# Patient Record
Sex: FEMALE | Race: WHITE | NOT HISPANIC OR LATINO | Employment: OTHER | URBAN - METROPOLITAN AREA
[De-identification: names, ages, dates, MRNs, and addresses within clinical notes are randomized per-mention and may not be internally consistent; named-entity substitution may affect disease eponyms.]

---

## 2017-01-19 ENCOUNTER — TRANSCRIBE ORDERS (OUTPATIENT)
Dept: ADMINISTRATIVE | Facility: HOSPITAL | Age: 67
End: 2017-01-19

## 2017-01-19 DIAGNOSIS — C49.9: Primary | ICD-10-CM

## 2017-02-07 ENCOUNTER — HOSPITAL ENCOUNTER (EMERGENCY)
Facility: HOSPITAL | Age: 67
Discharge: HOME | End: 2017-02-07
Attending: EMERGENCY MEDICINE | Admitting: EMERGENCY MEDICINE
Payer: MEDICARE

## 2017-02-07 ENCOUNTER — GENERIC CONVERSION - ENCOUNTER (OUTPATIENT)
Dept: OTHER | Facility: OTHER | Age: 67
End: 2017-02-07

## 2017-02-07 ENCOUNTER — APPOINTMENT (EMERGENCY)
Dept: RADIOLOGY | Facility: HOSPITAL | Age: 67
End: 2017-02-07
Payer: MEDICARE

## 2017-02-07 VITALS
SYSTOLIC BLOOD PRESSURE: 110 MMHG | DIASTOLIC BLOOD PRESSURE: 72 MMHG | HEART RATE: 78 BPM | RESPIRATION RATE: 20 BRPM | TEMPERATURE: 98.2 F | OXYGEN SATURATION: 96 % | HEIGHT: 63 IN | WEIGHT: 167 LBS | BODY MASS INDEX: 29.59 KG/M2

## 2017-02-07 DIAGNOSIS — K52.9 GASTROENTERITIS: Primary | ICD-10-CM

## 2017-02-07 LAB
ALBUMIN SERPL BCP-MCNC: 4 G/DL (ref 3.5–5)
ALP SERPL-CCNC: 46 U/L (ref 46–116)
ALT SERPL W P-5'-P-CCNC: 21 U/L (ref 12–78)
ANION GAP SERPL CALCULATED.3IONS-SCNC: 13 MMOL/L (ref 4–13)
APTT PPP: 22 SECONDS (ref 24–36)
AST SERPL W P-5'-P-CCNC: 23 U/L (ref 5–45)
ATRIAL RATE: 80 BPM
BASOPHILS # BLD MANUAL: 0.07 THOUSAND/UL (ref 0–0.1)
BASOPHILS NFR MAR MANUAL: 1 % (ref 0–1)
BILIRUB SERPL-MCNC: 0.3 MG/DL (ref 0.2–1)
BUN SERPL-MCNC: 22 MG/DL (ref 5–25)
C DIFF TOX GENS STL QL NAA+PROBE: NORMAL
CALCIUM SERPL-MCNC: 8.8 MG/DL (ref 8.3–10.1)
CHLORIDE SERPL-SCNC: 104 MMOL/L (ref 100–108)
CO2 SERPL-SCNC: 24 MMOL/L (ref 21–32)
CREAT SERPL-MCNC: 1.14 MG/DL (ref 0.6–1.3)
EOSINOPHIL # BLD MANUAL: 0.07 THOUSAND/UL (ref 0–0.4)
EOSINOPHIL NFR BLD MANUAL: 1 % (ref 0–6)
ERYTHROCYTE [DISTWIDTH] IN BLOOD BY AUTOMATED COUNT: 14.7 % (ref 11.6–15.1)
GFR SERPL CREATININE-BSD FRML MDRD: 47.4 ML/MIN/1.73SQ M
GLUCOSE SERPL-MCNC: 131 MG/DL (ref 65–140)
HCT VFR BLD AUTO: 37.3 % (ref 34.8–46.1)
HEMOCCULT STL QL: POSITIVE
HGB BLD-MCNC: 12.1 G/DL (ref 11.5–15.4)
INR PPP: 1.04 (ref 0.86–1.16)
LACTATE SERPL-SCNC: 3.1 MMOL/L (ref 0.5–2)
LIPASE SERPL-CCNC: 78 U/L (ref 73–393)
LYMPHOCYTES # BLD AUTO: 0.2 THOUSAND/UL (ref 0.6–4.47)
LYMPHOCYTES # BLD AUTO: 3 % (ref 14–44)
MCH RBC QN AUTO: 33.2 PG (ref 26.8–34.3)
MCHC RBC AUTO-ENTMCNC: 32.4 G/DL (ref 31.4–37.4)
MCV RBC AUTO: 102 FL (ref 82–98)
MONOCYTES # BLD AUTO: 0.2 THOUSAND/UL (ref 0–1.22)
MONOCYTES NFR BLD: 3 % (ref 4–12)
NEUTROPHILS # BLD MANUAL: 6.24 THOUSAND/UL (ref 1.85–7.62)
NEUTS SEG NFR BLD AUTO: 92 % (ref 43–75)
P AXIS: 72 DEGREES
PLATELET # BLD AUTO: 179 THOUSANDS/UL (ref 149–390)
PLATELET BLD QL SMEAR: ADEQUATE
PMV BLD AUTO: 11.3 FL (ref 8.9–12.7)
POTASSIUM SERPL-SCNC: 4 MMOL/L (ref 3.5–5.3)
PR INTERVAL: 140 MS
PROT SERPL-MCNC: 6.9 G/DL (ref 6.4–8.2)
PROTHROMBIN TIME: 13.5 SECONDS (ref 12–14.3)
QRS AXIS: -46 DEGREES
QRSD INTERVAL: 126 MS
QT INTERVAL: 434 MS
QTC INTERVAL: 500 MS
RBC # BLD AUTO: 3.65 MILLION/UL (ref 3.81–5.12)
RBC MORPH BLD: NORMAL
RV AG STL QL: NEGATIVE
SODIUM SERPL-SCNC: 141 MMOL/L (ref 136–145)
T WAVE AXIS: 64 DEGREES
TOTAL CELLS COUNTED SPEC: 100
VENTRICULAR RATE: 80 BPM
WBC # BLD AUTO: 6.78 THOUSAND/UL (ref 4.31–10.16)

## 2017-02-07 PROCEDURE — 87899 AGENT NOS ASSAY W/OPTIC: CPT | Performed by: EMERGENCY MEDICINE

## 2017-02-07 PROCEDURE — 71010 HB CHEST X-RAY 1 VIEW FRONTAL (PORTABLE): CPT

## 2017-02-07 PROCEDURE — 80053 COMPREHEN METABOLIC PANEL: CPT | Performed by: EMERGENCY MEDICINE

## 2017-02-07 PROCEDURE — 82272 OCCULT BLD FECES 1-3 TESTS: CPT | Performed by: EMERGENCY MEDICINE

## 2017-02-07 PROCEDURE — 96361 HYDRATE IV INFUSION ADD-ON: CPT

## 2017-02-07 PROCEDURE — 85007 BL SMEAR W/DIFF WBC COUNT: CPT | Performed by: EMERGENCY MEDICINE

## 2017-02-07 PROCEDURE — 93005 ELECTROCARDIOGRAM TRACING: CPT | Performed by: EMERGENCY MEDICINE

## 2017-02-07 PROCEDURE — 83690 ASSAY OF LIPASE: CPT | Performed by: EMERGENCY MEDICINE

## 2017-02-07 PROCEDURE — 36415 COLL VENOUS BLD VENIPUNCTURE: CPT | Performed by: EMERGENCY MEDICINE

## 2017-02-07 PROCEDURE — 83605 ASSAY OF LACTIC ACID: CPT | Performed by: EMERGENCY MEDICINE

## 2017-02-07 PROCEDURE — 87045 FECES CULTURE AEROBIC BACT: CPT | Performed by: EMERGENCY MEDICINE

## 2017-02-07 PROCEDURE — 89055 LEUKOCYTE ASSESSMENT FECAL: CPT | Performed by: EMERGENCY MEDICINE

## 2017-02-07 PROCEDURE — 99284 EMERGENCY DEPT VISIT MOD MDM: CPT

## 2017-02-07 PROCEDURE — 87425 ROTAVIRUS AG IA: CPT | Performed by: EMERGENCY MEDICINE

## 2017-02-07 PROCEDURE — 96374 THER/PROPH/DIAG INJ IV PUSH: CPT

## 2017-02-07 PROCEDURE — 85027 COMPLETE CBC AUTOMATED: CPT | Performed by: EMERGENCY MEDICINE

## 2017-02-07 PROCEDURE — 85730 THROMBOPLASTIN TIME PARTIAL: CPT | Performed by: EMERGENCY MEDICINE

## 2017-02-07 PROCEDURE — 87046 STOOL CULTR AEROBIC BACT EA: CPT | Performed by: EMERGENCY MEDICINE

## 2017-02-07 PROCEDURE — 85610 PROTHROMBIN TIME: CPT | Performed by: EMERGENCY MEDICINE

## 2017-02-07 PROCEDURE — 87493 C DIFF AMPLIFIED PROBE: CPT | Performed by: EMERGENCY MEDICINE

## 2017-02-07 PROCEDURE — 87015 SPECIMEN INFECT AGNT CONCNTJ: CPT | Performed by: EMERGENCY MEDICINE

## 2017-02-07 RX ORDER — ONDANSETRON 2 MG/ML
4 INJECTION INTRAMUSCULAR; INTRAVENOUS ONCE
Status: COMPLETED | OUTPATIENT
Start: 2017-02-07 | End: 2017-02-07

## 2017-02-07 RX ORDER — LOPERAMIDE HYDROCHLORIDE 2 MG/1
2 CAPSULE ORAL 4 TIMES DAILY PRN
COMMUNITY

## 2017-02-07 RX ORDER — IBUPROFEN 200 MG
400 TABLET ORAL
COMMUNITY
End: 2019-06-07 | Stop reason: ALTCHOICE

## 2017-02-07 RX ORDER — ONDANSETRON 4 MG/1
4 TABLET, ORALLY DISINTEGRATING ORAL EVERY 8 HOURS PRN
Qty: 9 TABLET | Refills: 0 | Status: SHIPPED | OUTPATIENT
Start: 2017-02-07 | End: 2019-06-27

## 2017-02-07 RX ORDER — ALPRAZOLAM 0.25 MG/1
TABLET ORAL
COMMUNITY
End: 2020-05-07 | Stop reason: ALTCHOICE

## 2017-02-07 RX ORDER — TRIAMCINOLONE ACETONIDE 55 UG/1
SPRAY, METERED NASAL
COMMUNITY
Start: 2014-10-15 | End: 2018-03-12

## 2017-02-07 RX ORDER — ASPIRIN 325 MG
325 TABLET ORAL DAILY
COMMUNITY
End: 2020-05-03 | Stop reason: HOSPADM

## 2017-02-07 RX ORDER — HYDROXYCHLOROQUINE SULFATE 200 MG/1
200 TABLET, FILM COATED ORAL
COMMUNITY
Start: 2015-04-04 | End: 2019-11-01 | Stop reason: ALTCHOICE

## 2017-02-07 RX ADMIN — SODIUM CHLORIDE 1000 ML: 0.9 INJECTION, SOLUTION INTRAVENOUS at 05:22

## 2017-02-07 RX ADMIN — ONDANSETRON 4 MG: 2 INJECTION INTRAMUSCULAR; INTRAVENOUS at 04:55

## 2017-02-09 LAB
BACTERIA STL CULT: NORMAL
BACTERIA STL CULT: NORMAL

## 2017-02-12 LAB — WBC SPEC QL GRAM STN: ABNORMAL

## 2017-03-01 ENCOUNTER — TRANSCRIBE ORDERS (OUTPATIENT)
Dept: ADMINISTRATIVE | Facility: HOSPITAL | Age: 67
End: 2017-03-01

## 2017-03-01 ENCOUNTER — APPOINTMENT (OUTPATIENT)
Dept: LAB | Facility: HOSPITAL | Age: 67
End: 2017-03-01
Payer: MEDICARE

## 2017-03-01 DIAGNOSIS — C49.9 ENDOTHELIOSARCOMA (HCC): Primary | ICD-10-CM

## 2017-03-01 DIAGNOSIS — C49.9 ENDOTHELIOSARCOMA (HCC): ICD-10-CM

## 2017-03-01 LAB
ALBUMIN SERPL BCP-MCNC: 3.4 G/DL (ref 3.5–5)
ALP SERPL-CCNC: 41 U/L (ref 46–116)
ALT SERPL W P-5'-P-CCNC: 21 U/L (ref 12–78)
ANION GAP SERPL CALCULATED.3IONS-SCNC: 7 MMOL/L (ref 4–13)
AST SERPL W P-5'-P-CCNC: 20 U/L (ref 5–45)
BILIRUB SERPL-MCNC: 0.2 MG/DL (ref 0.2–1)
BUN SERPL-MCNC: 22 MG/DL (ref 5–25)
CALCIUM SERPL-MCNC: 8.2 MG/DL (ref 8.3–10.1)
CHLORIDE SERPL-SCNC: 109 MMOL/L (ref 100–108)
CO2 SERPL-SCNC: 27 MMOL/L (ref 21–32)
CREAT SERPL-MCNC: 1 MG/DL (ref 0.6–1.3)
ERYTHROCYTE [DISTWIDTH] IN BLOOD BY AUTOMATED COUNT: 14.5 % (ref 11.6–15.1)
GFR SERPL CREATININE-BSD FRML MDRD: 55.1 ML/MIN/1.73SQ M
GLUCOSE SERPL-MCNC: 90 MG/DL (ref 65–140)
HCT VFR BLD AUTO: 33 % (ref 34.8–46.1)
HGB BLD-MCNC: 10.4 G/DL (ref 11.5–15.4)
MCH RBC QN AUTO: 32.5 PG (ref 26.8–34.3)
MCHC RBC AUTO-ENTMCNC: 31.5 G/DL (ref 31.4–37.4)
MCV RBC AUTO: 103 FL (ref 82–98)
PLATELET # BLD AUTO: 180 THOUSANDS/UL (ref 149–390)
PMV BLD AUTO: 11.4 FL (ref 8.9–12.7)
POTASSIUM SERPL-SCNC: 4.4 MMOL/L (ref 3.5–5.3)
PROT SERPL-MCNC: 6.5 G/DL (ref 6.4–8.2)
RBC # BLD AUTO: 3.2 MILLION/UL (ref 3.81–5.12)
SODIUM SERPL-SCNC: 143 MMOL/L (ref 136–145)
WBC # BLD AUTO: 4.18 THOUSAND/UL (ref 4.31–10.16)

## 2017-03-01 PROCEDURE — 85027 COMPLETE CBC AUTOMATED: CPT

## 2017-03-01 PROCEDURE — 36415 COLL VENOUS BLD VENIPUNCTURE: CPT

## 2017-03-01 PROCEDURE — 80053 COMPREHEN METABOLIC PANEL: CPT

## 2017-03-16 ENCOUNTER — HOSPITAL ENCOUNTER (OUTPATIENT)
Dept: CT IMAGING | Facility: HOSPITAL | Age: 67
Discharge: HOME | End: 2017-03-16
Payer: MEDICARE

## 2017-03-16 DIAGNOSIS — C49.9: ICD-10-CM

## 2017-03-16 PROCEDURE — 71260 CT THORAX DX C+: CPT

## 2017-03-16 PROCEDURE — 74177 CT ABD & PELVIS W/CONTRAST: CPT

## 2017-03-16 RX ADMIN — IOHEXOL 100 ML: 350 INJECTION, SOLUTION INTRAVENOUS at 09:36

## 2017-03-23 ENCOUNTER — TRANSCRIBE ORDERS (OUTPATIENT)
Dept: ADMINISTRATIVE | Facility: HOSPITAL | Age: 67
End: 2017-03-23

## 2017-03-23 DIAGNOSIS — T45.1X5A CHEMOTHERAPY ADVERSE REACTION, INITIAL ENCOUNTER: Primary | ICD-10-CM

## 2017-03-28 ENCOUNTER — HOSPITAL ENCOUNTER (OUTPATIENT)
Dept: NON INVASIVE DIAGNOSTICS | Facility: HOSPITAL | Age: 67
Discharge: HOME | End: 2017-03-28
Payer: MEDICARE

## 2017-03-28 DIAGNOSIS — T45.1X5A CHEMOTHERAPY ADVERSE REACTION, INITIAL ENCOUNTER: ICD-10-CM

## 2017-03-28 PROCEDURE — 93308 TTE F-UP OR LMTD: CPT

## 2017-03-29 ENCOUNTER — TRANSCRIBE ORDERS (OUTPATIENT)
Dept: ADMINISTRATIVE | Facility: HOSPITAL | Age: 67
End: 2017-03-29

## 2017-03-29 ENCOUNTER — ALLSCRIPTS OFFICE VISIT (OUTPATIENT)
Dept: OTHER | Facility: OTHER | Age: 67
End: 2017-03-29

## 2017-03-29 DIAGNOSIS — I45.10 RIGHT BUNDLE-BRANCH BLOCK: ICD-10-CM

## 2017-04-03 ENCOUNTER — HOSPITAL ENCOUNTER (OUTPATIENT)
Dept: NON INVASIVE DIAGNOSTICS | Facility: CLINIC | Age: 67
Discharge: HOME | End: 2017-04-03
Payer: MEDICARE

## 2017-04-03 DIAGNOSIS — I45.10 RIGHT BUNDLE-BRANCH BLOCK: ICD-10-CM

## 2017-04-03 PROCEDURE — 93225 XTRNL ECG REC<48 HRS REC: CPT

## 2017-04-03 PROCEDURE — 93226 XTRNL ECG REC<48 HR SCAN A/R: CPT

## 2017-04-19 ENCOUNTER — APPOINTMENT (OUTPATIENT)
Dept: LAB | Facility: HOSPITAL | Age: 67
End: 2017-04-19
Payer: MEDICARE

## 2017-04-19 ENCOUNTER — TRANSCRIBE ORDERS (OUTPATIENT)
Dept: ADMINISTRATIVE | Facility: HOSPITAL | Age: 67
End: 2017-04-19

## 2017-04-19 DIAGNOSIS — C49.A0 STROMAL TUMOR OF DIGESTIVE SYSTEM (HCC): ICD-10-CM

## 2017-04-19 DIAGNOSIS — C79.89 SECONDARY MALIGNANT NEOPLASM OF OTHER SPECIFIED SITES(198.89): ICD-10-CM

## 2017-04-19 DIAGNOSIS — C49.A0 STROMAL TUMOR OF DIGESTIVE SYSTEM (HCC): Primary | ICD-10-CM

## 2017-04-19 LAB
ALBUMIN SERPL BCP-MCNC: 3.6 G/DL (ref 3.5–5)
ALP SERPL-CCNC: 56 U/L (ref 46–116)
ALT SERPL W P-5'-P-CCNC: 23 U/L (ref 12–78)
ANION GAP SERPL CALCULATED.3IONS-SCNC: 8 MMOL/L (ref 4–13)
AST SERPL W P-5'-P-CCNC: 26 U/L (ref 5–45)
BASOPHILS # BLD AUTO: 0.01 THOUSANDS/ΜL (ref 0–0.1)
BASOPHILS NFR BLD AUTO: 0 % (ref 0–1)
BILIRUB SERPL-MCNC: 0.3 MG/DL (ref 0.2–1)
BUN SERPL-MCNC: 19 MG/DL (ref 5–25)
CALCIUM SERPL-MCNC: 8.5 MG/DL (ref 8.3–10.1)
CHLORIDE SERPL-SCNC: 108 MMOL/L (ref 100–108)
CO2 SERPL-SCNC: 26 MMOL/L (ref 21–32)
CREAT SERPL-MCNC: 0.97 MG/DL (ref 0.6–1.3)
EOSINOPHIL # BLD AUTO: 0.51 THOUSAND/ΜL (ref 0–0.61)
EOSINOPHIL NFR BLD AUTO: 14 % (ref 0–6)
ERYTHROCYTE [DISTWIDTH] IN BLOOD BY AUTOMATED COUNT: 14.9 % (ref 11.6–15.1)
GFR SERPL CREATININE-BSD FRML MDRD: 57.1 ML/MIN/1.73SQ M
GLUCOSE P FAST SERPL-MCNC: 92 MG/DL (ref 65–99)
HCT VFR BLD AUTO: 39.3 % (ref 34.8–46.1)
HGB BLD-MCNC: 12.8 G/DL (ref 11.5–15.4)
LYMPHOCYTES # BLD AUTO: 0.57 THOUSANDS/ΜL (ref 0.6–4.47)
LYMPHOCYTES NFR BLD AUTO: 15 % (ref 14–44)
MCH RBC QN AUTO: 33 PG (ref 26.8–34.3)
MCHC RBC AUTO-ENTMCNC: 32.6 G/DL (ref 31.4–37.4)
MCV RBC AUTO: 101 FL (ref 82–98)
MONOCYTES # BLD AUTO: 0.41 THOUSAND/ΜL (ref 0.17–1.22)
MONOCYTES NFR BLD AUTO: 11 % (ref 4–12)
NEUTROPHILS # BLD AUTO: 2.22 THOUSANDS/ΜL (ref 1.85–7.62)
NEUTS SEG NFR BLD AUTO: 60 % (ref 43–75)
PLATELET # BLD AUTO: 190 THOUSANDS/UL (ref 149–390)
PMV BLD AUTO: 11.3 FL (ref 8.9–12.7)
POTASSIUM SERPL-SCNC: 4.3 MMOL/L (ref 3.5–5.3)
PROT SERPL-MCNC: 6.7 G/DL (ref 6.4–8.2)
RBC # BLD AUTO: 3.88 MILLION/UL (ref 3.81–5.12)
SODIUM SERPL-SCNC: 142 MMOL/L (ref 136–145)
TSH SERPL DL<=0.05 MIU/L-ACNC: 1.98 UIU/ML (ref 0.36–3.74)
WBC # BLD AUTO: 3.72 THOUSAND/UL (ref 4.31–10.16)

## 2017-04-19 PROCEDURE — 84443 ASSAY THYROID STIM HORMONE: CPT

## 2017-04-19 PROCEDURE — 36415 COLL VENOUS BLD VENIPUNCTURE: CPT

## 2017-04-19 PROCEDURE — 80053 COMPREHEN METABOLIC PANEL: CPT

## 2017-04-19 PROCEDURE — 85025 COMPLETE CBC W/AUTO DIFF WBC: CPT

## 2017-05-17 ENCOUNTER — APPOINTMENT (OUTPATIENT)
Dept: LAB | Facility: HOSPITAL | Age: 67
End: 2017-05-17
Payer: MEDICARE

## 2017-05-17 DIAGNOSIS — C49.A0 STROMAL TUMOR OF DIGESTIVE SYSTEM (HCC): ICD-10-CM

## 2017-05-17 LAB
ALBUMIN SERPL BCP-MCNC: 3.3 G/DL (ref 3.5–5)
ALP SERPL-CCNC: 48 U/L (ref 46–116)
ALT SERPL W P-5'-P-CCNC: 22 U/L (ref 12–78)
ANION GAP SERPL CALCULATED.3IONS-SCNC: 7 MMOL/L (ref 4–13)
AST SERPL W P-5'-P-CCNC: 27 U/L (ref 5–45)
BASOPHILS # BLD AUTO: 0.01 THOUSANDS/ΜL (ref 0–0.1)
BASOPHILS NFR BLD AUTO: 0 % (ref 0–1)
BILIRUB SERPL-MCNC: 0.3 MG/DL (ref 0.2–1)
BUN SERPL-MCNC: 20 MG/DL (ref 5–25)
CALCIUM SERPL-MCNC: 8.3 MG/DL (ref 8.3–10.1)
CHLORIDE SERPL-SCNC: 107 MMOL/L (ref 100–108)
CO2 SERPL-SCNC: 30 MMOL/L (ref 21–32)
CREAT SERPL-MCNC: 1 MG/DL (ref 0.6–1.3)
EOSINOPHIL # BLD AUTO: 0.13 THOUSAND/ΜL (ref 0–0.61)
EOSINOPHIL NFR BLD AUTO: 4 % (ref 0–6)
ERYTHROCYTE [DISTWIDTH] IN BLOOD BY AUTOMATED COUNT: 15.6 % (ref 11.6–15.1)
GFR SERPL CREATININE-BSD FRML MDRD: 55.1 ML/MIN/1.73SQ M
GLUCOSE P FAST SERPL-MCNC: 85 MG/DL (ref 65–99)
HCT VFR BLD AUTO: 39.8 % (ref 34.8–46.1)
HGB BLD-MCNC: 13.1 G/DL (ref 11.5–15.4)
LYMPHOCYTES # BLD AUTO: 0.96 THOUSANDS/ΜL (ref 0.6–4.47)
LYMPHOCYTES NFR BLD AUTO: 26 % (ref 14–44)
MCH RBC QN AUTO: 33.2 PG (ref 26.8–34.3)
MCHC RBC AUTO-ENTMCNC: 32.9 G/DL (ref 31.4–37.4)
MCV RBC AUTO: 101 FL (ref 82–98)
MONOCYTES # BLD AUTO: 0.33 THOUSAND/ΜL (ref 0.17–1.22)
MONOCYTES NFR BLD AUTO: 9 % (ref 4–12)
NEUTROPHILS # BLD AUTO: 2.22 THOUSANDS/ΜL (ref 1.85–7.62)
NEUTS SEG NFR BLD AUTO: 61 % (ref 43–75)
PLATELET # BLD AUTO: 154 THOUSANDS/UL (ref 149–390)
PMV BLD AUTO: 10.6 FL (ref 8.9–12.7)
POTASSIUM SERPL-SCNC: 4.3 MMOL/L (ref 3.5–5.3)
PROT SERPL-MCNC: 6.2 G/DL (ref 6.4–8.2)
RBC # BLD AUTO: 3.95 MILLION/UL (ref 3.81–5.12)
SODIUM SERPL-SCNC: 144 MMOL/L (ref 136–145)
TSH SERPL DL<=0.05 MIU/L-ACNC: 2.88 UIU/ML (ref 0.36–3.74)
WBC # BLD AUTO: 3.65 THOUSAND/UL (ref 4.31–10.16)

## 2017-05-17 PROCEDURE — 85025 COMPLETE CBC W/AUTO DIFF WBC: CPT

## 2017-05-17 PROCEDURE — 84443 ASSAY THYROID STIM HORMONE: CPT

## 2017-05-17 PROCEDURE — 36415 COLL VENOUS BLD VENIPUNCTURE: CPT

## 2017-05-17 PROCEDURE — 80053 COMPREHEN METABOLIC PANEL: CPT

## 2017-05-22 ENCOUNTER — TRANSCRIBE ORDERS (OUTPATIENT)
Dept: ADMINISTRATIVE | Facility: HOSPITAL | Age: 67
End: 2017-05-22

## 2017-05-22 DIAGNOSIS — C49.A0 STROMAL TUMOR OF DIGESTIVE SYSTEM (HCC): Primary | ICD-10-CM

## 2017-05-23 DIAGNOSIS — I48.0 PAROXYSMAL ATRIAL FIBRILLATION (HCC): ICD-10-CM

## 2017-05-23 DIAGNOSIS — I10 ESSENTIAL (PRIMARY) HYPERTENSION: ICD-10-CM

## 2017-06-05 ENCOUNTER — APPOINTMENT (OUTPATIENT)
Dept: LAB | Facility: HOSPITAL | Age: 67
End: 2017-06-05
Attending: INTERNAL MEDICINE
Payer: MEDICARE

## 2017-06-05 ENCOUNTER — TRANSCRIBE ORDERS (OUTPATIENT)
Dept: ADMINISTRATIVE | Facility: HOSPITAL | Age: 67
End: 2017-06-05

## 2017-06-05 ENCOUNTER — ALLSCRIPTS OFFICE VISIT (OUTPATIENT)
Dept: OTHER | Facility: OTHER | Age: 67
End: 2017-06-05

## 2017-06-05 DIAGNOSIS — I10 ESSENTIAL (PRIMARY) HYPERTENSION: ICD-10-CM

## 2017-06-05 DIAGNOSIS — C49.A0 STROMAL TUMOR OF DIGESTIVE SYSTEM (HCC): Primary | ICD-10-CM

## 2017-06-05 DIAGNOSIS — I48.0 PAROXYSMAL ATRIAL FIBRILLATION (HCC): ICD-10-CM

## 2017-06-05 DIAGNOSIS — C49.A0 STROMAL TUMOR OF DIGESTIVE SYSTEM (HCC): ICD-10-CM

## 2017-06-05 LAB
ALBUMIN SERPL BCP-MCNC: 3.4 G/DL (ref 3.5–5)
ALP SERPL-CCNC: 46 U/L (ref 46–116)
ALT SERPL W P-5'-P-CCNC: 23 U/L (ref 12–78)
ANION GAP SERPL CALCULATED.3IONS-SCNC: 9 MMOL/L (ref 4–13)
AST SERPL W P-5'-P-CCNC: 26 U/L (ref 5–45)
BASOPHILS # BLD AUTO: 0 THOUSANDS/ΜL (ref 0–0.1)
BASOPHILS NFR BLD AUTO: 0 % (ref 0–1)
BILIRUB SERPL-MCNC: 0.3 MG/DL (ref 0.2–1)
BUN SERPL-MCNC: 13 MG/DL (ref 5–25)
CALCIUM SERPL-MCNC: 8.8 MG/DL (ref 8.3–10.1)
CHLORIDE SERPL-SCNC: 104 MMOL/L (ref 100–108)
CO2 SERPL-SCNC: 28 MMOL/L (ref 21–32)
CREAT SERPL-MCNC: 1.1 MG/DL (ref 0.6–1.3)
EOSINOPHIL # BLD AUTO: 0.06 THOUSAND/ΜL (ref 0–0.61)
EOSINOPHIL NFR BLD AUTO: 2 % (ref 0–6)
ERYTHROCYTE [DISTWIDTH] IN BLOOD BY AUTOMATED COUNT: 15.9 % (ref 11.6–15.1)
GFR SERPL CREATININE-BSD FRML MDRD: 49.4 ML/MIN/1.73SQ M
GLUCOSE P FAST SERPL-MCNC: 101 MG/DL (ref 65–99)
HCT VFR BLD AUTO: 42.1 % (ref 34.8–46.1)
HGB BLD-MCNC: 13.8 G/DL (ref 11.5–15.4)
LYMPHOCYTES # BLD AUTO: 0.81 THOUSANDS/ΜL (ref 0.6–4.47)
LYMPHOCYTES NFR BLD AUTO: 30 % (ref 14–44)
MCH RBC QN AUTO: 33.3 PG (ref 26.8–34.3)
MCHC RBC AUTO-ENTMCNC: 32.8 G/DL (ref 31.4–37.4)
MCV RBC AUTO: 102 FL (ref 82–98)
MONOCYTES # BLD AUTO: 0.43 THOUSAND/ΜL (ref 0.17–1.22)
MONOCYTES NFR BLD AUTO: 16 % (ref 4–12)
NEUTROPHILS # BLD AUTO: 1.45 THOUSANDS/ΜL (ref 1.85–7.62)
NEUTS SEG NFR BLD AUTO: 52 % (ref 43–75)
PLATELET # BLD AUTO: 175 THOUSANDS/UL (ref 149–390)
PMV BLD AUTO: 10.3 FL (ref 8.9–12.7)
POTASSIUM SERPL-SCNC: 4.1 MMOL/L (ref 3.5–5.3)
PROT SERPL-MCNC: 6.4 G/DL (ref 6.4–8.2)
RBC # BLD AUTO: 4.14 MILLION/UL (ref 3.81–5.12)
SODIUM SERPL-SCNC: 141 MMOL/L (ref 136–145)
WBC # BLD AUTO: 2.75 THOUSAND/UL (ref 4.31–10.16)

## 2017-06-05 PROCEDURE — 36415 COLL VENOUS BLD VENIPUNCTURE: CPT

## 2017-06-05 PROCEDURE — 85025 COMPLETE CBC W/AUTO DIFF WBC: CPT

## 2017-06-05 PROCEDURE — 80053 COMPREHEN METABOLIC PANEL: CPT

## 2017-06-09 ENCOUNTER — ALLSCRIPTS OFFICE VISIT (OUTPATIENT)
Dept: RADIOLOGY | Facility: CLINIC | Age: 67
End: 2017-06-09
Payer: MEDICARE

## 2017-06-14 ENCOUNTER — HOSPITAL ENCOUNTER (OUTPATIENT)
Dept: CT IMAGING | Facility: HOSPITAL | Age: 67
Discharge: HOME | End: 2017-06-14
Payer: MEDICARE

## 2017-06-14 DIAGNOSIS — C49.A0 STROMAL TUMOR OF DIGESTIVE SYSTEM (HCC): ICD-10-CM

## 2017-06-14 PROCEDURE — 74177 CT ABD & PELVIS W/CONTRAST: CPT

## 2017-06-14 RX ADMIN — IODIXANOL 85 ML: 320 INJECTION, SOLUTION INTRAVASCULAR at 09:26

## 2017-07-25 ENCOUNTER — ALLSCRIPTS OFFICE VISIT (OUTPATIENT)
Dept: OTHER | Facility: OTHER | Age: 67
End: 2017-07-25

## 2017-12-12 ENCOUNTER — ALLSCRIPTS OFFICE VISIT (OUTPATIENT)
Dept: OTHER | Facility: OTHER | Age: 67
End: 2017-12-12

## 2017-12-13 ENCOUNTER — GENERIC CONVERSION - ENCOUNTER (OUTPATIENT)
Dept: OTHER | Facility: OTHER | Age: 67
End: 2017-12-13

## 2017-12-13 NOTE — PROGRESS NOTES
was demonstrated using models and literature was provided  Verbal and written consent was obtained  because of helpful our schedule is and her schedule with chemotherapy, we would not be able to proceed with injection for at least another 2 weeks and since I do feel there is definitely significant inflammatory component, we are going to do a titrating dose of oral prednisone  discussed with the patient that at this point time since I feel that there is a significant inflammatory component to their pain symptoms, that they would benefit from a titrating dose of oral steroids over the next 7 days  I advised the patient that while on the steroids, they should not take any other oral NSAIDs except for acetaminophen or Tylenol until they have completed the steroid taper  I also advised them that once they have completed the steroid taper, they are to give our office a follow up phone call to let us know how they are doing and if there is any improvement  The patient was agreeable and verbalized an understanding  discussed with the patient that at this point time she can followup with our office on an as-needed basis  I did review the patient that if her pain symptoms should change, worsen, and/or if she would experience any new symptoms as she would like to be evaluated for, she should give our office a call  The patient was agreeable and verbalized an understanding  The patient has the current Goals: To proceed with a repeat right SI joint injection and the prednisone taper as discussed and hopefully note at least moderate and stable relief soon  The patent has the current Barriers: Chronic pain syndrome  Patient is able to Self-Care  The treatment plan was reviewed with the patient/guardian   The patient/guardian understands and agrees with the treatment plan   The patient was counseled regarding instructions for management,-- prognosis,-- patient and family education,-- impressions,-- risks and benefits of treatment options-- and-- importance of compliance with treatment  total time of encounter was 25 minutes  Chief Complaint    1  Pain  Worsening right sided low back/SI joint and leg pain, returned  History of Present Illness  The patient presents today for a follow up office visit and re-evaluation of the return of her right-sided low back, SI joint, and leg pain  She reports that the right sacroiliac joint injection she received on June 9, 2017 provided moderate to significant relief up until about 1 week ago  She states that without any trauma or injury as she was just lying in bed and rolled over, she heard a snapping sound, which caused significant pain and ever since then the right SI joint and low back pain in the same areas as it was before has been significantly inflamed and causing pain  She reports that she has been using heat, cold, and ibuprofen with minimal relief  She also followed up with a chiropractor who feels it is her SI joint and she presents today to discuss the possibility proceed with a repeat SI joint injection with Dr Navya Patel  However, the patient is now on new chemotherapy, which also makes it difficult for her to schedule the injection and presents today to discuss her medication regimen and treatment plan  Sarah Johnson presents with complaints of constant episodes of severe right lower back, right buttock, right hip, right thigh and right knee pain, described as throbbing and cramping, radiating to the lower back, right buttock and right lower extremity  On a scale of 1 to 10, the patient rates the pain as 10  Symptoms are worsening  Review of Systems   Constitutional: no fever,-- no recent weight gain-- and-- no recent weight loss  Eyes: no double vision-- and-- no blurry vision  Cardiovascular: no chest pain,-- no palpitations-- and-- no lower extremity edema  Respiratory: shortness of breath, but-- no wheezing    Musculoskeletal: difficulty walking,-- joint stiffness-- and-- decreased range of motion, but-- no muscle weakness,-- no joint swelling,-- no limb swelling-- and-- no pain in extremity  Neurological: no dizziness,-- no difficulty swallowing,-- no memory loss,-- no loss of consciousness-- and-- no seizures  Gastrointestinal: constipation-- and-- diarrhea, but-- no nausea-- and-- no vomiting  Genitourinary: no difficulty initiating urine stream,-- no genital pain-- and-- no frequent urination  Integumentary: no complaints of skin rash  Psychiatric: no depression  Endocrine: no excessive thirst,-- no adrenal disease,-- no hypothyroidism-- and-- no hyperthyroidism  Hematologic/Lymphatic: no tendency for easy bruising-- and-- no tendency for easy bleeding  Active Problems    1  Anxiety (300 00) (F41 9)   2  Chronic hip pain, right (099 11,222 24) (M25 551,G89 29)   3  Chronic right-sided low back pain without sciatica (724 2,338 29) (M54 5,G89 29)   4  Closed nondisplaced fracture of neck of left radius with delayed healing, subsequent encounter (V54 12) (S52 135G)   5  DJD (degenerative joint disease) (715 90) (M19 90)   6  Gait disturbance (781 2) (R26 9)   7  Greater trochanteric bursitis of right hip (726 5) (M70 61)   8  Hallux Limitus Of The Left First Toe (735 8)   9  Herniated nucleus pulposus, L5-S1, right (722 10) (M51 27)   10  Hiatal hernia (553 3) (K44 9)   11  Hypertension (401 9) (I10)   12  Lumbar stenosis without neurogenic claudication (724 02) (M48 061)   13  Macrocytic Anemia With Vitamin B12 Deficiency (281 1)   14  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)   15  MARK (obstructive sleep apnea) (327 23) (G47 33)   16  Pain in joint, hand (719 44) (M25 549)   17  Pain syndrome, chronic (338 4) (G89 4)   18  Paroxysmal atrial fibrillation (427 31) (I48 0)   19  Premature atrial contractions (427 61) (I49 1)   20  Pre-procedural examination (V72 84) (Z01 818)   21  Right bundle-branch block (426 4) (I45 10)   22   Sacroiliac joint dysfunction of right side (724 6) (M53 3)   23  Sacroiliitis (720 2) (M46 1)   24  Spondylosis of lumbar region without myelopathy or radiculopathy (721 3) (M47 816)   25  Sprain of medial collateral ligament of left knee, subsequent encounter (V58 89,844 1)  (S83 412D)   26  Status post lumbar laminectomy (V45 89) (Z98 890)   27  Supraventricular tachycardia (427 89) (I47 1)    Past Medical History    1  History of Chronic lumbar radiculopathy (724 4) (M54 16)   2  History of Closed fracture of neck of left radius, sequela   3  History of Closed nondisplaced fracture of neck of left radius with routine healing, subsequent encounter (V54 12) (S52 135D)   4  History of Closed nondisplaced fracture of neck of left radius, initial encounter (813 06) (S52 135A)   5  History of GIST (gastrointestinal stromal tumor), 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)   6  History of atrial fibrillation (V12 59) (Z86 79)   7  History of liver cancer (V10 07) (Z85 05)   8  Personal history of arthritis (V13 4) (Z87 39)   9  History of Rectal cancer (154 1) (C20)   10  History of Reflux (530 81) (K21 9)   11  History of Sprain of medial collateral ligament of knee, left, initial encounter    The active problems and past medical history were reviewed and updated today  Surgical History  1  History of Back Surgery   2  History of Breast Surgery   3  History of Bunion Correction By Lapidus Procedure   4  History of Cataract Surgery   5  History of Hallux Valgus (Bunion) Correction   6  History of Rotator Cuff Repair   7  History of Shoulder Arthroscopy   8  History of Sinus Surgery   9  History of Small Bowel Resection   10  History of Tympanoplasty    The surgical history was reviewed and updated today  Family History  Mother    1  Family history of liver cancer (V16 0) (Z80 0)   2  Family history of lung cancer (V16 1) (Z80 1)   3  Family history of Hip joint replacement status  Father    4   Family history of Prostate cancer  Family History    5  Family history of Back disorder   6  Family history of cardiac disorder (V17 49) (Z82 49)   7  Family history of cerebrovascular accident (V17 1) (Z82 3)   8  Family history of malignant neoplasm (V16 9) (Z80 9)    The family history was reviewed and updated today  Social History     · Former smoker (G55 80) (J71 078)  The social history was reviewed and updated today  The social history was reviewed and is unchanged  Current Meds   1  ALPRAZolam 0 25 MG Oral Tablet; TAKE 1 TABLET AT BEDTIME; Therapy: (Recorded:16Oct2013) to Recorded   2  Aspirin 325 MG Oral Tablet; TAKE 1 TABLET DAILY; Therapy: (Recorded:16Oct2013) to Recorded   3  Hydroxychloroquine Sulfate 200 MG Oral Tablet; Take 1 tablet twice daily; Therapy: 68TQO6049 to Recorded   4  Metoprolol Succinate ER 25 MG Oral Tablet Extended Release 24 Hour; take 1 tablet twice a day; Therapy: 60TFT7222 to (Bijan Vogel)  Requested for: 47MCM1810; Last Rx:75Whx2771 Ordered   5  Nasacort AQ 55 MCG/ACT AERS; Therapy: (Recorded:16Oct2013) to Recorded   6  Probiotic CAPS; Therapy: (Recorded:40Czr8108) to Recorded   7  Reglan SOLN; Therapy: (Recorded:33Aab6287) to Recorded   8  Tums CHEW; Therapy: (Recorded:51Pmf9166) to Recorded   9  Tylenol Extra Strength 500 MG Oral Tablet Recorded   10  Zofran TABS; Therapy: (Recorded:04Xoh9769) to Recorded    The medication list was reviewed and updated today  Allergies  1  Adhesive 1x6yd TAPE   2  Codeine Derivatives   3  Neosporin OINT   4  Zomig TABS    Vitals  Vital Signs    Recorded: 91Uds9286 07:35AM   Temperature 98 F   Heart Rate 64   Systolic 394   Diastolic 70   Height 5 ft 3 in   Weight 154 lb    BMI Calculated 27 28   BSA Calculated 1 73   Pain Scale 10       Physical Exam   Constitutional  General appearance: Well developed, well nourished, alert, in no distress, non-toxic and no overt pain behavior  Eyes  Sclera: anicteric  HEENT  Hearing grossly intact     Pulmonary  Respiratory

## 2017-12-19 ENCOUNTER — TRANSCRIBE ORDERS (OUTPATIENT)
Dept: ADMINISTRATIVE | Facility: HOSPITAL | Age: 67
End: 2017-12-19

## 2017-12-19 ENCOUNTER — APPOINTMENT (OUTPATIENT)
Dept: LAB | Facility: HOSPITAL | Age: 67
End: 2017-12-19
Payer: MEDICARE

## 2017-12-19 DIAGNOSIS — C49.A0 STROMAL TUMOR OF DIGESTIVE SYSTEM (HCC): ICD-10-CM

## 2017-12-19 DIAGNOSIS — C49.A0 STROMAL TUMOR OF DIGESTIVE SYSTEM (HCC): Primary | ICD-10-CM

## 2017-12-19 LAB — TSH SERPL DL<=0.05 MIU/L-ACNC: 2.77 UIU/ML (ref 0.36–3.74)

## 2017-12-19 PROCEDURE — 36415 COLL VENOUS BLD VENIPUNCTURE: CPT

## 2017-12-19 PROCEDURE — 84443 ASSAY THYROID STIM HORMONE: CPT

## 2017-12-28 ENCOUNTER — HOSPITAL ENCOUNTER (OUTPATIENT)
Dept: RADIOLOGY | Facility: CLINIC | Age: 67
Discharge: HOME | End: 2018-01-03
Attending: ANESTHESIOLOGY | Admitting: ANESTHESIOLOGY
Payer: MEDICARE

## 2018-01-10 NOTE — MISCELLANEOUS
Message   Recorded as Task   Date: 03/25/2016 10:47 AM, Created By: Edith Blevins   Task Name: Follow Up   Assigned To: [facility]   Regarding Patient: Zoltan Hood, Status: In Progress   Comment:    BennieNathan - 25 Mar 2016 10:47 AM     TASK CREATED  Can you call the patient and advise her that her right lower ext EMG was normal and did not show any evidence of active or chronic lumbar radiculopathy  I would recommend she proceed with the Gabapentin titration first and f/u in 3-4 weeks and we will re-group then  For now, I think we should hold off on any injections  Sari Gallardo - 25 Mar 2016 11:51 AM     TASK EDITED  *** FYI ***  s/w pt, advised of above  Scheduled fu ov 4/20/2016 at 0915  Pt states she is currently taking gabapentin 1 pill qhs  Helps w/ sleep, noted drowsiness in the morning - improving  Pt states she has a h/o loose bowels, states gabapentin may be contributing  Advised pt - these are common se's which should improve w/ time as her body adjusts  BennieNathan - 25 Mar 2016 11:58 AM     TASK REPLIED TO: Previously Assigned To Edith Blevins  Provider aware  Agree with recommendations  THank you  Active Problems    1  Acute exacerbation of chronic low back pain (724 2,338 19,338 29) (M54 5,G89 29)   2  Anxiety (300 00) (F41 9)   3  Chronic hip pain, right (068 82,723 25) (M25 551,G89 29)   4  Chronic lumbar radiculopathy (724 4) (M54 16)   5  Closed nondisplaced fracture of neck of left radius with delayed healing, subsequent   encounter (V54 12) (S52 135G)   6  DJD (degenerative joint disease) (715 90) (M19 90)   7  Gait disturbance (781 2) (R26 9)   8  Greater trochanteric bursitis of right hip (726 5) (M70 61)   9  Hallux Limitus Of The Left First Toe (735 8)   10  Herniated nucleus pulposus, L5-S1, right (722 10) (M51 27)   11  Hiatal hernia (553 3) (K44 9)   12  Hypertension (401 9) (I10)   13   Lumbar stenosis without neurogenic claudication (724 02) (M48 06)   14  Macrocytic Anemia With Vitamin B12 Deficiency (281 1)   15  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)   16  MARK (obstructive sleep apnea) (327 23) (G47 33)   17  Pain in joint, hand (719 44) (M79 643)   18  Pain syndrome, chronic (338 4) (G89 4)   19  Paroxysmal atrial fibrillation (427 31) (I48 0)   20  Premature atrial contractions (427 61) (I49 1)   21  Pre-procedural examination (V72 84) (Z01 818)   22  Right bundle-branch block (426 4) (I45 10)   23  Sacroiliitis (720 2) (M46 1)   24  Spondylosis of lumbar region without myelopathy or radiculopathy (721 3) (M47 816)   25  Sprain of medial collateral ligament of left knee, subsequent encounter (V58 89,844 1)    (S83 412D)   26  Status post lumbar laminectomy (V45 89) (Z98 89)   27  Supraventricular tachycardia (427 89) (I47 1)    Current Meds   1  ALPRAZolam 0 25 MG Oral Tablet; TAKE 1 TABLET AT BEDTIME; Therapy: (Recorded:16Oct2013) to Recorded   2  Aspirin 325 MG Oral Tablet; TAKE 1 TABLET DAILY; Therapy: (Recorded:16Oct2013) to Recorded   3  Essential Magnesium TABS Recorded   4  Gabapentin 300 MG Oral Capsule; Take 1 pill every other night x 7 days, then 1 every   night; Therapy: 34QHG2810 to (Evaluate:15Apr2016)  Requested for: 29AKJ4287; Last   Rx:16Mar2016 Ordered   5  Gleevec 400 MG Oral Tablet (Imatinib Mesylate); TAKE 1 TABLET DAILY; Therapy: (Recorded:16Oct2013) to Recorded   6  Hydroxychloroquine Sulfate 200 MG Oral Tablet; Take 1 tablet twice daily; Therapy: 42KOU6053 to Recorded   7  Metoprolol Succinate ER 25 MG Oral Tablet Extended Release 24 Hour; Take 1 tablet   twice daily; Therapy: 03ZEN7924 to 03 17 74 30 53)  Requested for: 23HKQ9809; Last   Rx:02Nov2015 Ordered   8  Nasacort AQ 55 MCG/ACT AERS; Therapy: (Recorded:16Oct2013) to Recorded   9  Omeprazole 20 MG Oral Capsule Delayed Release; TAKE 1 CAPSULE DAILY; Therapy: (Recorded:16Oct2013) to Recorded   10   Tylenol Extra Strength 500 MG Oral Tablet Recorded   11  Vitamin D 2000 UNIT Oral Capsule; Therapy: (Recorded:16Oct2013) to Recorded   12  Voltaren 1 % Transdermal Gel; Apply 4 grams to right hip QID; Therapy: 83DNP0977 to (Evaluate:31Jan2016)  Requested for: 24Ymp3498; Last    Rx:89Kuh0873 Ordered    Allergies    1  Adhesive 1"x6yd TAPE   2  Codeine Derivatives   3  Neosporin OINT   4   Zomig TABS    Signatures   Electronically signed by : Chano Fink, ; Mar 25 2016 11:59AM EST                       (Author)

## 2018-01-12 VITALS
TEMPERATURE: 98.3 F | WEIGHT: 159 LBS | DIASTOLIC BLOOD PRESSURE: 82 MMHG | SYSTOLIC BLOOD PRESSURE: 138 MMHG | HEART RATE: 72 BPM | BODY MASS INDEX: 28.17 KG/M2 | HEIGHT: 63 IN

## 2018-01-12 VITALS
SYSTOLIC BLOOD PRESSURE: 118 MMHG | TEMPERATURE: 98.3 F | DIASTOLIC BLOOD PRESSURE: 64 MMHG | WEIGHT: 159 LBS | BODY MASS INDEX: 28.17 KG/M2 | HEART RATE: 78 BPM | HEIGHT: 63 IN

## 2018-01-13 VITALS
WEIGHT: 172 LBS | DIASTOLIC BLOOD PRESSURE: 78 MMHG | SYSTOLIC BLOOD PRESSURE: 124 MMHG | RESPIRATION RATE: 14 BRPM | HEIGHT: 63 IN | HEART RATE: 68 BPM | BODY MASS INDEX: 30.48 KG/M2

## 2018-01-16 ENCOUNTER — ALLSCRIPTS OFFICE VISIT (OUTPATIENT)
Dept: OTHER | Facility: OTHER | Age: 68
End: 2018-01-16

## 2018-01-16 DIAGNOSIS — M54.16 RADICULOPATHY OF LUMBAR REGION: ICD-10-CM

## 2018-01-16 DIAGNOSIS — M25.551 PAIN IN RIGHT HIP: ICD-10-CM

## 2018-01-16 NOTE — MISCELLANEOUS
Message   Recorded as Task   Date: 07/11/2016 01:09 PM, Created By: Jeannette Hargrove   Task Name: Med Renewal Request   Assigned To: [facility] [place] [facility]   Regarding Patient: Roman Santamaria, Status: In Progress   Comment:    Abby Peralta - 11 Jul 2016 1:09 PM     TASK CREATED  Caller: Self; Renew Medication; [phone] (Home)  Received VM from pt  on [facility] triage line from 1230 pm  Pt  states that she was told to call when she needed a refill on her medication  Pt  states that she needs a rx refill on Nortriptyline 10 mg  Pt  requesting c/b at [phone]  Abby Peralta - 11 Jul 2016 2:05 PM     TASK EDITED  Attempted to reach pt  LMOM for pt  to c/b  Abby Peralta - 11 Jul 2016 2:05 PM     TASK IN PROGRESS   Abby Peralta - 11 Jul 2016 4:18 PM     TASK EDITED  Received VM from pt  on [facility] triage line from 248 pm  Pt  states that she is just returning our phone call  Will be home the rest of the afternoon  Sari Gallardo - 12 Jul 2016 11:39 AM     TASK EDITED  LMOM to cb on home/ cell  Batsheva Gonzalez - 12 Jul 2016 11:46 AM     TASK REASSIGNED: Previously Assigned To [facility] [place] [facility]  Spoke with pt who is requesting refill of nortriptyline 10mg after dinner qd to [facility]  States taking it after dinner and not qhs has only helped a little bit with her lethargy during the day  States she ran out of med 1 1/2 weeks ago-states she wanted to see how she would do without it  States her pain hs returned to her back and right leg  So, pt would like to continue on the med  Advised pt of our 49YQ refill policy and that this med should not be stopped abruptly  Nathan Avery - 12 Jul 2016 11:50 AM     TASK REPLIED TO: Previously Assigned To Nathan Avery  I escribed a script, but since she has been off of the med she should take it every other day x 1 week, then every day  She should f/u as scheduled in August and we will proceed from there     Urmila Patel - 12 Jul 2016 3:15 PM     TASK EDITED    I spoke with pt, advised above  Pt verbalizes understanding  ***************        Active Problems    1  Acute exacerbation of chronic low back pain (724 2,338 19,338 29) (M54 5,G89 29)   2  Anxiety (300 00) (F41 9)   3  Chronic hip pain, right (406 14,836 42) (M25 551,G89 29)   4  Closed nondisplaced fracture of neck of left radius with delayed healing, subsequent   encounter (V54 12) (S52 135G)   5  DJD (degenerative joint disease) (715 90) (M19 90)   6  Gait disturbance (781 2) (R26 9)   7  Greater trochanteric bursitis of right hip (726 5) (M70 61)   8  Hallux Limitus Of The Left First Toe (735 8)   9  Herniated nucleus pulposus, L5-S1, right (722 10) (M51 27)   10  Hiatal hernia (553 3) (K44 9)   11  Hypertension (401 9) (I10)   12  Lumbar stenosis without neurogenic claudication (724 02) (M48 06)   13  Macrocytic Anemia With Vitamin B12 Deficiency (281 1)   14  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)   15  MARK (obstructive sleep apnea) (327 23) (G47 33)   16  Pain in joint, hand (719 44) (M79 643)   17  Pain syndrome, chronic (338 4) (G89 4)   18  Paroxysmal atrial fibrillation (427 31) (I48 0)   19  Premature atrial contractions (427 61) (I49 1)   20  Pre-procedural examination (V72 84) (Z01 818)   21  Right bundle-branch block (426 4) (I45 10)   22  Sacroiliac joint dysfunction of right side (724 6) (M53 3)   23  Sacroiliitis (720 2) (M46 1)   24  Spondylosis of lumbar region without myelopathy or radiculopathy (721 3) (M47 816)   25  Sprain of medial collateral ligament of left knee, subsequent encounter (V58 89,844 1)    (S83 412D)   26  Status post lumbar laminectomy (V45 89) (Z98 89)   27  Supraventricular tachycardia (427 89) (I47 1)    Current Meds   1  ALPRAZolam 0 25 MG Oral Tablet; TAKE 1 TABLET AT BEDTIME; Therapy: (Recorded:16Oct2013) to Recorded   2  Aspirin 325 MG Oral Tablet; TAKE 1 TABLET DAILY; Therapy: (Recorded:16Oct2013) to Recorded   3   Calcium 600 MG Oral Tablet Recorded   4  Essential Magnesium TABS Recorded   5  Gleevec 400 MG Oral Tablet (Imatinib Mesylate); TAKE 1 TABLET DAILY; Therapy: (Recorded:16Oct2013) to Recorded   6  Hydroxychloroquine Sulfate 200 MG Oral Tablet; Take 1 tablet twice daily; Therapy: 79VRL4233 to Recorded   7  Metoprolol Succinate ER 25 MG Oral Tablet Extended Release 24 Hour; Take 1 tablet   twice daily; Therapy: 14YKX1439 to 51-30-20-57)  Requested for: 36ICJ3303; Last   Rx:02Nov2015 Ordered   8  Nasacort AQ 55 MCG/ACT AERS; Therapy: (Recorded:16Oct2013) to Recorded   9  Nortriptyline HCl - 10 MG Oral Capsule; Take 1 every other day a dinner x 1 week, then 1   daily at dinner; Therapy: 20Apr2016 to (Evaluate:11Aug2016)  Requested for: 38Mqj5086; Last   Rx:70Dkp1766 Ordered   10  Omeprazole 20 MG Oral Capsule Delayed Release; TAKE 1 CAPSULE DAILY; Therapy: (Recorded:16Oct2013) to Recorded   11  Tylenol Extra Strength 500 MG Oral Tablet Recorded   12  Vitamin D 2000 UNIT Oral Capsule; Therapy: (Recorded:16Oct2013) to Recorded    Allergies    1  Adhesive 1"x6yd TAPE   2  Codeine Derivatives   3  Neosporin OINT   4  Zomig TABS    Signatures   Electronically signed by :  Valeria Barthel, ; Jul 12 2016  3:15PM EST                       (Author)

## 2018-01-17 NOTE — PROGRESS NOTES
Assessment   1  Chronic hip pain, right (182 85,776 08) (M25 551,G89 29)   2  Right lumbar radiculitis (724 4) (M54 16)    Plan   Chronic hip pain, right    · * XR HIP/PELV 2-3 VWS RIGHT W PELVIS IF PERFORMED; Status:Active; Requested    YPQ:[id number]; Perform:[facility]; YKB:[id number]; For:Chronic hip pain, right; Ordered By:Tre Rdz;  Right lumbar radiculitis    · * CT SPINE LUMBAR WO CONTRAST; Status:Hold For - Scheduling; Requested    IHN:[id number]; Perform:[facility]; MNI:[id number]; For:Right lumbar radiculitis; Ordered By:Tre Rdz; Discussion/Summary      At this point the patient is having worsening symptoms I think it most likely secondary to stenosis requesting an MRI however the patient is extremely claustrophobic and will order CT scan  obtain hip radiographs trial any other acute pathology  need CBC if any neuro axial techniques would be performed  did offer patient to try alternative medications for her pain but she would like to hold off at this time  follow up with to obtain the images  The patient has the current Goals: Decreased pain increased function  The patent has the current Barriers: GIST, chronic pain syndrome  Patient is able to Self-Care  The treatment plan was reviewed with the patient/guardian  The patient/guardian understands and agrees with the treatment plan    The patient was counseled regarding diagnostic results,-- instructions for management,-- risk factor reductions,-- prognosis,-- patient and family education,-- risks and benefits of treatment options-- and-- importance of compliance with treatment  Self Referrals: No      Chief Complaint   1  Pain    History of Present Illness   Patient not obtain significant relief with right sacroiliac joint injection  has pain now radiating down her right leg and pain into her groin and anterior thigh  is currently undergoing experimental chemotherapy     with pain that she rates as 10/10 on the visual analog scale  Madhuri Diaz presents with complaints of constant episodes of severe right buttock, right hip and right thigh pain, described as sharp and burning  On a scale of 1 to 10, the patient rates the pain as 10  Symptoms are unchanged  Review of Systems        Constitutional: no fever,-- no recent weight gain-- and-- no recent weight loss  Eyes: no double vision-- and-- no blurry vision  Cardiovascular: no chest pain,-- no palpitations-- and-- no lower extremity edema  Respiratory: shortness of breath, but-- no wheezing  Musculoskeletal: joint stiffness, but-- no difficulty walking,-- no muscle weakness,-- no joint swelling,-- no limb swelling,-- no pain in extremity-- and-- no decreased range of motion  Neurological: no dizziness,-- no difficulty swallowing,-- no memory loss,-- no loss of consciousness-- and-- no seizures  Gastrointestinal: constipation-- and-- diarrhea, but-- no nausea-- and-- no vomiting  Genitourinary: no difficulty initiating urine stream,-- no genital pain-- and-- no frequent urination  Integumentary: no complaints of skin rash  Psychiatric: no depression  Endocrine: no excessive thirst,-- no adrenal disease,-- no hypothyroidism-- and-- no hyperthyroidism  Hematologic/Lymphatic: no tendency for easy bruising-- and-- no tendency for easy bleeding  ROS reviewed  Active Problems   1  Anxiety (300 00) (F41 9)   2  Chronic hip pain, right (473 17,184 33) (M25 551,G89 29)   3  Chronic right-sided low back pain without sciatica (724 2,338 29) (M54 5,G89 29)   4  Closed nondisplaced fracture of neck of left radius with delayed healing, subsequent     encounter (V54 12) (S52 135G)   5  DJD (degenerative joint disease) (715 90) (M19 90)   6  Gait disturbance (781 2) (R26 9)   7  Greater trochanteric bursitis of right hip (726 5) (M70 61)   8   Hallux Limitus Of The Left First Toe (735 8)   9  Herniated nucleus pulposus, L5-S1, right (722 10) (M51 27)   10  Hiatal hernia (553 3) (K44 9)   11  Hypertension (401 9) (I10)   12  Lumbar stenosis without neurogenic claudication (724 02) (M48 061)   13  Macrocytic Anemia With Vitamin B12 Deficiency (281 1)   14  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)   15  MARK (obstructive sleep apnea) (327 23) (G47 33)   16  Pain in joint, hand (719 44) (M25 549)   17  Pain syndrome, chronic (338 4) (G89 4)   18  Paroxysmal atrial fibrillation (427 31) (I48 0)   19  Premature atrial contractions (427 61) (I49 1)   20  Pre-procedural examination (V72 84) (Z01 818)   21  Right bundle-branch block (426 4) (I45 10)   22  Sacroiliac joint dysfunction of right side (724 6) (M53 3)   23  Sacroiliitis (720 2) (M46 1)   24  Spondylosis of lumbar region without myelopathy or radiculopathy (721 3) (M47 816)   25  Sprain of medial collateral ligament of left knee, subsequent encounter (V58 89,844 1)      (S83 412D)   26  Status post lumbar laminectomy (V45 89) (Z98 890)   27  Supraventricular tachycardia (427 89) (I47 1)    Past Medical History   1  History of Chronic lumbar radiculopathy (724 4) (M54 16)   2  History of Closed fracture of neck of left radius, sequela   3  History of Closed nondisplaced fracture of neck of left radius with routine healing,     subsequent encounter (V54 12) (S52 135D)   4  History of Closed nondisplaced fracture of neck of left radius, initial encounter (813 06)     (S52 135A)   5  History of GIST (gastrointestinal stromal tumor), 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)   6  History of atrial fibrillation (V12 59) (Z86 79)   7  History of liver cancer (V10 07) (Z85 05)   8  Personal history of arthritis (V13 4) (Z87 39)   9  History of Rectal cancer (154 1) (C20)   10  History of Reflux (530 81) (K21 9)   11   History of Sprain of medial collateral ligament of knee, left, initial encounter     The active problems and past medical history were reviewed and updated today  Surgical History   1  History of Back Surgery   2  History of Breast Surgery   3  History of Bunion Correction By Lapidus Procedure   4  History of Cataract Surgery   5  History of Hallux Valgus (Bunion) Correction   6  History of Rotator Cuff Repair   7  History of Shoulder Arthroscopy   8  History of Sinus Surgery   9  History of Small Bowel Resection   10  History of Tympanoplasty     The surgical history was reviewed and updated today  Family History   Mother    1  Family history of liver cancer (V16 0) (Z80 0)   2  Family history of lung cancer (V16 1) (Z80 1)   3  Family history of Hip joint replacement status  Father    4  Family history of Prostate cancer  Family History    5  Family history of Back disorder   6  Family history of cardiac disorder (V17 49) (Z82 49)   7  Family history of cerebrovascular accident (V17 1) (Z82 3)   8  Family history of malignant neoplasm (V16 9) (Z80 9)     The family history was reviewed and updated today  Social History    · Former smoker (G98 73) (J38 047)  The social history was reviewed and updated today  Current Meds    1  ALPRAZolam 0 25 MG Oral Tablet; TAKE 1 TABLET AT BEDTIME; Therapy: (Recorded:16Oct2013) to Recorded   2  Aspirin 325 MG Oral Tablet; TAKE 1 TABLET DAILY; Therapy: (Recorded:16Oct2013) to Recorded   3  Hydroxychloroquine Sulfate 200 MG Oral Tablet; Take 1 tablet twice daily; Therapy: 57ZVR4552 to Recorded   4  Metoprolol Succinate ER 25 MG Oral Tablet Extended Release 24 Hour; take 1 tablet     twice a day; Therapy: 57MWS2685 to ([facility])  Requested for: 81LKC9036; Last     Rx:03Jjx0313 Ordered   5  Nasacort AQ 55 MCG/ACT AERS; Therapy: (Recorded:92Axl4313) to Recorded   6  Probiotic CAPS; Therapy: (Recorded:83Mgp7583) to Recorded   7  Reglan SOLN;     Therapy: (Recorded:15Wcb7511) to Recorded   8  Tums CHEW;     Therapy: (Recorded:56Xjq3963) to Recorded   9   Tylenol Extra Strength 500 MG Oral Tablet Recorded   10  Zofran TABS; Therapy: (Recorded:07Zmb3622) to Recorded     The medication list was reviewed and updated today  Allergies   1  Adhesive 1x6yd TAPE   2  Codeine Derivatives   3  Neosporin OINT   4  Zomig TABS    Vitals   Vital Signs    Recorded: 46SKL1723 01:14PM   Temperature 98 F   Heart Rate 68   Systolic 933   Diastolic 64   Height 5 ft 3 in   Weight 154 lb    BMI Calculated 27 28   BSA Calculated 1 73   Pain Scale 10     Physical Exam        Constitutional      General appearance: Abnormal   underweight  Eyes      Sclera: anicteric      HEENT      Hearing grossly intact  Neck      Neck: Supple, symmetric, trachea midline, no masses  Pulmonary      Respiratory effort: Even and unlabored  Cardiovascular      Examination of extremities: No edema or pitting edema present  Skin      Skin and subcutaneous tissue: Normal without rashes or lesions, well hydrated  Psychiatric      Mood and affect: Mood and affect appropriate  Neurologic      Cranial nerves: Cranial nerves II-XII grossly intact  Musculoskeletal      Gait and station: Abnormal   Gait evaluation demonstrated slow, deliberate sit to stand transfer  Results/Data   Results Free Text Form [facility]:    Results     I personally reviewed the films/images in the office today  * MRI Lumbar Spine With and Without Contrast 94ASR4895 02:14PM Joshua Knight      Test Name Result Flag Reference   MRI LSpine W/ & W/O (Report)     [address];;[place];[address]     07/11/2014 1425     07/11/2014 1510     NONE          MRI LUMBAR SPINE WITH AND WITHOUT CONTRAST          INDICATION- 25319& 724 4  Low back pain radiating into right hip amc601 4  Low back pain radiating into right hip and     leg for the last year  COMPARISON- None            TECHNIQUE-     Sagittal T1, sagittal T2, sagittal inversion recovery, axial T1 and     axial T2, coronal T2  Sagittal and axial T1 postcontrast      7 mL of Gadavist was injected intravenously with no immediate     consequence  IMAGE QUALITY-  Diagnostic          FINDINGS-          ALIGNMENT- Mild dextroscoliosis of the mid lumbar spine, apex at the     L4 level  Normal lordosis  No subluxation  MARROW SIGNAL-  Mild scattered degenerative endplate changes noted  DISTAL CORD AND CONUS-  Normal size and signal of the distal cord and     conus  The conus ends at the L2 level  PARASPINAL SOFT TISSUES-  Paraspinal soft tissues are unremarkable  SACRUM-  Normal signal within the sacrum  No evidence of insufficiency     or stress fracture  LOWER THORACIC DISC SPACES-  T10-T11- There is a small central disc     herniation, protrusion type  Mild central canal narrowing  Neural     foramina bilaterally patent  T  11 T12- Small central disc herniation, protrusion type  Mild     central canal narrowing  Neural foramina bilaterally patent  LUMBAR DISC SPACES-             L1-L2- Small left paracentral disc herniation, protrusion type  Mild     left neural foraminal narrowing  Central canal and right neural     foramen patent  L2-L3- Central/right paracentral disc herniation, extrusion type  Bilateral facet hypertrophy and infolding of ligamentum flavum  Moderate central canal narrowing  Moderate right neural foraminal     narrowing  Left neural foramen patent  L3-L4- There is a central/right paracentral disc herniation,     protrusion type  There is bilateral facet hypertrophy  There is     infolding of ligamentum flavum  Mild tricompartmental narrowing  L4-L5- There is a disc osteophyte complex  There is bilateral facet     hypertrophy  There is infolding of ligamentum flavum  Moderate     tricompartmental narrowing  There is infolding of ligamentum flavum        Findings are similar to the previous CT  L5-S1- There is a disc osteophyte complex  There is a superimposed     right foraminal disc protrusion  Moderate right neural foraminal     narrowing  Bilateral facet hypertrophy  Central canal patent  Left     neural foramen minimally  Tiny cysts similar to the previous CT  POSTCONTRAST IMAGING- No abnormal enhancement  IMPRESSION-          1  Multilevel spondylotic changes as described                      Transcribed on- Rise ELIA Barillas MD     Reading Radiologist- ELIA Cortez MD     Releasing Radiologist- ELIA Cortez MD     Released Date Time- 07/11/14 1657     ------------------------------------------------------------------------------     [address] [place] [address] - CHAR      Signatures    Electronically signed by : Garrett Patino DO; Jan 16 2018  1:34PM EST                       (Author)

## 2018-01-18 ENCOUNTER — APPOINTMENT (OUTPATIENT)
Dept: RADIOLOGY | Facility: CLINIC | Age: 68
End: 2018-01-18
Payer: MEDICARE

## 2018-01-18 DIAGNOSIS — M25.551 PAIN IN RIGHT HIP: ICD-10-CM

## 2018-01-18 PROCEDURE — 73502 X-RAY EXAM HIP UNI 2-3 VIEWS: CPT

## 2018-01-19 ENCOUNTER — TRANSCRIBE ORDERS (OUTPATIENT)
Dept: ADMINISTRATIVE | Facility: HOSPITAL | Age: 68
End: 2018-01-19

## 2018-01-19 ENCOUNTER — HOSPITAL ENCOUNTER (OUTPATIENT)
Dept: CT IMAGING | Facility: HOSPITAL | Age: 68
Discharge: HOME | End: 2018-01-19
Attending: ANESTHESIOLOGY
Payer: MEDICARE

## 2018-01-19 ENCOUNTER — APPOINTMENT (OUTPATIENT)
Dept: LAB | Facility: HOSPITAL | Age: 68
End: 2018-01-19
Attending: INTERNAL MEDICINE
Payer: MEDICARE

## 2018-01-19 DIAGNOSIS — M54.16 RADICULOPATHY OF LUMBAR REGION: ICD-10-CM

## 2018-01-19 DIAGNOSIS — Z79.899 NEED FOR PROPHYLACTIC CHEMOTHERAPY: ICD-10-CM

## 2018-01-19 DIAGNOSIS — M06.4 INFLAMMATORY POLYARTHROPATHY (HCC): ICD-10-CM

## 2018-01-19 DIAGNOSIS — M06.4 INFLAMMATORY POLYARTHROPATHY (HCC): Primary | ICD-10-CM

## 2018-01-19 LAB
ERYTHROCYTE [SEDIMENTATION RATE] IN BLOOD: 2 MM/HOUR (ref 0–15)
RHEUMATOID FACT SER QL LA: NEGATIVE

## 2018-01-19 PROCEDURE — 85652 RBC SED RATE AUTOMATED: CPT

## 2018-01-19 PROCEDURE — 86200 CCP ANTIBODY: CPT

## 2018-01-19 PROCEDURE — 72131 CT LUMBAR SPINE W/O DYE: CPT

## 2018-01-19 PROCEDURE — 86430 RHEUMATOID FACTOR TEST QUAL: CPT

## 2018-01-19 PROCEDURE — 36415 COLL VENOUS BLD VENIPUNCTURE: CPT

## 2018-01-22 LAB — CCP IGA+IGG SERPL IA-ACNC: 2 UNITS (ref 0–19)

## 2018-01-23 VITALS
HEART RATE: 64 BPM | TEMPERATURE: 98 F | BODY MASS INDEX: 27.29 KG/M2 | DIASTOLIC BLOOD PRESSURE: 70 MMHG | SYSTOLIC BLOOD PRESSURE: 128 MMHG | WEIGHT: 154 LBS | HEIGHT: 63 IN

## 2018-01-23 VITALS
HEIGHT: 63 IN | SYSTOLIC BLOOD PRESSURE: 118 MMHG | DIASTOLIC BLOOD PRESSURE: 64 MMHG | WEIGHT: 154 LBS | HEART RATE: 68 BPM | BODY MASS INDEX: 27.29 KG/M2 | TEMPERATURE: 98 F

## 2018-01-23 NOTE — MISCELLANEOUS
Message   Recorded as Task   Date: 12/13/2017 12:18 PM, Created By: Nathanael Garcia   Task Name: Miscellaneous   Assigned To: [facility] clinical,Team   Regarding Patient: Nasir Notice, Status: Active   Comment:    Velia Sheppard - 13 Dec 2017 12:18 PM     TASK CREATED  Pt called stating prednisone was prescribed by Nathan yesterday and she does not know how to take it (instructions)  Pls call pt at [phone]  Sari Gallardo - 13 Dec 2017 1:09 PM     TASK EDITED  s/w pt, states she lost the instruction sheet for prednisone 10 mg  Advised pt, will leave a copy of instructions at the  for pu  provided office hours  Pt verbalized understanding and apprecation  Active Problems    1  Anxiety (300 00) (F41 9)   2  Chronic hip pain, right (222 97,984 31) (M25 551,G89 29)   3  Chronic right-sided low back pain without sciatica (724 2,338 29) (M54 5,G89 29)   4  Closed nondisplaced fracture of neck of left radius with delayed healing, subsequent   encounter (V54 12) (S52 135G)   5  DJD (degenerative joint disease) (715 90) (M19 90)   6  Gait disturbance (781 2) (R26 9)   7  Greater trochanteric bursitis of right hip (726 5) (M70 61)   8  Hallux Limitus Of The Left First Toe (735 8)   9  Herniated nucleus pulposus, L5-S1, right (722 10) (M51 27)   10  Hiatal hernia (553 3) (K44 9)   11  Hypertension (401 9) (I10)   12  Lumbar stenosis without neurogenic claudication (724 02) (M48 061)   13  Macrocytic Anemia With Vitamin B12 Deficiency (281 1)   14  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)   15  MARK (obstructive sleep apnea) (327 23) (G47 33)   16  Pain in joint, hand (719 44) (M25 549)   17  Pain syndrome, chronic (338 4) (G89 4)   18  Paroxysmal atrial fibrillation (427 31) (I48 0)   19  Premature atrial contractions (427 61) (I49 1)   20  Pre-procedural examination (V72 84) (Z01 818)   21  Right bundle-branch block (426 4) (I45 10)   22   Sacroiliac joint dysfunction of right side (724 6) (M53 3)   23  Sacroiliitis (720 2) (M46 1)   24  Spondylosis of lumbar region without myelopathy or radiculopathy (721 3) (M47 816)   25  Sprain of medial collateral ligament of left knee, subsequent encounter (V58 89,844 1)    (S83 412D)   26  Status post lumbar laminectomy (V45 89) (Z98 890)   27  Supraventricular tachycardia (427 89) (I47 1)    Current Meds   1  ALPRAZolam 0 25 MG Oral Tablet; TAKE 1 TABLET AT BEDTIME; Therapy: (Recorded:16Oct2013) to Recorded   2  Aspirin 325 MG Oral Tablet; TAKE 1 TABLET DAILY; Therapy: (Recorded:16Oct2013) to Recorded   3  Hydroxychloroquine Sulfate 200 MG Oral Tablet; Take 1 tablet twice daily; Therapy: 29DOT1749 to Recorded   4  Metoprolol Succinate ER 25 MG Oral Tablet Extended Release 24 Hour; take 1 tablet   twice a day; Therapy: 93SKL1972 to ([facility])  Requested for: 95KAL0285; Last   Rx:32Rjh2879 Ordered   5  Nasacort AQ 55 MCG/ACT AERS; Therapy: (Recorded:16Oct2013) to Recorded   6  PredniSONE 10 MG Oral Tablet; Take as directed according to info sheet given at office; Therapy: 52Bur0646 to (Evaluate:86Orh3806)  Requested for: 52Lpa8594; Last   Rx:78Fmd8680 Ordered   7  Probiotic CAPS; Therapy: (Recorded:20Tgg8442) to Recorded   8  Reglan SOLN (Metoclopramide HCl); Therapy: (Recorded:78Ctp2336) to Recorded   9  Tums CHEW;   Therapy: (Recorded:74Lez8746) to Recorded   10  Tylenol Extra Strength 500 MG Oral Tablet Recorded   11  Zofran TABS (Ondansetron HCl); Therapy: (Recorded:70Fcn2001) to Recorded    Allergies    1  Adhesive 1"x6yd TAPE   2  Codeine Derivatives   3  Neosporin OINT   4   Zomig TABS    Signatures   Electronically signed by : Neisha Fuller, ; Dec 13 2017  1:09PM EST                       (Author)

## 2018-01-23 NOTE — RESULT NOTES
Message   Recorded as Task   Date: 12/29/2017 01:41 PM, Created By: Bette Tay   Task Name: Follow Up   Assigned To: [facility] [place],Team   Regarding Patient: Shelbie Rea, Status: Active   CommentRand Cords - 29 Dec 2017 1:41 PM     TASK CREATED  S/P RT SIJ on 12/28/2017 w/SL Teresa Bearden  3wk f/u scheduled on 01/16/2018    Please call on 01/04/2018   Bette Tay - 04 Jan 2018 1:36 PM     TASK EDITED  Patient states the pain is better then it was  She got 10-15% relief and pain level is a 8-9/10  Conf f/u scheduled on 01/16/2018     Tre Rdz - 04 Jan 2018 1:46 PM     TASK REPLIED TO: Previously Assigned To Tre Rdz  [place]

## 2018-01-30 ENCOUNTER — GENERIC CONVERSION - ENCOUNTER (OUTPATIENT)
Dept: OTHER | Facility: OTHER | Age: 68
End: 2018-01-30

## 2018-02-12 NOTE — RESULT NOTES
Message   Recorded as Task   Date: 01/22/2018 09:40 AM, Created By: Corbin [facility]   Task Name: Care Coordination   Assigned To: Shawna Escoto   Regarding Patient: Seth Dixon, Status: Active   Comment:    Tre Rdz - 22 Jan 2018 9:40 AM     TASK CREATED  Patient's CT scan reveals stenosis and degeneration where is the patient's pain in regards to low back 1 leg versus the   Jeny Santana - 22 Jan 2018 11:35 AM     TASK EDITED  S/w the pt  and reviewed the previous task  Pt  stated that her pain is mostly in her LB radiating down the right leg  She does currently take 325mg of ASA  Pt  was made aware of the process depending on the type of treatment, might need to obtain consent  SL to advise  thanks   Corbin Trent [facility] - 22 Jan 2018 11:50 AM     TASK REPLIED TO: Previously Assigned To Tre Rdz  would schedule right L4/5 TFESI X 2  Dx:  Lumbar spinal stenosis, lumbar radiculitis   Sandi Chandra - 22 Jan 2018 1:38 PM     TASK REASSIGNED: Previously Assigned To [facility] [place] clinical,Team   Bia Woodard - 23 Jan 2018 1:32 PM     TASK REASSIGNED: Previously Assigned To [facility] sched,Team   Bia Woodard - 30 Jan 2018 7:28 AM     TASK EDITED  scheduled in  [facility]

## 2018-02-20 ENCOUNTER — HOSPITAL ENCOUNTER (OUTPATIENT)
Dept: RADIOLOGY | Facility: CLINIC | Age: 68
Discharge: HOME | End: 2018-02-20
Attending: ANESTHESIOLOGY | Admitting: ANESTHESIOLOGY
Payer: MEDICARE

## 2018-02-20 VITALS
SYSTOLIC BLOOD PRESSURE: 127 MMHG | RESPIRATION RATE: 18 BRPM | HEART RATE: 67 BPM | TEMPERATURE: 98.6 F | WEIGHT: 120 LBS | OXYGEN SATURATION: 100 % | BODY MASS INDEX: 21.26 KG/M2 | DIASTOLIC BLOOD PRESSURE: 84 MMHG

## 2018-02-20 DIAGNOSIS — M54.16 LUMBAR RADICULITIS: ICD-10-CM

## 2018-02-20 DIAGNOSIS — M48.07 LUMBOSACRAL STENOSIS: ICD-10-CM

## 2018-02-20 PROCEDURE — 64483 NJX AA&/STRD TFRM EPI L/S 1: CPT | Performed by: ANESTHESIOLOGY

## 2018-02-20 PROCEDURE — 64484 NJX AA&/STRD TFRM EPI L/S EA: CPT | Performed by: ANESTHESIOLOGY

## 2018-02-20 RX ORDER — PAPAVERINE HCL 150 MG
20 CAPSULE, EXTENDED RELEASE ORAL ONCE
Status: COMPLETED | OUTPATIENT
Start: 2018-02-20 | End: 2018-02-20

## 2018-02-20 RX ORDER — LIDOCAINE HYDROCHLORIDE 10 MG/ML
5 INJECTION, SOLUTION EPIDURAL; INFILTRATION; INTRACAUDAL; PERINEURAL ONCE
Status: COMPLETED | OUTPATIENT
Start: 2018-02-20 | End: 2018-02-20

## 2018-02-20 RX ADMIN — LIDOCAINE HYDROCHLORIDE 5 ML: 10 INJECTION, SOLUTION EPIDURAL; INFILTRATION; INTRACAUDAL; PERINEURAL at 13:55

## 2018-02-20 RX ADMIN — DEXAMETHASONE SODIUM PHOSPHATE 20 MG: 10 INJECTION, SOLUTION INTRAMUSCULAR; INTRAVENOUS at 13:55

## 2018-02-20 RX ADMIN — LIDOCAINE HYDROCHLORIDE 5 ML: 20 INJECTION, SOLUTION EPIDURAL; INFILTRATION; INTRACAUDAL at 13:55

## 2018-02-20 RX ADMIN — IOHEXOL 1 ML: 300 INJECTION, SOLUTION INTRAVENOUS at 13:51

## 2018-02-20 NOTE — DISCHARGE INSTRUCTIONS
Epidural Steroid Injection   WHAT YOU NEED TO KNOW:   An epidural steroid injection (MARIVEL) is a procedure to inject steroid medicine into the epidural space  The epidural space is between your spinal cord and vertebrae  Steroids reduce inflammation and fluid buildup in your spine that may be causing pain  You may be given pain medicine along with the steroids  ACTIVITY  · Do not drive or operate machinery today  · No strenuous activity today - bending, lifting, etc   · You may resume normal activites starting tomorrow - start slowly and as tolerated  · You may shower today, but no tub baths or hot tubs  · You may have numbness for several hours from the local anesthetic  Please use caution and common sense, especially with weight-bearing activities  CARE OF THE INJECTION SITE  · If you have soreness or pain, apply ice to the area today (20 minutes on/20 minutes off)  · Starting tomorrow, you may use warm, moist heat or ice if needed  · You may have an increase or change in your discomfort for 36-48 hours after your treatment  · Apply ice and continue with any pain medication you have been prescribed  · Notify the [facility] if you have any of the following: redness, drainage, swelling, headache, stiff neck or fever above 100°F     SPECIAL INSTRUCTIONS  · Our office will contact you in approximately 7 days for a progress report  MEDICATIONS  · Continue to take all routine medications  · Our office may have instructed you to hold some medications  If you have a problem specifically related to your procedure, please call our office at [phone]  Problems not related to your procedure should be directed to your primary care physician

## 2018-02-20 NOTE — H&P
History of Present Illness: The patient is a 69 y o  female who presents with complaints of low back and right leg pain    There is no problem list on file for this patient        Past Medical History:   Diagnosis Date    Anxiety     Atrial fibrillation ([address] )     Cancer (HCC)     GERD (gastroesophageal reflux disease)     Hypertension        Past Surgical History:   Procedure Laterality Date    CATARACT EXTRACTION      ROTATOR CUFF REPAIR           Current Outpatient Prescriptions:     ALPRAZolam (XANAX) 0 25 mg tablet, Take by mouth, Disp: , Rfl:     aspirin (ESTELA ASPIRIN) 325 mg tablet, Take 325 mg by mouth, Disp: , Rfl:     Calcium Carbonate-Vitamin D (CALCIUM 500 + D PO), Take 500 mg by mouth, Disp: , Rfl:     hydroxychloroquine (PLAQUENIL) 200 mg tablet, Take 200 mg by mouth, Disp: , Rfl:     ibuprofen (MOTRIN) 200 mg tablet, Take 400 mg by mouth, Disp: , Rfl:     Imatinib Mesylate (GLEEVEC PO), Take 400 mg by mouth, Disp: , Rfl:     loperamide (IMODIUM) 2 mg capsule, Take 2 capsules by mouth 4 (four) times a day as needed, Disp: , Rfl:     MAGNESIUM PO, Take 250 mg by mouth, Disp: , Rfl:     metoprolol (LOPRESSOR) 10 mg/mL oral suspension, Take 25 mg by mouth 2 (two) times a day, Disp: , Rfl:     Omeprazole (PRILOSEC PO), Take 20 mg by mouth daily, Disp: , Rfl:     ondansetron (ZOFRAN-ODT) 4 mg disintegrating tablet, Take 1 tablet by mouth every 8 (eight) hours as needed for nausea or vomiting for up to 3 days, Disp: 9 tablet, Rfl: 0    Probiotic Product (PROBIOTIC ADVANCED PO), Take by mouth, Disp: , Rfl:     Triamcinolone Acetonide 55 MCG/ACT AERO, , Disp: , Rfl:     Allergies   Allergen Reactions    Codeine     Neomycin-Bacitracin Zn-Polymyx Rash    Zolmitriptan Palpitations     Heart palpitations       Physical Exam:   Vitals:    02/20/18 1338   BP: 139/73   Pulse: 71   Resp: 18   Temp: 98 6 °F (37 °C)   SpO2: 99%     General: Awake, Alert, Oriented x 3, Mood and affect appropriate  Respiratory: Respirations even and unlabored  Cardiovascular: Peripheral pulses intact; no edema  Musculoskeletal Exam:  Positive straight leg raising on the right    ASA Score: II    Assessment:   1  Lumbosacral stenosis    2   Lumbar radiculitis        Plan: RT L4-5 TFESI

## 2018-02-21 ENCOUNTER — TRANSCRIBE ORDERS (OUTPATIENT)
Dept: ADMINISTRATIVE | Facility: HOSPITAL | Age: 68
End: 2018-02-21

## 2018-02-21 ENCOUNTER — APPOINTMENT (OUTPATIENT)
Dept: LAB | Facility: HOSPITAL | Age: 68
End: 2018-02-21
Payer: MEDICARE

## 2018-02-21 DIAGNOSIS — D50.0 IRON DEFICIENCY ANEMIA DUE TO CHRONIC BLOOD LOSS: ICD-10-CM

## 2018-02-21 DIAGNOSIS — D50.0 IRON DEFICIENCY ANEMIA DUE TO CHRONIC BLOOD LOSS: Primary | ICD-10-CM

## 2018-02-21 LAB
BASOPHILS # BLD AUTO: 0 THOUSANDS/ΜL (ref 0–0.1)
BASOPHILS NFR BLD AUTO: 0 % (ref 0–1)
EOSINOPHIL # BLD AUTO: 0 THOUSAND/ΜL (ref 0–0.61)
EOSINOPHIL NFR BLD AUTO: 0 % (ref 0–6)
ERYTHROCYTE [DISTWIDTH] IN BLOOD BY AUTOMATED COUNT: 18.5 % (ref 11.6–15.1)
HCT VFR BLD AUTO: 23.7 % (ref 34.8–46.1)
HGB BLD-MCNC: 7.6 G/DL (ref 11.5–15.4)
LYMPHOCYTES # BLD AUTO: 0.33 THOUSANDS/ΜL (ref 0.6–4.47)
LYMPHOCYTES NFR BLD AUTO: 4 % (ref 14–44)
MCH RBC QN AUTO: 33 PG (ref 26.8–34.3)
MCHC RBC AUTO-ENTMCNC: 32.1 G/DL (ref 31.4–37.4)
MCV RBC AUTO: 103 FL (ref 82–98)
MONOCYTES # BLD AUTO: 0.76 THOUSAND/ΜL (ref 0.17–1.22)
MONOCYTES NFR BLD AUTO: 9 % (ref 4–12)
NEUTROPHILS # BLD AUTO: 7.23 THOUSANDS/ΜL (ref 1.85–7.62)
NEUTS SEG NFR BLD AUTO: 87 % (ref 43–75)
PLATELET # BLD AUTO: 227 THOUSANDS/UL (ref 149–390)
PMV BLD AUTO: 9.2 FL (ref 8.9–12.7)
RBC # BLD AUTO: 2.3 MILLION/UL (ref 3.81–5.12)
WBC # BLD AUTO: 8.32 THOUSAND/UL (ref 4.31–10.16)

## 2018-02-21 PROCEDURE — 85025 COMPLETE CBC W/AUTO DIFF WBC: CPT

## 2018-02-21 PROCEDURE — 36415 COLL VENOUS BLD VENIPUNCTURE: CPT

## 2018-02-28 ENCOUNTER — TELEPHONE (OUTPATIENT)
Dept: PAIN MEDICINE | Facility: CLINIC | Age: 68
End: 2018-02-28

## 2018-02-28 NOTE — TELEPHONE ENCOUNTER
1st attempt LM to COB needing %of relief and pain level         S/P RT L4-5 TFESI on 02/20/18 w/[facility] [place]  No f/u scheduled

## 2018-03-01 NOTE — TELEPHONE ENCOUNTER
Patient states she did not get as much relief with this injection compare to the injection she had in the passed  She got 1% relief and her pain level is a 8/10         S/P RT L4-5 TFESI on 02/20/18 w/[place]  No f/u scheduled

## 2018-03-07 NOTE — PROCEDURES
Procedure    Procedure: Right sacroiliac joint injection under fluoroscopy  Diagnosis: Disorder of the sacrum sacroiliitis  Indication for Fluoroscopy: This procedure requires the precise placement of the spinal needle  It is the only way to accurately and safely perform the injection  Medical Necessity: Failure of conservative management  Procedure Note: After fully informed written consent was obtained, the patient was positioned prone on the fluoroscopy table with pillows under the abdomen  Fluoroscopic guidance was used to isolate and identify the sacroiliac joint  A medial to lateral oblique projection allowed separation of the anterior (lateral) and posterior (medial) joint space  The sacrum and posterior iliac crest were sterilely prepared and draped  A 25-gauge needle was placed under the skin and lidocaine 1% was injected for subcutaneous anesthesia  A 22-gauge 3 Â½ inch spinal needle was directed into the inferior aspect of the sacroiliac joint using a posterior approach in bull's eye fashion  The interosseous ligament was engaged  Lidocaine 2% and methylprednisolone was injected  Volumes were kept small-commensurate with the joint's capacity as determined by end-injection back pressure on the syringe  The needle was removed  Disposition: Motor function was intact  Vital signs stable  The patient was discharged home with a   The discharge instruction sheet was given to the patient  Sacroiliac Joint Radiography: Omnipaque 300 was injected in the inferior pole of the SIJ        Signatures   Electronically signed by : Venice Gauthier DO; Dec 28 2017 10:04AM EST                       (Author)

## 2018-03-12 ENCOUNTER — OFFICE VISIT (OUTPATIENT)
Dept: PAIN MEDICINE | Facility: CLINIC | Age: 68
End: 2018-03-12
Payer: MEDICARE

## 2018-03-12 VITALS
HEIGHT: 63 IN | TEMPERATURE: 98 F | DIASTOLIC BLOOD PRESSURE: 76 MMHG | WEIGHT: 126 LBS | HEART RATE: 80 BPM | BODY MASS INDEX: 22.32 KG/M2 | SYSTOLIC BLOOD PRESSURE: 122 MMHG

## 2018-03-12 DIAGNOSIS — G89.4 PAIN SYNDROME, CHRONIC: ICD-10-CM

## 2018-03-12 DIAGNOSIS — G89.29 CHRONIC RIGHT-SIDED LOW BACK PAIN WITHOUT SCIATICA: Primary | ICD-10-CM

## 2018-03-12 DIAGNOSIS — M54.16 RIGHT LUMBAR RADICULITIS: ICD-10-CM

## 2018-03-12 DIAGNOSIS — M47.816 SPONDYLOSIS OF LUMBAR REGION WITHOUT MYELOPATHY OR RADICULOPATHY: ICD-10-CM

## 2018-03-12 DIAGNOSIS — G57.01 PIRIFORMIS SYNDROME OF RIGHT SIDE: ICD-10-CM

## 2018-03-12 DIAGNOSIS — M54.50 CHRONIC RIGHT-SIDED LOW BACK PAIN WITHOUT SCIATICA: Primary | ICD-10-CM

## 2018-03-12 DIAGNOSIS — M51.27 HERNIATED NUCLEUS PULPOSUS, L5-S1, RIGHT: ICD-10-CM

## 2018-03-12 DIAGNOSIS — M25.551 CHRONIC HIP PAIN, RIGHT: ICD-10-CM

## 2018-03-12 DIAGNOSIS — G89.29 CHRONIC HIP PAIN, RIGHT: ICD-10-CM

## 2018-03-12 PROBLEM — D64.9 ANEMIA: Status: ACTIVE | Noted: 2017-09-18

## 2018-03-12 PROBLEM — C49.A0 GIST (GASTROINTESTINAL STROMAL TUMOR), MALIGNANT (HCC): Status: ACTIVE | Noted: 2018-03-12

## 2018-03-12 PROCEDURE — 99214 OFFICE O/P EST MOD 30 MIN: CPT | Performed by: NURSE PRACTITIONER

## 2018-03-12 RX ORDER — METOCLOPRAMIDE HYDROCHLORIDE 5 MG/ML
INJECTION INTRAMUSCULAR; INTRAVENOUS
COMMUNITY
End: 2018-12-28

## 2018-03-12 RX ORDER — DIPHENOXYLATE HYDROCHLORIDE AND ATROPINE SULFATE 2.5; .025 MG/1; MG/1
TABLET ORAL
Refills: 0 | COMMUNITY
Start: 2018-02-20 | End: 2019-01-03 | Stop reason: SDUPTHER

## 2018-03-12 RX ORDER — FUROSEMIDE 20 MG/1
20 TABLET ORAL
COMMUNITY
Start: 2018-03-08 | End: 2019-01-03 | Stop reason: SDUPTHER

## 2018-03-12 RX ORDER — AZELASTINE HYDROCHLORIDE 137 UG/1
SPRAY, METERED NASAL
Refills: 0 | COMMUNITY
Start: 2018-02-21

## 2018-03-12 RX ORDER — MAGNESIUM CHLORIDE 71.5 G/G
TABLET ORAL
Refills: 0 | COMMUNITY
Start: 2018-02-27 | End: 2018-11-02

## 2018-03-12 NOTE — PROGRESS NOTES
Assessment:  1  Chronic right-sided low back pain without sciatica    2  Chronic hip pain, right    3  Herniated nucleus pulposus, L5-S1, right    4  Spondylosis of lumbar region without myelopathy or radiculopathy    5  Pain syndrome, chronic    6  Right lumbar radiculitis    7  Piriformis syndrome of right side        Plan:  While the patient was in the office today, I did have a thorough conversation with the patient regarding her medication regimen and treatment plan  I explained to the patient that with her current symptoms and exam findings, I am suspicious she may actually be suffering from piriformis syndrome on the right side  I explained to her that she may benefit from proceeding with a right piriformis injection and that the injection would serve both a diagnostic, and hopefully, a therapeutic purpose  The patient was agreeable and verbalized an understanding  Complete risks and benefits including bleeding, infection, tissue reaction, nerve injury and allergic reaction were discussed  The approach was demonstrated using models and literature was provided  Verbal and written consent was obtained  I discussed with the patient that at this point time she can followup with our office on an as-needed basis  I did review the patient that if her pain symptoms should change, worsen, and/or if she would experience any new symptoms as she would like to be evaluated for, she should give our office a call  The patient was agreeable and verbalized an understanding  History of Present Illness: The patient is a 69 y o  female last seen on 2/20/18 Right L4-5 TFESI who presents for a follow up office visit in regards to chronic pain secondary to lumbar spondylosis and stenosis as well as possible right piriformis syndrome    The patient currently reports that initially she felt there was a few days of relief after her most recent transforaminal epidural steroid injection with Dr Wilman Galvez in February, however, she continues with the right-sided low back, buttocks, hip and thigh pain and denies any significant lower extremity radicular symptoms or pain past her knee  The patient reports that at this point time she is not interested in medication options but wants to know if there is any other procedures or injections that could be helpful just to make her little bit more comfortable as she continues with chemotherapy as well  The patient presents today to discuss her treatment plan options  I have personally reviewed and/or updated the patient's past medical history, past surgical history, family history, social history, current medications, allergies, and vital signs today  Review of Systems:    Review of Systems   Respiratory: Positive for shortness of breath  Cardiovascular: Negative for chest pain  Gastrointestinal: Positive for constipation and diarrhea  Negative for nausea and vomiting  Musculoskeletal: Positive for gait problem and joint swelling (joint stiffness)  Negative for arthralgias and myalgias  Skin: Negative for rash  Neurological: Positive for dizziness  Negative for seizures and weakness  All other systems reviewed and are negative          Past Medical History:   Diagnosis Date    ADHD     Anxiety     Arthritis     Asthma     Atrial fibrillation (HCC)     Bleeding disorder (HCC)     Breast cancer (HCC)     Cancer ([facility] )     Depression     GERD (gastroesophageal reflux disease)     History of stomach ulcers     Hypercholesterolemia     Hypertension     Hypothyroidism     Kidney disease     Metastatic cancer ([facility] )        Past Surgical History:   Procedure Laterality Date    ANKLE SURGERY      APPENDECTOMY      BREAST SURGERY      CATARACT EXTRACTION       SECTION      HIP SURGERY      LAMINECTOMY      ROTATOR CUFF REPAIR         Family History   Problem Relation Age of Onset    Breast cancer Mother     Diabetes Mother    Laura Tani Hypertension Mother     Hyperlipidemia Father     Colon cancer Paternal Grandmother     Colon cancer Paternal Grandfather     Diabetes Family        Social History     Occupational History    Not on file  Social History Main Topics    Smoking status: Never Smoker    Smokeless tobacco: Not on file    Alcohol use No    Drug use: No    Sexual activity: Not on file         Current Outpatient Prescriptions:     ALPRAZolam (XANAX) 0 25 mg tablet, Take by mouth, Disp: , Rfl:     aspirin (ESTELA ASPIRIN) 325 mg tablet, Take 325 mg by mouth, Disp: , Rfl:     Calcium Carbonate-Vitamin D (CALCIUM 500 + D PO), Take 500 mg by mouth, Disp: , Rfl:     hydroxychloroquine (PLAQUENIL) 200 mg tablet, Take 200 mg by mouth, Disp: , Rfl:     ibuprofen (MOTRIN) 200 mg tablet, Take 400 mg by mouth, Disp: , Rfl:     Imatinib Mesylate (GLEEVEC PO), Take 400 mg by mouth, Disp: , Rfl:     loperamide (IMODIUM) 2 mg capsule, Take 2 capsules by mouth 4 (four) times a day as needed, Disp: , Rfl:     MAGNESIUM PO, Take 250 mg by mouth, Disp: , Rfl:     metoprolol (LOPRESSOR) 10 mg/mL oral suspension, Take 25 mg by mouth 2 (two) times a day, Disp: , Rfl:     Omeprazole (PRILOSEC PO), Take 20 mg by mouth daily, Disp: , Rfl:     ondansetron (ZOFRAN-ODT) 4 mg disintegrating tablet, Take 1 tablet by mouth every 8 (eight) hours as needed for nausea or vomiting for up to 3 days, Disp: 9 tablet, Rfl: 0    Probiotic Product (PROBIOTIC ADVANCED PO), Take by mouth, Disp: , Rfl:     Triamcinolone Acetonide 55 MCG/ACT AERO, , Disp: , Rfl:     Allergies   Allergen Reactions    Codeine     Neomycin-Bacitracin Zn-Polymyx Rash    Zolmitriptan Palpitations     Heart palpitations       Physical Exam:    There were no vitals taken for this visit      Constitutional:underweight  Eyes:anicteric  HEENT:grossly intact  Neck:supple, symmetric, trachea midline and no masses   Pulmonary:even and unlabored  Cardiovascular:No edema or pitting edema present  Skin:Normal without rashes or lesions and well hydrated  Psychiatric:Mood and affect appropriate  Neurologic:Cranial Nerves II-XII grossly intact  Musculoskeletal:normal and Steady, without any assistive devices  The patient continues with tenderness upon exam of the right gluteus roopa, ischial bursa, greater trochanter bursa, and anterior thigh  Imaging  No orders to display         No orders of the defined types were placed in this encounter

## 2018-03-13 NOTE — H&P
Assessment:  1  Chronic right-sided low back pain without sciatica    2  Chronic hip pain, right    3  Herniated nucleus pulposus, L5-S1, right    4  Spondylosis of lumbar region without myelopathy or radiculopathy    5  Pain syndrome, chronic    6  Right lumbar radiculitis    7  Piriformis syndrome of right side        Plan:  While the patient was in the office today, I did have a thorough conversation with the patient regarding her medication regimen and treatment plan  I explained to the patient that with her current symptoms and exam findings, I am suspicious she may actually be suffering from piriformis syndrome on the right side  I explained to her that she may benefit from proceeding with a right piriformis injection and that the injection would serve both a diagnostic, and hopefully, a therapeutic purpose  The patient was agreeable and verbalized an understanding  Complete risks and benefits including bleeding, infection, tissue reaction, nerve injury and allergic reaction were discussed  The approach was demonstrated using models and literature was provided  Verbal and written consent was obtained  I discussed with the patient that at this point time she can followup with our office on an as-needed basis  I did review the patient that if her pain symptoms should change, worsen, and/or if she would experience any new symptoms as she would like to be evaluated for, she should give our office a call  The patient was agreeable and verbalized an understanding  History of Present Illness: The patient is a 69 y o  female last seen on 2/20/18 Right L4-5 TFESI who presents for a follow up office visit in regards to chronic pain secondary to lumbar spondylosis and stenosis as well as possible right piriformis syndrome    The patient currently reports that initially she felt there was a few days of relief after her most recent transforaminal epidural steroid injection with Dr Travis Diaz in February, however, she continues with the right-sided low back, buttocks, hip and thigh pain and denies any significant lower extremity radicular symptoms or pain past her knee  The patient reports that at this point time she is not interested in medication options but wants to know if there is any other procedures or injections that could be helpful just to make her little bit more comfortable as she continues with chemotherapy as well  The patient presents today to discuss her treatment plan options  I have personally reviewed and/or updated the patient's past medical history, past surgical history, family history, social history, current medications, allergies, and vital signs today  Review of Systems:    Review of Systems   Respiratory: Positive for shortness of breath  Cardiovascular: Negative for chest pain  Gastrointestinal: Positive for constipation and diarrhea  Negative for nausea and vomiting  Musculoskeletal: Positive for gait problem and joint swelling (joint stiffness)  Negative for arthralgias and myalgias  Skin: Negative for rash  Neurological: Positive for dizziness  Negative for seizures and weakness  All other systems reviewed and are negative          Past Medical History:   Diagnosis Date    ADHD     Anxiety     Arthritis     Asthma     Atrial fibrillation (HCC)     Bleeding disorder (HCC)     Breast cancer (HCC)     [facility] )     Depression     GERD (gastroesophageal reflux disease)     History of stomach ulcers     Hypercholesterolemia     Hypertension     Hypothyroidism     Kidney disease     Metastatic cancer ([facility] )        Past Surgical History:   Procedure Laterality Date    ANKLE SURGERY      APPENDECTOMY      BREAST SURGERY      CATARACT EXTRACTION       SECTION      HIP SURGERY      LAMINECTOMY      ROTATOR CUFF REPAIR         Family History   Problem Relation Age of Onset    Breast cancer Mother     Diabetes Mother    Cecilia Solano Hypertension Mother     Hyperlipidemia Father     Colon cancer Paternal Grandmother     Colon cancer Paternal Grandfather     Diabetes Family        Social History     Occupational History    Not on file  Social History Main Topics    Smoking status: Never Smoker    Smokeless tobacco: Not on file    Alcohol use No    Drug use: No    Sexual activity: Not on file         Current Outpatient Prescriptions:     ALPRAZolam (XANAX) 0 25 mg tablet, Take by mouth, Disp: , Rfl:     aspirin (ESTELA ASPIRIN) 325 mg tablet, Take 325 mg by mouth, Disp: , Rfl:     Calcium Carbonate-Vitamin D (CALCIUM 500 + D PO), Take 500 mg by mouth, Disp: , Rfl:     hydroxychloroquine (PLAQUENIL) 200 mg tablet, Take 200 mg by mouth, Disp: , Rfl:     ibuprofen (MOTRIN) 200 mg tablet, Take 400 mg by mouth, Disp: , Rfl:     Imatinib Mesylate (GLEEVEC PO), Take 400 mg by mouth, Disp: , Rfl:     loperamide (IMODIUM) 2 mg capsule, Take 2 capsules by mouth 4 (four) times a day as needed, Disp: , Rfl:     MAGNESIUM PO, Take 250 mg by mouth, Disp: , Rfl:     metoprolol (LOPRESSOR) 10 mg/mL oral suspension, Take 25 mg by mouth 2 (two) times a day, Disp: , Rfl:     Omeprazole (PRILOSEC PO), Take 20 mg by mouth daily, Disp: , Rfl:     ondansetron (ZOFRAN-ODT) 4 mg disintegrating tablet, Take 1 tablet by mouth every 8 (eight) hours as needed for nausea or vomiting for up to 3 days, Disp: 9 tablet, Rfl: 0    Probiotic Product (PROBIOTIC ADVANCED PO), Take by mouth, Disp: , Rfl:     Triamcinolone Acetonide 55 MCG/ACT AERO, , Disp: , Rfl:     Allergies   Allergen Reactions    Codeine     Neomycin-Bacitracin Zn-Polymyx Rash    Zolmitriptan Palpitations     Heart palpitations       Physical Exam:    There were no vitals taken for this visit      Constitutional:underweight  Eyes:anicteric  HEENT:grossly intact  Neck:supple, symmetric, trachea midline and no masses   Pulmonary:even and unlabored  Cardiovascular:No edema or pitting edema present  Skin:Normal without rashes or lesions and well hydrated  Psychiatric:Mood and affect appropriate  Neurologic:Cranial Nerves II-XII grossly intact  Musculoskeletal:normal and Steady, without any assistive devices  The patient continues with tenderness upon exam of the right gluteus roopa, ischial bursa, greater trochanter bursa, and anterior thigh  Imaging  No orders to display         No orders of the defined types were placed in this encounter

## 2018-03-16 ENCOUNTER — TELEPHONE (OUTPATIENT)
Dept: RADIOLOGY | Facility: CLINIC | Age: 68
End: 2018-03-16

## 2018-03-16 NOTE — TELEPHONE ENCOUNTER
rcv'd call this am @ 07:13 pt cx'd procedure due to being sick, when she feels better she will call to r/s

## 2018-03-29 ENCOUNTER — HOSPITAL ENCOUNTER (OUTPATIENT)
Dept: RADIOLOGY | Facility: CLINIC | Age: 68
Discharge: HOME | End: 2018-03-29
Attending: ANESTHESIOLOGY | Admitting: ANESTHESIOLOGY
Payer: MEDICARE

## 2018-03-29 VITALS
TEMPERATURE: 97.1 F | HEART RATE: 57 BPM | OXYGEN SATURATION: 96 % | SYSTOLIC BLOOD PRESSURE: 131 MMHG | RESPIRATION RATE: 18 BRPM | DIASTOLIC BLOOD PRESSURE: 78 MMHG

## 2018-03-29 DIAGNOSIS — G57.01 PIRIFORMIS SYNDROME OF RIGHT SIDE: ICD-10-CM

## 2018-03-29 DIAGNOSIS — M54.50 CHRONIC RIGHT-SIDED LOW BACK PAIN WITHOUT SCIATICA: ICD-10-CM

## 2018-03-29 DIAGNOSIS — G89.29 CHRONIC HIP PAIN, RIGHT: ICD-10-CM

## 2018-03-29 DIAGNOSIS — M25.551 CHRONIC HIP PAIN, RIGHT: ICD-10-CM

## 2018-03-29 DIAGNOSIS — G89.29 CHRONIC RIGHT-SIDED LOW BACK PAIN WITHOUT SCIATICA: ICD-10-CM

## 2018-03-29 PROCEDURE — 64445 NJX AA&/STRD SCIATIC NRV IMG: CPT | Performed by: ANESTHESIOLOGY

## 2018-03-29 RX ORDER — LIDOCAINE HYDROCHLORIDE 10 MG/ML
5 INJECTION, SOLUTION EPIDURAL; INFILTRATION; INTRACAUDAL; PERINEURAL ONCE
Status: COMPLETED | OUTPATIENT
Start: 2018-03-29 | End: 2018-03-29

## 2018-03-29 RX ORDER — METHYLPREDNISOLONE ACETATE 80 MG/ML
80 INJECTION, SUSPENSION INTRA-ARTICULAR; INTRALESIONAL; INTRAMUSCULAR; PARENTERAL; SOFT TISSUE ONCE
Status: COMPLETED | OUTPATIENT
Start: 2018-03-29 | End: 2018-03-29

## 2018-03-29 RX ADMIN — IOHEXOL 1 ML: 300 INJECTION, SOLUTION INTRAVENOUS at 10:45

## 2018-03-29 RX ADMIN — METHYLPREDNISOLONE ACETATE 80 MG: 80 INJECTION, SUSPENSION INTRA-ARTICULAR; INTRALESIONAL; INTRAMUSCULAR; SOFT TISSUE at 10:31

## 2018-03-29 RX ADMIN — LIDOCAINE HYDROCHLORIDE 5 ML: 20 INJECTION, SOLUTION EPIDURAL; INFILTRATION; INTRACAUDAL at 10:31

## 2018-03-29 RX ADMIN — LIDOCAINE HYDROCHLORIDE 3 ML: 10 INJECTION, SOLUTION EPIDURAL; INFILTRATION; INTRACAUDAL; PERINEURAL at 10:30

## 2018-03-29 NOTE — H&P
History of Present Illness: The patient is a 69 y o  female who presents with complaints of leg pain      Patient Active Problem List   Diagnosis    Anemia    Anxiety    Chronic hip pain, right    Chronic right-sided low back pain without sciatica    DJD (degenerative joint disease)    Gait disturbance    GIST (gastrointestinal stromal tumor), malignant (HCC)    Greater trochanteric bursitis of right hip    Other acquired deformity of toe    Herniated nucleus pulposus, L5-S1, right    Hiatal hernia    Hypertension    Lumbar stenosis without neurogenic claudication    Absolute anemia    Lesion of plantar nerve    MARK (obstructive sleep apnea)    Pain syndrome, chronic    Paroxysmal atrial fibrillation (HCC)    Premature atrial contractions    Right bundle-branch block    Right lumbar radiculitis    Sacroiliac joint dysfunction of right side    Sacroiliitis (HCC)    Spondylosis of lumbar region without myelopathy or radiculopathy    Supraventricular tachycardia (HCC)    Piriformis syndrome of right side       Past Medical History:   Diagnosis Date    ADHD     Anxiety     Arthritis     Asthma     Atrial fibrillation (HCC)     Bleeding disorder (HCC)     Breast cancer (HCC)     [facility] )     Depression     GERD (gastroesophageal reflux disease)     History of stomach ulcers     Hypercholesterolemia     Hypertension     Hypothyroidism     Kidney disease     [facility]astatic cancer ([facility] )        Past Surgical History:   Procedure Laterality Date    ANKLE SURGERY      APPENDECTOMY      BREAST SURGERY      CATARACT EXTRACTION       SECTION      HIP SURGERY      LAMINECTOMY      ROTATOR CUFF REPAIR           Current Outpatient Prescriptions:     ALPRAZolam (XANAX) 0 25 mg tablet, Take by mouth, Disp: , Rfl:     aspirin (ESTELA ASPIRIN) 325 mg tablet, Take 325 mg by mouth, Disp: , Rfl:     Azelastine HCl 137 MCG/SPRAY SOLN, instill 2 sprays into each nostril twice a day if needed for congestion, Disp: , Rfl: 0    Calcium Carbonate-Vitamin D (CALCIUM 500 + D PO), Take 500 mg by mouth, Disp: , Rfl:     diphenoxylate-atropine (LOMOTIL) 2 5-0 025 mg per tablet, take 1 tablet by mouth four times a day if needed for diarrhea, Disp: , Rfl: 0    furosemide (LASIX) 20 mg tablet, Take 20 mg by mouth, Disp: , Rfl:     hydroxychloroquine (PLAQUENIL) 200 mg tablet, Take 200 mg by mouth, Disp: , Rfl:     ibuprofen (MOTRIN) 200 mg tablet, Take 400 mg by mouth, Disp: , Rfl:     loperamide (IMODIUM) 2 mg capsule, Take 2 capsules by mouth 4 (four) times a day as needed, Disp: , Rfl:     MAGNESIUM PO, Take 250 mg by mouth, Disp: , Rfl:     metoclopramide (REGLAN) 5 mg/mL, Inject as directed, Disp: , Rfl:     metoprolol (LOPRESSOR) 10 mg/mL oral suspension, Take 25 mg by mouth 2 (two) times a day, Disp: , Rfl:     ondansetron (ZOFRAN-ODT) 4 mg disintegrating tablet, Take 1 tablet by mouth every 8 (eight) hours as needed for nausea or vomiting for up to 3 days, Disp: 9 tablet, Rfl: 0    Probiotic Product (PROBIOTIC ADVANCED PO), Take by mouth, Disp: , Rfl:     SLOW-MAG 71 5-119 MG, , Disp: , Rfl: 0    Allergies   Allergen Reactions    Codeine     Wound Dressing Adhesive     Neomycin-Bacitracin Zn-Polymyx Rash    Zolmitriptan Palpitations     Heart palpitations       Physical Exam:   Vitals:    03/29/18 1007   BP: 107/73   Pulse: 57   Resp: 18   Temp: (!) 97 1 °F (36 2 °C)   SpO2: 96%     General: Awake, Alert, Oriented x 3, Mood and affect appropriate  Respiratory: Respirations even and unlabored  Cardiovascular: Peripheral pulses intact; no edema  Musculoskeletal Exam:  Pain to palpation along the piriformis    ASA Score: II    Assessment:   1  Chronic right-sided low back pain without sciatica    2  Chronic hip pain, right    3   Piriformis syndrome of right side        Plan: Right Piriformis inj

## 2018-03-29 NOTE — DISCHARGE INSTRUCTIONS
1  Do not apply heat to any area that is numb  If you have discomfort or soreness at the injection site, you may apply ice today, 20 minutes on and 20 minutes off  Tomorrow you may use ice or warm, moist heat  Do not apply ice or heat directly to the skin  2  If you experience severe shortness of breath, go to the Emergency Room  3  You may have numbness for several hours from the local anesthetic  Please use caution and common sense, especially with weight-bearing activities  4  You may have an increase or change in the discomfort for 36-48 hours after your treatment  Apply ice and continue with any pain medicine you have been prescribed  5  Do not do anything strenuous today  You may shower, but no tub baths or hot tubs today  You may resume your normal activities tomorrow, but do not overdo it  Resume normal activities slowly when you are feeling better  6  If you experience redness, drainage or swelling at the injection site, or if you develop a fever above 100 degrees, please call [facility] at [phone] or go to the Emergency Room  7  Continue to take all routine medicines prescribed by your primary care physician unless otherwise instructed by our staff  Most blood thinners should be started again according to your regularly scheduled dosing  If you have any questions, please give our office a call  If you have a problem specifically related to your procedure, please call our office at [phone]  Problems not related to your procedure should be directed to your primary care physician

## 2018-04-05 ENCOUNTER — TELEPHONE (OUTPATIENT)
Dept: PAIN MEDICINE | Facility: CLINIC | Age: 68
End: 2018-04-05

## 2018-04-05 NOTE — TELEPHONE ENCOUNTER
1st attempt LM to COB needing %of relief and pain level         S/P RT PIRIFORMIS INJ on 03/29/18 w/SL Fadi  F/u scheduled on 04/30/18 w/DG

## 2018-04-11 NOTE — TELEPHONE ENCOUNTER
Patient states if she moves the wrong way the pain start to bother her  She got 80% relief and her pain level is a 2/10  Patient also states she is able to put her socks and shoes on without a lot of pain she was not able to do that before her surgery             S/P RT PIRIFORMIS INJ on 03/29/18 w/SL [employer]ites  F/u scheduled on 04/30/18 w/DG

## 2018-04-25 PROBLEM — I34.0 NON-RHEUMATIC MITRAL REGURGITATION: Status: ACTIVE | Noted: 2018-04-25

## 2018-04-30 ENCOUNTER — OFFICE VISIT (OUTPATIENT)
Dept: PAIN MEDICINE | Facility: CLINIC | Age: 68
End: 2018-04-30
Payer: MEDICARE

## 2018-04-30 VITALS
WEIGHT: 114 LBS | TEMPERATURE: 98 F | DIASTOLIC BLOOD PRESSURE: 78 MMHG | HEART RATE: 56 BPM | HEIGHT: 63 IN | BODY MASS INDEX: 20.2 KG/M2 | SYSTOLIC BLOOD PRESSURE: 118 MMHG

## 2018-04-30 DIAGNOSIS — M47.816 SPONDYLOSIS OF LUMBAR REGION WITHOUT MYELOPATHY OR RADICULOPATHY: ICD-10-CM

## 2018-04-30 DIAGNOSIS — G57.02 PIRIFORMIS SYNDROME, LEFT: ICD-10-CM

## 2018-04-30 DIAGNOSIS — M48.061 LUMBAR STENOSIS WITHOUT NEUROGENIC CLAUDICATION: ICD-10-CM

## 2018-04-30 DIAGNOSIS — G89.4 PAIN SYNDROME, CHRONIC: ICD-10-CM

## 2018-04-30 DIAGNOSIS — G89.29 CHRONIC HIP PAIN, RIGHT: ICD-10-CM

## 2018-04-30 DIAGNOSIS — G89.29 CHRONIC RIGHT-SIDED LOW BACK PAIN WITHOUT SCIATICA: Primary | ICD-10-CM

## 2018-04-30 DIAGNOSIS — M25.551 CHRONIC HIP PAIN, RIGHT: ICD-10-CM

## 2018-04-30 DIAGNOSIS — M54.50 CHRONIC RIGHT-SIDED LOW BACK PAIN WITHOUT SCIATICA: Primary | ICD-10-CM

## 2018-04-30 DIAGNOSIS — G57.01 PIRIFORMIS SYNDROME OF RIGHT SIDE: ICD-10-CM

## 2018-04-30 PROCEDURE — 99213 OFFICE O/P EST LOW 20 MIN: CPT | Performed by: NURSE PRACTITIONER

## 2018-04-30 RX ORDER — MOMETASONE FUROATE 50 UG/1
2 SPRAY, METERED NASAL DAILY
COMMUNITY
End: 2019-12-12 | Stop reason: ALTCHOICE

## 2018-04-30 NOTE — PROGRESS NOTES
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    6  Piriformis syndrome of right side    7  Piriformis syndrome, left        Plan:    I discussed with the patient that since there has been moderate-significant improvement in the pain symptoms that we will hold off on any repeat injections at this point in time  However, I did explain that if over the next 4-6 weeks the pain symptoms should return, worsen, and/or change, we could consider a repeat injection  If after the 6 weeks and OV would be warranted for re-evaluation  I discussed with the patient that if she decides she would like to proceed with the left piriformis injection, she should call our office and we can proceed with that injection since we have already done the right side  The patient was agreeable and verbalized an understanding  I discussed with the patient that at this point time she can followup with our office on an as-needed basis  I did review the patient that if her pain symptoms should change, worsen, and/or if she would experience any new symptoms as she would like to be evaluated for, she should give our office a call  The patient was agreeable and verbalized an understanding  History of Present Illness: The patient is a 69 y o  female last seen on 3/29/18 Right piriformis inj who presents for a follow up office visit in regards to chronic pain secondary to right-sided greater than left piriformis syndrome  The patient currently reports that she definitely has noted moderate stable improvement of her right-sided low back and hip pain as well as some the radicular symptoms as she is status post a right piriformis injection on March 29, 2018 with Dr Dagmar Johnson    She reports she is starting to notice some of the similar pain on the left side, but that it is still manageable and she is still noting moderate to significant relief of the right-sided pain  I have personally reviewed and/or updated the patient's past medical history, past surgical history, family history, social history, current medications, allergies, and vital signs today  Review of Systems:    Review of Systems   Respiratory: Positive for shortness of breath  Cardiovascular: Negative for chest pain  Gastrointestinal: Positive for constipation and diarrhea  Negative for nausea and vomiting  Musculoskeletal: Negative for arthralgias, gait problem, joint swelling and myalgias  Skin: Negative for rash  Neurological: Positive for dizziness  Negative for seizures and weakness  All other systems reviewed and are negative  Past Medical History:   Diagnosis Date    ADHD     Anxiety     Arthritis     Asthma     Atrial fibrillation (HCC)     Bleeding disorder (HCC)     Breast cancer (HCC)     Cancer ([facility] )     Depression     GERD (gastroesophageal reflux disease)     History of stomach ulcers     Hypercholesterolemia     Hypertension     Hypothyroidism     Kidney disease     Metastatic cancer ([facility] )        Past Surgical History:   Procedure Laterality Date    ANKLE SURGERY      APPENDECTOMY      BREAST SURGERY      CATARACT EXTRACTION       SECTION      HIP SURGERY      LAMINECTOMY      ROTATOR CUFF REPAIR         Family History   Problem Relation Age of Onset   Learta January Breast cancer Mother     Diabetes Mother     Hypertension Mother     Hyperlipidemia Father     Colon cancer Paternal [de-identified]     Colon cancer Paternal Grandfather     Diabetes Family        Social History     Occupational History    Not on file       Social History Main Topics    Smoking status: Never Smoker    Smokeless tobacco: Never Used    Alcohol use No    Drug use: No    Sexual activity: Not on file         Current Outpatient Prescriptions:    ALPRAZolam (XANAX) 0 25 mg tablet, Take by mouth, Disp: , Rfl:     aspirin (ESTELA ASPIRIN) 325 mg tablet, Take 325 mg by mouth, Disp: , Rfl:     Azelastine HCl 137 MCG/SPRAY SOLN, instill 2 sprays into each nostril twice a day if needed for congestion, Disp: , Rfl: 0    Calcium Carbonate-Vitamin D (CALCIUM 500 + D PO), Take 500 mg by mouth, Disp: , Rfl:     diphenoxylate-atropine (LOMOTIL) 2 5-0 025 mg per tablet, take 1 tablet by mouth four times a day if needed for diarrhea, Disp: , Rfl: 0    furosemide (LASIX) 20 mg tablet, Take 20 mg by mouth, Disp: , Rfl:     hydroxychloroquine (PLAQUENIL) 200 mg tablet, Take 200 mg by mouth, Disp: , Rfl:     ibuprofen (MOTRIN) 200 mg tablet, Take 400 mg by mouth, Disp: , Rfl:     loperamide (IMODIUM) 2 mg capsule, Take 2 capsules by mouth 4 (four) times a day as needed, Disp: , Rfl:     MAGNESIUM PO, Take 250 mg by mouth, Disp: , Rfl:     metoclopramide (REGLAN) 5 mg/mL, Inject as directed, Disp: , Rfl:     metoprolol (LOPRESSOR) 10 mg/mL oral suspension, Take 25 mg by mouth 2 (two) times a day, Disp: , Rfl:     ondansetron (ZOFRAN-ODT) 4 mg disintegrating tablet, Take 1 tablet by mouth every 8 (eight) hours as needed for nausea or vomiting for up to 3 days, Disp: 9 tablet, Rfl: 0    Probiotic Product (PROBIOTIC ADVANCED PO), Take by mouth, Disp: , Rfl:     SLOW-MAG 71 5-119 MG, , Disp: , Rfl: 0    Allergies   Allergen Reactions    Codeine     Wound Dressing Adhesive     Neomycin-Bacitracin Zn-Polymyx Rash    Zolmitriptan Palpitations     Heart palpitations       Physical Exam:    There were no vitals taken for this visit  Constitutional:normal, well developed, well nourished, alert, in no distress and non-toxic and no overt pain behavior    Eyes:anicteric  HEENT:grossly intact  Neck:supple, symmetric, trachea midline and no masses   Pulmonary:even and unlabored  Cardiovascular:No edema or pitting edema present  Skin:Normal without rashes or lesions and well hydrated  Psychiatric:Mood and affect appropriate  Neurologic:Cranial Nerves II-XII grossly intact  Musculoskeletal:Slightly antalgic, but steady gait with the use of a single cane  Imaging  No orders to display         No orders of the defined types were placed in this encounter

## 2018-08-15 ENCOUNTER — TELEPHONE (OUTPATIENT)
Dept: RADIOLOGY | Facility: CLINIC | Age: 68
End: 2018-08-15

## 2018-08-15 NOTE — TELEPHONE ENCOUNTER
Pt calling to see about getting her Left sided low back pain that radiates to her foot treated  Pt had Rt piriformis inj done on 3/2018, and would like to do the same for the Lt piriformis done as noted in last office note  Advise pt will discuss w/provider and rtc to possibly get her on the schedule by the end of this week

## 2018-08-17 ENCOUNTER — HOSPITAL ENCOUNTER (OUTPATIENT)
Dept: RADIOLOGY | Facility: CLINIC | Age: 68
Discharge: HOME | End: 2018-08-17
Attending: ANESTHESIOLOGY | Admitting: ANESTHESIOLOGY
Payer: MEDICARE

## 2018-08-17 VITALS
RESPIRATION RATE: 20 BRPM | DIASTOLIC BLOOD PRESSURE: 81 MMHG | TEMPERATURE: 98.2 F | HEART RATE: 76 BPM | SYSTOLIC BLOOD PRESSURE: 147 MMHG | OXYGEN SATURATION: 99 %

## 2018-08-17 DIAGNOSIS — M54.50 LOW BACK PAIN: ICD-10-CM

## 2018-08-17 DIAGNOSIS — G57.02 PIRIFORMIS SYNDROME OF LEFT SIDE: ICD-10-CM

## 2018-08-17 PROCEDURE — 20552 NJX 1/MLT TRIGGER POINT 1/2: CPT | Performed by: ANESTHESIOLOGY

## 2018-08-17 PROCEDURE — 77003 FLUOROGUIDE FOR SPINE INJECT: CPT | Performed by: ANESTHESIOLOGY

## 2018-08-17 RX ORDER — METHYLPREDNISOLONE ACETATE 80 MG/ML
80 INJECTION, SUSPENSION INTRA-ARTICULAR; INTRALESIONAL; INTRAMUSCULAR; PARENTERAL; SOFT TISSUE ONCE
Status: COMPLETED | OUTPATIENT
Start: 2018-08-17 | End: 2018-08-17

## 2018-08-17 RX ORDER — LIDOCAINE HYDROCHLORIDE 10 MG/ML
5 INJECTION, SOLUTION EPIDURAL; INFILTRATION; INTRACAUDAL; PERINEURAL ONCE
Status: COMPLETED | OUTPATIENT
Start: 2018-08-17 | End: 2018-08-17

## 2018-08-17 RX ADMIN — LIDOCAINE HYDROCHLORIDE 3 ML: 20 INJECTION, SOLUTION EPIDURAL; INFILTRATION; INTRACAUDAL at 09:11

## 2018-08-17 RX ADMIN — METHYLPREDNISOLONE ACETATE 80 MG: 80 INJECTION, SUSPENSION INTRA-ARTICULAR; INTRALESIONAL; INTRAMUSCULAR; SOFT TISSUE at 09:11

## 2018-08-17 RX ADMIN — IOHEXOL 1 ML: 300 INJECTION, SOLUTION INTRAVENOUS at 09:15

## 2018-08-17 RX ADMIN — LIDOCAINE HYDROCHLORIDE 4 ML: 10 INJECTION, SOLUTION EPIDURAL; INFILTRATION; INTRACAUDAL; PERINEURAL at 09:11

## 2018-08-17 NOTE — DISCHARGE INSTR - LAB
1  Do not apply heat to any area that is numb  If you have discomfort or soreness at the injection site, you may apply ice today, 20 minutes on and 20 minutes off  Tomorrow you may use ice or warm, moist heat  Do not apply ice or heat directly to the skin  2  If you experience severe shortness of breath, go to the Emergency Room  3  You may have numbness for several hours from the local anesthetic  Please use caution and common sense, especially with weight-bearing activities  4  You may have an increase or change in the discomfort for 36-48 hours after your treatment  Apply ice and continue with any pain medicine you have been prescribed  5  Do not do anything strenuous today  You may shower, but no tub baths or hot tubs today  You may resume your normal activities tomorrow, but do not overdo it  Resume normal activities slowly when you are feeling better  6  If you experience redness, drainage or swelling at the injection site, or if you develop a fever above 100 degrees, please call [facility] at [phone] or go to the Emergency Room  7  Continue to take all routine medicines prescribed by your primary care physician unless otherwise instructed by our staff  Most blood thinners should be started again according to your regularly scheduled dosing  If you have any questions, please give our office a call  If you have a problem specifically related to your procedure, please call our office at [phone]  Problems not related to your procedure should be directed to your primary care physician

## 2018-08-17 NOTE — H&P
History of Present Illness: The patient is a 77 y o  female who presents with complaints of left lower extremity pain      Patient Active Problem List   Diagnosis    Anemia    Anxiety    Chronic hip pain, right    Chronic right-sided low back pain without sciatica    DJD (degenerative joint disease)    Gait disturbance    GIST (gastrointestinal stromal tumor), malignant (HCC)    Greater trochanteric bursitis of right hip    Other acquired deformity of toe    Herniated nucleus pulposus, L5-S1, right    Hiatal hernia    Hypertension    Lumbar stenosis without neurogenic claudication    Absolute anemia    Lesion of plantar nerve    MARK (obstructive sleep apnea)    Pain syndrome, chronic    Paroxysmal atrial fibrillation (HCC)    Premature atrial contractions    Right bundle-branch block    Right lumbar radiculitis    Sacroiliac joint dysfunction of right side    Sacroiliitis (HCC)    Spondylosis of lumbar region without myelopathy or radiculopathy    Supraventricular tachycardia (HCC)    Piriformis syndrome of right side    Non-rheumatic mitral regurgitation    Piriformis syndrome, left       Past Medical History:   Diagnosis Date    ADHD     Anxiety     Arthritis     Asthma     Atrial fibrillation (HCC)     Bleeding disorder (HCC)     Breast cancer (HCC)     [facility] )     Depression     GERD (gastroesophageal reflux disease)     History of stomach ulcers     Hypercholesterolemia     Hypertension     Hypothyroidism     Kidney disease     Metastatic cancer ([facility] )        Past Surgical History:   Procedure Laterality Date    ANKLE SURGERY      APPENDECTOMY      BREAST SURGERY      CATARACT EXTRACTION       SECTION      HIP SURGERY      LAMINECTOMY      ROTATOR CUFF REPAIR           Current Outpatient Prescriptions:     ALPRAZolam (XANAX) 0 25 mg tablet, Take by mouth, Disp: , Rfl:     aspirin (ESTELA ASPIRIN) 325 mg tablet, Take 325 mg by mouth, Disp: , Rfl:   Azelastine HCl 137 MCG/SPRAY SOLN, instill 2 sprays into each nostril twice a day if needed for congestion, Disp: , Rfl: 0    Calcium Carbonate-Vitamin D (CALCIUM 500 + D PO), Take 500 mg by mouth, Disp: , Rfl:     diphenoxylate-atropine (LOMOTIL) 2 5-0 025 mg per tablet, take 1 tablet by mouth four times a day if needed for diarrhea, Disp: , Rfl: 0    furosemide (LASIX) 20 mg tablet, Take 20 mg by mouth, Disp: , Rfl:     hydroxychloroquine (PLAQUENIL) 200 mg tablet, Take 200 mg by mouth, Disp: , Rfl:     ibuprofen (MOTRIN) 200 mg tablet, Take 400 mg by mouth, Disp: , Rfl:     loperamide (IMODIUM) 2 mg capsule, Take 2 capsules by mouth 4 (four) times a day as needed, Disp: , Rfl:     MAGNESIUM PO, Take 250 mg by mouth, Disp: , Rfl:     metoclopramide (REGLAN) 5 mg/mL, Inject as directed, Disp: , Rfl:     metoprolol (LOPRESSOR) 10 mg/mL oral suspension, Take 25 mg by mouth 2 (two) times a day, Disp: , Rfl:     mometasone (NASONEX) 50 mcg/act nasal spray, 2 sprays into each nostril daily, Disp: , Rfl:     ondansetron (ZOFRAN-ODT) 4 mg disintegrating tablet, Take 1 tablet by mouth every 8 (eight) hours as needed for nausea or vomiting for up to 3 days, Disp: 9 tablet, Rfl: 0    Probiotic Product (PROBIOTIC ADVANCED PO), Take by mouth, Disp: , Rfl:     SLOW-MAG 71 5-119 MG, , Disp: , Rfl: 0    Allergies   Allergen Reactions    Codeine     Codeine Sulfate     Wound Dressing Adhesive     Neomycin-Bacitracin Zn-Polymyx Rash    Zolmitriptan Palpitations     Heart palpitations       Physical Exam:   Vitals:    08/17/18 0856   BP: 127/74   Pulse: 71   Resp: 18   Temp: 98 2 °F (36 8 °C)   SpO2: 100%     General: Awake, Alert, Oriented x 3, Mood and affect appropriate  Respiratory: Respirations even and unlabored  Cardiovascular: Peripheral pulses intact; no edema  Musculoskeletal Exam:  Pain to palpation left piriformis    ASA Score: II    Patient/Chart Verification  Patient ID Verified: Verbal  Consents Confirmed: Procedural, To be obtained in the Pre-Procedure area  H&P( within 30 days) Verified: To be obtained in the Pre-Procedure area  Interval H&P(within 24 hr) Complete (required for Outpatients and Surgery Admit only): To be obtained in the Pre-Procedure area  Allergies Reviewed: Yes  Anticoag/NSAID held?: NA  Currently on antibiotics?: No    Assessment:   1  Piriformis syndrome of left side    2   Low back pain        Plan: LT PIRIFORMIS INJ

## 2018-08-21 ENCOUNTER — TELEPHONE (OUTPATIENT)
Dept: PAIN MEDICINE | Facility: MEDICAL CENTER | Age: 68
End: 2018-08-21

## 2018-08-24 ENCOUNTER — TELEPHONE (OUTPATIENT)
Dept: PAIN MEDICINE | Facility: CLINIC | Age: 68
End: 2018-08-24

## 2018-08-24 NOTE — TELEPHONE ENCOUNTER
Telephone note   Reason for the call    Post Op F/u [facility] [place]    S/w pt states that she has had little to no relief from inj and that her pain level is a 10 and that when walking she has to stop frequently     S/P LT PIRIFORMIS on 8/17 w/[facility] in [place]      Follow up with [place] 9/7

## 2018-09-06 ENCOUNTER — APPOINTMENT (OUTPATIENT)
Dept: RADIOLOGY | Facility: CLINIC | Age: 68
End: 2018-09-06
Payer: MEDICARE

## 2018-09-06 ENCOUNTER — OFFICE VISIT (OUTPATIENT)
Dept: PAIN MEDICINE | Facility: CLINIC | Age: 68
End: 2018-09-06
Payer: MEDICARE

## 2018-09-06 VITALS
BODY MASS INDEX: 21.62 KG/M2 | TEMPERATURE: 98.1 F | SYSTOLIC BLOOD PRESSURE: 136 MMHG | WEIGHT: 122 LBS | HEART RATE: 76 BPM | DIASTOLIC BLOOD PRESSURE: 72 MMHG | HEIGHT: 63 IN

## 2018-09-06 DIAGNOSIS — G57.02 PIRIFORMIS SYNDROME, LEFT: ICD-10-CM

## 2018-09-06 DIAGNOSIS — M54.16 LUMBAR RADICULOPATHY: ICD-10-CM

## 2018-09-06 DIAGNOSIS — M51.27 HERNIATED NUCLEUS PULPOSUS, L5-S1, RIGHT: ICD-10-CM

## 2018-09-06 DIAGNOSIS — M48.061 LUMBAR STENOSIS WITHOUT NEUROGENIC CLAUDICATION: ICD-10-CM

## 2018-09-06 DIAGNOSIS — G89.4 PAIN SYNDROME, CHRONIC: ICD-10-CM

## 2018-09-06 DIAGNOSIS — M47.816 SPONDYLOSIS OF LUMBAR REGION WITHOUT MYELOPATHY OR RADICULOPATHY: ICD-10-CM

## 2018-09-06 DIAGNOSIS — M25.562 ACUTE PAIN OF LEFT KNEE: Primary | ICD-10-CM

## 2018-09-06 DIAGNOSIS — M79.605 PAIN OF LEFT LOWER EXTREMITY: ICD-10-CM

## 2018-09-06 DIAGNOSIS — M25.562 ACUTE PAIN OF LEFT KNEE: ICD-10-CM

## 2018-09-06 PROCEDURE — 73562 X-RAY EXAM OF KNEE 3: CPT

## 2018-09-06 PROCEDURE — 99214 OFFICE O/P EST MOD 30 MIN: CPT | Performed by: NURSE PRACTITIONER

## 2018-09-06 RX ORDER — TRAMADOL HYDROCHLORIDE 100 MG/1
TABLET, FILM COATED, EXTENDED RELEASE ORAL
Qty: 30 TABLET | Refills: 1 | Status: SHIPPED | OUTPATIENT
Start: 2018-09-06 | End: 2018-11-02

## 2018-09-06 RX ORDER — TRAMADOL HYDROCHLORIDE 50 MG/1
TABLET ORAL
Qty: 90 TABLET | Refills: 1 | Status: SHIPPED | OUTPATIENT
Start: 2018-09-06 | End: 2018-11-02

## 2018-09-06 RX ORDER — TRAMADOL HYDROCHLORIDE 50 MG/1
TABLET ORAL
COMMUNITY
Start: 2018-08-21 | End: 2018-09-06 | Stop reason: SDUPTHER

## 2018-09-06 NOTE — PROGRESS NOTES
Assessment:  1  Acute pain of left knee    2  Pain of left lower extremity    3  Lumbar radiculopathy    4  Herniated nucleus pulposus, L5-S1, right    5  Piriformis syndrome, left    6  Spondylosis of lumbar region without myelopathy or radiculopathy    7  Lumbar stenosis without neurogenic claudication    8  Pain syndrome, chronic        Plan:  While the patient was in the office today, I did have a thorough conversation with the patient regarding her medication regimen and treatment plan options  At this point time I feel would be beneficial proceed with an x-ray of the left knee to evaluate for any new or worsening underlying etiology  I explained to the patient that once we receive the results of the x-rays, our office will give her a call to review the results and discuss the next step in her treatment plan, which most likely will include a left knee injection with Dr Ghulam Guan  The patient was agreeable and verbalized an understanding  However, I explained to the patient that if she continues with some of the radicular symptoms we may also consider a left L4 and L5 transforaminal epidural steroid injection with Dr Ghulam Guan in the future  The patient was agreeable and verbalized an understanding  With regards to her medication regimen, at this point time since she is quite sensitive to medications and tramadol is somewhat helpful for short periods of time, without side effects, I am going to start her on tramadol  mg at bedtime and then she can also take tramadol IR 50 mg t i d  p r n  for pain during the day  I explained to the patient that hopefully by adding the long-acting tramadol it will help with her overall baseline pain and help her sleep better at nighttime and then she will have the tramadol for breakthrough pain throughout the day    I advised the patient that if they experience any side effects or issues with the changes in their medication regiment, they should give our office a call to discuss  I also advised the patient not to drive or operate machinery until they see how the changes in the medication regimen affects them  The patient was agreeable and verbalized an understanding  [place] Prescription Drug Monitoring Program report was reviewed and was appropriate     There are risks associated with opioid medications, including dependence, addiction and tolerance  The patient understands and agrees to use these medications only as prescribed  Potential side effects of the medications include, but are not limited to, constipation, drowsiness, addiction, impaired judgment and risk of fatal overdose if not taken as prescribed  The patient was warned against driving while taking sedation medications  Sharing medications is a felony  At this point in time, the patient is showing no signs of addiction, abuse, diversion or suicidal ideation  The patient will follow-up in 8 weeks for medication prescription refill and reevaluation  The patient was advised to contact the office should their symptoms worsen in the interim  The patient was agreeable and verbalized an understanding  History of Present Illness: The patient is a 77 y o  female last seen on 8/17/18 who presents for a follow up office visit in regards to chronic pain secondary to left piriformis syndrome as well as lumbar stenosis and spondylosis  The patient currently reports that at this point she has not noticed any improvement as a result of her left piriformis injection on August 17, 2018 with Dr Milena Ortega  She reports that her pain is actually worsening, especially the pain in her left knee and denies any recent falls, trauma or injury, however, reports that her left knee has been swelling and at times feels that it is so weak it is going to give out and she is going to fall, the us the reason she is walking with a cane    The patient has been using p r n  tramadol as prescribed by her surgeon for her foot pain with minimal and short-lived relief  However, she is not having side effects from the tramadol as she is typically very sensitive to medications  The patient presents today for follow-up and re-evaluation of her worsening pain symptoms and treatment plan options  Current pain medications includes:  Tramadol 50 mg b i d  p r n  for pain  The patient reports that this regimen is providing 10% pain relief  The patient is reporting no side effects from this pain medication regimen  I have personally reviewed and/or updated the patient's past medical history, past surgical history, family history, social history, current medications, allergies, and vital signs today  Review of Systems:    Review of Systems   Respiratory: Positive for shortness of breath  Cardiovascular: Negative for chest pain  Gastrointestinal: Positive for constipation and diarrhea  Negative for nausea and vomiting  Musculoskeletal: Positive for gait problem and joint swelling (joint stiffness)  Negative for arthralgias and myalgias  Skin: Negative for rash  Neurological: Negative for dizziness, seizures and weakness  All other systems reviewed and are negative          Past Medical History:   Diagnosis Date    ADHD     Anxiety     Arthritis     Asthma     Atrial fibrillation (HCC)     Bleeding disorder (HCC)     Breast cancer (HCC)     Cancer ([facility] )     Depression     GERD (gastroesophageal reflux disease)     History of stomach ulcers     Hypercholesterolemia     Hypertension     Hypothyroidism     Kidney disease     Metastatic cancer ([facility] )        Past Surgical History:   Procedure Laterality Date    ANKLE SURGERY      APPENDECTOMY      BREAST SURGERY      CATARACT EXTRACTION       SECTION      HIP SURGERY      LAMINECTOMY      ROTATOR CUFF REPAIR         Family History   Problem Relation Age of Onset    Breast cancer Mother     Diabetes Mother     Hypertension Mother     Hyperlipidemia Father     Colon cancer Paternal Grandmother     Colon cancer Paternal Grandfather     Diabetes Family        Social History     Occupational History    Not on file       Social History Main Topics    Smoking status: Never Smoker    Smokeless tobacco: Never Used    Alcohol use No    Drug use: No    Sexual activity: Not on file         Current Outpatient Prescriptions:     traMADol (ULTRAM) 50 mg tablet, Take 1 PO TID PRN for Break through pain , Disp: 90 tablet, Rfl: 1    ALPRAZolam (XANAX) 0 25 mg tablet, Take by mouth, Disp: , Rfl:     aspirin (ESTELA ASPIRIN) 325 mg tablet, Take 325 mg by mouth, Disp: , Rfl:     Azelastine HCl 137 MCG/SPRAY SOLN, instill 2 sprays into each nostril twice a day if needed for congestion, Disp: , Rfl: 0    diphenoxylate-atropine (LOMOTIL) 2 5-0 025 mg per tablet, take 1 tablet by mouth four times a day if needed for diarrhea, Disp: , Rfl: 0    furosemide (LASIX) 20 mg tablet, Take 20 mg by mouth, Disp: , Rfl:     hydroxychloroquine (PLAQUENIL) 200 mg tablet, Take 200 mg by mouth, Disp: , Rfl:     ibuprofen (MOTRIN) 200 mg tablet, Take 400 mg by mouth, Disp: , Rfl:     loperamide (IMODIUM) 2 mg capsule, Take 2 capsules by mouth 4 (four) times a day as needed, Disp: , Rfl:     MAGNESIUM PO, Take 250 mg by mouth, Disp: , Rfl:     metoclopramide (REGLAN) 5 mg/mL, Inject as directed, Disp: , Rfl:     metoprolol (LOPRESSOR) 10 mg/mL oral suspension, Take 25 mg by mouth 2 (two) times a day, Disp: , Rfl:     mometasone (NASONEX) 50 mcg/act nasal spray, 2 sprays into each nostril daily, Disp: , Rfl:     ondansetron (ZOFRAN-ODT) 4 mg disintegrating tablet, Take 1 tablet by mouth every 8 (eight) hours as needed for nausea or vomiting for up to 3 days, Disp: 9 tablet, Rfl: 0    SLOW-MAG 71 5-119 MG, , Disp: , Rfl: 0    TraMADol HCl ER, Biphasic, (RYZOLT) 100 MG 24 hr tablet, Take 1 PO HS , Disp: 30 tablet, Rfl: 1    Allergies   Allergen Reactions    Codeine     Codeine Sulfate     Wound Dressing Adhesive     Neomycin-Bacitracin Zn-Polymyx Rash    Zolmitriptan Palpitations     Heart palpitations       Physical Exam:    /72   Pulse 76   Temp 98 1 °F (36 7 °C)   Ht 5' 3" (1 6 m)   Wt 55 3 kg (122 lb)   BMI 21 61 kg/m²     Constitutional:normal, well developed, well nourished, alert, in no distress and non-toxic and no overt pain behavior  Eyes:anicteric  HEENT:grossly intact  Neck:supple, symmetric, trachea midline and no masses   Pulmonary:even and unlabored  Cardiovascular:No edema or pitting edema present  Skin:Normal without rashes or lesions and well hydrated  Psychiatric:Mood and affect appropriate  Neurologic:Cranial Nerves II-XII grossly intact  Musculoskeletal:Slightly antalgic, limping, painful, but steady gait with the use of a single cane  Knee Exam    Appearance:  Normal with no swelling or bruising and no obvious joint deformity  Palpation/Tenderness:   Moderate tenderness noted upon exam of the medial and anterior aspect of the left knee upon exam   Active Range of Motion:  Flexion: Minimally limited, Extension: Minimally limited, Abduction: Minimally limited, External Rotation: Minimally limited and Internal Rotation: Minimally limited    Imaging   No orders to display         Orders Placed This Encounter   Procedures    XR knee 3 vw left non injury

## 2018-09-07 ENCOUNTER — TELEPHONE (OUTPATIENT)
Dept: PAIN MEDICINE | Facility: CLINIC | Age: 68
End: 2018-09-07

## 2018-09-07 NOTE — TELEPHONE ENCOUNTER
Caller: patient   Callback: [phone]  Dr Serenity Sandhu             Pt called looking for xray results for her knee   Please call thanks

## 2018-09-07 NOTE — TELEPHONE ENCOUNTER
S/w pt, confirmed x-ray of 9/6  Advised pt per Epic, x-ray images are complete  The report has not been dictated yet  This office will cb when the report is available  Pt verbalized understanding and appreciation

## 2018-09-09 ENCOUNTER — TELEPHONE (OUTPATIENT)
Dept: PAIN MEDICINE | Facility: CLINIC | Age: 68
End: 2018-09-09

## 2018-09-09 DIAGNOSIS — G89.29 CHRONIC PAIN OF LEFT KNEE: Primary | ICD-10-CM

## 2018-09-09 DIAGNOSIS — M25.562 CHRONIC PAIN OF LEFT KNEE: Primary | ICD-10-CM

## 2018-09-09 DIAGNOSIS — M17.12 LOCALIZED OSTEOARTHRITIS OF LEFT KNEE: ICD-10-CM

## 2018-09-09 NOTE — TELEPHONE ENCOUNTER
Can you please call the patient and advise her that her left knee x-ray showed mod deg OA and that it has worsened since her last x-ray in 2015  I recommend we proceed with a left knee injection with Dr Manuel Pack  I put in the orders  She should continue with the medications as I prescribed them at her last OV and we will see how she does from there

## 2018-09-10 ENCOUNTER — TELEPHONE (OUTPATIENT)
Dept: RADIOLOGY | Facility: CLINIC | Age: 68
End: 2018-09-10

## 2018-09-10 NOTE — TELEPHONE ENCOUNTER
Pt scheduled for procedure on 9/13/2018, pt aware need for , npo 1 hr prior, wear comfortable pants, if become ill/fever/antbx call to r/s, pt verbalized understanding

## 2018-09-13 ENCOUNTER — HOSPITAL ENCOUNTER (OUTPATIENT)
Dept: RADIOLOGY | Facility: CLINIC | Age: 68
Discharge: HOME | End: 2018-09-13
Attending: ANESTHESIOLOGY | Admitting: ANESTHESIOLOGY
Payer: MEDICARE

## 2018-09-13 VITALS
HEART RATE: 53 BPM | SYSTOLIC BLOOD PRESSURE: 110 MMHG | RESPIRATION RATE: 18 BRPM | TEMPERATURE: 98.5 F | OXYGEN SATURATION: 95 % | DIASTOLIC BLOOD PRESSURE: 70 MMHG

## 2018-09-13 DIAGNOSIS — M17.12 LOCALIZED OSTEOARTHRITIS OF LEFT KNEE: ICD-10-CM

## 2018-09-13 DIAGNOSIS — M25.562 CHRONIC PAIN OF LEFT KNEE: ICD-10-CM

## 2018-09-13 DIAGNOSIS — G89.29 CHRONIC PAIN OF LEFT KNEE: ICD-10-CM

## 2018-09-13 PROCEDURE — 77002 NEEDLE LOCALIZATION BY XRAY: CPT | Performed by: ANESTHESIOLOGY

## 2018-09-13 PROCEDURE — 20610 DRAIN/INJ JOINT/BURSA W/O US: CPT | Performed by: ANESTHESIOLOGY

## 2018-09-13 PROCEDURE — 77002 NEEDLE LOCALIZATION BY XRAY: CPT

## 2018-09-13 RX ORDER — LORATADINE 10 MG/1
10 TABLET ORAL DAILY
COMMUNITY
End: 2018-11-02

## 2018-09-13 RX ORDER — HYDROCHLOROTHIAZIDE 12.5 MG/1
CAPSULE, GELATIN COATED ORAL
COMMUNITY
Start: 2018-08-06 | End: 2018-11-02

## 2018-09-13 RX ORDER — FUROSEMIDE 20 MG/1
20 TABLET ORAL DAILY
COMMUNITY
Start: 2018-06-07 | End: 2021-08-11

## 2018-09-13 RX ORDER — LIDOCAINE HYDROCHLORIDE 10 MG/ML
5 INJECTION, SOLUTION EPIDURAL; INFILTRATION; INTRACAUDAL; PERINEURAL ONCE
Status: COMPLETED | OUTPATIENT
Start: 2018-09-13 | End: 2018-09-13

## 2018-09-13 RX ORDER — METHYLPREDNISOLONE ACETATE 80 MG/ML
80 INJECTION, SUSPENSION INTRA-ARTICULAR; INTRALESIONAL; INTRAMUSCULAR; PARENTERAL; SOFT TISSUE ONCE
Status: COMPLETED | OUTPATIENT
Start: 2018-09-13 | End: 2018-09-13

## 2018-09-13 RX ORDER — DIPHENOXYLATE HYDROCHLORIDE AND ATROPINE SULFATE 2.5; .025 MG/1; MG/1
1 TABLET ORAL 4 TIMES DAILY PRN
COMMUNITY
Start: 2018-05-15 | End: 2018-11-02

## 2018-09-13 RX ADMIN — LIDOCAINE HYDROCHLORIDE 2 ML: 10 INJECTION, SOLUTION EPIDURAL; INFILTRATION; INTRACAUDAL; PERINEURAL at 10:12

## 2018-09-13 RX ADMIN — METHYLPREDNISOLONE ACETATE 80 MG: 80 INJECTION, SUSPENSION INTRA-ARTICULAR; INTRALESIONAL; INTRAMUSCULAR; SOFT TISSUE at 10:12

## 2018-09-13 RX ADMIN — IOHEXOL 1 ML: 300 INJECTION, SOLUTION INTRAVENOUS at 10:15

## 2018-09-13 RX ADMIN — LIDOCAINE HYDROCHLORIDE 4 ML: 20 INJECTION, SOLUTION EPIDURAL; INFILTRATION; INTRACAUDAL at 10:12

## 2018-09-13 NOTE — DISCHARGE INSTRUCTIONS
Steroid Joint Injection   WHAT YOU NEED TO KNOW:   A steroid joint injection is a procedure to inject steroid medicine into a joint  Steroid medicine decreases pain and inflammation  The injection may also contain an anesthetic (numbing medicine) to decrease pain  It may be done to treat conditions such as arthritis, gout, or carpal tunnel syndrome  The injections may be given in your knee, ankle, shoulder, elbow, wrist, ankle or sacroiliac joint  1  Do not apply heat to any area that is numb  If you have discomfort or soreness at the injection site, you may apply ice today, 20 minutes on and 20 minutes off  Tomorrow you may use ice or warm, moist heat  Do not apply ice or heat directly to the skin  2  You may have an increase or change in the discomfort for 36-48 hours after your treatment  Apply ice and continue with any pain medicine you have been prescribed  3  Do not do anything strenuous today  You may shower, but no tub baths or hot tubs today  You may resume your normal activities tomorrow, but do not overdo it  Resume normal activities slowly when you are feeling better  4  If you experience redness, drainage or swelling at the injection site, or if you develop a fever above 100 degrees, please call [facility] at [phone] or go to the Emergency Room  5  Continue to take all routine medicines prescribed by your primary care physician unless otherwise instructed by our staff  Most blood thinners should be started again according to your regularly scheduled dosing  If you have any questions, please give our office a call  If you have a problem specifically related to your procedure, please call our office at [phone]  Problems not related to your procedure should be directed to your primary care physician

## 2018-09-13 NOTE — H&P
History of Present Illness: The patient is a 77 y o  female who presents with complaints of left knee pain      Patient Active Problem List   Diagnosis    Anemia    Anxiety    Chronic hip pain, right    Chronic right-sided low back pain without sciatica    DJD (degenerative joint disease)    Gait disturbance    GIST (gastrointestinal stromal tumor), malignant (HCC)    Greater trochanteric bursitis of right hip    Other acquired deformity of toe    Herniated nucleus pulposus, L5-S1, right    Hiatal hernia    Hypertension    Lumbar stenosis without neurogenic claudication    Absolute anemia    Lesion of plantar nerve    MARK (obstructive sleep apnea)    Pain syndrome, chronic    Paroxysmal atrial fibrillation (HCC)    Premature atrial contractions    Right bundle-branch block    Right lumbar radiculitis    Sacroiliac joint dysfunction of right side    Sacroiliitis (HCC)    Spondylosis of lumbar region without myelopathy or radiculopathy    Supraventricular tachycardia (HCC)    Piriformis syndrome of right side    Non-rheumatic mitral regurgitation    Piriformis syndrome, left    Pain of left lower extremity    Chronic pain of left knee    Localized osteoarthritis of left knee       Past Medical History:   Diagnosis Date    ADHD     Anxiety     Arthritis     Asthma     Atrial fibrillation (HCC)     Bleeding disorder (HCC)     Breast cancer (HCC)     [facility] [address] [facility]     Depression     GERD (gastroesophageal reflux disease)     History of stomach ulcers     Hypercholesterolemia     Hypertension     Hypothyroidism     Kidney disease     Metastatic cancer (HCC)        Past Surgical History:   Procedure Laterality Date    ANKLE SURGERY      APPENDECTOMY      BREAST SURGERY      CATARACT EXTRACTION       SECTION      HIP SURGERY      LAMINECTOMY      ROTATOR CUFF REPAIR           Current Outpatient Prescriptions:     ALPRAZolam (XANAX) 0 25 mg tablet, Take by mouth, Disp: , Rfl:     aspirin (ESTELA ASPIRIN) 325 mg tablet, Take 325 mg by mouth, Disp: , Rfl:     Azelastine HCl 137 MCG/SPRAY SOLN, instill 2 sprays into each nostril twice a day if needed for congestion, Disp: , Rfl: 0    diphenoxylate-atropine (LOMOTIL) 2 5-0 025 mg per tablet, take 1 tablet by mouth four times a day if needed for diarrhea, Disp: , Rfl: 0    furosemide (LASIX) 20 mg tablet, Take 20 mg by mouth, Disp: , Rfl:     hydroxychloroquine (PLAQUENIL) 200 mg tablet, Take 200 mg by mouth, Disp: , Rfl:     ibuprofen (MOTRIN) 200 mg tablet, Take 400 mg by mouth, Disp: , Rfl:     loperamide (IMODIUM) 2 mg capsule, Take 2 capsules by mouth 4 (four) times a day as needed, Disp: , Rfl:     MAGNESIUM PO, Take 250 mg by mouth, Disp: , Rfl:     metoclopramide (REGLAN) 5 mg/mL, Inject as directed, Disp: , Rfl:     metoprolol (LOPRESSOR) 10 mg/mL oral suspension, Take 25 mg by mouth 2 (two) times a day, Disp: , Rfl:     mometasone (NASONEX) 50 mcg/act nasal spray, 2 sprays into each nostril daily, Disp: , Rfl:     ondansetron (ZOFRAN-ODT) 4 mg disintegrating tablet, Take 1 tablet by mouth every 8 (eight) hours as needed for nausea or vomiting for up to 3 days, Disp: 9 tablet, Rfl: 0    SLOW-MAG 71 5-119 MG, , Disp: , Rfl: 0    traMADol (ULTRAM) 50 mg tablet, Take 1 PO TID PRN for Break through pain , Disp: 90 tablet, Rfl: 1    TraMADol HCl ER, Biphasic, (RYZOLT) 100 MG 24 hr tablet, Take 1 PO HS , Disp: 30 tablet, Rfl: 1    Allergies   Allergen Reactions    Codeine     Codeine Sulfate     Wound Dressing Adhesive     Neomycin-Bacitracin Zn-Polymyx Rash    Zolmitriptan Palpitations     Heart palpitations       Physical Exam:   Vitals:    09/13/18 0956   BP: 116/66   Pulse: 68   Resp: 20   Temp: 98 5 °F (36 9 °C)   SpO2: 99%     General: Awake, Alert, Oriented x 3, Mood and affect appropriate  Respiratory: Respirations even and unlabored  Cardiovascular: Peripheral pulses intact; no edema  Musculoskeletal Exam:   Decreased range of motion left knee    ASA Score: II    Patient/Chart Verification  Patient ID Verified: Verbal  ID Band Applied: No  Consents Confirmed: Procedural  H&P( within 30 days) Verified: To be obtained in the Pre-Procedure area  Interval H&P(within 24 hr) Complete (required for Outpatients and Surgery Admit only): To be obtained in the Pre-Procedure area  Allergies Reviewed: Yes  Anticoag/NSAID held?: No (takes ASA)  Currently on antibiotics?: No  Pre-op Lab/Test Results Available: N/A    Assessment:   1  Chronic pain of left knee    2   Localized osteoarthritis of left knee        Plan: Left Knee Injection

## 2018-09-20 ENCOUNTER — TELEPHONE (OUTPATIENT)
Dept: PAIN MEDICINE | Facility: CLINIC | Age: 68
End: 2018-09-20

## 2018-09-20 NOTE — TELEPHONE ENCOUNTER
Telephone note   Reason for the call    Post Op F/u [facility] [place]    Pt states that has a 10 pain level with no relief  Still has all the same complaints     S/P Left knee inj on 9/13/18 w/[facility] in [place]      Follow up appt with DG on 11/2/18

## 2018-11-02 ENCOUNTER — OFFICE VISIT (OUTPATIENT)
Dept: PAIN MEDICINE | Facility: CLINIC | Age: 68
End: 2018-11-02
Payer: MEDICARE

## 2018-11-02 VITALS
HEART RATE: 80 BPM | BODY MASS INDEX: 22.86 KG/M2 | WEIGHT: 129 LBS | SYSTOLIC BLOOD PRESSURE: 128 MMHG | DIASTOLIC BLOOD PRESSURE: 74 MMHG | HEIGHT: 63 IN

## 2018-11-02 DIAGNOSIS — M25.562 CHRONIC PAIN OF LEFT KNEE: ICD-10-CM

## 2018-11-02 DIAGNOSIS — M25.551 CHRONIC HIP PAIN, RIGHT: ICD-10-CM

## 2018-11-02 DIAGNOSIS — M79.605 PAIN OF LEFT LOWER EXTREMITY: ICD-10-CM

## 2018-11-02 DIAGNOSIS — G89.4 PAIN SYNDROME, CHRONIC: ICD-10-CM

## 2018-11-02 DIAGNOSIS — M17.12 LOCALIZED OSTEOARTHRITIS OF LEFT KNEE: ICD-10-CM

## 2018-11-02 DIAGNOSIS — M51.27 HERNIATED NUCLEUS PULPOSUS, L5-S1, RIGHT: ICD-10-CM

## 2018-11-02 DIAGNOSIS — M48.061 LUMBAR STENOSIS WITHOUT NEUROGENIC CLAUDICATION: ICD-10-CM

## 2018-11-02 DIAGNOSIS — M54.16 LUMBAR RADICULOPATHY: ICD-10-CM

## 2018-11-02 DIAGNOSIS — G89.29 CHRONIC PAIN OF LEFT KNEE: ICD-10-CM

## 2018-11-02 DIAGNOSIS — G89.29 CHRONIC HIP PAIN, RIGHT: ICD-10-CM

## 2018-11-02 DIAGNOSIS — G89.29 CHRONIC RIGHT-SIDED LOW BACK PAIN WITHOUT SCIATICA: Primary | ICD-10-CM

## 2018-11-02 DIAGNOSIS — M54.50 CHRONIC RIGHT-SIDED LOW BACK PAIN WITHOUT SCIATICA: Primary | ICD-10-CM

## 2018-11-02 DIAGNOSIS — M47.816 SPONDYLOSIS OF LUMBAR REGION WITHOUT MYELOPATHY OR RADICULOPATHY: ICD-10-CM

## 2018-11-02 DIAGNOSIS — M54.16 RIGHT LUMBAR RADICULITIS: ICD-10-CM

## 2018-11-02 DIAGNOSIS — R26.9 GAIT DISTURBANCE: ICD-10-CM

## 2018-11-02 PROCEDURE — 99214 OFFICE O/P EST MOD 30 MIN: CPT | Performed by: NURSE PRACTITIONER

## 2018-11-02 RX ORDER — CETIRIZINE HYDROCHLORIDE 10 MG/1
10 TABLET ORAL DAILY
COMMUNITY
End: 2018-12-28

## 2018-11-02 NOTE — H&P
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 or radiculopathy    12  Lumbar radiculopathy        Plan:  While the patient was in the office today, I discussed with the patient with her current symptoms and most recent MRI findings, I do feel that it would be beneficial to try to address inflammatory component at least provide some kind of relief or improvement and proceed with the previously discussed left L4 and L5 transforaminal epidural steroid injection with Dr Earnest Dee  However, I did explain to the patient at the pending on the results of the injection, it may need to be repeated  The patient was agreeable and verbalized an understanding  Complete risks and benefits including bleeding, infection, tissue reaction, nerve injury and allergic reaction were discussed  The approach was demonstrated using models and literature was provided  Verbal and written consent was obtained  I advised the patient that at this point since she feels Tylenol has been more helpful and she is somewhat sensitive to medications, she can just continue with Tylenol as needed  The patient was agreeable and verbalized an understanding  I discussed with the patient that at this point time she can followup with our office on an as-needed basis  I did review the patient that if her pain symptoms should change, worsen, and/or if she would experience any new symptoms as she would like to be evaluated for, she should give our office a call  The patient was agreeable and verbalized an understanding               History of Present Illness: The patient is a 77 y o  female last seen on 9/13/18 who presents for a follow up office visit in regards to chronic pain secondary to lumbar spondylosis and stenosis with left knee osteoarthritis  The patient currently reports that since her last office visit she does feel there has been some improvement in her left knee pain as she is status post a left knee injection on September 13, 2018 with Dr Cielo Whitmore  She reports that there still times to her pain can be severe and she feels like her left knee is going to give out, but that is better since the injection  Patient rates her biggest issue is the left low back and leg pain that radiates down to her foot and L4 and L5 dermatome distribution  She reports that this point she is ready to discuss the transforaminal epidural steroid injection we had discussed at her last office visit and see if that is helpful  She reports that she has not been taking the tramadol for almost a month because she did not find significantly helpful even though we did at the tramadol ER at bedtime and tramadol IR as needed during the day  She actually feels that Tylenol is more helpful and has discontinued the tramadol and is just using Tylenol with more relief  I have personally reviewed and/or updated the patient's past medical history, past surgical history, family history, social history, current medications, allergies, and vital signs today  Review of Systems:    Review of Systems   Respiratory: Positive for shortness of breath  Cardiovascular: Negative for chest pain  Gastrointestinal: Positive for constipation and diarrhea  Negative for nausea and vomiting  Musculoskeletal: Positive for gait problem and joint swelling  Negative for arthralgias and myalgias  Skin: Negative for rash  Neurological: Negative for dizziness, seizures and weakness  All other systems reviewed and are negative          Past Medical History:   Diagnosis Date    ADHD     Anxiety     Arthritis     Asthma     [facility]     Bleeding disorder (HCC)     Breast cancer (HCC)     Cancer (HCC)     Depression     GERD (gastroesophageal reflux disease)     History of stomach ulcers     Hypercholesterolemia     Hypertension     Hypothyroidism     Kidney disease     [facility]        Past Surgical History:   Procedure Laterality Date    ANKLE SURGERY      APPENDECTOMY      BREAST SURGERY      CATARACT EXTRACTION       SECTION      HIP SURGERY      LAMINECTOMY      ROTATOR CUFF REPAIR         Family History   Problem Relation Age of Onset   Pavel Brown Breast cancer Mother     Diabetes Mother     Hypertension Mother     Hyperlipidemia Father     Colon cancer Paternal [de-identified]     Colon cancer Paternal Grandfather     Diabetes Family        Social History     Occupational History    Not on file       Social History Main Topics    Smoking status: Never Smoker    Smokeless tobacco: Never Used    Alcohol use No    Drug use: No    Sexual activity: Not on file         Current Outpatient Prescriptions:     ALPRAZolam (XANAX) 0 25 mg tablet, Take by mouth, Disp: , Rfl:     aspirin (ESTELA ASPIRIN) 325 mg tablet, Take 325 mg by mouth, Disp: , Rfl:     Azelastine HCl 137 MCG/SPRAY SOLN, instill 2 sprays into each nostril twice a day if needed for congestion, Disp: , Rfl: 0    diphenoxylate-atropine (LOMOTIL) 2 5-0 025 mg per tablet, take 1 tablet by mouth four times a day if needed for diarrhea, Disp: , Rfl: 0    diphenoxylate-atropine (LOMOTIL) 2 5-0 025 mg per tablet, Take 1 tablet by mouth 4 (four) times a day as needed, Disp: , Rfl:     furosemide (LASIX) 20 mg tablet, Take 20 mg by mouth, Disp: , Rfl:     furosemide (LASIX) 20 mg tablet, , Disp: , Rfl:     hydrochlorothiazide (MICROZIDE) 12 5 mg capsule, , Disp: , Rfl:     hydroxychloroquine (PLAQUENIL) 200 mg tablet, Take 200 mg by mouth, Disp: , Rfl:     ibuprofen (MOTRIN) 200 mg tablet, Take 400 mg by mouth, Disp: , Rfl:     loperamide (IMODIUM) 2 mg capsule, Take 2 capsules by mouth 4 (four) times a day as needed, Disp: , Rfl:     loratadine (CLARITIN) 10 mg tablet, Take 10 mg by mouth daily, Disp: , Rfl:     MAGNESIUM PO, Take 250 mg by mouth, Disp: , Rfl:     metoclopramide (REGLAN) 5 mg/mL, Inject as directed, Disp: , Rfl:     metoprolol (LOPRESSOR) 10 mg/mL oral suspension, Take 25 mg by mouth 2 (two) times a day, Disp: , Rfl:     mometasone (NASONEX) 50 mcg/act nasal spray, 2 sprays into each nostril daily, Disp: , Rfl:     ondansetron (ZOFRAN-ODT) 4 mg disintegrating tablet, Take 1 tablet by mouth every 8 (eight) hours as needed for nausea or vomiting for up to 3 days, Disp: 9 tablet, Rfl: 0    SLOW-MAG 71 5-119 MG, , Disp: , Rfl: 0    traMADol (ULTRAM) 50 mg tablet, Take 1 PO TID PRN for Break through pain , Disp: 90 tablet, Rfl: 1    TraMADol HCl ER, Biphasic, (RYZOLT) 100 MG 24 hr tablet, Take 1 PO HS , Disp: 30 tablet, Rfl: 1    Allergies   Allergen Reactions    Codeine Other (See Comments)     Throat closes    Codeine Sulfate     Wound Dressing Adhesive     Neomycin-Bacitracin Zn-Polymyx Rash    Zolmitriptan Palpitations     Heart palpitations       Physical Exam:    There were no vitals taken for this visit  Constitutional:normal, well developed, well nourished, alert, in no distress and non-toxic and no overt pain behavior  Eyes:anicteric  HEENT:grossly intact  Neck:supple, symmetric, trachea midline and no masses   Pulmonary:even and unlabored  Cardiovascular:No edema or pitting edema present  Skin:Normal without rashes or lesions and well hydrated  Psychiatric:Mood and affect appropriate  Neurologic:Cranial Nerves II-XII grossly intact  Musculoskeletal:Slightly antalgic, but steady gait without the use of any assistive devices  Imaging  No orders to display         No orders of the defined types were placed in this encounter

## 2018-11-02 NOTE — PROGRESS NOTES
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 or radiculopathy    12  Lumbar radiculopathy        Plan:  While the patient was in the office today, I discussed with the patient with her current symptoms and most recent MRI findings, I do feel that it would be beneficial to try to address inflammatory component at least provide some kind of relief or improvement and proceed with the previously discussed left L4 and L5 transforaminal epidural steroid injection with Dr Flaca Diaz  However, I did explain to the patient at the pending on the results of the injection, it may need to be repeated  The patient was agreeable and verbalized an understanding  Complete risks and benefits including bleeding, infection, tissue reaction, nerve injury and allergic reaction were discussed  The approach was demonstrated using models and literature was provided  Verbal and written consent was obtained  I advised the patient that at this point since she feels Tylenol has been more helpful and she is somewhat sensitive to medications, she can just continue with Tylenol as needed  The patient was agreeable and verbalized an understanding  I discussed with the patient that at this point time she can followup with our office on an as-needed basis  I did review the patient that if her pain symptoms should change, worsen, and/or if she would experience any new symptoms as she would like to be evaluated for, she should give our office a call  The patient was agreeable and verbalized an understanding               History of Present Illness: The patient is a 77 y o  female last seen on 9/13/18 who presents for a follow up office visit in regards to chronic pain secondary to lumbar spondylosis and stenosis with left knee osteoarthritis  The patient currently reports that since her last office visit she does feel there has been some improvement in her left knee pain as she is status post a left knee injection on September 13, 2018 with Dr Anibal Ward  She reports that there still times to her pain can be severe and she feels like her left knee is going to give out, but that is better since the injection  Patient rates her biggest issue is the left low back and leg pain that radiates down to her foot and L4 and L5 dermatome distribution  She reports that this point she is ready to discuss the transforaminal epidural steroid injection we had discussed at her last office visit and see if that is helpful  She reports that she has not been taking the tramadol for almost a month because she did not find significantly helpful even though we did at the tramadol ER at bedtime and tramadol IR as needed during the day  She actually feels that Tylenol is more helpful and has discontinued the tramadol and is just using Tylenol with more relief  I have personally reviewed and/or updated the patient's past medical history, past surgical history, family history, social history, current medications, allergies, and vital signs today  Review of Systems:    Review of Systems   Respiratory: Positive for shortness of breath  Cardiovascular: Negative for chest pain  Gastrointestinal: Positive for constipation and diarrhea  Negative for nausea and vomiting  Musculoskeletal: Positive for gait problem and joint swelling  Negative for arthralgias and myalgias  Skin: Negative for rash  Neurological: Negative for dizziness, seizures and weakness  All other systems reviewed and are negative          Past Medical History:   Diagnosis Date    ADHD     Anxiety     Arthritis     Asthma     [facility] ([facility] [address] [facility]     Bleeding disorder (HCC)     Breast cancer (HCC)     Cancer (HCC)     Depression     GERD (gastroesophageal reflux disease)     History of stomach ulcers     Hypercholesterolemia     Hypertension     Hypothyroidism     Kidney disease     Metastatic cancer ([facility] [facility]        Past Surgical History:   Procedure Laterality Date    ANKLE SURGERY      APPENDECTOMY      BREAST SURGERY      CATARACT EXTRACTION       SECTION      HIP SURGERY      LAMINECTOMY      ROTATOR CUFF REPAIR         Family History   Problem Relation Age of Onset   [place] Breast cancer Mother     Diabetes Mother     Hypertension Mother     Hyperlipidemia Father     Colon cancer Paternal Danyell Raine     Colon cancer Paternal Grandfather     Diabetes Family        Social History     Occupational History    Not on file       Social History Main Topics    Smoking status: Never Smoker    Smokeless tobacco: Never Used    Alcohol use No    Drug use: No    Sexual activity: Not on file         Current Outpatient Prescriptions:     ALPRAZolam (XANAX) 0 25 mg tablet, Take by mouth, Disp: , Rfl:     aspirin (ESTELA ASPIRIN) 325 mg tablet, Take 325 mg by mouth, Disp: , Rfl:     Azelastine HCl 137 MCG/SPRAY SOLN, instill 2 sprays into each nostril twice a day if needed for congestion, Disp: , Rfl: 0    diphenoxylate-atropine (LOMOTIL) 2 5-0 025 mg per tablet, take 1 tablet by mouth four times a day if needed for diarrhea, Disp: , Rfl: 0    diphenoxylate-atropine (LOMOTIL) 2 5-0 025 mg per tablet, Take 1 tablet by mouth 4 (four) times a day as needed, Disp: , Rfl:     furosemide (LASIX) 20 mg tablet, Take 20 mg by mouth, Disp: , Rfl:     furosemide (LASIX) 20 mg tablet, , Disp: , Rfl:     hydrochlorothiazide (MICROZIDE) 12 5 mg capsule, , Disp: , Rfl:     hydroxychloroquine (PLAQUENIL) 200 mg tablet, Take 200 mg by mouth, Disp: , Rfl:     ibuprofen (MOTRIN) 200 mg tablet, Take 400 mg by mouth, Disp: , Rfl:     loperamide (IMODIUM) 2 mg capsule, Take 2 capsules by mouth 4 (four) times a day as needed, Disp: , Rfl:     loratadine (CLARITIN) 10 mg tablet, Take 10 mg by mouth daily, Disp: , Rfl:     MAGNESIUM PO, Take 250 mg by mouth, Disp: , Rfl:     metoclopramide (REGLAN) 5 mg/mL, Inject as directed, Disp: , Rfl:     metoprolol (LOPRESSOR) 10 mg/mL oral suspension, Take 25 mg by mouth 2 (two) times a day, Disp: , Rfl:     mometasone (NASONEX) 50 mcg/act nasal spray, 2 sprays into each nostril daily, Disp: , Rfl:     ondansetron (ZOFRAN-ODT) 4 mg disintegrating tablet, Take 1 tablet by mouth every 8 (eight) hours as needed for nausea or vomiting for up to 3 days, Disp: 9 tablet, Rfl: 0    SLOW-MAG 71 5-119 MG, , Disp: , Rfl: 0    traMADol (ULTRAM) 50 mg tablet, Take 1 PO TID PRN for Break through pain , Disp: 90 tablet, Rfl: 1    TraMADol HCl ER, Biphasic, (RYZOLT) 100 MG 24 hr tablet, Take 1 PO HS , Disp: 30 tablet, Rfl: 1    Allergies   Allergen Reactions    Codeine Other (See Comments)     Throat closes    Codeine Sulfate     Wound Dressing Adhesive     Neomycin-Bacitracin Zn-Polymyx Rash    Zolmitriptan Palpitations     Heart palpitations       Physical Exam:    There were no vitals taken for this visit  Constitutional:normal, well developed, well nourished, alert, in no distress and non-toxic and no overt pain behavior  Eyes:anicteric  HEENT:grossly intact  Neck:supple, symmetric, trachea midline and no masses   Pulmonary:even and unlabored  Cardiovascular:No edema or pitting edema present  Skin:Normal without rashes or lesions and well hydrated  Psychiatric:Mood and affect appropriate  Neurologic:Cranial Nerves II-XII grossly intact  Musculoskeletal:Slightly antalgic, but steady gait without the use of any assistive devices  Imaging  No orders to display         No orders of the defined types were placed in this encounter

## 2018-11-08 ENCOUNTER — HOSPITAL ENCOUNTER (OUTPATIENT)
Dept: RADIOLOGY | Facility: CLINIC | Age: 68
Discharge: HOME | End: 2018-11-08
Admitting: ANESTHESIOLOGY
Payer: MEDICARE

## 2018-11-08 VITALS
TEMPERATURE: 98.4 F | SYSTOLIC BLOOD PRESSURE: 143 MMHG | RESPIRATION RATE: 20 BRPM | OXYGEN SATURATION: 98 % | DIASTOLIC BLOOD PRESSURE: 81 MMHG | HEART RATE: 75 BPM

## 2018-11-08 DIAGNOSIS — M51.27 HERNIATED NUCLEUS PULPOSUS, L5-S1, RIGHT: ICD-10-CM

## 2018-11-08 DIAGNOSIS — M54.16 LUMBAR RADICULOPATHY: ICD-10-CM

## 2018-11-08 DIAGNOSIS — M48.061 LUMBAR STENOSIS WITHOUT NEUROGENIC CLAUDICATION: ICD-10-CM

## 2018-11-08 PROCEDURE — 64483 NJX AA&/STRD TFRM EPI L/S 1: CPT | Performed by: ANESTHESIOLOGY

## 2018-11-08 PROCEDURE — 64484 NJX AA&/STRD TFRM EPI L/S EA: CPT | Performed by: ANESTHESIOLOGY

## 2018-11-08 RX ORDER — LIDOCAINE HYDROCHLORIDE 10 MG/ML
5 INJECTION, SOLUTION EPIDURAL; INFILTRATION; INTRACAUDAL; PERINEURAL ONCE
Status: COMPLETED | OUTPATIENT
Start: 2018-11-08 | End: 2018-11-08

## 2018-11-08 RX ORDER — PAPAVERINE HCL 150 MG
20 CAPSULE, EXTENDED RELEASE ORAL ONCE
Status: COMPLETED | OUTPATIENT
Start: 2018-11-08 | End: 2018-11-08

## 2018-11-08 RX ADMIN — IOHEXOL 1 ML: 300 INJECTION, SOLUTION INTRAVENOUS at 10:43

## 2018-11-08 RX ADMIN — DEXAMETHASONE SODIUM PHOSPHATE 20 MG: 10 INJECTION, SOLUTION INTRAMUSCULAR; INTRAVENOUS at 10:43

## 2018-11-08 RX ADMIN — LIDOCAINE HYDROCHLORIDE 3 ML: 10 INJECTION, SOLUTION EPIDURAL; INFILTRATION; INTRACAUDAL; PERINEURAL at 10:40

## 2018-11-08 RX ADMIN — LIDOCAINE HYDROCHLORIDE 2 ML: 20 INJECTION, SOLUTION EPIDURAL; INFILTRATION; INTRACAUDAL at 10:43

## 2018-11-08 NOTE — H&P
History of Present Illness: The patient is a 77 y o  female who presents with complaints of low back and leg pain      Patient Active Problem List   Diagnosis    Anemia    Anxiety    Chronic hip pain, right    Chronic right-sided low back pain without sciatica    DJD (degenerative joint disease)    Gait disturbance    GIST (gastrointestinal stromal tumor), malignant (HCC)    Greater trochanteric bursitis of right hip    Other acquired deformity of toe    Herniated nucleus pulposus, L5-S1, right    Hiatal hernia    Hypertension    Lumbar stenosis without neurogenic claudication    Absolute anemia    Lesion of plantar nerve    MARK (obstructive sleep apnea)    Pain syndrome, chronic    Paroxysmal atrial fibrillation (HCC)    Premature atrial contractions    Right bundle-branch block    Right lumbar radiculitis    Sacroiliac joint dysfunction of right side    Sacroiliitis (HCC)    Spondylosis of lumbar region without myelopathy or radiculopathy    Supraventricular tachycardia (HCC)    Piriformis syndrome of right side    Non-rheumatic mitral regurgitation    Piriformis syndrome, left    Pain of left lower extremity    Chronic pain of left knee    Localized osteoarthritis of left knee       Past Medical History:   Diagnosis Date    ADHD     Anxiety     Arthritis     Asthma     Atrial fibrillation (HCC)     Bleeding disorder (HCC)     Breast cancer (HCC)     Cancer ([facility] [address] )     Depression     GERD (gastroesophageal reflux disease)     History of stomach ulcers     Hypercholesterolemia     Hypertension     Hypothyroidism     Kidney disease     Metastatic cancer (HCC)        Past Surgical History:   Procedure Laterality Date    ANKLE SURGERY      APPENDECTOMY      BREAST SURGERY      CATARACT EXTRACTION       SECTION      HIP SURGERY      LAMINECTOMY      ROTATOR CUFF REPAIR           Current Outpatient Prescriptions:     ALPRAZolam (XANAX) 0 25 mg tablet, Take by mouth, Disp: , Rfl:     aspirin (ESTELA ASPIRIN) 325 mg tablet, Take 325 mg by mouth, Disp: , Rfl:     Azelastine HCl 137 MCG/SPRAY SOLN, instill 2 sprays into each nostril twice a day if needed for congestion, Disp: , Rfl: 0    cetirizine (ZyrTEC) 10 mg tablet, Take 10 mg by mouth daily, Disp: , Rfl:     diphenoxylate-atropine (LOMOTIL) 2 5-0 025 mg per tablet, take 1 tablet by mouth four times a day if needed for diarrhea, Disp: , Rfl: 0    furosemide (LASIX) 20 mg tablet, Take 20 mg by mouth, Disp: , Rfl:     furosemide (LASIX) 20 mg tablet, , Disp: , Rfl:     hydroxychloroquine (PLAQUENIL) 200 mg tablet, Take 200 mg by mouth, Disp: , Rfl:     ibuprofen (MOTRIN) 200 mg tablet, Take 400 mg by mouth, Disp: , Rfl:     loperamide (IMODIUM) 2 mg capsule, Take 2 capsules by mouth 4 (four) times a day as needed, Disp: , Rfl:     metoclopramide (REGLAN) 5 mg/mL, Inject as directed, Disp: , Rfl:     metoprolol (LOPRESSOR) 10 mg/mL oral suspension, Take 25 mg by mouth 2 (two) times a day, Disp: , Rfl:     mometasone (NASONEX) 50 mcg/act nasal spray, 2 sprays into each nostril daily, Disp: , Rfl:     ondansetron (ZOFRAN-ODT) 4 mg disintegrating tablet, Take 1 tablet by mouth every 8 (eight) hours as needed for nausea or vomiting for up to 3 days, Disp: 9 tablet, Rfl: 0    Current Facility-Administered Medications:     dexamethasone (PF) (DECADRON) injection 20 mg, 20 mg, Epidural, Once, Tre Rdz DO    iohexol (OMNIPAQUE) 300 mg/mL injection 50 mL, 50 mL, Epidural, Once, Tre Rdz DO    lidocaine (PF) (XYLOCAINE-MPF) 1 % injection 5 mL, 5 mL, Other, Once, Tre Rdz DO    lidocaine (PF) (XYLOCAINE-MPF) 2 % injection 5 mL, 5 mL, Epidural, Once, Tre Rdz DO    Allergies   Allergen Reactions    Codeine Other (See Comments)     Throat closes    Codeine Sulfate     Wound Dressing Adhesive     Neomycin-Bacitracin Zn-Polymyx Rash    Zolmitriptan Palpitations     Heart palpitations       Physical Exam: Vitals:    11/08/18 1030   BP: 128/78   Pulse: 72   Resp: 18   Temp: 98 4 °F (36 9 °C)   SpO2: 97%     General: Awake, Alert, Oriented x 3, Mood and affect appropriate  Respiratory: Respirations even and unlabored  Cardiovascular: Peripheral pulses intact; no edema  Musculoskeletal Exam:  Decreased range of motion lumbar spine    ASA Score: II    Patient/Chart Verification  Patient ID Verified: Verbal  ID Band Applied: No  Consents Confirmed: Procedural  H&P( within 30 days) Verified: To be obtained in the Pre-Procedure area  Interval H&P(within 24 hr) Complete (required for Outpatients and Surgery Admit only): To be obtained in the Pre-Procedure area  Allergies Reviewed: Yes  Anticoag/NSAID held?: No  Currently on antibiotics?: No  Pre-op Lab/Test Results Available: N/A  Pregnancy Lab Collected: N/A comment    Assessment: No diagnosis found      Plan: Left L4 & L5 TFESI

## 2018-11-08 NOTE — DISCHARGE INSTRUCTIONS
Epidural Steroid Injection   WHAT YOU NEED TO KNOW:   An epidural steroid injection (MARIVEL) is a procedure to inject steroid medicine into the epidural space  The epidural space is between your spinal cord and vertebrae  Steroids reduce inflammation and fluid buildup in your spine that may be causing pain  You may be given pain medicine along with the steroids  ACTIVITY  · Do not drive or operate machinery today  · No strenuous activity today - bending, lifting, etc   · You may resume normal activites starting tomorrow - start slowly and as tolerated  · You may shower today, but no tub baths or hot tubs  · You may have numbness for several hours from the local anesthetic  Please use caution and common sense, especially with weight-bearing activities  CARE OF THE INJECTION SITE  · If you have soreness or pain, apply ice to the area today (20 minutes on/20 minutes off)  · Starting tomorrow, you may use warm, moist heat or ice if needed  · You may have an increase or change in your discomfort for 36-48 hours after your treatment  · Apply ice and continue with any pain medication you have been prescribed  · [facility] if you have any of the following: redness, drainage, swelling, headache, stiff neck or fever above 100°F     SPECIAL INSTRUCTIONS  · Our office will contact you in approximately 7 days for a progress report  MEDICATIONS  · Continue to take all routine medications  · Our office may have instructed you to hold some medications  If you have a problem specifically related to your procedure, please call our office at [phone]  Problems not related to your procedure should be directed to your primary care physician

## 2018-11-12 ENCOUNTER — TELEPHONE (OUTPATIENT)
Dept: PAIN MEDICINE | Facility: CLINIC | Age: 68
End: 2018-11-12

## 2018-11-12 NOTE — TELEPHONE ENCOUNTER
Telephone note   Reason for the call      Post Op F/u [facility] [place]    LMTCB with pain level and % of relief since the inj     S/P Left L4 and L5 TFESI on 11/8 w/SL in [place]      Follow up with DG on 11/29

## 2018-11-16 NOTE — TELEPHONE ENCOUNTER
Pt is calling back stating up until yesterday she has no pain but this morning she is having a little bit of pain on her right hip   Pain level 2/10 and 90% relief

## 2018-12-28 ENCOUNTER — OFFICE VISIT (OUTPATIENT)
Dept: PAIN MEDICINE | Facility: CLINIC | Age: 68
End: 2018-12-28
Payer: MEDICARE

## 2018-12-28 VITALS
WEIGHT: 128 LBS | BODY MASS INDEX: 22.68 KG/M2 | DIASTOLIC BLOOD PRESSURE: 72 MMHG | HEART RATE: 68 BPM | SYSTOLIC BLOOD PRESSURE: 132 MMHG | HEIGHT: 63 IN

## 2018-12-28 DIAGNOSIS — G89.29 CHRONIC BILATERAL LOW BACK PAIN WITHOUT SCIATICA: Primary | ICD-10-CM

## 2018-12-28 DIAGNOSIS — R26.9 GAIT DISTURBANCE: ICD-10-CM

## 2018-12-28 DIAGNOSIS — M48.061 LUMBAR STENOSIS WITHOUT NEUROGENIC CLAUDICATION: ICD-10-CM

## 2018-12-28 DIAGNOSIS — G57.02 PIRIFORMIS SYNDROME, LEFT: ICD-10-CM

## 2018-12-28 DIAGNOSIS — M47.816 SPONDYLOSIS OF LUMBAR REGION WITHOUT MYELOPATHY OR RADICULOPATHY: ICD-10-CM

## 2018-12-28 DIAGNOSIS — G57.01 PIRIFORMIS SYNDROME OF RIGHT SIDE: ICD-10-CM

## 2018-12-28 DIAGNOSIS — M54.50 CHRONIC BILATERAL LOW BACK PAIN WITHOUT SCIATICA: Primary | ICD-10-CM

## 2018-12-28 DIAGNOSIS — G89.4 PAIN SYNDROME, CHRONIC: ICD-10-CM

## 2018-12-28 DIAGNOSIS — M79.605 PAIN OF LEFT LOWER EXTREMITY: ICD-10-CM

## 2018-12-28 DIAGNOSIS — M25.562 CHRONIC PAIN OF LEFT KNEE: ICD-10-CM

## 2018-12-28 DIAGNOSIS — M51.27 HERNIATED NUCLEUS PULPOSUS, L5-S1, RIGHT: ICD-10-CM

## 2018-12-28 DIAGNOSIS — M54.16 RIGHT LUMBAR RADICULITIS: ICD-10-CM

## 2018-12-28 DIAGNOSIS — G89.29 CHRONIC PAIN OF LEFT KNEE: ICD-10-CM

## 2018-12-28 PROCEDURE — 99214 OFFICE O/P EST MOD 30 MIN: CPT | Performed by: NURSE PRACTITIONER

## 2018-12-28 NOTE — PROGRESS NOTES
Assessment:  1  Chronic bilateral low back pain without sciatica    2  Chronic pain of left knee    3  Pain of left lower extremity    4  Pain syndrome, chronic    5  Lumbar stenosis without neurogenic claudication    6  Gait disturbance    7  Spondylosis of lumbar region without myelopathy or radiculopathy    8  Herniated nucleus pulposus, L5-S1, right    9  Piriformis syndrome of right side    10  Piriformis syndrome, left    11  Right lumbar radiculitis        Plan:  While the patient was in the office today, I did discuss with the patient that with her current symptoms and exam findings and her previous imaging, I feel would be reasonable appropriate to proceed with a right L4 and L5 transforaminal epidural steroid injection to try to address inflammatory component of the right side and see we can note at least moderate relief on that side as well  However, I did explain to the patient that after this injection she would have to hold off on any repeat injections for at least 2-3 months, if not longer because of the overall steroid load  The patient was agreeable and verbalized an understanding  Complete risks and benefits including bleeding, infection, tissue reaction, nerve injury and allergic reaction were discussed  The approach was demonstrated using models and literature was provided  Verbal and written consent was obtained  The patient can continue to use over-the-counter Tylenol as needed for pain  The patient was agreeable and verbalized an understanding  I discussed with the patient that at this point time she can followup with our office on an as-needed basis  I did review the patient that if her pain symptoms should change, worsen, and/or if she would experience any new symptoms as she would like to be evaluated for, she should give our office a call  The patient was agreeable and verbalized an understanding  History of Present Illness:     The patient is a 77 y o  female last seen on 11/8/18 who presents for a follow up office visit in regards to chronic pain secondary to lumbar spondylosis and stenosis  The patient currently reports that since her last office visit she does feel that her left leg pain has significantly improved and all that she has left in her left leg is the knee pain which is not from her back  She feels that the previous left L4 and L5 transforaminal epidural steroid injection she had in November has provided at least 90% relief of the left leg pain and radicular symptoms  However, she is now noting the same symptoms on the right side in the same distribution pattern  She denies any recent trauma or injury as well as any signs or symptoms of cauda equina syndrome  She continues with Tylenol p r n  for pain as she cannot tolerate medications and presents today to see if there are any injection options for the right side  I have personally reviewed and/or updated the patient's past medical history, past surgical history, family history, social history, current medications, allergies, and vital signs today  Review of Systems:    Review of Systems   Respiratory: Positive for shortness of breath  Cardiovascular: Negative for chest pain  Gastrointestinal: Positive for constipation and diarrhea  Negative for nausea and vomiting  Musculoskeletal: Positive for joint swelling  Negative for arthralgias, gait problem and myalgias  Skin: Negative for rash  Neurological: Negative for dizziness, seizures and weakness  All other systems reviewed and are negative          Past Medical History:   Diagnosis Date    ADHD     Anxiety     Arthritis     Asthma     Atrial fibrillation (HCC)     Bleeding disorder (HCC)     Breast cancer (HCC)     Cancer ([facility] [address]     Depression     GERD (gastroesophageal reflux disease)     History of stomach ulcers     Hypercholesterolemia     Hypertension     Hypothyroidism     Kidney disease     Metastatic cancer [facility])        Past Surgical History:   Procedure Laterality Date    ANKLE SURGERY      APPENDECTOMY      BREAST SURGERY      CATARACT EXTRACTION       SECTION      HIP SURGERY      LAMINECTOMY      ROTATOR CUFF REPAIR         Family History   Problem Relation Age of Onset   [facility] Breast [facility] Mother     Diabetes Mother     Hypertension Mother     Hyperlipidemia Father     Colon cancer Paternal Grandmother     Colon cancer Paternal Grandfather     Diabetes Family        Social History     Occupational History    Not on file       Social History Main Topics    Smoking status: Never Smoker    Smokeless tobacco: Never Used    Alcohol use No    Drug use: No    Sexual activity: Not on file         Current Outpatient Prescriptions:     ALPRAZolam (XANAX) 0 25 mg tablet, Take by mouth, Disp: , Rfl:     aspirin (ESTELA ASPIRIN) 325 mg tablet, Take 325 mg by mouth, Disp: , Rfl:     Azelastine HCl 137 MCG/SPRAY SOLN, instill 2 sprays into each nostril twice a day if needed for congestion, Disp: , Rfl: 0    cetirizine (ZyrTEC) 10 mg tablet, Take 10 mg by mouth daily, Disp: , Rfl:     diphenoxylate-atropine (LOMOTIL) 2 5-0 025 mg per tablet, take 1 tablet by mouth four times a day if needed for diarrhea, Disp: , Rfl: 0    furosemide (LASIX) 20 mg tablet, Take 20 mg by mouth, Disp: , Rfl:     furosemide (LASIX) 20 mg tablet, , Disp: , Rfl:     hydroxychloroquine (PLAQUENIL) 200 mg tablet, Take 200 mg by mouth, Disp: , Rfl:     ibuprofen (MOTRIN) 200 mg tablet, Take 400 mg by mouth, Disp: , Rfl:     loperamide (IMODIUM) 2 mg capsule, Take 2 capsules by mouth 4 (four) times a day as needed, Disp: , Rfl:     metoclopramide (REGLAN) 5 mg/mL, Inject as directed, Disp: , Rfl:     metoprolol (LOPRESSOR) 10 mg/mL oral suspension, Take 25 mg by mouth 2 (two) times a day, Disp: , Rfl:     mometasone (NASONEX) 50 mcg/act nasal spray, 2 sprays into each nostril daily, Disp: , Rfl:     ondansetron (ZOFRAN-ODT) 4 mg disintegrating tablet, Take 1 tablet by mouth every 8 (eight) hours as needed for nausea or vomiting for up to 3 days, Disp: 9 tablet, Rfl: 0    Allergies   Allergen Reactions    Codeine Other (See Comments)     Throat closes    Codeine Sulfate     Wound Dressing Adhesive     Neomycin-Bacitracin Zn-Polymyx Rash    Zolmitriptan Palpitations     Heart palpitations       Physical Exam:    There were no vitals taken for this visit  Constitutional:normal, well developed, well nourished, alert, in no distress and non-toxic and no overt pain behavior  Eyes:anicteric  HEENT:grossly intact  Neck:supple, symmetric, trachea midline and no masses   Pulmonary:even and unlabored  Cardiovascular:No edema or pitting edema present  Skin:Normal without rashes or lesions and well hydrated  Psychiatric:Mood and affect appropriate  Neurologic:Cranial Nerves II-XII grossly intact  Musculoskeletal:Slightly antalgic, but steady gait without the use of any assistive devices  Imaging  No orders to display         No orders of the defined types were placed in this encounter

## 2018-12-29 NOTE — H&P
Assessment:  1  Chronic bilateral low back pain without sciatica    2  Chronic pain of left knee    3  Pain of left lower extremity    4  Pain syndrome, chronic    5  Lumbar stenosis without neurogenic claudication    6  Gait disturbance    7  Spondylosis of lumbar region without myelopathy or radiculopathy    8  Herniated nucleus pulposus, L5-S1, right    9  Piriformis syndrome of right side    10  Piriformis syndrome, left    11  Right lumbar radiculitis        Plan:  While the patient was in the office today, I did discuss with the patient that with her current symptoms and exam findings and her previous imaging, I feel would be reasonable appropriate to proceed with a right L4 and L5 transforaminal epidural steroid injection to try to address inflammatory component of the right side and see we can note at least moderate relief on that side as well  However, I did explain to the patient that after this injection she would have to hold off on any repeat injections for at least 2-3 months, if not longer because of the overall steroid load  The patient was agreeable and verbalized an understanding  Complete risks and benefits including bleeding, infection, tissue reaction, nerve injury and allergic reaction were discussed  The approach was demonstrated using models and literature was provided  Verbal and written consent was obtained  The patient can continue to use over-the-counter Tylenol as needed for pain  The patient was agreeable and verbalized an understanding  I discussed with the patient that at this point time she can followup with our office on an as-needed basis  I did review the patient that if her pain symptoms should change, worsen, and/or if she would experience any new symptoms as she would like to be evaluated for, she should give our office a call  The patient was agreeable and verbalized an understanding  History of Present Illness:     The patient is a 77 y o  female last seen on 11/8/18 who presents for a follow up office visit in regards to chronic pain secondary to lumbar spondylosis and stenosis  The patient currently reports that since her last office visit she does feel that her left leg pain has significantly improved and all that she has left in her left leg is the knee pain which is not from her back  She feels that the previous left L4 and L5 transforaminal epidural steroid injection she had in November has provided at least 90% relief of the left leg pain and radicular symptoms  However, she is now noting the same symptoms on the right side in the same distribution pattern  She denies any recent trauma or injury as well as any signs or symptoms of cauda equina syndrome  She continues with Tylenol p r n  for pain as she cannot tolerate medications and presents today to see if there are any injection options for the right side  I have personally reviewed and/or updated the patient's past medical history, past surgical history, family history, social history, current medications, allergies, and vital signs today  Review of Systems:    Review of Systems   Respiratory: Positive for shortness of breath  Cardiovascular: Negative for chest pain  Gastrointestinal: Positive for constipation and diarrhea  Negative for nausea and vomiting  Musculoskeletal: Positive for joint swelling  Negative for arthralgias, gait problem and myalgias  Skin: Negative for rash  Neurological: Negative for dizziness, seizures and weakness  All other systems reviewed and are negative          Past Medical History:   Diagnosis Date    ADHD     Anxiety     Arthritis     Asthma     Atrial fibrillation (HCC)     Bleeding disorder (HCC)     Breast cancer (HCC)     Cancer ([facility] [address]     Depression     GERD (gastroesophageal reflux disease)     History of stomach ulcers     Hypercholesterolemia     Hypertension     Hypothyroidism     Kidney disease     Metast[facility])        Past Surgical History:   Procedure Laterality Date    ANKLE SURGERY      APPENDECTOMY      BREAST SURGERY      CATARACT EXTRACTION       SECTION      HIP SURGERY      LAMINECTOMY      ROTATOR CUFF REPAIR         Family History   Problem Relation Age of Onset   [facility] Breast cancer Mother     Diabetes Mother     Hypertension Mother     Hyperlipidemia Father     Colon cancer Paternal Grandmother     Colon cancer Paternal Grandfather     Diabetes Family        Social History     Occupational History    Not on file       Social History Main Topics    Smoking status: Never Smoker    Smokeless tobacco: Never Used    Alcohol use No    Drug use: No    Sexual activity: Not on file         Current Outpatient Prescriptions:     ALPRAZolam (XANAX) 0 25 mg tablet, Take by mouth, Disp: , Rfl:     aspirin (ESTELA ASPIRIN) 325 mg tablet, Take 325 mg by mouth, Disp: , Rfl:     Azelastine HCl 137 MCG/SPRAY SOLN, instill 2 sprays into each nostril twice a day if needed for congestion, Disp: , Rfl: 0    cetirizine (ZyrTEC) 10 mg tablet, Take 10 mg by mouth daily, Disp: , Rfl:     diphenoxylate-atropine (LOMOTIL) 2 5-0 025 mg per tablet, take 1 tablet by mouth four times a day if needed for diarrhea, Disp: , Rfl: 0    furosemide (LASIX) 20 mg tablet, Take 20 mg by mouth, Disp: , Rfl:     furosemide (LASIX) 20 mg tablet, , Disp: , Rfl:     hydroxychloroquine (PLAQUENIL) 200 mg tablet, Take 200 mg by mouth, Disp: , Rfl:     ibuprofen (MOTRIN) 200 mg tablet, Take 400 mg by mouth, Disp: , Rfl:     loperamide (IMODIUM) 2 mg capsule, Take 2 capsules by mouth 4 (four) times a day as needed, Disp: , Rfl:     metoclopramide (REGLAN) 5 mg/mL, Inject as directed, Disp: , Rfl:     metoprolol (LOPRESSOR) 10 mg/mL oral suspension, Take 25 mg by mouth 2 (two) times a day, Disp: , Rfl:     mometasone (NASONEX) 50 mcg/act nasal spray, 2 sprays into each nostril daily, Disp: , Rfl:     ondansetron (ZOFRAN-ODT) 4 mg disintegrating tablet, Take 1 tablet by mouth every 8 (eight) hours as needed for nausea or vomiting for up to 3 days, Disp: 9 tablet, Rfl: 0    Allergies   Allergen Reactions    Codeine Other (See Comments)     Throat closes    Codeine Sulfate     Wound Dressing Adhesive     Neomycin-Bacitracin Zn-Polymyx Rash    Zolmitriptan Palpitations     Heart palpitations       Physical Exam:    There were no vitals taken for this visit  Constitutional:normal, well developed, well nourished, alert, in no distress and non-toxic and no overt pain behavior  Eyes:anicteric  HEENT:grossly intact  Neck:supple, symmetric, trachea midline and no masses   Pulmonary:even and unlabored  Cardiovascular:No edema or pitting edema present  Skin:Normal without rashes or lesions and well hydrated  Psychiatric:Mood and affect appropriate  Neurologic:Cranial Nerves II-XII grossly intact  Musculoskeletal:Slightly antalgic, but steady gait without the use of any assistive devices  Imaging  No orders to display         No orders of the defined types were placed in this encounter

## 2019-01-03 ENCOUNTER — HOSPITAL ENCOUNTER (OUTPATIENT)
Dept: RADIOLOGY | Facility: CLINIC | Age: 69
Discharge: HOME | End: 2019-01-03
Admitting: ANESTHESIOLOGY
Payer: MEDICARE

## 2019-01-03 VITALS
DIASTOLIC BLOOD PRESSURE: 66 MMHG | RESPIRATION RATE: 20 BRPM | TEMPERATURE: 98.9 F | OXYGEN SATURATION: 93 % | HEART RATE: 73 BPM | SYSTOLIC BLOOD PRESSURE: 113 MMHG

## 2019-01-03 DIAGNOSIS — G89.29 CHRONIC BILATERAL LOW BACK PAIN WITHOUT SCIATICA: ICD-10-CM

## 2019-01-03 DIAGNOSIS — M54.50 CHRONIC BILATERAL LOW BACK PAIN WITHOUT SCIATICA: ICD-10-CM

## 2019-01-03 DIAGNOSIS — M48.061 LUMBAR STENOSIS WITHOUT NEUROGENIC CLAUDICATION: ICD-10-CM

## 2019-01-03 DIAGNOSIS — M54.16 RIGHT LUMBAR RADICULITIS: ICD-10-CM

## 2019-01-03 DIAGNOSIS — M51.27 HERNIATED NUCLEUS PULPOSUS, L5-S1, RIGHT: ICD-10-CM

## 2019-01-03 PROCEDURE — 64483 NJX AA&/STRD TFRM EPI L/S 1: CPT | Performed by: ANESTHESIOLOGY

## 2019-01-03 PROCEDURE — 64484 NJX AA&/STRD TFRM EPI L/S EA: CPT | Performed by: ANESTHESIOLOGY

## 2019-01-03 RX ORDER — LIDOCAINE HYDROCHLORIDE 10 MG/ML
5 INJECTION, SOLUTION EPIDURAL; INFILTRATION; INTRACAUDAL; PERINEURAL ONCE
Status: COMPLETED | OUTPATIENT
Start: 2019-01-03 | End: 2019-01-03

## 2019-01-03 RX ORDER — PAPAVERINE HCL 150 MG
20 CAPSULE, EXTENDED RELEASE ORAL ONCE
Status: COMPLETED | OUTPATIENT
Start: 2019-01-03 | End: 2019-01-03

## 2019-01-03 RX ORDER — ONDANSETRON HYDROCHLORIDE 8 MG/1
8 TABLET, FILM COATED ORAL EVERY 8 HOURS PRN
COMMUNITY
Start: 2018-10-30 | End: 2019-10-30

## 2019-01-03 RX ADMIN — LIDOCAINE HYDROCHLORIDE 3 ML: 10 INJECTION, SOLUTION EPIDURAL; INFILTRATION; INTRACAUDAL; PERINEURAL at 15:56

## 2019-01-03 RX ADMIN — DEXAMETHASONE SODIUM PHOSPHATE 20 MG: 10 INJECTION, SOLUTION INTRAMUSCULAR; INTRAVENOUS at 15:57

## 2019-01-03 RX ADMIN — IOHEXOL 1 ML: 300 INJECTION, SOLUTION INTRAVENOUS at 15:56

## 2019-01-03 RX ADMIN — LIDOCAINE HYDROCHLORIDE 2 ML: 20 INJECTION, SOLUTION EPIDURAL; INFILTRATION; INTRACAUDAL at 15:57

## 2019-01-03 NOTE — DISCHARGE INSTRUCTIONS
Epidural Steroid Injection   WHAT YOU NEED TO KNOW:   An epidural steroid injection (MARIVEL) is a procedure to inject steroid medicine into the epidural space  The epidural space is between your spinal cord and vertebrae  Steroids reduce inflammation and fluid buildup in your spine that may be causing pain  You may be given pain medicine along with the steroids  ACTIVITY  · Do not drive or operate machinery today  · No strenuous activity today - bending, lifting, etc   · You may resume normal activites starting tomorrow - start slowly and as tolerated  · You may shower today, but no tub baths or hot tubs  · You may have numbness for several hours from the local anesthetic  Please use caution and common sense, especially with weight-bearing activities  CARE OF THE INJECTION SITE  · If you have soreness or pain, apply ice to the area today (20 minutes on/20 minutes off)  · Starting tomorrow, you may use warm, moist heat or ice if needed  · You may have an increase or change in your discomfort for 36-48 hours after your treatment  · Apply ice and continue with any pain medication you have been prescribed  · [facility]ify [facility] if you have any of the following: redness, drainage, swelling, headache, stiff neck or fever above 100°F     SPECIAL INSTRUCTIONS  · Our office will contact you in approximately 7 days for a progress report  MEDICATIONS  · Continue to take all routine medications  · Our office may have instructed you to hold some medications  If you have a problem specifically related to your procedure, please call our office at [phone]  Problems not related to your procedure should be directed to your primary care physician

## 2019-01-03 NOTE — H&P
History of Present Illness: The patient is a 77 y o  female who presents with complaints of low back and leg pain      Patient Active Problem List   Diagnosis    Anemia    Anxiety    Chronic hip pain, right    Chronic bilateral low back pain without sciatica    DJD (degenerative joint disease)    Gait disturbance    GIST (gastrointestinal stromal tumor), malignant (HCC)    Greater trochanteric bursitis of right hip    Other acquired deformity of toe    Herniated nucleus pulposus, L5-S1, right    Hiatal hernia    Hypertension    Lumbar stenosis without neurogenic claudication    Absolute anemia    Lesion of plantar nerve    MAKR (obstructive sleep apnea)    Pain syndrome, chronic    Paroxysmal atrial fibrillation (HCC)    Premature atrial contractions    Right bundle-branch block    Right lumbar radiculitis    Sacroiliac joint dysfunction of right side    Sacroiliitis (HCC)    Spondylosis of lumbar region without myelopathy or radiculopathy    Supraventricular tachycardia (HCC)    Piriformis syndrome of right side    Non-rheumatic mitral regurgitation    Piriformis syndrome, left    Pain of left lower extremity    Chronic pain of left knee    Localized osteoarthritis of left knee       Past Medical History:   Diagnosis Date    ADHD     Anxiety     Arthritis     Asthma     Atrial fibrillation (HCC)     Bleeding disorder (HCC)     Breast cancer (HCC)     Cancer ([facility] [address] )     Depression     GERD (gastroesophageal reflux disease)     History of stomach ulcers     Hypercholesterolemia     Hypertension     Hypothyroidism     Kidney disease     Metastatic cancer (HCC)        Past Surgical History:   Procedure Laterality Date    ANKLE SURGERY      APPENDECTOMY      BREAST SURGERY      CATARACT EXTRACTION       SECTION      HIP SURGERY      LAMINECTOMY      ROTATOR CUFF REPAIR           Current Outpatient Prescriptions:     ALPRAZolam (XANAX) 0 25 mg tablet, Take by mouth, Disp: , Rfl:     aspirin (ESTELA ASPIRIN) 325 mg tablet, Take 325 mg by mouth, Disp: , Rfl:     Azelastine HCl 137 MCG/SPRAY SOLN, instill 2 sprays into each nostril twice a day if needed for congestion, Disp: , Rfl: 0    furosemide (LASIX) 20 mg tablet, , Disp: , Rfl:     hydroxychloroquine (PLAQUENIL) 200 mg tablet, Take 200 mg by mouth, Disp: , Rfl:     ibuprofen (MOTRIN) 200 mg tablet, Take 400 mg by mouth, Disp: , Rfl:     loperamide (IMODIUM) 2 mg capsule, Take 2 capsules by mouth 4 (four) times a day as needed, Disp: , Rfl:     loratadine-pseudoephedrine (CLARITIN-D 12-HOUR) 5-120 mg per tablet, Take 1 tablet by mouth 2 (two) times a day, Disp: , Rfl:     metoprolol (LOPRESSOR) 10 mg/mL oral suspension, Take 25 mg by mouth 2 (two) times a day, Disp: , Rfl:     mometasone (NASONEX) 50 mcg/act nasal spray, 2 sprays into each nostril daily, Disp: , Rfl:     ondansetron (ZOFRAN) 8 mg tablet, Take 8 mg by mouth every 8 (eight) hours as needed, Disp: , Rfl:     ondansetron (ZOFRAN-ODT) 4 mg disintegrating tablet, Take 1 tablet by mouth every 8 (eight) hours as needed for nausea or vomiting for up to 3 days, Disp: 9 tablet, Rfl: 0    Current Facility-Administered Medications:     dexamethasone (PF) (DECADRON) injection 20 mg, 20 mg, Epidural, Once, Tre Rdz DO    iohexol (OMNIPAQUE) 300 mg/mL injection 50 mL, 50 mL, Epidural, Once, Tre Rdz DO    lidocaine (PF) (XYLOCAINE-MPF) 1 % injection 5 mL, 5 mL, Other, Once, Tre Rdz DO    lidocaine (PF) (XYLOCAINE-MPF) 2 % injection 5 mL, 5 mL, Epidural, Once, Tre Rdz DO    Allergies   Allergen Reactions    Codeine Other (See Comments)     Throat closes    Codeine Sulfate     Wound Dressing Adhesive     Neomycin-Bacitracin Zn-Polymyx Rash    Zolmitriptan Palpitations     Heart palpitations       Physical Exam:   General: Awake, Alert, Oriented x 3, Mood and affect appropriate  Respiratory: Respirations even and unlabored  Cardiovascular: Peripheral pulses intact; no edema  Musculoskeletal Exam:  Decreased range of motion lumbar spine    ASA Score: II    Patient/Chart Verification  Patient ID Verified: Verbal  ID Band Applied: No  Consents Confirmed: Procedural, To be obtained in the Pre-Procedure area  H&P( within 30 days) Verified: To be obtained in the Pre-Procedure area  Interval H&P(within 24 hr) Complete (required for Outpatients and Surgery Admit only): To be obtained in the Pre-Procedure area  Allergies Reviewed: Yes  Anticoag/NSAID held?: No  Currently on antibiotics?: No    Assessment:   1  Chronic bilateral low back pain without sciatica    2  Lumbar stenosis without neurogenic claudication    3  Herniated nucleus pulposus, L5-S1, right    4   Right lumbar radiculitis        Plan: Right L4 & L5 TFESI

## 2019-01-10 ENCOUNTER — TELEPHONE (OUTPATIENT)
Dept: PAIN MEDICINE | Facility: CLINIC | Age: 69
End: 2019-01-10

## 2019-05-28 ENCOUNTER — TRANSCRIBE ORDERS (OUTPATIENT)
Dept: PHYSICAL THERAPY | Facility: CLINIC | Age: 69
End: 2019-05-28

## 2019-05-28 ENCOUNTER — EVALUATION (OUTPATIENT)
Dept: PHYSICAL THERAPY | Facility: CLINIC | Age: 69
End: 2019-05-28
Payer: MEDICARE

## 2019-05-28 DIAGNOSIS — G89.29 CHRONIC PAIN OF LEFT KNEE: Primary | ICD-10-CM

## 2019-05-28 DIAGNOSIS — M25.562 CHRONIC PAIN OF LEFT KNEE: Primary | ICD-10-CM

## 2019-05-28 PROCEDURE — 97161 PT EVAL LOW COMPLEX 20 MIN: CPT | Performed by: PHYSICAL THERAPIST

## 2019-05-28 PROCEDURE — 97110 THERAPEUTIC EXERCISES: CPT | Performed by: PHYSICAL THERAPIST

## 2019-06-03 ENCOUNTER — OFFICE VISIT (OUTPATIENT)
Dept: PHYSICAL THERAPY | Facility: CLINIC | Age: 69
End: 2019-06-03
Payer: MEDICARE

## 2019-06-03 DIAGNOSIS — G89.29 CHRONIC PAIN OF LEFT KNEE: Primary | ICD-10-CM

## 2019-06-03 DIAGNOSIS — M25.562 CHRONIC PAIN OF LEFT KNEE: Primary | ICD-10-CM

## 2019-06-03 PROCEDURE — 97110 THERAPEUTIC EXERCISES: CPT

## 2019-06-03 PROCEDURE — 97112 NEUROMUSCULAR REEDUCATION: CPT

## 2019-06-03 PROCEDURE — 97140 MANUAL THERAPY 1/> REGIONS: CPT

## 2019-06-04 ENCOUNTER — OFFICE VISIT (OUTPATIENT)
Dept: GASTROENTEROLOGY | Facility: CLINIC | Age: 69
End: 2019-06-04
Payer: MEDICARE

## 2019-06-04 ENCOUNTER — TELEPHONE (OUTPATIENT)
Dept: GASTROENTEROLOGY | Facility: CLINIC | Age: 69
End: 2019-06-04

## 2019-06-04 VITALS
SYSTOLIC BLOOD PRESSURE: 108 MMHG | WEIGHT: 127 LBS | HEART RATE: 92 BPM | BODY MASS INDEX: 23.37 KG/M2 | HEIGHT: 62 IN | DIASTOLIC BLOOD PRESSURE: 68 MMHG

## 2019-06-04 DIAGNOSIS — K59.00 CONSTIPATION, UNSPECIFIED CONSTIPATION TYPE: ICD-10-CM

## 2019-06-04 DIAGNOSIS — K92.1 MELENA: ICD-10-CM

## 2019-06-04 DIAGNOSIS — C49.A9 MALIGNANT GASTROINTESTINAL STROMAL TUMOR (GIST) OF OTHER SITE (HCC): ICD-10-CM

## 2019-06-04 DIAGNOSIS — K62.5 RECTAL BLEEDING: Primary | ICD-10-CM

## 2019-06-04 DIAGNOSIS — Z79.82 LONG-TERM USE OF ASPIRIN THERAPY: ICD-10-CM

## 2019-06-04 DIAGNOSIS — I48.0 PAROXYSMAL ATRIAL FIBRILLATION (HCC): ICD-10-CM

## 2019-06-04 DIAGNOSIS — D64.9 ANEMIA, UNSPECIFIED TYPE: ICD-10-CM

## 2019-06-04 PROBLEM — C49.9: Status: ACTIVE | Noted: 2019-06-04

## 2019-06-04 PROCEDURE — 99215 OFFICE O/P EST HI 40 MIN: CPT | Performed by: INTERNAL MEDICINE

## 2019-06-04 RX ORDER — ACETAMINOPHEN 325 MG/1
650 TABLET ORAL EVERY 6 HOURS PRN
COMMUNITY
End: 2019-06-27

## 2019-06-04 RX ORDER — CALCIUM CARBONATE 200(500)MG
1 TABLET,CHEWABLE ORAL DAILY PRN
COMMUNITY
End: 2020-06-05 | Stop reason: HOSPADM

## 2019-06-04 RX ORDER — RANITIDINE 150 MG/1
150 TABLET ORAL 2 TIMES DAILY
COMMUNITY
End: 2019-11-01 | Stop reason: ALTCHOICE

## 2019-06-04 RX ORDER — PANTOPRAZOLE SODIUM 40 MG/1
40 TABLET, DELAYED RELEASE ORAL DAILY
COMMUNITY
End: 2020-07-31 | Stop reason: SDUPTHER

## 2019-06-04 RX ORDER — DOCUSATE SODIUM 100 MG/1
100 CAPSULE, LIQUID FILLED ORAL 2 TIMES DAILY PRN
COMMUNITY

## 2019-06-05 ENCOUNTER — TELEPHONE (OUTPATIENT)
Dept: GASTROENTEROLOGY | Facility: CLINIC | Age: 69
End: 2019-06-05

## 2019-06-05 ENCOUNTER — APPOINTMENT (OUTPATIENT)
Dept: LAB | Facility: HOSPITAL | Age: 69
End: 2019-06-05
Attending: INTERNAL MEDICINE
Payer: MEDICARE

## 2019-06-05 DIAGNOSIS — C49.A9 MALIGNANT GASTROINTESTINAL STROMAL TUMOR (GIST) OF OTHER SITE (HCC): ICD-10-CM

## 2019-06-05 DIAGNOSIS — K92.1 MELENA: ICD-10-CM

## 2019-06-05 LAB
ALBUMIN SERPL BCP-MCNC: 3.3 G/DL (ref 3.5–5)
ALP SERPL-CCNC: 123 U/L (ref 46–116)
ALT SERPL W P-5'-P-CCNC: 34 U/L (ref 12–78)
ANION GAP SERPL CALCULATED.3IONS-SCNC: 10 MMOL/L (ref 4–13)
AST SERPL W P-5'-P-CCNC: 54 U/L (ref 5–45)
BASOPHILS # BLD AUTO: 0.01 THOUSANDS/ΜL (ref 0–0.1)
BASOPHILS NFR BLD AUTO: 0 % (ref 0–1)
BILIRUB SERPL-MCNC: 0.2 MG/DL (ref 0.2–1)
BUN SERPL-MCNC: 28 MG/DL (ref 5–25)
CALCIUM SERPL-MCNC: 9 MG/DL (ref 8.3–10.1)
CHLORIDE SERPL-SCNC: 101 MMOL/L (ref 100–108)
CO2 SERPL-SCNC: 26 MMOL/L (ref 21–32)
CREAT SERPL-MCNC: 1.54 MG/DL (ref 0.6–1.3)
EOSINOPHIL # BLD AUTO: 0.09 THOUSAND/ΜL (ref 0–0.61)
EOSINOPHIL NFR BLD AUTO: 4 % (ref 0–6)
ERYTHROCYTE [DISTWIDTH] IN BLOOD BY AUTOMATED COUNT: 16.7 % (ref 11.6–15.1)
GFR SERPL CREATININE-BSD FRML MDRD: 34 ML/MIN/1.73SQ M
GLUCOSE P FAST SERPL-MCNC: 88 MG/DL (ref 65–99)
HCT VFR BLD AUTO: 23.8 % (ref 34.8–46.1)
HGB BLD-MCNC: 7.7 G/DL (ref 11.5–15.4)
IMM GRANULOCYTES # BLD AUTO: 0.01 THOUSAND/UL (ref 0–0.2)
IMM GRANULOCYTES NFR BLD AUTO: 0 % (ref 0–2)
INR PPP: 1.11 (ref 0.86–1.17)
LYMPHOCYTES # BLD AUTO: 0.4 THOUSANDS/ΜL (ref 0.6–4.47)
LYMPHOCYTES NFR BLD AUTO: 16 % (ref 14–44)
MCH RBC QN AUTO: 32.6 PG (ref 26.8–34.3)
MCHC RBC AUTO-ENTMCNC: 32.4 G/DL (ref 31.4–37.4)
MCV RBC AUTO: 101 FL (ref 82–98)
MONOCYTES # BLD AUTO: 0.39 THOUSAND/ΜL (ref 0.17–1.22)
MONOCYTES NFR BLD AUTO: 15 % (ref 4–12)
NEUTROPHILS # BLD AUTO: 1.66 THOUSANDS/ΜL (ref 1.85–7.62)
NEUTS SEG NFR BLD AUTO: 65 % (ref 43–75)
NRBC BLD AUTO-RTO: 0 /100 WBCS
PLATELET # BLD AUTO: 239 THOUSANDS/UL (ref 149–390)
PMV BLD AUTO: 9.6 FL (ref 8.9–12.7)
POTASSIUM SERPL-SCNC: 4.4 MMOL/L (ref 3.5–5.3)
PROT SERPL-MCNC: 6.2 G/DL (ref 6.4–8.2)
PROTHROMBIN TIME: 13.6 SECONDS (ref 11.8–14.2)
RBC # BLD AUTO: 2.36 MILLION/UL (ref 3.81–5.12)
SODIUM SERPL-SCNC: 137 MMOL/L (ref 136–145)
WBC # BLD AUTO: 2.56 THOUSAND/UL (ref 4.31–10.16)

## 2019-06-05 PROCEDURE — 85025 COMPLETE CBC W/AUTO DIFF WBC: CPT

## 2019-06-05 PROCEDURE — 36415 COLL VENOUS BLD VENIPUNCTURE: CPT

## 2019-06-05 PROCEDURE — 80053 COMPREHEN METABOLIC PANEL: CPT

## 2019-06-05 PROCEDURE — 85610 PROTHROMBIN TIME: CPT

## 2019-06-06 ENCOUNTER — ANESTHESIA EVENT (OUTPATIENT)
Dept: GASTROENTEROLOGY | Facility: HOSPITAL | Age: 69
End: 2019-06-06

## 2019-06-07 ENCOUNTER — HOSPITAL ENCOUNTER (OUTPATIENT)
Dept: GASTROENTEROLOGY | Facility: HOSPITAL | Age: 69
Setting detail: OUTPATIENT SURGERY
Discharge: HOME | End: 2019-06-07
Attending: INTERNAL MEDICINE | Admitting: INTERNAL MEDICINE
Payer: MEDICARE

## 2019-06-07 ENCOUNTER — TELEPHONE (OUTPATIENT)
Dept: GASTROENTEROLOGY | Facility: CLINIC | Age: 69
End: 2019-06-07

## 2019-06-07 ENCOUNTER — ANESTHESIA (OUTPATIENT)
Dept: GASTROENTEROLOGY | Facility: HOSPITAL | Age: 69
End: 2019-06-07

## 2019-06-07 VITALS
OXYGEN SATURATION: 99 % | TEMPERATURE: 98.2 F | HEIGHT: 61 IN | DIASTOLIC BLOOD PRESSURE: 58 MMHG | RESPIRATION RATE: 20 BRPM | SYSTOLIC BLOOD PRESSURE: 122 MMHG | HEART RATE: 74 BPM | WEIGHT: 127.6 LBS | BODY MASS INDEX: 24.09 KG/M2

## 2019-06-07 DIAGNOSIS — K92.1 MELENA: ICD-10-CM

## 2019-06-07 DIAGNOSIS — D64.9 ANEMIA, UNSPECIFIED TYPE: Primary | ICD-10-CM

## 2019-06-07 LAB
ATRIAL RATE: 74 BPM
P AXIS: 38 DEGREES
PR INTERVAL: 162 MS
QRS AXIS: -37 DEGREES
QRSD INTERVAL: 128 MS
QT INTERVAL: 444 MS
QTC INTERVAL: 492 MS
T WAVE AXIS: -4 DEGREES
VENTRICULAR RATE: 74 BPM

## 2019-06-07 PROCEDURE — 93005 ELECTROCARDIOGRAM TRACING: CPT

## 2019-06-07 PROCEDURE — 43255 EGD CONTROL BLEEDING ANY: CPT | Performed by: INTERNAL MEDICINE

## 2019-06-07 PROCEDURE — 93010 ELECTROCARDIOGRAM REPORT: CPT | Performed by: INTERNAL MEDICINE

## 2019-06-07 RX ORDER — SODIUM CHLORIDE 9 MG/ML
20 INJECTION, SOLUTION INTRAVENOUS CONTINUOUS
Status: DISCONTINUED | OUTPATIENT
Start: 2019-06-07 | End: 2019-06-08 | Stop reason: HOSPADM

## 2019-06-07 RX ORDER — LABETALOL 20 MG/4 ML (5 MG/ML) INTRAVENOUS SYRINGE
5 AS NEEDED
Status: DISCONTINUED | OUTPATIENT
Start: 2019-06-07 | End: 2019-06-11 | Stop reason: HOSPADM

## 2019-06-07 RX ORDER — PROPOFOL 10 MG/ML
INJECTION, EMULSION INTRAVENOUS AS NEEDED
Status: DISCONTINUED | OUTPATIENT
Start: 2019-06-07 | End: 2019-06-07 | Stop reason: SURG

## 2019-06-07 RX ORDER — METOCLOPRAMIDE HYDROCHLORIDE 5 MG/ML
10 INJECTION INTRAMUSCULAR; INTRAVENOUS ONCE AS NEEDED
Status: DISCONTINUED | OUTPATIENT
Start: 2019-06-07 | End: 2019-06-11 | Stop reason: HOSPADM

## 2019-06-07 RX ORDER — HYDRALAZINE HYDROCHLORIDE 20 MG/ML
5 INJECTION INTRAMUSCULAR; INTRAVENOUS AS NEEDED
Status: DISCONTINUED | OUTPATIENT
Start: 2019-06-07 | End: 2019-06-11 | Stop reason: HOSPADM

## 2019-06-07 RX ORDER — ONDANSETRON 2 MG/ML
4 INJECTION INTRAMUSCULAR; INTRAVENOUS ONCE AS NEEDED
Status: DISCONTINUED | OUTPATIENT
Start: 2019-06-07 | End: 2019-06-11 | Stop reason: HOSPADM

## 2019-06-07 RX ORDER — FENTANYL CITRATE/PF 50 MCG/ML
25 SYRINGE (ML) INJECTION
Status: DISCONTINUED | OUTPATIENT
Start: 2019-06-07 | End: 2019-06-11 | Stop reason: HOSPADM

## 2019-06-07 RX ORDER — MEPERIDINE HYDROCHLORIDE 25 MG/ML
12.5 INJECTION INTRAMUSCULAR; INTRAVENOUS; SUBCUTANEOUS
Status: DISCONTINUED | OUTPATIENT
Start: 2019-06-07 | End: 2019-06-11 | Stop reason: HOSPADM

## 2019-06-07 RX ADMIN — PROPOFOL 30 MG: 10 INJECTION, EMULSION INTRAVENOUS at 08:08

## 2019-06-07 RX ADMIN — PROPOFOL 20 MG: 10 INJECTION, EMULSION INTRAVENOUS at 08:16

## 2019-06-07 RX ADMIN — PROPOFOL 30 MG: 10 INJECTION, EMULSION INTRAVENOUS at 08:10

## 2019-06-07 RX ADMIN — PROPOFOL 30 MG: 10 INJECTION, EMULSION INTRAVENOUS at 08:21

## 2019-06-07 RX ADMIN — PROPOFOL 20 MG: 10 INJECTION, EMULSION INTRAVENOUS at 08:13

## 2019-06-07 RX ADMIN — SODIUM CHLORIDE 20 ML/HR: 0.9 INJECTION, SOLUTION INTRAVENOUS at 07:44

## 2019-06-07 RX ADMIN — PROPOFOL 30 MG: 10 INJECTION, EMULSION INTRAVENOUS at 08:23

## 2019-06-07 RX ADMIN — PROPOFOL 20 MG: 10 INJECTION, EMULSION INTRAVENOUS at 08:18

## 2019-06-07 RX ADMIN — PROPOFOL 20 MG: 10 INJECTION, EMULSION INTRAVENOUS at 08:11

## 2019-06-10 ENCOUNTER — EVALUATION (OUTPATIENT)
Dept: PHYSICAL THERAPY | Facility: CLINIC | Age: 69
End: 2019-06-10
Payer: MEDICARE

## 2019-06-10 DIAGNOSIS — M25.562 CHRONIC PAIN OF LEFT KNEE: Primary | ICD-10-CM

## 2019-06-10 DIAGNOSIS — G89.29 CHRONIC PAIN OF LEFT KNEE: Primary | ICD-10-CM

## 2019-06-10 PROCEDURE — 97110 THERAPEUTIC EXERCISES: CPT | Performed by: PHYSICAL THERAPIST

## 2019-06-11 ENCOUNTER — TELEPHONE (OUTPATIENT)
Dept: GASTROENTEROLOGY | Facility: CLINIC | Age: 69
End: 2019-06-11

## 2019-06-21 ENCOUNTER — TELEPHONE (OUTPATIENT)
Dept: GASTROENTEROLOGY | Facility: CLINIC | Age: 69
End: 2019-06-21

## 2019-06-21 ENCOUNTER — APPOINTMENT (OUTPATIENT)
Dept: LAB | Facility: HOSPITAL | Age: 69
End: 2019-06-21
Attending: INTERNAL MEDICINE
Payer: MEDICARE

## 2019-06-21 DIAGNOSIS — D64.9 ANEMIA, UNSPECIFIED TYPE: ICD-10-CM

## 2019-06-21 DIAGNOSIS — K92.1 MELENA: ICD-10-CM

## 2019-06-21 LAB
BASOPHILS # BLD AUTO: 0.01 THOUSANDS/ΜL (ref 0–0.1)
BASOPHILS NFR BLD AUTO: 0 % (ref 0–1)
EOSINOPHIL # BLD AUTO: 0.11 THOUSAND/ΜL (ref 0–0.61)
EOSINOPHIL NFR BLD AUTO: 4 % (ref 0–6)
ERYTHROCYTE [DISTWIDTH] IN BLOOD BY AUTOMATED COUNT: 15.9 % (ref 11.6–15.1)
HCT VFR BLD AUTO: 31.5 % (ref 34.8–46.1)
HGB BLD-MCNC: 10.3 G/DL (ref 11.5–15.4)
IMM GRANULOCYTES # BLD AUTO: 0.01 THOUSAND/UL (ref 0–0.2)
IMM GRANULOCYTES NFR BLD AUTO: 0 % (ref 0–2)
LYMPHOCYTES # BLD AUTO: 0.49 THOUSANDS/ΜL (ref 0.6–4.47)
LYMPHOCYTES NFR BLD AUTO: 19 % (ref 14–44)
MCH RBC QN AUTO: 33.1 PG (ref 26.8–34.3)
MCHC RBC AUTO-ENTMCNC: 32.7 G/DL (ref 31.4–37.4)
MCV RBC AUTO: 101 FL (ref 82–98)
MONOCYTES # BLD AUTO: 0.44 THOUSAND/ΜL (ref 0.17–1.22)
MONOCYTES NFR BLD AUTO: 17 % (ref 4–12)
NEUTROPHILS # BLD AUTO: 1.56 THOUSANDS/ΜL (ref 1.85–7.62)
NEUTS SEG NFR BLD AUTO: 60 % (ref 43–75)
NRBC BLD AUTO-RTO: 0 /100 WBCS
PLATELET # BLD AUTO: 242 THOUSANDS/UL (ref 149–390)
PMV BLD AUTO: 9.9 FL (ref 8.9–12.7)
RBC # BLD AUTO: 3.11 MILLION/UL (ref 3.81–5.12)
WBC # BLD AUTO: 2.62 THOUSAND/UL (ref 4.31–10.16)

## 2019-06-21 PROCEDURE — 85025 COMPLETE CBC W/AUTO DIFF WBC: CPT

## 2019-06-21 PROCEDURE — 36415 COLL VENOUS BLD VENIPUNCTURE: CPT

## 2019-06-24 ENCOUNTER — PREP FOR PROCEDURE (OUTPATIENT)
Dept: GASTROENTEROLOGY | Facility: CLINIC | Age: 69
End: 2019-06-24

## 2019-06-24 DIAGNOSIS — D64.9 ANEMIA, UNSPECIFIED TYPE: ICD-10-CM

## 2019-06-24 DIAGNOSIS — K92.1 MELENA: Primary | ICD-10-CM

## 2019-06-27 ENCOUNTER — TELEPHONE (OUTPATIENT)
Dept: GASTROENTEROLOGY | Facility: CLINIC | Age: 69
End: 2019-06-27

## 2019-06-27 DIAGNOSIS — D64.9 ANEMIA, UNSPECIFIED TYPE: Primary | ICD-10-CM

## 2019-06-27 RX ORDER — ACETAMINOPHEN 325 MG/1
650 TABLET ORAL EVERY 6 HOURS PRN
COMMUNITY
End: 2019-06-27

## 2019-07-08 ENCOUNTER — HOSPITAL ENCOUNTER (OUTPATIENT)
Dept: GASTROENTEROLOGY | Facility: HOSPITAL | Age: 69
Setting detail: OUTPATIENT SURGERY
Discharge: HOME | End: 2019-07-08
Attending: INTERNAL MEDICINE | Admitting: INTERNAL MEDICINE
Payer: MEDICARE

## 2019-07-08 ENCOUNTER — ANESTHESIA EVENT (OUTPATIENT)
Dept: GASTROENTEROLOGY | Facility: HOSPITAL | Age: 69
End: 2019-07-08

## 2019-07-08 ENCOUNTER — ANESTHESIA (OUTPATIENT)
Dept: GASTROENTEROLOGY | Facility: HOSPITAL | Age: 69
End: 2019-07-08

## 2019-07-08 VITALS
DIASTOLIC BLOOD PRESSURE: 55 MMHG | BODY MASS INDEX: 24 KG/M2 | SYSTOLIC BLOOD PRESSURE: 102 MMHG | RESPIRATION RATE: 27 BRPM | WEIGHT: 127 LBS | HEART RATE: 85 BPM | TEMPERATURE: 97.5 F | OXYGEN SATURATION: 95 %

## 2019-07-08 DIAGNOSIS — K92.1 MELENA: ICD-10-CM

## 2019-07-08 PROCEDURE — 45380 COLONOSCOPY AND BIOPSY: CPT | Performed by: INTERNAL MEDICINE

## 2019-07-08 PROCEDURE — 45385 COLONOSCOPY W/LESION REMOVAL: CPT | Performed by: INTERNAL MEDICINE

## 2019-07-08 PROCEDURE — 1123F ACP DISCUSS/DSCN MKR DOCD: CPT | Performed by: PATHOLOGY

## 2019-07-08 PROCEDURE — 88305 TISSUE EXAM BY PATHOLOGIST: CPT | Performed by: PATHOLOGY

## 2019-07-08 RX ORDER — PROPOFOL 10 MG/ML
INJECTION, EMULSION INTRAVENOUS CONTINUOUS PRN
Status: DISCONTINUED | OUTPATIENT
Start: 2019-07-08 | End: 2019-07-08 | Stop reason: SURG

## 2019-07-08 RX ORDER — PROPOFOL 10 MG/ML
INJECTION, EMULSION INTRAVENOUS AS NEEDED
Status: DISCONTINUED | OUTPATIENT
Start: 2019-07-08 | End: 2019-07-08 | Stop reason: SURG

## 2019-07-08 RX ORDER — SODIUM CHLORIDE 9 MG/ML
20 INJECTION, SOLUTION INTRAVENOUS CONTINUOUS
Status: DISCONTINUED | OUTPATIENT
Start: 2019-07-08 | End: 2019-07-09 | Stop reason: HOSPADM

## 2019-07-08 RX ADMIN — PROPOFOL 50 MCG/KG/MIN: 10 INJECTION, EMULSION INTRAVENOUS at 13:04

## 2019-07-08 RX ADMIN — PROPOFOL 25 MG: 10 INJECTION, EMULSION INTRAVENOUS at 13:34

## 2019-07-08 RX ADMIN — SODIUM CHLORIDE 20 ML/HR: 0.9 INJECTION, SOLUTION INTRAVENOUS at 12:45

## 2019-07-08 RX ADMIN — PROPOFOL 35 MG: 10 INJECTION, EMULSION INTRAVENOUS at 13:04

## 2019-07-08 RX ADMIN — PROPOFOL 25 MG: 10 INJECTION, EMULSION INTRAVENOUS at 13:15

## 2019-07-08 RX ADMIN — PROPOFOL 20 MG: 10 INJECTION, EMULSION INTRAVENOUS at 13:05

## 2019-07-08 NOTE — ANESTHESIA PREPROCEDURE EVALUATION
Review of Systems/Medical History  Patient summary reviewed  Chart reviewed  History of anesthetic complications (EZ mask, intubated with Chely Carcamo 2 (according to patient letter)) difficult airway    Cardiovascular  EKG reviewed, Exercise tolerance (METS): <4,  Hyperlipidemia, Hypertension , Dysrhythmias , atrial fibrillation and history of PSVT,    Pulmonary  Smoker ex-smoker  , Pneumonia: 25 , Asthma , well controlled/ stable , Sleep apnea (not on CPAP) ,        GI/Hepatic    GI bleeding , GERD ,  Hiatal hernia, GI malignancy (GIST),        Chronic kidney disease ,        Endo/Other  History of thyroid disease , hypothyroidism,      GYN  Negative gynecology ROS          Hematology  Anemia ,     Musculoskeletal    Arthritis     Neurology  Negative neurology ROS      Psychology   Anxiety, Depression ,              Physical Exam    Airway    Mallampati score: III  TM Distance: <3 FB  Neck ROM: limited     Dental   No notable dental hx     Cardiovascular  Cardiovascular exam normal    Pulmonary  Pulmonary exam normal     Other Findings        Anesthesia Plan  ASA Score- 3     Anesthesia Type- IV sedation with anesthesia with ASA Monitors  Additional Monitors:   Airway Plan:         Plan Factors-    Induction- intravenous  Postoperative Plan-     Informed Consent- Anesthetic plan and risks discussed with patient  I personally reviewed this patient with the CRNA  Discussed and agreed on the Anesthesia Plan with the CRNA  Ana Mcqueen

## 2019-07-13 NOTE — RESULT ENCOUNTER NOTE
Called the patient about the path  --  She had 5 adenomas - removed - largest under 10 mm   Recall colon for 5 yrs - States that Hb this week was 9 ( good for me ) to have knee surgery 7/30  --  Lots of pain - had EGD earlier this month with cauterized avms in the stomach - advised capsule study - for evaluation of small bowel - she declines at this time - getting another cbc next week - please send endo reports to and path to Dr Bayron Gary, Dr Lico Licona and DR Jackie Mercado- her Oncologist at [facility] -- ( check spelling )

## 2019-07-29 ENCOUNTER — OFFICE VISIT (OUTPATIENT)
Dept: GASTROENTEROLOGY | Facility: CLINIC | Age: 69
End: 2019-07-29
Payer: MEDICARE

## 2019-07-29 ENCOUNTER — TELEPHONE (OUTPATIENT)
Dept: GASTROENTEROLOGY | Facility: CLINIC | Age: 69
End: 2019-07-29

## 2019-07-29 VITALS
BODY MASS INDEX: 24.35 KG/M2 | HEART RATE: 92 BPM | HEIGHT: 61 IN | DIASTOLIC BLOOD PRESSURE: 80 MMHG | WEIGHT: 129 LBS | SYSTOLIC BLOOD PRESSURE: 138 MMHG

## 2019-07-29 DIAGNOSIS — M17.12 LOCALIZED OSTEOARTHRITIS OF LEFT KNEE: ICD-10-CM

## 2019-07-29 DIAGNOSIS — K92.1 MELENA: Primary | ICD-10-CM

## 2019-07-29 DIAGNOSIS — K31.819 AVM (ARTERIOVENOUS MALFORMATION) OF STOMACH, ACQUIRED: ICD-10-CM

## 2019-07-29 DIAGNOSIS — D64.9 ANEMIA, UNSPECIFIED TYPE: ICD-10-CM

## 2019-07-29 DIAGNOSIS — C49.A9 MALIGNANT GASTROINTESTINAL STROMAL TUMOR (GIST) OF OTHER SITE (HCC): ICD-10-CM

## 2019-07-29 PROCEDURE — 1123F ACP DISCUSS/DSCN MKR DOCD: CPT | Performed by: INTERNAL MEDICINE

## 2019-07-29 PROCEDURE — 99214 OFFICE O/P EST MOD 30 MIN: CPT | Performed by: INTERNAL MEDICINE

## 2019-07-29 NOTE — PATIENT INSTRUCTIONS
[facility] - Outpatient Follow-up Note  Radha Paulson 77 y o  female MRN: [id number]  Encounter: [id number]    ASSESSMENT AND PLAN:      1  Melena  This is resolved  The patient did have black appearing stool back in June  She underwent colonoscopy and a arterial venous malformation with bleeding in the stomach  This was cauterized  The patient has not had melena since that time  Continue to monitor CBC    2  Anemia, unspecified type  Hemoglobin on July 26 was 8 4  This is generally pretty good for the patient  He did have a unit of packed red blood cells today in anticipation of surgery for tomorrow  She is going to have a total knee replacement for severe arthritis  -follow CBC  [facility] can arrange and send us results  -and to arrange for capsule endoscopy after the patient's surgery will call in early October  arrange    3  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 [facility])  Followed at [facility]  Stable  No bowel recurrence    4  Localized osteoarthritis of left knee  -patient with severe discomfort  She has been looking forward to replacement surgery for quite some time  She is clear for surgery at this time    5  AVM (arteriovenous malformation) of stomach, acquired  -as above    6  Colon polyps on recent exam   July 2018   Patient had 5 polyps removed  -recall colonoscopy in 3 years      Followup Appointment:  4 month

## 2019-08-28 ENCOUNTER — OFFICE VISIT (OUTPATIENT)
Dept: FAMILY MEDICINE CLINIC | Facility: HOSPITAL | Age: 69
End: 2019-08-28
Payer: MEDICARE

## 2019-08-28 ENCOUNTER — EVALUATION (OUTPATIENT)
Dept: PHYSICAL THERAPY | Facility: CLINIC | Age: 69
End: 2019-08-28
Payer: MEDICARE

## 2019-08-28 VITALS
RESPIRATION RATE: 16 BRPM | WEIGHT: 131 LBS | HEART RATE: 82 BPM | HEIGHT: 61 IN | SYSTOLIC BLOOD PRESSURE: 120 MMHG | BODY MASS INDEX: 24.73 KG/M2 | DIASTOLIC BLOOD PRESSURE: 60 MMHG

## 2019-08-28 DIAGNOSIS — Z96.652 S/P TKR (TOTAL KNEE REPLACEMENT), LEFT: Primary | ICD-10-CM

## 2019-08-28 DIAGNOSIS — Z00.00 HEALTH CARE MAINTENANCE: Primary | ICD-10-CM

## 2019-08-28 DIAGNOSIS — I10 HYPERTENSION, UNSPECIFIED TYPE: ICD-10-CM

## 2019-08-28 DIAGNOSIS — C78.7 HEPATIC METASTASES (HCC): ICD-10-CM

## 2019-08-28 DIAGNOSIS — C49.A9 MALIGNANT GASTROINTESTINAL STROMAL TUMOR (GIST) OF OTHER SITE (HCC): ICD-10-CM

## 2019-08-28 DIAGNOSIS — I48.0 PAROXYSMAL ATRIAL FIBRILLATION (HCC): ICD-10-CM

## 2019-08-28 DIAGNOSIS — D64.9 ANEMIA, UNSPECIFIED TYPE: ICD-10-CM

## 2019-08-28 PROBLEM — C49.9: Status: RESOLVED | Noted: 2019-06-04 | Resolved: 2019-08-28

## 2019-08-28 PROBLEM — K62.5 RECTAL BLEEDING: Status: RESOLVED | Noted: 2019-06-04 | Resolved: 2019-08-28

## 2019-08-28 PROBLEM — M25.562 CHRONIC PAIN OF LEFT KNEE: Status: RESOLVED | Noted: 2018-09-06 | Resolved: 2019-08-28

## 2019-08-28 PROBLEM — M79.605 PAIN OF LEFT LOWER EXTREMITY: Status: RESOLVED | Noted: 2018-09-06 | Resolved: 2019-08-28

## 2019-08-28 PROBLEM — K92.1 MELENA: Status: RESOLVED | Noted: 2019-06-04 | Resolved: 2019-08-28

## 2019-08-28 PROBLEM — G89.29 CHRONIC PAIN OF LEFT KNEE: Status: RESOLVED | Noted: 2018-09-06 | Resolved: 2019-08-28

## 2019-08-28 PROCEDURE — 97163 PT EVAL HIGH COMPLEX 45 MIN: CPT | Performed by: PHYSICAL THERAPIST

## 2019-08-28 PROCEDURE — 99214 OFFICE O/P EST MOD 30 MIN: CPT | Performed by: INTERNAL MEDICINE

## 2019-08-28 RX ORDER — CETIRIZINE HYDROCHLORIDE 10 MG/1
10 TABLET ORAL DAILY
Status: ON HOLD | COMMUNITY
End: 2020-01-05

## 2019-08-28 NOTE — PROGRESS NOTES
PT Evaluation     Today's date: 2019  Patient name: Jose Govea  :   MRN: [id number]  Referring provider: Abram Burkitt, MD  Dx: No diagnosis found  Assessment  Assessment details: Pt is a 68year old female presenting to outpatient Physical Therapy s/p L TKR  Pt presents with moderate to significant strength deficits of the left LE into knee extension, knee flexion, and hip flexion  She demonstrates decreased L knee ROM, 20-83 degrees actively, 12-85 passively  She displays moderate swelling of the L knee, and  and overall decreased functional mobility  She ambulates with a right sided SPC with significant forward trunk flexion, decreased stride length, lacking full extension of the L LE during stance phase  These physical deficits are preventing the patient from participating in usual activities such as walking for prolonged distances, grocery shopping, and standing for prolonged periods of time  Pt would benefit from skilled PT services in order to address these deficits and reach maximum level of function  Thank you kindly for the referral!  Impairments: abnormal gait, abnormal or restricted ROM, activity intolerance, impaired physical strength, lacks appropriate home exercise program and pain with function  Barriers to therapy: None  Understanding of Dx/Px/POC: good   Prognosis: good    Goals  ST  The patient will be fully compliant with HEP within two weeks    2  Pt will report a decrease in pain by at least two points on VAS within three weeks  3  Pt will demonstrate an increase in L knee extension AROM by at least 10 degrees within three weeks  4  Pt will demonstrate L knee flexion AROM of at least 90 degrees or more within three weeks    LT  The patient will increase FOTO score to 56 within 8 weeks  2  The patient will demonstrate full left knee extension during the stance phase of gait within 8 weeks  3  Pt will ambulate without the use of any assistive device within 8 weeks  4  Pt will tolerate walking up to 45 minutes within 8 weeks      Plan  Patient would benefit from: skilled physical therapy  Planned modality interventions: thermotherapy: hydrocollator packs and cryotherapy  Planned therapy interventions: therapeutic activities, therapeutic exercise, home exercise program, manual therapy, joint mobilization, balance, patient education, neuromuscular re-education and gait training  Frequency: 2x week  Duration in weeks: 8  Plan of Care beginning date: 10/23/2019  Treatment plan discussed with: patient        Subjective Evaluation    History of Present Illness  Date of surgery: 2019  Mechanism of injury: Pt had a TKR on , where she returned home after one night in the hospital  She subsequently suffered from an infection and was treated with antimitotics  She had home care for two or three weeks after her surgery   Her doctor told her that he would like to see her do more leg raises and achieve full flexion, as she is currently "too stiff "  Pain  Current pain ratin  At best pain ratin  At worst pain rating: 10  Location: Distal thigh  Quality: burning and sharp  Relieving factors: rest  Aggravating factors: standing and walking    Social Support  Steps to enter house: yes  Stairs in house: yes     Employment status: working  Treatments  Previous treatment: home therapy  Patient Goals  Patient goal: get the thigh stronger, bend the knee further, to improve walking        Objective     Active Range of Motion   Left Knee   Flexion: 83 degrees   Extensor la degrees     Passive Range of Motion   Left Knee   Flexion: 85 degrees with pain  Extension: 12 degrees with pain    Strength/Myotome Testing     Left Hip   Planes of Motion   Flexion: 3+    Right Hip   Planes of Motion   Flexion: 4+    Left Knee   Flexion: 3-  Extension: 3-    Right Knee   Flexion: 4+  Extension: 4+    Left Ankle/Foot   Dorsiflexion: 4+    Right Ankle/Foot   Dorsiflexion: 5    Swelling     Left Knee Girth Measurement (cm)   Joint line: 38 cm    Right Knee Girth Measurement (cm)   Joint line: 35 5 cm    Ambulation     Comments   Pt ambulates with a right sided SPC with significant forward trunk flexion, decreased stride length, lacking full extension of the L LE during stance phase                Precautions: High Blood Pressure, Asthma, Arthritis, Anxiety/Depression, ADHD, Atrial Fibrillation, Breast Cancer, GERD, hypothyroid, kidney Disease    EPOC: 10/23/19    Daily Treatment Diary      Manual                        L knee PROM                       STM Knee                                                                                                     Exercise Diary                        Stationary Bike  (Half rotations)                       Calf stretch at block                       Knee flexion stretch on step                       Standing hamstring Stretch                        HR/TR                       Step Ups/Downs                       Lateral Step Ups                       Mini Squats                       TKE                                               Quad Sets                       SAQ                       Bridges                       ITB stretch with strap                       Prone hamstring curls                                                                                                                                                      Modalities                        CP (post tx)                        NMES                                                  HEP: AAROM knee flexion with contralateral limb,tailgaiting, quad sets, heel prop in sitting, heel slides

## 2019-08-28 NOTE — PROGRESS NOTES
Subjective:   Chief Complaint   Patient presents with    Well Check     NP        Patient ID: Pavan Ricks is a 68 y o  female  First visit here  Multiple medical problems  GIST with mets to liver  PAF and on aspirin only due to GI bleed hx  Anemia noted on recent labs    In a clinical research trial at [facility], taking ANNA 285, going on 2 years now  The following portions of the patient's history were reviewed and updated as appropriate: allergies, current medications, past family history, past medical history, past social history, past surgical history and problem list     Review of Systems   Constitutional: Negative for fatigue and fever  HENT: Negative for hearing loss  Eyes: Negative for visual disturbance  Respiratory: Negative for cough, chest tightness, shortness of breath and wheezing  Cardiovascular: Negative for chest pain, palpitations and leg swelling  Gastrointestinal: Negative for abdominal pain, diarrhea and nausea  Genitourinary: Negative for dysuria and hematuria  Musculoskeletal: Negative for arthralgias  Neurological: Negative for dizziness, numbness and headaches  Psychiatric/Behavioral: Negative for confusion and dysphoric mood  All other systems reviewed and are negative          Current Outpatient Medications on File Prior to Visit   Medication Sig Dispense Refill    ALPRAZolam (XANAX) 0 25 mg tablet Take by mouth daily at bedtime as needed       aspirin (ESTELA ASPIRIN) 325 mg tablet Take 325 mg by mouth      Azelastine HCl 137 MCG/SPRAY SOLN instill 2 sprays into each nostril twice a day if needed for congestion  0    calcium carbonate (TUMS) 500 mg chewable tablet Chew 1 tablet daily as needed for indigestion or heartburn      cetirizine (ZyrTEC) 10 mg tablet Take 10 mg by mouth daily      docusate sodium (COLACE) 100 mg capsule Take 100 mg by mouth 2 (two) times a day as needed for constipation      furosemide (LASIX) 20 mg tablet Take 20 mg by mouth daily       hydroxychloroquine (PLAQUENIL) 200 mg tablet Take 200 mg by mouth      loperamide (IMODIUM) 2 mg capsule Take 2 capsules by mouth 4 (four) times a day as needed      metoprolol tartrate (LOPRESSOR) 25 mg tablet Take 25 mg by mouth every evening      mometasone (NASONEX) 50 mcg/act nasal spray 2 sprays into each nostril daily      Multiple Vitamins-Minerals (CENTRUM SILVER 50+MEN PO) Take by mouth daily      NON FORMULARY BLUE 285 chemotherapy research pill      ondansetron (ZOFRAN) 8 mg tablet Take 8 mg by mouth every 8 (eight) hours as needed      pantoprazole (PROTONIX) 40 mg tablet Take 40 mg by mouth daily      ranitidine (ZANTAC) 150 mg tablet Take 150 mg by mouth 2 (two) times a day      [DISCONTINUED] loratadine-pseudoephedrine (CLARITIN-D 12-HOUR) 5-120 mg per tablet Take 1 tablet by mouth 2 (two) times a day       No current facility-administered medications on file prior to visit  Objective:  Vitals:    08/28/19 1301   BP: 120/60   Pulse: 82   Resp: 16   Weight: 59 4 kg (131 lb)   Height: 5' 1" (1 549 m)      Physical Exam   Constitutional: She is oriented to person, place, and time  She appears well-developed and well-nourished  Eyes: Pupils are equal, round, and reactive to light  Neck: Normal range of motion  Neck supple  No thyromegaly present  Cardiovascular: Normal rate, regular rhythm, normal heart sounds and intact distal pulses  No murmur heard  Pulmonary/Chest: Effort normal and breath sounds normal  She has no wheezes  She has no rales  Abdominal: Soft  Bowel sounds are normal  There is no tenderness  Musculoskeletal: Normal range of motion  She exhibits no edema, tenderness or deformity  Lymphadenopathy:     She has no cervical adenopathy  Neurological: She is alert and oriented to person, place, and time  She has normal reflexes  Skin: Skin is warm and dry  Psychiatric: She has a normal mood and affect     Nursing note and vitals reviewed  Assessment/Plan:    No problem-specific Assessment & Plan notes found for this encounter  Diagnoses and all orders for this visit:    Health care maintenance    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)    Paroxysmal atrial fibrillation (HCC)    Hypertension, unspecified type    Hepatic metastases (HCC)    Anemia, unspecified type    Other orders  -     cetirizine (ZyrTEC) 10 mg tablet;  Take 10 mg by mouth daily

## 2019-08-28 NOTE — PATIENT INSTRUCTIONS
Your visit today was to establish a relationship with our office and to establish care  We hope that you feel welcomed and confident that we will address your health care needs as needed  The doctor or nurse reviewed your health information with you, all medications and some family and social history  If the doctor has ordered labs or tests, please do this soon and then schedule a follow up visit as requested  Consider the new Shingrix vaccine for shingles, this is not a live virus  Recommend flu shot in October  Consider cholesterol and thyroid blood tests soon

## 2019-08-30 ENCOUNTER — TELEPHONE (OUTPATIENT)
Dept: FAMILY MEDICINE CLINIC | Facility: HOSPITAL | Age: 69
End: 2019-08-30

## 2019-08-30 DIAGNOSIS — Z00.00 HEALTH CARE MAINTENANCE: ICD-10-CM

## 2019-08-30 DIAGNOSIS — E03.9 HYPOTHYROIDISM, UNSPECIFIED TYPE: Primary | ICD-10-CM

## 2019-09-04 ENCOUNTER — OFFICE VISIT (OUTPATIENT)
Dept: PHYSICAL THERAPY | Facility: CLINIC | Age: 69
End: 2019-09-04
Payer: MEDICARE

## 2019-09-04 DIAGNOSIS — Z96.652 S/P TKR (TOTAL KNEE REPLACEMENT), LEFT: Primary | ICD-10-CM

## 2019-09-04 PROCEDURE — 97140 MANUAL THERAPY 1/> REGIONS: CPT | Performed by: PHYSICAL THERAPIST

## 2019-09-04 PROCEDURE — 97110 THERAPEUTIC EXERCISES: CPT | Performed by: PHYSICAL THERAPIST

## 2019-09-04 NOTE — PROGRESS NOTES
Daily Note     Today's date: 2019  Patient name: Alia Russell  : [id number]  MRN: [id number]  Referring provider: Jeff Boothe MD  Dx:   Encounter Diagnosis     ICD-10-CM    1  S/P TKR (total knee replacement), left Z96 [id number]                   Subjective:       Objective: See treatment diary below      Assessment: Tolerated treatment fair  Patient was unable to tolerate the stationary bike with half rotations due to a severe report of pain with her first revolution  Due to her high sensitivity and moderate knee ROM deficits, she was treated with STM to the sensitive area, which was over the left knee joint  She tolerated STM well, with moderate decreases in hypersensitivity afterwards  Pt achieved approximately 90 degrees of knee flexion towards the end of AAROM and PROM, but continues to exhibit significant discomfort at end range, with significant guarding, limiting her motion  She demonstrated an extensor lag of 8 degrees following a heel prop in long sit and quad sets  She would benefit from continued skilled PT to progress these deficits and help her achieve her maximum level of functioning  Plan: Add in standing stretching and ROM     Precautions: High Blood Pressure, Asthma, Arthritis, Anxiety/Depression, ADHD, Atrial Fibrillation, Breast Cancer, GERD, hypothyroid, kidney Disease    EPOC: 10/23/19    Daily Treatment Diary      Manual                        L knee PROM  14'                     STM Knee  10'                                                                        24'                           Exercise Diary                        Stationary Bike  (Half rotations)  p!                      Calf stretch at block                       Knee flexion stretch on step  heel slides with strap  2 x10, 10" hold                     Standing hamstring Stretch                        HR/TR                       Step Ups/Downs                       Lateral Step Ups Mini Squats  x20                     TKE                                               Quad Sets  3" hold  2 x10                     SAQ  2 x10                     Bridges                       ITB stretch with strap                       Prone hamstring curls                                                                                                                                                      Modalities                        CP (post tx)                        NMES                                                  HEP: AAROM knee flexion with contralateral limb,tailgaiting, quad sets, heel prop in sitting, heel slides

## 2019-09-06 ENCOUNTER — APPOINTMENT (OUTPATIENT)
Dept: PHYSICAL THERAPY | Facility: CLINIC | Age: 69
End: 2019-09-06
Payer: MEDICARE

## 2019-09-11 ENCOUNTER — OFFICE VISIT (OUTPATIENT)
Dept: PHYSICAL THERAPY | Facility: CLINIC | Age: 69
End: 2019-09-11
Payer: MEDICARE

## 2019-09-11 DIAGNOSIS — Z96.652 S/P TKR (TOTAL KNEE REPLACEMENT), LEFT: Primary | ICD-10-CM

## 2019-09-11 PROCEDURE — 97110 THERAPEUTIC EXERCISES: CPT | Performed by: PHYSICAL THERAPIST

## 2019-09-11 PROCEDURE — 97140 MANUAL THERAPY 1/> REGIONS: CPT | Performed by: PHYSICAL THERAPIST

## 2019-09-13 ENCOUNTER — OFFICE VISIT (OUTPATIENT)
Dept: PHYSICAL THERAPY | Facility: CLINIC | Age: 69
End: 2019-09-13
Payer: MEDICARE

## 2019-09-13 DIAGNOSIS — Z96.652 S/P TKR (TOTAL KNEE REPLACEMENT), LEFT: Primary | ICD-10-CM

## 2019-09-13 PROCEDURE — 97140 MANUAL THERAPY 1/> REGIONS: CPT

## 2019-09-13 PROCEDURE — 97110 THERAPEUTIC EXERCISES: CPT

## 2019-09-13 NOTE — PROGRESS NOTES
Daily Note     Today's date: 2019  Patient name: Cynthia Godoy  :   MRN: [id number]  Referring provider: Aashish Dobbins MD  Dx:   Encounter Diagnosis     ICD-10-CM    1  S/P TKR (total knee replacement), left Z96 652        Start Time: 945  Stop Time: 1030  Total time in clinic (min): 45 minutes    Subjective: Pt reports her L knee is feeling "pretty good" today prior to start of session  Notes she did a lot of walking yesterday at the store with minimal problems/complaints  Objective: See treatment diary below      Assessment: Tolerated treatment well  Was able to perform all exercise with no significant increase of pain  Visible limitations in both L knee flex/ext PROM noted  Is challenged with program, demonstration limitations in strength  Patient would benefit from continued PT for additional strengthening/available ROM, in effort to improve function with ADL  Plan: Continue per plan of care  Progress as able       Precautions: High Blood Pressure, Asthma, Arthritis, Anxiety/Depression, ADHD, Atrial Fibrillation, Breast Cancer, GERD, hypothyroid, kidney Disease    EPOC: 10/23/19    Daily Treatment Diary      Manual                     L knee PROM   14'   15'  15'                 STM Knee   10'   8'  8'                                                                    24'    23'                       Exercise Diary                     Stationary Bike  (Half rotations)   p!   held held                  Calf stretch at block     Long sit with strap  3 x30"  Long sit with strap  3 x30                 Knee flexion stretch on step   heel slides with strap  2 x10, 10" hold      heel slides with strap  2 x10, 10" hold         Heel slides  w/strap    10" hold  2x10                 HR/TR     HR  x20  HR only  2x10                 Step Ups/Downs                       Lateral Step Ups                       Mini Squats   x20   x20  x20 TKE                                               Quad Sets   3" hold  2 x10    3" hold  2 x10  3" hold  2 x10                 SAQ   2 x10   3" hold  2 x10  3" hold  2 x10                 Bridges                       Hamstring stretch in long sit with strap    3 x30"  3x30"                 Prone hamstring curls                                                                                                                                                      Modalities                        CP (post tx)                        NMES                                                  HEP: AAROM knee flexion with contralateral limb,tailgaiting, quad sets, heel prop in sitting, heel slides

## 2019-09-18 ENCOUNTER — OFFICE VISIT (OUTPATIENT)
Dept: PHYSICAL THERAPY | Facility: CLINIC | Age: 69
End: 2019-09-18
Payer: MEDICARE

## 2019-09-18 DIAGNOSIS — Z96.652 S/P TKR (TOTAL KNEE REPLACEMENT), LEFT: Primary | ICD-10-CM

## 2019-09-18 PROCEDURE — 97110 THERAPEUTIC EXERCISES: CPT | Performed by: PHYSICAL THERAPIST

## 2019-09-18 PROCEDURE — 97140 MANUAL THERAPY 1/> REGIONS: CPT | Performed by: PHYSICAL THERAPIST

## 2019-09-18 NOTE — PROGRESS NOTES
Daily Note     Today's date: 2019  Patient name: Travis Mata  : [birth year]  MRN: [id number]  Referring provider: Karson Mensah MD  Dx:   Encounter Diagnosis     ICD-10-CM    1  S/P TKR (total knee replacement), left Z96 [id number]                   Subjective: Pt reports that her knee is feeling a bit stiff today after going grocery shopping for a few hours  Objective: See treatment diary below      Assessment: Tolerated treatment well  Patient is tolerating more with each visit, today progressing to stretching in standing, responding best to prolonged knee flexion on the step  She also responded well to the introduction of IASTM of her incision and surrounding soft tissue, with decreased sensitivity at the end of the treatment  She continues to progress her L knee ROM, but still has significant limitations, appoximately 8-90 degrees of motion today  She would benefit from skilled PT to continue her progress and help her achieve her maximum level of functioning  Plan: Progress treatment as tolerated         Precautions: High Blood Pressure, Asthma, Arthritis, Anxiety/Depression, ADHD, Atrial Fibrillation, Breast Cancer, GERD, hypothyroid, kidney Disease    EPOC: 10/23/19    Daily Treatment Diary      Manual                   L knee PROM   14'   15'   15'  6'               STM Knee   10'   8'   8'  IASTM  6'                                                                  24'     23'  12'                     Exercise Diary                   Stationary Bike  (Half rotations)   p!   held held   held               Calf stretch at block     Long sit with strap  3 x30"  Long sit with strap  3 x30  at block  3 x30"               Knee flexion stretch on step   heel slides with strap  2 x10, 10" hold      heel slides with strap  2 x10, 10" hold          Heel slides  w/strap    10" hold  2x10  Step  10 x10"               HR/TR     HR  x20   HR only  2x10 HR  x20               Step Ups/Downs                       Lateral Step Ups                       Mini Squats   x20   x20   x20                 TKE                                               Quad Sets   3" hold  2 x10    3" hold  2 x10  3" hold  2 x10  5" hold  2 x10               SAQ   2 x10   3" hold  2 x10  3" hold  2 x10   3" hold  2 x10               Bridges                       Hamstring stretch in long sit with strap    3 x30"   3x30"  step  3 x30"               Prone hamstring curls         Heel slides in long sit on towel  x20                Clamshells        PTB  3" hold  2 x10                Knee adduction in hook-laying with ball squeeze        5" hold  2 x10                                                                                             Modalities                        CP (post tx)                        NMES                                                  HEP: AAROM knee flexion with contralateral limb,tailgaiting, quad sets, heel prop in sitting, heel slides

## 2019-09-20 ENCOUNTER — APPOINTMENT (OUTPATIENT)
Dept: PHYSICAL THERAPY | Facility: CLINIC | Age: 69
End: 2019-09-20
Payer: MEDICARE

## 2019-09-25 ENCOUNTER — APPOINTMENT (OUTPATIENT)
Dept: PHYSICAL THERAPY | Facility: CLINIC | Age: 69
End: 2019-09-25
Payer: MEDICARE

## 2019-09-27 ENCOUNTER — OFFICE VISIT (OUTPATIENT)
Dept: PHYSICAL THERAPY | Facility: CLINIC | Age: 69
End: 2019-09-27
Payer: MEDICARE

## 2019-09-27 DIAGNOSIS — Z96.652 S/P TKR (TOTAL KNEE REPLACEMENT), LEFT: Primary | ICD-10-CM

## 2019-09-27 PROCEDURE — 97140 MANUAL THERAPY 1/> REGIONS: CPT

## 2019-09-27 PROCEDURE — 97110 THERAPEUTIC EXERCISES: CPT

## 2019-09-27 NOTE — PROGRESS NOTES
Daily Note     Today's date: 2019  Patient name: Christian Alvarado  : [birth year]  MRN: [id number]  Referring provider: Danay Calderon MD  Dx:   Encounter Diagnosis     ICD-10-CM    1  S/P TKR (total knee replacement), left Z96 [id number]                   Subjective: Pt reports that she continues to have pain and stiffness in her knee  She states that they did a lot of walking yesterday so that may have made it worse  Pt reports that she tries to get some stretching in once a day if she can find time  She also reports having increased LBP recently  Objective: See treatment diary below      Assessment: Tolerated treatment fair  Pt was fearful throughout session of moving her knee due to pain  Flexion PROM of knee was limited due to pain  Educated pt on importance of making time to perform stretching/ROM exercises at home more than one time a day as reported by patient  Pt reported understanding      Plan: Continue per plan of care     Precautions: High Blood Pressure, Asthma, Arthritis, Anxiety/Depression, ADHD, Atrial Fibrillation, Breast Cancer, GERD, hypothyroid, kidney Disease    EPOC: 10/23/19    Daily Treatment Diary      Manual                 L knee PROM   14'   15'   15'   6'  10'             STM Knee   10'   8'   8'   IASTM  6'  IASTM  10'                                                                24'     23'   12'  20'                   Exercise Diary                 Stationary Bike  (Half rotations)   p!   held held    held  held             Calf stretch at block     Long sit with strap  3 x30"  Long sit with strap  3 x30   at block  3 x30"  at block 3x30"             Knee flexion stretch on step   heel slides with strap  2 x10, 10" hold      heel slides with strap  2 x10, 10" hold          Heel slides  w/strap    10" hold  2x10   Step  10 x10"  step  10x10"             HR/TR     HR  x20   HR only  2x10   HR  x20  HR  x20 Step Ups/Downs                       Lateral Step Ups                       Mini Squats   x20   x20   x20                 TKE                                               Quad Sets   3" hold  2 x10    3" hold  2 x10  3" hold  2 x10   5" hold  2 x10  5" hold  2x10             SAQ   2 x10   3" hold  2 x10  3" hold  2 x10   3" hold  2 x10  3" hold  2x10             Bridges                       Hamstring stretch in long sit with strap    3 x30"   3x30"   step  3 x30"               Prone hamstring curls          Heel slides in long sit on towel  x20                 Clamshells        PTB  3" hold  2 x10  PTB  3" hold  2x10               Knee adduction in hook-laying with ball squeeze        5" hold  2 x10  5" hold  2x10                                                                                           Modalities                        CP (post tx)                        NMES                                                  HEP: AAROM knee flexion with contralateral limb,tailgaiting, quad sets, heel prop in sitting, heel slides

## 2019-09-30 ENCOUNTER — TRANSCRIBE ORDERS (OUTPATIENT)
Dept: ADMINISTRATIVE | Facility: HOSPITAL | Age: 69
End: 2019-09-30

## 2019-09-30 DIAGNOSIS — A09 DIARRHEA OF INFECTIOUS ORIGIN: Primary | ICD-10-CM

## 2019-10-09 ENCOUNTER — APPOINTMENT (OUTPATIENT)
Dept: PHYSICAL THERAPY | Facility: CLINIC | Age: 69
End: 2019-10-09
Payer: MEDICARE

## 2019-10-11 ENCOUNTER — OFFICE VISIT (OUTPATIENT)
Dept: PHYSICAL THERAPY | Facility: CLINIC | Age: 69
End: 2019-10-11
Payer: MEDICARE

## 2019-10-11 DIAGNOSIS — Z96.652 S/P TKR (TOTAL KNEE REPLACEMENT), LEFT: Primary | ICD-10-CM

## 2019-10-11 PROCEDURE — 97110 THERAPEUTIC EXERCISES: CPT | Performed by: PHYSICAL THERAPIST

## 2019-10-11 PROCEDURE — 97140 MANUAL THERAPY 1/> REGIONS: CPT | Performed by: PHYSICAL THERAPIST

## 2019-10-11 NOTE — PROGRESS NOTES
Daily Note     Today's date: 10/11/2019  Patient name: Koko Pina  : [birth year]  MRN: [id number]  Referring provider: Manuel Garrido MD  Dx:   Encounter Diagnosis     ICD-10-CM    1  S/P TKR (total knee replacement), left Z96 652                   Subjective: Pt reports that her entire L LE is sore today, especially the hip, which may be from doing a lot of heavy housework, including climbing ladders and scrubbing walls  She notes that her surgeon expects her to achieve more knee motion into both flexion and extension  Objective: See treatment diary below      Assessment: The pt presented to PT today with use of R sided SPC, a progression from the rolling walker at her last visit  She presented wait increased R UE weight bearing and R sided trunk leaning, and decreased knee extension during stance phase B  She takes small steps and moves at a decreased speed  She continues to demonstrate a moderate extensor lag as well as approximately 90 degrees of L knee flexion ROM  She tolerated manual therapy fair, with verbal cues to decrease guarding and moving away from the limb as compensation  She was encouraged to continue with stretch and ROM TE at home mutliple times a day, she verbalized understanding  She was also encouraged to attend PT more, as she would benefit from continued manual therapy, mobility training, and strengthening  Plan: Continue with focus on ROM, gait       Precautions: High Blood Pressure, Asthma, Arthritis, Anxiety/Depression, ADHD, Atrial Fibrillation, Breast Cancer, GERD, hypothyroid, kidney Disease    EPOC: 10/23/19    Daily Treatment Diary      Manual    9/4   9/11   9/13   9/18   9/27  10/11           L knee PROM   14'   15'   15'   6'   10'  13'           STM Knee   10'   8'   8'   IASTM  6'   IASTM  10'  Tiger Tail  10'                                                              24'     23'   12'   20'  23'                 Exercise Diary     9/27  10/11           Calf stretch at block     Long sit with strap  3 x30"  Long sit with strap  3 x30   at block  3 x30"   at block 3x30"  at block  3 x30"           Knee flexion stretch on step   heel slides with strap  2 x10, 10" hold      heel slides with strap  2 x10, 10" hold          Heel slides  w/strap    10" hold  2x10   Step  10 x10"   step  10x10"  step  10 x10"           HR/TR     HR  x20   HR only  2x10   HR  x20   HR  x20  HR  x20           Step Ups/Downs                       Lateral Step Ups                       Mini Squats   x20   x20   x20      x20           Quad stretch in standing with toes on 8" step            3 x30"                                   Quad Sets   3" hold  2 x10    3" hold  2 x10  3" hold  2 x10   5" hold  2 x10   5" hold  2x10             SAQ   2 x10   3" hold  2 x10  3" hold  2 x10   3" hold  2 x10   3" hold  2x10             Tail gaiting             5'           Hamstring stretch in long sit with strap    3 x30"   3x30"   step  3 x30"    manual           Prone hamstring curls          Heel slides in long sit on towel  x20                 Clamshells        PTB  3" hold  2 x10   PTB  3" hold  2x10               Knee adduction in hook-laying with ball squeeze        5" hold  2 x10   5" hold  2x10                                                                                           Modalities                        CP (post tx)                        NMES                                                  HEP: AAROM knee flexion with contralateral limb,tailgaiting, quad sets, heel prop in sitting, heel slides

## 2019-10-14 ENCOUNTER — OFFICE VISIT (OUTPATIENT)
Dept: PHYSICAL THERAPY | Facility: CLINIC | Age: 69
End: 2019-10-14
Payer: MEDICARE

## 2019-10-14 DIAGNOSIS — Z96.652 S/P TKR (TOTAL KNEE REPLACEMENT), LEFT: Primary | ICD-10-CM

## 2019-10-14 PROCEDURE — 97110 THERAPEUTIC EXERCISES: CPT | Performed by: PHYSICAL THERAPIST

## 2019-10-14 PROCEDURE — 97140 MANUAL THERAPY 1/> REGIONS: CPT | Performed by: PHYSICAL THERAPIST

## 2019-10-14 NOTE — PROGRESS NOTES
Daily Note     Today's date: 10/14/2019  Patient name: Tayo Snell  :   MRN: [id number]  Referring provider: Catracho Gay MD  Dx:   Encounter Diagnosis     ICD-10-CM    1  S/P TKR (total knee replacement), left Z96 652                   Subjective: Pt reports that her knee is quite sore today following a weekend of yardwork such as weeding and working in the garden  She says that she stretches usually one time each day, when she is getting up in the morning  Objective: See treatment diary below      Assessment: Tolerated treatment fair  Patient continues to demonstrate significant stiffness of the left knee joint into both flexion and extension  She demonstrates approximately 90 degrees of flexion and a 15 degree extension lag  During manual stretching she revealed significant hamstring, calf, and quad tightness  She also demonstrates heavy guarding during PROM and moderate pain levels at available end range  She was educated on the importance of stretching at least 3-5 times a day right now, in order to regain normal motion  She was given an updated HEP, targeting increased motion and flexibility  She verbalized understanding  She would benefit from continued skilled PT to help improve her ROM and restore normal motion and strength  Plan: Continue per plan of care  Progress treatment as tolerated         Precautions: High Blood Pressure, Asthma, Arthritis, Anxiety/Depression, ADHD, Atrial Fibrillation, Breast Cancer, GERD, hypothyroid, kidney Disease    EPOC: 10/23/19    Daily Treatment Diary      Manual    9/4   9/11   9/13   9/18   9/27   10/11  10/14         L knee PROM   14'   15'   15'   6'   10'   13'  8'         STM Knee   10'   8'   8'   IASTM  6'   IASTM  10'   Tiger Tail  10'  IASTM  5'                                                            24'     23'   12'   20'   23'  13'               Exercise Diary    9/4   9/11   9/13   9/18   9/27   10/11  10/14         Calf stretch at block     Long sit with strap  3 x30"  Long sit with strap  3 x30   at block  3 x30"   at block 3x30"   at block  3 x30"  block  3 x30"         Knee flexion stretch on step   heel slides with strap  2 x10, 10" hold      heel slides with strap  2 x10, 10" hold          Heel slides  w/strap    10" hold  2x10   Step  10 x10"   step  10x10"   step  10 x10"  step  10 x10"         HR/TR     HR  x20   HR only  2x10   HR  x20   HR  x20   HR  x20  HR  x20         Mini Squats   x20   x20   x20       x20  x20  HEP       Quad stretch in standing with toes on 8" step            3 x30"  3 x30"                                 Quad Sets   3" hold  2 x10    3" hold  2 x10  3" hold  2 x10   5" hold  2 x10   5" hold  2x10    5" hold  2 x10         SAQ   2 x10   3" hold  2 x10  3" hold  2 x10   3" hold  2 x10   3" hold  2x10             Tail gaiting             5'  3'  HEP       Hamstring stretch in long sit with strap    3 x30"   3x30"   step  3 x30"     manual  3 x30"         Prone hamstring curls          Heel slides in long sit on towel  x20      Heel slides  10 x10"                      HEP:                  Seated hamstring stretch         HEP reviewed          Heel Prop              HEP reviewed          Seated calf stretch with towel              HEP reviewed           L knee flexion AAROM with contralateral LE              HEP reviewed                Modalities                        CP (post tx)                        NMES                                                  HEP: AAROM knee flexion with contralateral limb,tailgaiting, quad sets, heel prop in sitting, heel slides

## 2019-10-18 ENCOUNTER — EVALUATION (OUTPATIENT)
Dept: PHYSICAL THERAPY | Facility: CLINIC | Age: 69
End: 2019-10-18
Payer: MEDICARE

## 2019-10-18 ENCOUNTER — TRANSCRIBE ORDERS (OUTPATIENT)
Dept: PHYSICAL THERAPY | Facility: CLINIC | Age: 69
End: 2019-10-18

## 2019-10-18 DIAGNOSIS — Z96.652 S/P TKR (TOTAL KNEE REPLACEMENT), LEFT: Primary | ICD-10-CM

## 2019-10-18 PROCEDURE — 97140 MANUAL THERAPY 1/> REGIONS: CPT | Performed by: PHYSICAL THERAPIST

## 2019-10-18 PROCEDURE — 97110 THERAPEUTIC EXERCISES: CPT | Performed by: PHYSICAL THERAPIST

## 2019-10-18 NOTE — PROGRESS NOTES
PT Re-Evaluation     Today's date: 10/18/2019  Patient name: Janette Willoughby  :   MRN: [id number]  Referring provider: Sharona Cesar MD  Dx:   Encounter Diagnosis     ICD-10-CM    1  S/P TKR (total knee replacement), left [id number]                   Assessment  Assessment details: Pt is a 68year old female who has been attending outpatient Physical Therapy s/p L TKR  She has been making slow but steady progress with PT, her sessions have not been able to occur regularly due to her cancer treatments that she is also currently undergoing  Despite the inconsistency with PT, the pt has made progress with her left knee strength and ROM into extension, as well as PROM and AROM into flexion  She would benefit from continued skilled PT as she still displays an extensor lag of 15 degrees and currently her flexion is only at 95 degrees  She also continues to display moderate weakness of the left hamstrings and an antalgic gait pattern  Thank you for your referral    Impairments: abnormal gait, abnormal or restricted ROM, activity intolerance, impaired physical strength and pain with function  Barriers to therapy: None  Understanding of Dx/Px/POC: good   Prognosis: good    Goals  ST  The patient will be fully compliant with HEP within two weeks-partially met     2  Pt will report a decrease in pain by at least two points on VAS within three weeks-not met   3  Pt will demonstrate an increase in L knee extension AROM by at least 10 degrees within three weeks-met  4  Pt will demonstrate L knee flexion AROM of at least 90 degrees or more within three weeks-met    LT  The patient will increase FOTO score to 56 within 8 weeks-met  2  The patient will demonstrate full left knee extension during the stance phase of gait within 8 weeks-not met   3  Pt will ambulate without the use of any assistive device within 8 weeks-not met   4  Pt will tolerate walking up to 45 minutes within 8 weeks-met   5   Pt will demonstrate left knee extension to at least 5 degrees within four weeks-new  6  The pt will demonstrate left knee flexion to at least 110 degrees within four weeks-new      Plan  Patient would benefit from: skilled physical therapy  Planned modality interventions: thermotherapy: hydrocollator packs and cryotherapy  Planned therapy interventions: therapeutic activities, therapeutic exercise, home exercise program, manual therapy, joint mobilization, balance, patient education, neuromuscular re-education and gait training  Frequency: 2x week  Duration in weeks: 8  Plan of Care beginning date: 10/23/2019  Plan of Care expiration date: 11/15/2019  Treatment plan discussed with: patient        Subjective Evaluation    History of Present Illness  Date of surgery: 2019  Mechanism of injury: Pt had a TKR on , where she returned home after one night in the hospital  She subsequently suffered from an infection and was treated with antimitotics  She had home care for two or three weeks after her surgery  Her doctor told her that he would like to see her do more leg raises and achieve full flexion, as she is currently "too stiff "    Re-evaluation 10/17: Pt reports that she is getting around well now  She still has pain but was given CBD oil by her MD  She is not taking as much Tramadol or Tylenol anymore  Her MD told her that it is up to her and her PT about how much longer to come to therapy         Pain  Current pain ratin  At best pain ratin  At worst pain rating: 10  Location: Medial aspect of L knee  Quality: burning and sharp  Relieving factors: rest  Aggravating factors: standing and walking    Social Support  Steps to enter house: yes  Stairs in house: yes     Employment status: working  Treatments  Previous treatment: home therapy  Patient Goals  Patient goal: get the thigh stronger, bend the knee further, to improve walking        Objective     Active Range of Motion   Left Knee   Flexion: 95 degrees Extensor lag: 15 degrees     Passive Range of Motion   Left Knee   Flexion: 85 degrees with pain  Extension: 10 degrees with pain    Strength/Myotome Testing     Left Hip   Planes of Motion   Flexion: 3+    Right Hip   Planes of Motion   Flexion: 4+    Left Knee   Flexion: 3-  Extension: 4+    Right Knee   Flexion: 4+  Extension: 4+    Left Ankle/Foot   Dorsiflexion: 4+    Right Ankle/Foot   Dorsiflexion: 5    Swelling     Left Knee Girth Measurement (cm)   Joint line: 38 cm    Right Knee Girth Measurement (cm)   Joint line: 38 cm    Ambulation     Comments   Pt ambulates with a right sided SPC with significant forward trunk flexion, decreased stride length, lacking full extension of the L LE during stance phase                Precautions: High Blood Pressure, Asthma, Arthritis, Anxiety/Depression, ADHD, Atrial Fibrillation, Breast Cancer, GERD, hypothyroid, kidney Disease    EPOC: 11/15/19    Daily Treatment Diary      Manual    9/4   9/11   9/13   9/18   9/27   10/11  10/14  10/18       L knee PROM   14'   15'   15'   6'   10'   13'  8'  8'       STM Knee   10'   8'   8'   IASTM  6'   IASTM  10'   Tiger Tail  10'  IASTM  5'                                                            24'     23'   12'   20'   23'  13'               Exercise Diary    9/4   9/11   9/13   9/18   9/27   10/11  10/14  10/18       Calf stretch at block     Long sit with strap  3 x30"  Long sit with strap  3 x30   at block  3 x30"   at block 3x30"   at block  3 x30"  block  3 x30"         Knee flexion stretch on step   heel slides with strap  2 x10, 10" hold      heel slides with strap  2 x10, 10" hold          Heel slides  w/strap    10" hold  2x10   Step  10 x10"   step  10x10"   step  10 x10"  step  10 x10"         HR/TR     HR  x20   HR only  2x10   HR  x20   HR  x20   HR  x20  HR  x20         Mini Squats   x20   x20   x20       x20  x20  HEP       Quad stretch in standing with toes on 8" step            3 x30"  3 x30" Quad Sets   3" hold  2 x10    3" hold  2 x10  3" hold  2 x10   5" hold  2 x10   5" hold  2x10    5" hold  2 x10         SAQ   2 x10   3" hold  2 x10  3" hold  2 x10   3" hold  2 x10   3" hold  2x10             Tail gaiting             5'  3'  HEP       Hamstring stretch in long sit with strap    3 x30"   3x30"   step  3 x30"     manual  3 x30"         Prone hamstring curls          Heel slides in long sit on towel  x20      Heel slides  10 x10"  heel slides  10 x10"                    HEP:                  Seated hamstring stretch         HEP reviewed          Heel Prop              HEP reviewed          Seated calf stretch with towel              HEP reviewed           L knee flexion AAROM with contralateral LE              HEP reviewed                Modalities                        CP (post tx)                        NMES                                                  HEP: AAROM knee flexion with contralateral limb,tailgaiting, quad sets, heel prop in sitting, heel slides

## 2019-10-18 NOTE — LETTER
2019    Janeen Ledesma MD  [address] [facility],[address]  [place]    Patient: Gael Murphy   YOB: 1950   Date of Visit: 10/18/2019     Encounter Diagnosis     ICD-10-CM    1  S/P TKR (total knee replacement), left [id number]        Dear Dr Adel Blizzard: Thank you for your recent referral of Gael Murphy  Please review the attached evaluation summary from Cherelle's recent visit  Please verify that you agree with the plan of care by signing the attached order  If you have any questions or concerns, please do not hesitate to call  I sincerely appreciate the opportunity to share in the care of one of your patients and hope to have another opportunity to work with you in the near future  Sincerely,    Jaz Menon, PT      Referring Provider:      I certify that I have read the below Plan of Care and certify the need for these services furnished under this plan of treatment while under my care  Janeen Ledesma MD  [address] [facility],[address]  [employer]: [phone]          PT Re-Evaluation     Today's date: 10/18/2019  Patient name: Gael Murphy  : 1912  MRN: [id number]  Referring provider: Sydney Carballo MD  Dx:   Encounter Diagnosis     ICD-10-CM    1  S/P TKR (total knee replacement), left [id number]                   Assessment  Assessment details: Pt is a 68year old female who has been attending outpatient Physical Therapy s/p L TKR  She has been making slow but steady progress with PT, her sessions have not been able to occur regularly due to her cancer treatments that she is also currently undergoing  Despite the inconsistency with PT, the pt has made progress with her left knee strength and ROM into extension, as well as PROM and AROM into flexion   She would benefit from continued skilled PT as she still displays an extensor lag of 15 degrees and currently her flexion is only at 95 degrees  She also continues to display moderate weakness of the left hamstrings and an antalgic gait pattern  Thank you for your referral    Impairments: abnormal gait, abnormal or restricted ROM, activity intolerance, impaired physical strength and pain with function  Barriers to therapy: None  Understanding of Dx/Px/POC: good   Prognosis: good    Goals  ST  The patient will be fully compliant with HEP within two weeks-partially met     2  Pt will report a decrease in pain by at least two points on VAS within three weeks-not met   3  Pt will demonstrate an increase in L knee extension AROM by at least 10 degrees within three weeks-met  4  Pt will demonstrate L knee flexion AROM of at least 90 degrees or more within three weeks-met    LT  The patient will increase FOTO score to 56 within 8 weeks-met  2  The patient will demonstrate full left knee extension during the stance phase of gait within 8 weeks-not met   3  Pt will ambulate without the use of any assistive device within 8 weeks-not met   4  Pt will tolerate walking up to 45 minutes within 8 weeks-met   5  Pt will demonstrate left knee extension to at least 5 degrees within four weeks-new  6  The pt will demonstrate left knee flexion to at least 110 degrees within four weeks-new      Plan  Patient would benefit from: skilled physical therapy  Planned modality interventions: thermotherapy: hydrocollator packs and cryotherapy  Planned therapy interventions: therapeutic activities, therapeutic exercise, home exercise program, manual therapy, joint mobilization, balance, patient education, neuromuscular re-education and gait training  Frequency: 2x week  Duration in weeks: 8  Plan of Care beginning date: 10/23/2019  Plan of Care expiration date: 11/15/2019  Treatment plan discussed with: patient        Subjective Evaluation    History of Present Illness  Date of surgery: 2019  Mechanism of injury: Pt had a TKR on , where she returned home after one night in the hospital  She subsequently suffered from an infection and was treated with antimitotics  She had home care for two or three weeks after her surgery  Her doctor told her that he would like to see her do more leg raises and achieve full flexion, as she is currently "too stiff "    Re-evaluation 10/17: Pt reports that she is getting around well now  She still has pain but was given CBD oil by her MD  She is not taking as much Tramadol or Tylenol anymore  Her MD told her that it is up to her and her PT about how much longer to come to therapy  Pain  Current pain ratin  At best pain ratin  At worst pain rating: 10  Location: Medial aspect of L knee  Quality: burning and sharp  Relieving factors: rest  Aggravating factors: standing and walking    Social Support  Steps to enter house: yes  Stairs in house: yes     Employment status: working  Treatments  Previous treatment: home therapy  Patient Goals  Patient goal: get the thigh stronger, bend the knee further, to improve walking        Objective     Active Range of Motion   Left Knee   Flexion: 95 degrees   Extensor lag: 15 degrees     Passive Range of Motion   Left Knee   Flexion: 85 degrees with pain  Extension: 10 degrees with pain    Strength/Myotome Testing     Left Hip   Planes of Motion   Flexion: 3+    Right Hip   Planes of Motion   Flexion: 4+    Left Knee   Flexion: 3-  Extension: 4+    Right Knee   Flexion: 4+  Extension: 4+    Left Ankle/Foot   Dorsiflexion: 4+    Right Ankle/Foot   Dorsiflexion: 5    Swelling     Left Knee Girth Measurement (cm)   Joint line: 38 cm    Right Knee Girth Measurement (cm)   Joint line: 38 cm    Ambulation     Comments   Pt ambulates with a right sided SPC with significant forward trunk flexion, decreased stride length, lacking full extension of the L LE during stance phase                Precautions: High Blood Pressure, Asthma, Arthritis, Anxiety/Depression, ADHD, Atrial Fibrillation, Breast Cancer, GERD, hypothyroid, kidney Disease    EPOC: 11/15/19    Daily Treatment Diary      Manual    9/4   9/11   9/13   9/18   9/27   10/11  10/14   10/18       L knee PROM   14'   15'   15'   6'   10'   13'  8'   8'       STM Knee   10'   8'   8'   IASTM  6'   IASTM  10'   Tiger Tail  10'  IASTM  5'                                                            24'     23'   12'   20'   23'  13'               Exercise Diary    9/4   9/11   9/13   9/18   9/27   10/11  10/14   10/18       Calf stretch at block     Long sit with strap  3 x30"  Long sit with strap  3 x30   at block  3 x30"   at block 3x30"   at block  3 x30"  block  3 x30"         Knee flexion stretch on step   heel slides with strap  2 x10, 10" hold      heel slides with strap  2 x10, 10" hold          Heel slides  w/strap    10" hold  2x10   Step  10 x10"   step  10x10"   step  10 x10"  step  10 x10"         HR/TR     HR  x20   HR only  2x10   HR  x20   HR  x20   HR  x20  HR  x20         Mini Squats   x20   x20   x20       x20  x20  HEP       Quad stretch in standing with toes on 8" step            3 x30"  3 x30"                                 Quad Sets   3" hold  2 x10    3" hold  2 x10  3" hold  2 x10   5" hold  2 x10   5" hold  2x10    5" hold  2 x10         SAQ   2 x10   3" hold  2 x10  3" hold  2 x10   3" hold  2 x10   3" hold  2x10             Tail gaiting             5'  3'  HEP       Hamstring stretch in long sit with strap    3 x30"   3x30"   step  3 x30"     manual  3 x30"         Prone hamstring curls          Heel slides in long sit on towel  x20      Heel slides  10 x10"   heel slides  10 x10"                    HEP:                  Seated hamstring stretch         HEP reviewed          Heel Prop              HEP reviewed          Seated calf stretch with towel              HEP reviewed           L knee flexion AAROM with contralateral LE              HEP reviewed                Modalities                        CP (post tx) NMES                                                  HEP: AAROM knee flexion with contralateral limb,tailgaiting, quad sets, heel prop in sitting, heel slides

## 2019-10-23 ENCOUNTER — APPOINTMENT (OUTPATIENT)
Dept: PHYSICAL THERAPY | Facility: CLINIC | Age: 69
End: 2019-10-23
Payer: MEDICARE

## 2019-10-25 ENCOUNTER — OFFICE VISIT (OUTPATIENT)
Dept: PHYSICAL THERAPY | Facility: CLINIC | Age: 69
End: 2019-10-25
Payer: MEDICARE

## 2019-10-25 DIAGNOSIS — Z96.652 S/P TKR (TOTAL KNEE REPLACEMENT), LEFT: Primary | ICD-10-CM

## 2019-10-25 PROCEDURE — 97140 MANUAL THERAPY 1/> REGIONS: CPT | Performed by: PHYSICAL THERAPIST

## 2019-10-25 PROCEDURE — 97110 THERAPEUTIC EXERCISES: CPT | Performed by: PHYSICAL THERAPIST

## 2019-10-25 NOTE — PROGRESS NOTES
Daily Note     Today's date: 10/25/2019  Patient name: Janette Willoughby  : [birth year]  MRN: [id number]  Referring provider: Sharona Cesar MD  Dx: No diagnosis found  Subjective: Pt reports that her knee feels a bit stiff but not bad overall  She notes that she has low back pain that has been the main thing bothering her  Objective: See treatment diary below      Assessment:Overall the pt continues to respond well to gentle passive and active ROM exercises  She performs TE cautiously as she has high levels of fear with pain, however, she continues to end the session with improved motion into both flexion and extension of the knee  She would benefit from continued skilled PT to progress her ROM and to reach her maximum level of functioning  Plan: Continue per plan of care  Progress treatment as tolerated         Precautions: High Blood Pressure, Asthma, Arthritis, Anxiety/Depression, ADHD, Atrial Fibrillation, Breast Cancer, GERD, hypothyroid, kidney Disease    EPOC: 11/15/19    Daily Treatment Diary      Manual    9/11   9/13   9/18   9/27   10/11  10/14   10/18  10/25     L knee PROM   15'   15'   6'   10'   13'  8'   8' 8'     STM Knee   8'   8'   IASTM  6'   IASTM  10'   Tiger Tail  10'  IASTM  5'                                                           23'   12'   20'   23'  13'               Exercise Diary    9/11   9/13   9/18   9/27   10/11  10/14   10/18  10/25     Calf stretch at block   Long sit with strap  3 x30"  Long sit with strap  3 x30   at block  3 x30"   at block 3x30"   at block  3 x30"  block  3 x30"    NP     Standing hamstring stretch on step        3 x30"    Knee flexion stretch on step      heel slides with strap  2 x10, 10" hold          Heel slides  w/strap    10" hold  2x10   Step  10 x10"   step  10x10"   step  10 x10"  step  10 x10"    step 10 x10"     HR/TR   HR  x20   HR only  2x10   HR  x20   HR  x20   HR  x20  HR  x20    HR  x20     Quad stretch in standing with toes on 8" step          3 x30"  3 x30"    3 x30"                           Quad Sets    3" hold  2 x10  3" hold  2 x10   5" hold  2 x10   5" hold  2x10    5" hold  2 x10    5" hold  x20     Tail gaiting           5'  3'  3'  HEP     Hamstring stretch in long sit with strap  3 x30"   3x30"   step  3 x30"     manual  3 x30"    3 x30"     Prone hamstring curls        Heel slides in long sit on towel  x20      Heel slides  10 x10"   heel slides  10 x10"  heel slides  10 x10"                 HEP:                 Seated hamstring stretch        HEP reviewed          Heel Prop            HEP reviewed          Seated calf stretch with towel            HEP reviewed           L knee flexion AAROM with contralateral LE            HEP reviewed                Modalities                        CP (post tx)                        NMES                                                  HEP: AAROM knee flexion with contralateral limb,tailgaiting, quad sets, heel prop in sitting, heel slides

## 2019-10-30 ENCOUNTER — OFFICE VISIT (OUTPATIENT)
Dept: PHYSICAL THERAPY | Facility: CLINIC | Age: 69
End: 2019-10-30
Payer: MEDICARE

## 2019-10-30 DIAGNOSIS — Z96.652 S/P TKR (TOTAL KNEE REPLACEMENT), LEFT: Primary | ICD-10-CM

## 2019-10-30 PROCEDURE — 97110 THERAPEUTIC EXERCISES: CPT | Performed by: PHYSICAL THERAPIST

## 2019-10-30 PROCEDURE — 97140 MANUAL THERAPY 1/> REGIONS: CPT | Performed by: PHYSICAL THERAPIST

## 2019-10-30 NOTE — PROGRESS NOTES
Daily Note     Today's date: 10/30/2019  Patient name: Dario Sanabria  :   MRN: [id number]  Referring provider: Mihaela Valdez MD  Dx:   Encounter Diagnosis     ICD-10-CM    1  S/P TKR (total knee replacement), left [id number]                   Subjective: Pt reports that her knee is feeling stiff this morning  Objective: See treatment diary below      Assessment: Tolerated treatment well  She is making slow but steady progress with L knee mobility and ROM  She responded well to a semi-cespedes position today during PROM and as a result was able to get more of a stretch into flexion on the knee  She still displays hypersensitivity over the most superior aspect of her surgical incision, but it decreases with IASTM  She would benefit from skilled PT to make further progress with ROM and mobility  Plan: Continue to focus on knee ROM and mobility        Precautions: High Blood Pressure, Asthma, Arthritis, Anxiety/Depression, ADHD, Atrial Fibrillation, Breast Cancer, GERD, hypothyroid, kidney Disease    EPOC: 11/15/19    Daily Treatment Diary      Manual    10/11  10/14   10/18   10/25  10/30       L knee PROM   13'  8'   8' 8' 10'       STM Knee   Tiger Tail  10'  IASTM  5'      IASTM  5'        AP tib/fib grade III mobilization           5'                             23'  13'      20'             Exercise Diary    10/11  10/14   10/18   10/25  10/30       Calf stretch at block   at block  3 x30"  block  3 x30"     NP  seated with stap       Standing hamstring stretch on step    3 x30" 3 x30"       Knee flexion stretch on step   step  10 x10"  step  10 x10"     step 10 x10"  step  10 x10"       Mini squats       3 x10       Quad stretch in standing with toes on 8" step  3 x30"  3 x30"     3 x30"  3 x30"                         Quad Sets    5" hold  2 x10     5" hold  x20   5" hold  x20       Tail gaiting   5'  3'   3'  HEP  --       Hamstring stretch in long sit with strap   manual  3 x30"     3 x30"  3 x30"                   HEP:                 Seated hamstring stretch    HEP reviewed              Heel Prop    HEP reviewed              Seated calf stretch with towel    HEP reviewed               L knee flexion AAROM with contralateral LE    HEP reviewed                    Modalities                        CP (post tx)                        NMES                                                  HEP: AAROM knee flexion with contralateral limb,tailgaiting, quad sets, heel prop in sitting, heel slides

## 2019-10-31 ENCOUNTER — LAB (OUTPATIENT)
Dept: LAB | Facility: HOSPITAL | Age: 69
End: 2019-10-31
Attending: INTERNAL MEDICINE
Payer: MEDICARE

## 2019-10-31 DIAGNOSIS — K92.1 MELENA: ICD-10-CM

## 2019-10-31 LAB
BASOPHILS # BLD AUTO: 0.01 THOUSANDS/ΜL (ref 0–0.1)
BASOPHILS NFR BLD AUTO: 0 % (ref 0–1)
EOSINOPHIL # BLD AUTO: 0.12 THOUSAND/ΜL (ref 0–0.61)
EOSINOPHIL NFR BLD AUTO: 3 % (ref 0–6)
ERYTHROCYTE [DISTWIDTH] IN BLOOD BY AUTOMATED COUNT: 14.7 % (ref 11.6–15.1)
HCT VFR BLD AUTO: 23.9 % (ref 34.8–46.1)
HGB BLD-MCNC: 7.7 G/DL (ref 11.5–15.4)
IMM GRANULOCYTES # BLD AUTO: 0.01 THOUSAND/UL (ref 0–0.2)
IMM GRANULOCYTES NFR BLD AUTO: 0 % (ref 0–2)
LYMPHOCYTES # BLD AUTO: 0.7 THOUSANDS/ΜL (ref 0.6–4.47)
LYMPHOCYTES NFR BLD AUTO: 19 % (ref 14–44)
MCH RBC QN AUTO: 34.1 PG (ref 26.8–34.3)
MCHC RBC AUTO-ENTMCNC: 32.2 G/DL (ref 31.4–37.4)
MCV RBC AUTO: 106 FL (ref 82–98)
MONOCYTES # BLD AUTO: 0.38 THOUSAND/ΜL (ref 0.17–1.22)
MONOCYTES NFR BLD AUTO: 10 % (ref 4–12)
NEUTROPHILS # BLD AUTO: 2.56 THOUSANDS/ΜL (ref 1.85–7.62)
NEUTS SEG NFR BLD AUTO: 68 % (ref 43–75)
NRBC BLD AUTO-RTO: 0 /100 WBCS
PLATELET # BLD AUTO: 218 THOUSANDS/UL (ref 149–390)
PMV BLD AUTO: 10 FL (ref 8.9–12.7)
RBC # BLD AUTO: 2.26 MILLION/UL (ref 3.81–5.12)
WBC # BLD AUTO: 3.78 THOUSAND/UL (ref 4.31–10.16)

## 2019-10-31 PROCEDURE — 36415 COLL VENOUS BLD VENIPUNCTURE: CPT

## 2019-10-31 PROCEDURE — 85025 COMPLETE CBC W/AUTO DIFF WBC: CPT

## 2019-10-31 NOTE — RESULT ENCOUNTER NOTE
I spoke with the patient  Hemoglobin is down to 7 7  She has had no further melena today or yesterday  ** needs an EGD with APC at either hospital next week important to schedule  I told her if she feels weaker has more melena she should proceed ER in the interim    Thanks

## 2019-10-31 NOTE — TELEPHONE ENCOUNTER
I spoke with patient  Having some black stool which she has had in the past   Please arrange for a CBC to be done today or tomorrow  She needs an office visit as well  Perhaps the beginning of the week of November 8th  I am away next week  The patient understands that if her blood count is very low she may need quicker attention    Thanks

## 2019-10-31 NOTE — TELEPHONE ENCOUNTER
Pt left  mss asking if she could spk w/ Dr Aidan Posadas about a couple things that are going on; asks for him to call her at [phone]

## 2019-10-31 NOTE — TELEPHONE ENCOUNTER
Patient advised CBC ordered  She will go for testing  FYI - I have been leaving messages for patient to call back to schedule capsule previously ordered by [place]  Would that be helpful at this time or should we wait?

## 2019-11-01 ENCOUNTER — TELEPHONE (OUTPATIENT)
Dept: GASTROENTEROLOGY | Facility: CLINIC | Age: 69
End: 2019-11-01

## 2019-11-01 ENCOUNTER — APPOINTMENT (OUTPATIENT)
Dept: PHYSICAL THERAPY | Facility: CLINIC | Age: 69
End: 2019-11-01
Payer: MEDICARE

## 2019-11-01 RX ORDER — FUROSEMIDE 20 MG/1
TABLET ORAL
COMMUNITY
Start: 2019-08-27 | End: 2019-11-01 | Stop reason: SDUPTHER

## 2019-11-01 RX ORDER — CALCIUM CARBONATE 200(500)MG
1 TABLET,CHEWABLE ORAL DAILY
COMMUNITY
End: 2019-11-01 | Stop reason: SDUPTHER

## 2019-11-01 RX ORDER — ONDANSETRON HYDROCHLORIDE 8 MG/1
8 TABLET, FILM COATED ORAL
COMMUNITY
Start: 2019-10-17

## 2019-11-01 RX ORDER — METOPROLOL SUCCINATE 25 MG
25 TABLET, EXTENDED RELEASE 24 HR ORAL DAILY
COMMUNITY
Start: 2019-10-24 | End: 2020-06-05 | Stop reason: HOSPADM

## 2019-11-01 RX ORDER — HYDROXYCHLOROQUINE SULFATE 200 MG/1
200 TABLET, FILM COATED ORAL 2 TIMES DAILY WITH MEALS
COMMUNITY

## 2019-11-01 NOTE — TELEPHONE ENCOUNTER
Pt called back and did reschedule eye appointment and is able to schedule 11/5 [facility] egd with apc with Dr Larry Vila completed phone prep, paperwork given to her  Josi aware of add on/faxed res sheet to [facility]  FYI to Dr Angelo Tavera so he is aware of this

## 2019-11-01 NOTE — TELEPHONE ENCOUNTER
EGD Phone prep completed  EGD at [facility] with YEE Tuesday 11/5  Pt verbalized understanding, NPO after midnight, all important meds taken night before  Meds and allergies reviewed      WT 129lb HT 61"   BMI 24 4

## 2019-11-01 NOTE — TELEPHONE ENCOUNTER
Copied from result note:  Notes recorded by Tanner Lares MD on 10/31/2019 at 5:34 PM EDT  I spoke with the patient  Ursula Jenkins is down to 7 7   She has had no further melena today or yesterday  ** needs an EGD with APC at either hospital next week important to schedule   I told her if she feels weaker has more melena she should proceed ER in the interim   Thanks    ---  I called patient and offered Tuesday, Nov 6th at [facility] with Dr Karolina Caicedo  She is going to call me back after checking her schedule as she was just waking up  I have tentatively booked her for this

## 2019-11-01 NOTE — TELEPHONE ENCOUNTER
I did not hear from patient yet- so I called her again to ask for an update if she was able to schedule for Tuesday  Patient is very reluctant to do so, as she states she is very busy next week with appointments for eye doctor, dentist, fridge repair  I explained we are only there certain days at the hospital and that Dr Jamee Rutherford stressed the importance of having this done for low hemoglobin, even advising ER if she feels poorly  I suggested to patient to call her eye doctor office and see if she can reschedule this so it would allow her to have the EGD performed  She was resistant to this and said she didn't want to have to wait 3 months to get back in their office  I have asked her to call me back after doing so and let me know    I did try strongly encouraging her that this is probably more of a priority to have done

## 2019-11-08 ENCOUNTER — APPOINTMENT (OUTPATIENT)
Dept: PHYSICAL THERAPY | Facility: CLINIC | Age: 69
End: 2019-11-08
Payer: MEDICARE

## 2019-11-13 ENCOUNTER — OFFICE VISIT (OUTPATIENT)
Dept: PHYSICAL THERAPY | Facility: CLINIC | Age: 69
End: 2019-11-13
Payer: MEDICARE

## 2019-11-13 DIAGNOSIS — Z96.652 S/P TKR (TOTAL KNEE REPLACEMENT), LEFT: Primary | ICD-10-CM

## 2019-11-13 PROCEDURE — 97110 THERAPEUTIC EXERCISES: CPT | Performed by: PHYSICAL THERAPIST

## 2019-11-13 PROCEDURE — 97140 MANUAL THERAPY 1/> REGIONS: CPT | Performed by: PHYSICAL THERAPIST

## 2019-11-13 NOTE — PROGRESS NOTES
Daily Note     Today's date: 2019  Patient name: Tomeka Hoffman  :   MRN: [id number]  Referring provider: Alee Alegria MD  Dx:   Encounter Diagnosis     ICD-10-CM    1  S/P TKR (total knee replacement), left [id number]                   Subjective: Pt reports that she no longer uses her SPC at home, just when she goes out into the community  She says that her L knee overall is feeling pretty good, she feels ready to make this her last week of PT  Objective: See treatment diary below      Assessment: Tolerated treatment well  Patient continues to make progress with her L knee ROM and activity tolerance  She is also displaying an improved gait pattern with decreased forward trunk flexion, increased time spent on L LE and decreased knee flexion during L stance phase  She is ready to be prepared for discharge at her next visit  Plan: Potential discharge next visit       Precautions: High Blood Pressure, Asthma, Arthritis, Anxiety/Depression, ADHD, Atrial Fibrillation, Breast Cancer, GERD, hypothyroid, kidney Disease    EPOC: 11/15/19    Daily Treatment Diary      Manual    10/11  10/14   10/18   10/25   10/30 11/13      L knee PROM   13'  8'   8' 8' 10' 8'      STM Knee   Tiger Tail  10'  IASTM  5'       IASTM  5' IASTM'  8'       AP tib/fib grade III mobilization           5' 8'                            23'  13'       20' 24'            Exercise Diary    10/11  10/14   10/18   10/25   10/30 11/13      Calf stretch at block   at block  3 x30"  block  3 x30"     NP   seated with stap Block  3 x30"      Standing hamstring stretch on step    3 x30" 3 x30" 3 x30"      Knee flexion stretch on step   step  10 x10"  step  10 x10"     step 10 x10"   step  10 x10" 10 x10"      Mini squats       3 x10 3 x10      Quad stretch in standing with toes on 8" step  3 x30"  3 x30"     3 x30"   3 x30" 3 x30"                        Quad Sets    5" hold  2 x10     5" hold  x20   5" hold  x20 5" hold  x20 Tail gaiting   5'  3'   3'  HEP   --       Hamstring stretch in long sit with strap   manual  3 x30"     3 x30"   3 x30" 3 x30"      Heel slides with strap      5" hold  x20      HEP:                 Seated hamstring stretch    HEP reviewed              Heel Prop    HEP reviewed              Seated calf stretch with towel    HEP reviewed               L knee flexion AAROM with contralateral LE    HEP reviewed                    Modalities                        CP (post tx)                        NMES                                                  HEP: AAROM knee flexion with contralateral limb,tailgaiting, quad sets, heel prop in sitting, heel slides

## 2019-11-15 ENCOUNTER — EVALUATION (OUTPATIENT)
Dept: PHYSICAL THERAPY | Facility: CLINIC | Age: 69
End: 2019-11-15
Payer: MEDICARE

## 2019-11-15 DIAGNOSIS — Z96.652 S/P TKR (TOTAL KNEE REPLACEMENT), LEFT: Primary | ICD-10-CM

## 2019-11-15 PROCEDURE — 97140 MANUAL THERAPY 1/> REGIONS: CPT | Performed by: PHYSICAL THERAPIST

## 2019-11-15 PROCEDURE — 97110 THERAPEUTIC EXERCISES: CPT | Performed by: PHYSICAL THERAPIST

## 2019-11-15 NOTE — PROGRESS NOTES
PT Discharge Summary     Today's date: 11/15/2019  Patient name: Jina Adan  : [id number]  MRN: [id number]  Referring provider: Mook Crystal MD  Dx:   Encounter Diagnosis     ICD-10-CM    1  S/P TKR (total knee replacement), left [id number]                   Assessment  Assessment details: Pt is a 68year old female who has been attending outpatient Physical Therapy s/p L TKR  Ermelinda Beams has made progress with PT and at this time verbalizes feeling ready to discharge  She has met all of her personal functional goals, and has met almost all PT set goals  She will continue to progress her ROM at home independently with her HEP  Please feel free to call with any questions or changes in status  Thank you  Barriers to therapy: None    Goals  ST  The patient will be fully compliant with HEP within two weeks-met     2  Pt will report a decrease in pain by at least two points on VAS within three weeks- met   3  Pt will demonstrate an increase in L knee extension AROM by at least 10 degrees within three weeks-met  4  Pt will demonstrate L knee flexion AROM of at least 90 degrees or more within three weeks-met    LT  The patient will increase FOTO score to 56 within 8 weeks-met  2  The patient will demonstrate full left knee extension during the stance phase of gait within 8 weeks-partially met   3  Pt will ambulate without the use of any assistive device within 8 weeks-met   4  Pt will tolerate walking up to 45 minutes within 8 weeks-met   5  Pt will demonstrate left knee extension to at least 5 degrees within four weeks-met  6  The pt will demonstrate left knee flexion to at least 110 degrees within four weeks-partially met       Plan  Plan details: Discharge   Treatment plan discussed with: patient        Subjective Evaluation    History of Present Illness  Date of surgery: 2019  Mechanism of injury: Pt had a TKR on , where she returned home after one night in the hospital  She subsequently suffered from an infection and was treated with antimitotics  She had home care for two or three weeks after her surgery  Her doctor told her that he would like to see her do more leg raises and achieve full flexion, as she is currently "too stiff "    Re-evaluation 10/17: Pt reports that she is getting around well now  She still has pain but was given CBD oil by her MD  She is not taking as much Tramadol or Tylenol anymore  Her MD told her that it is up to her and her PT about how much longer to come to therapy  11/15: Pt reports that overall her L knee feels a lot better  She has been using CBD oil on it, but not today as it was feeling so good  She notes that she is ready to totally stop using her cane for all community ambulation, as she not feels like it gets in her way more than it helps her     Pain  Current pain rating: 3  At best pain ratin  At worst pain ratin  Location: Medial aspect of L knee  Quality: dull ache  Relieving factors: rest  Aggravating factors: standing and walking    Social Support  Steps to enter house: yes  Stairs in house: yes     Employment status: working  Treatments  Previous treatment: home therapy  Patient Goals  Patient goal: get the thigh stronger, bend the knee further, to improve walking        Objective     Active Range of Motion   Left Knee   Flexion: 100 degrees   Extensor la degrees     Passive Range of Motion   Left Knee   Flexion: 101 degrees   Extension: 5 degrees     Strength/Myotome Testing     Left Hip   Planes of Motion   Flexion: 3+    Right Hip   Planes of Motion   Flexion: 4+    Left Knee   Flexion: 4  Extension: 4+    Right Knee   Flexion: 4+  Extension: 4+    Left Ankle/Foot   Dorsiflexion: 4+    Right Ankle/Foot   Dorsiflexion: 5    Swelling     Left Knee Girth Measurement (cm)   Joint line: 38 cm    Right Knee Girth Measurement (cm)   Joint line: 38 cm    Ambulation     Comments   Pt ambulates with a right sided SPC with significant forward trunk flexion, decreased stride length, lacking full extension of the L LE during stance phase                Precautions: High Blood Pressure, Asthma, Arthritis, Anxiety/Depression, ADHD, Atrial Fibrillation, Breast Cancer, GERD, hypothyroid, kidney Disease    EPOC: 11/15/19      Daily Treatment Diary      Manual    10/11  10/14   10/18   10/25   10/30 11/13 11/15     L knee PROM   13'  8'   8' 8' 10' 8' 10'     STM Knee   T[place]  10'  IASTM  5'       IASTM  5' IASTM'  8'       AP tib/fib grade III mobilization           5' 8'                            23'  13'       20' 24' 10'           Exercise Diary    10/11  10/14   10/18   10/25   10/30 11/13 11/15     Calf stretch at block   at block  3 x30"  block  3 x30"     NP   seated with stap Block  3 x30" Block  3 x30"     Standing hamstring stretch on step    3 x30" 3 x30" 3 x30" 3 x30"     Knee flexion stretch on step   step  10 x10"  step  10 x10"     step 10 x10"   step  10 x10" 10 x10" 10 x10"     Mini squats       3 x10 3 x10 3 x10     Quad stretch in standing with toes on 8" step  3 x30"  3 x30"     3 x30"   3 x30" 3 x30"                        Quad Sets    5" hold  2 x10     5" hold  x20   5" hold  x20 5" hold  x20      Tail gaiting   5'  3'   3'  HEP   --       Hamstring stretch in long sit with strap   manual  3 x30"     3 x30"   3 x30" 3 x30"      Heel slides with strap      5" hold  x20      HEP:                 Seated hamstring stretch    HEP reviewed              Heel Prop    HEP reviewed              Seated calf stretch with towel    HEP reviewed               L knee flexion AAROM with contralateral LE    HEP reviewed                    Modalities                        CP (post tx)                        NMES                                                  HEP: AAROM knee flexion with contralateral limb,tailgaiting, quad sets, heel prop in sitting, heel slides

## 2019-11-21 ENCOUNTER — TELEPHONE (OUTPATIENT)
Dept: GASTROENTEROLOGY | Facility: CLINIC | Age: 69
End: 2019-11-21

## 2019-11-21 NOTE — TELEPHONE ENCOUNTER
Capsule ordered in July, then postponed until October  In October patient had melena and Hgb 7 _ and had EGD performed  Does patient still need capsule, office f/u and/or CBC  Last CBC was 10/31/19 according to our records

## 2019-11-25 NOTE — TELEPHONE ENCOUNTER
Tell patient she should have a capsule study -   Had egd and colon this spring- if declines - discuss with Gen Chappell at next vist in December

## 2019-11-27 NOTE — TELEPHONE ENCOUNTER
Spoke with patient  States she went to [facility] this week and her labs showed hemoglobin 8 3  Patient needs to have cornea surgery  States Dr Muriel Singer did EGD and "everything was fine"  I stated that the capsule also shows the small intestine which sometimes is where the cause of the bleeding is found  She does not wish to proceed with capsule

## 2019-12-12 ENCOUNTER — OFFICE VISIT (OUTPATIENT)
Dept: FAMILY MEDICINE CLINIC | Facility: HOSPITAL | Age: 69
End: 2019-12-12
Payer: MEDICARE

## 2019-12-12 VITALS
HEIGHT: 61 IN | SYSTOLIC BLOOD PRESSURE: 96 MMHG | HEART RATE: 73 BPM | WEIGHT: 132 LBS | DIASTOLIC BLOOD PRESSURE: 54 MMHG | BODY MASS INDEX: 24.92 KG/M2

## 2019-12-12 DIAGNOSIS — C49.A9 MALIGNANT GASTROINTESTINAL STROMAL TUMOR (GIST) OF OTHER SITE (HCC): ICD-10-CM

## 2019-12-12 DIAGNOSIS — D64.89 ANEMIA DUE TO OTHER CAUSE, NOT CLASSIFIED: ICD-10-CM

## 2019-12-12 DIAGNOSIS — I45.10 RIGHT BUNDLE-BRANCH BLOCK: ICD-10-CM

## 2019-12-12 DIAGNOSIS — Z01.818 PRE-OP EXAMINATION: Primary | ICD-10-CM

## 2019-12-12 DIAGNOSIS — I10 HYPERTENSION, UNSPECIFIED TYPE: ICD-10-CM

## 2019-12-12 DIAGNOSIS — I48.0 PAROXYSMAL ATRIAL FIBRILLATION (HCC): ICD-10-CM

## 2019-12-12 PROBLEM — C78.7 HEPATIC METASTASES (HCC): Status: RESOLVED | Noted: 2019-08-28 | Resolved: 2019-12-12

## 2019-12-12 PROCEDURE — 99214 OFFICE O/P EST MOD 30 MIN: CPT | Performed by: INTERNAL MEDICINE

## 2019-12-12 RX ORDER — OFLOXACIN 3 MG/ML
SOLUTION/ DROPS OPHTHALMIC
Refills: 0 | COMMUNITY
Start: 2020-04-07 | End: 2021-08-11

## 2019-12-12 RX ORDER — PREDNISOLONE ACETATE 10 MG/ML
SUSPENSION/ DROPS OPHTHALMIC
Refills: 0 | Status: ON HOLD | COMMUNITY
Start: 2020-04-07 | End: 2021-08-18

## 2019-12-12 RX ORDER — DIPHENOXYLATE HYDROCHLORIDE AND ATROPINE SULFATE 2.5; .025 MG/1; MG/1
TABLET ORAL
Refills: 0 | COMMUNITY
Start: 2019-11-25 | End: 2020-06-11 | Stop reason: SDUPTHER

## 2019-12-12 NOTE — ASSESSMENT & PLAN NOTE
anemia due to GI blood loss  No recent labs for review  Last Hg documented for me was 7 7  Capsule endoscopy has been recommended to patient  To date, she has not complied with this  Patient states that more recent labs done at [facility] documented Hg of 8 3

## 2019-12-12 NOTE — PROGRESS NOTES
Subjective:   Chief Complaint   Patient presents with    Pre-op Exam     left eye surgery 12/14/2019 Dr Naren Doyle @ [address] [place] [address]  Patient ID: Chalo Gillette is a 68 y o  female  Presents for pre-op clearance  Has some significant medical problems  She is followed by oncology at [facility] and is on a medication trial      Describes chronic issues with diarrhea  Has not had nay blood in BMs  The following portions of the patient's history were reviewed and updated as appropriate: allergies, current medications, past family history, past medical history, past social history, past surgical history and problem list     Review of Systems   Constitutional: Negative for fatigue and fever  HENT: Negative for hearing loss  Eyes: Negative for visual disturbance  Respiratory: Negative for cough, chest tightness, shortness of breath and wheezing  Cardiovascular: Negative for chest pain, palpitations and leg swelling  Gastrointestinal: Negative for abdominal pain, diarrhea and nausea  Genitourinary: Negative for dysuria and hematuria  Musculoskeletal: Negative for arthralgias  Neurological: Negative for dizziness, numbness and headaches  Psychiatric/Behavioral: Negative for confusion and dysphoric mood  All other systems reviewed and are negative          Current Outpatient Medications on File Prior to Visit   Medication Sig Dispense Refill    ALPRAZolam (XANAX) 0 25 mg tablet Take by mouth daily at bedtime as needed       Azelastine HCl 137 MCG/SPRAY SOLN instill 2 sprays into each nostril twice a day if needed for congestion  0    bismuth subsalicylate (PEPTO BISMOL) 524 mg/30 mL oral suspension Take 15 mL by mouth every 6 (six) hours as needed      calcium carbonate (TUMS) 500 mg chewable tablet Chew 1 tablet daily as needed for indigestion or heartburn      cetirizine (ZyrTEC) 10 mg tablet Take 10 mg by mouth daily      diphenoxylate-atropine (LOMOTIL) 2 5-0 025 mg per tablet TAKE 1 TABLET BY MOUTH 4 TIMES DAILY AS NEEDED FOR DIARRHEA  0    docusate sodium (COLACE) 100 mg capsule Take 100 mg by mouth 2 (two) times a day as needed for constipation      furosemide (LASIX) 20 mg tablet Take 20 mg by mouth daily       hydroxychloroquine (PLAQUENIL) 200 mg tablet Take 200 mg by mouth 2 (two) times a day with meals      loperamide (IMODIUM) 2 mg capsule Take 2 capsules by mouth 4 (four) times a day as needed      metoprolol tartrate (LOPRESSOR) 25 mg tablet Take 25 mg by mouth every evening      ofloxacin (OCUFLOX) 0 3 % ophthalmic solution START 3 DAYS PRE-OP: 1 DROP INTO LEFT EYE 4 TIMES DAILY; CONTINUE POST OP as directed  0    ondansetron (ZOFRAN) 8 mg tablet take 1 tablet by mouth every 8 hours if needed for nausea      pantoprazole (PROTONIX) 40 mg tablet Take 40 mg by mouth daily      prednisoLONE acetate (PRED FORTE) 1 % ophthalmic suspension POST OP: 1 DROP INTO LEFT EYE 4 TIMES DAILY; CONTINUE AS DIRECTED  0    TOPROL XL 25 MG 24 hr tablet       aspirin (ESTELA ASPIRIN) 325 mg tablet Take 325 mg by mouth      [DISCONTINUED] mometasone (NASONEX) 50 mcg/act nasal spray 2 sprays into each nostril daily      [DISCONTINUED] Multiple Vitamins-Minerals (CENTRUM SILVER 50+MEN PO) Take by mouth daily       No current facility-administered medications on file prior to visit  Objective:  Vitals:    12/12/19 1409   BP: 96/54   Pulse: 73   Weight: 59 9 kg (132 lb)   Height: 5' 1" (1 549 m)      Physical Exam   Constitutional: She is oriented to person, place, and time  She appears well-developed and well-nourished  Eyes: Pupils are equal, round, and reactive to light  Neck: Normal range of motion  Neck supple  No thyromegaly present  Cardiovascular: Normal rate, regular rhythm, normal heart sounds and intact distal pulses  No murmur heard  Pulmonary/Chest: Effort normal and breath sounds normal  She has no wheezes  She has no rales  Abdominal: Soft  Bowel sounds are normal  There is no tenderness  Musculoskeletal: Normal range of motion  She exhibits no edema, tenderness or deformity  Lymphadenopathy:     She has no cervical adenopathy  Neurological: She is alert and oriented to person, place, and time  She has normal reflexes  Skin: Skin is warm and dry  Psychiatric: She has a normal mood and affect  Nursing note and vitals reviewed  Assessment/Plan:    Anemia  anemia due to GI blood loss  No recent labs for review  Last Hg documented for me was 7 7  Capsule endoscopy has been recommended to patient  To date, she has not complied with this  Patient states that more recent labs done at [facility] documented Hg of 8 3  Hypertension  Stable on her current regimen    Paroxysmal atrial fibrillation (HCC)  Currently in sinus rhythm  No anticoagulation ordered due to chronic GI bleeding  Right bundle-branch block  Chronic and stable on EKG       Diagnoses and all orders for this visit:    Pre-op examination    Anemia due to other cause, not classified    Hypertension, unspecified type    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 [facility])    Right bundle-branch block    Paroxysmal atrial fibrillation (HCC)    Other orders  -     diphenoxylate-atropine (LOMOTIL) 2 5-0 025 mg per tablet; TAKE 1 TABLET BY MOUTH 4 TIMES DAILY AS NEEDED FOR DIARRHEA  -     ofloxacin (OCUFLOX) 0 3 % ophthalmic solution; START 3 DAYS PRE-OP: 1 DROP INTO LEFT EYE 4 TIMES DAILY; CONTINUE POST OP as directed  -     prednisoLONE acetate (PRED FORTE) 1 % ophthalmic suspension; POST OP: 1 DROP INTO LEFT EYE 4 TIMES DAILY; CONTINUE AS DIRECTED      Patient is cleared for the procedure  Chronic medical problems are stable at present  She has no cardiac symptoms

## 2020-01-05 ENCOUNTER — HOSPITAL ENCOUNTER (INPATIENT)
Facility: HOSPITAL | Age: 70
LOS: 2 days | Discharge: HOME | DRG: 392 | End: 2020-01-07
Attending: EMERGENCY MEDICINE | Admitting: INTERNAL MEDICINE
Payer: COMMERCIAL

## 2020-01-05 ENCOUNTER — APPOINTMENT (EMERGENCY)
Dept: RADIOLOGY | Facility: HOSPITAL | Age: 70
DRG: 392 | End: 2020-01-05
Payer: COMMERCIAL

## 2020-01-05 ENCOUNTER — APPOINTMENT (EMERGENCY)
Dept: CT IMAGING | Facility: HOSPITAL | Age: 70
DRG: 392 | End: 2020-01-05
Payer: COMMERCIAL

## 2020-01-05 DIAGNOSIS — D64.9 CHRONIC ANEMIA: ICD-10-CM

## 2020-01-05 DIAGNOSIS — K52.9 ENTEROCOLITIS: Primary | ICD-10-CM

## 2020-01-05 DIAGNOSIS — C49.A9 MALIGNANT GASTROINTESTINAL STROMAL TUMOR (GIST) OF OTHER SITE (HCC): ICD-10-CM

## 2020-01-05 DIAGNOSIS — R11.2 NAUSEA & VOMITING: ICD-10-CM

## 2020-01-05 DIAGNOSIS — N18.9 CKD (CHRONIC KIDNEY DISEASE): ICD-10-CM

## 2020-01-05 PROBLEM — N18.30 CKD (CHRONIC KIDNEY DISEASE) STAGE 3, GFR 30-59 ML/MIN (HCC): Status: ACTIVE | Noted: 2020-01-05

## 2020-01-05 PROBLEM — R73.9 HYPERGLYCEMIA: Status: ACTIVE | Noted: 2020-01-05

## 2020-01-05 LAB
ALBUMIN SERPL BCP-MCNC: 3.8 G/DL (ref 3.5–5)
ALP SERPL-CCNC: 66 U/L (ref 46–116)
ALT SERPL W P-5'-P-CCNC: 34 U/L (ref 12–78)
ANION GAP SERPL CALCULATED.3IONS-SCNC: 16 MMOL/L (ref 4–13)
APTT PPP: 24 SECONDS (ref 23–37)
AST SERPL W P-5'-P-CCNC: 61 U/L (ref 5–45)
ATRIAL RATE: 82 BPM
BASOPHILS # BLD AUTO: 0.01 THOUSANDS/ΜL (ref 0–0.1)
BASOPHILS NFR BLD AUTO: 0 % (ref 0–1)
BILIRUB SERPL-MCNC: 0.4 MG/DL (ref 0.2–1)
BUN SERPL-MCNC: 20 MG/DL (ref 5–25)
CALCIUM SERPL-MCNC: 8.5 MG/DL (ref 8.3–10.1)
CHLORIDE SERPL-SCNC: 103 MMOL/L (ref 100–108)
CO2 SERPL-SCNC: 24 MMOL/L (ref 21–32)
CREAT SERPL-MCNC: 1.61 MG/DL (ref 0.6–1.3)
EOSINOPHIL # BLD AUTO: 0.04 THOUSAND/ΜL (ref 0–0.61)
EOSINOPHIL NFR BLD AUTO: 2 % (ref 0–6)
ERYTHROCYTE [DISTWIDTH] IN BLOOD BY AUTOMATED COUNT: 14.5 % (ref 11.6–15.1)
GFR SERPL CREATININE-BSD FRML MDRD: 32 ML/MIN/1.73SQ M
GLUCOSE SERPL-MCNC: 155 MG/DL (ref 65–140)
HCT VFR BLD AUTO: 27.1 % (ref 34.8–46.1)
HGB BLD-MCNC: 8.9 G/DL (ref 11.5–15.4)
IMM GRANULOCYTES # BLD AUTO: 0.01 THOUSAND/UL (ref 0–0.2)
IMM GRANULOCYTES NFR BLD AUTO: 0 % (ref 0–2)
INR PPP: 1.12 (ref 0.84–1.19)
LYMPHOCYTES # BLD AUTO: 0.32 THOUSANDS/ΜL (ref 0.6–4.47)
LYMPHOCYTES NFR BLD AUTO: 12 % (ref 14–44)
MCH RBC QN AUTO: 34.6 PG (ref 26.8–34.3)
MCHC RBC AUTO-ENTMCNC: 32.8 G/DL (ref 31.4–37.4)
MCV RBC AUTO: 105 FL (ref 82–98)
MONOCYTES # BLD AUTO: 0.07 THOUSAND/ΜL (ref 0.17–1.22)
MONOCYTES NFR BLD AUTO: 3 % (ref 4–12)
NEUTROPHILS # BLD AUTO: 2.25 THOUSANDS/ΜL (ref 1.85–7.62)
NEUTS SEG NFR BLD AUTO: 83 % (ref 43–75)
NRBC BLD AUTO-RTO: 0 /100 WBCS
P AXIS: 87 DEGREES
PLATELET # BLD AUTO: 172 THOUSANDS/UL (ref 149–390)
PMV BLD AUTO: 10.5 FL (ref 8.9–12.7)
POTASSIUM SERPL-SCNC: 3.5 MMOL/L (ref 3.5–5.3)
PR INTERVAL: 128 MS
PROT SERPL-MCNC: 6.8 G/DL (ref 6.4–8.2)
PROTHROMBIN TIME: 14.1 SECONDS (ref 11.6–14.5)
QRS AXIS: -42 DEGREES
QRSD INTERVAL: 132 MS
QT INTERVAL: 460 MS
QTC INTERVAL: 537 MS
RBC # BLD AUTO: 2.57 MILLION/UL (ref 3.81–5.12)
SODIUM SERPL-SCNC: 143 MMOL/L (ref 136–145)
T WAVE AXIS: 55 DEGREES
VENTRICULAR RATE: 82 BPM
WBC # BLD AUTO: 2.7 THOUSAND/UL (ref 4.31–10.16)

## 2020-01-05 PROCEDURE — 85730 THROMBOPLASTIN TIME PARTIAL: CPT | Performed by: EMERGENCY MEDICINE

## 2020-01-05 PROCEDURE — 74176 CT ABD & PELVIS W/O CONTRAST: CPT

## 2020-01-05 PROCEDURE — 99223 1ST HOSP IP/OBS HIGH 75: CPT | Performed by: INTERNAL MEDICINE

## 2020-01-05 PROCEDURE — 85610 PROTHROMBIN TIME: CPT | Performed by: EMERGENCY MEDICINE

## 2020-01-05 PROCEDURE — 96376 TX/PRO/DX INJ SAME DRUG ADON: CPT

## 2020-01-05 PROCEDURE — 93010 ELECTROCARDIOGRAM REPORT: CPT | Performed by: INTERNAL MEDICINE

## 2020-01-05 PROCEDURE — 93005 ELECTROCARDIOGRAM TRACING: CPT

## 2020-01-05 PROCEDURE — 85025 COMPLETE CBC W/AUTO DIFF WBC: CPT | Performed by: EMERGENCY MEDICINE

## 2020-01-05 PROCEDURE — 36415 COLL VENOUS BLD VENIPUNCTURE: CPT | Performed by: EMERGENCY MEDICINE

## 2020-01-05 PROCEDURE — 99222 1ST HOSP IP/OBS MODERATE 55: CPT | Performed by: INTERNAL MEDICINE

## 2020-01-05 PROCEDURE — 80053 COMPREHEN METABOLIC PANEL: CPT | Performed by: EMERGENCY MEDICINE

## 2020-01-05 PROCEDURE — 99285 EMERGENCY DEPT VISIT HI MDM: CPT

## 2020-01-05 PROCEDURE — 96374 THER/PROPH/DIAG INJ IV PUSH: CPT

## 2020-01-05 PROCEDURE — 96361 HYDRATE IV INFUSION ADD-ON: CPT

## 2020-01-05 PROCEDURE — 99285 EMERGENCY DEPT VISIT HI MDM: CPT | Performed by: EMERGENCY MEDICINE

## 2020-01-05 PROCEDURE — 71045 X-RAY EXAM CHEST 1 VIEW: CPT

## 2020-01-05 RX ORDER — PANTOPRAZOLE SODIUM 40 MG/1
40 TABLET, DELAYED RELEASE ORAL
Status: DISCONTINUED | OUTPATIENT
Start: 2020-01-05 | End: 2020-01-06

## 2020-01-05 RX ORDER — HEPARIN SODIUM 5000 [USP'U]/ML
5000 INJECTION, SOLUTION INTRAVENOUS; SUBCUTANEOUS EVERY 8 HOURS SCHEDULED
Status: DISCONTINUED | OUTPATIENT
Start: 2020-01-05 | End: 2020-01-07 | Stop reason: HOSPADM

## 2020-01-05 RX ORDER — ONDANSETRON 2 MG/ML
4 INJECTION INTRAMUSCULAR; INTRAVENOUS ONCE
Status: COMPLETED | OUTPATIENT
Start: 2020-01-05 | End: 2020-01-05

## 2020-01-05 RX ORDER — ACETAMINOPHEN 325 MG/1
650 TABLET ORAL EVERY 6 HOURS PRN
Status: DISCONTINUED | OUTPATIENT
Start: 2020-01-05 | End: 2020-01-07 | Stop reason: HOSPADM

## 2020-01-05 RX ORDER — METOPROLOL SUCCINATE 25 MG/1
25 TABLET, EXTENDED RELEASE ORAL DAILY
Status: DISCONTINUED | OUTPATIENT
Start: 2020-01-05 | End: 2020-01-07 | Stop reason: HOSPADM

## 2020-01-05 RX ORDER — ONDANSETRON 2 MG/ML
4 INJECTION INTRAMUSCULAR; INTRAVENOUS EVERY 6 HOURS PRN
Status: DISCONTINUED | OUTPATIENT
Start: 2020-01-05 | End: 2020-01-07 | Stop reason: HOSPADM

## 2020-01-05 RX ORDER — DIPHENHYDRAMINE HYDROCHLORIDE 50 MG/ML
25 INJECTION INTRAMUSCULAR; INTRAVENOUS ONCE
Status: DISCONTINUED | OUTPATIENT
Start: 2020-01-05 | End: 2020-01-05

## 2020-01-05 RX ORDER — ONDANSETRON 2 MG/ML
4 INJECTION INTRAMUSCULAR; INTRAVENOUS ONCE
Status: DISCONTINUED | OUTPATIENT
Start: 2020-01-05 | End: 2020-01-05

## 2020-01-05 RX ORDER — SODIUM CHLORIDE 9 MG/ML
125 INJECTION, SOLUTION INTRAVENOUS CONTINUOUS
Status: DISCONTINUED | OUTPATIENT
Start: 2020-01-05 | End: 2020-01-05

## 2020-01-05 RX ORDER — CALCIUM CARBONATE 200(500)MG
500 TABLET,CHEWABLE ORAL DAILY PRN
Status: DISCONTINUED | OUTPATIENT
Start: 2020-01-05 | End: 2020-01-07 | Stop reason: HOSPADM

## 2020-01-05 RX ORDER — METOCLOPRAMIDE HYDROCHLORIDE 5 MG/ML
10 INJECTION INTRAMUSCULAR; INTRAVENOUS ONCE
Status: DISCONTINUED | OUTPATIENT
Start: 2020-01-05 | End: 2020-01-05

## 2020-01-05 RX ORDER — ALPRAZOLAM 0.25 MG/1
0.25 TABLET ORAL
Status: DISCONTINUED | OUTPATIENT
Start: 2020-01-05 | End: 2020-01-07 | Stop reason: HOSPADM

## 2020-01-05 RX ORDER — ASPIRIN 325 MG
325 TABLET ORAL DAILY
Status: DISCONTINUED | OUTPATIENT
Start: 2020-01-05 | End: 2020-01-07 | Stop reason: HOSPADM

## 2020-01-05 RX ORDER — HYDROXYCHLOROQUINE SULFATE 200 MG/1
200 TABLET, FILM COATED ORAL 2 TIMES DAILY WITH MEALS
Status: DISCONTINUED | OUTPATIENT
Start: 2020-01-05 | End: 2020-01-07 | Stop reason: HOSPADM

## 2020-01-05 RX ORDER — SODIUM CHLORIDE 9 MG/ML
75 INJECTION, SOLUTION INTRAVENOUS CONTINUOUS
Status: DISCONTINUED | OUTPATIENT
Start: 2020-01-05 | End: 2020-01-06

## 2020-01-05 RX ORDER — FUROSEMIDE 20 MG/1
20 TABLET ORAL DAILY
Status: DISCONTINUED | OUTPATIENT
Start: 2020-01-05 | End: 2020-01-06

## 2020-01-05 RX ADMIN — HEPARIN SODIUM 5000 UNITS: 5000 INJECTION INTRAVENOUS; SUBCUTANEOUS at 22:17

## 2020-01-05 RX ADMIN — ONDANSETRON 4 MG: 2 INJECTION INTRAMUSCULAR; INTRAVENOUS at 05:54

## 2020-01-05 RX ADMIN — ONDANSETRON 4 MG: 2 INJECTION INTRAMUSCULAR; INTRAVENOUS at 04:12

## 2020-01-05 RX ADMIN — SODIUM CHLORIDE 1000 ML: 0.9 INJECTION, SOLUTION INTRAVENOUS at 04:15

## 2020-01-05 RX ADMIN — ACETAMINOPHEN 650 MG: 325 TABLET, FILM COATED ORAL at 17:43

## 2020-01-05 RX ADMIN — HYDROXYCHLOROQUINE SULFATE 200 MG: 200 TABLET, FILM COATED ORAL at 11:01

## 2020-01-05 RX ADMIN — ASPIRIN 325 MG ORAL TABLET 325 MG: 325 PILL ORAL at 10:59

## 2020-01-05 RX ADMIN — SODIUM CHLORIDE 75 ML/HR: 0.9 INJECTION, SOLUTION INTRAVENOUS at 22:30

## 2020-01-05 RX ADMIN — HEPARIN SODIUM 5000 UNITS: 5000 INJECTION INTRAVENOUS; SUBCUTANEOUS at 11:00

## 2020-01-05 RX ADMIN — METOPROLOL SUCCINATE 25 MG: 25 TABLET, FILM COATED, EXTENDED RELEASE ORAL at 10:59

## 2020-01-05 RX ADMIN — HYDROXYCHLOROQUINE SULFATE 200 MG: 200 TABLET, FILM COATED ORAL at 15:57

## 2020-01-05 RX ADMIN — PANTOPRAZOLE SODIUM 40 MG: 40 TABLET, DELAYED RELEASE ORAL at 10:59

## 2020-01-05 RX ADMIN — SODIUM CHLORIDE 75 ML/HR: 0.9 INJECTION, SOLUTION INTRAVENOUS at 11:00

## 2020-01-05 RX ADMIN — FUROSEMIDE 20 MG: 20 TABLET ORAL at 10:59

## 2020-01-05 RX ADMIN — SODIUM CHLORIDE 75 ML/HR: 0.9 INJECTION, SOLUTION INTRAVENOUS at 07:56

## 2020-01-05 NOTE — ASSESSMENT & PLAN NOTE
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

## 2020-01-05 NOTE — ED NOTES
Pt did not tolerate PO  Pt to be admitted   Pt ambulated to bathroom again without issue      Donnie Castro RN  01/05/20 [id number]

## 2020-01-05 NOTE — ASSESSMENT & PLAN NOTE
History of hypertension on Toprol 25 mg once a day and Lasix 20 mg daily  Continue home meds   monitor BP per unit protocol

## 2020-01-05 NOTE — ED NOTES
Provider gave pt ice chips   If pt tolerates PO she will be able to be discharged     Neema Goldman RN  01/05/20 1518

## 2020-01-05 NOTE — ASSESSMENT & PLAN NOTE
· Patient with chronic anemia, Hb 8 9 on presentation  Hb was 7 7 2 months ago, 10 3 ten months ago  · Patient has had GI workup carried out which showed stomach AVMs and colonoscopy showed polyps   She was advised to get a small bowel capsule enteroscopy but never followed through  · GI recommends getting that done urgently outpatient  · Trend CBC and temperature curve daily  · Keep Hb > 7, transfuse prn if needed

## 2020-01-05 NOTE — PLAN OF CARE
Problem: Potential for Falls  Goal: Patient will remain free of falls  Description  INTERVENTIONS:  - Assess patient frequently for physical needs  -  Identify cognitive and physical deficits and behaviors that affect risk of falls    -  [place] fall precautions as indicated by assessment   - Educate patient/family on patient safety including physical limitations  - Instruct patient to call for assistance with activity based on assessment  - Modify environment to reduce risk of injury  - Consider OT/PT consult to assist with strengthening/mobility  Outcome: Progressing

## 2020-01-05 NOTE — H&P
H&P- Sunshine Carmichael 1950, 68 y o  female MRN: [id number]    Unit/Bed#: -Megan Encounter: [id number]    Primary Care Provider: Damion Conteh MD   Date and time admitted to hospital: 1/5/2020  4:02 AM        * Enterocolitis  Assessment & Plan  Enterocolitis, present on admission as evidenced by abdominal pain, vomiting, Diarrhea of 1 day duration,   CT scan abdomen/pelvis - 1  Diffuse abnormal appearance of the small and large bowel, most compatible with enterocolitis, either infectious or inflammatory  2   Diffuse circumferential wall thickening of the stomach  Correlate for gastritis  3   No significant change in hepatic metastatic disease      Patient with history of GIST tumors, diagnosed 2001, 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  Plan  · Hydrate given GI losses  · Collect Stool cultures  · Check C diff  · Trend CBC and temp curve daily  · Hold off on antibiotics for now, unless patient develops a fever or leucocytosis  · Place on a clear liquid diet  · Discussed with GI, Dr Margie Vernon    Hyperglycemia  Assessment & Plan  Hyperglycemia, blood glucose 155, present on admission, no reported history of DM  Check screening A1c in the morning    CKD (chronic kidney disease) stage 3, GFR 30-59 ml/min ([facility])  Assessment & Plan  History of CKD stage III, creatinine 1 61, GFR 32 on presentation  Baseline creatinine 1 00 - 1 54, baseline GFR of 34-55  Trend BMP daily  Continue home meds    Paroxysmal atrial fibrillation 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, ANNA 285   Patient follows up regularly at [facility] and was seen on Thursday, 1/2/20  She had scans carried out and was told her tumors are stable  · To continue with outpatient scheduled Oncology follow ups    Anemia  Assessment & Plan  · Patient with chronic anemia, Hb 8 9 on presentation  Hb was 7 7 2 months ago, 10 3 ten months ago  · Patient has had GI workup carried out which showed stomach AVMs and colonoscopy showed polyps  She was advised to get a small bowel capsule enteroscopy but never followed through  · GI recommends getting that done urgently outpatient  · Trend CBC and temperature curve daily  · Keep Hb > 7, transfuse prn if needed      VTE Prophylaxis: Heparin  / sequential compression device   Code Status:  Full code  POLST: There is no POLST form on file for this patient (pre-hospital)  Discussion with family:  Discussed with patient and  at bedside    Anticipated Length of Stay:  Patient will be admitted on an Inpatient basis with an anticipated length of stay of  greater than 2 midnights  Justification for Hospital Stay:  Enterocolitis    Total Time for Visit, including Counseling / Coordination of Care: 1 hour  Greater than 50% of this total time spent on direct patient counseling and coordination of care  Chief Complaint:   Abdominal pain, vomiting and diarrhea    History of Present Illness:    Akanksha Mata is a 68 y o  female with past medical history of metastatic GIST cancer, 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), anemia, paroxysmal AFib who presents with abdominal pain, diffuse, of sudden onset, constant, nonradiating, severity 6/10, vomiting x 2 episodes and profuse diarrhea  Patient reports the symptoms started at about 11:00 p m  Last night  Usually patient has intermittent constipation and diarrhea, and she initially thought this was a short-lived episode of diarrhea, however she developed abdominal pain (which was unusual) and 2 episodes of nonbilious nonbloody vomiting, thus she decided to present in the ED      Patient has a history of gastric AVMs per previous endoscopy (November 2019) colonoscopy showed polyps  At the time patient was anemic and advised to get a capsule endoscopy but she was scared of swallowing the capsule and thus never followed up  She is well known to the GI group especially  Dr Bela Thorne  In the ED, patient had multiple episodes of vomiting as well as diarrhea  Labs were notable for creatinine 1 61, glucose 155, AST 61, WBC 2 70, HGB 8 9  CT scan showed enterocolitis and known hepatic metastasis    Review of Systems:    Review of Systems   Constitutional: Positive for chills  HENT: Negative for congestion, drooling, ear discharge and ear pain  Respiratory: Negative for cough, choking, chest tightness, shortness of breath and stridor  Cardiovascular: Negative for chest pain, palpitations and leg swelling  Gastrointestinal: Positive for abdominal pain, diarrhea, nausea and vomiting  Negative for abdominal distention and anal bleeding  Genitourinary: Negative for dyspareunia, flank pain and frequency  Musculoskeletal: Negative for arthralgias, back pain, gait problem and joint swelling  Skin: Negative for color change and pallor  Neurological: Positive for headaches  Negative for seizures, syncope, speech difficulty and numbness  Psychiatric/Behavioral: Negative for agitation, behavioral problems and confusion  All other systems reviewed and are negative        Past Medical and Surgical History:     Past Medical History:   Diagnosis Date    ADHD     Anemia     Anxiety     Arthritis     Asthma     [facility]     [facility]eding disorder [facility]     [facility] cancer [facility] [address] [facility]     Cancer ([facility] [facility]     Coronary artery disease     Depression     GERD (gastroesophageal reflux disease)     History of stomach ulcers     Hypercholesterolemia     Hypertension     Kidney disease     Metastatic cancer ([facility] )        Past Surgical History:   Procedure Laterality Date    ANKLE SURGERY      APPENDECTOMY      BREAST SURGERY      CATARACT EXTRACTION       SECTION      COLONOSCOPY      HIP SURGERY      JOINT REPLACEMENT  2019    Left knee replacement    LAMINECTOMY      ROTATOR CUFF REPAIR      SIGMOID RESECTION / RECTOPEXY      SINUS SURGERY         Meds/Allergies:    Prior to Admission medications    Medication Sig Start Date End Date Taking?  Authorizing Provider   ALPRAZolam Haydee Rafiq) 0 25 mg tablet Take by mouth daily at bedtime as needed     Historical Provider, MD   aspirin (ESTELA ASPIRIN) 325 mg tablet Take 325 mg by mouth    Historical Provider, MD   Azelastine HCl 137 MCG/SPRAY SOLN instill 2 sprays into each nostril twice a day if needed for congestion 18   Historical Provider, MD   bismuth subsalicylate (PEPTO BISMOL) 524 mg/30 mL oral suspension Take 15 mL by mouth every 6 (six) hours as needed    Historical Provider, MD   calcium carbonate (TUMS) 500 mg chewable tablet Chew 1 tablet daily as needed for indigestion or heartburn    Historical Provider, MD   cetirizine (ZyrTEC) 10 mg tablet Take 10 mg by mouth daily    Historical Provider, MD   diphenoxylate-atropine (LOMOTIL) 2 5-0 025 mg per tablet TAKE 1 TABLET BY MOUTH 4 TIMES DAILY AS NEEDED FOR DIARRHEA 19   Historical Provider, MD   docusate sodium (COLACE) 100 mg capsule Take 100 mg by mouth 2 (two) times a day as needed for constipation    Historical Provider, MD   furosemide (LASIX) 20 mg tablet Take 20 mg by mouth daily  18   Historical Provider, MD   hydroxychloroquine (PLAQUENIL) 200 mg tablet Take 200 mg by mouth 2 (two) times a day with meals    Historical Provider, MD   loperamide (IMODIUM) 2 mg capsule Take 2 capsules by mouth 4 (four) times a day as needed    Historical Provider, MD   metoprolol tartrate (LOPRESSOR) 25 mg tablet Take 25 mg by mouth every evening    Historical Provider, MD   ofloxacin (OCUFLOX) 0 3 % ophthalmic solution START 3 DAYS PRE-OP: 1 DROP INTO LEFT EYE 4 TIMES DAILY; CONTINUE POST OP as directed 12/5/19   Historical Provider, MD   ondansetron (ZOFRAN) 8 mg tablet take 1 tablet by mouth every 8 hours if needed for nausea 10/17/19   Historical Provider, MD   pantoprazole (PROTONIX) 40 mg tablet Take 40 mg by mouth daily    Historical Provider, MD   prednisoLONE acetate (PRED FORTE) 1 % ophthalmic suspension POST OP: 1 DROP INTO LEFT EYE 4 TIMES DAILY; CONTINUE AS DIRECTED 12/5/19   Historical Provider, MD   TOPROL XL 25 MG 24 hr tablet  10/24/19   Historical Provider, MD     I have reviewed home medications using allscripts  Allergies: Allergies   Allergen Reactions    Codeine Other (See Comments)     Throat closes    Codeine Sulfate Throat Swelling    Neomycin-Bacitracin Zn-Polymyx Rash    Wound Dressing Adhesive Other (See Comments)     If its on too long- pt starts itching    Zolmitriptan Palpitations     Heart palpitations    Generic for Zomig         Social History:     Marital Status: /Civil Union   Occupation:  Retired  Patient Pre-hospital Living Situation:  Lives with family  Patient Pre-hospital Level of Mobility: Uses a walking stick  Patient Pre-hospital Diet Restrictions:  None  Substance Use History:   Social History     Substance and Sexual Activity   Alcohol Use Never    Frequency: Never    Drinks per session: 1 or 2    Binge frequency: Never     Social History     Tobacco Use   Smoking Status Former Smoker    Years: 20 00   Smokeless Tobacco Never Used     Social History     Substance and Sexual Activity   Drug Use No       Family History:    Family History   Problem Relation Age of Onset    Breast cancer Mother     Diabetes Mother     Hypertension Mother     Lung cancer Mother     Hyperlipidemia Father     Prostate cancer Father     Colon cancer Paternal Grandmother     Colon cancer Paternal Grandfather     Diabetes Family     Substance Abuse Neg Hx     Mental illness Neg Hx        Physical Exam:     Vitals:   Blood Pressure: 124/69 (01/05/20 8858)  Pulse: 84 (01/05/20 0921)  Temperature: 99 6 °F (37 6 °C) (01/05/20 0921)  Temp Source: Temporal (01/05/20 0403)  Respirations: 18 (01/05/20 0921)  Height: 5' 1" (154 9 cm) (01/05/20 0403)  Weight - Scale: 59 kg (130 lb) (01/05/20 0403)  SpO2: 98 % (01/05/20 0921)    Physical Exam   Constitutional: She is oriented to person, place, and time  No distress  Tired looking but comfortable not in acute distress   Eyes: Conjunctivae are normal  Right eye exhibits no discharge  Left eye exhibits no discharge  No scleral icterus  Neck: Normal range of motion  Neck supple  No thyromegaly present  Cardiovascular: Normal rate, regular rhythm, normal heart sounds and intact distal pulses  Exam reveals no gallop and no friction rub  Pulmonary/Chest: Effort normal and breath sounds normal  No stridor  No respiratory distress  She has no wheezes  She has no rales  Abdominal: Soft  Bowel sounds are normal  She exhibits no distension  There is no tenderness  There is no guarding  Musculoskeletal: Normal range of motion  She exhibits no edema, tenderness or deformity  Neurological: She is alert and oriented to person, place, and time  No cranial nerve deficit  Coordination normal    Skin: Skin is warm and dry  Capillary refill takes less than 2 seconds  Rash (Chronic macular erythematous rash on right cheeck/sub orbital area) noted  She is not diaphoretic  No erythema  There is pallor  Psychiatric: She has a normal mood and affect  Her behavior is normal      Additional Data:     Lab Results: I have personally reviewed pertinent reports        Results from last 7 days   Lab Units 01/05/20 0418   WBC Thousand/uL 2 70*   HEMOGLOBIN g/dL 8 9*   HEMATOCRIT % 27 1*   PLATELETS Thousands/uL 172   NEUTROS PCT % 83*   LYMPHS PCT % 12*   MONOS PCT % 3*   EOS PCT % 2     Results from last 7 days   Lab Units 01/05/20 0418   SODIUM mmol/L 143   POTASSIUM mmol/L 3 5   CHLORIDE mmol/L 103   CO2 mmol/L 24   BUN mg/dL 20   CREATININE mg/dL 1 61*   ANION GAP mmol/L 16*   CALCIUM mg/dL 8 5   ALBUMIN g/dL 3 8   TOTAL BILIRUBIN mg/dL 0 40   ALK PHOS U/L 66   ALT U/L 34   AST U/L 61*   GLUCOSE RANDOM mg/dL 155*     Results from last 7 days   Lab Units 01/05/20  0418   INR  1 12                   Imaging: I have personally reviewed pertinent reports  CT abdomen pelvis wo contrast   Final Result by Denny Talley DO (01/05 0196)   1  Diffuse abnormal appearance of the small and large bowel, most compatible with enterocolitis, either infectious or inflammatory  2   Diffuse circumferential wall thickening of the stomach  Correlate for gastritis  3   No significant change in hepatic metastatic disease  The study was marked in [facility] for immediate notification  Workstation performed: [id number]         XR chest 1 view portable   ED Interpretation by Rayo Rodrigues MD (01/05 5795)   No acute findings  EKG, Pathology, and Other Studies Reviewed on Admission:   · EKG:  Normal sinus rhythm    Allscripts / Epic Records Reviewed: Yes     ** Please Note: This note has been constructed using a voice recognition system   **

## 2020-01-05 NOTE — ASSESSMENT & PLAN NOTE
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

## 2020-01-05 NOTE — ED PROVIDER NOTES
History  Chief Complaint   Patient presents with    Vomiting     patient states having diarrhea, which is a problem she has from her cancer  started vomiting around 230 this morning  no fevers  complains of headache  67 yo F with PMH of metastatic GIST gastrointestinal cancer, with resultant chronic GIB & anemia, afib, HTN presents to ED with N/V and diarrhea  Also with some periumbilical crampy abd pain, which is new  Vomited 3 times, yellow  Diarrhea is not new, no blood, normal brown color  Has had a cough, no CP/SOB  No fevers  Had a normal upper endoscopy 11/19  Was recommended to get a capsule study, which she has opted not to do  Current chemo is ILI130, has been on since 2017  Had a recent CT at [facility] - stable disease  Hg typically around 8  History provided by:  Patient and medical records   used: No    Vomiting   Severity:  Moderate  Timing:  Intermittent  Quality:  Stomach contents  Progression:  Unchanged  Chronicity:  Recurrent  Recent urination:  Normal  Relieved by:  Nothing  Worsened by:  Nothing  Associated symptoms: diarrhea    Associated symptoms: no abdominal pain, no chills, no cough, no fever, no headaches and no sore throat    Risk factors: prior abdominal surgery    Risk factors: no diabetes        Prior to Admission Medications   Prescriptions Last Dose Informant Patient Reported? Taking?    ALPRAZolam (XANAX) 0 25 mg tablet  Self Yes No   Sig: Take by mouth daily at bedtime as needed    Azelastine HCl 137 MCG/SPRAY SOLN  Self Yes No   Sig: instill 2 sprays into each nostril twice a day if needed for congestion   TOPROL XL 25 MG 24 hr tablet   Yes No   aspirin (ESTELA ASPIRIN) 325 mg tablet  Self Yes No   Sig: Take 325 mg by mouth   bismuth subsalicylate (PEPTO BISMOL) 524 mg/30 mL oral suspension   Yes No   Sig: Take 15 mL by mouth every 6 (six) hours as needed   calcium carbonate (TUMS) 500 mg chewable tablet  Self Yes No   Sig: Chew 1 tablet daily as needed for indigestion or heartburn   cetirizine (ZyrTEC) 10 mg tablet   Yes No   Sig: Take 10 mg by mouth daily   diphenoxylate-atropine (LOMOTIL) 2 5-0 025 mg per tablet   Yes No   Sig: TAKE 1 TABLET BY MOUTH 4 TIMES DAILY AS NEEDED FOR DIARRHEA   docusate sodium (COLACE) 100 mg capsule  Self Yes No   Sig: Take 100 mg by mouth 2 (two) times a day as needed for constipation   furosemide (LASIX) 20 mg tablet  Self Yes No   Sig: Take 20 mg by mouth daily    hydroxychloroquine (PLAQUENIL) 200 mg tablet   Yes No   Sig: Take 200 mg by mouth 2 (two) times a day with meals   loperamide (IMODIUM) 2 mg capsule  Self Yes No   Sig: Take 2 capsules by mouth 4 (four) times a day as needed   metoprolol tartrate (LOPRESSOR) 25 mg tablet  Self Yes No   Sig: Take 25 mg by mouth every evening   ofloxacin (OCUFLOX) 0 3 % ophthalmic solution   Yes No   Sig: START 3 DAYS PRE-OP: 1 DROP INTO LEFT EYE 4 TIMES DAILY; CONTINUE POST OP as directed   ondansetron (ZOFRAN) 8 mg tablet   Yes No   Sig: take 1 tablet by mouth every 8 hours if needed for nausea   pantoprazole (PROTONIX) 40 mg tablet  Self Yes No   Sig: Take 40 mg by mouth daily   prednisoLONE acetate (PRED FORTE) 1 % ophthalmic suspension   Yes No   Sig: POST OP: 1 DROP INTO LEFT EYE 4 TIMES DAILY; CONTINUE AS DIRECTED      Facility-Administered Medications: None       Past Medical History:   Diagnosis Date    ADHD     Anemia     Anxiety     Arthritis     Asthma     Atrial fibrillation (HCC)     Bleeding disorder (HCC)     Breast cancer (HCC)     Cancer ([place] [address] )     Depression     GERD (gastroesophageal reflux disease)     History of stomach ulcers     Hypercholesterolemia     Hypertension     Hypothyroidism     Kidney disease     [facility] )        Past Surgical History:   Procedure Laterality Date    ANKLE SURGERY      APPENDECTOMY      BREAST SURGERY      CATARACT EXTRACTION       SECTION      COLONOSCOPY      HIP SURGERY      JOINT REPLACEMENT  07/30/2019    Left knee replacement    LAMINECTOMY      ROTATOR CUFF REPAIR      SIGMOID RESECTION / RECTOPEXY      SINUS SURGERY         Family History   Problem Relation Age of Onset   [facility] Breast cancer Mother     Diabetes Mother     Hypertension Mother     Lung cancer Mother     Hyperlipidemia Father     Prostate cancer Father     Colon cancer Paternal [de-identified]     Colon cancer Paternal Grandfather     Diabetes Family     Substance Abuse Neg Hx     Mental illness Neg Hx      I have reviewed and agree with the history as documented  Social History     Tobacco Use    Smoking status: Former Smoker     Years: 20 00    Smokeless tobacco: Never Used   Substance Use Topics    Alcohol use: Never     Frequency: Never     Drinks per session: 1 or 2     Binge frequency: Never    Drug use: No        Review of Systems   Constitutional: Negative for appetite change, chills, fatigue and fever  HENT: Negative for congestion, ear pain, rhinorrhea, sore throat, trouble swallowing and voice change  Eyes: Negative for pain and visual disturbance  Respiratory: Negative for cough, chest tightness and shortness of breath  Cardiovascular: Negative for chest pain, palpitations and leg swelling  Gastrointestinal: Positive for diarrhea, nausea and vomiting  Negative for abdominal pain, blood in stool and constipation  Genitourinary: Negative for difficulty urinating and hematuria  Musculoskeletal: Negative for back pain, neck pain and neck stiffness  Skin: Negative for rash  Neurological: Negative for dizziness, syncope, speech difficulty, light-headedness and headaches  Psychiatric/Behavioral: Negative for confusion and suicidal ideas  Physical Exam  Physical Exam   Constitutional: She is oriented to person, place, and time  She appears well-developed and well-nourished  No distress  HENT:   Head: Normocephalic and atraumatic     Right Ear: External ear normal    Left Ear: External ear normal    Nose: Nose normal    Mouth/Throat: Oropharynx is clear and moist    Eyes: Pupils are equal, round, and reactive to light  Conjunctivae and EOM are normal  Right eye exhibits no discharge  Left eye exhibits no discharge  No scleral icterus  Neck: Normal range of motion  Neck supple  No tracheal deviation present  Cardiovascular: Normal rate, regular rhythm, normal heart sounds and intact distal pulses  Exam reveals no gallop and no friction rub  No murmur heard  Pulmonary/Chest: Effort normal and breath sounds normal  No stridor  No respiratory distress  She exhibits no tenderness  Abdominal: Soft  Bowel sounds are normal  There is no tenderness  There is no rebound and no guarding  Musculoskeletal: Normal range of motion  She exhibits no edema (1+ b/l LE), tenderness or deformity  Lymphadenopathy:     She has no cervical adenopathy  Neurological: She is alert and oriented to person, place, and time  No cranial nerve deficit or sensory deficit  Coordination normal    Skin: Skin is warm and dry  No rash noted  She is not diaphoretic  There is pallor  Psychiatric: She has a normal mood and affect  Her behavior is normal    Nursing note and vitals reviewed        Vital Signs  ED Triage Vitals [01/05/20 0403]   Temperature Pulse Respirations Blood Pressure SpO2   (!) 96 8 °F (36 °C) 94 16 118/64 97 %      Temp Source Heart Rate Source Patient Position - Orthostatic VS BP Location FiO2 (%)   Temporal Monitor Lying Left arm --      Pain Score       --           Vitals:    01/05/20 0403 01/05/20 0607   BP: 118/64 120/60   Pulse: 94 86   Patient Position - Orthostatic VS: Lying Sitting         Visual Acuity      ED Medications  Medications   sodium chloride 0 9 % infusion (has no administration in time range)   sodium chloride 0 9 % bolus 1,000 mL (0 mL Intravenous Stopped 1/5/20 8372)   ondansetron (ZOFRAN) injection 4 mg (4 mg Intravenous Given 1/5/20 8169)   ondansetron (ZOFRAN) injection 4 mg (4 mg Intravenous Given 1/5/20 0554)       Diagnostic Studies  Results Reviewed     Procedure Component Value Units Date/Time    Comprehensive metabolic panel [[id number]]  (Abnormal) Collected:  01/05/20 0418    Lab Status:  Final result Specimen:  Blood from Arm, Right Updated:  01/05/20 0500     Sodium 143 mmol/L      Potassium 3 5 mmol/L      Chloride 103 mmol/L      CO2 24 mmol/L      ANION GAP 16 mmol/L      BUN 20 mg/dL      Creatinine 1 61 mg/dL      Glucose 155 mg/dL      Calcium 8 5 mg/dL      AST 61 U/L      ALT 34 U/L      Alkaline Phosphatase 66 U/L      Total Protein 6 8 g/dL      Albumin 3 8 g/dL      Total Bilirubin 0 40 mg/dL      eGFR 32 ml/min/1 73sq m     Narrative:       National Kidney Disease Foundation guidelines for Chronic Kidney Disease (CKD):     Stage 1 with normal or high GFR (GFR > 90 mL/min/1 73 square meters)    Stage 2 Mild CKD (GFR = 60-89 mL/min/1 73 square meters)    Stage 3A Moderate CKD (GFR = 45-59 mL/min/1 73 square meters)    Stage 3B Moderate CKD (GFR = 30-44 mL/min/1 73 square meters)    Stage 4 Severe CKD (GFR = 15-29 mL/min/1 73 square meters)    Stage 5 End Stage CKD (GFR <15 mL/min/1 73 square meters)  Note: GFR calculation is accurate only with a steady state creatinine    Protime-INR [[id number]]  (Normal) Collected:  01/05/20 0418    Lab Status:  Final result Specimen:  Blood from Arm, Right Updated:  01/05/20 0457     Protime 14 1 seconds      INR 1 12    APTT [[id number]]  (Normal) Collected:  01/05/20 0418    Lab Status:  Final result Specimen:  Blood from Arm, Right Updated:  01/05/20 0457     PTT 24 seconds     CBC and differential [[id number]]  (Abnormal) Collected:  01/05/20 0418    Lab Status:  Final result Specimen:  Blood from Arm, Right Updated:  01/05/20 0443     WBC 2 70 Thousand/uL      RBC 2 57 Million/uL      Hemoglobin 8 9 g/dL      Hematocrit 27 1 %       fL      MCH 34 6 pg      MCHC 32 8 g/dL      RDW 14 5 %      MPV 10 5 fL      Platelets 789 Thousands/uL      nRBC 0 /100 WBCs      Neutrophils Relative 83 %      Immat GRANS % 0 %      Lymphocytes Relative 12 %      Monocytes Relative 3 %      Eosinophils Relative 2 %      Basophils Relative 0 %      Neutrophils Absolute 2 25 Thousands/µL      Immature Grans Absolute 0 01 Thousand/uL      Lymphocytes Absolute 0 32 Thousands/µL      Monocytes Absolute 0 07 Thousand/µL      Eosinophils Absolute 0 04 Thousand/µL      Basophils Absolute 0 01 Thousands/µL                  CT abdomen pelvis wo contrast   Final Result by Juan Forte DO (01/05 8537)   1  Diffuse abnormal appearance of the small and large bowel, most compatible with enterocolitis, either infectious or inflammatory  2   Diffuse circumferential wall thickening of the stomach  Correlate for gastritis  3   No significant change in hepatic metastatic disease  The study was marked in [facility] for immediate notification  Workstation performed: [id number]         XR chest 1 view portable   ED Interpretation by Pat Arcos MD (01/05 9059)   No acute findings  Procedures  ECG 12 Lead Documentation Only  Date/Time: 1/5/2020 4:17 AM  Performed by: Pat Arcos MD  Authorized by: Pat Arcos MD     Indications / Diagnosis:  N/v  ECG reviewed by me, the ED Provider: yes    Patient location:  ED  Previous ECG:     Previous ECG:  Compared to current    Similarity:  No change    Comparison to cardiac monitor: Yes    Interpretation:     Interpretation: abnormal    Quality:     Tracing quality:  Limited by artifact  Rate:     ECG rate:  82    ECG rate assessment: normal    Rhythm:     Rhythm: sinus rhythm    Ectopy:     Ectopy: none    QRS:     QRS axis:  Left    QRS intervals:   Wide  Conduction:     Conduction: abnormal      Abnormal conduction: complete RBBB    ST segments:     ST segments:  Non-specific  T waves:     T waves: non-specific               ED Course  ED Course as of Jan 05 0714   Sun Jan 05, 2020   0448 Hg is around baseline  No need for transfusion at this time  2378 Pt feeling slightly better  Discussed CT findings with her, and offered admission  She would prefer to try to go home  Will po trial        1711 Pt did not tolerate PO, vomited after 2nd dose zofran  Also had diarrhea here, nonbloody, small  Will discuss with hospitalist for admission                                    MDM  Number of Diagnoses or Management Options  Chronic anemia: new and requires workup  CKD (chronic kidney disease): new and requires workup  Enterocolitis: new and requires workup  Nausea & vomiting: new and requires workup     Amount and/or Complexity of Data Reviewed  Clinical lab tests: ordered and reviewed  Tests in the radiology section of CPT®: ordered and reviewed  Tests in the medicine section of CPT®: ordered and reviewed  Review and summarize past medical records: yes  Independent visualization of images, tracings, or specimens: yes    Risk of Complications, Morbidity, and/or Mortality  Presenting problems: moderate  Diagnostic procedures: low  Management options: low    Patient Progress  Patient progress: stable        Disposition  Final diagnoses:   Enterocolitis   Nausea & vomiting   Chronic anemia   CKD (chronic kidney disease)     Time reflects when diagnosis was documented in both MDM as applicable and the Disposition within this note     Time User Action Codes Description Comment    1/5/2020  6:36 AM Cheryn Joe S Add [K52 9] Enterocolitis     1/5/2020  6:36 AM Cheryn Joe S Add [R11 2] Nausea & vomiting     1/5/2020  6:36 AM Fern Wanda Add [D64 9] Chronic anemia     1/5/2020  6:36 AM Cheryn Joe S Add [N18 9] CKD (chronic kidney disease)       ED Disposition     ED Disposition Condition Date/Time Comment    Admit Stable Sun Jan 5, 2020  7:13 AM Case was discussed with Dr Aggie Bucio and the patient's admission status was agreed to be Admission Status: inpatient status to the service of Dr Hidns Matters     Follow up With Specialties Details Why Contact Info Additional Information    Anu Cordero MD Internal Medicine Schedule an appointment as soon as possible for a visit in 2 days  NIGEL Cochran [phone]        [address] Emergency Department Emergency Medicine  If symptoms worsen [address] [place], [address]  [phone]  ED, [address] [place] [address], [facility] [address]          Patient's Medications   Discharge Prescriptions    No medications on file     No discharge procedures on file      ED Provider  Electronically Signed by           Melvina Fraire MD  01/05/20 MD Esau  01/05/20 7304

## 2020-01-05 NOTE — ASSESSMENT & PLAN NOTE
History of CKD stage III, creatinine 1 61, GFR 32 on presentation     Baseline creatinine 1 00 - 1 54, baseline GFR of 34-55  Trend BMP daily  Continue home meds

## 2020-01-05 NOTE — ED NOTES
Patient waiting a little bit to see if she needs ordered zofran or if she will be okay without it        Cristobal Newman RN  01/05/20 8343

## 2020-01-05 NOTE — ASSESSMENT & PLAN NOTE
Hyperglycemia, blood glucose 155, present on admission, no reported history of DM  Check screening A1c in the morning

## 2020-01-05 NOTE — CONSULTS
Consultation - GI   Vini Dominguez 68 y o  female MRN: [id number]  Unit/Bed#: -01 Encounter: [id number]    Consults  ASSESSMENT and PLAN    3 61-year-old female well known to us with gets tumor of the colon diagnosed in 2001  Metastatic to the liver  She has been treated with a variety of chemotherapeutic agents most recently blue" under the direction of [facility]  In with several days of diarrhea crampy abdominal pain nausea and vomiting  CT scan shows large and small bowel enteritis  Most likely this is an infectious enteritis the severity of which could be exacerbated by her immune suppression  There is a small chance this could be ischemic or a side effect of the medication but this would seem less likely  I would recommend supportive care including IV fluids gradually increasing her diet  Stool cultures have been ordered but not done yet  2  Of equal concern is her anemia which has not been adequately explained  She had stomach AVMs  Colonoscopy showed polyps  The she has not followed up with capsule endoscopy which I think she very much needs  Will try to arrange this immediately after discharge  She should probably also have a repeat EGD  Thanks will continue to follow her with you  I discussed with Dr Clyde Davis    Chief Complaint   Patient presents with    Vomiting     patient states having diarrhea, which is a problem she has from her cancer  started vomiting around 230 this morning  no fevers  complains of headache  Physician Requesting Consult: Millie Simon MD    HPI Vini Dominguez is a 68y o  year old female who presents with 24-48 hours of diarrhea  Mild crampy abdominal pain with this  Some nausea and vomiting as well  She denies any fever sweats or chills she says her stool has not been bloody  She said it was black go but she was instructed to discontinue Pepto-Bismol which alleviated the black stool    She has lost about 10 lb over the last 2-3 months  She saw Dr Silas San in 2019 and was noted to have anemia  Colonoscopy showed several polyps which were removed and benign upper endoscopy showed AVMs in the stomach which were cauterized  It was felt that we did not have an adequate explanation for her anemia and a capsule endoscopy was recommended  She has not followed through on this because she is afraid she can swallow the capsule      Historical Information   Past Medical History:   Diagnosis Date    ADHD     Anemia     Anxiety     Arthritis     Asthma     Atrial fibrillation (HCC)     Bleeding disorder (HCC)     Breast cancer (HCC)     Cancer ([place] [address] )     Coronary artery disease     Depression     GERD (gastroesophageal reflux disease)     History of stomach ulcers     Hypercholesterolemia     Hypertension     Kidney disease     [facility] ([facility] )      Past Surgical History:   Procedure Laterality Date    ANKLE SURGERY      APPENDECTOMY      BREAST SURGERY      CATARACT EXTRACTION       SECTION      COLONOSCOPY      HIP SURGERY      JOINT REPLACEMENT  2019    Left knee replacement    LAMINECTOMY      ROTATOR CUFF REPAIR      SIGMOID RESECTION / RECTOPEXY      SINUS SURGERY       Social History   Social History     Substance and Sexual Activity   Alcohol Use Never    Frequency: Never    Drinks per session: 1 or 2    Binge frequency: Never     Social History     Substance and Sexual Activity   Drug Use No     Social History     Tobacco Use   Smoking Status Former Smoker    Years: 20 00   Smokeless Tobacco Never Used     Family History   Problem Relation Age of Onset   Joellen Fraser Breast cancer Mother     Diabetes Mother     Hypertension Mother     Lung cancer Mother     Hyperlipidemia Father     Prostate cancer Father     Colon cancer Paternal Grandmother     Colon cancer Paternal Grandfather     Diabetes Family     Substance Abuse Neg Hx     Mental illness Neg Hx        Meds/Allergies Current Facility-Administered Medications   Medication Dose Route Frequency    ALPRAZolam (XANAX) tablet 0 25 mg  0 25 mg Oral HS PRN    aspirin tablet 325 mg  325 mg Oral Daily    calcium carbonate (TUMS) chewable tablet 500 mg  500 mg Oral Daily PRN    furosemide (LASIX) tablet 20 mg  20 mg Oral Daily    heparin (porcine) subcutaneous injection 5,000 Units  5,000 Units Subcutaneous Q8H [address]    hydroxychloroquine (PLAQUENIL) tablet 200 mg  200 mg Oral BID With Meals    metoprolol succinate (TOPROL-XL) 24 hr tablet 25 mg  25 mg Oral Daily    pantoprazole (PROTONIX) EC tablet 40 mg  40 mg Oral Early Morning    sodium chloride 0 9 % infusion  75 mL/hr Intravenous Continuous     Medications Prior to Admission   Medication    aspirin (ESTELA ASPIRIN) 325 mg tablet    ALPRAZolam (XANAX) 0 25 mg tablet    Azelastine HCl 137 MCG/SPRAY SOLN    calcium carbonate (TUMS) 500 mg chewable tablet    diphenoxylate-atropine (LOMOTIL) 2 5-0 025 mg per tablet    docusate sodium (COLACE) 100 mg capsule    furosemide (LASIX) 20 mg tablet    hydroxychloroquine (PLAQUENIL) 200 mg tablet    loperamide (IMODIUM) 2 mg capsule    ofloxacin (OCUFLOX) 0 3 % ophthalmic solution    ondansetron (ZOFRAN) 8 mg tablet    pantoprazole (PROTONIX) 40 mg tablet    prednisoLONE acetate (PRED FORTE) 1 % ophthalmic suspension    TOPROL XL 25 MG 24 hr tablet       Allergies   Allergen Reactions    Codeine Other (See Comments)     Throat closes    Codeine Sulfate Throat Swelling    Neomycin-Bacitracin Zn-Polymyx Rash    Wound Dressing Adhesive Other (See Comments)     If its on too long- pt starts itching    Zolmitriptan Palpitations     Heart palpitations  Generic for Zomig         PHYSICALEXAM  Blood pressure 124/69, pulse 84, temperature 99 6 °F (37 6 °C), resp  rate 18, height 5' 1" (1 549 m), weight 59 kg (130 lb), SpO2 98 %  Body mass index is 24 56 kg/m²    General Appearance: NAD, cooperative, alert  Eyes: Anicteric, PERRLA, EOMI extreme pallor  ENT:  Normocephalic, atraumatic, normal mucosa  Neck:  Supple, symmetrical, trachea midline  Resp:  Clear to auscultation bilaterally; no rales, rhonchi or wheezing; respirations unlabored   CV:  S1 S2, Regular rate and rhythm; no murmur, rub, or gallop  GI:  Soft, non-tender, non-distended; normal bowel sounds; no masses, no organomegaly   Rectal: Deferred  Musculoskeletal: No cyanosis, clubbing or edema  Normal ROM  Skin:  No jaundice, rashes, or lesions   Heme/Lymph: No palpable cervical lymphadenopathy  Psych: Normal affect, good eye contact  Neuro: No gross deficits, AAOx3    Lab Results   Component Value Date    GLUCOSE 94 10/12/2015    CALCIUM 8 5 01/05/2020     10/12/2015    K 3 5 01/05/2020    CO2 24 01/05/2020     01/05/2020    BUN 20 01/05/2020    CREATININE 1 61 (H) 01/05/2020     Lab Results   Component Value Date    WBC 2 70 (L) 01/05/2020    HGB 8 9 (L) 01/05/2020    HCT 27 1 (L) 01/05/2020     (H) 01/05/2020     01/05/2020     Lab Results   Component Value Date    ALT 34 01/05/2020    AST 61 (H) 01/05/2020    ALKPHOS 66 01/05/2020    BILITOT 0 28 10/12/2015     No results found for: AMYLASE  Lab Results   Component Value Date    LIPASE 78 02/07/2017     No results found for: IRON, TIBC, FERRITIN  Lab Results   Component Value Date    INR 1 12 01/05/2020       Imaging Studies: I have personally reviewed pertinent reports  EKG, Pathology, and Other Studies: I have personally reviewed pertinent reports  REVIEW OF SYSTEMS:    CONSTITUTIONAL: Denies any fever, chills, rigors, and weight loss  HEENT: No earache or tinnitus  Denies hearing loss or visual disturbances  CARDIOVASCULAR: No chest pain or palpitations  RESPIRATORY: Denies any cough, hemoptysis, shortness of breath or dyspnea on exertion  GASTROINTESTINAL: As noted in the History of Present Illness  GENITOURINARY: No problems with urination   Denies any hematuria or dysuria  NEUROLOGIC: No dizziness or vertigo, denies headaches  MUSCULOSKELETAL: Denies any muscle or joint pain  SKIN: Denies skin rashes or itching  ENDOCRINE: Denies excessive thirst  Denies intolerance to heat or cold  PSYCHOSOCIAL: Denies depression or anxiety  Denies any recent memory loss

## 2020-01-05 NOTE — ASSESSMENT & PLAN NOTE
History of paroxysmal AFib not on anticoagulation  Currently in normal sinus rhythm  Currently on 25 mg Toprol once a day

## 2020-01-06 LAB
ABO GROUP BLD: NORMAL
ABO GROUP BLD: NORMAL
ALBUMIN SERPL BCP-MCNC: 2.9 G/DL (ref 3.5–5)
ALP SERPL-CCNC: 47 U/L (ref 46–116)
ALT SERPL W P-5'-P-CCNC: 25 U/L (ref 12–78)
ANION GAP SERPL CALCULATED.3IONS-SCNC: 8 MMOL/L (ref 4–13)
AST SERPL W P-5'-P-CCNC: 42 U/L (ref 5–45)
BILIRUB SERPL-MCNC: 0.5 MG/DL (ref 0.2–1)
BLD GP AB SCN SERPL QL: NEGATIVE
BUN SERPL-MCNC: 18 MG/DL (ref 5–25)
CALCIUM SERPL-MCNC: 8 MG/DL (ref 8.3–10.1)
CHLORIDE SERPL-SCNC: 108 MMOL/L (ref 100–108)
CO2 SERPL-SCNC: 26 MMOL/L (ref 21–32)
CREAT SERPL-MCNC: 1.27 MG/DL (ref 0.6–1.3)
ERYTHROCYTE [DISTWIDTH] IN BLOOD BY AUTOMATED COUNT: 14.5 % (ref 11.6–15.1)
GFR SERPL CREATININE-BSD FRML MDRD: 43 ML/MIN/1.73SQ M
GLUCOSE SERPL-MCNC: 87 MG/DL (ref 65–140)
HCT VFR BLD AUTO: 21.8 % (ref 34.8–46.1)
HCT VFR BLD AUTO: 22.1 % (ref 34.8–46.1)
HCT VFR BLD AUTO: 27.1 % (ref 34.8–46.1)
HGB BLD-MCNC: 7 G/DL (ref 11.5–15.4)
HGB BLD-MCNC: 7.3 G/DL (ref 11.5–15.4)
HGB BLD-MCNC: 9 G/DL (ref 11.5–15.4)
MCH RBC QN AUTO: 34.7 PG (ref 26.8–34.3)
MCHC RBC AUTO-ENTMCNC: 32.1 G/DL (ref 31.4–37.4)
MCV RBC AUTO: 108 FL (ref 82–98)
PLATELET # BLD AUTO: 122 THOUSANDS/UL (ref 149–390)
PMV BLD AUTO: 10.2 FL (ref 8.9–12.7)
POTASSIUM SERPL-SCNC: 3.8 MMOL/L (ref 3.5–5.3)
PROT SERPL-MCNC: 5.4 G/DL (ref 6.4–8.2)
RBC # BLD AUTO: 2.02 MILLION/UL (ref 3.81–5.12)
RH BLD: POSITIVE
RH BLD: POSITIVE
SODIUM SERPL-SCNC: 142 MMOL/L (ref 136–145)
SPECIMEN EXPIRATION DATE: NORMAL
WBC # BLD AUTO: 3.23 THOUSAND/UL (ref 4.31–10.16)

## 2020-01-06 PROCEDURE — 86901 BLOOD TYPING SEROLOGIC RH(D): CPT | Performed by: NURSE PRACTITIONER

## 2020-01-06 PROCEDURE — P9016 RBC LEUKOCYTES REDUCED: HCPCS

## 2020-01-06 PROCEDURE — 86900 BLOOD TYPING SEROLOGIC ABO: CPT | Performed by: NURSE PRACTITIONER

## 2020-01-06 PROCEDURE — 85018 HEMOGLOBIN: CPT | Performed by: INTERNAL MEDICINE

## 2020-01-06 PROCEDURE — 85014 HEMATOCRIT: CPT | Performed by: INTERNAL MEDICINE

## 2020-01-06 PROCEDURE — 86850 RBC ANTIBODY SCREEN: CPT | Performed by: NURSE PRACTITIONER

## 2020-01-06 PROCEDURE — 86920 COMPATIBILITY TEST SPIN: CPT

## 2020-01-06 PROCEDURE — 85027 COMPLETE CBC AUTOMATED: CPT | Performed by: INTERNAL MEDICINE

## 2020-01-06 PROCEDURE — 30233N1 TRANSFUSION OF NONAUTOLOGOUS RED BLOOD CELLS INTO PERIPHERAL VEIN, PERCUTANEOUS APPROACH: ICD-10-PCS | Performed by: INTERNAL MEDICINE

## 2020-01-06 PROCEDURE — 85014 HEMATOCRIT: CPT | Performed by: NURSE PRACTITIONER

## 2020-01-06 PROCEDURE — 99232 SBSQ HOSP IP/OBS MODERATE 35: CPT | Performed by: INTERNAL MEDICINE

## 2020-01-06 PROCEDURE — 80053 COMPREHEN METABOLIC PANEL: CPT | Performed by: INTERNAL MEDICINE

## 2020-01-06 PROCEDURE — 83036 HEMOGLOBIN GLYCOSYLATED A1C: CPT | Performed by: INTERNAL MEDICINE

## 2020-01-06 PROCEDURE — 85018 HEMOGLOBIN: CPT | Performed by: NURSE PRACTITIONER

## 2020-01-06 RX ORDER — SODIUM CHLORIDE 9 MG/ML
75 INJECTION, SOLUTION INTRAVENOUS CONTINUOUS
Status: DISCONTINUED | OUTPATIENT
Start: 2020-01-06 | End: 2020-01-07 | Stop reason: HOSPADM

## 2020-01-06 RX ORDER — PANTOPRAZOLE SODIUM 40 MG/1
40 TABLET, DELAYED RELEASE ORAL
Status: DISCONTINUED | OUTPATIENT
Start: 2020-01-06 | End: 2020-01-07 | Stop reason: HOSPADM

## 2020-01-06 RX ORDER — FAMOTIDINE 20 MG/1
40 TABLET, FILM COATED ORAL
Status: DISCONTINUED | OUTPATIENT
Start: 2020-01-06 | End: 2020-01-07 | Stop reason: HOSPADM

## 2020-01-06 RX ADMIN — ACETAMINOPHEN 650 MG: 325 TABLET, FILM COATED ORAL at 03:15

## 2020-01-06 RX ADMIN — HYDROXYCHLOROQUINE SULFATE 200 MG: 200 TABLET, FILM COATED ORAL at 09:32

## 2020-01-06 RX ADMIN — HEPARIN SODIUM 5000 UNITS: 5000 INJECTION INTRAVENOUS; SUBCUTANEOUS at 21:54

## 2020-01-06 RX ADMIN — HYDROXYCHLOROQUINE SULFATE 200 MG: 200 TABLET, FILM COATED ORAL at 17:46

## 2020-01-06 RX ADMIN — FAMOTIDINE 40 MG: 20 TABLET, FILM COATED ORAL at 21:53

## 2020-01-06 RX ADMIN — HEPARIN SODIUM 5000 UNITS: 5000 INJECTION INTRAVENOUS; SUBCUTANEOUS at 14:46

## 2020-01-06 RX ADMIN — SODIUM CHLORIDE 75 ML/HR: 0.9 INJECTION, SOLUTION INTRAVENOUS at 09:42

## 2020-01-06 RX ADMIN — ASPIRIN 325 MG ORAL TABLET 325 MG: 325 PILL ORAL at 09:33

## 2020-01-06 RX ADMIN — PANTOPRAZOLE SODIUM 40 MG: 40 TABLET, DELAYED RELEASE ORAL at 17:46

## 2020-01-06 RX ADMIN — ACETAMINOPHEN 650 MG: 325 TABLET, FILM COATED ORAL at 09:29

## 2020-01-06 RX ADMIN — HEPARIN SODIUM 5000 UNITS: 5000 INJECTION INTRAVENOUS; SUBCUTANEOUS at 05:02

## 2020-01-06 RX ADMIN — FUROSEMIDE 20 MG: 20 TABLET ORAL at 09:34

## 2020-01-06 RX ADMIN — PANTOPRAZOLE SODIUM 40 MG: 40 TABLET, DELAYED RELEASE ORAL at 05:00

## 2020-01-06 RX ADMIN — SODIUM CHLORIDE 75 ML/HR: 0.9 INJECTION, SOLUTION INTRAVENOUS at 12:15

## 2020-01-06 RX ADMIN — METOPROLOL SUCCINATE 25 MG: 25 TABLET, FILM COATED, EXTENDED RELEASE ORAL at 09:35

## 2020-01-06 NOTE — ASSESSMENT & PLAN NOTE
Hyperglycemia, blood glucose 155, present on admission, no reported history of DM  Hb A1c is pending

## 2020-01-06 NOTE — ASSESSMENT & PLAN NOTE
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, ANNA 285  Patient follows up regularly at [facility] and was seen on Thursday, 1/2/20   She had scans carried out and was told her tumors are stable  · To continue with outpatient scheduled Oncology follow ups

## 2020-01-06 NOTE — PROGRESS NOTES
Progress note - Gastroenterology   Mode Cooper 68 y o  female MRN: [id number]  Unit/Bed#: -01 Encounter: [id number]    ASSESSMENT and PLAN    Anemia  Assessment & Plan  Currently receiving chemo at [facility] for rectal CA with mets to the liver  Her hemoglobin this morning was 7 0 but no overt signs of GI blood loss  She has had no bowel movements since 01/05  Denies UGI symptoms  Says she gets heartburn if she does not watch what she eats despite suppressive therapy  Last transfusion was for hemoglobin of 6 1  Most recent EGD showed gastric AVMs which were cauterized  Colonoscopy only some polyps which were removed  She was previously encourage to follow through with the small bowel capsule study but she did not as she fears swallowing the capsule  -recheck H&H at 11 o'clock  -type and screen  -consider transfusion for hemoglobin less than 7  -may need repeat EGD, consider bleeding scan  -will need outpatient capsule to evaluate for small bowel source for GI blood loss  -increase PPI to b i d   -will add famotidine 40 mg at HS to maximize acid suppression    * Enterocolitis  Assessment & Plan  59-year-old female well known to us with gets tumor of the colon diagnosed in 2001  Metastatic to the liver  She has been treated with a variety of chemotherapeutic agents most recently blue" under the direction of [facility]  In with several days of diarrhea crampy abdominal pain nausea and vomiting  CT scan showed large and small bowel enteritis  Most likely this is an infectious enteritis the severity of which could be exacerbated by her immune suppression  There is a small chance this could be ischemic or a side effect of the medication but this would seem less likely  I would recommend supportive care including IV fluids  She is currently tolerating clear liquids    Stool cultures have been ordered but not done yet and she has had no further stools since yesterday making an infectious etiology less likely  May be viral     -continue clear liquids  -continue IV fluids and supportive care per primary team  -follow lab trends  -complete stool enteric panel and C diff if she has further diarrhea        Chief Complaint   Patient presents with    Vomiting     patient states having diarrhea, which is a problem she has from her cancer  started vomiting around 230 this morning  no fevers  complains of headache  SUBJECTIVE/HPI   No GI complaints today  Nausea, vomiting and diarrhea have all resolved  She is tolerating clear liquids without difficulty  Denies melena, hematochezia      /65 (BP Location: Right arm)   Pulse 80   Temp (!) 97 1 °F (36 2 °C) (Oral)   Resp 17   Ht 5' 1" (1 549 m)   Wt 59 kg (130 lb)   SpO2 97%   BMI 24 56 kg/m²     PHYSICALEXAM  General appearance: alert, appears stated age and cooperative, pale  Eyes: PERLLA, EOMI, no icterus   Head: Normocephalic, without obvious abnormality, atraumatic  Lungs: clear to auscultation bilaterally  Heart: regular rate and rhythm, S1, S2 normal, no murmur, click, rub or gallop  Abdomen: soft, non-tender; bowel sounds normal; no masses,  no organomegaly  Extremities: extremities normal, atraumatic, no cyanosis or edema  Neurologic: Grossly normal    Lab Results   Component Value Date    GLUCOSE 94 10/12/2015    CALCIUM 8 0 (L) 01/06/2020     10/12/2015    K 3 8 01/06/2020    CO2 26 01/06/2020     01/06/2020    BUN 18 01/06/2020    CREATININE 1 27 01/06/2020     Lab Results   Component Value Date    WBC 3 23 (L) 01/06/2020    HGB 7 0 (L) 01/06/2020    HCT 21 8 (L) 01/06/2020     (H) 01/06/2020     (L) 01/06/2020     Lab Results   Component Value Date    ALT 25 01/06/2020    AST 42 01/06/2020    ALKPHOS 47 01/06/2020    BILITOT 0 28 10/12/2015     No results found for: AMYLASE  Lab Results   Component Value Date    LIPASE 78 02/07/2017     No results found for: IRON, TIBC, FERRITIN  Lab Results   Component Value Date    INR 1 12 01/05/2020

## 2020-01-06 NOTE — NURSING NOTE
Pt slept during few hrly rounds overnight, stating that she does not ordinarily sleep much at home  Denies pain and eager to have diet advanced  No stool sample overnight; voided qs

## 2020-01-06 NOTE — ASSESSMENT & PLAN NOTE
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

## 2020-01-06 NOTE — ASSESSMENT & PLAN NOTE
History of CKD stage III, creatinine 1 61, GFR 32 on presentation     Baseline creatinine 1 00 - 1 54, baseline GFR of 34-55  Creatinine is down to 1 27  On IV fluids  Monitor renal function

## 2020-01-06 NOTE — ASSESSMENT & PLAN NOTE
· Patient with chronic anemia, Hb 8 9 on presentation  Hb was 7 7 2 months ago, 10 3 ten months ago  · Patient has had GI workup carried out which showed stomach AVMs and colonoscopy showed polyps  She was advised to get a small bowel capsule enteroscopy but never followed through  · Trend CBC and temperature curve daily  · Keep Hb > 7, transfuse prn if needed  · Hemoglobin this morning is 7    Will transfuse 1 unit of packed red cell  · Monitor H&H and transfuse as needed

## 2020-01-06 NOTE — ASSESSMENT & PLAN NOTE
Currently receiving chemo at [facility] for rectal CA with mets to the liver  Her hemoglobin a m  1/6 was 7 0 but no overt signs of GI blood loss  She has had no bowel movements since 01/05  Denies UGI symptoms  Says she gets heartburn if she does not watch what she eats despite suppressive therapy  Last transfusion was for hemoglobin of 6 1  Most recent EGD showed gastric AVMs which were cauterized  Colonoscopy only some polyps which were removed  She was previously encouraged to follow through with the small bowel capsule study but she did not as she fears swallowing the capsule  Hemoglobin this morning is holding at 9 1 after transfusion of 1 unit PRBCs yesterday  Continues to have no overt signs of GI blood loss  -will need outpatient capsule to evaluate for small bowel source for GI blood loss; this will be arranged by our office outpatient and she is now agreeable  -continue PPI to b i d   -continue famotidine 40 mg at HS   -follow CBC trend, would repeat CBC 1 week post discharge to ensure stability  -no GI barriers to discharge with outpatient follow-up with our office

## 2020-01-06 NOTE — PROGRESS NOTES
Progress Note - Romaine Corrales 1950, 68 y o  female MRN: [id number]    Unit/Bed#: -01 Encounter: [id number]    Primary Care Provider: Lida Lucio MD   Date and time admitted to hospital: 1/5/2020  4:02 AM        Hyperglycemia  Assessment & Plan  Hyperglycemia, blood glucose 155, present on admission, no reported history of DM  Hb A1c is pending    CKD (chronic kidney disease) stage 3, GFR 30-59 ml/min (HCC)  Assessment & Plan  History of CKD stage III, creatinine 1 61, GFR 32 on presentation  Baseline creatinine 1 00 - 1 54, baseline GFR of 34-55  Creatinine is down to 1 27  On IV fluids  Monitor renal function    Paroxysmal atrial fibrillation (HCC)  Assessment & Plan  History of paroxysmal AFib not on anticoagulation  Currently in normal sinus rhythm  Currently on 25 mg Toprol xl once a day    Hypertension  Assessment & Plan  History of hypertension on Toprol xl 25 mg daily  Lasix on hold  Monitor vitals as per protocol    GIST (gastrointestinal stromal tumor), malignant ([facility] [address] )  Assessment & Plan  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, ANNA 285  Patient follows up regularly at [facility] and was seen on Thursday, 1/2/20  She had scans carried out and was told her tumors are stable  · To continue with outpatient scheduled Oncology follow ups    Anemia  Assessment & Plan  · Patient with chronic anemia, Hb 8 9 on presentation  Hb was 7 7 2 months ago, 10 3 ten months ago  · Patient has had GI workup carried out which showed stomach AVMs and colonoscopy showed polyps  She was advised to get a small bowel capsule enteroscopy but never followed through  · Trend CBC and temperature curve daily  · Keep Hb > 7, transfuse prn if needed  · Hemoglobin this morning is 7    Will transfuse 1 unit of packed red cell  · Monitor H&H and transfuse as needed    * Enterocolitis  Assessment & Plan  Enterocolitis, present on admission as evidenced by abdominal pain, vomiting, diarrhea of 1 day duration, CT scan abdomen/pelvis - 1  Diffuse abnormal appearance of the small and large bowel, most compatible with enterocolitis, either infectious or inflammatory  2   Diffuse circumferential wall thickening of the stomach  Correlate for gastritis  3   No significant change in hepatic metastatic disease  Patient with history of GIST tumors, diagnosed 2001, 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  Plan  · On IV fluids  · Collect Stool cultures and Check C diff  · Trend CBC and temp curve daily  · Hold off on antibiotics for now, unless patient develops a fever or leucocytosis  · Place on a clear liquid diet  · GI follow-up      Labs & Imaging: I have personally reviewed pertinent reports  VTE Pharmacologic Prophylaxis: Heparin  VTE Mechanical Prophylaxis: sequential compression device    Code Status:   Level 1 - Full Code    Patient Centered Rounds: I have performed bedside rounds with nursing staff today  Discussions with Specialists or Other Care Team Provider: GI    Current Length of Stay: 1 day(s)    Current Patient Status: Inpatient   Certification Statement: The patient will continue to require additional inpatient hospital stay due to see my assessment and plan  Subjective:   Patient is seen and examined at bedside  Denies any abdominal pain, diarrhea, nausea or vomiting  Afebrile  Denies any other complaint  All other ROS are negative  Objective:    Vitals: Blood pressure 124/65, pulse 80, temperature (!) 97 1 °F (36 2 °C), temperature source Oral, resp  rate 17, height 5' 1" (1 549 m), weight 59 kg (130 lb), SpO2 97 %  ,Body mass index is 24 56 kg/m²    SPO2 RA Rest      ED to Hosp-Admission (Current) from 1/5/2020 in [address] Med Surg Unit   SpO2  97 %   SpO2 Activity  At Rest   O2 Device  None (Room air)   O2 Flow Rate          I&O:     Intake/Output Summary (Last 24 hours) at 1/6/2020 1215  Last data filed at 1/6/2020 1150  Gross per 24 hour   Intake 2452 5 ml   Output 1425 ml   Net 1027 5 ml       Physical Exam:    General- Alert, sitting comfortably chair  Not in any acute distress  HEENT- LATISHA, EOM intact  Neck- Supple, No JVD  CVS- regular, S1 and S2 normal   Chest- Bilateral Air entry, No rhochi, crackles or wheezing present  Abdomen- soft, nontender, not distended, no guarding or rigidity, BS+  Extremities-  No pedal edema, No calf tenderness                         Normal ROM in all extremities  CNS-   Alert, awake and orientedx3  No focal deficits present      Invasive Devices     Peripheral Intravenous Line            Peripheral IV 01/05/20 Right Antecubital 1 day                      Social History  reviewed  Family History   Problem Relation Age of Onset   [facility] Breast cancer Mother     Diabetes Mother     Hypertension Mother     Lung cancer Mother     Hyperlipidemia Father     Prostate cancer Father     Colon cancer Paternal Grandmother     Colon cancer Paternal Grandfather     Diabetes Family     Substance Abuse Neg Hx     Mental illness Neg Hx     reviewed    Meds:  Current Facility-Administered Medications   Medication Dose Route Frequency Provider Last Rate Last Dose    acetaminophen (TYLENOL) tablet 650 mg  650 mg Oral Q6H PRN Millie Simon MD   650 mg at 01/06/20 0929    ALPRAZolam (XANAX) tablet 0 25 mg  0 25 mg Oral HS PRN Millie Simon MD        aspirin tablet 325 mg  325 mg Oral Daily Millie Simon MD   325 mg at 01/06/20 0933    calcium carbonate (TUMS) chewable tablet 500 mg  500 mg Oral Daily PRN Millie Simon MD        famotidine (PEPCID) tablet 40 mg  40 mg Oral HS LANEY Kate        heparin (porcine) subcutaneous injection 5,000 Units  5,000 Units Subcutaneous Q8H 6225 Sasha Carcamo MD   5,000 Units at 01/06/20 0502    hydroxychloroquine (PLAQUENIL) tablet 200 mg  200 mg Oral BID With Meals Millie Simon MD   200 mg at 01/06/20 0932    metoprolol succinate (TOPROL-XL) 24 hr tablet 25 mg 25 mg Oral Daily Artie Michelle MD   25 mg at 01/06/20 0935    ondansetron (ZOFRAN) injection 4 mg  4 mg Intravenous Q6H PRN Artie Michelle MD        pantoprazole (PROTONIX) EC tablet 40 mg  40 mg Oral BID AC LANEY Kate        pneumococcal 23-valent polysaccharide vaccine (PNEUMOVAX-23) injection 0 5 mL  0 5 mL Subcutaneous Prior to discharge Artie Michelle MD        sodium chloride 0 9 % infusion  75 mL/hr Intravenous Continuous Josue Soulier, MD          Medications Prior to Admission   Medication    aspirin (ESTELA ASPIRIN) 325 mg tablet    ALPRAZolam (XANAX) 0 25 mg tablet    Azelastine HCl 137 MCG/SPRAY SOLN    calcium carbonate (TUMS) 500 mg chewable tablet    diphenoxylate-atropine (LOMOTIL) 2 5-0 025 mg per tablet    docusate sodium (COLACE) 100 mg capsule    furosemide (LASIX) 20 mg tablet    hydroxychloroquine (PLAQUENIL) 200 mg tablet    loperamide (IMODIUM) 2 mg capsule    ofloxacin (OCUFLOX) 0 3 % ophthalmic solution    ondansetron (ZOFRAN) 8 mg tablet    pantoprazole (PROTONIX) 40 mg tablet    prednisoLONE acetate (PRED FORTE) 1 % ophthalmic suspension    TOPROL XL 25 MG 24 hr tablet       Labs:  Results from last 7 days   Lab Units 01/06/20  1141 01/06/20  0436 01/05/20  0418   WBC Thousand/uL  --  3 23* 2 70*   HEMOGLOBIN g/dL 7 3* 7 0* 8 9*   HEMATOCRIT % 22 1* 21 8* 27 1*   PLATELETS Thousands/uL  --  122* 172   NEUTROS PCT %  --   --  83*   LYMPHS PCT %  --   --  12*   MONOS PCT %  --   --  3*   EOS PCT %  --   --  2     Results from last 7 days   Lab Units 01/06/20  0437 01/05/20  0418   POTASSIUM mmol/L 3 8 3 5   CHLORIDE mmol/L 108 103   CO2 mmol/L 26 24   BUN mg/dL 18 20   CREATININE mg/dL 1 27 1 61*   CALCIUM mg/dL 8 0* 8 5   ALK PHOS U/L 47 66   ALT U/L 25 34   AST U/L 42 61*     Lab Results   Component Value Date    CKTOTAL 400 (H) 07/03/2014     Results from last 7 days   Lab Units 01/05/20  0418   INR  1 12     No results found for: Sofia Austin      Imaging:  Results for orders placed during the hospital encounter of 01/05/20   XR chest 1 view portable    Narrative CHEST     INDICATION:   cough  COMPARISON:  2/7/2017    EXAM PERFORMED/VIEWS:  XR CHEST PORTABLE      FINDINGS:    Cardiomediastinal silhouette appears unremarkable  The lungs are clear  No pneumothorax or pleural effusion  Osseous structures appear within normal limits for patient age  Impression No acute cardiopulmonary disease  Workstation performed: [id number]       No results found for this or any previous visit  Last 24 Hours Medication List:     Current Facility-Administered Medications:  acetaminophen 650 mg Oral Q6H PRN Ryan Pringle MD   ALPRAZolam 0 25 mg Oral HS PRN Ryan Pringle MD   aspirin 325 mg Oral Daily Ryan Pringle MD   calcium carbonate 500 mg Oral Daily PRN Ryan Pringle MD   famotidine 40 mg Oral HS LANEY Kate   heparin (porcine) 5,000 Units Subcutaneous Q8H [facility] Ryan Pringle MD   hydroxychloroquine 200 mg Oral BID With Meals Ryan Pringle MD   metoprolol succinate 25 mg Oral Daily Ryan Pringle MD   ondansetron 4 mg Intravenous Q6H PRN Ryan Pringle MD   pantoprazole 40 mg Oral BID AC LANEY Kate   pneumococcal 23-valent polysaccharide vaccine 0 5 mL Subcutaneous Prior to discharge Ryan Pringle MD   sodium chloride 75 mL/hr Intravenous Continuous Herminio Donahue MD        Today, Patient Was Seen By: Herminio Donahue MD    ** Please Note: Dictation voice to text software may have been used in the creation of this document   **

## 2020-01-06 NOTE — TELEPHONE ENCOUNTER
Can you set her up for an outpatient capsule endoscopy  ? Luli Enriquez was in the hospital on I know she has refused in the past but now she is agreeable thanks WO

## 2020-01-06 NOTE — ASSESSMENT & PLAN NOTE
24-year-old female well known to us with gets tumor of the colon diagnosed in 2001  Metastatic to the liver  She has been treated with a variety of chemotherapeutic agents most recently blue" under the direction of Jaspreet García  In with several days of diarrhea crampy abdominal pain nausea and vomiting  CT scan showed large and small bowel enteritis  Most likely this is an infectious enteritis the severity of which could be exacerbated by her immune suppression  There is a small chance this could be ischemic or a side effect of the medication but this would seem less likely  She has recovered with supportive care  Currently tolerating at [employer] diet without any pain    Stool studies have been obtained including C diff but are still pending results     -continue medical management per primary team  -follow lab trends  -await stool enteric panel and C diff

## 2020-01-06 NOTE — ASSESSMENT & PLAN NOTE
History of paroxysmal AFib not on anticoagulation  Currently in normal sinus rhythm  Currently on 25 mg Toprol xl once a day

## 2020-01-07 VITALS
DIASTOLIC BLOOD PRESSURE: 74 MMHG | WEIGHT: 130 LBS | BODY MASS INDEX: 24.55 KG/M2 | HEART RATE: 75 BPM | SYSTOLIC BLOOD PRESSURE: 145 MMHG | HEIGHT: 61 IN | OXYGEN SATURATION: 97 % | TEMPERATURE: 98.4 F | RESPIRATION RATE: 18 BRPM

## 2020-01-07 LAB
ANION GAP SERPL CALCULATED.3IONS-SCNC: 7 MMOL/L (ref 4–13)
BASOPHILS # BLD AUTO: 0.01 THOUSANDS/ΜL (ref 0–0.1)
BASOPHILS NFR BLD AUTO: 0 % (ref 0–1)
BUN SERPL-MCNC: 19 MG/DL (ref 5–25)
C DIFF TOX GENS STL QL NAA+PROBE: NEGATIVE
CALCIUM SERPL-MCNC: 8.1 MG/DL (ref 8.3–10.1)
CAMPYLOBACTER DNA SPEC NAA+PROBE: NORMAL
CHLORIDE SERPL-SCNC: 107 MMOL/L (ref 100–108)
CO2 SERPL-SCNC: 23 MMOL/L (ref 21–32)
CREAT SERPL-MCNC: 1.22 MG/DL (ref 0.6–1.3)
EOSINOPHIL # BLD AUTO: 0.12 THOUSAND/ΜL (ref 0–0.61)
EOSINOPHIL NFR BLD AUTO: 3 % (ref 0–6)
ERYTHROCYTE [DISTWIDTH] IN BLOOD BY AUTOMATED COUNT: 16.6 % (ref 11.6–15.1)
GFR SERPL CREATININE-BSD FRML MDRD: 45 ML/MIN/1.73SQ M
GLUCOSE SERPL-MCNC: 87 MG/DL (ref 65–140)
HCT VFR BLD AUTO: 27.4 % (ref 34.8–46.1)
HGB BLD-MCNC: 9.1 G/DL (ref 11.5–15.4)
IMM GRANULOCYTES # BLD AUTO: 0.01 THOUSAND/UL (ref 0–0.2)
IMM GRANULOCYTES NFR BLD AUTO: 0 % (ref 0–2)
LYMPHOCYTES # BLD AUTO: 0.54 THOUSANDS/ΜL (ref 0.6–4.47)
LYMPHOCYTES NFR BLD AUTO: 14 % (ref 14–44)
MAGNESIUM SERPL-MCNC: 1.8 MG/DL (ref 1.6–2.6)
MCH RBC QN AUTO: 34.2 PG (ref 26.8–34.3)
MCHC RBC AUTO-ENTMCNC: 33.2 G/DL (ref 31.4–37.4)
MCV RBC AUTO: 103 FL (ref 82–98)
MONOCYTES # BLD AUTO: 0.38 THOUSAND/ΜL (ref 0.17–1.22)
MONOCYTES NFR BLD AUTO: 10 % (ref 4–12)
NEUTROPHILS # BLD AUTO: 2.85 THOUSANDS/ΜL (ref 1.85–7.62)
NEUTS SEG NFR BLD AUTO: 73 % (ref 43–75)
NRBC BLD AUTO-RTO: 0 /100 WBCS
PLATELET # BLD AUTO: 130 THOUSANDS/UL (ref 149–390)
PMV BLD AUTO: 10.9 FL (ref 8.9–12.7)
POTASSIUM SERPL-SCNC: 3.8 MMOL/L (ref 3.5–5.3)
RBC # BLD AUTO: 2.66 MILLION/UL (ref 3.81–5.12)
SALMONELLA DNA SPEC QL NAA+PROBE: NORMAL
SHIGA TOXIN STX GENE SPEC NAA+PROBE: NORMAL
SHIGELLA DNA SPEC QL NAA+PROBE: NORMAL
SODIUM SERPL-SCNC: 137 MMOL/L (ref 136–145)
WBC # BLD AUTO: 3.91 THOUSAND/UL (ref 4.31–10.16)

## 2020-01-07 PROCEDURE — 80048 BASIC METABOLIC PNL TOTAL CA: CPT | Performed by: INTERNAL MEDICINE

## 2020-01-07 PROCEDURE — 99239 HOSP IP/OBS DSCHRG MGMT >30: CPT | Performed by: INTERNAL MEDICINE

## 2020-01-07 PROCEDURE — 99232 SBSQ HOSP IP/OBS MODERATE 35: CPT | Performed by: INTERNAL MEDICINE

## 2020-01-07 PROCEDURE — 87505 NFCT AGENT DETECTION GI: CPT | Performed by: EMERGENCY MEDICINE

## 2020-01-07 PROCEDURE — 85025 COMPLETE CBC W/AUTO DIFF WBC: CPT | Performed by: INTERNAL MEDICINE

## 2020-01-07 PROCEDURE — 97166 OT EVAL MOD COMPLEX 45 MIN: CPT

## 2020-01-07 PROCEDURE — 83735 ASSAY OF MAGNESIUM: CPT | Performed by: INTERNAL MEDICINE

## 2020-01-07 PROCEDURE — 97163 PT EVAL HIGH COMPLEX 45 MIN: CPT

## 2020-01-07 PROCEDURE — 87493 C DIFF AMPLIFIED PROBE: CPT | Performed by: EMERGENCY MEDICINE

## 2020-01-07 RX ADMIN — HEPARIN SODIUM 5000 UNITS: 5000 INJECTION INTRAVENOUS; SUBCUTANEOUS at 14:32

## 2020-01-07 RX ADMIN — PANTOPRAZOLE SODIUM 40 MG: 40 TABLET, DELAYED RELEASE ORAL at 05:31

## 2020-01-07 RX ADMIN — METOPROLOL SUCCINATE 25 MG: 25 TABLET, FILM COATED, EXTENDED RELEASE ORAL at 09:24

## 2020-01-07 RX ADMIN — HYDROXYCHLOROQUINE SULFATE 200 MG: 200 TABLET, FILM COATED ORAL at 09:25

## 2020-01-07 RX ADMIN — ASPIRIN 325 MG ORAL TABLET 325 MG: 325 PILL ORAL at 09:24

## 2020-01-07 RX ADMIN — SODIUM CHLORIDE 75 ML/HR: 0.9 INJECTION, SOLUTION INTRAVENOUS at 05:36

## 2020-01-07 RX ADMIN — HEPARIN SODIUM 5000 UNITS: 5000 INJECTION INTRAVENOUS; SUBCUTANEOUS at 05:31

## 2020-01-07 NOTE — PHYSICAL THERAPY NOTE
Physical Therapy Evaluation     Patient's Name: Koko Pina    Admitting Diagnosis  Enterocolitis [K52 9]  Vomiting [R11 10]  Nausea & vomiting [R11 2]  CKD (chronic kidney disease) [N18 9]  Chronic anemia [D64 9]    Problem List  Patient Active Problem List   Diagnosis    Anemia    Anxiety    DJD (degenerative joint disease)    Gait disturbance    GIST (gastrointestinal stromal tumor), malignant ([address] )    Greater trochanteric bursitis of right hip    Herniated nucleus pulposus, L5-S1, right    Hiatal hernia    Hypertension    Lumbar stenosis without neurogenic claudication    Lesion of plantar nerve    MARK (obstructive sleep apnea)    Pain syndrome, chronic    Paroxysmal atrial fibrillation (HCC)    Right bundle-branch block    Right lumbar radiculitis    Sacroiliac joint dysfunction of right side    Sacroiliitis (HCC)    Spondylosis of lumbar region without myelopathy or radiculopathy    Piriformis syndrome of right side    Non-rheumatic mitral regurgitation    Piriformis syndrome, left    Localized osteoarthritis of left knee    Health care maintenance    Pre-op examination    Enterocolitis    CKD (chronic kidney disease) stage 3, GFR 30-59 ml/min ([facility])    Hyperglycemia       Past Medical History  Past Medical History:   Diagnosis Date    ADHD     Anemia     Anxiety     Arthritis     Asthma     Atrial fibrillation ([address] )     Bleeding disorder ([facility]     Breast cancer ([facility] )     Cancer ([facility] )     Coronary artery disease     Depression     GERD (gastroesophageal reflux disease)     History of stomach ulcers     Hypercholesterolemia     Hypertension     Kidney disease     Metastatic cancer ([facility] )        Past Surgical History  Past Surgical History:   Procedure Laterality Date    ANKLE SURGERY      APPENDECTOMY      BREAST SURGERY      CATARACT EXTRACTION       SECTION      COLONOSCOPY      HIP SURGERY      JOINT REPLACEMENT  2019    Left knee replacement    LAMINECTOMY      ROTATOR CUFF REPAIR      SIGMOID RESECTION / RECTOPEXY      SINUS SURGERY          01/07/20 4640   Note Type   Note type Eval only   Pain Assessment   Pain Assessment No/denies pain   Pain Score No Pain   Home Living   Type of Home House   Home Layout Two level;Stairs to enter with rails   Home Equipment Walker;Cane   Additional Comments PRIOR TO THIS ADMISSION PATIENT RESIDED [address] [facility] IN A TWO LEVEL HOME (2 [address]) WHERE SHE REPORTS PRIOR I WITH MOBILITY (NO AD IN THE HOME, SPC OUTSIDE OF THE HOME, 0 FALLS), ADLS, AND IADLS  Prior Function   Level of [place] Independent with ADLs and functional mobility   Lives With Spouse; Son   ADL Assistance Independent   IADLs Independent   Falls in the last 6 months 0   Restrictions/Precautions   Weight Bearing Precautions Per Order No   Braces or Orthoses   (NONE)   Other Precautions Contact/isolation;Multiple lines  (MASIMO)   General   Family/Caregiver Present Yes  (SPOUSE)   Cognition   Overall Cognitive Status WFL   Comments FLAT AFFECT    RUE Assessment   RUE Assessment WFL   LUE Assessment   LUE Assessment WFL   RLE Assessment   RLE Assessment WFL   LLE Assessment   LLE Assessment WFL   Bed Mobility   Supine to Sit 7  Independent   Sit to Supine 7  Independent   Transfers   Sit to Stand 5  Supervision   Additional items Assist x 1  (FOR LINE MANAGEMENT)   Stand to Sit 5  Supervision   Additional items Assist x 1  (FOR LINE MANAGEMENT)   Toilet transfer 5  Supervision   Additional items Increased time required;Assist x 1  (FOR LINE MANAGEMENT)   Ambulation/Elevation   Gait pattern WNL  (MILD REDUCTION IN GAIT SPEED )   Gait Assistance 5  Supervision   Additional items Assist x 1  (FOR LINE MANAGEMENT)   Assistive Device SPC   Distance 30 FEET (WITHIN ROOM)    Stair Management Assistance   (PROVIDED EDUCATION )   Balance   Static Sitting Normal   Static Standing Good   Ambulatory Good   Endurance Deficit   Endurance Deficit Yes   Endurance Deficit Description FATIGUE; REDUCED MOBILITY SINCE ADMITTED    Activity Tolerance   Activity Tolerance Patient tolerated treatment well   Medical Staff Made Aware CHUY PENA AND RUPERTO FOR D/C PLANNING   Nurse Made Aware TRINI TO SEE PER CICI HOSKINS    Assessment   Prognosis Good   Problem List Decreased endurance; Impaired balance   Assessment PT COMPLETED EVALUATION OF 68YEAR OLD FEMALE AD[facility]ITTED TO [facility] ON 1/5/20 WITH VOMITING AND DIARRHEA  PATIENT EVALUATED BY GI WHOSE DIAGNOSES INCLUDE ANEMIA AND ENTEROCOLITIS AND R/O C  DIFF  CURRENT STATUS INSTABILITES INCLUDE PENDING STOOL TEST RESULTS, CONTACT PRECAUTIONS, IV FLUIDS, AND CONTINUOUS O2/HR MONITORING  PMH IS SIGNIFICANT FOR COLON CANCER WITH METASTASIS TO LIVER (UNDERGOES CHEMOTHERAPY), ANEMIA, L TKA (07/2019), ANXIETY, AFIB, BREAST CANCER, HTN, AND ANKLE SURGERY  PRIOR TO THIS ADMISSION PATIENT RESIDED WITH SPOUSE AND SON IN A TWO LEVEL HOME (2 [address]) WHERE SHE REPORTS PRIOR I WITH MOBILITY (NO AD IN THE HOME, SPC OUTSIDE OF THE HOME, 0 FALLS), ADLS, AND IADLS  CURRENT IMPAIRMENTS INCLUDE DECREASED ACTIVITY TOLERANCE, GROSS FATIGUE, GAIT DEVIATIONS (REDUCED SPEED), AND BALANCE DEFICITS (USE OF SPC)  DURING PT EVALUATION PATIENT PERFORMED BED MOBILITY I  SHE REQUIRED S FOR LINE MANAGEMENT DURING SIT<-->STAND TRANSFERS FROM BED AND FROM TOILET AND FOR AMBULATION  SHE AMBULATED APPROX 30 FEET WITHIN THE ROOM (MUST STAY WITHIN ROOM SECONDARY TO R/O C DIFF) WITH USE OF SPC  PROVIDED EDUCATION TO RECOMMEND NONRECRIPROCAL NAVIGATION OF STAIRS WITH USE OF HAND RAILS UPON D/C HOME WHICH PATIENT REPORTS DOING AT BASELINE  PT D/C RECOMMENDATION IS FOR HOME WITH USE OF SPC PRN AND ASSISTANCE FROM FAMILY PRN  IN FUTURE SESSIONS IF/WHEN CONTACT PRECAUTIONS CLEARED PLAN TO TRIAL STAIRS AND COMMUNITY DISTANCE AMBULATION IN HALLWAYS  PATIENT WILL BENEFIT FROM CONTINUED SKILLED PT THIS ADMISSION TO ACHIEVE MAXIMAL FUNCTION AND SAFETY     Goals   Patient Goals TO GO HOME    Short Term Goal #1 1) PERFORM SIT<-->STAND TRANSFER I TO PROMOTE I WITH TOILETING AND OOB MOBILITY; 2) AMBULATE 200 FEET MOD-I W/ LEAST RESTRICTIVE DEVICE TO PARTICIPATE IN HOUSEHOLD AND COMMUNITY LEVEL AMBULATION;  3) IMPROVE BALANCE BY 1 GRADE TO REDUCE RISK FOR FALLS; 4) NAVIGATE 12 STEPS MOD-I LEVEL IN ORDER TO SAFELY NAVIGATE MULTIPLE FLOORS AT HOME  PT Treatment Day 0   Plan   Treatment/Interventions Functional transfer training;Elevations; Endurance training;Equipment eval/education;Gait training;Spoke to nursing;OT;Spoke to case management   PT Frequency Other (Comment)  (3-5X/WK)   Recommendation   Recommendation Home with family support   Equipment Recommended Cane  (HAS SPC )   PT - OK to Discharge Yes  (HOME W/ USE OF SPC PRN WHEN MED TRINI )   Barthel Index   Feeding 10   Bathing 0   Grooming Score 5   Dressing Score 10   Bladder Score 5   Bowels Score 5   Toilet Use Score 5   Transfers (Bed/Chair) Score 10   Mobility (Level Surface) Score 0   Stairs Score 0   Barthel Index Score 50       Ángela Daugherty, PT DPT

## 2020-01-07 NOTE — PLAN OF CARE
Problem: PHYSICAL THERAPY ADULT  Goal: Performs mobility at highest level of function for planned discharge setting  See evaluation for individualized goals  Description  Treatment/Interventions: Functional transfer training, Elevations, Endurance training, Equipment eval/education, Gait training, Spoke to nursing, OT, Spoke to case management  Equipment Recommended: Rubene(HAS SPC )       See flowsheet documentation for full assessment, interventions and recommendations  Note:   Prognosis: Good  Problem List: Decreased endurance, Impaired balance  Assessment: PT COMPLETED EVALUATION OF 68YEAR OLD FEMALE ADMITTED TO [facility] ON 1/5/20 WITH VOMITING AND DIARRHEA  PATIENT EVALUATED BY GI WHOSE DIAGNOSES INCLUDE ANEMIA AND ENTEROCOLITIS AND R/O C  DIFF  CURRENT STATUS INSTABILITES INCLUDE PENDING STOOL TEST RESULTS, CONTACT PRECAUTIONS, IV FLUIDS, AND CONTINUOUS O2/HR MONITORING  PMH IS SIGNIFICANT FOR COLON CANCER WITH METASTASIS TO LIVER (UNDERGOES CHEMOTHERAPY), ANEMIA, L TKA (07/2019), ANXIETY, AFIB, BREAST CANCER, HTN, AND ANKLE SURGERY  PRIOR TO THIS ADMISSION PATIENT RESIDED WITH SPOUSE AND SON IN A TWO LEVEL HOME ([address] [facility]) WHERE SHE REPORTS PRIOR I WITH MOBILITY (NO AD IN THE HOME, SPC OUTSIDE OF THE HOME, 0 FALLS), ADLS, AND IADLS  CURRENT IMPAIRMENTS INCLUDE DECREASED ACTIVITY TOLERANCE, GROSS FATIGUE, GAIT DEVIATIONS (REDUCED SPEED), AND BALANCE DEFICITS (USE OF SPC)  DURING PT EVALUATION PATIENT PERFORMED BED MOBILITY I  SHE REQUIRED S FOR LINE MANAGEMENT DURING SIT<-->STAND TRANSFERS FROM BED AND FROM TOILET AND FOR AMBULATION  SHE AMBULATED APPROX 30 FEET WITHIN THE ROOM (MUST STAY WITHIN ROOM SECONDARY TO R/O C DIFF) WITH USE OF SPC  PROVIDED EDUCATION TO RECOMMEND NONRECRIPROCAL NAVIGATION OF STAIRS WITH USE OF HAND RAILS UPON D/C HOME WHICH PATIENT REPORTS DOING AT BASELINE  PT D/C RECOMMENDATION IS FOR HOME WITH USE OF SPC PRN AND ASSISTANCE FROM FAMILY PRN   IN FUTURE SESSIONS IF/WHEN CONTACT PRECAUTIONS CLEARED PLAN TO TRIAL STAIRS AND COMMUNITY DISTANCE AMBULATION IN HALLWAYS  PATIENT WILL BENEFIT FROM CONTINUED SKILLED PT THIS ADMISSION TO ACHIEVE MAXIMAL FUNCTION AND SAFETY  Recommendation: Home with family support     PT - OK to Discharge: Yes(HOME W/ USE OF SPC PRN WHEN MED TRINI )    See flowsheet documentation for full assessment

## 2020-01-07 NOTE — ASSESSMENT & PLAN NOTE
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

## 2020-01-07 NOTE — DISCHARGE SUMMARY
Discharge- Vini Dominguez 1950, 68 y o  female MRN: [id number]    Unit/Bed#: -01 Encounter: [id number]    Primary Care Provider: Carl Leary MD   Date and time admitted to hospital: 1/5/2020  4:02 AM        Hyperglycemia  Assessment & Plan  Hyperglycemia, blood glucose 155, present on admission, no reported history of DM  HB A1c is pending    CKD (chronic kidney disease) stage 3, GFR 30-59 ml/min (HCC)  Assessment & Plan  History of CKD stage III, creatinine 1 61, GFR 32 on presentation  Baseline creatinine 1 00 - 1 54, baseline GFR of 34-55  Creatinine is down to 1 22  Monitor renal function    Paroxysmal atrial fibrillation (HCC)  Assessment & Plan  History of paroxysmal AFib not on anticoagulation  Currently in normal sinus rhythm  Currently on 25 mg Toprol xl once a day    Hypertension  Assessment & Plan  History of hypertension on Toprol xl 25 mg daily  Discharged on Lasix  Monitor vitals as per protocol    GIST (gastrointestinal stromal tumor), malignant ([facility] [address] [address] )  Assessment & Plan  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, ANNA 285  Patient follows up regularly at [facility] and was seen on Thursday, 1/2/20  She had scans carried out and was told her tumors are stable  · To continue with outpatient scheduled Oncology follow ups    Anemia  Assessment & Plan  · Patient with chronic anemia, Hb 8 9 on presentation  Hb was 7 7 2 months ago, 10 3 ten months ago  · Patient has had GI workup carried out which showed stomach AVMs and colonoscopy showed polyps  She was advised to get a small bowel capsule enteroscopy but never followed through  · Trend CBC and temperature curve daily  · Keep Hb > 7, transfuse prn if needed  · Hemoglobin this morning is 7  Will transfuse 1 unit of packed red cell  · Hemoglobin this morning is 9 1  · GI follow-up noted    Patient is stable for discharge with outpatient capsule endoscopy which will be arranged by GI    * Enterocolitis  Assessment & Plan  Enterocolitis, present on admission as evidenced by abdominal pain, vomiting, diarrhea of 1 day duration,   CT scan abdomen/pelvis - 1  Diffuse abnormal appearance of the small and large bowel, most compatible with enterocolitis, either infectious or inflammatory  2   Diffuse circumferential wall thickening of the stomach  Correlate for gastritis  3   No significant change in hepatic metastatic disease  Patient with history of GIST tumors, diagnosed 2001, 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  Plan  · Patient is tolerating diet without any complaints  · Stool cultures and Check C diff is negative  · Trend CBC and temp curve daily  · Hold off on antibiotics for now, unless patient develops a fever or leucocytosis  · GI follow-up  · Patient is seen by  and physical therapy and will be discharged home  · Patient denied any home care services      Hospital Course:     Jesse Torres is a 68 y o  female patient who originally presented to the hospital on   Admission Orders (From admission, onward)     Ordered        01/05/20 0726  Inpatient Admission (expected length of stay for this patient Order details is greater than two midnights)  Once                  due to abdominal pain, vomiting and diarrhea  Patient has history of gastric AVMs and underwent endoscopy and colonoscopy in November 2019  At this time she was recommended for capsule endoscopy but patient states she was scared of swallowing the capsule and never followed up  Patient had CT abdomen pelvis done which showed diffuse abnormal appearance of small and large bowel compatible with enterocolitis  Patient was seen by Gastroenterology and was started on IV fluids  Patient symptoms resolved  Stool workup including stool for C diff was negative  Patient was started on diet and was advanced as tolerated  Patient did well with diet and denies any complaint of abdominal pain, nausea, vomiting or diarrhea    During this admission patient was given 1 unit of packed red cell transfusion and hemoglobin at discharge is 9 1  GI recommended outpatient capsule endoscopy  Patient was seen by Physical therapy and  and did not require any home care services  Follow-up with PCP in 1 week  Follow-up with GI as outpatient for capsule endoscopy  Return to ER with any worsening abdominal pain, diarrhea, rectal bleeding or any other alarming symptoms        Please see above list of diagnoses and related plan for additional information  Condition at Discharge:  good      Discharge instructions/Information to patient and family:   See after visit summary for information provided to patient and family  Provisions for Follow-Up Care:  See after visit summary for information related to follow-up care and any pertinent home health orders  Disposition:     Home       Discharge Statement:  I spent 40 minutes discharging the patient  This time was spent on the day of discharge  I had direct contact with the patient on the day of discharge  Greater than 50% of the total time was spent examining patient, answering all patient questions, arranging and discussing plan of care with patient as well as directly providing post-discharge instructions  Additional time then spent on discharge activities  Discharge Medications:  See after visit summary for reconciled discharge medications provided to patient and family        ** Please Note: This note has been constructed using a voice recognition system **

## 2020-01-07 NOTE — ASSESSMENT & PLAN NOTE
Enterocolitis, present on admission as evidenced by abdominal pain, vomiting, diarrhea of 1 day duration,   CT scan abdomen/pelvis - 1  Diffuse abnormal appearance of the small and large bowel, most compatible with enterocolitis, either infectious or inflammatory  2   Diffuse circumferential wall thickening of the stomach  Correlate for gastritis  3   No significant change in hepatic metastatic disease  Patient with history of GIST tumors, diagnosed 2001, 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  Plan  · Patient is tolerating diet without any complaints    · Stool cultures and Check C diff is negative  · Trend CBC and temp curve daily  · Hold off on antibiotics for now, unless patient develops a fever or leucocytosis  · GI follow-up  · Patient is seen by  and physical therapy and will be discharged home  · Patient denied any home care services

## 2020-01-07 NOTE — DISCHARGE INSTRUCTIONS
Follow-up with PCP in 1 week  Follow-up with GI as outpatient for capsule endoscopy  Return to ER with any worsening abdominal pain, diarrhea, rectal bleeding or any other alarming symptoms

## 2020-01-07 NOTE — NURSING NOTE
Pt slept during approx half of hrly rounds overnight; denies pain  Stool spec obtained and sent as ordered; pt eager to go home

## 2020-01-07 NOTE — PLAN OF CARE
Problem: OCCUPATIONAL THERAPY ADULT  Goal: Performs self-care activities at highest level of function for planned discharge setting  See evaluation for individualized goals  Description  Treatment Interventions: ADL retraining, Functional transfer training, UE strengthening/ROM, Endurance training, Patient/family training, Equipment evaluation/education, Compensatory technique education, Energy conservation, Activityengagement          See flowsheet documentation for full assessment, interventions and recommendations  Note:   Limitation: Decreased ADL status, Decreased UE strength, Decreased endurance, Decreased sensation, Decreased self-care trans, Decreased high-level ADLs, Mood limitation  Prognosis: Fair  Assessment: Pt is a 68 y o  female seen for OT evaluation s/p admit to [address] on 1/5/2020 w/ Enterocolitis  Comorbidities affecting pt's functional performance at time of assessment include: anemia, GIST, HTN, afib, hyperglycemia, anxiety, DJD, gait distubance (please see extensive list of comorbidities)  Personal factors affecting pt at time of IE include:steps to enter environment, difficulty performing ADLS, difficulty performing IADLS , level of education, flat affect, decreased initiation and engagement , health management  and environment  Prior to admission, pt was indep w/ ADLs, functional mobility w/o AD (SPC in community only) and IADLS  Patient lives at home with her son and  who are able to provide 24/7 S/A at DC  Patient states she lives in a [facility] with bed/bath upstairs, no hx of falls in last 6 months  + driving and retired  Upon evaluation: Pt independent w/ bed mobility, S for functional transfers with [facility] and w/ line management, and S for all ADLS this date  Patient's  also present on evaluation stating he is able to A at dc    At this time, patient presents with the following deficits impacting occupational performance: weakness, decreased strength, decreased balance, decreased tolerance, decreased safety awareness and decreased coping skills  Pt to benefit from continued skilled OT tx while in the hospital to address deficits as defined above and maximize level of functional independence w ADL's and functional mobility  Occupational Performance areas to address include: eating, grooming, bathing/shower, toilet hygiene, dressing, medication management, socialization, health maintenance, functional mobility, community mobility, clothing management, cleaning, meal prep, money management, household maintenance and social participation  From OT standpoint, recommendation at time of d/c would be home with family support  Based on findings, patient is of moderate complexity       OT Discharge Recommendation: Home with family support

## 2020-01-07 NOTE — PLAN OF CARE
Problem: Potential for Falls  Goal: Patient will remain free of falls  Description  INTERVENTIONS:  - Assess patient frequently for physical needs  -  Identify cognitive and physical deficits and behaviors that affect risk of falls  -  [place] fall precautions as indicated by assessment   - Educate patient/family on patient safety including physical limitations  - Instruct patient to call for assistance with activity based on assessment  - Modify environment to reduce risk of injury  - Consider OT/PT consult to assist with strengthening/mobility  1/7/2020 1546 by Neetu Moscoso RN  Outcome: Adequate for Discharge  1/7/2020 1106 by Neetu Moscoso RN  Outcome: Progressing     Problem: Prexisting or High Potential for Compromised Skin Integrity  Goal: Skin integrity is maintained or improved  Description  INTERVENTIONS:  - Identify patients at risk for skin breakdown  - Assess and monitor skin integrity  - Assess and monitor nutrition and hydration status  - Monitor labs   - Assess for incontinence   - Turn and reposition patient  - Assist with mobility/ambulation  - Relieve pressure over bony prominences  - Avoid friction and shearing  - Provide appropriate hygiene as needed including keeping skin clean and dry  - Evaluate need for skin moisturizer/barrier cream  - Collaborate with interdisciplinary team   - Patient/family teaching  - Consider wound care consult   1/7/2020 1546 by Neetu Moscoso RN  Outcome: Adequate for Discharge  1/7/2020 1106 by Neetu Moscoso RN  Outcome: Progressing     Problem: Nutrition/Hydration-ADULT  Goal: Nutrient/Hydration intake appropriate for improving, restoring or maintaining nutritional needs  Description  Monitor and assess patient's nutrition/hydration status for malnutrition  Collaborate with interdisciplinary team and initiate plan and interventions as ordered  Monitor patient's weight and dietary intake as ordered or per policy   Utilize nutrition screening tool and intervene as necessary  Determine patient's food preferences and provide high-protein, high-caloric foods as appropriate       INTERVENTIONS:  - Monitor oral intake, urinary output, labs, and treatment plans  - Assess nutrition and hydration status and recommend course of action  - Evaluate amount of meals eaten  - Assist patient with eating if necessary   - Allow adequate time for meals  - Recommend/ encourage appropriate diets, oral nutritional supplements, and vitamin/mineral supplements  - Order, calculate, and assess calorie counts as needed  - Recommend, monitor, and adjust tube feedings and TPN/PPN based on assessed needs  - Assess need for intravenous fluids  - Provide specific nutrition/hydration education as appropriate  - Include patient/family/caregiver in decisions related to nutrition  1/7/2020 1546 by Dorothy Thompson RN  Outcome: Adequate for Discharge  1/7/2020 1106 by Dorothy Thompson RN  Outcome: Progressing     Problem: SAFETY ADULT  Goal: Maintain or return to baseline ADL function  Description  INTERVENTIONS:  -  Assess patient's ability to carry out ADLs; assess patient's baseline for ADL function and identify physical deficits which impact ability to perform ADLs (bathing, care of mouth/teeth, toileting, grooming, dressing, etc )  - Assess/evaluate cause of self-care deficits   - Assess range of motion  - Assess patient's mobility; develop plan if impaired  - Assess patient's need for assistive devices and provide as appropriate  - Encourage maximum independence but intervene and supervise when necessary  - Involve family in performance of ADLs  - Assess for home care needs following discharge   - Consider OT consult to assist with ADL evaluation and planning for discharge  - Provide patient education as appropriate  1/7/2020 1546 by Dorothy Thompson RN  Outcome: Adequate for Discharge  1/7/2020 1106 by Dorothy Thompson RN  Outcome: Progressing  Goal: Maintain or return mobility status to optimal level  Description  INTERVENTIONS:  - Assess patient's baseline mobility status (ambulation, transfers, stairs, etc )    - Identify cognitive and physical deficits and behaviors that affect mobility  - Identify mobility aids required to assist with transfers and/or ambulation (gait belt, sit-to-stand, lift, walker, cane, etc )  - [place] fall precautions as indicated by assessment  - Record patient progress and toleration of activity level on Mobility SBAR; progress patient to next Phase/Stage  - Instruct patient to call for assistance with activity based on assessment  - Consider rehabilitation consult to assist with strengthening/weightbearing, etc   1/7/2020 1546 by Elena Ames RN  Outcome: Adequate for Discharge  1/7/2020 1106 by Elena Ames RN  Outcome: Progressing     Problem: DISCHARGE PLANNING  Goal: Discharge to home or other facility with appropriate resources  Description  INTERVENTIONS:  - Identify barriers to discharge w/patient and caregiver  - Arrange for needed discharge resources and transportation as appropriate  - Identify discharge learning needs (meds, wound care, etc )  - Arrange for interpretive services to assist at discharge as needed  - Refer to Case Management Department for coordinating discharge planning if the patient needs post-hospital services based on physician/advanced practitioner order or complex needs related to functional status, cognitive ability, or social support system  1/7/2020 1546 by Elena Ames RN  Outcome: Adequate for Discharge  1/7/2020 1106 by Elena Ames RN  Outcome: Progressing     Problem: Knowledge Deficit  Goal: Patient/family/caregiver demonstrates understanding of disease process, treatment plan, medications, and discharge instructions  Description  Complete learning assessment and assess knowledge base    Interventions:  - Provide teaching at level of understanding  - Provide teaching via preferred learning methods  1/7/2020 1546 by Gloria Cabrera RN  Outcome: Adequate for Discharge  1/7/2020 1106 by Gloria Cabrera RN  Outcome: Progressing     Problem: GASTROINTESTINAL - ADULT  Goal: Minimal or absence of nausea and/or vomiting  Description  INTERVENTIONS:  - Administer IV fluids if ordered to ensure adequate hydration  - Maintain NPO status until nausea and vomiting are resolved  - Nasogastric tube if ordered  - Administer ordered antiemetic medications as needed  - Provide nonpharmacologic comfort measures as appropriate  - Advance diet as tolerated, if ordered  - Consider nutrition services referral to assist patient with adequate nutrition and appropriate food choices  1/7/2020 1546 by Gloria Cabrera RN  Outcome: Adequate for Discharge  1/7/2020 1106 by Gloria Cabrera RN  Outcome: Progressing  Goal: Maintains or returns to baseline bowel function  Description  INTERVENTIONS:  - Assess bowel function  - Encourage oral fluids to ensure adequate hydration  - Administer IV fluids if ordered to ensure adequate hydration  - Administer ordered medications as needed  - Encourage mobilization and activity  - Consider nutritional services referral to assist patient with adequate nutrition and appropriate food choices  1/7/2020 1546 by Gloria Cabrera RN  Outcome: Adequate for Discharge  1/7/2020 1106 by Gloria Cabrera RN  Outcome: Progressing  Goal: Maintains adequate nutritional intake  Description  INTERVENTIONS:  - Monitor percentage of each meal consumed  - Identify factors contributing to decreased intake, treat as appropriate  - Assist with meals as needed  - Monitor I&O, weight, and lab values if indicated  - Obtain nutrition services referral as needed  1/7/2020 1546 by Gloria Cabrera RN  Outcome: Adequate for Discharge  1/7/2020 1106 by Gloria Cabrera RN  Outcome: Progressing  Goal: Establish and maintain optimal ostomy function  Description  INTERVENTIONS:  - Assess bowel function  - Encourage oral fluids to ensure adequate hydration  - Administer IV fluids if ordered to ensure adequate hydration   - Administer ordered medications as needed  - Encourage mobilization and activity  - Nutrition services referral to assist patient with appropriate food choices  - Assess stoma site  - Consider wound care consult   1/7/2020 1546 by Giorgio Ruggiero RN  Outcome: Adequate for Discharge  1/7/2020 1106 by Giorgio Ruggiero RN  Outcome: Progressing     Problem: METABOLIC, FLUID AND ELECTROLYTES - ADULT  Goal: Electrolytes maintained within normal limits  Description  INTERVENTIONS:  - Monitor labs and assess patient for signs and symptoms of electrolyte imbalances  - Administer electrolyte replacement as ordered  - Monitor response to electrolyte replacements, including repeat lab results as appropriate  - Instruct patient on fluid and nutrition as appropriate  1/7/2020 1546 by Giorgio Ruggiero RN  Outcome: Adequate for Discharge  1/7/2020 1106 by Giorgio Ruggiero RN  Outcome: Progressing  Goal: Fluid balance maintained  Description  INTERVENTIONS:  - Monitor labs   - Monitor I/O and WT  - Instruct patient on fluid and nutrition as appropriate  - Assess for signs & symptoms of volume excess or deficit  1/7/2020 1546 by Giorgio Ruggiero RN  Outcome: Adequate for Discharge  1/7/2020 1106 by Giorgio Ruggiero RN  Outcome: Progressing  Goal: Glucose maintained within target range  Description  INTERVENTIONS:  - Monitor Blood Glucose as ordered  - Assess for signs and symptoms of hyperglycemia and hypoglycemia  - Administer ordered medications to maintain glucose within target range  - Assess nutritional intake and initiate nutrition service referral as needed  1/7/2020 1546 by Giorgio Ruggiero RN  Outcome: Adequate for Discharge  1/7/2020 1106 by Giorgio Ruggiero RN  Outcome: Progressing

## 2020-01-07 NOTE — ASSESSMENT & PLAN NOTE
History of CKD stage III, creatinine 1 61, GFR 32 on presentation     Baseline creatinine 1 00 - 1 54, baseline GFR of 34-55  Creatinine is down to 1 22  Monitor renal function

## 2020-01-07 NOTE — PROGRESS NOTES
Progress note - Gastroenterology   Unknown Au 68 y o  female MRN: [id number]  Unit/Bed#: -01 Encounter: [id number]    ASSESSMENT and PLAN    Anemia  Assessment & Plan  Currently receiving chemo at [facility] for rectal CA with mets to the liver  Her hemoglobin a m  1/6 was 7 0 but no overt signs of GI blood loss  She has had no bowel movements since 01/05  Denies UGI symptoms  Says she gets heartburn if she does not watch what she eats despite suppressive therapy  Last transfusion was for hemoglobin of 6 1  Most recent EGD showed gastric AVMs which were cauterized  Colonoscopy only some polyps which were removed  She was previously encouraged to follow through with the small bowel capsule study but she did not as she fears swallowing the capsule  Hemoglobin this morning is holding at 9 1 after transfusion of 1 unit PRBCs yesterday  Continues to have no overt signs of GI blood loss  -will need outpatient capsule to evaluate for small bowel source for GI blood loss; this will be arranged by our office outpatient and she is now agreeable  -continue PPI to b i d   -continue famotidine 40 mg at HS   -follow CBC trend, would repeat CBC 1 week post discharge to ensure stability  -no GI barriers to discharge with outpatient follow-up with our office  * Enterocolitis  Assessment & Plan  73-year-old female well known to us with gets tumor of the colon diagnosed in 2001  Metastatic to the liver  She has been treated with a variety of chemotherapeutic agents most recently blue" under the direction of [employer]  In with several days of diarrhea crampy abdominal pain nausea and vomiting  CT scan showed large and small bowel enteritis  Most likely this is an infectious enteritis the severity of which could be exacerbated by her immune suppression  There is a small chance this could be ischemic or a side effect of the medication but this would seem less likely    She has recovered with supportive care  Currently tolerating at [facility] diet without any pain  Stool studies have been obtained including C diff but are still pending results     -continue medical management per primary team  -follow lab trends  -await stool enteric panel and C diff       Chief Complaint   Patient presents with    Vomiting     patient states having diarrhea, which is a problem she has from her cancer  started vomiting around 230 this morning  no fevers  complains of headache  SUBJECTIVE/HPI   Feels well  Tolerating house diet with no abdominal pain  Denies N/V/D, melena, hematochezia       /74   Pulse 75   Temp 98 4 °F (36 9 °C)   Resp 18   Ht 5' 1" (1 549 m)   Wt 59 kg (130 lb)   SpO2 97%   BMI 24 56 kg/m²     PHYSICALEXAM  General appearance: alert, appears stated age and cooperative  Eyes: PERLLA, EOMI, no icterus   Head: Normocephalic, without obvious abnormality, atraumatic  Lungs: clear to auscultation bilaterally  Heart: regular rate and rhythm, S1, S2 normal, no murmur, click, rub or gallop  Abdomen: soft, non-tender; bowel sounds normal; no masses,  no organomegaly  Extremities: extremities normal, atraumatic, no cyanosis or edema  Neurologic: Grossly normal    Lab Results   Component Value Date    GLUCOSE 94 10/12/2015    CALCIUM 8 1 (L) 01/07/2020     10/12/2015    K 3 8 01/07/2020    CO2 23 01/07/2020     01/07/2020    BUN 19 01/07/2020    CREATININE 1 22 01/07/2020     Lab Results   Component Value Date    WBC 3 91 (L) 01/07/2020    HGB 9 1 (L) 01/07/2020    HCT 27 4 (L) 01/07/2020     (H) 01/07/2020     (L) 01/07/2020     Lab Results   Component Value Date    ALT 25 01/06/2020    AST 42 01/06/2020    ALKPHOS 47 01/06/2020    BILITOT 0 28 10/12/2015     No results found for: AMYLASE  Lab Results   Component Value Date    LIPASE 78 02/07/2017     No results found for: IRON, TIBC, FERRITIN  Lab Results   Component Value Date    INR 1 12 01/05/2020

## 2020-01-07 NOTE — ASSESSMENT & PLAN NOTE
History of hypertension on Toprol xl 25 mg daily  Discharged on Lasix  Monitor vitals as per protocol

## 2020-01-07 NOTE — UTILIZATION REVIEW
Initial Clinical Review    Admission: Date/Time/Statement: Inpatient Admission Orders (From admission, onward)     Ordered        01/05/20 0726  Inpatient Admission (expected length of stay for this patient Order details is greater than two midnights)  Once                   Orders Placed This Encounter   Procedures    Inpatient Admission (expected length of stay for this patient Order details is greater than two midnights)     Standing Status:   Standing     Number of Occurrences:   1     Order Specific Question:   Admitting Physician     Answer:   Jeny Lopez     Order Specific Question:   Level of Care     Answer:   Med Surg [16]     Order Specific Question:   Estimated length of stay     Answer:   More than 2 Midnights     Order Specific Question:   Certification     Answer:   I certify that inpatient services are medically necessary for this patient for a duration of greater than two midnights  See H&P and MD Progress Notes for additional information about the patient's course of treatment  ED Arrival Information     Expected Arrival Acuity Means of Arrival Escorted By Service Admission Type    - 1/5/2020 03:55 Urgent Walk-In Family Member Hospitalist Urgent    Arrival Complaint    Vomiting        Chief Complaint   Patient presents with    Vomiting     patient states having diarrhea, which is a problem she has from her cancer  started vomiting around 230 this morning  no fevers  complains of headache  Assessment/Plan: this is a 68year old female from home to ED admitted impatient due to enterocolitis/hyperglycemia  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   Presented due to nausea, vomiting x 3 and diarrhea with crampy abdominal pain  On exam has +1 bilateral edema  Pallor  Bun 20  Creatinine 1 61 with baseline of 1- 1 54, glucose 155 with no history of diabetes  AST 61  Wbc 2 70, H&H 8 9/27 1 with baseline of 8   CT abdomen showed enterocolitis, possible gastritis, no change in hepatic metastatic disease  In ED vomited po trial after receiving 2 doses of Zofran  IVF, stool cultures in progress  Consult GI      1/5/2020 per GI - gist tumor of the colon diagnosed in 2001  Metastatic to the liver  She has been treated with a variety of chemotherapeutic agents most recently blue" under the direction of Teddy Thorne  There is a small chance this could be ischemic or a side effect of the medication but this would seem less likely  I would recommend supportive care including IV fluids gradually increasing her diet  anemia which has not been adequately explained  She had stomach AVMs  Colonoscopy showed polyps  The she has not followed up with capsule endoscopy     On 1/6/2020- diet is advanced  Hemoglobin down  To 7, hct 21 8 with no overt signs of bleeding, transfusion with PRBC  Creatinine improved to 1 27 and on IVF       On 1/7/2020 stool C diff and enteric panel in progress    ED Triage Vitals   Temperature Pulse Respirations Blood Pressure SpO2   01/05/20 0403 01/05/20 0403 01/05/20 0403 01/05/20 0403 01/05/20 0403   (!) 96 8 °F (36 °C) 94 16 118/64 97 %      Temp Source Heart Rate Source Patient Position - Orthostatic VS BP Location FiO2 (%)   01/05/20 0403 01/05/20 0403 01/05/20 0403 01/05/20 0403 --   Temporal Monitor Lying Left arm       Pain Score       01/05/20 1743       7        Wt Readings from Last 1 Encounters:   01/05/20 59 kg (130 lb)     Additional Vital Signs:   01/07/20 08:15:43  98 4 °F (36 9 °C)  75  18  145/74  98  97 %       01/06/20 23:53:18  98 5 °F (36 9 °C)  72  20  146/74  98  98 %  None (Room air)  Lying   01/06/20 1840  98 9 °F (37 2 °C)  66  18  128/76           01/06/20 1800    78    129/80  96  97 %       01/06/20 1749  98 9 °F (37 2 °C)    18  129/80           01/06/20 1545  99 3 °F (37 4 °C)    17  124/62  81      Sitting   01/06/20 0929  97 1 °F (36 2 °C)Abnormal   80  17  124/65  80      Sitting   01/06/20 01:03:55  97 9 °F (36 6 °C)  70  18  101/57  72  97 %       01/05/20 09:21:48  99 6 °F (37 6 °C)  84  18  124/69  87  98 %           Pertinent Labs/Diagnostic Test Results:   1/5/2020 CXR - No acute cardiopulmonary disease  1/5/2020 CT abdomen -  Diffuse abnormal appearance of the small and large bowel, most compatible with enterocolitis, either infectious or inflammatory  2   Diffuse circumferential wall thickening of the stomach  Correlate for gastritis  3   No significant change in hepatic metastatic disease      1/5/2020 EKG - Normal sinus rhythm  Left axis deviation  Right bundle branch block  Nonspecific ST and T wave abnormality  Results from last 7 days   Lab Units 01/07/20  0521 01/06/20  2054 01/06/20  1141 01/06/20  0436 01/05/20  0418   WBC Thousand/uL 3 91*  --   --  3 23* 2 70*   HEMOGLOBIN g/dL 9 1* 9 0* 7 3* 7 0* 8 9*   HEMATOCRIT % 27 4* 27 1* 22 1* 21 8* 27 1*   PLATELETS Thousands/uL 130*  --   --  122* 172   NEUTROS ABS Thousands/µL 2 85  --   --   --  2 25     Results from last 7 days   Lab Units 01/07/20  0520 01/06/20  0437 01/05/20  0418   SODIUM mmol/L 137 142 143   POTASSIUM mmol/L 3 8 3 8 3 5   CHLORIDE mmol/L 107 108 103   CO2 mmol/L 23 26 24   ANION GAP mmol/L 7 8 16*   BUN mg/dL 19 18 20   CREATININE mg/dL 1 22 1 27 1 61*   EGFR ml/min/1 73sq m 45 43 32   CALCIUM mg/dL 8 1* 8 0* 8 5   MAGNESIUM mg/dL 1 8  --   --      Results from last 7 days   Lab Units 01/06/20  0437 01/05/20  0418   AST U/L 42 61*   ALT U/L 25 34   ALK PHOS U/L 47 66   TOTAL PROTEIN g/dL 5 4* 6 8   ALBUMIN g/dL 2 9* 3 8   TOTAL BILIRUBIN mg/dL 0 50 0 40     Results from last 7 days   Lab Units 01/07/20  0520 01/06/20  0437 01/05/20  0418   GLUCOSE RANDOM mg/dL 87 87 155*     Results from last 7 days   Lab Units 01/05/20  0418   PROTIME seconds 14 1   INR  1 12   PTT seconds 24     ED Treatment:   Medication Administration from 01/05/2020 0355 to 01/05/2020 0621       Date/Time Order Dose Route Action Comments 01/05/2020 0415 sodium chloride 0 9 % bolus 1,000 mL 1,000 mL Intravenous New Bag      01/05/2020 0412 ondansetron (ZOFRAN) injection 4 mg 4 mg Intravenous Given      01/05/2020 0554 ondansetron (ZOFRAN) injection 4 mg 4 mg Intravenous Given      01/05/2020 0756 sodium chloride 0 9 % infusion 75 mL/hr Intravenous New Bag         Past Medical History:   Diagnosis Date    ADHD     Anemia     Anxiety     Arthritis     Asthma     Atrial fibrillation (HCC)     Bleeding disorder (HCC)     [facility] )     Cancer ([place] [address] )     Coronary artery disease     Depression     GERD (gastroesophageal reflux disease)     History of stomach ulcers     Hypercholesterolemia     Hypertension     Kidney disease     Metastatic cancer ([place] [address] )      Present on Admission:   Enterocolitis   Anemia   GIST (gastrointestinal stromal tumor), malignant (HCC)   Hypertension   Paroxysmal atrial fibrillation (HCC)   CKD (chronic kidney disease) stage 3, GFR 30-59 ml/min ([facility])      Admitting Diagnosis: Enterocolitis [K52 9]  Vomiting [R11 10]  Nausea & vomiting [R11 2]  CKD (chronic kidney disease) [N18 9]  Chronic anemia [D64 9]  Age/Sex: 68 y o  female  Admission Orders: 1/5/2020 0726 inpatient   Scheduled Medications:  ALPRAZolam (XANAX) tablet 0 25 mg  0 25 mg Oral HS PRN     aspirin tablet 325 mg  325 mg Oral Daily    calcium carbonate (TUMS) chewable tablet 500 mg  500 mg Oral Daily PRN   Dc on 1/6/2020 furosemide (LASIX) tablet 20 mg  20 mg Oral Daily    heparin (porcine) subcutaneous injection 5,000 Units  5,000 Units Subcutaneous Q8H [facility]    hydroxychloroquine (PLAQUENIL) tablet 200 mg  200 mg Oral BID With Meals    metoprolol succinate (TOPROL-XL) 24 hr tablet 25 mg  25 mg Oral Daily    pantoprazole (PROTONIX) EC tablet 40 mg  40 mg Oral Early Morning    sodium chloride 0 9 % infusion  75 mL/hr Intravenous Continuous     PRN:  Acetaminophen - used x 1 on 1/5, x 2 on 1/6    IP CONSULT TO GASTROENTEROLOGY  Transfused on e unit leukoreduced RBC on 1/6/2020 1/5/2020 - clears  Advanced to house on 1/6/2020      Network Utilization Review Department  [email or web address]  ATTENTION: Please call with any questions or concerns to [phone] and carefully listen to the prompts so that you are directed to the right person  All voicemails are confidential   Galilea Aquino all requests for admission clinical reviews, approved or denied determinations and any other requests to dedicated fax number below belonging to the campus where the patient is receiving treatment   List of dedicated fax numbers for the Facilities:  [address] DENIALS (Administrative/Medical Necessity) [phone]   [address] (Maternity/NICU/Pediatrics) [phone]   [facility] [phone]   Laverne Egan [phone]   Ilan Mckenna [phone]   Anders Buerger [phone]   [address] [place] [address] [phone]   [facility]  [phone]   [address] [facility]  [address] [phone]

## 2020-01-07 NOTE — OCCUPATIONAL THERAPY NOTE
[address] [facility]     Patient Name: Pratik Ricks  YKBUW'P Date: 2020  Problem List  Principal Problem:    Enterocolitis  Active Problems:    Anemia    GIST (gastrointestinal stromal tumor), malignant (HCC)    Hypertension    Paroxysmal atrial fibrillation (HCC)    CKD (chronic kidney disease) stage 3, GFR 30-59 ml/min (HCC)    Hyperglycemia    Past Medical History  Past Medical History:   Diagnosis Date    ADHD     Anemia     Anxiety     Arthritis     Asthma     Atrial fibrillation (HCC)     Bleeding disorder ([facility] )     Breast cancer ([facility] )     Cancer ([facility] )     Coronary artery disease     Depression     GERD (gastroesophageal reflux disease)     History of stomach ulcers     Hypercholesterolemia     Hypertension     Kidney disease     Metastatic cancer ([facility] )      Past Surgical History  Past Surgical History:   Procedure Laterality Date    ANKLE SURGERY      APPENDECTOMY      BREAST SURGERY      CATARACT EXTRACTION       SECTION      COLONOSCOPY      HIP SURGERY      JOINT REPLACEMENT  2019    Left knee replacement    LAMINECTOMY      ROTATOR CUFF REPAIR      SIGMOID RESECTION / RECTOPEXY      SINUS SURGERY          20 1440   Note Type   Note type Eval only   Restrictions/Precautions   Weight Bearing Precautions Per Order No   Other Precautions Contact/isolation;Multiple lines; Fall Risk  (+ strict hand hygiene)   Pain Assessment   Pain Assessment No/denies pain   Pain Score No Pain   Home Living   Type of [address] [place] [address] Two level;1/2 bath on main level;Bed/bath upstairs;Stairs to enter with rails  (2STE, FF to 2nd floor BHR)   Bathroom Shower/Tub Tub/shower unit   Bathroom Toilet Standard   Bathroom Equipment Grab bars in shower; Tub transfer bench;Hand-held shower;Commode; Toilet raiser   [address] Cane;Grab bars   Prior Function   Level of [place] Independent with ADLs and functional mobility  (+ IADLs)   Lives With Spouse;Son  ((+) 24/7 S/A at [facility])   Receives Help From Family   ADL Assistance Independent   IADLs Independent   Falls in the last 6 months 0   Vocational Retired  (hairdresser)   Comments + driving   Lifestyle   Autonomy PTA pt was I w/ ADLs, IADLs and functional mobility w/o AD in the house (used [facility] for community only)   Reciprocal Relationships supportive spouse and son   Service to Others retired hairdresser   Intr[employer] Gratification TV, spending time with family   Psychosocial   Psychosocial (WDL) WDL   Subjective   Subjective I don't know why you guys are here - I can take myself to and from the bathroom just fine   ADL   Eating Assistance 7  Independent   Grooming Assistance 5  Supervision/Setup  (standing)   UB Bathing Assistance 5  Supervision/Setup   LB Bathing Assistance 5  Supervision/Setup   UB Dressing Assistance 5  Supervision/Setup   LB Dressing Assistance 5  Supervision/Setup   Toileting Assistance  5  Supervision/Setup   Bed Mobility   Supine to Sit 7  Independent   Sit to Supine 7  Independent   Transfers   Sit to Stand 5  Supervision   Additional items Assist x 1; Increased time required   Stand to Sit 5  Supervision   Additional items Assist x 1; Increased time required   Stand pivot 5  Supervision   Additional items Assist x 1; Increased time required   Toilet transfer 5  Supervision   Additional items Assist x 1; Increased time required;Verbal cues;Standard toilet  (w/ rails)   Additional Comments transfers w/ SPC - A w/ IV pole management   Functional Mobility   Functional Mobility 5  Supervision   Additional Comments Ax1, assist w/ line management   Additional items SPC   Balance   Static Sitting Normal   Static Standing Fair +   Ambulatory Fair   Activity Tolerance   Activity Tolerance Patient tolerated treatment well   Medical Staff Made Aware PT and CM for DC planning   Nurse Made Aware [place] to see per [place]IC[place] LAW Assessment   RUE Assessment WFL   LUE Assessment   LUE Assessment WFL   Hand Function   Gross Motor Coordination Functional   Fine Motor Coordination Functional   Sensation   Light Touch Partial deficits in the LUE  (pointer and middle finger, chronic)   Vision-Basic Assessment   Current Vision Wears glasses all the time   Cognition   Overall Cognitive Status [facility]   Arousal/Participation Alert; Responsive; Cooperative   Attention Within functional limits   Orientation Level Oriented X4   Memory Within functional limits   Following Commands Follows all commands and directions without difficulty   Comments flat affect noted throughout eval  cooperative  eager to return home at DC  Assessment   Limitation Decreased ADL status; Decreased UE strength;Decreased endurance;Decreased sensation;Decreased self-care trans;Decreased high-level ADLs;Mood limitation   Prognosis Fair   Assessment Pt is a 68 y o  female seen for OT evaluation s/p admit to [facility] on 1/5/2020 w/ Enterocolitis  Comorbidities affecting pt's functional performance at time of assessment include: anemia, GIST, HTN, afib, hyperglycemia, anxiety, DJD, gait distubance (please see extensive list of comorbidities)  Personal factors affecting pt at time of IE include:steps to enter environment, difficulty performing ADLS, difficulty performing IADLS , level of education, flat affect, decreased initiation and engagement , health management  and environment  Prior to admission, pt was indep w/ ADLs, functional mobility w/o AD (SPC in community only) and IADLS  Patient lives at home with her son and  who are able to provide 24/7 S/A at DC  Patient states she lives in a [facility] with bed/bath upstairs, no hx of falls in last 6 months  + driving and retired  Upon evaluation: Pt independent w/ bed mobility, S for functional transfers with [facility] and w/ line management, and S for all ADLS this date  Patient's  also present on evaluation stating he is able to A at dc    At this time, patient presents with the following deficits impacting occupational performance: weakness, decreased strength, decreased balance, decreased tolerance, decreased safety awareness and decreased coping skills  Pt to benefit from continued skilled OT tx while in the hospital to address deficits as defined above and maximize level of functional independence w ADL's and functional mobility  Occupational Performance areas to address include: eating, grooming, bathing/shower, toilet hygiene, dressing, medication management, socialization, health maintenance, functional mobility, community mobility, clothing management, cleaning, meal prep, money management, household maintenance and social participation  From OT standpoint, recommendation at time of d/c would be home with family support  Based on findings, patient is of moderate complexity  Goals   Patient Goals to go home   Long Term Goal see below   Plan   Treatment Interventions ADL retraining;Functional transfer training;UE strengthening/ROM; Endurance training;Patient/family training;Equipment evaluation/education; Compensatory technique education; Energy conservation; Activityengagement   Goal Expiration Date 01/17/20   OT Treatment Day 0   OT Frequency 2-3x/wk   Recommendation   OT Discharge Recommendation Home with family support   Barthel Index   Feeding 10   Bathing 0   Grooming Score 5   Dressing Score 10   Bladder Score 5  (pull up on eval)   Bowels Score 5  (pull up on eval)   Toilet Use Score 5   Transfers (Bed/Chair) Score 10   Mobility (Level Surface) Score 0   Stairs Score 0   Barthel Index Score 50         Pt will achieve the following goals by d/c:    1  Pt will complete bathroom mobility Lilo/I   2  Pt will complete bed mobility with Lilo/I for seated ADLs EOB  3  Pt will complete LB ADLs with Lilo/I   4  Patient will increase UB ADLs to Mod I/I for bathing and dressing  5  Patient will complete toileting with Mod I  6   Patient will increase ADL transfers to Mod I level with G activity tolerance and balance  7  Continue to monitor functional cogntion, safety and behaviors during I/ADL participation in efforts to decrease risk of falls/injury  8  Pt will ID 2-3 EC techniques to utilize for inc'd performance with daily purposeful tasks        Vilma Bahena MS, OTR/L

## 2020-01-07 NOTE — ASSESSMENT & PLAN NOTE
· Patient with chronic anemia, Hb 8 9 on presentation  Hb was 7 7 2 months ago, 10 3 ten months ago  · Patient has had GI workup carried out which showed stomach AVMs and colonoscopy showed polyps  She was advised to get a small bowel capsule enteroscopy but never followed through  · Trend CBC and temperature curve daily  · Keep Hb > 7, transfuse prn if needed  · Hemoglobin this morning is 7  Will transfuse 1 unit of packed red cell  · Hemoglobin this morning is 9 1  · GI follow-up noted    Patient is stable for discharge with outpatient capsule endoscopy which will be arranged by GI

## 2020-01-07 NOTE — PLAN OF CARE
Problem: Potential for Falls  Goal: Patient will remain free of falls  Description  INTERVENTIONS:  - Assess patient frequently for physical needs  -  Identify cognitive and physical deficits and behaviors that affect risk of falls  -  [place] fall precautions as indicated by assessment   - Educate patient/family on patient safety including physical limitations  - Instruct patient to call for assistance with activity based on assessment  - Modify environment to reduce risk of injury  - Consider OT/PT consult to assist with strengthening/mobility  Outcome: Progressing     Problem: Prexisting or High Potential for Compromised Skin Integrity  Goal: Skin integrity is maintained or improved  Description  INTERVENTIONS:  - Identify patients at risk for skin breakdown  - Assess and monitor skin integrity  - Assess and monitor nutrition and hydration status  - Monitor labs   - Assess for incontinence   - Turn and reposition patient  - Assist with mobility/ambulation  - Relieve pressure over bony prominences  - Avoid friction and shearing  - Provide appropriate hygiene as needed including keeping skin clean and dry  - Evaluate need for skin moisturizer/barrier cream  - Collaborate with interdisciplinary team   - Patient/family teaching  - Consider wound care consult   Outcome: Progressing     Problem: Nutrition/Hydration-ADULT  Goal: Nutrient/Hydration intake appropriate for improving, restoring or maintaining nutritional needs  Description  Monitor and assess patient's nutrition/hydration status for malnutrition  Collaborate with interdisciplinary team and initiate plan and interventions as ordered  Monitor patient's weight and dietary intake as ordered or per policy  Utilize nutrition screening tool and intervene as necessary  Determine patient's food preferences and provide high-protein, high-caloric foods as appropriate       INTERVENTIONS:  - Monitor oral intake, urinary output, labs, and treatment plans  - Assess nutrition and hydration status and recommend course of action  - Evaluate amount of meals eaten  - Assist patient with eating if necessary   - Allow adequate time for meals  - Recommend/ encourage appropriate diets, oral nutritional supplements, and vitamin/mineral supplements  - Order, calculate, and assess calorie counts as needed  - Recommend, monitor, and adjust tube feedings and TPN/PPN based on assessed needs  - Assess need for intravenous fluids  - Provide specific nutrition/hydration education as appropriate  - Include patient/family/caregiver in decisions related to nutrition  Outcome: Progressing     Problem: SAFETY ADULT  Goal: Maintain or return to baseline ADL function  Description  INTERVENTIONS:  -  Assess patient's ability to carry out ADLs; assess patient's baseline for ADL function and identify physical deficits which impact ability to perform ADLs (bathing, care of mouth/teeth, toileting, grooming, dressing, etc )  - Assess/evaluate cause of self-care deficits   - Assess range of motion  - Assess patient's mobility; develop plan if impaired  - Assess patient's need for assistive devices and provide as appropriate  - Encourage maximum independence but intervene and supervise when necessary  - Involve family in performance of ADLs  - Assess for home care needs following discharge   - Consider OT consult to assist with ADL evaluation and planning for discharge  - Provide patient education as appropriate  Outcome: Progressing  Goal: Maintain or return mobility status to optimal level  Description  INTERVENTIONS:  - Assess patient's baseline mobility status (ambulation, transfers, stairs, etc )    - Identify cognitive and physical deficits and behaviors that affect mobility  - Identify mobility aids required to assist with transfers and/or ambulation (gait belt, sit-to-stand, lift, walker, cane, etc )  - [place] fall precautions as indicated by assessment  - Record patient progress and toleration of activity level on Mobility SBAR; progress patient to next Phase/Stage  - Instruct patient to call for assistance with activity based on assessment  - Consider rehabilitation consult to assist with strengthening/weightbearing, etc   Outcome: Progressing     Problem: DISCHARGE PLANNING  Goal: Discharge to home or other facility with appropriate resources  Description  INTERVENTIONS:  - Identify barriers to discharge w/patient and caregiver  - Arrange for needed discharge resources and transportation as appropriate  - Identify discharge learning needs (meds, wound care, etc )  - Arrange for interpretive services to assist at discharge as needed  - Refer to Case Management Department for coordinating discharge planning if the patient needs post-hospital services based on physician/advanced practitioner order or complex needs related to functional status, cognitive ability, or social support system  Outcome: Progressing     Problem: Knowledge Deficit  Goal: Patient/family/caregiver demonstrates understanding of disease process, treatment plan, medications, and discharge instructions  Description  Complete learning assessment and assess knowledge base    Interventions:  - Provide teaching at level of understanding  - Provide teaching via preferred learning methods  Outcome: Progressing     Problem: GASTROINTESTINAL - ADULT  Goal: Minimal or absence of nausea and/or vomiting  Description  INTERVENTIONS:  - Administer IV fluids if ordered to ensure adequate hydration  - Maintain NPO status until nausea and vomiting are resolved  - Nasogastric tube if ordered  - Administer ordered antiemetic medications as needed  - Provide nonpharmacologic comfort measures as appropriate  - Advance diet as tolerated, if ordered  - Consider nutrition services referral to assist patient with adequate nutrition and appropriate food choices  Outcome: Progressing  Goal: Maintains or returns to baseline bowel function  Description  INTERVENTIONS:  - Assess bowel function  - Encourage oral fluids to ensure adequate hydration  - Administer IV fluids if ordered to ensure adequate hydration  - Administer ordered medications as needed  - Encourage mobilization and activity  - Consider nutritional services referral to assist patient with adequate nutrition and appropriate food choices  Outcome: Progressing  Goal: Maintains adequate nutritional intake  Description  INTERVENTIONS:  - Monitor percentage of each meal consumed  - Identify factors contributing to decreased intake, treat as appropriate  - Assist with meals as needed  - Monitor I&O, weight, and lab values if indicated  - Obtain nutrition services referral as needed  Outcome: Progressing  Goal: Establish and maintain optimal ostomy function  Description  INTERVENTIONS:  - Assess bowel function  - Encourage oral fluids to ensure adequate hydration  - Administer IV fluids if ordered to ensure adequate hydration   - Administer ordered medications as needed  - Encourage mobilization and activity  - Nutrition services referral to assist patient with appropriate food choices  - Assess stoma site  - Consider wound care consult   Outcome: Progressing     Problem: METABOLIC, FLUID AND ELECTROLYTES - ADULT  Goal: Electrolytes maintained within normal limits  Description  INTERVENTIONS:  - Monitor labs and assess patient for signs and symptoms of electrolyte imbalances  - Administer electrolyte replacement as ordered  - Monitor response to electrolyte replacements, including repeat lab results as appropriate  - Instruct patient on fluid and nutrition as appropriate  Outcome: Progressing  Goal: Fluid balance maintained  Description  INTERVENTIONS:  - Monitor labs   - Monitor I/O and WT  - Instruct patient on fluid and nutrition as appropriate  - Assess for signs & symptoms of volume excess or deficit  Outcome: Progressing  Goal: Glucose maintained within target range  Description  INTERVENTIONS:  - Monitor Blood Glucose as ordered  - Assess for signs and symptoms of hyperglycemia and hypoglycemia  - Administer ordered medications to maintain glucose within target range  - Assess nutritional intake and initiate nutrition service referral as needed  Outcome: Progressing

## 2020-01-08 LAB
ABO GROUP BLD BPU: NORMAL
BPU ID: NORMAL
CROSSMATCH: NORMAL
UNIT DISPENSE STATUS: NORMAL
UNIT PRODUCT CODE: NORMAL
UNIT RH: NORMAL

## 2020-01-08 NOTE — TELEPHONE ENCOUNTER
Spoke with patient  Scheduled for January 17, 2020  Patient is very reluctant to proceed with capsule  Reassurance provided  Instructions will be mailed to patient

## 2020-01-08 NOTE — TELEPHONE ENCOUNTER
Pt states she was supposed to call Dr Xena Richardson when she got home from the hospital/got home yesterday at 4; is supposed to sched a scope down her throat  I thought she was calling to sched EGD/transf'd call to scheduling  Scheduling transf'd call back/test is capsule  # [phone]   Pt vocalized not wanting test

## 2020-01-09 ENCOUNTER — TRANSITIONAL CARE MANAGEMENT (OUTPATIENT)
Dept: FAMILY MEDICINE CLINIC | Facility: HOSPITAL | Age: 70
End: 2020-01-09

## 2020-01-09 NOTE — UTILIZATION REVIEW
Notification of Discharge  This is a Notification of Discharge from our facility [address]  Please be advised that this patient has been discharge from our facility  Below you will find the admission and discharge date and time including the patients disposition  PRESENTATION DATE: 1/5/2020  4:02 AM  OBS ADMISSION DATE:   IP ADMISSION DATE: 1/5/20 0726   DISCHARGE DATE: 1/7/2020  4:13 PM  DISPOSITION: Home/Self Care Home/Self Care   Admission Orders listed below:  Admission Orders (From admission, onward)     Ordered        01/05/20 0726  Inpatient Admission (expected length of stay for this patient Order details is greater than two midnights)  Once                   Please contact the UR Department if additional information is required to close this patient's authorization/case  [facility] Utilization Review Department  Main: [phone] x carefully listen to the prompts  All voicemails are confidential   [email or web address]  Send all requests for admission clinical reviews, approved or denied determinations and any other requests to dedicated fax number below belonging to the campus where the patient is receiving treatment   List of dedicated fax numbers:  [address] DENIALS (Administrative/Medical Necessity) [phone]   [address] (Maternity/NICU/Pediatrics) [phone]   Wu Muñoz [phone]   Nicole Clay [phone]   Cornell Bernabe [phone]   [address] [phone]   [facility]  [phone]   [address] [place] [address], [address]  2401 [facility] 1000 [facility] [phone]

## 2020-01-10 ENCOUNTER — TELEPHONE (OUTPATIENT)
Dept: GASTROENTEROLOGY | Facility: CLINIC | Age: 70
End: 2020-01-10

## 2020-01-10 LAB
EST. AVERAGE GLUCOSE BLD GHB EST-MCNC: 94 MG/DL
HBA1C MFR BLD: 4.9 % (ref 4.2–6.3)

## 2020-01-10 NOTE — TELEPHONE ENCOUNTER
PER DR Jing Mendez AN OUTPATIENT CAPSULE FOR Yinareema Ovidio  SHE WAS DISCHARGED FROM [facility] ON 01/07/2020    Niurka Moulton

## 2020-01-14 NOTE — TELEPHONE ENCOUNTER
Called [employer]  "No precert required" for [id number] if participating provider  Checked policy info and patient's diagnosis code/egd/colon requirements are okay  Patient advised

## 2020-01-14 NOTE — TELEPHONE ENCOUNTER
Pt called  Her insurance changed    Now has [employer] [facility]  ID # [id number] Dael Graham  Provider line is   States she was told capsule policy # [id number]

## 2020-01-15 ENCOUNTER — OFFICE VISIT (OUTPATIENT)
Dept: FAMILY MEDICINE CLINIC | Facility: HOSPITAL | Age: 70
End: 2020-01-15
Payer: COMMERCIAL

## 2020-01-15 VITALS
BODY MASS INDEX: 24.55 KG/M2 | HEIGHT: 61 IN | WEIGHT: 130 LBS | HEART RATE: 82 BPM | DIASTOLIC BLOOD PRESSURE: 68 MMHG | OXYGEN SATURATION: 97 % | SYSTOLIC BLOOD PRESSURE: 132 MMHG

## 2020-01-15 DIAGNOSIS — I10 HYPERTENSION, UNSPECIFIED TYPE: ICD-10-CM

## 2020-01-15 DIAGNOSIS — I48.0 PAROXYSMAL ATRIAL FIBRILLATION (HCC): ICD-10-CM

## 2020-01-15 DIAGNOSIS — D50.8 OTHER IRON DEFICIENCY ANEMIA: ICD-10-CM

## 2020-01-15 DIAGNOSIS — N18.30 CKD (CHRONIC KIDNEY DISEASE) STAGE 3, GFR 30-59 ML/MIN (HCC): ICD-10-CM

## 2020-01-15 DIAGNOSIS — I34.0 NON-RHEUMATIC MITRAL REGURGITATION: ICD-10-CM

## 2020-01-15 DIAGNOSIS — K52.9 ENTEROCOLITIS: Primary | ICD-10-CM

## 2020-01-15 DIAGNOSIS — C49.A9 MALIGNANT GASTROINTESTINAL STROMAL TUMOR (GIST) OF OTHER SITE (HCC): ICD-10-CM

## 2020-01-15 PROBLEM — R73.9 HYPERGLYCEMIA: Status: RESOLVED | Noted: 2020-01-05 | Resolved: 2020-01-15

## 2020-01-15 PROBLEM — G57.01 PIRIFORMIS SYNDROME OF RIGHT SIDE: Status: RESOLVED | Noted: 2018-03-12 | Resolved: 2020-01-15

## 2020-01-15 PROBLEM — Z00.00 HEALTH CARE MAINTENANCE: Status: RESOLVED | Noted: 2019-08-28 | Resolved: 2020-01-15

## 2020-01-15 PROBLEM — G57.02 PIRIFORMIS SYNDROME, LEFT: Status: RESOLVED | Noted: 2018-04-30 | Resolved: 2020-01-15

## 2020-01-15 PROBLEM — M17.12 LOCALIZED OSTEOARTHRITIS OF LEFT KNEE: Status: RESOLVED | Noted: 2018-09-09 | Resolved: 2020-01-15

## 2020-01-15 PROBLEM — Z01.818 PRE-OP EXAMINATION: Status: RESOLVED | Noted: 2019-12-12 | Resolved: 2020-01-15

## 2020-01-15 PROCEDURE — 1160F RVW MEDS BY RX/DR IN RCRD: CPT | Performed by: INTERNAL MEDICINE

## 2020-01-15 PROCEDURE — 1111F DSCHRG MED/CURRENT MED MERGE: CPT | Performed by: INTERNAL MEDICINE

## 2020-01-15 PROCEDURE — 99495 TRANSJ CARE MGMT MOD F2F 14D: CPT | Performed by: INTERNAL MEDICINE

## 2020-01-15 RX ORDER — AMOXICILLIN 500 MG/1
CAPSULE ORAL
COMMUNITY
Start: 2019-12-13 | End: 2020-02-08 | Stop reason: HOSPADM

## 2020-01-15 NOTE — PROGRESS NOTES
TCM VISIT     Subjective:    Patient ID: Zahra Reyes is a 68 y o  female here today for TCM    The following portions of the patient's history were reviewed and updated as appropriate: allergies, current medications, past family history, past medical history, past social history, past surgical history and problem list     MISTI visit after hospital stay    Treated for enterocolitis complicated by hx of gastric AVMs and ongoing chemotx for GIST tumor  Feeling better  No diarrhea and appetite is better  GI consult reviewed and discussed  Planning capsule endoscopy with GI  Review of Systems   Constitutional: Negative for fatigue and fever  HENT: Negative for hearing loss  Eyes: Negative for visual disturbance  Respiratory: Negative for cough, chest tightness, shortness of breath and wheezing  Cardiovascular: Negative for chest pain, palpitations and leg swelling  Gastrointestinal: Negative for abdominal pain, diarrhea and nausea  Genitourinary: Negative for dysuria and hematuria  Musculoskeletal: Negative for arthralgias  Neurological: Negative for dizziness, numbness and headaches  Psychiatric/Behavioral: Negative for confusion and dysphoric mood  All other systems reviewed and are negative         Objective:    Vitals:    01/15/20 1101   BP: 132/68   Pulse: 82   SpO2: 97%   Weight: 59 kg (130 lb)   Height: 5' 1" (1 549 m)       Current Outpatient Medications on File Prior to Visit   Medication Sig Dispense Refill    ALPRAZolam (XANAX) 0 25 mg tablet Take by mouth daily at bedtime as needed       aspirin (ESTELA ASPIRIN) 325 mg tablet Take 325 mg by mouth daily       Azelastine HCl 137 MCG/SPRAY SOLN instill 2 sprays into each nostril twice a day if needed for congestion  0    calcium carbonate (TUMS) 500 mg chewable tablet Chew 1 tablet daily as needed for indigestion or heartburn      diphenoxylate-atropine (LOMOTIL) 2 5-0 025 mg per tablet TAKE 1 TABLET BY MOUTH 4 TIMES DAILY AS NEEDED FOR DIARRHEA  0    docusate sodium (COLACE) 100 mg capsule Take 100 mg by mouth 2 (two) times a day as needed for constipation      furosemide (LASIX) 20 mg tablet Take 20 mg by mouth daily       hydroxychloroquine (PLAQUENIL) 200 mg tablet Take 200 mg by mouth daily in the early morning       loperamide (IMODIUM) 2 mg capsule Take 2 capsules by mouth 4 (four) times a day as needed      ofloxacin (OCUFLOX) 0 3 % ophthalmic solution START 3 DAYS PRE-OP: 1 DROP INTO LEFT EYE 4 TIMES DAILY; CONTINUE POST OP as directed  0    ondansetron (ZOFRAN) 8 mg tablet take 1 tablet by mouth every 8 hours if needed for nausea      pantoprazole (PROTONIX) 40 mg tablet Take 40 mg by mouth daily      prednisoLONE acetate (PRED FORTE) 1 % ophthalmic suspension POST OP: 1 DROP INTO LEFT EYE 4 TIMES DAILY; CONTINUE AS DIRECTED  0    TOPROL XL 25 MG 24 hr tablet daily       amoxicillin (AMOXIL) 500 mg capsule        No current facility-administered medications on file prior to visit  Physical Exam   Constitutional: She appears well-developed  HENT:   Head: Normocephalic  Eyes: Pupils are equal, round, and reactive to light  Cardiovascular: Normal rate, regular rhythm, normal heart sounds and intact distal pulses  Pulmonary/Chest: Effort normal and breath sounds normal    Abdominal: Soft  Bowel sounds are normal    Musculoskeletal: Normal range of motion  Neurological: She is alert  Skin: Skin is warm and dry  Psychiatric: She has a normal mood and affect   Her behavior is normal          Transitional Care Management Review:    TCM Call (since 12/15/2019)     Date and time call was made  1/9/2020  2:40 PM    Hospital care reviewed  Records reviewed    Patient was hospitialized at  [facility]    Date of Admission  01/05/20    Date of discharge  01/07/20    Diagnosis  Enterocolitis     Disposition  Home    Were the patients medications reviewed and updated  Yes    Current Symptoms  None      TCM Call (since 12/15/2019)     Should patient be enrolled in anticoag monitoring? No    Scheduled for follow up? Yes    Patients specialists  Other (comment)    Other specialists names  [facility]     Did you obtain your prescribed medications  Yes    Do you need help managing your prescriptions or medications  No    Is transportation to your appointment needed  No    I have advised the patient to call PCP with any new or worsening symptoms  MEREDITH Torres MA     Living Arrangements  Spouse or Significiant other    Support System  Spouse; Friends; Family    Are you recieving any outpatient services  Yes    What type of services  Chemo treatments    Are you recieving home care services  No            Assessment/Plan:     No diagnosis found  No problem-specific Assessment & Plan notes found for this encounter        Chester Gonzalez MD

## 2020-01-15 NOTE — PATIENT INSTRUCTIONS
You have been seen today for a hospital discharge follow up visit  The purpose of the visit is to be sure that you are feeling well and taking all medications as ordered  This visit is scheduled so that any post hospital questions you have can be addressed  The doctor or nurse will review all medications and will provide refills of medications  You may be asked to get some follow up labs or other testing done, please do this as soon as able  If  You are seeing a specialist for follow up, let us know so we can do appropriate referrals  Schedule your next routine visit today so that we can keep you on track and healthy  Labs should be done at end of month    No need to fast

## 2020-01-17 ENCOUNTER — CLINICAL SUPPORT (OUTPATIENT)
Dept: GASTROENTEROLOGY | Facility: CLINIC | Age: 70
End: 2020-01-17
Payer: COMMERCIAL

## 2020-01-17 DIAGNOSIS — D50.9 IRON DEFICIENCY ANEMIA, UNSPECIFIED IRON DEFICIENCY ANEMIA TYPE: ICD-10-CM

## 2020-01-17 PROCEDURE — 91110 GI TRC IMG INTRAL ESOPH-ILE: CPT | Performed by: INTERNAL MEDICINE

## 2020-01-21 ENCOUNTER — TELEPHONE (OUTPATIENT)
Dept: PAIN MEDICINE | Facility: MEDICAL CENTER | Age: 70
End: 2020-01-21

## 2020-01-23 ENCOUNTER — TELEPHONE (OUTPATIENT)
Dept: GASTROENTEROLOGY | Facility: CLINIC | Age: 70
End: 2020-01-23

## 2020-01-23 NOTE — TELEPHONE ENCOUNTER
Discussed capsule study with patient  No active bleeding or bleeding lesions identified but a polyp was identified in the mid small bowel  Unclear if this is part of her underlying malignancy (GIST) or an incidental finding  She would like to have any further studies done with her GI surgeon at [facility]  Please send copy of capsule study to Dr Paul Wei at [facility]  Send them a copy of the disc if desired

## 2020-02-06 ENCOUNTER — APPOINTMENT (EMERGENCY)
Dept: CT IMAGING | Facility: HOSPITAL | Age: 70
DRG: 872 | End: 2020-02-06
Payer: COMMERCIAL

## 2020-02-06 ENCOUNTER — HOSPITAL ENCOUNTER (INPATIENT)
Facility: HOSPITAL | Age: 70
LOS: 2 days | Discharge: HOME | DRG: 872 | End: 2020-02-08
Attending: EMERGENCY MEDICINE | Admitting: FAMILY MEDICINE
Payer: COMMERCIAL

## 2020-02-06 ENCOUNTER — APPOINTMENT (EMERGENCY)
Dept: RADIOLOGY | Facility: HOSPITAL | Age: 70
DRG: 872 | End: 2020-02-06
Payer: COMMERCIAL

## 2020-02-06 DIAGNOSIS — A41.9 SEPSIS (HCC): ICD-10-CM

## 2020-02-06 DIAGNOSIS — K52.9 ENTERITIS: Primary | ICD-10-CM

## 2020-02-06 DIAGNOSIS — D72.825 BANDEMIA: ICD-10-CM

## 2020-02-06 LAB
ALBUMIN SERPL BCP-MCNC: 3.8 G/DL (ref 3.5–5)
ALP SERPL-CCNC: 69 U/L (ref 46–116)
ALT SERPL W P-5'-P-CCNC: 30 U/L (ref 12–78)
ANION GAP SERPL CALCULATED.3IONS-SCNC: 14 MMOL/L (ref 4–13)
ANISOCYTOSIS BLD QL SMEAR: PRESENT
APTT PPP: 24 SECONDS (ref 23–37)
AST SERPL W P-5'-P-CCNC: 53 U/L (ref 5–45)
BASOPHILS # BLD MANUAL: 0 THOUSAND/UL (ref 0–0.1)
BASOPHILS NFR MAR MANUAL: 0 % (ref 0–1)
BILIRUB SERPL-MCNC: 0.6 MG/DL (ref 0.2–1)
BUN SERPL-MCNC: 23 MG/DL (ref 5–25)
CALCIUM SERPL-MCNC: 8.5 MG/DL (ref 8.3–10.1)
CHLORIDE SERPL-SCNC: 103 MMOL/L (ref 100–108)
CLARITY, POC: CLEAR
CO2 SERPL-SCNC: 23 MMOL/L (ref 21–32)
COLOR, POC: YELLOW
CREAT SERPL-MCNC: 1.71 MG/DL (ref 0.6–1.3)
EOSINOPHIL # BLD MANUAL: 0 THOUSAND/UL (ref 0–0.4)
EOSINOPHIL NFR BLD MANUAL: 0 % (ref 0–6)
ERYTHROCYTE [DISTWIDTH] IN BLOOD BY AUTOMATED COUNT: 16.6 % (ref 11.6–15.1)
EXT BILIRUBIN, UA: NEGATIVE
EXT BLOOD URINE: ABNORMAL
EXT GLUCOSE, UA: NEGATIVE
EXT KETONES: NEGATIVE
EXT NITRITE, UA: NEGATIVE
EXT PH, UA: 6
EXT PROTEIN, UA: ABNORMAL
EXT SPECIFIC GRAVITY, UA: 1
EXT UROBILINOGEN: 0.2
FLUAV RNA NPH QL NAA+PROBE: NORMAL
FLUBV RNA NPH QL NAA+PROBE: NORMAL
GFR SERPL CREATININE-BSD FRML MDRD: 30 ML/MIN/1.73SQ M
GLUCOSE SERPL-MCNC: 130 MG/DL (ref 65–140)
HCT VFR BLD AUTO: 22.5 % (ref 34.8–46.1)
HGB BLD-MCNC: 7.2 G/DL (ref 11.5–15.4)
HYPERCHROMIA BLD QL SMEAR: PRESENT
INR PPP: 1.1 (ref 0.84–1.19)
LACTATE SERPL-SCNC: 2.3 MMOL/L (ref 0.5–2)
LYMPHOCYTES # BLD AUTO: 0.13 THOUSAND/UL (ref 0.6–4.47)
LYMPHOCYTES # BLD AUTO: 4 % (ref 14–44)
MACROCYTES BLD QL AUTO: PRESENT
MAGNESIUM SERPL-MCNC: 1.9 MG/DL (ref 1.6–2.6)
MCH RBC QN AUTO: 34.6 PG (ref 26.8–34.3)
MCHC RBC AUTO-ENTMCNC: 32 G/DL (ref 31.4–37.4)
MCV RBC AUTO: 108 FL (ref 82–98)
MONOCYTES # BLD AUTO: 0.03 THOUSAND/UL (ref 0–1.22)
MONOCYTES NFR BLD: 1 % (ref 4–12)
NEUTROPHILS # BLD MANUAL: 3.11 THOUSAND/UL (ref 1.85–7.62)
NEUTS BAND NFR BLD MANUAL: 14 % (ref 0–8)
NEUTS SEG NFR BLD AUTO: 81 % (ref 43–75)
NRBC BLD AUTO-RTO: 0 /100 WBCS
PLATELET # BLD AUTO: 179 THOUSANDS/UL (ref 149–390)
PLATELET BLD QL SMEAR: ADEQUATE
PMV BLD AUTO: 10.4 FL (ref 8.9–12.7)
POLYCHROMASIA BLD QL SMEAR: PRESENT
POTASSIUM SERPL-SCNC: 3.6 MMOL/L (ref 3.5–5.3)
PROT SERPL-MCNC: 6.8 G/DL (ref 6.4–8.2)
PROTHROMBIN TIME: 13.9 SECONDS (ref 11.6–14.5)
RBC # BLD AUTO: 2.08 MILLION/UL (ref 3.81–5.12)
RBC MORPH BLD: PRESENT
RSV RNA NPH QL NAA+PROBE: NORMAL
SODIUM SERPL-SCNC: 140 MMOL/L (ref 136–145)
TOTAL CELLS COUNTED SPEC: 100
TROPONIN I SERPL-MCNC: 0.04 NG/ML
WBC # BLD AUTO: 3.27 THOUSAND/UL (ref 4.31–10.16)
WBC # BLD EST: NEGATIVE 10*3/UL

## 2020-02-06 PROCEDURE — 85007 BL SMEAR W/DIFF WBC COUNT: CPT | Performed by: EMERGENCY MEDICINE

## 2020-02-06 PROCEDURE — 36415 COLL VENOUS BLD VENIPUNCTURE: CPT | Performed by: EMERGENCY MEDICINE

## 2020-02-06 PROCEDURE — 74177 CT ABD & PELVIS W/CONTRAST: CPT

## 2020-02-06 PROCEDURE — 93005 ELECTROCARDIOGRAM TRACING: CPT

## 2020-02-06 PROCEDURE — 85610 PROTHROMBIN TIME: CPT | Performed by: EMERGENCY MEDICINE

## 2020-02-06 PROCEDURE — 96365 THER/PROPH/DIAG IV INF INIT: CPT

## 2020-02-06 PROCEDURE — 85730 THROMBOPLASTIN TIME PARTIAL: CPT | Performed by: EMERGENCY MEDICINE

## 2020-02-06 PROCEDURE — 80053 COMPREHEN METABOLIC PANEL: CPT | Performed by: EMERGENCY MEDICINE

## 2020-02-06 PROCEDURE — 83605 ASSAY OF LACTIC ACID: CPT | Performed by: PHYSICIAN ASSISTANT

## 2020-02-06 PROCEDURE — 87631 RESP VIRUS 3-5 TARGETS: CPT | Performed by: EMERGENCY MEDICINE

## 2020-02-06 PROCEDURE — 85027 COMPLETE CBC AUTOMATED: CPT | Performed by: EMERGENCY MEDICINE

## 2020-02-06 PROCEDURE — 81002 URINALYSIS NONAUTO W/O SCOPE: CPT | Performed by: EMERGENCY MEDICINE

## 2020-02-06 PROCEDURE — 99285 EMERGENCY DEPT VISIT HI MDM: CPT | Performed by: EMERGENCY MEDICINE

## 2020-02-06 PROCEDURE — 87040 BLOOD CULTURE FOR BACTERIA: CPT | Performed by: EMERGENCY MEDICINE

## 2020-02-06 PROCEDURE — 71046 X-RAY EXAM CHEST 2 VIEWS: CPT

## 2020-02-06 PROCEDURE — 83605 ASSAY OF LACTIC ACID: CPT | Performed by: EMERGENCY MEDICINE

## 2020-02-06 PROCEDURE — 30233N1 TRANSFUSION OF NONAUTOLOGOUS RED BLOOD CELLS INTO PERIPHERAL VEIN, PERCUTANEOUS APPROACH: ICD-10-PCS | Performed by: INTERNAL MEDICINE

## 2020-02-06 PROCEDURE — 83735 ASSAY OF MAGNESIUM: CPT | Performed by: PHYSICIAN ASSISTANT

## 2020-02-06 PROCEDURE — 96368 THER/DIAG CONCURRENT INF: CPT

## 2020-02-06 PROCEDURE — 84484 ASSAY OF TROPONIN QUANT: CPT | Performed by: EMERGENCY MEDICINE

## 2020-02-06 PROCEDURE — 99285 EMERGENCY DEPT VISIT HI MDM: CPT

## 2020-02-06 RX ORDER — ONDANSETRON 2 MG/ML
1 INJECTION INTRAMUSCULAR; INTRAVENOUS ONCE
Status: COMPLETED | OUTPATIENT
Start: 2020-02-06 | End: 2020-02-06

## 2020-02-06 RX ORDER — ACETAMINOPHEN 325 MG/1
975 TABLET ORAL ONCE
Status: COMPLETED | OUTPATIENT
Start: 2020-02-06 | End: 2020-02-06

## 2020-02-06 RX ORDER — LIDOCAINE 50 MG/G
1 PATCH TOPICAL ONCE
Status: COMPLETED | OUTPATIENT
Start: 2020-02-06 | End: 2020-02-07

## 2020-02-06 RX ORDER — CEFEPIME HYDROCHLORIDE 1 G/50ML
1000 INJECTION, SOLUTION INTRAVENOUS ONCE
Status: COMPLETED | OUTPATIENT
Start: 2020-02-06 | End: 2020-02-06

## 2020-02-06 RX ORDER — VANCOMYCIN HYDROCHLORIDE 1 G/200ML
15 INJECTION, SOLUTION INTRAVENOUS ONCE
Status: COMPLETED | OUTPATIENT
Start: 2020-02-06 | End: 2020-02-06

## 2020-02-06 RX ADMIN — METRONIDAZOLE 500 MG: 500 INJECTION, SOLUTION INTRAVENOUS at 23:34

## 2020-02-06 RX ADMIN — SODIUM CHLORIDE 500 ML: 0.9 INJECTION, SOLUTION INTRAVENOUS at 20:54

## 2020-02-06 RX ADMIN — SODIUM CHLORIDE 500 ML: 0.9 INJECTION, SOLUTION INTRAVENOUS at 23:16

## 2020-02-06 RX ADMIN — ACETAMINOPHEN 975 MG: 325 TABLET, FILM COATED ORAL at 20:26

## 2020-02-06 RX ADMIN — CEFEPIME HYDROCHLORIDE 1000 MG: 1 INJECTION, SOLUTION INTRAVENOUS at 21:35

## 2020-02-06 RX ADMIN — IOHEXOL 100 ML: 350 INJECTION, SOLUTION INTRAVENOUS at 21:58

## 2020-02-06 RX ADMIN — VANCOMYCIN HYDROCHLORIDE 1000 MG: 1 INJECTION, SOLUTION INTRAVENOUS at 21:35

## 2020-02-06 RX ADMIN — LIDOCAINE 1 PATCH: 50 PATCH TOPICAL at 20:54

## 2020-02-07 PROBLEM — D72.825 BANDEMIA: Status: ACTIVE | Noted: 2020-02-07

## 2020-02-07 LAB
ABO GROUP BLD BPU: NORMAL
ABO GROUP BLD: NORMAL
ALBUMIN SERPL BCP-MCNC: 2.8 G/DL (ref 3.5–5)
ALP SERPL-CCNC: 43 U/L (ref 46–116)
ALT SERPL W P-5'-P-CCNC: 22 U/L (ref 12–78)
ANION GAP SERPL CALCULATED.3IONS-SCNC: 12 MMOL/L (ref 4–13)
ANISOCYTOSIS BLD QL SMEAR: PRESENT
AST SERPL W P-5'-P-CCNC: 42 U/L (ref 5–45)
ATRIAL RATE: 114 BPM
BASOPHILS # BLD MANUAL: 0 THOUSAND/UL (ref 0–0.1)
BASOPHILS NFR MAR MANUAL: 0 % (ref 0–1)
BILIRUB SERPL-MCNC: 0.6 MG/DL (ref 0.2–1)
BLD GP AB SCN SERPL QL: NEGATIVE
BPU ID: NORMAL
BUN SERPL-MCNC: 24 MG/DL (ref 5–25)
C DIFF TOX GENS STL QL NAA+PROBE: NEGATIVE
CALCIUM SERPL-MCNC: 7.5 MG/DL (ref 8.3–10.1)
CHLORIDE SERPL-SCNC: 105 MMOL/L (ref 100–108)
CO2 SERPL-SCNC: 21 MMOL/L (ref 21–32)
CREAT SERPL-MCNC: 1.53 MG/DL (ref 0.6–1.3)
CROSSMATCH: NORMAL
EOSINOPHIL # BLD MANUAL: 0 THOUSAND/UL (ref 0–0.4)
EOSINOPHIL NFR BLD MANUAL: 0 % (ref 0–6)
ERYTHROCYTE [DISTWIDTH] IN BLOOD BY AUTOMATED COUNT: 17.6 % (ref 11.6–15.1)
FERRITIN SERPL-MCNC: 79 NG/ML (ref 8–388)
FOLATE SERPL-MCNC: >20 NG/ML (ref 3.1–17.5)
GFR SERPL CREATININE-BSD FRML MDRD: 34 ML/MIN/1.73SQ M
GLUCOSE SERPL-MCNC: 116 MG/DL (ref 65–140)
HCT VFR BLD AUTO: 20.8 % (ref 34.8–46.1)
HCT VFR BLD AUTO: 25.8 % (ref 34.8–46.1)
HGB BLD-MCNC: 6.7 G/DL (ref 11.5–15.4)
HGB BLD-MCNC: 8.5 G/DL (ref 11.5–15.4)
HYPERCHROMIA BLD QL SMEAR: PRESENT
IRON SATN MFR SERPL: 6 %
IRON SERPL-MCNC: 22 UG/DL (ref 50–170)
LACTATE SERPL-SCNC: 1.5 MMOL/L (ref 0.5–2)
LACTATE SERPL-SCNC: 3.2 MMOL/L (ref 0.5–2)
LYMPHOCYTES # BLD AUTO: 0 % (ref 14–44)
LYMPHOCYTES # BLD AUTO: 0 THOUSAND/UL (ref 0.6–4.47)
MACROCYTES BLD QL AUTO: PRESENT
MAGNESIUM SERPL-MCNC: 1.7 MG/DL (ref 1.6–2.6)
MCH RBC QN AUTO: 33.3 PG (ref 26.8–34.3)
MCHC RBC AUTO-ENTMCNC: 32.2 G/DL (ref 31.4–37.4)
MCV RBC AUTO: 104 FL (ref 82–98)
MONOCYTES # BLD AUTO: 0.62 THOUSAND/UL (ref 0–1.22)
MONOCYTES NFR BLD: 8 % (ref 4–12)
NEUTROPHILS # BLD MANUAL: 7.18 THOUSAND/UL (ref 1.85–7.62)
NEUTS BAND NFR BLD MANUAL: 4 % (ref 0–8)
NEUTS SEG NFR BLD AUTO: 88 % (ref 43–75)
NRBC BLD AUTO-RTO: 0 /100 WBCS
P AXIS: 41 DEGREES
PHOSPHATE SERPL-MCNC: 3.6 MG/DL (ref 2.3–4.1)
PLATELET # BLD AUTO: 126 THOUSANDS/UL (ref 149–390)
PLATELET # BLD AUTO: 146 THOUSANDS/UL (ref 149–390)
PLATELET BLD QL SMEAR: ADEQUATE
PMV BLD AUTO: 10 FL (ref 8.9–12.7)
PMV BLD AUTO: 9.8 FL (ref 8.9–12.7)
POLYCHROMASIA BLD QL SMEAR: PRESENT
POTASSIUM SERPL-SCNC: 4.3 MMOL/L (ref 3.5–5.3)
PR INTERVAL: 130 MS
PROT SERPL-MCNC: 5.3 G/DL (ref 6.4–8.2)
QRS AXIS: -62 DEGREES
QRSD INTERVAL: 130 MS
QT INTERVAL: 378 MS
QTC INTERVAL: 521 MS
RBC # BLD AUTO: 2.01 MILLION/UL (ref 3.81–5.12)
RBC MORPH BLD: PRESENT
RH BLD: POSITIVE
SODIUM SERPL-SCNC: 138 MMOL/L (ref 136–145)
SPECIMEN EXPIRATION DATE: NORMAL
T WAVE AXIS: 92 DEGREES
TIBC SERPL-MCNC: 341 UG/DL (ref 250–450)
TOTAL CELLS COUNTED SPEC: 100
UNIT DISPENSE STATUS: NORMAL
UNIT PRODUCT CODE: NORMAL
UNIT RH: NORMAL
VENTRICULAR RATE: 114 BPM
VIT B12 SERPL-MCNC: 299 PG/ML (ref 100–900)
WBC # BLD AUTO: 7.8 THOUSAND/UL (ref 4.31–10.16)

## 2020-02-07 PROCEDURE — 83550 IRON BINDING TEST: CPT | Performed by: INTERNAL MEDICINE

## 2020-02-07 PROCEDURE — 86920 COMPATIBILITY TEST SPIN: CPT

## 2020-02-07 PROCEDURE — 83735 ASSAY OF MAGNESIUM: CPT | Performed by: PHYSICIAN ASSISTANT

## 2020-02-07 PROCEDURE — 80053 COMPREHEN METABOLIC PANEL: CPT | Performed by: PHYSICIAN ASSISTANT

## 2020-02-07 PROCEDURE — 86923 COMPATIBILITY TEST ELECTRIC: CPT

## 2020-02-07 PROCEDURE — 85007 BL SMEAR W/DIFF WBC COUNT: CPT | Performed by: PHYSICIAN ASSISTANT

## 2020-02-07 PROCEDURE — 85018 HEMOGLOBIN: CPT | Performed by: INTERNAL MEDICINE

## 2020-02-07 PROCEDURE — 85049 AUTOMATED PLATELET COUNT: CPT | Performed by: PHYSICIAN ASSISTANT

## 2020-02-07 PROCEDURE — 82728 ASSAY OF FERRITIN: CPT | Performed by: INTERNAL MEDICINE

## 2020-02-07 PROCEDURE — P9058 RBC, L/R, CMV-NEG, IRRAD: HCPCS

## 2020-02-07 PROCEDURE — 86850 RBC ANTIBODY SCREEN: CPT | Performed by: PHYSICIAN ASSISTANT

## 2020-02-07 PROCEDURE — 86901 BLOOD TYPING SEROLOGIC RH(D): CPT | Performed by: PHYSICIAN ASSISTANT

## 2020-02-07 PROCEDURE — 82746 ASSAY OF FOLIC ACID SERUM: CPT | Performed by: INTERNAL MEDICINE

## 2020-02-07 PROCEDURE — 82607 VITAMIN B-12: CPT | Performed by: INTERNAL MEDICINE

## 2020-02-07 PROCEDURE — 86900 BLOOD TYPING SEROLOGIC ABO: CPT | Performed by: PHYSICIAN ASSISTANT

## 2020-02-07 PROCEDURE — 84100 ASSAY OF PHOSPHORUS: CPT | Performed by: PHYSICIAN ASSISTANT

## 2020-02-07 PROCEDURE — 85027 COMPLETE CBC AUTOMATED: CPT | Performed by: PHYSICIAN ASSISTANT

## 2020-02-07 PROCEDURE — 93010 ELECTROCARDIOGRAM REPORT: CPT | Performed by: INTERNAL MEDICINE

## 2020-02-07 PROCEDURE — 36415 COLL VENOUS BLD VENIPUNCTURE: CPT | Performed by: PHYSICIAN ASSISTANT

## 2020-02-07 PROCEDURE — 99223 1ST HOSP IP/OBS HIGH 75: CPT | Performed by: INTERNAL MEDICINE

## 2020-02-07 PROCEDURE — 85014 HEMATOCRIT: CPT | Performed by: INTERNAL MEDICINE

## 2020-02-07 PROCEDURE — 87493 C DIFF AMPLIFIED PROBE: CPT | Performed by: PHYSICIAN ASSISTANT

## 2020-02-07 PROCEDURE — 83605 ASSAY OF LACTIC ACID: CPT | Performed by: PHYSICIAN ASSISTANT

## 2020-02-07 PROCEDURE — 83540 ASSAY OF IRON: CPT | Performed by: INTERNAL MEDICINE

## 2020-02-07 PROCEDURE — 99253 IP/OBS CNSLTJ NEW/EST LOW 45: CPT | Performed by: INTERNAL MEDICINE

## 2020-02-07 RX ORDER — ONDANSETRON 2 MG/ML
4 INJECTION INTRAMUSCULAR; INTRAVENOUS EVERY 6 HOURS PRN
Status: DISCONTINUED | OUTPATIENT
Start: 2020-02-07 | End: 2020-02-08 | Stop reason: HOSPADM

## 2020-02-07 RX ORDER — ACETAMINOPHEN 325 MG/1
650 TABLET ORAL EVERY 6 HOURS PRN
Status: DISCONTINUED | OUTPATIENT
Start: 2020-02-07 | End: 2020-02-08 | Stop reason: HOSPADM

## 2020-02-07 RX ORDER — ONDANSETRON 4 MG/1
8 TABLET, ORALLY DISINTEGRATING ORAL ONCE
Status: COMPLETED | OUTPATIENT
Start: 2020-02-07 | End: 2020-02-07

## 2020-02-07 RX ORDER — ASPIRIN 325 MG
325 TABLET ORAL DAILY
Status: DISCONTINUED | OUTPATIENT
Start: 2020-02-07 | End: 2020-02-07

## 2020-02-07 RX ORDER — ALPRAZOLAM 0.25 MG/1
0.25 TABLET ORAL
Status: DISCONTINUED | OUTPATIENT
Start: 2020-02-07 | End: 2020-02-08 | Stop reason: HOSPADM

## 2020-02-07 RX ORDER — HYDROXYCHLOROQUINE SULFATE 200 MG/1
200 TABLET, FILM COATED ORAL
Status: DISCONTINUED | OUTPATIENT
Start: 2020-02-07 | End: 2020-02-08 | Stop reason: HOSPADM

## 2020-02-07 RX ORDER — DOCUSATE SODIUM 100 MG/1
100 CAPSULE, LIQUID FILLED ORAL 2 TIMES DAILY PRN
Status: DISCONTINUED | OUTPATIENT
Start: 2020-02-07 | End: 2020-02-08 | Stop reason: HOSPADM

## 2020-02-07 RX ORDER — FUROSEMIDE 20 MG/1
20 TABLET ORAL DAILY
Status: DISCONTINUED | OUTPATIENT
Start: 2020-02-07 | End: 2020-02-07

## 2020-02-07 RX ORDER — HEPARIN SODIUM 5000 [USP'U]/ML
5000 INJECTION, SOLUTION INTRAVENOUS; SUBCUTANEOUS EVERY 8 HOURS SCHEDULED
Status: DISCONTINUED | OUTPATIENT
Start: 2020-02-07 | End: 2020-02-08 | Stop reason: HOSPADM

## 2020-02-07 RX ORDER — CEFEPIME HYDROCHLORIDE 1 G/50ML
1000 INJECTION, SOLUTION INTRAVENOUS EVERY 12 HOURS
Status: DISCONTINUED | OUTPATIENT
Start: 2020-02-07 | End: 2020-02-08

## 2020-02-07 RX ORDER — PANTOPRAZOLE SODIUM 40 MG/1
40 TABLET, DELAYED RELEASE ORAL
Status: DISCONTINUED | OUTPATIENT
Start: 2020-02-07 | End: 2020-02-08 | Stop reason: HOSPADM

## 2020-02-07 RX ORDER — SODIUM CHLORIDE 9 MG/ML
75 INJECTION, SOLUTION INTRAVENOUS CONTINUOUS
Status: DISCONTINUED | OUTPATIENT
Start: 2020-02-07 | End: 2020-02-08 | Stop reason: HOSPADM

## 2020-02-07 RX ORDER — VANCOMYCIN HYDROCHLORIDE 1 G/200ML
15 INJECTION, SOLUTION INTRAVENOUS EVERY 24 HOURS
Status: DISCONTINUED | OUTPATIENT
Start: 2020-02-07 | End: 2020-02-08

## 2020-02-07 RX ADMIN — SODIUM CHLORIDE 75 ML/HR: 0.9 INJECTION, SOLUTION INTRAVENOUS at 09:40

## 2020-02-07 RX ADMIN — HEPARIN SODIUM 5000 UNITS: 5000 INJECTION INTRAVENOUS; SUBCUTANEOUS at 18:17

## 2020-02-07 RX ADMIN — SODIUM CHLORIDE 500 ML: 0.9 INJECTION, SOLUTION INTRAVENOUS at 01:46

## 2020-02-07 RX ADMIN — SODIUM CHLORIDE 500 ML: 0.9 INJECTION, SOLUTION INTRAVENOUS at 00:21

## 2020-02-07 RX ADMIN — HEPARIN SODIUM 5000 UNITS: 5000 INJECTION INTRAVENOUS; SUBCUTANEOUS at 11:30

## 2020-02-07 RX ADMIN — HEPARIN SODIUM 5000 UNITS: 5000 INJECTION INTRAVENOUS; SUBCUTANEOUS at 01:48

## 2020-02-07 RX ADMIN — METRONIDAZOLE 500 MG: 500 INJECTION, SOLUTION INTRAVENOUS at 23:47

## 2020-02-07 RX ADMIN — ACETAMINOPHEN 650 MG: 325 TABLET ORAL at 06:21

## 2020-02-07 RX ADMIN — ACETAMINOPHEN 650 MG: 325 TABLET ORAL at 14:27

## 2020-02-07 RX ADMIN — PANTOPRAZOLE SODIUM 40 MG: 40 TABLET, DELAYED RELEASE ORAL at 06:10

## 2020-02-07 RX ADMIN — METRONIDAZOLE 500 MG: 500 INJECTION, SOLUTION INTRAVENOUS at 16:57

## 2020-02-07 RX ADMIN — CEFEPIME HYDROCHLORIDE 1000 MG: 1 INJECTION, SOLUTION INTRAVENOUS at 11:30

## 2020-02-07 RX ADMIN — VANCOMYCIN HYDROCHLORIDE 1000 MG: 1 INJECTION, SOLUTION INTRAVENOUS at 22:37

## 2020-02-07 RX ADMIN — CEFEPIME HYDROCHLORIDE 1000 MG: 1 INJECTION, SOLUTION INTRAVENOUS at 21:39

## 2020-02-07 RX ADMIN — ACETAMINOPHEN 650 MG: 325 TABLET ORAL at 21:39

## 2020-02-07 RX ADMIN — ONDANSETRON 8 MG: 4 TABLET, ORALLY DISINTEGRATING ORAL at 01:48

## 2020-02-07 RX ADMIN — METRONIDAZOLE 500 MG: 500 INJECTION, SOLUTION INTRAVENOUS at 09:26

## 2020-02-07 NOTE — ASSESSMENT & PLAN NOTE
History of CKD stage 3 creatinine is 1 71 GFR 32  Baseline creatinine is 1-1 5 for  Trend BMP  Continue home medication

## 2020-02-07 NOTE — ASSESSMENT & PLAN NOTE
Patient has bandemia on the CBC with 14% bands  Started on antibiotics  Procalcitonin is pending  Lactic acid is is 3 8  Continue to trend

## 2020-02-07 NOTE — ASSESSMENT & PLAN NOTE
C diff check   Trend CBC and temp curve daily  cefepime and Flagyl and vancomycin was given in the ER, bandemia was present  GI consult  rehydrate  Place on a clear liquid diet

## 2020-02-07 NOTE — SOCIAL WORK
LOS: 1 Patient is a 30 day readmission being discharged 1/8/20, she reports having explosive diarrhea last night and came into the ED  She has followed up with her PCP and reports taking her medication, she reports no new meds were prescribed  Met with patient to discuss the discharge planning process including identifying help at home and patient preference for discharge  Patient resides in a 2 story home with her spouse and their 39year old son  Patient reports being independent of ADL's and having a SPC and Rw after recent knee surgery  Patient uses [facility] for her medication and can afford her meds  Mariah Stoddard a POA/Ad and says a copy is in her chart  Patient denies SNF/BHU/D&A rehab admission  She plans to return home and anticipates no needs

## 2020-02-07 NOTE — PLAN OF CARE
Problem: Potential for Falls  Goal: Patient will remain free of falls  Description  INTERVENTIONS:  - Assess patient frequently for physical needs  -  Identify cognitive and physical deficits and behaviors that affect risk of falls    -  [place] fall precautions as indicated by assessment   - Educate patient/family on patient safety including physical limitations  - Instruct patient to call for assistance with activity based on assessment  - Modify environment to reduce risk of injury  - Consider OT/PT consult to assist with strengthening/mobility  Outcome: Progressing     Problem: Prexisting or High Potential for Compromised Skin Integrity  Goal: Skin integrity is maintained or improved  Description  INTERVENTIONS:  - Identify patients at risk for skin breakdown  - Assess and monitor skin integrity  - Assess and monitor nutrition and hydration status  - Monitor labs   - Assess for incontinence   - Turn and reposition patient  - Assist with mobility/ambulation  - Relieve pressure over bony prominences  - Avoid friction and shearing  - Provide appropriate hygiene as needed including keeping skin clean and dry  - Evaluate need for skin moisturizer/barrier cream  - Collaborate with interdisciplinary team   - Patient/family teaching  - Consider wound care consult   Outcome: Progressing     Problem: PAIN - ADULT  Goal: Verbalizes/displays adequate comfort level or baseline comfort level  Description  Interventions:  - Encourage patient to monitor pain and request assistance  - Assess pain using appropriate pain scale  - Administer analgesics based on type and severity of pain and evaluate response  - Implement non-pharmacological measures as appropriate and evaluate response  - Consider cultural and social influences on pain and pain management  - Notify physician/advanced practitioner if interventions unsuccessful or patient reports new pain  Outcome: Progressing     Problem: INFECTION - ADULT  Goal: Absence or prevention of progression during hospitalization  Description  INTERVENTIONS:  - Assess and monitor for signs and symptoms of infection  - Monitor lab/diagnostic results  - Monitor all insertion sites, i e  indwelling lines, tubes, and drains  - Monitor endotracheal if appropriate and nasal secretions for changes in amount and color  - Frederic appropriate cooling/warming therapies per order  - Administer medications as ordered  - Instruct and encourage patient and family to use good hand hygiene technique  - Identify and instruct in appropriate isolation precautions for identified infection/condition  Outcome: Progressing  Goal: Absence of fever/infection during neutropenic period  Description  INTERVENTIONS:  - Monitor WBC    Outcome: Progressing     Problem: SAFETY ADULT  Goal: Maintain or return to baseline ADL function  Description  INTERVENTIONS:  -  Assess patient's ability to carry out ADLs; assess patient's baseline for ADL function and identify physical deficits which impact ability to perform ADLs (bathing, care of mouth/teeth, toileting, grooming, dressing, etc )  - Assess/evaluate cause of self-care deficits   - Assess range of motion  - Assess patient's mobility; develop plan if impaired  - Assess patient's need for assistive devices and provide as appropriate  - Encourage maximum independence but intervene and supervise when necessary  - Involve family in performance of ADLs  - Assess for home care needs following discharge   - Consider OT consult to assist with ADL evaluation and planning for discharge  - Provide patient education as appropriate  Outcome: Progressing  Goal: Maintain or return mobility status to optimal level  Description  INTERVENTIONS:  - Assess patient's baseline mobility status (ambulation, transfers, stairs, etc )    - Identify cognitive and physical deficits and behaviors that affect mobility  - Identify mobility aids required to assist with transfers and/or ambulation (gait belt, sit-to-stand, lift, walker, cane, etc )  - [place] fall precautions as indicated by assessment  - Record patient progress and toleration of activity level on Mobility SBAR; progress patient to next Phase/Stage  - Instruct patient to call for assistance with activity based on assessment  - Consider rehabilitation consult to assist with strengthening/weightbearing, etc   Outcome: Progressing

## 2020-02-07 NOTE — ASSESSMENT & PLAN NOTE
Patient has chronic anemia hemoglobin 7 2  Ordered 1 unit of red packed cells  Received 1 L of fluid  Trend CBC and temperature curve  Keep hemoglobin greater than 7  Patient had recent capsule studies done see below for resolved   capsule study -  No active bleeding or bleeding lesions identified but a polyp was identified in the mid small bowel  Unclear if this is part of her underlying malignancy (GIST) or an incidental finding

## 2020-02-07 NOTE — H&P
Progress Note - Dario Sanabria 1950, 68 y o  female MRN: [id number]    Unit/Bed#: -Megan Encounter: [id number]    Primary Care Provider: Velasquez Camilo MD   Date and time admitted to hospital: 2/6/2020  8:07 PM        * Enteritis  Assessment & Plan  C diff check   Trend CBC and temp curve daily  cefepime and Flagyl and vancomycin was given in the ER, bandemia was present  GI consult  rehydrate  Place on a clear liquid diet      Bandemia  Assessment & Plan  Patient has bandemia on the CBC with 14% bands  Started on antibiotics  Procalcitonin is pending  Lactic acid is is 3 8  Continue to trend    CKD (chronic kidney disease) stage 3, GFR 30-59 ml/min ([facility])  Assessment & Plan  History of CKD stage 3 creatinine is 1 71 GFR 32  Baseline creatinine is 1-1 5 for  Trend BMP  Continue home medication    Paroxysmal atrial fibrillation ([facility] [address] )  Assessment & Plan  History of AFib not currently on anti coag      GIST (gastrointestinal stromal tumor), malignant ([facility] [address] )  Assessment & Plan  History of high-grade gist tumor on chemotherapy patient follows regularly with [facility] last seen Thursday 01/02/2020 was told tumor is stable    Anemia  Assessment & Plan  Patient has chronic anemia hemoglobin 7 2  Ordered 1 unit of red packed cells  Received 1 L of fluid  Trend CBC and temperature curve  Keep hemoglobin greater than 7  Patient had recent capsule studies done see below for resolved   capsule study -  No active bleeding or bleeding lesions identified but a polyp was identified in the mid small bowel  Unclear if this is part of her underlying malignancy (GIST) or an incidental finding  VTE Prophylaxis: Heparin  / sequential compression device   Code Status: level 1  POLST: POLST form is not discussed and not completed at this time  Anticipated Length of Stay:  Patient will be admitted on an Inpatient basis with an anticipated length of stay of  > 2 midnights     Justification for Hospital Stay: enteritis     Total Time for Visit, including Counseling / Coordination of Care: 30 minutes  Greater than 50% of this total time spent on direct patient counseling and coordination of care  Chief Complaint:   Diarrhea    History of Present Illness:    Koko Pina is a 68 y o  female with an extensive medical history comes to the ER for evaluation of nonproductive cough chills and diarrhea since last night, patient admitted to vomiting x1 yesterday denies any today she denies any blood in her stools, received Zofran with AMS and stated she improved her nausea  She also reports 4 times loose stool she did take Imodium with no improved she also complains of bilateral hip pain and took the tramadol   Patient gets chemotherapy twice a year at [facility] for gastrointestinal stromal tumor  Patient came in with a hemoglobin of 7 2 in receiving a unit of RBCs her last transfusion was in general on the last admission has a history of PAF and is not on anticoagulation due to chronic anemia  Review of Systems:    Review of Systems   Constitutional: Positive for appetite change, chills and fever  Negative for diaphoresis and fatigue  HENT: Negative for congestion, facial swelling, rhinorrhea, sneezing and tinnitus  Eyes: Negative for photophobia, pain and discharge  Respiratory: Negative for apnea, cough and shortness of breath  Cardiovascular: Negative for chest pain and leg swelling  Gastrointestinal: Positive for diarrhea, nausea and vomiting  Negative for abdominal distention and abdominal pain  Endocrine: Negative for cold intolerance, polydipsia and polyphagia  Genitourinary: Negative for enuresis, frequency and hematuria  Musculoskeletal: Positive for back pain, gait problem and myalgias  Negative for arthralgias  Skin: Negative for color change and rash  Allergic/Immunologic: Negative for environmental allergies and food allergies     Neurological: Positive for dizziness, weakness and numbness  Negative for tremors, speech difficulty, light-headedness and headaches  Hematological: Negative for adenopathy  Does not bruise/bleed easily  Psychiatric/Behavioral: Negative for behavioral problems, decreased concentration and hallucinations  The patient is not hyperactive  Past Medical and Surgical History:     Past Medical History:   Diagnosis Date    ADHD     Anemia     Anxiety     Arthritis     Asthma     Atrial fibrillation (HCC)     Bleeding disorder (HCC)     [place] [address] )     Cancer ([place] [address] )     Coronary artery disease     Depression     GERD (gastroesophageal reflux disease)     History of stomach ulcers     Hypercholesterolemia     Hypertension     Kidney disease     Metastatic cancer ([place] [address] )        Past Surgical History:   Procedure Laterality Date    ANKLE SURGERY      APPENDECTOMY      BREAST SURGERY      CATARACT EXTRACTION       SECTION      COLONOSCOPY      HIP SURGERY      JOINT REPLACEMENT  2019    Left knee replacement    LAMINECTOMY      ROTATOR CUFF REPAIR      SIGMOID RESECTION / RECTOPEXY      SINUS SURGERY         Meds/Allergies:    Prior to Admission medications    Medication Sig Start Date End Date Taking?  Authorizing Provider   aspirin (ESTELA ASPIRIN) 325 mg tablet Take 325 mg by mouth daily    Yes Historical Provider, MD   Azelastine HCl 137 MCG/SPRAY SOLN instill 2 sprays into each nostril twice a day if needed for congestion 18  Yes Historical Provider, MD   diphenoxylate-atropine (LOMOTIL) 2 5-0 025 mg per tablet TAKE 1 TABLET BY MOUTH 4 TIMES DAILY AS NEEDED FOR DIARRHEA 19  Yes Historical Provider, MD   docusate sodium (COLACE) 100 mg capsule Take 100 mg by mouth 2 (two) times a day as needed for constipation   Yes Historical Provider, MD   furosemide (LASIX) 20 mg tablet Take 20 mg by mouth daily  18  Yes Historical Provider, MD   hydroxychloroquine (PLAQUENIL) 200 mg tablet Take 200 mg by mouth daily in the early morning    Yes Historical Provider, MD   ondansetron (ZOFRAN) 8 mg tablet take 1 tablet by mouth every 8 hours if needed for nausea 10/17/19  Yes Historical Provider, MD   pantoprazole (PROTONIX) 40 mg tablet Take 40 mg by mouth daily   Yes Historical Provider, MD   TOPROL XL 25 MG 24 hr tablet daily  10/24/19  Yes Historical Provider, MD   ALPRAZolam Brendalyn Pilon) 0 25 mg tablet Take by mouth daily at bedtime as needed     Historical Provider, MD   amoxicillin (AMOXIL) 500 mg capsule  12/13/19   Historical Provider, MD   calcium carbonate (TUMS) 500 mg chewable tablet Chew 1 tablet daily as needed for indigestion or heartburn    Historical Provider, MD   loperamide (IMODIUM) 2 mg capsule Take 2 capsules by mouth 4 (four) times a day as needed    Historical Provider, MD   ofloxacin (OCUFLOX) 0 3 % ophthalmic solution START 3 DAYS PRE-OP: 1 DROP INTO LEFT EYE 4 TIMES DAILY; CONTINUE POST OP as directed 12/5/19   Historical Provider, MD   prednisoLONE acetate (PRED FORTE) 1 % ophthalmic suspension POST OP: 1 DROP INTO LEFT EYE 4 TIMES DAILY; CONTINUE AS DIRECTED 12/5/19   Historical Provider, MD     I have reviewed home medications using allscripts  Allergies: Allergies   Allergen Reactions    Codeine Other (See Comments)     Throat closes    Codeine Sulfate Throat Swelling    Neomycin-Bacitracin Zn-Polymyx Rash    Wound Dressing Adhesive Other (See Comments)     If its on too long- pt starts itching    Zolmitriptan Palpitations     Heart palpitations    Generic for Zomig         Social History:     Marital Status: /Civil Union   Occupation: retired  Patient Pre-hospital Living Situation: home   Patient Pre-hospital Level of Mobility: walks   Patient Pre-hospital Diet Restrictions: none   Substance Use History:   Social History     Substance and Sexual Activity   Alcohol Use Never    Frequency: Never    Drinks per session: 1 or 2    Binge frequency: Never     Social History     Tobacco Use   Smoking Status Former Smoker    Years: 20 00   Smokeless Tobacco Never Used     Social History     Substance and Sexual Activity   Drug Use No       Family History:    Family History   Problem Relation Age of Onset   Frida Somers Breast cancer Mother     Diabetes Mother     Hypertension Mother     Lung cancer Mother     Hyperlipidemia Father     Prostate cancer Father     Colon cancer Paternal Grandmother     Colon cancer Paternal Grandfather     Diabetes Family     Substance Abuse Neg Hx     Mental illness Neg Hx        Physical Exam:     Vitals:   Blood Pressure: (!) 89/55 (02/07/20 0221)  Pulse: 66 (02/07/20 0221)  Temperature: 98 3 °F (36 8 °C) (02/07/20 0221)  Temp Source: Oral (02/07/20 0221)  Respirations: 20 (02/07/20 0221)  Height: 5' 1" (154 9 cm) (02/07/20 0221)  Weight - Scale: 63 kg (138 lb 14 2 oz) (02/07/20 0221)  SpO2: 95 % (02/07/20 0110)    Physical Exam   Constitutional: She is oriented to person, place, and time  No distress  Under nourished   HENT:   Head: Normocephalic and atraumatic  Eyes: Pupils are equal, round, and reactive to light  Conjunctivae and EOM are normal  No scleral icterus  Neck: Normal range of motion  Neck supple  No JVD present  No tracheal deviation present  Cardiovascular: Normal rate, regular rhythm, normal heart sounds and intact distal pulses  Exam reveals no friction rub  No murmur heard  Pulmonary/Chest: Effort normal and breath sounds normal  No stridor  No respiratory distress  She has no wheezes  She has no rales  Abdominal: Soft  Bowel sounds are normal  She exhibits no distension  There is no tenderness  There is no guarding  Musculoskeletal: Normal range of motion  She exhibits no edema  Neurological: She is alert and oriented to person, place, and time  She has normal reflexes  Skin: Skin is warm and dry  Capillary refill takes less than 2 seconds  She is not diaphoretic  Nursing note and vitals reviewed          Additional Data: Lab Results: I have personally reviewed pertinent reports  Results from last 7 days   Lab Units 02/06/20 2026   WBC Thousand/uL 3 27*   HEMOGLOBIN g/dL 7 2*   HEMATOCRIT % 22 5*   PLATELETS Thousands/uL 179   LYMPHO PCT % 4*   MONO PCT % 1*   EOS PCT % 0     Results from last 7 days   Lab Units 02/06/20 2026   POTASSIUM mmol/L 3 6   CHLORIDE mmol/L 103   CO2 mmol/L 23   BUN mg/dL 23   CREATININE mg/dL 1 71*   CALCIUM mg/dL 8 5   ALK PHOS U/L 69   ALT U/L 30   AST U/L 53*     Results from last 7 days   Lab Units 02/06/20 2026   INR  1 10       Imaging: I have personally reviewed pertinent reports  No results found  EKG, Pathology, and Other Studies Reviewed on Admission:   · EKG:  AFib    Epic / Care Everywhere Records Reviewed: Yes     ** Please Note: This note has been constructed using a voice recognition system   **

## 2020-02-07 NOTE — PROGRESS NOTES
Vancomycin Assessment    Akash Henning is a 68 y o  female who is currently receiving vancomycin vancomycin 750mg q12h for bacteremia     Relevant clinical data and objective history reviewed:  Creatinine   Date Value Ref Range Status   02/06/2020 1 71 (H) 0 60 - 1 30 mg/dL Final     Comment:     Standardized to IDMS reference method   01/07/2020 1 22 0 60 - 1 30 mg/dL Final     Comment:     Standardized to IDMS reference method   01/06/2020 1 27 0 60 - 1 30 mg/dL Final     Comment:     Standardized to IDMS reference method   10/12/2015 0 93 0 60 - 1 30 mg/dL Final     Comment:     Standardized to IDMS reference method   07/09/2015 0 91 0 60 - 1 30 mg/dL Final     Comment:     Standardized to IDMS reference method   04/14/2015 0 89 0 60 - 1 30 mg/dL Final     Comment:     Standardized to IDMS reference method     BP (!) 89/55   Pulse 66   Temp 98 3 °F (36 8 °C) (Oral)   Resp 20   LMP  (LMP Unknown)   SpO2 95%   No intake/output data recorded  Lab Results   Component Value Date/Time    BUN 23 02/06/2020 08:26 PM    BUN 18 10/12/2015 08:56 AM    WBC 3 27 (L) 02/06/2020 08:26 PM    WBC 4 11 (L) 10/12/2015 08:56 AM    HGB 7 2 (L) 02/06/2020 08:26 PM    HGB 10 8 (L) 10/12/2015 08:56 AM    HCT 22 5 (L) 02/06/2020 08:26 PM    HCT 33 8 (L) 10/12/2015 08:56 AM     (H) 02/06/2020 08:26 PM     (H) 10/12/2015 08:56 AM     02/06/2020 08:26 PM     10/12/2015 08:56 AM     Temp Readings from Last 3 Encounters:   02/07/20 98 3 °F (36 8 °C) (Oral)   01/07/20 98 4 °F (36 9 °C)   07/08/19 97 5 °F (36 4 °C) (Oral)     Vancomycin Days of Therapy: 2    Assessment/Plan  The patient is currently on vancomycin utilizing scheduled dosing based on actual body weight  Baseline risks associated with therapy include: pre-existing renal impairment and advanced age  The patient is currently receiving vancomycin 750mg q12h and after clinical evaluation will be changed to vancomycin 1000mg q24h    Pharmacy will also follow closely for s/sx of nephrotoxicity, infusion reactions and appropriateness of therapy  BMP and CBC will be ordered per protocol  Plan for trough as patient approaches steady state, prior to the 4th  dose at approximately 2130 2/9   Due to infection severity, will target a trough of 15-20 (appropriate for most indications)   Pharmacy will continue to follow the patients culture results and clinical progress daily      Reba Shepard, Pharmacist

## 2020-02-07 NOTE — ASSESSMENT & PLAN NOTE
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

## 2020-02-07 NOTE — CONSULTS
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, NO, 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  Follows at [facility]  Seen in the beginning of this year at [facility] and was told that tumor was stable  Initially resected in August of 2001  A biopsy of the liver lesion in April 2005 demonstrated metastatic disease  She underwent a diagnostic laparoscopy, laparoscopy lysis of adhesions, exploratory laparotomy, resection of pelvic just in conduction with a small-bowel resection with primary anastomosis at that time  3  Anemia, recurrent  Macrocytic  Possibly secondary to AVMs or GIST tumor  EGD/COLONOSCOPY June of 2019 showed multiple small angiectasias in the body of the stomach and antrum with bleeding status post cauterization  Colonoscopy showed multiple colon polyps, sigmoid diverticulitis has an abnormal low rectal anastomosis from previous surgery  Repeat EGD in November 2019 without any active GI bleeding source  Recent capsule endoscopy showed no active bleeding or bleeding lesions identified a polyp identified in the mid small bowel  She would like to have any further studies performed by her GI surgeon at [facility]  Chief Complaint   Patient presents with    Fever - 9 weeks to 74 years     Pt with fever and body aches x 24 hours        Physician Requesting Consult: Michelle Marin Jossy Blanchard MD    [facility] Keira Culver is a 68y o  year old female who presents with a 2 day history of diarrhea  This woke her up from sleep and she went approximately 4-5 times in one setting  Diarrhea recurred several hours later and prompted emergency room visitation after she took Imodium without improvement  Took amoxicillin approximately a week ago prior to dental examination  Denies sick contacts or travel  Denies any rectal bleeding, melena or abdominal pain  She believes she has lost weight but is unclear as to the amount  Admits to  fevers and chills  Admits to nausea and vomiting that began yesterday  History of GIST tumor and on chemotherapy  Followed by Lyle Goldstein      Historical Information   Past Medical History:   Diagnosis Date    ADHD     Anemia     Anxiety     Arthritis     Asthma     Atrial fibrillation (HCC)     Bleeding disorder (HCC)     Breast cancer (HCC)     Cancer ([place] [address] )     Coronary artery disease     Depression     GERD (gastroesophageal reflux disease)     History of stomach ulcers     Hypercholesterolemia     Hypertension     Kidney disease     Metastatic cancer ([facility] )      Past Surgical History:   Procedure Laterality Date    ANKLE SURGERY      APPENDECTOMY      BREAST SURGERY      CATARACT EXTRACTION       SECTION      COLONOSCOPY      HIP SURGERY      JOINT REPLACEMENT  2019    Left knee replacement    LAMINECTOMY      ROTATOR CUFF REPAIR      SIGMOID RESECTION / RECTOPEXY      SINUS SURGERY       Social History   Social History     Substance and Sexual Activity   Alcohol Use Never    Frequency: Never    Drinks per session: 1 or 2    Binge frequency: Never     Social History     Substance and Sexual Activity   Drug Use No     Social History     Tobacco Use   Smoking Status Former Smoker    Years: 20 00   Smokeless Tobacco Never Used     Family History   Problem Relation Age of Onset   Johnny Danielle Breast cancer Mother     Diabetes Mother  Hypertension Mother     Lung cancer Mother     Hyperlipidemia Father     Prostate cancer Father     Colon cancer Paternal Grandmother     Colon cancer Paternal Grandfather     Diabetes Family     Substance Abuse Neg Hx     Mental illness Neg Hx        Meds/Allergies   Current Facility-Administered Medications   Medication Dose Route Frequency    acetaminophen (TYLENOL) tablet 650 mg  650 mg Oral Q6H PRN    ALPRAZolam (XANAX) tablet 0 25 mg  0 25 mg Oral HS PRN    aspirin tablet 325 mg  325 mg Oral Daily    cefepime (MAXIPIME) IVPB (premix) 1,000 mg  1,000 mg Intravenous Q12H    docusate sodium (COLACE) capsule 100 mg  100 mg Oral BID PRN    furosemide (LASIX) tablet 20 mg  20 mg Oral Daily    heparin (porcine) subcutaneous injection 5,000 Units  5,000 Units Subcutaneous Q8H [address]    hydroxychloroquine (PLAQUENIL) tablet 200 mg  200 mg Oral Early Morning    lidocaine (LIDODERM) 5 % patch 1 patch  1 patch Topical Once    metroNIDAZOLE (FLAGYL) IVPB (premix) 500 mg  500 mg Intravenous Q8H    ondansetron (ZOFRAN) injection 4 mg  4 mg Intravenous Q6H PRN    pantoprazole (PROTONIX) EC tablet 40 mg  40 mg Oral Early Morning    vancomycin (VANCOCIN) IVPB (premix) 1,000 mg  15 mg/kg Intravenous Q24H     Medications Prior to Admission   Medication    aspirin (ESTELA ASPIRIN) 325 mg tablet    Azelastine HCl 137 MCG/SPRAY SOLN    diphenoxylate-atropine (LOMOTIL) 2 5-0 025 mg per tablet    docusate sodium (COLACE) 100 mg capsule    furosemide (LASIX) 20 mg tablet    hydroxychloroquine (PLAQUENIL) 200 mg tablet    ondansetron (ZOFRAN) 8 mg tablet    pantoprazole (PROTONIX) 40 mg tablet    TOPROL XL 25 MG 24 hr tablet    ALPRAZolam (XANAX) 0 25 mg tablet    amoxicillin (AMOXIL) 500 mg capsule    calcium carbonate (TUMS) 500 mg chewable tablet    loperamide (IMODIUM) 2 mg capsule    ofloxacin (OCUFLOX) 0 3 % ophthalmic solution    prednisoLONE acetate (PRED FORTE) 1 % ophthalmic suspension Allergies   Allergen Reactions    Codeine Other (See Comments)     Throat closes    Codeine Sulfate Throat Swelling    Neomycin-Bacitracin Zn-Polymyx Rash    Wound Dressing Adhesive Other (See Comments)     If its on too long- pt starts itching    Zolmitriptan Palpitations     Heart palpitations  Generic for Zomig         PHYSICALEXAM  Blood pressure 100/54, pulse 77, temperature 98 5 °F (36 9 °C), temperature source Oral, resp  rate 18, height 5' 1" (1 549 m), weight 63 kg (138 lb 14 2 oz), SpO2 95 %  Body mass index is 26 24 kg/m²  General Appearance: NAD, cooperative, alert  Eyes: Anicteric, pale conjunctiva  ENT:  Normocephalic, atraumatic, normal mucosa  Neck:  Supple, symmetrical, trachea midline  Resp:  Clear to auscultation bilaterally; no rales, rhonchi or wheezing; respirations unlabored   CV:  S1 S2, Regular rate and rhythm; no murmur, rub, or gallop  GI:  Soft, non-tender, non-distended; normal bowel sounds; no masses, no organomegaly   Rectal: Deferred  Musculoskeletal: No cyanosis, clubbing or edema  Normal ROM    Skin:  No jaundice, rashes, or lesions  positive birthmark right side of eye  Heme/Lymph: No palpable cervical lymphadenopathy  Psych: Normal affect, good eye contact  Neuro: No gross deficits, AAOx3    Lab Results   Component Value Date    GLUCOSE 94 10/12/2015    CALCIUM 7 5 (L) 02/07/2020     10/12/2015    K 4 3 02/07/2020    CO2 21 02/07/2020     02/07/2020    BUN 24 02/07/2020    CREATININE 1 53 (H) 02/07/2020     Lab Results   Component Value Date    WBC 7 80 02/07/2020    HGB 6 7 (LL) 02/07/2020    HCT 20 8 (L) 02/07/2020     (H) 02/07/2020     (L) 02/07/2020     Lab Results   Component Value Date    ALT 22 02/07/2020    AST 42 02/07/2020    ALKPHOS 43 (L) 02/07/2020    BILITOT 0 28 10/12/2015     No results found for: AMYLASE  Lab Results   Component Value Date    LIPASE 78 02/07/2017     No results found for: IRON, TIBC, FERRITIN  Lab Results Component Value Date    INR 1 10 02/06/2020       Imaging Studies: I have personally reviewed pertinent reports  EKG, Pathology, and Other Studies: I have personally reviewed pertinent reports  REVIEW OF SYSTEMS:    CONSTITUTIONAL:  Positive for fever, chills, rigors, and weight loss  HEENT: No earache or tinnitus  Denies hearing loss or visual disturbances  CARDIOVASCULAR: No chest pain or palpitations  RESPIRATORY: Denies any cough, hemoptysis, shortness of breath or dyspnea on exertion  GASTROINTESTINAL: As noted in the History of Present Illness  GENITOURINARY: No problems with urination  Denies any hematuria or dysuria  NEUROLOGIC: No dizziness or vertigo, denies headaches  MUSCULOSKELETAL: Denies any muscle or joint pain  SKIN: Denies skin rashes or itching  ENDOCRINE: Denies excessive thirst  Denies intolerance to heat or cold  PSYCHOSOCIAL: Denies depression or anxiety  Denies any recent memory loss

## 2020-02-07 NOTE — UTILIZATION REVIEW
Initial Clinical Review    Admission: Date/Time/Statement: Admission Orders (From admission, onward)     Ordered        02/06/20 2326  Inpatient Admission  Once                   Orders Placed This Encounter   Procedures    Inpatient Admission     Standing Status:   Standing     Number of Occurrences:   1     Order Specific Question:   Admitting Physician     Answer:   Tre Slater [[id number]]     Order Specific Question:   Level of Care     Answer:   Med Surg [[id number]]     Order Specific Question:   Estimated length of stay     Answer:   More than 2 Midnights     Order Specific Question:   Certification     Answer:   I certify that inpatient services are medically necessary for this patient for a duration of greater than two midnights  See H&P and MD Progress Notes for additional information about the patient's course of treatment  ED Arrival Information     Expected Arrival Acuity Means of Arrival Escorted By Service Admission Type    - 2/6/2020 20:05 Emergent Ambulance [facility]) Hospitalist Emergency    Arrival Complaint    N/V         Chief Complaint   Patient presents with    Fever - 9 weeks to 74 years     Pt with fever and body aches x 24 hours  Assessment/Plan:  this is a 68year old female from home to ED via ems admitted inpatient due to enteritis with bandemia/anemia  History of metastatic GIST cancer with mets to liver on chemo  Presented due to non productive cough, chills, nausea and vomiting for last day  loose stools earlier and taking imodium  Has bilateral posterior hip and back pain  Febrile to 102 4  Ems administered Zofran  On exam is tachycardic, systolic murmur  Tenderness at SI joints  Pallor  Lactic acid 3 2  H&H 7 2/22  5  Wbc 3 27,  Bands 14blood cultures done  Bun 23  Creatinine 1 71 wit baseline of 1 t 1 5     CT showed signs of enteritis  IVF, IV antibiotics in progress  GI consulted    On 2/7 - sepsis secondary to enteritis, ? Infectious/IVIS     has continued diarrhea  Plan is stool work up, started on Questran and probiotics, IVF continue, IV antibiotics continue  Hemoglobin down to 6 7 today post unit PRBC  And to have another  PRBC today  Lasix on hold, creatinine is 1 53 and IVF continue  2/7/2020 per GI -  Recurrent diarrhea  Exclude C diff as she recently was given amoxicillin prior to dental exam last week  Exclude enteric pathogens  Possibly secondary to GIST tumor as she does report acute on chronic diarrhea for several years      ED Triage Vitals   Temperature Pulse Respirations Blood Pressure SpO2   02/06/20 2016 02/06/20 2016 02/06/20 2016 02/06/20 2016 02/06/20 2016   (!) 102 4 °F (39 1 °C) (!) 112 22 135/63 97 %      Temp Source Heart Rate Source Patient Position - Orthostatic VS BP Location FiO2 (%)   02/06/20 2016 02/06/20 2016 02/06/20 2016 02/06/20 2016 --   Oral Monitor Lying Right arm       Pain Score       02/06/20 2026       9        Wt Readings from Last 1 Encounters:   02/07/20 63 kg (138 lb 14 2 oz)     Additional Vital Signs:   02/07/20 11:29:02        103/54  70         02/07/20 1002        84/52Abnormal         Sitting   02/07/20 09:59:20  98 8 °F (37 1 °C)    16  81/52Abnormal   62         02/07/20 0700  98 5 °F (36 9 °C)  77  18  100/54    95 %  None (Room air)  Lying   02/07/20 0614  98 6 °F (37 °C)  69  18  100/54           02/07/20 0540  98 3 °F (36 8 °C)  69  20  98/54           02/07/20 0221  98 3 °F (36 8 °C)  66  20  89/55Abnormal            02/07/20 0157  99 °F (37 2 °C)  85  20  89/56Abnormal         Lying   02/07/20 0000    121Abnormal   26Abnormal   103/55    92 %    Lying   02/06/20 2300  100 2 °F (37 9 °C)  91  22  93/55    94 %  None (Room air)  Lying   02/06/20 2230    98  22  99/54    96 %  None (Room air)         Pertinent Labs/Diagnostic Test Results:   2/7/2020 CT abdomen - Nonspecific findings including fluid-filled loops of bowel throughout the abdomen, distention of stomach and proximal large bowel, and trace abdominopelvic ascites  Chantepepe Godinez is nonspecific findings can sometimes be seen in the setting of gastroenteritis    Abnormal appearance of the retroverted uterus suggesting the possibility of abnormal post menopausal endometrial thickening   Pelvic ultrasound follow-up, when appropriate, is recommended   Please note that the abnormal appearing uterus was reported as "presacral soft tissue mass" in the preliminary interpretation by the virtual radiologic physician     Advanced thoracolumbar degenerative change  Results from last 7 days   Lab Units 02/07/20  0416 02/06/20 2026   WBC Thousand/uL 7 80 3 27*   HEMOGLOBIN g/dL 6 7* 7 2*   HEMATOCRIT % 20 8* 22 5*   PLATELETS Thousands/uL 126* 179   BANDS PCT % 4 14*     Results from last 7 days   Lab Units 02/07/20  0416 02/06/20 2026   SODIUM mmol/L 138 140   POTASSIUM mmol/L 4 3 3 6   CHLORIDE mmol/L 105 103   CO2 mmol/L 21 23   ANION GAP mmol/L 12 14*   BUN mg/dL 24 23   CREATININE mg/dL 1 53* 1 71*   EGFR ml/min/1 73sq m 34 30   CALCIUM mg/dL 7 5* 8 5   MAGNESIUM mg/dL 1 7 1 9   PHOSPHORUS mg/dL 3 6  --      Results from last 7 days   Lab Units 02/07/20  0416 02/06/20 2026   AST U/L 42 53*   ALT U/L 22 30   ALK PHOS U/L 43* 69   TOTAL PROTEIN g/dL 5 3* 6 8   ALBUMIN g/dL 2 8* 3 8   TOTAL BILIRUBIN mg/dL 0 60 0 60     Results from last 7 days   Lab Units 02/07/20  0416 02/06/20 2026   GLUCOSE RANDOM mg/dL 116 130     Results from last 7 days   Lab Units 02/06/20 2026   TROPONIN I ng/mL 0 04     Results from last 7 days   Lab Units 02/06/20 2026   PROTIME seconds 13 9   INR  1 10   PTT seconds 24     Results from last 7 days   Lab Units 02/07/20  0418 02/06/20  2337 02/06/20 2026   LACTIC ACID mmol/L 1 5 3 2* 2 3*     Results from last 7 days   Lab Units 02/06/20  2343   CLARITY UA  CLEAR   COLOR UA  YELLOW   SPEC GRAV US  1 005   PH UA  6 0   GLUCOSE UA  NEGATIVE   KETONES UA  NEGATIVE   BLOOD UA  TRACE   PROTEIN UA TRACE   NITRITE UA  NEGATIVE   BILIRUBIN, UA  NEGATIVE   UROBILINOGEN UA  0 2   LEUKOCYTES UA  NEGATIVE     Results from last 7 days   Lab Units 02/06/20 2026   INFLUENZA A PCR  None Detected   INFLUENZA B PCR  None Detected   RSV PCR  None Detected     Results from last 7 days   Lab Units 02/06/20 2026   BLOOD CULTURE  Received in Microbiology Lab  Culture in Progress  Received in Microbiology Lab  Culture in Progress       Results from last 7 days   Lab Units 02/07/20  0416 02/06/20 2026   TOTAL COUNTED  100 100     ED Treatment:   Medication Administration from 02/06/2020 2005 to 02/07/2020 0101       Date/Time Order Dose Route Action Comments     02/06/2020 2026 acetaminophen (TYLENOL) tablet 975 mg 975 mg Oral Given      02/06/2020 2054 ondansetron (FOR EMS ONLY) (ZOFRAN) 4 mg/2 mL injection 4 mg 0 mg Does not apply Given to EMS      02/06/2020 2054 lidocaine (LIDODERM) 5 % patch 1 patch 1 patch Topical Medication Applied      02/06/2020 2054 sodium chloride 0 9 % bolus 500 mL 500 mL Intravenous New Bag      02/06/2020 2135 cefepime (MAXIPIME) IVPB (premix) 1,000 mg 1,000 mg Intravenous New Bag      02/06/2020 2135 vancomycin (VANCOCIN) IVPB (premix) 1,000 mg 1,000 mg Intravenous New Bag      02/06/2020 2316 sodium chloride 0 9 % bolus 500 mL 500 mL Intravenous New Bag      02/06/2020 2334 metroNIDAZOLE (FLAGYL) IVPB (premix) 500 mg 500 mg Intravenous New Bag      02/07/2020 0021 sodium chloride 0 9 % bolus 500 mL 500 mL Intravenous New Bag         Past Medical History:   Diagnosis Date    ADHD     Anemia     Anxiety     Arthritis     Asthma     Atrial fibrillation (HCC)     [facility]     [facility] cancer [facility] )     [facility] )     Coronary artery disease     Depression     GERD (gastroesophageal reflux disease)     History of stomach ulcers     Hypercholesterolemia     Hypertension     Kidney disease     [facility]      Present on Admission:   MARK (obstructive sleep apnea)   Paroxysmal atrial fibrillation (HCC)   CKD (chronic kidney disease) stage 3, GFR 30-59 ml/min (HCC)   GIST (gastrointestinal stromal tumor), malignant (HCC)   Anemia      Admitting Diagnosis: Enteritis [K52 9]  Nausea [R11 0]  Bandemia [D72 825]  Sepsis ([facility] ) [A41 9]  Age/Sex: 68 y o  female  Admission Orders: 2/6/2020 2326 inpatient   Scheduled Medications:  Medications:  cefepime 1,000 mg Intravenous Q12H   heparin (porcine) 5,000 Units Subcutaneous Q8H [address]   hydroxychloroquine 200 mg Oral Early Morning   metroNIDAZOLE 500 mg Intravenous Q8H   pantoprazole 40 mg Oral Early Morning   vancomycin 15 mg/kg Intravenous Q24H     Continuous IV Infusions:  sodium chloride 75 mL/hr Intravenous Continuous   Transfuse leukoreduced RBC- 1 unit 2/6 and 2/7    PRN Meds:  Acetaminophen - used x 1  650 mg Oral Q6H PRN   ALPRAZolam 0 25 mg Oral HS PRN   docusate sodium 100 mg Oral BID PRN   ondansetron 4 mg Intravenous Q6H PRN       IP CONSULT TO GASTROENTEROLOGY  [facility]     Network Utilization Review Department  [email or web address]  ATTENTION: Please call with any questions or concerns to [phone] and carefully listen to the prompts so that you are directed to the right person  All voicemails are confidential   Keri Avina all requests for admission clinical reviews, approved or denied determinations and any other requests to dedicated fax number below belonging to the campus where the patient is receiving treatment   List of dedicated fax numbers for the Facilities:  [address] [place] [address] DENIALS (Administrative/Medical Necessity) [phone]   [address] (Maternity/NICU/Pediatrics) [phone]   [place] Notice [phone]   Kylah Gordillo [phone]   [facility] [phone]   Traci Anderson [address] [facility] [phone] [address] [facility]  [phone] [address] [place] [address] [address] [facility] [phone]

## 2020-02-07 NOTE — ED PROVIDER NOTES
History  Chief Complaint   Patient presents with    Fever - 9 weeks to 74 years     Pt with fever and body aches x 24 hours  47-year-old female presents for evaluation of nonproductive cough, chills, nausea and vomiting beginning last night  Reports approximately 5 episodes of emesis today  She denies hematemesis  She denies melena or hematochezia  Nausea improved with 4 mg of Zofran given by EMS prior to arrival   Patient states that she has also had bilateral posterior hip and lumbar back pain beginning last night with other symptoms as she was walking to the bathroom  She denies any falls  Patient reports 3 loose stools earlier today and has been taking Imodium  Patient took tramadol around 4:00 p m  for her hip pain  Patient has history of gastrointestinal stromal tumor for which she undergoes chemotherapy  She does not recall when her last chemotherapy treatment was does not believe it has been in the past month  Patient sees a gastrointestinal surgeon at [facility]  Patient had undergone a capsule endoscopy on 01/17/2020 per records which had shown a polyp in the mid small bowel  She had been admitted from 01/05/2022 01/07/2020 for enterocolitis  Patient has history of chronic anemia and received 1 unit PRBCs during her January admission  History of PAF  No current anticoagulation  History provided by:  Patient and medical records  Vomiting   Severity:  Moderate  Duration:  1 day  Timing:  Constant  Number of daily episodes:  5  Quality:  Stomach contents  Progression:  Improving  Chronicity:  New  Relieved by: Zofran given by EMS    Worsened by:  Nothing  Ineffective treatments:  None tried  Associated symptoms: chills and cough    Associated symptoms: no abdominal pain, no diarrhea, no headaches, no myalgias and no sore throat    Cough:     Cough characteristics:  Non-productive    Severity:  Mild    Onset quality:  Gradual    Duration:  1 day    Timing:  Constant    Progression: Unchanged    Chronicity:  New  Risk factors comment:  History of gastrointestinal tumor      Prior to Admission Medications   Prescriptions Last Dose Informant Patient Reported? Taking?    ALPRAZolam (XANAX) 0 25 mg tablet  Self Yes No   Sig: Take by mouth daily at bedtime as needed    Azelastine HCl 137 MCG/SPRAY SOLN 2/6/2020 at Unknown time Self Yes Yes   Sig: instill 2 sprays into each nostril twice a day if needed for congestion   TOPROL XL 25 MG 24 hr tablet 2/6/2020 at Unknown time  Yes Yes   Sig: daily    amoxicillin (AMOXIL) 500 mg capsule Not Taking at Unknown time  Yes No   aspirin (ESTELA ASPIRIN) 325 mg tablet 2/6/2020 at Unknown time Self Yes Yes   Sig: Take 325 mg by mouth daily    calcium carbonate (TUMS) 500 mg chewable tablet  Self Yes No   Sig: Chew 1 tablet daily as needed for indigestion or heartburn   diphenoxylate-atropine (LOMOTIL) 2 5-0 025 mg per tablet 2/6/2020 at Unknown time  Yes Yes   Sig: TAKE 1 TABLET BY MOUTH 4 TIMES DAILY AS NEEDED FOR DIARRHEA   docusate sodium (COLACE) 100 mg capsule 2/6/2020 at Unknown time Self Yes Yes   Sig: Take 100 mg by mouth 2 (two) times a day as needed for constipation   furosemide (LASIX) 20 mg tablet 2/6/2020 at Unknown time Self Yes Yes   Sig: Take 20 mg by mouth daily    hydroxychloroquine (PLAQUENIL) 200 mg tablet 2/6/2020 at Unknown time  Yes Yes   Sig: Take 200 mg by mouth daily in the early morning    loperamide (IMODIUM) 2 mg capsule  Self Yes No   Sig: Take 2 capsules by mouth 4 (four) times a day as needed   ofloxacin (OCUFLOX) 0 3 % ophthalmic solution   Yes No   Sig: START 3 DAYS PRE-OP: 1 DROP INTO LEFT EYE 4 TIMES DAILY; CONTINUE POST OP as directed   ondansetron (ZOFRAN) 8 mg tablet 2/6/2020 at Unknown time  Yes Yes   Sig: take 1 tablet by mouth every 8 hours if needed for nausea   pantoprazole (PROTONIX) 40 mg tablet 2/6/2020 at Unknown time Self Yes Yes   Sig: Take 40 mg by mouth daily   prednisoLONE acetate (PRED FORTE) 1 % ophthalmic suspension   Yes No   Sig: POST OP: 1 DROP INTO LEFT EYE 4 TIMES DAILY; CONTINUE AS DIRECTED      Facility-Administered Medications: None       Past Medical History:   Diagnosis Date    ADHD     Anemia     Anxiety     Arthritis     Asthma     Atrial fibrillation (HCC)     Bleeding disorder (HCC)     Breast cancer (HCC)     Cancer ([facility] )     Coronary artery disease     Depression     GERD (gastroesophageal reflux disease)     History of stomach ulcers     Hypercholesterolemia     Hypertension     Kidney disease     Metastatic cancer ([facility] )        Past Surgical History:   Procedure Laterality Date    ANKLE SURGERY      APPENDECTOMY      BREAST SURGERY      CATARACT EXTRACTION       SECTION      COLONOSCOPY      HIP SURGERY      JOINT REPLACEMENT  2019    Left knee replacement    LAMINECTOMY      ROTATOR CUFF REPAIR      SIGMOID RESECTION / RECTOPEXY      SINUS SURGERY         Family History   Problem Relation Age of Onset   [facility] Breast cancer Mother     Diabetes Mother     Hypertension Mother     Lung cancer Mother     Hyperlipidemia Father     Prostate cancer Father     Colon cancer Paternal [de-identified]     Colon cancer Paternal Grandfather     Diabetes Family     Substance Abuse Neg Hx     Mental illness Neg Hx      I have reviewed and agree with the history as documented  Social History     Tobacco Use    Smoking status: Former Smoker     Years: 20 00    Smokeless tobacco: Never Used   Substance Use Topics    Alcohol use: Never     Frequency: Never     Drinks per session: 1 or 2     Binge frequency: Never    Drug use: No        Review of Systems   Constitutional: Positive for appetite change and chills  HENT: Negative for congestion, rhinorrhea and sore throat  Respiratory: Positive for cough and chest tightness  Negative for shortness of breath  Cardiovascular: Negative for chest pain, palpitations and leg swelling     Gastrointestinal: Positive for nausea and vomiting  Negative for abdominal pain, constipation and diarrhea  Genitourinary: Negative for dysuria, frequency and hematuria  Musculoskeletal: Positive for back pain (lumbar and bilateral posterior hips)  Negative for myalgias, neck pain and neck stiffness  Skin: Negative for pallor  Neurological: Negative for syncope and headaches  All other systems reviewed and are negative  Physical Exam  Physical Exam   Constitutional: She is oriented to person, place, and time  She appears well-developed and well-nourished  Non-toxic appearance  No distress  HENT:   Head: Normocephalic and atraumatic  Eyes: Pupils are equal, round, and reactive to light  Conjunctivae and EOM are normal    Neck: Normal range of motion  Neck supple  No tracheal deviation present  No thyromegaly present  Cardiovascular: Regular rhythm and intact distal pulses  Tachycardia present  Murmur heard  Systolic murmur is present with a grade of 2/6  Pulmonary/Chest: Effort normal and breath sounds normal    Abdominal: Soft  Bowel sounds are normal  She exhibits no distension  There is no tenderness  Musculoskeletal:   Positive straight leg raise bilaterally  Tenderness at SI joints bilaterally  Lymphadenopathy:     She has no cervical adenopathy  Neurological: She is alert and oriented to person, place, and time  Skin: Skin is warm and dry  She is not diaphoretic  There is pallor  Nursing note and vitals reviewed        Vital Signs  ED Triage Vitals   Temperature Pulse Respirations Blood Pressure SpO2   02/06/20 2016 02/06/20 2016 02/06/20 2016 02/06/20 2016 02/06/20 2016   (!) 102 4 °F (39 1 °C) (!) 112 22 135/63 97 %      Temp Source Heart Rate Source Patient Position - Orthostatic VS BP Location FiO2 (%)   02/06/20 2016 02/06/20 2016 02/06/20 2016 02/06/20 2016 --   Oral Monitor Lying Right arm       Pain Score       02/06/20 2026       9           Vitals:    02/07/20 0000 02/07/20 0110 02/07/20 0157 02/07/20 0221   BP: 103/55 117/59 (!) 89/56 (!) 89/55   Pulse: (!) 121 102 85 66   Patient Position - Orthostatic VS: Lying Sitting Lying          Visual Acuity      ED Medications  Medications   lidocaine (LIDODERM) 5 % patch 1 patch (1 patch Topical Medication Applied 2/6/20 2054)   ALPRAZolam (XANAX) tablet 0 25 mg (has no administration in time range)   aspirin tablet 325 mg (has no administration in time range)   docusate sodium (COLACE) capsule 100 mg (has no administration in time range)   furosemide (LASIX) tablet 20 mg (has no administration in time range)   hydroxychloroquine (PLAQUENIL) tablet 200 mg (has no administration in time range)   pantoprazole (PROTONIX) EC tablet 40 mg (has no administration in time range)   heparin (porcine) subcutaneous injection 5,000 Units (5,000 Units Subcutaneous Given 2/7/20 0148)   ondansetron (ZOFRAN) injection 4 mg (has no administration in time range)   acetaminophen (TYLENOL) tablet 650 mg (has no administration in time range)   cefepime (MAXIPIME) IVPB (premix) 1,000 mg (has no administration in time range)   metroNIDAZOLE (FLAGYL) IVPB (premix) 500 mg (has no administration in time range)   vancomycin (VANCOCIN) IVPB (premix) 1,000 mg (has no administration in time range)   acetaminophen (TYLENOL) tablet 975 mg (975 mg Oral Given 2/6/20 2026)   ondansetron (FOR EMS ONLY) (ZOFRAN) 4 mg/2 mL injection 4 mg (0 mg Does not apply Given to EMS 2/6/20 2054)   sodium chloride 0 9 % bolus 500 mL (0 mL Intravenous Stopped 2/6/20 2113)   cefepime (MAXIPIME) IVPB (premix) 1,000 mg (0 mg Intravenous Stopped 2/6/20 2205)   vancomycin (VANCOCIN) IVPB (premix) 1,000 mg (0 mg/kg × 59 kg Intravenous Stopped 2/6/20 2235)   iohexol (OMNIPAQUE) 350 MG/ML injection (MULTI-DOSE) 100 mL (100 mL Intravenous Given 2/6/20 2158)   sodium chloride 0 9 % bolus 500 mL (0 mL Intravenous Stopped 2/6/20 5726)   metroNIDAZOLE (FLAGYL) IVPB (premix) 500 mg (0 mg Intravenous Stopped 2/7/20 0019) ondansetron (ZOFRAN-ODT) dispersible tablet 8 mg (8 mg Oral Given 2/7/20 0148)   sodium chloride 0 9 % bolus 500 mL (500 mL Intravenous New Bag 2/7/20 0021)   sodium chloride 0 9 % bolus 500 mL (500 mL Intravenous New Bag 2/7/20 0146)       Diagnostic Studies  Results Reviewed     Procedure Component Value Units Date/Time    Lactic acid, plasma [[id number]]  (Abnormal) Collected:  02/06/20 2337    Lab Status:  Final result Specimen:  Blood from Arm, Right Updated:  02/07/20 0059     LACTIC ACID 3 2 mmol/L     Narrative:       Result may be elevated if tourniquet was used during collection      Magnesium [[id number]]  (Normal) Collected:  02/06/20 2026    Lab Status:  Final result Specimen:  Blood from Arm, Right Updated:  02/06/20 2357     Magnesium 1 9 mg/dL     POCT urinalysis dipstick [[id number]]  (Abnormal) Resulted:  02/06/20 2343    Lab Status:  Final result Specimen:  Urine Updated:  02/06/20 2345     Color, UA YELLOW     Clarity, UA CLEAR     Glucose, UA (Ref: Negative) NEGATIVE     Bilirubin, UA (Ref: Negative) NEGATIVE     Ketones, UA (Ref: Negative) NEGATIVE     Spec Grav, UA (Ref:1 003-1 030) 1 005     Blood, UA (Ref: Negative) TRACE     pH, UA (Ref: 4 5-8 0) 6 0     Protein, UA (Ref: Negative) TRACE     Urobilinogen, UA (Ref: 0 2- 1 0) 0 2      Leukocytes, UA (Ref: Negative) NEGATIVE     Nitrite, UA (Ref: Negative) NEGATIVE    Influenza A/B and RSV PCR [[id number]]  (Normal) Collected:  02/06/20 2026    Lab Status:  Final result Specimen:  Nasopharyngeal Swab Updated:  02/06/20 2125     INFLUENZA A PCR None Detected     INFLUENZA B PCR None Detected     RSV PCR None Detected    CBC and differential [[id number]]  (Abnormal) Collected:  02/06/20 2026    Lab Status:  Final result Specimen:  Blood from Arm, Right Updated:  02/06/20 2120     WBC 3 27 Thousand/uL      RBC 2 08 Million/uL      Hemoglobin 7 2 g/dL      Hematocrit 22 5 %       fL      MCH 34 6 pg      MCHC 32 0 g/dL      RDW 16 6 %      MPV 10 4 fL      Platelets 600 Thousands/uL      nRBC 0 /100 WBCs     Narrative: This is an appended report  These results have been appended to a previously verified report  Lactic acid, plasma x2 [[id number]]  (Abnormal) Collected:  02/06/20 2026    Lab Status:  Final result Specimen:  Blood from Arm, Right Updated:  02/06/20 2120     LACTIC ACID 2 3 mmol/L     Narrative:       Result may be elevated if tourniquet was used during collection      Troponin I [[id number]]  (Normal) Collected:  02/06/20 2026    Lab Status:  Final result Specimen:  Blood from Arm, Right Updated:  02/06/20 2100     Troponin I 0 04 ng/mL     Comprehensive metabolic panel [[id number]]  (Abnormal) Collected:  02/06/20 2026    Lab Status:  Final result Specimen:  Blood from Arm, Right Updated:  02/06/20 2057     Sodium 140 mmol/L      Potassium 3 6 mmol/L      Chloride 103 mmol/L      CO2 23 mmol/L      ANION GAP 14 mmol/L      BUN 23 mg/dL      Creatinine 1 71 mg/dL      Glucose 130 mg/dL      Calcium 8 5 mg/dL      AST 53 U/L      ALT 30 U/L      Alkaline Phosphatase 69 U/L      Total Protein 6 8 g/dL      Albumin 3 8 g/dL      Total Bilirubin 0 60 mg/dL      eGFR 30 ml/min/1 73sq m     Narrative:       [place] guidelines for Chronic Kidney Disease (CKD):     Stage 1 with normal or high GFR (GFR > 90 mL/min/1 73 square meters)    Stage 2 Mild CKD (GFR = 60-89 mL/min/1 73 square meters)    Stage 3A Moderate CKD (GFR = 45-59 mL/min/1 73 square meters)    Stage 3B Moderate CKD (GFR = 30-44 mL/min/1 73 square meters)    Stage 4 Severe CKD (GFR = 15-29 mL/min/1 73 square meters)    Stage 5 End Stage CKD (GFR <15 mL/min/1 73 square meters)  Note: GFR calculation is accurate only with a steady state creatinine    Protime-INR [[id number]]  (Normal) Collected:  02/06/20 2026    Lab Status:  Final result Specimen:  Blood from Arm, Right Updated:  02/06/20 2052     Protime 13 9 seconds      INR 1 10    APTT [[id number]]  (Normal) Collected:  02/06/20 2026    Lab Status:  Final result Specimen:  Blood from Arm, Right Updated:  02/06/20 2052     PTT 24 seconds     Blood culture #1 [[id number]] Collected:  02/06/20 2026    Lab Status: In process Specimen:  Blood from Arm, Left Updated:  02/06/20 2037    Blood culture #2 [[id number]] Collected:  02/06/20 2026    Lab Status: In process Specimen:  Blood from Arm, Right Updated:  02/06/20 2037                 CT abdomen pelvis with contrast   ED Interpretation by Thanh English MD (02/06 2316)   Reviewed interpretation by [facility]  Per their impression:   1  5 1 x 4 7 x 2 5 cm presacral soft tissue mass  This abuts the distal sigmoid colon and small bowel loops  Colonic neoplasm is in the differential    2  Findings suggestive of infectious or inflammatory enteritis  3  Mild gallbladder distention  The finding is nonspecific in nature and can be seen in the fasting state  There is significant clinical concern for acute cholecystitis, recommend abdominal ultrasound for further evaluation  XR chest 2 views   ED Interpretation by Thanh English MD (02/06 2118)   No acute pulmonary pathology                 Procedures  ECG 12 Lead Documentation Only  Date/Time: 2/6/2020 8:15 PM  Performed by: Thanh English MD  Authorized by: Thanh English MD     Indications / Diagnosis:  Tachycardia  Patient location:  ED  Previous ECG:     Previous ECG:  Compared to current    Comparison ECG info:  01/05/2020 normal sinus rhythm with left axis deviation and right bundle branch block    Similarity:  Changes noted  Interpretation:     Interpretation: abnormal    Rate:     ECG rate:  114    ECG rate assessment: tachycardic    Rhythm:     Rhythm: sinus tachycardia    Ectopy:     Ectopy: none    QRS:     QRS axis:  Left    QRS intervals:   Wide  Conduction:     Conduction: abnormal      Abnormal conduction: bifascicular block    ST segments:     ST segments:  Normal  T waves:     T waves: inverted      Inverted:  AVL and V2             ED Course  ED Course as of Feb 07 0246   u Feb 06, 2020 2057 9 1 one month ago after transfusion   Hemoglobin(!): 7 2 2057 3 91 one month ago  No recent chemotherapy per patient   WBC(!): 3 27 2058 1 22 one month ago   Creatinine(!): 1 71   2124 LACTIC ACID(!!): 2 3   2125 Bands Relative(!): 14   2331 Patient does not require blood products at this time; however, given current hemoglobin, patient likely to require blood products during this admission  Consent for blood products signed by patient and myself  Initial Sepsis Screening     Row Name 02/06/20 2126 02/06/20 2103             Is the patient's history suggestive of a new or worsening infection?   (!) Yes (Proceed)  -EE       Suspected source of infection    suspect infection, source unknown  -EE       Are two or more of the following signs & symptoms of infection both present and new to the patient?   (!) Yes (Proceed)  -EE       Indicate SIRS criteria    Tachycardia > 90 bpm;Hyperthemia > 38 3C (100 9F); Leukopenia (WBC < 4000 IJL)  -EE       If the answer is yes to both questions, suspicion of sepsis is present  [de-identified]         If severe sepsis is present AND tissue hypoperfusion perists in the hour after fluid resuscitation or lactate > 4, the patient meets criteria for SEPTIC SHOCK           Are any of the following organ dysfunction criteria present within 6 hours of suspected infection and SIRS criteria that are NOT considered to be chronic conditions? (!) Yes  -EE         Organ dysfunction  Lactate > 2 0 mmol/L  -EE         Date of presentation of severe sepsis  02/06/20  -EE         Time of presentation of severe sepsis  2126  -EE         Tissue hypoperfusion persists in the hour after crystalloid fluid administration, evidenced, by either:           Was hypotension present within one hour of the conclusion of crystalloid fluid administration?           Date of presentation of septic shock           Time of presentation of septic shock             User Key  (r) = Recorded By, (t) = Taken By, (c) = Cosigned By    [address] Name Provider Type     Thurl Saint, MD Physician                  MDM  Number of Diagnoses or Management Options  Bandemia: new and requires workup  Enteritis: new and requires workup  Sepsis [facility]): new and requires workup  Diagnosis management comments: 68-year-old female with history of gastrointestinal stromal tumor presents for evaluation of nausea, vomiting, chills and back pain  Patient tachycardic on arrival   EF of 65% on echocardiogram performed in 2017  Patient's meets sepsis criteria with bandemia present on labs as well as elevated lactic acid  CT shows signs of enteritis  Patient started on broad-spectrum antibiotics including cefepime, vancomycin and Flagyl  Patient admitted for further evaluation management         Amount and/or Complexity of Data Reviewed  Clinical lab tests: ordered and reviewed  Tests in the radiology section of CPT®: ordered and reviewed  Independent visualization of images, tracings, or specimens: yes    Patient Progress  Patient progress: stable        Disposition  Final diagnoses:   Bandemia   Enteritis   [facility]     Time reflects when diagnosis was documented in both MDM as applicable and the Disposition within this note     Time User Action Codes Description Comment    2/6/2020  9:27 PM Chrystie Gash [D72 825] Bandemia     2/6/2020 11:10 PM Chrystie Gash [K52 9] Enteritis     2/6/2020 11:10 PM Steph Baldwinty [D72 825] Bandemia     2/6/2020 11:10 PM Iveth Long Neck Modify [K52 9] Enteritis     2/6/2020 11:10 PM Ivethlyndon Scott Add [R79 89] Elevated lactic acid level     2/6/2020 11:11 PM Iveth Long Neck Add [A41 9] Sepsis ([facility] )     2/6/2020 11:11 PM Micah Merida Remove [R79 89] Elevated lactic acid level       ED Disposition     ED Disposition Condition Date/Time Comment    Admit Stable u Feb 6, 2020 11:31 PM Case was discussed with BRINDA and the patient's admission status was agreed to be Admission Status: inpatient status to the service of Dr Nesha Wells   Follow-up Information    None         Current Discharge Medication List      CONTINUE these medications which have NOT CHANGED    Details   aspirin (ESTELA ASPIRIN) 325 mg tablet Take 325 mg by mouth daily       Azelastine HCl 137 MCG/SPRAY SOLN instill 2 sprays into each nostril twice a day if needed for congestion  Refills: 0      diphenoxylate-atropine (LOMOTIL) 2 5-0 025 mg per tablet TAKE 1 TABLET BY MOUTH 4 TIMES DAILY AS NEEDED FOR DIARRHEA  Refills: 0      docusate sodium (COLACE) 100 mg capsule Take 100 mg by mouth 2 (two) times a day as needed for constipation      furosemide (LASIX) 20 mg tablet Take 20 mg by mouth daily       hydroxychloroquine (PLAQUENIL) 200 mg tablet Take 200 mg by mouth daily in the early morning       ondansetron (ZOFRAN) 8 mg tablet take 1 tablet by mouth every 8 hours if needed for nausea      pantoprazole (PROTONIX) 40 mg tablet Take 40 mg by mouth daily      TOPROL XL 25 MG 24 hr tablet daily       ALPRAZolam (XANAX) 0 25 mg tablet Take by mouth daily at bedtime as needed       amoxicillin (AMOXIL) 500 mg capsule       calcium carbonate (TUMS) 500 mg chewable tablet Chew 1 tablet daily as needed for indigestion or heartburn      loperamide (IMODIUM) 2 mg capsule Take 2 capsules by mouth 4 (four) times a day as needed      ofloxacin (OCUFLOX) 0 3 % ophthalmic solution START 3 DAYS PRE-OP: 1 DROP INTO LEFT EYE 4 TIMES DAILY; CONTINUE POST OP as directed  Refills: 0      prednisoLONE acetate (PRED FORTE) 1 % ophthalmic suspension POST OP: 1 DROP INTO LEFT EYE 4 TIMES DAILY; CONTINUE AS DIRECTED  Refills: 0           No discharge procedures on file      ED Provider  Electronically Signed by           Gamal Palma MD  02/07/20 6413

## 2020-02-08 ENCOUNTER — APPOINTMENT (INPATIENT)
Dept: RADIOLOGY | Facility: HOSPITAL | Age: 70
DRG: 872 | End: 2020-02-08
Payer: COMMERCIAL

## 2020-02-08 PROBLEM — A41.9 SEPSIS (HCC): Status: ACTIVE | Noted: 2020-02-07

## 2020-02-08 PROBLEM — A41.9 SEPSIS (HCC): Status: RESOLVED | Noted: 2020-02-07 | Resolved: 2020-02-08

## 2020-02-08 LAB
ABO GROUP BLD BPU: NORMAL
ALBUMIN SERPL BCP-MCNC: 2.6 G/DL (ref 3.5–5)
ALP SERPL-CCNC: 42 U/L (ref 46–116)
ALT SERPL W P-5'-P-CCNC: 23 U/L (ref 12–78)
ANION GAP SERPL CALCULATED.3IONS-SCNC: 9 MMOL/L (ref 4–13)
AST SERPL W P-5'-P-CCNC: 41 U/L (ref 5–45)
BASOPHILS # BLD AUTO: 0.01 THOUSANDS/ΜL (ref 0–0.1)
BASOPHILS NFR BLD AUTO: 0 % (ref 0–1)
BILIRUB SERPL-MCNC: 0.6 MG/DL (ref 0.2–1)
BPU ID: NORMAL
BUN SERPL-MCNC: 20 MG/DL (ref 5–25)
CALCIUM SERPL-MCNC: 7.8 MG/DL (ref 8.3–10.1)
CHLORIDE SERPL-SCNC: 111 MMOL/L (ref 100–108)
CO2 SERPL-SCNC: 22 MMOL/L (ref 21–32)
CREAT SERPL-MCNC: 1.28 MG/DL (ref 0.6–1.3)
CROSSMATCH: NORMAL
EOSINOPHIL # BLD AUTO: 0.03 THOUSAND/ΜL (ref 0–0.61)
EOSINOPHIL NFR BLD AUTO: 0 % (ref 0–6)
ERYTHROCYTE [DISTWIDTH] IN BLOOD BY AUTOMATED COUNT: 20.1 % (ref 11.6–15.1)
GFR SERPL CREATININE-BSD FRML MDRD: 42 ML/MIN/1.73SQ M
GLUCOSE SERPL-MCNC: 85 MG/DL (ref 65–140)
HCT VFR BLD AUTO: 24.5 % (ref 34.8–46.1)
HCT VFR BLD AUTO: 24.9 % (ref 34.8–46.1)
HGB BLD-MCNC: 8.2 G/DL (ref 11.5–15.4)
HGB BLD-MCNC: 8.3 G/DL (ref 11.5–15.4)
IMM GRANULOCYTES # BLD AUTO: 0.09 THOUSAND/UL (ref 0–0.2)
IMM GRANULOCYTES NFR BLD AUTO: 1 % (ref 0–2)
LYMPHOCYTES # BLD AUTO: 0.36 THOUSANDS/ΜL (ref 0.6–4.47)
LYMPHOCYTES NFR BLD AUTO: 4 % (ref 14–44)
MAGNESIUM SERPL-MCNC: 1.9 MG/DL (ref 1.6–2.6)
MCH RBC QN AUTO: 32.8 PG (ref 26.8–34.3)
MCHC RBC AUTO-ENTMCNC: 33.5 G/DL (ref 31.4–37.4)
MCV RBC AUTO: 98 FL (ref 82–98)
MONOCYTES # BLD AUTO: 0.64 THOUSAND/ΜL (ref 0.17–1.22)
MONOCYTES NFR BLD AUTO: 7 % (ref 4–12)
NEUTROPHILS # BLD AUTO: 8.53 THOUSANDS/ΜL (ref 1.85–7.62)
NEUTS SEG NFR BLD AUTO: 88 % (ref 43–75)
NRBC BLD AUTO-RTO: 0 /100 WBCS
PHOSPHATE SERPL-MCNC: 3.1 MG/DL (ref 2.3–4.1)
PLATELET # BLD AUTO: 133 THOUSANDS/UL (ref 149–390)
PMV BLD AUTO: 10.3 FL (ref 8.9–12.7)
POTASSIUM SERPL-SCNC: 3.9 MMOL/L (ref 3.5–5.3)
PROCALCITONIN SERPL-MCNC: 44.95 NG/ML
PROT SERPL-MCNC: 5.1 G/DL (ref 6.4–8.2)
RBC # BLD AUTO: 2.5 MILLION/UL (ref 3.81–5.12)
SODIUM SERPL-SCNC: 142 MMOL/L (ref 136–145)
UNIT DISPENSE STATUS: NORMAL
UNIT PRODUCT CODE: NORMAL
UNIT RH: NORMAL
WBC # BLD AUTO: 9.66 THOUSAND/UL (ref 4.31–10.16)

## 2020-02-08 PROCEDURE — 84100 ASSAY OF PHOSPHORUS: CPT | Performed by: INTERNAL MEDICINE

## 2020-02-08 PROCEDURE — 99232 SBSQ HOSP IP/OBS MODERATE 35: CPT | Performed by: INTERNAL MEDICINE

## 2020-02-08 PROCEDURE — 84145 PROCALCITONIN (PCT): CPT | Performed by: INTERNAL MEDICINE

## 2020-02-08 PROCEDURE — 97162 PT EVAL MOD COMPLEX 30 MIN: CPT

## 2020-02-08 PROCEDURE — 83735 ASSAY OF MAGNESIUM: CPT | Performed by: INTERNAL MEDICINE

## 2020-02-08 PROCEDURE — 80053 COMPREHEN METABOLIC PANEL: CPT | Performed by: INTERNAL MEDICINE

## 2020-02-08 PROCEDURE — 85025 COMPLETE CBC W/AUTO DIFF WBC: CPT | Performed by: INTERNAL MEDICINE

## 2020-02-08 PROCEDURE — 71046 X-RAY EXAM CHEST 2 VIEWS: CPT

## 2020-02-08 PROCEDURE — 85014 HEMATOCRIT: CPT | Performed by: INTERNAL MEDICINE

## 2020-02-08 PROCEDURE — 85018 HEMOGLOBIN: CPT | Performed by: INTERNAL MEDICINE

## 2020-02-08 PROCEDURE — 99239 HOSP IP/OBS DSCHRG MGMT >30: CPT | Performed by: INTERNAL MEDICINE

## 2020-02-08 RX ADMIN — PANTOPRAZOLE SODIUM 40 MG: 40 TABLET, DELAYED RELEASE ORAL at 05:18

## 2020-02-08 RX ADMIN — CEFEPIME HYDROCHLORIDE 1000 MG: 1 INJECTION, SOLUTION INTRAVENOUS at 12:02

## 2020-02-08 RX ADMIN — METRONIDAZOLE 500 MG: 500 INJECTION, SOLUTION INTRAVENOUS at 08:46

## 2020-02-08 RX ADMIN — HEPARIN SODIUM 5000 UNITS: 5000 INJECTION INTRAVENOUS; SUBCUTANEOUS at 12:03

## 2020-02-08 RX ADMIN — ACETAMINOPHEN 650 MG: 325 TABLET ORAL at 05:30

## 2020-02-08 RX ADMIN — HYDROXYCHLOROQUINE SULFATE 200 MG: 200 TABLET, FILM COATED ORAL at 05:18

## 2020-02-08 RX ADMIN — HEPARIN SODIUM 5000 UNITS: 5000 INJECTION INTRAVENOUS; SUBCUTANEOUS at 02:26

## 2020-02-08 NOTE — DISCHARGE INSTRUCTIONS
Follow-up with PCP in 1 week  Follow-up with Cristiano Winston in 1 week  Return to ER with any worsening fever, chills, abdominal pain, nausea, vomiting, diarrhea or any other alarming symptoms

## 2020-02-08 NOTE — ASSESSMENT & PLAN NOTE
Patient presented with acute kidney injury on chronic kidney disease stage 3  Patient creatinine on admission was 1 77  Creatinine this morning is down to 1 28  Resolved  Avoid hypotension/nephrotoxins medications

## 2020-02-08 NOTE — ASSESSMENT & PLAN NOTE
Patient admitted with fever, tachycardia and bandemia    Likely secondary to enteritis  DC antibiotics  Patient is afebrile

## 2020-02-08 NOTE — PHYSICAL THERAPY NOTE
PHYSICAL THERAPY EVAL       02/08/20 1320   Note Type   Note type Eval only   Pain Assessment   Pain Assessment No/denies pain   Pain Score No Pain   Home Living   Type of Home House   Home Layout Two level;1/2 bath on main level;Bed/bath upstairs  (1STE )   Home Equipment Walker;Cane   Additional Comments Uses cane outside of home   Prior Function   Level of [place] Independent with ADLs and functional mobility   Lives With Spouse; Son   ADL Assistance Independent   IADLs Independent   Falls in the last 6 months 0   Comments +Drives   Restrictions/Precautions   Weight Bearing Precautions Per Order No   Other Precautions Fall Risk;Multiple lines   General   Family/Caregiver Present No   Cognition   Overall Cognitive Status WFL   Arousal/Participation Alert   Orientation Level Oriented X4   Memory Within functional limits   Following Commands Follows all commands and directions without difficulty   RLE Assessment   RLE Assessment WFL   LLE Assessment   LLE Assessment WFL   Coordination   Sensation WFL   Light Touch   RLE Light Touch Grossly intact   LLE Light Touch Grossly intact   Bed Mobility   Supine to Sit 7  Independent   Sit to Supine 7  Independent   Transfers   Sit to Stand 5  Supervision   Additional items Armrests   Stand to Sit 5  Supervision   Additional items Armrests   Ambulation/Elevation   Gait pattern Foward flexed; Wide CATALINO; Short stride   Gait Assistance 5  Supervision   Assistive Device None   Distance 75ft   Stair Management Assistance Not tested   Balance   Static Sitting Normal   Dynamic Sitting Good   Static Standing Fair +   Dynamic Standing Fair +   Ambulatory Fair +   Endurance Deficit   Endurance Deficit No   Activity Tolerance   Activity Tolerance Patient tolerated treatment well   Nurse Made Aware RN Deena   Assessment   Prognosis Good   Problem List Decreased endurance; Impaired balance;Decreased mobility   Assessment Patient is a 68y/o F who presented with abdominal pain, nausea, vomiting and diarrhea  Hemoglobin was 6 7 and patient was transfused  She was found to be septic secondary to enteritis  PMH significant for CKD, A-fib, GIST, anxiety, HTN, chronic pain  Patient lives with spouse and son in a [address] with steps to enter  Was ind prior to admission and uses a cane outside of the home  Current medical status includes multiple lines, fatigue, decreased endurance and mobility  She tolerated session well and was at a supervision level for transfers and amb  She amb a short distance before becoming fatigued  Did not attempt stairs this session  Plan for next session  Advised patient to have spouse present when performing stairs for the first few times at home  She is agreeable  Anticipate that she will progress during her stay  Recommending that she return home with family support  Barriers to Discharge Inaccessible home environment   Goals   Patient Goals To go home   STG Expiration Date 02/15/20   Short Term Goal #1 1  perform sit<>stand transfers mod I level 2  Amb 200ft without an AD ind 3  Ascend/descend 12 stairs with railing mod I level   PT Treatment Day 0   Plan   Treatment/Interventions Functional transfer training;Elevations;Gait training;Spoke to nursing   PT Frequency 2-3x/wk   Recommendation   Recommendation Home with family support   Modified [place] Scale   Modified [place] Scale 3   Jael Tom, PT            Patient Name: Tayo DERAS Date: 2/8/2020

## 2020-02-08 NOTE — PROGRESS NOTES
Progress note - Gastroenterology   Jina Adan 68 y o  female MRN: [id number]  Unit/Bed#: -01 Encounter: [id number]    ASSESSMENT and PLAN    1  Recurrent diarrhea  Was on amoxicillin prior to dental exam last week but C dif negative  Exclude enteric pathogens  Possibly secondary to GIST tumor as she does report acute on chronic diarrhea for several years  Resolved  Tolerating diet  - stop antibiotics  - Okay to discharge  - resume oral chemotherapy  - follow-up with [facility]    Chief Complaint   Patient presents with    Fever - 9 weeks to 74 years     Pt with fever and body aches x 24 hours  SUBJECTIVE/HPI   Denies any further no nausea/vomiting/diarrhea  Tolerating diet  Wants to go home      /66 (BP Location: Left arm)   Pulse 81   Temp 98 5 °F (36 9 °C) (Oral)   Resp 18   Ht 5' 1" (1 549 m)   Wt 59 6 kg (131 lb 8 oz)   LMP  (LMP Unknown)   SpO2 98%   BMI 24 85 kg/m²     PHYSICALEXAM  General appearance: alert, appears stated age and cooperative  Eyes: PERLLA, EOMI, no icterus   Head: Normocephalic, without obvious abnormality, atraumatic  Lungs: clear to auscultation bilaterally  Heart: regular rate and rhythm, S1, S2 normal, no murmur, click, rub or gallop  Abdomen: soft, non-tender; bowel sounds normal; no masses,  no organomegaly  Extremities: extremities normal, atraumatic, no cyanosis or edema  Neurologic: Grossly normal    Lab Results   Component Value Date    GLUCOSE 94 10/12/2015    CALCIUM 7 8 (L) 02/08/2020     10/12/2015    K 3 9 02/08/2020    CO2 22 02/08/2020     (H) 02/08/2020    BUN 20 02/08/2020    CREATININE 1 28 02/08/2020     Lab Results   Component Value Date    WBC 9 66 02/08/2020    HGB 8 2 (L) 02/08/2020    HCT 24 5 (L) 02/08/2020    MCV 98 02/08/2020     (L) 02/08/2020     Lab Results   Component Value Date    ALT 23 02/08/2020    AST 41 02/08/2020    ALKPHOS 42 (L) 02/08/2020    BILITOT 0 28 10/12/2015     No results found for: AMYLASE  Lab Results   Component Value Date    LIPASE 78 02/07/2017     Lab Results   Component Value Date    IRON 22 (L) 02/07/2020    TIBC 341 02/07/2020    FERRITIN 79 02/07/2020     Lab Results   Component Value Date    INR 1 10 02/06/2020

## 2020-02-08 NOTE — ASSESSMENT & PLAN NOTE
On cefepime, vancomycin and Flagyl  CT scan showed Nonspecific findings including fluid-filled loops of bowel throughout the abdomen, distention of stomach and proximal large bowel, and trace abdominopelvic ascites  There is nonspecific findings can sometimes be seen in the setting of gastroenteritis  rehydrate  GI follow-up noted  Has per GI patient recurrent diarrhea is likely chemotherapy related  Stool for C diff is negative  Continue on Questran and Imodium  As per GI DC antibiotics and patient can be discharged with outpatient follow-up with [facility]  Patient is tolerating diet    Discussed with patient and she is in agreement with the discharge plan and wants to go home and will follow-up with [facility] as outpatient patient

## 2020-02-08 NOTE — PROGRESS NOTES
Progress Note - Claudean Husk 1950, 68 y o  female MRN: [id number]    Unit/Bed#: -01 Encounter: [id number]    Primary Care Provider: Nhan Gallagher MD   Date and time admitted to hospital: 2/6/2020  8:07 PM        Sepsis [facility])  Assessment & Plan  Patient admitted with fever, tachycardia and bandemia  Likely secondary to enteritis  Continue antibiotics  Trend procalcitonin  Trend fever and WBC curve    CKD (chronic kidney disease) stage 3, GFR 30-59 ml/min ([facility])  Assessment & Plan  Patient presented with acute kidney injury on chronic kidney disease stage 3  Patient creatinine on admission was 1 77  Creatinine this morning is down to 1 28  Resolved  Avoid hypotension/nephrotoxins medications    Paroxysmal atrial fibrillation ([facility] [address] )  Assessment & Plan  History of AFib not currently on anti coag secondary to chronic anemia      GIST (gastrointestinal stromal tumor), malignant [facility])  Assessment & Plan  History of high-grade gist tumor on chemotherapy patient follows regularly with [facility] last seen Thursday 01/02/2020 was told tumor is stable  Continue outpatient follow-up at [facility]  Patient has chronic anemia hemoglobin 7 2  Patient received 2 units of packed red cell transfusion  H&H is 8 2 this morning  Keep hemoglobin greater than 7  Patient had recent 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  GI on board  Monitor H&H and transfuse as needed    * Enteritis  Assessment & Plan  On cefepime, vancomycin and Flagyl  CT scan showed Nonspecific findings including fluid-filled loops of bowel throughout the abdomen, distention of stomach and proximal large bowel, and trace abdominopelvic ascites  There is nonspecific findings can sometimes be seen in the setting of gastroenteritis  rehydrate  GI consult appreciated  Has per GI patient recurrent diarrhea is likely chemotherapy related    Stool for C diff is negative  Continue on Questran and Imodium  Advance diet as tolerated          Labs & Imaging: I have personally reviewed pertinent reports  VTE Pharmacologic Prophylaxis: Heparin  VTE Mechanical Prophylaxis: sequential compression device    Code Status:   Level 1 - Full Code    Patient Centered Rounds: I have performed bedside rounds with nursing staff today  Discussions with Specialists or Other Care Team Provider:     Current Length of Stay: 2 day(s)    Current Patient Status: Inpatient   Certification Statement: The patient will continue to require additional inpatient hospital stay due to see my assessment and plan  Subjective:   Patient is seen and examined at bedside  Denies any further nausea, vomiting or abdominal pain or diarrhea  Afebrile  No active or overt rectal bleeding  All other ROS are negative  Objective:    Vitals: Blood pressure 121/66, pulse 81, temperature 98 5 °F (36 9 °C), temperature source Oral, resp  rate 18, height 5' 1" (1 549 m), weight 59 6 kg (131 lb 8 oz), SpO2 98 %  ,Body mass index is 24 85 kg/m²  SPO2 RA Rest      ED to Hosp-Admission (Current) from 2/6/2020 in [address] Med Surg Unit   SpO2  98 %   SpO2 Activity  At Rest   O2 Device  None (Room air)   O2 Flow Rate          I&O:     Intake/Output Summary (Last 24 hours) at 2/8/2020 1018  Last data filed at 2/8/2020 0846  Gross per 24 hour   Intake 360 ml   Output 550 ml   Net -190 ml       Physical Exam:    General- Alert, lying comfortably in bed  Not in any acute distress  HEENT- LATISHA, EOM intact  Neck- Supple, No JVD  CVS- regular, S1 and S2 normal   Chest- Bilateral Air entry, No rhochi, crackles or wheezing present  Abdomen- soft, nontender, not distended, no guarding or rigidity, BS+  Extremities-  No pedal edema, No calf tenderness                         Normal ROM in all extremities  CNS-   Alert, awake and orientedx3  No focal deficits present      Invasive Devices     Peripheral Intravenous Line            Peripheral IV 02/06/20 Right Antecubital 1 day                      Social History  reviewed  Family History   Problem Relation Age of Onset   [facility] Breast cancer Mother     Diabetes Mother     Hypertension Mother     Lung cancer Mother     Hyperlipidemia Father     Prostate cancer Father     Colon cancer Paternal Grandmother     Colon cancer Paternal Grandfather     Diabetes Family     Substance Abuse Neg Hx     Mental illness Neg Hx     reviewed    Meds:  Current Facility-Administered Medications   Medication Dose Route Frequency Provider Last Rate Last Dose    acetaminophen (TYLENOL) tablet 650 mg  650 mg Oral Q6H PRN Heidimarie I YOSELIN Griffin   650 mg at 02/08/20 0530    ALPRAZolam (XANAX) tablet 0 25 mg  0 25 mg Oral HS PRN Heidimarie I YOSELIN Griffin        cefepime (MAXIPIME) IVPB (premix) 1,000 mg  1,000 mg Intravenous Q12H [facility] 100 mL/hr at 02/07/20 2139 1,000 mg at 02/07/20 2139    docusate sodium (COLACE) capsule 100 mg  100 mg Oral BID PRN Heidimarie I YOSELIN Griffin        heparin (porcine) subcutaneous injection 5,000 Units  5,000 Units Subcutaneous Q8H [facility] I YOSELIN Griffin   5,000 Units at 02/08/20 0226    hydroxychloroquine (PLAQUENIL) tablet 200 mg  200 mg Oral Early Morning Heidimarie I YOSELIN Griffin   200 mg at 02/08/20 0518    metroNIDAZOLE (FLAGYL) IVPB (premix) 500 mg  500 mg Intravenous Q8H Heidimarie I YOSELIN Griffin 200 mL/hr at 02/08/20 0846 500 mg at 02/08/20 0846    ondansetron (ZOFRAN) injection 4 mg  4 mg Intravenous Q6H PRN Heidimarie I YOSELIN Griffin        pantoprazole (PROTONIX) EC tablet 40 mg  40 mg Oral Early Morning Heidimarie I YOSELIN Griffin   40 mg at 02/08/20 0518    sodium chloride 0 9 % infusion  75 mL/hr Intravenous Continuous Nino Lombardi MD 75 mL/hr at 02/07/20 0940 75 mL/hr at 02/07/20 0940    vancomycin (VANCOCIN) IVPB (premix) 1,000 mg  15 mg/kg Intravenous Q24H Shashank Lr  mL/hr at 02/07/20 2237 1,000 mg at 02/07/20 2237      Medications Prior to Admission   Medication    aspirin (ESTELA ASPIRIN) 325 mg tablet    Azelastine HCl 137 MCG/SPRAY SOLN    diphenoxylate-atropine (LOMOTIL) 2 5-0 025 mg per tablet    docusate sodium (COLACE) 100 mg capsule    furosemide (LASIX) 20 mg tablet    hydroxychloroquine (PLAQUENIL) 200 mg tablet    ondansetron (ZOFRAN) 8 mg tablet    pantoprazole (PROTONIX) 40 mg tablet    TOPROL XL 25 MG 24 hr tablet    ALPRAZolam (XANAX) 0 25 mg tablet    amoxicillin (AMOXIL) 500 mg capsule    calcium carbonate (TUMS) 500 mg chewable tablet    loperamide (IMODIUM) 2 mg capsule    ofloxacin (OCUFLOX) 0 3 % ophthalmic solution    prednisoLONE acetate (PRED FORTE) 1 % ophthalmic suspension       Labs:  Results from last 7 days   Lab Units 02/08/20  0654 02/08/20  0003 02/07/20  1611 02/07/20  0416 02/06/20 2026   WBC Thousand/uL 9 66  --   --  7 80 3 27*   HEMOGLOBIN g/dL 8 2* 8 3* 8 5* 6 7* 7 2*   HEMATOCRIT % 24 5* 24 9* 25 8* 20 8* 22 5*   PLATELETS Thousands/uL 133*  --  146* 126* 179   NEUTROS PCT % 88*  --   --   --   --    LYMPHS PCT % 4*  --   --   --   --    LYMPHO PCT %  --   --   --  0* 4*   MONOS PCT % 7  --   --   --   --    MONO PCT %  --   --   --  8 1*   EOS PCT % 0  --   --  0 0     Results from last 7 days   Lab Units 02/08/20  0654 02/07/20  0416 02/06/20 2026   POTASSIUM mmol/L 3 9 4 3 3 6   CHLORIDE mmol/L 111* 105 103   CO2 mmol/L 22 21 23   BUN mg/dL 20 24 23   CREATININE mg/dL 1 28 1 53* 1 71*   CALCIUM mg/dL 7 8* 7 5* 8 5   ALK PHOS U/L 42* 43* 69   ALT U/L 23 22 30   AST U/L 41 42 53*     Lab Results   Component Value Date    TROPONINI 0 04 02/06/2020    CKTOTAL 400 (H) 07/03/2014     Results from last 7 days   Lab Units 02/06/20 2026   INR  1 10     Lab Results   Component Value Date    BLOODCX No Growth at 24 hrs  02/06/2020    BLOODCX No Growth at 24 hrs  02/06/2020         Imaging:  Results for orders placed during the hospital encounter of 01/05/20   XR chest 1 view portable    Narrative CHEST     INDICATION:   cough  COMPARISON:  2/7/2017    EXAM PERFORMED/VIEWS:  XR CHEST PORTABLE      FINDINGS:    Cardiomediastinal silhouette appears unremarkable  The lungs are clear  No pneumothorax or pleural effusion  Osseous structures appear within normal limits for patient age  Impression No acute cardiopulmonary disease  Workstation performed: [id number]       Results for orders placed during the hospital encounter of 02/06/20   XR chest pa & lateral    Narrative CHEST     INDICATION:   sob  COMPARISON:  Chest radiograph from 2/6/2020  Chest CT from 3/16/2017  EXAM PERFORMED/VIEWS:  XR CHEST PA & LATERAL  DUAL ENERGY SUBTRACTION TECHNIQUE      FINDINGS:    Mild cardiomegaly  No acute pulmonary disease  Trace effusions on the lateral projection  No pneumothorax  Osseous structures appear within normal limits for patient age  Impression No acute pulmonary disease  Trace effusions          Workstation performed: [id number]         Last 24 Hours Medication List:     Current Facility-Administered Medications:  acetaminophen 650 mg Oral Q6H PRN Heidimarie I YOSELIN Griffin    ALPRAZolam 0 25 mg Oral HS PRN Heidimarie I YOSELIN Griffin    cefepime 1,000 mg Intravenous Q12H Heidimarie I YOSELIN Griffin Last Rate: 1,000 mg (02/07/20 2139)   docusate sodium 100 mg Oral BID PRN Heidimarie I YOSELIN Griffin    heparin (porcine) 5,000 Units Subcutaneous Q8H [address] Heidimarie I YOSELIN Griffin    hydroxychloroquine 200 mg Oral Early Morning Heidimarie I YOSELIN Griffin    metroNIDAZOLE 500 mg Intravenous Q8H Heidimarie I YOSELIN Griffin Last Rate: 500 mg (02/08/20 0846)   ondansetron 4 mg Intravenous Q6H PRN Heidimarie I YOSELIN Griffin    pantoprazole 40 mg Oral Early Morning Heidimarie I YOSELIN Griffin    sodium chloride 75 mL/hr Intravenous Continuous Stefania Montgomery MD Last Rate: 75 mL/hr (02/07/20 0940)   vancomycin 15 mg/kg Intravenous Q24H Alfie Junior CORAL Lr DO Last Rate: 1,000 mg (02/07/20 2234)        Today, Patient Was Seen By: Curtis Marcial MD    ** Please Note: Dictation voice to text software may have been used in the creation of this document   **

## 2020-02-08 NOTE — ASSESSMENT & PLAN NOTE
History of high-grade gist tumor on chemotherapy patient follows regularly with [facility] last seen Thursday 01/02/2020 was told tumor is stable  Continue outpatient [facility] [place]

## 2020-02-08 NOTE — ASSESSMENT & PLAN NOTE
Patient has chronic anemia hemoglobin 7 2  Patient received 2 units of packed red cell transfusion  H&H is 8 2 this morning  Keep hemoglobin greater than 7  Patient had recent 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  GI on board    As per GI patient can be discharged with [facility] [place] [facility]

## 2020-02-08 NOTE — ASSESSMENT & PLAN NOTE
Patient admitted with fever, tachycardia and bandemia    Likely secondary to enteritis  Continue antibiotics  Trend procalcitonin  Trend fever and WBC curve

## 2020-02-08 NOTE — PLAN OF CARE
Problem: Potential for Falls  Goal: Patient will remain free of falls  Description  INTERVENTIONS:  - Assess patient frequently for physical needs  -  Identify cognitive and physical deficits and behaviors that affect risk of falls    -  [place] fall precautions as indicated by assessment   - Educate patient/family on patient safety including physical limitations  - Instruct patient to call for assistance with activity based on assessment  - Modify environment to reduce risk of injury  - Consider OT/PT consult to assist with strengthening/mobility  Outcome: Progressing     Problem: Prexisting or High Potential for Compromised Skin Integrity  Goal: Skin integrity is maintained or improved  Description  INTERVENTIONS:  - Identify patients at risk for skin breakdown  - Assess and monitor skin integrity  - Assess and monitor nutrition and hydration status  - Monitor labs   - Assess for incontinence   - Turn and reposition patient  - Assist with mobility/ambulation  - Relieve pressure over bony prominences  - Avoid friction and shearing  - Provide appropriate hygiene as needed including keeping skin clean and dry  - Evaluate need for skin moisturizer/barrier cream  - Collaborate with interdisciplinary team   - Patient/family teaching  - Consider wound care consult   Outcome: Progressing     Problem: PAIN - ADULT  Goal: Verbalizes/displays adequate comfort level or baseline comfort level  Description  Interventions:  - Encourage patient to monitor pain and request assistance  - Assess pain using appropriate pain scale  - Administer analgesics based on type and severity of pain and evaluate response  - Implement non-pharmacological measures as appropriate and evaluate response  - Consider cultural and social influences on pain and pain management  - Notify physician/advanced practitioner if interventions unsuccessful or patient reports new pain  Outcome: Progressing     Problem: INFECTION - ADULT  Goal: Absence or prevention of progression during hospitalization  Description  INTERVENTIONS:  - Assess and monitor for signs and symptoms of infection  - Monitor lab/diagnostic results  - Monitor all insertion sites, i e  indwelling lines, tubes, and drains  - Monitor endotracheal if appropriate and nasal secretions for changes in amount and color  - [place] appropriate cooling/warming therapies per order  - Administer medications as ordered  - Instruct and encourage patient and family to use good hand hygiene technique  - Identify and instruct in appropriate isolation precautions for identified infection/condition  Outcome: Progressing  Goal: Absence of fever/infection during neutropenic period  Description  INTERVENTIONS:  - Monitor WBC    Outcome: Progressing     Problem: SAFETY ADULT  Goal: Maintain or return to baseline ADL function  Description  INTERVENTIONS:  -  Assess patient's ability to carry out ADLs; assess patient's baseline for ADL function and identify physical deficits which impact ability to perform ADLs (bathing, care of mouth/teeth, toileting, grooming, dressing, etc )  - Assess/evaluate cause of self-care deficits   - Assess range of motion  - Assess patient's mobility; develop plan if impaired  - Assess patient's need for assistive devices and provide as appropriate  - Encourage maximum independence but intervene and supervise when necessary  - Involve family in performance of ADLs  - Assess for home care needs following discharge   - Consider OT consult to assist with ADL evaluation and planning for discharge  - Provide patient education as appropriate  Outcome: Progressing  Goal: Maintain or return mobility status to optimal level  Description  INTERVENTIONS:  - Assess patient's baseline mobility status (ambulation, transfers, stairs, etc )    - Identify cognitive and physical deficits and behaviors that affect mobility  - Identify mobility aids required to assist with transfers and/or ambulation (gait belt, sit-to-stand, lift, walker, cane, etc )  - [place] fall precautions as indicated by assessment  - Record patient progress and toleration of activity level on Mobility SBAR; progress patient to next Phase/Stage  - Instruct patient to call for assistance with activity based on assessment  - Consider rehabilitation consult to assist with strengthening/weightbearing, etc   Outcome: Progressing

## 2020-02-08 NOTE — ASSESSMENT & PLAN NOTE
History of high-grade gist tumor on chemotherapy patient follows regularly with [facility] last seen Thursday 01/02/2020 was told tumor is stable  [place] [facility] [place]

## 2020-02-08 NOTE — PLAN OF CARE
Problem: Potential for Falls  Goal: Patient will remain free of falls  Description  INTERVENTIONS:  - Assess patient frequently for physical needs  -  Identify cognitive and physical deficits and behaviors that affect risk of falls    -  [place] fall precautions as indicated by assessment   - Educate patient/family on patient safety including physical limitations  - Instruct patient to call for assistance with activity based on assessment  - Modify environment to reduce risk of injury  - Consider OT/PT consult to assist with strengthening/mobility  Outcome: Progressing     Problem: Prexisting or High Potential for Compromised Skin Integrity  Goal: Skin integrity is maintained or improved  Description  INTERVENTIONS:  - Identify patients at risk for skin breakdown  - Assess and monitor skin integrity  - Assess and monitor nutrition and hydration status  - Monitor labs   - Assess for incontinence   - Turn and reposition patient  - Assist with mobility/ambulation  - Relieve pressure over bony prominences  - Avoid friction and shearing  - Provide appropriate hygiene as needed including keeping skin clean and dry  - Evaluate need for skin moisturizer/barrier cream  - Collaborate with interdisciplinary team   - Patient/family teaching  - Consider wound care consult   Outcome: Progressing     Problem: PAIN - ADULT  Goal: Verbalizes/displays adequate comfort level or baseline comfort level  Description  Interventions:  - Encourage patient to monitor pain and request assistance  - Assess pain using appropriate pain scale  - Administer analgesics based on type and severity of pain and evaluate response  - Implement non-pharmacological measures as appropriate and evaluate response  - Consider cultural and social influences on pain and pain management  - Notify physician/advanced practitioner if interventions unsuccessful or patient reports new pain  Outcome: Progressing     Problem: INFECTION - ADULT  Goal: Absence or prevention of progression during hospitalization  Description  INTERVENTIONS:  - Assess and monitor for signs and symptoms of infection  - Monitor lab/diagnostic results  - Monitor all insertion sites, i e  indwelling lines, tubes, and drains  - Monitor endotracheal if appropriate and nasal secretions for changes in amount and color  - [place] appropriate cooling/warming therapies per order  - Administer medications as ordered  - Instruct and encourage patient and family to use good hand hygiene technique  - Identify and instruct in appropriate isolation precautions for identified infection/condition  Outcome: Progressing  Goal: Absence of fever/infection during neutropenic period  Description  INTERVENTIONS:  - Monitor WBC    Outcome: Progressing     Problem: SAFETY ADULT  Goal: Maintain or return to baseline ADL function  Description  INTERVENTIONS:  -  Assess patient's ability to carry out ADLs; assess patient's baseline for ADL function and identify physical deficits which impact ability to perform ADLs (bathing, care of mouth/teeth, toileting, grooming, dressing, etc )  - Assess/evaluate cause of self-care deficits   - Assess range of motion  - Assess patient's mobility; develop plan if impaired  - Assess patient's need for assistive devices and provide as appropriate  - Encourage maximum independence but intervene and supervise when necessary  - Involve family in performance of ADLs  - Assess for home care needs following discharge   - Consider OT consult to assist with ADL evaluation and planning for discharge  - Provide patient education as appropriate  Outcome: Progressing  Goal: Maintain or return mobility status to optimal level  Description  INTERVENTIONS:  - Assess patient's baseline mobility status (ambulation, transfers, stairs, etc )    - Identify cognitive and physical deficits and behaviors that affect mobility  - Identify mobility aids required to assist with transfers and/or ambulation (gait belt, sit-to-stand, lift, walker, cane, etc )  - [place] fall precautions as indicated by assessment  - Record patient progress and toleration of activity level on Mobility SBAR; progress patient to next Phase/Stage  - Instruct patient to call for assistance with activity based on assessment  - Consider rehabilitation consult to assist with strengthening/weightbearing, etc   Outcome: Progressing

## 2020-02-08 NOTE — ASSESSMENT & PLAN NOTE
On cefepime, vancomycin and Flagyl  CT scan showed Nonspecific findings including fluid-filled loops of bowel throughout the abdomen, distention of stomach and proximal large bowel, and trace abdominopelvic ascites  There is nonspecific findings can sometimes be seen in the setting of gastroenteritis  rehydrate  GI consult appreciated  Has per GI patient recurrent diarrhea is likely chemotherapy related    Stool for C diff is negative  Continue on Questran and Imodium  Advance diet as tolerated

## 2020-02-08 NOTE — PROGRESS NOTES
Vancomycin IV Pharmacy-to-Dose Consultation  Regino Wolfe is a 68 y o  female who is currently receiving Vancomycin IV with management by the Pharmacy Consult service for the treatment of bacteremia    Assessment/Plan:  The patient's chart was reviewed  Renal function is stable  There are no signs or symptoms of nephrotoxicity and/or infusion reactions documented  The following nephrotoxicity factors are present:  Medications: Zosyn, IV acyclovir, tenofovir, high-dose Bactrim, Aminoglycosides, amphotericin B, colistin/polymyxin B, vasopressors, diuretics, NSAIDs, ACEIs/ARBs, IV contrast, cyclosporine, tacrolimus, cisplatin, lithium, etc     Patient-Factors: elderly, dehydration, hypotension, renal dysfunction, blood loss (surgery)  Based on todays assessment, will continue current vancomycin (Day 3 ) dosing of 1000mg q24h, with a plan for trough to be drawn at 2130 on 2/9/20  We will continue to follow the patients culture results and clinical progress daily      Maureen Avitia, PharmD  Pharmacist

## 2020-02-08 NOTE — ASSESSMENT & PLAN NOTE
Patient presented with acute kidney injury on chronic kidney disease stage 3  Patient creatinine on admission was 1 77  Creatinine this morning is down to 1 28  Resolved

## 2020-02-08 NOTE — ASSESSMENT & PLAN NOTE
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

## 2020-02-08 NOTE — DISCHARGE SUMMARY
Discharge- Koko Prude 1950, 68 y o  female MRN: [id number]    Unit/Bed#: -01 Encounter: [id number]    Primary Care Provider: Jared Mason MD   Date and time admitted to hospital: 2/6/2020  8:07 PM        Sepsis [facility])  Assessment & Plan  Patient admitted with fever, tachycardia and bandemia  Likely secondary to enteritis  DC antibiotics  Patient is afebrile    CKD (chronic kidney disease) stage 3, GFR 30-59 ml/min ([facility])  Assessment & Plan  Patient presented with acute kidney injury on chronic kidney disease stage 3  Patient creatinine on admission was 1 77  Creatinine this morning is down to 1 28  Resolved      Paroxysmal atrial fibrillation ([facility] [address] )  Assessment & Plan  History of AFib not currently on anti coag secondary to chronic anemia      GIST (gastrointestinal stromal tumor), malignant [facility])  Assessment & Plan  History of high-grade gist tumor on chemotherapy patient follows regularly with Ben Dunn last seen Thursday 01/02/2020 was told tumor is stable  Continue outpatient follow-up at [place] [address]  Patient has chronic anemia hemoglobin 7 2  Patient received 2 units of packed red cell transfusion  H&H is 8 2 this morning  Keep hemoglobin greater than 7  Patient had recent 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  GI on board  As per GI patient can be discharged with outpatient follow-up at [address] [place] [address]  On cefepime, vancomycin and Flagyl  CT scan showed Nonspecific findings including fluid-filled loops of bowel throughout the abdomen, distention of stomach and proximal large bowel, and trace abdominopelvic ascites  There is nonspecific findings can sometimes be seen in the setting of gastroenteritis  rehydrate  GI follow-up noted  Has per GI patient recurrent diarrhea is likely chemotherapy related    Stool for C diff is negative  Continue on Questran and Imodium  As per GI DC antibiotics and patient can be discharged with outpatient follow-up with [facility]  Patient is tolerating diet  Discussed with patient and she is in agreement with the discharge plan and wants to go home and will follow-up with [facility] as outpatient patient            Hospital Course:     Wilton Batista is a 68 y o  female patient who originally presented to the hospital on   Admission Orders (From admission, onward)     Ordered        02/06/20 2326  Inpatient Admission  Once                  due to abdominal pain nausea, vomiting and diarrhea  Patient had workup for stool done which was all negative for C diff and enteric organisms  Patient presented with hemoglobin of 7 2 which went down to 6 7  Patient received 2 units of packed red cell transfusion  Patient was started on broad-spectrum antibiotics at the time of admission for sepsis likely secondary to enteritis  Patient had CT abdomen pelvis done-the results as above  Patient was seen by GI and as per GI patient was started on clear liquid diet and it was advanced as tolerated  Patient  Follows up with the [facility] and undergoes chemotherapy for GIST tumors  GI feels patient's symptoms are related to chemotherapy  As per GI patient is tolerating diet  They recommended DC antibiotics and patient can be discharged home  Patient is ambulating without any discomfort  Patient wants to go home and agreed with the discharge plan and will follow-up with [facility] as outpatient  Patient was seen by  and refused any home care services  On exam-  Chest-clear to auscultation  Abdomen-soft, nontender  Follow-up with PCP in 1 week  Follow-up with [facility] Din[facility] in 1 week  Return to ER with any worsening fever, chills, abdominal pain, nausea, vomiting, diarrhea or any other alarming symptoms  Please see above list of diagnoses and related plan for additional information       Condition at Discharge:  good      Discharge instructions/Information to patient and family:   See after visit summary for information provided to patient and family  Provisions for Follow-Up Care:  See after visit summary for information related to follow-up care and any pertinent home health orders  Disposition:     Home       Discharge Statement:  I spent 40 minutes discharging the patient  This time was spent on the day of discharge  I had direct contact with the patient on the day of discharge  Greater than 50% of the total time was spent examining patient, answering all patient questions, arranging and discussing plan of care with patient as well as directly providing post-discharge instructions  Additional time then spent on discharge activities  Discharge Medications:  See after visit summary for reconciled discharge medications provided to patient and family        ** Please Note: This note has been constructed using a voice recognition system **

## 2020-02-08 NOTE — CONSULTS
Vancomycin IV Pharmacy-to-Dose Consultation  Akanksha Mata is a 68 y o  female who was receiving Vancomycin IV with management by the Pharmacy Consult service for treatment of bacteremia    The patients Vancomycin therapy has been completed / discontinued  Thank you for allowing us to take part in this patient's care  Pharmacy will sign-off now; please call or re-consult if there are any questions       Nasima uGyD  Pharmacist

## 2020-02-08 NOTE — NURSING NOTE
Pt  For D/C home accompanied by her   Pt  Belongings secured with patient, PIV D/C and D/C instructions reviewed with patient  Pt  Left in NAD

## 2020-02-09 VITALS
OXYGEN SATURATION: 98 % | HEIGHT: 61 IN | HEART RATE: 81 BPM | RESPIRATION RATE: 19 BRPM | DIASTOLIC BLOOD PRESSURE: 68 MMHG | WEIGHT: 131.5 LBS | BODY MASS INDEX: 24.83 KG/M2 | SYSTOLIC BLOOD PRESSURE: 116 MMHG | TEMPERATURE: 97.7 F

## 2020-02-11 ENCOUNTER — TRANSITIONAL CARE MANAGEMENT (OUTPATIENT)
Dept: FAMILY MEDICINE CLINIC | Facility: HOSPITAL | Age: 70
End: 2020-02-11

## 2020-02-12 ENCOUNTER — OFFICE VISIT (OUTPATIENT)
Dept: PAIN MEDICINE | Facility: CLINIC | Age: 70
End: 2020-02-12
Payer: COMMERCIAL

## 2020-02-12 VITALS
DIASTOLIC BLOOD PRESSURE: 70 MMHG | WEIGHT: 129 LBS | BODY MASS INDEX: 24.35 KG/M2 | SYSTOLIC BLOOD PRESSURE: 128 MMHG | HEIGHT: 61 IN | HEART RATE: 80 BPM

## 2020-02-12 DIAGNOSIS — M48.061 LUMBAR STENOSIS WITHOUT NEUROGENIC CLAUDICATION: ICD-10-CM

## 2020-02-12 DIAGNOSIS — M54.50 CHRONIC BILATERAL LOW BACK PAIN WITHOUT SCIATICA: ICD-10-CM

## 2020-02-12 DIAGNOSIS — R26.9 GAIT DISTURBANCE: ICD-10-CM

## 2020-02-12 DIAGNOSIS — G89.4 PAIN SYNDROME, CHRONIC: Primary | ICD-10-CM

## 2020-02-12 DIAGNOSIS — G89.29 CHRONIC BILATERAL LOW BACK PAIN WITHOUT SCIATICA: ICD-10-CM

## 2020-02-12 DIAGNOSIS — M54.16 BILATERAL LUMBAR RADICULOPATHY: ICD-10-CM

## 2020-02-12 LAB
BACTERIA BLD CULT: NORMAL
BACTERIA BLD CULT: NORMAL

## 2020-02-12 PROCEDURE — 3078F DIAST BP <80 MM HG: CPT | Performed by: NURSE PRACTITIONER

## 2020-02-12 PROCEDURE — 99214 OFFICE O/P EST MOD 30 MIN: CPT | Performed by: NURSE PRACTITIONER

## 2020-02-12 PROCEDURE — 3074F SYST BP LT 130 MM HG: CPT | Performed by: NURSE PRACTITIONER

## 2020-02-12 PROCEDURE — 1111F DSCHRG MED/CURRENT MED MERGE: CPT | Performed by: NURSE PRACTITIONER

## 2020-02-12 PROCEDURE — 1036F TOBACCO NON-USER: CPT | Performed by: NURSE PRACTITIONER

## 2020-02-12 PROCEDURE — 3008F BODY MASS INDEX DOCD: CPT | Performed by: NURSE PRACTITIONER

## 2020-02-12 PROCEDURE — 4040F PNEUMOC VAC/ADMIN/RCVD: CPT | Performed by: NURSE PRACTITIONER

## 2020-02-12 PROCEDURE — 1160F RVW MEDS BY RX/DR IN RCRD: CPT | Performed by: NURSE PRACTITIONER

## 2020-02-12 RX ORDER — DIPHENHYDRAMINE HCL 25 MG
25 TABLET ORAL EVERY 6 HOURS PRN
COMMUNITY

## 2020-02-12 NOTE — PATIENT INSTRUCTIONS
Epidural Steroid Injection   AMBULATORY CARE:   What you need to know about an epidural steroid injection (MARIVEL):  An MARIVEL is a procedure to inject steroid medicine into the epidural space  The epidural space is between your spinal cord and vertebrae  Steroids reduce inflammation and fluid buildup in your spine that may be causing pain  You may be given pain medicine along with the steroids  How to prepare for an MARIVEL:  Your healthcare provider will talk to you about how to prepare for your procedure  He will tell you what medicines to take or not take on the day of your procedure  You may need to stop taking blood thinners or other medicines several days before your procedure  You may need to adjust any diabetes medicine you take on the day of your procedure  Steroid medicine can increase your blood sugar level  What will happen during an MARIVEL:   · You will be given medicine to numb the procedure area  You will be awake for the procedure, but you will not feel pain  You may also be given medicine to help you relax during the procedure  Contrast liquid will be used to help your healthcare provider see the area better  Tell the healthcare provider if you have ever had an allergic reaction to contrast liquid  · Your healthcare provider may place the needle into your neck area, middle of your back, or tailbone area  He may inject the medicine next to the nerves that are causing your pain  He may instead inject the medicine into a larger area of the epidural space  This helps the medicine spread to more nerves  Your healthcare provider will use a fluoroscope to help guide the needle to the right place  A fluoroscope is a type of x-ray  After the procedure, a bandage will be placed over the injection site to prevent infection  Risks of an MARIVEL:  You may have temporary or permanent nerve damage or paralysis  You may have bleeding or develop a serious infection, such as meningitis (swelling of the brain coverings)  An abscess may also develop  You may need surgery to fix the abscess  You may have a seizure, anxiety, or trouble sleeping  If you are a man, you may have temporary erectile dysfunction (not able to have an erection)  Care for your wound as directed: You may remove the bandage before you go to bed the day of your procedure  You may take a shower, but do not take a bath for at least 24 hours  Self-care:   · Do not drive,  use machines, or do strenuous activity for 24 hours after your procedure or as directed  · Continue other treatments  as directed  Steroid injections alone will not control your pain  The injections are meant to be used with other treatments, such as physical therapy  Seek care immediately if:   · Blood soaks through your bandage  · Your wound is red, swollen, or draining pus  · You have a fever or chills, severe back pain, and the procedure area is sensitive to the touch  · You have a seizure  · You have trouble moving your legs  · You have weakness or numbness in your legs  · You cannot control when you urinate or have a bowel movement  Contact your healthcare provider if:   · You have nausea or are vomiting  · Your face or neck is red for a few days and you feel warm  · You have more pain than you had before the procedure  · You have swelling in your hands or feet  · You have questions or concerns about your condition or care  Follow up with your healthcare provider as directed:  Write down your questions so you remember to ask them during your visits  © 2017 [employer] [employer] Information is for End User's use only and may not be sold, redistributed or otherwise used for commercial purposes  All illustrations and images included in CareNotes® are the copyrighted property of [email or web address]  or [employer]  The above information is an  only  It is not intended as medical advice for individual conditions or treatments  Talk to your doctor, nurse or pharmacist before following any medical regimen to see if it is safe and effective for you

## 2020-02-12 NOTE — UTILIZATION REVIEW
Notification of Discharge  This is a Notification of Discharge from our facility [address]  Please be advised that this patient has been discharge from our facility  Below you will find the admission and discharge date and time including the patients disposition  PRESENTATION DATE: 2/6/2020  8:07 PM  OBS ADMISSION DATE:   IP ADMISSION DATE: 2/6/20 2321   DISCHARGE DATE: 2/8/2020  4:06 PM  DISPOSITION: Home/Self Care Home/Self Care   Admission Orders listed below:  Admission Orders (From admission, onward)     Ordered        02/06/20 2326  Inpatient Admission  Once                   Please contact the UR Department if additional information is required to close this patient's authorization/case  Savi Bloodgood  [facility] Utilization Review Department  Main: [phone] x carefully listen to the prompts  All voicemails are confidential   [email or web address]  Send all requests for admission clinical reviews, approved or denied determinations and any other requests to dedicated fax number below belonging to the campus where the patient is receiving treatment   List of dedicated fax numbers:  [address] DENIALS (Administrative/Medical Necessity) [phone]   [address] (Maternity/NICU/Pediatrics) [phone]   [facility] [phone]   [facility] [phone]   [facility] [phone]   [address] [facility] [address] [place] [address] [phone]   [facility]  [phone]   [address] [facility] [address] [facility]  [address] [facility] [address] [facility] [phone]

## 2020-02-12 NOTE — PROGRESS NOTES
Assessment:  1  Pain syndrome, chronic    2  Chronic bilateral low back pain without sciatica    3  Bilateral lumbar radiculopathy    4  Gait disturbance    5  Lumbar stenosis without neurogenic claudication        Plan:  While the patient was in the office today, I discussed with the patient that at this point time since I do feel there is definitely significant inflammatory component and the previous injection was significantly helpful for such a long time, I do feel that it would be beneficial proceed with a bilateral L4 transforaminal epidural steroid injection with Dr Tenna Baumgarten  Depending on the results of the injection, it may need to be repeated and we may need to consider going down to the L5 or S1 level depending on her pain and symptoms at the time  The patient was agreeable and verbalized an understanding  Complete risks and benefits including bleeding, infection, tissue reaction, nerve injury and allergic reaction were discussed  The approach was demonstrated using models and literature was provided  Verbal and written consent was obtained  The patient is schedule a follow-up office visit 3-4 weeks after the procedure at that point time we will re-evaluate her pain symptoms and discuss her treatment plan options  The patient was agreeable and verbalized an understanding  History of Present Illness: The patient is a 68 y o  female last seen on 1/3/19 who presents for a follow up office visit in regards to chronic pain syndrome secondary to lumbar stenosis with radiculopathy  The patient currently reports that since her last office visit her low back and leg pain and radicular symptoms in an L4-L5 dermatome distribution have returned without any recent trauma or injury  She currently denies any signs or symptoms of cauda equina syndrome  She presents today to discuss the possibility of a repeat injection as her last injection was on January 3, 2019   She reports that the previous injection provided moderate to significant relief up until a few months ago and the pain and just been slowly and steadily returning  She also feels that her knee replacement surgery, which went relatively well, but she is still not walking quite right as she did follow-up with Dr Matias Barth recently who is going to have her restart physical therapy to make sure she is getting more out of the flexion and hopefully help with the continued knee recovery  I have personally reviewed and/or updated the patient's past medical history, past surgical history, family history, social history, current medications, allergies, and vital signs today  Review of Systems:    Review of Systems   Respiratory: Positive for shortness of breath  Cardiovascular: Negative for chest pain  Gastrointestinal: Positive for constipation and diarrhea  Negative for nausea and vomiting  Musculoskeletal: Positive for gait problem  Negative for arthralgias, joint swelling (joint stiffness) and myalgias  Skin: Negative for rash  Neurological: Negative for dizziness, seizures and weakness  All other systems reviewed and are negative          Past Medical History:   Diagnosis Date    ADHD     Anemia     Anxiety     Arthritis     Asthma     Atrial fibrillation (HCC)     Bleeding disorder (HCC)     Breast cancer (HCC)     [facility] )     Coronary artery disease     Depression     GERD (gastroesophageal reflux disease)     History of stomach ulcers     Hypercholesterolemia     Hypertension     Kidney disease     [facility] )        Past Surgical History:   Procedure Laterality Date    ANKLE SURGERY      APPENDECTOMY      BREAST SURGERY      CATARACT EXTRACTION       SECTION      COLONOSCOPY      HIP SURGERY      JOINT REPLACEMENT  2019    Left knee replacement    LAMINECTOMY      ROTATOR CUFF REPAIR      SIGMOID RESECTION / RECTOPEXY      SINUS SURGERY         Family History Problem Relation Age of Onset   Adriana Mckeon Breast cancer Mother     Diabetes Mother     Hypertension Mother     Lung cancer Mother     Hyperlipidemia Father     Prostate cancer Father     Colon cancer Paternal Grandmother     Colon cancer Paternal Grandfather     Diabetes Family     Substance Abuse Neg Hx     Mental illness Neg Hx        Social History     Occupational History    Not on file   Tobacco Use    Smoking status: Former Smoker     Years: 20 00    Smokeless tobacco: Never Used   Substance and Sexual Activity    Alcohol use: Never     Frequency: Never     Drinks per session: 1 or 2     Binge frequency: Never    Drug use: No    Sexual activity: Not Currently         Current Outpatient Medications:     ALPRAZolam (XANAX) 0 25 mg tablet, Take by mouth daily at bedtime as needed , Disp: , Rfl:     aspirin (ESTELA ASPIRIN) 325 mg tablet, Take 325 mg by mouth daily , Disp: , Rfl:     calcium carbonate (TUMS) 500 mg chewable tablet, Chew 1 tablet daily as needed for indigestion or heartburn, Disp: , Rfl:     diphenhydrAMINE (BENADRYL) 25 mg tablet, Take 25 mg by mouth every 6 (six) hours as needed for itching, Disp: , Rfl:     diphenoxylate-atropine (LOMOTIL) 2 5-0 025 mg per tablet, TAKE 1 TABLET BY MOUTH 4 TIMES DAILY AS NEEDED FOR DIARRHEA, Disp: , Rfl: 0    docusate sodium (COLACE) 100 mg capsule, Take 100 mg by mouth 2 (two) times a day as needed for constipation, Disp: , Rfl:     furosemide (LASIX) 20 mg tablet, Take 20 mg by mouth daily , Disp: , Rfl:     hydroxychloroquine (PLAQUENIL) 200 mg tablet, Take 200 mg by mouth daily in the early morning , Disp: , Rfl:     loperamide (IMODIUM) 2 mg capsule, Take 2 capsules by mouth 4 (four) times a day as needed, Disp: , Rfl:     ofloxacin (OCUFLOX) 0 3 % ophthalmic solution, START 3 DAYS PRE-OP: 1 DROP INTO LEFT EYE 4 TIMES DAILY; CONTINUE POST OP as directed, Disp: , Rfl: 0    ondansetron (ZOFRAN) 8 mg tablet, take 1 tablet by mouth every 8 hours if needed for nausea, Disp: , Rfl:     pantoprazole (PROTONIX) 40 mg tablet, Take 40 mg by mouth daily, Disp: , Rfl:     prednisoLONE acetate (PRED FORTE) 1 % ophthalmic suspension, POST OP: 1 DROP INTO LEFT EYE 4 TIMES DAILY; CONTINUE AS DIRECTED, Disp: , Rfl: 0    TOPROL XL 25 MG 24 hr tablet, daily , Disp: , Rfl:     Azelastine HCl 137 MCG/SPRAY SOLN, instill 2 sprays into each nostril twice a day if needed for congestion, Disp: , Rfl: 0    Allergies   Allergen Reactions    Codeine Other (See Comments)     Throat closes    Codeine Sulfate Throat Swelling    Neomycin-Bacitracin Zn-Polymyx Rash    Wound Dressing Adhesive Other (See Comments)     If its on too long- pt starts itching    Zolmitriptan Palpitations     Heart palpitations  Generic for Zomig         Physical Exam:    /70 (BP Location: Left arm, Patient Position: Sitting, Cuff Size: Standard)   Pulse 80   Ht 5' 1" (1 549 m)   Wt 58 5 kg (129 lb)   LMP  (LMP Unknown)   BMI 24 37 kg/m²     Constitutional:normal, well developed, well nourished, alert, in no distress and non-toxic and no overt pain behavior  Eyes:anicteric  HEENT:grossly intact  Neck:supple, symmetric, trachea midline and no masses   Pulmonary:even and unlabored  Cardiovascular:No edema or pitting edema present  Skin:Normal without rashes or lesions and well hydrated  Psychiatric:Mood and affect appropriate  Neurologic:Cranial Nerves II-XII grossly intact  Musculoskeletal:The patient's gait is slightly antalgic, painful, but steady without the use of any assistive devices  The patient has pain upon exam with flexion greater than extension of the lumbosacral spine  Pain was noted upon exam of the left greater than right lumbosacral spine paravertebral musculature and left greater than right sacroiliac joint        Imaging  FL spine and pain procedure    (Results Pending)         Orders Placed This Encounter   Procedures    FL spine and pain procedure

## 2020-02-13 NOTE — UTILIZATION REVIEW
Notification of Discharge  This is a Notification of Discharge from our facility [address]  Please be advised that this patient has been discharge from our facility  Below you will find the admission and discharge date and time including the patients disposition  PRESENTATION DATE: 2/6/2020  8:07 PM  OBS ADMISSION DATE:   IP ADMISSION DATE: 2/6/20 2321   DISCHARGE DATE: 2/8/2020  4:06 PM  DISPOSITION: Home/Self Care Home/Self Care   Admission Orders listed below:  Admission Orders (From admission, onward)     Ordered        02/06/20 2326  Inpatient Admission  Once                   Please contact the UR Department if additional information is required to close this patient's authorization/case  [facility] Utilization Review Department  Main: [phone] x carefully listen to the prompts  All voicemails are confidential   [email or web address]  Send all requests for admission clinical reviews, approved or denied determinations and any other requests to dedicated fax number below belonging to the campus where the patient is receiving treatment   List of dedicated fax numbers:  [address] DENIALS (Administrative/Medical Necessity) [phone]   [address] (Maternity/NICU/Pediatrics) [phone]   [facility] [phone]   [facility] [phone]   [email or web address] [phone]   145 [facility] [address] [place] [address] [phone]   [facility]  [phone]   [address] [facility] [address]  [address] [facility] [address] [facility] [phone]

## 2020-02-17 ENCOUNTER — HOSPITAL ENCOUNTER (OUTPATIENT)
Dept: RADIOLOGY | Facility: CLINIC | Age: 70
Discharge: HOME | End: 2020-02-17
Attending: ANESTHESIOLOGY
Payer: COMMERCIAL

## 2020-02-17 VITALS
OXYGEN SATURATION: 97 % | RESPIRATION RATE: 16 BRPM | SYSTOLIC BLOOD PRESSURE: 116 MMHG | DIASTOLIC BLOOD PRESSURE: 53 MMHG | TEMPERATURE: 98.5 F | HEART RATE: 80 BPM

## 2020-02-17 DIAGNOSIS — M54.50 CHRONIC BILATERAL LOW BACK PAIN WITHOUT SCIATICA: ICD-10-CM

## 2020-02-17 DIAGNOSIS — G89.29 CHRONIC BILATERAL LOW BACK PAIN WITHOUT SCIATICA: ICD-10-CM

## 2020-02-17 DIAGNOSIS — M54.16 BILATERAL LUMBAR RADICULOPATHY: ICD-10-CM

## 2020-02-17 DIAGNOSIS — M48.061 LUMBAR STENOSIS WITHOUT NEUROGENIC CLAUDICATION: ICD-10-CM

## 2020-02-17 PROCEDURE — 64483 NJX AA&/STRD TFRM EPI L/S 1: CPT | Performed by: ANESTHESIOLOGY

## 2020-02-17 RX ORDER — LIDOCAINE HYDROCHLORIDE 10 MG/ML
5 INJECTION, SOLUTION EPIDURAL; INFILTRATION; INTRACAUDAL; PERINEURAL ONCE
Status: COMPLETED | OUTPATIENT
Start: 2020-02-17 | End: 2020-02-17

## 2020-02-17 RX ORDER — PAPAVERINE HCL 150 MG
20 CAPSULE, EXTENDED RELEASE ORAL ONCE
Status: COMPLETED | OUTPATIENT
Start: 2020-02-17 | End: 2020-02-17

## 2020-02-17 RX ADMIN — DEXAMETHASONE SODIUM PHOSPHATE 20 MG: 10 INJECTION, SOLUTION INTRAMUSCULAR; INTRAVENOUS at 09:11

## 2020-02-17 RX ADMIN — IOHEXOL 1 ML: 300 INJECTION, SOLUTION INTRAVENOUS at 09:10

## 2020-02-17 RX ADMIN — LIDOCAINE HYDROCHLORIDE 3 ML: 10 INJECTION, SOLUTION EPIDURAL; INFILTRATION; INTRACAUDAL; PERINEURAL at 09:10

## 2020-02-17 RX ADMIN — LIDOCAINE HYDROCHLORIDE 3 ML: 20 INJECTION, SOLUTION EPIDURAL; INFILTRATION; INTRACAUDAL at 09:14

## 2020-02-17 NOTE — H&P
History of Present Illness: The patient is a 68 y o  female who presents with complaints of low back and leg pain      Patient Active Problem List   Diagnosis    Anemia    Anxiety    Chronic bilateral low back pain without sciatica    DJD (degenerative joint disease)    Gait disturbance    GIST (gastrointestinal stromal tumor), malignant ([facility] )    Hiatal hernia    Hypertension    Spinal stenosis of lumbar region with neurogenic claudication    Pain syndrome, chronic    Paroxysmal atrial fibrillation (HCC)    Right bundle-branch block    Bilateral lumbar radiculopathy    Non-rheumatic mitral regurgitation    Enteritis    CKD (chronic kidney disease) stage 3, GFR 30-59 ml/min ([facility])       Past Medical History:   Diagnosis Date    ADHD     Anemia     Anxiety     Arthritis     Asthma     Atrial fibrillation ([place] [address] )     Bleeding disorder ([place] [address] )     Breast cancer ([place] [address] )     Cancer ([place] [address] )     Coronary artery disease     Depression     GERD (gastroesophageal reflux disease)     History of stomach ulcers     Hypercholesterolemia     Hypertension     Kidney disease     Metastatic cancer ([place] [address] )        Past Surgical History:   Procedure Laterality Date    ANKLE SURGERY      APPENDECTOMY      BREAST SURGERY      CATARACT EXTRACTION       SECTION      COLONOSCOPY      HIP SURGERY      JOINT REPLACEMENT  2019    Left knee replacement    LAMINECTOMY      ROTATOR CUFF REPAIR      SIGMOID RESECTION / RECTOPEXY      SINUS SURGERY           Current Outpatient Medications:     ALPRAZolam (XANAX) 0 25 mg tablet, Take by mouth daily at bedtime as needed , Disp: , Rfl:     aspirin (ESTELA ASPIRIN) 325 mg tablet, Take 325 mg by mouth daily , Disp: , Rfl:     Azelastine HCl 137 MCG/SPRAY SOLN, instill 2 sprays into each nostril twice a day if needed for congestion, Disp: , Rfl: 0    calcium carbonate (TUMS) 500 mg chewable tablet, Chew 1 tablet daily as needed for indigestion or heartburn, Disp: , Rfl:     diphenhydrAMINE (BENADRYL) 25 mg tablet, Take 25 mg by mouth every 6 (six) hours as needed for itching, Disp: , Rfl:     diphenoxylate-atropine (LOMOTIL) 2 5-0 025 mg per tablet, TAKE 1 TABLET BY MOUTH 4 TIMES DAILY AS NEEDED FOR DIARRHEA, Disp: , Rfl: 0    docusate sodium (COLACE) 100 mg capsule, Take 100 mg by mouth 2 (two) times a day as needed for constipation, Disp: , Rfl:     furosemide (LASIX) 20 mg tablet, Take 20 mg by mouth daily , Disp: , Rfl:     hydroxychloroquine (PLAQUENIL) 200 mg tablet, Take 200 mg by mouth daily in the early morning , Disp: , Rfl:     loperamide (IMODIUM) 2 mg capsule, Take 2 capsules by mouth 4 (four) times a day as needed, Disp: , Rfl:     ofloxacin (OCUFLOX) 0 3 % ophthalmic solution, START 3 DAYS PRE-OP: 1 DROP INTO LEFT EYE 4 TIMES DAILY; CONTINUE POST OP as directed, Disp: , Rfl: 0    ondansetron (ZOFRAN) 8 mg tablet, take 1 tablet by mouth every 8 hours if needed for nausea, Disp: , Rfl:     pantoprazole (PROTONIX) 40 mg tablet, Take 40 mg by mouth daily, Disp: , Rfl:     prednisoLONE acetate (PRED FORTE) 1 % ophthalmic suspension, POST OP: 1 DROP INTO LEFT EYE 4 TIMES DAILY; CONTINUE AS DIRECTED, Disp: , Rfl: 0    TOPROL XL 25 MG 24 hr tablet, daily , Disp: , Rfl:     Current Facility-Administered Medications:     dexamethasone (PF) (DECADRON) injection 20 mg, 20 mg, Epidural, Once, Tre Rdz DO    iohexol (OMNIPAQUE) 300 mg/mL injection 50 mL, 50 mL, Epidural, Once, Tre Rdz DO    lidocaine (PF) (XYLOCAINE-MPF) 1 % injection 5 mL, 5 mL, Other, Once, Tre Rdz DO    lidocaine (PF) (XYLOCAINE-MPF) 2 % injection 5 mL, 5 mL, Epidural, Once, Tre Rdz DO    Allergies   Allergen Reactions    Codeine Other (See Comments)     Throat closes    Codeine Sulfate Throat Swelling    Neomycin-Bacitracin Zn-Polymyx Rash    Wound Dressing Adhesive Other (See Comments)     If its on too long- pt starts itching    Zolmitriptan Palpitations Heart palpitations  Generic for Zomig         Physical Exam:   General: Awake, Alert, Oriented x 3, Mood and affect appropriate  Respiratory: Respirations even and unlabored  Cardiovascular: Peripheral pulses intact; no edema  Musculoskeletal Exam:  Decreased range of motion lumbar spine    ASA Score: II         Assessment:   1  Chronic bilateral low back pain without sciatica    2  Bilateral lumbar radiculopathy    3   Lumbar stenosis without neurogenic claudication        Plan: B/L L4 TFESI

## 2020-02-17 NOTE — DISCHARGE INSTRUCTIONS
Epidural Steroid Injection   WHAT YOU NEED TO KNOW:   An epidural steroid injection (MARIVEL) is a procedure to inject steroid medicine into the epidural space  The epidural space is between your spinal cord and vertebrae  Steroids reduce inflammation and fluid buildup in your spine that may be causing pain  You may be given pain medicine along with the steroids  ACTIVITY  · Do not drive or operate machinery today  · No strenuous activity today - bending, lifting, etc   · You may resume normal activites starting tomorrow - start slowly and as tolerated  · You may shower today, but no tub baths or hot tubs  · You may have numbness for several hours from the local anesthetic  Please use caution and common sense, especially with weight-bearing activities  CARE OF THE INJECTION SITE  · If you have soreness or pain, apply ice to the area today (20 minutes on/20 minutes off)  · Starting tomorrow, you may use warm, moist heat or ice if needed  · You may have an increase or change in your discomfort for 36-48 hours after your treatment  · Apply ice and continue with any pain medication you have been prescribed  · Notify [facility] if you have any of the following: redness, drainage, swelling, headache, stiff neck or fever above 100°F     SPECIAL INSTRUCTIONS  · Our office will contact you in approximately 7 days for a progress report  MEDICATIONS  · Continue to take all routine medications  · Our office may have instructed you to hold some medications  If you have a problem specifically related to your procedure, please call our office at [phone]  Problems not related to your procedure should be directed to your primary care physician

## 2020-02-19 ENCOUNTER — APPOINTMENT (EMERGENCY)
Dept: CT IMAGING | Facility: HOSPITAL | Age: 70
End: 2020-02-19
Payer: COMMERCIAL

## 2020-02-19 ENCOUNTER — TELEPHONE (OUTPATIENT)
Dept: GASTROENTEROLOGY | Facility: CLINIC | Age: 70
End: 2020-02-19

## 2020-02-19 ENCOUNTER — HOSPITAL ENCOUNTER (OUTPATIENT)
Facility: HOSPITAL | Age: 70
Setting detail: OBSERVATION
Discharge: HOME | End: 2020-02-20
Attending: EMERGENCY MEDICINE | Admitting: FAMILY MEDICINE
Payer: COMMERCIAL

## 2020-02-19 DIAGNOSIS — R11.2 NAUSEA AND VOMITING: ICD-10-CM

## 2020-02-19 DIAGNOSIS — R77.8 ELEVATED TROPONIN: Primary | ICD-10-CM

## 2020-02-19 DIAGNOSIS — E86.0 DEHYDRATION: ICD-10-CM

## 2020-02-19 DIAGNOSIS — N17.9 AKI (ACUTE KIDNEY INJURY) (HCC): ICD-10-CM

## 2020-02-19 DIAGNOSIS — C49.A9 MALIGNANT GASTROINTESTINAL STROMAL TUMOR (GIST) OF OTHER SITE (HCC): ICD-10-CM

## 2020-02-19 LAB
ALBUMIN SERPL BCP-MCNC: 3.8 G/DL (ref 3.5–5)
ALP SERPL-CCNC: 65 U/L (ref 46–116)
ALT SERPL W P-5'-P-CCNC: 44 U/L (ref 12–78)
ANION GAP SERPL CALCULATED.3IONS-SCNC: 13 MMOL/L (ref 4–13)
ANISOCYTOSIS BLD QL SMEAR: PRESENT
APTT PPP: 23 SECONDS (ref 23–37)
AST SERPL W P-5'-P-CCNC: 53 U/L (ref 5–45)
BASOPHILS # BLD MANUAL: 0 THOUSAND/UL (ref 0–0.1)
BASOPHILS NFR MAR MANUAL: 0 % (ref 0–1)
BILIRUB SERPL-MCNC: 0.7 MG/DL (ref 0.2–1)
BUN SERPL-MCNC: 26 MG/DL (ref 5–25)
CALCIUM SERPL-MCNC: 8.7 MG/DL (ref 8.3–10.1)
CHLORIDE SERPL-SCNC: 105 MMOL/L (ref 100–108)
CO2 SERPL-SCNC: 24 MMOL/L (ref 21–32)
CREAT SERPL-MCNC: 1.78 MG/DL (ref 0.6–1.3)
EOSINOPHIL # BLD MANUAL: 0 THOUSAND/UL (ref 0–0.4)
EOSINOPHIL NFR BLD MANUAL: 0 % (ref 0–6)
ERYTHROCYTE [DISTWIDTH] IN BLOOD BY AUTOMATED COUNT: 17.5 % (ref 11.6–15.1)
FLUAV RNA NPH QL NAA+PROBE: NORMAL
FLUBV RNA NPH QL NAA+PROBE: NORMAL
GFR SERPL CREATININE-BSD FRML MDRD: 28 ML/MIN/1.73SQ M
GLUCOSE SERPL-MCNC: 112 MG/DL (ref 65–140)
HCT VFR BLD AUTO: 28.2 % (ref 34.8–46.1)
HGB BLD-MCNC: 9.2 G/DL (ref 11.5–15.4)
INR PPP: 1.15 (ref 0.84–1.19)
LACTATE SERPL-SCNC: 2 MMOL/L (ref 0.5–2)
LG PLATELETS BLD QL SMEAR: PRESENT
LIPASE SERPL-CCNC: 118 U/L (ref 73–393)
LYMPHOCYTES # BLD AUTO: 0.09 THOUSAND/UL (ref 0.6–4.47)
LYMPHOCYTES # BLD AUTO: 1 % (ref 14–44)
MACROCYTES BLD QL AUTO: PRESENT
MCH RBC QN AUTO: 33.6 PG (ref 26.8–34.3)
MCHC RBC AUTO-ENTMCNC: 32.6 G/DL (ref 31.4–37.4)
MCV RBC AUTO: 103 FL (ref 82–98)
MONOCYTES # BLD AUTO: 0.77 THOUSAND/UL (ref 0–1.22)
MONOCYTES NFR BLD: 9 % (ref 4–12)
NEUTROPHILS # BLD MANUAL: 7.65 THOUSAND/UL (ref 1.85–7.62)
NEUTS BAND NFR BLD MANUAL: 4 % (ref 0–8)
NEUTS SEG NFR BLD AUTO: 86 % (ref 43–75)
NRBC BLD AUTO-RTO: 0 /100 WBCS
NT-PROBNP SERPL-MCNC: 1705 PG/ML
PLATELET # BLD AUTO: 253 THOUSANDS/UL (ref 149–390)
PLATELET BLD QL SMEAR: ADEQUATE
PMV BLD AUTO: 10.1 FL (ref 8.9–12.7)
POTASSIUM SERPL-SCNC: 3.6 MMOL/L (ref 3.5–5.3)
PROT SERPL-MCNC: 6.7 G/DL (ref 6.4–8.2)
PROTHROMBIN TIME: 14.4 SECONDS (ref 11.6–14.5)
RBC # BLD AUTO: 2.74 MILLION/UL (ref 3.81–5.12)
RBC MORPH BLD: PRESENT
RSV RNA NPH QL NAA+PROBE: NORMAL
SODIUM SERPL-SCNC: 142 MMOL/L (ref 136–145)
TOTAL CELLS COUNTED SPEC: 100
TROPONIN I SERPL-MCNC: 0.06 NG/ML
WBC # BLD AUTO: 8.5 THOUSAND/UL (ref 4.31–10.16)

## 2020-02-19 PROCEDURE — 81002 URINALYSIS NONAUTO W/O SCOPE: CPT | Performed by: EMERGENCY MEDICINE

## 2020-02-19 PROCEDURE — 99285 EMERGENCY DEPT VISIT HI MDM: CPT

## 2020-02-19 PROCEDURE — 99285 EMERGENCY DEPT VISIT HI MDM: CPT | Performed by: EMERGENCY MEDICINE

## 2020-02-19 PROCEDURE — 96374 THER/PROPH/DIAG INJ IV PUSH: CPT

## 2020-02-19 PROCEDURE — 85610 PROTHROMBIN TIME: CPT | Performed by: EMERGENCY MEDICINE

## 2020-02-19 PROCEDURE — 93005 ELECTROCARDIOGRAM TRACING: CPT

## 2020-02-19 PROCEDURE — 83690 ASSAY OF LIPASE: CPT | Performed by: EMERGENCY MEDICINE

## 2020-02-19 PROCEDURE — 83880 ASSAY OF NATRIURETIC PEPTIDE: CPT | Performed by: EMERGENCY MEDICINE

## 2020-02-19 PROCEDURE — 74176 CT ABD & PELVIS W/O CONTRAST: CPT

## 2020-02-19 PROCEDURE — 84484 ASSAY OF TROPONIN QUANT: CPT | Performed by: EMERGENCY MEDICINE

## 2020-02-19 PROCEDURE — 85027 COMPLETE CBC AUTOMATED: CPT | Performed by: EMERGENCY MEDICINE

## 2020-02-19 PROCEDURE — 96361 HYDRATE IV INFUSION ADD-ON: CPT

## 2020-02-19 PROCEDURE — 85730 THROMBOPLASTIN TIME PARTIAL: CPT | Performed by: EMERGENCY MEDICINE

## 2020-02-19 PROCEDURE — 80053 COMPREHEN METABOLIC PANEL: CPT | Performed by: EMERGENCY MEDICINE

## 2020-02-19 PROCEDURE — 87040 BLOOD CULTURE FOR BACTERIA: CPT | Performed by: EMERGENCY MEDICINE

## 2020-02-19 PROCEDURE — 85007 BL SMEAR W/DIFF WBC COUNT: CPT | Performed by: EMERGENCY MEDICINE

## 2020-02-19 PROCEDURE — 87631 RESP VIRUS 3-5 TARGETS: CPT | Performed by: EMERGENCY MEDICINE

## 2020-02-19 PROCEDURE — 36415 COLL VENOUS BLD VENIPUNCTURE: CPT | Performed by: EMERGENCY MEDICINE

## 2020-02-19 PROCEDURE — 83605 ASSAY OF LACTIC ACID: CPT | Performed by: EMERGENCY MEDICINE

## 2020-02-19 RX ORDER — ONDANSETRON 2 MG/ML
4 INJECTION INTRAMUSCULAR; INTRAVENOUS ONCE
Status: COMPLETED | OUTPATIENT
Start: 2020-02-19 | End: 2020-02-19

## 2020-02-19 RX ADMIN — SODIUM CHLORIDE 1000 ML: 0.9 INJECTION, SOLUTION INTRAVENOUS at 20:34

## 2020-02-19 RX ADMIN — ONDANSETRON 4 MG: 2 INJECTION INTRAMUSCULAR; INTRAVENOUS at 20:29

## 2020-02-19 NOTE — TELEPHONE ENCOUNTER
I returned patient's call from the answering service  Patient did have significant problems with nausea vomiting abdominal pain  She is dizzy with standing up  She had a lot of diarrhea last night  Not having blood per rectum  She took Imodium and is not having a bowel movement  Was in the hospital about 2 weeks ago for question of sepsis  I did call the [address] ER  Did refer the patient for evaluation -spoke with attending physician staff

## 2020-02-20 ENCOUNTER — APPOINTMENT (OUTPATIENT)
Dept: ULTRASOUND IMAGING | Facility: HOSPITAL | Age: 70
End: 2020-02-20
Payer: COMMERCIAL

## 2020-02-20 VITALS
SYSTOLIC BLOOD PRESSURE: 114 MMHG | RESPIRATION RATE: 17 BRPM | HEIGHT: 61 IN | WEIGHT: 131.84 LBS | HEART RATE: 83 BPM | DIASTOLIC BLOOD PRESSURE: 63 MMHG | BODY MASS INDEX: 24.89 KG/M2 | OXYGEN SATURATION: 98 % | TEMPERATURE: 98.9 F

## 2020-02-20 PROBLEM — R77.8 ELEVATED TROPONIN I LEVEL: Status: RESOLVED | Noted: 2020-02-20 | Resolved: 2020-02-20

## 2020-02-20 PROBLEM — R79.89 ELEVATED TROPONIN I LEVEL: Status: RESOLVED | Noted: 2020-02-20 | Resolved: 2020-02-20

## 2020-02-20 PROBLEM — N18.9 ACUTE KIDNEY INJURY SUPERIMPOSED ON CKD (HCC): Status: ACTIVE | Noted: 2020-01-05

## 2020-02-20 PROBLEM — N17.9 ACUTE KIDNEY INJURY SUPERIMPOSED ON CKD (HCC): Status: RESOLVED | Noted: 2020-01-05 | Resolved: 2020-02-20

## 2020-02-20 PROBLEM — R79.89 ELEVATED TROPONIN I LEVEL: Status: ACTIVE | Noted: 2020-02-20

## 2020-02-20 PROBLEM — N18.9 ACUTE KIDNEY INJURY SUPERIMPOSED ON CKD (HCC): Status: RESOLVED | Noted: 2020-01-05 | Resolved: 2020-02-20

## 2020-02-20 PROBLEM — R11.2 NAUSEA & VOMITING: Status: RESOLVED | Noted: 2020-02-20 | Resolved: 2020-02-20

## 2020-02-20 PROBLEM — D63.8 ANEMIA OF CHRONIC DISEASE: Status: ACTIVE | Noted: 2017-09-18

## 2020-02-20 PROBLEM — R11.2 NAUSEA & VOMITING: Status: ACTIVE | Noted: 2020-02-20

## 2020-02-20 PROBLEM — N17.9 ACUTE KIDNEY INJURY SUPERIMPOSED ON CKD (HCC): Status: ACTIVE | Noted: 2020-01-05

## 2020-02-20 PROBLEM — R77.8 ELEVATED TROPONIN I LEVEL: Status: ACTIVE | Noted: 2020-02-20

## 2020-02-20 PROBLEM — N28.89 PERINEPHRIC FLUID COLLECTION: Status: ACTIVE | Noted: 2020-02-20

## 2020-02-20 LAB
ABO GROUP BLD: NORMAL
ALBUMIN SERPL BCP-MCNC: 2.6 G/DL (ref 3.5–5)
ALP SERPL-CCNC: 45 U/L (ref 46–116)
ALT SERPL W P-5'-P-CCNC: 29 U/L (ref 12–78)
ANION GAP SERPL CALCULATED.3IONS-SCNC: 11 MMOL/L (ref 4–13)
ANION GAP SERPL CALCULATED.3IONS-SCNC: 7 MMOL/L (ref 4–13)
AST SERPL W P-5'-P-CCNC: 36 U/L (ref 5–45)
ATRIAL RATE: 100 BPM
BILIRUB SERPL-MCNC: 0.6 MG/DL (ref 0.2–1)
BLD GP AB SCN SERPL QL: NEGATIVE
BUN SERPL-MCNC: 22 MG/DL (ref 5–25)
BUN SERPL-MCNC: 28 MG/DL (ref 5–25)
CALCIUM SERPL-MCNC: 7.1 MG/DL (ref 8.3–10.1)
CALCIUM SERPL-MCNC: 8.2 MG/DL (ref 8.3–10.1)
CHLORIDE SERPL-SCNC: 104 MMOL/L (ref 100–108)
CHLORIDE SERPL-SCNC: 107 MMOL/L (ref 100–108)
CLARITY, POC: CLEAR
CO2 SERPL-SCNC: 23 MMOL/L (ref 21–32)
CO2 SERPL-SCNC: 24 MMOL/L (ref 21–32)
COLOR, POC: NORMAL
CREAT SERPL-MCNC: 1.17 MG/DL (ref 0.6–1.3)
CREAT SERPL-MCNC: 1.45 MG/DL (ref 0.6–1.3)
ERYTHROCYTE [DISTWIDTH] IN BLOOD BY AUTOMATED COUNT: 17.5 % (ref 11.6–15.1)
EXT BILIRUBIN, UA: NEGATIVE
EXT BLOOD URINE: NEGATIVE
EXT GLUCOSE, UA: NEGATIVE
EXT KETONES: NORMAL
EXT NITRITE, UA: NEGATIVE
EXT PH, UA: 5
EXT PROTEIN, UA: NORMAL
EXT SPECIFIC GRAVITY, UA: 1.01
EXT UROBILINOGEN: 0.2
GFR SERPL CREATININE-BSD FRML MDRD: 36 ML/MIN/1.73SQ M
GFR SERPL CREATININE-BSD FRML MDRD: 47 ML/MIN/1.73SQ M
GLUCOSE P FAST SERPL-MCNC: 101 MG/DL (ref 65–99)
GLUCOSE SERPL-MCNC: 101 MG/DL (ref 65–140)
GLUCOSE SERPL-MCNC: 82 MG/DL (ref 65–140)
HCT VFR BLD AUTO: 20.2 % (ref 34.8–46.1)
HCT VFR BLD AUTO: 32 % (ref 34.8–46.1)
HGB BLD-MCNC: 10.7 G/DL (ref 11.5–15.4)
HGB BLD-MCNC: 6.6 G/DL (ref 11.5–15.4)
HGB BLD-MCNC: 6.8 G/DL (ref 11.5–15.4)
HGB BLD-MCNC: 9.6 G/DL (ref 11.5–15.4)
MAGNESIUM SERPL-MCNC: 1.7 MG/DL (ref 1.6–2.6)
MCH RBC QN AUTO: 33.3 PG (ref 26.8–34.3)
MCHC RBC AUTO-ENTMCNC: 32.7 G/DL (ref 31.4–37.4)
MCV RBC AUTO: 102 FL (ref 82–98)
P AXIS: 56 DEGREES
PLATELET # BLD AUTO: 174 THOUSANDS/UL (ref 149–390)
PLATELET # BLD AUTO: 191 THOUSANDS/UL (ref 149–390)
PMV BLD AUTO: 10 FL (ref 8.9–12.7)
PMV BLD AUTO: 9.8 FL (ref 8.9–12.7)
POTASSIUM SERPL-SCNC: 4.1 MMOL/L (ref 3.5–5.3)
POTASSIUM SERPL-SCNC: 4.5 MMOL/L (ref 3.5–5.3)
PR INTERVAL: 128 MS
PROT SERPL-MCNC: 4.9 G/DL (ref 6.4–8.2)
QRS AXIS: -62 DEGREES
QRSD INTERVAL: 122 MS
QT INTERVAL: 388 MS
QTC INTERVAL: 500 MS
RBC # BLD AUTO: 1.98 MILLION/UL (ref 3.81–5.12)
RH BLD: POSITIVE
SODIUM SERPL-SCNC: 137 MMOL/L (ref 136–145)
SODIUM SERPL-SCNC: 139 MMOL/L (ref 136–145)
SPECIMEN EXPIRATION DATE: NORMAL
T WAVE AXIS: 84 DEGREES
TROPONIN I SERPL-MCNC: 0.05 NG/ML
TROPONIN I SERPL-MCNC: 0.05 NG/ML
TROPONIN I SERPL-MCNC: 0.07 NG/ML
VENTRICULAR RATE: 100 BPM
WBC # BLD AUTO: 8.59 THOUSAND/UL (ref 4.31–10.16)
WBC # BLD EST: NEGATIVE 10*3/UL

## 2020-02-20 PROCEDURE — 93010 ELECTROCARDIOGRAM REPORT: CPT | Performed by: INTERNAL MEDICINE

## 2020-02-20 PROCEDURE — 86850 RBC ANTIBODY SCREEN: CPT | Performed by: NURSE PRACTITIONER

## 2020-02-20 PROCEDURE — 76770 US EXAM ABDO BACK WALL COMP: CPT

## 2020-02-20 PROCEDURE — 99253 IP/OBS CNSLTJ NEW/EST LOW 45: CPT | Performed by: INTERNAL MEDICINE

## 2020-02-20 PROCEDURE — 85018 HEMOGLOBIN: CPT | Performed by: INTERNAL MEDICINE

## 2020-02-20 PROCEDURE — 85049 AUTOMATED PLATELET COUNT: CPT | Performed by: NURSE PRACTITIONER

## 2020-02-20 PROCEDURE — 85027 COMPLETE CBC AUTOMATED: CPT | Performed by: NURSE PRACTITIONER

## 2020-02-20 PROCEDURE — 36415 COLL VENOUS BLD VENIPUNCTURE: CPT | Performed by: NURSE PRACTITIONER

## 2020-02-20 PROCEDURE — 99236 HOSP IP/OBS SAME DATE HI 85: CPT | Performed by: INTERNAL MEDICINE

## 2020-02-20 PROCEDURE — C9113 INJ PANTOPRAZOLE SODIUM, VIA: HCPCS | Performed by: NURSE PRACTITIONER

## 2020-02-20 PROCEDURE — 86923 COMPATIBILITY TEST ELECTRIC: CPT

## 2020-02-20 PROCEDURE — P9058 RBC, L/R, CMV-NEG, IRRAD: HCPCS

## 2020-02-20 PROCEDURE — 84484 ASSAY OF TROPONIN QUANT: CPT | Performed by: NURSE PRACTITIONER

## 2020-02-20 PROCEDURE — 80048 BASIC METABOLIC PNL TOTAL CA: CPT | Performed by: INTERNAL MEDICINE

## 2020-02-20 PROCEDURE — 86900 BLOOD TYPING SEROLOGIC ABO: CPT | Performed by: NURSE PRACTITIONER

## 2020-02-20 PROCEDURE — 80053 COMPREHEN METABOLIC PANEL: CPT | Performed by: NURSE PRACTITIONER

## 2020-02-20 PROCEDURE — 86901 BLOOD TYPING SEROLOGIC RH(D): CPT | Performed by: NURSE PRACTITIONER

## 2020-02-20 PROCEDURE — 83735 ASSAY OF MAGNESIUM: CPT | Performed by: NURSE PRACTITIONER

## 2020-02-20 PROCEDURE — 85018 HEMOGLOBIN: CPT | Performed by: NURSE PRACTITIONER

## 2020-02-20 PROCEDURE — 85014 HEMATOCRIT: CPT | Performed by: INTERNAL MEDICINE

## 2020-02-20 RX ORDER — HEPARIN SODIUM 5000 [USP'U]/ML
5000 INJECTION, SOLUTION INTRAVENOUS; SUBCUTANEOUS EVERY 8 HOURS SCHEDULED
Status: DISCONTINUED | OUTPATIENT
Start: 2020-02-20 | End: 2020-02-20

## 2020-02-20 RX ORDER — ASPIRIN 325 MG
325 TABLET ORAL DAILY
Status: DISCONTINUED | OUTPATIENT
Start: 2020-02-20 | End: 2020-02-20

## 2020-02-20 RX ORDER — METOPROLOL SUCCINATE 25 MG/1
25 TABLET, EXTENDED RELEASE ORAL DAILY
Status: DISCONTINUED | OUTPATIENT
Start: 2020-02-20 | End: 2020-02-20

## 2020-02-20 RX ORDER — ALPRAZOLAM 0.25 MG/1
0.25 TABLET ORAL
Status: DISCONTINUED | OUTPATIENT
Start: 2020-02-20 | End: 2020-02-20 | Stop reason: HOSPADM

## 2020-02-20 RX ORDER — SODIUM CHLORIDE, SODIUM GLUCONATE, SODIUM ACETATE, POTASSIUM CHLORIDE, MAGNESIUM CHLORIDE, SODIUM PHOSPHATE, DIBASIC, AND POTASSIUM PHOSPHATE .53; .5; .37; .037; .03; .012; .00082 G/100ML; G/100ML; G/100ML; G/100ML; G/100ML; G/100ML; G/100ML
500 INJECTION, SOLUTION INTRAVENOUS ONCE
Status: COMPLETED | OUTPATIENT
Start: 2020-02-20 | End: 2020-02-20

## 2020-02-20 RX ORDER — PANTOPRAZOLE SODIUM 40 MG/1
40 TABLET, DELAYED RELEASE ORAL
Status: DISCONTINUED | OUTPATIENT
Start: 2020-02-20 | End: 2020-02-20

## 2020-02-20 RX ORDER — POTASSIUM CHLORIDE 20 MEQ/1
40 TABLET, EXTENDED RELEASE ORAL ONCE
Status: COMPLETED | OUTPATIENT
Start: 2020-02-20 | End: 2020-02-20

## 2020-02-20 RX ORDER — DOCUSATE SODIUM 100 MG/1
100 CAPSULE, LIQUID FILLED ORAL 2 TIMES DAILY PRN
Status: DISCONTINUED | OUTPATIENT
Start: 2020-02-20 | End: 2020-02-20 | Stop reason: HOSPADM

## 2020-02-20 RX ORDER — HYDROXYCHLOROQUINE SULFATE 200 MG/1
200 TABLET, FILM COATED ORAL
Status: DISCONTINUED | OUTPATIENT
Start: 2020-02-20 | End: 2020-02-20 | Stop reason: HOSPADM

## 2020-02-20 RX ORDER — ACETAMINOPHEN 325 MG/1
650 TABLET ORAL EVERY 6 HOURS PRN
Status: DISCONTINUED | OUTPATIENT
Start: 2020-02-20 | End: 2020-02-20 | Stop reason: HOSPADM

## 2020-02-20 RX ORDER — DIPHENOXYLATE HYDROCHLORIDE AND ATROPINE SULFATE 2.5; .025 MG/1; MG/1
1 TABLET ORAL 4 TIMES DAILY PRN
Status: DISCONTINUED | OUTPATIENT
Start: 2020-02-20 | End: 2020-02-20 | Stop reason: HOSPADM

## 2020-02-20 RX ORDER — CALCIUM CARBONATE 200(500)MG
500 TABLET,CHEWABLE ORAL DAILY PRN
Status: DISCONTINUED | OUTPATIENT
Start: 2020-02-20 | End: 2020-02-20 | Stop reason: HOSPADM

## 2020-02-20 RX ORDER — ONDANSETRON 2 MG/ML
4 INJECTION INTRAMUSCULAR; INTRAVENOUS EVERY 6 HOURS PRN
Status: DISCONTINUED | OUTPATIENT
Start: 2020-02-20 | End: 2020-02-20 | Stop reason: HOSPADM

## 2020-02-20 RX ORDER — DIPHENHYDRAMINE HCL 25 MG
25 TABLET ORAL EVERY 6 HOURS PRN
Status: DISCONTINUED | OUTPATIENT
Start: 2020-02-20 | End: 2020-02-20 | Stop reason: HOSPADM

## 2020-02-20 RX ORDER — FUROSEMIDE 20 MG/1
20 TABLET ORAL DAILY
Status: DISCONTINUED | OUTPATIENT
Start: 2020-02-20 | End: 2020-02-20 | Stop reason: HOSPADM

## 2020-02-20 RX ORDER — ONDANSETRON 4 MG/1
8 TABLET, ORALLY DISINTEGRATING ORAL EVERY 6 HOURS PRN
Status: DISCONTINUED | OUTPATIENT
Start: 2020-02-20 | End: 2020-02-20 | Stop reason: SDUPTHER

## 2020-02-20 RX ORDER — PANTOPRAZOLE SODIUM 40 MG/1
40 INJECTION, POWDER, FOR SOLUTION INTRAVENOUS EVERY 12 HOURS SCHEDULED
Status: DISCONTINUED | OUTPATIENT
Start: 2020-02-20 | End: 2020-02-20 | Stop reason: HOSPADM

## 2020-02-20 RX ADMIN — POTASSIUM CHLORIDE 40 MEQ: 1500 TABLET, EXTENDED RELEASE ORAL at 08:08

## 2020-02-20 RX ADMIN — PANTOPRAZOLE SODIUM 40 MG: 40 INJECTION, POWDER, FOR SOLUTION INTRAVENOUS at 04:53

## 2020-02-20 RX ADMIN — SODIUM CHLORIDE, SODIUM GLUCONATE, SODIUM ACETATE, POTASSIUM CHLORIDE, MAGNESIUM CHLORIDE, SODIUM PHOSPHATE, DIBASIC, AND POTASSIUM PHOSPHATE 500 ML: .53; .5; .37; .037; .03; .012; .00082 INJECTION, SOLUTION INTRAVENOUS at 03:49

## 2020-02-20 RX ADMIN — ACETAMINOPHEN 650 MG: 325 TABLET, FILM COATED ORAL at 03:16

## 2020-02-20 RX ADMIN — HYDROXYCHLOROQUINE SULFATE 200 MG: 200 TABLET, FILM COATED ORAL at 08:00

## 2020-02-20 NOTE — CONSULTS
Consultation - GI   Lyric Leger 68 y o  female MRN: [id number]  Unit/Bed#: ED 10 Encounter: [id number]    Inpatient consult to gastroenterology  Consult performed by: Ernesto Lazo DO  Consult ordered by: LANEY Palma        ASSESSMENT and PLAN    1  Vomiting  Abrupt onset of nausea and vomiting prior to admission yesterday, none since  Two admissions to  [facility] this year with enteritis symptoms with mainly lower GI complaints  No etiology on CT  Differential mainly includes infectious gastroenteritis, gastritis/peptic ulcer disease, or  chemotherapy effect  Recent EGD and capsule study showed a normal stomach and she has been on a PPI so gastritis is less likely  We discussed repeat EGD, but will proceed conservatively for now with clear liquid diet advancing as tolerated  Will reconsider upper endoscopy if symptoms recur  She takes Zofran once daily at home, I recommended additional doses as needed     2  Anemia  Acute on chronic  No localizing GI source  Recent EGD/colonoscopy/capsule without bleeding source  Agree with transfusion  If hemoglobin declines further will discuss upper endoscopy    3  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  Nonobstructive and nonbleeding  Reports for faxed to her oncologist at [facility]  No urgent need for deep enteroscopy    4  Metastatic GIST tumor  Participating in a clinical trial at [facility]  D/w her clinical coordinator, GI symptoms and anemia can be affected by the study drug     Discussed with R N  And Internal Medicine    Chief Complaint   Patient presents with    Vomiting     Started vomiting at 4pm today  Physician Requesting Consult: Kym Amezcua MD    [facility] Lyric Leger is a 68y o  year old female who presents with vomiting  She is known to our practice from an outpatient anemia workup and 2 recent admissions [facility] with [facility]    She was in her usual state of health yesterday  She ate a normal breakfast   She was not very hungry for lunch  She ate a small amount of chicken around 3:00 p m     At around 4:00 p m  She developed nausea and several episodes of nonbloody emesis  She contacted our physician on-call and was referred to the ER for further evaluation  She has had no recurrence of nausea or vomiting  She denies any abdominal pain  She chronically has loose stool with her last episode on   She typically takes Zofran once a day in the morning but does not take additional doses      Historical Information   Past Medical History:   Diagnosis Date    ADHD     Anemia     Anxiety     Arthritis     Asthma     Atrial fibrillation (HCC)     Bleeding disorder (HCC)     Breast cancer (HCC)     Cancer ([facility] [address] [facility]     Coronary artery disease     Depression     GERD (gastroesophageal reflux disease)     History of stomach ulcers     Hypercholesterolemia     Hypertension     Kidney disease     Metastatic cancer (HCC)      Past Surgical History:   Procedure Laterality Date    ANKLE SURGERY      APPENDECTOMY      BREAST SURGERY      CATARACT EXTRACTION       SECTION      COLONOSCOPY      HIP SURGERY      JOINT REPLACEMENT  2019    Left knee replacement    LAMINECTOMY      ROTATOR CUFF REPAIR      SIGMOID RESECTION / RECTOPEXY      SINUS SURGERY       Social History   Social History     Substance and Sexual Activity   Alcohol Use Never    Frequency: Never    Drinks per session: 1 or 2    Binge frequency: Never     Social History     Substance and Sexual Activity   Drug Use No     Social History     Tobacco Use   Smoking Status Former Smoker    Years: 20 00   Smokeless Tobacco Never Used     Family History   Problem Relation Age of Onset   [place] Breast cancer Mother     Diabetes Mother     Hypertension Mother     Lung cancer Mother     Hyperlipidemia Father     Prostate cancer Father     Colon cancer Paternal Grandmother     Colon cancer Paternal Grandfather     Diabetes Family     Substance Abuse Neg Hx     Mental illness Neg Hx        Meds/Allergies   Current Facility-Administered Medications   Medication Dose Route Frequency    acetaminophen (TYLENOL) tablet 650 mg  650 mg Oral Q6H PRN    ALPRAZolam (XANAX) tablet 0 25 mg  0 25 mg Oral HS PRN    calcium carbonate (TUMS) chewable tablet 500 mg  500 mg Oral Daily PRN    diphenhydrAMINE (BENADRYL) tablet 25 mg  25 mg Oral Q6H PRN    diphenoxylate-atropine (LOMOTIL) 2 5-0 025 mg per tablet 1 tablet  1 tablet Oral 4x Daily PRN    docusate sodium (COLACE) capsule 100 mg  100 mg Oral BID PRN    furosemide (LASIX) tablet 20 mg  20 mg Oral Daily    hydroxychloroquine (PLAQUENIL) tablet 200 mg  200 mg Oral Early Morning    ondansetron (ZOFRAN) injection 4 mg  4 mg Intravenous Q6H PRN    pantoprazole (PROTONIX) injection 40 mg  40 mg Intravenous Q12H [address]       (Not in a hospital admission)    Allergies   Allergen Reactions    Codeine Other (See Comments)     Throat closes    Codeine Sulfate Throat Swelling    Neomycin-Bacitracin Zn-Polymyx Rash    Wound Dressing Adhesive Other (See Comments)     If its on too long- pt starts itching    Zolmitriptan Palpitations     Heart palpitations  Generic for Zomig         PHYSICALEXAM  Blood pressure 102/58, pulse 75, temperature 98 6 °F (37 °C), temperature source Temporal, resp  rate 20, weight 59 kg (130 lb), SpO2 97 %  Body mass index is 24 56 kg/m²  General Appearance: NAD, cooperative, alert  Eyes: Anicteric, PERRLA, EOMI  ENT:  Normocephalic, atraumatic, normal mucosa  Neck:  Supple, symmetrical, trachea midline  Resp:  Clear to auscultation bilaterally; no rales, rhonchi or wheezing; respirations unlabored   CV:  S1 S2, Regular rate and rhythm; no murmur, rub, or gallop    GI:  Soft, non-tender, non-distended; normal bowel sounds; no masses, no organomegaly   Rectal: Deferred  Musculoskeletal: No cyanosis, clubbing or edema  Normal ROM  Skin:  No jaundice, rashes, or lesions   Heme/Lymph: No palpable cervical lymphadenopathy  Psych: Normal affect, good eye contact  Neuro: No gross deficits, AAOx3    Lab Results   Component Value Date    GLUCOSE 94 10/12/2015    CALCIUM 7 1 (L) 02/20/2020     10/12/2015    K 4 5 02/20/2020    CO2 24 02/20/2020     02/20/2020    BUN 28 (H) 02/20/2020    CREATININE 1 45 (H) 02/20/2020     Lab Results   Component Value Date    WBC 8 59 02/20/2020    HGB 6 8 (LL) 02/20/2020    HCT 20 2 (L) 02/20/2020     (H) 02/20/2020     02/20/2020     Lab Results   Component Value Date    ALT 29 02/20/2020    AST 36 02/20/2020    ALKPHOS 45 (L) 02/20/2020    BILITOT 0 28 10/12/2015     No results found for: AMYLASE  Lab Results   Component Value Date    LIPASE 118 02/19/2020     Lab Results   Component Value Date    IRON 22 (L) 02/07/2020    TIBC 341 02/07/2020    FERRITIN 79 02/07/2020     Lab Results   Component Value Date    INR 1 15 02/19/2020       Imaging Studies: I have personally reviewed pertinent reports  EKG, Pathology, and Other Studies: I have personally reviewed pertinent reports  REVIEW OF SYSTEMS:    CONSTITUTIONAL: Denies any fever, chills, rigors, and weight loss  HEENT: No earache or tinnitus  Denies hearing loss or visual disturbances  CARDIOVASCULAR: No chest pain or palpitations  RESPIRATORY: Denies any cough, hemoptysis, shortness of breath or dyspnea on exertion  GASTROINTESTINAL: As noted in the History of Present Illness  GENITOURINARY: No problems with urination  Denies any hematuria or dysuria  NEUROLOGIC: No dizziness or vertigo, denies headaches  MUSCULOSKELETAL: Denies any muscle or joint pain  SKIN: Denies skin rashes or itching  ENDOCRINE: Denies excessive thirst  Denies intolerance to heat or cold  PSYCHOSOCIAL: Denies depression or anxiety  Denies any recent memory loss

## 2020-02-20 NOTE — UTILIZATION REVIEW
Initial Clinical Review    2/20/2020 0207 observation with treatment/evaluation starting in ED on 2/19 1856  Admission: Date/Time/Statement: Admission Orders (From admission, onward)     Ordered        02/20/20 0207  Place in Observation (expected length of stay for this patient is less than two midnights)  Once                   Orders Placed This Encounter   Procedures    Place in Observation (expected length of stay for this patient is less than two midnights)     Standing Status:   Standing     Number of Occurrences:   1     Order Specific Question:   Admitting Physician     Answer:   Jason Nation [[id number]]     Order Specific Question:   Level of Care     Answer:   Med Surg [[id number]]     ED Arrival Information     Expected Arrival Acuity Means of Arrival Escorted By Service Admission Type    - 2/19/2020 18:56 Urgent Walk-In Family Member General Medicine Urgent    Arrival Complaint    vomiting         Chief Complaint   Patient presents with    Vomiting     Started vomiting at 4pm today  Assessment/Plan: this is a 68year old female from home to ED admitted to observation due to IVIS/nausea and vomiting/anemia  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 through [facility] clinical trial, CKD stage III, PAF not on AC, hx of GIB, chronic anemia, HTN & spinal stenosis  Presented due to nausea and 6 episodes of vomiting starting about 4 hours prior to arrival   Has RUQ and abdominal pain starting today, along with cough, nasal congestion for last 3 to 4 days  On exam mucous membranes are dry  Tachycardic  Tenderness in RUQ and epigastric area  Bun 26 creatinine 1 78 with baseline of 1 2 - 1 4  H&H 9 2/28  2  Troponin 0 06  NT-proBNP 1705  CT abdomen showed asymmetric perinephric fluid  EKG with inverted T waves  IVF, antiemetics and protonix given in ED  GI consulted, continue Protonix, telemetry, serial troponin in progress        On 2/20- am labs revealed hemoglobin of 6 6, hematocrit 20 2, transfusion of 2 units PRBC in progress, continue Protonix  Trend hemoglobin and NPO until seen by GI     Per GI on 2/20-  Differential mainly includes infectious gastroenteritis, gastritis/peptic ulcer disease, or  chemotherapy effect  Recent EGD and capsule study showed a normal stomach and she has been on a PPI so gastritis is less likely  Will start clears and proceed conservatively  ED Triage Vitals   Temperature Pulse Respirations Blood Pressure SpO2   02/19/20 1919 02/19/20 1919 02/19/20 1919 02/19/20 1919 02/19/20 1919   100 2 °F (37 9 °C) (!) 119 16 132/66 97 %      Temp Source Heart Rate Source Patient Position - Orthostatic VS BP Location FiO2 (%)   02/19/20 1943 02/19/20 1943 02/19/20 1943 02/19/20 1943 --   Temporal Monitor Lying Left arm       Pain Score       02/19/20 1919       7        Wt Readings from Last 1 Encounters:   02/19/20 59 kg (130 lb)     Additional Vital Signs:   02/20/20 0700    67    92/55  72  93 %       02/20/20 0558  97 8 °F (36 6 °C)  80  22  119/58           02/20/20 0237    74  18  94/53    97 %  None (Room air)  Lying   02/20/20 0015    92    107/54  77  96 %           Pertinent Labs/Diagnostic Test Results:   2/19 CT abdomen - Asymmetric perinephric fluid more prominent on the left     No renal stones   Post appendectomy changes are noted   No bowel obstruction    2/19/2020 EKG - sinus tachycardia  Bifascicular block    Inverted T in aVL and V2  Results from last 7 days   Lab Units 02/20/20  0438 02/20/20 0409 02/20/20  0326 02/19/20 2022   WBC Thousand/uL  --  8 59  --  8 50   HEMOGLOBIN g/dL 6 8* 6 6*  --  9 2*   HEMATOCRIT %  --  20 2*  --  28 2*   PLATELETS Thousands/uL  --  174 191 253   BANDS PCT %  --   --   --  4         Results from last 7 days   Lab Units 02/20/20  0409 02/19/20 2022   SODIUM mmol/L 139 142   POTASSIUM mmol/L 4 5 3 6   CHLORIDE mmol/L 104 105   CO2 mmol/L 24 24   ANION GAP mmol/L 11 13   BUN mg/dL 28* 26* CREATININE mg/dL 1 45* 1 78*   EGFR ml/min/1 73sq m 36 28   CALCIUM mg/dL 7 1* 8 7   MAGNESIUM mg/dL 1 7  --      Results from last 7 days   Lab Units 02/20/20  0409 02/19/20 2022   AST U/L 36 53*   ALT U/L 29 44   ALK PHOS U/L 45* 65   TOTAL PROTEIN g/dL 4 9* 6 7   ALBUMIN g/dL 2 6* 3 8   TOTAL BILIRUBIN mg/dL 0 60 0 70         Results from last 7 days   Lab Units 02/20/20  0409 02/19/20 2022   GLUCOSE RANDOM mg/dL 101 112       Results from last 7 days   Lab Units 02/20/20  0409 02/19/20 2022   TROPONIN I ng/mL 0 07* 0 06*     Results from last 7 days   Lab Units 02/19/20 2022   PROTIME seconds 14 4   INR  1 15   PTT seconds 23     Results from last 7 days   Lab Units 02/19/20 2022   LACTIC ACID mmol/L 2 0     Results from last 7 days   Lab Units 02/19/20 2022   NT-PRO BNP pg/mL 1,705*     Results from last 7 days   Lab Units 02/19/20 2022   LIPASE u/L 118     Results from last 7 days   Lab Units 02/20/20  0016   CLARITY UA  clear   COLOR UA  [place]   SPEC GRAV US  1 015   PH UA  5 0   GLUCOSE UA  negative   KETONES UA  MODERATE   BLOOD UA  negative   PROTEIN UA  100+   NITRITE UA  negative   BILIRUBIN, UA  negative   UROBILINOGEN UA  0 2   LEUKOCYTES UA  negative     Results from last 7 days   Lab Units 02/19/20 2022   INFLUENZA A PCR  None Detected   INFLUENZA B PCR  None Detected   RSV PCR  None Detected       Results from last 7 days   Lab Units 02/19/20 2022   TOTAL COUNTED  100       ED Treatment:   Medication Administration from 02/19/2020 1856 to 02/20/2020 6387       Date/Time Order Dose Route Action Comments     02/19/2020 2034 sodium chloride 0 9 % bolus 1,000 mL 1,000 mL Intravenous New Bag      02/19/2020 2029 ondansetron (ZOFRAN) injection 4 mg 4 mg Intravenous Given      02/20/2020 0316 acetaminophen (TYLENOL) tablet 650 mg 650 mg Oral Given      02/20/2020 0349 multi-electrolyte (ISOLYTE-S PH 7 4) bolus 500 mL 500 mL Intravenous New Bag      02/20/2020 0453 pantoprazole (PROTONIX) injection 40 mg 40 mg Intravenous Given         Past Medical History:   Diagnosis Date    ADHD     Anemia     Anxiety     Arthritis     Asthma     Atrial fibrillation (HCC)     Bleeding disorder (HCC)     Breast cancer (HCC)     Cancer ([place] [address] )     Coronary artery disease     Depression     GERD (gastroesophageal reflux disease)     History of stomach ulcers     Hypercholesterolemia     Hypertension     Kidney disease     [facility] ([facility] )      Present on Admission:   Hypertension   Acute on chronic anemia   GIST (gastrointestinal stromal tumor), malignant (HCC)   Paroxysmal atrial fibrillation (HCC)   Acute kidney injury superimposed on CKD ([place] [address] )   Spinal stenosis of lumbar region with neurogenic claudication      Admitting Diagnosis: Vomiting [R11 10]  Age/Sex: 68 y o  female  Admission Orders: 2/20/2020 0207 Observation   Scheduled Medications:  Medications:  furosemide 20 mg Oral Daily   hydroxychloroquine 200 mg Oral Early Morning   pantoprazole 40 mg Intravenous Q12H [address]   potassium chloride 40 mEq Oral Once     Continuous IV Infusions:   transfuse leukoreduced RBC - 2 units    PRN Meds:  acetaminophen 650 mg Oral Q6H PRN   ALPRAZolam 0 25 mg Oral HS PRN   calcium carbonate 500 mg Oral Daily PRN   diphenhydrAMINE 25 mg Oral Q6H PRN   diphenoxylate-atropine 1 tablet Oral 4x Daily PRN   docusate sodium 100 mg Oral BID PRN   ondansetron 4 mg Intravenous Q6H PRN   ondansetron 8 mg Oral Q6H PRN       IP CONSULT TO GASTROENTEROLOGY   telemetry    Network Utilization Review Department  [email or web address]  ATTENTION: Please call with any questions or concerns to [phone] and carefully listen to the prompts so that you are directed to the right person   All voicemails are confidential   Aden Zelaya all requests for admission clinical reviews, approved or denied determinations and any other requests to dedicated fax number below belonging to the campus where the patient is receiving treatment   List of dedicated fax numbers for the Facilities:  [address] DENIALS (Administrative/Medical Necessity) [phone]   [address] (Maternity/NICU/Pediatrics) [phone]   Melvina Lorenz [phone]   [address] [phone]   [place] [phone]   [address] [facility]  [phone]   [address] [facility] [address] [facility] [phone]   [facility]  [phone]   [address] [facility]  [address] [facility] [address] [facility] [phone]

## 2020-02-20 NOTE — ASSESSMENT & PLAN NOTE
· Possibly related to dehydration, patient denies CP   · Initial troponin was 0 06, will trend until peaked   · ECG appears to show SR with known Right BBB  · ECG PRN with chest pain or rhythm changes  · Will place on telemetry monitoring

## 2020-02-20 NOTE — ASSESSMENT & PLAN NOTE
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

## 2020-02-20 NOTE — ASSESSMENT & PLAN NOTE
· Patient states she just saw her pain doctor and received injections in her back which helped her and her pain has improved greatly  · PRN tylenol ordered

## 2020-02-20 NOTE — ED NOTES
1st transfusion complete  Patient tolerated well  No reaction occurred        Low Phelan, CICI  02/20/20 1128

## 2020-02-20 NOTE — ASSESSMENT & PLAN NOTE
· Continue f/u with cristopher chacon, patient recently evaluated and was told her cancer is stable at this time   · Oncology treatment summary as above

## 2020-02-20 NOTE — ASSESSMENT & PLAN NOTE
· Hgb this admission was 9 2, previous trends appear to be anywhere from 7 0-10 0  · Trend CBC daily  · No active source of bleeding at this time   · Transfuse with PRBC for Hgb < 7 0   · Previous EGD done in 06/2019 - Revealed 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  One in antrum and 1 in body  Cauterized with control of bleeding  Small hiatal hernia

## 2020-02-20 NOTE — H&P
H&P- Prshloom Gillette 1950, 68 y o  female MRN: [id number]    Unit/Bed#: ED 10 Encounter: [id number]    Primary Care Provider: Kaiden Johnston MD   Date and time admitted to hospital: 2/19/2020  7:41 PM      * Nausea & vomiting  Assessment & Plan  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, primarily managed by Servando Bateman   · Patient started with nausea & vomiting around 1600 yesterday after going out to dinner with her , vomited x 6   · CT abdomen/pelvis - Sludge in the gallbladder  Asymmetric perinephric fluid more prominent on the left  No renal stones  Post appendectomy changes are noted  No bowel obstruction  Consider contrast examining the appropriate clinical setting  · Vomiting resolved with zofran which patient takes regularly   · Continue zofran PRN, PO & IV  · Diet as tolerated   · No further vomiting since admission, no blood noted in vomit as per patient   · Received 1 L IVF while in the ED   · Continue protonix daily   · TUMS PRN   · Previous EGD work up revealed 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  One in antrum and 1 in body  Cauterized with control of bleeding  Small hiatal hernia  · Will consult GI      Acute kidney injury superimposed on CKD (HCC)  Assessment & Plan  · Baseline Cr appears to be anywhere from 1 2-1 4 as per EMR, Gfr 40-45, this admission Cr is 1 78, gfr 28, possibly related to dehydration   · Patient received 1 L IVF bolus while in the ED, will give additional bolus of 500cc of isolyte over 2 hrs  · Trend Cr daily  · Strict I/O  · CT findings as above   · Consult nephrology if Cr does not improve     Anemia of chronic disease  Assessment & Plan  · Hgb this admission was 9 2, previous trends appear to be anywhere from 7 0-10 0  · Trend CBC daily  · No active source of bleeding at this time   · Transfuse with PRBC for Hgb < 7 0   · Previous EGD done in 06/2019 - Revealed 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  One in antrum and 1 in body    Cauterized with control of bleeding  Small hiatal hernia      Paroxysmal atrial fibrillation (HCC)  Assessment & Plan  · Patient not on AC due to anemia and previous GIB   · Continue toprol XL & ASA   · Currently in SR, had some tachycardia upon arrival possibly related to dehydration which as resolved, current HR - 74    Perinephric fluid collection  Assessment & Plan  · As evidenced by CT abd/pelvis   · Will order renal u/s   · Would consider Nephrology consult     Elevated troponin I level  Assessment & Plan  · Possibly related to dehydration, patient denies CP   · Initial troponin was 0 06, will trend until peaked   · ECG appears to show SR with known Right BBB  · ECG PRN with chest pain or rhythm changes  · Will place on telemetry monitoring     Spinal stenosis of lumbar region with neurogenic claudication  Assessment & Plan  · Patient states she just saw her pain doctor and received injections in her back which helped her and her pain has improved greatly  · PRN tylenol ordered     Hypertension  Assessment & Plan  · Currently normotensive  · Continue toprol & lasix with hold parameters for SBP <110     GIST (gastrointestinal stromal tumor), malignant (HCC)  Assessment & Plan  · Continue f/u with cristopher chacon, patient recently evaluated and was told her cancer is stable at this time   · Oncology treatment summary as above       History and Physical - [facility] Internal Medicine    Patient Information: Unknown Plank 68 y o  female MRN: [id number]  Unit/Bed#: ED 10 Encounter: [id number]  Admitting Physician: Tiago Wilkins  PCP: Dayday Beavers MD  Date of Admission:  02/20/20    Assessment/Plan:    Hospital Problem List:     Principal Problem:    Nausea & vomiting  Active Problems:    Acute kidney injury superimposed on CKD (HCC)    Anemia of chronic disease    Paroxysmal atrial fibrillation ([facility] [address] )    GIST (gastrointestinal stromal tumor), malignant ([facility] [address] )    Hypertension    Spinal stenosis of lumbar region with neurogenic claudication    Elevated troponin I level    Perinephric fluid collection      VTE Prophylaxis: Heparin  / sequential compression device   Code Status: FC  POLST: There is no POLST form on file for this patient (pre-hospital)    Anticipated Length of Stay:  Patient will be admitted on an Observation basis with an anticipated length of stay of  1- 2 midnights  Justification for Hospital Stay: IVIS/Trop elevation     Total Time for Visit, including Counseling / Coordination of Care: 20 minutes  Greater than 50% of this total time spent on direct patient counseling and coordination of care  Chief Complaint:   Nausea/vomiting     History of Present Illness:    Regino Wolfe is a 68 y o  female who presents with PMH of gastrointestinal stromal tumor stage IV on chemotherapy through [facility] clinical trial, CKD stage III, PAF not on AC, hx of GIB, chronic anemia, HTN & spinal stenosis  She presents to the ED for nausea & vomiting which started yesterday afternoon around 1600 after she had dinner with her   The patient has nausea often which primarily gets attributed to her chemotherapy drug  She also had a mildly elevated troponin & was tachycardic on arrival      She was most recently admitted x 2 in the past 2 months for enteritis and treated with abx, all her stool work ups have been negative to date  She denies any blood in her vomit  She was treated with Zofran & IVF while in the ED which appeared to improve her symptoms & tachycardia  A CT of the abdomen/pelvis was performed which revealed sludge in the gallbladder & asymmetric perinephric fluid  No bowel obstruction  A renal U/S was ordered to further evaluate the perinephric fluid  The patient was admitted under observation status with a GI consultation  Review of Systems:    Review of Systems   Constitutional: Negative  HENT: Negative  Eyes: Negative  Respiratory: Negative  Cardiovascular: Negative  Gastrointestinal: Positive for constipation, nausea and vomiting  Negative for abdominal pain  Endocrine: Negative  Genitourinary: Negative  Musculoskeletal: Positive for arthralgias  Skin: Negative  Allergic/Immunologic: Negative  Neurological: Positive for headaches  Hematological: Negative  Psychiatric/Behavioral: Negative  Past Medical and Surgical History:     Past Medical History:   Diagnosis Date    ADHD     Anemia     Anxiety     Arthritis     Asthma     Atrial fibrillation (HCC)     Bleeding disorder (HCC)     [place] ([place] [address] )     Cancer ([place] [address] )     Coronary artery disease     Depression     GERD (gastroesophageal reflux disease)     History of stomach ulcers     Hypercholesterolemia     Hypertension     Kidney disease     Metastatic cancer ([place] [address] )        Past Surgical History:   Procedure Laterality Date    ANKLE SURGERY      APPENDECTOMY      BREAST SURGERY      CATARACT EXTRACTION       SECTION      COLONOSCOPY      HIP SURGERY      JOINT REPLACEMENT  2019    Left knee replacement    LAMINECTOMY      ROTATOR CUFF REPAIR      SIGMOID RESECTION / RECTOPEXY      SINUS SURGERY         Meds/Allergies:    Prior to Admission medications    Medication Sig Start Date End Date Taking?  Authorizing Provider   ALPRAZolam Durward Ambrosia) 0 25 mg tablet Take by mouth daily at bedtime as needed     Historical Provider, MD   aspirin (ESTELA ASPIRIN) 325 mg tablet Take 325 mg by mouth daily     Historical Provider, MD   Azelastine HCl 137 MCG/SPRAY SOLN instill 2 sprays into each nostril twice a day if needed for congestion 18   Historical Provider, MD   calcium carbonate (TUMS) 500 mg chewable tablet Chew 1 tablet daily as needed for indigestion or heartburn    Historical Provider, MD   diphenhydrAMINE (BENADRYL) 25 mg tablet Take 25 mg by mouth every 6 (six) hours as needed for itching    Historical Provider, MD   diphenoxylate-atropine (LOMOTIL) 2 5-0 025 mg per tablet TAKE 1 TABLET BY MOUTH 4 TIMES DAILY AS NEEDED FOR DIARRHEA 11/25/19   Historical Provider, MD   docusate sodium (COLACE) 100 mg capsule Take 100 mg by mouth 2 (two) times a day as needed for constipation    Historical Provider, MD   furosemide (LASIX) 20 mg tablet Take 20 mg by mouth daily  6/7/18   Historical Provider, MD   hydroxychloroquine (PLAQUENIL) 200 mg tablet Take 200 mg by mouth daily in the early morning     Historical Provider, MD   loperamide (IMODIUM) 2 mg capsule Take 2 capsules by mouth 4 (four) times a day as needed    Historical Provider, MD   ofloxacin (OCUFLOX) 0 3 % ophthalmic solution START 3 DAYS PRE-OP: 1 DROP INTO LEFT EYE 4 TIMES DAILY; CONTINUE POST OP as directed 12/5/19   Historical Provider, MD   ondansetron (ZOFRAN) 8 mg tablet take 1 tablet by mouth every 8 hours if needed for nausea 10/17/19   Historical Provider, MD   pantoprazole (PROTONIX) 40 mg tablet Take 40 mg by mouth daily    Historical Provider, MD   prednisoLONE acetate (PRED FORTE) 1 % ophthalmic suspension POST OP: 1 DROP INTO LEFT EYE 4 TIMES DAILY; CONTINUE AS DIRECTED 12/5/19   Historical Provider, MD   TOPROL XL 25 MG 24 hr tablet 25 mg daily  10/24/19   Historical Provider, MD     I have reviewed home medications with patient personally  Allergies: Allergies   Allergen Reactions    Codeine Other (See Comments)     Throat closes    Codeine Sulfate Throat Swelling    Neomycin-Bacitracin Zn-Polymyx Rash    Wound Dressing Adhesive Other (See Comments)     If its on too long- pt starts itching    Zolmitriptan Palpitations     Heart palpitations    Generic for Zomig         Social History:     Marital Status: /Civil Union     Substance Use History:   Social History     Substance and Sexual Activity   Alcohol Use Never    Frequency: Never    Drinks per session: 1 or 2    Binge frequency: Never     Social History     Tobacco Use   Smoking Status Former Smoker    Years: 20 00 Smokeless Tobacco Never Used     Social History     Substance and Sexual Activity   Drug Use No       Family History:    Family History   Problem Relation Age of Onset   [facility] Breast cancer Mother     Diabetes Mother     Hypertension Mother     Lung cancer Mother     Hyperlipidemia Father     Prostate cancer Father     Colon cancer Paternal Grandmother     Colon cancer Paternal Grandfather     Diabetes Family     Substance Abuse Neg Hx     Mental illness Neg Hx        Physical Exam:     Vitals:   Blood Pressure: 94/53 (02/20/20 0237)  Pulse: 74 (02/20/20 0237)  Temperature: 98 8 °F (37 1 °C) (02/19/20 1943)  Temp Source: Temporal (02/19/20 1943)  Respirations: 18 (02/20/20 0237)  Weight - Scale: 59 kg (130 lb) (02/19/20 1919)  SpO2: 97 % (02/20/20 0237)    Physical Exam   Constitutional: She is oriented to person, place, and time  No distress  HENT:   Head: Normocephalic and atraumatic  Eyes: Pupils are equal, round, and reactive to light  Neck: Normal range of motion  No tracheal deviation present  Cardiovascular: Normal rate and regular rhythm  Pulmonary/Chest: Effort normal and breath sounds normal  No respiratory distress  Abdominal: Soft  She exhibits no distension  Bowel sounds are increased  There is no tenderness  Musculoskeletal:   Left knee pain s/p knee replacement, limited ROM   Lymphadenopathy:     She has no cervical adenopathy  Neurological: She is alert and oriented to person, place, and time  Skin: Skin is warm and dry  She is not diaphoretic  Nails show no clubbing  Psychiatric: She has a normal mood and affect  Cognition and memory are normal        Additional Data:     Lab Results: I have personally reviewed pertinent reports        Results from last 7 days   Lab Units 02/20/20  0326 02/19/20 2022   WBC Thousand/uL  --  8 50   HEMOGLOBIN g/dL  --  9 2*   HEMATOCRIT %  --  28 2*   PLATELETS Thousands/uL 191 253   LYMPHO PCT %  --  1*   MONO PCT %  --  9   EOS PCT %  -- 0     Results from last 7 days   Lab Units 02/19/20 2022   POTASSIUM mmol/L 3 6   CHLORIDE mmol/L 105   CO2 mmol/L 24   BUN mg/dL 26*   CREATININE mg/dL 1 78*   CALCIUM mg/dL 8 7   ALK PHOS U/L 65   ALT U/L 44   AST U/L 53*     Results from last 7 days   Lab Units 02/19/20 2022   INR  1 15       Imaging: I have personally reviewed pertinent reports  Ct Abdomen Pelvis Wo Contrast    Result Date: 2/19/2020  Narrative: CT ABDOMEN AND PELVIS WITHOUT IV CONTRAST INDICATION:   RUQ, epigastric tenderness, vomiting, constipation  COMPARISON:  2/6/2020 TECHNIQUE:  CT examination of the abdomen and pelvis was performed without intravenous contrast   Axial, sagittal, and coronal 2D reformatted images were created from the source data and submitted for interpretation  Radiation dose length product (DLP) for this visit:  389 mGy-cm   This examination, like all CT scans performed in the [facility], was performed utilizing techniques to minimize radiation dose exposure, including the use of iterative reconstruction and automated exposure control  Enteric contrast was not administered  FINDINGS: ABDOMEN LOWER CHEST:  No clinically significant abnormality identified in the visualized lower chest  LIVER/BILIARY TREE:  There are one or more hepatic simple cyst(s) present  No CT evidence of suspicious solid hepatic mass  Normal hepatic contours  No biliary dilatation  GALLBLADDER: Sludge is noted within the gallbladder  No calcified gallstones  No pericholecystic inflammatory change  SPLEEN:  Unremarkable  PANCREAS:  Unremarkable  ADRENAL GLANDS:  Unremarkable  KIDNEYS/URETERS:  Asymmetric perinephric fluid more prominent on the left  Subcentimeter hyperdense cyst is seen in the right kidney  No hydronephrosis  STOMACH AND BOWEL:  Sigmoid and rectal bowel anastomosis is noted    APPENDIX:  There are expected postoperative changes of appendectomy   ABDOMINOPELVIC CAVITY:  No ascites or free intraperitoneal air  No lymphadenopathy  VESSELS:  Unremarkable for patient's age  PELVIS REPRODUCTIVE ORGANS:  Retroverted uterus is again noted  Otherwise unremarkable for patient's age  URINARY BLADDER:  Unremarkable  ABDOMINAL WALL/INGUINAL REGIONS:  Unremarkable  OSSEOUS STRUCTURES:  No acute fracture or destructive osseous lesion  Unchanged degenerative changes of the thoracolumbar spine  Impression: Asymmetric perinephric fluid more prominent on the left  No renal stones  Post appendectomy changes are noted  No bowel obstruction  Consider contrast examining the appropriate clinical setting  Workstation performed: [id number]     Xr Chest Pa & Lateral    Result Date: 2/8/2020  Narrative: CHEST INDICATION:   sob  COMPARISON:  Chest radiograph from 2/6/2020  Chest CT from 3/16/2017  EXAM PERFORMED/VIEWS:  XR CHEST PA & LATERAL  DUAL ENERGY SUBTRACTION TECHNIQUE FINDINGS: Mild cardiomegaly  No acute pulmonary disease  Trace effusions on the lateral projection  No pneumothorax  Osseous structures appear within normal limits for patient age  Impression: No acute pulmonary disease  Trace effusions  Workstation performed: [id number]     Xr Chest 2 Views    Result Date: 2/8/2020  Narrative: CHEST INDICATION:  Cough, fever  COMPARISON:  1/5/2020, report CT chest, abdomen and pelvis 3/16/2017 EXAM PERFORMED/VIEWS:  XR CHEST FRONTAL & LATERAL FINDINGS: Cardiomediastinal silhouette appears stable  The lungs are clear  Mild eventration right diaphragm  No pneumothorax or pleural effusion  Degenerative changes of the spine  Impression: No acute cardiopulmonary disease  Workstation performed: [id number]     Radiology Results    Result Date: 2/8/2020  Narrative: Ordered by an unspecified provider  Ct Abdomen Pelvis With Contrast    Result Date: 2/8/2020  Narrative: CT ABDOMEN AND PELVIS WITH IV CONTRAST INDICATION:   hx gastrointestinal stromal tumor, nausea, vomiting, bilateral posterior hip and lumbar back pain  COMPARISON:  None  TECHNIQUE:  CT examination of the abdomen and pelvis was performed  Axial, sagittal, and coronal 2D reformatted images were created from the source data and submitted for interpretation  Radiation dose length product (DLP) for this visit:  627 mGy-cm   This examination, like all CT scans performed in the [facility], was performed utilizing techniques to minimize radiation dose exposure, including the use of iterative reconstruction and automated exposure control  IV Contrast:  100 mL of iohexol (OMNIPAQUE) Enteric Contrast:  Enteric contrast was not administered  FINDINGS: ABDOMEN LOWER CHEST:  Minor bibasilar atelectasis  LIVER/BILIARY TREE:  Small scattered hepatic cysts are noted largest measuring up to 13 mm in the dome of the right hepatic lobe  No suspicious solid hepatic mass  Patent portal and hepatic veins  No biliary dilatation  GALLBLADDER:  No calcified gallstones  No pericholecystic inflammatory change  SPLEEN:  Unremarkable  PANCREAS:  Unremarkable  ADRENAL GLANDS:  Unremarkable  KIDNEYS/URETERS:  No solid renal mass, urinary tract calculus, or hydronephrosis  Symmetric nephrograms  Nonspecific bilateral perinephric stranding and trace bilateral perinephric fluid  STOMACH AND BOWEL:  Anastomotic staple line is seen in association with the rectum  Right mid abdominal small bowel anastomotic staple line is noted  Findings are consistent with surgery related to resection of gastrointestinal stromal tumor  Stomach,  small bowel, and large bowel loops are fluid-filled, and some loops are rather distended; findings are nonspecific but can be seen in the setting of gastroenteritis  No bowel obstruction  No abnormal bowel wall thickening or pneumatosis  APPENDIX:  There are expected postoperative changes of appendectomy  ABDOMINOPELVIC CAVITY:  No pneumoperitoneum  No significant lymphadenopathy    Small volume of upper abdominal ascites and of pelvic ascites noted  VESSELS:  Unremarkable for patient's age  PELVIS REPRODUCTIVE ORGANS:  The uterus is retroverted in the uterine fundus extends into the presacral region  There is an atypical appearance of the uterus suggesting the possibility of markedly distended endometrial cavity and nonemergent pelvic ultrasound follow-up is recommended  URINARY BLADDER:  Unremarkable  ABDOMINAL WALL/INGUINAL REGIONS:  Surgical changes are seen in the abdominal wall  No lymphadenopathy  OSSEOUS STRUCTURES:  No acute fracture or destructive osseous lesion  Advanced thoracolumbar degenerative changes are noted  Impression: Nonspecific findings including fluid-filled loops of bowel throughout the abdomen, distention of stomach and proximal large bowel, and trace abdominopelvic ascites  There is nonspecific findings can sometimes be seen in the setting of gastroenteritis  Abnormal appearance of the retroverted uterus suggesting the possibility of abnormal post menopausal endometrial thickening  Pelvic ultrasound follow-up, when appropriate, is recommended  Please note that the abnormal appearing uterus was reported as "presacral soft tissue mass" in the preliminary interpretation by the virtual radiologic physician  Advanced thoracolumbar degenerative change  This examination was marked "significant notification" in Epic in order to begin the standard process by which the radiology reading room liaison alerts the referring practitioner  Workstation performed: [id number]     Fl Spine And Pain Procedure    Result Date: 2/17/2020  Narrative: Place of Service: Procedure Room  Diagnosis:  Lumbar spinal stenosis lumbar radiculitis  Procedure:   Bilateral L4 Transforaminal Epidural Injection under Fluoroscopy  Indication for Fluoroscopy: This procedure requires the precise placement of the spinal needle into the specific paravertebral foramen  The only way to accurately and safely perform the injection   Medical necessity: Failure of conservative medical management  Procedure Note: After fully informed consent was obtained, the patient was then positioned on the fluoroscopy table in the prone position with a pillow beneath the pelvis to reduce lumbar lordosis  The lumbar area was prepped with ChloraPrep and draped in the usual manner  The fluoroscope was used to identify the L3///L4///L5 vertebral body on the AP projection  It was then rotated into an oblique projection until the superior articulating process of the L5 (inferior) vertebra is projected beneath the 6 o'clock position of the L4 (superior) vertebrae  Using a 25 gauge needle, 1% lidocaine was injected for local anesthetic  The 22 gauge 5 inch curved tip needle was inserted in the skin at a point overlying the superior articulating process of the inferior vertebra and aimed for the 6 o'clock position of the superior vertebrae's pedicle  After the needle contacted bone, a lateral projection was obtained to insure that the needle tip was in proximity with the vertebral body  After gentle negative aspiration,  Omnipaque 300 was injected under live fluoroscopy  Digital subtraction was utilized  No vascular uptake was noted  Following demonstration of the neurogram, 2% lidocaine was injected followed by Dexamethasone  There was no CSF, paresthesiae nor heme identified  This was then repeated for the contralateral side  Disposition: The patient was brought to the recovery room in stable condition  Strength and sensation were tested in both lower extremities and were found to be intact  Vital signs were stable  The patient tolerated the procedure well  The patient was discharged home with a  and discharge instructions were reviewed and given to the patient   Estimated blood loss:  Minimal No specimens were taken              EKG, Pathology, and Other Studies Reviewed on Admission:   · EKG: SR with BBB     Allscripts / Epic Records Reviewed: Yes     ** Please Note: This note has been constructed using a voice recognition system   **

## 2020-02-20 NOTE — PLAN OF CARE
Problem: Potential for Falls  Goal: Patient will remain free of falls  Description  INTERVENTIONS:  - Assess patient frequently for physical needs  -  Identify cognitive and physical deficits and behaviors that affect risk of falls    -  [place] fall precautions as indicated by assessment   - Educate patient/family on patient safety including physical limitations  - Instruct patient to call for assistance with activity based on assessment  - Modify environment to reduce risk of injury  - Consider OT/PT consult to assist with strengthening/mobility  Outcome: Progressing     Problem: GASTROINTESTINAL - ADULT  Goal: Minimal or absence of nausea and/or vomiting  Description  INTERVENTIONS:  - Administer IV fluids if ordered to ensure adequate hydration  - Maintain NPO status until nausea and vomiting are resolved  - Nasogastric tube if ordered  - Administer ordered antiemetic medications as needed  - Provide nonpharmacologic comfort measures as appropriate  - Advance diet as tolerated, if ordered  - Consider nutrition services referral to assist patient with adequate nutrition and appropriate food choices  Outcome: Progressing  Goal: Maintains or returns to baseline bowel function  Description  INTERVENTIONS:  - Assess bowel function  - Encourage oral fluids to ensure adequate hydration  - Administer IV fluids if ordered to ensure adequate hydration  - Administer ordered medications as needed  - Encourage mobilization and activity  - Consider nutritional services referral to assist patient with adequate nutrition and appropriate food choices  Outcome: Progressing     Problem: HEMATOLOGIC - ADULT  Goal: Maintains hematologic stability  Description  INTERVENTIONS  - Assess for signs and symptoms of bleeding or hemorrhage  - Monitor labs  - Administer supportive blood products/factors as ordered and appropriate  Outcome: Progressing     Problem: PAIN - ADULT  Goal: Verbalizes/displays adequate comfort level or baseline comfort level  Description  Interventions:  - Encourage patient to monitor pain and request assistance  - Assess pain using appropriate pain scale  - Administer analgesics based on type and severity of pain and evaluate response  - Implement non-pharmacological measures as appropriate and evaluate response  - Consider cultural and social influences on pain and pain management  - Notify physician/advanced practitioner if interventions unsuccessful or patient reports new pain  Outcome: Progressing     Problem: INFECTION - ADULT  Goal: Absence or prevention of progression during hospitalization  Description  INTERVENTIONS:  - Assess and monitor for signs and symptoms of infection  - Monitor lab/diagnostic results  - Monitor all insertion sites, i e  indwelling lines, tubes, and drains  - Monitor endotracheal if appropriate and nasal secretions for changes in amount and color  - [place] appropriate cooling/warming therapies per order  - Administer medications as ordered  - Instruct and encourage patient and family to use good hand hygiene technique  - Identify and instruct in appropriate isolation precautions for identified infection/condition  Outcome: Progressing     Problem: SAFETY ADULT  Goal: Maintain or return to baseline ADL function  Description  INTERVENTIONS:  -  Assess patient's ability to carry out ADLs; assess patient's baseline for ADL function and identify physical deficits which impact ability to perform ADLs (bathing, care of mouth/teeth, toileting, grooming, dressing, etc )  - Assess/evaluate cause of self-care deficits   - Assess range of motion  - Assess patient's mobility; develop plan if impaired  - Assess patient's need for assistive devices and provide as appropriate  - Encourage maximum independence but intervene and supervise when necessary  - Involve family in performance of ADLs  - Assess for home care needs following discharge   - Consider OT consult to assist with ADL evaluation and planning for discharge  - Provide patient education as appropriate  Outcome: Progressing  Goal: Maintain or return mobility status to optimal level  Description  INTERVENTIONS:  - Assess patient's baseline mobility status (ambulation, transfers, stairs, etc )    - Identify cognitive and physical deficits and behaviors that affect mobility  - Identify mobility aids required to assist with transfers and/or ambulation (gait belt, sit-to-stand, lift, walker, cane, etc )  - [place] fall precautions as indicated by assessment  - Record patient progress and toleration of activity level on Mobility SBAR; progress patient to next Phase/Stage  - Instruct patient to call for assistance with activity based on assessment  - Consider rehabilitation consult to assist with strengthening/weightbearing, etc   Outcome: Progressing     Problem: DISCHARGE PLANNING  Goal: Discharge to home or other facility with appropriate resources  Description  INTERVENTIONS:  - Identify barriers to discharge w/patient and caregiver  - Arrange for needed discharge resources and transportation as appropriate  - Identify discharge learning needs (meds, wound care, etc )  - Arrange for interpretive services to assist at discharge as needed  - Refer to Case Management Department for coordinating discharge planning if the patient needs post-hospital services based on physician/advanced practitioner order or complex needs related to functional status, cognitive ability, or social support system  Outcome: Progressing     Problem: Knowledge Deficit  Goal: Patient/family/caregiver demonstrates understanding of disease process, treatment plan, medications, and discharge instructions  Description  Complete learning assessment and assess knowledge base    Interventions:  - Provide teaching at level of understanding  - Provide teaching via preferred learning methods  Outcome: Progressing     Problem: Prexisting or High Potential for Compromised Skin Integrity  Goal: Skin integrity is maintained or improved  Description  INTERVENTIONS:  - Identify patients at risk for skin breakdown  - Assess and monitor skin integrity  - Assess and monitor nutrition and hydration status  - Monitor labs   - Assess for incontinence   - Turn and reposition patient  - Assist with mobility/ambulation  - Relieve pressure over bony prominences  - Avoid friction and shearing  - Provide appropriate hygiene as needed including keeping skin clean and dry  - Evaluate need for skin moisturizer/barrier cream  - Collaborate with interdisciplinary team   - Patient/family teaching  - Consider wound care consult   Outcome: Progressing

## 2020-02-20 NOTE — ASSESSMENT & PLAN NOTE
· Baseline Cr appears to be anywhere from 1 2-1 4 as per EMR, Gfr 40-45, this admission Cr is 1 78, gfr 28, possibly related to dehydration   · Patient creatinine improved to 1 17 at discharge  · Resolved  · Repeat blood work as outpatient with PCP

## 2020-02-20 NOTE — ASSESSMENT & PLAN NOTE
· Possibly related to dehydration, patient denies CP   · Initial troponin was 0 06, will trend until peaked   · ECG appears to show SR with known Right BBB  · ECG PRN with chest pain or rhythm changes  · Denies any chest pain  Stable    No further cardiac workup needed

## 2020-02-20 NOTE — ASSESSMENT & PLAN NOTE
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, primarily managed by Marilia Nesbitt   · Patient started with nausea & vomiting around 1600 yesterday after going out to dinner with her , vomited x 6   · CT abdomen/pelvis - Sludge in the gallbladder  Asymmetric perinephric fluid more prominent on the left  No renal stones  Post appendectomy changes are noted  No bowel obstruction  Consider contrast examining the appropriate clinical setting  · Vomiting resolved with zofran which patient takes regularly   · Continue zofran PRN, PO & IV  · Diet as tolerated   · No further vomiting since admission, no blood noted in vomit as per patient   · Received 1 L IVF while in the ED   · Continue protonix daily   · TUMS PRN   · Previous EGD work up revealed 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

## 2020-02-20 NOTE — DISCHARGE SUMMARY
Discharge- Chapis Keane 1950, 68 y o  female MRN: [id number]    Unit/Bed#: -01 Encounter: [id number]    Primary Care Provider: Kofi Villa MD   Date and time admitted to hospital: 2/19/2020  7:41 PM        Perinephric fluid collection  Assessment & Plan  · Renal function back to normal   · Outpatient follow-up with PCP    Elevated troponin I level  Assessment & Plan  · Possibly related to dehydration, patient denies CP   · Initial troponin was 0 06, will trend until peaked   · ECG appears to show SR with known Right BBB  · ECG PRN with chest pain or rhythm changes  · Denies any chest pain  Stable    No further cardiac workup needed    Acute kidney injury superimposed on CKD (HCC)  Assessment & Plan  · Baseline Cr appears to be anywhere from 1 2-1 4 as per EMR, Gfr 40-45, this admission Cr is 1 78, gfr 28, possibly related to dehydration   · Patient creatinine improved to 1 17 at discharge  · Resolved  · Repeat blood work as outpatient with PCP    Paroxysmal atrial fibrillation ([facility] [address] )  Assessment & Plan  · Patient not on AC due to anemia and previous GIB   · Continue toprol XL & ASA   · Currently in SR, had some tachycardia upon arrival possibly related to dehydration which as resolved, current HR - 74    Spinal stenosis of lumbar region with neurogenic claudication  Assessment & Plan  · Patient states she just saw her pain doctor and received injections in her back which helped her and her pain has improved greatly  · PRN tylenol ordered     Hypertension  Assessment & Plan  · Currently normotensive  · Continue toprol & lasix with hold parameters for SBP <110     GIST (gastrointestinal stromal tumor), malignant ([address] )  Assessment & Plan  · Continue f/u with cristopher chacon, patient recently evaluated and was told her cancer is stable at this time   · Oncology treatment summary as above     Acute on chronic anemia  Assessment & Plan  · Hgb this admission was 9 2, previous trends appear to be anywhere from 7 0-10 0  · Hemoglobin dropped to 6 6  · Patient received 2 units of packed red cells  · Hemoglobin improved to 9 6 and then at discharge 10 7  · Previous EGD done in 06/2019 - Revealed 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  One in antrum and 1 in body  Cauterized with control of bleeding  Small hiatal hernia  · GI consult appreciated  · Switch protonix to IV BID  · Trend Hgb q6 hrs   · GI did speak with Tammy Tony who recommended patient's symptoms are likely secondary to chemotherapy and no further need for endoscopy at this time  · GI started patient on clear liquid diet and it was advanced  Patient tolerated the diet well  · As per GI patient can be discharged with outpatient follow-up with Tammy Tony and them  · Patient wants to go home and is in agreement with the discharge plan    * Nausea & vomiting  Assessment & Plan  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, primarily managed by Tammy Tony   · Patient started with nausea & vomiting around 1600 yesterday after going out to dinner with her , vomited x 6   · CT abdomen/pelvis - Sludge in the gallbladder  Asymmetric perinephric fluid more prominent on the left  No renal stones  Post appendectomy changes are noted  No bowel obstruction  Consider contrast examining the appropriate clinical setting  · Resolved  · Patient tolerated diet with no further nausea and vomiting  · Received IV fluids  · Continue protonix daily   · TUMS PRN   · Previous EGD work up revealed 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  One in antrum and 1 in body  Cauterized with control of bleeding   Small hiatal hernia  · Cleared by GI for discharge        Hospital Course:     Jesse Torres is a 68 y o  female patient who originally presented to the hospital on   Admission Orders (From admission, onward)     Ordered        02/20/20 0207  Place in Observation (expected length of stay for this patient is less than two midnights)  Once                  due to nausea, vomiting  Patient was admitted and was found to have hemoglobin of 6 6  Patient was given 2 units of packed red cell transfusion  Patient was also admitted with acute kidney injury which improved with IV fluids  Patient was seen by GI and as per them no further intervention is needed  Patient nausea vomiting resolved  Patient's symptoms are attributed to her chemotherapy  Patient hemoglobin at discharge is 10  Patient also had renal ultrasound down for evaluation of perinephric fluid  Discussed with her that the results are still pending and she states that she will follow-up with PCP  Patient is hemodynamically stable and wants to go home  Patient is cleared by GI for discharge and outpatient follow-up with kade and Jaspreet García  Follow-up with PCP and review renal ultrasound results within 1 week  Follow-up with STEPHEN and Jaspreet García as outpatient  Return to ER with any worsening abdominal pain, nausea, vomiting or any other alarming symptoms    Please see above list of diagnoses and related plan for additional information  Condition at Discharge:  good      Discharge instructions/Information to patient and family:   See after visit summary for information provided to patient and family  Provisions for Follow-Up Care:  See after visit summary for information related to follow-up care and any pertinent home health orders  Disposition:     Home       Discharge Statement:  I spent 40 minutes discharging the patient  This time was spent on the day of discharge  I had direct contact with the patient on the day of discharge  Greater than 50% of the total time was spent examining patient, answering all patient questions, arranging and discussing plan of care with patient as well as directly providing post-discharge instructions  Additional time then spent on discharge activities      Discharge Medications:  See after visit summary for reconciled discharge medications provided to patient and family        ** Please Note: This note has been constructed using a voice recognition system **

## 2020-02-20 NOTE — ED PROVIDER NOTES
History  Chief Complaint   Patient presents with    Vomiting     Started vomiting at 4pm today  25-year-old female presents for evaluation nausea and vomiting beginning at 4:00 p m  today  Patient reports approximately 6 episodes of nonbloody nonbilious emesis prior to arrival  Patient states she has not had a bowel movement in the past 3 days  She reports it is typical for her to go several days without a bowel movement  She has RUQ and epigastric abdominal pain which began today as well  Recent URI symptoms with cough and nasal congestion for the past 3-4 days  She reports dyspnea with exertion which resolves with rest  No chest pain  Patient has history of gastrointestinal stromal tumor for which she undergoes oral chemotherapy managed by [facility]  Patient had undergone a capsule endoscopy on 01/17/2020 per records which had shown a polyp in the mid small bowel  She had been admitted from 01/05/2020 - 01/07/2020 and again from 02/06/2020 - 02/08/2020 for enteritis  Stool was negative for C diff  Patient has history of chronic anemia and received 1 unit PRBCs during her January admission and 2 units PRBCs during the admission earlier this month  History of PAF  No current anticoagulation  Patient states she is taking a chemotherapy under active research which she takes daily  She take zofran with her morning doses, but did not take zofran when she developed nausea and vomiting this afternoon        History provided by:  Patient and medical records  Vomiting   Severity:  Severe  Duration:  4 hours  Timing:  Constant  Number of daily episodes:  6  Quality:  Stomach contents  Progression:  Improving  Chronicity:  New  Relieved by:  None tried  Worsened by:  Nothing  Ineffective treatments:  None tried  Associated symptoms: abdominal pain and cough    Associated symptoms: no chills, no diarrhea, no fever, no headaches, no myalgias and no sore throat    Abdominal pain:     Location:  RUQ and epigastric Severity:  Moderate    Onset quality:  Gradual    Timing:  Constant    Progression:  Worsening    Chronicity:  New  Cough:     Cough characteristics:  Non-productive    Severity:  Moderate    Onset quality:  Gradual    Duration:  3 days    Timing:  Constant    Progression:  Worsening    Chronicity:  New  Risk factors: prior abdominal surgery    Risk factors comment:  History gastrointestinal stromal tumor      Prior to Admission Medications   Prescriptions Last Dose Informant Patient Reported? Taking?    ALPRAZolam (XANAX) 0 25 mg tablet  Self Yes No   Sig: Take by mouth daily at bedtime as needed    Azelastine HCl 137 MCG/SPRAY SOLN  Self Yes No   Sig: instill 2 sprays into each nostril twice a day if needed for congestion   TOPROL XL 25 MG 24 hr tablet   Yes No   Si mg daily    aspirin (ESTELA ASPIRIN) 325 mg tablet  Self Yes No   Sig: Take 325 mg by mouth daily    calcium carbonate (TUMS) 500 mg chewable tablet  Self Yes No   Sig: Chew 1 tablet daily as needed for indigestion or heartburn   diphenhydrAMINE (BENADRYL) 25 mg tablet   Yes No   Sig: Take 25 mg by mouth every 6 (six) hours as needed for itching   diphenoxylate-atropine (LOMOTIL) 2 5-0 025 mg per tablet   Yes No   Sig: TAKE 1 TABLET BY MOUTH 4 TIMES DAILY AS NEEDED FOR DIARRHEA   docusate sodium (COLACE) 100 mg capsule  Self Yes No   Sig: Take 100 mg by mouth 2 (two) times a day as needed for constipation   furosemide (LASIX) 20 mg tablet  Self Yes No   Sig: Take 20 mg by mouth daily    hydroxychloroquine (PLAQUENIL) 200 mg tablet   Yes No   Sig: Take 200 mg by mouth daily in the early morning    loperamide (IMODIUM) 2 mg capsule  Self Yes No   Sig: Take 2 capsules by mouth 4 (four) times a day as needed   ofloxacin (OCUFLOX) 0 3 % ophthalmic solution   Yes No   Sig: START 3 DAYS PRE-OP: 1 DROP INTO LEFT EYE 4 TIMES DAILY; CONTINUE POST OP as directed   ondansetron (ZOFRAN) 8 mg tablet   Yes No   Sig: take 1 tablet by mouth every 8 hours if needed for nausea   pantoprazole (PROTONIX) 40 mg tablet  Self Yes No   Sig: Take 40 mg by mouth daily   prednisoLONE acetate (PRED FORTE) 1 % ophthalmic suspension   Yes No   Sig: POST OP: 1 DROP INTO LEFT EYE 4 TIMES DAILY; CONTINUE AS DIRECTED      Facility-Administered Medications: None       Past Medical History:   Diagnosis Date    ADHD     Anemia     Anxiety     Arthritis     Asthma     Atrial fibrillation (HCC)     Bleeding disorder (HCC)     Breast cancer (HCC)     [place] ([place] [address] )     Coronary artery disease     Depression     GERD (gastroesophageal reflux disease)     History of stomach ulcers     Hypercholesterolemia     Hypertension     Kidney disease     Metastatic cancer ([place] [address] )        Past Surgical History:   Procedure Laterality Date    ANKLE SURGERY      APPENDECTOMY      BREAST SURGERY      CATARACT EXTRACTION       SECTION      COLONOSCOPY      HIP SURGERY      JOINT REPLACEMENT  2019    Left knee replacement    LAMINECTOMY      ROTATOR CUFF REPAIR      SIGMOID RESECTION / RECTOPEXY      SINUS SURGERY         Family History   Problem Relation Age of Onset   Valaria Reil Breast cancer Mother     Diabetes Mother     Hypertension Mother     Lung cancer Mother     Hyperlipidemia Father     Prostate cancer Father     Colon cancer Paternal [de-identified]     Colon cancer Paternal Grandfather     Diabetes Family     Substance Abuse Neg Hx     Mental illness Neg Hx      I have reviewed and agree with the history as documented  Social History     Tobacco Use    Smoking status: Former Smoker     Years: 20 00    Smokeless tobacco: Never Used   Substance Use Topics    Alcohol use: Never     Frequency: Never     Drinks per session: 1 or 2     Binge frequency: Never    Drug use: No       Review of Systems   Constitutional: Negative for appetite change, chills and fever  HENT: Positive for congestion  Negative for rhinorrhea and sore throat      Respiratory: Positive for cough and shortness of breath  Negative for chest tightness  Cardiovascular: Negative for chest pain, palpitations and leg swelling  Gastrointestinal: Positive for abdominal pain, constipation, nausea and vomiting  Negative for diarrhea  Genitourinary: Negative for dysuria, frequency and hematuria  Musculoskeletal: Negative for myalgias, neck pain and neck stiffness  Skin: Negative for pallor  Neurological: Negative for syncope, weakness and headaches  All other systems reviewed and are negative  Physical Exam  Physical Exam   Constitutional: She is oriented to person, place, and time  She appears well-developed and well-nourished  Non-toxic appearance  No distress  HENT:   Head: Normocephalic and atraumatic  Mouth/Throat: Mucous membranes are dry  Eyes: Pupils are equal, round, and reactive to light  Conjunctivae and EOM are normal    Neck: Normal range of motion  Neck supple  No tracheal deviation present  No thyromegaly present  Cardiovascular: Regular rhythm, normal heart sounds and intact distal pulses  Tachycardia present  Pulmonary/Chest: Effort normal and breath sounds normal    Abdominal: Soft  Bowel sounds are normal  She exhibits no distension  There is tenderness in the right upper quadrant and epigastric area  There is no rigidity, no rebound and no guarding  Musculoskeletal: She exhibits no edema  Lymphadenopathy:     She has no cervical adenopathy  Neurological: She is alert and oriented to person, place, and time  Skin: Skin is warm and dry  She is not diaphoretic  There is pallor  Nursing note and vitals reviewed        Vital Signs  ED Triage Vitals   Temperature Pulse Respirations Blood Pressure SpO2   02/19/20 1919 02/19/20 1919 02/19/20 1919 02/19/20 1919 02/19/20 1919   100 2 °F (37 9 °C) (!) 119 16 132/66 97 %      Temp Source Heart Rate Source Patient Position - Orthostatic VS BP Location FiO2 (%)   02/19/20 1943 02/19/20 1943 02/19/20 1943 02/19/20 1943 -- Temporal Monitor Lying Left arm       Pain Score       02/19/20 1919       7           Vitals:    02/19/20 2130 02/19/20 2200 02/20/20 0015 02/20/20 0237   BP: 101/51 101/54 107/54 94/53   Pulse: 90 87 92 74   Patient Position - Orthostatic VS:    Lying         Visual Acuity  Visual Acuity      Most Recent Value   L Pupil Size (mm)  2   R Pupil Size (mm)  2   L Pupil Shape  Round   R Pupil Shape  Round          ED Medications  Medications   sodium chloride 0 9 % bolus 1,000 mL (0 mL Intravenous Stopped 2/19/20 2143)   ondansetron (ZOFRAN) injection 4 mg (4 mg Intravenous Given 2/19/20 2029)       Diagnostic Studies  Results Reviewed     Procedure Component Value Units Date/Time    POCT urinalysis dipstick [[id number]]  (Normal) Resulted:  02/20/20 0016    Lab Status:  Final result Specimen:  Urine Updated:  02/20/20 0017     Color, UA Jemima     Clarity, UA clear     Glucose, UA (Ref: Negative) negative     Bilirubin, UA (Ref: Negative) negative     Ketones, UA (Ref: Negative) MODERATE     Spec Grav, UA (Ref:1 003-1 030) 1 015     Blood, UA (Ref: Negative) negative     pH, UA (Ref: 4 5-8 0) 5 0     Protein, UA (Ref: Negative) 100+     Urobilinogen, UA (Ref: 0 2- 1 0) 0 2      Leukocytes, UA (Ref: Negative) negative     Nitrite, UA (Ref: Negative) negative    Blood culture #2 [[id number]] Collected:  02/19/20 2300    Lab Status: In process Specimen:  Blood from Arm, Right Updated:  02/19/20 2305    Blood culture #1 [[id number]] Collected:  02/19/20 2300    Lab Status:   In process Specimen:  Blood from Arm, Left Updated:  02/19/20 2305    Comprehensive metabolic panel [[id number]]  (Abnormal) Collected:  02/19/20 2022    Lab Status:  Final result Specimen:  Blood from Arm, Right Updated:  02/19/20 2236     Sodium 142 mmol/L      Potassium 3 6 mmol/L      Chloride 105 mmol/L      CO2 24 mmol/L      ANION GAP 13 mmol/L      BUN 26 mg/dL      Creatinine 1 78 mg/dL      Glucose 112 mg/dL      Calcium 8 7 mg/dL      AST 53 U/L      ALT 44 U/L      Alkaline Phosphatase 65 U/L      Total Protein 6 7 g/dL      Albumin 3 8 g/dL      Total Bilirubin 0 70 mg/dL      eGFR 28 ml/min/1 73sq m     Narrative:       [place] guidelines for Chronic Kidney Disease (CKD):     Stage 1 with normal or high GFR (GFR > 90 mL/min/1 73 square meters)    Stage 2 Mild CKD (GFR = 60-89 mL/min/1 73 square meters)    Stage 3A Moderate CKD (GFR = 45-59 mL/min/1 73 square meters)    Stage 3B Moderate CKD (GFR = 30-44 mL/min/1 73 square meters)    Stage 4 Severe CKD (GFR = 15-29 mL/min/1 73 square meters)    Stage 5 End Stage CKD (GFR <15 mL/min/1 73 square meters)  Note: GFR calculation is accurate only with a steady state creatinine    Lipase [[id number]]  (Normal) Collected:  02/19/20 2022    Lab Status:  Final result Specimen:  Blood from Arm, Right Updated:  02/19/20 2236     Lipase 118 u/L     Protime-INR [[id number]]  (Normal) Collected:  02/19/20 2022    Lab Status:  Final result Specimen:  Blood from Arm, Right Updated:  02/19/20 2231     Protime 14 4 seconds      INR 1 15    APTT [[id number]]  (Normal) Collected:  02/19/20 2022    Lab Status:  Final result Specimen:  Blood from Arm, Right Updated:  02/19/20 2231     PTT 23 seconds     CBC and differential [[id number]]  (Abnormal) Collected:  02/19/20 2022    Lab Status:  Final result Specimen:  Blood from Arm, Right Updated:  02/19/20 2129     WBC 8 50 Thousand/uL      RBC 2 74 Million/uL      Hemoglobin 9 2 g/dL      Hematocrit 28 2 %       fL      MCH 33 6 pg      MCHC 32 6 g/dL      RDW 17 5 %      MPV 10 1 fL      Platelets 204 Thousands/uL      nRBC 0 /100 WBCs     Narrative: This is an appended report  These results have been appended to a previously verified report      Influenza A/B and RSV PCR [[id number]]  (Normal) Collected:  02/19/20 2022    Lab Status:  Final result Specimen:  Nose Updated:  02/19/20 2124     INFLUENZA A PCR None Detected     INFLUENZA B PCR None Detected     RSV PCR None Detected    NT-BNP PRO [[id number]]  (Abnormal) Collected:  02/19/20 2022    Lab Status:  Final result Specimen:  Blood from Arm, Right Updated:  02/19/20 2103     NT-proBNP 1,705 pg/mL     Lactic acid, plasma x2 [[id number]]  (Normal) Collected:  02/19/20 2022    Lab Status:  Final result Specimen:  Blood from Arm, Right Updated:  02/19/20 2102     LACTIC ACID 2 0 mmol/L     Narrative:       Result may be elevated if tourniquet was used during collection  Troponin I [[id number]]  (Abnormal) Collected:  02/19/20 2022    Lab Status:  Final result Specimen:  Blood from Arm, Right Updated:  02/19/20 2058     Troponin I 0 06 ng/mL                  CT abdomen pelvis wo contrast   Final Result by Merlene Osman MD (02/19 2346)      Asymmetric perinephric fluid more prominent on the left  No renal stones  Post appendectomy changes are noted  No bowel obstruction  Consider contrast examining the appropriate clinical setting  Workstation performed: [id number]                    Procedures  ECG 12 Lead Documentation Only  Date/Time: 2/19/2020 7:50 PM  Performed by: Alisson Skinner MD  Authorized by: Alisson Skinner MD     Indications / Diagnosis:  Exertional dyspnea  ECG reviewed by me, the ED Provider: yes    Patient location:  ED  Previous ECG:     Previous ECG:  Compared to current    Comparison ECG info:  2/6/2020 sinus tachycardia with bifascicular block    Similarity:  No change  Interpretation:     Interpretation: abnormal    Rate:     ECG rate:  100    ECG rate assessment: tachycardic    Rhythm:     Rhythm: sinus tachycardia    Ectopy:     Ectopy: none    QRS:     QRS axis:  Left    QRS intervals:   Wide  Conduction:     Conduction: abnormal      Abnormal conduction: bifascicular block    ST segments:     ST segments:  Normal  T waves:     T waves: inverted      Inverted:  AVL and V2             ED Course  ED Course as of Feb 20 0304   Wed Feb 19, 2020 2042 8 2 eleven days ago   Hemoglobin(!): 9 2   2106 0 04 thirteen days ago   Troponin I(!): 0 06   2236 IVIS, 1 28 eleven days ago   Creatinine(!): 1 78   Thu Feb 20, 2020   0036 Leukocytes, UA: negative   0036 Nitrite, UA: negative                   Initial Sepsis Screening     Row Name 02/19/20 2238                Is the patient's history suggestive of a new or worsening infection? No  -EE        Suspected source of infection          Are two or more of the following signs & symptoms of infection both present and new to the patient? No  -EE        Indicate SIRS criteria  Tachycardia > 90 bpm  -EE        If the answer is yes to both questions, suspicion of sepsis is present          If severe sepsis is present AND tissue hypoperfusion perists in the hour after fluid resuscitation or lactate > 4, the patient meets criteria for SEPTIC SHOCK          Are any of the following organ dysfunction criteria present within 6 hours of suspected infection and SIRS criteria that are NOT considered to be chronic conditions?         Organ dysfunction          Date of presentation of severe sepsis          Time of presentation of severe sepsis          Tissue hypoperfusion persists in the hour after crystalloid fluid administration, evidenced, by either:          Was hypotension present within one hour of the conclusion of crystalloid fluid administration?         Date of presentation of septic shock          Time of presentation of septic shock            User Key  (r) = Recorded By, (t) = Taken By, (c) = Cosigned By    [address] Name Provider Type    EE Kassi Soto MD Physician                  MDM  Number of Diagnoses or Management Options  IVIS (acute kidney injury) [facility]): new and requires workup  Dehydration: new and requires workup  Elevated troponin: new and requires workup  Nausea and vomiting: new and requires workup  Diagnosis management comments: 68year old female presents with nausea and vomiting   Tachycardic on arrival with dry mucous membranes concerning for dehydration  1 L NS ordered  Recent admission for similar symptoms  Left perinephric fluid on CT  No signs of UTI/pyelonephritis on UA  History of abdominal malignancy  Labs significant for mildly elevated troponin  No STEMI criteria on EKG  IVIS likely secondary to dehydration  Will defer antibiotics at this time as no source of infection noted  Patient admitted for further evaluation and management  Amount and/or Complexity of Data Reviewed  Clinical lab tests: ordered and reviewed  Tests in the radiology section of CPT®: ordered and reviewed  Review and summarize past medical records: yes    Patient Progress  Patient progress: stable        Disposition  Final diagnoses:   Elevated troponin   Nausea and vomiting   Dehydration   IVIS (acute kidney injury) ([facility] [address] )     Time reflects when diagnosis was documented in both MDM as applicable and the Disposition within this note     Time User Action Codes Description Comment    2/19/2020  9:07 PM Ruthanna Prince Add [R79 89] Elevated troponin     2/19/2020  9:07 PM MagnoliaGrace cifuentes Norse Add [R11 2] Nausea and vomiting     2/19/2020 10:37 PM Ruthanna Prince Add [E86 0] Dehydration     2/19/2020 10:37 PM MagnoliaGrace Norse Add [N17 9] IVIS (acute kidney injury) [facility])       ED Disposition     ED Disposition Condition Date/Time Comment    Admit Stable Thu Feb 20, 2020  2:06 AM Case was discussed with BRINDA and the patient's admission status was agreed to be Admission Status: observation status to the service of Dr Cale Breaux   Follow-up Information    None         Patient's Medications   Discharge Prescriptions    No medications on file     No discharge procedures on file      PDMP Review       Value Time User    PDMP Reviewed  Yes 2/8/2020  2:10 PM Jackie García MD          ED Provider  Electronically Signed by           Nadia Torre MD  02/20/20 7900

## 2020-02-20 NOTE — NURSING NOTE
Will be discharged to home in stable condition  Family to take patient home  Instructions given to patient

## 2020-02-20 NOTE — ASSESSMENT & PLAN NOTE
· Baseline Cr appears to be anywhere from 1 2-1 4 as per EMR, Gfr 40-45, this admission Cr is 1 78, gfr 28, possibly related to dehydration   · Patient received 1 L IVF bolus while in the ED, will give additional bolus of 500cc of isolyte over 2 hrs  · Trend Cr daily  · Strict I/O  · CT findings as above   · Consult nephrology if Cr does not improve

## 2020-02-20 NOTE — PROGRESS NOTES
Progress Note - Andrew Guevara 1950, 68 y o  female MRN: [id number]    Unit/Bed#: ED 10 Encounter: [id number]    Primary Care Provider: Teagan Gardner MD   Date and time admitted to hospital: 2/19/2020  7:41 PM    Called by ED RN for Hgb drop to 6 6 on repeat AM labs  Interventions as below  BPs soft but stable, no evident ongoing bleeding  Acute on chronic anemia  Assessment & Plan  · Hgb this admission was 9 2, previous trends appear to be anywhere from 7 0-10 0  · Trend CBC daily, AM labs revealed a Hgb of 6 6, verified by repeat   · Will obtain stool occult   · Transfuse with PRBC , prepare 2 units and transfuse, goal Hgb > 7 5  · Obtain type and screen   · Previous EGD done in 06/2019 - Revealed 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  One in antrum and 1 in body  Cauterized with control of bleeding  Small hiatal hernia    · Stop ASA, toprol  · Switch protonix to IV BID  · Trend Hgb q6 hrs   · GI consultation pending   · Maintain NPO

## 2020-02-20 NOTE — ASSESSMENT & PLAN NOTE
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, primarily managed by Vasiliy Forde   · Patient started with nausea & vomiting around 1600 yesterday after going out to dinner with her , vomited x 6   · CT abdomen/pelvis - Sludge in the gallbladder  Asymmetric perinephric fluid more prominent on the left  No renal stones  Post appendectomy changes are noted  No bowel obstruction  Consider contrast examining the appropriate clinical setting  · Resolved  · Patient tolerated diet with no further nausea and vomiting  · Received IV fluids  · Continue protonix daily   · TUMS PRN   · Previous EGD work up revealed 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  One in antrum and 1 in body  Cauterized with control of bleeding   Small hiatal hernia  · Cleared by GI for discharge

## 2020-02-20 NOTE — DISCHARGE INSTRUCTIONS
Follow-up with PCP and review renal ultrasound results within 1 week  Follow-up with GI and Kj Anderson as outpatient  Return to ER with any worsening abdominal pain, nausea, vomiting or any other alarming symptoms

## 2020-02-20 NOTE — ED NOTES
Patient transported to [address] [place] [address], [address] [place] [address]  02/19/20 1181

## 2020-02-20 NOTE — ASSESSMENT & PLAN NOTE
· Hgb this admission was 9 2, previous trends appear to be anywhere from 7 0-10 0  · Trend CBC daily, AM labs revealed a Hgb of 6 6, verified by repeat   · Will obtain stool occult   · Transfuse with PRBC , prepare 2 units and transfuse, goal Hgb > 7 5  · Obtain type and screen   · Previous EGD done in 06/2019 - Revealed 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  One in antrum and 1 in body  Cauterized with control of bleeding  Small hiatal hernia    · Stop ASA, toprol  · Switch protonix to IV BID  · Trend Hgb q6 hrs   · GI consultation pending   · Maintain NPO

## 2020-02-20 NOTE — ASSESSMENT & PLAN NOTE
· Patient not on AC due to anemia and previous GIB   · Continue toprol XL & ASA   · Currently in SR, had some tachycardia upon arrival possibly related to dehydration which as resolved, current HR - 74

## 2020-02-21 ENCOUNTER — TELEPHONE (OUTPATIENT)
Dept: FAMILY MEDICINE CLINIC | Facility: HOSPITAL | Age: 70
End: 2020-02-21

## 2020-02-21 LAB
ABO GROUP BLD BPU: NORMAL
ABO GROUP BLD BPU: NORMAL
BPU ID: NORMAL
BPU ID: NORMAL
CROSSMATCH: NORMAL
CROSSMATCH: NORMAL
UNIT DISPENSE STATUS: NORMAL
UNIT DISPENSE STATUS: NORMAL
UNIT PRODUCT CODE: NORMAL
UNIT PRODUCT CODE: NORMAL
UNIT RH: NORMAL
UNIT RH: NORMAL

## 2020-02-21 NOTE — TELEPHONE ENCOUNTER
Dr Shaista Sorensen please advise what to tell patient about her inpatient CT and US results  She was told to call here to get results, pt aware that she will not receive call until Monday when you are back in the office to review results

## 2020-02-24 ENCOUNTER — TRANSITIONAL CARE MANAGEMENT (OUTPATIENT)
Dept: FAMILY MEDICINE CLINIC | Facility: HOSPITAL | Age: 70
End: 2020-02-24

## 2020-02-24 ENCOUNTER — TELEPHONE (OUTPATIENT)
Dept: PAIN MEDICINE | Facility: CLINIC | Age: 70
End: 2020-02-24

## 2020-02-24 DIAGNOSIS — M48.062 SPINAL STENOSIS OF LUMBAR REGION WITH NEUROGENIC CLAUDICATION: ICD-10-CM

## 2020-02-24 DIAGNOSIS — G89.29 CHRONIC BILATERAL LOW BACK PAIN WITHOUT SCIATICA: ICD-10-CM

## 2020-02-24 DIAGNOSIS — M54.50 CHRONIC BILATERAL LOW BACK PAIN WITHOUT SCIATICA: ICD-10-CM

## 2020-02-24 DIAGNOSIS — M54.16 BILATERAL LUMBAR RADICULOPATHY: Primary | ICD-10-CM

## 2020-02-24 NOTE — TELEPHONE ENCOUNTER
Pt reports 80% improvement post inj   Pain level 4-5/10 (depends on what she's doing)     S/p B/L L4 TFESI 2/17 F/u 3/31

## 2020-02-24 NOTE — TELEPHONE ENCOUNTER
CT shows several chronic and ongoing issues,  US of kidneys is not in chart  We will ask to get result and I can go over with her at upcoming appt  Ladan, please obtain report of US of kidneys,  Report not in chart

## 2020-02-25 LAB
BACTERIA BLD CULT: NORMAL
BACTERIA BLD CULT: NORMAL

## 2020-03-02 ENCOUNTER — OFFICE VISIT (OUTPATIENT)
Dept: FAMILY MEDICINE CLINIC | Facility: HOSPITAL | Age: 70
End: 2020-03-02
Payer: COMMERCIAL

## 2020-03-02 VITALS
HEIGHT: 61 IN | BODY MASS INDEX: 23.79 KG/M2 | SYSTOLIC BLOOD PRESSURE: 108 MMHG | WEIGHT: 126 LBS | DIASTOLIC BLOOD PRESSURE: 62 MMHG | HEART RATE: 82 BPM

## 2020-03-02 DIAGNOSIS — D64.89 ANEMIA DUE TO OTHER CAUSE, NOT CLASSIFIED: ICD-10-CM

## 2020-03-02 DIAGNOSIS — C49.A9 MALIGNANT GASTROINTESTINAL STROMAL TUMOR (GIST) OF OTHER SITE (HCC): Primary | ICD-10-CM

## 2020-03-02 DIAGNOSIS — I48.0 PAROXYSMAL ATRIAL FIBRILLATION (HCC): ICD-10-CM

## 2020-03-02 DIAGNOSIS — C79.9 METASTATIC CANCER (HCC): ICD-10-CM

## 2020-03-02 DIAGNOSIS — E86.0 DEHYDRATION: ICD-10-CM

## 2020-03-02 DIAGNOSIS — N17.9 AKI (ACUTE KIDNEY INJURY) (HCC): ICD-10-CM

## 2020-03-02 PROCEDURE — 1111F DSCHRG MED/CURRENT MED MERGE: CPT | Performed by: INTERNAL MEDICINE

## 2020-03-02 PROCEDURE — 99495 TRANSJ CARE MGMT MOD F2F 14D: CPT | Performed by: INTERNAL MEDICINE

## 2020-03-02 NOTE — PATIENT INSTRUCTIONS
You have been seen today for a hospital discharge follow up visit  The purpose of the visit is to be sure that you are feeling well and taking all medications as ordered  This visit is scheduled so that any post hospital questions you have can be addressed  The doctor or nurse will review all medications and will provide refills of medications  You may be asked to get some follow up labs or other testing done, please do this as soon as able  If  You are seeing a specialist for follow up, let us know so we can do appropriate referrals  Schedule your next routine visit today so that we can keep you on track and healthy        Labs due in 2 week    Follow up for pre-op exam in about 3 weeks

## 2020-03-04 ENCOUNTER — EVALUATION (OUTPATIENT)
Dept: PHYSICAL THERAPY | Facility: CLINIC | Age: 70
End: 2020-03-04
Payer: COMMERCIAL

## 2020-03-04 DIAGNOSIS — M25.562 CHRONIC PAIN OF LEFT KNEE: Primary | ICD-10-CM

## 2020-03-04 DIAGNOSIS — G89.29 CHRONIC PAIN OF LEFT KNEE: Primary | ICD-10-CM

## 2020-03-04 PROCEDURE — 97163 PT EVAL HIGH COMPLEX 45 MIN: CPT | Performed by: PHYSICAL THERAPIST

## 2020-03-04 NOTE — PROGRESS NOTES
PT Evaluation     Today's date: 3/4/2020  Patient name: Emelia Amador  :   MRN: [id number]  Referring provider: Lars Fraire MD  Dx:   Encounter Diagnosis     ICD-10-CM    1  Chronic pain of left knee M25 562     G89 29                   Assessment  Assessment details: Pt is a 68year old female presenting to outpatient Physical Therapy with primary complaints of left knee pain and ROM deficits  She underwent a L TKR in 2019, and subsequently successfully completed physical therapy with a discharge on 11/15/2019  Since her discharge she has been hospitalized on several different occasions with gastroenteritis, and following those periods of immobility her left knee functional has digressed  At the time of intitial evaluation today her left knee has poor ROM, with AROM of 25-75 degrees, moderate strength deficits into l hip flexion and knee extension, and considerable strength deficits into L knee flexion  She also displays TTP of all hamstring and quad musculature, with hypomobility of the L patella superiorly and inferiorly  Other findings that are likely contributing to her pain is poor posture in standing and ambulation, including moderately increased flexed trunk, B knee valgus, midfoot contact, small steps, and slow speed  She would benefit from skilled PT to improve strength and ROM of the L knee, as well as to improve posture and gait  Thank you for your referral!  Impairments: abnormal gait, abnormal or restricted ROM, abnormal movement, activity intolerance, impaired physical strength, lacks appropriate home exercise program, pain with function, poor posture  and poor body mechanics  Barriers to therapy: None  Understanding of Dx/Px/POC: good   Prognosis: good    Goals  ST  The patient will be fully compliant with HEP within two weeks    2  Pt will report a decrease in pain by at least two points on VAS within two to three weeks  3  Pt will improve L knee AROM into flexion by at least 10 degrees within two to three weeks  4  Pt will improve L knee extension AROM by at least 5 degrees  LT  The patient will increase FOTO score to at least 49 within six weeks  2  The patient will display a decrease in forward trunk flexion during ambulation by at least 75% within six weeks  3  Pt will display at least 4/5 hamstring strength of the L knee if order to have better foot clearance during ambulation within six weeks  4  Pt will have at least 4+/5 L quad strength for good eccentric control coming down stairs    Plan  Patient would benefit from: skilled physical therapy  Planned modality interventions: thermotherapy: hydrocollator packs and cryotherapy  Planned therapy interventions: therapeutic activities, therapeutic exercise, home exercise program, manual therapy, joint mobilization, balance, patient education and neuromuscular re-education  Frequency: 2x week  Duration in weeks: 6  Plan of Care beginning date: 3/4/2020  Plan of Care expiration date: 4/15/2020  Treatment plan discussed with: patient        Subjective Evaluation    History of Present Illness  Mechanism of injury: Pt says that she was feeling okay with her left knee replacement until her last visit with her surgeon, who told her that her knee flexion ROM was poor and that she needed to return to PT  She says that above her knee it is "still lumpy-like scar tissue, but isn't scar tissue", and her surgeon told her that if it doesn't improve he will have to go back in and cut the adhesions  She was in the hospital a few days in January and February with gastroadenitis, and she says that her knee start hurting through all of that while she wasn't able to do her exercises  She got two low back injections with her pain management team about three weeks ago  Following the first 5-6 days following her injections she felt better, but then she started being more active and the pain returned     Pain  Current pain ratin  At best pain ratin  At worst pain ratin  Location: B UEs posterior and anterior  Quality: dull ache and sharp  Relieving factors: ice, heat and medications  Exacerbated by: kneeling, bending  Treatments  Previous treatment: physical therapy and injection treatment  Discharged from (in last 30 days): inpatient hospitalization  Patient Goals  Patient goals for therapy: increased motion  Patient goal: "to get the knee the way it should be-bending, straightening, and kneeling"        Objective     Static Posture   General Observations  Tilted left and flexed  Head  Forward  Shoulders  Rounded  Thoracic Spine  Hyperkyphosis  Knee   Genu valgus  Knee (Left): Flexed  Palpation   Left   Tenderness of the distal biceps femoris, distal semimembranosus, distal semitendinosus, rectus femoris, vastus lateralis and vastus medialis       Active Range of Motion   Left Knee   Flexion: 75 degrees with pain  Extensor la degrees     Passive Range of Motion   Left Knee   Flexion: 82 degrees with pain  Extension: 14 degrees     Mobility   Patellar Mobility:   Left Knee   WFL: medial and lateral    Hypomobile: left superior and left inferior    Strength/Myotome Testing     Left Hip   Planes of Motion   Flexion: 4-    Right Hip   Planes of Motion   Flexion: 4+    Left Knee   Flexion: 2-  Extension: 4+    Right Knee   Flexion: 4-  Extension: 4+    Ambulation     Comments   Pt ambulates with a R sided SPC and the following deficits:  Slow speed  Midfoot strike  Small step length  Moderately increased forward trunk flexion  No trunk rotation or lateral flexion  Little to no arm swing  Minimal knee and hip flexion  Lacking full L knee extension during stance phase             Precautions: cancer, anemia, chronic low back pain, asthma, CAD, A-fib, GERD, kidney disease, anxiety/depression  EPOC:4/15/2020      Manual              L knee PROM                                                                     Exercise Diary Stationary bike             Mini squats on slant board              TKE             Step ps             Step downs             Standing hamstring curl             B shoulder ER with TB                          Seated postural correction with holds             Seated lateral trunk flexion             Seated trunk rotation                                       HEP             Scapular squeezes             Doorway pec stretch             Seated hamstring stretch             Seated calf stretch             Knee flexion AAROM with contralateral LE             Knee flexor stretch on step             Tail gaiters             LTR                                                                     Modalities

## 2020-03-06 ENCOUNTER — OFFICE VISIT (OUTPATIENT)
Dept: PHYSICAL THERAPY | Facility: CLINIC | Age: 70
End: 2020-03-06
Payer: COMMERCIAL

## 2020-03-06 DIAGNOSIS — M25.562 CHRONIC PAIN OF LEFT KNEE: Primary | ICD-10-CM

## 2020-03-06 DIAGNOSIS — G89.29 CHRONIC PAIN OF LEFT KNEE: Primary | ICD-10-CM

## 2020-03-06 PROCEDURE — 97140 MANUAL THERAPY 1/> REGIONS: CPT | Performed by: PHYSICAL THERAPIST

## 2020-03-06 PROCEDURE — 97110 THERAPEUTIC EXERCISES: CPT | Performed by: PHYSICAL THERAPIST

## 2020-03-06 NOTE — PROGRESS NOTES
Daily Note/Discharge Summary  **pt is discharged at this time per her request due to [place]  She was not interested in telehealth  Today's date: 3/6/2020  Patient name: Zahra Reyes  :   MRN: [id number]  Referring provider: Melania Grnat MD  Dx:   Encounter Diagnosis     ICD-10-CM    1  Chronic pain of left knee M25 562     G89 29                   Subjective: Pt reports that her left sided low back into the thigh has been incredibly painful the last two days  She has started her HEP, which she thinks is going well  Objective: See treatment diary below      Assessment: Tolerated treatment well  Patient was able to show mild improvements in L knee flexion with half rotations on the stationary bike, with slow cautious movements  She demonstrated good improvement in trunk posture during ambulation after raising cane height one level  She also showed good trunk activation with seated TB exercises  She displayed TTP of L low back and hip musculature, but responded well to manual therapy  She displayed improved knee flexion to 85 degrees at the end of the treatment  Plan: Continue per plan of care  Progress treatment as tolerated         Precautions: cancer, anemia, chronic low back pain, asthma, CAD, A-fib, GERD, kidney disease, anxiety/depression  EPOC:4/15/2020      Manual  3/6            L knee PROM 6'            STM TIget tail low back/hip 6'            IASTM L knee 6'            IASTM L hip/low back 6'                          24'                Exercise Diary  3/6            Stationary bike half rotations 5'            Mini squats on slant board              TKE             Step ups             Step downs             Standing hamstring curl                          B shoulder ER with TB YTB  x10            Seated shoulder rows with TB YTB  2 x10            Tailgaiters 2'            Seated lateral trunk flexion x10            Seated trunk rotation x10 HEP             Scapular squeezes             Doorway pec stretch             Seated hamstring stretch             Seated calf stretch             Knee flexion AAROM with contralateral LE             Knee flexor stretch on step             Tail gaiters             LTR                                                                     Modalities

## 2020-03-10 LAB
ALBUMIN SERPL-MCNC: 3.9 G/DL (ref 3.6–5.1)
ALBUMIN/GLOB SERPL: 1.9 (CALC) (ref 1–2.5)
ALP SERPL-CCNC: 59 U/L (ref 37–153)
ALT SERPL-CCNC: 18 U/L (ref 6–29)
AST SERPL-CCNC: 38 U/L (ref 10–35)
BASOPHILS # BLD AUTO: 11 CELLS/UL (ref 0–200)
BASOPHILS NFR BLD AUTO: 0.2 %
BILIRUB SERPL-MCNC: 0.4 MG/DL (ref 0.2–1.2)
BUN SERPL-MCNC: 20 MG/DL (ref 7–25)
BUN/CREAT SERPL: 15 (CALC) (ref 6–22)
CALCIUM SERPL-MCNC: 9.1 MG/DL (ref 8.6–10.4)
CHLORIDE SERPL-SCNC: 101 MMOL/L (ref 98–110)
CO2 SERPL-SCNC: 27 MMOL/L (ref 20–32)
CREAT SERPL-MCNC: 1.36 MG/DL (ref 0.6–0.93)
EOSINOPHIL # BLD AUTO: 138 CELLS/UL (ref 15–500)
EOSINOPHIL NFR BLD AUTO: 2.6 %
ERYTHROCYTE [DISTWIDTH] IN BLOOD BY AUTOMATED COUNT: 15.8 % (ref 11–15)
GLOBULIN SER CALC-MCNC: 2.1 G/DL (CALC) (ref 1.9–3.7)
GLUCOSE SERPL-MCNC: 75 MG/DL (ref 65–99)
HCT VFR BLD AUTO: 26.9 % (ref 35–45)
HGB BLD-MCNC: 8.8 G/DL (ref 11.7–15.5)
LYMPHOCYTES # BLD AUTO: 620 CELLS/UL (ref 850–3900)
LYMPHOCYTES NFR BLD AUTO: 11.7 %
MCH RBC QN AUTO: 31.3 PG (ref 27–33)
MCHC RBC AUTO-ENTMCNC: 32.7 G/DL (ref 32–36)
MCV RBC AUTO: 95.7 FL (ref 80–100)
MONOCYTES # BLD AUTO: 440 CELLS/UL (ref 200–950)
MONOCYTES NFR BLD AUTO: 8.3 %
NEUTROPHILS # BLD AUTO: 4092 CELLS/UL (ref 1500–7800)
NEUTROPHILS NFR BLD AUTO: 77.2 %
PLATELET # BLD AUTO: 255 THOUSAND/UL (ref 140–400)
PMV BLD REES-ECKER: 10.5 FL (ref 7.5–12.5)
POTASSIUM SERPL-SCNC: 4.2 MMOL/L (ref 3.5–5.3)
PROT SERPL-MCNC: 6 G/DL (ref 6.1–8.1)
RBC # BLD AUTO: 2.81 MILLION/UL (ref 3.8–5.1)
SL AMB EGFR AFRICAN AMERICAN: 45 ML/MIN/1.73M2
SL AMB EGFR NON AFRICAN AMERICAN: 39 ML/MIN/1.73M2
SODIUM SERPL-SCNC: 137 MMOL/L (ref 135–146)
WBC # BLD AUTO: 5.3 THOUSAND/UL (ref 3.8–10.8)

## 2020-03-10 NOTE — TELEPHONE ENCOUNTER
Pt called statin that she is having pain in both legs pt states the she would like to know what can be done  Pt can not walk or sit for too long   Pt states 10/10 pain     Pt can be reached at [phone]

## 2020-03-10 NOTE — TELEPHONE ENCOUNTER
I put in an order to proceed with a B/L S1 TFESI with Dr Natasha Gandara  She will then need a f/u OV with me 3-4 weeks after the injection  Thank you

## 2020-03-10 NOTE — TELEPHONE ENCOUNTER
S/W pt who states that after injection on 2/17, had about 2 weeks of 80% relief, but pain has returned  Pain is down both legs, and severe at times  Using ice/heat along with Tylenol  When it is really bad, takes left over Tramadol    Has f/u OV on 3/31/2020  Please advise, OV note advised possible repeat or change in injection

## 2020-03-11 ENCOUNTER — OFFICE VISIT (OUTPATIENT)
Dept: PHYSICAL THERAPY | Facility: CLINIC | Age: 70
End: 2020-03-11
Payer: COMMERCIAL

## 2020-03-11 DIAGNOSIS — M25.562 CHRONIC PAIN OF LEFT KNEE: Primary | ICD-10-CM

## 2020-03-11 DIAGNOSIS — G89.29 CHRONIC PAIN OF LEFT KNEE: Primary | ICD-10-CM

## 2020-03-11 PROCEDURE — 97110 THERAPEUTIC EXERCISES: CPT | Performed by: PHYSICAL THERAPIST

## 2020-03-11 PROCEDURE — 97140 MANUAL THERAPY 1/> REGIONS: CPT | Performed by: PHYSICAL THERAPIST

## 2020-03-11 NOTE — PROGRESS NOTES
Daily Note     Today's date: 3/11/2020  Patient name: Mode Cooper  :   MRN: [id number]  Referring provider: Ciaran Lorenzana MD  Dx:   Encounter Diagnosis     ICD-10-CM    1  Chronic pain of left knee M25 562     G89 29                   Subjective: Reports low back pain is improving a little  Objective: See treatment diary below      Assessment: Tolerated treatment well  Knee ROM continues limits in flex and ext with some pain at end range  Demonstrated less tenderness in lumbar and hip musculature today, but does still have pain referring through posterior thighs  General weakness present especially in LLE  Responded well to additional strengthening exercises today with fatigue  Difficulty with technique with mini squat, but does well with sit to stands  Would benefit from continued PT  Plan: Continue per plan of care  Progress treatment as tolerated         Precautions: cancer, anemia, chronic low back pain, asthma, CAD, A-fib, GERD, kidney disease, anxiety/depression  EPOC:4/15/2020      Manual  3/6 3/11           L knee PROM 6' 8'           STM TIget tail low back/hip 6' 8'           IASTM L knee 6'            IASTM L hip/low back 6'                          24' 16'               Exercise Diary  3/6 3/11           Stationary bike half rotations 5' 6'           Mini squats   10x           Sit to stand  15x chair +foam, hand hold assit           TKE             Step ups  6" 15x ea           Step downs  6" 10x ea           Standing hamstring curl             LAQ  2# 2x10 ea           B shoulder ER with TB YTB  x10 YTB  2x10           Seated shoulder rows with TB YTB  2 x10 YTB  2 x10           Tailgaiters 2'            Seated lateral trunk flexion x10            Seated trunk rotation x10 x10                                     HEP             Scapular squeezes             Doorway pec stretch             Seated hamstring stretch             Seated calf stretch             Knee flexion AAROM with contralateral LE             Knee flexor stretch on step             Tail gaiters             LTR                                                                     Modalities

## 2020-03-11 NOTE — TELEPHONE ENCOUNTER
Proc scheduled for 3/16/20, kepted the 3/31/2020 f/u as pt will be having eye surgery on 4/10/20  pt aware of all pre-procedural instructions

## 2020-03-16 ENCOUNTER — APPOINTMENT (OUTPATIENT)
Dept: PHYSICAL THERAPY | Facility: CLINIC | Age: 70
End: 2020-03-16
Payer: COMMERCIAL

## 2020-03-16 ENCOUNTER — TELEPHONE (OUTPATIENT)
Dept: RADIOLOGY | Facility: CLINIC | Age: 70
End: 2020-03-16

## 2020-03-16 NOTE — TELEPHONE ENCOUNTER
When calling pt for screening questions for covid-19, pt states she was going to call and cancel her procedure due to being sick  Pt is scheduled for eye surgery on 4/10/20, pt will call after surgery to r/s procedure

## 2020-03-20 ENCOUNTER — APPOINTMENT (OUTPATIENT)
Dept: PHYSICAL THERAPY | Facility: CLINIC | Age: 70
End: 2020-03-20
Payer: COMMERCIAL

## 2020-03-23 ENCOUNTER — APPOINTMENT (OUTPATIENT)
Dept: PHYSICAL THERAPY | Facility: CLINIC | Age: 70
End: 2020-03-23
Payer: COMMERCIAL

## 2020-03-27 ENCOUNTER — APPOINTMENT (OUTPATIENT)
Dept: PHYSICAL THERAPY | Facility: CLINIC | Age: 70
End: 2020-03-27
Payer: COMMERCIAL

## 2020-03-30 ENCOUNTER — APPOINTMENT (OUTPATIENT)
Dept: PHYSICAL THERAPY | Facility: CLINIC | Age: 70
End: 2020-03-30
Payer: COMMERCIAL

## 2020-04-07 ENCOUNTER — TELEMEDICINE (OUTPATIENT)
Dept: PAIN MEDICINE | Facility: CLINIC | Age: 70
End: 2020-04-07
Payer: COMMERCIAL

## 2020-04-07 ENCOUNTER — TELEPHONE (OUTPATIENT)
Dept: PAIN MEDICINE | Facility: CLINIC | Age: 70
End: 2020-04-07

## 2020-04-07 DIAGNOSIS — M48.062 SPINAL STENOSIS OF LUMBAR REGION WITH NEUROGENIC CLAUDICATION: Primary | ICD-10-CM

## 2020-04-07 DIAGNOSIS — M54.16 LUMBAR RADICULOPATHY: ICD-10-CM

## 2020-04-07 PROCEDURE — G2012 BRIEF CHECK IN BY MD/QHP: HCPCS | Performed by: ANESTHESIOLOGY

## 2020-04-07 RX ORDER — TRAMADOL HYDROCHLORIDE 50 MG/1
50 TABLET ORAL EVERY 8 HOURS PRN
Qty: 30 TABLET | Refills: 0 | Status: SHIPPED | OUTPATIENT
Start: 2020-04-07 | End: 2020-05-20 | Stop reason: SDUPTHER

## 2020-04-30 ENCOUNTER — HOSPITAL ENCOUNTER (INPATIENT)
Facility: HOSPITAL | Age: 70
LOS: 3 days | Discharge: HOME | DRG: 378 | End: 2020-05-03
Attending: EMERGENCY MEDICINE | Admitting: INTERNAL MEDICINE
Payer: COMMERCIAL

## 2020-04-30 ENCOUNTER — APPOINTMENT (EMERGENCY)
Dept: RADIOLOGY | Facility: HOSPITAL | Age: 70
DRG: 378 | End: 2020-04-30
Payer: COMMERCIAL

## 2020-04-30 ENCOUNTER — OFFICE VISIT (OUTPATIENT)
Dept: GASTROENTEROLOGY | Facility: CLINIC | Age: 70
End: 2020-04-30
Payer: COMMERCIAL

## 2020-04-30 DIAGNOSIS — D64.9 SYMPTOMATIC ANEMIA: ICD-10-CM

## 2020-04-30 DIAGNOSIS — D64.89 ANEMIA DUE TO OTHER CAUSE, NOT CLASSIFIED: ICD-10-CM

## 2020-04-30 DIAGNOSIS — C49.A9 MALIGNANT GASTROINTESTINAL STROMAL TUMOR (GIST) OF OTHER SITE (HCC): ICD-10-CM

## 2020-04-30 DIAGNOSIS — R19.7 DIARRHEA, UNSPECIFIED TYPE: ICD-10-CM

## 2020-04-30 DIAGNOSIS — K92.2 GI BLEED: Primary | ICD-10-CM

## 2020-04-30 DIAGNOSIS — K92.1 MELENA: Primary | ICD-10-CM

## 2020-04-30 DIAGNOSIS — K92.1 MELENA: ICD-10-CM

## 2020-04-30 PROBLEM — E86.0 DEHYDRATION: Status: RESOLVED | Noted: 2020-03-02 | Resolved: 2020-04-30

## 2020-04-30 PROBLEM — K52.9 ENTERITIS: Status: RESOLVED | Noted: 2020-01-05 | Resolved: 2020-04-30

## 2020-04-30 PROBLEM — R79.89 ELEVATED SERUM CREATININE: Status: ACTIVE | Noted: 2020-04-30

## 2020-04-30 PROBLEM — N17.9 AKI (ACUTE KIDNEY INJURY) (HCC): Status: RESOLVED | Noted: 2020-03-02 | Resolved: 2020-04-30

## 2020-04-30 LAB
ABO GROUP BLD: NORMAL
ALBUMIN SERPL BCP-MCNC: 3.2 G/DL (ref 3.5–5)
ALP SERPL-CCNC: 50 U/L (ref 46–116)
ALT SERPL W P-5'-P-CCNC: 23 U/L (ref 12–78)
ANION GAP SERPL CALCULATED.3IONS-SCNC: 11 MMOL/L (ref 4–13)
APTT PPP: 30 SECONDS (ref 23–37)
AST SERPL W P-5'-P-CCNC: 41 U/L (ref 5–45)
BASOPHILS # BLD AUTO: 0 THOUSANDS/ΜL (ref 0–0.1)
BASOPHILS NFR BLD AUTO: 0 % (ref 0–1)
BILIRUB SERPL-MCNC: 0.2 MG/DL (ref 0.2–1)
BLD GP AB SCN SERPL QL: NEGATIVE
BUN SERPL-MCNC: 26 MG/DL (ref 5–25)
CALCIUM SERPL-MCNC: 7.6 MG/DL (ref 8.3–10.1)
CHLORIDE SERPL-SCNC: 103 MMOL/L (ref 100–108)
CO2 SERPL-SCNC: 24 MMOL/L (ref 21–32)
CREAT SERPL-MCNC: 1.58 MG/DL (ref 0.6–1.3)
EOSINOPHIL # BLD AUTO: 0 THOUSAND/ΜL (ref 0–0.61)
EOSINOPHIL NFR BLD AUTO: 0 % (ref 0–6)
ERYTHROCYTE [DISTWIDTH] IN BLOOD BY AUTOMATED COUNT: 17.2 % (ref 11.6–15.1)
GFR SERPL CREATININE-BSD FRML MDRD: 33 ML/MIN/1.73SQ M
GLUCOSE SERPL-MCNC: 85 MG/DL (ref 65–140)
HCT VFR BLD AUTO: 13 % (ref 34.8–46.1)
HGB BLD-MCNC: 3.9 G/DL (ref 11.5–15.4)
IMM GRANULOCYTES # BLD AUTO: 0 THOUSAND/UL (ref 0–0.2)
IMM GRANULOCYTES NFR BLD AUTO: 0 % (ref 0–2)
INR PPP: 1.19 (ref 0.84–1.19)
LACTATE SERPL-SCNC: 0.8 MMOL/L (ref 0.5–2)
LYMPHOCYTES # BLD AUTO: 0.22 THOUSANDS/ΜL (ref 0.6–4.47)
LYMPHOCYTES NFR BLD AUTO: 8 % (ref 14–44)
MCH RBC QN AUTO: 33.3 PG (ref 26.8–34.3)
MCHC RBC AUTO-ENTMCNC: 30 G/DL (ref 31.4–37.4)
MCV RBC AUTO: 111 FL (ref 82–98)
MONOCYTES # BLD AUTO: 0.27 THOUSAND/ΜL (ref 0.17–1.22)
MONOCYTES NFR BLD AUTO: 10 % (ref 4–12)
NEUTROPHILS # BLD AUTO: 2.31 THOUSANDS/ΜL (ref 1.85–7.62)
NEUTS SEG NFR BLD AUTO: 82 % (ref 43–75)
PLATELET # BLD AUTO: 160 THOUSANDS/UL (ref 149–390)
PMV BLD AUTO: 9.8 FL (ref 8.9–12.7)
POTASSIUM SERPL-SCNC: 3.5 MMOL/L (ref 3.5–5.3)
PROT SERPL-MCNC: 6 G/DL (ref 6.4–8.2)
PROTHROMBIN TIME: 14.8 SECONDS (ref 11.6–14.5)
RBC # BLD AUTO: 1.17 MILLION/UL (ref 3.81–5.12)
RH BLD: POSITIVE
SARS-COV-2 RNA RESP QL NAA+PROBE: NEGATIVE
SODIUM SERPL-SCNC: 138 MMOL/L (ref 136–145)
SPECIMEN EXPIRATION DATE: NORMAL
TROPONIN I SERPL-MCNC: 0.05 NG/ML
WBC # BLD AUTO: 2.8 THOUSAND/UL (ref 4.31–10.16)

## 2020-04-30 PROCEDURE — 83605 ASSAY OF LACTIC ACID: CPT | Performed by: EMERGENCY MEDICINE

## 2020-04-30 PROCEDURE — 36415 COLL VENOUS BLD VENIPUNCTURE: CPT | Performed by: EMERGENCY MEDICINE

## 2020-04-30 PROCEDURE — 96365 THER/PROPH/DIAG IV INF INIT: CPT

## 2020-04-30 PROCEDURE — P9058 RBC, L/R, CMV-NEG, IRRAD: HCPCS

## 2020-04-30 PROCEDURE — 86850 RBC ANTIBODY SCREEN: CPT | Performed by: EMERGENCY MEDICINE

## 2020-04-30 PROCEDURE — 30233N1 TRANSFUSION OF NONAUTOLOGOUS RED BLOOD CELLS INTO PERIPHERAL VEIN, PERCUTANEOUS APPROACH: ICD-10-PCS | Performed by: INTERNAL MEDICINE

## 2020-04-30 PROCEDURE — U0003 INFECTIOUS AGENT DETECTION BY NUCLEIC ACID (DNA OR RNA); SEVERE ACUTE RESPIRATORY SYNDROME CORONAVIRUS 2 (SARS-COV-2) (CORONAVIRUS DISEASE [COVID-19]), AMPLIFIED PROBE TECHNIQUE, MAKING USE OF HIGH THROUGHPUT TECHNOLOGIES AS DESCRIBED BY CMS-2020-01-R: HCPCS | Performed by: EMERGENCY MEDICINE

## 2020-04-30 PROCEDURE — 1160F RVW MEDS BY RX/DR IN RCRD: CPT | Performed by: NURSE PRACTITIONER

## 2020-04-30 PROCEDURE — 86901 BLOOD TYPING SEROLOGIC RH(D): CPT | Performed by: EMERGENCY MEDICINE

## 2020-04-30 PROCEDURE — 3008F BODY MASS INDEX DOCD: CPT | Performed by: NURSE PRACTITIONER

## 2020-04-30 PROCEDURE — 85610 PROTHROMBIN TIME: CPT | Performed by: EMERGENCY MEDICINE

## 2020-04-30 PROCEDURE — 4040F PNEUMOC VAC/ADMIN/RCVD: CPT | Performed by: NURSE PRACTITIONER

## 2020-04-30 PROCEDURE — 85730 THROMBOPLASTIN TIME PARTIAL: CPT | Performed by: EMERGENCY MEDICINE

## 2020-04-30 PROCEDURE — 3074F SYST BP LT 130 MM HG: CPT | Performed by: NURSE PRACTITIONER

## 2020-04-30 PROCEDURE — 3078F DIAST BP <80 MM HG: CPT | Performed by: NURSE PRACTITIONER

## 2020-04-30 PROCEDURE — 80053 COMPREHEN METABOLIC PANEL: CPT | Performed by: EMERGENCY MEDICINE

## 2020-04-30 PROCEDURE — C9113 INJ PANTOPRAZOLE SODIUM, VIA: HCPCS | Performed by: EMERGENCY MEDICINE

## 2020-04-30 PROCEDURE — 1036F TOBACCO NON-USER: CPT | Performed by: NURSE PRACTITIONER

## 2020-04-30 PROCEDURE — 99223 1ST HOSP IP/OBS HIGH 75: CPT | Performed by: INTERNAL MEDICINE

## 2020-04-30 PROCEDURE — 86900 BLOOD TYPING SEROLOGIC ABO: CPT | Performed by: EMERGENCY MEDICINE

## 2020-04-30 PROCEDURE — 71045 X-RAY EXAM CHEST 1 VIEW: CPT

## 2020-04-30 PROCEDURE — 86920 COMPATIBILITY TEST SPIN: CPT

## 2020-04-30 PROCEDURE — 99285 EMERGENCY DEPT VISIT HI MDM: CPT

## 2020-04-30 PROCEDURE — 99285 EMERGENCY DEPT VISIT HI MDM: CPT | Performed by: EMERGENCY MEDICINE

## 2020-04-30 PROCEDURE — 93005 ELECTROCARDIOGRAM TRACING: CPT

## 2020-04-30 PROCEDURE — 84484 ASSAY OF TROPONIN QUANT: CPT | Performed by: EMERGENCY MEDICINE

## 2020-04-30 PROCEDURE — 85025 COMPLETE CBC W/AUTO DIFF WBC: CPT | Performed by: EMERGENCY MEDICINE

## 2020-04-30 PROCEDURE — 36430 TRANSFUSION BLD/BLD COMPNT: CPT

## 2020-04-30 PROCEDURE — 99213 OFFICE O/P EST LOW 20 MIN: CPT | Performed by: NURSE PRACTITIONER

## 2020-04-30 RX ORDER — AZELASTINE 1 MG/ML
1 SPRAY, METERED NASAL 2 TIMES DAILY
Status: DISCONTINUED | OUTPATIENT
Start: 2020-04-30 | End: 2020-05-03 | Stop reason: HOSPADM

## 2020-04-30 RX ORDER — ALPRAZOLAM 0.25 MG/1
0.25 TABLET ORAL
Status: DISCONTINUED | OUTPATIENT
Start: 2020-04-30 | End: 2020-05-03 | Stop reason: HOSPADM

## 2020-04-30 RX ORDER — PREDNISOLONE ACETATE 10 MG/ML
1 SUSPENSION/ DROPS OPHTHALMIC 4 TIMES DAILY
Status: DISCONTINUED | OUTPATIENT
Start: 2020-04-30 | End: 2020-05-03 | Stop reason: HOSPADM

## 2020-04-30 RX ORDER — METOPROLOL SUCCINATE 25 MG/1
25 TABLET, EXTENDED RELEASE ORAL DAILY
Status: DISCONTINUED | OUTPATIENT
Start: 2020-05-01 | End: 2020-05-03 | Stop reason: HOSPADM

## 2020-04-30 RX ORDER — TRAMADOL HYDROCHLORIDE 50 MG/1
50 TABLET ORAL EVERY 8 HOURS PRN
Status: DISCONTINUED | OUTPATIENT
Start: 2020-04-30 | End: 2020-05-03 | Stop reason: HOSPADM

## 2020-04-30 RX ORDER — DIPHENHYDRAMINE HCL 25 MG
25 TABLET ORAL EVERY 6 HOURS PRN
Status: DISCONTINUED | OUTPATIENT
Start: 2020-04-30 | End: 2020-05-03 | Stop reason: HOSPADM

## 2020-04-30 RX ORDER — ACETAMINOPHEN 325 MG/1
650 TABLET ORAL EVERY 6 HOURS PRN
Status: DISCONTINUED | OUTPATIENT
Start: 2020-04-30 | End: 2020-05-03 | Stop reason: HOSPADM

## 2020-04-30 RX ORDER — ONDANSETRON 2 MG/ML
4 INJECTION INTRAMUSCULAR; INTRAVENOUS EVERY 6 HOURS PRN
Status: DISCONTINUED | OUTPATIENT
Start: 2020-04-30 | End: 2020-05-03 | Stop reason: HOSPADM

## 2020-04-30 RX ORDER — HYDROXYCHLOROQUINE SULFATE 200 MG/1
200 TABLET, FILM COATED ORAL
Status: DISCONTINUED | OUTPATIENT
Start: 2020-05-01 | End: 2020-05-03 | Stop reason: HOSPADM

## 2020-04-30 RX ADMIN — SODIUM CHLORIDE 80 MG: 9 INJECTION, SOLUTION INTRAVENOUS at 17:56

## 2020-04-30 RX ADMIN — SODIUM CHLORIDE 8 MG/HR: 9 INJECTION, SOLUTION INTRAVENOUS at 17:56

## 2020-04-30 NOTE — ASSESSMENT & PLAN NOTE
Underlying chronic kidney disease stage 3, creatinine near baseline  Avoid nephrotoxins, renal function likely improve with blood transfusion

## 2020-04-30 NOTE — ED NOTES
Patient o2 saturation while walking dropped into low 80s on RA, patient placed in wheelchair for remainder of transport to room, by the time patient was put in room RA sat normal at 97%      Yoko Montero RN  04/30/20 [id number]

## 2020-04-30 NOTE — ASSESSMENT & PLAN NOTE
Patient presents with acute on chronic anemia, suspected acute blood loss anemia secondary to acute GI bleed  Plan for upper endoscopy tomorrow clear liquids until midnight, NPO after midnight  GI consult, ppi drip  Transfuse 2 units packed red blood cells

## 2020-05-01 ENCOUNTER — APPOINTMENT (INPATIENT)
Dept: GASTROENTEROLOGY | Facility: HOSPITAL | Age: 70
DRG: 378 | End: 2020-05-01
Payer: COMMERCIAL

## 2020-05-01 ENCOUNTER — ANESTHESIA EVENT (INPATIENT)
Dept: GASTROENTEROLOGY | Facility: HOSPITAL | Age: 70
DRG: 378 | End: 2020-05-01
Payer: COMMERCIAL

## 2020-05-01 ENCOUNTER — ANESTHESIA (INPATIENT)
Dept: GASTROENTEROLOGY | Facility: HOSPITAL | Age: 70
DRG: 378 | End: 2020-05-01
Payer: COMMERCIAL

## 2020-05-01 LAB
ABO GROUP BLD BPU: NORMAL
ABO GROUP BLD BPU: NORMAL
ALBUMIN SERPL BCP-MCNC: 2.7 G/DL (ref 3.5–5)
ALP SERPL-CCNC: 46 U/L (ref 46–116)
ALT SERPL W P-5'-P-CCNC: 21 U/L (ref 12–78)
ANION GAP SERPL CALCULATED.3IONS-SCNC: 9 MMOL/L (ref 4–13)
AST SERPL W P-5'-P-CCNC: 37 U/L (ref 5–45)
ATRIAL RATE: 75 BPM
BASOPHILS # BLD AUTO: 0 THOUSANDS/ΜL (ref 0–0.1)
BASOPHILS # BLD AUTO: 0 THOUSANDS/ΜL (ref 0–0.1)
BASOPHILS NFR BLD AUTO: 0 % (ref 0–1)
BASOPHILS NFR BLD AUTO: 0 % (ref 0–1)
BILIRUB SERPL-MCNC: 0.7 MG/DL (ref 0.2–1)
BPU ID: NORMAL
BPU ID: NORMAL
BUN SERPL-MCNC: 19 MG/DL (ref 5–25)
CALCIUM SERPL-MCNC: 7.5 MG/DL (ref 8.3–10.1)
CHLORIDE SERPL-SCNC: 106 MMOL/L (ref 100–108)
CO2 SERPL-SCNC: 22 MMOL/L (ref 21–32)
CREAT SERPL-MCNC: 1.31 MG/DL (ref 0.6–1.3)
CROSSMATCH: NORMAL
CROSSMATCH: NORMAL
EOSINOPHIL # BLD AUTO: 0 THOUSAND/ΜL (ref 0–0.61)
EOSINOPHIL # BLD AUTO: 0.01 THOUSAND/ΜL (ref 0–0.61)
EOSINOPHIL NFR BLD AUTO: 0 % (ref 0–6)
EOSINOPHIL NFR BLD AUTO: 1 % (ref 0–6)
ERYTHROCYTE [DISTWIDTH] IN BLOOD BY AUTOMATED COUNT: 19.7 % (ref 11.6–15.1)
ERYTHROCYTE [DISTWIDTH] IN BLOOD BY AUTOMATED COUNT: 21.3 % (ref 11.6–15.1)
GFR SERPL CREATININE-BSD FRML MDRD: 41 ML/MIN/1.73SQ M
GLUCOSE SERPL-MCNC: 75 MG/DL (ref 65–140)
GLUCOSE SERPL-MCNC: 86 MG/DL (ref 65–140)
HCT VFR BLD AUTO: 21.3 % (ref 34.8–46.1)
HCT VFR BLD AUTO: 21.7 % (ref 34.8–46.1)
HCT VFR BLD AUTO: 21.8 % (ref 34.8–46.1)
HGB BLD-MCNC: 7 G/DL (ref 11.5–15.4)
HGB BLD-MCNC: 7 G/DL (ref 11.5–15.4)
HGB BLD-MCNC: 7.1 G/DL (ref 11.5–15.4)
IMM GRANULOCYTES # BLD AUTO: 0.01 THOUSAND/UL (ref 0–0.2)
IMM GRANULOCYTES # BLD AUTO: 0.05 THOUSAND/UL (ref 0–0.2)
IMM GRANULOCYTES NFR BLD AUTO: 1 % (ref 0–2)
IMM GRANULOCYTES NFR BLD AUTO: 1 % (ref 0–2)
INR PPP: 1.11 (ref 0.84–1.19)
LYMPHOCYTES # BLD AUTO: 0.28 THOUSANDS/ΜL (ref 0.6–4.47)
LYMPHOCYTES # BLD AUTO: 0.39 THOUSANDS/ΜL (ref 0.6–4.47)
LYMPHOCYTES NFR BLD AUTO: 18 % (ref 14–44)
LYMPHOCYTES NFR BLD AUTO: 6 % (ref 14–44)
MAGNESIUM SERPL-MCNC: 1.8 MG/DL (ref 1.6–2.6)
MCH RBC QN AUTO: 33.2 PG (ref 26.8–34.3)
MCH RBC QN AUTO: 33.2 PG (ref 26.8–34.3)
MCHC RBC AUTO-ENTMCNC: 32.1 G/DL (ref 31.4–37.4)
MCHC RBC AUTO-ENTMCNC: 32.7 G/DL (ref 31.4–37.4)
MCV RBC AUTO: 101 FL (ref 82–98)
MCV RBC AUTO: 103 FL (ref 82–98)
MONOCYTES # BLD AUTO: 0.29 THOUSAND/ΜL (ref 0.17–1.22)
MONOCYTES # BLD AUTO: 0.44 THOUSAND/ΜL (ref 0.17–1.22)
MONOCYTES NFR BLD AUTO: 10 % (ref 4–12)
MONOCYTES NFR BLD AUTO: 13 % (ref 4–12)
NEUTROPHILS # BLD AUTO: 1.51 THOUSANDS/ΜL (ref 1.85–7.62)
NEUTROPHILS # BLD AUTO: 3.74 THOUSANDS/ΜL (ref 1.85–7.62)
NEUTS SEG NFR BLD AUTO: 67 % (ref 43–75)
NEUTS SEG NFR BLD AUTO: 83 % (ref 43–75)
NRBC BLD AUTO-RTO: 0 /100 WBCS
NRBC BLD AUTO-RTO: 0 /100 WBCS
P AXIS: 83 DEGREES
PHOSPHATE SERPL-MCNC: 4 MG/DL (ref 2.3–4.1)
PLATELET # BLD AUTO: 136 THOUSANDS/UL (ref 149–390)
PLATELET # BLD AUTO: 139 THOUSANDS/UL (ref 149–390)
PMV BLD AUTO: 10.1 FL (ref 8.9–12.7)
POTASSIUM SERPL-SCNC: 3.9 MMOL/L (ref 3.5–5.3)
PR INTERVAL: 160 MS
PROT SERPL-MCNC: 5.3 G/DL (ref 6.4–8.2)
PROTHROMBIN TIME: 14 SECONDS (ref 11.6–14.5)
QRS AXIS: -30 DEGREES
QRSD INTERVAL: 126 MS
QT INTERVAL: 432 MS
QTC INTERVAL: 482 MS
RBC # BLD AUTO: 2.11 MILLION/UL (ref 3.81–5.12)
RBC # BLD AUTO: 2.14 MILLION/UL (ref 3.81–5.12)
SODIUM SERPL-SCNC: 137 MMOL/L (ref 136–145)
T WAVE AXIS: 43 DEGREES
UNIT DISPENSE STATUS: NORMAL
UNIT DISPENSE STATUS: NORMAL
UNIT PRODUCT CODE: NORMAL
UNIT PRODUCT CODE: NORMAL
UNIT RH: NORMAL
UNIT RH: NORMAL
VENTRICULAR RATE: 75 BPM
WBC # BLD AUTO: 2.21 THOUSAND/UL (ref 4.31–10.16)
WBC # BLD AUTO: 4.51 THOUSAND/UL (ref 4.31–10.16)

## 2020-05-01 PROCEDURE — P9058 RBC, L/R, CMV-NEG, IRRAD: HCPCS

## 2020-05-01 PROCEDURE — 85014 HEMATOCRIT: CPT | Performed by: INTERNAL MEDICINE

## 2020-05-01 PROCEDURE — 85025 COMPLETE CBC W/AUTO DIFF WBC: CPT | Performed by: NURSE PRACTITIONER

## 2020-05-01 PROCEDURE — 85018 HEMOGLOBIN: CPT | Performed by: INTERNAL MEDICINE

## 2020-05-01 PROCEDURE — C9113 INJ PANTOPRAZOLE SODIUM, VIA: HCPCS | Performed by: INTERNAL MEDICINE

## 2020-05-01 PROCEDURE — 84100 ASSAY OF PHOSPHORUS: CPT | Performed by: INTERNAL MEDICINE

## 2020-05-01 PROCEDURE — 0W3P8ZZ CONTROL BLEEDING IN GASTROINTESTINAL TRACT, VIA NATURAL OR ARTIFICIAL OPENING ENDOSCOPIC: ICD-10-PCS | Performed by: INTERNAL MEDICINE

## 2020-05-01 PROCEDURE — 83735 ASSAY OF MAGNESIUM: CPT | Performed by: INTERNAL MEDICINE

## 2020-05-01 PROCEDURE — 85025 COMPLETE CBC W/AUTO DIFF WBC: CPT | Performed by: INTERNAL MEDICINE

## 2020-05-01 PROCEDURE — 93010 ELECTROCARDIOGRAM REPORT: CPT | Performed by: INTERNAL MEDICINE

## 2020-05-01 PROCEDURE — 97165 OT EVAL LOW COMPLEX 30 MIN: CPT

## 2020-05-01 PROCEDURE — 30233N1 TRANSFUSION OF NONAUTOLOGOUS RED BLOOD CELLS INTO PERIPHERAL VEIN, PERCUTANEOUS APPROACH: ICD-10-PCS | Performed by: INTERNAL MEDICINE

## 2020-05-01 PROCEDURE — 43255 EGD CONTROL BLEEDING ANY: CPT

## 2020-05-01 PROCEDURE — 99253 IP/OBS CNSLTJ NEW/EST LOW 45: CPT | Performed by: INTERNAL MEDICINE

## 2020-05-01 PROCEDURE — 82948 REAGENT STRIP/BLOOD GLUCOSE: CPT

## 2020-05-01 PROCEDURE — 97163 PT EVAL HIGH COMPLEX 45 MIN: CPT

## 2020-05-01 PROCEDURE — 80053 COMPREHEN METABOLIC PANEL: CPT | Performed by: INTERNAL MEDICINE

## 2020-05-01 PROCEDURE — 85610 PROTHROMBIN TIME: CPT | Performed by: INTERNAL MEDICINE

## 2020-05-01 PROCEDURE — 99232 SBSQ HOSP IP/OBS MODERATE 35: CPT | Performed by: INTERNAL MEDICINE

## 2020-05-01 RX ORDER — SODIUM CHLORIDE 9 MG/ML
INJECTION, SOLUTION INTRAVENOUS CONTINUOUS PRN
Status: DISCONTINUED | OUTPATIENT
Start: 2020-05-01 | End: 2020-05-01 | Stop reason: SURG

## 2020-05-01 RX ORDER — PROPOFOL 10 MG/ML
INJECTION, EMULSION INTRAVENOUS AS NEEDED
Status: DISCONTINUED | OUTPATIENT
Start: 2020-05-01 | End: 2020-05-01 | Stop reason: SURG

## 2020-05-01 RX ORDER — EPINEPHRINE 1 MG/ML
INJECTION, SOLUTION, CONCENTRATE INTRAVENOUS CODE/TRAUMA/SEDATION MEDICATION
Status: COMPLETED | OUTPATIENT
Start: 2020-05-01 | End: 2020-05-01

## 2020-05-01 RX ADMIN — PROPOFOL 50 MG: 10 INJECTION, EMULSION INTRAVENOUS at 14:11

## 2020-05-01 RX ADMIN — PROPOFOL 50 MG: 10 INJECTION, EMULSION INTRAVENOUS at 14:23

## 2020-05-01 RX ADMIN — PROPOFOL 50 MG: 10 INJECTION, EMULSION INTRAVENOUS at 14:34

## 2020-05-01 RX ADMIN — PROPOFOL 50 MG: 10 INJECTION, EMULSION INTRAVENOUS at 14:02

## 2020-05-01 RX ADMIN — PROPOFOL 50 MG: 10 INJECTION, EMULSION INTRAVENOUS at 14:19

## 2020-05-01 RX ADMIN — PROPOFOL 50 MG: 10 INJECTION, EMULSION INTRAVENOUS at 14:15

## 2020-05-01 RX ADMIN — SODIUM CHLORIDE 8 MG/HR: 9 INJECTION, SOLUTION INTRAVENOUS at 23:22

## 2020-05-01 RX ADMIN — AZELASTINE HYDROCHLORIDE 1 SPRAY: 137 SPRAY, METERED NASAL at 10:00

## 2020-05-01 RX ADMIN — ACETAMINOPHEN 650 MG: 325 TABLET ORAL at 17:51

## 2020-05-01 RX ADMIN — PROPOFOL 50 MG: 10 INJECTION, EMULSION INTRAVENOUS at 14:06

## 2020-05-01 RX ADMIN — SODIUM CHLORIDE 8 MG/HR: 9 INJECTION, SOLUTION INTRAVENOUS at 13:11

## 2020-05-01 RX ADMIN — EPINEPHRINE 0.2 MG: 1 INJECTION, SOLUTION, CONCENTRATE INTRAVENOUS at 14:10

## 2020-05-01 RX ADMIN — PROPOFOL 50 MG: 10 INJECTION, EMULSION INTRAVENOUS at 14:28

## 2020-05-01 RX ADMIN — AZELASTINE HYDROCHLORIDE 1 SPRAY: 137 SPRAY, METERED NASAL at 17:52

## 2020-05-01 RX ADMIN — HYDROXYCHLOROQUINE SULFATE 200 MG: 200 TABLET, FILM COATED ORAL at 05:19

## 2020-05-01 RX ADMIN — ACETAMINOPHEN 650 MG: 325 TABLET ORAL at 05:41

## 2020-05-01 RX ADMIN — SODIUM CHLORIDE: 0.9 INJECTION, SOLUTION INTRAVENOUS at 14:00

## 2020-05-01 NOTE — ASSESSMENT & PLAN NOTE
Full-dose aspirin/aspirin 325 mg on hold due to GI bleed    Patient has not been on full-dose anticoagulation ever before

## 2020-05-01 NOTE — PROGRESS NOTES
Pancytopenia present on admission, likely due to chemotherapy plus acute GI blood loss      White blood cell count 2 21 today with platelet count [id number], hemoglobin was 3 9 on admission increased to 7 0 status post 2 unit blood transfusion

## 2020-05-01 NOTE — CONSULTS
Consultation - GI   Osiris Gordon 68 y o  female MRN: [id number]  Unit/Bed#: -01 Encounter: [id number]    Inpatient consult to gastroenterology  Consult performed by: LANEY Oviedo  Consult ordered by: Kal Brown DO        ASSESSMENT and PLAN    1  Symptomatic anemia   2  Melena   Recurrent anemia, macrocytic  Hemoglobin 3 9 on admission , received 2 units of packed red blood cells on 04/30  Hemoglobin 7 0 this morning  Patient denies any further episodes of melena since admission  EGD performed on 06/19 showed 2 bleeding AVMs, one in the antrum and one in the body of the stomach that were cauterized  Also showed small hiatal hernia  Patient did have a repeat EGD in 11/19 that was negative as well as a capsule endoscopy in January 2020 which showed a jejunal polyp  Differential diagnoses include AVM's or secondary to GIST tumor      - continue to trend H&H    - transfuse PRN for goal Hgb >7    - plan for EGD today   - NPO  - continue IV Protonix gtt      3  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  Follows at [facility] and is currently participating in a clinical trial   She is on oral chemotherapy daily  The tumor was initially resected in August 2001  A biopsy of the liver lesion in April 2005 demonstrated metastatic disease  She did undergo a diagnostic laparoscopy, lysis of adhesions, exploratory lap, resection of pelvis with a small bowel resection and primary anastomoses at that time  WBC 2 21   Platelets 751     - continue to trend CBC     Chief Complaint   Patient presents with    Weakness - Generalized     patient presents to the ED with c/o SOB on exertion, weakness with hx of anemia and cough for the past several days  was sent from upper x GI     Physician Requesting Consult: Kal Brown DO    HPI Osiris Gordon is a 68y o  year old female who presents with symptomatic anemia, melena    Patient was seen in the office yesterday with me and was complaining of melena, shortness of breath, dizziness, diarrhea  Patient states that her symptoms began on Tuesday evening   She reported multiple episodes of melena  Patient has a history of chronic diarrhea, but states that she had been moving her bowels about 10-12 times per day which was unusual for her  She did have associated shortness of breath, dyspnea on exertion, dizziness, lightheadedness  She also did have an episode of vomiting which has since resolved  Given history of symptomatic anemia and AVMs in the past, patient was sent to the emergency department for further evaluation and admitted  She did test negative for COVID-19  She was found to have a hemoglobin of 3 9 on admission and was transfused with 2 units of packed red blood cells  Patient states that she is feeling somewhat improved since yesterday  She denies any further melena, diarrhea, she also states that shortness of breath and dizziness has resolved  She denies any fever, chills, nausea, vomiting, abdominal pain  She is not on anticoagulation      Historical Information   Past Medical History:   Diagnosis Date    ADHD     Anemia     Anxiety     Arthritis     Asthma     Atrial fibrillation (HCC)     Breast cancer (HCC)     Cancer ([place] [address] )     Coronary artery disease     Depression     Gastroenteritis 2020    GERD (gastroesophageal reflux disease)     History of stomach ulcers     Hypercholesterolemia     Hypertension     Kidney disease     [facility] ([facility] )      Past Surgical History:   Procedure Laterality Date    ANKLE SURGERY      APPENDECTOMY      BREAST SURGERY      CATARACT EXTRACTION       SECTION      COLONOSCOPY      HIP SURGERY      JOINT REPLACEMENT  2019    Left knee replacement    LAMINECTOMY      ROTATOR CUFF REPAIR      SIGMOID RESECTION / RECTOPEXY      SINUS SURGERY       Social History   Social History     Substance and Sexual Activity   Alcohol Use Not Currently    Frequency: Never    Drinks per session: 1 or 2    Binge frequency: Never     Social History     Substance and Sexual Activity   Drug Use No     Social History     Tobacco Use   Smoking Status Former Smoker    Years: 20 00   Smokeless Tobacco Never Used     Family History   Problem Relation Age of Onset   Godfrey Liming Breast cancer Mother     Diabetes Mother     Hypertension Mother     Lung cancer Mother     Hyperlipidemia Father     Prostate cancer Father     Colon cancer Paternal Grandmother     Colon cancer Paternal Grandfather     Diabetes Family     Substance Abuse Neg Hx     Mental illness Neg Hx        Meds/Allergies   Current Facility-Administered Medications   Medication Dose Route Frequency    acetaminophen (TYLENOL) tablet 650 mg  650 mg Oral Q6H PRN    ALPRAZolam (XANAX) tablet 0 25 mg  0 25 mg Oral HS PRN    azelastine (ASTELIN) 0 1 % nasal spray 1 spray  1 spray Each Nare BID    diphenhydrAMINE (BENADRYL) tablet 25 mg  25 mg Oral Q6H PRN    hydroxychloroquine (PLAQUENIL) tablet 200 mg  200 mg Oral Early Morning    metoprolol succinate (TOPROL-XL) 24 hr tablet 25 mg  25 mg Oral Daily    ondansetron (ZOFRAN) injection 4 mg  4 mg Intravenous Q6H PRN    pantoprazole (PROTONIX) 80 mg in sodium chloride 0 9 % 100 mL infusion  8 mg/hr Intravenous Continuous    prednisoLONE acetate (PRED FORTE) 1 % ophthalmic suspension 1 drop  1 drop Left Eye 4x Daily    traMADol (ULTRAM) tablet 50 mg  50 mg Oral Q8H PRN     Medications Prior to Admission   Medication    ALPRAZolam (XANAX) 0 25 mg tablet    aspirin (ESTELA ASPIRIN) 325 mg tablet    calcium carbonate (TUMS) 500 mg chewable tablet    diphenhydrAMINE (BENADRYL) 25 mg tablet    diphenoxylate-atropine (LOMOTIL) 2 5-0 025 mg per tablet    docusate sodium (COLACE) 100 mg capsule    famotidine (PEPCID) 10 mg tablet    furosemide (LASIX) 20 mg tablet    hydroxychloroquine (PLAQUENIL) 200 mg tablet    loperamide (IMODIUM) 2 mg capsule    NON FORMULARY    pantoprazole (PROTONIX) 40 mg tablet    TOPROL XL 25 MG 24 hr tablet    Azelastine HCl 137 MCG/SPRAY SOLN    ofloxacin (OCUFLOX) 0 3 % ophthalmic solution    ondansetron (ZOFRAN) 8 mg tablet    prednisoLONE acetate (PRED FORTE) 1 % ophthalmic suspension    traMADol (ULTRAM) 50 mg tablet    Triamcinolone Acetonide (NASACORT ALLERGY 24HR NA)       Allergies   Allergen Reactions    Codeine Other (See Comments)     Throat closes    Codeine Sulfate Throat Swelling    Neomycin-Bacitracin Zn-Polymyx Rash    Wound Dressing Adhesive Other (See Comments)     If its on too long- pt starts itching    Zolmitriptan Palpitations     Heart palpitations  Generic for Zomig         PHYSICALEXAM  Blood pressure 109/64, pulse 72, temperature 98 5 °F (36 9 °C), temperature source Oral, resp  rate 18, height 5' 1" (1 549 m), weight 56 7 kg (125 lb), SpO2 98 %  Body mass index is 23 62 kg/m²  General Appearance: NAD, cooperative, alert, +pallor   Eyes: Anicteric, PERRLA, EOMI  ENT:  Normocephalic, atraumatic, normal mucosa  Neck:  Supple, symmetrical, trachea midline  Resp:  Clear to auscultation bilaterally; no rales, rhonchi or wheezing; respirations unlabored   CV:  S1 S2, Regular rate and rhythm; no murmur, rub, or gallop  GI:  Soft, non-tender, non-distended; normal bowel sounds; no masses, no organomegaly   Rectal: Deferred   Musculoskeletal: No cyanosis, clubbing or edema  Normal ROM    Skin:  No jaundice, rashes, or lesions   Heme/Lymph: No palpable cervical lymphadenopathy  Psych: Normal affect, good eye contact  Neuro: No gross deficits, AAOx3    Lab Results   Component Value Date    GLUCOSE 94 10/12/2015    CALCIUM 7 5 (L) 05/01/2020     10/12/2015    K 3 9 05/01/2020    CO2 22 05/01/2020     05/01/2020    BUN 19 05/01/2020    CREATININE 1 31 (H) 05/01/2020     Lab Results   Component Value Date    WBC 2 21 (L) 05/01/2020    HGB 7 0 (L) 05/01/2020    HCT 21 8 (L) 05/01/2020     (H) 05/01/2020     (L) 05/01/2020     Lab Results   Component Value Date    ALT 21 05/01/2020    AST 37 05/01/2020    ALKPHOS 46 05/01/2020    BILITOT 0 28 10/12/2015     No results found for: AMYLASE  Lab Results   Component Value Date    LIPASE 118 02/19/2020     Lab Results   Component Value Date    IRON 22 (L) 02/07/2020    TIBC 341 02/07/2020    FERRITIN 79 02/07/2020     Lab Results   Component Value Date    INR 1 11 05/01/2020       Imaging Studies: I have personally reviewed pertinent reports  EKG, Pathology, and Other Studies: I have personally reviewed pertinent reports  REVIEW OF SYSTEMS:    CONSTITUTIONAL: Denies any fever, chills, rigors, and weight loss  HEENT: No earache or tinnitus  Denies hearing loss or visual disturbances  CARDIOVASCULAR: No chest pain or palpitations  RESPIRATORY: Denies any cough, hemoptysis, shortness of breath or dyspnea on exertion  GASTROINTESTINAL: As noted in the History of Present Illness  GENITOURINARY: No problems with urination  Denies any hematuria or dysuria  NEUROLOGIC: No dizziness or vertigo, denies headaches  MUSCULOSKELETAL: Denies any muscle or joint pain  SKIN: Denies skin rashes or itching  ENDOCRINE: Denies excessive thirst  Denies intolerance to heat or cold  PSYCHOSOCIAL: Denies depression or anxiety  Denies any recent memory loss

## 2020-05-01 NOTE — ASSESSMENT & PLAN NOTE
Patient presents with acute on chronic anemia, suspected acute blood loss anemia secondary to acute GI bleed  EGD today    Follow-up hemoglobin later today and tomorrow a m , patient will likely require an additional unit of packed red blood cells

## 2020-05-01 NOTE — ED PROVIDER NOTES
History  Chief Complaint   Patient presents with    Weakness - Generalized     patient presents to the ED with c/o SOB on exertion, weakness with hx of anemia and cough for the past several days  was sent from [facility]       History provided by:  Patient   used: No      Patient is 70-year-old female presenting to emergency department with weakness, shortness of breath with exertion, and dark stools  Went to her gastroenterologist today and was referred to ER  Patient with history of upper GI bleed  Also with gist tumor and currently on chemotherapy at [facility]   Takes aspirin daily  No other blood thinners  No chest pain  Feels lightheaded  No vomiting  MDM will do cardiac evaluation, type and screen, AB0 Rh, CBC, coags, transfuse as needed    Case discussed with GI, will scope the patient tomorrow  Patient to be transfused  Admitted to Medicine      Prior to Admission Medications   Prescriptions Last Dose Informant Patient Reported? Taking?    ALPRAZolam (XANAX) 0 25 mg tablet  Self Yes Yes   Sig: Take by mouth daily at bedtime as needed    Azelastine HCl 137 MCG/SPRAY SOLN  Self Yes No   Sig: instill 2 sprays into each nostril twice a day if needed for congestion   NON FORMULARY   Yes Yes   Sig: Take 2 tablets by mouth daily Chemo: Blue 285   TOPROL XL 25 MG 24 hr tablet 2020 at Unknown time  Yes Yes   Si mg daily    Triamcinolone Acetonide (NASACORT ALLERGY 24HR NA)  Self Yes No   Sig: into each nostril 1 spray each nostril--at bedtime   (OTC)   aspirin (ESTELA ASPIRIN) 325 mg tablet 2020 at Unknown time Self Yes Yes   Sig: Take 325 mg by mouth daily    calcium carbonate (TUMS) 500 mg chewable tablet  Self Yes Yes   Sig: Chew 1 tablet daily as needed for indigestion or heartburn   diphenhydrAMINE (BENADRYL) 25 mg tablet   Yes Yes   Sig: Take 25 mg by mouth every 6 (six) hours as needed for itching   diphenoxylate-atropine (LOMOTIL) 2 5-0 025 mg per tablet 2020 at Unknown time  Yes Yes   Sig: TAKE 1 TABLET BY MOUTH 4 TIMES DAILY AS NEEDED FOR DIARRHEA   docusate sodium (COLACE) 100 mg capsule Past Month at Unknown time Self Yes Yes   Sig: Take 100 mg by mouth 2 (two) times a day as needed for constipation   famotidine (PEPCID) 10 mg tablet   Yes Yes   Sig: Take 10 mg by mouth    furosemide (LASIX) 20 mg tablet 2020 at Unknown time Self Yes Yes   Sig: Take 20 mg by mouth daily    hydroxychloroquine (PLAQUENIL) 200 mg tablet 2020 at Unknown time  Yes Yes   Sig: Take 200 mg by mouth daily in the early morning    loperamide (IMODIUM) 2 mg capsule 2020 at Unknown time Self Yes Yes   Sig: Take 2 capsules by mouth 4 (four) times a day as needed   ofloxacin (OCUFLOX) 0 3 % ophthalmic solution   Yes No   Sig: Starting    ondansetron (ZOFRAN) 8 mg tablet   Yes No   Si mg daily with breakfast Prior to chemo   pantoprazole (PROTONIX) 40 mg tablet 2020 at Unknown time Self Yes Yes   Sig: Take 40 mg by mouth daily   prednisoLONE acetate (PRED FORTE) 1 % ophthalmic suspension   Yes No   Sig: POST OP: 1 DROP INTO LEFT EYE 4 TIMES DAILY; CONTINUE AS DIRECTED   traMADol (ULTRAM) 50 mg tablet   No No   Sig: Take 1 tablet (50 mg total) by mouth every 8 (eight) hours as needed for moderate pain      Facility-Administered Medications: None       Past Medical History:   Diagnosis Date    ADHD     Anemia     Anxiety     Arthritis     Asthma     Atrial fibrillation (HCC)     Breast cancer (HCC)     Cancer ([place] [id number] )     Coronary artery disease     Depression     Gastroenteritis 2020    GERD (gastroesophageal reflux disease)     History of stomach ulcers     Hypercholesterolemia     Hypertension     Kidney disease     [facility]ast[facility] cancer ([facility] )        Past Surgical History:   Procedure Laterality Date    ANKLE SURGERY      APPENDECTOMY      BREAST SURGERY      CATARACT EXTRACTION       SECTION      COLONOSCOPY      HIP SURGERY      JOINT REPLACEMENT  07/30/2019    Left knee replacement    LAMINECTOMY      ROTATOR CUFF REPAIR      SIGMOID RESECTION / RECTOPEXY      SINUS SURGERY         Family History   Problem Relation Age of Onset   [facility] Breast cancer Mother     Diabetes Mother     Hypertension Mother     Lung cancer Mother     Hyperlipidemia Father     Prostate cancer Father     Colon cancer Paternal [de-identified]     Colon cancer Paternal Grandfather     Diabetes Family     Substance Abuse Neg Hx     Mental illness Neg Hx      I have reviewed and agree with the history as documented  E-Cigarette/Vaping    E-Cigarette Use Never User      E-Cigarette/Vaping Substances     Social History     Tobacco Use    Smoking status: Former Smoker     Years: 20 00    Smokeless tobacco: Never Used   Substance Use Topics    Alcohol use: Not Currently     Frequency: Never     Drinks per session: 1 or 2     Binge frequency: Never    Drug use: No       Review of Systems   Constitutional: Negative for chills, diaphoresis and fever  HENT: Negative for congestion and sore throat  Respiratory: Negative for cough, shortness of breath, wheezing and stridor  Cardiovascular: Negative for chest pain, palpitations and leg swelling  Gastrointestinal: Negative for abdominal pain, blood in stool, diarrhea, nausea and vomiting  Genitourinary: Negative for dysuria, frequency and urgency  Musculoskeletal: Negative for neck pain and neck stiffness  Skin: Negative for pallor and rash  Neurological: Negative for dizziness, syncope, weakness, light-headedness and headaches  All other systems reviewed and are negative  Physical Exam  Physical Exam   Constitutional: She is oriented to person, place, and time  She appears well-developed and well-nourished  HENT:   Head: Normocephalic and atraumatic  Eyes: Pupils are equal, round, and reactive to light  Neck: Neck supple     Cardiovascular: Normal rate, regular rhythm, normal heart sounds and intact distal pulses  Pulmonary/Chest: Effort normal and breath sounds normal  No respiratory distress  Abdominal: Soft  Bowel sounds are normal  There is no tenderness  Genitourinary: Rectal exam shows guaiac positive stool  Musculoskeletal: Normal range of motion  She exhibits no edema, tenderness or deformity  Neurological: She is alert and oriented to person, place, and time  Skin: Skin is warm and dry  Capillary refill takes less than 2 seconds  No rash noted  No erythema  No pallor  Vitals reviewed        Vital Signs  ED Triage Vitals   Temperature Pulse Respirations Blood Pressure SpO2   04/30/20 1625 04/30/20 1625 04/30/20 1625 04/30/20 1625 04/30/20 1625   98 5 °F (36 9 °C) 72 20 124/59 97 %      Temp Source Heart Rate Source Patient Position - Orthostatic VS BP Location FiO2 (%)   04/30/20 1625 04/30/20 1625 04/30/20 1625 04/30/20 1625 --   Temporal Monitor Sitting Right arm       Pain Score       04/30/20 2055       5           Vitals:    04/30/20 1930 04/30/20 1945 04/30/20 2032 04/30/20 2119   BP: 129/62 122/58 119/71 121/72   Pulse: 71 71 78 74   Patient Position - Orthostatic VS:             Visual Acuity      ED Medications  Medications   pantoprazole (PROTONIX) 80 mg in sodium chloride 0 9 % 100 mL infusion (8 mg/hr Intravenous New Bag 4/30/20 1756)   ALPRAZolam (XANAX) tablet 0 25 mg (has no administration in time range)   azelastine (ASTELIN) 0 1 % nasal spray 1 spray (has no administration in time range)   diphenhydrAMINE (BENADRYL) tablet 25 mg (has no administration in time range)   hydroxychloroquine (PLAQUENIL) tablet 200 mg (has no administration in time range)   prednisoLONE acetate (PRED FORTE) 1 % ophthalmic suspension 1 drop (has no administration in time range)   metoprolol succinate (TOPROL-XL) 24 hr tablet 25 mg (has no administration in time range)   traMADol (ULTRAM) tablet 50 mg (has no administration in time range)   ondansetron (ZOFRAN) injection 4 mg (has no administration in time range)   acetaminophen (TYLENOL) tablet 650 mg (has no administration in time range)   pantoprazole (PROTONIX) 80 mg in sodium chloride 0 9 % 100 mL IVPB (0 mg Intravenous Stopped 4/30/20 1830)       Diagnostic Studies  Results Reviewed     Procedure Component Value Units Date/Time    Novel Coronavirus [facility] [[id number]] Collected:  04/30/20 1618    Lab Status: In process Specimen:  Nasopharyngeal Swab Updated:  04/30/20 1817    Lactic acid [[id number]]  (Normal) Collected:  04/30/20 1618    Lab Status:  Final result Specimen:  Blood from Arm, Right Updated:  04/30/20 1655     LACTIC ACID 0 8 mmol/L     Narrative:       Result may be elevated if tourniquet was used during collection      Troponin I [[id number]]  (Abnormal) Collected:  04/30/20 1618    Lab Status:  Final result Specimen:  Blood from Arm, Right Updated:  04/30/20 1655     Troponin I 0 05 ng/mL     Comprehensive metabolic panel [[id number]]  (Abnormal) Collected:  04/30/20 1618    Lab Status:  Final result Specimen:  Blood from Arm, Right Updated:  04/30/20 1652     Sodium 138 mmol/L      Potassium 3 5 mmol/L      Chloride 103 mmol/L      CO2 24 mmol/L      ANION GAP 11 mmol/L      BUN 26 mg/dL      Creatinine 1 58 mg/dL      Glucose 85 mg/dL      Calcium 7 6 mg/dL      AST 41 U/L      ALT 23 U/L      Alkaline Phosphatase 50 U/L      Total Protein 6 0 g/dL      Albumin 3 2 g/dL      Total Bilirubin 0 20 mg/dL      eGFR 33 ml/min/1 73sq m     Narrative:       [place] guidelines for Chronic Kidney Disease (CKD):     Stage 1 with normal or high GFR (GFR > 90 mL/min/1 73 square meters)    Stage 2 Mild CKD (GFR = 60-89 mL/min/1 73 square meters)    Stage 3A Moderate CKD (GFR = 45-59 mL/min/1 73 square meters)    Stage 3B Moderate CKD (GFR = 30-44 mL/min/1 73 square meters)    Stage 4 Severe CKD (GFR = 15-29 mL/min/1 73 square meters)    Stage 5 End Stage CKD (GFR <15 mL/min/1 73 square meters)  Note: GFR calculation is accurate only with a steady state creatinine    CBC and differential [[id number]]  (Abnormal) Collected:  04/30/20 1618    Lab Status:  Final result Specimen:  Blood from Arm, Right Updated:  04/30/20 1649     WBC 2 80 Thousand/uL      RBC 1 17 Million/uL      Hemoglobin 3 9 g/dL      Hematocrit 13 0 %       fL      MCH 33 3 pg      MCHC 30 0 g/dL      RDW 17 2 %      MPV 9 8 fL      Platelets 002 Thousands/uL      Neutrophils Relative 82 %      Immat GRANS % 0 %      Lymphocytes Relative 8 %      Monocytes Relative 10 %      Eosinophils Relative 0 %      Basophils Relative 0 %      Neutrophils Absolute 2 31 Thousands/µL      Immature Grans Absolute 0 00 Thousand/uL      Lymphocytes Absolute 0 22 Thousands/µL      Monocytes Absolute 0 27 Thousand/µL      Eosinophils Absolute 0 00 Thousand/µL      Basophils Absolute 0 00 Thousands/µL     Protime-INR [[id number]]  (Abnormal) Collected:  04/30/20 1618    Lab Status:  Final result Specimen:  Blood from Arm, Right Updated:  04/30/20 1647     Protime 14 8 seconds      INR 1 19    APTT [[id number]]  (Normal) Collected:  04/30/20 1618    Lab Status:  Final result Specimen:  Blood from Arm, Right Updated:  04/30/20 1647     PTT 30 seconds     POCT urinalysis dipstick [[id number]]     Lab Status:  No result Specimen:  Urine                  XR chest 1 view portable   Final Result by Darleen Magana DO (04/30 1647)      Cardiomegaly  No acute cardiopulmonary disease              Workstation performed: [id number]                    Procedures  Procedures         ED Course  ED Course as of Apr 30 2227   Thu Apr 30, 2020 1708 Spoke with Dr Lanny Matta from GI, recommends admission, blood, scope tomorrow      1823 Blood pressure 113/56, heart rate 68      1824 ECG shows rate of 75, sinus, left axis deviation, nonspecific ST and T-waves throughout, independently interpreted by me MDM      Disposition  Final diagnoses:   GI bleed     Time reflects when diagnosis was documented in both MDM as applicable and the Disposition within this note     Time User Action Codes Description Comment    4/30/2020  6:24 PM Mercedes Terrazas Augustus [K92 2] GI bleed       ED Disposition     ED Disposition Condition Date/Time Comment    Admit Stable Thu Apr 30, 2020  6:24 PM Case was discussed with medicine and the patient's admission status was agreed to be Admission Status: inpatient status to the service of Dr Keri Rehman          Follow-up Information    None         Current Discharge Medication List      CONTINUE these medications which have NOT CHANGED    Details   ALPRAZolam (XANAX) 0 25 mg tablet Take by mouth daily at bedtime as needed       aspirin (ESTELA ASPIRIN) 325 mg tablet Take 325 mg by mouth daily       calcium carbonate (TUMS) 500 mg chewable tablet Chew 1 tablet daily as needed for indigestion or heartburn      diphenhydrAMINE (BENADRYL) 25 mg tablet Take 25 mg by mouth every 6 (six) hours as needed for itching      diphenoxylate-atropine (LOMOTIL) 2 5-0 025 mg per tablet TAKE 1 TABLET BY MOUTH 4 TIMES DAILY AS NEEDED FOR DIARRHEA  Refills: 0      docusate sodium (COLACE) 100 mg capsule Take 100 mg by mouth 2 (two) times a day as needed for constipation      famotidine (PEPCID) 10 mg tablet Take 10 mg by mouth       furosemide (LASIX) 20 mg tablet Take 20 mg by mouth daily       hydroxychloroquine (PLAQUENIL) 200 mg tablet Take 200 mg by mouth daily in the early morning       loperamide (IMODIUM) 2 mg capsule Take 2 capsules by mouth 4 (four) times a day as needed      NON FORMULARY Take 2 tablets by mouth daily Chemo: Blue 285      pantoprazole (PROTONIX) 40 mg tablet Take 40 mg by mouth daily      TOPROL XL 25 MG 24 hr tablet 25 mg daily       Azelastine HCl 137 MCG/SPRAY SOLN instill 2 sprays into each nostril twice a day if needed for congestion  Refills: 0 ofloxacin (OCUFLOX) 0 3 % ophthalmic solution Starting April 7  Refills: 0      ondansetron (ZOFRAN) 8 mg tablet 8 mg daily with breakfast Prior to chemo      prednisoLONE acetate (PRED FORTE) 1 % ophthalmic suspension POST OP: 1 DROP INTO LEFT EYE 4 TIMES DAILY; CONTINUE AS DIRECTED  Refills: 0      traMADol (ULTRAM) 50 mg tablet Take 1 tablet (50 mg total) by mouth every 8 (eight) hours as needed for moderate pain  Qty: 30 tablet, Refills: 0    Associated Diagnoses: Spinal stenosis of lumbar region with neurogenic claudication; Lumbar radiculopathy      Triamcinolone Acetonide (NASACORT ALLERGY 24HR NA) into each nostril 1 spray each nostril--at bedtime   (OTC)           No discharge procedures on file      PDMP Review       Value Time User    PDMP Reviewed  Yes 4/7/2020  1:14 PM [employer], DO          ED Provider  Electronically Signed by           Julieth Sanchez MD  04/30/20 2425

## 2020-05-01 NOTE — PHYSICAL THERAPY NOTE
PT eval     05/01/20 1605   Note Type   Note type Eval only   Pain Assessment   Pain Assessment Tool Pain Assessment not indicated - pt denies pain   Home Living   Type of [address] Two level   Prior Function   Level of [place] Independent with ADLs and functional mobility   Lives With Family   Receives Help From Family   ADL Assistance Independent   IADLs Needs assistance   Falls in the last 6 months 0   Vocational Retired   Restrictions/Precautions   Other Precautions Telemetry   General   Additional Pertinent History adm with severe anekia, rec'd 2 units prbcs, EGD revealed some bleed injected and clips placed  Family/Caregiver Present No   Cognition   Overall Cognitive Status WFL   Orientation Level Oriented X4   Following Commands Follows all commands and directions without difficulty   RUE Assessment   RUE Assessment WFL   LUE Assessment   LUE Assessment WFL   RLE Assessment   RLE Assessment WFL   LLE Assessment   LLE Assessment WFL   Proprioception   RLE Proprioception Grossly intact   LLE Proprioception Grossly Intact   Bed Mobility   Rolling R 7  Independent   Rolling L 7  Independent   Supine to Sit 6  Modified independent   Additional items HOB elevated; Bedrails   Transfers   Sit to Stand 5  Supervision   Stand to Sit 5  Supervision   Stand pivot 5  Supervision   Ambulation/Elevation   Gait pattern WNL   Gait Assistance 5  Supervision   Assistive Device None   Distance 15'x2   Balance   Static Sitting Good   Dynamic Sitting Good   Static Standing Fair +   Dynamic Standing Fair +   Ambulatory Fair +   Endurance Deficit   Endurance Deficit No   Activity Tolerance   Activity Tolerance Patient tolerated treatment well   Medical Staff Made Aware CHUY Cornelius   Nurse Made Aware yes   Assessment   Prognosis Good   Assessment Pt presents as a functional ambualtor without AD  No bp issues or dizziness noted with mobility  Has cane fro when she goes out   State she is much improved after transfusions and is hungry  Able to toilet self wiutout issues  No skilled PT needs at this time  d/c PT no needs Appropriate for home d/c once medically cleared     Barriers to Discharge   (medical status)   Goals   Patient Goals go home   PT Treatment Day   (dc PT)   Plan   PT Frequency   (d/c PT)   Recommendation   Recommendation Home with family support   PT - OK to Discharge Yes   Modified Leslie Scale   Modified Royce Scale 1   Angle Ramos, PT

## 2020-05-01 NOTE — OCCUPATIONAL THERAPY NOTE
Occupational Therapy Evaluation     Patient Name: Osiris DANIELSLAriellaO Date: 2020  Problem List  Principal Problem:    Symptomatic anemia  Active Problems:    Anxiety    GIST (gastrointestinal stromal tumor), malignant (HCC)    Paroxysmal atrial fibrillation (HCC)    Elevated troponin    Elevated serum creatinine    Past Medical History  Past Medical History:   Diagnosis Date    ADHD     Anemia     Anxiety     Arthritis     Asthma     [facility] fibrillation [facility] )     [facility] cancer ([facility] )     Cancer ([facility] )     Coronary artery disease     Depression     Gastroenteritis 2020    GERD (gastroesophageal reflux disease)     History of stomach ulcers     Hypercholesterolemia     Hypertension     Kidney disease     Metastatic cancer ([facility] )      Past Surgical History  Past Surgical History:   Procedure Laterality Date    ANKLE SURGERY      APPENDECTOMY      BREAST SURGERY      CATARACT EXTRACTION       SECTION      COLONOSCOPY      HIP SURGERY      JOINT REPLACEMENT  2019    Left knee replacement    LAMINECTOMY      ROTATOR CUFF REPAIR      SIGMOID RESECTION / RECTOPEXY      SINUS SURGERY           20 1604   Note Type   Note type Eval only   Restrictions/Precautions   Other Precautions Telemetry   Pain Assessment   Pain Assessment Tool Pain Assessment not indicated - pt denies pain   Home Living   Type of [address] [place] [address] Two level;Stairs to enter with rails   Bathroom Shower/Tub Tub/shower unit   Bathroom Toilet Standard   Bathroom Accessibility [place]  (for community mobility)   Prior Function   Level of [place] Independent with ADLs and functional mobility; Needs assistance with IADLs   Lives With Family   Receives Help From Family   ADL Assistance Independent   IADLs Needs assistance   Falls in the last 6 months 0   Vocational Retired   Subjective   Subjective Pt states "I'm feeling better"   ADL   Eating Assistance 7 Independent   Eating Deficit Setup   Grooming Assistance 7  Independent   Grooming Deficit Setup   UB Bathing Assistance 5  Supervision/Setup   LB Bathing Assistance 5  Supervision/Setup   UB Dressing Assistance 5  Supervision/Setup   LB Dressing Assistance 5  Supervision/Setup   Toileting Assistance  5  Supervision/Setup   Bed Mobility   Supine to Sit 6  Modified independent   Additional Comments Pt remained seated in wheelchair awaiting transport to another room   Transfers   Sit to Stand 5  Supervision   Stand to Sit 5  Supervision   Stand pivot 5  Supervision   Functional Mobility   Functional Mobility 5  Supervision   Additional Comments No AD   Balance   Static Sitting Good   Dynamic Sitting Good   Static Standing Fair +   Dynamic Standing Fair +   Activity Tolerance   Activity Tolerance Patient tolerated treatment well   Medical Staff Made Aware PT Carla  RN Hilary   RUE Assessment   RUE Assessment WFL   LUE Assessment   LUE Assessment WFL   Cognition   Overall Cognitive Status WFL   Assessment   Assessment Pt is a 68 y o  female seen for OT evaluation at [facility], admitted 4/30/2020 w/ Symptomatic anemia  OT completed brief review of pt's medical and social history  Comorbidities affecting pt's functional performance at time of assessment include: anxiety, DJD, gait disturbance, HN, GL bleed, and metastatic cancer, hiatal hernia, etc (see chart)  Prior to admission, pt was living with family in house with steps to manage and was independent with ADLs and relied on some assist with IADLS  Pt also relied on a cane PRN  Upon evaluation, pt presents to OT at functional baseline  Based on findings, pt is of low complexity  At this time, OT recommendations at time of discharge are home with family support  No further acute OT needs indicated at this time - Recommend pt continue to be OOB for meals, ambulation to/from BR, perform self care tasks, and mobility in hallway with nursing   D/C from OT caseload with above recommendations     Goals   Patient Goals Pt wishes to get home   Plan   OT Frequency Eval only  (Pt appears to be at/close to baseline)   Recommendation   OT Discharge Recommendation Home with family support      Yamileth Echols, OTR/L

## 2020-05-01 NOTE — PLAN OF CARE
Problem: Nutrition/Hydration-ADULT  Goal: Nutrient/Hydration intake appropriate for improving, restoring or maintaining nutritional needs  Description  Monitor and assess patient's nutrition/hydration status for malnutrition  Collaborate with interdisciplinary team and initiate plan and interventions as ordered  Monitor patient's weight and dietary intake as ordered or per policy  Utilize nutrition screening tool and intervene as necessary  Determine patient's food preferences and provide high-protein, high-caloric foods as appropriate       INTERVENTIONS:  - Monitor oral intake, urinary output, labs, and treatment plans  - Assess nutrition and hydration status and recommend course of action  - Evaluate amount of meals eaten  - Assist patient with eating if necessary   - Allow adequate time for meals  - Recommend/ encourage appropriate diets, oral nutritional supplements, and vitamin/mineral supplements  - Order, calculate, and assess calorie counts as needed  - Recommend, monitor, and adjust tube feedings and TPN/PPN based on assessed needs  - Assess need for intravenous fluids  - Provide specific nutrition/hydration education as appropriate  - Include patient/family/caregiver in decisions related to nutrition  Outcome: Progressing     Problem: Prexisting or High Potential for Compromised Skin Integrity  Goal: Skin integrity is maintained or improved  Description  INTERVENTIONS:  - Identify patients at risk for skin breakdown  - Assess and monitor skin integrity  - Assess and monitor nutrition and hydration status  - Monitor labs   - Assess for incontinence   - Turn and reposition patient  - Assist with mobility/ambulation  - Relieve pressure over bony prominences  - Avoid friction and shearing  - Provide appropriate hygiene as needed including keeping skin clean and dry  - Evaluate need for skin moisturizer/barrier cream  - Collaborate with interdisciplinary team   - Patient/family teaching  - Consider wound care consult   Outcome: Progressing

## 2020-05-01 NOTE — PLAN OF CARE
Problem: Nutrition/Hydration-ADULT  Goal: Nutrient/Hydration intake appropriate for improving, restoring or maintaining nutritional needs  Description  Monitor and assess patient's nutrition/hydration status for malnutrition  Collaborate with interdisciplinary team and initiate plan and interventions as ordered  Monitor patient's weight and dietary intake as ordered or per policy  Utilize nutrition screening tool and intervene as necessary  Determine patient's food preferences and provide high-protein, high-caloric foods as appropriate       INTERVENTIONS:  - Monitor oral intake, urinary output, labs, and treatment plans  - Assess nutrition and hydration status and recommend course of action  - Evaluate amount of meals eaten  - Assist patient with eating if necessary   - Allow adequate time for meals  - Recommend/ encourage appropriate diets, oral nutritional supplements, and vitamin/mineral supplements  - Order, calculate, and assess calorie counts as needed  - Recommend, monitor, and adjust tube feedings and TPN/PPN based on assessed needs  - Assess need for intravenous fluids  - Provide specific nutrition/hydration education as appropriate  - Include patient/family/caregiver in decisions related to nutrition  5/1/2020 1138 by Melina Pavon RN  Outcome: Progressing  5/1/2020 1003 by Melina Pavon RN  Outcome: Progressing     Problem: Prexisting or High Potential for Compromised Skin Integrity  Goal: Skin integrity is maintained or improved  Description  INTERVENTIONS:  - Identify patients at risk for skin breakdown  - Assess and monitor skin integrity  - Assess and monitor nutrition and hydration status  - Monitor labs   - Assess for incontinence   - Turn and reposition patient  - Assist with mobility/ambulation  - Relieve pressure over bony prominences  - Avoid friction and shearing  - Provide appropriate hygiene as needed including keeping skin clean and dry  - Evaluate need for skin moisturizer/barrier cream  - Collaborate with interdisciplinary team   - Patient/family teaching  - Consider wound care consult   5/1/2020 1138 by Cary Galeano RN  Outcome: Progressing  5/1/2020 1003 by Cary Galeano RN  Outcome: Progressing     Problem: HEMATOLOGIC - ADULT  Goal: Maintains hematologic stability  Description  INTERVENTIONS  - Assess for signs and symptoms of bleeding or hemorrhage  - Monitor labs  - Administer supportive blood products/factors as ordered and appropriate  5/1/2020 1138 by Cary Galeano RN  Outcome: Progressing  5/1/2020 1003 by Cary Galeano RN  Outcome: Progressing     Problem: Potential for Falls  Goal: Patient will remain free of falls  Description  INTERVENTIONS:  - Assess patient frequently for physical needs  -  Identify cognitive and physical deficits and behaviors that affect risk of falls    -  [place] fall precautions as indicated by assessment   - Educate patient/family on patient safety including physical limitations  - Instruct patient to call for assistance with activity based on assessment  - Modify environment to reduce risk of injury  - Consider OT/PT consult to assist with strengthening/mobility  Outcome: Progressing

## 2020-05-01 NOTE — PROGRESS NOTES
Pastoral Care Progress Note    2020  Patient: Osiris Gordon :   Admission Date & Time: 2020 1552  MRN: [id number] [facility]: [id number]                     Chaplaincy Interventions Utilized:   Empowerment: Clarified, confirmed, or reviewed information from treatment team  and Provided chaplaincy education    Exploration: Explored emotional needs & resources, Explored relational needs & resources, Explored spiritual needs & resources and Facilitated story telling        Relationship Building: Cultivated a relationship of care and support, Listened empathically and Provided silent and supportive presence                Chaplaincy Outcomes Achieved: Identified meaningful connections  Spiritual Coping Strategies Utilized:   Connectedness and Spiritual empowerment       [address] Affiliation Christianity/[facility] [place]   Spiritual Leader Fr Maru Montiel   Spiritual Beliefs/Perceptions   Concept of God Accepting   Relationship with God Close   Spiritual Strengths Resilient spirit  Stress Factors   Patient Stress Factors Health changes; Other (Comment)  (Has lived w/chronic illness for 18 years)   Coping Responses   Patient Coping Accepting;Open/discussion   Plan of Care   Assessment Completed by: Unit visit

## 2020-05-01 NOTE — UTILIZATION REVIEW
Initial Clinical Review    Admission: Date/Time/Statement: Admission Orders (From admission, onward)     Ordered        04/30/20 1824  Inpatient Admission  Once                   Orders Placed This Encounter   Procedures    Inpatient Admission     Standing Status:   Standing     Number of Occurrences:   1     Order Specific Question:   Admitting Physician     Answer:   Luisa Mckeon     Order Specific Question:   Level of Care     Answer:   Med Surg [16]     Order Specific Question:   Estimated length of stay     Answer:   More than 2 Midnights     Order Specific Question:   Certification     Answer:   I certify that inpatient services are medically necessary for this patient for a duration of greater than two midnights  See H&P and MD Progress Notes for additional information about the patient's course of treatment  ED Arrival Information     Expected Arrival Acuity Means of Arrival Escorted By Service Admission Type    - 4/30/2020 15:46 Emergent Walk-In Family Member General Medicine Urgent    Arrival Complaint    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  Presented due to weakness, shortness of breath with exertion and dark stools  Feels lightheaded  On daily asa  On exam has guaiac positive stool  Bun 26  Creatinine 1 58   H&H 3 9/13 0  Plan is transfusion 2 units PRBC, Protonix gtt, GI consult  On 5/1/2020 per GI - patient with symptomatic anemia and melena  No stools since admission  Plan is EGD  Continue Protonix gtt  Transfuse for hemoglobin of >7     Procedure EGD - Dieulafoy's lesion in proximal stomach just distal to the GE junction which was actively bleeding with some fresh blood in the stomach  1-71844 epinephrine was injected to control bleeding  Then 2 Endoclips were placed  1  Small hiatal hernia  Normal esophagus and stomach      ED Triage Vitals   Temperature Pulse Respirations Blood Pressure SpO2   04/30/20 1625 04/30/20 1625 04/30/20 1625 04/30/20 1625 04/30/20 1625   98 5 °F (36 9 °C) 72 20 124/59 97 %      Temp Source Heart Rate Source Patient Position - Orthostatic VS BP Location FiO2 (%)   04/30/20 1625 04/30/20 1625 04/30/20 1625 04/30/20 1625 --   Temporal Monitor Sitting Right arm       Pain Score       04/30/20 2055       5        Wt Readings from Last 1 Encounters:   04/30/20 56 7 kg (125 lb)     Additional Vital Signs:   05/01/20 07:34:49  98 5 °F (36 9 °C)  72  18  109/64  79  98 %  None (Room air)  Lying   05/01/20 0530  --  65  16  114/64  --  96 %  --  --   05/01/20 0521  98 4 °F (36 9 °C)  77  --  --  --  97 %  --  --   05/01/20 02:44:54  98 6 °F (37 °C)  66  16  104/49Abnormal   67  95 %  --  Lying   05/01/20 0223  98 7 °F (37 1 °C)  65  16  94/46Abnormal   --  --  --  --   04/30/20 22:45:52  98 6 °F (37 °C)  84  --  127/73  91  97 %  --  --   04/30/20 21:19:55  98 9 °F (37 2 °C)  74  16  121/72  88  100 %  --  --   04/30/20 20:32:41  99 1 °F (37 3 °C)  78  18  119/71  87  99 %  --  --   04/30/20 1912  98 1 °F (36 7 °C)  76  --  114/55  --  96 %  None (Room air)         Pertinent Labs/Diagnostic Test Results:   4/30/2020 CxR - Cardiomegaly   No acute cardiopulmonary disease      4/30/2020 EKG - rate of 75, sinus, left axis deviation, nonspecific ST and T-waves throughout  Results from last 7 days   Lab Units 04/30/20  1618   SARS-COV-2  Negative     Results from last 7 days   Lab Units 05/01/20  0534 04/30/20  1618   WBC Thousand/uL 2 21* 2 80*   HEMOGLOBIN g/dL 7 0* 3 9*   HEMATOCRIT % 21 8* 13 0*   PLATELETS Thousands/uL 136* 160   NEUTROS ABS Thousands/µL 1 51* 2 31     Results from last 7 days Lab Units 05/01/20  0535 04/30/20  1618   SODIUM mmol/L 137 138   POTASSIUM mmol/L 3 9 3 5   CHLORIDE mmol/L 106 103   CO2 mmol/L 22 24   ANION GAP mmol/L 9 11   BUN mg/dL 19 26*   CREATININE mg/dL 1 31* 1 58*   EGFR ml/min/1 73sq m 41 33   CALCIUM mg/dL 7 5* 7 6*   MAGNESIUM mg/dL 1 8  --    PHOSPHORUS mg/dL 4 0  --      Results from last 7 days   Lab Units 05/01/20  0535 04/30/20  1618   AST U/L 37 41   ALT U/L 21 23   ALK PHOS U/L 46 50   TOTAL PROTEIN g/dL 5 3* 6 0*   ALBUMIN g/dL 2 7* 3 2*   TOTAL BILIRUBIN mg/dL 0 70 0 20     Results from last 7 days   Lab Units 05/01/20  0730   POC GLUCOSE mg/dl 86     Results from last 7 days   Lab Units 05/01/20  0535 04/30/20  1618   GLUCOSE RANDOM mg/dL 75 85     Results from last 7 days   Lab Units 04/30/20  1618   TROPONIN I ng/mL 0 05*     Results from last 7 days   Lab Units 05/01/20  0535 04/30/20  1618   PROTIME seconds 14 0 14 8*   INR  1 11 1 19   PTT seconds  --  30     Results from last 7 days   Lab Units 04/30/20  1618   LACTIC ACID mmol/L 0 8     ED Treatment:   Medication Administration from 04/30/2020 1545 to 04/30/2020 2030       Date/Time Order Dose Route Action Comments     04/30/2020 1756 pantoprazole (PROTONIX) 80 mg in sodium chloride 0 9 % 100 mL IVPB 80 mg Intravenous New Bag      04/30/2020 1756 pantoprazole (PROTONIX) 80 mg in sodium chloride 0 9 % 100 mL infusion 8 mg/hr Intravenous New Bag         Past Medical History:   Diagnosis Date    ADHD     Anemia     Anxiety     Arthritis     Asthma     [facility] ([facility] )     Breast cancer ([facility] )     Cancer ([facility] )     Coronary artery disease     Depression     Gastroenteritis 01/2020    GERD (gastroesophageal reflux disease)     History of stomach ulcers     Hypercholesterolemia     Hypertension     Kidney disease     Metastatic cancer ([facility] )      Present on Admission:   Symptomatic anemia   Anxiety   GIST (gastrointestinal stromal tumor), malignant (HCC)   Paroxysmal atrial fibrillation (HCC)   Elevated serum creatinine   Elevated troponin      Admitting Diagnosis: Weakness [R53 1]  GI bleed [K92 2]  Age/Sex: 68 y o  female  Admission Orders:  4/30/2020 1824 inpatient  Scheduled Medications:  Medications:  azelastine 1 spray Each Nare BID   hydroxychloroquine 200 mg Oral Early Morning   metoprolol succinate 25 mg Oral Daily   prednisoLONE acetate 1 drop Left Eye 4x Daily     Continuous IV Infusions:  pantoprozole (PROTONIX) infusion (Continuous) 8 mg/hr Intravenous Continuous     PRN Meds:  Acetaminophen - used x 1  650 mg Oral Q6H PRN   ALPRAZolam 0 25 mg Oral HS PRN   diphenhydrAMINE 25 mg Oral Q6H PRN   ondansetron 4 mg Intravenous Q6H PRN   traMADol 50 mg Oral Q8H PRN       IP CONSULT TO GASTROENTEROLOGY   Telemetry  Transfuse 2 units leukoreduced RBC    Network Utilization Review Department  [email or web address]  ATTENTION: Please call with any questions or concerns to [phone] and carefully listen to the prompts so that you are directed to the right person  All voicemails are confidential   Kin Schmitt all requests for admission clinical reviews, approved or denied determinations and any other requests to dedicated fax number below belonging to the campus where the patient is receiving treatment   List of dedicated fax numbers for the Facilities:  [address] DENIALS (Administrative/Medical Necessity) [phone]   [address] [place] [address] (Maternity/NICU/Pediatrics) [phone]   [facility] Seen [phone]   Lelo Bauman [phone]   Amee Jose [phone]   [facility] [phone]   [address] [place] [address] [phone]   [facility]  [phone]   [address] [facility]  [address] [facility] [address] [facility] [phone]

## 2020-05-01 NOTE — PROGRESS NOTES
Progress Note - Gustavo Sparrow 1950, 68 y o  female MRN: [id number]    Unit/Bed#: -01 Encounter: [id number]    Primary Care Provider: Mike Shaw MD   Date and time admitted to hospital: 2020  3:52 PM        * Symptomatic anemia  Assessment & Plan  Patient presents with acute on chronic anemia, suspected acute blood loss anemia secondary to acute GI bleed  EGD today    Follow-up hemoglobin later today and tomorrow a m , patient will likely require an additional unit of packed red blood cells  Elevated serum creatinine  Assessment & Plan  Underlying chronic kidney disease stage 3, creatinine near baseline  Avoid nephrotoxins, renal function did improve with blood transfusion as expected        Elevated troponin  Assessment & Plan  Non MI troponin elevation, no further evaluation required    Paroxysmal atrial fibrillation (HCC)  Assessment & Plan  Full-dose aspirin/aspirin 325 mg on hold due to GI bleed    Patient has not been on full-dose anticoagulation ever before    Anxiety  Assessment & Plan  Medical management    GIST (gastrointestinal stromal tumor), malignant [facility])  Assessment & Plan  Continue outpatient follow-up once acute medical issues resolved  VTE Pharmacologic Prophylaxis:   Pharmacologic: Pharmacologic VTE Prophylaxis contraindicated due to Acute GI bleed  Mechanical VTE Prophylaxis in Place: Yes    Patient Centered Rounds: I have performed bedside rounds with nursing staff today  Current Length of Stay: 1 day(s)    Current Patient Status: Inpatient   Certification Statement: The patient will continue to require additional inpatient hospital stay due to Monitoring of blood counts, follow-up EGD results      Discharge Plan / Estimated Discharge Date:  Possible discharge 2020      Code Status: Level 1 - Full Code      Subjective:   No pain    Objective:     Vitals:   Temp (24hrs), Av 7 °F (37 1 °C), Min:98 1 °F (36 7 °C), Max:99 4 °F (37 4 °C)    Temp:  [98 1 °F (36 7 °C)-99 4 °F (37 4 °C)] 99 4 °F (37 4 °C)  HR:  [65-90] 84  Resp:  [16-20] 16  BP: ()/(46-73) 106/65  SpO2:  [93 %-100 %] 98 %  Body mass index is 23 62 kg/m²  Input and Output Summary (last 24 hours): Intake/Output Summary (Last 24 hours) at 5/1/2020 1704  Last data filed at 5/1/2020 1438  Gross per 24 hour   Intake 1300 ml   Output 1000 ml   Net 300 ml       Physical Exam:     Physical Exam   Constitutional: No distress  Frail elderly appearing female   Cardiovascular: Normal rate, regular rhythm, normal heart sounds and intact distal pulses  Pulmonary/Chest: Effort normal and breath sounds normal  No stridor  No respiratory distress  Abdominal: Soft  Bowel sounds are normal  She exhibits no distension  There is no tenderness  Musculoskeletal: She exhibits no edema or deformity  Skin: She is not diaphoretic  There is pallor  Psychiatric: She has a normal mood and affect  Her behavior is normal  Judgment and thought content normal    Nursing note and vitals reviewed  Additional Data:     Labs:    Results from last 7 days   Lab Units 05/01/20  1635   WBC Thousand/uL 4 51   HEMOGLOBIN g/dL 7 1*   HEMATOCRIT % 21 7*   PLATELETS Thousands/uL 139*   NEUTROS PCT % 83*   LYMPHS PCT % 6*   MONOS PCT % 10   EOS PCT % 0     Results from last 7 days   Lab Units 05/01/20  0535   POTASSIUM mmol/L 3 9   CHLORIDE mmol/L 106   CO2 mmol/L 22   BUN mg/dL 19   CREATININE mg/dL 1 31*   CALCIUM mg/dL 7 5*   ALK PHOS U/L 46   ALT U/L 21   AST U/L 37     Results from last 7 days   Lab Units 05/01/20  0535   INR  1 11       * I Have Reviewed All Lab Data Listed Above  * Additional Pertinent Lab Tests Reviewed:  [place] 66 Admission Reviewed      Recent Cultures (last 7 days):           Last 24 Hours Medication List:     Current Facility-Administered Medications:  acetaminophen 650 mg Oral Q6H PRN Marden Daniel, DO    ALPRAZolam 0 25 mg Oral HS PRN Lisbeth Mura Jacinto, DO    azelastine 1 spray Each Nare BID Ceclia Dianelys, DO    diphenhydrAMINE 25 mg Oral Q6H PRN Ceclia Dianelys, DO    hydroxychloroquine 200 mg Oral Early Morning Ceclia Dianelys, DO    metoprolol succinate 25 mg Oral Daily Ceclia Dianelys, DO    ondansetron 4 mg Intravenous Q6H PRN Ceclia Dianelys, DO    pantoprozole (PROTONIX) infusion (Continuous) 8 mg/hr Intravenous Continuous Ceclia Dianelys, DO Last Rate: 8 mg/hr (05/01/20 1311)   prednisoLONE acetate 1 drop Left Eye 4x Daily Ceclia Dianelys, DO    traMADol 50 mg Oral Q8H PRN Ceclia Dianelys, DO         Today, Patient Was Seen By: Stefan Ivory, DO

## 2020-05-01 NOTE — SOCIAL WORK
LOS: 1 day  Pt is not a documented bundle  Pt is not a 30 day readmission  Met with Pt  Pt presents AA&Ox3  Discussed role of , discharge planning, identifying help at home and discharge preference  Pt reports she lives with her  and youngest son [place], [place]  Pt reports she is independent with adls and uses cane sometimes for community distance  Pt reports she uses [facility] on [facility], has prescription plan and is able to afford medications  Pt reports she also uses [facility]  Pt reports she has living will  Pt reports she had [facility] in past but unable to recall agency  Pt denies hx of SNF  Pt denies hx of mental health and drug and alcohol treatment  Pt reports her  helps her at home if needed  Pt reports her  will transport her home upon discharge  Await PT/OT kati HICKEY to follow

## 2020-05-01 NOTE — QUICK NOTE
Brief EGD report    IMPRESSION:  1  Dieulafoy's lesion in proximal stomach just distal to the GE junction which was actively bleeding with some fresh blood in the stomach  [id number] epinephrine was injected to control bleeding  Then 2 Endoclips were placed  2  Small hiatal hernia  3   Normal esophagus and stomach      RECOMMENDATION:  Follow H&H and signs of bleeding  Continue PPI  Clear liquid diet

## 2020-05-01 NOTE — PROGRESS NOTES
Pleasant woman  Receptive to   Pt openly shared her med hx, especially tx for cancer at [facility]  No despondency  Matter of fact attitude  Very supportive spouse of 48 yrs 10/2020  Latter-day  Erin a positive presence in her life and outlook  05/01/20 590 [facility] Affiliation Latter-[employer]/[place]   Spiritual Leader Fr Emilee Martin   Spiritual Beliefs/Perceptions   Concept of God Accepting   Relationship with God Close   Spiritual Strengths Resilient spirit  Stress Factors   Patient Stress Factors Health changes; Other (Comment)  (Has lived w/chronic illness for 18 years)   Coping Responses   Patient Coping Accepting;Open/discussion   Plan of Care   Assessment Completed by: Unit visit

## 2020-05-01 NOTE — ASSESSMENT & PLAN NOTE
Underlying chronic kidney disease stage 3, creatinine near baseline      Avoid nephrotoxins, renal function did improve with blood transfusion as expected

## 2020-05-01 NOTE — H&P
H&P- Glenroy Apodaca 1950, 68 y o  female MRN: [id number]    Unit/Bed#: -01 Encounter: [id number]    Primary Care Provider: Francisco Davis MD   Date and time admitted to hospital: 4/30/2020  3:52 PM        * Symptomatic anemia  Assessment & Plan  Patient presents with acute on chronic anemia, suspected acute blood loss anemia secondary to acute GI bleed  Plan for upper endoscopy tomorrow clear liquids until midnight, NPO after midnight  GI consult, ppi drip  Transfuse 2 units packed red blood cells  Elevated serum creatinine  Assessment & Plan  Underlying chronic kidney disease stage 3, creatinine near baseline  Avoid nephrotoxins, renal function likely improve with blood transfusion  Elevated troponin  Assessment & Plan  Non MI troponin elevation, no further evaluation required    Paroxysmal atrial fibrillation (HCC)  Assessment & Plan  Full-dose aspirin/aspirin 325 mg on hold due to GI bleed    Anxiety  Assessment & Plan  Medical management    GIST (gastrointestinal stromal tumor), malignant [facility])  Assessment & Plan  Continue outpatient follow-up once acute medical issues resolved  Anticipated Length of Stay:  Patient will be admitted on an Inpatient basis with an anticipated length of stay of  greater than 2 midnights  Justification for Hospital Stay:  Acute on chronic anemia, suspected acute blood loss anemia, secondary to suspected GI bleed    Total Time for Visit, including Counseling / Coordination of Care: 90 minutes  Greater than 50% of this total time spent on direct patient counseling and coordination of care  Chief Complaint:   Generalized weakness    History of Present Illness:    Glenroy Apodaca is a 68 y o  female who presents with generalized weakness, increasing shortness of breath  Patient denies any acute pain  Denies any shortness of breath at rest, no chest pain      Review of Systems:    Review of Systems   All other systems reviewed and are negative  Past Medical and Surgical History:     Past Medical History:   Diagnosis Date    ADHD     Anemia     Anxiety     Arthritis     Asthma     [facility]     Breast cancer ([facility] )     Cancer ([facility] )     Coronary artery disease     Depression     Gastroenteritis 2020    GERD (gastroesophageal reflux disease)     History of stomach ulcers     Hypercholesterolemia     Hypertension     Kidney disease     Metastatic cancer ([facility] )        Past Surgical History:   Procedure Laterality Date    ANKLE SURGERY      APPENDECTOMY      BREAST SURGERY      CATARACT EXTRACTION       SECTION      COLONOSCOPY      HIP SURGERY      JOINT REPLACEMENT  2019    Left knee replacement    LAMINECTOMY      ROTATOR CUFF REPAIR      SIGMOID RESECTION / RECTOPEXY      SINUS SURGERY         Meds/Allergies:    Prior to Admission medications    Medication Sig Start Date End Date Taking?  Authorizing Provider   ALPRAZolam Karrogelio Meline) 0 25 mg tablet Take by mouth daily at bedtime as needed     Historical Provider, MD   aspirin (ESTELA ASPIRIN) 325 mg tablet Take 325 mg by mouth daily     Historical Provider, MD   Azelastine HCl 137 MCG/SPRAY SOLN instill 2 sprays into each nostril twice a day if needed for congestion 18   Historical Provider, MD   calcium carbonate (TUMS) 500 mg chewable tablet Chew 1 tablet daily as needed for indigestion or heartburn    Historical Provider, MD   diphenhydrAMINE (BENADRYL) 25 mg tablet Take 25 mg by mouth every 6 (six) hours as needed for itching    Historical Provider, MD   diphenoxylate-atropine (LOMOTIL) 2 5-0 025 mg per tablet TAKE 1 TABLET BY MOUTH 4 TIMES DAILY AS NEEDED FOR DIARRHEA 19   Historical Provider, MD   docusate sodium (COLACE) 100 mg capsule Take 100 mg by mouth 2 (two) times a day as needed for constipation    Historical Provider, MD   famotidine (PEPCID) 10 mg tablet Take 10 mg by mouth 2 (two) times a day Historical Provider, MD   furosemide (LASIX) 20 mg tablet Take 20 mg by mouth daily  6/7/18   Historical Provider, MD   hydroxychloroquine (PLAQUENIL) 200 mg tablet Take 200 mg by mouth daily in the early morning     Historical Provider, MD   loperamide (IMODIUM) 2 mg capsule Take 2 capsules by mouth 4 (four) times a day as needed    Historical Provider, MD   ofloxacin (OCUFLOX) 0 3 % ophthalmic solution Starting April 7 4/7/20   Historical Provider, MD   ondansetron (ZOFRAN) 8 mg tablet take 1 tablet by mouth every 8 hours if needed for nausea 10/17/19   Historical Provider, MD   pantoprazole (PROTONIX) 40 mg tablet Take 40 mg by mouth daily    Historical Provider, MD   prednisoLONE acetate (PRED FORTE) 1 % ophthalmic suspension POST OP: 1 DROP INTO LEFT EYE 4 TIMES DAILY; CONTINUE AS DIRECTED 4/7/20   Historical Provider, MD   TOPROL XL 25 MG 24 hr tablet 25 mg daily  10/24/19   Historical Provider, MD   traMADol (ULTRAM) 50 mg tablet Take 1 tablet (50 mg total) by mouth every 8 (eight) hours as needed for moderate pain 4/7/20   Cohen Hoops, DO   Triamcinolone Acetonide (NASACORT ALLERGY 24HR NA) into each nostril 1 spray each nostril--at bedtime   (OTC)    Historical Provider, MD     I have reviewed home medications with patient personally  Allergies: Allergies   Allergen Reactions    Codeine Other (See Comments)     Throat closes    Codeine Sulfate Throat Swelling    Neomycin-Bacitracin Zn-Polymyx Rash    Wound Dressing Adhesive Other (See Comments)     If its on too long- pt starts itching    Zolmitriptan Palpitations     Heart palpitations    Generic for Zomig         Social History:     Marital Status: /Civil Union   Substance Use History:   Social History     Substance and Sexual Activity   Alcohol Use Never    Frequency: Never    Drinks per session: 1 or 2    Binge frequency: Never     Social History     Tobacco Use   Smoking Status Former Smoker    Years: 20 00   Smokeless Tobacco Never Used     Social History     Substance and Sexual Activity   Drug Use No       Family History:    non-contributory    Physical Exam:     Vitals:   Blood Pressure: 119/71 (04/30/20 2032)  Pulse: 78 (04/30/20 2032)  Temperature: 99 1 °F (37 3 °C) (04/30/20 2032)  Temp Source: Temporal (04/30/20 1625)  Respirations: 18 (04/30/20 2032)  Height: 5' 1" (154 9 cm) (04/30/20 1625)  Weight - Scale: 56 7 kg (125 lb) (04/30/20 1625)  SpO2: 99 % (04/30/20 2032)    Physical Exam   Constitutional: No distress  Chronically ill-appearing elderly female   HENT:   Head: Normocephalic  Eyes: Pupils are equal, round, and reactive to light  Cardiovascular: Normal rate  Pulmonary/Chest: Effort normal    Abdominal: Soft  Bowel sounds are normal  She exhibits no distension  There is no tenderness  Skin: Skin is warm and dry  She is not diaphoretic  No erythema  There is pallor  Nursing note and vitals reviewed  Additional Data:     Lab Results: I have personally reviewed pertinent reports  Results from last 7 days   Lab Units 04/30/20  1618   WBC Thousand/uL 2 80*   HEMOGLOBIN g/dL 3 9*   HEMATOCRIT % 13 0*   PLATELETS Thousands/uL 160   NEUTROS PCT % 82*   LYMPHS PCT % 8*   MONOS PCT % 10   EOS PCT % 0     Results from last 7 days   Lab Units 04/30/20  1618   SODIUM mmol/L 138   POTASSIUM mmol/L 3 5   CHLORIDE mmol/L 103   CO2 mmol/L 24   BUN mg/dL 26*   CREATININE mg/dL 1 58*   ANION GAP mmol/L 11   CALCIUM mg/dL 7 6*   ALBUMIN g/dL 3 2*   TOTAL BILIRUBIN mg/dL 0 20   ALK PHOS U/L 50   ALT U/L 23   AST U/L 41   GLUCOSE RANDOM mg/dL 85     Results from last 7 days   Lab Units 04/30/20  1618   INR  1 19             Results from last 7 days   Lab Units 04/30/20  1618   LACTIC ACID mmol/L 0 8       Imaging: I have personally reviewed pertinent reports  XR chest 1 view portable   Final Result by Blas Mckeon DO (04/30 1647)      Cardiomegaly  No acute cardiopulmonary disease              Workstation performed: [id number]           [facility] Records Reviewed: Yes     ** Please Note: This note has been constructed using a voice recognition system   **

## 2020-05-01 NOTE — PLAN OF CARE
Problem: Nutrition/Hydration-ADULT  Goal: Nutrient/Hydration intake appropriate for improving, restoring or maintaining nutritional needs  Description  Monitor and assess patient's nutrition/hydration status for malnutrition  Collaborate with interdisciplinary team and initiate plan and interventions as ordered  Monitor patient's weight and dietary intake as ordered or per policy  Utilize nutrition screening tool and intervene as necessary  Determine patient's food preferences and provide high-protein, high-caloric foods as appropriate       INTERVENTIONS:  - Monitor oral intake, urinary output, labs, and treatment plans  - Assess nutrition and hydration status and recommend course of action  - Evaluate amount of meals eaten  - Assist patient with eating if necessary   - Allow adequate time for meals  - Recommend/ encourage appropriate diets, oral nutritional supplements, and vitamin/mineral supplements  - Order, calculate, and assess calorie counts as needed  - Recommend, monitor, and adjust tube feedings and TPN/PPN based on assessed needs  - Assess need for intravenous fluids  - Provide specific nutrition/hydration education as appropriate  - Include patient/family/caregiver in decisions related to nutrition  Outcome: Progressing     Problem: Prexisting or High Potential for Compromised Skin Integrity  Goal: Skin integrity is maintained or improved  Description  INTERVENTIONS:  - Identify patients at risk for skin breakdown  - Assess and monitor skin integrity  - Assess and monitor nutrition and hydration status  - Monitor labs   - Assess for incontinence   - Turn and reposition patient  - Assist with mobility/ambulation  - Relieve pressure over bony prominences  - Avoid friction and shearing  - Provide appropriate hygiene as needed including keeping skin clean and dry  - Evaluate need for skin moisturizer/barrier cream  - Collaborate with interdisciplinary team   - Patient/family teaching  - Consider wound care consult   Outcome: Progressing     Problem: HEMATOLOGIC - ADULT  Goal: Maintains hematologic stability  Description  INTERVENTIONS  - Assess for signs and symptoms of bleeding or hemorrhage  - Monitor labs  - Administer supportive blood products/factors as ordered and appropriate  Outcome: Progressing

## 2020-05-02 LAB
ALBUMIN SERPL BCP-MCNC: 2.6 G/DL (ref 3.5–5)
ALP SERPL-CCNC: 48 U/L (ref 46–116)
ALT SERPL W P-5'-P-CCNC: 21 U/L (ref 12–78)
ANION GAP SERPL CALCULATED.3IONS-SCNC: 10 MMOL/L (ref 4–13)
AST SERPL W P-5'-P-CCNC: 35 U/L (ref 5–45)
BASOPHILS # BLD AUTO: 0 THOUSANDS/ΜL (ref 0–0.1)
BASOPHILS # BLD AUTO: 0.01 THOUSANDS/ΜL (ref 0–0.1)
BASOPHILS NFR BLD AUTO: 0 % (ref 0–1)
BASOPHILS NFR BLD AUTO: 0 % (ref 0–1)
BILIRUB SERPL-MCNC: 0.5 MG/DL (ref 0.2–1)
BUN SERPL-MCNC: 15 MG/DL (ref 5–25)
CALCIUM SERPL-MCNC: 7.8 MG/DL (ref 8.3–10.1)
CHLORIDE SERPL-SCNC: 107 MMOL/L (ref 100–108)
CO2 SERPL-SCNC: 22 MMOL/L (ref 21–32)
CREAT SERPL-MCNC: 1.17 MG/DL (ref 0.6–1.3)
EOSINOPHIL # BLD AUTO: 0.04 THOUSAND/ΜL (ref 0–0.61)
EOSINOPHIL # BLD AUTO: 0.05 THOUSAND/ΜL (ref 0–0.61)
EOSINOPHIL NFR BLD AUTO: 1 % (ref 0–6)
EOSINOPHIL NFR BLD AUTO: 2 % (ref 0–6)
ERYTHROCYTE [DISTWIDTH] IN BLOOD BY AUTOMATED COUNT: 20.4 % (ref 11.6–15.1)
ERYTHROCYTE [DISTWIDTH] IN BLOOD BY AUTOMATED COUNT: 21.1 % (ref 11.6–15.1)
GFR SERPL CREATININE-BSD FRML MDRD: 47 ML/MIN/1.73SQ M
GLUCOSE SERPL-MCNC: 71 MG/DL (ref 65–140)
HCT VFR BLD AUTO: 23.2 % (ref 34.8–46.1)
HCT VFR BLD AUTO: 23.8 % (ref 34.8–46.1)
HGB BLD-MCNC: 7.5 G/DL (ref 11.5–15.4)
HGB BLD-MCNC: 7.8 G/DL (ref 11.5–15.4)
IMM GRANULOCYTES # BLD AUTO: 0.02 THOUSAND/UL (ref 0–0.2)
IMM GRANULOCYTES # BLD AUTO: 0.02 THOUSAND/UL (ref 0–0.2)
IMM GRANULOCYTES NFR BLD AUTO: 1 % (ref 0–2)
IMM GRANULOCYTES NFR BLD AUTO: 1 % (ref 0–2)
INR PPP: 1.1 (ref 0.84–1.19)
LYMPHOCYTES # BLD AUTO: 0.39 THOUSANDS/ΜL (ref 0.6–4.47)
LYMPHOCYTES # BLD AUTO: 0.44 THOUSANDS/ΜL (ref 0.6–4.47)
LYMPHOCYTES NFR BLD AUTO: 12 % (ref 14–44)
LYMPHOCYTES NFR BLD AUTO: 16 % (ref 14–44)
MAGNESIUM SERPL-MCNC: 2 MG/DL (ref 1.6–2.6)
MCH RBC QN AUTO: 32.8 PG (ref 26.8–34.3)
MCH RBC QN AUTO: 33.3 PG (ref 26.8–34.3)
MCHC RBC AUTO-ENTMCNC: 32.3 G/DL (ref 31.4–37.4)
MCHC RBC AUTO-ENTMCNC: 32.8 G/DL (ref 31.4–37.4)
MCV RBC AUTO: 100 FL (ref 82–98)
MCV RBC AUTO: 103 FL (ref 82–98)
MONOCYTES # BLD AUTO: 0.4 THOUSAND/ΜL (ref 0.17–1.22)
MONOCYTES # BLD AUTO: 0.43 THOUSAND/ΜL (ref 0.17–1.22)
MONOCYTES NFR BLD AUTO: 13 % (ref 4–12)
MONOCYTES NFR BLD AUTO: 14 % (ref 4–12)
NEUTROPHILS # BLD AUTO: 1.88 THOUSANDS/ΜL (ref 1.85–7.62)
NEUTROPHILS # BLD AUTO: 2.31 THOUSANDS/ΜL (ref 1.85–7.62)
NEUTS SEG NFR BLD AUTO: 68 % (ref 43–75)
NEUTS SEG NFR BLD AUTO: 72 % (ref 43–75)
NRBC BLD AUTO-RTO: 0 /100 WBCS
NRBC BLD AUTO-RTO: 0 /100 WBCS
PHOSPHATE SERPL-MCNC: 4 MG/DL (ref 2.3–4.1)
PLATELET # BLD AUTO: 143 THOUSANDS/UL (ref 149–390)
PLATELET # BLD AUTO: 148 THOUSANDS/UL (ref 149–390)
PMV BLD AUTO: 10.3 FL (ref 8.9–12.7)
PMV BLD AUTO: 9.9 FL (ref 8.9–12.7)
POTASSIUM SERPL-SCNC: 3.7 MMOL/L (ref 3.5–5.3)
PROT SERPL-MCNC: 5.2 G/DL (ref 6.4–8.2)
PROTHROMBIN TIME: 13.9 SECONDS (ref 11.6–14.5)
RBC # BLD AUTO: 2.25 MILLION/UL (ref 3.81–5.12)
RBC # BLD AUTO: 2.38 MILLION/UL (ref 3.81–5.12)
SODIUM SERPL-SCNC: 139 MMOL/L (ref 136–145)
WBC # BLD AUTO: 2.79 THOUSAND/UL (ref 4.31–10.16)
WBC # BLD AUTO: 3.2 THOUSAND/UL (ref 4.31–10.16)

## 2020-05-02 PROCEDURE — 85025 COMPLETE CBC W/AUTO DIFF WBC: CPT | Performed by: INTERNAL MEDICINE

## 2020-05-02 PROCEDURE — 84100 ASSAY OF PHOSPHORUS: CPT | Performed by: INTERNAL MEDICINE

## 2020-05-02 PROCEDURE — 99232 SBSQ HOSP IP/OBS MODERATE 35: CPT | Performed by: INTERNAL MEDICINE

## 2020-05-02 PROCEDURE — 80053 COMPREHEN METABOLIC PANEL: CPT | Performed by: INTERNAL MEDICINE

## 2020-05-02 PROCEDURE — 83735 ASSAY OF MAGNESIUM: CPT | Performed by: INTERNAL MEDICINE

## 2020-05-02 PROCEDURE — 85610 PROTHROMBIN TIME: CPT | Performed by: INTERNAL MEDICINE

## 2020-05-02 RX ORDER — PANTOPRAZOLE SODIUM 40 MG/1
40 TABLET, DELAYED RELEASE ORAL
Status: DISCONTINUED | OUTPATIENT
Start: 2020-05-03 | End: 2020-05-03 | Stop reason: HOSPADM

## 2020-05-02 RX ORDER — FAMOTIDINE 20 MG/1
20 TABLET, FILM COATED ORAL
Status: DISCONTINUED | OUTPATIENT
Start: 2020-05-02 | End: 2020-05-03 | Stop reason: HOSPADM

## 2020-05-02 RX ADMIN — FAMOTIDINE 20 MG: 20 TABLET ORAL at 21:03

## 2020-05-02 RX ADMIN — AZELASTINE HYDROCHLORIDE 1 SPRAY: 137 SPRAY, METERED NASAL at 11:02

## 2020-05-02 RX ADMIN — HYDROXYCHLOROQUINE SULFATE 200 MG: 200 TABLET, FILM COATED ORAL at 04:50

## 2020-05-02 RX ADMIN — METOPROLOL SUCCINATE 25 MG: 25 TABLET, EXTENDED RELEASE ORAL at 11:06

## 2020-05-02 NOTE — PLAN OF CARE
Problem: Nutrition/Hydration-ADULT  Goal: Nutrient/Hydration intake appropriate for improving, restoring or maintaining nutritional needs  Description  Monitor and assess patient's nutrition/hydration status for malnutrition  Collaborate with interdisciplinary team and initiate plan and interventions as ordered  Monitor patient's weight and dietary intake as ordered or per policy  Utilize nutrition screening tool and intervene as necessary  Determine patient's food preferences and provide high-protein, high-caloric foods as appropriate       INTERVENTIONS:  - Monitor oral intake, urinary output, labs, and treatment plans  - Assess nutrition and hydration status and recommend course of action  - Evaluate amount of meals eaten  - Assist patient with eating if necessary   - Allow adequate time for meals  - Recommend/ encourage appropriate diets, oral nutritional supplements, and vitamin/mineral supplements  - Order, calculate, and assess calorie counts as needed  - Recommend, monitor, and adjust tube feedings and TPN/PPN based on assessed needs  - Assess need for intravenous fluids  - Provide specific nutrition/hydration education as appropriate  - Include patient/family/caregiver in decisions related to nutrition  Outcome: Progressing     Problem: Prexisting or High Potential for Compromised Skin Integrity  Goal: Skin integrity is maintained or improved  Description  INTERVENTIONS:  - Identify patients at risk for skin breakdown  - Assess and monitor skin integrity  - Assess and monitor nutrition and hydration status  - Monitor labs   - Assess for incontinence   - Turn and reposition patient  - Assist with mobility/ambulation  - Relieve pressure over bony prominences  - Avoid friction and shearing  - Provide appropriate hygiene as needed including keeping skin clean and dry  - Evaluate need for skin moisturizer/barrier cream  - Collaborate with interdisciplinary team   - Patient/family teaching  - Consider wound care consult   Outcome: Progressing     Problem: HEMATOLOGIC - ADULT  Goal: Maintains hematologic stability  Description  INTERVENTIONS  - Assess for signs and symptoms of bleeding or hemorrhage  - Monitor labs  - Administer supportive blood products/factors as ordered and appropriate  Outcome: Progressing     Problem: Potential for Falls  Goal: Patient will remain free of falls  Description  INTERVENTIONS:  - Assess patient frequently for physical needs  -  Identify cognitive and physical deficits and behaviors that affect risk of falls    -  [place] fall precautions as indicated by assessment   - Educate patient/family on patient safety including physical limitations  - Instruct patient to call for assistance with activity based on assessment  - Modify environment to reduce risk of injury  - Consider OT/PT consult to assist with strengthening/mobility  Outcome: Progressing

## 2020-05-02 NOTE — PROGRESS NOTES
Progress note - Gastroenterology   Devyn Monae 68 y o  female MRN: [id number]  Unit/Bed#: -01 Encounter: [id number]    ASSESSMENT and PLAN    1  Upper GI bleed secondary to bleeding proximal gastric Diealafoy's lesion  S/P EGD with epinephrine injection and Endoclip placement  - follow H&H  - restart PPI and H2 blocker  - advance diet    Chief Complaint   Patient presents with    Weakness - Generalized     patient presents to the ED with c/o SOB on exertion, weakness with hx of anemia and cough for the past several days  was sent from upper bux GI       SUBJECTIVE/HPI   Brown stool this morning    No GI complaints    /65   Pulse 70   Temp 97 8 °F (36 6 °C)   Resp 20   Ht 5' 1" (1 549 m)   Wt 56 7 kg (125 lb)   LMP  (LMP Unknown)   SpO2 98%   BMI 23 62 kg/m²     PHYSICALEXAM  General appearance: alert, appears stated age and cooperative  Eyes: PERLLA, EOMI, no icterus   Head: Normocephalic, without obvious abnormality, atraumatic  Lungs: clear to auscultation bilaterally  Heart: regular rate and rhythm, S1, S2 normal, no murmur, click, rub or gallop  Abdomen: soft, non-tender; bowel sounds normal; no masses,  no organomegaly  Extremities: extremities normal, atraumatic, no cyanosis or edema  Neurologic: Grossly normal    Lab Results   Component Value Date    GLUCOSE 94 10/12/2015    CALCIUM 7 8 (L) 05/02/2020     10/12/2015    K 3 7 05/02/2020    CO2 22 05/02/2020     05/02/2020    BUN 15 05/02/2020    CREATININE 1 17 05/02/2020     Lab Results   Component Value Date    WBC 2 79 (L) 05/02/2020    HGB 7 8 (L) 05/02/2020    HCT 23 8 (L) 05/02/2020     (H) 05/02/2020     (L) 05/02/2020     Lab Results   Component Value Date    ALT 21 05/02/2020    AST 35 05/02/2020    ALKPHOS 48 05/02/2020    BILITOT 0 28 10/12/2015     No results found for: AMYLASE  Lab Results   Component Value Date    LIPASE 118 02/19/2020     Lab Results   Component Value Date    IRON 22 (L) 02/07/2020    TIBC 341 02/07/2020    FERRITIN 79 02/07/2020     Lab Results   Component Value Date    INR 1 10 05/02/2020

## 2020-05-02 NOTE — ASSESSMENT & PLAN NOTE
Patient presents with acute on chronic anemia, suspected acute blood loss anemia secondary to acute GI bleed  EGD today    Hemoglobin is slightly low still    Discussed with GI and they would like to monitor the patient for another day likely discharge tomorrow

## 2020-05-02 NOTE — PROGRESS NOTES
Progress Note - Rajinder Renu 1950, 68 y o  female MRN: [id number]    Unit/Bed#: -01 Encounter: [id number]    Primary Care Provider: Leticia Soliz MD   Date and time admitted to hospital: 4/30/2020  3:52 PM        Elevated serum creatinine  Assessment & Plan  Underlying chronic kidney disease stage 3, creatinine near baseline  Avoid nephrotoxins, renal function did improve with blood transfusion as expected        Paroxysmal atrial fibrillation (HCC)  Assessment & Plan  Full-dose aspirin/aspirin 325 mg on hold due to GI bleed    Patient has not been on full-dose anticoagulation ever before    GIST (gastrointestinal stromal tumor), malignant [facility])  Assessment & Plan  Continue outpatient follow-up once acute medical issues resolved  Anxiety  Assessment & Plan  Medical management    * Symptomatic anemia  Assessment & Plan  Patient presents with acute on chronic anemia, suspected acute blood loss anemia secondary to acute GI bleed  EGD today    Hemoglobin is slightly low still  Discussed with GI and they would like to monitor the patient for another day likely discharge tomorrow          VTE Pharmacologic Prophylaxis:   Pharmacologic: Heparin  Mechanical VTE Prophylaxis in Place: Yes    Patient Centered Rounds: I have performed bedside rounds with nursing staff today  Discussions with Specialists or Other Care Team Provider: Discussed with GI     Education and Discussions with Family / Patient: Discussed with patient  Time Spent for Care: 30 minutes  More than 50% of total time spent on counseling and coordination of care as described above  Current Length of Stay: 2 day(s)    Current Patient Status: Inpatient   Certification Statement: The patient will continue to require additional inpatient hospital stay due to monitor for further bleeding       Discharge Plan:  Not medically stable for discharge    Code Status: Level 1 - Full Code      Subjective:   Patient seen and examined  Feeling okay eager to be discharged    Objective:     Vitals:   Temp (24hrs), Av 8 °F (36 6 °C), Min:97 8 °F (36 6 °C), Max:97 8 °F (36 6 °C)    Temp:  [97 8 °F (36 6 °C)] 97 8 °F (36 6 °C)  HR:  [66-72] 70  Resp:  [20] 20  BP: (107-122)/(57-65) 122/65  SpO2:  [96 %-99 %] 99 %  Body mass index is 23 62 kg/m²  Input and Output Summary (last 24 hours): Intake/Output Summary (Last 24 hours) at 2020 1915  Last data filed at 2020 1201  Gross per 24 hour   Intake 100 ml   Output --   Net 100 ml       Physical Exam:     Physical Exam   Constitutional: She is oriented to person, place, and time  She appears well-developed  No distress  HENT:   Head: Normocephalic  Mouth/Throat: No oropharyngeal exudate  Eyes: Pupils are equal, round, and reactive to light  Right eye exhibits no discharge  Left eye exhibits no discharge  No scleral icterus  Neck: No tracheal deviation present  No thyromegaly present  Cardiovascular: Normal rate  Exam reveals no gallop and no friction rub  No murmur heard  Pulmonary/Chest: Effort normal  No stridor  No respiratory distress  She has no rales  She exhibits no tenderness  Abdominal: She exhibits no distension and no mass  There is no tenderness  There is no rebound and no guarding  No hernia  Musculoskeletal: She exhibits no edema, tenderness or deformity  Lymphadenopathy:     She has no cervical adenopathy  Neurological: She is alert and oriented to person, place, and time  She displays normal reflexes  No cranial nerve deficit or sensory deficit  She exhibits normal muscle tone  Coordination normal    Skin: Capillary refill takes less than 2 seconds  She is not diaphoretic  No erythema  There is pallor  Psychiatric: She has a normal mood and affect         Additional Data:     Labs:    Results from last 7 days   Lab Units 20  0453   WBC Thousand/uL 2 79*   HEMOGLOBIN g/dL 7 8*   HEMATOCRIT % 23 8*   PLATELETS Thousands/uL 143* NEUTROS PCT % 68   LYMPHS PCT % 16   MONOS PCT % 14*   EOS PCT % 1     Results from last 7 days   Lab Units 05/02/20  0453   SODIUM mmol/L 139   POTASSIUM mmol/L 3 7   CHLORIDE mmol/L 107   CO2 mmol/L 22   BUN mg/dL 15   CREATININE mg/dL 1 17   ANION GAP mmol/L 10   CALCIUM mg/dL 7 8*   ALBUMIN g/dL 2 6*   TOTAL BILIRUBIN mg/dL 0 50   ALK PHOS U/L 48   ALT U/L 21   AST U/L 35   GLUCOSE RANDOM mg/dL 71     Results from last 7 days   Lab Units 05/02/20  0453   INR  1 10     Results from last 7 days   Lab Units 05/01/20  0730   POC GLUCOSE mg/dl 86         Results from last 7 days   Lab Units 04/30/20  1618   LACTIC ACID mmol/L 0 8           * I Have Reviewed All Lab Data Listed Above  * Additional Pertinent Lab Tests Reviewed: [place] 64 Admission Reviewed    Imaging:    Imaging Reports Reviewed Today Include:   Imaging Personally Reviewed by Myself Includes:      Recent Cultures (last 7 days):           Last 24 Hours Medication List:     Current Facility-Administered Medications:  acetaminophen 650 mg Oral Q6H PRN Dayday My, DO   ALPRAZolam 0 25 mg Oral HS PRN Dayday My, DO   azelastine 1 spray Each Nare BID Dayday My, DO   diphenhydrAMINE 25 mg Oral Q6H PRN Dayday My, DO   famotidine 20 mg Oral HS Kaleb Jackson MD   hydroxychloroquine 200 mg Oral Early Morning Dayday My, DO   metoprolol succinate 25 mg Oral Daily Dayday My, DO   ondansetron 4 mg Intravenous Q6H PRN Dayday My, DO   [START ON 5/3/2020] pantoprazole 40 mg Oral Early Morning Kaleb Jackson MD   prednisoLONE acetate 1 drop Left Eye 4x Daily Dayday My, DO   traMADol 50 mg Oral Q8H PRN Dayday My, DO        Today, Patient Was Seen By: Rosa Huff MD    ** Please Note: Dictation voice to text software may have been used in the creation of this document   **

## 2020-05-03 VITALS
OXYGEN SATURATION: 99 % | HEIGHT: 61 IN | HEART RATE: 73 BPM | DIASTOLIC BLOOD PRESSURE: 61 MMHG | BODY MASS INDEX: 23.6 KG/M2 | TEMPERATURE: 97.5 F | WEIGHT: 125 LBS | RESPIRATION RATE: 20 BRPM | SYSTOLIC BLOOD PRESSURE: 115 MMHG

## 2020-05-03 PROBLEM — K92.2 UPPER GI BLEED: Status: ACTIVE | Noted: 2020-05-03

## 2020-05-03 LAB
ANION GAP SERPL CALCULATED.3IONS-SCNC: 10 MMOL/L (ref 4–13)
BUN SERPL-MCNC: 13 MG/DL (ref 5–25)
CALCIUM SERPL-MCNC: 7.9 MG/DL (ref 8.3–10.1)
CHLORIDE SERPL-SCNC: 105 MMOL/L (ref 100–108)
CO2 SERPL-SCNC: 23 MMOL/L (ref 21–32)
CREAT SERPL-MCNC: 1.14 MG/DL (ref 0.6–1.3)
ERYTHROCYTE [DISTWIDTH] IN BLOOD BY AUTOMATED COUNT: 19.9 % (ref 11.6–15.1)
GFR SERPL CREATININE-BSD FRML MDRD: 48 ML/MIN/1.73SQ M
GLUCOSE SERPL-MCNC: 88 MG/DL (ref 65–140)
HCT VFR BLD AUTO: 22.4 % (ref 34.8–46.1)
HGB BLD-MCNC: 7.2 G/DL (ref 11.5–15.4)
MCH RBC QN AUTO: 32.9 PG (ref 26.8–34.3)
MCHC RBC AUTO-ENTMCNC: 32.1 G/DL (ref 31.4–37.4)
MCV RBC AUTO: 102 FL (ref 82–98)
PLATELET # BLD AUTO: 136 THOUSANDS/UL (ref 149–390)
PMV BLD AUTO: 9.9 FL (ref 8.9–12.7)
POTASSIUM SERPL-SCNC: 3.4 MMOL/L (ref 3.5–5.3)
RBC # BLD AUTO: 2.19 MILLION/UL (ref 3.81–5.12)
SODIUM SERPL-SCNC: 138 MMOL/L (ref 136–145)
WBC # BLD AUTO: 2.66 THOUSAND/UL (ref 4.31–10.16)

## 2020-05-03 PROCEDURE — 99232 SBSQ HOSP IP/OBS MODERATE 35: CPT | Performed by: INTERNAL MEDICINE

## 2020-05-03 PROCEDURE — 85027 COMPLETE CBC AUTOMATED: CPT | Performed by: INTERNAL MEDICINE

## 2020-05-03 PROCEDURE — 99239 HOSP IP/OBS DSCHRG MGMT >30: CPT | Performed by: INTERNAL MEDICINE

## 2020-05-03 PROCEDURE — 80048 BASIC METABOLIC PNL TOTAL CA: CPT | Performed by: INTERNAL MEDICINE

## 2020-05-03 RX ORDER — LOPERAMIDE HYDROCHLORIDE 2 MG/1
2 CAPSULE ORAL 3 TIMES DAILY PRN
Status: DISCONTINUED | OUTPATIENT
Start: 2020-05-03 | End: 2020-05-03 | Stop reason: HOSPADM

## 2020-05-03 RX ORDER — POTASSIUM CHLORIDE 20 MEQ/1
40 TABLET, EXTENDED RELEASE ORAL ONCE
Status: COMPLETED | OUTPATIENT
Start: 2020-05-03 | End: 2020-05-03

## 2020-05-03 RX ORDER — FERROUS SULFATE TAB EC 324 MG (65 MG FE EQUIVALENT) 324 (65 FE) MG
324 TABLET DELAYED RESPONSE ORAL
Qty: 60 TABLET | Refills: 0 | Status: SHIPPED | OUTPATIENT
Start: 2020-05-03 | End: 2020-06-29 | Stop reason: SDUPTHER

## 2020-05-03 RX ADMIN — HYDROXYCHLOROQUINE SULFATE 200 MG: 200 TABLET, FILM COATED ORAL at 05:02

## 2020-05-03 RX ADMIN — POTASSIUM CHLORIDE 40 MEQ: 1500 TABLET, EXTENDED RELEASE ORAL at 11:09

## 2020-05-03 RX ADMIN — AZELASTINE HYDROCHLORIDE 1 SPRAY: 137 SPRAY, METERED NASAL at 09:12

## 2020-05-03 RX ADMIN — PANTOPRAZOLE SODIUM 40 MG: 40 TABLET, DELAYED RELEASE ORAL at 05:02

## 2020-05-03 RX ADMIN — LOPERAMIDE HYDROCHLORIDE 2 MG: 2 CAPSULE ORAL at 11:09

## 2020-05-03 RX ADMIN — METOPROLOL SUCCINATE 25 MG: 25 TABLET, EXTENDED RELEASE ORAL at 09:12

## 2020-05-03 NOTE — ASSESSMENT & PLAN NOTE
Patient presents with acute on chronic anemia, suspected acute blood loss anemia secondary to acute GI bleed      EGD today  DC ASA  Discussed with GI, medically cleared for discharge  They recommend starting oral iron tabs BID

## 2020-05-03 NOTE — ASSESSMENT & PLAN NOTE
POA, secondary to bleeding proximal gastric dieulafoy's lesion s /p EGD  2 endoclips placed and epi injection  Continue with PPI  OTC iron tabs

## 2020-05-03 NOTE — PLAN OF CARE
Problem: Nutrition/Hydration-ADULT  Goal: Nutrient/Hydration intake appropriate for improving, restoring or maintaining nutritional needs  Description  Monitor and assess patient's nutrition/hydration status for malnutrition  Collaborate with interdisciplinary team and initiate plan and interventions as ordered  Monitor patient's weight and dietary intake as ordered or per policy  Utilize nutrition screening tool and intervene as necessary  Determine patient's food preferences and provide high-protein, high-caloric foods as appropriate       INTERVENTIONS:  - Monitor oral intake, urinary output, labs, and treatment plans  - Assess nutrition and hydration status and recommend course of action  - Evaluate amount of meals eaten  - Assist patient with eating if necessary   - Allow adequate time for meals  - Recommend/ encourage appropriate diets, oral nutritional supplements, and vitamin/mineral supplements  - Order, calculate, and assess calorie counts as needed  - Recommend, monitor, and adjust tube feedings and TPN/PPN based on assessed needs  - Assess need for intravenous fluids  - Provide specific nutrition/hydration education as appropriate  - Include patient/family/caregiver in decisions related to nutrition  Outcome: Progressing     Problem: Prexisting or High Potential for Compromised Skin Integrity  Goal: Skin integrity is maintained or improved  Description  INTERVENTIONS:  - Identify patients at risk for skin breakdown  - Assess and monitor skin integrity  - Assess and monitor nutrition and hydration status  - Monitor labs   - Assess for incontinence   - Turn and reposition patient  - Assist with mobility/ambulation  - Relieve pressure over bony prominences  - Avoid friction and shearing  - Provide appropriate hygiene as needed including keeping skin clean and dry  - Evaluate need for skin moisturizer/barrier cream  - Collaborate with interdisciplinary team   - Patient/family teaching  - Consider wound care consult   Outcome: Progressing     Problem: HEMATOLOGIC - ADULT  Goal: Maintains hematologic stability  Description  INTERVENTIONS  - Assess for signs and symptoms of bleeding or hemorrhage  - Monitor labs  - Administer supportive blood products/factors as ordered and appropriate  Outcome: Progressing     Problem: Potential for Falls  Goal: Patient will remain free of falls  Description  INTERVENTIONS:  - Assess patient frequently for physical needs  -  Identify cognitive and physical deficits and behaviors that affect risk of falls    -  Thornton fall precautions as indicated by assessment   - Educate patient/family on patient safety including physical limitations  - Instruct patient to call for assistance with activity based on assessment  - Modify environment to reduce risk of injury  - Consider OT/PT consult to assist with strengthening/mobility  Outcome: Progressing     Problem: INFECTION - ADULT  Goal: Absence or prevention of progression during hospitalization  Description  INTERVENTIONS:  - Assess and monitor for signs and symptoms of infection  - Monitor lab/diagnostic results  - Monitor all insertion sites, i e  indwelling lines, tubes, and drains  - Monitor endotracheal if appropriate and nasal secretions for changes in amount and color  - Thornton appropriate cooling/warming therapies per order  - Administer medications as ordered  - Instruct and encourage patient and family to use good hand hygiene technique  - Identify and instruct in appropriate isolation precautions for identified infection/condition  Outcome: Progressing     Problem: DISCHARGE PLANNING  Goal: Discharge to home or other facility with appropriate resources  Description  INTERVENTIONS:  - Identify barriers to discharge w/patient and caregiver  - Arrange for needed discharge resources and transportation as appropriate  - Identify discharge learning needs (meds, wound care, etc )  - Arrange for interpretive services to assist at discharge as needed  - Refer to Case Management Department for coordinating discharge planning if the patient needs post-hospital services based on physician/advanced practitioner order or complex needs related to functional status, cognitive ability, or social support system  Outcome: Progressing     Problem: Knowledge Deficit  Goal: Patient/family/caregiver demonstrates understanding of disease process, treatment plan, medications, and discharge instructions  Description  Complete learning assessment and assess knowledge base    Interventions:  - Provide teaching at level of understanding  - Provide teaching via preferred learning methods  Outcome: Progressing

## 2020-05-03 NOTE — PROGRESS NOTES
Progress note - Gastroenterology   Dale Kurtz 68 y o  female MRN: [id number]  Unit/Bed#: -01 Encounter: [id number]    ASSESSMENT and PLAN    1  Upper GI bleed secondary to bleeding proximal gastric Diealafoy's lesion  S/P EGD with epinephrine injection and Endoclip placement  HGB stable 7-7 5  - OK to discharge  - Recheck CBC 1 week  - F/U office visit 3-4 weeks  - Continue PPI/H2B  - OTC iron supplement      Chief Complaint   Patient presents with    Weakness - Generalized     patient presents to the ED with c/o SOB on exertion, weakness with hx of anemia and cough for the past several days  was sent from upper bux GI       SUBJECTIVE/HPI   No further bleeding  Tolerating diet      /61   Pulse 73   Temp 97 5 °F (36 4 °C)   Resp 20   Ht 5' 1" (1 549 m)   Wt 56 7 kg (125 lb)   LMP  (LMP Unknown)   SpO2 99%   BMI 23 62 kg/m²     PHYSICALEXAM  General appearance: alert, appears stated age and cooperative  Eyes: PERLLA, EOMI, no icterus   Head: Normocephalic, without obvious abnormality, atraumatic  Lungs: clear to auscultation bilaterally  Heart: regular rate and rhythm, S1, S2 normal, no murmur, click, rub or gallop  Abdomen: soft, non-tender; bowel sounds normal; no masses,  no organomegaly  Extremities: extremities normal, atraumatic, no cyanosis or edema  Neurologic: Grossly normal    Lab Results   Component Value Date    GLUCOSE 94 10/12/2015    CALCIUM 7 9 (L) 05/03/2020     10/12/2015    K 3 4 (L) 05/03/2020    CO2 23 05/03/2020     05/03/2020    BUN 13 05/03/2020    CREATININE 1 14 05/03/2020     Lab Results   Component Value Date    WBC 2 66 (L) 05/03/2020    HGB 7 2 (L) 05/03/2020    HCT 22 4 (L) 05/03/2020     (H) 05/03/2020     (L) 05/03/2020     Lab Results   Component Value Date    ALT 21 05/02/2020    AST 35 05/02/2020    ALKPHOS 48 05/02/2020    BILITOT 0 28 10/12/2015     No results found for: AMYLASE  Lab Results   Component Value Date    LIPASE 118 02/19/2020     Lab Results   Component Value Date    IRON 22 (L) 02/07/2020    TIBC 341 02/07/2020    FERRITIN 79 02/07/2020     Lab Results   Component Value Date    INR 1 10 05/02/2020

## 2020-05-03 NOTE — PLAN OF CARE
Problem: Nutrition/Hydration-ADULT  Goal: Nutrient/Hydration intake appropriate for improving, restoring or maintaining nutritional needs  Description  Monitor and assess patient's nutrition/hydration status for malnutrition  Collaborate with interdisciplinary team and initiate plan and interventions as ordered  Monitor patient's weight and dietary intake as ordered or per policy  Utilize nutrition screening tool and intervene as necessary  Determine patient's food preferences and provide high-protein, high-caloric foods as appropriate       INTERVENTIONS:  - Monitor oral intake, urinary output, labs, and treatment plans  - Assess nutrition and hydration status and recommend course of action  - Evaluate amount of meals eaten  - Assist patient with eating if necessary   - Allow adequate time for meals  - Recommend/ encourage appropriate diets, oral nutritional supplements, and vitamin/mineral supplements  - Order, calculate, and assess calorie counts as needed  - Recommend, monitor, and adjust tube feedings and TPN/PPN based on assessed needs  - Assess need for intravenous fluids  - Provide specific nutrition/hydration education as appropriate  - Include patient/family/caregiver in decisions related to nutrition  5/3/2020 1355 by Kal Multani RN  Outcome: Adequate for Discharge  5/3/2020 0936 by Kal Multani RN  Outcome: Progressing     Problem: Prexisting or High Potential for Compromised Skin Integrity  Goal: Skin integrity is maintained or improved  Description  INTERVENTIONS:  - Identify patients at risk for skin breakdown  - Assess and monitor skin integrity  - Assess and monitor nutrition and hydration status  - Monitor labs   - Assess for incontinence   - Turn and reposition patient  - Assist with mobility/ambulation  - Relieve pressure over bony prominences  - Avoid friction and shearing  - Provide appropriate hygiene as needed including keeping skin clean and dry  - Evaluate need for skin moisturizer/barrier cream  - Collaborate with interdisciplinary team   - Patient/family teaching  - Consider wound care consult   5/3/2020 1355 by Carina Yee RN  Outcome: Adequate for Discharge  5/3/2020 0936 by Carina Yee RN  Outcome: Progressing     Problem: HEMATOLOGIC - ADULT  Goal: Maintains hematologic stability  Description  INTERVENTIONS  - Assess for signs and symptoms of bleeding or hemorrhage  - Monitor labs  - Administer supportive blood products/factors as ordered and appropriate  5/3/2020 1355 by Carina Yee RN  Outcome: Adequate for Discharge  5/3/2020 0936 by Carina Yee RN  Outcome: Progressing     Problem: Potential for Falls  Goal: Patient will remain free of falls  Description  INTERVENTIONS:  - Assess patient frequently for physical needs  -  Identify cognitive and physical deficits and behaviors that affect risk of falls    -  Rueter fall precautions as indicated by assessment   - Educate patient/family on patient safety including physical limitations  - Instruct patient to call for assistance with activity based on assessment  - Modify environment to reduce risk of injury  - Consider OT/PT consult to assist with strengthening/mobility  5/3/2020 1355 by Carina Yee RN  Outcome: Adequate for Discharge  5/3/2020 0936 by Carina Yee RN  Outcome: Progressing     Problem: INFECTION - ADULT  Goal: Absence or prevention of progression during hospitalization  Description  INTERVENTIONS:  - Assess and monitor for signs and symptoms of infection  - Monitor lab/diagnostic results  - Monitor all insertion sites, i e  indwelling lines, tubes, and drains  - Monitor endotracheal if appropriate and nasal secretions for changes in amount and color  - Ru[place] appropriate cooling/warming therapies per order  - Administer medications as ordered  - Instruct and encourage patient and family to use good hand hygiene technique  - Identify and instruct in appropriate isolation precautions for identified infection/condition  5/3/2020 1355 by Ana Cuevas RN  Outcome: Adequate for Discharge  5/3/2020 0936 by Ana Cuevas RN  Outcome: Progressing     Problem: DISCHARGE PLANNING  Goal: Discharge to home or other facility with appropriate resources  Description  INTERVENTIONS:  - Identify barriers to discharge w/patient and caregiver  - Arrange for needed discharge resources and transportation as appropriate  - Identify discharge learning needs (meds, wound care, etc )  - Arrange for interpretive services to assist at discharge as needed  - Refer to Case Management Department for coordinating discharge planning if the patient needs post-hospital services based on physician/advanced practitioner order or complex needs related to functional status, cognitive ability, or social support system  5/3/2020 1355 by Ana Cuevas RN  Outcome: Adequate for Discharge  5/3/2020 0936 by Ana Cuevas RN  Outcome: Progressing     Problem: Knowledge Deficit  Goal: Patient/family/caregiver demonstrates understanding of disease process, treatment plan, medications, and discharge instructions  Description  Complete learning assessment and assess knowledge base    Interventions:  - Provide teaching at level of understanding  - Provide teaching via preferred learning methods  5/3/2020 1355 by Ana Cuevas RN  Outcome: Adequate for Discharge  5/3/2020 0936 by Ana Cuevas RN  Outcome: Progressing

## 2020-05-03 NOTE — DISCHARGE SUMMARY
Discharge- Nguyen Marine 1950, 68 y o  female MRN: [id number]    Unit/Bed#: -01 Encounter: [id number]    Primary Care Provider: Erica Stratton MD   Date and time admitted to hospital: 4/30/2020  3:52 PM        * Symptomatic anemia  Assessment & Plan  Patient presents with acute on chronic anemia, suspected acute blood loss anemia secondary to acute GI bleed  EGD today  DC ASA  Discussed with GI, medically cleared for discharge  They recommend starting oral iron tabs BID      Upper GI bleed  Assessment & Plan  POA, secondary to bleeding proximal gastric dieulafoy's lesion s /p EGD  2 endoclips placed and epi injection  Continue with PPI  OTC iron tabs    Elevated serum creatinine  Assessment & Plan  Underlying chronic kidney disease stage 3, creatinine near baseline  Avoid nephrotoxins, renal function did improve with blood transfusion as expected        Elevated troponin  Assessment & Plan  Non MI troponin elevation, no further evaluation required    Paroxysmal atrial fibrillation (HCC)  Assessment & Plan  Full-dose aspirin/aspirin 325 mg on hold due to GI bleed  DC ASA on discharge      GIST (gastrointestinal stromal tumor), malignant [facility])  Assessment & Plan  Continue outpatient follow-up once acute medical issues resolved      Anxiety  Assessment & Plan  Medical management        Discharging Physician / Practitioner: Handy Hogan MD  PCP: Erica Stratton MD  Admission Date:   Admission Orders (From admission, onward)     Ordered        04/30/20 1824  Inpatient Admission  Once                   Discharge Date: 05/03/20    Resolved Problems  Date Reviewed: 5/2/2020    None          Consultations During Hospital Stay:  · GI    Procedures Performed:   · 5/1/2020 EGD - Dieulafoy's lesion s/p endoclip and epinephrine  ·     Significant Findings / Test Results:   · CXR- No acute cardiopulmonary disease    Incidental Findings:   ·      Test Results Pending at Discharge (will require follow up):   ·      Outpatient Tests Requested:  ·     Complications:      Reason for Admission: Generalized weakness    Hospital Course:     Francisco Price is a 68 y o  female patient with metastatic GIST tumors, anxiety who originally presented to the hospital on 4/30/2020 due to generalized weakness, found to have Hb 3 9 s/p transfusion, EGD showed dieulafoy's lesion s/p epi and endoclip  Hb stable at 7 2, to continue with oral iron tabs and DC ASA  Please see above list of diagnoses and related plan for additional information  Condition at Discharge: stable     Discharge Day Visit / Exam:     Subjective:  No overnight events, eager to go home    Vitals: Blood Pressure: 115/61 (05/03/20 0729)  Pulse: 73 (05/03/20 0729)  Temperature: 97 5 °F (36 4 °C) (05/03/20 0729)  Temp Source: Oral (05/01/20 1455)  Respirations: 20 (05/01/20 2337)  Height: 5' 1" (154 9 cm) (04/30/20 1625)  Weight - Scale: 56 7 kg (125 lb) (04/30/20 1625)  SpO2: 99 % (05/03/20 0729)  Exam:   Physical Exam   Constitutional: She is oriented to person, place, and time  She appears well-developed  No distress  Cardiovascular: Normal rate, regular rhythm, normal heart sounds and intact distal pulses  Exam reveals no friction rub  No murmur heard  Pulmonary/Chest: Effort normal and breath sounds normal  No stridor  No respiratory distress  She has no wheezes  Abdominal: Soft  Bowel sounds are normal  She exhibits no distension  There is no tenderness  There is no guarding  Musculoskeletal: Normal range of motion  She exhibits no edema, tenderness or deformity  Neurological: She is alert and oriented to person, place, and time  No cranial nerve deficit  Coordination normal    Skin: Skin is warm  Capillary refill takes less than 2 seconds  She is not diaphoretic  No erythema  There is pallor  Psychiatric: She has a normal mood and affect  Nursing note and vitals reviewed      Discussion with Family: Discussed with patient, she would update family    Discharge instructions/Information to patient and family:   See after visit summary for information provided to patient and family  Provisions for Follow-Up Care:  See after visit summary for information related to follow-up care and any pertinent home health orders  Disposition:     Home    For Discharges to [facility] SNF:   · Not Applicable to this Patient - Not Applicable to this Patient    Planned Readmission:      Discharge Statement:  I spent 45 minutes discharging the patient  This time was spent on the day of discharge  I had direct contact with the patient on the day of discharge  Greater than 50% of the total time was spent examining patient, answering all patient questions, arranging and discussing plan of care with patient as well as directly providing post-discharge instructions  Additional time then spent on discharge activities  Discharge Medications:  See after visit summary for reconciled discharge medications provided to patient and family        ** Please Note: This note has been constructed using a voice recognition system **

## 2020-05-04 ENCOUNTER — TRANSITIONAL CARE MANAGEMENT (OUTPATIENT)
Dept: FAMILY MEDICINE CLINIC | Facility: HOSPITAL | Age: 70
End: 2020-05-04

## 2020-05-04 ENCOUNTER — TELEPHONE (OUTPATIENT)
Dept: GASTROENTEROLOGY | Facility: CLINIC | Age: 70
End: 2020-05-04

## 2020-05-04 NOTE — UTILIZATION REVIEW
Notification of Discharge  This is a Notification of Discharge from our facility [address]  Please be advised that this patient has been discharge from our facility  Below you will find the admission and discharge date and time including the patients disposition  PRESENTATION DATE: 4/30/2020  3:52 PM  OBS ADMISSION DATE:   IP ADMISSION DATE: 4/30/20 1824   DISCHARGE DATE: 5/3/2020  2:24 PM  DISPOSITION: Home/Self Care Home/Self Care   Admission Orders listed below:  Admission Orders (From admission, onward)     Ordered        04/30/20 1824  Inpatient Admission  Once                   Please contact the UR Department if additional information is required to close this patient's authorization/case  [facility] Utilization Review Department  Main: [phone] x carefully listen to the prompts  All voicemails are confidential   [email or web address]  Send all requests for admission clinical reviews, approved or denied determinations and any other requests to dedicated fax number below belonging to the campus where the patient is receiving treatment   List of dedicated fax numbers:  [address] DENIALS (Administrative/Medical Necessity) [phone]   [address] (Maternity/NICU/Pediatrics) [phone]   Evaristo Calixto [phone]   Jean Mott [phone]   Dania Burden [phone]   Josue Shepard [facility] [address]  [phone]   [facility]  [phone]   [address] [facility] [address]  [address] [address] [address] [facility] [phone]

## 2020-05-05 ENCOUNTER — TELEPHONE (OUTPATIENT)
Dept: FAMILY MEDICINE CLINIC | Facility: HOSPITAL | Age: 70
End: 2020-05-05

## 2020-05-07 ENCOUNTER — TELEPHONE (OUTPATIENT)
Dept: RADIOLOGY | Facility: CLINIC | Age: 70
End: 2020-05-07

## 2020-05-07 ENCOUNTER — APPOINTMENT (OUTPATIENT)
Dept: LAB | Facility: HOSPITAL | Age: 70
End: 2020-05-07
Attending: INTERNAL MEDICINE
Payer: COMMERCIAL

## 2020-05-07 DIAGNOSIS — D64.9 SYMPTOMATIC ANEMIA: ICD-10-CM

## 2020-05-07 LAB
ERYTHROCYTE [DISTWIDTH] IN BLOOD BY AUTOMATED COUNT: 18 % (ref 11.6–15.1)
HCT VFR BLD AUTO: 24.1 % (ref 34.8–46.1)
HGB BLD-MCNC: 7.4 G/DL (ref 11.5–15.4)
MCH RBC QN AUTO: 33 PG (ref 26.8–34.3)
MCHC RBC AUTO-ENTMCNC: 30.7 G/DL (ref 31.4–37.4)
MCV RBC AUTO: 108 FL (ref 82–98)
PLATELET # BLD AUTO: 198 THOUSANDS/UL (ref 149–390)
PMV BLD AUTO: 10.6 FL (ref 8.9–12.7)
RBC # BLD AUTO: 2.24 MILLION/UL (ref 3.81–5.12)
WBC # BLD AUTO: 3.2 THOUSAND/UL (ref 4.31–10.16)

## 2020-05-07 PROCEDURE — 36415 COLL VENOUS BLD VENIPUNCTURE: CPT

## 2020-05-07 PROCEDURE — 85027 COMPLETE CBC AUTOMATED: CPT

## 2020-05-14 ENCOUNTER — TELEMEDICINE (OUTPATIENT)
Dept: FAMILY MEDICINE CLINIC | Facility: HOSPITAL | Age: 70
End: 2020-05-14
Payer: COMMERCIAL

## 2020-05-14 VITALS
HEIGHT: 61 IN | DIASTOLIC BLOOD PRESSURE: 70 MMHG | BODY MASS INDEX: 23.6 KG/M2 | TEMPERATURE: 97.8 F | SYSTOLIC BLOOD PRESSURE: 108 MMHG | WEIGHT: 125 LBS

## 2020-05-14 DIAGNOSIS — C79.9 METASTATIC CANCER (HCC): ICD-10-CM

## 2020-05-14 DIAGNOSIS — C49.A9 MALIGNANT GASTROINTESTINAL STROMAL TUMOR (GIST) OF OTHER SITE (HCC): ICD-10-CM

## 2020-05-14 DIAGNOSIS — G89.29 CHRONIC BILATERAL LOW BACK PAIN WITHOUT SCIATICA: ICD-10-CM

## 2020-05-14 DIAGNOSIS — M54.50 CHRONIC BILATERAL LOW BACK PAIN WITHOUT SCIATICA: ICD-10-CM

## 2020-05-14 DIAGNOSIS — K92.2 UPPER GI BLEED: Primary | ICD-10-CM

## 2020-05-14 DIAGNOSIS — I10 HYPERTENSION, UNSPECIFIED TYPE: ICD-10-CM

## 2020-05-14 DIAGNOSIS — D63.8 ANEMIA OF CHRONIC DISEASE: ICD-10-CM

## 2020-05-14 DIAGNOSIS — I48.0 PAROXYSMAL ATRIAL FIBRILLATION (HCC): ICD-10-CM

## 2020-05-14 PROBLEM — N28.89 PERINEPHRIC FLUID COLLECTION: Status: RESOLVED | Noted: 2020-02-20 | Resolved: 2020-05-14

## 2020-05-14 PROBLEM — R79.89 ELEVATED SERUM CREATININE: Status: RESOLVED | Noted: 2020-04-30 | Resolved: 2020-05-14

## 2020-05-14 PROBLEM — R77.8 ELEVATED TROPONIN: Status: RESOLVED | Noted: 2020-02-20 | Resolved: 2020-05-14

## 2020-05-14 PROBLEM — R79.89 ELEVATED TROPONIN: Status: RESOLVED | Noted: 2020-02-20 | Resolved: 2020-05-14

## 2020-05-14 PROBLEM — D64.9 SYMPTOMATIC ANEMIA: Status: RESOLVED | Noted: 2017-09-18 | Resolved: 2020-05-14

## 2020-05-14 PROCEDURE — 99443 PR PHYS/QHP TELEPHONE EVALUATION 21-30 MIN: CPT | Performed by: INTERNAL MEDICINE

## 2020-05-20 ENCOUNTER — TELEMEDICINE (OUTPATIENT)
Dept: PAIN MEDICINE | Facility: CLINIC | Age: 70
End: 2020-05-20
Payer: COMMERCIAL

## 2020-05-20 DIAGNOSIS — M48.062 SPINAL STENOSIS OF LUMBAR REGION WITH NEUROGENIC CLAUDICATION: ICD-10-CM

## 2020-05-20 DIAGNOSIS — M54.16 LUMBAR RADICULOPATHY: ICD-10-CM

## 2020-05-20 DIAGNOSIS — G89.4 PAIN SYNDROME, CHRONIC: Primary | ICD-10-CM

## 2020-05-20 DIAGNOSIS — G89.29 CHRONIC BILATERAL LOW BACK PAIN WITHOUT SCIATICA: ICD-10-CM

## 2020-05-20 DIAGNOSIS — M54.50 CHRONIC BILATERAL LOW BACK PAIN WITHOUT SCIATICA: ICD-10-CM

## 2020-05-20 DIAGNOSIS — M47.816 SPONDYLOSIS OF LUMBAR REGION WITHOUT MYELOPATHY OR RADICULOPATHY: ICD-10-CM

## 2020-05-20 PROCEDURE — 99441 PR PHYS/QHP TELEPHONE EVALUATION 5-10 MIN: CPT | Performed by: NURSE PRACTITIONER

## 2020-05-20 RX ORDER — TRAMADOL HYDROCHLORIDE 50 MG/1
50 TABLET ORAL EVERY 8 HOURS PRN
Qty: 30 TABLET | Refills: 0 | Status: SHIPPED | OUTPATIENT
Start: 2020-05-20 | End: 2020-10-09 | Stop reason: SDUPTHER

## 2020-05-22 ENCOUNTER — TELEPHONE (OUTPATIENT)
Dept: FAMILY MEDICINE CLINIC | Facility: HOSPITAL | Age: 70
End: 2020-05-22

## 2020-06-01 ENCOUNTER — TELEPHONE (OUTPATIENT)
Dept: PAIN MEDICINE | Facility: CLINIC | Age: 70
End: 2020-06-01

## 2020-06-01 ENCOUNTER — HOSPITAL ENCOUNTER (OUTPATIENT)
Dept: RADIOLOGY | Facility: CLINIC | Age: 70
Discharge: HOME | End: 2020-06-01
Attending: ANESTHESIOLOGY

## 2020-06-01 DIAGNOSIS — M48.062 SPINAL STENOSIS, LUMBAR REGION, WITH NEUROGENIC CLAUDICATION: ICD-10-CM

## 2020-06-01 DIAGNOSIS — M54.16 LUMBAR RADICULOPATHY: ICD-10-CM

## 2020-06-02 ENCOUNTER — APPOINTMENT (OUTPATIENT)
Dept: LAB | Facility: HOSPITAL | Age: 70
End: 2020-06-02
Attending: INTERNAL MEDICINE
Payer: COMMERCIAL

## 2020-06-02 ENCOUNTER — TELEPHONE (OUTPATIENT)
Dept: OTHER | Facility: OTHER | Age: 70
End: 2020-06-02

## 2020-06-02 ENCOUNTER — HOSPITAL ENCOUNTER (INPATIENT)
Facility: HOSPITAL | Age: 70
LOS: 3 days | Discharge: HOME | DRG: 378 | End: 2020-06-05
Attending: EMERGENCY MEDICINE | Admitting: INTERNAL MEDICINE
Payer: COMMERCIAL

## 2020-06-02 ENCOUNTER — APPOINTMENT (EMERGENCY)
Dept: RADIOLOGY | Facility: HOSPITAL | Age: 70
DRG: 378 | End: 2020-06-02
Payer: COMMERCIAL

## 2020-06-02 DIAGNOSIS — N17.9 AKI (ACUTE KIDNEY INJURY) (HCC): ICD-10-CM

## 2020-06-02 DIAGNOSIS — C49.A9 MALIGNANT GASTROINTESTINAL STROMAL TUMOR (GIST) OF OTHER SITE (HCC): ICD-10-CM

## 2020-06-02 DIAGNOSIS — I10 HYPERTENSION, UNSPECIFIED TYPE: ICD-10-CM

## 2020-06-02 DIAGNOSIS — R77.8 ELEVATED TROPONIN: ICD-10-CM

## 2020-06-02 DIAGNOSIS — D64.9 SYMPTOMATIC ANEMIA: Primary | ICD-10-CM

## 2020-06-02 DIAGNOSIS — R79.89 ELEVATED LACTIC ACID LEVEL: ICD-10-CM

## 2020-06-02 DIAGNOSIS — K92.2 UPPER GI BLEED: ICD-10-CM

## 2020-06-02 DIAGNOSIS — D62 ACUTE BLOOD LOSS ANEMIA: ICD-10-CM

## 2020-06-02 LAB
ABO GROUP BLD: NORMAL
ALBUMIN SERPL BCP-MCNC: 2.8 G/DL (ref 3.5–5)
ALBUMIN SERPL BCP-MCNC: 3 G/DL (ref 3.5–5)
ALP SERPL-CCNC: 52 U/L (ref 46–116)
ALP SERPL-CCNC: 55 U/L (ref 46–116)
ALT SERPL W P-5'-P-CCNC: 25 U/L (ref 12–78)
ALT SERPL W P-5'-P-CCNC: 25 U/L (ref 12–78)
ANION GAP SERPL CALCULATED.3IONS-SCNC: 10 MMOL/L (ref 4–13)
ANION GAP SERPL CALCULATED.3IONS-SCNC: 13 MMOL/L (ref 4–13)
APTT PPP: 28 SECONDS (ref 23–37)
AST SERPL W P-5'-P-CCNC: 36 U/L (ref 5–45)
AST SERPL W P-5'-P-CCNC: 44 U/L (ref 5–45)
BASOPHILS # BLD AUTO: 0 THOUSANDS/ΜL (ref 0–0.1)
BASOPHILS # BLD AUTO: 0.01 THOUSANDS/ΜL (ref 0–0.1)
BASOPHILS NFR BLD AUTO: 0 % (ref 0–1)
BASOPHILS NFR BLD AUTO: 0 % (ref 0–1)
BILIRUB SERPL-MCNC: 0.27 MG/DL (ref 0.2–1)
BILIRUB SERPL-MCNC: 0.3 MG/DL (ref 0.2–1)
BLD GP AB SCN SERPL QL: NEGATIVE
BUN SERPL-MCNC: 33 MG/DL (ref 5–25)
BUN SERPL-MCNC: 34 MG/DL (ref 5–25)
CALCIUM SERPL-MCNC: 7.9 MG/DL (ref 8.3–10.1)
CALCIUM SERPL-MCNC: 8 MG/DL (ref 8.3–10.1)
CHLORIDE SERPL-SCNC: 105 MMOL/L (ref 100–108)
CHLORIDE SERPL-SCNC: 110 MMOL/L (ref 100–108)
CO2 SERPL-SCNC: 18 MMOL/L (ref 21–32)
CO2 SERPL-SCNC: 19 MMOL/L (ref 21–32)
CREAT SERPL-MCNC: 1.79 MG/DL (ref 0.6–1.3)
CREAT SERPL-MCNC: 2.05 MG/DL (ref 0.6–1.3)
EOSINOPHIL # BLD AUTO: 0.01 THOUSAND/ΜL (ref 0–0.61)
EOSINOPHIL # BLD AUTO: 0.03 THOUSAND/ΜL (ref 0–0.61)
EOSINOPHIL NFR BLD AUTO: 0 % (ref 0–6)
EOSINOPHIL NFR BLD AUTO: 1 % (ref 0–6)
ERYTHROCYTE [DISTWIDTH] IN BLOOD BY AUTOMATED COUNT: 16.6 % (ref 11.6–15.1)
ERYTHROCYTE [DISTWIDTH] IN BLOOD BY AUTOMATED COUNT: 16.8 % (ref 11.6–15.1)
GFR SERPL CREATININE-BSD FRML MDRD: 24 ML/MIN/1.73SQ M
GFR SERPL CREATININE-BSD FRML MDRD: 28 ML/MIN/1.73SQ M
GLUCOSE P FAST SERPL-MCNC: 106 MG/DL (ref 65–99)
GLUCOSE SERPL-MCNC: 134 MG/DL (ref 65–140)
HCT VFR BLD AUTO: 10.9 % (ref 34.8–46.1)
HCT VFR BLD AUTO: 9.5 % (ref 34.8–46.1)
HGB BLD-MCNC: 2.8 G/DL (ref 11.5–15.4)
HGB BLD-MCNC: 3.2 G/DL (ref 11.5–15.4)
IMM GRANULOCYTES # BLD AUTO: 0.04 THOUSAND/UL (ref 0–0.2)
IMM GRANULOCYTES # BLD AUTO: 0.08 THOUSAND/UL (ref 0–0.2)
IMM GRANULOCYTES NFR BLD AUTO: 1 % (ref 0–2)
IMM GRANULOCYTES NFR BLD AUTO: 2 % (ref 0–2)
INR PPP: 1.28 (ref 0.84–1.19)
LACTATE SERPL-SCNC: 0.7 MMOL/L (ref 0.5–2)
LACTATE SERPL-SCNC: 2.9 MMOL/L (ref 0.5–2)
LYMPHOCYTES # BLD AUTO: 0.22 THOUSANDS/ΜL (ref 0.6–4.47)
LYMPHOCYTES # BLD AUTO: 0.35 THOUSANDS/ΜL (ref 0.6–4.47)
LYMPHOCYTES NFR BLD AUTO: 6 % (ref 14–44)
LYMPHOCYTES NFR BLD AUTO: 8 % (ref 14–44)
MCH RBC QN AUTO: 31.1 PG (ref 26.8–34.3)
MCH RBC QN AUTO: 32.3 PG (ref 26.8–34.3)
MCHC RBC AUTO-ENTMCNC: 29.4 G/DL (ref 31.4–37.4)
MCHC RBC AUTO-ENTMCNC: 29.5 G/DL (ref 31.4–37.4)
MCV RBC AUTO: 106 FL (ref 82–98)
MCV RBC AUTO: 110 FL (ref 82–98)
MONOCYTES # BLD AUTO: 0.23 THOUSAND/ΜL (ref 0.17–1.22)
MONOCYTES # BLD AUTO: 0.33 THOUSAND/ΜL (ref 0.17–1.22)
MONOCYTES NFR BLD AUTO: 7 % (ref 4–12)
MONOCYTES NFR BLD AUTO: 8 % (ref 4–12)
NEUTROPHILS # BLD AUTO: 2.96 THOUSANDS/ΜL (ref 1.85–7.62)
NEUTROPHILS # BLD AUTO: 3.61 THOUSANDS/ΜL (ref 1.85–7.62)
NEUTS SEG NFR BLD AUTO: 81 % (ref 43–75)
NEUTS SEG NFR BLD AUTO: 86 % (ref 43–75)
NRBC BLD AUTO-RTO: 1 /100 WBCS
PLATELET # BLD AUTO: 155 THOUSANDS/UL (ref 149–390)
PLATELET # BLD AUTO: 170 THOUSANDS/UL (ref 149–390)
PMV BLD AUTO: 10.5 FL (ref 8.9–12.7)
PMV BLD AUTO: 11 FL (ref 8.9–12.7)
POTASSIUM SERPL-SCNC: 3.9 MMOL/L (ref 3.5–5.3)
POTASSIUM SERPL-SCNC: 4 MMOL/L (ref 3.5–5.3)
PROT SERPL-MCNC: 5.5 G/DL (ref 6.4–8.2)
PROT SERPL-MCNC: 5.7 G/DL (ref 6.4–8.2)
PROTHROMBIN TIME: 15.7 SECONDS (ref 11.6–14.5)
RBC # BLD AUTO: 0.9 MILLION/UL (ref 3.81–5.12)
RBC # BLD AUTO: 0.99 MILLION/UL (ref 3.81–5.12)
RH BLD: POSITIVE
SODIUM SERPL-SCNC: 137 MMOL/L (ref 136–145)
SODIUM SERPL-SCNC: 138 MMOL/L (ref 136–145)
SPECIMEN EXPIRATION DATE: NORMAL
TROPONIN I SERPL-MCNC: 0.1 NG/ML
WBC # BLD AUTO: 3.47 THOUSAND/UL (ref 4.31–10.16)
WBC # BLD AUTO: 4.4 THOUSAND/UL (ref 4.31–10.16)

## 2020-06-02 PROCEDURE — C9113 INJ PANTOPRAZOLE SODIUM, VIA: HCPCS | Performed by: PHYSICIAN ASSISTANT

## 2020-06-02 PROCEDURE — 36430 TRANSFUSION BLD/BLD COMPNT: CPT

## 2020-06-02 PROCEDURE — 85025 COMPLETE CBC W/AUTO DIFF WBC: CPT

## 2020-06-02 PROCEDURE — 96374 THER/PROPH/DIAG INJ IV PUSH: CPT

## 2020-06-02 PROCEDURE — 86923 COMPATIBILITY TEST ELECTRIC: CPT

## 2020-06-02 PROCEDURE — 93005 ELECTROCARDIOGRAM TRACING: CPT

## 2020-06-02 PROCEDURE — 86900 BLOOD TYPING SEROLOGIC ABO: CPT | Performed by: PHYSICIAN ASSISTANT

## 2020-06-02 PROCEDURE — P9058 RBC, L/R, CMV-NEG, IRRAD: HCPCS

## 2020-06-02 PROCEDURE — 36415 COLL VENOUS BLD VENIPUNCTURE: CPT

## 2020-06-02 PROCEDURE — 85610 PROTHROMBIN TIME: CPT | Performed by: PHYSICIAN ASSISTANT

## 2020-06-02 PROCEDURE — 86901 BLOOD TYPING SEROLOGIC RH(D): CPT | Performed by: PHYSICIAN ASSISTANT

## 2020-06-02 PROCEDURE — 99223 1ST HOSP IP/OBS HIGH 75: CPT | Performed by: NURSE PRACTITIONER

## 2020-06-02 PROCEDURE — 83605 ASSAY OF LACTIC ACID: CPT | Performed by: PHYSICIAN ASSISTANT

## 2020-06-02 PROCEDURE — 99285 EMERGENCY DEPT VISIT HI MDM: CPT

## 2020-06-02 PROCEDURE — 99285 EMERGENCY DEPT VISIT HI MDM: CPT | Performed by: PHYSICIAN ASSISTANT

## 2020-06-02 PROCEDURE — 80053 COMPREHEN METABOLIC PANEL: CPT | Performed by: PHYSICIAN ASSISTANT

## 2020-06-02 PROCEDURE — 80053 COMPREHEN METABOLIC PANEL: CPT

## 2020-06-02 PROCEDURE — 71045 X-RAY EXAM CHEST 1 VIEW: CPT

## 2020-06-02 PROCEDURE — 84484 ASSAY OF TROPONIN QUANT: CPT | Performed by: PHYSICIAN ASSISTANT

## 2020-06-02 PROCEDURE — 86850 RBC ANTIBODY SCREEN: CPT | Performed by: PHYSICIAN ASSISTANT

## 2020-06-02 PROCEDURE — 85730 THROMBOPLASTIN TIME PARTIAL: CPT | Performed by: PHYSICIAN ASSISTANT

## 2020-06-02 PROCEDURE — 85025 COMPLETE CBC W/AUTO DIFF WBC: CPT | Performed by: PHYSICIAN ASSISTANT

## 2020-06-02 PROCEDURE — 30233N1 TRANSFUSION OF NONAUTOLOGOUS RED BLOOD CELLS INTO PERIPHERAL VEIN, PERCUTANEOUS APPROACH: ICD-10-PCS | Performed by: EMERGENCY MEDICINE

## 2020-06-02 RX ORDER — PANTOPRAZOLE SODIUM 40 MG/1
40 INJECTION, POWDER, FOR SOLUTION INTRAVENOUS 2 TIMES DAILY
Status: DISCONTINUED | OUTPATIENT
Start: 2020-06-02 | End: 2020-06-03

## 2020-06-02 RX ORDER — DIPHENOXYLATE HYDROCHLORIDE AND ATROPINE SULFATE 2.5; .025 MG/1; MG/1
1 TABLET ORAL 4 TIMES DAILY PRN
Status: DISCONTINUED | OUTPATIENT
Start: 2020-06-02 | End: 2020-06-05 | Stop reason: HOSPADM

## 2020-06-02 RX ORDER — CALCIUM CARBONATE 200(500)MG
500 TABLET,CHEWABLE ORAL DAILY PRN
Status: DISCONTINUED | OUTPATIENT
Start: 2020-06-02 | End: 2020-06-05 | Stop reason: HOSPADM

## 2020-06-02 RX ORDER — HYDROXYCHLOROQUINE SULFATE 200 MG/1
200 TABLET, FILM COATED ORAL
Status: DISCONTINUED | OUTPATIENT
Start: 2020-06-03 | End: 2020-06-05 | Stop reason: HOSPADM

## 2020-06-02 RX ORDER — ONDANSETRON 2 MG/ML
4 INJECTION INTRAMUSCULAR; INTRAVENOUS EVERY 6 HOURS PRN
Status: DISCONTINUED | OUTPATIENT
Start: 2020-06-02 | End: 2020-06-05 | Stop reason: HOSPADM

## 2020-06-02 RX ORDER — FUROSEMIDE 20 MG/1
20 TABLET ORAL DAILY
Status: DISCONTINUED | OUTPATIENT
Start: 2020-06-03 | End: 2020-06-04

## 2020-06-02 RX ORDER — DIPHENHYDRAMINE HCL 25 MG
25 TABLET ORAL EVERY 6 HOURS PRN
Status: DISCONTINUED | OUTPATIENT
Start: 2020-06-02 | End: 2020-06-05 | Stop reason: HOSPADM

## 2020-06-02 RX ORDER — FLUTICASONE PROPIONATE 50 MCG
2 SPRAY, SUSPENSION (ML) NASAL
Status: DISCONTINUED | OUTPATIENT
Start: 2020-06-02 | End: 2020-06-05 | Stop reason: HOSPADM

## 2020-06-02 RX ORDER — ACETAMINOPHEN 325 MG/1
650 TABLET ORAL EVERY 6 HOURS PRN
Status: DISCONTINUED | OUTPATIENT
Start: 2020-06-02 | End: 2020-06-05 | Stop reason: HOSPADM

## 2020-06-02 RX ORDER — FERROUS SULFATE 325(65) MG
325 TABLET ORAL
Status: DISCONTINUED | OUTPATIENT
Start: 2020-06-03 | End: 2020-06-05 | Stop reason: HOSPADM

## 2020-06-02 RX ORDER — TRIAMCINOLONE ACETONIDE 55 UG/1
1 SPRAY, METERED NASAL
Status: DISCONTINUED | OUTPATIENT
Start: 2020-06-02 | End: 2020-06-02 | Stop reason: CLARIF

## 2020-06-02 RX ORDER — METOPROLOL SUCCINATE 25 MG/1
25 TABLET, EXTENDED RELEASE ORAL DAILY
Status: DISCONTINUED | OUTPATIENT
Start: 2020-06-03 | End: 2020-06-04

## 2020-06-02 RX ORDER — AZELASTINE 1 MG/ML
1 SPRAY, METERED NASAL 2 TIMES DAILY
Status: DISCONTINUED | OUTPATIENT
Start: 2020-06-03 | End: 2020-06-05 | Stop reason: HOSPADM

## 2020-06-02 RX ADMIN — SODIUM CHLORIDE 80 MG: 9 INJECTION, SOLUTION INTRAVENOUS at 19:37

## 2020-06-03 ENCOUNTER — ANESTHESIA (INPATIENT)
Dept: GASTROENTEROLOGY | Facility: HOSPITAL | Age: 70
DRG: 378 | End: 2020-06-03
Payer: COMMERCIAL

## 2020-06-03 ENCOUNTER — APPOINTMENT (INPATIENT)
Dept: GASTROENTEROLOGY | Facility: HOSPITAL | Age: 70
DRG: 378 | End: 2020-06-03
Payer: COMMERCIAL

## 2020-06-03 ENCOUNTER — ANESTHESIA EVENT (INPATIENT)
Dept: GASTROENTEROLOGY | Facility: HOSPITAL | Age: 70
DRG: 378 | End: 2020-06-03
Payer: COMMERCIAL

## 2020-06-03 PROBLEM — E87.20 LACTIC ACIDOSIS: Status: ACTIVE | Noted: 2020-06-02

## 2020-06-03 PROBLEM — E87.2 LACTIC ACIDOSIS: Status: ACTIVE | Noted: 2020-06-02

## 2020-06-03 LAB
ABO GROUP BLD BPU: NORMAL
ANION GAP SERPL CALCULATED.3IONS-SCNC: 11 MMOL/L (ref 4–13)
ATRIAL RATE: 113 BPM
BPU ID: NORMAL
BUN SERPL-MCNC: 29 MG/DL (ref 5–25)
CALCIUM SERPL-MCNC: 7.8 MG/DL (ref 8.3–10.1)
CHLORIDE SERPL-SCNC: 107 MMOL/L (ref 100–108)
CO2 SERPL-SCNC: 21 MMOL/L (ref 21–32)
CREAT SERPL-MCNC: 1.74 MG/DL (ref 0.6–1.3)
CROSSMATCH: NORMAL
GFR SERPL CREATININE-BSD FRML MDRD: 29 ML/MIN/1.73SQ M
GLUCOSE SERPL-MCNC: 82 MG/DL (ref 65–140)
HGB BLD-MCNC: 6 G/DL (ref 11.5–15.4)
HGB BLD-MCNC: 7 G/DL (ref 11.5–15.4)
LACTATE SERPL-SCNC: 0.6 MMOL/L (ref 0.5–2)
P AXIS: 83 DEGREES
POTASSIUM SERPL-SCNC: 3.9 MMOL/L (ref 3.5–5.3)
PR INTERVAL: 206 MS
QRS AXIS: -30 DEGREES
QRSD INTERVAL: 120 MS
QT INTERVAL: 362 MS
QTC INTERVAL: 496 MS
SARS-COV-2 RNA RESP QL NAA+PROBE: NEGATIVE
SODIUM SERPL-SCNC: 139 MMOL/L (ref 136–145)
T WAVE AXIS: 41 DEGREES
UNIT DISPENSE STATUS: NORMAL
UNIT PRODUCT CODE: NORMAL
UNIT RH: NORMAL
VENTRICULAR RATE: 113 BPM

## 2020-06-03 PROCEDURE — 99232 SBSQ HOSP IP/OBS MODERATE 35: CPT | Performed by: INTERNAL MEDICINE

## 2020-06-03 PROCEDURE — 0W3P8ZZ CONTROL BLEEDING IN GASTROINTESTINAL TRACT, VIA NATURAL OR ARTIFICIAL OPENING ENDOSCOPIC: ICD-10-PCS | Performed by: INTERNAL MEDICINE

## 2020-06-03 PROCEDURE — 85018 HEMOGLOBIN: CPT | Performed by: INTERNAL MEDICINE

## 2020-06-03 PROCEDURE — C9113 INJ PANTOPRAZOLE SODIUM, VIA: HCPCS | Performed by: INTERNAL MEDICINE

## 2020-06-03 PROCEDURE — C9113 INJ PANTOPRAZOLE SODIUM, VIA: HCPCS | Performed by: NURSE PRACTITIONER

## 2020-06-03 PROCEDURE — P9058 RBC, L/R, CMV-NEG, IRRAD: HCPCS

## 2020-06-03 PROCEDURE — 85018 HEMOGLOBIN: CPT | Performed by: NURSE PRACTITIONER

## 2020-06-03 PROCEDURE — 80048 BASIC METABOLIC PNL TOTAL CA: CPT | Performed by: NURSE PRACTITIONER

## 2020-06-03 PROCEDURE — 99223 1ST HOSP IP/OBS HIGH 75: CPT | Performed by: INTERNAL MEDICINE

## 2020-06-03 PROCEDURE — 3E0G8GC INTRODUCTION OF OTHER THERAPEUTIC SUBSTANCE INTO UPPER GI, VIA NATURAL OR ARTIFICIAL OPENING ENDOSCOPIC: ICD-10-PCS | Performed by: INTERNAL MEDICINE

## 2020-06-03 PROCEDURE — 83605 ASSAY OF LACTIC ACID: CPT | Performed by: NURSE PRACTITIONER

## 2020-06-03 PROCEDURE — 93010 ELECTROCARDIOGRAM REPORT: CPT | Performed by: INTERNAL MEDICINE

## 2020-06-03 PROCEDURE — 43255 EGD CONTROL BLEEDING ANY: CPT | Performed by: INTERNAL MEDICINE

## 2020-06-03 PROCEDURE — 87635 SARS-COV-2 COVID-19 AMP PRB: CPT | Performed by: NURSE PRACTITIONER

## 2020-06-03 PROCEDURE — 30233N1 TRANSFUSION OF NONAUTOLOGOUS RED BLOOD CELLS INTO PERIPHERAL VEIN, PERCUTANEOUS APPROACH: ICD-10-PCS | Performed by: EMERGENCY MEDICINE

## 2020-06-03 RX ORDER — SODIUM CHLORIDE 9 MG/ML
INJECTION, SOLUTION INTRAVENOUS CONTINUOUS PRN
Status: DISCONTINUED | OUTPATIENT
Start: 2020-06-03 | End: 2020-06-03 | Stop reason: SURG

## 2020-06-03 RX ORDER — EPINEPHRINE 0.1 MG/ML
SYRINGE (ML) INJECTION CODE/TRAUMA/SEDATION MEDICATION
Status: DISCONTINUED | OUTPATIENT
Start: 2020-06-03 | End: 2020-06-05 | Stop reason: HOSPADM

## 2020-06-03 RX ORDER — PROPOFOL 10 MG/ML
INJECTION, EMULSION INTRAVENOUS AS NEEDED
Status: DISCONTINUED | OUTPATIENT
Start: 2020-06-03 | End: 2020-06-03 | Stop reason: SURG

## 2020-06-03 RX ORDER — PANTOPRAZOLE SODIUM 40 MG/1
40 INJECTION, POWDER, FOR SOLUTION INTRAVENOUS 2 TIMES DAILY
Status: DISCONTINUED | OUTPATIENT
Start: 2020-06-03 | End: 2020-06-05 | Stop reason: HOSPADM

## 2020-06-03 RX ADMIN — FERROUS SULFATE TAB 325 MG (65 MG ELEMENTAL FE) 325 MG: 325 (65 FE) TAB at 08:32

## 2020-06-03 RX ADMIN — EPINEPHRINE 0.04 MG: 0.1 INJECTION, SOLUTION ENDOTRACHEAL; INTRACARDIAC; INTRAVENOUS at 12:23

## 2020-06-03 RX ADMIN — PANTOPRAZOLE SODIUM 40 MG: 40 INJECTION, POWDER, FOR SOLUTION INTRAVENOUS at 18:19

## 2020-06-03 RX ADMIN — METOPROLOL SUCCINATE 25 MG: 25 TABLET, EXTENDED RELEASE ORAL at 08:32

## 2020-06-03 RX ADMIN — AZELASTINE HYDROCHLORIDE 1 SPRAY: 137 SPRAY, METERED NASAL at 18:17

## 2020-06-03 RX ADMIN — HYDROXYCHLOROQUINE SULFATE 200 MG: 200 TABLET, FILM COATED ORAL at 05:01

## 2020-06-03 RX ADMIN — PROPOFOL 50 MG: 10 INJECTION, EMULSION INTRAVENOUS at 12:17

## 2020-06-03 RX ADMIN — FUROSEMIDE 20 MG: 20 TABLET ORAL at 08:31

## 2020-06-03 RX ADMIN — PROPOFOL 100 MG: 10 INJECTION, EMULSION INTRAVENOUS at 12:14

## 2020-06-03 RX ADMIN — PANTOPRAZOLE SODIUM 40 MG: 40 INJECTION, POWDER, FOR SOLUTION INTRAVENOUS at 08:31

## 2020-06-03 RX ADMIN — SODIUM CHLORIDE: 0.9 INJECTION, SOLUTION INTRAVENOUS at 12:06

## 2020-06-03 RX ADMIN — FLUTICASONE PROPIONATE 2 SPRAY: 50 SPRAY, METERED NASAL at 23:02

## 2020-06-03 RX ADMIN — PANTOPRAZOLE SODIUM 40 MG: 40 INJECTION, POWDER, FOR SOLUTION INTRAVENOUS at 00:33

## 2020-06-04 PROBLEM — N17.9 ACUTE KIDNEY FAILURE (HCC): Status: RESOLVED | Noted: 2020-01-05 | Resolved: 2020-06-04

## 2020-06-04 PROBLEM — K31.819 GASTRIC AVM: Status: ACTIVE | Noted: 2020-06-04

## 2020-06-04 PROBLEM — K25.0 ACUTE GASTRIC ULCER WITH HEMORRHAGE: Status: ACTIVE | Noted: 2020-06-04

## 2020-06-04 LAB
ALBUMIN SERPL BCP-MCNC: 2.5 G/DL (ref 3.5–5)
ALP SERPL-CCNC: 53 U/L (ref 46–116)
ALT SERPL W P-5'-P-CCNC: 19 U/L (ref 12–78)
ANION GAP SERPL CALCULATED.3IONS-SCNC: 5 MMOL/L (ref 4–13)
AST SERPL W P-5'-P-CCNC: 35 U/L (ref 5–45)
ATRIAL RATE: 64 BPM
BILIRUB SERPL-MCNC: 0.8 MG/DL (ref 0.2–1)
BUN SERPL-MCNC: 18 MG/DL (ref 5–25)
CALCIUM SERPL-MCNC: 8 MG/DL (ref 8.3–10.1)
CHLORIDE SERPL-SCNC: 106 MMOL/L (ref 100–108)
CO2 SERPL-SCNC: 27 MMOL/L (ref 21–32)
CREAT SERPL-MCNC: 1.54 MG/DL (ref 0.6–1.3)
ERYTHROCYTE [DISTWIDTH] IN BLOOD BY AUTOMATED COUNT: 19.3 % (ref 11.6–15.1)
GFR SERPL CREATININE-BSD FRML MDRD: 34 ML/MIN/1.73SQ M
GLUCOSE SERPL-MCNC: 81 MG/DL (ref 65–140)
HCT VFR BLD AUTO: 22.5 % (ref 34.8–46.1)
HCT VFR BLD AUTO: 23.7 % (ref 34.8–46.1)
HGB BLD-MCNC: 6.8 G/DL (ref 11.5–15.4)
HGB BLD-MCNC: 7.5 G/DL (ref 11.5–15.4)
HGB BLD-MCNC: 7.8 G/DL (ref 11.5–15.4)
LACTATE SERPL-SCNC: 1.3 MMOL/L (ref 0.5–2)
MCH RBC QN AUTO: 30.4 PG (ref 26.8–34.3)
MCHC RBC AUTO-ENTMCNC: 32.9 G/DL (ref 31.4–37.4)
MCV RBC AUTO: 92 FL (ref 82–98)
P AXIS: 37 DEGREES
PLATELET # BLD AUTO: 143 THOUSANDS/UL (ref 149–390)
PMV BLD AUTO: 10.8 FL (ref 8.9–12.7)
POTASSIUM SERPL-SCNC: 3.5 MMOL/L (ref 3.5–5.3)
PR INTERVAL: 174 MS
PROT SERPL-MCNC: 5.1 G/DL (ref 6.4–8.2)
QRS AXIS: -44 DEGREES
QRSD INTERVAL: 126 MS
QT INTERVAL: 472 MS
QTC INTERVAL: 486 MS
RBC # BLD AUTO: 2.57 MILLION/UL (ref 3.81–5.12)
SODIUM SERPL-SCNC: 138 MMOL/L (ref 136–145)
T WAVE AXIS: 9 DEGREES
VENTRICULAR RATE: 64 BPM
WBC # BLD AUTO: 3.36 THOUSAND/UL (ref 4.31–10.16)

## 2020-06-04 PROCEDURE — 99232 SBSQ HOSP IP/OBS MODERATE 35: CPT | Performed by: INTERNAL MEDICINE

## 2020-06-04 PROCEDURE — 86644 CMV ANTIBODY: CPT

## 2020-06-04 PROCEDURE — 80053 COMPREHEN METABOLIC PANEL: CPT | Performed by: INTERNAL MEDICINE

## 2020-06-04 PROCEDURE — P9058 RBC, L/R, CMV-NEG, IRRAD: HCPCS

## 2020-06-04 PROCEDURE — 85018 HEMOGLOBIN: CPT | Performed by: INTERNAL MEDICINE

## 2020-06-04 PROCEDURE — 93005 ELECTROCARDIOGRAM TRACING: CPT

## 2020-06-04 PROCEDURE — 93010 ELECTROCARDIOGRAM REPORT: CPT | Performed by: INTERNAL MEDICINE

## 2020-06-04 PROCEDURE — 85018 HEMOGLOBIN: CPT | Performed by: NURSE PRACTITIONER

## 2020-06-04 PROCEDURE — C9113 INJ PANTOPRAZOLE SODIUM, VIA: HCPCS | Performed by: INTERNAL MEDICINE

## 2020-06-04 PROCEDURE — 85027 COMPLETE CBC AUTOMATED: CPT | Performed by: INTERNAL MEDICINE

## 2020-06-04 PROCEDURE — 83605 ASSAY OF LACTIC ACID: CPT | Performed by: INTERNAL MEDICINE

## 2020-06-04 PROCEDURE — 85014 HEMATOCRIT: CPT | Performed by: NURSE PRACTITIONER

## 2020-06-04 RX ORDER — METOPROLOL SUCCINATE 25 MG/1
12.5 TABLET, EXTENDED RELEASE ORAL DAILY
Status: DISCONTINUED | OUTPATIENT
Start: 2020-06-05 | End: 2020-06-05 | Stop reason: HOSPADM

## 2020-06-04 RX ADMIN — AZELASTINE HYDROCHLORIDE 1 SPRAY: 137 SPRAY, METERED NASAL at 17:28

## 2020-06-04 RX ADMIN — HYDROXYCHLOROQUINE SULFATE 200 MG: 200 TABLET, FILM COATED ORAL at 05:22

## 2020-06-04 RX ADMIN — PANTOPRAZOLE SODIUM 40 MG: 40 INJECTION, POWDER, FOR SOLUTION INTRAVENOUS at 09:22

## 2020-06-04 RX ADMIN — PANTOPRAZOLE SODIUM 40 MG: 40 INJECTION, POWDER, FOR SOLUTION INTRAVENOUS at 17:27

## 2020-06-04 RX ADMIN — FERROUS SULFATE TAB 325 MG (65 MG ELEMENTAL FE) 325 MG: 325 (65 FE) TAB at 09:27

## 2020-06-04 RX ADMIN — AZELASTINE HYDROCHLORIDE 1 SPRAY: 137 SPRAY, METERED NASAL at 09:28

## 2020-06-04 RX ADMIN — FLUTICASONE PROPIONATE 2 SPRAY: 50 SPRAY, METERED NASAL at 21:47

## 2020-06-05 VITALS
BODY MASS INDEX: 23.79 KG/M2 | RESPIRATION RATE: 20 BRPM | HEIGHT: 61 IN | OXYGEN SATURATION: 99 % | HEART RATE: 69 BPM | WEIGHT: 126 LBS | TEMPERATURE: 98.6 F | DIASTOLIC BLOOD PRESSURE: 64 MMHG | SYSTOLIC BLOOD PRESSURE: 122 MMHG

## 2020-06-05 LAB
ABO GROUP BLD BPU: NORMAL
ABO GROUP BLD BPU: NORMAL
BPU ID: NORMAL
BPU ID: NORMAL
CROSSMATCH: NORMAL
CROSSMATCH: NORMAL
HCT VFR BLD AUTO: 24.6 % (ref 34.8–46.1)
HGB BLD-MCNC: 10.4 G/DL (ref 11.5–15.4)
HGB BLD-MCNC: 8.2 G/DL (ref 11.5–15.4)
HGB BLD-MCNC: 8.5 G/DL (ref 11.5–15.4)
UNIT DISPENSE STATUS: NORMAL
UNIT DISPENSE STATUS: NORMAL
UNIT PRODUCT CODE: NORMAL
UNIT PRODUCT CODE: NORMAL
UNIT RH: NORMAL
UNIT RH: NORMAL

## 2020-06-05 PROCEDURE — 85014 HEMATOCRIT: CPT | Performed by: NURSE PRACTITIONER

## 2020-06-05 PROCEDURE — 99238 HOSP IP/OBS DSCHRG MGMT 30/<: CPT | Performed by: INTERNAL MEDICINE

## 2020-06-05 PROCEDURE — 85018 HEMOGLOBIN: CPT | Performed by: NURSE PRACTITIONER

## 2020-06-05 PROCEDURE — C9113 INJ PANTOPRAZOLE SODIUM, VIA: HCPCS | Performed by: INTERNAL MEDICINE

## 2020-06-05 PROCEDURE — 85018 HEMOGLOBIN: CPT | Performed by: INTERNAL MEDICINE

## 2020-06-05 PROCEDURE — 99232 SBSQ HOSP IP/OBS MODERATE 35: CPT | Performed by: INTERNAL MEDICINE

## 2020-06-05 RX ORDER — TRAMADOL HYDROCHLORIDE 50 MG/1
50 TABLET ORAL EVERY 6 HOURS PRN
Status: DISCONTINUED | OUTPATIENT
Start: 2020-06-05 | End: 2020-06-05 | Stop reason: HOSPADM

## 2020-06-05 RX ORDER — METOPROLOL SUCCINATE 25 MG/1
12.5 TABLET, EXTENDED RELEASE ORAL DAILY
Refills: 0 | Status: ON HOLD
Start: 2020-06-05 | End: 2021-08-25 | Stop reason: CLARIF

## 2020-06-05 RX ORDER — HYDROCODONE BITARTRATE AND ACETAMINOPHEN 5; 325 MG/1; MG/1
1 TABLET ORAL EVERY 6 HOURS PRN
Status: DISCONTINUED | OUTPATIENT
Start: 2020-06-05 | End: 2020-06-05

## 2020-06-05 RX ADMIN — METOPROLOL SUCCINATE 12.5 MG: 25 TABLET, EXTENDED RELEASE ORAL at 09:38

## 2020-06-05 RX ADMIN — TRAMADOL HYDROCHLORIDE 50 MG: 50 TABLET, FILM COATED ORAL at 09:37

## 2020-06-05 RX ADMIN — FERROUS SULFATE TAB 325 MG (65 MG ELEMENTAL FE) 325 MG: 325 (65 FE) TAB at 09:37

## 2020-06-05 RX ADMIN — ACETAMINOPHEN 650 MG: 325 TABLET ORAL at 07:24

## 2020-06-05 RX ADMIN — PANTOPRAZOLE SODIUM 40 MG: 40 INJECTION, POWDER, FOR SOLUTION INTRAVENOUS at 09:37

## 2020-06-05 RX ADMIN — DIPHENOXYLATE HYDROCHLORIDE AND ATROPINE SULFATE 1 TABLET: 2.5; .025 TABLET ORAL at 09:37

## 2020-06-05 RX ADMIN — HYDROXYCHLOROQUINE SULFATE 200 MG: 200 TABLET, FILM COATED ORAL at 05:12

## 2020-06-08 ENCOUNTER — TELEPHONE (OUTPATIENT)
Dept: FAMILY MEDICINE CLINIC | Facility: HOSPITAL | Age: 70
End: 2020-06-08

## 2020-06-08 ENCOUNTER — TELEPHONE (OUTPATIENT)
Dept: GASTROENTEROLOGY | Facility: CLINIC | Age: 70
End: 2020-06-08

## 2020-06-09 ENCOUNTER — TRANSITIONAL CARE MANAGEMENT (OUTPATIENT)
Dept: FAMILY MEDICINE CLINIC | Facility: HOSPITAL | Age: 70
End: 2020-06-09

## 2020-06-09 ENCOUNTER — OFFICE VISIT (OUTPATIENT)
Dept: GASTROENTEROLOGY | Facility: CLINIC | Age: 70
End: 2020-06-09
Payer: COMMERCIAL

## 2020-06-09 VITALS
WEIGHT: 128 LBS | BODY MASS INDEX: 24.17 KG/M2 | SYSTOLIC BLOOD PRESSURE: 120 MMHG | DIASTOLIC BLOOD PRESSURE: 84 MMHG | TEMPERATURE: 99.3 F | HEIGHT: 61 IN

## 2020-06-09 DIAGNOSIS — D50.9 CHRONIC IRON DEFICIENCY ANEMIA: Primary | ICD-10-CM

## 2020-06-09 DIAGNOSIS — C49.A9 MALIGNANT GASTROINTESTINAL STROMAL TUMOR (GIST) OF OTHER SITE (HCC): ICD-10-CM

## 2020-06-09 DIAGNOSIS — R19.7 DIARRHEA, UNSPECIFIED TYPE: ICD-10-CM

## 2020-06-09 PROCEDURE — 4040F PNEUMOC VAC/ADMIN/RCVD: CPT | Performed by: INTERNAL MEDICINE

## 2020-06-09 PROCEDURE — 3008F BODY MASS INDEX DOCD: CPT | Performed by: INTERNAL MEDICINE

## 2020-06-09 PROCEDURE — 99214 OFFICE O/P EST MOD 30 MIN: CPT | Performed by: INTERNAL MEDICINE

## 2020-06-09 PROCEDURE — 1036F TOBACCO NON-USER: CPT | Performed by: INTERNAL MEDICINE

## 2020-06-09 PROCEDURE — 3074F SYST BP LT 130 MM HG: CPT | Performed by: INTERNAL MEDICINE

## 2020-06-09 PROCEDURE — 1160F RVW MEDS BY RX/DR IN RCRD: CPT | Performed by: INTERNAL MEDICINE

## 2020-06-09 PROCEDURE — 3079F DIAST BP 80-89 MM HG: CPT | Performed by: INTERNAL MEDICINE

## 2020-06-09 PROCEDURE — 1111F DSCHRG MED/CURRENT MED MERGE: CPT | Performed by: INTERNAL MEDICINE

## 2020-06-11 ENCOUNTER — TELEMEDICINE (OUTPATIENT)
Dept: FAMILY MEDICINE CLINIC | Facility: HOSPITAL | Age: 70
End: 2020-06-11
Payer: COMMERCIAL

## 2020-06-11 DIAGNOSIS — I48.0 PAROXYSMAL ATRIAL FIBRILLATION (HCC): ICD-10-CM

## 2020-06-11 DIAGNOSIS — C49.A9 MALIGNANT GASTROINTESTINAL STROMAL TUMOR (GIST) OF OTHER SITE (HCC): ICD-10-CM

## 2020-06-11 DIAGNOSIS — I10 HYPERTENSION, UNSPECIFIED TYPE: ICD-10-CM

## 2020-06-11 DIAGNOSIS — K25.0 ACUTE GASTRIC ULCER WITH HEMORRHAGE: ICD-10-CM

## 2020-06-11 DIAGNOSIS — C79.9 METASTATIC CANCER (HCC): ICD-10-CM

## 2020-06-11 DIAGNOSIS — K92.2 UPPER GI BLEED: Primary | ICD-10-CM

## 2020-06-11 PROBLEM — D50.9 CHRONIC IRON DEFICIENCY ANEMIA: Status: RESOLVED | Noted: 2020-06-09 | Resolved: 2020-06-11

## 2020-06-11 PROBLEM — E87.2 LACTIC ACIDOSIS: Status: RESOLVED | Noted: 2020-06-02 | Resolved: 2020-06-11

## 2020-06-11 PROBLEM — R77.8 ELEVATED TROPONIN LEVEL NOT DUE MYOCARDIAL INFARCTION: Status: RESOLVED | Noted: 2020-02-20 | Resolved: 2020-06-11

## 2020-06-11 PROBLEM — E87.20 LACTIC ACIDOSIS: Status: RESOLVED | Noted: 2020-06-02 | Resolved: 2020-06-11

## 2020-06-11 PROBLEM — R79.89 ELEVATED TROPONIN LEVEL NOT DUE MYOCARDIAL INFARCTION: Status: RESOLVED | Noted: 2020-02-20 | Resolved: 2020-06-11

## 2020-06-11 PROBLEM — D64.9 SYMPTOMATIC ANEMIA: Status: RESOLVED | Noted: 2017-09-18 | Resolved: 2020-06-11

## 2020-06-11 PROCEDURE — 1160F RVW MEDS BY RX/DR IN RCRD: CPT | Performed by: INTERNAL MEDICINE

## 2020-06-11 PROCEDURE — 99214 OFFICE O/P EST MOD 30 MIN: CPT | Performed by: INTERNAL MEDICINE

## 2020-06-11 RX ORDER — DIPHENOXYLATE HYDROCHLORIDE AND ATROPINE SULFATE 2.5; .025 MG/1; MG/1
1 TABLET ORAL 4 TIMES DAILY PRN
Qty: 30 TABLET | Refills: 2 | Status: SHIPPED | OUTPATIENT
Start: 2020-06-11 | End: 2020-08-10

## 2020-06-18 ENCOUNTER — TELEPHONE (OUTPATIENT)
Dept: FAMILY MEDICINE CLINIC | Facility: HOSPITAL | Age: 70
End: 2020-06-18

## 2020-06-19 ENCOUNTER — TELEPHONE (OUTPATIENT)
Dept: GASTROENTEROLOGY | Facility: CLINIC | Age: 70
End: 2020-06-19

## 2020-06-29 DIAGNOSIS — D64.9 SYMPTOMATIC ANEMIA: ICD-10-CM

## 2020-06-29 RX ORDER — FERROUS SULFATE TAB EC 324 MG (65 MG FE EQUIVALENT) 324 (65 FE) MG
324 TABLET DELAYED RESPONSE ORAL
Qty: 60 TABLET | Refills: 2 | Status: SHIPPED | OUTPATIENT
Start: 2020-06-29 | End: 2022-07-20

## 2020-07-07 NOTE — TELEPHONE ENCOUNTER
Patient called requesting to reschedule her procedure she had cancelled last month   Please advise, abram    Call back# [phone]

## 2020-07-20 ENCOUNTER — TELEPHONE (OUTPATIENT)
Dept: RADIOLOGY | Facility: CLINIC | Age: 70
End: 2020-07-20

## 2020-07-23 ENCOUNTER — TRANSCRIBE ORDERS (OUTPATIENT)
Dept: ADMINISTRATIVE | Facility: HOSPITAL | Age: 70
End: 2020-07-23

## 2020-07-23 ENCOUNTER — TELEPHONE (OUTPATIENT)
Dept: PAIN MEDICINE | Facility: CLINIC | Age: 70
End: 2020-07-23

## 2020-07-23 ENCOUNTER — APPOINTMENT (OUTPATIENT)
Dept: LAB | Facility: HOSPITAL | Age: 70
End: 2020-07-23
Payer: COMMERCIAL

## 2020-07-23 DIAGNOSIS — D64.9 ANEMIA, UNSPECIFIED TYPE: Primary | ICD-10-CM

## 2020-07-23 DIAGNOSIS — D64.9 ANEMIA, UNSPECIFIED TYPE: ICD-10-CM

## 2020-07-23 LAB
BASOPHILS # BLD AUTO: 0.02 THOUSANDS/ΜL (ref 0–0.1)
BASOPHILS NFR BLD AUTO: 1 % (ref 0–1)
EOSINOPHIL # BLD AUTO: 0.14 THOUSAND/ΜL (ref 0–0.61)
EOSINOPHIL NFR BLD AUTO: 4 % (ref 0–6)
ERYTHROCYTE [DISTWIDTH] IN BLOOD BY AUTOMATED COUNT: 20.6 % (ref 11.6–15.1)
HCT VFR BLD AUTO: 21.6 % (ref 34.8–46.1)
HGB BLD-MCNC: 6.8 G/DL (ref 11.5–15.4)
IMM GRANULOCYTES # BLD AUTO: 0.01 THOUSAND/UL (ref 0–0.2)
IMM GRANULOCYTES NFR BLD AUTO: 0 % (ref 0–2)
LYMPHOCYTES # BLD AUTO: 0.39 THOUSANDS/ΜL (ref 0.6–4.47)
LYMPHOCYTES NFR BLD AUTO: 11 % (ref 14–44)
MCH RBC QN AUTO: 34.2 PG (ref 26.8–34.3)
MCHC RBC AUTO-ENTMCNC: 31.5 G/DL (ref 31.4–37.4)
MCV RBC AUTO: 109 FL (ref 82–98)
MONOCYTES # BLD AUTO: 0.4 THOUSAND/ΜL (ref 0.17–1.22)
MONOCYTES NFR BLD AUTO: 11 % (ref 4–12)
NEUTROPHILS # BLD AUTO: 2.68 THOUSANDS/ΜL (ref 1.85–7.62)
NEUTS SEG NFR BLD AUTO: 73 % (ref 43–75)
NRBC BLD AUTO-RTO: 0 /100 WBCS
PLATELET # BLD AUTO: 208 THOUSANDS/UL (ref 149–390)
PMV BLD AUTO: 10.6 FL (ref 8.9–12.7)
RBC # BLD AUTO: 1.99 MILLION/UL (ref 3.81–5.12)
WBC # BLD AUTO: 3.64 THOUSAND/UL (ref 4.31–10.16)

## 2020-07-23 PROCEDURE — 36415 COLL VENOUS BLD VENIPUNCTURE: CPT

## 2020-07-23 PROCEDURE — 85025 COMPLETE CBC W/AUTO DIFF WBC: CPT

## 2020-07-23 NOTE — TELEPHONE ENCOUNTER
Patient   [phone]  Dr Betito Espana    Patient is calling in because she would like to get another injection  Her pain level is a 9-10/10  She said that she called last week with no answer back  Pain is going from the hip down into the leg  The medication is not helping that much       Please follow up with patient

## 2020-07-24 NOTE — TELEPHONE ENCOUNTER
Patient would like to reschedule LESI  Patient confirmed taking Aspiring 325mg prescibed by Dr Robbie Patel  Anti Coag faxed to Dr Rodriguez Cassette office at [phone]

## 2020-07-24 NOTE — TELEPHONE ENCOUNTER
S/w pt, advised that the writer will need to discuss the iron infusion w/ Dr Janet Marks and cb to advise  This office is also waiting for Dr Claressa Seip' response to the asa hold  Pt verbalized understanding and appreciation  Stated that her oncologist, Dr Ashish Moses ordered the infusion  The pt will see her on 7/30

## 2020-07-24 NOTE — TELEPHONE ENCOUNTER
Patient states on July 30th she will be having an Iron Infusion   Questioning if this will interfere with procedure

## 2020-07-27 NOTE — TELEPHONE ENCOUNTER
Per communication consent, left detailed message on pt's home phone advising of SL's notation  CB# provided for any further questions or concerns

## 2020-07-29 ENCOUNTER — OFFICE VISIT (OUTPATIENT)
Dept: FAMILY MEDICINE CLINIC | Facility: HOSPITAL | Age: 70
End: 2020-07-29
Payer: COMMERCIAL

## 2020-07-29 VITALS
SYSTOLIC BLOOD PRESSURE: 120 MMHG | OXYGEN SATURATION: 98 % | HEART RATE: 78 BPM | HEIGHT: 61 IN | BODY MASS INDEX: 23.22 KG/M2 | DIASTOLIC BLOOD PRESSURE: 64 MMHG | TEMPERATURE: 98.1 F | WEIGHT: 123 LBS

## 2020-07-29 DIAGNOSIS — I48.0 PAROXYSMAL ATRIAL FIBRILLATION (HCC): ICD-10-CM

## 2020-07-29 DIAGNOSIS — Z01.818 PRE-OP EXAMINATION: Primary | ICD-10-CM

## 2020-07-29 DIAGNOSIS — G89.4 PAIN SYNDROME, CHRONIC: ICD-10-CM

## 2020-07-29 DIAGNOSIS — H18.9 CORNEA DISORDER: ICD-10-CM

## 2020-07-29 DIAGNOSIS — D62 ANEMIA DUE TO BLOOD LOSS, ACUTE: ICD-10-CM

## 2020-07-29 DIAGNOSIS — K31.819 GASTRIC AVM: ICD-10-CM

## 2020-07-29 DIAGNOSIS — C49.A9 MALIGNANT GASTROINTESTINAL STROMAL TUMOR (GIST) OF OTHER SITE (HCC): ICD-10-CM

## 2020-07-29 DIAGNOSIS — C79.9 METASTATIC CANCER (HCC): ICD-10-CM

## 2020-07-29 DIAGNOSIS — I10 HYPERTENSION, UNSPECIFIED TYPE: ICD-10-CM

## 2020-07-29 LAB — # OF QRS COMPLEXES: ABNORMAL MS

## 2020-07-29 PROCEDURE — 3078F DIAST BP <80 MM HG: CPT | Performed by: INTERNAL MEDICINE

## 2020-07-29 PROCEDURE — 93000 ELECTROCARDIOGRAM COMPLETE: CPT | Performed by: INTERNAL MEDICINE

## 2020-07-29 PROCEDURE — 3008F BODY MASS INDEX DOCD: CPT | Performed by: INTERNAL MEDICINE

## 2020-07-29 PROCEDURE — 1101F PT FALLS ASSESS-DOCD LE1/YR: CPT | Performed by: INTERNAL MEDICINE

## 2020-07-29 PROCEDURE — 3288F FALL RISK ASSESSMENT DOCD: CPT | Performed by: INTERNAL MEDICINE

## 2020-07-29 PROCEDURE — 3074F SYST BP LT 130 MM HG: CPT | Performed by: INTERNAL MEDICINE

## 2020-07-29 PROCEDURE — 1036F TOBACCO NON-USER: CPT | Performed by: INTERNAL MEDICINE

## 2020-07-29 PROCEDURE — 4040F PNEUMOC VAC/ADMIN/RCVD: CPT | Performed by: INTERNAL MEDICINE

## 2020-07-29 PROCEDURE — 1160F RVW MEDS BY RX/DR IN RCRD: CPT | Performed by: INTERNAL MEDICINE

## 2020-07-29 PROCEDURE — 99214 OFFICE O/P EST MOD 30 MIN: CPT | Performed by: INTERNAL MEDICINE

## 2020-07-29 PROCEDURE — 1111F DSCHRG MED/CURRENT MED MERGE: CPT | Performed by: INTERNAL MEDICINE

## 2020-07-29 NOTE — ASSESSMENT & PLAN NOTE
Hg on last labs done 7/23 showed Hg of 6 8  Prior labs had Hg of 11 after hospital admission for GI bleed in June  She has had no evidence of GI bleeding but is on oral iron supplements  Denies any abdominal pain or diarrhea or nausea  She has plans to see her oncologist tomorrow for venofer infusion and a second one one week later

## 2020-07-29 NOTE — PROGRESS NOTES
Subjective:   Chief Complaint   Patient presents with    Pre-op Exam     8/22/2020 Dr Nathanael Sy        Patient ID: Mercy Tate is a 68 y o  female  Here for pre-op evaluation for a corneal procedure  Has multiple medical problems as indicated below  Current medications were reviewed  Patient advised to take her usual medications in the AM with a small sip of water  Patient had recent GI bleed from gastric AVMs and GIST tumor  She was hospitalized with Hg or 2 8  Was transfused several units of PRBCs and had EGD procedure which revealed a gastric ulcer and several gastric AVMs  She is currently taking PPI and famotidine and iron supplements  On discharge from hospital, Hg was 11  In spite of this, recent labs are notable for critical Hg of 6 8 on labs of 7/23/20  Would hesitate to clear her with critical value  She is to have venofer infusions x 2 and will be seen by her oncologist and repeat labs will be done  The following portions of the patient's history were reviewed and updated as appropriate: allergies, current medications, past family history, past medical history, past social history, past surgical history and problem list     Review of Systems   Constitutional: Negative for fatigue and fever  HENT: Negative for hearing loss  Eyes: Negative for visual disturbance  Respiratory: Negative for cough, chest tightness, shortness of breath and wheezing  Cardiovascular: Negative for chest pain, palpitations and leg swelling  Gastrointestinal: Negative for abdominal pain, diarrhea and nausea  Genitourinary: Negative for dysuria and hematuria  Musculoskeletal: Negative for arthralgias  Neurological: Negative for dizziness, numbness and headaches  Psychiatric/Behavioral: Negative for confusion and dysphoric mood  All other systems reviewed and are negative          Current Outpatient Medications on File Prior to Visit   Medication Sig Dispense Refill    Azelastine HCl 137 MCG/SPRAY SOLN instill 2 sprays into each nostril twice a day if needed for congestion  0    diphenhydrAMINE (BENADRYL) 25 mg tablet Take 25 mg by mouth every 6 (six) hours as needed for itching      diphenoxylate-atropine (LOMOTIL) 2 5-0 025 mg per tablet Take 1 tablet by mouth 4 (four) times a day as needed for diarrhea 30 tablet 2    docusate sodium (COLACE) 100 mg capsule Take 100 mg by mouth 2 (two) times a day as needed for constipation      famotidine (PEPCID) 10 mg tablet Take 10 mg by mouth       ferrous sulfate 324 (65 Fe) mg Take 1 tablet (324 mg total) by mouth 2 (two) times a day before meals 60 tablet 2    furosemide (LASIX) 20 mg tablet Take 20 mg by mouth daily       hydroxychloroquine (PLAQUENIL) 200 mg tablet Take 200 mg by mouth daily in the early morning       loperamide (IMODIUM) 2 mg capsule Take 2 capsules by mouth 4 (four) times a day as needed      metoprolol succinate (Toprol XL) 25 mg 24 hr tablet Take 0 5 tablets (12 5 mg total) by mouth daily  0    NON FORMULARY Take 2 tablets by mouth daily Chemo: Blue 285      ofloxacin (OCUFLOX) 0 3 % ophthalmic solution Starting April 7  0    ondansetron (ZOFRAN) 8 mg tablet 8 mg daily with breakfast Prior to chemo      pantoprazole (PROTONIX) 40 mg tablet Take 40 mg by mouth daily      prednisoLONE acetate (PRED FORTE) 1 % ophthalmic suspension   0    traMADol (ULTRAM) 50 mg tablet Take 1 tablet (50 mg total) by mouth every 8 (eight) hours as needed for moderate pain 30 tablet 0    Triamcinolone Acetonide (NASACORT ALLERGY 24HR NA) into each nostril 1 spray each nostril--at bedtime   (OTC)       No current facility-administered medications on file prior to visit          Objective:  Vitals:    07/29/20 1120   BP: 120/64   Pulse: 78   Temp: 98 1 °F (36 7 °C)   TempSrc: Tympanic   SpO2: 98%   Weight: 55 8 kg (123 lb)   Height: 5' 1" (1 549 m)      Physical Exam   Constitutional: She is oriented to person, place, and time  She appears well-developed and well-nourished  Eyes: Pupils are equal, round, and reactive to light  Neck: Normal range of motion  Neck supple  No thyromegaly present  Cardiovascular: Normal rate, regular rhythm, normal heart sounds and intact distal pulses  No murmur heard  Pulmonary/Chest: Effort normal and breath sounds normal  She has no wheezes  She has no rales  Abdominal: Soft  Bowel sounds are normal  There is no tenderness  Musculoskeletal: Normal range of motion  She exhibits no edema, tenderness or deformity  Lymphadenopathy:     She has no cervical adenopathy  Neurological: She is alert and oriented to person, place, and time  She has normal reflexes  Skin: Skin is warm and dry  Psychiatric: She has a normal mood and affect  Nursing note and vitals reviewed  Assessment/Plan:    EKG is stable, sinus at 68 bpm , RBBB and LAD, no change from prior tracings  Clearance is pending her follow up labs  Will get venofer infusions over next 2 weeks, then follow up labs are ordered  Anemia due to blood loss, acute  Hg on last labs done 7/23 showed Hg of 6 8  Prior labs had Hg of 11 after hospital admission for GI bleed in June  She has had no evidence of GI bleeding but is on oral iron supplements  Denies any abdominal pain or diarrhea or nausea  She has plans to see her oncologist tomorrow for venofer infusion and a second one one week later          Diagnoses and all orders for this visit:    Pre-op examination  -     POCT ECG    Cornea disorder    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

## 2020-07-31 ENCOUNTER — OFFICE VISIT (OUTPATIENT)
Dept: GASTROENTEROLOGY | Facility: CLINIC | Age: 70
End: 2020-07-31
Payer: COMMERCIAL

## 2020-07-31 VITALS
SYSTOLIC BLOOD PRESSURE: 160 MMHG | DIASTOLIC BLOOD PRESSURE: 88 MMHG | HEART RATE: 82 BPM | TEMPERATURE: 98.2 F | WEIGHT: 123 LBS | BODY MASS INDEX: 23.22 KG/M2 | HEIGHT: 61 IN

## 2020-07-31 DIAGNOSIS — D50.9 IRON DEFICIENCY ANEMIA, UNSPECIFIED IRON DEFICIENCY ANEMIA TYPE: ICD-10-CM

## 2020-07-31 DIAGNOSIS — K92.2 UPPER GI BLEED: Primary | ICD-10-CM

## 2020-07-31 DIAGNOSIS — K92.2 UPPER GI BLEED: ICD-10-CM

## 2020-07-31 DIAGNOSIS — D62 ANEMIA DUE TO BLOOD LOSS, ACUTE: ICD-10-CM

## 2020-07-31 DIAGNOSIS — C49.A9 MALIGNANT GASTROINTESTINAL STROMAL TUMOR (GIST) OF OTHER SITE (HCC): ICD-10-CM

## 2020-07-31 PROCEDURE — 1160F RVW MEDS BY RX/DR IN RCRD: CPT | Performed by: NURSE PRACTITIONER

## 2020-07-31 PROCEDURE — 1111F DSCHRG MED/CURRENT MED MERGE: CPT | Performed by: NURSE PRACTITIONER

## 2020-07-31 PROCEDURE — 3008F BODY MASS INDEX DOCD: CPT | Performed by: NURSE PRACTITIONER

## 2020-07-31 PROCEDURE — 99214 OFFICE O/P EST MOD 30 MIN: CPT | Performed by: NURSE PRACTITIONER

## 2020-07-31 PROCEDURE — 4040F PNEUMOC VAC/ADMIN/RCVD: CPT | Performed by: NURSE PRACTITIONER

## 2020-07-31 PROCEDURE — 3079F DIAST BP 80-89 MM HG: CPT | Performed by: NURSE PRACTITIONER

## 2020-07-31 PROCEDURE — 1036F TOBACCO NON-USER: CPT | Performed by: NURSE PRACTITIONER

## 2020-07-31 PROCEDURE — 3077F SYST BP >= 140 MM HG: CPT | Performed by: NURSE PRACTITIONER

## 2020-07-31 RX ORDER — PANTOPRAZOLE SODIUM 40 MG/1
TABLET, DELAYED RELEASE ORAL
Qty: 180 TABLET | Refills: 5 | Status: ON HOLD | OUTPATIENT
Start: 2020-07-31 | End: 2021-08-25 | Stop reason: SDUPTHER

## 2020-07-31 RX ORDER — PANTOPRAZOLE SODIUM 40 MG/1
40 TABLET, DELAYED RELEASE ORAL 2 TIMES DAILY
Qty: 60 TABLET | Refills: 5 | Status: SHIPPED | OUTPATIENT
Start: 2020-07-31 | End: 2020-07-31

## 2020-07-31 NOTE — LETTER
July 31, 2020     Phillip Asher MD  [facility] [address]    Patient: Carson Roman   YOB: 1950   Date of Visit: 7/31/2020       Dear Dr Richy Mcintyre:    Thank you for referring Carson Roman to me for evaluation  Below are my notes for this consultation  If you have questions, please do not hesitate to call me  I look forward to following your patient along with you  Sincerely,        LANEY Templeton        CC: MD Cary Culver MD Darliss Cram, CRNP  7/31/2020  1:13 PM  [address] [facility] - Outpatient Follow-up Note  Carson Roman 68 y o  female MRN: [id number]  Encounter: [id number]    ASSESSMENT AND PLAN:          1  Anemia due to blood loss, acute  2  Iron deficiency anemia, unspecified iron deficiency anemia type  Patient with recurrent chronic iron deficiency anemia with intermittent upper GI bleeding  Underwent an extensive GI evaluation over the last year to include multiple EGDs, a colonoscopy, and capsule endoscopy  Has had gastric AVMs cauterized, Dieulafoy's lesion clipped in May of this year, and most recently a Rizzo  erosion that was cauterized in June of this year  Hgb continues to decline and most recent hgb was 6 4gms  She recently received 2 units of packed red blood cells at [facility] yesterday in addition to an IV iron transfusion  Follow-up appointment scheduled [facility] for another IV iron infusion next week  Recheck CBC next week at [facility] Protonix to 40 mg twice a day  Pantoprazole (PROTONIX) 40 mg tablet; Take 1 tablet (40 mg total) by mouth 2 (two) times a day  Dispense: 60 tablet;  Refill: 5  Repeat EGD  Continue with IV iron infusions  Advise surgical consultation, as previously advised by Dr Beverly Sawyer with Dr Kalpana Blancas, surgeon at [facility], for a hiatal hernia repair regarding bleeding Seb erosions, causing recurrent issues with anemia and subsequent multiple blood transfusions this past year  4  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 [facility])  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   Followed at [facility] by Madeline Martinez  Currently participating in a clinical trial  She is on oral chemotherapy daily  The tumor was initially resected in August 2001  A subsequent CT scan several years later demonstrated a liver lesion  Biopsy of the liver lesion in April of 2005 demonstrated metastatic disease  Underwent a diagnostic laparoscopy, lysis of adhesions, exploratory lap, resection of pelvis with a small bowel resection and primary anastomoses at that time  Progression in March 2017     - continue follow-up with at [facility] by Madeline Martinez  Followup Appointment: 3 months  ______________________________________________________________________    Chief Complaint   Patient presents with    Follow-up    Anemia     HPI:    Follow-up for recurrent issues with GI bleeding and anemia  Multiple hospitalizations for this in addition to multiple blood transfusions over the past year  Most recent EGD revealed a bleeding Seb's erosion was cauterized  She has been feeling fatigued and weak  Recent hemoglobin was 6 4 and she was seen at [facility] yesterday where she received an IV iron infusion in addition to transfusion with 2 units of packed red blood cells  She is scheduled for another IV iron infusion next week  She is on oral iron as well so her stools with dark  Denies any hematochezia, abdominal pain, nausea or vomiting  Her weight has been stable over the past year  Denies any shortness of breath or chest pain  Continues with fatigue and weakness but this has improved following blood transfusions      Historical Information   Past Medical History:   Diagnosis Date    ADHD     Anemia     Anxiety     Arthritis     Asthma  Atrial fibrillation (HCC)     Breast cancer (HCC)     [facility] [address] [facility]     Chronic iron deficiency anemia 2020    Extensive GI evaluation over the last year including multiple EGD, colonoscopy, and capsule endoscopy    Has had gastric AVMs cauterized, Dieulafoy's lesion clipped, and most recently a Seb erosion cauterized    Coronary artery disease     Depression     Gastroenteritis 2020    GERD (gastroesophageal reflux disease)     History of stomach ulcers     Hypercholesterolemia     Hypertension     Kidney disease     Metastatic cancer ([facility] )      Past Surgical History:   Procedure Laterality Date    ANKLE SURGERY      APPENDECTOMY      BREAST SURGERY      CATARACT EXTRACTION       SECTION      COLONOSCOPY      HIP SURGERY      JOINT REPLACEMENT  2019    Left knee replacement    LAMINECTOMY      ROTATOR CUFF REPAIR      SIGMOID RESECTION / RECTOPEXY      SINUS SURGERY       Social History     Substance and Sexual Activity   Alcohol Use Not Currently    Frequency: Never    Drinks per session: 1 or 2    Binge frequency: Never     Social History     Substance and Sexual Activity   Drug Use No     Social History     Tobacco Use   Smoking Status Former Smoker    Years: 20 00   Smokeless Tobacco Never Used     Family History   Problem Relation Age of Onset   Ankita Spear Breast cancer Mother     Diabetes Mother     Hypertension Mother     Lung cancer Mother     Hyperlipidemia Father     Prostate cancer Father     Colon cancer Paternal Grandmother     Colon cancer Paternal Grandfather     Diabetes Family     Substance Abuse Neg Hx     Mental illness Neg Hx          Current Outpatient Medications:     Azelastine HCl 137 MCG/SPRAY SOLN    diphenhydrAMINE (BENADRYL) 25 mg tablet    diphenoxylate-atropine (LOMOTIL) 2 5-0 025 mg per tablet    docusate sodium (COLACE) 100 mg capsule    famotidine (PEPCID) 10 mg tablet    furosemide (LASIX) 20 mg tablet   hydroxychloroquine (PLAQUENIL) 200 mg tablet    loperamide (IMODIUM) 2 mg capsule    metoprolol succinate (Toprol XL) 25 mg 24 hr tablet    NON FORMULARY    ofloxacin (OCUFLOX) 0 3 % ophthalmic solution    ondansetron (ZOFRAN) 8 mg tablet    pantoprazole (PROTONIX) 40 mg tablet    prednisoLONE acetate (PRED FORTE) 1 % ophthalmic suspension    traMADol (ULTRAM) 50 mg tablet    Triamcinolone Acetonide (NASACORT ALLERGY 24HR NA)    ferrous sulfate 324 (65 Fe) mg  Allergies   Allergen Reactions    Codeine Other (See Comments)     Throat closes    Codeine Sulfate Throat Swelling    Neomycin-Bacitracin Zn-Polymyx Rash    Wound Dressing Adhesive Other (See Comments)     If its on too long- pt starts itching    Zolmitriptan Palpitations     Heart palpitations  Generic for Zomig       Reviewed medications and allergies and updated as indicated    PHYSICAL EXAM:    Blood pressure 160/88, pulse 82, temperature 98 2 °F (36 8 °C), height 5' 1" (1 549 m), weight 55 8 kg (123 lb)  Body mass index is 23 24 kg/m²  General Appearance: NAD, cooperative, alert  Eyes: Pale conjunctiva  ENT:  Normocephalic   Neck:  Appears normal  Resp:  Clear to auscultation bilaterally; no rales, rhonchi or wheezing; respirations unlabored   CV:  S1 S2, Regular rate and rhythm; no murmur, rub, or gallop  GI:  Soft, non-tender, non-distended; normal bowel sounds; no masses, no organomegaly   Rectal: Deferred  Musculoskeletal: No cyanosis, clubbing or edema  Normal ROM    Skin: No jaundice, rashes, or lesions, birthmark on right-side of face  Psych: Normal affect, good eye contact  Neuro: No gross deficits, AAOx3    Lab Results:   Lab Results   Component Value Date    WBC 3 64 (L) 07/23/2020    HGB 6 8 (LL) 07/23/2020    HCT 21 6 (L) 07/23/2020     (H) 07/23/2020     07/23/2020     Lab Results   Component Value Date     10/12/2015    K 3 5 06/04/2020     06/04/2020    CO2 27 06/04/2020    ANIONGAP 5 10/12/2015 BUN 18 06/04/2020    CREATININE 1 54 (H) 06/04/2020    GLUCOSE 94 10/12/2015    GLUF 106 (H) 06/02/2020    CALCIUM 8 0 (L) 06/04/2020    AST 35 06/04/2020    ALT 19 06/04/2020    ALKPHOS 53 06/04/2020    PROT 6 2 (L) 10/12/2015    BILITOT 0 28 10/12/2015    EGFR 34 06/04/2020     Lab Results   Component Value Date    IRON 22 (L) 02/07/2020    TIBC 341 02/07/2020    FERRITIN 79 02/07/2020     Lab Results   Component Value Date    LIPASE 118 02/19/2020       Radiology Results:   No results found

## 2020-07-31 NOTE — PROGRESS NOTES
[facility] - Outpatient Follow-up Note  Devyn Monae 68 y o  female MRN: [id number]  Encounter: [id number]    ASSESSMENT AND PLAN:          1  Anemia due to blood loss, acute  2  Iron deficiency anemia, unspecified iron deficiency anemia type  Patient with recurrent chronic iron deficiency anemia with intermittent upper GI bleeding  Underwent an extensive GI evaluation over the last year to include multiple EGDs, a colonoscopy, and capsule endoscopy  Has had gastric AVMs cauterized, Dieulafoy's lesion clipped in May of this year, and most recently a [place] erosion that was cauterized in June of this year  Hgb continues to decline and most recent hgb was 6 4gms  She recently received 2 units of packed red blood cells at [facility] yesterday in addition to an IV iron transfusion  Follow-up appointment scheduled [facility] for another IV iron infusion next week  Recheck CBC next week at [facility] Protonix to 40 mg twice a day  Pantoprazole (PROTONIX) 40 mg tablet; Take 1 tablet (40 mg total) by mouth 2 (two) times a day  Dispense: 60 tablet; Refill: 5  Repeat EGD  Continue with IV iron infusions  Advise surgical consultation, as previously advised by Dr Lana Hook with Dr Brenton Hamman, surgeon at [facility], for a hiatal hernia repair regarding bleeding Seb erosions, causing recurrent issues with anemia and subsequent multiple blood transfusions this past year  4  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   Followed at [facility] by Mana Crane  Currently participating in a clinical trial  She is on oral chemotherapy daily  The tumor was initially resected in August 2001  A subsequent CT scan several years later demonstrated a liver lesion  Biopsy of the liver lesion in April of 2005 demonstrated metastatic disease    Underwent a diagnostic laparoscopy, lysis of adhesions, exploratory lap, resection of pelvis with a small bowel resection and primary anastomoses at that time  Progression in March 2017     - continue follow-up with at [facility] by Catrachito Barnett  Followup Appointment: 3 months  ______________________________________________________________________    Chief Complaint   Patient presents with    Follow-up    Anemia     HPI:    Follow-up for recurrent issues with GI bleeding and anemia  Multiple hospitalizations for this in addition to multiple blood transfusions over the past year  Most recent EGD revealed a bleeding Seb's erosion was cauterized  She has been feeling fatigued and weak  Recent hemoglobin was 6 4 and she was seen at [facility] yesterday where she received an IV iron infusion in addition to transfusion with 2 units of packed red blood cells  She is scheduled for another IV iron infusion next week  She is on oral iron as well so her stools with dark  Denies any hematochezia, abdominal pain, nausea or vomiting  Her weight has been stable over the past year  Denies any shortness of breath or chest pain  Continues with fatigue and weakness but this has improved following blood transfusions  Historical Information   Past Medical History:   Diagnosis Date    ADHD     Anemia     Anxiety     Arthritis     Asthma     Atrial fibrillation (HCC)     [facility] cancer ([facility] [address] )     Cancer ([facility] )     Chronic iron deficiency anemia 6/9/2020    Extensive GI evaluation over the last year including multiple EGD, colonoscopy, and capsule endoscopy    Has had gastric AVMs cauterized, Dieulafoy's lesion clipped, and most recently a Seb erosion cauterized    Coronary artery disease     Depression     Gastroenteritis 01/2020    GERD (gastroesophageal reflux disease)     History of stomach ulcers     Hypercholesterolemia     Hypertension     Kidney disease     Metastatic cancer (HCC)      Past Surgical History:   Procedure Laterality Date    ANKLE SURGERY      APPENDECTOMY      BREAST SURGERY      CATARACT EXTRACTION       SECTION      COLONOSCOPY      HIP SURGERY      JOINT REPLACEMENT  2019    Left knee replacement    LAMINECTOMY      ROTATOR CUFF REPAIR      SIGMOID RESECTION / RECTOPEXY      SINUS SURGERY       Social History     Substance and Sexual Activity   Alcohol Use Not Currently    Frequency: Never    Drinks per session: 1 or 2    Binge frequency: Never     Social History     Substance and Sexual Activity   Drug Use No     Social History     Tobacco Use   Smoking Status Former Smoker    Years: 20 00   Smokeless Tobacco Never Used     Family History   Problem Relation Age of Onset   [facility] Breast cancer Mother     Diabetes Mother     Hypertension Mother     Lung cancer Mother     Hyperlipidemia Father     Prostate cancer Father     Colon cancer Paternal Grandmother     Colon cancer Paternal Grandfather     Diabetes Family     Substance Abuse Neg Hx     Mental illness Neg Hx          Current Outpatient Medications:     Azelastine HCl 137 MCG/SPRAY SOLN    diphenhydrAMINE (BENADRYL) 25 mg tablet    diphenoxylate-atropine (LOMOTIL) 2 5-0 025 mg per tablet    docusate sodium (COLACE) 100 mg capsule    famotidine (PEPCID) 10 mg tablet    furosemide (LASIX) 20 mg tablet    hydroxychloroquine (PLAQUENIL) 200 mg tablet    loperamide (IMODIUM) 2 mg capsule    metoprolol succinate (Toprol XL) 25 mg 24 hr tablet    NON FORMULARY    ofloxacin (OCUFLOX) 0 3 % ophthalmic solution    ondansetron (ZOFRAN) 8 mg tablet    pantoprazole (PROTONIX) 40 mg tablet    prednisoLONE acetate (PRED FORTE) 1 % ophthalmic suspension    traMADol (ULTRAM) 50 mg tablet    Triamcinolone Acetonide (NASACORT ALLERGY 24HR NA)    ferrous sulfate 324 (65 Fe) mg  Allergies   Allergen Reactions    Codeine Other (See Comments)     Throat closes    Codeine Sulfate Throat Swelling    Neomycin-Bacitracin Zn-Polymyx Rash    Wound Dressing Adhesive Other (See Comments)     If its on too long- pt starts itching    Zolmitriptan Palpitations     Heart palpitations  Generic for Zomig       Reviewed medications and allergies and updated as indicated    PHYSICAL EXAM:    Blood pressure 160/88, pulse 82, temperature 98 2 °F (36 8 °C), height 5' 1" (1 549 m), weight 55 8 kg (123 lb)  Body mass index is 23 24 kg/m²  General Appearance: NAD, cooperative, alert  Eyes: Pale conjunctiva  ENT:  Normocephalic  Neck:  Appears normal  Resp:  Clear to auscultation bilaterally; no rales, rhonchi or wheezing; respirations unlabored   CV:  S1 S2, Regular rate and rhythm; no murmur, rub, or gallop  GI:  Soft, non-tender, non-distended; normal bowel sounds; no masses, no organomegaly   Rectal: Deferred  Musculoskeletal: No cyanosis, clubbing or edema  Normal ROM  Skin: No jaundice, rashes, or lesions, birthmark on right-side of face  Psych: Normal affect, good eye contact  Neuro: No gross deficits, AAOx3    Lab Results:   Lab Results   Component Value Date    WBC 3 64 (L) 07/23/2020    HGB 6 8 (LL) 07/23/2020    HCT 21 6 (L) 07/23/2020     (H) 07/23/2020     07/23/2020     Lab Results   Component Value Date     10/12/2015    K 3 5 06/04/2020     06/04/2020    CO2 27 06/04/2020    ANIONGAP 5 10/12/2015    BUN 18 06/04/2020    CREATININE 1 54 (H) 06/04/2020    GLUCOSE 94 10/12/2015    GLUF 106 (H) 06/02/2020    CALCIUM 8 0 (L) 06/04/2020    AST 35 06/04/2020    ALT 19 06/04/2020    ALKPHOS 53 06/04/2020    PROT 6 2 (L) 10/12/2015    BILITOT 0 28 10/12/2015    EGFR 34 06/04/2020     Lab Results   Component Value Date    IRON 22 (L) 02/07/2020    TIBC 341 02/07/2020    FERRITIN 79 02/07/2020     Lab Results   Component Value Date    LIPASE 118 02/19/2020       Radiology Results:   No results found

## 2020-07-31 NOTE — PATIENT INSTRUCTIONS
Increase Protonix to 40 mg twice a day   Repeat EGD   Continue with IV iron infusions   Advise surgical consultation with Dr Saleem Gupta for consideration of hiatal hernia repair regarding bleeding Trey Mercury erosions

## 2020-08-05 DIAGNOSIS — M54.16 LUMBAR RADICULITIS: ICD-10-CM

## 2020-08-05 DIAGNOSIS — M48.061 SPINAL STENOSIS OF LUMBAR REGION, UNSPECIFIED WHETHER NEUROGENIC CLAUDICATION PRESENT: Primary | ICD-10-CM

## 2020-08-05 NOTE — TELEPHONE ENCOUNTER
S/w pt, advised of above  Scheduled procedure on 8/21 at 1420 pm  No asa 8/15 - 8/21/2020 post procedure  Reviewed pre procedure instructions; eat a light meal - npo 1 hour prior,  - may not enter the building, mask must be worn while on the property, loose fitting clothing, cb if illness / abs present, injected steroid may affect immunity  Pt should practice strict handwashing, masking, social distancing / isolate whenever possible x 2 - 3 weeks after procedure  Pt verbalized understanding  Will cb if questions / concerns arise  Pt stated that she is going for an iron infusion next week s/t anemia  SL aware, no pre procedure bw required

## 2020-08-07 ENCOUNTER — TELEPHONE (OUTPATIENT)
Dept: GASTROENTEROLOGY | Facility: CLINIC | Age: 70
End: 2020-08-07

## 2020-08-07 DIAGNOSIS — C49.A9 MALIGNANT GASTROINTESTINAL STROMAL TUMOR (GIST) OF OTHER SITE (HCC): ICD-10-CM

## 2020-08-07 NOTE — TELEPHONE ENCOUNTER
Pt called to cancel upcoming 8/27 procedure at [address]- states she is having this done at [facility]  She did confirm keeping her office visit in September with [facility]  Sending to you as a heads up since it was ordered from an office visit  Thank you

## 2020-08-10 RX ORDER — DIPHENOXYLATE HYDROCHLORIDE AND ATROPINE SULFATE 2.5; .025 MG/1; MG/1
TABLET ORAL
Qty: 30 TABLET | Refills: 5 | Status: SHIPPED | OUTPATIENT
Start: 2020-08-10

## 2020-08-21 ENCOUNTER — HOSPITAL ENCOUNTER (OUTPATIENT)
Dept: RADIOLOGY | Facility: CLINIC | Age: 70
Discharge: HOME | End: 2020-08-21
Admitting: ANESTHESIOLOGY
Payer: COMMERCIAL

## 2020-08-21 VITALS
HEART RATE: 61 BPM | RESPIRATION RATE: 20 BRPM | SYSTOLIC BLOOD PRESSURE: 114 MMHG | OXYGEN SATURATION: 98 % | DIASTOLIC BLOOD PRESSURE: 68 MMHG | TEMPERATURE: 97.1 F

## 2020-08-21 DIAGNOSIS — M54.16 LUMBAR RADICULITIS: ICD-10-CM

## 2020-08-21 DIAGNOSIS — M48.061 SPINAL STENOSIS OF LUMBAR REGION, UNSPECIFIED WHETHER NEUROGENIC CLAUDICATION PRESENT: ICD-10-CM

## 2020-08-21 PROCEDURE — 62323 NJX INTERLAMINAR LMBR/SAC: CPT | Performed by: ANESTHESIOLOGY

## 2020-08-21 RX ORDER — METHYLPREDNISOLONE ACETATE 80 MG/ML
80 INJECTION, SUSPENSION INTRA-ARTICULAR; INTRALESIONAL; INTRAMUSCULAR; PARENTERAL; SOFT TISSUE ONCE
Status: COMPLETED | OUTPATIENT
Start: 2020-08-21 | End: 2020-08-21

## 2020-08-21 RX ORDER — LIDOCAINE HYDROCHLORIDE 10 MG/ML
5 INJECTION, SOLUTION EPIDURAL; INFILTRATION; INTRACAUDAL; PERINEURAL ONCE
Status: COMPLETED | OUTPATIENT
Start: 2020-08-21 | End: 2020-08-21

## 2020-08-21 RX ADMIN — METHYLPREDNISOLONE ACETATE 80 MG: 80 INJECTION, SUSPENSION INTRA-ARTICULAR; INTRALESIONAL; INTRAMUSCULAR; SOFT TISSUE at 14:19

## 2020-08-21 RX ADMIN — IOHEXOL 1 ML: 300 INJECTION, SOLUTION INTRAVENOUS at 14:19

## 2020-08-21 RX ADMIN — LIDOCAINE HYDROCHLORIDE 3 ML: 10 INJECTION, SOLUTION EPIDURAL; INFILTRATION; INTRACAUDAL; PERINEURAL at 14:18

## 2020-08-28 ENCOUNTER — TELEPHONE (OUTPATIENT)
Dept: PAIN MEDICINE | Facility: CLINIC | Age: 70
End: 2020-08-28

## 2020-09-17 ENCOUNTER — TELEPHONE (OUTPATIENT)
Dept: GASTROENTEROLOGY | Facility: CLINIC | Age: 70
End: 2020-09-17

## 2020-09-17 NOTE — TELEPHONE ENCOUNTER
Call transf'd  Pt states she was transf'd to leave a mssg for [facility]; canc'd an appt [facility]/ [facility]; has surgery at [facility] for her hiatal hernia/suspected source of bleeding; surgeon Dr Martino Search  If ques # [phone]     I req'd Pt ask FC to send info to [facility]

## 2020-09-22 NOTE — TELEPHONE ENCOUNTER
Inge Gonzalez RN from [facility] called requesting we mail color copies of patient's EGD's 5/2020 and 6/2020 and a copy of her capsule study to [address] [place] [address]  attn: Inge [phone] [id number]  I contacted endo to print out

## 2020-09-25 ENCOUNTER — TELEPHONE (OUTPATIENT)
Dept: PAIN MEDICINE | Facility: CLINIC | Age: 70
End: 2020-09-25

## 2020-09-25 NOTE — TELEPHONE ENCOUNTER
Pt contacted Call Center requested refill of their medication  Medication Name: traMADol (ULTRAM          Dosage of Med: 50 mg       Frequency of Med: Take 1 tablet (50 mg total) by mouth every 8 (eight) hours as needed for moderate pain     Remaining Medication: 2      Pharmacy and Location:   [facility]   [address] [place] [address],[address]   [phone]        Pt  Preferred Callback Phone Number: [phone]      Thank you  PATIENT NAME: Adrienne King  MRN: [id number]  DATE OF OPERATION: 5/6/2019  PREOPERATIVE DIAGNOSIS:   1. Intermittent dysphagia  2. Chronic gastroesophageal reflux disease  3. Anemia with low iron saturation  4.  Average risk for colon cancer here for scr randomly to rule out eosinophilic esophagitis. Sliding hiatal hernia Hill grade 2 was seen approximately 1 cm in length. 2.  Patchy erythema was noted in the stomach and biopsies were taken to rule out H. pylori gastritis.   The gastric folds were not thi discharge. IMPRESSION:  1. As above    RECOMMENDATIONS:  1. Await final biopsy results if adenomatous polyp in the colon then colonoscopy in 5 years otherwise 10 years  2. Check final biopsy results in the duodenal polyp.   If adenomatous polyp then uppe

## 2020-09-25 NOTE — TELEPHONE ENCOUNTER
Pt would like to schedule her next procedure  Please be advise thank you        Please call patient back @  [phone]

## 2020-09-25 NOTE — TELEPHONE ENCOUNTER
pre op instructions   Went over pre procedure instructions, no vaccinations 2 weeks prior/post proc, NPO 1 hr prior, if sick or on abx needs to call to rs, wear loose, comf clothing- no buttons/zippers, needs   Pt verbalized understanding  covid disclaimer:   COVID disclaimer/ screening completed  Pt states they have NOT had COVID  Because of possible immunosuppression due to steroid, pt is aware that he should practice more strict social distancing, masking, handwashing for 2-3 weeks following procedure  Reviewed check in process with pt- will enter building no earlier than arrival time given, agreed to wear mask for duration of appt,  will wait in car

## 2020-10-06 DIAGNOSIS — M48.062 SPINAL STENOSIS OF LUMBAR REGION WITH NEUROGENIC CLAUDICATION: ICD-10-CM

## 2020-10-06 DIAGNOSIS — M54.16 LUMBAR RADICULOPATHY: ICD-10-CM

## 2020-10-06 RX ORDER — TRAMADOL HYDROCHLORIDE 50 MG/1
TABLET ORAL
Qty: 30 TABLET | OUTPATIENT
Start: 2020-10-06

## 2020-10-09 ENCOUNTER — HOSPITAL ENCOUNTER (OUTPATIENT)
Dept: RADIOLOGY | Facility: CLINIC | Age: 70
Discharge: HOME | End: 2020-10-09
Attending: ANESTHESIOLOGY | Admitting: ANESTHESIOLOGY
Payer: COMMERCIAL

## 2020-10-09 VITALS
SYSTOLIC BLOOD PRESSURE: 113 MMHG | RESPIRATION RATE: 20 BRPM | TEMPERATURE: 99.2 F | OXYGEN SATURATION: 97 % | DIASTOLIC BLOOD PRESSURE: 70 MMHG | HEART RATE: 68 BPM

## 2020-10-09 DIAGNOSIS — M48.062 SPINAL STENOSIS OF LUMBAR REGION WITH NEUROGENIC CLAUDICATION: ICD-10-CM

## 2020-10-09 DIAGNOSIS — M54.16 LUMBAR RADICULOPATHY: ICD-10-CM

## 2020-10-09 PROCEDURE — 62323 NJX INTERLAMINAR LMBR/SAC: CPT | Performed by: ANESTHESIOLOGY

## 2020-10-09 RX ORDER — TRAMADOL HYDROCHLORIDE 50 MG/1
50 TABLET ORAL EVERY 8 HOURS PRN
Qty: 30 TABLET | Refills: 0 | Status: SHIPPED | OUTPATIENT
Start: 2020-10-09 | End: 2020-11-18 | Stop reason: SDUPTHER

## 2020-10-09 RX ORDER — METHYLPREDNISOLONE ACETATE 80 MG/ML
80 INJECTION, SUSPENSION INTRA-ARTICULAR; INTRALESIONAL; INTRAMUSCULAR; PARENTERAL; SOFT TISSUE ONCE
Status: COMPLETED | OUTPATIENT
Start: 2020-10-09 | End: 2020-10-09

## 2020-10-09 RX ORDER — LIDOCAINE HYDROCHLORIDE 10 MG/ML
5 INJECTION, SOLUTION EPIDURAL; INFILTRATION; INTRACAUDAL; PERINEURAL ONCE
Status: COMPLETED | OUTPATIENT
Start: 2020-10-09 | End: 2020-10-09

## 2020-10-09 RX ADMIN — IOHEXOL 1 ML: 300 INJECTION, SOLUTION INTRAVENOUS at 11:04

## 2020-10-09 RX ADMIN — METHYLPREDNISOLONE ACETATE 80 MG: 80 INJECTION, SUSPENSION INTRA-ARTICULAR; INTRALESIONAL; INTRAMUSCULAR; SOFT TISSUE at 11:04

## 2020-10-09 RX ADMIN — LIDOCAINE HYDROCHLORIDE 3 ML: 10 INJECTION, SOLUTION EPIDURAL; INFILTRATION; INTRACAUDAL; PERINEURAL at 11:03

## 2020-10-14 ENCOUNTER — OFFICE VISIT (OUTPATIENT)
Dept: FAMILY MEDICINE CLINIC | Facility: HOSPITAL | Age: 70
End: 2020-10-14
Payer: COMMERCIAL

## 2020-10-14 VITALS
HEIGHT: 61 IN | SYSTOLIC BLOOD PRESSURE: 124 MMHG | BODY MASS INDEX: 24.2 KG/M2 | OXYGEN SATURATION: 98 % | DIASTOLIC BLOOD PRESSURE: 60 MMHG | TEMPERATURE: 97.8 F | WEIGHT: 128.2 LBS | HEART RATE: 71 BPM

## 2020-10-14 DIAGNOSIS — I10 HYPERTENSION, UNSPECIFIED TYPE: ICD-10-CM

## 2020-10-14 DIAGNOSIS — Z00.00 HEALTH CARE MAINTENANCE: Primary | ICD-10-CM

## 2020-10-14 DIAGNOSIS — I48.0 PAROXYSMAL ATRIAL FIBRILLATION (HCC): ICD-10-CM

## 2020-10-14 DIAGNOSIS — N18.30 STAGE 3 CHRONIC KIDNEY DISEASE, UNSPECIFIED WHETHER STAGE 3A OR 3B CKD (HCC): ICD-10-CM

## 2020-10-14 DIAGNOSIS — D50.8 OTHER IRON DEFICIENCY ANEMIA: ICD-10-CM

## 2020-10-14 DIAGNOSIS — M48.062 SPINAL STENOSIS, LUMBAR REGION, WITH NEUROGENIC CLAUDICATION: ICD-10-CM

## 2020-10-14 DIAGNOSIS — C49.A9 MALIGNANT GASTROINTESTINAL STROMAL TUMOR (GIST) OF OTHER SITE (HCC): ICD-10-CM

## 2020-10-14 PROBLEM — K92.2 UPPER GI BLEED: Status: RESOLVED | Noted: 2020-05-03 | Resolved: 2020-10-14

## 2020-10-14 PROBLEM — K25.0 ACUTE GASTRIC ULCER WITH HEMORRHAGE: Status: RESOLVED | Noted: 2020-06-04 | Resolved: 2020-10-14

## 2020-10-14 PROBLEM — Z01.818 PRE-OP EXAMINATION: Status: RESOLVED | Noted: 2020-07-29 | Resolved: 2020-10-14

## 2020-10-14 PROBLEM — H18.9 CORNEA DISORDER: Status: RESOLVED | Noted: 2020-07-29 | Resolved: 2020-10-14

## 2020-10-14 PROBLEM — D64.9 ABSOLUTE ANEMIA: Status: ACTIVE | Noted: 2020-10-14

## 2020-10-14 PROBLEM — D62 ANEMIA DUE TO BLOOD LOSS, ACUTE: Status: RESOLVED | Noted: 2020-07-29 | Resolved: 2020-10-14

## 2020-10-14 PROBLEM — M54.16 LUMBAR RADICULOPATHY: Status: RESOLVED | Noted: 2018-01-16 | Resolved: 2020-10-14

## 2020-10-14 PROCEDURE — 3288F FALL RISK ASSESSMENT DOCD: CPT | Performed by: INTERNAL MEDICINE

## 2020-10-14 PROCEDURE — 3078F DIAST BP <80 MM HG: CPT | Performed by: INTERNAL MEDICINE

## 2020-10-14 PROCEDURE — 1101F PT FALLS ASSESS-DOCD LE1/YR: CPT | Performed by: INTERNAL MEDICINE

## 2020-10-14 PROCEDURE — 3074F SYST BP LT 130 MM HG: CPT | Performed by: INTERNAL MEDICINE

## 2020-10-14 PROCEDURE — 1036F TOBACCO NON-USER: CPT | Performed by: INTERNAL MEDICINE

## 2020-10-14 PROCEDURE — 1160F RVW MEDS BY RX/DR IN RCRD: CPT | Performed by: INTERNAL MEDICINE

## 2020-10-14 PROCEDURE — 1170F FXNL STATUS ASSESSED: CPT | Performed by: INTERNAL MEDICINE

## 2020-10-14 PROCEDURE — G0439 PPPS, SUBSEQ VISIT: HCPCS | Performed by: INTERNAL MEDICINE

## 2020-10-14 PROCEDURE — 1125F AMNT PAIN NOTED PAIN PRSNT: CPT | Performed by: INTERNAL MEDICINE

## 2020-10-14 PROCEDURE — 3725F SCREEN DEPRESSION PERFORMED: CPT | Performed by: INTERNAL MEDICINE

## 2020-10-14 PROCEDURE — 99213 OFFICE O/P EST LOW 20 MIN: CPT | Performed by: INTERNAL MEDICINE

## 2020-10-14 RX ORDER — AVAPRITINIB 200 MG/1
200 TABLET, FILM COATED ORAL DAILY
COMMUNITY
End: 2020-10-14 | Stop reason: SDUPTHER

## 2020-10-14 RX ORDER — AVAPRITINIB 200 MG/1
200 TABLET, FILM COATED ORAL DAILY
COMMUNITY

## 2020-10-16 ENCOUNTER — TELEPHONE (OUTPATIENT)
Dept: PAIN MEDICINE | Facility: CLINIC | Age: 70
End: 2020-10-16

## 2020-11-02 ENCOUNTER — LAB (OUTPATIENT)
Dept: LAB | Facility: HOSPITAL | Age: 70
End: 2020-11-02
Attending: INTERNAL MEDICINE
Payer: COMMERCIAL

## 2020-11-02 ENCOUNTER — TELEPHONE (OUTPATIENT)
Dept: FAMILY MEDICINE CLINIC | Facility: HOSPITAL | Age: 70
End: 2020-11-02

## 2020-11-02 ENCOUNTER — APPOINTMENT (EMERGENCY)
Dept: RADIOLOGY | Facility: HOSPITAL | Age: 70
End: 2020-11-02
Payer: COMMERCIAL

## 2020-11-02 ENCOUNTER — TRANSCRIBE ORDERS (OUTPATIENT)
Dept: ADMINISTRATIVE | Facility: HOSPITAL | Age: 70
End: 2020-11-02

## 2020-11-02 ENCOUNTER — HOSPITAL ENCOUNTER (EMERGENCY)
Facility: HOSPITAL | Age: 70
Discharge: HOME | End: 2020-11-02
Attending: EMERGENCY MEDICINE | Admitting: EMERGENCY MEDICINE
Payer: COMMERCIAL

## 2020-11-02 VITALS
OXYGEN SATURATION: 96 % | RESPIRATION RATE: 18 BRPM | HEART RATE: 81 BPM | BODY MASS INDEX: 23.03 KG/M2 | TEMPERATURE: 98 F | HEIGHT: 61 IN | WEIGHT: 122 LBS | DIASTOLIC BLOOD PRESSURE: 56 MMHG | SYSTOLIC BLOOD PRESSURE: 119 MMHG

## 2020-11-02 DIAGNOSIS — Z51.81 ENCOUNTER FOR THERAPEUTIC DRUG MONITORING: ICD-10-CM

## 2020-11-02 DIAGNOSIS — C49.A0 STROMAL TUMOR OF DIGESTIVE SYSTEM (HCC): ICD-10-CM

## 2020-11-02 DIAGNOSIS — D64.9 ACUTE ON CHRONIC ANEMIA: Primary | ICD-10-CM

## 2020-11-02 DIAGNOSIS — C78.7 SECONDARY MALIGNANT NEOPLASM OF LIVER (HCC): ICD-10-CM

## 2020-11-02 DIAGNOSIS — D50.8 OTHER IRON DEFICIENCY ANEMIA: ICD-10-CM

## 2020-11-02 DIAGNOSIS — D64.9 ANEMIA, UNSPECIFIED TYPE: Primary | ICD-10-CM

## 2020-11-02 DIAGNOSIS — R19.7 DIARRHEA, UNSPECIFIED TYPE: ICD-10-CM

## 2020-11-02 DIAGNOSIS — D64.9 ANEMIA, UNSPECIFIED TYPE: ICD-10-CM

## 2020-11-02 DIAGNOSIS — I10 HYPERTENSION, UNSPECIFIED TYPE: ICD-10-CM

## 2020-11-02 LAB
ABO GROUP BLD: NORMAL
ALBUMIN SERPL BCP-MCNC: 3.1 G/DL (ref 3.5–5)
ALBUMIN SERPL BCP-MCNC: 3.1 G/DL (ref 3.5–5)
ALP SERPL-CCNC: 66 U/L (ref 46–116)
ALP SERPL-CCNC: 69 U/L (ref 46–116)
ALT SERPL W P-5'-P-CCNC: 23 U/L (ref 12–78)
ALT SERPL W P-5'-P-CCNC: 28 U/L (ref 12–78)
ANION GAP SERPL CALCULATED.3IONS-SCNC: 7 MMOL/L (ref 4–13)
ANION GAP SERPL CALCULATED.3IONS-SCNC: 8 MMOL/L (ref 4–13)
AST SERPL W P-5'-P-CCNC: 36 U/L (ref 5–45)
AST SERPL W P-5'-P-CCNC: 42 U/L (ref 5–45)
BASOPHILS # BLD AUTO: 0 THOUSANDS/ΜL (ref 0–0.1)
BASOPHILS # BLD AUTO: 0 THOUSANDS/ΜL (ref 0–0.1)
BASOPHILS NFR BLD AUTO: 0 % (ref 0–1)
BASOPHILS NFR BLD AUTO: 0 % (ref 0–1)
BILIRUB SERPL-MCNC: 0.2 MG/DL (ref 0.2–1)
BILIRUB SERPL-MCNC: 0.22 MG/DL (ref 0.2–1)
BLD GP AB SCN SERPL QL: NEGATIVE
BUN SERPL-MCNC: 18 MG/DL (ref 5–25)
BUN SERPL-MCNC: 18 MG/DL (ref 5–25)
CALCIUM ALBUM COR SERPL-MCNC: 8.6 MG/DL (ref 8.3–10.1)
CALCIUM ALBUM COR SERPL-MCNC: 8.7 MG/DL (ref 8.3–10.1)
CALCIUM SERPL-MCNC: 7.9 MG/DL (ref 8.3–10.1)
CALCIUM SERPL-MCNC: 8 MG/DL (ref 8.3–10.1)
CHLORIDE SERPL-SCNC: 105 MMOL/L (ref 100–108)
CHLORIDE SERPL-SCNC: 111 MMOL/L (ref 100–108)
CO2 SERPL-SCNC: 24 MMOL/L (ref 21–32)
CO2 SERPL-SCNC: 26 MMOL/L (ref 21–32)
CREAT SERPL-MCNC: 1.45 MG/DL (ref 0.6–1.3)
CREAT SERPL-MCNC: 1.52 MG/DL (ref 0.6–1.3)
EOSINOPHIL # BLD AUTO: 0.03 THOUSAND/ΜL (ref 0–0.61)
EOSINOPHIL # BLD AUTO: 0.07 THOUSAND/ΜL (ref 0–0.61)
EOSINOPHIL NFR BLD AUTO: 1 % (ref 0–6)
EOSINOPHIL NFR BLD AUTO: 3 % (ref 0–6)
ERYTHROCYTE [DISTWIDTH] IN BLOOD BY AUTOMATED COUNT: 17.6 % (ref 11.6–15.1)
ERYTHROCYTE [DISTWIDTH] IN BLOOD BY AUTOMATED COUNT: 18 % (ref 11.6–15.1)
GFR SERPL CREATININE-BSD FRML MDRD: 34 ML/MIN/1.73SQ M
GFR SERPL CREATININE-BSD FRML MDRD: 36 ML/MIN/1.73SQ M
GLUCOSE P FAST SERPL-MCNC: 95 MG/DL (ref 65–99)
GLUCOSE SERPL-MCNC: 91 MG/DL (ref 65–140)
HCT VFR BLD AUTO: 17.8 % (ref 34.8–46.1)
HCT VFR BLD AUTO: 18.6 % (ref 34.8–46.1)
HGB BLD-MCNC: 5.2 G/DL (ref 11.5–15.4)
HGB BLD-MCNC: 5.4 G/DL (ref 11.5–15.4)
IMM GRANULOCYTES # BLD AUTO: 0.03 THOUSAND/UL (ref 0–0.2)
IMM GRANULOCYTES # BLD AUTO: 0.03 THOUSAND/UL (ref 0–0.2)
IMM GRANULOCYTES NFR BLD AUTO: 1 % (ref 0–2)
IMM GRANULOCYTES NFR BLD AUTO: 1 % (ref 0–2)
LYMPHOCYTES # BLD AUTO: 0.26 THOUSANDS/ΜL (ref 0.6–4.47)
LYMPHOCYTES # BLD AUTO: 0.48 THOUSANDS/ΜL (ref 0.6–4.47)
LYMPHOCYTES NFR BLD AUTO: 10 % (ref 14–44)
LYMPHOCYTES NFR BLD AUTO: 17 % (ref 14–44)
MCH RBC QN AUTO: 38 PG (ref 26.8–34.3)
MCH RBC QN AUTO: 39.1 PG (ref 26.8–34.3)
MCHC RBC AUTO-ENTMCNC: 29 G/DL (ref 31.4–37.4)
MCHC RBC AUTO-ENTMCNC: 29.2 G/DL (ref 31.4–37.4)
MCV RBC AUTO: 130 FL (ref 82–98)
MCV RBC AUTO: 135 FL (ref 82–98)
MONOCYTES # BLD AUTO: 0.28 THOUSAND/ΜL (ref 0.17–1.22)
MONOCYTES # BLD AUTO: 0.39 THOUSAND/ΜL (ref 0.17–1.22)
MONOCYTES NFR BLD AUTO: 10 % (ref 4–12)
MONOCYTES NFR BLD AUTO: 14 % (ref 4–12)
NEUTROPHILS # BLD AUTO: 1.82 THOUSANDS/ΜL (ref 1.85–7.62)
NEUTROPHILS # BLD AUTO: 2.09 THOUSANDS/ΜL (ref 1.85–7.62)
NEUTS SEG NFR BLD AUTO: 65 % (ref 43–75)
NEUTS SEG NFR BLD AUTO: 78 % (ref 43–75)
NRBC BLD AUTO-RTO: 0 /100 WBCS
PLATELET # BLD AUTO: 156 THOUSANDS/UL (ref 149–390)
PLATELET # BLD AUTO: 170 THOUSANDS/UL (ref 149–390)
PMV BLD AUTO: 10.4 FL (ref 8.9–12.7)
PMV BLD AUTO: 9.9 FL (ref 8.9–12.7)
POTASSIUM SERPL-SCNC: 3.7 MMOL/L (ref 3.5–5.3)
POTASSIUM SERPL-SCNC: 3.8 MMOL/L (ref 3.5–5.3)
PROT SERPL-MCNC: 5.6 G/DL (ref 6.4–8.2)
PROT SERPL-MCNC: 6 G/DL (ref 6.4–8.2)
RBC # BLD AUTO: 1.37 MILLION/UL (ref 3.81–5.12)
RBC # BLD AUTO: 1.38 MILLION/UL (ref 3.81–5.12)
RH BLD: POSITIVE
SODIUM SERPL-SCNC: 139 MMOL/L (ref 136–145)
SODIUM SERPL-SCNC: 142 MMOL/L (ref 136–145)
SPECIMEN EXPIRATION DATE: NORMAL
WBC # BLD AUTO: 2.69 THOUSAND/UL (ref 4.31–10.16)
WBC # BLD AUTO: 2.79 THOUSAND/UL (ref 4.31–10.16)

## 2020-11-02 PROCEDURE — 80053 COMPREHEN METABOLIC PANEL: CPT | Performed by: PHYSICIAN ASSISTANT

## 2020-11-02 PROCEDURE — 99285 EMERGENCY DEPT VISIT HI MDM: CPT | Performed by: PHYSICIAN ASSISTANT

## 2020-11-02 PROCEDURE — 36415 COLL VENOUS BLD VENIPUNCTURE: CPT

## 2020-11-02 PROCEDURE — 85025 COMPLETE CBC W/AUTO DIFF WBC: CPT

## 2020-11-02 PROCEDURE — 85025 COMPLETE CBC W/AUTO DIFF WBC: CPT | Performed by: PHYSICIAN ASSISTANT

## 2020-11-02 PROCEDURE — 93005 ELECTROCARDIOGRAM TRACING: CPT

## 2020-11-02 PROCEDURE — 86900 BLOOD TYPING SEROLOGIC ABO: CPT | Performed by: PHYSICIAN ASSISTANT

## 2020-11-02 PROCEDURE — 71045 X-RAY EXAM CHEST 1 VIEW: CPT

## 2020-11-02 PROCEDURE — 80053 COMPREHEN METABOLIC PANEL: CPT

## 2020-11-02 PROCEDURE — 86923 COMPATIBILITY TEST ELECTRIC: CPT

## 2020-11-02 PROCEDURE — P9058 RBC, L/R, CMV-NEG, IRRAD: HCPCS

## 2020-11-02 PROCEDURE — 86901 BLOOD TYPING SEROLOGIC RH(D): CPT | Performed by: PHYSICIAN ASSISTANT

## 2020-11-02 PROCEDURE — 86850 RBC ANTIBODY SCREEN: CPT | Performed by: PHYSICIAN ASSISTANT

## 2020-11-02 PROCEDURE — 99284 EMERGENCY DEPT VISIT MOD MDM: CPT

## 2020-11-02 PROCEDURE — 36430 TRANSFUSION BLD/BLD COMPNT: CPT

## 2020-11-02 RX ORDER — SOD PHOS DI, MONO/K PHOS MONO 250 MG
1 TABLET ORAL DAILY
COMMUNITY
Start: 2020-10-06 | End: 2021-08-11

## 2020-11-04 LAB
ATRIAL RATE: 97 BPM
P AXIS: 64 DEGREES
PR INTERVAL: 160 MS
QRS AXIS: -37 DEGREES
QRSD INTERVAL: 122 MS
QT INTERVAL: 400 MS
QTC INTERVAL: 508 MS
T WAVE AXIS: 52 DEGREES
VENTRICULAR RATE: 97 BPM

## 2020-11-04 PROCEDURE — 93010 ELECTROCARDIOGRAM REPORT: CPT | Performed by: INTERNAL MEDICINE

## 2020-11-05 ENCOUNTER — VBI (OUTPATIENT)
Dept: FAMILY MEDICINE CLINIC | Facility: HOSPITAL | Age: 70
End: 2020-11-05

## 2020-11-18 ENCOUNTER — OFFICE VISIT (OUTPATIENT)
Dept: PAIN MEDICINE | Facility: CLINIC | Age: 70
End: 2020-11-18
Payer: COMMERCIAL

## 2020-11-18 VITALS
HEART RATE: 68 BPM | SYSTOLIC BLOOD PRESSURE: 130 MMHG | HEIGHT: 61 IN | WEIGHT: 128 LBS | DIASTOLIC BLOOD PRESSURE: 76 MMHG | TEMPERATURE: 99.1 F | BODY MASS INDEX: 24.17 KG/M2

## 2020-11-18 DIAGNOSIS — M54.16 LUMBAR RADICULOPATHY: ICD-10-CM

## 2020-11-18 DIAGNOSIS — M48.062 SPINAL STENOSIS, LUMBAR REGION, WITH NEUROGENIC CLAUDICATION: ICD-10-CM

## 2020-11-18 DIAGNOSIS — M48.062 SPINAL STENOSIS OF LUMBAR REGION WITH NEUROGENIC CLAUDICATION: ICD-10-CM

## 2020-11-18 DIAGNOSIS — M47.816 SPONDYLOSIS OF LUMBAR REGION WITHOUT MYELOPATHY OR RADICULOPATHY: ICD-10-CM

## 2020-11-18 DIAGNOSIS — G89.4 PAIN SYNDROME, CHRONIC: Primary | ICD-10-CM

## 2020-11-18 DIAGNOSIS — G89.29 CHRONIC BILATERAL LOW BACK PAIN WITHOUT SCIATICA: ICD-10-CM

## 2020-11-18 DIAGNOSIS — M54.50 CHRONIC BILATERAL LOW BACK PAIN WITHOUT SCIATICA: ICD-10-CM

## 2020-11-18 DIAGNOSIS — M51.27 HERNIATED NUCLEUS PULPOSUS, L5-S1, RIGHT: ICD-10-CM

## 2020-11-18 PROCEDURE — 3008F BODY MASS INDEX DOCD: CPT | Performed by: NURSE PRACTITIONER

## 2020-11-18 PROCEDURE — 1160F RVW MEDS BY RX/DR IN RCRD: CPT | Performed by: NURSE PRACTITIONER

## 2020-11-18 PROCEDURE — 3078F DIAST BP <80 MM HG: CPT | Performed by: NURSE PRACTITIONER

## 2020-11-18 PROCEDURE — 3075F SYST BP GE 130 - 139MM HG: CPT | Performed by: NURSE PRACTITIONER

## 2020-11-18 PROCEDURE — 99214 OFFICE O/P EST MOD 30 MIN: CPT | Performed by: NURSE PRACTITIONER

## 2020-11-18 PROCEDURE — 1036F TOBACCO NON-USER: CPT | Performed by: NURSE PRACTITIONER

## 2020-11-18 RX ORDER — TRAMADOL HYDROCHLORIDE 50 MG/1
50 TABLET ORAL EVERY 8 HOURS PRN
Qty: 40 TABLET | Refills: 1 | Status: SHIPPED | OUTPATIENT
Start: 2020-11-18 | End: 2021-01-15 | Stop reason: SDUPTHER

## 2020-11-18 RX ORDER — ACETAMINOPHEN 500 MG
500 TABLET ORAL EVERY 6 HOURS PRN
COMMUNITY

## 2020-12-02 ENCOUNTER — TELEPHONE (OUTPATIENT)
Dept: PAIN MEDICINE | Facility: CLINIC | Age: 70
End: 2020-12-02

## 2020-12-03 ENCOUNTER — HOSPITAL ENCOUNTER (OUTPATIENT)
Dept: RADIOLOGY | Facility: CLINIC | Age: 70
Discharge: HOME | End: 2020-12-03
Attending: ANESTHESIOLOGY | Admitting: ANESTHESIOLOGY
Payer: COMMERCIAL

## 2020-12-03 VITALS
TEMPERATURE: 98.1 F | OXYGEN SATURATION: 96 % | DIASTOLIC BLOOD PRESSURE: 65 MMHG | HEART RATE: 71 BPM | RESPIRATION RATE: 20 BRPM | SYSTOLIC BLOOD PRESSURE: 120 MMHG

## 2020-12-03 DIAGNOSIS — M51.27 HERNIATED NUCLEUS PULPOSUS, L5-S1, RIGHT: ICD-10-CM

## 2020-12-03 DIAGNOSIS — M54.16 LUMBAR RADICULOPATHY: ICD-10-CM

## 2020-12-03 DIAGNOSIS — M54.50 CHRONIC BILATERAL LOW BACK PAIN WITHOUT SCIATICA: ICD-10-CM

## 2020-12-03 DIAGNOSIS — M48.062 SPINAL STENOSIS OF LUMBAR REGION WITH NEUROGENIC CLAUDICATION: ICD-10-CM

## 2020-12-03 DIAGNOSIS — G89.29 CHRONIC BILATERAL LOW BACK PAIN WITHOUT SCIATICA: ICD-10-CM

## 2020-12-03 PROCEDURE — 62323 NJX INTERLAMINAR LMBR/SAC: CPT | Performed by: ANESTHESIOLOGY

## 2020-12-03 RX ORDER — LIDOCAINE HYDROCHLORIDE 10 MG/ML
5 INJECTION, SOLUTION EPIDURAL; INFILTRATION; INTRACAUDAL; PERINEURAL ONCE
Status: COMPLETED | OUTPATIENT
Start: 2020-12-03 | End: 2020-12-03

## 2020-12-03 RX ORDER — METHYLPREDNISOLONE ACETATE 80 MG/ML
80 INJECTION, SUSPENSION INTRA-ARTICULAR; INTRALESIONAL; INTRAMUSCULAR; PARENTERAL; SOFT TISSUE ONCE
Status: COMPLETED | OUTPATIENT
Start: 2020-12-03 | End: 2020-12-03

## 2020-12-03 RX ADMIN — METHYLPREDNISOLONE ACETATE 80 MG: 80 INJECTION, SUSPENSION INTRA-ARTICULAR; INTRALESIONAL; INTRAMUSCULAR; SOFT TISSUE at 13:25

## 2020-12-03 RX ADMIN — LIDOCAINE HYDROCHLORIDE 3 ML: 10 INJECTION, SOLUTION EPIDURAL; INFILTRATION; INTRACAUDAL; PERINEURAL at 13:24

## 2020-12-03 RX ADMIN — IOHEXOL 1 ML: 300 INJECTION, SOLUTION INTRAVENOUS at 13:25

## 2020-12-07 ENCOUNTER — TRANSCRIBE ORDERS (OUTPATIENT)
Dept: ADMINISTRATIVE | Facility: HOSPITAL | Age: 70
End: 2020-12-07

## 2020-12-07 ENCOUNTER — LAB (OUTPATIENT)
Dept: LAB | Facility: HOSPITAL | Age: 70
End: 2020-12-07
Payer: COMMERCIAL

## 2020-12-07 DIAGNOSIS — C49.A0 STROMAL TUMOR OF DIGESTIVE SYSTEM (HCC): ICD-10-CM

## 2020-12-07 DIAGNOSIS — C49.A0 STROMAL TUMOR OF DIGESTIVE SYSTEM (HCC): Primary | ICD-10-CM

## 2020-12-07 LAB
ALBUMIN SERPL BCP-MCNC: 3.4 G/DL (ref 3.5–5)
ALP SERPL-CCNC: 73 U/L (ref 46–116)
ALT SERPL W P-5'-P-CCNC: 29 U/L (ref 12–78)
ANION GAP SERPL CALCULATED.3IONS-SCNC: 5 MMOL/L (ref 4–13)
AST SERPL W P-5'-P-CCNC: 42 U/L (ref 5–45)
BASOPHILS # BLD AUTO: 0.01 THOUSANDS/ΜL (ref 0–0.1)
BASOPHILS NFR BLD AUTO: 0 % (ref 0–1)
BILIRUB SERPL-MCNC: 0.31 MG/DL (ref 0.2–1)
BUN SERPL-MCNC: 20 MG/DL (ref 5–25)
CALCIUM ALBUM COR SERPL-MCNC: 9.5 MG/DL (ref 8.3–10.1)
CALCIUM SERPL-MCNC: 9 MG/DL (ref 8.3–10.1)
CHLORIDE SERPL-SCNC: 109 MMOL/L (ref 100–108)
CO2 SERPL-SCNC: 28 MMOL/L (ref 21–32)
CREAT SERPL-MCNC: 1.12 MG/DL (ref 0.6–1.3)
EOSINOPHIL # BLD AUTO: 0.11 THOUSAND/ΜL (ref 0–0.61)
EOSINOPHIL NFR BLD AUTO: 4 % (ref 0–6)
ERYTHROCYTE [DISTWIDTH] IN BLOOD BY AUTOMATED COUNT: 18.1 % (ref 11.6–15.1)
GFR SERPL CREATININE-BSD FRML MDRD: 49 ML/MIN/1.73SQ M
GLUCOSE P FAST SERPL-MCNC: 82 MG/DL (ref 65–99)
HCT VFR BLD AUTO: 25.5 % (ref 34.8–46.1)
HGB BLD-MCNC: 7.9 G/DL (ref 11.5–15.4)
IMM GRANULOCYTES # BLD AUTO: 0.01 THOUSAND/UL (ref 0–0.2)
IMM GRANULOCYTES NFR BLD AUTO: 0 % (ref 0–2)
LYMPHOCYTES # BLD AUTO: 0.43 THOUSANDS/ΜL (ref 0.6–4.47)
LYMPHOCYTES NFR BLD AUTO: 14 % (ref 14–44)
MCH RBC QN AUTO: 33.3 PG (ref 26.8–34.3)
MCHC RBC AUTO-ENTMCNC: 31 G/DL (ref 31.4–37.4)
MCV RBC AUTO: 108 FL (ref 82–98)
MONOCYTES # BLD AUTO: 0.4 THOUSAND/ΜL (ref 0.17–1.22)
MONOCYTES NFR BLD AUTO: 13 % (ref 4–12)
NEUTROPHILS # BLD AUTO: 2.11 THOUSANDS/ΜL (ref 1.85–7.62)
NEUTS SEG NFR BLD AUTO: 69 % (ref 43–75)
NRBC BLD AUTO-RTO: 0 /100 WBCS
PLATELET # BLD AUTO: 210 THOUSANDS/UL (ref 149–390)
PMV BLD AUTO: 10.6 FL (ref 8.9–12.7)
POTASSIUM SERPL-SCNC: 3.9 MMOL/L (ref 3.5–5.3)
PROT SERPL-MCNC: 6 G/DL (ref 6.4–8.2)
RBC # BLD AUTO: 2.37 MILLION/UL (ref 3.81–5.12)
SODIUM SERPL-SCNC: 142 MMOL/L (ref 136–145)
WBC # BLD AUTO: 3.07 THOUSAND/UL (ref 4.31–10.16)

## 2020-12-07 PROCEDURE — 80053 COMPREHEN METABOLIC PANEL: CPT

## 2020-12-07 PROCEDURE — 36415 COLL VENOUS BLD VENIPUNCTURE: CPT

## 2020-12-07 PROCEDURE — 85025 COMPLETE CBC W/AUTO DIFF WBC: CPT

## 2020-12-10 ENCOUNTER — TELEPHONE (OUTPATIENT)
Dept: PAIN MEDICINE | Facility: CLINIC | Age: 70
End: 2020-12-10

## 2020-12-10 NOTE — TELEPHONE ENCOUNTER
Pt reports 95% improvement post inj   Pain level 8/10 (a lot of walking today)      S/p L5-S1 LESI #2 12/3 F/u 12/28

## 2020-12-15 ENCOUNTER — TRANSCRIBE ORDERS (OUTPATIENT)
Dept: ADMINISTRATIVE | Facility: HOSPITAL | Age: 70
End: 2020-12-15

## 2020-12-15 ENCOUNTER — LAB (OUTPATIENT)
Dept: LAB | Facility: HOSPITAL | Age: 70
End: 2020-12-15
Payer: COMMERCIAL

## 2020-12-15 DIAGNOSIS — D64.9 ANEMIA, UNSPECIFIED TYPE: Primary | ICD-10-CM

## 2020-12-15 DIAGNOSIS — D64.9 ANEMIA, UNSPECIFIED TYPE: ICD-10-CM

## 2020-12-15 LAB
ALBUMIN SERPL BCP-MCNC: 3.7 G/DL (ref 3.5–5)
ALP SERPL-CCNC: 75 U/L (ref 46–116)
ALT SERPL W P-5'-P-CCNC: 25 U/L (ref 12–78)
ANION GAP SERPL CALCULATED.3IONS-SCNC: 6 MMOL/L (ref 4–13)
AST SERPL W P-5'-P-CCNC: 44 U/L (ref 5–45)
BASOPHILS # BLD AUTO: 0.01 THOUSANDS/ΜL (ref 0–0.1)
BASOPHILS NFR BLD AUTO: 0 % (ref 0–1)
BILIRUB SERPL-MCNC: 0.28 MG/DL (ref 0.2–1)
BUN SERPL-MCNC: 24 MG/DL (ref 5–25)
CALCIUM SERPL-MCNC: 9.2 MG/DL (ref 8.3–10.1)
CHLORIDE SERPL-SCNC: 109 MMOL/L (ref 100–108)
CO2 SERPL-SCNC: 24 MMOL/L (ref 21–32)
CREAT SERPL-MCNC: 1.56 MG/DL (ref 0.6–1.3)
EOSINOPHIL # BLD AUTO: 0.15 THOUSAND/ΜL (ref 0–0.61)
EOSINOPHIL NFR BLD AUTO: 5 % (ref 0–6)
ERYTHROCYTE [DISTWIDTH] IN BLOOD BY AUTOMATED COUNT: 18.1 % (ref 11.6–15.1)
GFR SERPL CREATININE-BSD FRML MDRD: 33 ML/MIN/1.73SQ M
GLUCOSE P FAST SERPL-MCNC: 82 MG/DL (ref 65–99)
HCT VFR BLD AUTO: 26.5 % (ref 34.8–46.1)
HGB BLD-MCNC: 8.4 G/DL (ref 11.5–15.4)
IMM GRANULOCYTES # BLD AUTO: 0.02 THOUSAND/UL (ref 0–0.2)
IMM GRANULOCYTES NFR BLD AUTO: 1 % (ref 0–2)
LYMPHOCYTES # BLD AUTO: 0.45 THOUSANDS/ΜL (ref 0.6–4.47)
LYMPHOCYTES NFR BLD AUTO: 14 % (ref 14–44)
MCH RBC QN AUTO: 34.3 PG (ref 26.8–34.3)
MCHC RBC AUTO-ENTMCNC: 31.7 G/DL (ref 31.4–37.4)
MCV RBC AUTO: 108 FL (ref 82–98)
MONOCYTES # BLD AUTO: 0.36 THOUSAND/ΜL (ref 0.17–1.22)
MONOCYTES NFR BLD AUTO: 11 % (ref 4–12)
NEUTROPHILS # BLD AUTO: 2.25 THOUSANDS/ΜL (ref 1.85–7.62)
NEUTS SEG NFR BLD AUTO: 69 % (ref 43–75)
NRBC BLD AUTO-RTO: 0 /100 WBCS
PLATELET # BLD AUTO: 222 THOUSANDS/UL (ref 149–390)
PMV BLD AUTO: 10.4 FL (ref 8.9–12.7)
POTASSIUM SERPL-SCNC: 4.6 MMOL/L (ref 3.5–5.3)
PROT SERPL-MCNC: 6.3 G/DL (ref 6.4–8.2)
RBC # BLD AUTO: 2.45 MILLION/UL (ref 3.81–5.12)
SODIUM SERPL-SCNC: 139 MMOL/L (ref 136–145)
WBC # BLD AUTO: 3.24 THOUSAND/UL (ref 4.31–10.16)

## 2020-12-15 PROCEDURE — 85025 COMPLETE CBC W/AUTO DIFF WBC: CPT

## 2020-12-15 PROCEDURE — 36415 COLL VENOUS BLD VENIPUNCTURE: CPT

## 2020-12-15 PROCEDURE — 80053 COMPREHEN METABOLIC PANEL: CPT

## 2020-12-28 ENCOUNTER — TELEMEDICINE (OUTPATIENT)
Dept: PAIN MEDICINE | Facility: CLINIC | Age: 70
End: 2020-12-28
Payer: COMMERCIAL

## 2020-12-28 DIAGNOSIS — G89.4 PAIN SYNDROME, CHRONIC: Primary | ICD-10-CM

## 2020-12-28 DIAGNOSIS — M51.27 HERNIATED NUCLEUS PULPOSUS, L5-S1, RIGHT: ICD-10-CM

## 2020-12-28 DIAGNOSIS — M48.062 SPINAL STENOSIS, LUMBAR REGION, WITH NEUROGENIC CLAUDICATION: ICD-10-CM

## 2020-12-28 DIAGNOSIS — M54.16 LUMBAR RADICULOPATHY: ICD-10-CM

## 2020-12-28 DIAGNOSIS — G89.29 CHRONIC BILATERAL LOW BACK PAIN WITHOUT SCIATICA: ICD-10-CM

## 2020-12-28 DIAGNOSIS — M47.816 SPONDYLOSIS OF LUMBAR REGION WITHOUT MYELOPATHY OR RADICULOPATHY: ICD-10-CM

## 2020-12-28 DIAGNOSIS — M54.50 CHRONIC BILATERAL LOW BACK PAIN WITHOUT SCIATICA: ICD-10-CM

## 2020-12-28 PROCEDURE — 99441 PR PHYS/QHP TELEPHONE EVALUATION 5-10 MIN: CPT | Performed by: NURSE PRACTITIONER

## 2020-12-29 ENCOUNTER — LAB (OUTPATIENT)
Dept: LAB | Facility: HOSPITAL | Age: 70
End: 2020-12-29
Payer: COMMERCIAL

## 2020-12-29 ENCOUNTER — TRANSCRIBE ORDERS (OUTPATIENT)
Dept: ADMINISTRATIVE | Facility: HOSPITAL | Age: 70
End: 2020-12-29

## 2020-12-29 DIAGNOSIS — C49.A0 GASTROINTESTINAL STROMAL TUMOR (HCC): ICD-10-CM

## 2020-12-29 DIAGNOSIS — C49.A0 GASTROINTESTINAL STROMAL TUMOR (HCC): Primary | ICD-10-CM

## 2020-12-29 LAB
ALBUMIN SERPL BCP-MCNC: 3.6 G/DL (ref 3.5–5)
ALP SERPL-CCNC: 73 U/L (ref 46–116)
ALT SERPL W P-5'-P-CCNC: 26 U/L (ref 12–78)
ANION GAP SERPL CALCULATED.3IONS-SCNC: 5 MMOL/L (ref 4–13)
AST SERPL W P-5'-P-CCNC: 45 U/L (ref 5–45)
BASOPHILS # BLD AUTO: 0.01 THOUSANDS/ΜL (ref 0–0.1)
BASOPHILS NFR BLD AUTO: 0 % (ref 0–1)
BILIRUB SERPL-MCNC: 0.3 MG/DL (ref 0.2–1)
BUN SERPL-MCNC: 27 MG/DL (ref 5–25)
CALCIUM SERPL-MCNC: 8.7 MG/DL (ref 8.3–10.1)
CHLORIDE SERPL-SCNC: 109 MMOL/L (ref 100–108)
CO2 SERPL-SCNC: 28 MMOL/L (ref 21–32)
CREAT SERPL-MCNC: 1.53 MG/DL (ref 0.6–1.3)
EOSINOPHIL # BLD AUTO: 0.14 THOUSAND/ΜL (ref 0–0.61)
EOSINOPHIL NFR BLD AUTO: 3 % (ref 0–6)
ERYTHROCYTE [DISTWIDTH] IN BLOOD BY AUTOMATED COUNT: 17.5 % (ref 11.6–15.1)
GFR SERPL CREATININE-BSD FRML MDRD: 34 ML/MIN/1.73SQ M
GLUCOSE P FAST SERPL-MCNC: 83 MG/DL (ref 65–99)
HCT VFR BLD AUTO: 23.4 % (ref 34.8–46.1)
HGB BLD-MCNC: 7.2 G/DL (ref 11.5–15.4)
IMM GRANULOCYTES # BLD AUTO: 0.01 THOUSAND/UL (ref 0–0.2)
IMM GRANULOCYTES NFR BLD AUTO: 0 % (ref 0–2)
LYMPHOCYTES # BLD AUTO: 0.45 THOUSANDS/ΜL (ref 0.6–4.47)
LYMPHOCYTES NFR BLD AUTO: 10 % (ref 14–44)
MCH RBC QN AUTO: 33.8 PG (ref 26.8–34.3)
MCHC RBC AUTO-ENTMCNC: 30.8 G/DL (ref 31.4–37.4)
MCV RBC AUTO: 110 FL (ref 82–98)
MONOCYTES # BLD AUTO: 0.36 THOUSAND/ΜL (ref 0.17–1.22)
MONOCYTES NFR BLD AUTO: 8 % (ref 4–12)
NEUTROPHILS # BLD AUTO: 3.48 THOUSANDS/ΜL (ref 1.85–7.62)
NEUTS SEG NFR BLD AUTO: 79 % (ref 43–75)
NRBC BLD AUTO-RTO: 0 /100 WBCS
PLATELET # BLD AUTO: 196 THOUSANDS/UL (ref 149–390)
PMV BLD AUTO: 10.6 FL (ref 8.9–12.7)
POTASSIUM SERPL-SCNC: 4.2 MMOL/L (ref 3.5–5.3)
PROT SERPL-MCNC: 6.5 G/DL (ref 6.4–8.2)
RBC # BLD AUTO: 2.13 MILLION/UL (ref 3.81–5.12)
SODIUM SERPL-SCNC: 142 MMOL/L (ref 136–145)
WBC # BLD AUTO: 4.45 THOUSAND/UL (ref 4.31–10.16)

## 2020-12-29 PROCEDURE — 80053 COMPREHEN METABOLIC PANEL: CPT

## 2020-12-29 PROCEDURE — 36415 COLL VENOUS BLD VENIPUNCTURE: CPT

## 2020-12-29 PROCEDURE — 85025 COMPLETE CBC W/AUTO DIFF WBC: CPT

## 2021-01-04 ENCOUNTER — TELEPHONE (OUTPATIENT)
Dept: PAIN MEDICINE | Facility: CLINIC | Age: 71
End: 2021-01-04

## 2021-01-04 ENCOUNTER — TELEPHONE (OUTPATIENT)
Dept: FAMILY MEDICINE CLINIC | Facility: HOSPITAL | Age: 71
End: 2021-01-04

## 2021-01-04 DIAGNOSIS — M51.27 HERNIATED NUCLEUS PULPOSUS, L5-S1, RIGHT: ICD-10-CM

## 2021-01-04 DIAGNOSIS — M48.062 SPINAL STENOSIS, LUMBAR REGION, WITH NEUROGENIC CLAUDICATION: ICD-10-CM

## 2021-01-04 DIAGNOSIS — M54.50 CHRONIC BILATERAL LOW BACK PAIN WITHOUT SCIATICA: ICD-10-CM

## 2021-01-04 DIAGNOSIS — G89.29 CHRONIC BILATERAL LOW BACK PAIN WITHOUT SCIATICA: ICD-10-CM

## 2021-01-04 DIAGNOSIS — M54.16 LUMBAR RADICULOPATHY: Primary | ICD-10-CM

## 2021-01-04 NOTE — TELEPHONE ENCOUNTER
DG told pt to call if she is having trouble with her legs, so she can schedule an appt with dr mckinney for an injection   Ph# [phone]

## 2021-01-08 ENCOUNTER — TELEPHONE (OUTPATIENT)
Dept: RADIOLOGY | Facility: CLINIC | Age: 71
End: 2021-01-08

## 2021-01-08 ENCOUNTER — HOSPITAL ENCOUNTER (OUTPATIENT)
Dept: RADIOLOGY | Facility: CLINIC | Age: 71
Discharge: HOME | End: 2021-01-08
Attending: ANESTHESIOLOGY

## 2021-01-08 VITALS — RESPIRATION RATE: 20 BRPM | TEMPERATURE: 97.7 F

## 2021-01-08 DIAGNOSIS — M48.062 SPINAL STENOSIS, LUMBAR REGION, WITH NEUROGENIC CLAUDICATION: ICD-10-CM

## 2021-01-08 DIAGNOSIS — M54.16 LUMBAR RADICULOPATHY: ICD-10-CM

## 2021-01-08 DIAGNOSIS — M54.50 CHRONIC BILATERAL LOW BACK PAIN WITHOUT SCIATICA: ICD-10-CM

## 2021-01-08 DIAGNOSIS — G89.29 CHRONIC BILATERAL LOW BACK PAIN WITHOUT SCIATICA: ICD-10-CM

## 2021-01-08 DIAGNOSIS — M51.27 HERNIATED NUCLEUS PULPOSUS, L5-S1, RIGHT: ICD-10-CM

## 2021-01-08 RX ORDER — METHYLPREDNISOLONE ACETATE 80 MG/ML
80 INJECTION, SUSPENSION INTRA-ARTICULAR; INTRALESIONAL; INTRAMUSCULAR; PARENTERAL; SOFT TISSUE ONCE
Status: DISCONTINUED | OUTPATIENT
Start: 2021-01-08 | End: 2021-01-09 | Stop reason: HOSPADM

## 2021-01-08 RX ORDER — LIDOCAINE HYDROCHLORIDE 10 MG/ML
5 INJECTION, SOLUTION EPIDURAL; INFILTRATION; INTRACAUDAL; PERINEURAL ONCE
Status: DISCONTINUED | OUTPATIENT
Start: 2021-01-08 | End: 2021-01-09 | Stop reason: HOSPADM

## 2021-01-08 NOTE — TELEPHONE ENCOUNTER
----- Message from Emi Saenz DO sent at 1/8/2021 10:44 AM EST -----  Please schedule patient for one week from today for LESI, needed to cancel for today secondary to blood transfusion yesterday

## 2021-01-08 NOTE — H&P
History of Present Illness: The patient is a 65 y o  female who presents with complaints of low back and leg pain  Patient Active Problem List   Diagnosis    Anxiety    Chronic bilateral low back pain without sciatica    DJD (degenerative joint disease)    GIST (gastrointestinal stromal tumor), malignant (HCC)    Herniated nucleus pulposus, L5-S1, right    Hypertension    Spinal stenosis, lumbar region, with neurogenic claudication    Pain syndrome, chronic    Paroxysmal atrial fibrillation (HCC)    Right bundle-branch block    Lumbar radiculopathy    Spondylosis of lumbar region without myelopathy or radiculopathy    Non-rheumatic mitral regurgitation    Health care maintenance    Metastatic cancer ([place] [address] )    Gastric AVM    Iron deficiency anemia    Stage 3 chronic kidney disease    Absolute anemia       Past Medical History:   Diagnosis Date    ADHD     Anemia     Anxiety     Arthritis     Asthma     Atrial fibrillation (HCC)     [facility] cancer ([facility] )     Cancer ([place] [address] )     Chronic iron deficiency anemia 2020    Extensive GI evaluation over the last year including multiple EGD, colonoscopy, and capsule endoscopy  Has had gastric AVMs cauterized, Dieulafoy's lesion clipped, and most recently a Seb erosion cauterized    Coronary artery disease     Depression     Gastroenteritis 2020    GERD (gastroesophageal reflux disease)     History of stomach ulcers     Hypercholesterolemia     Hypertension     Kidney disease     Metastatic cancer ([facility] )        Past Surgical History:   Procedure Laterality Date    ANKLE SURGERY      APPENDECTOMY      BREAST SURGERY      CATARACT EXTRACTION       SECTION      COLONOSCOPY  2019    Multiple adenomatous colon polyps and internal hemorrhoids    Three year recall advised    HIP SURGERY      JOINT REPLACEMENT  2019    Left knee replacement    LAMINECTOMY      ROTATOR CUFF REPAIR      SIGMOID RESECTION / RECTOPEXY      SINUS SURGERY      UPPER GASTROINTESTINAL ENDOSCOPY  05/01/2020    Dieulafoy's lesion in proximal stomach which was actively oozing  Injected with epinephrine and 2 Endoclips placed with control of bleeding, hiatal hernia      UPPER GASTROINTESTINAL ENDOSCOPY  06/2020    Bleeding Seb's erosion status post cauterization         Current Outpatient Medications:     acetaminophen (TYLENOL) 500 mg tablet, Take 500 mg by mouth every 6 (six) hours as needed for mild pain, Disp: , Rfl:     Avapritinib (Ayvakit) 200 MG TABS, Take 200 mg by mouth daily, Disp: , Rfl:     Azelastine HCl 137 MCG/SPRAY SOLN, instill 2 sprays into each nostril twice a day if needed for congestion, Disp: , Rfl: 0    diphenhydrAMINE (BENADRYL) 25 mg tablet, Take 25 mg by mouth every 6 (six) hours as needed for itching, Disp: , Rfl:     diphenoxylate-atropine (LOMOTIL) 2 5-0 025 mg per tablet, TAKE 1 TABLET BY MOUTH 4 TIMES A DAY AS NEEDED FOR DIARRHEA, Disp: 30 tablet, Rfl: 5    docusate sodium (COLACE) 100 mg capsule, Take 100 mg by mouth 2 (two) times a day as needed for constipation, Disp: , Rfl:     famotidine (PEPCID) 10 mg tablet, Take 10 mg by mouth , Disp: , Rfl:     ferrous sulfate 324 (65 Fe) mg, Take 1 tablet (324 mg total) by mouth 2 (two) times a day before meals, Disp: 60 tablet, Rfl: 2    furosemide (LASIX) 20 mg tablet, Take 20 mg by mouth daily , Disp: , Rfl:     hydroxychloroquine (PLAQUENIL) 200 mg tablet, Take 200 mg by mouth daily in the early morning , Disp: , Rfl:     loperamide (IMODIUM) 2 mg capsule, Take 2 capsules by mouth 4 (four) times a day as needed, Disp: , Rfl:     metoprolol succinate (Toprol XL) 25 mg 24 hr tablet, Take 0 5 tablets (12 5 mg total) by mouth daily, Disp: , Rfl: 0    NON FORMULARY, Take 2 tablets by mouth daily Chemo: Blue 285, Disp: , Rfl:     ofloxacin (OCUFLOX) 0 3 % ophthalmic solution, Starting April 7, Disp: , Rfl: 0    ondansetron (ZOFRAN) 8 mg tablet, 8 mg daily with breakfast Prior to chemo, Disp: , Rfl:     pantoprazole (PROTONIX) 40 mg tablet, take 1 tablet by mouth twice a day, Disp: 180 tablet, Rfl: 5    prednisoLONE acetate (PRED FORTE) 1 % ophthalmic suspension, , Disp: , Rfl: 0    traMADol (ULTRAM) 50 mg tablet, Take 1 tablet (50 mg total) by mouth every 8 (eight) hours as needed for moderate pain, Disp: 40 tablet, Rfl: 1    Triamcinolone Acetonide (NASACORT ALLERGY 24HR NA), into each nostril 1 spray each nostril--at bedtime   (OTC), Disp: , Rfl:     Virt-Phos 250 Neutral 155-852-130 MG tablet, Take 1 tablet by mouth daily, Disp: , Rfl:     Current Facility-Administered Medications:     lidocaine (PF) (XYLOCAINE-MPF) 1 % injection 5 mL, 5 mL, Other, Once, Tre Rdz DO    methylPREDNISolone acetate (DEPO-MEDROL) injection 80 mg, 80 mg, Epidural, Once, Tre Rdz DO    Allergies   Allergen Reactions    Codeine Other (See Comments)     Throat closes    Codeine Sulfate Throat Swelling    Neomycin-Bacitracin Zn-Polymyx Rash    Wound Dressing Adhesive Other (See Comments)     If its on too long- pt starts itching    Zolmitriptan Palpitations     Heart palpitations    Generic for Zomig         Physical Exam:   General: Awake, Alert, Oriented x 3, Mood and affect appropriate  Respiratory: Respirations even and unlabored  Cardiovascular: Peripheral pulses intact; no edema  Musculoskeletal Exam:  Decreased range of motion lumbar spine    ASA Score: II         Assessment:  Lumbar spinal stenosis    Plan: L5-S1 LESI

## 2021-01-15 ENCOUNTER — HOSPITAL ENCOUNTER (OUTPATIENT)
Dept: RADIOLOGY | Facility: CLINIC | Age: 71
Discharge: HOME | End: 2021-01-15
Attending: ANESTHESIOLOGY | Admitting: ANESTHESIOLOGY
Payer: COMMERCIAL

## 2021-01-15 VITALS
OXYGEN SATURATION: 97 % | RESPIRATION RATE: 20 BRPM | DIASTOLIC BLOOD PRESSURE: 84 MMHG | SYSTOLIC BLOOD PRESSURE: 136 MMHG | TEMPERATURE: 97.5 F | HEART RATE: 114 BPM

## 2021-01-15 DIAGNOSIS — M54.16 LUMBAR RADICULOPATHY: ICD-10-CM

## 2021-01-15 DIAGNOSIS — M54.50 ACUTE BILATERAL LOW BACK PAIN WITHOUT SCIATICA: Primary | ICD-10-CM

## 2021-01-15 DIAGNOSIS — M48.062 SPINAL STENOSIS OF LUMBAR REGION WITH NEUROGENIC CLAUDICATION: ICD-10-CM

## 2021-01-15 PROCEDURE — 62323 NJX INTERLAMINAR LMBR/SAC: CPT | Performed by: ANESTHESIOLOGY

## 2021-01-15 RX ORDER — TRAMADOL HYDROCHLORIDE 50 MG/1
50 TABLET ORAL EVERY 8 HOURS PRN
Qty: 40 TABLET | Refills: 1 | Status: SHIPPED | OUTPATIENT
Start: 2021-01-15 | End: 2021-04-26 | Stop reason: SDUPTHER

## 2021-01-15 RX ORDER — METHYLPREDNISOLONE ACETATE 80 MG/ML
80 INJECTION, SUSPENSION INTRA-ARTICULAR; INTRALESIONAL; INTRAMUSCULAR; PARENTERAL; SOFT TISSUE ONCE
Status: COMPLETED | OUTPATIENT
Start: 2021-01-15 | End: 2021-01-15

## 2021-01-15 RX ORDER — LIDOCAINE HYDROCHLORIDE 10 MG/ML
5 INJECTION, SOLUTION EPIDURAL; INFILTRATION; INTRACAUDAL; PERINEURAL ONCE
Status: COMPLETED | OUTPATIENT
Start: 2021-01-15 | End: 2021-01-15

## 2021-01-15 RX ADMIN — METHYLPREDNISOLONE ACETATE 80 MG: 80 INJECTION, SUSPENSION INTRA-ARTICULAR; INTRALESIONAL; INTRAMUSCULAR; SOFT TISSUE at 10:51

## 2021-01-15 RX ADMIN — LIDOCAINE HYDROCHLORIDE 3 ML: 10 INJECTION, SOLUTION EPIDURAL; INFILTRATION; INTRACAUDAL; PERINEURAL at 10:43

## 2021-01-15 RX ADMIN — IOHEXOL 1 ML: 300 INJECTION, SOLUTION INTRAVENOUS at 10:51

## 2021-01-15 NOTE — DISCHARGE INSTRUCTIONS
Epidural Steroid Injection   WHAT YOU NEED TO KNOW:   An epidural steroid injection (MARIVEL) is a procedure to inject steroid medicine into the epidural space  The epidural space is between your spinal cord and vertebrae  Steroids reduce inflammation and fluid buildup in your spine that may be causing pain  You may be given pain medicine along with the steroids  ACTIVITY  · Do not drive or operate machinery today  · No strenuous activity today - bending, lifting, etc   · You may resume normal activites starting tomorrow - start slowly and as tolerated  · You may shower today, but no tub baths or hot tubs  · You may have numbness for several hours from the local anesthetic  Please use caution and common sense, especially with weight-bearing activities  CARE OF THE INJECTION SITE  · If you have soreness or pain, apply ice to the area today (20 minutes on/20 minutes off)  · Starting tomorrow, you may use warm, moist heat or ice if needed  · You may have an increase or change in your discomfort for 36-48 hours after your treatment  · Apply ice and continue with any pain medication you have been prescribed  · [facility]ify [facility] if you have any of the following: redness, drainage, swelling, headache, stiff neck or fever above 100°F     SPECIAL INSTRUCTIONS  · Our office will contact you in approximately 7 days for a progress report  MEDICATIONS  · Continue to take all routine medications  · Our office may have instructed you to hold some medications  If you have a problem specifically related to your procedure, please call our office at [phone]  Problems not related to your procedure should be directed to your primary care physician

## 2021-01-15 NOTE — H&P
History of Present Illness: The patient is a 65 y o  female who presents with complaints of low back pain    Patient Active Problem List   Diagnosis    Anxiety    Chronic bilateral low back pain without sciatica    DJD (degenerative joint disease)    GIST (gastrointestinal stromal tumor), malignant (HCC)    Herniated nucleus pulposus, L5-S1, right    Hypertension    Spinal stenosis, lumbar region, with neurogenic claudication    Pain syndrome, chronic    Paroxysmal atrial fibrillation (HCC)    Right bundle-branch block    Lumbar radiculopathy    Spondylosis of lumbar region without myelopathy or radiculopathy    Non-rheumatic mitral regurgitation    Health care maintenance    Metastatic cancer (HCC)    Gastric AVM    Iron deficiency anemia    Stage 3 chronic kidney disease    Absolute anemia       Past Medical History:   Diagnosis Date    ADHD     Anemia     Anxiety     Arthritis     Asthma     Atrial fibrillation (HCC)     [facility]     Cancer ([facility] )     Chronic iron deficiency anemia 2020    Extensive GI evaluation over the last year including multiple EGD, colonoscopy, and capsule endoscopy  Has had gastric AVMs cauterized, Dieulafoy's lesion clipped, and most recently a Seb erosion cauterized    Coronary artery disease     Depression     Gastroenteritis 2020    GERD (gastroesophageal reflux disease)     History of stomach ulcers     Hypercholesterolemia     Hypertension     Kidney disease     [facility] )        Past Surgical History:   Procedure Laterality Date    ANKLE SURGERY      APPENDECTOMY      BREAST SURGERY      CATARACT EXTRACTION       SECTION      COLONOSCOPY  2019    Multiple adenomatous colon polyps and internal hemorrhoids    Three year recall advised    HIP SURGERY      JOINT REPLACEMENT  2019    Left knee replacement    LAMINECTOMY      ROTATOR CUFF REPAIR      SIGMOID RESECTION / RECTOPEXY      SINUS SURGERY      UPPER GASTROINTESTINAL ENDOSCOPY  05/01/2020    Dieulafoy's lesion in proximal stomach which was actively oozing  Injected with epinephrine and 2 Endoclips placed with control of bleeding, hiatal hernia      UPPER GASTROINTESTINAL ENDOSCOPY  06/2020    Bleeding Seb's erosion status post cauterization         Current Outpatient Medications:     acetaminophen (TYLENOL) 500 mg tablet, Take 500 mg by mouth every 6 (six) hours as needed for mild pain, Disp: , Rfl:     Avapritinib (Ayvakit) 200 MG TABS, Take 200 mg by mouth daily, Disp: , Rfl:     Azelastine HCl 137 MCG/SPRAY SOLN, instill 2 sprays into each nostril twice a day if needed for congestion, Disp: , Rfl: 0    diphenhydrAMINE (BENADRYL) 25 mg tablet, Take 25 mg by mouth every 6 (six) hours as needed for itching, Disp: , Rfl:     diphenoxylate-atropine (LOMOTIL) 2 5-0 025 mg per tablet, TAKE 1 TABLET BY MOUTH 4 TIMES A DAY AS NEEDED FOR DIARRHEA, Disp: 30 tablet, Rfl: 5    docusate sodium (COLACE) 100 mg capsule, Take 100 mg by mouth 2 (two) times a day as needed for constipation, Disp: , Rfl:     famotidine (PEPCID) 10 mg tablet, Take 10 mg by mouth , Disp: , Rfl:     ferrous sulfate 324 (65 Fe) mg, Take 1 tablet (324 mg total) by mouth 2 (two) times a day before meals, Disp: 60 tablet, Rfl: 2    furosemide (LASIX) 20 mg tablet, Take 20 mg by mouth daily , Disp: , Rfl:     hydroxychloroquine (PLAQUENIL) 200 mg tablet, Take 200 mg by mouth daily in the early morning , Disp: , Rfl:     loperamide (IMODIUM) 2 mg capsule, Take 2 capsules by mouth 4 (four) times a day as needed, Disp: , Rfl:     metoprolol succinate (Toprol XL) 25 mg 24 hr tablet, Take 0 5 tablets (12 5 mg total) by mouth daily, Disp: , Rfl: 0    NON FORMULARY, Take 2 tablets by mouth daily Chemo: Blue 285, Disp: , Rfl:     ofloxacin (OCUFLOX) 0 3 % ophthalmic solution, Starting April 7, Disp: , Rfl: 0    ondansetron (ZOFRAN) 8 mg tablet, 8 mg daily with breakfast Prior to chemo, Disp: , Rfl:     pantoprazole (PROTONIX) 40 mg tablet, take 1 tablet by mouth twice a day, Disp: 180 tablet, Rfl: 5    prednisoLONE acetate (PRED FORTE) 1 % ophthalmic suspension, , Disp: , Rfl: 0    traMADol (ULTRAM) 50 mg tablet, Take 1 tablet (50 mg total) by mouth every 8 (eight) hours as needed for moderate pain, Disp: 40 tablet, Rfl: 1    Triamcinolone Acetonide (NASACORT ALLERGY 24HR NA), into each nostril 1 spray each nostril--at bedtime   (OTC), Disp: , Rfl:     Virt-Phos 250 Neutral 155-852-130 MG tablet, Take 1 tablet by mouth daily, Disp: , Rfl:     Current Facility-Administered Medications:     iohexol (OMNIPAQUE) 300 mg/mL injection 50 mL, 50 mL, Epidural, Once, Tre Rdz,     lidocaine (PF) (XYLOCAINE-MPF) 1 % injection 5 mL, 5 mL, Other, Once, Tre Rdz DO    methylPREDNISolone acetate (DEPO-MEDROL) injection 80 mg, 80 mg, Epidural, Once, [employer], DO    Allergies   Allergen Reactions    Codeine Other (See Comments)     Throat closes    Codeine Sulfate Throat Swelling    Neomycin-Bacitracin Zn-Polymyx Rash    Wound Dressing Adhesive Other (See Comments)     If its on too long- pt starts itching    Zolmitriptan Palpitations     Heart palpitations    Generic for Zomig         Physical Exam:   General: Awake, Alert, Oriented x 3, Mood and affect appropriate  Respiratory: Respirations even and unlabored  Cardiovascular: Peripheral pulses intact; no edema  Musculoskeletal Exam:  Antalgic gait    ASA Score: II         Assessment:  Lumbar spinal stenosis    Plan: L5-S1 LESI

## 2021-01-18 ENCOUNTER — LAB (OUTPATIENT)
Dept: LAB | Facility: HOSPITAL | Age: 71
End: 2021-01-18
Payer: COMMERCIAL

## 2021-01-18 ENCOUNTER — TRANSCRIBE ORDERS (OUTPATIENT)
Dept: ADMINISTRATIVE | Facility: HOSPITAL | Age: 71
End: 2021-01-18

## 2021-01-18 DIAGNOSIS — C49.A0 STROMAL TUMOR OF DIGESTIVE SYSTEM (HCC): ICD-10-CM

## 2021-01-18 DIAGNOSIS — C49.A0 STROMAL TUMOR OF DIGESTIVE SYSTEM (HCC): Primary | ICD-10-CM

## 2021-01-18 LAB
ALBUMIN SERPL BCP-MCNC: 3.4 G/DL (ref 3.5–5)
ALP SERPL-CCNC: 71 U/L (ref 46–116)
ALT SERPL W P-5'-P-CCNC: 27 U/L (ref 12–78)
ANION GAP SERPL CALCULATED.3IONS-SCNC: 6 MMOL/L (ref 4–13)
AST SERPL W P-5'-P-CCNC: 42 U/L (ref 5–45)
BILIRUB SERPL-MCNC: 0.34 MG/DL (ref 0.2–1)
BUN SERPL-MCNC: 18 MG/DL (ref 5–25)
CALCIUM ALBUM COR SERPL-MCNC: 9.3 MG/DL (ref 8.3–10.1)
CALCIUM SERPL-MCNC: 8.8 MG/DL (ref 8.3–10.1)
CHLORIDE SERPL-SCNC: 110 MMOL/L (ref 100–108)
CO2 SERPL-SCNC: 28 MMOL/L (ref 21–32)
CREAT SERPL-MCNC: 1.4 MG/DL (ref 0.6–1.3)
GFR SERPL CREATININE-BSD FRML MDRD: 38 ML/MIN/1.73SQ M
GLUCOSE P FAST SERPL-MCNC: 85 MG/DL (ref 65–99)
POTASSIUM SERPL-SCNC: 4.1 MMOL/L (ref 3.5–5.3)
PROT SERPL-MCNC: 6 G/DL (ref 6.4–8.2)
SODIUM SERPL-SCNC: 144 MMOL/L (ref 136–145)

## 2021-01-18 PROCEDURE — 80053 COMPREHEN METABOLIC PANEL: CPT

## 2021-01-18 PROCEDURE — 36415 COLL VENOUS BLD VENIPUNCTURE: CPT

## 2021-01-25 ENCOUNTER — TELEPHONE (OUTPATIENT)
Dept: PAIN MEDICINE | Facility: CLINIC | Age: 71
End: 2021-01-25

## 2021-02-01 ENCOUNTER — TELEMEDICINE (OUTPATIENT)
Dept: PAIN MEDICINE | Facility: CLINIC | Age: 71
End: 2021-02-01
Payer: COMMERCIAL

## 2021-02-01 DIAGNOSIS — M48.062 SPINAL STENOSIS OF LUMBAR REGION WITH NEUROGENIC CLAUDICATION: ICD-10-CM

## 2021-02-01 DIAGNOSIS — M48.062 SPINAL STENOSIS, LUMBAR REGION, WITH NEUROGENIC CLAUDICATION: ICD-10-CM

## 2021-02-01 DIAGNOSIS — G89.4 PAIN SYNDROME, CHRONIC: Primary | ICD-10-CM

## 2021-02-01 DIAGNOSIS — M54.50 CHRONIC BILATERAL LOW BACK PAIN WITHOUT SCIATICA: ICD-10-CM

## 2021-02-01 DIAGNOSIS — M54.16 LUMBAR RADICULOPATHY: ICD-10-CM

## 2021-02-01 DIAGNOSIS — M47.816 SPONDYLOSIS OF LUMBAR REGION WITHOUT MYELOPATHY OR RADICULOPATHY: ICD-10-CM

## 2021-02-01 DIAGNOSIS — G89.29 CHRONIC BILATERAL LOW BACK PAIN WITHOUT SCIATICA: ICD-10-CM

## 2021-02-01 PROCEDURE — 99212 OFFICE O/P EST SF 10 MIN: CPT | Performed by: NURSE PRACTITIONER

## 2021-02-01 NOTE — PATIENT INSTRUCTIONS
Assessment/Plan: 1  I discussed with the patient at this point time since she has had 3 epidural steroid injections and the previous injections did not seem to last very long, for now, we will see how she does over the next 1-2 months and hope that the injection last at least 2-3 months, if not longer, however, I did explain to the patient at this point Dr Abdi Toro feels that it is in her best interest to hold off any repeat injections for a little while  The patient was agreeable and verbalized an understanding  2    I discussed with the patient that in the meantime she can continue to use her p r n  tramadol as prescribed and I suggested her cutting the tramadol in half and trying taking half a tramadol with the Tylenol as needed this way it might help her pain, but not make her as sleepy and she could take the tramadol 2 or 3 times a day that way spread out which might help manage her pain better  I did not send a prescription for the tramadol today as we were confident that with what she has left and the refill that it most likely would get her to her next office visit in 3 months, however, I did explain to the patient that if she does need another refill before her next office visit, she should call our office  The patient was agreeable and verbalized an understanding  3   The patient is to follow-up as scheduled in 12 weeks on April 26, 2021 for a virtual visit and I advised the patient that we will call her at or around her scheduled time  Because of the patient's immunosuppression therapy and her cancer we decided to continue with the virtual visit for her next visit at this point especially since she has not yet had the vaccine for COVID  The patient was agreeable and verbalized an understanding  Opioid Safety   AMBULATORY CARE:   Opioid safety  includes the correct use, storage, and disposal of opioids  Examples of opioid medicines to treat pain include oxycodone, morphine, fentanyl, and codeine  Call your local emergency number (911 in the [address]), or have someone else call if:   · You have a seizure  · You cannot be woken  · You have trouble staying awake and your breathing is slow or shallow  · Your speech is slurred, or you are confused  · You are dizzy or stumble when you walk  Call your doctor, or have someone close to you call if:   · You are extremely drowsy, or you have trouble staying awake or speaking  · You have pale or clammy skin  · You have blue fingernails or lips  · Your heartbeat is slower than normal     · You cannot stop vomiting  · You have questions or concerns about your condition or care  Use opioids safely:   · Take prescribed opioids exactly as directed  Opioids come with directions based on the kind of opioid and how it is given  Do not take more than the recommended amount, or for longer than needed  · Do not give opioids to others or take opioids that belong to someone else  Misuse of opioids can lead to an addiction or overdose  · Do not mix opioids with other medicines or alcohol  The combination can cause an overdose, or lead to a coma  · Do not drive or operate heavy machinery after you take the opioid  Your provider or pharmacist can tell you how long to wait after a dose before you do these activities  · Talk to your healthcare provider if you have any side effects  He or she can help you prevent or relieve side effects  Side effects include nausea, sleepiness, itching, and trouble thinking clearly  Manage constipation:  Constipation is the most common side effect of opioid medicine  Constipation is when you have hard, dry bowel movements, or you go longer than usual between bowel movements  Tell your healthcare provider about all changes in your bowel movements while you are taking opioids  He or she may recommend laxative medicine to help you have a bowel movement   He or she may also change the kind of opioid you are taking, or change when you take it  The following are more ways you can prevent or relieve constipation:  · Drink liquids as directed  You may need to drink extra liquids to help soften and move your bowels  Ask how much liquid to drink each day and which liquids are best for you  · Eat high-fiber foods  This may help decrease constipation by adding bulk to your bowel movements  High-fiber foods include fruits, vegetables, whole-grain breads and cereals, and beans  Your healthcare provider or dietitian can help you create a high-fiber meal plan  Your provider may also recommend a fiber supplement if you cannot get enough fiber from food  · Exercise regularly  Regular physical activity can help stimulate your intestines  Walking is a good exercise to prevent or relieve constipation  Ask which exercises are best for you  · Schedule a time each day to have a bowel movement  This may help train your body to have regular bowel movements  Bend forward while you are on the toilet to help move the bowel movement out  Sit on the toilet for at least 10 minutes, even if you do not have a bowel movement  Store opioids safely:   · Store opioids where others cannot easily get them  Keep them in a locked cabinet or secure area  Do not  keep them in a purse or other bag you carry with you  A person may be looking for something else and find the opioids  · Make sure opioids are stored out of the reach of children  A child can easily overdose on opioids  Opioids may look like candy to a small child  The best way to dispose of opioids: The laws vary by country and area  In the [place], the best way is to return the opioids through a take-back program  This program is offered by the [employer]  The following are options for using the program:  · Take the opioids to a SOPHIA collection site  The site is often a law enforcement center   Call your local law enforcement center for scheduled take-back days in your area  You will be given information on where to go if the collection site is in a different location  · Take the opioids to an approved pharmacy or hospital   A pharmacy or hospital may be set up as a collection site  You will need to ask if it is a SOPHIA collection site if you were not directed there  A pharmacy or doctor's office may not be able to take back opioids unless it is a SOPHIA site  · Use a mail-back system  This means you are given containers to put the opioids into  You will then mail them in the containers  · Use a take-back drop box  This is a place to leave the opioids at any time  People and animals will not be able to get into the box  Your local law enforcement agency can tell you where to find a drop box in your area  Other safe ways to dispose of opioids: The medicine may come with disposal instructions  The instructions may vary depending on the brand of medicine you are using  Instructions may come in a Medication Guide, but not every medicine has one  You may instead get instructions from your pharmacy or doctor  Follow instructions carefully  The following are general guidelines to follow:  · Find out if you can flush the opioid  Some opioids can be flushed down the toilet or poured into the sink  You will need to contact authorities in your area to see if this is an option for you  The FDA also offers a list of medicines that are safe to flush down the toilet  You can check the list if you cannot get the information for your local area  · Ask your waste management company about rules for putting opioids in the trash  The company will be able to give you specific directions  Scratch out personal information on the original medicine label so it cannot be read  Then put it in the trash  Do not label the trash or put any information on it about the opioids  It should look like regular household trash so no one is tempted to look for the opioids  Keep the trash out of the reach of children and animals  Always make sure trash is secure  · Talk to officials if you live in a facility  If you live in a nursing home or assisted living center, talk to an official  The person will know the rules for your area  Other ways to manage pain:   · Ask your healthcare provider about non-opioid medicines to control pain  Nonprescription medicines include NSAIDs (such as ibuprofen) and acetaminophen  Prescription medicines include muscle relaxers, antidepressants, and steroids  · Pain may be managed without any medicines  Some ways to relieve pain include massage, aromatherapy, or meditation  Physical or occupational therapy may also help  For more information:   · Drug Enforcement Administration  [address]  Phone: 8- 365 - 979-2392  Web Address: [facility]/drug_disposal/    · [address] and Drug Administration  [place] , [address]  Phone: 7- 120 - [phone]  Web Address: http://[employer]/  Follow up with your doctor or pain specialist as directed: You may need to have your dose adjusted  Your doctor or pain specialist can also help you find ways to manage pain without opioids  Write down your questions so you remember to ask them during your visits  © Copyright [address] [place] [address] Information is for End User's use only and may not be sold, redistributed or otherwise used for commercial purposes  All illustrations and images included in CareNotes® are the copyrighted property of A D A M , Inc  or [facility] Aaron Lai   The above information is an  only  It is not intended as medical advice for individual conditions or treatments  Talk to your doctor, nurse or pharmacist before following any medical regimen to see if it is safe and effective for you

## 2021-02-01 NOTE — PROGRESS NOTES
Virtual Brief Visit    Assessment/Plan: 1  I discussed with the patient at this point time since she has had 3 epidural steroid injections and the previous injections did not seem to last very long, for now, we will see how she does over the next 1-2 months and hope that the injection last at least 2-3 months, if not longer, however, I did explain to the patient at this point Dr Hieu Hudson feels that it is in her best interest to hold off any repeat injections for a little while  The patient was agreeable and verbalized an understanding  2    I discussed with the patient that in the meantime she can continue to use her p r n  tramadol as prescribed and I suggested her cutting the tramadol in half and trying taking half a tramadol with the Tylenol as needed this way it might help her pain, but not make her as sleepy and she could take the tramadol 2 or 3 times a day that way spread out which might help manage her pain better  I did not send a prescription for the tramadol today as we were confident that with what she has left and the refill that it most likely would get her to her next office visit in 3 months, however, I did explain to the patient that if she does need another refill before her next office visit, she should call our office  The patient was agreeable and verbalized an understanding  3   The patient is to follow-up as scheduled in 12 weeks on April 26, 2021 for a virtual visit and I advised the patient that we will call her at or around her scheduled time  Because of the patient's immunosuppression therapy and her cancer we decided to continue with the virtual visit for her next visit at this point especially since she has not yet had the vaccine for COVID  The patient was agreeable and verbalized an understanding             Problem List Items Addressed This Visit        Nervous and Auditory    Lumbar radiculopathy       Musculoskeletal and Integument    Spondylosis of lumbar region without myelopathy or radiculopathy       Other    Chronic bilateral low back pain without sciatica    Spinal stenosis, lumbar region, with neurogenic claudication    Pain syndrome, chronic - Primary      Other Visit Diagnoses     Spinal stenosis of lumbar region with neurogenic claudication                    Reason for visit is   Chief Complaint   Patient presents with    Virtual Brief Visit        Encounter provider LANEY Manley    Provider located at [address]  [address]  [facility] Mikayla O[address]  [phone]    Recent Visits  No visits were found meeting these conditions  Showing recent visits within past 7 days and meeting all other requirements     Today's Visits  Date Type Provider Dept   02/01/21 Telemedicine [facility] [place]   Showing today's visits and meeting all other requirements     Future Appointments  No visits were found meeting these conditions  Showing future appointments within next 150 days and meeting all other requirements      It was my intent to perform this visit via video technology but the patient was not able to do a video connection so the visit was completed via audio telephone only  After connecting through telephone, the patient was identified by name and date of birth  Pratik Ricks was informed that this is a telemedicine visit and that the visit is being conducted through telephone  My office door was closed  No one else was in the room  She acknowledged consent and understanding of privacy and security of the platform  The patient has agreed to participate and understands she can discontinue the visit at any time  Patient is aware this is a billable service  Subjective    Pratik Ricks is a 65 y o  female Who currently is concerned with inclement weather and our office is physically closed so we decided to proceed with a virtual visit today  Patient is currently being treated for her chronic pain syndrome secondary to lumbar spondylosis and stenosis with radiculopathy  She is status post her 3rd epidural steroid injection which was an L5-S1 interlaminar lumbar epidural steroid injection on January 15, 2021 with Dr Teri Jon  She does report that there has been moderate to significant improvement in her pain symptoms as a result of the most recent injection and that so far this was the best injection with regards to relief and is hoping it will last several weeks to months  However, she want to know the if in the next few weeks or month or so if she needed another injection could she call and get 1  She does report that she has been using the p r n  tramadol and if the pain is severe she will take the tramadol with  Tylenol which seems to be somewhat helpful, however, if she takes the tramadol too much it makes her too sleepy but she does note mild to moderate relief, without any significant side effects  Presently she rates her pain as a 4 to 5/10   And reports that she has approximately 20 pills of the tramadol left on hand with a refill yet  Noreen HOROWITZ     Past Medical History:   Diagnosis Date    ADHD     Anemia     Anxiety     Arthritis     Asthma     Atrial fibrillation (HCC)     [facility]     [facility]     Chronic iron deficiency anemia 2020    Extensive GI evaluation over the last year including multiple EGD, colonoscopy, and capsule endoscopy    Has had gastric AVMs cauterized, Dieulafoy's lesion clipped, and most recently a Seb erosion cauterized    Coronary artery disease     Depression     Gastroenteritis 2020    GERD (gastroesophageal reflux disease)     History of stomach ulcers     Hypercholesterolemia     Hypertension     Kidney disease     [facility] )        Past Surgical History:   Procedure Laterality Date    ANKLE SURGERY      APPENDECTOMY      BREAST SURGERY      CATARACT EXTRACTION       SECTION      COLONOSCOPY  06/26/2019    Multiple adenomatous colon polyps and internal hemorrhoids  Three year recall advised    HIP SURGERY      JOINT REPLACEMENT  07/30/2019    Left knee replacement    LAMINECTOMY      ROTATOR CUFF REPAIR      SIGMOID RESECTION / RECTOPEXY      SINUS SURGERY      UPPER GASTROINTESTINAL ENDOSCOPY  05/01/2020    Dieulafoy's lesion in proximal stomach which was actively oozing  Injected with epinephrine and 2 Endoclips placed with control of bleeding, hiatal hernia      UPPER GASTROINTESTINAL ENDOSCOPY  06/2020    Bleeding Seb's erosion status post cauterization       Current Outpatient Medications   Medication Sig Dispense Refill    acetaminophen (TYLENOL) 500 mg tablet Take 500 mg by mouth every 6 (six) hours as needed for mild pain      Avapritinib (Ayvakit) 200 MG TABS Take 200 mg by mouth daily      Azelastine HCl 137 MCG/SPRAY SOLN instill 2 sprays into each nostril twice a day if needed for congestion  0    diphenhydrAMINE (BENADRYL) 25 mg tablet Take 25 mg by mouth every 6 (six) hours as needed for itching      diphenoxylate-atropine (LOMOTIL) 2 5-0 025 mg per tablet TAKE 1 TABLET BY MOUTH 4 TIMES A DAY AS NEEDED FOR DIARRHEA 30 tablet 5    docusate sodium (COLACE) 100 mg capsule Take 100 mg by mouth 2 (two) times a day as needed for constipation      famotidine (PEPCID) 10 mg tablet Take 10 mg by mouth       ferrous sulfate 324 (65 Fe) mg Take 1 tablet (324 mg total) by mouth 2 (two) times a day before meals 60 tablet 2    furosemide (LASIX) 20 mg tablet Take 20 mg by mouth daily       hydroxychloroquine (PLAQUENIL) 200 mg tablet Take 200 mg by mouth daily in the early morning       loperamide (IMODIUM) 2 mg capsule Take 2 capsules by mouth 4 (four) times a day as needed      metoprolol succinate (Toprol XL) 25 mg 24 hr tablet Take 0 5 tablets (12 5 mg total) by mouth daily  0    NON FORMULARY Take 2 tablets by mouth daily Chemo: Blue 285      ofloxacin (OCUFLOX) 0 3 % ophthalmic solution Starting April 7  0    ondansetron (ZOFRAN) 8 mg tablet 8 mg daily with breakfast Prior to chemo      pantoprazole (PROTONIX) 40 mg tablet take 1 tablet by mouth twice a day 180 tablet 5    prednisoLONE acetate (PRED FORTE) 1 % ophthalmic suspension   0    traMADol (ULTRAM) 50 mg tablet Take 1 tablet (50 mg total) by mouth every 8 (eight) hours as needed for moderate pain 40 tablet 1    Triamcinolone Acetonide (NASACORT ALLERGY 24HR NA) into each nostril 1 spray each nostril--at bedtime   (OTC)      Virt-Phos 250 Neutral 155-852-130 MG tablet Take 1 tablet by mouth daily       No current facility-administered medications for this visit  Allergies   Allergen Reactions    Codeine Other (See Comments)     Throat closes    Codeine Sulfate Throat Swelling    Neomycin-Bacitracin Zn-Polymyx Rash    Wound Dressing Adhesive Other (See Comments)     If its on too long- pt starts itching    Zolmitriptan Palpitations     Heart palpitations  Generic for Zomig         Review of Systems   Musculoskeletal: Positive for arthralgias, back pain and gait problem  There were no vitals filed for this visit  I spent 11 minutes discussing the patients past medical/surgical history, current pain symptoms, and medication regiment/treatment plan with the patient today during the virtual visit  Normal OV: [id number]    VIRTUAL VISIT DISCLAIMER    Janette Willoughby acknowledges that she has consented to an online visit or consultation  She understands that the online visit is based solely on information provided by her, and that, in the absence of a face-to-face physical evaluation by the physician, the diagnosis she receives is both limited and provisional in terms of accuracy and completeness  This is not intended to replace a full medical face-to-face evaluation by the physician  Janette Willoughby understands and accepts these terms

## 2021-02-23 ENCOUNTER — OFFICE VISIT (OUTPATIENT)
Dept: FAMILY MEDICINE CLINIC | Facility: HOSPITAL | Age: 71
End: 2021-02-23
Payer: COMMERCIAL

## 2021-02-23 VITALS
HEIGHT: 61 IN | HEART RATE: 95 BPM | BODY MASS INDEX: 24.09 KG/M2 | SYSTOLIC BLOOD PRESSURE: 132 MMHG | WEIGHT: 127.6 LBS | OXYGEN SATURATION: 95 % | DIASTOLIC BLOOD PRESSURE: 62 MMHG

## 2021-02-23 DIAGNOSIS — I10 HYPERTENSION, UNSPECIFIED TYPE: ICD-10-CM

## 2021-02-23 DIAGNOSIS — I48.0 PAROXYSMAL ATRIAL FIBRILLATION (HCC): ICD-10-CM

## 2021-02-23 DIAGNOSIS — C49.A9 MALIGNANT GASTROINTESTINAL STROMAL TUMOR (GIST) OF OTHER SITE (HCC): Primary | ICD-10-CM

## 2021-02-23 PROCEDURE — 3078F DIAST BP <80 MM HG: CPT | Performed by: INTERNAL MEDICINE

## 2021-02-23 PROCEDURE — 1101F PT FALLS ASSESS-DOCD LE1/YR: CPT | Performed by: INTERNAL MEDICINE

## 2021-02-23 PROCEDURE — 3725F SCREEN DEPRESSION PERFORMED: CPT | Performed by: INTERNAL MEDICINE

## 2021-02-23 PROCEDURE — 3075F SYST BP GE 130 - 139MM HG: CPT | Performed by: INTERNAL MEDICINE

## 2021-02-23 PROCEDURE — 99397 PER PM REEVAL EST PAT 65+ YR: CPT | Performed by: INTERNAL MEDICINE

## 2021-02-23 PROCEDURE — 3288F FALL RISK ASSESSMENT DOCD: CPT | Performed by: INTERNAL MEDICINE

## 2021-02-23 PROCEDURE — 3008F BODY MASS INDEX DOCD: CPT | Performed by: INTERNAL MEDICINE

## 2021-02-23 PROCEDURE — 1160F RVW MEDS BY RX/DR IN RCRD: CPT | Performed by: INTERNAL MEDICINE

## 2021-02-23 PROCEDURE — 1036F TOBACCO NON-USER: CPT | Performed by: INTERNAL MEDICINE

## 2021-02-23 NOTE — PROGRESS NOTES
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, hx of gastric AVMs as well    Other problems stable at this time  On list for Covid vaccine  The following portions of the patient's history were reviewed and updated as appropriate: allergies, current medications, past family history, past medical history, past social history, past surgical history and problem list     Review of Systems   Constitutional: Positive for fatigue  Negative for fever  HENT: Negative for hearing loss  Eyes: Negative for visual disturbance  Respiratory: Negative for cough, chest tightness, shortness of breath and wheezing  Cardiovascular: Negative for chest pain, palpitations and leg swelling  Gastrointestinal: Negative for abdominal pain, diarrhea and nausea  Genitourinary: Negative for dysuria and hematuria  Musculoskeletal: Negative for arthralgias  Neurological: Negative for dizziness, numbness and headaches  Psychiatric/Behavioral: Negative for confusion and dysphoric mood  All other systems reviewed and are negative          Current Outpatient Medications on File Prior to Visit   Medication Sig Dispense Refill    acetaminophen (TYLENOL) 500 mg tablet Take 500 mg by mouth every 6 (six) hours as needed for mild pain      Avapritinib (Ayvakit) 200 MG TABS Take 200 mg by mouth daily      Azelastine HCl 137 MCG/SPRAY SOLN instill 2 sprays into each nostril twice a day if needed for congestion  0    diphenhydrAMINE (BENADRYL) 25 mg tablet Take 25 mg by mouth every 6 (six) hours as needed for itching      diphenoxylate-atropine (LOMOTIL) 2 5-0 025 mg per tablet TAKE 1 TABLET BY MOUTH 4 TIMES A DAY AS NEEDED FOR DIARRHEA 30 tablet 5    docusate sodium (COLACE) 100 mg capsule Take 100 mg by mouth 2 (two) times a day as needed for constipation      ferrous sulfate 324 (65 Fe) mg Take 1 tablet (324 mg total) by mouth 2 (two) times a day before meals 60 tablet 2    furosemide (LASIX) 20 mg tablet Take 20 mg by mouth daily       hydroxychloroquine (PLAQUENIL) 200 mg tablet Take 200 mg by mouth daily in the early morning       loperamide (IMODIUM) 2 mg capsule Take 2 capsules by mouth 4 (four) times a day as needed      metoprolol succinate (Toprol XL) 25 mg 24 hr tablet Take 0 5 tablets (12 5 mg total) by mouth daily  0    NON FORMULARY Take 2 tablets by mouth daily Chemo: Blue 285      ofloxacin (OCUFLOX) 0 3 % ophthalmic solution Starting April 7  0    ondansetron (ZOFRAN) 8 mg tablet 8 mg daily with breakfast Prior to chemo      pantoprazole (PROTONIX) 40 mg tablet take 1 tablet by mouth twice a day 180 tablet 5    prednisoLONE acetate (PRED FORTE) 1 % ophthalmic suspension   0    traMADol (ULTRAM) 50 mg tablet Take 1 tablet (50 mg total) by mouth every 8 (eight) hours as needed for moderate pain 40 tablet 1    Triamcinolone Acetonide (NASACORT ALLERGY 24HR NA) into each nostril 1 spray each nostril--at bedtime   (OTC)      Virt-Phos 250 Neutral 155-852-130 MG tablet Take 1 tablet by mouth daily      [DISCONTINUED] famotidine (PEPCID) 10 mg tablet Take 10 mg by mouth        No current facility-administered medications on file prior to visit  Objective:  Vitals:    02/23/21 1400   BP: 132/62   Pulse: 95   SpO2: 95%   Weight: 57 9 kg (127 lb 9 6 oz)   Height: 5' 1" (1 549 m)      Physical Exam  Vitals signs and nursing note reviewed  Constitutional:       General: She is not in acute distress  Neck:      Musculoskeletal: Normal range of motion     Cardiovascular:      Rate and Rhythm: Normal rate and regular rhythm  Pulmonary:      Effort: Pulmonary effort is normal       Breath sounds: Normal breath sounds  Musculoskeletal: Normal range of motion  Skin:     Findings: No rash  Neurological:      Mental Status: She is alert and oriented to person, place, and time  Psychiatric:         Thought Content: Thought content normal             Assessment/Plan:    No problem-specific Assessment & Plan notes found for this encounter         Diagnoses and all orders for this visit:    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

## 2021-03-09 DIAGNOSIS — Z23 ENCOUNTER FOR IMMUNIZATION: ICD-10-CM

## 2021-04-12 ENCOUNTER — LAB (OUTPATIENT)
Dept: LAB | Facility: HOSPITAL | Age: 71
End: 2021-04-12
Payer: COMMERCIAL

## 2021-04-12 ENCOUNTER — TRANSCRIBE ORDERS (OUTPATIENT)
Dept: ADMINISTRATIVE | Facility: HOSPITAL | Age: 71
End: 2021-04-12

## 2021-04-12 DIAGNOSIS — D64.9 ANEMIA, UNSPECIFIED TYPE: ICD-10-CM

## 2021-04-12 DIAGNOSIS — Z51.81 ENCOUNTER FOR THERAPEUTIC DRUG MONITORING: Primary | ICD-10-CM

## 2021-04-12 DIAGNOSIS — Z51.81 ENCOUNTER FOR THERAPEUTIC DRUG MONITORING: ICD-10-CM

## 2021-04-12 LAB
ALBUMIN SERPL BCP-MCNC: 3.5 G/DL (ref 3.5–5)
ALP SERPL-CCNC: 70 U/L (ref 46–116)
ALT SERPL W P-5'-P-CCNC: 29 U/L (ref 12–78)
ANION GAP SERPL CALCULATED.3IONS-SCNC: 7 MMOL/L (ref 4–13)
AST SERPL W P-5'-P-CCNC: 48 U/L (ref 5–45)
BASOPHILS # BLD AUTO: 0.01 THOUSANDS/ΜL (ref 0–0.1)
BASOPHILS NFR BLD AUTO: 0 % (ref 0–1)
BILIRUB SERPL-MCNC: 0.23 MG/DL (ref 0.2–1)
BUN SERPL-MCNC: 26 MG/DL (ref 5–25)
CALCIUM SERPL-MCNC: 8.6 MG/DL (ref 8.3–10.1)
CHLORIDE SERPL-SCNC: 108 MMOL/L (ref 100–108)
CO2 SERPL-SCNC: 25 MMOL/L (ref 21–32)
CREAT SERPL-MCNC: 1.56 MG/DL (ref 0.6–1.3)
EOSINOPHIL # BLD AUTO: 0.26 THOUSAND/ΜL (ref 0–0.61)
EOSINOPHIL NFR BLD AUTO: 10 % (ref 0–6)
ERYTHROCYTE [DISTWIDTH] IN BLOOD BY AUTOMATED COUNT: 16.7 % (ref 11.6–15.1)
GFR SERPL CREATININE-BSD FRML MDRD: 33 ML/MIN/1.73SQ M
GLUCOSE P FAST SERPL-MCNC: 87 MG/DL (ref 65–99)
HCT VFR BLD AUTO: 23.5 % (ref 34.8–46.1)
HGB BLD-MCNC: 7.6 G/DL (ref 11.5–15.4)
IMM GRANULOCYTES # BLD AUTO: 0.01 THOUSAND/UL (ref 0–0.2)
IMM GRANULOCYTES NFR BLD AUTO: 0 % (ref 0–2)
LYMPHOCYTES # BLD AUTO: 0.46 THOUSANDS/ΜL (ref 0.6–4.47)
LYMPHOCYTES NFR BLD AUTO: 18 % (ref 14–44)
MAGNESIUM SERPL-MCNC: 1.7 MG/DL (ref 1.6–2.6)
MCH RBC QN AUTO: 35.3 PG (ref 26.8–34.3)
MCHC RBC AUTO-ENTMCNC: 32.3 G/DL (ref 31.4–37.4)
MCV RBC AUTO: 109 FL (ref 82–98)
MONOCYTES # BLD AUTO: 0.36 THOUSAND/ΜL (ref 0.17–1.22)
MONOCYTES NFR BLD AUTO: 14 % (ref 4–12)
NEUTROPHILS # BLD AUTO: 1.45 THOUSANDS/ΜL (ref 1.85–7.62)
NEUTS SEG NFR BLD AUTO: 58 % (ref 43–75)
NRBC BLD AUTO-RTO: 0 /100 WBCS
PHOSPHATE SERPL-MCNC: 3.7 MG/DL (ref 2.3–4.1)
PLATELET # BLD AUTO: 203 THOUSANDS/UL (ref 149–390)
PMV BLD AUTO: 10.3 FL (ref 8.9–12.7)
POTASSIUM SERPL-SCNC: 3.6 MMOL/L (ref 3.5–5.3)
PROT SERPL-MCNC: 6.4 G/DL (ref 6.4–8.2)
RBC # BLD AUTO: 2.15 MILLION/UL (ref 3.81–5.12)
SODIUM SERPL-SCNC: 140 MMOL/L (ref 136–145)
WBC # BLD AUTO: 2.55 THOUSAND/UL (ref 4.31–10.16)

## 2021-04-12 PROCEDURE — 36415 COLL VENOUS BLD VENIPUNCTURE: CPT

## 2021-04-12 PROCEDURE — 85025 COMPLETE CBC W/AUTO DIFF WBC: CPT

## 2021-04-12 PROCEDURE — 83735 ASSAY OF MAGNESIUM: CPT

## 2021-04-12 PROCEDURE — 84100 ASSAY OF PHOSPHORUS: CPT

## 2021-04-12 PROCEDURE — 80053 COMPREHEN METABOLIC PANEL: CPT

## 2021-04-24 ENCOUNTER — IMMUNIZATIONS (OUTPATIENT)
Dept: FAMILY MEDICINE CLINIC | Facility: HOSPITAL | Age: 71
End: 2021-04-24

## 2021-04-24 DIAGNOSIS — Z23 ENCOUNTER FOR IMMUNIZATION: Primary | ICD-10-CM

## 2021-04-24 PROCEDURE — 0001A SARS-COV-2 / COVID-19 MRNA VACCINE (PFIZER-BIONTECH) 30 MCG: CPT | Performed by: NURSE PRACTITIONER

## 2021-04-24 PROCEDURE — 91300 SARS-COV-2 / COVID-19 MRNA VACCINE (PFIZER-BIONTECH) 30 MCG: CPT | Performed by: NURSE PRACTITIONER

## 2021-04-26 ENCOUNTER — TELEMEDICINE (OUTPATIENT)
Dept: PAIN MEDICINE | Facility: CLINIC | Age: 71
End: 2021-04-26
Payer: COMMERCIAL

## 2021-04-26 DIAGNOSIS — M54.16 LUMBAR RADICULOPATHY: ICD-10-CM

## 2021-04-26 DIAGNOSIS — M47.816 SPONDYLOSIS OF LUMBAR REGION WITHOUT MYELOPATHY OR RADICULOPATHY: ICD-10-CM

## 2021-04-26 DIAGNOSIS — G89.29 CHRONIC BILATERAL LOW BACK PAIN WITHOUT SCIATICA: ICD-10-CM

## 2021-04-26 DIAGNOSIS — M48.062 SPINAL STENOSIS, LUMBAR REGION, WITH NEUROGENIC CLAUDICATION: ICD-10-CM

## 2021-04-26 DIAGNOSIS — G89.4 PAIN SYNDROME, CHRONIC: Primary | ICD-10-CM

## 2021-04-26 DIAGNOSIS — M48.062 SPINAL STENOSIS OF LUMBAR REGION WITH NEUROGENIC CLAUDICATION: ICD-10-CM

## 2021-04-26 DIAGNOSIS — M54.50 CHRONIC BILATERAL LOW BACK PAIN WITHOUT SCIATICA: ICD-10-CM

## 2021-04-26 PROCEDURE — 99214 OFFICE O/P EST MOD 30 MIN: CPT | Performed by: NURSE PRACTITIONER

## 2021-04-26 PROCEDURE — 1036F TOBACCO NON-USER: CPT | Performed by: NURSE PRACTITIONER

## 2021-04-26 PROCEDURE — 1160F RVW MEDS BY RX/DR IN RCRD: CPT | Performed by: NURSE PRACTITIONER

## 2021-04-26 RX ORDER — TRAMADOL HYDROCHLORIDE 50 MG/1
50 TABLET ORAL EVERY 8 HOURS PRN
Qty: 40 TABLET | Refills: 2 | Status: SHIPPED | OUTPATIENT
Start: 2021-04-26 | End: 2021-05-13 | Stop reason: SDUPTHER

## 2021-04-26 NOTE — PROGRESS NOTES
Virtual Brief Visit    Assessment/Plan: 1  I discussed with the patient at this point time I feel it is in her best interest to continue to follow-up with the [facility] regarding her cancer treatments and hemoglobin and hematocrit and transfusion needs and to proceed with the ultrasound of her left breast as scheduled  The patient was agreeable and verbalized an understanding  2   With regards to her chronic low back and leg pain, for now, since she is noting moderate and stable overall relief, without any significant side effects or issues, we will hold off any repeat injections and she can continue the p r n  tramadol and the topical Voltaren gel as needed  The patient was agreeable and verbalized an understanding  3   The patient is to follow-up as scheduled in 16 weeks on August 23, 2021 to arrive at 10:00 a m  for a 10:15 a m  office visit  The patient was agreeable and verbalized an understanding  While the patient was in the office today, I did review the patient's report on the [employer] [facility] web site and found it to be appropriate for what is being prescribed and I reviewed it for any inconsistencies or evidence of multiple prescribers/drug diversion  A copy of the patient's report can be found in their chart  The risk of opioid medications, including dependence, addiction and tolerance were explained to the patient  The patient understands and agrees to use these medications only as prescribed  I have fully discussed the potential side effects of the medication with the patient, which include, but are not limited to, constipation, drowsiness, addiction, impaired judgment and risk of fatal overdose as not taken as prescribed  I have warned the patient that sharing medications is a felony  I warned against driving while taking sedating medications   At this point in time, the patient is showing no signs of addiction, abuse, diversion or suicidal ideation  Problem List Items Addressed This Visit        Nervous and Auditory    Lumbar radiculopathy    Relevant Medications    traMADol (ULTRAM) 50 mg tablet       Musculoskeletal and Integument    Spondylosis of lumbar region without myelopathy or radiculopathy       Other    Chronic bilateral low back pain without sciatica    Spinal stenosis, lumbar region, with neurogenic claudication    Relevant Medications    traMADol (ULTRAM) 50 mg tablet    Pain syndrome, chronic - Primary      Other Visit Diagnoses     Spinal stenosis of lumbar region with neurogenic claudication        Relevant Medications    traMADol (ULTRAM) 50 mg tablet                Reason for visit is   Chief Complaint   Patient presents with    Virtual Brief Visit        Encounter provider LANEY Cabrera    Provider located at [address] [facility]  [address]  [place] [address] [address]  [phone]    Recent Visits  No visits were found meeting these conditions  Showing recent visits within past 7 days and meeting all other requirements     Today's Visits  Date Type Provider Dept   04/26/21 Telemedicine [facility]   Showing today's visits and meeting all other requirements     Future Appointments  No visits were found meeting these conditions  Showing future appointments within next 150 days and meeting all other requirements      It was my intent to perform this visit via video technology but the patient was not able to do a video connection so the visit was completed via audio telephone only  After connecting through telephone, the patient was identified by name and date of birth  Enzo Rogers was informed that this is a telemedicine visit and that the visit is being conducted through telephone  My office door was closed  No one else was in the room    She acknowledged consent and understanding of privacy and security of the platform  The patient has agreed to participate and understands she can discontinue the visit at any time  Patient is aware this is a billable service  Subjective    Jamey Lai is a 65 y o  female who is currently concerned about the risks of COVID-19  The patient currently denies any of the signs or symptoms of COVID-19, but because of age or other comorbidities, medical history, and/or fear of exposure to COVID-19, she also continues with some significant compromise the shin of her immune system and low hemoglobin and hematocrit and at this point the patient preferred to proceed with a virtual visit today  The patient is currently being treated for her chronic pain syndrome secondary to lumbar spondylosis and stenosis with radiculopathy  She has continue to follow-up with [facility] regarding her cancer treatments and most recently had a mammogram and is proceeding with a left breast ultrasound because of a finding on the mammogram The patient reports that overall her pain symptoms have remained manageable and stable since her last office visit and without any significant side effects or issues  She is currently managing her pain symptoms with p r n  tramadol and Voltaren gel  She reports that she will use the p r n  tramadol occasionally and it does note moderate relief most of the time she takes it without any significant side effects or issues  Presently she rates her pain as a 2/10 and at this point she would like to hold off any procedures or injections and continue her current medications as prescribed  HPI     Past Medical History:   Diagnosis Date    ADHD     Anemia     Anxiety     Arthritis     Asthma     Atrial fibrillation (HCC)     [facility] [address] [facility]     [facility]     Chronic iron deficiency anemia 6/9/2020    Extensive GI evaluation over the last year including multiple EGD, colonoscopy, and capsule endoscopy    Has had gastric AVMs cauterized, Dieulafoy's lesion clipped, and most recently a [place] erosion cauterized    Coronary artery disease     Depression     GERD (gastroesophageal reflux disease)     History of stomach ulcers     Hypercholesterolemia     Hypertension     Kidney disease     Metastatic cancer ([address] [address] )        Past Surgical History:   Procedure Laterality Date    ANKLE SURGERY      APPENDECTOMY      BREAST SURGERY      CATARACT EXTRACTION       SECTION      COLONOSCOPY  2019    Multiple adenomatous colon polyps and internal hemorrhoids  Three year recall advised    HIP SURGERY      JOINT REPLACEMENT  2019    Left knee replacement    LAMINECTOMY      ROTATOR CUFF REPAIR      SIGMOID RESECTION / RECTOPEXY      SINUS SURGERY      UPPER GASTROINTESTINAL ENDOSCOPY  2020    Dieulafoy's lesion in proximal stomach which was actively oozing  Injected with epinephrine and 2 Endoclips placed with control of bleeding, hiatal hernia      UPPER GASTROINTESTINAL ENDOSCOPY  2020    Bleeding Seb's erosion status post cauterization       Current Outpatient Medications   Medication Sig Dispense Refill    acetaminophen (TYLENOL) 500 mg tablet Take 500 mg by mouth every 6 (six) hours as needed for mild pain      Avapritinib (Ayvakit) 200 MG TABS Take 200 mg by mouth daily      Azelastine HCl 137 MCG/SPRAY SOLN instill 2 sprays into each nostril twice a day if needed for congestion  0    diphenhydrAMINE (BENADRYL) 25 mg tablet Take 25 mg by mouth every 6 (six) hours as needed for itching      diphenoxylate-atropine (LOMOTIL) 2 5-0 025 mg per tablet TAKE 1 TABLET BY MOUTH 4 TIMES A DAY AS NEEDED FOR DIARRHEA 30 tablet 5    docusate sodium (COLACE) 100 mg capsule Take 100 mg by mouth 2 (two) times a day as needed for constipation      ferrous sulfate 324 (65 Fe) mg Take 1 tablet (324 mg total) by mouth 2 (two) times a day before meals 60 tablet 2    furosemide (LASIX) 20 mg tablet Take 20 mg by mouth daily       hydroxychloroquine (PLAQUENIL) 200 mg tablet Take 200 mg by mouth daily in the early morning       loperamide (IMODIUM) 2 mg capsule Take 2 capsules by mouth 4 (four) times a day as needed      metoprolol succinate (Toprol XL) 25 mg 24 hr tablet Take 0 5 tablets (12 5 mg total) by mouth daily  0    NON FORMULARY Take 2 tablets by mouth daily Chemo: Blue 285      ofloxacin (OCUFLOX) 0 3 % ophthalmic solution Starting April 7  0    ondansetron (ZOFRAN) 8 mg tablet 8 mg daily with breakfast Prior to chemo      pantoprazole (PROTONIX) 40 mg tablet take 1 tablet by mouth twice a day 180 tablet 5    prednisoLONE acetate (PRED FORTE) 1 % ophthalmic suspension   0    traMADol (ULTRAM) 50 mg tablet Take 1 tablet (50 mg total) by mouth every 8 (eight) hours as needed for moderate pain 40 tablet 2    Triamcinolone Acetonide (NASACORT ALLERGY 24HR NA) into each nostril 1 spray each nostril--at bedtime   (OTC)      Virt-Phos 250 Neutral [id number] MG tablet Take 1 tablet by mouth daily       No current facility-administered medications for this visit  Allergies   Allergen Reactions    Codeine Other (See Comments)     Throat closes    Codeine Sulfate Throat Swelling    Neomycin-Bacitracin Zn-Polymyx Rash    Wound Dressing Adhesive Other (See Comments)     If its on too long- pt starts itching    Zolmitriptan Palpitations     Heart palpitations  Generic for Zomig         Review of Systems   Musculoskeletal: Positive for arthralgias, back pain and gait problem  Neurological: Positive for weakness and numbness  All other systems reviewed and are negative  There were no vitals filed for this visit  I spent 10 minutes discussing the patients past medical/surgical history, current pain symptoms, and medication regiment/treatment plan with the patient today during the virtual visit       Normal OV: 19733    VIRTUAL VISIT DISCLAIMER    Ariel Edwards acknowledges that she has consented to an online visit or consultation  She understands that the online visit is based solely on information provided by her, and that, in the absence of a face-to-face physical evaluation by the physician, the diagnosis she receives is both limited and provisional in terms of accuracy and completeness  This is not intended to replace a full medical face-to-face evaluation by the physician  Francisco Price understands and accepts these terms

## 2021-04-26 NOTE — PATIENT INSTRUCTIONS
Assessment/Plan: 1  I discussed with the patient at this point time I feel it is in her best interest to continue to follow-up with the [facility] regarding her cancer treatments and hemoglobin and hematocrit and transfusion needs and to proceed with the ultrasound of her left breast as scheduled  The patient was agreeable and verbalized an understanding  2   With regards to her chronic low back and leg pain, for now, since she is noting moderate and stable overall relief, without any significant side effects or issues, we will hold off any repeat injections and she can continue the p r n  tramadol and the topical Voltaren gel as needed  The patient was agreeable and verbalized an understanding  3   The patient is to follow-up as scheduled in 16 weeks on August 23, 2021 to arrive at 10:00 a m  for a 10:15 a m  office visit  The patient was agreeable and verbalized an understanding  Opioid Safety   AMBULATORY CARE:   Opioid safety  includes the correct use, storage, and disposal of opioids  Examples of opioid medicines to treat pain include oxycodone, morphine, fentanyl, and codeine  Call your local emergency number (911 in the [address] [place] [address],[address]), or have someone else call if:   · You have a seizure  · You cannot be woken  · You have trouble staying awake and your breathing is slow or shallow  · Your speech is slurred, or you are confused  · You are dizzy or stumble when you walk  Call your doctor, or have someone close to you call if:   · You are extremely drowsy, or you have trouble staying awake or speaking  · You have pale or clammy skin  · You have blue fingernails or lips  · Your heartbeat is slower than normal     · You cannot stop vomiting  · You have questions or concerns about your condition or care  Use opioids safely:   · Take prescribed opioids exactly as directed  Opioids come with directions based on the kind of opioid and how it is given   Do not take more than the recommended amount, or for longer than needed  · Do not give opioids to others or take opioids that belong to someone else  Misuse of opioids can lead to an addiction or overdose  · Do not mix opioids with other medicines or alcohol  The combination can cause an overdose, or lead to a coma  · Do not drive or operate heavy machinery after you take the opioid  Your provider or pharmacist can tell you how long to wait after a dose before you do these activities  · Talk to your healthcare provider if you have any side effects  He or she can help you prevent or relieve side effects  Side effects include nausea, sleepiness, itching, and trouble thinking clearly  Manage constipation:  Constipation is the most common side effect of opioid medicine  Constipation is when you have hard, dry bowel movements, or you go longer than usual between bowel movements  Tell your healthcare provider about all changes in your bowel movements while you are taking opioids  He or she may recommend laxative medicine to help you have a bowel movement  He or she may also change the kind of opioid you are taking, or change when you take it  The following are more ways you can prevent or relieve constipation:  · Drink liquids as directed  You may need to drink extra liquids to help soften and move your bowels  Ask how much liquid to drink each day and which liquids are best for you  · Eat high-fiber foods  This may help decrease constipation by adding bulk to your bowel movements  High-fiber foods include fruits, vegetables, whole-grain breads and cereals, and beans  Your healthcare provider or dietitian can help you create a high-fiber meal plan  Your provider may also recommend a fiber supplement if you cannot get enough fiber from food  · Exercise regularly  Regular physical activity can help stimulate your intestines  Walking is a good exercise to prevent or relieve constipation   Ask which exercises are best for you  · Schedule a time each day to have a bowel movement  This may help train your body to have regular bowel movements  Bend forward while you are on the toilet to help move the bowel movement out  Sit on the toilet for at least 10 minutes, even if you do not have a bowel movement  Store opioids safely:   · Store opioids where others cannot easily get them  Keep them in a locked cabinet or secure area  Do not  keep them in a purse or other bag you carry with you  A person may be looking for something else and find the opioids  · Make sure opioids are stored out of the reach of children  A child can easily overdose on opioids  Opioids may look like candy to a small child  The best way to dispose of opioids: The laws vary by country and area  In the [place], the best way is to return the opioids through a take-back program  This program is offered by the [employer])  The following are options for using the program:  · Take the opioids to a SOPHIA collection site  The site is often a law enforcement center  Call your local law enforcement center for scheduled take-back days in your area  You will be given information on where to go if the collection site is in a different location  · Take the opioids to an approved pharmacy or hospital   A pharmacy or hospital may be set up as a collection site  You will need to ask if it is a SOPHIA collection site if you were not directed there  A pharmacy or doctor's office may not be able to take back opioids unless it is a SOPHIA site  · Use a mail-back system  This means you are given containers to put the opioids into  You will then mail them in the containers  · Use a take-back drop box  This is a place to leave the opioids at any time  People and animals will not be able to get into the box  Your local law enforcement agency can tell you where to find a drop box in your area      Other safe ways to dispose of opioids: The medicine may come with disposal instructions  The instructions may vary depending on the brand of medicine you are using  Instructions may come in a Medication Guide, but not every medicine has one  You may instead get instructions from your pharmacy or doctor  Follow instructions carefully  The following are general guidelines to follow:  · Find out if you can flush the opioid  Some opioids can be flushed down the toilet or poured into the sink  You will need to contact authorities in your area to see if this is an option for you  The FDA also offers a list of medicines that are safe to flush down the toilet  You can check the list if you cannot get the information for your local area  · Ask your waste management company about rules for putting opioids in the trash  The company will be able to give you specific directions  Scratch out personal information on the original medicine label so it cannot be read  Then put it in the trash  Do not label the trash or put any information on it about the opioids  It should look like regular household trash so no one is tempted to look for the opioids  Keep the trash out of the reach of children and animals  Always make sure trash is secure  · Talk to officials if you live in a facility  If you live in a nursing home or assisted living center, talk to an official  The person will know the rules for your area  Other ways to manage pain:   · Ask your healthcare provider about non-opioid medicines to control pain  Nonprescription medicines include NSAIDs (such as ibuprofen) and acetaminophen  Prescription medicines include muscle relaxers, antidepressants, and steroids  · Pain may be managed without any medicines  Some ways to relieve pain include massage, aromatherapy, or meditation  Physical or occupational therapy may also help      For more information:   · Drug Enforcement Administration  [facility] [address]  Phone: 1- [address] [place] [address]  Web Address: [facility]  usdoj gov/drug_disposal/    · [address] and Drug Administration  [place] , [address] [facility]  Phone: 2- 427 - 722-8309  Web Address: http://[employer]/  Follow up with your doctor or pain specialist as directed: You may need to have your dose adjusted  Your doctor or pain specialist can also help you find ways to manage pain without opioids  Write down your questions so you remember to ask them during your visits  © Copyright [address] [place] [address] Information is for End User's use only and may not be sold, redistributed or otherwise used for commercial purposes  All illustrations and images included in CareNotes® are the copyrighted property of A D A M , Inc  or Cliff Munguia  The above information is an  only  It is not intended as medical advice for individual conditions or treatments  Talk to your doctor, nurse or pharmacist before following any medical regimen to see if it is safe and effective for you

## 2021-05-04 ENCOUNTER — HOSPITAL ENCOUNTER (OUTPATIENT)
Facility: HOSPITAL | Age: 71
Setting detail: OBSERVATION
Discharge: HOME | End: 2021-05-05
Attending: EMERGENCY MEDICINE | Admitting: INTERNAL MEDICINE
Payer: COMMERCIAL

## 2021-05-04 ENCOUNTER — APPOINTMENT (EMERGENCY)
Dept: CT IMAGING | Facility: HOSPITAL | Age: 71
End: 2021-05-04
Payer: COMMERCIAL

## 2021-05-04 DIAGNOSIS — M54.59 INTRACTABLE LOW BACK PAIN: ICD-10-CM

## 2021-05-04 DIAGNOSIS — D64.9 ACUTE ON CHRONIC ANEMIA: ICD-10-CM

## 2021-05-04 DIAGNOSIS — D64.9 ANEMIA: Primary | ICD-10-CM

## 2021-05-04 DIAGNOSIS — R26.2 AMBULATORY DYSFUNCTION: ICD-10-CM

## 2021-05-04 DIAGNOSIS — M54.30 SCIATICA: ICD-10-CM

## 2021-05-04 LAB
ABO GROUP BLD: NORMAL
ALBUMIN SERPL BCP-MCNC: 3.1 G/DL (ref 3.5–5)
ALP SERPL-CCNC: 78 U/L (ref 46–116)
ALT SERPL W P-5'-P-CCNC: 25 U/L (ref 12–78)
ANION GAP SERPL CALCULATED.3IONS-SCNC: 8 MMOL/L (ref 4–13)
AST SERPL W P-5'-P-CCNC: 45 U/L (ref 5–45)
BASOPHILS # BLD AUTO: 0 THOUSANDS/ΜL (ref 0–0.1)
BASOPHILS NFR BLD AUTO: 0 % (ref 0–1)
BILIRUB SERPL-MCNC: 0.5 MG/DL (ref 0.2–1)
BLD GP AB SCN SERPL QL: NEGATIVE
BUN SERPL-MCNC: 16 MG/DL (ref 5–25)
CALCIUM ALBUM COR SERPL-MCNC: 9.4 MG/DL (ref 8.3–10.1)
CALCIUM SERPL-MCNC: 8.7 MG/DL (ref 8.3–10.1)
CHLORIDE SERPL-SCNC: 101 MMOL/L (ref 100–108)
CO2 SERPL-SCNC: 28 MMOL/L (ref 21–32)
CREAT SERPL-MCNC: 1.31 MG/DL (ref 0.6–1.3)
EOSINOPHIL # BLD AUTO: 0 THOUSAND/ΜL (ref 0–0.61)
EOSINOPHIL NFR BLD AUTO: 0 % (ref 0–6)
ERYTHROCYTE [DISTWIDTH] IN BLOOD BY AUTOMATED COUNT: 15.8 % (ref 11.6–15.1)
GFR SERPL CREATININE-BSD FRML MDRD: 41 ML/MIN/1.73SQ M
GLUCOSE SERPL-MCNC: 95 MG/DL (ref 65–140)
HCT VFR BLD AUTO: 20.2 % (ref 34.8–46.1)
HCT VFR BLD AUTO: 29.1 % (ref 34.8–46.1)
HGB BLD-MCNC: 6.5 G/DL (ref 11.5–15.4)
HGB BLD-MCNC: 9.8 G/DL (ref 11.5–15.4)
IMM GRANULOCYTES # BLD AUTO: 0.07 THOUSAND/UL (ref 0–0.2)
IMM GRANULOCYTES NFR BLD AUTO: 1 % (ref 0–2)
LYMPHOCYTES # BLD AUTO: 0.09 THOUSANDS/ΜL (ref 0.6–4.47)
LYMPHOCYTES NFR BLD AUTO: 1 % (ref 14–44)
MCH RBC QN AUTO: 35.7 PG (ref 26.8–34.3)
MCHC RBC AUTO-ENTMCNC: 32.2 G/DL (ref 31.4–37.4)
MCV RBC AUTO: 111 FL (ref 82–98)
MONOCYTES # BLD AUTO: 0.63 THOUSAND/ΜL (ref 0.17–1.22)
MONOCYTES NFR BLD AUTO: 8 % (ref 4–12)
NEUTROPHILS # BLD AUTO: 7.15 THOUSANDS/ΜL (ref 1.85–7.62)
NEUTS SEG NFR BLD AUTO: 90 % (ref 43–75)
NRBC BLD AUTO-RTO: 0 /100 WBCS
PLATELET # BLD AUTO: 155 THOUSANDS/UL (ref 149–390)
PMV BLD AUTO: 9.9 FL (ref 8.9–12.7)
POTASSIUM SERPL-SCNC: 3.6 MMOL/L (ref 3.5–5.3)
PROT SERPL-MCNC: 6.2 G/DL (ref 6.4–8.2)
RBC # BLD AUTO: 1.82 MILLION/UL (ref 3.81–5.12)
RH BLD: POSITIVE
SODIUM SERPL-SCNC: 137 MMOL/L (ref 136–145)
SPECIMEN EXPIRATION DATE: NORMAL
WBC # BLD AUTO: 7.94 THOUSAND/UL (ref 4.31–10.16)

## 2021-05-04 PROCEDURE — 99220 PR INITIAL OBSERVATION CARE/DAY 70 MINUTES: CPT | Performed by: INTERNAL MEDICINE

## 2021-05-04 PROCEDURE — 96374 THER/PROPH/DIAG INJ IV PUSH: CPT

## 2021-05-04 PROCEDURE — 36415 COLL VENOUS BLD VENIPUNCTURE: CPT | Performed by: EMERGENCY MEDICINE

## 2021-05-04 PROCEDURE — 86850 RBC ANTIBODY SCREEN: CPT | Performed by: INTERNAL MEDICINE

## 2021-05-04 PROCEDURE — 72131 CT LUMBAR SPINE W/O DYE: CPT

## 2021-05-04 PROCEDURE — 86901 BLOOD TYPING SEROLOGIC RH(D): CPT | Performed by: INTERNAL MEDICINE

## 2021-05-04 PROCEDURE — P9058 RBC, L/R, CMV-NEG, IRRAD: HCPCS

## 2021-05-04 PROCEDURE — G1004 CDSM NDSC: HCPCS

## 2021-05-04 PROCEDURE — 80053 COMPREHEN METABOLIC PANEL: CPT | Performed by: EMERGENCY MEDICINE

## 2021-05-04 PROCEDURE — 86900 BLOOD TYPING SEROLOGIC ABO: CPT | Performed by: INTERNAL MEDICINE

## 2021-05-04 PROCEDURE — 36430 TRANSFUSION BLD/BLD COMPNT: CPT

## 2021-05-04 PROCEDURE — 85014 HEMATOCRIT: CPT | Performed by: INTERNAL MEDICINE

## 2021-05-04 PROCEDURE — 85018 HEMOGLOBIN: CPT | Performed by: INTERNAL MEDICINE

## 2021-05-04 PROCEDURE — 85025 COMPLETE CBC W/AUTO DIFF WBC: CPT | Performed by: EMERGENCY MEDICINE

## 2021-05-04 PROCEDURE — 99285 EMERGENCY DEPT VISIT HI MDM: CPT | Performed by: EMERGENCY MEDICINE

## 2021-05-04 PROCEDURE — 86923 COMPATIBILITY TEST ELECTRIC: CPT

## 2021-05-04 PROCEDURE — 99285 EMERGENCY DEPT VISIT HI MDM: CPT

## 2021-05-04 RX ORDER — FUROSEMIDE 20 MG/1
20 TABLET ORAL DAILY
Status: DISCONTINUED | OUTPATIENT
Start: 2021-05-05 | End: 2021-05-05 | Stop reason: HOSPADM

## 2021-05-04 RX ORDER — HYDROMORPHONE HCL/PF 1 MG/ML
0.2 SYRINGE (ML) INJECTION ONCE
Status: COMPLETED | OUTPATIENT
Start: 2021-05-04 | End: 2021-05-04

## 2021-05-04 RX ORDER — HYDROXYCHLOROQUINE SULFATE 200 MG/1
200 TABLET, FILM COATED ORAL
Status: DISCONTINUED | OUTPATIENT
Start: 2021-05-05 | End: 2021-05-05 | Stop reason: HOSPADM

## 2021-05-04 RX ORDER — FERROUS SULFATE 325(65) MG
325 TABLET ORAL 2 TIMES DAILY WITH MEALS
Status: DISCONTINUED | OUTPATIENT
Start: 2021-05-04 | End: 2021-05-05 | Stop reason: HOSPADM

## 2021-05-04 RX ORDER — ASPIRIN 81 MG/1
81 TABLET, CHEWABLE ORAL DAILY
COMMUNITY
End: 2022-07-20 | Stop reason: SDUPTHER

## 2021-05-04 RX ORDER — FOLIC ACID/MULTIVIT,IRON,MINER .4-18-35
1 TABLET,CHEWABLE ORAL DAILY
COMMUNITY

## 2021-05-04 RX ORDER — PREDNISONE 20 MG/1
40 TABLET ORAL DAILY
Status: DISCONTINUED | OUTPATIENT
Start: 2021-05-05 | End: 2021-05-05 | Stop reason: HOSPADM

## 2021-05-04 RX ORDER — PREDNISONE 20 MG/1
40 TABLET ORAL ONCE
Status: COMPLETED | OUTPATIENT
Start: 2021-05-04 | End: 2021-05-04

## 2021-05-04 RX ORDER — METOPROLOL SUCCINATE 25 MG/1
12.5 TABLET, EXTENDED RELEASE ORAL DAILY
Status: DISCONTINUED | OUTPATIENT
Start: 2021-05-05 | End: 2021-05-05 | Stop reason: HOSPADM

## 2021-05-04 RX ORDER — TRAMADOL HYDROCHLORIDE 50 MG/1
50 TABLET ORAL EVERY 8 HOURS PRN
Status: DISCONTINUED | OUTPATIENT
Start: 2021-05-04 | End: 2021-05-05 | Stop reason: HOSPADM

## 2021-05-04 RX ORDER — PANTOPRAZOLE SODIUM 40 MG/1
40 TABLET, DELAYED RELEASE ORAL 2 TIMES DAILY
Status: DISCONTINUED | OUTPATIENT
Start: 2021-05-04 | End: 2021-05-05 | Stop reason: HOSPADM

## 2021-05-04 RX ORDER — LIDOCAINE 50 MG/G
1 PATCH TOPICAL ONCE
Status: COMPLETED | OUTPATIENT
Start: 2021-05-04 | End: 2021-05-04

## 2021-05-04 RX ORDER — ACETAMINOPHEN 325 MG/1
975 TABLET ORAL EVERY 8 HOURS SCHEDULED
Status: DISCONTINUED | OUTPATIENT
Start: 2021-05-04 | End: 2021-05-05 | Stop reason: HOSPADM

## 2021-05-04 RX ADMIN — PANTOPRAZOLE SODIUM 40 MG: 40 TABLET, DELAYED RELEASE ORAL at 17:41

## 2021-05-04 RX ADMIN — PREDNISONE 40 MG: 20 TABLET ORAL at 11:22

## 2021-05-04 RX ADMIN — ACETAMINOPHEN 975 MG: 325 TABLET, FILM COATED ORAL at 22:26

## 2021-05-04 RX ADMIN — LIDOCAINE 5% 1 PATCH: 700 PATCH TOPICAL at 11:22

## 2021-05-04 RX ADMIN — HYDROMORPHONE HYDROCHLORIDE 0.2 MG: 1 INJECTION, SOLUTION INTRAMUSCULAR; INTRAVENOUS; SUBCUTANEOUS at 11:21

## 2021-05-04 RX ADMIN — FERROUS SULFATE TAB 325 MG (65 MG ELEMENTAL FE) 325 MG: 325 (65 FE) TAB at 18:04

## 2021-05-04 RX ADMIN — TRAMADOL HYDROCHLORIDE 50 MG: 50 TABLET, FILM COATED ORAL at 16:46

## 2021-05-04 RX ADMIN — ACETAMINOPHEN 975 MG: 325 TABLET, FILM COATED ORAL at 17:41

## 2021-05-04 NOTE — ED NOTES
Patient sitting up and eating in bed   Tolerating food without difficulty     Mele John RN  05/04/21 [id number]

## 2021-05-04 NOTE — ASSESSMENT & PLAN NOTE
· Acute on chronic anemia, patient receives regular transfusions as outpatient  · Currently receiving 2 units PRBCs at time of admission  · Hemoglobin 6 5 on admit, repeat CBC tomorrow  · Transfuse as needed  · Patient denies any evidence of bleeding

## 2021-05-04 NOTE — ASSESSMENT & PLAN NOTE
· Patient presented to the emergency department with acute on chronic low back pain; patient feels she may have over exerted herself a couple of days ago  · Follows with pain management as an outpatient, took Tylenol and tramadol without relief  Described as her typical sciatic pain radiating down the left leg  Denies any direct trauma  · CT lumbar spine: "There is worsening dextroscoliosis of the lumbar spine and grade 1 anterior spondylolisthesis of L5 upon S1, compared to the prior CT of the lumbar spine dated 1/19/2018  Persistent multilevel degenerative disc disease within the lumbar spine including superiorly extruded disc herniation at L2-3 and left-sided degenerative disc disease with disc herniation at L4-5  Right greater than left foraminal narrowing at L5-S1 is also stable  Large central calcification in the anterior epidural space at the T11-12 level resulting in moderate canal stenosis and distortion of the ventral aspect of the thecal sac is unchanged from prior examination  Heterogeneous bone density within the sacrum bilaterally appears slightly improved compared to the CT scans of the abdomen and pelvis from early 2020, suggestive of healing sacral insufficiency/stress fractures  If patient is able, MRI would help evaluate any residual marrow changes within the sacrum "  · Patient did note pain relief in the ER - had received lidocaine patch, IV Dilaudid, prednisone  · Continue scheduled Tylenol, prednisone  Can continue tramadol as needed    Reports allergy to codeine  · PT/OT consult  · Fall precautions

## 2021-05-04 NOTE — ASSESSMENT & PLAN NOTE
Lab Results   Component Value Date    EGFR 41 05/04/2021    EGFR 33 04/12/2021    EGFR 38 01/18/2021    CREATININE 1 31 (H) 05/04/2021    CREATININE 1 56 (H) 04/12/2021    CREATININE 1 40 (H) 01/18/2021   · Baseline creatinine 1 4-1 5  · Creatinine stable at 1 31  · Repeat BMP tomorrow

## 2021-05-04 NOTE — H&P
[facility] [place]     H&P- Devyn Monae 1950, 65 y o  female MRN: [id number]  Unit/Bed#: ED 08 Encounter: [id number]  Primary Care Provider: Carmen Gao MD   Date and time admitted to hospital: 5/4/2021 10:15 AM    * Intractable low back pain  Assessment & Plan  · Patient presented to the emergency department with acute on chronic low back pain; patient feels she may have over exerted herself a couple of days ago  · Follows with pain management as an outpatient, took Tylenol and tramadol without relief  Described as her typical sciatic pain radiating down the left leg  Denies any direct trauma  · CT lumbar spine: "There is worsening dextroscoliosis of the lumbar spine and grade 1 anterior spondylolisthesis of L5 upon S1, compared to the prior CT of the lumbar spine dated 1/19/2018  Persistent multilevel degenerative disc disease within the lumbar spine including superiorly extruded disc herniation at L2-3 and left-sided degenerative disc disease with disc herniation at L4-5  Right greater than left foraminal narrowing at L5-S1 is also stable  Large central calcification in the anterior epidural space at the T11-12 level resulting in moderate canal stenosis and distortion of the ventral aspect of the thecal sac is unchanged from prior examination  Heterogeneous bone density within the sacrum bilaterally appears slightly improved compared to the CT scans of the abdomen and pelvis from early 2020, suggestive of healing sacral insufficiency/stress fractures  If patient is able, MRI would help evaluate any residual marrow changes within the sacrum "  · Patient did note pain relief in the ER - had received lidocaine patch, IV Dilaudid, prednisone  · Continue scheduled Tylenol, prednisone  Can continue tramadol as needed    Reports allergy to codeine  · PT/OT consult  · Fall precautions    Acute on chronic anemia  Assessment & Plan  · Acute on chronic anemia, patient receives regular transfusions as outpatient  · Currently receiving 2 units PRBCs at time of admission  · Hemoglobin 6 5 on admit, repeat CBC tomorrow  · Transfuse as needed  · Patient denies any evidence of bleeding    Stage 3 chronic kidney disease [facility])  Assessment & Plan  Lab Results   Component Value Date    EGFR 41 05/04/2021    EGFR 33 04/12/2021    EGFR 38 01/18/2021    CREATININE 1 31 (H) 05/04/2021    CREATININE 1 56 (H) 04/12/2021    CREATININE 1 40 (H) 01/18/2021   · Baseline creatinine 1 4-1 5  · Creatinine stable at 1 31  · Repeat BMP tomorrow    GIST (gastrointestinal stromal tumor), malignant ([facility] [address] )  Assessment & Plan  · [facility]  · Continue outpatient follow-up with Oncology  · Currently on ayvakit    VTE Prophylaxis: Pharmacologic VTE Prophylaxis contraindicated due to Anemia  / sequential compression device   Code Status:  Level 1 - full code  POLST: There is no POLST form on file for this patient (pre-hospital)  Discussion with family:  Discussed with patient directly at bedside  Declined family update, states she willl call her  herself  Anticipated Length of Stay:  Patient will be admitted on an Observation basis with an anticipated length of stay of  less than 2 midnights  Justification for Hospital Stay:  Intractable back pain, anemia    Total Time for Visit, including Counseling / Coordination of Care: 70 minutes  Greater than 50% of this total time spent on direct patient counseling and coordination of care  Chief Complaint:   "The back pain just got worse "    History of Present Illness:    Jay Read is a 65 y o  female who presents with acute on chronic low back pain  Past medical history significant for gist tumor, anemia, GERD, chronic kidney disease, iron deficiency anemia  Patient presented to the emergency department several day history of worsening low back pain with radiation down her left leg typical for her sciatic pain    Patient reports that she feels she may have over exerted herself a couple of days ago with a lot of bending and lifting  Denies any trauma or fall  Denies chest pain/palpitations, shortness of breath, nausea vomiting, abdominal pain  Reports that she follows with Pain Management as an outpatient and triedTylenol and tramadol without improvement  She has received injections in the past with pain management but usually gets relief for only about 3 weeks  Pain currently feels improved after receiving pain medication in the ED  Denies any fever/chills or recent illness  Denies any melena, bloody stools, dizziness or lightheadedness  Does admit to generalized fatigue  Denies any new bowel or bladder incontinence or saddle anesthesia  Review of Systems:    Review of Systems   Constitutional: Positive for fatigue  Negative for chills, fever and unexpected weight change  HENT: Negative for congestion, sore throat and trouble swallowing  Eyes: Negative for photophobia, pain and visual disturbance  Respiratory: Negative for cough, shortness of breath and wheezing  Cardiovascular: Negative for chest pain, palpitations and leg swelling  Gastrointestinal: Negative for abdominal pain, constipation, diarrhea, nausea and vomiting  Endocrine: Negative for polyuria  Genitourinary: Negative for difficulty urinating, dysuria, flank pain, hematuria and urgency  Musculoskeletal: Positive for back pain and gait problem  Negative for myalgias, neck pain and neck stiffness  Skin: Negative for pallor and rash  Neurological: Negative for dizziness, tremors, syncope, weakness, light-headedness, numbness and headaches  Hematological: Does not bruise/bleed easily  Psychiatric/Behavioral: Negative for agitation and confusion         Past Medical and Surgical History:     Past Medical History:   Diagnosis Date    ADHD     Anemia     Anxiety     Arthritis     Asthma     Atrial fibrillation ([facility] )     Breast cancer ([facility] )     [facility]     Chronic iron deficiency anemia 2020    Extensive GI evaluation over the last year including multiple EGD, colonoscopy, and capsule endoscopy  Has had gastric AVMs cauterized, Dieulafoy's lesion clipped, and most recently a Seb erosion cauterized    Coronary artery disease     Depression     GERD (gastroesophageal reflux disease)     History of stomach ulcers     Hypercholesterolemia     Hypertension     Kidney disease     [facility]        Past Surgical History:   Procedure Laterality Date    ANKLE SURGERY      APPENDECTOMY      BREAST SURGERY      CATARACT EXTRACTION       SECTION      COLONOSCOPY  2019    Multiple adenomatous colon polyps and internal hemorrhoids  Three year recall advised    HIP SURGERY      JOINT REPLACEMENT  2019    Left knee replacement    LAMINECTOMY      ROTATOR CUFF REPAIR      SIGMOID RESECTION / RECTOPEXY      SINUS SURGERY      UPPER GASTROINTESTINAL ENDOSCOPY  2020    Dieulafoy's lesion in proximal stomach which was actively oozing  Injected with epinephrine and 2 Endoclips placed with control of bleeding, hiatal hernia   UPPER GASTROINTESTINAL ENDOSCOPY  2020    Bleeding Seb's erosion status post cauterization       Meds/Allergies:    Prior to Admission medications    Medication Sig Start Date End Date Taking?  Authorizing Provider   acetaminophen (TYLENOL) 500 mg tablet Take 500 mg by mouth every 6 (six) hours as needed for mild pain    Historical Provider, MD   Avapritinib (Ayvakit) 200 MG TABS Take 200 mg by mouth daily    Historical Provider, MD   Azelastine HCl 137 MCG/SPRAY SOLN instill 2 sprays into each nostril twice a day if needed for congestion 18   Historical Provider, MD   diphenhydrAMINE (BENADRYL) 25 mg tablet Take 25 mg by mouth every 6 (six) hours as needed for itching    Historical Provider, MD   diphenoxylate-atropine (LOMOTIL) 2 5-0 025 mg per tablet TAKE 1 TABLET BY MOUTH 4 TIMES A DAY AS NEEDED FOR DIARRHEA 8/10/20   Naima Tyler MD   docusate sodium (COLACE) 100 mg capsule Take 100 mg by mouth 2 (two) times a day as needed for constipation    Historical Provider, MD   ferrous sulfate 324 (65 Fe) mg Take 1 tablet (324 mg total) by mouth 2 (two) times a day before meals 6/29/20 2/23/21  Angie Spaulding MD   furosemide (LASIX) 20 mg tablet Take 20 mg by mouth daily  6/7/18   Historical Provider, MD   hydroxychloroquine (PLAQUENIL) 200 mg tablet Take 200 mg by mouth daily in the early morning     Historical Provider, MD   loperamide (IMODIUM) 2 mg capsule Take 2 capsules by mouth 4 (four) times a day as needed    Historical Provider, MD   metoprolol succinate (Toprol XL) 25 mg 24 hr tablet Take 0 5 tablets (12 5 mg total) by mouth daily 6/5/20   Priya Rocks, DO   NON FORMULARY Take 2 tablets by mouth daily Chemo: Blue 285    Historical Provider, MD   ofloxacin (OCUFLOX) 0 3 % ophthalmic solution Starting April 7 4/7/20   Historical Provider, MD   ondansetron (ZOFRAN) 8 mg tablet 8 mg daily with breakfast Prior to chemo 10/17/19   Historical Provider, MD   pantoprazole (PROTONIX) 40 mg tablet take 1 tablet by mouth twice a day 7/31/20   LANEY Aguillon   prednisoLONE acetate (PRED FORTE) 1 % ophthalmic suspension  4/7/20   Historical Provider, MD   traMADol (ULTRAM) 50 mg tablet Take 1 tablet (50 mg total) by mouth every 8 (eight) hours as needed for moderate pain 4/26/21   LANEY Angela   Triamcinolone Acetonide (NASACORT ALLERGY 24HR NA) into each nostril 1 spray each nostril--at bedtime   (OTC)    Historical Provider, MD   Virt-Phos 250 Neutral [phone] MG tablet Take 1 tablet by mouth daily 10/6/20   Historical Provider, MD     I have reviewed home medications with patient personally  Allergies:    Allergies   Allergen Reactions    Codeine Other (See Comments)     Throat closes    Codeine Sulfate Throat Swelling    Neomycin-Bacitracin Zn-Polymyx Rash  Wound Dressing Adhesive Other (See Comments)     If its on too long- pt starts itching    Zolmitriptan Palpitations     Heart palpitations  Generic for Zomig         Social History:     Marital Status: /Civil Union   Occupation:  Retired  Patient Pre-hospital Living Situation:  Lives with   Patient Pre-hospital Level of Mobility:  Ambulatory  Patient Pre-hospital Diet Restrictions:  None  Substance Use History:   Social History     Substance and Sexual Activity   Alcohol Use Not Currently    Frequency: Never    Drinks per session: 1 or 2    Binge frequency: Never     Social History     Tobacco Use   Smoking Status Former Smoker    Years: 20 00   Smokeless Tobacco Never Used     Social History     Substance and Sexual Activity   Drug Use No       Family History:    Family History   Problem Relation Age of Onset   Ardeth Needs Breast cancer Mother     Diabetes Mother     Hypertension Mother     Lung cancer Mother     Hyperlipidemia Father     Prostate cancer Father     Colon cancer Paternal Grandmother     Colon cancer Paternal Grandfather     Diabetes Family     Substance Abuse Neg Hx     Mental illness Neg Hx        Physical Exam:     Vitals:   Blood Pressure: 123/65 (05/04/21 1615)  Pulse: 87 (05/04/21 1615)  Temperature: 99 °F (37 2 °C) (05/04/21 1537)  Temp Source: Temporal (05/04/21 1537)  Respirations: 20 (05/04/21 1615)  Height: 5' 1" (154 9 cm) (05/04/21 1019)  Weight - Scale: 56 7 kg (125 lb) (05/04/21 1019)  SpO2: 96 % (05/04/21 1615)    Physical Exam  Vitals signs and nursing note reviewed  Constitutional:       Appearance: Normal appearance  Comments: Appears comfortable, no acute distress   HENT:      Head: Normocephalic  Eyes:      General: No scleral icterus  Extraocular Movements: Extraocular movements intact  Conjunctiva/sclera: Conjunctivae normal    Neck:      Musculoskeletal: Normal range of motion     Cardiovascular:      Rate and Rhythm: Normal rate and regular rhythm  Heart sounds: S1 normal and S2 normal    Pulmonary:      Effort: Pulmonary effort is normal       Breath sounds: Normal breath sounds  No wheezing, rhonchi or rales  Abdominal:      General: Bowel sounds are normal       Palpations: Abdomen is soft  Tenderness: There is no abdominal tenderness  There is no guarding or rebound  Musculoskeletal:         General: No swelling, tenderness or deformity  Comments: Able to move upper/lower extremities bilaterally, lifting left lower extremity elicits low back pain  No edema   Skin:     General: Skin is warm and dry  Neurological:      Mental Status: She is alert and oriented to person, place, and time  Psychiatric:         Mood and Affect: Mood normal          Speech: Speech normal          Behavior: Behavior normal            Additional Data:     Lab Results: I have personally reviewed pertinent reports  Results from last 7 days   Lab Units 05/04/21  1127   WBC Thousand/uL 7 94   HEMOGLOBIN g/dL 6 5*   HEMATOCRIT % 20 2*   PLATELETS Thousands/uL 155   NEUTROS PCT % 90*   LYMPHS PCT % 1*   MONOS PCT % 8   EOS PCT % 0     Results from last 7 days   Lab Units 05/04/21  1127   SODIUM mmol/L 137   POTASSIUM mmol/L 3 6   CHLORIDE mmol/L 101   CO2 mmol/L 28   BUN mg/dL 16   CREATININE mg/dL 1 31*   ANION GAP mmol/L 8   CALCIUM mg/dL 8 7   ALBUMIN g/dL 3 1*   TOTAL BILIRUBIN mg/dL 0 50   ALK PHOS U/L 78   ALT U/L 25   AST U/L 45   GLUCOSE RANDOM mg/dL 95                       Imaging: I have personally reviewed pertinent reports  CT lumbar spine without contrast   Final Result by Milvia Dan DO (05/04 9762)      There is worsening dextroscoliosis of the lumbar spine and grade 1 anterior spondylolisthesis of L5 upon S1, compared to the prior CT of the lumbar spine dated 1/19/2018        Persistent multilevel degenerative disc disease within the lumbar spine including superiorly extruded disc herniation at L2-3 and left-sided degenerative disc disease with disc herniation at L4-5  Right greater than left foraminal narrowing at L5-S1 is    also stable  Large central calcification in the anterior epidural space at the T11-12 level resulting in moderate canal stenosis and distortion of the ventral aspect of the thecal sac is unchanged from prior examination  Heterogeneous bone density within the sacrum bilaterally appears slightly improved compared to the CT scans of the abdomen and pelvis from early 2020, suggestive of healing sacral insufficiency/stress fractures  If patient is able, MRI would help    evaluate any residual marrow changes within the sacrum  Workstation performed: [id number]             EKG, Pathology, and Other Studies Reviewed on Admission:   · Reviewed as above    Allscripts / Epic Records Reviewed: Yes     ** Please Note: This note has been constructed using a voice recognition system   **

## 2021-05-04 NOTE — ED PROVIDER NOTES
History  Chief Complaint   Patient presents with    Back Pain     patient presents to the ED with c/o back pain into left leg, states she think she over did it this weekend and aggrivated her sciatic nerve       This is a 64-year-old female presents with low back pain that radiates down the left leg to the ankle over the past few days she thinks she did some increased activity which aggravated it over the weekend including bending and lifting she does have chronic stool incontinence from previous surgery denies any urinary incontinence no focal paralysis no fevers she is currently undergoing chemotherapy for GI cancer  Normally runs low hemoglobin  She states in the 7 range  She does have a history of recurrent sciatic of followed by pain specialist and took a tramadol and Tylenol earlier this morning      History provided by:  Patient and spouse  Medical Problem  Location:   low back  Quality:   shooting pain  Severity:  Severe  Onset quality:  Gradual  Duration:  3 days  Timing:  Constant  Progression:  Waxing and waning  Chronicity:  Recurrent  Context:   low back pain with radiation down the left leg over the past few days from increased activity  Relieved by:   nothing  Worsened by:   movement  Associated symptoms: no abdominal pain and no fever        Prior to Admission Medications   Prescriptions Last Dose Informant Patient Reported? Taking?    Avapritinib (Ayvakit) 200 MG TABS   Yes No   Sig: Take 200 mg by mouth daily   Azelastine HCl 137 MCG/SPRAY SOLN  Self Yes No   Sig: instill 2 sprays into each nostril twice a day if needed for congestion   NON FORMULARY  Self Yes No   Sig: Take 2 tablets by mouth daily Chemo: Blue 285   Triamcinolone Acetonide (NASACORT ALLERGY 24HR NA)  Self Yes No   Sig: into each nostril 1 spray each nostril--at bedtime   (OTC)   Virt-Phos 250 Neutral [id number] MG tablet   Yes No   Sig: Take 1 tablet by mouth daily   acetaminophen (TYLENOL) 500 mg tablet   Yes No   Sig: Take 500 mg by mouth every 6 (six) hours as needed for mild pain   diphenhydrAMINE (BENADRYL) 25 mg tablet  Self Yes No   Sig: Take 25 mg by mouth every 6 (six) hours as needed for itching   diphenoxylate-atropine (LOMOTIL) 2 5-0 025 mg per tablet   No No   Sig: TAKE 1 TABLET BY MOUTH 4 TIMES A DAY AS NEEDED FOR DIARRHEA   docusate sodium (COLACE) 100 mg capsule  Self Yes No   Sig: Take 100 mg by mouth 2 (two) times a day as needed for constipation   ferrous sulfate 324 (65 Fe) mg   No No   Sig: Take 1 tablet (324 mg total) by mouth 2 (two) times a day before meals   furosemide (LASIX) 20 mg tablet  Self Yes No   Sig: Take 20 mg by mouth daily    hydroxychloroquine (PLAQUENIL) 200 mg tablet  Self Yes No   Sig: Take 200 mg by mouth daily in the early morning    loperamide (IMODIUM) 2 mg capsule  Self Yes No   Sig: Take 2 capsules by mouth 4 (four) times a day as needed   metoprolol succinate (Toprol XL) 25 mg 24 hr tablet  Self No No   Sig: Take 0 5 tablets (12 5 mg total) by mouth daily   ofloxacin (OCUFLOX) 0 3 % ophthalmic solution  Self Yes No   Sig: Starting    ondansetron (ZOFRAN) 8 mg tablet  Self Yes No   Si mg daily with breakfast Prior to chemo   pantoprazole (PROTONIX) 40 mg tablet   No No   Sig: take 1 tablet by mouth twice a day   prednisoLONE acetate (PRED FORTE) 1 % ophthalmic suspension  Self Yes No   traMADol (ULTRAM) 50 mg tablet   No No   Sig: Take 1 tablet (50 mg total) by mouth every 8 (eight) hours as needed for moderate pain      Facility-Administered Medications: None       Past Medical History:   Diagnosis Date    ADHD     Anemia     Anxiety     Arthritis     Asthma     Atrial fibrillation (HCC)     Breast cancer (HCC)     Cancer (HCC)     Chronic iron deficiency anemia 2020    Extensive GI evaluation over the last year including multiple EGD, colonoscopy, and capsule endoscopy    Has had gastric AVMs cauterized, Dieulafoy's lesion clipped, and most recently a Burnie Charter erosion cauterized    Coronary artery disease     Depression     GERD (gastroesophageal reflux disease)     History of stomach ulcers     Hypercholesterolemia     Hypertension     Kidney disease     Metast[facility] cancer ([facility] [address] )        Past Surgical History:   Procedure Laterality Date    ANKLE SURGERY      APPENDECTOMY      BREAST SURGERY      CATARACT EXTRACTION       SECTION      COLONOSCOPY  2019    Multiple adenomatous colon polyps and internal hemorrhoids  Three year recall advised    HIP SURGERY      JOINT REPLACEMENT  2019    Left knee replacement    LAMINECTOMY      ROTATOR CUFF REPAIR      SIGMOID RESECTION / RECTOPEXY      SINUS SURGERY      UPPER GASTROINTESTINAL ENDOSCOPY  2020    Dieulafoy's lesion in proximal stomach which was actively oozing  Injected with epinephrine and 2 Endoclips placed with control of bleeding, hiatal hernia   UPPER GASTROINTESTINAL ENDOSCOPY  2020    Bleeding Seb's erosion status post cauterization       Family History   Problem Relation Age of Onset   Erika Nixon Breast cancer Mother     Diabetes Mother     Hypertension Mother     Lung cancer Mother     Hyperlipidemia Father     Prostate cancer Father     Colon cancer Paternal [de-identified]     Colon cancer Paternal Grandfather     Diabetes Family     Substance Abuse Neg Hx     Mental illness Neg Hx      I have reviewed and agree with the history as documented  E-Cigarette/Vaping    E-Cigarette Use Never User      E-Cigarette/Vaping Substances    Nicotine No     THC No     CBD No     Flavoring No     Other No     Unknown No      Social History     Tobacco Use    Smoking status: Former Smoker     Years: 20 00    Smokeless tobacco: Never Used   Substance Use Topics    Alcohol use: Not Currently     Frequency: Never     Drinks per session: 1 or 2     Binge frequency: Never    Drug use: No       Review of Systems   Constitutional: Negative for fever     Gastrointestinal: Negative for abdominal pain  Genitourinary: Negative for vaginal bleeding  Musculoskeletal: Positive for back pain  All other systems reviewed and are negative  Physical Exam  Physical Exam  Vitals signs and nursing note reviewed  Constitutional:       General: She is not in acute distress  Appearance: She is not ill-appearing, toxic-appearing or diaphoretic  HENT:      Head: Normocephalic and atraumatic  Right Ear: External ear normal       Left Ear: External ear normal       Nose: Nose normal    Eyes:      General: No scleral icterus  Right eye: No discharge  Left eye: No discharge  Extraocular Movements: Extraocular movements intact  Pupils: Pupils are equal, round, and reactive to light  Neck:      Musculoskeletal: Normal range of motion and neck supple  No neck rigidity or muscular tenderness  Cardiovascular:      Rate and Rhythm: Normal rate and regular rhythm  Pulses: Normal pulses  Heart sounds: Normal heart sounds  No murmur  No friction rub  No gallop  Pulmonary:      Effort: Pulmonary effort is normal  No respiratory distress  Breath sounds: Normal breath sounds  No stridor  No wheezing, rhonchi or rales  Abdominal:      General: Abdomen is flat  Bowel sounds are normal  There is no distension  Palpations: Abdomen is soft  Tenderness: There is no abdominal tenderness  There is no guarding or rebound  Musculoskeletal:         General: Tenderness ( left lower lumbar area) present  Comments: Decreased range of motion to the left leg secondary to pain without any focal motor deficit   Skin:     General: Skin is warm and dry  Findings: Rash ( right facial birth jf) present  Neurological:      General: No focal deficit present  Mental Status: She is alert and oriented to person, place, and time  Cranial Nerves: No cranial nerve deficit  Sensory: No sensory deficit        Coordination: Coordination normal       Deep Tendon Reflexes: Reflexes normal    Psychiatric:         Mood and Affect: Mood normal          Behavior: Behavior normal          Thought Content:  Thought content normal          Vital Signs  ED Triage Vitals [05/04/21 1019]   Temperature Pulse Respirations Blood Pressure SpO2   98 °F (36 7 °C) 95 20 123/76 98 %      Temp Source Heart Rate Source Patient Position - Orthostatic VS BP Location FiO2 (%)   Temporal Monitor Sitting Right arm --      Pain Score       6           Vitals:    05/04/21 1019 05/04/21 1515 05/04/21 1520   BP: 123/76 131/63 131/63   Pulse: 95 84 82   Patient Position - Orthostatic VS: Sitting           Visual Acuity  Visual Acuity      Most Recent Value   L Pupil Size (mm)  3   R Pupil Size (mm)  3          ED Medications  Medications   lidocaine (LIDODERM) 5 % patch 1 patch (1 patch Topical Medication Applied 5/4/21 1122)   HYDROmorphone (DILAUDID) injection 0 2 mg (0 2 mg Intravenous Given 5/4/21 1121)   predniSONE tablet 40 mg (40 mg Oral Given 5/4/21 1122)       Diagnostic Studies  Results Reviewed     Procedure Component Value Units Date/Time    CBC and differential [[id number]]  (Abnormal) Collected: 05/04/21 1127    Lab Status: Final result Specimen: Blood from Arm, Right Updated: 05/04/21 1221     WBC 7 94 Thousand/uL      RBC 1 82 Million/uL      Hemoglobin 6 5 g/dL      Hematocrit 20 2 %       fL      MCH 35 7 pg      MCHC 32 2 g/dL      RDW 15 8 %      MPV 9 9 fL      Platelets 275 Thousands/uL      nRBC 0 /100 WBCs      Neutrophils Relative 90 %      Immat GRANS % 1 %      Lymphocytes Relative 1 %      Monocytes Relative 8 %      Eosinophils Relative 0 %      Basophils Relative 0 %      Neutrophils Absolute 7 15 Thousands/µL      Immature Grans Absolute 0 07 Thousand/uL      Lymphocytes Absolute 0 09 Thousands/µL      Monocytes Absolute 0 63 Thousand/µL      Eosinophils Absolute 0 00 Thousand/µL      Basophils Absolute 0 00 Thousands/µL     Narrative: This is an appended report  These results have been appended to a previously verified report  Comprehensive metabolic panel [[id number]]  (Abnormal) Collected: 05/04/21 1127    Lab Status: Final result Specimen: Blood from Arm, Right Updated: 05/04/21 1152     Sodium 137 mmol/L      Potassium 3 6 mmol/L      Chloride 101 mmol/L      CO2 28 mmol/L      ANION GAP 8 mmol/L      BUN 16 mg/dL      Creatinine 1 31 mg/dL      Glucose 95 mg/dL      Calcium 8 7 mg/dL      Corrected Calcium 9 4 mg/dL      AST 45 U/L      ALT 25 U/L      Alkaline Phosphatase 78 U/L      Total Protein 6 2 g/dL      Albumin 3 1 g/dL      Total Bilirubin 0 50 mg/dL      eGFR 41 ml/min/1 73sq m     Narrative:      [place] guidelines for Chronic Kidney Disease (CKD):     Stage 1 with normal or high GFR (GFR > 90 mL/min/1 73 square meters)    Stage 2 Mild CKD (GFR = 60-89 mL/min/1 73 square meters)    Stage 3A Moderate CKD (GFR = 45-59 mL/min/1 73 square meters)    Stage 3B Moderate CKD (GFR = 30-44 mL/min/1 73 square meters)    Stage 4 Severe CKD (GFR = 15-29 mL/min/1 73 square meters)    Stage 5 End Stage CKD (GFR <15 mL/min/1 73 square meters)  Note: GFR calculation is accurate only with a steady state creatinine                 CT lumbar spine without contrast   Final Result by Viraj Jonas DO (05/04 1152)      There is worsening dextroscoliosis of the lumbar spine and grade 1 anterior spondylolisthesis of L5 upon S1, compared to the prior CT of the lumbar spine dated 1/19/2018  Persistent multilevel degenerative disc disease within the lumbar spine including superiorly extruded disc herniation at L2-3 and left-sided degenerative disc disease with disc herniation at L4-5  Right greater than left foraminal narrowing at L5-S1 is    also stable        Large central calcification in the anterior epidural space at the T11-12 level resulting in moderate canal stenosis and distortion of the ventral aspect of the thecal sac is unchanged from prior examination  Heterogeneous bone density within the sacrum bilaterally appears slightly improved compared to the CT scans of the abdomen and pelvis from early 2020, suggestive of healing sacral insufficiency/stress fractures  If patient is able, MRI would help    evaluate any residual marrow changes within the sacrum  Workstation performed: [id number]                    Procedures  Procedures         ED Course  ED Course as of May 04 1525   Tue May 04, 2021   1220  Attempted to stand patient in the ER but she is still in too much pain to support her own weight will admit for pain control and transfusion                                SBIRT 20yo+      Most Recent Value   SBIRT (25 yo +)   In order to provide better care to our patients, we are screening all of our patients for alcohol and drug use  Would it be okay to ask you these screening questions?   No Filed at: 05/04/2021 1057                    [facility]  Number of Diagnoses or Management Options  Diagnosis management comments:  Low back pain most consistent with sciatica will treat symptomatically check CT scan to rule out cancerous metastatic spread       Amount and/or Complexity of Data Reviewed  Clinical lab tests: ordered  Tests in the radiology section of CPT®: ordered        Disposition  Final diagnoses:   Anemia   Sciatica   Ambulatory dysfunction     Time reflects when diagnosis was documented in both MDM as applicable and the Disposition within this note     Time User Action Codes Description Comment    5/4/2021 12:21 PM Lei Lucero [D64 9] Anemia     5/4/2021 12:21 PM Trude Hashimoto Add [M54 30] Sciatica     5/4/2021 12:21 PM Trude Hashimoto Add [R26 2] Ambulatory dysfunction       ED Disposition     ED Disposition Condition Date/Time Comment    Admit Stable Tue May 4, 2021 12:32 PM Case was discussed with *Dr Ivis Wilson** and the patient's admission status was agreed to be Admission Status: observation status to the service of Dr Jacalyn Baumgarten   Follow-up Information    None         Patient's Medications   Discharge Prescriptions    No medications on file     No discharge procedures on file      PDMP Review       Value Time User    PDMP Reviewed  Yes 4/26/2021  1:38 PM Bethany Ramirez, [address]           ED Provider  Electronically Signed by           Steve Constantino DO  05/04/21 7804

## 2021-05-05 VITALS
TEMPERATURE: 98.7 F | HEART RATE: 80 BPM | RESPIRATION RATE: 14 BRPM | BODY MASS INDEX: 23.81 KG/M2 | OXYGEN SATURATION: 96 % | WEIGHT: 126.1 LBS | HEIGHT: 61 IN | SYSTOLIC BLOOD PRESSURE: 119 MMHG | DIASTOLIC BLOOD PRESSURE: 59 MMHG

## 2021-05-05 PROBLEM — D64.9 ACUTE ON CHRONIC ANEMIA: Status: RESOLVED | Noted: 2020-10-14 | Resolved: 2021-05-05

## 2021-05-05 PROBLEM — M54.59 INTRACTABLE LOW BACK PAIN: Status: RESOLVED | Noted: 2021-05-04 | Resolved: 2021-05-05

## 2021-05-05 LAB
ABO GROUP BLD BPU: NORMAL
ABO GROUP BLD BPU: NORMAL
ANION GAP SERPL CALCULATED.3IONS-SCNC: 8 MMOL/L (ref 4–13)
BASOPHILS # BLD AUTO: 0 THOUSANDS/ΜL (ref 0–0.1)
BASOPHILS NFR BLD AUTO: 0 % (ref 0–1)
BPU ID: NORMAL
BPU ID: NORMAL
BUN SERPL-MCNC: 19 MG/DL (ref 5–25)
CALCIUM SERPL-MCNC: 8.3 MG/DL (ref 8.3–10.1)
CHLORIDE SERPL-SCNC: 104 MMOL/L (ref 100–108)
CO2 SERPL-SCNC: 26 MMOL/L (ref 21–32)
CREAT SERPL-MCNC: 1.18 MG/DL (ref 0.6–1.3)
CROSSMATCH: NORMAL
CROSSMATCH: NORMAL
EOSINOPHIL # BLD AUTO: 0 THOUSAND/ΜL (ref 0–0.61)
EOSINOPHIL NFR BLD AUTO: 0 % (ref 0–6)
ERYTHROCYTE [DISTWIDTH] IN BLOOD BY AUTOMATED COUNT: 21.8 % (ref 11.6–15.1)
GFR SERPL CREATININE-BSD FRML MDRD: 46 ML/MIN/1.73SQ M
GLUCOSE P FAST SERPL-MCNC: 114 MG/DL (ref 65–99)
GLUCOSE SERPL-MCNC: 114 MG/DL (ref 65–140)
GLUCOSE SERPL-MCNC: 114 MG/DL (ref 65–140)
HCT VFR BLD AUTO: 30.6 % (ref 34.8–46.1)
HGB BLD-MCNC: 10.2 G/DL (ref 11.5–15.4)
IMM GRANULOCYTES # BLD AUTO: 0.01 THOUSAND/UL (ref 0–0.2)
IMM GRANULOCYTES NFR BLD AUTO: 0 % (ref 0–2)
LYMPHOCYTES # BLD AUTO: 0.1 THOUSANDS/ΜL (ref 0.6–4.47)
LYMPHOCYTES NFR BLD AUTO: 2 % (ref 14–44)
MCH RBC QN AUTO: 33.1 PG (ref 26.8–34.3)
MCHC RBC AUTO-ENTMCNC: 33.3 G/DL (ref 31.4–37.4)
MCV RBC AUTO: 99 FL (ref 82–98)
MONOCYTES # BLD AUTO: 0.43 THOUSAND/ΜL (ref 0.17–1.22)
MONOCYTES NFR BLD AUTO: 7 % (ref 4–12)
NEUTROPHILS # BLD AUTO: 5.44 THOUSANDS/ΜL (ref 1.85–7.62)
NEUTS SEG NFR BLD AUTO: 91 % (ref 43–75)
PLATELET # BLD AUTO: 150 THOUSANDS/UL (ref 149–390)
PMV BLD AUTO: 10.1 FL (ref 8.9–12.7)
POTASSIUM SERPL-SCNC: 3.9 MMOL/L (ref 3.5–5.3)
RBC # BLD AUTO: 3.08 MILLION/UL (ref 3.81–5.12)
SODIUM SERPL-SCNC: 138 MMOL/L (ref 136–145)
UNIT DISPENSE STATUS: NORMAL
UNIT DISPENSE STATUS: NORMAL
UNIT PRODUCT CODE: NORMAL
UNIT PRODUCT CODE: NORMAL
UNIT RH: NORMAL
UNIT RH: NORMAL
WBC # BLD AUTO: 5.98 THOUSAND/UL (ref 4.31–10.16)

## 2021-05-05 PROCEDURE — 97163 PT EVAL HIGH COMPLEX 45 MIN: CPT

## 2021-05-05 PROCEDURE — 99217 PR OBSERVATION CARE DISCHARGE MANAGEMENT: CPT | Performed by: INTERNAL MEDICINE

## 2021-05-05 PROCEDURE — 82948 REAGENT STRIP/BLOOD GLUCOSE: CPT

## 2021-05-05 PROCEDURE — 80048 BASIC METABOLIC PNL TOTAL CA: CPT | Performed by: PHYSICIAN ASSISTANT

## 2021-05-05 PROCEDURE — 85025 COMPLETE CBC W/AUTO DIFF WBC: CPT | Performed by: PHYSICIAN ASSISTANT

## 2021-05-05 PROCEDURE — 97167 OT EVAL HIGH COMPLEX 60 MIN: CPT

## 2021-05-05 RX ORDER — PREDNISONE 20 MG/1
TABLET ORAL
Qty: 8 TABLET | Refills: 0 | Status: SHIPPED | OUTPATIENT
Start: 2021-05-06 | End: 2021-05-13 | Stop reason: SDUPTHER

## 2021-05-05 RX ADMIN — PANTOPRAZOLE SODIUM 40 MG: 40 TABLET, DELAYED RELEASE ORAL at 08:38

## 2021-05-05 RX ADMIN — TRAMADOL HYDROCHLORIDE 50 MG: 50 TABLET, FILM COATED ORAL at 10:19

## 2021-05-05 RX ADMIN — PREDNISONE 40 MG: 20 TABLET ORAL at 08:38

## 2021-05-05 RX ADMIN — ACETAMINOPHEN 975 MG: 325 TABLET, FILM COATED ORAL at 05:01

## 2021-05-05 RX ADMIN — HYDROXYCHLOROQUINE SULFATE 200 MG: 200 TABLET, FILM COATED ORAL at 05:03

## 2021-05-05 RX ADMIN — METOPROLOL SUCCINATE 12.5 MG: 25 TABLET, FILM COATED, EXTENDED RELEASE ORAL at 08:37

## 2021-05-05 RX ADMIN — FERROUS SULFATE TAB 325 MG (65 MG ELEMENTAL FE) 325 MG: 325 (65 FE) TAB at 07:34

## 2021-05-05 RX ADMIN — ACETAMINOPHEN 975 MG: 325 TABLET, FILM COATED ORAL at 13:14

## 2021-05-05 RX ADMIN — FUROSEMIDE 20 MG: 20 TABLET ORAL at 08:38

## 2021-05-05 NOTE — ASSESSMENT & PLAN NOTE
· Patient presented to the emergency department with acute on chronic low back pain; patient feels she may have over exerted herself a couple of days ago  · Follows with pain management as an outpatient, took Tylenol and tramadol without relief  Described as her typical sciatic pain radiating down the left leg  Denies any direct trauma  · CT lumbar spine: "There is worsening dextroscoliosis of the lumbar spine and grade 1 anterior spondylolisthesis of L5 upon S1, compared to the prior CT of the lumbar spine dated 1/19/2018  Persistent multilevel degenerative disc disease within the lumbar spine including superiorly extruded disc herniation at L2-3 and left-sided degenerative disc disease with disc herniation at L4-5  Right greater than left foraminal narrowing at L5-S1 is also stable  Large central calcification in the anterior epidural space at the T11-12 level resulting in moderate canal stenosis and distortion of the ventral aspect of the thecal sac is unchanged from prior examination  Heterogeneous bone density within the sacrum bilaterally appears slightly improved compared to the CT scans of the abdomen and pelvis from early 2020, suggestive of healing sacral insufficiency/stress fractures  If patient is able, MRI would help evaluate any residual marrow changes within the sacrum "  · Patient did note pain relief in the ER - had received lidocaine patch, IV Dilaudid, prednisone  · Continue scheduled Tylenol, prednisone  Can continue tramadol as needed  Reports allergy to codeine  · PT/OT consult - patient is stable for discharge home with family support  · Notes improvement in her back pain    Recommend close outpatient follow-up with pain management

## 2021-05-05 NOTE — OCCUPATIONAL THERAPY NOTE
Occupational Therapy Evaluation     Patient Name: Asher Thornton  TFSTG'E Date: 2021  Problem List  Active Problems:    GIST (gastrointestinal stromal tumor), malignant ([facility] [address] )    Stage 3 chronic kidney disease ([facility] )    Past Medical History  Past Medical History:   Diagnosis Date    ADHD     Anemia     Anxiety     Arthritis     Asthma     [facility]rial fibrillation [facility]     Breast cancer ([facility] )     Cancer ([facility] )     Chronic iron deficiency anemia 2020    Extensive GI evaluation over the last year including multiple EGD, colonoscopy, and capsule endoscopy  Has had gastric AVMs cauterized, Dieulafoy's lesion clipped, and most recently a Seb erosion cauterized    Coronary artery disease     Depression     GERD (gastroesophageal reflux disease)     History of stomach ulcers     Hypercholesterolemia     Hypertension     Kidney disease     Metastatic cancer ([facility] )      Past Surgical History  Past Surgical History:   Procedure Laterality Date    ANKLE SURGERY      APPENDECTOMY      BREAST SURGERY      CATARACT EXTRACTION       SECTION      COLONOSCOPY  2019    Multiple adenomatous colon polyps and internal hemorrhoids  Three year recall advised    HIP SURGERY      JOINT REPLACEMENT  2019    Left knee replacement    LAMINECTOMY      ROTATOR CUFF REPAIR      SIGMOID RESECTION / RECTOPEXY      SINUS SURGERY      UPPER GASTROINTESTINAL ENDOSCOPY  2020    Dieulafoy's lesion in proximal stomach which was actively oozing  Injected with epinephrine and 2 Endoclips placed with control of bleeding, hiatal hernia      UPPER GASTROINTESTINAL ENDOSCOPY  2020    Bleeding Seb's erosion status post cauterization           21 1014   OT Last Visit   OT Visit Date 21   Note Type   Note type Evaluation   Restrictions/Precautions   Weight Bearing Precautions Per Order No   Other Precautions Pain      Pain Assessment   Pain Score 1   Pain Location/Orientation Location: Back   Home Living   Type of [address] [place] [address] Two level;Stairs to enter with rails   Bathroom Shower/Tub Tub/shower unit   Bathroom Toilet Standard   Bathroom Equipment Shower chair;Commode   [address] Walker;Cane  (Typically relies on cane)   Prior Function   Level of Bath Independent with ADLs and functional mobility   Lives With Spouse   Receives Help From Family   ADL Assistance Independent   IADLs Independent   Falls in the last 6 months 0   Vocational Retired   Subjective   Subjective Pt received in semisupine position  Pt agreeable to session  ADL   Eating Assistance 7  Independent   Grooming Assistance 7  Independent   UB Bathing Assistance 5  Supervision/Setup   LB Bathing Assistance 4  Minimal Assistance   UB Dressing Assistance 5  Supervision/Setup   LB Dressing Assistance 4  Minimal [address]  4  Minimal Assistance   Bed Mobility   Supine to Sit 5  Supervision   Additional items Verbal cues   Additional Comments via long rolling  Pt remained seated in recliner by end of session      Transfers   Sit to Stand 5  Supervision   Stand to Sit 5  Supervision   Stand pivot 5  Supervision   Additional items   (RW)   Functional Mobility   Functional Mobility 5  Supervision   Additional Comments RW   Balance   Static Sitting Good   Dynamic Sitting Fair +   Static Standing Fair +   Dynamic Standing Fair +   Activity Tolerance   Activity Tolerance Patient limited by pain   Medical Staff Made Aware PT Carla   Nurse Made Aware RN Joanna   RULYNDSEY Assessment   RUE Assessment WFL   LUE Assessment   LUE Assessment WFL   Cognition   Overall Cognitive Status WFL   Arousal/Participation Alert   Attention Within functional limits   Orientation Level Oriented X4   Memory Within functional limits   Following Commands Follows all commands and directions without difficulty   Assessment   Limitation Decreased high-level ADLs   Assessment Pt is nick 65 y o  female seen for OT evaluation at [facility], admitted 5/4/2021 w/ Intractable low back pain  OT completed extensive review of pt's medical and social history  Comorbidities affecting pt's functional performance at time of assessment include: sciatica, ambulatory dysfunction, anemia, DJD, metastatic cancer, etc (see chart for additional hx)  Personal factors affecting pt at time of IE include:steps to enter environment, difficulty performing ADLS and difficulty performing IADLS   Pt with active OT orders  Prior to admission, pt was living with spouse in house with steps to manage  Pt was I w/  ADLS & required use of cane PTA  Upon evaluation: Pt requires supervision for bed mobility, supervision for functional mobility/transfers, supervision for UB ADLs and min A for LB ADLS 2* the following deficits impacting occupational performance: decreased balance and increased pain  Pt to benefit from continued skilled OT tx while in the hospital to address deficits as defined above and maximize level of functional independence w ADL's and functional mobility  Occupational Performance areas to address include: bathing/shower, toilet hygiene, dressing and functional mobility  Based on findings, pt is of high complexity  The patient's raw score on the AM-PAC Daily Activity inpatient short form is 21, standardized score is 44 27, greater than 39 4  Patients at this level are likely to benefit from DC to home  Please refer to the recommendation of the Occupational Therapist for safe DC planning  At this time, OT recommendations at time of discharge are home with family support  Also recommended use of long handled equipment for LB ADLs  Goals   Patient Goals Pt wishes for back pain to improve   Plan   Treatment Interventions ADL retraining;Functional transfer training;Patient/family training; Compensatory technique education;Equipment evaluation/education   Goal Expiration Date 05/15/21   OT Treatment Day 0 OT Frequency 2-3x/wk   Recommendation   OT Discharge Recommendation No rehabilitation needs   Equipment Recommended Reacher ($)  (Pt aware how to purchase)   AM-PAC Daily Activity Inpatient   Lower Body Dressing 3   Bathing 3   Toileting 3   Upper Body Dressing 4   Grooming 4   Eating 4   Daily Activity Raw Score 21   Daily Activity Standardized Score (Calc for Raw Score >=11) 44 27   AM-PAC Applied Cognition Inpatient   Following a Speech/Presentation 4   Understanding Ordinary Conversation 4   Taking Medications 4   Remembering Where Things Are Placed or Put Away 4   Remembering List of 4-5 Errands 4   Taking Care of Complicated Tasks 4   Applied Cognition Raw Score 24   Applied Cognition Standardized Score 62 21         Pt will achieve the following goals within 10 days  *Pt will complete UB bathing and dressing with mod I     *Pt will complete LB bathing and dressing with mod I      * Pt will complete toileting w/ mod I w/ G hygiene/thoroughness using DME PRN    *Pt will complete bed mobility with mod I, with bed flat and no side rail to prep for purposeful tasks    *Pt will perform functional transfers with on/off all surfaces with mod UI using DME as needed w/ G balance/safety  *Pt will increase standing tolerance to 5 minutes in order to complete sinkside ADL task  *Pt will improve functional mobility during ADL/IADL/leisure tasks to mod I using DME as needed w/ G balance/safety         Renny Campbell, OTR/L

## 2021-05-05 NOTE — PLAN OF CARE
Problem: Potential for Falls  Goal: Patient will remain free of falls  Description: INTERVENTIONS:  - Assess patient frequently for physical needs  -  Identify cognitive and physical deficits and behaviors that affect risk of falls    -  [place] fall precautions as indicated by assessment   - Educate patient/family on patient safety including physical limitations  - Instruct patient to call for assistance with activity based on assessment  - Modify environment to reduce risk of injury  - Consider OT/PT consult to assist with strengthening/mobility  5/5/2021 1246 by Hugo Sung RN  Outcome: Adequate for Discharge  5/5/2021 0740 by Hugo Sung RN  Outcome: Progressing     Problem: PAIN - ADULT  Goal: Verbalizes/displays adequate comfort level or baseline comfort level  Description: Interventions:  - Encourage patient to monitor pain and request assistance  - Assess pain using appropriate pain scale  - Administer analgesics based on type and severity of pain and evaluate response  - Implement non-pharmacological measures as appropriate and evaluate response  - Consider cultural and social influences on pain and pain management  - Notify physician/advanced practitioner if interventions unsuccessful or patient reports new pain  5/5/2021 1246 by Hugo Sung RN  Outcome: Adequate for Discharge  5/5/2021 0740 by Hugo Sung RN  Outcome: Progressing     Problem: INFECTION - ADULT  Goal: Absence or prevention of progression during hospitalization  Description: INTERVENTIONS:  - Assess and monitor for signs and symptoms of infection  - Monitor lab/diagnostic results  - Monitor all insertion sites, i e  indwelling lines, tubes, and drains  - Monitor endotracheal if appropriate and nasal secretions for changes in amount and color  - [place] appropriate cooling/warming therapies per order  - Administer medications as ordered  - Instruct and encourage patient and family to use good hand hygiene technique  - Identify and instruct in appropriate isolation precautions for identified infection/condition  5/5/2021 1246 by Zheng Belle RN  Outcome: Adequate for Discharge  5/5/2021 0740 by Zheng Belle RN  Outcome: Progressing  Goal: Absence of fever/infection during neutropenic period  Description: INTERVENTIONS:  - Monitor WBC    5/5/2021 1246 by Zheng Belle RN  Outcome: Adequate for Discharge  5/5/2021 0740 by Zheng Belle RN  Outcome: Progressing     Problem: SAFETY ADULT  Goal: Patient will remain free of falls  Description: INTERVENTIONS:  - Assess patient frequently for physical needs  -  Identify cognitive and physical deficits and behaviors that affect risk of falls    -  [place] fall precautions as indicated by assessment   - Educate patient/family on patient safety including physical limitations  - Instruct patient to call for assistance with activity based on assessment  - Modify environment to reduce risk of injury  - Consider OT/PT consult to assist with strengthening/mobility  5/5/2021 1246 by Zheng Belle RN  Outcome: Adequate for Discharge  5/5/2021 0740 by Zheng Belle RN  Outcome: Progressing  Goal: Maintain or return to baseline ADL function  Description: INTERVENTIONS:  -  Assess patient's ability to carry out ADLs; assess patient's baseline for ADL function and identify physical deficits which impact ability to perform ADLs (bathing, care of mouth/teeth, toileting, grooming, dressing, etc )  - Assess/evaluate cause of self-care deficits   - Assess range of motion  - Assess patient's mobility; develop plan if impaired  - Assess patient's need for assistive devices and provide as appropriate  - Encourage maximum independence but intervene and supervise when necessary  - Involve family in performance of ADLs  - Assess for home care needs following discharge   - Consider OT consult to assist with ADL evaluation and planning for discharge  - Provide patient education as appropriate  5/5/2021 1246 by Maria Alejandra Franks Sandro Posada RN  Outcome: Adequate for Discharge  5/5/2021 0740 by Alesha Malhotra RN  Outcome: Progressing  Goal: Maintain or return mobility status to optimal level  Description: INTERVENTIONS:  - Assess patient's baseline mobility status (ambulation, transfers, stairs, etc )    - Identify cognitive and physical deficits and behaviors that affect mobility  - Identify mobility aids required to assist with transfers and/or ambulation (gait belt, sit-to-stand, lift, walker, cane, etc )  - [place] fall precautions as indicated by assessment  - Record patient progress and toleration of activity level on Mobility SBAR; progress patient to next Phase/Stage  - Instruct patient to call for assistance with activity based on assessment  - Consider rehabilitation consult to assist with strengthening/weightbearing, etc   5/5/2021 1246 by Alesha Malhotra RN  Outcome: Adequate for Discharge  5/5/2021 0740 by Alesha Malhotra RN  Outcome: Progressing     Problem: DISCHARGE PLANNING  Goal: Discharge to home or other facility with appropriate resources  Description: INTERVENTIONS:  - Identify barriers to discharge w/patient and caregiver  - Arrange for needed discharge resources and transportation as appropriate  - Identify discharge learning needs (meds, wound care, etc )  - Arrange for interpretive services to assist at discharge as needed  - Refer to Case Management Department for coordinating discharge planning if the patient needs post-hospital services based on physician/advanced practitioner order or complex needs related to functional status, cognitive ability, or social support system  5/5/2021 1246 by Alesha Malhotra RN  Outcome: Adequate for Discharge  5/5/2021 0740 by Alesha Malhotra RN  Outcome: Progressing     Problem: Knowledge Deficit  Goal: Patient/family/caregiver demonstrates understanding of disease process, treatment plan, medications, and discharge instructions  Description: Complete learning assessment and assess knowledge base   Interventions:  - Provide teaching at level of understanding  - Provide teaching via preferred learning methods  5/5/2021 1246 by Ward Hedrick RN  Outcome: Adequate for Discharge  5/5/2021 0740 by Ward Hedrick RN  Outcome: Progressing

## 2021-05-05 NOTE — UTILIZATION REVIEW
Initial Clinical Review    Admission: Date/Time/Statement:   Admission Orders (From admission, onward)     Ordered        05/04/21 1236  Place in Observation  Once                   Orders Placed This Encounter   Procedures    Place in Observation     Standing Status:   Standing     Number of Occurrences:   1     Order Specific Question:   Level of Care     Answer:   Med Surg [16]     ED Arrival Information     Expected Arrival Acuity Means of Arrival Escorted By Service Admission Type    - 5/4/2021 10:14 [facility]) General Medicine Urgent    Arrival Complaint    chronic back pain        Chief Complaint   Patient presents with    Back Pain     patient presents to the ED with c/o back pain into left leg, states she think she over did it this weekend and aggrivated her sciatic nerve        Initial Presentation:   64 yo female,  To ER via EMS,  Admitted OBS status, ms level of care for management of   Acute on chronic low back pain     Pt follows w/pain mgt as OP:  Took tylenol and tramadol at home w/o relief  Described as her typical sciatic pain radiating down the left leg  CT Lumbar spine  Indicated worsening dextroscoliosis  And spondylolishtesis  H/o chronic anemia - receives regular transfusions as OP  Admit hgb 6 5   Baseline crt 1 4-1 5; today is 1 31 (stable)      Will  Give 2 U PRBC,  cont scheduled tylenol, prednisone, tramadol prn:  Order PT/OT evals    Repeat CBC in AM       Date:  5/5    Day 2:        ED Triage Vitals [05/04/21 1019]   Temperature Pulse Respirations Blood Pressure SpO2   98 °F (36 7 °C) 95 20 123/76 98 %      Temp Source Heart Rate Source Patient Position - Orthostatic VS BP Location FiO2 (%)   Temporal Monitor Sitting Right arm --      Pain Score       6          Wt Readings from Last 1 Encounters:   05/04/21 57 2 kg (126 lb 1 7 oz)     Additional Vital Signs:   05/04/21 1808  97 °F (36 1 °C)Abnormal   83  18  123/65  --  --  --  --   05/04/21 1715  99 °F (37 2 °C)  80  20  123/65  --  98 %  None (Room air)         Pertinent Labs/Diagnostic Test Results:   Cxr, ekg  None    5/4  CT L Spine: There is worsening dextroscoliosis of the lumbar spine and grade 1 anterior spondylolisthesis of L5 upon S1, compared to the prior CT of the lumbar spine dated 1/19/2018  Persistent multilevel degenerative disc disease within the lumbar spine including superiorly extruded disc herniation at L2-3 and left-sided degenerative disc disease with disc herniation at L4-5   Right greater than left foraminal narrowing at L5-S1 is   also stable  Large central calcification in the anterior epidural space at the T11-12 level resulting in moderate canal stenosis and distortion of the ventral aspect of the thecal sac is unchanged from prior examination  Heterogeneous bone density within the sacrum bilaterally appears slightly improved compared to the CT scans of the abdomen and pelvis from early 2020, suggestive of healing sacral insufficiency/stress fractures   If patient is able, MRI would help   evaluate any residual marrow changes within the sacrum             Results from last 7 days   Lab Units 05/05/21  0459 05/04/21  2326 05/04/21  1127   WBC Thousand/uL 5 98  --  7 94   HEMOGLOBIN g/dL 10 2* 9 8* 6 5*   HEMATOCRIT % 30 6* 29 1* 20 2*   PLATELETS Thousands/uL 150  --  155   NEUTROS ABS Thousands/µL 5 44  --  7 15         Results from last 7 days   Lab Units 05/05/21  0459 05/04/21  1127   SODIUM mmol/L 138 137   POTASSIUM mmol/L 3 9 3 6   CHLORIDE mmol/L 104 101   CO2 mmol/L 26 28   ANION GAP mmol/L 8 8   BUN mg/dL 19 16   CREATININE mg/dL 1 18 1 31*   EGFR ml/min/1 73sq m 46 41   CALCIUM mg/dL 8 3 8 7     Results from last 7 days   Lab Units 05/04/21  1127   AST U/L 45   ALT U/L 25   ALK PHOS U/L 78   TOTAL PROTEIN g/dL 6 2*   ALBUMIN g/dL 3 1*   TOTAL BILIRUBIN mg/dL 0 50         Results from last 7 days   Lab Units 05/05/21  0459 05/04/21  1127   GLUCOSE RANDOM mg/dL 114 95   ED Treatment:   Medication Administration from 05/04/2021 1014 to 05/04/2021 1946       Date/Time Order Dose Route Action     05/04/2021 1121 HYDROmorphone (DILAUDID) injection 0 2 mg 0 2 mg Intravenous Given     05/04/2021 1122 predniSONE tablet 40 mg 40 mg Oral Given     05/04/2021 1122 lidocaine (LIDODERM) 5 % patch 1 patch 1 patch Topical Medication Applied     05/04/2021 1741 pantoprazole (PROTONIX) EC tablet 40 mg 40 mg Oral Given     05/04/2021 1646 traMADol (ULTRAM) tablet 50 mg 50 mg Oral Given     05/04/2021 1804 ferrous sulfate tablet 325 mg 325 mg Oral Given     05/04/2021 1741 acetaminophen (TYLENOL) tablet 975 mg 975 mg Oral Given        Past Medical History:   Diagnosis Date    ADHD     Anemia     Anxiety     Arthritis     Asthma     Atrial fibrillation (HCC)     Breast cancer ([facility] )     Cancer ([place] [address] )     Chronic iron deficiency anemia 6/9/2020    Extensive GI evaluation over the last year including multiple EGD, colonoscopy, and capsule endoscopy    Has had gastric AVMs cauterized, Dieulafoy's lesion clipped, and most recently a [place] erosion cauterized    Coronary artery disease     Depression     GERD (gastroesophageal reflux disease)     History of stomach ulcers     Hypercholesterolemia     Hypertension     Kidney disease     Metastatic cancer ([facility] )      Present on Admission:   Intractable low back pain   GIST (gastrointestinal stromal tumor), malignant (HCC)   Stage 3 chronic kidney disease (HCC)      Admitting Diagnosis: Sciatica [M54 30]  Back pain [M54 9]  Anemia [D64 9]  Ambulatory dysfunction [R26 2]  Age/Sex: 65 y o  female  Admission Orders:   PT/OT;  Transfuse 2 U PRBC;  VS q 3 hr x2 then routine    Scheduled Medications:  acetaminophen, 975 mg, Oral, Q8H [address]  Avapritinib, 200 mg, Oral, Daily  ferrous sulfate, 325 mg, Oral, BID With Meals  furosemide, 20 mg, Oral, Daily  hydroxychloroquine, 200 mg, Oral, Early Morning  metoprolol succinate, 12 5 mg, Oral, Daily  pantoprazole, 40 mg, Oral, BID  predniSONE, 40 mg, Oral, Daily      Continuous IV Infusions:     PRN Meds:  traMADol, 50 mg, Oral, Q8H PRN        Network Utilization Review Department  ATTENTION: Please call with any questions or concerns to [phone] and carefully listen to the prompts so that you are directed to the right person  All voicemails are confidential   Kin Schmitt all requests for admission clinical reviews, approved or denied determinations and any other requests to dedicated fax number below belonging to the campus where the patient is receiving treatment   List of dedicated fax numbers for the Facilities:  [address] DENIALS (Administrative/Medical Necessity) [phone]   [address] (Maternity/NICU/Pediatrics) [phone]   [address] Holden Gee 9361 [address] [place] [address] [address] [address] [place] [address] [place], [place] [address] [phone]

## 2021-05-05 NOTE — ASSESSMENT & PLAN NOTE
· Acute on chronic anemia, patient receives regular transfusions as outpatient  · Currently receiving 2 units PRBCs at time of admission  · Hemoglobin 6 5 on admit, repeat H/H 10 2 s/p transfusions  · Repeat CBC in 1 week  · Outpatient follow-up with Hematology

## 2021-05-05 NOTE — PLAN OF CARE
Problem: Potential for Falls  Goal: Patient will remain free of falls  Description: INTERVENTIONS:  - Assess patient frequently for physical needs  -  Identify cognitive and physical deficits and behaviors that affect risk of falls    -  [place] fall precautions as indicated by assessment   - Educate patient/family on patient safety including physical limitations  - Instruct patient to call for assistance with activity based on assessment  - Modify environment to reduce risk of injury  - Consider OT/PT consult to assist with strengthening/mobility  Outcome: Progressing     Problem: PAIN - ADULT  Goal: Verbalizes/displays adequate comfort level or baseline comfort level  Description: Interventions:  - Encourage patient to monitor pain and request assistance  - Assess pain using appropriate pain scale  - Administer analgesics based on type and severity of pain and evaluate response  - Implement non-pharmacological measures as appropriate and evaluate response  - Consider cultural and social influences on pain and pain management  - Notify physician/advanced practitioner if interventions unsuccessful or patient reports new pain  Outcome: Progressing     Problem: INFECTION - ADULT  Goal: Absence or prevention of progression during hospitalization  Description: INTERVENTIONS:  - Assess and monitor for signs and symptoms of infection  - Monitor lab/diagnostic results  - Monitor all insertion sites, i e  indwelling lines, tubes, and drains  - Monitor endotracheal if appropriate and nasal secretions for changes in amount and color  - [place] appropriate cooling/warming therapies per order  - Administer medications as ordered  - Instruct and encourage patient and family to use good hand hygiene technique  - Identify and instruct in appropriate isolation precautions for identified infection/condition  Outcome: Progressing  Goal: Absence of fever/infection during neutropenic period  Description: INTERVENTIONS:  - Monitor WBC    Outcome: Progressing     Problem: SAFETY ADULT  Goal: Patient will remain free of falls  Description: INTERVENTIONS:  - Assess patient frequently for physical needs  -  Identify cognitive and physical deficits and behaviors that affect risk of falls    -  [place] fall precautions as indicated by assessment   - Educate patient/family on patient safety including physical limitations  - Instruct patient to call for assistance with activity based on assessment  - Modify environment to reduce risk of injury  - Consider OT/PT consult to assist with strengthening/mobility  Outcome: Progressing  Goal: Maintain or return to baseline ADL function  Description: INTERVENTIONS:  -  Assess patient's ability to carry out ADLs; assess patient's baseline for ADL function and identify physical deficits which impact ability to perform ADLs (bathing, care of mouth/teeth, toileting, grooming, dressing, etc )  - Assess/evaluate cause of self-care deficits   - Assess range of motion  - Assess patient's mobility; develop plan if impaired  - Assess patient's need for assistive devices and provide as appropriate  - Encourage maximum independence but intervene and supervise when necessary  - Involve family in performance of ADLs  - Assess for home care needs following discharge   - Consider OT consult to assist with ADL evaluation and planning for discharge  - Provide patient education as appropriate  Outcome: Progressing  Goal: Maintain or return mobility status to optimal level  Description: INTERVENTIONS:  - Assess patient's baseline mobility status (ambulation, transfers, stairs, etc )    - Identify cognitive and physical deficits and behaviors that affect mobility  - Identify mobility aids required to assist with transfers and/or ambulation (gait belt, sit-to-stand, lift, walker, cane, etc )  - [place] fall precautions as indicated by assessment  - Record patient progress and toleration of activity level on Mobility SBAR; progress patient to next Phase/Stage  - Instruct patient to call for assistance with activity based on assessment  - Consider rehabilitation consult to assist with strengthening/weightbearing, etc   Outcome: Progressing     Problem: DISCHARGE PLANNING  Goal: Discharge to home or other facility with appropriate resources  Description: INTERVENTIONS:  - Identify barriers to discharge w/patient and caregiver  - Arrange for needed discharge resources and transportation as appropriate  - Identify discharge learning needs (meds, wound care, etc )  - Arrange for interpretive services to assist at discharge as needed  - Refer to Case Management Department for coordinating discharge planning if the patient needs post-hospital services based on physician/advanced practitioner order or complex needs related to functional status, cognitive ability, or social support system  Outcome: Progressing     Problem: Knowledge Deficit  Goal: Patient/family/caregiver demonstrates understanding of disease process, treatment plan, medications, and discharge instructions  Description: Complete learning assessment and assess knowledge base    Interventions:  - Provide teaching at level of understanding  - Provide teaching via preferred learning methods  Outcome: Progressing

## 2021-05-05 NOTE — PLAN OF CARE
Problem: OCCUPATIONAL THERAPY ADULT  Goal: Performs self-care activities at highest level of function for planned discharge setting  See evaluation for individualized goals  Description: Treatment Interventions: ADL retraining, Functional transfer training, Patient/family training, Compensatory technique education, Equipment evaluation/education  Equipment Recommended: [employer] ($)(Pt aware how to purchase)       See flowsheet documentation for full assessment, interventions and recommendations  Note: Limitation: Decreased high-level ADLs     Assessment: Pt is a 65 y o  female seen for OT evaluation at [address] [place] [address], admitted 5/4/2021 w/ Intractable low back pain  OT completed extensive review of pt's medical and social history  Comorbidities affecting pt's functional performance at time of assessment include: sciatica, ambulatory dysfunction, anemia, DJD, metastatic cancer, etc (see chart for additional hx)  Personal factors affecting pt at time of IE include:steps to enter environment, difficulty performing ADLS and difficulty performing IADLS   Pt with active OT orders  Prior to admission, pt was living with spouse in house with steps to manage  Pt was I w/  ADLS & required use of cane PTA  Upon evaluation: Pt requires supervision for bed mobility, supervision for functional mobility/transfers, supervision for UB ADLs and min A for LB ADLS 2* the following deficits impacting occupational performance: decreased balance and increased pain  Pt to benefit from continued skilled OT tx while in the hospital to address deficits as defined above and maximize level of functional independence w ADL's and functional mobility  Occupational Performance areas to address include: bathing/shower, toilet hygiene, dressing and functional mobility  Based on findings, pt is of high complexity   The patient's raw score on the AM-PAC Daily Activity inpatient short form is 21, standardized score is 44 27, greater than 39 4  Patients at this level are likely to benefit from DC to home  Please refer to the recommendation of the Occupational Therapist for safe DC planning  At this time, OT recommendations at time of discharge are home with family support  Also recommended use of long handled equipment for LB ADLs        OT Discharge Recommendation: No rehabilitation needs

## 2021-05-05 NOTE — PLAN OF CARE
Problem: Potential for Falls  Goal: Patient will remain free of falls  Description: INTERVENTIONS:  - Assess patient frequently for physical needs  -  Identify cognitive and physical deficits and behaviors that affect risk of falls    -  [place] fall precautions as indicated by assessment   - Educate patient/family on patient safety including physical limitations  - Instruct patient to call for assistance with activity based on assessment  - Modify environment to reduce risk of injury  - Consider OT/PT consult to assist with strengthening/mobility  Outcome: Progressing     Problem: PAIN - ADULT  Goal: Verbalizes/displays adequate comfort level or baseline comfort level  Description: Interventions:  - Encourage patient to monitor pain and request assistance  - Assess pain using appropriate pain scale  - Administer analgesics based on type and severity of pain and evaluate response  - Implement non-pharmacological measures as appropriate and evaluate response  - Consider cultural and social influences on pain and pain management  - Notify physician/advanced practitioner if interventions unsuccessful or patient reports new pain  Outcome: Progressing     Problem: INFECTION - ADULT  Goal: Absence or prevention of progression during hospitalization  Description: INTERVENTIONS:  - Assess and monitor for signs and symptoms of infection  - Monitor lab/diagnostic results  - Monitor all insertion sites, i e  indwelling lines, tubes, and drains  - Monitor endotracheal if appropriate and nasal secretions for changes in amount and color  - [place] appropriate cooling/warming therapies per order  - Administer medications as ordered  - Instruct and encourage patient and family to use good hand hygiene technique  - Identify and instruct in appropriate isolation precautions for identified infection/condition  Outcome: Progressing  Goal: Absence of fever/infection during neutropenic period  Description: INTERVENTIONS:  - Monitor WBC    Outcome: Progressing     Problem: SAFETY ADULT  Goal: Patient will remain free of falls  Description: INTERVENTIONS:  - Assess patient frequently for physical needs  -  Identify cognitive and physical deficits and behaviors that affect risk of falls    -  [place] fall precautions as indicated by assessment   - Educate patient/family on patient safety including physical limitations  - Instruct patient to call for assistance with activity based on assessment  - Modify environment to reduce risk of injury  - Consider OT/PT consult to assist with strengthening/mobility  Outcome: Progressing  Goal: Maintain or return to baseline ADL function  Description: INTERVENTIONS:  -  Assess patient's ability to carry out ADLs; assess patient's baseline for ADL function and identify physical deficits which impact ability to perform ADLs (bathing, care of mouth/teeth, toileting, grooming, dressing, etc )  - Assess/evaluate cause of self-care deficits   - Assess range of motion  - Assess patient's mobility; develop plan if impaired  - Assess patient's need for assistive devices and provide as appropriate  - Encourage maximum independence but intervene and supervise when necessary  - Involve family in performance of ADLs  - Assess for home care needs following discharge   - Consider OT consult to assist with ADL evaluation and planning for discharge  - Provide patient education as appropriate  Outcome: Progressing  Goal: Maintain or return mobility status to optimal level  Description: INTERVENTIONS:  - Assess patient's baseline mobility status (ambulation, transfers, stairs, etc )    - Identify cognitive and physical deficits and behaviors that affect mobility  - Identify mobility aids required to assist with transfers and/or ambulation (gait belt, sit-to-stand, lift, walker, cane, etc )  - [place] fall precautions as indicated by assessment  - Record patient progress and toleration of activity level on Mobility SBAR; progress patient to next Phase/Stage  - Instruct patient to call for assistance with activity based on assessment  - Consider rehabilitation consult to assist with strengthening/weightbearing, etc   Outcome: Progressing     Problem: DISCHARGE PLANNING  Goal: Discharge to home or other facility with appropriate resources  Description: INTERVENTIONS:  - Identify barriers to discharge w/patient and caregiver  - Arrange for needed discharge resources and transportation as appropriate  - Identify discharge learning needs (meds, wound care, etc )  - Arrange for interpretive services to assist at discharge as needed  - Refer to Case Management Department for coordinating discharge planning if the patient needs post-hospital services based on physician/advanced practitioner order or complex needs related to functional status, cognitive ability, or social support system  Outcome: Progressing     Problem: Knowledge Deficit  Goal: Patient/family/caregiver demonstrates understanding of disease process, treatment plan, medications, and discharge instructions  Description: Complete learning assessment and assess knowledge base    Interventions:  - Provide teaching at level of understanding  - Provide teaching via preferred learning methods  Outcome: Progressing

## 2021-05-05 NOTE — ASSESSMENT & PLAN NOTE
Lab Results   Component Value Date    EGFR 46 05/05/2021    EGFR 41 05/04/2021    EGFR 33 04/12/2021    CREATININE 1 18 05/05/2021    CREATININE 1 31 (H) 05/04/2021    CREATININE 1 56 (H) 04/12/2021   · Baseline creatinine 1 4-1 5  · Creatinine stable at 1 18

## 2021-05-05 NOTE — DISCHARGE SUMMARY
[facility]     Discharge- Cali Grant 1950, 65 y o  female MRN: [id number]  Unit/Bed#: -01 Encounter: [id number]  Primary Care Provider: Howard Guzman MD   Date and time admitted to hospital: 5/4/2021 10:15 AM    * Intractable low back pain-resolved as of 5/5/2021  Assessment & Plan  · Patient presented to the emergency department with acute on chronic low back pain; patient feels she may have over exerted herself a couple of days ago  · Follows with pain management as an outpatient, took Tylenol and tramadol without relief  Described as her typical sciatic pain radiating down the left leg  Denies any direct trauma  · CT lumbar spine: "There is worsening dextroscoliosis of the lumbar spine and grade 1 anterior spondylolisthesis of L5 upon S1, compared to the prior CT of the lumbar spine dated 1/19/2018  Persistent multilevel degenerative disc disease within the lumbar spine including superiorly extruded disc herniation at L2-3 and left-sided degenerative disc disease with disc herniation at L4-5  Right greater than left foraminal narrowing at L5-S1 is also stable  Large central calcification in the anterior epidural space at the T11-12 level resulting in moderate canal stenosis and distortion of the ventral aspect of the thecal sac is unchanged from prior examination  Heterogeneous bone density within the sacrum bilaterally appears slightly improved compared to the CT scans of the abdomen and pelvis from early 2020, suggestive of healing sacral insufficiency/stress fractures  If patient is able, MRI would help evaluate any residual marrow changes within the sacrum "  · Patient did note pain relief in the ER - had received lidocaine patch, IV Dilaudid, prednisone  · Continue scheduled Tylenol, prednisone  Can continue tramadol as needed    Reports allergy to codeine  · PT/OT consult - patient is stable for discharge home with family support  · Notes improvement in her back pain  Recommend close outpatient follow-up with pain management    Stage 3 chronic kidney disease [facility])  Assessment & Plan  Lab Results   Component Value Date    EGFR 46 05/05/2021    EGFR 41 05/04/2021    EGFR 33 04/12/2021    CREATININE 1 18 05/05/2021    CREATININE 1 31 (H) 05/04/2021    CREATININE 1 56 (H) 04/12/2021   · Baseline creatinine 1 4-1 5  · Creatinine stable at 1 18    GIST (gastrointestinal stromal tumor), malignant ([facility] [address] )  Assessment & Plan  · [facility]  · Continue outpatient follow-up with Oncology  · Currently on ayvakit    Acute on chronic anemia-resolved as of 5/5/2021  Assessment & Plan  · Acute on chronic anemia, patient receives regular transfusions as outpatient  · Currently receiving 2 units PRBCs at time of admission  · Hemoglobin 6 5 on admit, repeat H/H 10 2 s/p transfusions  · Repeat CBC in 1 week  · Outpatient follow-up with Hematology    Discharging Physician / Practitioner: Nasreen Cisse PA-C  PCP: Naima Tyler MD  Admission Date:   Admission Orders (From admission, onward)     Ordered        05/04/21 1236  Place in Observation  Once                   Discharge Date: 05/05/21    Resolved Problems  Date Reviewed: 5/5/2021          Resolved    Acute on chronic anemia 5/5/2021     Resolved by  Nasreen Cisse PA-C    * (Principal) Intractable low back pain 5/5/2021     Resolved by  Nasreen Cisse PA-C          Consultations During Hospital Stay:  · PT/OT    Procedures Performed:   · None    Significant Findings / Test Results:   · CT lumbar spine: "There is worsening dextroscoliosis of the lumbar spine and grade 1 anterior spondylolisthesis of L5 upon S1, compared to the prior CT of the lumbar spine dated 1/19/2018  Persistent multilevel degenerative disc disease within the lumbar spine including superiorly extruded disc herniation at L2-3 and left-sided degenerative disc disease with disc herniation at L4-5    Right greater than left foraminal narrowing at L5-S1 is also stable  Large central calcification in the anterior epidural space at the T11-12 level resulting in moderate canal stenosis and distortion of the ventral aspect of the thecal sac is unchanged from prior examination  Heterogeneous bone density within the sacrum bilaterally appears slightly improved compared to the CT scans of the abdomen and pelvis from early 2020, suggestive of healing sacral insufficiency/stress fractures  If patient is able, MRI would help  evaluate any residual marrow changes within the sacrum "  · Hemoglobin 6 5 on admission  · Creatinine 1 31 on admit    Incidental Findings:   · As above     Test Results Pending at Discharge (will require follow up): · None     Outpatient Tests Requested:  · CBC in 1 week    Complications:  None    Reason for Admission:  Acute on chronic back pain    Hospital Course:     Zia Jung is a 65 y o  female patient who originally presented to the hospital on 5/4/2021 due to back pain  Past medical history is significant for gist tumor, anemia, chronic kidney disease  Patient presented to the emergency department 05/04/2021 due to acute on chronic low back pain that radiates down her left leg  Patient follows with pain management as an outpatient  Also found to have hemoglobin of 6 5, patient does receive transfusions as outpatient, no evidence of active bleeding  Patient was admitted for pain control and PT/OT evaluation  Started on oral steroids and lidocaine patch  Patient noted good improvement in her pain and was evaluated by PT/OT who recommended home with family support  Hemoglobin improved s/p blood transfusions  Condition discharge patient was hemodynamically stable and verbalized understanding for request outpatient follow-up  Please see above list of diagnoses and related plan for additional information       Condition at Discharge: stable     Discharge Day Visit / Exam:     Subjective:  "I feel a lot better "  Vitals: Blood Pressure: 119/59 (05/05/21 0814)  Pulse: 80 (05/05/21 0814)  Temperature: 98 7 °F (37 1 °C) (05/05/21 0814)  Temp Source: Oral (05/05/21 0814)  Respirations: 14 (05/05/21 0814)  Height: 5' 1" (154 9 cm) (05/04/21 1949)  Weight - Scale: 57 2 kg (126 lb 1 7 oz) (05/04/21 1949)  SpO2: 96 % (05/05/21 0814)  Exam:   Physical Exam  Vitals signs and nursing note reviewed  Constitutional:       Appearance: Normal appearance  Comments: Appears comfortable, no acute distress   HENT:      Head: Normocephalic  Eyes:      General: No scleral icterus  Extraocular Movements: Extraocular movements intact  Conjunctiva/sclera: Conjunctivae normal    Neck:      Musculoskeletal: Normal range of motion  Cardiovascular:      Rate and Rhythm: Normal rate and regular rhythm  Heart sounds: S1 normal and S2 normal    Pulmonary:      Effort: Pulmonary effort is normal       Breath sounds: Normal breath sounds  No wheezing, rhonchi or rales  Abdominal:      General: Bowel sounds are normal       Palpations: Abdomen is soft  Tenderness: There is no abdominal tenderness  There is no guarding or rebound  Musculoskeletal:         General: No swelling, tenderness or deformity  Comments: Interval to move upper/lower extremities bilaterally without difficulty, no edema   Skin:     General: Skin is warm and dry  Neurological:      Mental Status: She is alert and oriented to person, place, and time  Psychiatric:         Mood and Affect: Mood normal          Speech: Speech normal          Behavior: Behavior normal          Discussion with Family: Declined family update  Discharge instructions/Information to patient and family:   See after visit summary for information provided to patient and family  Provisions for Follow-Up Care:  See after visit summary for information related to follow-up care and any pertinent home health orders        Disposition:     Home    For Discharges to [facility] SNF:   · Not Applicable to this Patient - Not Applicable to this Patient    Planned Readmission: None     Discharge Statement:  I spent 45 minutes discharging the patient  This time was spent on the day of discharge  I had direct contact with the patient on the day of discharge  Greater than 50% of the total time was spent examining patient, answering all patient questions, arranging and discussing plan of care with patient as well as directly providing post-discharge instructions  Additional time then spent on discharge activities  Discharge Medications:  See after visit summary for reconciled discharge medications provided to patient and family        ** Please Note: This note has been constructed using a voice recognition system **

## 2021-05-05 NOTE — DISCHARGE INSTR - AVS FIRST PAGE
· Follow-up follow-up with PCP within 1 week for post hospitalization follow-up  · Follow-up with Pain Management as previously scheduled  · Please have a CBC completed in 1 week to re-evaluate hemoglobin (blood count) level    Return to the emergency department for further evaluation should you experience chest pain/palpitations, shortness of breath, nausea/vomiting, abdominal pain, worsening back pain

## 2021-05-07 ENCOUNTER — TRANSITIONAL CARE MANAGEMENT (OUTPATIENT)
Dept: FAMILY MEDICINE CLINIC | Facility: HOSPITAL | Age: 71
End: 2021-05-07

## 2021-05-13 ENCOUNTER — OFFICE VISIT (OUTPATIENT)
Dept: FAMILY MEDICINE CLINIC | Facility: HOSPITAL | Age: 71
End: 2021-05-13
Payer: COMMERCIAL

## 2021-05-13 VITALS
OXYGEN SATURATION: 99 % | BODY MASS INDEX: 23.22 KG/M2 | HEIGHT: 61 IN | SYSTOLIC BLOOD PRESSURE: 140 MMHG | HEART RATE: 82 BPM | DIASTOLIC BLOOD PRESSURE: 82 MMHG | WEIGHT: 123 LBS

## 2021-05-13 DIAGNOSIS — M54.59 INTRACTABLE LOW BACK PAIN: ICD-10-CM

## 2021-05-13 DIAGNOSIS — M48.062 SPINAL STENOSIS OF LUMBAR REGION WITH NEUROGENIC CLAUDICATION: ICD-10-CM

## 2021-05-13 DIAGNOSIS — M51.27 HERNIATED NUCLEUS PULPOSUS, L5-S1, RIGHT: ICD-10-CM

## 2021-05-13 DIAGNOSIS — M54.16 LUMBAR RADICULOPATHY: Primary | ICD-10-CM

## 2021-05-13 DIAGNOSIS — M48.062 SPINAL STENOSIS, LUMBAR REGION, WITH NEUROGENIC CLAUDICATION: ICD-10-CM

## 2021-05-13 DIAGNOSIS — I48.0 PAROXYSMAL ATRIAL FIBRILLATION (HCC): ICD-10-CM

## 2021-05-13 DIAGNOSIS — C49.A9 MALIGNANT GASTROINTESTINAL STROMAL TUMOR (GIST) OF OTHER SITE (HCC): ICD-10-CM

## 2021-05-13 PROCEDURE — 1111F DSCHRG MED/CURRENT MED MERGE: CPT | Performed by: INTERNAL MEDICINE

## 2021-05-13 PROCEDURE — 99495 TRANSJ CARE MGMT MOD F2F 14D: CPT | Performed by: INTERNAL MEDICINE

## 2021-05-13 RX ORDER — TRAMADOL HYDROCHLORIDE 50 MG/1
50 TABLET ORAL EVERY 6 HOURS PRN
Qty: 120 TABLET | Refills: 0 | Status: SHIPPED | OUTPATIENT
Start: 2021-05-13 | End: 2021-06-12

## 2021-05-13 RX ORDER — PREDNISONE 20 MG/1
TABLET ORAL
Qty: 8 TABLET | Refills: 0 | Status: SHIPPED | OUTPATIENT
Start: 2021-05-13 | End: 2021-05-21 | Stop reason: SDUPTHER

## 2021-05-13 NOTE — PROGRESS NOTES
TCM VISIT     Subjective:    Patient ID: Bety Skinner is a 65 y o  female here today for TCM    The following portions of the patient's history were reviewed and updated as appropriate: allergies, current medications, past family history, past medical history, past social history, past surgical history and problem list     Hospital admission for severe back pain  CT lumbar reviewed with patient today  Multi-level disease and some stenosis of spinal canal   Pain did respond to prednisone with some relief but now, off prednisone, pain is worsened  Had spinal surgery in past       Follow at [facility] for GIST tumor and recurrent GI bleed with anemia  This currently is stable  Review of Systems   Constitutional: Negative for fatigue and fever  HENT: Negative for hearing loss  Eyes: Negative for visual disturbance  Respiratory: Negative for cough, chest tightness, shortness of breath and wheezing  Cardiovascular: Negative for chest pain, palpitations and leg swelling  Gastrointestinal: Negative for abdominal pain, diarrhea and nausea  Genitourinary: Negative for dysuria and hematuria  Musculoskeletal: Positive for arthralgias, back pain and myalgias  Neurological: Negative for dizziness, numbness and headaches  Psychiatric/Behavioral: Negative for confusion and dysphoric mood  All other systems reviewed and are negative        Objective:    Vitals:    05/13/21 1445   BP: 140/82   Pulse: 82   SpO2: 99%   Weight: 55 8 kg (123 lb)   Height: 5' 1" (1 549 m)       Current Outpatient Medications on File Prior to Visit   Medication Sig Dispense Refill    acetaminophen (TYLENOL) 500 mg tablet Take 500 mg by mouth every 6 (six) hours as needed for mild pain      aspirin 81 mg chewable tablet Chew 81 mg daily      Avapritinib (Ayvakit) 200 MG TABS Take 200 mg by mouth daily      Azelastine HCl 137 MCG/SPRAY SOLN instill 2 sprays into each nostril twice a day if needed for congestion  0    diphenhydrAMINE (BENADRYL) 25 mg tablet Take 25 mg by mouth every 6 (six) hours as needed for itching      diphenoxylate-atropine (LOMOTIL) 2 5-0 025 mg per tablet TAKE 1 TABLET BY MOUTH 4 TIMES A DAY AS NEEDED FOR DIARRHEA 30 tablet 5    docusate sodium (COLACE) 100 mg capsule Take 100 mg by mouth 2 (two) times a day as needed for constipation      ferrous sulfate 324 (65 Fe) mg Take 1 tablet (324 mg total) by mouth 2 (two) times a day before meals 60 tablet 2    furosemide (LASIX) 20 mg tablet Take 20 mg by mouth daily       hydroxychloroquine (PLAQUENIL) 200 mg tablet Take 200 mg by mouth daily in the early morning       loperamide (IMODIUM) 2 mg capsule Take 2 capsules by mouth 4 (four) times a day as needed      metoprolol succinate (Toprol XL) 25 mg 24 hr tablet Take 0 5 tablets (12 5 mg total) by mouth daily  0    multivitamin-iron-minerals-folic acid (CENTRUM) chewable tablet Chew 1 tablet daily      NON FORMULARY Take 2 tablets by mouth daily Chemo: Blue 285      ofloxacin (OCUFLOX) 0 3 % ophthalmic solution Starting April 7  0    ondansetron (ZOFRAN) 8 mg tablet 8 mg daily with breakfast Prior to chemo      pantoprazole (PROTONIX) 40 mg tablet take 1 tablet by mouth twice a day 180 tablet 5    prednisoLONE acetate (PRED FORTE) 1 % ophthalmic suspension   0    Triamcinolone Acetonide (NASACORT ALLERGY 24HR NA) into each nostril 1 spray each nostril--at bedtime   (OTC)      Virt-Phos 250 Neutral [id number] MG tablet Take 1 tablet by mouth daily      [DISCONTINUED] traMADol (ULTRAM) 50 mg tablet Take 1 tablet (50 mg total) by mouth every 8 (eight) hours as needed for moderate pain 40 tablet 2    [DISCONTINUED] predniSONE 20 mg tablet Take 2 tablets (40 mg total) by mouth daily for 1 day, THEN 1 5 tablets (30 mg total) daily for 2 days, THEN 1 tablet (20 mg total) daily for 2 days, THEN 0 5 tablets (10 mg total) daily for 2 days   (Patient not taking: Reported on 5/13/2021) 8 tablet 0     No current facility-administered medications on file prior to visit  Physical Exam  Constitutional:       Appearance: She is well-developed  HENT:      Head: Normocephalic  Eyes:      Pupils: Pupils are equal, round, and reactive to light  Cardiovascular:      Rate and Rhythm: Normal rate and regular rhythm  Heart sounds: Normal heart sounds  Pulmonary:      Effort: Pulmonary effort is normal       Breath sounds: Normal breath sounds  Abdominal:      General: Bowel sounds are normal       Palpations: Abdomen is soft  Musculoskeletal: Normal range of motion  Skin:     General: Skin is warm and dry  Neurological:      Mental Status: She is alert  Psychiatric:         Behavior: Behavior normal            Transitional Care Management Review:    TCM Call (since 4/12/2021)     [facility] care reviewed  Records reviewed    Patient was hospitialized at  [address] [place] [address]        Date of Admission  05/04/21    Date of discharge  05/05/21    Diagnosis  Intractable low back pain     Disposition  Home    Were the patients medications reviewed and updated  Yes    Current Symptoms  None      TCM Call (since 4/12/2021)     Should patient be enrolled in anticoag monitoring? No    Scheduled for follow up? Yes    Did you obtain your prescribed medications  Yes    Do you need help managing your prescriptions or medications  No    Is transportation to your appointment needed  No    I have advised the patient to call PCP with any new or worsening symptoms  Saima Nguyen, [address] or [employer] other    Support System  Spouse; Friends; Family    Are you recieving any outpatient services  No    Are you recieving home care services  No            Assessment/Plan:     1  Lumbar radiculopathy  Ambulatory referral to Neurosurgery    traMADol (ULTRAM) 50 mg tablet   2  Intractable low back pain  Ambulatory referral to Neurosurgery    predniSONE 20 mg tablet   3   Spinal stenosis of lumbar region with neurogenic claudication  Ambulatory referral to Neurosurgery    traMADol (ULTRAM) 50 mg tablet   4  Herniated nucleus pulposus, L5-S1, right     5  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 [facility])     6  Paroxysmal atrial fibrillation (HCC)     7  Spinal stenosis, lumbar region, with neurogenic claudication         Herniated nucleus pulposus, L5-S1, right  Severe pain    Refer to neurosurg      Amy Hernández MD

## 2021-05-18 ENCOUNTER — IMMUNIZATIONS (OUTPATIENT)
Dept: FAMILY MEDICINE CLINIC | Facility: HOSPITAL | Age: 71
End: 2021-05-18

## 2021-05-18 DIAGNOSIS — Z23 ENCOUNTER FOR IMMUNIZATION: Primary | ICD-10-CM

## 2021-05-18 PROCEDURE — 0002A SARS-COV-2 / COVID-19 MRNA VACCINE (PFIZER-BIONTECH) 30 MCG: CPT

## 2021-05-18 PROCEDURE — 91300 SARS-COV-2 / COVID-19 MRNA VACCINE (PFIZER-BIONTECH) 30 MCG: CPT

## 2021-05-21 ENCOUNTER — TELEPHONE (OUTPATIENT)
Dept: FAMILY MEDICINE CLINIC | Facility: HOSPITAL | Age: 71
End: 2021-05-21

## 2021-05-21 DIAGNOSIS — M54.59 INTRACTABLE LOW BACK PAIN: ICD-10-CM

## 2021-05-21 PROBLEM — K56.609 INTESTINAL OBSTRUCTION (HCC): Status: ACTIVE | Noted: 2021-05-21

## 2021-05-21 PROBLEM — Z98.890 STATUS POST LUMBAR LAMINECTOMY: Status: ACTIVE | Noted: 2021-05-21

## 2021-05-21 PROBLEM — G47.33 OSA (OBSTRUCTIVE SLEEP APNEA): Status: ACTIVE | Noted: 2021-05-21

## 2021-05-21 PROBLEM — S52.135A CLOSED NONDISPLACED FRACTURE OF NECK OF LEFT RADIUS: Status: ACTIVE | Noted: 2021-05-21

## 2021-05-21 PROBLEM — S83.412A SPRAIN OF MEDIAL COLLATERAL LIGAMENT OF LEFT KNEE: Status: ACTIVE | Noted: 2021-05-21

## 2021-05-21 RX ORDER — CALCIUM CARBONATE 200(500)MG
1 TABLET,CHEWABLE ORAL
COMMUNITY

## 2021-05-21 RX ORDER — PREDNISONE 20 MG/1
TABLET ORAL
Qty: 8 TABLET | Refills: 0 | Status: SHIPPED | OUTPATIENT
Start: 2021-05-21 | End: 2021-06-02 | Stop reason: HOSPADM

## 2021-05-21 RX ORDER — ERGOCALCIFEROL (VITAMIN D2) 50 MCG
CAPSULE ORAL
COMMUNITY
End: 2021-08-11

## 2021-05-21 NOTE — TELEPHONE ENCOUNTER
Saw gb 5/13--she rx'd Pred 20 mg tabs--2 x 1 day, 1 & 1/2 x 2 days, 1 x 2 days, 1/2 x 2 days  Gave #8 pills  Pt wants more till sees surg on 6/2  RA [id number]  Would you mind handling?   thx dk

## 2021-05-29 ENCOUNTER — APPOINTMENT (EMERGENCY)
Dept: CT IMAGING | Facility: HOSPITAL | Age: 71
DRG: 872 | End: 2021-05-29
Payer: COMMERCIAL

## 2021-05-29 ENCOUNTER — APPOINTMENT (EMERGENCY)
Dept: RADIOLOGY | Facility: HOSPITAL | Age: 71
DRG: 872 | End: 2021-05-29
Payer: COMMERCIAL

## 2021-05-29 ENCOUNTER — HOSPITAL ENCOUNTER (INPATIENT)
Facility: HOSPITAL | Age: 71
LOS: 4 days | Discharge: HOME HEALTH | DRG: 872 | End: 2021-06-02
Attending: EMERGENCY MEDICINE | Admitting: INTERNAL MEDICINE
Payer: COMMERCIAL

## 2021-05-29 DIAGNOSIS — R00.0 TACHYCARDIA: ICD-10-CM

## 2021-05-29 DIAGNOSIS — M46.40 DISKITIS: ICD-10-CM

## 2021-05-29 DIAGNOSIS — A41.9 SEPSIS (HCC): Primary | ICD-10-CM

## 2021-05-29 DIAGNOSIS — G89.29 ACUTE EXACERBATION OF CHRONIC LOW BACK PAIN: ICD-10-CM

## 2021-05-29 DIAGNOSIS — K56.600 PARTIAL SMALL BOWEL OBSTRUCTION (HCC): ICD-10-CM

## 2021-05-29 DIAGNOSIS — M54.50 ACUTE EXACERBATION OF CHRONIC LOW BACK PAIN: ICD-10-CM

## 2021-05-29 DIAGNOSIS — N39.0 UTI (URINARY TRACT INFECTION): ICD-10-CM

## 2021-05-29 DIAGNOSIS — R77.8 ELEVATED TROPONIN: ICD-10-CM

## 2021-05-29 DIAGNOSIS — M54.16 LUMBAR RADICULOPATHY: ICD-10-CM

## 2021-05-29 LAB
ALBUMIN SERPL BCP-MCNC: 3.4 G/DL (ref 3.5–5)
ALP SERPL-CCNC: 87 U/L (ref 46–116)
ALT SERPL W P-5'-P-CCNC: 30 U/L (ref 12–78)
ANION GAP SERPL CALCULATED.3IONS-SCNC: 11 MMOL/L (ref 4–13)
ANION GAP SERPL CALCULATED.3IONS-SCNC: 8 MMOL/L (ref 4–13)
APTT PPP: 24 SECONDS (ref 23–37)
AST SERPL W P-5'-P-CCNC: 63 U/L (ref 5–45)
BACTERIA UR QL AUTO: ABNORMAL /HPF
BASOPHILS # BLD AUTO: 0.01 THOUSANDS/ΜL (ref 0–0.1)
BASOPHILS NFR BLD AUTO: 0 % (ref 0–1)
BILIRUB SERPL-MCNC: 0.5 MG/DL (ref 0.2–1)
BILIRUB UR QL STRIP: NEGATIVE
BUN SERPL-MCNC: 18 MG/DL (ref 5–25)
BUN SERPL-MCNC: 21 MG/DL (ref 5–25)
CALCIUM ALBUM COR SERPL-MCNC: 8.8 MG/DL (ref 8.3–10.1)
CALCIUM SERPL-MCNC: 8.3 MG/DL (ref 8.3–10.1)
CALCIUM SERPL-MCNC: 8.4 MG/DL (ref 8.3–10.1)
CHLORIDE SERPL-SCNC: 98 MMOL/L (ref 100–108)
CHLORIDE SERPL-SCNC: 99 MMOL/L (ref 100–108)
CLARITY UR: ABNORMAL
CO2 SERPL-SCNC: 28 MMOL/L (ref 21–32)
CO2 SERPL-SCNC: 30 MMOL/L (ref 21–32)
COLOR UR: YELLOW
CREAT SERPL-MCNC: 1.37 MG/DL (ref 0.6–1.3)
CREAT SERPL-MCNC: 1.48 MG/DL (ref 0.6–1.3)
CRP SERPL QL: 38.9 MG/L
EOSINOPHIL # BLD AUTO: 0 THOUSAND/ΜL (ref 0–0.61)
EOSINOPHIL NFR BLD AUTO: 0 % (ref 0–6)
ERYTHROCYTE [DISTWIDTH] IN BLOOD BY AUTOMATED COUNT: 16.6 % (ref 11.6–15.1)
ERYTHROCYTE [SEDIMENTATION RATE] IN BLOOD: 32 MM/HOUR (ref 0–29)
GFR SERPL CREATININE-BSD FRML MDRD: 35 ML/MIN/1.73SQ M
GFR SERPL CREATININE-BSD FRML MDRD: 39 ML/MIN/1.73SQ M
GLUCOSE SERPL-MCNC: 103 MG/DL (ref 65–140)
GLUCOSE SERPL-MCNC: 99 MG/DL (ref 65–140)
GLUCOSE UR STRIP-MCNC: NEGATIVE MG/DL
HCT VFR BLD AUTO: 27.3 % (ref 34.8–46.1)
HGB BLD-MCNC: 9 G/DL (ref 11.5–15.4)
HGB UR QL STRIP.AUTO: ABNORMAL
IMM GRANULOCYTES # BLD AUTO: 0.06 THOUSAND/UL (ref 0–0.2)
IMM GRANULOCYTES NFR BLD AUTO: 1 % (ref 0–2)
INR PPP: 1.04 (ref 0.84–1.19)
KETONES UR STRIP-MCNC: NEGATIVE MG/DL
LACTATE SERPL-SCNC: 1.8 MMOL/L (ref 0.5–2)
LEUKOCYTE ESTERASE UR QL STRIP: NEGATIVE
LYMPHOCYTES # BLD AUTO: 0.28 THOUSANDS/ΜL (ref 0.6–4.47)
LYMPHOCYTES NFR BLD AUTO: 4 % (ref 14–44)
MCH RBC QN AUTO: 33.5 PG (ref 26.8–34.3)
MCHC RBC AUTO-ENTMCNC: 33 G/DL (ref 31.4–37.4)
MCV RBC AUTO: 102 FL (ref 82–98)
MONOCYTES # BLD AUTO: 0.18 THOUSAND/ΜL (ref 0.17–1.22)
MONOCYTES NFR BLD AUTO: 3 % (ref 4–12)
NEUTROPHILS # BLD AUTO: 6.79 THOUSANDS/ΜL (ref 1.85–7.62)
NEUTS SEG NFR BLD AUTO: 92 % (ref 43–75)
NITRITE UR QL STRIP: NEGATIVE
NON-SQ EPI CELLS URNS QL MICRO: ABNORMAL /HPF
NRBC BLD AUTO-RTO: 0 /100 WBCS
PH UR STRIP.AUTO: 7 [PH]
PLATELET # BLD AUTO: 291 THOUSANDS/UL (ref 149–390)
PMV BLD AUTO: 9.8 FL (ref 8.9–12.7)
POTASSIUM SERPL-SCNC: 3.9 MMOL/L (ref 3.5–5.3)
POTASSIUM SERPL-SCNC: 4.4 MMOL/L (ref 3.5–5.3)
PROCALCITONIN SERPL-MCNC: 1.32 NG/ML
PROT SERPL-MCNC: 7.6 G/DL (ref 6.4–8.2)
PROT UR STRIP-MCNC: NEGATIVE MG/DL
PROTHROMBIN TIME: 13.6 SECONDS (ref 11.6–14.5)
RBC # BLD AUTO: 2.69 MILLION/UL (ref 3.81–5.12)
RBC #/AREA URNS AUTO: ABNORMAL /HPF
SARS-COV-2 RNA RESP QL NAA+PROBE: NEGATIVE
SODIUM SERPL-SCNC: 136 MMOL/L (ref 136–145)
SODIUM SERPL-SCNC: 138 MMOL/L (ref 136–145)
SP GR UR STRIP.AUTO: 1.01 (ref 1–1.03)
TROPONIN I SERPL-MCNC: 0.07 NG/ML
TROPONIN I SERPL-MCNC: 0.24 NG/ML
TROPONIN I SERPL-MCNC: 0.28 NG/ML
TROPONIN I SERPL-MCNC: 0.35 NG/ML
TROPONIN I SERPL-MCNC: 0.4 NG/ML
UROBILINOGEN UR QL STRIP.AUTO: 0.2 E.U./DL
WBC # BLD AUTO: 7.32 THOUSAND/UL (ref 4.31–10.16)
WBC #/AREA URNS AUTO: ABNORMAL /HPF

## 2021-05-29 PROCEDURE — 99222 1ST HOSP IP/OBS MODERATE 55: CPT | Performed by: INTERNAL MEDICINE

## 2021-05-29 PROCEDURE — 99285 EMERGENCY DEPT VISIT HI MDM: CPT

## 2021-05-29 PROCEDURE — 96375 TX/PRO/DX INJ NEW DRUG ADDON: CPT

## 2021-05-29 PROCEDURE — 85610 PROTHROMBIN TIME: CPT | Performed by: EMERGENCY MEDICINE

## 2021-05-29 PROCEDURE — NC001 PR NO CHARGE: Performed by: NEUROLOGICAL SURGERY

## 2021-05-29 PROCEDURE — 80053 COMPREHEN METABOLIC PANEL: CPT | Performed by: EMERGENCY MEDICINE

## 2021-05-29 PROCEDURE — 84145 PROCALCITONIN (PCT): CPT | Performed by: EMERGENCY MEDICINE

## 2021-05-29 PROCEDURE — 96367 TX/PROPH/DG ADDL SEQ IV INF: CPT

## 2021-05-29 PROCEDURE — 84484 ASSAY OF TROPONIN QUANT: CPT | Performed by: INTERNAL MEDICINE

## 2021-05-29 PROCEDURE — 99223 1ST HOSP IP/OBS HIGH 75: CPT | Performed by: INTERNAL MEDICINE

## 2021-05-29 PROCEDURE — U0003 INFECTIOUS AGENT DETECTION BY NUCLEIC ACID (DNA OR RNA); SEVERE ACUTE RESPIRATORY SYNDROME CORONAVIRUS 2 (SARS-COV-2) (CORONAVIRUS DISEASE [COVID-19]), AMPLIFIED PROBE TECHNIQUE, MAKING USE OF HIGH THROUGHPUT TECHNOLOGIES AS DESCRIBED BY CMS-2020-01-R: HCPCS | Performed by: EMERGENCY MEDICINE

## 2021-05-29 PROCEDURE — 87077 CULTURE AEROBIC IDENTIFY: CPT | Performed by: EMERGENCY MEDICINE

## 2021-05-29 PROCEDURE — G1004 CDSM NDSC: HCPCS

## 2021-05-29 PROCEDURE — 81001 URINALYSIS AUTO W/SCOPE: CPT | Performed by: EMERGENCY MEDICINE

## 2021-05-29 PROCEDURE — 85652 RBC SED RATE AUTOMATED: CPT | Performed by: EMERGENCY MEDICINE

## 2021-05-29 PROCEDURE — 86140 C-REACTIVE PROTEIN: CPT | Performed by: EMERGENCY MEDICINE

## 2021-05-29 PROCEDURE — 71045 X-RAY EXAM CHEST 1 VIEW: CPT

## 2021-05-29 PROCEDURE — U0005 INFEC AGEN DETEC AMPLI PROBE: HCPCS | Performed by: EMERGENCY MEDICINE

## 2021-05-29 PROCEDURE — 80048 BASIC METABOLIC PNL TOTAL CA: CPT | Performed by: INTERNAL MEDICINE

## 2021-05-29 PROCEDURE — 84484 ASSAY OF TROPONIN QUANT: CPT | Performed by: EMERGENCY MEDICINE

## 2021-05-29 PROCEDURE — 85025 COMPLETE CBC W/AUTO DIFF WBC: CPT | Performed by: EMERGENCY MEDICINE

## 2021-05-29 PROCEDURE — 83605 ASSAY OF LACTIC ACID: CPT | Performed by: EMERGENCY MEDICINE

## 2021-05-29 PROCEDURE — 87186 SC STD MICRODIL/AGAR DIL: CPT | Performed by: EMERGENCY MEDICINE

## 2021-05-29 PROCEDURE — 85730 THROMBOPLASTIN TIME PARTIAL: CPT | Performed by: EMERGENCY MEDICINE

## 2021-05-29 PROCEDURE — 96365 THER/PROPH/DIAG IV INF INIT: CPT

## 2021-05-29 PROCEDURE — 93005 ELECTROCARDIOGRAM TRACING: CPT

## 2021-05-29 PROCEDURE — 74177 CT ABD & PELVIS W/CONTRAST: CPT

## 2021-05-29 PROCEDURE — 36415 COLL VENOUS BLD VENIPUNCTURE: CPT | Performed by: EMERGENCY MEDICINE

## 2021-05-29 PROCEDURE — 99285 EMERGENCY DEPT VISIT HI MDM: CPT | Performed by: EMERGENCY MEDICINE

## 2021-05-29 PROCEDURE — 87040 BLOOD CULTURE FOR BACTERIA: CPT | Performed by: EMERGENCY MEDICINE

## 2021-05-29 RX ORDER — TRAMADOL HYDROCHLORIDE 50 MG/1
50 TABLET ORAL EVERY 6 HOURS PRN
Status: DISCONTINUED | OUTPATIENT
Start: 2021-05-29 | End: 2021-06-02 | Stop reason: HOSPADM

## 2021-05-29 RX ORDER — HYDROMORPHONE HCL/PF 1 MG/ML
1 SYRINGE (ML) INJECTION ONCE
Status: COMPLETED | OUTPATIENT
Start: 2021-05-29 | End: 2021-05-29

## 2021-05-29 RX ORDER — PREDNISONE 1 MG/1
5 TABLET ORAL ONCE
Status: COMPLETED | OUTPATIENT
Start: 2021-05-29 | End: 2021-05-29

## 2021-05-29 RX ORDER — AZELASTINE 1 MG/ML
1 SPRAY, METERED NASAL 2 TIMES DAILY
Status: DISCONTINUED | OUTPATIENT
Start: 2021-05-29 | End: 2021-06-02 | Stop reason: HOSPADM

## 2021-05-29 RX ORDER — ONDANSETRON 2 MG/ML
4 INJECTION INTRAMUSCULAR; INTRAVENOUS EVERY 6 HOURS PRN
Status: DISCONTINUED | OUTPATIENT
Start: 2021-05-29 | End: 2021-06-02 | Stop reason: HOSPADM

## 2021-05-29 RX ORDER — ATORVASTATIN CALCIUM 40 MG/1
40 TABLET, FILM COATED ORAL
Status: DISCONTINUED | OUTPATIENT
Start: 2021-05-29 | End: 2021-06-02 | Stop reason: HOSPADM

## 2021-05-29 RX ORDER — HEPARIN SODIUM 5000 [USP'U]/ML
5000 INJECTION, SOLUTION INTRAVENOUS; SUBCUTANEOUS EVERY 8 HOURS SCHEDULED
Status: DISCONTINUED | OUTPATIENT
Start: 2021-05-29 | End: 2021-06-02 | Stop reason: HOSPADM

## 2021-05-29 RX ORDER — CEFTRIAXONE 1 G/50ML
1000 INJECTION, SOLUTION INTRAVENOUS ONCE
Status: COMPLETED | OUTPATIENT
Start: 2021-05-29 | End: 2021-05-29

## 2021-05-29 RX ORDER — ONDANSETRON 2 MG/ML
4 INJECTION INTRAMUSCULAR; INTRAVENOUS ONCE
Status: COMPLETED | OUTPATIENT
Start: 2021-05-29 | End: 2021-05-29

## 2021-05-29 RX ORDER — ASPIRIN 81 MG/1
81 TABLET, CHEWABLE ORAL DAILY
Status: DISCONTINUED | OUTPATIENT
Start: 2021-05-29 | End: 2021-05-29 | Stop reason: SDUPTHER

## 2021-05-29 RX ORDER — HYDROXYCHLOROQUINE SULFATE 200 MG/1
200 TABLET, FILM COATED ORAL
Status: DISCONTINUED | OUTPATIENT
Start: 2021-05-29 | End: 2021-06-02 | Stop reason: HOSPADM

## 2021-05-29 RX ORDER — PANTOPRAZOLE SODIUM 40 MG/1
40 TABLET, DELAYED RELEASE ORAL 2 TIMES DAILY
Status: DISCONTINUED | OUTPATIENT
Start: 2021-05-29 | End: 2021-06-02 | Stop reason: HOSPADM

## 2021-05-29 RX ORDER — SODIUM CHLORIDE 9 MG/ML
75 INJECTION, SOLUTION INTRAVENOUS CONTINUOUS
Status: DISCONTINUED | OUTPATIENT
Start: 2021-05-29 | End: 2021-06-01

## 2021-05-29 RX ORDER — VANCOMYCIN HYDROCHLORIDE 1 G/200ML
20 INJECTION, SOLUTION INTRAVENOUS ONCE
Status: DISCONTINUED | OUTPATIENT
Start: 2021-05-29 | End: 2021-05-29

## 2021-05-29 RX ORDER — ACETAMINOPHEN 325 MG/1
975 TABLET ORAL ONCE
Status: COMPLETED | OUTPATIENT
Start: 2021-05-29 | End: 2021-05-29

## 2021-05-29 RX ORDER — ACETAMINOPHEN 325 MG/1
650 TABLET ORAL EVERY 6 HOURS PRN
Status: DISCONTINUED | OUTPATIENT
Start: 2021-05-29 | End: 2021-06-02 | Stop reason: HOSPADM

## 2021-05-29 RX ORDER — SODIUM CHLORIDE, SODIUM GLUCONATE, SODIUM ACETATE, POTASSIUM CHLORIDE, MAGNESIUM CHLORIDE, SODIUM PHOSPHATE, DIBASIC, AND POTASSIUM PHOSPHATE .53; .5; .37; .037; .03; .012; .00082 G/100ML; G/100ML; G/100ML; G/100ML; G/100ML; G/100ML; G/100ML
1000 INJECTION, SOLUTION INTRAVENOUS ONCE
Status: COMPLETED | OUTPATIENT
Start: 2021-05-29 | End: 2021-05-29

## 2021-05-29 RX ORDER — MULTIVITAMIN/IRON/FOLIC ACID 18MG-0.4MG
1 TABLET ORAL DAILY
Status: DISCONTINUED | OUTPATIENT
Start: 2021-05-29 | End: 2021-05-31

## 2021-05-29 RX ORDER — VANCOMYCIN HYDROCHLORIDE 1 G/200ML
20 INJECTION, SOLUTION INTRAVENOUS ONCE
Status: COMPLETED | OUTPATIENT
Start: 2021-05-29 | End: 2021-05-29

## 2021-05-29 RX ORDER — ASPIRIN 325 MG
325 TABLET ORAL DAILY
Status: DISCONTINUED | OUTPATIENT
Start: 2021-05-29 | End: 2021-06-02 | Stop reason: HOSPADM

## 2021-05-29 RX ORDER — CEFEPIME HYDROCHLORIDE 1 G/50ML
1000 INJECTION, SOLUTION INTRAVENOUS EVERY 12 HOURS
Status: DISCONTINUED | OUTPATIENT
Start: 2021-05-29 | End: 2021-05-29

## 2021-05-29 RX ORDER — LIDOCAINE 50 MG/G
1 PATCH TOPICAL ONCE
Status: COMPLETED | OUTPATIENT
Start: 2021-05-29 | End: 2021-05-29

## 2021-05-29 RX ORDER — METOPROLOL SUCCINATE 25 MG/1
12.5 TABLET, EXTENDED RELEASE ORAL DAILY
Status: DISCONTINUED | OUTPATIENT
Start: 2021-05-29 | End: 2021-06-02 | Stop reason: HOSPADM

## 2021-05-29 RX ORDER — FUROSEMIDE 20 MG/1
20 TABLET ORAL DAILY
Status: DISCONTINUED | OUTPATIENT
Start: 2021-05-29 | End: 2021-05-29

## 2021-05-29 RX ORDER — CEFEPIME HYDROCHLORIDE 2 G/50ML
2000 INJECTION, SOLUTION INTRAVENOUS EVERY 12 HOURS
Status: DISCONTINUED | OUTPATIENT
Start: 2021-05-29 | End: 2021-05-31

## 2021-05-29 RX ORDER — ASPIRIN 81 MG/1
324 TABLET, CHEWABLE ORAL ONCE
Status: COMPLETED | OUTPATIENT
Start: 2021-05-29 | End: 2021-05-29

## 2021-05-29 RX ADMIN — MULTIPLE VITAMINS W/ MINERALS TAB 1 TABLET: TAB ORAL at 09:30

## 2021-05-29 RX ADMIN — VANCOMYCIN HYDROCHLORIDE 1000 MG: 1 INJECTION, SOLUTION INTRAVENOUS at 10:12

## 2021-05-29 RX ADMIN — IOHEXOL 100 ML: 350 INJECTION, SOLUTION INTRAVENOUS at 03:32

## 2021-05-29 RX ADMIN — SODIUM CHLORIDE 75 ML/HR: 0.9 INJECTION, SOLUTION INTRAVENOUS at 10:10

## 2021-05-29 RX ADMIN — TRAMADOL HYDROCHLORIDE 50 MG: 50 TABLET, FILM COATED ORAL at 23:39

## 2021-05-29 RX ADMIN — HEPARIN SODIUM 5000 UNITS: 5000 INJECTION INTRAVENOUS; SUBCUTANEOUS at 21:00

## 2021-05-29 RX ADMIN — ATORVASTATIN CALCIUM 40 MG: 40 TABLET, FILM COATED ORAL at 16:54

## 2021-05-29 RX ADMIN — PREDNISONE 5 MG: 5 TABLET ORAL at 09:30

## 2021-05-29 RX ADMIN — CEFEPIME HYDROCHLORIDE 2000 MG: 2 INJECTION, SOLUTION INTRAVENOUS at 16:54

## 2021-05-29 RX ADMIN — PANTOPRAZOLE SODIUM 40 MG: 40 TABLET, DELAYED RELEASE ORAL at 09:30

## 2021-05-29 RX ADMIN — CEFTRIAXONE 1000 MG: 1 INJECTION, SOLUTION INTRAVENOUS at 04:26

## 2021-05-29 RX ADMIN — ACETAMINOPHEN 650 MG: 325 TABLET, FILM COATED ORAL at 17:07

## 2021-05-29 RX ADMIN — PANTOPRAZOLE SODIUM 40 MG: 40 TABLET, DELAYED RELEASE ORAL at 17:07

## 2021-05-29 RX ADMIN — ASPIRIN 325 MG ORAL TABLET 325 MG: 325 PILL ORAL at 09:30

## 2021-05-29 RX ADMIN — HEPARIN SODIUM 5000 UNITS: 5000 INJECTION INTRAVENOUS; SUBCUTANEOUS at 14:34

## 2021-05-29 RX ADMIN — FUROSEMIDE 20 MG: 20 TABLET ORAL at 09:30

## 2021-05-29 RX ADMIN — HEPARIN SODIUM 5000 UNITS: 5000 INJECTION INTRAVENOUS; SUBCUTANEOUS at 09:37

## 2021-05-29 RX ADMIN — LIDOCAINE 1 PATCH: 50 PATCH TOPICAL at 02:30

## 2021-05-29 RX ADMIN — ACETAMINOPHEN 975 MG: 325 TABLET, FILM COATED ORAL at 03:08

## 2021-05-29 RX ADMIN — METOPROLOL SUCCINATE 12.5 MG: 25 TABLET, FILM COATED, EXTENDED RELEASE ORAL at 09:30

## 2021-05-29 RX ADMIN — ONDANSETRON 4 MG: 2 INJECTION INTRAMUSCULAR; INTRAVENOUS at 02:27

## 2021-05-29 RX ADMIN — HYDROXYCHLOROQUINE SULFATE 200 MG: 200 TABLET, FILM COATED ORAL at 09:37

## 2021-05-29 RX ADMIN — ASPIRIN 81 MG CHEWABLE TABLET 324 MG: 81 TABLET CHEWABLE at 03:08

## 2021-05-29 RX ADMIN — SODIUM CHLORIDE, SODIUM GLUCONATE, SODIUM ACETATE, POTASSIUM CHLORIDE, MAGNESIUM CHLORIDE, SODIUM PHOSPHATE, DIBASIC, AND POTASSIUM PHOSPHATE 1000 ML: .53; .5; .37; .037; .03; .012; .00082 INJECTION, SOLUTION INTRAVENOUS at 03:01

## 2021-05-29 RX ADMIN — HYDROMORPHONE HYDROCHLORIDE 1 MG: 1 INJECTION, SOLUTION INTRAMUSCULAR; INTRAVENOUS; SUBCUTANEOUS at 02:20

## 2021-05-29 RX ADMIN — AZELASTINE HYDROCHLORIDE 1 SPRAY: 137 SPRAY, METERED NASAL at 09:37

## 2021-05-29 RX ADMIN — SODIUM CHLORIDE, SODIUM GLUCONATE, SODIUM ACETATE, POTASSIUM CHLORIDE, MAGNESIUM CHLORIDE, SODIUM PHOSPHATE, DIBASIC, AND POTASSIUM PHOSPHATE 1000 ML: .53; .5; .37; .037; .03; .012; .00082 INJECTION, SOLUTION INTRAVENOUS at 02:23

## 2021-05-29 RX ADMIN — AZELASTINE HYDROCHLORIDE 1 SPRAY: 137 SPRAY, METERED NASAL at 17:07

## 2021-05-29 NOTE — Clinical Note
Case was discussed with hospitalist and the patient's admission status was agreed to be Admission Status: inpatient status to the service of Dr  hospitalist

## 2021-05-29 NOTE — ED NOTES
Pt became tachycardic on cardiac monitor at 140bpm  Dr Yolanda Baltazar at [address] [facility]  05/29/21 9072

## 2021-05-29 NOTE — H&P
[place]  H&P- Zia Jung 1950, 65 y o  female MRN: [id number]  Unit/Bed#: ED 11 Encounter: [id number]  Primary Care Provider: Denise Avina MD   Date and time admitted to hospital: 5/29/2021  1:48 AM    * Acute exacerbation of chronic low back pain  Assessment & Plan  · Patient presented to the ED due to worsening low back pain  Patient had been recently placed on a prednisone taper, last dose was earlier today  Patient reports that she felt her back pain is getting worse as the prednisone dosage decreased  · Pain is described as constant, radiates down both legs, worse with movements  Earlier in the day she was able to ambulate then unable to ambulate without walker  · Took Tylenol and tramadol before coming to the ED  · Febrile in the ED, meeting SIRS criteria, ED doc reports poor rectal tone  Concerned for possible back abscess  Spoke to neuro surgery who recommended patient have an MRI of her back performed  Patient currently refusing  · Manage pain  · Consult PT/OT    Sepsis ([facility] [address] )  Assessment & Plan  · Febrile (102 2), tachycardia, tachypnea  Possible source of infection UTI vs back abscess vs ??  · Urine micro is only positive for bacteria    UA is negative for nitrite and leukocytes  · Patient currently refusing MRI of her back  · Patient received 2 L bolus of fluids  · Started on ceftriaxone, continue  · Blood culture and procalcitonin pending    Elevated troponin  Assessment & Plan  · Troponin 0 07   · Continue to trend  · Patient currently denying chest pain   · EKG normal sinus rhythm 86 beats per minute  · Monitor on telemetry    Stage 3 chronic kidney disease [facility])  Assessment & Plan  Lab Results   Component Value Date    EGFR 35 05/29/2021    EGFR 46 05/05/2021    EGFR 41 05/04/2021    CREATININE 1 48 (H) 05/29/2021    CREATININE 1 18 05/05/2021    CREATININE 1 31 (H) 05/04/2021   · Creatinine 1 48 on admission   · Baseline appears to be around 1 41 5   · Continue to monitor    Essential hypertension  Assessment & Plan  · Continue metoprolol succinate 12 5 mg daily and Lasix 20 mg daily   · Blood pressure now controlled  Initial elevation most likely due to pain  · Continue to monitor    GIST (gastrointestinal stromal tumor), malignant ([address] )  Assessment & Plan  · Follows with [employer]   · Continue outpatient follow-up with Oncology   · Currently on ayvakit    VTE Prophylaxis: Heparin  / sequential compression device   Code Status: Level 1 Full Code   POLST: There is no POLST form on file for this patient (pre-hospital)  Discussion with family: No    Anticipated Length of Stay:  Patient will be admitted on an Inpatient basis with an anticipated length of stay of  > 2 midnights  Justification for Hospital Stay: Low back pain, sepsis     Total Time for Visit, including Counseling / Coordination of Care: 45 minutes  Greater than 50% of this total time spent on direct patient counseling and coordination of care  Chief Complaint:   Low back pain     History of Present Illness:    Stewart Stauffer is a 65 y o  female with past medical history of chronic low back pain, CKD, GIST, hypertension who presents with worsening low back pain  Patient reports that she had been started on a prednisone taper that was supposed to end today on 05/29  Reports that as the dosing has a decreased her pain has become worse  Pain is described as constant, radiates down both legs sometimes there is a numbness of the left leg  She is typically able to ambulate without a walker or cane  Today ambulation was more difficult and she found herself using a walker  Took Tylenol and tramadol before coming into the ED  In the ED patient found to have fever of 102, tachycardia and tachypnea  Patient was given Tylenol and Dilaudid  Patient is now seen post Dilaudid  She reports that her back pain has resolved at this point    She no longer has a fever, tachycardia and tachypnea have resolved at this time  Reports compliance with home medications  Review of Systems:    Review of Systems   Constitutional: Positive for activity change and fever  Negative for fatigue  HENT: Negative for sore throat  Respiratory: Negative for cough, chest tightness and shortness of breath  Cardiovascular: Negative for chest pain  Gastrointestinal: Negative for abdominal distention, abdominal pain, diarrhea, nausea and vomiting  Genitourinary: Negative for difficulty urinating  Musculoskeletal: Positive for back pain  Negative for arthralgias  Neurological: Negative for weakness and headaches  Psychiatric/Behavioral: Negative for agitation and behavioral problems  All other systems reviewed and are negative  Past Medical and Surgical History:     Past Medical History:   Diagnosis Date    ADHD     Anemia     Anxiety     Arthritis     Asthma     Atrial fibrillation ([facility] )     Breast cancer ([facility] )     Cancer ([facility] )     Chronic iron deficiency anemia 2020    Extensive GI evaluation over the last year including multiple EGD, colonoscopy, and capsule endoscopy  Has had gastric AVMs cauterized, Dieulafoy's lesion clipped, and most recently a Seb erosion cauterized    Coronary artery disease     Depression     GERD (gastroesophageal reflux disease)     History of stomach ulcers     Hypercholesterolemia     Hypertension     Kidney disease     Metastatic cancer ([facility] )        Past Surgical History:   Procedure Laterality Date    ANKLE SURGERY      APPENDECTOMY      BREAST SURGERY      CATARACT EXTRACTION       SECTION      COLONOSCOPY  2019    Multiple adenomatous colon polyps and internal hemorrhoids    Three year recall advised    HIP SURGERY      JOINT REPLACEMENT  2019    Left knee replacement    LAMINECTOMY      ROTATOR CUFF REPAIR      SIGMOID RESECTION / RECTOPEXY      SINUS SURGERY      UPPER GASTROINTESTINAL ENDOSCOPY 05/01/2020    Dieulafoy's lesion in proximal stomach which was actively oozing  Injected with epinephrine and 2 Endoclips placed with control of bleeding, hiatal hernia   UPPER GASTROINTESTINAL ENDOSCOPY  06/2020    Bleeding Seb's erosion status post cauterization       Meds/Allergies:    Prior to Admission medications    Medication Sig Start Date End Date Taking?  Authorizing Provider   acetaminophen (TYLENOL) 500 mg tablet Take 500 mg by mouth every 6 (six) hours as needed for mild pain    Historical Provider, MD   aspirin 81 mg chewable tablet Chew 81 mg daily    Historical Provider, MD   Avapritinib (Ayvakit) 200 MG TABS Take 200 mg by mouth daily    Historical Provider, MD   Azelastine HCl 137 MCG/SPRAY SOLN instill 2 sprays into each nostril twice a day if needed for congestion 2/21/18   Historical Provider, MD   calcium carbonate (Tums) 500 mg chewable tablet Chew 1 tablet    Historical Provider, MD   diphenhydrAMINE (BENADRYL) 25 mg tablet Take 25 mg by mouth every 6 (six) hours as needed for itching    Historical Provider, MD   diphenoxylate-atropine (LOMOTIL) 2 5-0 025 mg per tablet TAKE 1 TABLET BY MOUTH 4 TIMES A DAY AS NEEDED FOR DIARRHEA 8/10/20   Olga Hutchinson MD   docusate sodium (COLACE) 100 mg capsule Take 100 mg by mouth 2 (two) times a day as needed for constipation    Historical Provider, MD   ferrous sulfate 324 (65 Fe) mg Take 1 tablet (324 mg total) by mouth 2 (two) times a day before meals 6/29/20 5/13/21  Melvi Ross MD   furosemide (LASIX) 20 mg tablet Take 20 mg by mouth daily  6/7/18   Historical Provider, MD   hydroxychloroquine (PLAQUENIL) 200 mg tablet Take 200 mg by mouth daily in the early morning     Historical Provider, MD   loperamide (IMODIUM) 2 mg capsule Take 2 capsules by mouth 4 (four) times a day as needed    Historical Provider, MD   metoprolol succinate (Toprol XL) 25 mg 24 hr tablet Take 0 5 tablets (12 5 mg total) by mouth daily 6/5/20   Augustine Brunson DO multivitamin-iron-minerals-folic acid (CENTRUM) chewable tablet Chew 1 tablet daily    Historical Provider, MD   NON FORMULARY Take 2 tablets by mouth daily Chemo: Blue 285    Historical Provider, MD   ofloxacin (OCUFLOX) 0 3 % ophthalmic solution Starting April 7 4/7/20   Historical Provider, MD   ondansetron (ZOFRAN) 8 mg tablet 8 mg daily with breakfast Prior to chemo 10/17/19   Historical Provider, MD   pantoprazole (PROTONIX) 40 mg tablet take 1 tablet by mouth twice a day 7/31/20   LANEY Patino   prednisoLONE acetate (PRED FORTE) 1 % ophthalmic suspension  4/7/20   Historical Provider, MD   predniSONE 20 mg tablet Take 2 tablets (40 mg total) by mouth daily for 1 day, THEN 1 5 tablets (30 mg total) daily for 2 days, THEN 1 tablet (20 mg total) daily for 2 days, THEN 0 5 tablets (10 mg total) daily for 2 days  5/21/21 5/28/21  Daljit Magana PA-C   [facility]ol [facility]) 50 mg tablet Take 1 tablet (50 mg total) by mouth every 6 (six) hours as needed for severe pain 5/13/21 6/12/21  Silas Blas MD   Triamcinolone Acetonide (NASACORT ALLERGY 24HR NA) into each nostril 1 spray each nostril--at bedtime   (OTC)    Historical Provider, MD   Virt-Phos 250 Neutral [phone] MG tablet Take 1 tablet by mouth daily 10/6/20   Historical Provider, MD   Vitamin D, Ergocalciferol, 50 MCG (2000 UT) CAPS Take by mouth    Historical Provider, MD     I have reviewed home medications with patient personally  Allergies: Allergies   Allergen Reactions    Codeine Other (See Comments)     Throat closes    Codeine Sulfate Throat Swelling    Neomycin-Bacitracin Zn-Polymyx Rash    Wound Dressing Adhesive Other (See Comments)     If its on too long- pt starts itching    Zolmitriptan Palpitations     Heart palpitations    Generic for Zomig         Social History:     Marital Status: /Civil Union   Occupation:  Retired  Patient Pre-hospital Living Situation:  Home  Patient Pre-hospital Level of Mobility: Limited  Patient Pre-hospital Diet Restrictions:  Regular  Substance Use History:   Social History     Substance and Sexual Activity   Alcohol Use Not Currently    Frequency: Never    Drinks per session: 1 or 2    Binge frequency: Never     Social History     Tobacco Use   Smoking Status Former Smoker    Years: 20 00   Smokeless Tobacco Never Used     Social History     Substance and Sexual Activity   Drug Use No       Family History:    Family History   Problem Relation Age of Onset   [facility] Breast cancer Mother     Diabetes Mother     Hypertension Mother     Lung cancer Mother     Hyperlipidemia Father     Prostate cancer Father     Colon cancer Paternal Grandmother     Colon cancer Paternal Grandfather     Diabetes Family     Substance Abuse Neg Hx     Mental illness Neg Hx        Physical Exam:     Vitals:   Blood Pressure: 107/57 (05/29/21 0615)  Pulse: 81 (05/29/21 0615)  Temperature: 98 6 °F (37 °C) (05/29/21 0530)  Temp Source: Oral (05/29/21 0530)  Respirations: 18 (05/29/21 0615)  SpO2: 98 % (05/29/21 0615)    Physical Exam  Vitals signs and nursing note reviewed  Constitutional:       Appearance: Normal appearance  HENT:      Head: Normocephalic  Eyes:      Extraocular Movements: Extraocular movements intact  Pupils: Pupils are equal, round, and reactive to light  Neck:      Musculoskeletal: Normal range of motion  Cardiovascular:      Rate and Rhythm: Normal rate and regular rhythm  Heart sounds: No murmur  Pulmonary:      Effort: Pulmonary effort is normal  No respiratory distress  Breath sounds: Normal breath sounds  No wheezing  Abdominal:      General: Bowel sounds are normal  There is no distension  Tenderness: There is abdominal tenderness (low back)  There is no guarding  Musculoskeletal: Normal range of motion  Right lower leg: No edema  Left lower leg: No edema  Skin:     General: Skin is warm     Neurological:      General: No focal deficit present  Mental Status: She is alert and oriented to person, place, and time  Psychiatric:         Mood and Affect: Mood normal          Behavior: Behavior normal          Thought Content: Thought content normal            Additional Data:     Lab Results: I have personally reviewed pertinent reports  Results from last 7 days   Lab Units 05/29/21  0212   WBC Thousand/uL 7 32   HEMOGLOBIN g/dL 9 0*   HEMATOCRIT % 27 3*   PLATELETS Thousands/uL 291   NEUTROS PCT % 92*   LYMPHS PCT % 4*   MONOS PCT % 3*   EOS PCT % 0     Results from last 7 days   Lab Units 05/29/21  0212   SODIUM mmol/L 138   POTASSIUM mmol/L 4 4   CHLORIDE mmol/L 99*   CO2 mmol/L 28   BUN mg/dL 21   CREATININE mg/dL 1 48*   ANION GAP mmol/L 11   CALCIUM mg/dL 8 3   ALBUMIN g/dL 3 4*   TOTAL BILIRUBIN mg/dL 0 50   ALK PHOS U/L 87   ALT U/L 30   AST U/L 63*   GLUCOSE RANDOM mg/dL 99     Results from last 7 days   Lab Units 05/29/21  0212   INR  1 04             Results from last 7 days   Lab Units 05/29/21  0212   LACTIC ACID mmol/L 1 8       Imaging: I have personally reviewed pertinent reports  CT abdomen pelvis with contrast   Final Result by Zarina Zaragoza MD (05/29 0437)      Gas is seen within the endometrial canal   Differential diagnosis includes recent instrumentation, endometritis, and/or fistula to the bowel  Workstation performed: [id number]         XR chest portable    (Results Pending)         Allscripts / Epic Records Reviewed: Yes     ** Please Note: This note has been constructed using a voice recognition system   **

## 2021-05-29 NOTE — ASSESSMENT & PLAN NOTE
Lab Results   Component Value Date    EGFR 35 05/29/2021    EGFR 46 05/05/2021    EGFR 41 05/04/2021    CREATININE 1 48 (H) 05/29/2021    CREATININE 1 18 05/05/2021    CREATININE 1 31 (H) 05/04/2021   · Creatinine 1 48 on admission   · Baseline appears to be around 1 41 5   · Continue to monitor

## 2021-05-29 NOTE — ED PROVIDER NOTES
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, on avapritinib, HTN, HLD, pAfib not on anticoagulation secondary to prior GI bleeding and chronic iron anemia, chronic diarrhea and stool incontinence, chronic back pain on tylenol and tramadol, recently completed prednisone course (today) and following with pain management, chronic kidney disease stage 3, who presents with worsening low back pain  Patient reports that her back pain has been progressively worsening while she has been tapering down the prednisone, and today it became unbearable before bed  Earlier in the day,  states that she was doing yard work as well as cleaning the house  She then needed to use a walker to walk  Before bed, she describes the pain as constant, throughout the low back radiating down both legs, worse with movement, difficult to describe- can just describe as aching  Woke up with even worse pain, now unable to move without horrible pain and unable to stand/ambulate  Last took tylenol and tramadol at 8pm  Denies any numbness, tingling, weakness, loss of bladder control  Does have chronic diarrhea  Denies any trauma  Denies any other complaints  Per EMS, patient had a fever of 102 3 en route  Patient denies feeling fevers/ chills  Review of medical records shows that the patient had a CT AP on 5/24/2021 which showed:   IMPRESSION:  1   Prior resection of the perirectal GIST without evidence of local  recurrence  2   Small stable fluid attenuation lesions in others subcentimeter low  attenuations that are too  small to characterize in the liver are stable over multiple prior studies  and probably reflects  stable treated metastases  3   No new measurable or nonmeasurable disease    4  Nicholas Rogers is air within the endometrial cavity of the uterus of uncertain  etiology  Query recent  gynecologic procedure  Correlate for symptoms of infection  Prior to Admission Medications   Prescriptions Last Dose Informant Patient Reported? Taking?    Avapritinib (Ayvakit) 200 MG TABS   Yes No   Sig: Take 200 mg by mouth daily   Azelastine HCl 137 MCG/SPRAY SOLN  Self Yes No   Sig: instill 2 sprays into each nostril twice a day if needed for congestion   NON FORMULARY  Self Yes No   Sig: Take 2 tablets by mouth daily Chemo: Blue 285   Triamcinolone Acetonide (NASACORT ALLERGY 24HR NA)  Self Yes No   Sig: into each nostril 1 spray each nostril--at bedtime   (OTC)   Virt-Phos 250 Neutral [phone] MG tablet   Yes No   Sig: Take 1 tablet by mouth daily   Vitamin D, Ergocalciferol, 50 MCG (2000 UT) CAPS   Yes No   Sig: Take by mouth   acetaminophen (TYLENOL) 500 mg tablet   Yes No   Sig: Take 500 mg by mouth every 6 (six) hours as needed for mild pain   aspirin 81 mg chewable tablet  Self Yes No   Sig: Chew 81 mg daily   calcium carbonate (Tums) 500 mg chewable tablet   Yes No   Sig: Chew 1 tablet   diphenhydrAMINE (BENADRYL) 25 mg tablet  Self Yes No   Sig: Take 25 mg by mouth every 6 (six) hours as needed for itching   diphenoxylate-atropine (LOMOTIL) 2 5-0 025 mg per tablet   No No   Sig: TAKE 1 TABLET BY MOUTH 4 TIMES A DAY AS NEEDED FOR DIARRHEA   docusate sodium (COLACE) 100 mg capsule  Self Yes No   Sig: Take 100 mg by mouth 2 (two) times a day as needed for constipation   ferrous sulfate 324 (65 Fe) mg   No No   Sig: Take 1 tablet (324 mg total) by mouth 2 (two) times a day before meals   furosemide (LASIX) 20 mg tablet  Self Yes No   Sig: Take 20 mg by mouth daily    hydroxychloroquine (PLAQUENIL) 200 mg tablet  Self Yes No   Sig: Take 200 mg by mouth daily in the early morning    loperamide (IMODIUM) 2 mg capsule  Self Yes No   Sig: Take 2 capsules by mouth 4 (four) times a day as needed   metoprolol succinate (Toprol XL) 25 mg 24 hr tablet  Self No No   Sig: Take 0 5 tablets (12 5 mg total) by mouth daily   multivitamin-iron-minerals-folic acid (CENTRUM) chewable tablet  Self Yes No   Sig: Chew 1 tablet daily   ofloxacin (OCUFLOX) 0 3 % ophthalmic solution  Self Yes No   Sig: Starting    ondansetron (ZOFRAN) 8 mg tablet  Self Yes No   Si mg daily with breakfast Prior to chemo   pantoprazole (PROTONIX) 40 mg tablet   No No   Sig: take 1 tablet by mouth twice a day   predniSONE 20 mg tablet   No No   Sig: Take 2 tablets (40 mg total) by mouth daily for 1 day, THEN 1 5 tablets (30 mg total) daily for 2 days, THEN 1 tablet (20 mg total) daily for 2 days, THEN 0 5 tablets (10 mg total) daily for 2 days  prednisoLONE acetate (PRED FORTE) 1 % ophthalmic suspension  Self Yes No   traMADol (ULTRAM) 50 mg tablet   No No   Sig: Take 1 tablet (50 mg total) by mouth every 6 (six) hours as needed for severe pain      Facility-Administered Medications: None       Past Medical History:   Diagnosis Date    ADHD     Anemia     Anxiety     Arthritis     Asthma     Atrial fibrillation (HCC)     Breast cancer (HCC)     Cancer ([facility] )     Chronic iron deficiency anemia 2020    Extensive GI evaluation over the last year including multiple EGD, colonoscopy, and capsule endoscopy  Has had gastric AVMs cauterized, Dieulafoy's lesion clipped, and most recently a Seb erosion cauterized    Coronary artery disease     Depression     GERD (gastroesophageal reflux disease)     History of stomach ulcers     Hypercholesterolemia     Hypertension     Kidney disease     Metastatic cancer ([facility] )        Past Surgical History:   Procedure Laterality Date    ANKLE SURGERY      APPENDECTOMY      BREAST SURGERY      CATARACT EXTRACTION       SECTION      COLONOSCOPY  2019    Multiple adenomatous colon polyps and internal hemorrhoids    Three year recall advised    HIP SURGERY      JOINT REPLACEMENT  2019    Left knee replacement    LAMINECTOMY      ROTATOR CUFF REPAIR      SIGMOID RESECTION / RECTOPEXY      SINUS SURGERY      UPPER GASTROINTESTINAL ENDOSCOPY  05/01/2020    Dieulafoy's lesion in proximal stomach which was actively oozing  Injected with epinephrine and 2 Endoclips placed with control of bleeding, hiatal hernia   UPPER GASTROINTESTINAL ENDOSCOPY  06/2020    Bleeding Seb's erosion status post cauterization       Family History   Problem Relation Age of Onset   Erika Nixon Breast cancer Mother     Diabetes Mother     Hypertension Mother     Lung cancer Mother     Hyperlipidemia Father     Prostate cancer Father     Colon cancer Paternal [de-identified]     Colon cancer Paternal Grandfather     Diabetes Family     Substance Abuse Neg Hx     Mental illness Neg Hx      I have reviewed and agree with the history as documented  E-Cigarette/Vaping    E-Cigarette Use Never User      E-Cigarette/Vaping Substances    Nicotine No     THC No     CBD No     Flavoring No     Other No     Unknown No      Social History     Tobacco Use    Smoking status: Former Smoker     Years: 20 00    Smokeless tobacco: Never Used   Substance Use Topics    Alcohol use: Not Currently     Frequency: Never     Drinks per session: 1 or 2     Binge frequency: Never    Drug use: No       Review of Systems   Constitutional: Negative for chills and fever  HENT: Negative for congestion and rhinorrhea  Eyes: Negative for photophobia and visual disturbance  Respiratory: Negative for cough and shortness of breath  Cardiovascular: Negative for chest pain and palpitations  Gastrointestinal: Negative for abdominal pain, constipation, diarrhea, nausea and vomiting  Genitourinary: Negative for dysuria, flank pain, hematuria, pelvic pain, vaginal bleeding, vaginal discharge and vaginal pain  Musculoskeletal: Positive for back pain and gait problem  Negative for neck pain and neck stiffness  Skin: Negative for color change and pallor  Neurological: Negative for dizziness, weakness, light-headedness, numbness and headaches  Physical Exam  Physical Exam  Vitals signs and nursing note reviewed  Constitutional:       General: She is not in acute distress  Appearance: Normal appearance  She is not ill-appearing, toxic-appearing or diaphoretic  HENT:      Head: Normocephalic and atraumatic  Mouth/Throat:      Mouth: Mucous membranes are moist    Eyes:      Conjunctiva/sclera: Conjunctivae normal       Pupils: Pupils are equal, round, and reactive to light  Neck:      Musculoskeletal: Neck supple  Cardiovascular:      Rate and Rhythm: Normal rate and regular rhythm  Pulses: Normal pulses  Heart sounds: Normal heart sounds  No murmur  Pulmonary:      Effort: Pulmonary effort is normal  No respiratory distress  Breath sounds: Normal breath sounds  No stridor  No wheezing, rhonchi or rales  Chest:      Chest wall: No tenderness  Abdominal:      General: Bowel sounds are normal  There is no distension  Palpations: Abdomen is soft  Tenderness: There is no abdominal tenderness  There is no right CVA tenderness, left CVA tenderness, guarding or rebound  Genitourinary:     Vagina: No signs of injury and foreign body  No vaginal discharge, erythema, tenderness, bleeding, lesions or prolapsed vaginal walls  Rectum: Abnormal anal tone  Comments: Perianal sensation intact  Musculoskeletal:      Cervical back: Normal  She exhibits normal range of motion, no tenderness, no bony tenderness, no swelling, no edema, no deformity, no laceration, no pain, no spasm and normal pulse  Thoracic back: Normal  She exhibits normal range of motion, no tenderness, no bony tenderness, no swelling, no edema, no deformity, no laceration, no pain, no spasm and normal pulse  Lumbar back: She exhibits decreased range of motion, tenderness, pain and spasm   She exhibits no bony tenderness, no swelling, no edema, no deformity, no laceration and normal pulse  Back:       Right lower leg: No edema  Left lower leg: No edema  Skin:     General: Skin is warm and dry  Coloration: Skin is pale  Findings: No rash  Neurological:      General: No focal deficit present  Mental Status: She is alert and oriented to person, place, and time  Mental status is at baseline  GCS: GCS eye subscore is 4  GCS verbal subscore is 5  GCS motor subscore is 6  Sensory: Sensation is intact  Comments: Strength is 5 out of 5 in bilateral lower extremities- able to push down equally on the "gas pedals", able to lift and move bilateral lower extremities off the bed, but minimally as it results in significant back pain and she winces and cries out   Psychiatric:         Mood and Affect: Mood is anxious  Affect is tearful           Behavior: Behavior normal          Vital Signs  ED Triage Vitals [05/29/21 0150]   Temperature Pulse Respirations Blood Pressure SpO2   (!) 102 2 °F (39 °C) 95 20 (!) 183/90 97 %      Temp Source Heart Rate Source Patient Position - Orthostatic VS BP Location FiO2 (%)   Oral Monitor Lying Left arm --      Pain Score       Worst Possible Pain           Vitals:    05/29/21 0500 05/29/21 0530 05/29/21 0600 05/29/21 0615   BP: 128/68 129/62 92/54 107/57   Pulse: 93 90 73 81   Patient Position - Orthostatic VS:  Lying           Visual Acuity      ED Medications  Medications   lidocaine (LIDODERM) 5 % patch 1 patch (1 patch Topical Medication Applied 5/29/21 0230)   multi-electrolyte (ISOLYTE-S PH 7 4 equivalent) IV solution 1,000 mL (0 mL Intravenous Stopped 5/29/21 0300)     Followed by   multi-electrolyte (PLASMALYTE-A/ISOLYTE-S PH 7 4) IV solution 1,000 mL (0 mL Intravenous Stopped 5/29/21 0416)   HYDROmorphone (DILAUDID) injection 1 mg (1 mg Intravenous Given 5/29/21 0220)   ondansetron (ZOFRAN) injection 4 mg (4 mg Intravenous Given 5/29/21 0227)   aspirin chewable tablet 324 mg (324 mg Oral Given 5/29/21 0308)   acetaminophen (TYLENOL) tablet 975 mg (975 mg Oral Given 5/29/21 0308)   iohexol (OMNIPAQUE) 350 MG/ML injection (MULTI-DOSE) 100 mL (100 mL Intravenous Given 5/29/21 0332)   cefTRIAXone (ROCEPHIN) IVPB (premix in dextrose) 1,000 mg 50 mL (0 mg Intravenous Stopped 5/29/21 0456)       Diagnostic Studies  Results Reviewed     Procedure Component Value Units Date/Time    Troponin I [[id number]]  (Abnormal) Collected: 05/29/21 0532    Lab Status: Final result Specimen: Blood from Arm, Right Updated: 05/29/21 0633     Troponin I 0 28 ng/mL     Troponin I [[id number]]     Lab Status: No result Specimen: Blood     Urine Microscopic [[id number]]  (Abnormal) Collected: 05/29/21 0258    Lab Status: Final result Specimen: Urine, Straight Cath Updated: 05/29/21 0346     RBC, UA None Seen /hpf      WBC, UA None Seen /hpf      Epithelial Cells None Seen /hpf      Bacteria, UA Innumerable /hpf     UA w Reflex to Microscopic w Reflex to Culture [[id number]]  (Abnormal) Collected: 05/29/21 0258    Lab Status: Final result Specimen: Urine, Straight Cath Updated: 05/29/21 0328     Color, UA Yellow     Clarity, UA Slightly Cloudy     Specific Gravity, UA 1 015     pH, UA 7 0     Leukocytes, UA Negative     Nitrite, UA Negative     Protein, UA Negative mg/dl      Glucose, UA Negative mg/dl      Ketones, UA Negative mg/dl      Urobilinogen, UA 0 2 E U /dl      Bilirubin, UA Negative     Blood, UA Trace-Intact    Novel Coronavirus (Covid-19),PCR [facility] - 2 Hour Stat [[id number]]  (Normal) Collected: 05/29/21 2075    Lab Status: Final result Specimen: Nares from Nose Updated: 05/29/21 0328     SARS-CoV-2 Negative    Narrative: The specimen collection materials, transport medium, and/or testing methodology utilized in the production of these test results have been proven to be reliable in a limited validation with an abbreviated program under the Emergency Utilization Authorization provided by the FDA    Testing reported as "Presumptive positive" will be confirmed with secondary testing to ensure result accuracy  Clinical caution and judgement should be used with the interpretation of these results with consideration of the clinical impression and other laboratory testing  Testing reported as "Positive" or "Negative" has been proven to be accurate according to standard laboratory validation requirements  All testing is performed with control materials showing appropriate reactivity at standard intervals  CBC and differential [[id number]]  (Abnormal) Collected: 05/29/21 0212    Lab Status: Final result Specimen: Blood from Arm, Right Updated: 05/29/21 0259     WBC 7 32 Thousand/uL      RBC 2 69 Million/uL      Hemoglobin 9 0 g/dL      Hematocrit 27 3 %       fL      MCH 33 5 pg      MCHC 33 0 g/dL      RDW 16 6 %      MPV 9 8 fL      Platelets 497 Thousands/uL      nRBC 0 /100 WBCs      Neutrophils Relative 92 %      Immat GRANS % 1 %      Lymphocytes Relative 4 %      Monocytes Relative 3 %      Eosinophils Relative 0 %      Basophils Relative 0 %      Neutrophils Absolute 6 79 Thousands/µL      Immature Grans Absolute 0 06 Thousand/uL      Lymphocytes Absolute 0 28 Thousands/µL      Monocytes Absolute 0 18 Thousand/µL      Eosinophils Absolute 0 00 Thousand/µL      Basophils Absolute 0 01 Thousands/µL     Narrative: This is an appended report  These results have been appended to a previously verified report      Sedimentation rate, automated [[id number]]  (Abnormal) Collected: 05/29/21 0212    Lab Status: Final result Specimen: Blood from Arm, Right Updated: 05/29/21 0257     Sed Rate 32 mm/hour     C-reactive protein [[id number]]  (Abnormal) Collected: 05/29/21 0212    Lab Status: Final result Specimen: Blood from Arm, Right Updated: 05/29/21 0256     CRP 38 9 mg/L     Lactic acid [[id number]]  (Normal) Collected: 05/29/21 5535    Lab Status: Final result Specimen: Blood from Arm, Right Updated: 05/29/21 0249     LACTIC ACID 1 8 mmol/L     Narrative:      Result may be elevated if tourniquet was used during collection  Troponin I [[id number]]  (Abnormal) Collected: 05/29/21 0212    Lab Status: Final result Specimen: Blood from Arm, Right Updated: 05/29/21 0248     Troponin I 0 07 ng/mL     Comprehensive metabolic panel [[id number]]  (Abnormal) Collected: 05/29/21 0212    Lab Status: Final result Specimen: Blood from Arm, Right Updated: 05/29/21 0246     Sodium 138 mmol/L      Potassium 4 4 mmol/L      Chloride 99 mmol/L      CO2 28 mmol/L      ANION GAP 11 mmol/L      BUN 21 mg/dL      Creatinine 1 48 mg/dL      Glucose 99 mg/dL      Calcium 8 3 mg/dL      Corrected Calcium 8 8 mg/dL      AST 63 U/L      ALT 30 U/L      Alkaline Phosphatase 87 U/L      Total Protein 7 6 g/dL      Albumin 3 4 g/dL      Total Bilirubin 0 50 mg/dL      eGFR 35 ml/min/1 73sq m     Narrative:      [place] guidelines for Chronic Kidney Disease (CKD):     Stage 1 with normal or high GFR (GFR > 90 mL/min/1 73 square meters)    Stage 2 Mild CKD (GFR = 60-89 mL/min/1 73 square meters)    Stage 3A Moderate CKD (GFR = 45-59 mL/min/1 73 square meters)    Stage 3B Moderate CKD (GFR = 30-44 mL/min/1 73 square meters)    Stage 4 Severe CKD (GFR = 15-29 mL/min/1 73 square meters)    Stage 5 End Stage CKD (GFR <15 mL/min/1 73 square meters)  Note: GFR calculation is accurate only with a steady state creatinine    Protime-INR [[id number]]  (Normal) Collected: 05/29/21 0212    Lab Status: Final result Specimen: Blood from Arm, Right Updated: 05/29/21 0241     Protime 13 6 seconds      INR 1 04    APTT [[id number]]  (Normal) Collected: 05/29/21 0212    Lab Status: Final result Specimen: Blood from Arm, Right Updated: 05/29/21 0241     PTT 24 seconds     Procalcitonin with AM Reflex [[id number]] Collected: 05/29/21 6540    Lab Status:  In process Specimen: Blood from Arm, Right Updated: 05/29/21 0223    Blood culture #1 [[id number]] Collected: 05/29/21 6090    Lab Status: In process Specimen: Blood from Arm, Right Updated: 05/29/21 0223    Blood culture #2 [[id number]] Collected: 05/29/21 0212    Lab Status: In process Specimen: Blood from Hand, Right Updated: 05/29/21 0223                 CT abdomen pelvis with contrast   Final Result by Rohit Romero MD (05/29 0346)      Gas is seen within the endometrial canal   Differential diagnosis includes recent instrumentation, endometritis, and/or fistula to the bowel  Workstation performed: [id number]         XR chest portable    (Results Pending)              Procedures  ECG 12 Lead Documentation Only    Date/Time: 5/29/2021 2:35 AM  Performed by: Clovis Sorenson DO  Authorized by: Clovis Sorenson DO     Indications / Diagnosis:  Back pain   ECG reviewed by me, the ED Provider: yes    Patient location:  ED  Previous ECG:     Previous ECG:  Compared to current    Comparison ECG info:  11/2/2020    Similarity:  No change  Interpretation:     Interpretation: non-specific    Rate:     ECG rate:  89    ECG rate assessment: normal    Rhythm:     Rhythm: sinus rhythm    Ectopy:     Ectopy: none    QRS:     QRS axis:  Left    QRS intervals:   Wide  Conduction:     Conduction: abnormal      Abnormal conduction: complete RBBB, LAFB and bifascicular block    ST segments:     ST segments:  Non-specific  T waves:     T waves: non-specific    Comments:      qtc 462    ECG 12 Lead Documentation Only    Date/Time: 5/29/2021 5:45 AM  Performed by: Clovis Sorenson DO  Authorized by: Clovis Sorenson DO     Indications / Diagnosis:  Repeat  ECG reviewed by me, the ED Provider: yes    Patient location:  ED  Previous ECG:     Previous ECG:  Compared to current    Comparison ECG info:  3 hours prior    Similarity:  Changes noted  Interpretation:     Interpretation: non-specific    Rate:     ECG rate:  86    ECG rate assessment: normal    Rhythm:     Rhythm: sinus rhythm    Ectopy:     Ectopy: none QRS:     QRS axis:  Left    QRS intervals: Wide  Conduction:     Conduction: abnormal      Abnormal conduction: incomplete RBBB, LAFB and bifascicular block    ST segments:     ST segments:  Non-specific  T waves:     T waves: non-specific    Comments:      qtc 488             ED Course  ED Course as of May 29 0645   Sat May 29, 2021   0244 Patient had a 5 minute episode of tachycardia with -140 bpm  Blood pressure remained stable throughout and patient denied any chest pain, shortness of breath, palpitations, lightheadedness, dizziness  6373 Giving 324mg aspirin   Troponin I(!): 0 07   0257 C-REACTIVE PROTEIN(!): 38 9   0257 Sed Rate(!): 32   0307 Will discuss case with neurosurgeon on call regarding concern for epidural abscess in setting of fever, worsening low back pain  Normal neurological exam  Slowly progressive pain  Will discuss the question of whether patient needs urgent MRI tonight and will need to be transferred or if can be admitted at [facility] for MRI to be done in the morning/during the day  ESR/CRP mildly elevated, so cannot rule out epidural abscess  0335 SARS-COV-2: Negative   0347 Bacteria, UA(!): Innumerable   0441 Discussed case with Dr Onel De La Rosa, neurosurgery regarding patient's clinical exam, imaging, and labs  Secondary to patient not being a great historian, has worsening back pain, fever of 102 2 without any other complaints aside from the back pain, mildly elevated ESR/CRP, no rectal tone (granted questionable whether new or not), and no improvement after medications, will transfer for MRI imaging to be performed asap to rule out epidural abscess  6728 I discussed transfer to [facility] with the patient and her  at length  I explained my concern about the back pain in conjunction with fever as a concern for epidural abscess and that MRI is the definitive imaging modality to assess for it   I also thoroughly explained that risks of missing the diagnosis of epidural abscess including permanent disability from paralysis, bladder incontinence, spread of infection, sepsis, death  I also explained to the patient and her  that her vitals signs in the ER have been labile with the HR ranging from 90 - 140bpm despite IVF resusictation and that there is a questionable concomitant UTI as patient has innumerable bacteria on straight cath urine  Concern if truly a UTI though as there are no WBCs  CT imaging concerning for endometritis- pelvic exam performed  Was limited secondary to significant pain and patient being unable to accomodate position for appropriate speculum exam, but limited speculum exam did not show any discharge or stool within vagina  3031 Patient initially wanted to leave [place] as she states that she does not want to get an MRI because she has a knee replacement and because she does not want to lay in the MRI machine  After much conversation, patient is amenable to stay for admission in the [address] [place] [address], for continued antibiotics, and hospitalist team will once again discuss MRI imaging with the patient  Initial Sepsis Screening     Row Name 05/29/21 7955                Is the patient's history suggestive of a new or worsening infection? (!) Yes (Proceed)  -LG        Suspected source of infection  urinary tract infection UTI versus epidural abscess  -LG        Are two or more of the following signs & symptoms of infection both present and new to the patient?   (!) Yes (Proceed)  -LG        Indicate SIRS criteria  Tachycardia > 90 bpm;Tachypnea > 20 resp per min  -LG        If the answer is yes to both questions, suspicion of sepsis is present  --        If severe sepsis is present AND tissue hypoperfusion perists in the hour after fluid resuscitation or lactate > 4, the patient meets criteria for SEPTIC SHOCK  --        Are any of the following organ dysfunction criteria present within 6 hours of suspected infection and SIRS criteria that are NOT considered to be chronic conditions? No  -LG        Organ dysfunction  --        Date of presentation of severe sepsis  --        Time of presentation of severe sepsis  --        Tissue hypoperfusion persists in the hour after crystalloid fluid administration, evidenced, by either:  --        Was hypotension present within one hour of the conclusion of crystalloid fluid administration?  --        Date of presentation of septic shock  --        Time of presentation of septic shock  --          User Key  (r) = Recorded By, (t) = Taken By, (c) = Cosigned By    [address] Name Provider Type    OLMAN Newton DO Physician                        [facility]  Number of Diagnoses or Management Options  Diagnosis management comments: Assessment and Plan:   65year old female presenting with progressively worsening low back pain  Additionally, found to be febrile at 102 2  As patient is a cancer patient, immunocompromised presenting with fever and worsening back pain, concerned about epidural abscess  Patient has no neurological deficits, is globally weak  Will perform infectious workup with CXR to assess for PNA, urinalysis to assess for UTI, CT A/P for intra-abdominal pathology as well as metastases or pathologic fractures  Treat pain with lidoderm patch and dilaudid  Reassess         Disposition  Final diagnoses:   Sepsis ([address] )   UTI (urinary tract infection)   Acute exacerbation of chronic low back pain   Tachycardia   Elevated troponin     Time reflects when diagnosis was documented in both MDM as applicable and the Disposition within this note     Time User Action Codes Description Comment    5/29/2021  4:41 AM Fabio Bussing Add [A41 9] Sepsis ([address] )     5/29/2021  4:41 AM Fabio Bussing Add [N39 0] UTI (urinary tract infection)     5/29/2021  4:41 AM Fabio Bussing Add [M54 5,  G89 29] Acute exacerbation of chronic low back pain     5/29/2021  4:46 AM Fabio Bussing Add [R00 0] Tachycardia 5/29/2021  4:46 AM Awais Quiñones Add [R77 8] Elevated troponin       ED Disposition     ED Disposition Condition Date/Time Comment    Transfer to Another Facility-In Network  [facility] May 29, 2021  4:58 AM Patient is going to be transferred to [facility]        Follow-up Information    None         Patient's Medications   Discharge Prescriptions    No medications on file     No discharge procedures on file      PDMP Review       Value Time User    PDMP Reviewed  Yes 5/5/2021 11:52 AM Paulie Pierce PA-C          ED Provider  Electronically Signed by           Clovis Sorenson DO  05/29/21 [id number]

## 2021-05-29 NOTE — TELEMEDICINE
On-Call Telephone Note    Contacted by Dr Jasmina Kurtz 65 y o  female MRN: [id number]  Unit/Bed#: ED 6 Encounter: [id number]    Per provider report, patient presents with acute exacerbation of chronic back pain  Patient has a longstanding history of back pain followed by pain management  Patient also has history of GIST with previous abdominal surgery and rectal surgery  Available past medical history,social history, surgical history, medication list, drug allergies and review of systems were reviewed  /57   Pulse 81   Temp 98 6 °F (37 °C) (Oral)   Resp 18   SpO2 98%      Clinical exam per provider report, no focal neurological deficits in lower extremities  Patient does have decreased rectal tone, but is unclear if this is chronic  Imaging personally reviewed  CT scan of the abdomen pelvis with contrast dated May 29th, 2021  Lumbar reconstructions reviewed  No fracture  Degenerative scoliosis  Multiple levels of degenerative disc disease and facet arthropathy  No obvious epidural collection  Note is made of a calcified lesion within the canal at T11-T12, likely calcified disc herniation verses meningioma  Assessment and Plan  3 61-year-old woman who presents with acute exacerbation of more chronic lower back pain and is febrile  No obvious osteomyelitis or diskitis on CT imaging  Would recommend MRI of the lumbar and thoracic spine with and without contrast   Contact Neurosurgery with the results  All questions answered  Provider is in agreement with the course of action  A total of 7 minutes was spent discussing the presentation, physical exam and formulating a management plan with the provider

## 2021-05-29 NOTE — ASSESSMENT & PLAN NOTE
· Febrile (102 2), tachycardia, tachypnea  Possible source of infection UTI vs back abscess vs ??  · Urine micro is only positive for bacteria    UA is negative for nitrite and leukocytes  · Patient currently refusing MRI of her back  · Patient received 2 L bolus of fluids  · Started on ceftriaxone, continue  · Blood culture and procalcitonin pending

## 2021-05-29 NOTE — ASSESSMENT & PLAN NOTE
· Troponin 0 07   · Continue to trend  · Patient currently denying chest pain   · EKG normal sinus rhythm 86 beats per minute  · Monitor on telemetry

## 2021-05-29 NOTE — ASSESSMENT & PLAN NOTE
· Follows with Ayana Miller   · Continue outpatient follow-up with Oncology   · Currently on [employer]vakit

## 2021-05-29 NOTE — CONSULTS
Consultation - Cardiology   Corina Will 65 y o  female MRN: [id number]  Unit/Bed#: -01 Encounter: [id number]        Inpatient consult to Cardiology  Consult performed by: Luis Bearden MD  Consult ordered by: Yaron Hahn MD            Assessment/Plan   Principal Problem:    Acute exacerbation of chronic low back pain  Active Problems:    GIST (gastrointestinal stromal tumor), malignant ([facility] [address] )    Essential hypertension    Sepsis ([address] )    Elevated troponin    Stage 3 chronic kidney disease ([facility] )      Elevated troponin in the setting of sepsis:  Probable non MI troponin elevation  Intermediate risk of underlying coronary artery disease  Abnormal resting EKG  She will need further risk stratification with a pharmacologic nuclear stress test when her acute medical issues infection resolve  Continue beta-blockers and statins  Paroxysmal atrial fibrillation with no recurrence as per the patient in the last 5 years:  Anticoagulation was deferred due to recurrent GI blood loss secondary to gastric AV malformations for which she has had cauterization done  Telemetry shows sinus rhythm with APCs  Continue monitoring  Continue low-dose beta-blockers  Systolic murmur:  Possibly related to a combination of mitral regurgitation and aortic valve disease  Echo planned to evaluate LV systolic function, wall motion abnormalities and severity of mitral and aortic valve disease  Plan of care was discussed with the patient  Cardiology service will follow back with her on Tuesday  Please call for any questions  Physician Requesting Consult: Yaron Hahn MD    Reason for Consult / Principal Problem:   Chief Complaint   Patient presents with    Back Pain     chronic back pain worsening over past 3 weeks           HPI: Corina Will is a 65y o  year old female with history of chronic low back pain, hypertension, CKD, presented to the ER with fever, shortness of breath and worsening lower back pain  She was found to be febrile with tachycardia and tachypnea and was presumed to have sepsis  Incidental finding of mildly elevated troponin that peaked at 0 4  She denies any chest pain  She has no prior history of coronary artery disease  She is a former smoker  She does have dyslipidemia  She has a prior history of atrial fibrillation diagnosed over 5 years ago  AFib was transient  She was placed on a beta-blocker by her cardiologist here at [facility]  She was never anticoagulated  She has no history of TIA or stroke  Does not have diabetes  She does have a history of 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 which have been cauterized  For this reason she has not been on anticoagulation  She has had no recurrent atrial fibrillation documented to the best of her knowledge  Reports a family history of valvular heart disease  Her mother had to have valve replacement  I am asked to see her in consultation regarding her elevated troponin  Currently she has no cardiac symptoms  Her only complaint is low back pain  EKG was abnormal with sinus rhythm and bifascicular block:  Right bundle and left anterior fascicular block  Several prior EKG showed sinus rhythm with right bundle branch block and left axis deviation which did not meet criteria for left anterior fascicular block  Past history, family history, social history, current medications, vital signs, recent lab and imaging studies and  prior cardiology studies reviewed independently on this visit  Echo in 2017 showed normal LVEF, moderate MR and mild aortic sclerosis    Holter the same time showed sinus rhythm with rare ectopy      Review of Systems   Constitutional: Positive for activity change and fever  Negative for appetite change and unexpected weight change  HENT: Negative for trouble swallowing  Eyes: Negative for visual disturbance     Respiratory: Negative for chest tightness, shortness of breath and wheezing  Cardiovascular: Positive for palpitations  Negative for chest pain and leg swelling  Gastrointestinal: Negative for blood in stool  Endocrine: Negative for polyuria  Genitourinary: Negative for hematuria  Musculoskeletal: Positive for arthralgias and back pain  Skin: Negative for rash  Neurological: Negative for dizziness and weakness  Hematological: Does not bruise/bleed easily  Psychiatric/Behavioral: Negative for behavioral problems  Historical Information   Past Medical History:   Diagnosis Date    ADHD     Anemia     Anxiety     Arthritis     Asthma     Atrial fibrillation (HCC)     [place] ([place] [address] )     Cancer ([place] [address] )     Chronic iron deficiency anemia 2020    Extensive GI evaluation over the last year including multiple EGD, colonoscopy, and capsule endoscopy  Has had gastric AVMs cauterized, Dieulafoy's lesion clipped, and most recently a Seb erosion cauterized    Coronary artery disease     Depression     GERD (gastroesophageal reflux disease)     History of stomach ulcers     Hypercholesterolemia     Hypertension     Kidney disease     Metastatic cancer ([place] [address] )      Past Surgical History:   Procedure Laterality Date    ANKLE SURGERY      APPENDECTOMY      BREAST SURGERY      CATARACT EXTRACTION       SECTION      COLONOSCOPY  2019    Multiple adenomatous colon polyps and internal hemorrhoids  Three year recall advised    HIP SURGERY      JOINT REPLACEMENT  2019    Left knee replacement    LAMINECTOMY      ROTATOR CUFF REPAIR      SIGMOID RESECTION / RECTOPEXY      SINUS SURGERY      UPPER GASTROINTESTINAL ENDOSCOPY  2020    Dieulafoy's lesion in proximal stomach which was actively oozing  Injected with epinephrine and 2 Endoclips placed with control of bleeding, hiatal hernia      UPPER GASTROINTESTINAL ENDOSCOPY  2020    Bleeding Seb's erosion status post cauterization     Social History Substance and Sexual Activity   Alcohol Use Not Currently    Frequency: Never    Drinks per session: 1 or 2    Binge frequency: Never     Social History     Substance and Sexual Activity   Drug Use No     Social History     Tobacco Use   Smoking Status Former Smoker    Years: 20 00   Smokeless Tobacco Never Used     Family History   Problem Relation Age of Onset   Yolanda Crawley Breast cancer Mother     Diabetes Mother     Hypertension Mother     Lung cancer Mother     Hyperlipidemia Father     Prostate cancer Father     Colon cancer Paternal Grandmother     Colon cancer Paternal Grandfather     Diabetes Family     Substance Abuse Neg Hx     Mental illness Neg Hx        Allergies: Allergies   Allergen Reactions    Codeine Other (See Comments)     Throat closes    Codeine Sulfate Throat Swelling    Neomycin-Bacitracin Zn-Polymyx Rash    Wound Dressing Adhesive Other (See Comments)     If its on too long- pt starts itching    Zolmitriptan Palpitations     Heart palpitations    Generic for Zomig         Medications:     Current Facility-Administered Medications:     acetaminophen (TYLENOL) tablet 650 mg, 650 mg, Oral, Q6H PRN, Oscar Cervantes PA-C, 650 mg at 05/29/21 1707    aspirin tablet 325 mg, 325 mg, Oral, Daily, Jessee Tay MD, 325 mg at 05/29/21 0930    atorvastatin (LIPITOR) tablet 40 mg, 40 mg, Oral, Daily With Jane Andersen MD, 40 mg at 05/29/21 1654    Avapritinib TABS 200 mg, 200 mg, Oral, Daily, Oscar Cervantes PA-C    azelastine (ASTELIN) 0 1 % nasal spray 1 spray, 1 spray, Each Nare, BID, Arelis David PA-C, 1 spray at 05/29/21 1707    cefepime (MAXIPIME) IVPB (premix in dextrose) 2,000 mg 50 mL, 2,000 mg, Intravenous, Q12H, Kishor Purvis MD, Last Rate: 100 mL/hr at 05/29/21 1654, 2,000 mg at 05/29/21 1654    heparin (porcine) subcutaneous injection 5,000 Units, 5,000 Units, Subcutaneous, Q8H [address], 5,000 Units at 05/29/21 1434 **AND** Platelet count, , , Once, Oscar Cervantes PA-C  Yolanda Zaragozak hydroxychloroquine (PLAQUENIL) tablet 200 mg, 200 mg, Oral, Early Morning, Arelis David PA-C, 200 mg at 05/29/21 3653    metoprolol succinate (TOPROL-XL) 24 hr tablet 12 5 mg, 12 5 mg, Oral, Daily, Arelis David PA-C, 12 5 mg at 05/29/21 0930    multivitamin-minerals (CENTRUM ADULTS) tablet 1 tablet, 1 tablet, Oral, Daily, Arelis David PA-C, 1 tablet at 05/29/21 0930    ondansetron (ZOFRAN) injection 4 mg, 4 mg, Intravenous, Q6H PRN, Hamida Onofre PA-C    pantoprazole (PROTONIX) EC tablet 40 mg, 40 mg, Oral, BID, Arelis David PA-C, 40 mg at 05/29/21 1707    sodium chloride 0 9 % infusion, 75 mL/hr, Intravenous, Continuous, Trevor Hartman MD, Last Rate: 75 mL/hr at 05/29/21 1010, 75 mL/hr at 05/29/21 1010    traMADol (ULTRAM) tablet 50 mg, 50 mg, Oral, Q6H PRN, Hamida Onofre PA-C    vancomycin (VANCOCIN) IVPB (premix in dextrose) 750 mg 150 mL, 15 mg/kg, Intravenous, Q24H, Trevor Hartman MD     Objective:   Vitals:   Vitals:    05/29/21 1526   BP: 95/60   Pulse: 59   Resp: 18   Temp: 98 7 °F (37 1 °C)   SpO2: 97%     Body mass index is 23 05 kg/m²  Orthostatic Blood Pressures      Most Recent Value   Blood Pressure  95/60 filed at 05/29/2021 1526   Patient Position - Orthostatic VS  Lying filed at 05/29/2021 0314        Systolic (85DRH), ALYSON:534 , Min:92 , EKY:188     Diastolic (12EHV), NMR:46, Min:54, Max:97      Intake/Output Summary (Last 24 hours) at 5/29/2021 1829  Last data filed at 5/29/2021 1239  Gross per 24 hour   Intake 3220 ml   Output 2075 ml   Net 1145 ml     Weight (last 2 days)     Date/Time   Weight    05/29/21 0801   55 3 (122)            Invasive Devices     Peripheral Intravenous Line            Peripheral IV 05/29/21 Right Antecubital less than 1 day                Physical Exam  Constitutional:       General: She is not in acute distress  Appearance: Normal appearance  HENT:      Head: Normocephalic        Mouth/Throat:      Mouth: Mucous membranes are moist    Eyes: Conjunctiva/sclera: Conjunctivae normal    Cardiovascular:      Rate and Rhythm: Normal rate and regular rhythm  Heart sounds: S1 normal and S2 normal  Murmur present  Crescendo  systolic murmur present with a grade of 3/6  Pulmonary:      Effort: Pulmonary effort is normal       Breath sounds: Normal breath sounds  Abdominal:      Palpations: Abdomen is soft  Musculoskeletal:         General: No swelling  Right lower leg: No edema  Left lower leg: No edema  Skin:     General: Skin is warm  Comments: Left facial birth jf   Neurological:      General: No focal deficit present     Psychiatric:         Mood and Affect: Mood normal          Labs:     Results from last 7 days   Lab Units 05/29/21  1415 05/29/21  1125 05/29/21  0811   TROPONIN I ng/mL 0 24* 0 40* 0 35*     CBC with diff:   Results from last 7 days   Lab Units 05/29/21  0212   WBC Thousand/uL 7 32   HEMOGLOBIN g/dL 9 0*   HEMATOCRIT % 27 3*   MCV fL 102*   PLATELETS Thousands/uL 291   MCH pg 33 5   MCHC g/dL 33 0   RDW % 16 6*   MPV fL 9 8   NRBC AUTO /100 WBCs 0     CMP:  Results from last 7 days   Lab Units 05/29/21  0831 05/29/21  0212   POTASSIUM mmol/L 3 9 4 4   CHLORIDE mmol/L 98* 99*   CO2 mmol/L 30 28   BUN mg/dL 18 21   CREATININE mg/dL 1 37* 1 48*   CALCIUM mg/dL 8 4 8 3   AST U/L  --  63*   ALT U/L  --  30   ALK PHOS U/L  --  87   EGFR ml/min/1 73sq m 39 35     NT-proBNP:   Lab Results   Component Value Date    NTBNP 1,705 (H) 02/19/2020      Magnesium:    Coags:  Results from last 7 days   Lab Units 05/29/21  0212   PTT seconds 24   INR  1 04     TSH:     Hemoglobin A1C:    Lipid Profile:   Lab Results   Component Value Date    CHOL 127 02/17/2015    CHOL 144 07/03/2014     Lab Results   Component Value Date    HDL 49 04/11/2016    HDL 45 01/11/2016    HDL 45 02/17/2015     Lab Results   Component Value Date    LDLCALC 60 04/11/2016    LDLCALC 74 01/11/2016    LDLCALC 67 02/17/2015     Lab Results   Component Value Date    TRIG 45 04/11/2016    TRIG 46 01/11/2016    TRIG 75 02/17/2015       Imaging & Testing   Cardiac testing:       Imaging:   I have personally reviewed pertinent films in PACS  Xr Chest Portable    Result Date: 5/29/2021  Narrative: CHEST INDICATION:   fever  COMPARISON:  Chest radiograph from 11/2/2020 and chest CT from 7/15/2015  Abdomen CT from 5/29/2021  EXAM PERFORMED/VIEWS:  XR CHEST PORTABLE  FINDINGS: Cardiomediastinal silhouette normal  Lungs clear  No effusion or pneumothorax  Skinfolds over the left hemithorax  Clip in the left upper quadrant  Osseous structures normal for age  Impression: No acute cardiopulmonary disease  Workstation performed: [id number]     Ct Lumbar Spine Without Contrast    Result Date: 5/4/2021  Narrative: CT LUMBAR SPINE INDICATION:   Back pain or radiculopathy, cancer or infection suspected Low back pain with history of cancer  COMPARISON: X-rays dated 1/15/2021  CT dated 1/19/2018  TECHNIQUE:  Contiguous axial images through the lumbar spine were obtained  Sagittal and coronal reconstructions were performed  Radiation dose length product (DLP) for this visit:  673 mGy-cm   This examination, like all CT scans performed in the [facility], was performed utilizing techniques to minimize radiation dose exposure, including the use of iterative reconstruction and automated exposure control  IMAGE QUALITY:  Diagnostic  FINDINGS: ALIGNMENT:  There is an exaggerated lumbar lordosis with grade 1 anterior spondylolisthesis of L5 upon S1  There is worsening dextroscoliosis centered at the L4 level with slight right lateral subluxation of L4 in relation to L5  There are 5 lumbar type vertebral bodies  VERTEBRAL BODIES: No acute lumbar spine fracture is identified  Lower thoracic endplate degenerative changes are seen with sclerosis and multiple small Schmorl's nodes  Similar findings noted at the L4-5 endplates   DEGENERATIVE CHANGES: Lower Thoracic spine: Within the lower thoracic spine there is a large central calcification in the anterior epidural space at T11-12 similar to the prior examination resulting in moderate canal stenosis with distortion of the ventral aspect of the thecal sac  Moderate right foraminal narrowing  Similar central calcification, much smaller in size at the T10-11 level with mild canal stenosis and moderate bilateral foraminal narrowing  At T12-L1 there is slight annular bulging without canal stenosis or foraminal narrowing ] L1-2:  Vacuum phenomenon with slight loss of disc height  Mild annular bulging with mild annular calcification noted  Mild canal stenosis with moderate left and mild right foraminal narrowing  L2-3:  Vacuum phenomenon with moderate diffuse annular bulging  There is a small superiorly extruded central disc herniation  Facet degenerative change is present  There is moderate canal stenosis with distortion of the thecal sac  Mild right foraminal narrowing  L3-4:  Loss of disc height with vacuum phenomenon  Annular bulging with annular calcifications centrally  There is a broad-based left paramedian and subarticular disc herniation  Left greater than right facet hypertrophic degenerative change with moderate left lateral canal stenosis and moderate left foraminal narrowing  L4-5:  Vacuum phenomenon with loss of disc height  There is annular bulging with a broad-based left-sided subarticular and foraminal disc herniation  Moderate facet degenerative change bilaterally  Mild canal stenosis and advanced left foraminal narrowing  Correlate for left L4 radiculopathy  L5-S1:  Grade 1 anterior spondylolisthesis  Loss of disc height with vacuum phenomenon  Moderate annular bulging diffusely with mild annular calcifications  Right greater than left facet hypertrophic degenerative change is present  No significant central canal stenosis  Severe right foraminal narrowing with mild left foraminal narrowing   SACRUM: Evaluation of the sacrum demonstrates heterogeneity involving the anterior lateral aspect of the sacrum bilaterally  Overall there is decreased sclerosis when compared to the prior examination of the abdomen and pelvis from 2/19/2020  Findings are suggestive of healing sacral stress/insufficiency fractures  There is slight buckling of the anterior cortex seen on sagittal reconstructions  Stable benign focus of sclerosis within the left lateral aspect of the sacrum lateral to the S1 transverse foramen  PARASPINAL SOFT TISSUES:  Stable vascular calcification of the abdominal aorta  Impression: There is worsening dextroscoliosis of the lumbar spine and grade 1 anterior spondylolisthesis of L5 upon S1, compared to the prior CT of the lumbar spine dated 1/19/2018  Persistent multilevel degenerative disc disease within the lumbar spine including superiorly extruded disc herniation at L2-3 and left-sided degenerative disc disease with disc herniation at L4-5  Right greater than left foraminal narrowing at L5-S1 is also stable  Large central calcification in the anterior epidural space at the T11-12 level resulting in moderate canal stenosis and distortion of the ventral aspect of the thecal sac is unchanged from prior examination  Heterogeneous bone density within the sacrum bilaterally appears slightly improved compared to the CT scans of the abdomen and pelvis from early 2020, suggestive of healing sacral insufficiency/stress fractures  If patient is able, MRI would help evaluate any residual marrow changes within the sacrum  Workstation performed: [id number]     Ct Abdomen Pelvis With Contrast    Result Date: 5/29/2021  Narrative: CT ABDOMEN AND PELVIS WITH IV CONTRAST INDICATION:   Abdominal pain, fever fever, back pain  COMPARISON:  CT of abdomen pelvis on February 19, 2020  CT of the lumbar spine on May 4, 2021  TECHNIQUE:  CT examination of the abdomen and pelvis was performed   Axial, sagittal, and coronal 2D reformatted images were created from the source data and submitted for interpretation  Radiation dose length product (DLP) for this visit:  404 mGy-cm   This examination, like all CT scans performed in the [facility], was performed utilizing techniques to minimize radiation dose exposure, including the use of iterative reconstruction and automated exposure control  IV Contrast:  100 mL of iohexol (OMNIPAQUE)  was administered intravenously without immediate adverse reaction  Enteric Contrast:  Enteric contrast was not administered  FINDINGS: ABDOMEN LOWER CHEST:  No clinically significant abnormality identified in the visualized lower chest  LIVER/BILIARY TREE:  One or more subcentimeter sharply circumscribed low-density hepatic lesion(s) are noted, too small to accurately characterize, but statistically most likely to represent subcentimeter hepatic cysts  Hepatic contours are normal   No biliary dilatation  GALLBLADDER:  No calcified gallstones  No pericholecystic inflammatory change  SPLEEN:  Unremarkable  PANCREAS:  Unremarkable  ADRENAL GLANDS:  Unremarkable  KIDNEYS/URETERS:  Unremarkable  No hydronephrosis  STOMACH AND BOWEL:  Clips near the GE junction  No bowel obstruction  Right lower quadrant anastomosis  APPENDIX:  No findings to suggest appendicitis  ABDOMINOPELVIC CAVITY:  No ascites  No pneumoperitoneum  No lymphadenopathy  VESSELS:  Unremarkable for patient's age  PELVIS REPRODUCTIVE ORGANS:  Gas is seen within the endometrial canal (series 2, image 58)  URINARY BLADDER:  Unremarkable  ABDOMINAL WALL/INGUINAL REGIONS:  Unremarkable  OSSEOUS STRUCTURES:  No acute fracture or destructive osseous lesion  Degenerative changes the osseous structures with multilevel disc herniation similar to prior exam   Dextroscoliosis of the lumbar spine       Impression: Gas is seen within the endometrial canal   Differential diagnosis includes recent instrumentation, endometritis, and/or fistula to the bowel  Workstation performed: [id number]       Maximilian Lyons MD, [address] of the record may have been created with voice recognition software  Occasional wrong word or "sound a like" substitutions may have occurred due to the inherent limitations of voice recognition software  Please read the chart carefully and recognize, using context, where substitutions have occurred  Please call the Risa Scott of the note for any clarifications

## 2021-05-29 NOTE — ASSESSMENT & PLAN NOTE
· Patient presented to the ED due to worsening low back pain  Patient had been recently placed on a prednisone taper, last dose was earlier today  Patient reports that she felt her back pain is getting worse as the prednisone dosage decreased  · Pain is described as constant, radiates down both legs, worse with movements  Earlier in the day she was able to ambulate then unable to ambulate without walker  · Took Tylenol and tramadol before coming to the ED  · Febrile in the ED, meeting SIRS criteria, ED doc reports poor rectal tone  Concerned for possible back abscess  Spoke to neuro surgery who recommended patient have an MRI of her back performed  Patient currently refusing     · Manage pain  · Consult PT/OT

## 2021-05-29 NOTE — PLAN OF CARE
Problem: Potential for Falls  Goal: Patient will remain free of falls  Description: INTERVENTIONS:  - Assess patient frequently for physical needs  -  Identify cognitive and physical deficits and behaviors that affect risk of falls    -  [place] fall precautions as indicated by assessment   - Educate patient/family on patient safety including physical limitations  - Instruct patient to call for assistance with activity based on assessment  - Modify environment to reduce risk of injury  - Consider OT/PT consult to assist with strengthening/mobility  Outcome: Progressing

## 2021-05-29 NOTE — SEPSIS NOTE
Sepsis Note   Umm Cerda 65 y o  female MRN: [id number]  Unit/Bed#: ED 11 Encounter: [id number]      qSOFA     Row Name 05/29/21 0430 05/29/21 0415 05/29/21 0400 05/29/21 0345 05/29/21 0330    Altered mental status GCS < 15  --  --  --  --  --    Respiratory Rate > / =22  1  1  1  1  1    Systolic BP < / =209  0  0  0  0  0    Q Sofa Score  1  1  1  1  1    Row Name 05/29/21 0300 05/29/21 0230 05/29/21 0215 05/29/21 0150       Altered mental status GCS < 15  --  --  --  --     Respiratory Rate > / =22  0  0  0  0     Systolic BP < / =487  0  0  --  0     Q Sofa Score  0  0  0  0         Initial Sepsis Screening     Row Name 05/29/21 0447                Is the patient's history suggestive of a new or worsening infection? (!) Yes (Proceed)  -LG        Suspected source of infection  urinary tract infection UTI versus epidural abscess  -LG        Are two or more of the following signs & symptoms of infection both present and new to the patient? (!) Yes (Proceed)  -LG        Indicate SIRS criteria  Tachycardia > 90 bpm;Tachypnea > 20 resp per min  -LG        If the answer is yes to both questions, suspicion of sepsis is present  --        If severe sepsis is present AND tissue hypoperfusion perists in the hour after fluid resuscitation or lactate > 4, the patient meets criteria for SEPTIC SHOCK  --        Are any of the following organ dysfunction criteria present within 6 hours of suspected infection and SIRS criteria that are NOT considered to be chronic conditions?   No  -LG        Organ dysfunction  --        Date of presentation of severe sepsis  --        Time of presentation of severe sepsis  --        Tissue hypoperfusion persists in the hour after crystalloid fluid administration, evidenced, by either:  --        Was hypotension present within one hour of the conclusion of crystalloid fluid administration?  --        Date of presentation of septic shock  --        Time of presentation of septic shock --          User Key  (r) = Recorded By, (t) = Taken By, (c) = Cosigned By    [address] Name Provider Type    OLMAN Fernandes DO Physician

## 2021-05-29 NOTE — ASSESSMENT & PLAN NOTE
· Continue metoprolol succinate 12 5 mg daily and Lasix 20 mg daily   · Blood pressure now controlled    Initial elevation most likely due to pain  · Continue to monitor

## 2021-05-30 LAB
ALBUMIN SERPL BCP-MCNC: 2.8 G/DL (ref 3.5–5)
ALP SERPL-CCNC: 74 U/L (ref 46–116)
ALT SERPL W P-5'-P-CCNC: 25 U/L (ref 12–78)
ANION GAP SERPL CALCULATED.3IONS-SCNC: 11 MMOL/L (ref 4–13)
AST SERPL W P-5'-P-CCNC: 42 U/L (ref 5–45)
ATRIAL RATE: 133 BPM
ATRIAL RATE: 75 BPM
ATRIAL RATE: 86 BPM
ATRIAL RATE: 89 BPM
ATRIAL RATE: 94 BPM
BILIRUB SERPL-MCNC: 0.2 MG/DL (ref 0.2–1)
BUN SERPL-MCNC: 20 MG/DL (ref 5–25)
CALCIUM ALBUM COR SERPL-MCNC: 9.2 MG/DL (ref 8.3–10.1)
CALCIUM SERPL-MCNC: 8.2 MG/DL (ref 8.3–10.1)
CHLORIDE SERPL-SCNC: 104 MMOL/L (ref 100–108)
CHOLEST SERPL-MCNC: 119 MG/DL (ref 50–200)
CO2 SERPL-SCNC: 24 MMOL/L (ref 21–32)
CREAT SERPL-MCNC: 1.29 MG/DL (ref 0.6–1.3)
GFR SERPL CREATININE-BSD FRML MDRD: 41 ML/MIN/1.73SQ M
GLUCOSE SERPL-MCNC: 95 MG/DL (ref 65–140)
HDLC SERPL-MCNC: 58 MG/DL
LDLC SERPL CALC-MCNC: 42 MG/DL (ref 0–100)
MAGNESIUM SERPL-MCNC: 2.1 MG/DL (ref 1.6–2.6)
NONHDLC SERPL-MCNC: 61 MG/DL
P AXIS: 53 DEGREES
P AXIS: 72 DEGREES
P AXIS: 76 DEGREES
P AXIS: 85 DEGREES
POTASSIUM SERPL-SCNC: 3.3 MMOL/L (ref 3.5–5.3)
PR INTERVAL: 138 MS
PR INTERVAL: 140 MS
PR INTERVAL: 156 MS
PR INTERVAL: 162 MS
PROCALCITONIN SERPL-MCNC: 4.24 NG/ML
PROT SERPL-MCNC: 6.5 G/DL (ref 6.4–8.2)
QRS AXIS: -34 DEGREES
QRS AXIS: -41 DEGREES
QRS AXIS: -45 DEGREES
QRS AXIS: -49 DEGREES
QRS AXIS: -53 DEGREES
QRSD INTERVAL: 130 MS
QRSD INTERVAL: 130 MS
QRSD INTERVAL: 132 MS
QRSD INTERVAL: 134 MS
QRSD INTERVAL: 142 MS
QT INTERVAL: 346 MS
QT INTERVAL: 380 MS
QT INTERVAL: 384 MS
QT INTERVAL: 408 MS
QT INTERVAL: 422 MS
QTC INTERVAL: 462 MS
QTC INTERVAL: 471 MS
QTC INTERVAL: 480 MS
QTC INTERVAL: 488 MS
QTC INTERVAL: 528 MS
SODIUM SERPL-SCNC: 139 MMOL/L (ref 136–145)
T WAVE AXIS: 45 DEGREES
T WAVE AXIS: 55 DEGREES
T WAVE AXIS: 6 DEGREES
T WAVE AXIS: 63 DEGREES
T WAVE AXIS: 78 DEGREES
TRIGL SERPL-MCNC: 97 MG/DL
TROPONIN I SERPL-MCNC: 0.12 NG/ML
VENTRICULAR RATE: 140 BPM
VENTRICULAR RATE: 75 BPM
VENTRICULAR RATE: 86 BPM
VENTRICULAR RATE: 89 BPM
VENTRICULAR RATE: 94 BPM

## 2021-05-30 PROCEDURE — 99232 SBSQ HOSP IP/OBS MODERATE 35: CPT | Performed by: INTERNAL MEDICINE

## 2021-05-30 PROCEDURE — 80061 LIPID PANEL: CPT | Performed by: INTERNAL MEDICINE

## 2021-05-30 PROCEDURE — 80053 COMPREHEN METABOLIC PANEL: CPT | Performed by: INTERNAL MEDICINE

## 2021-05-30 PROCEDURE — 83735 ASSAY OF MAGNESIUM: CPT | Performed by: INTERNAL MEDICINE

## 2021-05-30 PROCEDURE — 84145 PROCALCITONIN (PCT): CPT | Performed by: EMERGENCY MEDICINE

## 2021-05-30 PROCEDURE — 84484 ASSAY OF TROPONIN QUANT: CPT | Performed by: INTERNAL MEDICINE

## 2021-05-30 PROCEDURE — 93010 ELECTROCARDIOGRAM REPORT: CPT | Performed by: INTERNAL MEDICINE

## 2021-05-30 PROCEDURE — 87040 BLOOD CULTURE FOR BACTERIA: CPT | Performed by: INTERNAL MEDICINE

## 2021-05-30 RX ORDER — TRAMADOL HYDROCHLORIDE 50 MG/1
50 TABLET ORAL ONCE
Status: COMPLETED | OUTPATIENT
Start: 2021-05-30 | End: 2021-05-31

## 2021-05-30 RX ORDER — HYDROMORPHONE HCL/PF 1 MG/ML
0.5 SYRINGE (ML) INJECTION ONCE
Status: COMPLETED | OUTPATIENT
Start: 2021-05-30 | End: 2021-05-30

## 2021-05-30 RX ORDER — POTASSIUM CHLORIDE 20 MEQ/1
40 TABLET, EXTENDED RELEASE ORAL ONCE
Status: COMPLETED | OUTPATIENT
Start: 2021-05-30 | End: 2021-05-30

## 2021-05-30 RX ORDER — HYDROMORPHONE HCL/PF 1 MG/ML
0.5 SYRINGE (ML) INJECTION EVERY 8 HOURS PRN
Status: DISCONTINUED | OUTPATIENT
Start: 2021-05-30 | End: 2021-06-02 | Stop reason: HOSPADM

## 2021-05-30 RX ADMIN — ATORVASTATIN CALCIUM 40 MG: 40 TABLET, FILM COATED ORAL at 17:02

## 2021-05-30 RX ADMIN — PANTOPRAZOLE SODIUM 40 MG: 40 TABLET, DELAYED RELEASE ORAL at 08:09

## 2021-05-30 RX ADMIN — CEFEPIME HYDROCHLORIDE 2000 MG: 2 INJECTION, SOLUTION INTRAVENOUS at 03:14

## 2021-05-30 RX ADMIN — TRAMADOL HYDROCHLORIDE 50 MG: 50 TABLET, FILM COATED ORAL at 06:32

## 2021-05-30 RX ADMIN — POTASSIUM CHLORIDE 40 MEQ: 1500 TABLET, EXTENDED RELEASE ORAL at 17:15

## 2021-05-30 RX ADMIN — METOPROLOL SUCCINATE 12.5 MG: 25 TABLET, FILM COATED, EXTENDED RELEASE ORAL at 08:09

## 2021-05-30 RX ADMIN — HEPARIN SODIUM 5000 UNITS: 5000 INJECTION INTRAVENOUS; SUBCUTANEOUS at 21:11

## 2021-05-30 RX ADMIN — VANCOMYCIN HYDROCHLORIDE 750 MG: 750 INJECTION, SOLUTION INTRAVENOUS at 09:28

## 2021-05-30 RX ADMIN — HYDROXYCHLOROQUINE SULFATE 200 MG: 200 TABLET, FILM COATED ORAL at 06:32

## 2021-05-30 RX ADMIN — AZELASTINE HYDROCHLORIDE 1 SPRAY: 137 SPRAY, METERED NASAL at 08:12

## 2021-05-30 RX ADMIN — TRAMADOL HYDROCHLORIDE 50 MG: 50 TABLET, FILM COATED ORAL at 19:27

## 2021-05-30 RX ADMIN — ACETAMINOPHEN 650 MG: 325 TABLET, FILM COATED ORAL at 03:13

## 2021-05-30 RX ADMIN — HYDROMORPHONE HYDROCHLORIDE 0.5 MG: 1 INJECTION, SOLUTION INTRAMUSCULAR; INTRAVENOUS; SUBCUTANEOUS at 17:07

## 2021-05-30 RX ADMIN — PANTOPRAZOLE SODIUM 40 MG: 40 TABLET, DELAYED RELEASE ORAL at 17:02

## 2021-05-30 RX ADMIN — MULTIPLE VITAMINS W/ MINERALS TAB 1 TABLET: TAB ORAL at 08:09

## 2021-05-30 RX ADMIN — ASPIRIN 325 MG ORAL TABLET 325 MG: 325 PILL ORAL at 08:09

## 2021-05-30 RX ADMIN — TRAMADOL HYDROCHLORIDE 50 MG: 50 TABLET, FILM COATED ORAL at 13:15

## 2021-05-30 RX ADMIN — ACETAMINOPHEN 650 MG: 325 TABLET, FILM COATED ORAL at 19:27

## 2021-05-30 RX ADMIN — CEFEPIME HYDROCHLORIDE 2000 MG: 2 INJECTION, SOLUTION INTRAVENOUS at 17:02

## 2021-05-30 RX ADMIN — SODIUM CHLORIDE 75 ML/HR: 0.9 INJECTION, SOLUTION INTRAVENOUS at 17:09

## 2021-05-30 RX ADMIN — HYDROMORPHONE HYDROCHLORIDE 0.5 MG: 1 INJECTION, SOLUTION INTRAMUSCULAR; INTRAVENOUS; SUBCUTANEOUS at 22:16

## 2021-05-30 RX ADMIN — AZELASTINE HYDROCHLORIDE 1 SPRAY: 137 SPRAY, METERED NASAL at 17:58

## 2021-05-30 RX ADMIN — HEPARIN SODIUM 5000 UNITS: 5000 INJECTION INTRAVENOUS; SUBCUTANEOUS at 13:16

## 2021-05-30 NOTE — ASSESSMENT & PLAN NOTE
· Troponin 0 07   · Continue to trend  · Troponin peaked at 0 40 and this morning is 0 12  · Non MI related troponin elevation in the setting of sepsis  · Patient currently denying chest pain   · EKG normal sinus rhythm 86 beats per minute  · Cardiology consult appreciated

## 2021-05-30 NOTE — ASSESSMENT & PLAN NOTE
Lab Results   Component Value Date    EGFR 41 05/30/2021    EGFR 39 05/29/2021    EGFR 35 05/29/2021    CREATININE 1 29 05/30/2021    CREATININE 1 37 (H) 05/29/2021    CREATININE 1 48 (H) 05/29/2021   · Creatinine 1 48 on admission   · Baseline appears to be around 1 4-1 5   · Creatinine this morning is 1 29  · Avoid hypotension/nephrotoxins medication  · Monitor renal function in a m

## 2021-05-30 NOTE — ASSESSMENT & PLAN NOTE
· Patient presented to the ED due to worsening low back pain  Patient had been recently placed on a prednisone taper, last dose was earlier today  Patient reports that she felt her back pain is getting worse as the prednisone dosage decreased  · Pain is described as constant, radiates down both legs, worse with movements  Earlier in the day she was able to ambulate then unable to ambulate without walker  · Took Tylenol and tramadol before coming to the ED  · Febrile in the ED, meeting SIRS criteria, ED doc reports poor rectal tone  Concerned for possible back abscess  Spoke to neuro surgery who recommended patient have an MRI of her back performed  Patient currently refusing  Discussed with patient and her  and she refuses MRI  Patient is aware of the risk of undiagnosed abscess/diskitis   · On cefepime and vancomycin  · Infectious disease consult  · Trend WBC and fever curve  · Blood cultures growing Gram-negative rods  Repeat blood cultures  · Consult PT/OT  · Discussed with ID and Neurosurgery Dr Reena Packer who recommended ideally MRI is recommended but since patient is refusing to talk to radiologist   I spoke with radiologist who agreed with getting CT thoracic and lumbar with contrast and if there is no abscess/diskitis then it does not exclude it completely  · Discussed regarding Radiology recommendation to Dr Reena Packer and he felt that once CT is done tomorrow to re-evaluate for regarding transferring the patient to [place] for getting MRI with anesthesia for Wednesday if needed

## 2021-05-30 NOTE — CONSULTS
Consultation - Infectious Disease   Yue Ballesteros 65 y o  female MRN: [id number]  Unit/Bed#: -01 Encounter: [id number]      Assessment/Plan   1  E coli septicemia/Fever/Back pain: Pt presented with increasing low back pain x 3 weeks  CT of lumbar spine without contrast was done on 5/4 which showed significant DDD and she was tx'd with Prednisone  Her back pain increased as the steroid dose was tapered  On admission, she had fever so discitis/vertebral osteo/epidural abscess need to be considered with worsening back pain  Abd CT also showed DDD but there was gas seen in the endomdetrial canal - ?presence of fistula to bowel  Neurosurgery recommended MRI of lumbar spine but Pt refused because of her significant back pain and her  states that she cannot have an MRI because of hardware in her ankle from previous surgery  ESR is 32 an dCRP is 38 9    Admission bld cx's are growing E coli - most likely from intra-abd source since UA did not show any pyuria to suggest presence of UTI  A  Cont current abx for now  B  Awaiting final cx results - if no evidence of MRSA infxn, will stop Vanco  C  Discussed case with Dr Maru Germain, since Pt cannot have MRI, would repeat CT of lumbar spine with IV contrast to eval for discitis/vertebral osteo/epidural abscess since Pt is bacteremia - her renal fcn needs to be optimized and she will need to be followed closely after she receives IV contrast - need to make sure that she is not dehydrated  D  Pt needs to be evaluated for presence of colo-vaginal fistula since Abd CT shows gas in the endometrial canal   E   Will re-eval Pt on Tuesday, please contact me if any new probs arise over the next two days     History of Present Illness   Physician Requesting Consult: Terence Price MD  Reason for Consult / Principal Problem: Back pain and fever    HPI: Yue Ballesteros is a 65y o  year old female with H/O GIST rectal tumor - s/p resection, CKD, HTN, HLD, A fib, GIB due to gastric AVM's, chronic diarrhea with incontinence, chronic back pain, ADHD, anxiety, DDD, asthma, breast CA, CAD, depression, s/p left TKR, and s/p ankle surgery with retained hardware who was admitted on 5/29 with c/o worsening back pain radiating to her legs x 3 weeks  She had CT of lumbar spine without contrast on 5/4 which showed significant degenerative disc disease  She was tx'd with Prednisone but back pain worsened as her steroid dose was tapered  On the day of admission, she developed increasing difficulty ambulating and presented to the ER  On admission, she had fever but WBC count was not elevated  Abd CT showed gas in the endometrial canal - ?possibility of fistula to the bowel  There was also degenerative changes of osseus structures with multilevel disc herniation but no acute fx of destructive osseous lesion  There is concern for presence of discitis or vertebral osteo with presence of back pain and fever  Neurosurgery recommended MRI of lumbar spine with and without ROGELIO but Pt refused because of her back pain  She was initially started on Rocephin but abx regimen was changed to Vanco and Cefepime  UA did not show any pyuria  Bld cx's are growing E coli  Pt denies change in her chronic abd discomfort  Pt's  reports that the Pt cannot have an MRI because of hardware in her ankle  ESR is 32 and CRP is 38 9    She denies cough, SOB, CP, N/V/D, or dysuria  COVID19 test is neg     Inpatient consult to Infectious Diseases  Consult performed by: Everette Peralta MD  Consult ordered by: Jaidne Herndon MD          ROS: 12 systems reviewed, remainder is neg      Historical Information   Past Medical History:   Diagnosis Date    ADHD     Anemia     Anxiety     Arthritis     Asthma     Atrial fibrillation (HCC)     Breast cancer (HCC)     Cancer ([facility] [address] [facility]     Chronic iron deficiency anemia 6/9/2020    Extensive GI evaluation over the last year including multiple EGD, colonoscopy, and capsule endoscopy  Has had gastric AVMs cauterized, Dieulafoy's lesion clipped, and most recently a Seb erosion cauterized    Coronary artery disease     Depression     GERD (gastroesophageal reflux disease)     History of stomach ulcers     Hypercholesterolemia     Hypertension     Kidney disease     Metastatic cancer ([facility] [address] )      Past Surgical History:   Procedure Laterality Date    ANKLE SURGERY      APPENDECTOMY      BREAST SURGERY      CATARACT EXTRACTION       SECTION      COLONOSCOPY  2019    Multiple adenomatous colon polyps and internal hemorrhoids  Three year recall advised    HIP SURGERY      JOINT REPLACEMENT  2019    Left knee replacement    LAMINECTOMY      ROTATOR CUFF REPAIR      SIGMOID RESECTION / RECTOPEXY      SINUS SURGERY      UPPER GASTROINTESTINAL ENDOSCOPY  2020    Dieulafoy's lesion in proximal stomach which was actively oozing  Injected with epinephrine and 2 Endoclips placed with control of bleeding, hiatal hernia      UPPER GASTROINTESTINAL ENDOSCOPY  2020    Bleeding Seb's erosion status post cauterization     Social History   Social History     Substance and Sexual Activity   Alcohol Use Not Currently    Frequency: Never    Drinks per session: 1 or 2    Binge frequency: Never     Social History     Substance and Sexual Activity   Drug Use No     Social History     Tobacco Use   Smoking Status Former Smoker    Years: 20 00   Smokeless Tobacco Never Used     Family History   Problem Relation Age of Onset   Chi Pert Breast cancer Mother     Diabetes Mother     Hypertension Mother     Lung cancer Mother     Hyperlipidemia Father     Prostate cancer Father     Colon cancer Paternal Grandmother     Colon cancer Paternal Grandfather     Diabetes Family     Substance Abuse Neg Hx     Mental illness Neg Hx        Meds/Allergies   MEDS:  Vanco: #2  Cefepime: #2        Completed:   Rocephin x 1 dose on       Current Facility-Administered Medications:     acetaminophen (TYLENOL) tablet 650 mg, 650 mg, Oral, Q6H PRN, Fay Horton PA-C, 650 mg at 05/30/21 6498    aspirin tablet 325 mg, 325 mg, Oral, Daily, Miranda Easton MD, 325 mg at 05/30/21 0809    atorvastatin (LIPITOR) tablet 40 mg, 40 mg, Oral, Daily With Asmita Perry MD, 40 mg at 05/29/21 1654    Avapritinib TABS 200 mg, 200 mg, Oral, Daily, Fay Horton PA-C    azelastine (ASTELIN) 0 1 % nasal spray 1 spray, 1 spray, Each Nare, BID, Fay Horton PA-C, 1 spray at 05/30/21 8863    cefepime (MAXIPIME) IVPB (premix in dextrose) 2,000 mg 50 mL, 2,000 mg, Intravenous, Q12H, Stewart Burnette MD, Last Rate: 100 mL/hr at 05/30/21 0314, 2,000 mg at 05/30/21 0314    heparin (porcine) subcutaneous injection 5,000 Units, 5,000 Units, Subcutaneous, Q8H [address], 5,000 Units at 05/30/21 1316 **AND** Platelet count, , , Once, Fay Horton PA-C    hydroxychloroquine (PLAQUENIL) tablet 200 mg, 200 mg, Oral, Early Morning, Arelis David PA-C, 200 mg at 05/30/21 7656    metoprolol succinate (TOPROL-XL) 24 hr tablet 12 5 mg, 12 5 mg, Oral, Daily, Arelis David PA-C, 12 5 mg at 05/30/21 0809    multivitamin-minerals (CENTRUM ADULTS) tablet 1 tablet, 1 tablet, Oral, Daily, Fay Horton PA-C, 1 tablet at 05/30/21 0809    ondansetron (ZOFRAN) injection 4 mg, 4 mg, Intravenous, Q6H PRN, Fay Horton PA-C    pantoprazole (PROTONIX) EC tablet 40 mg, 40 mg, Oral, BID, Arelis David PA-C, 40 mg at 05/30/21 0809    sodium chloride 0 9 % infusion, 75 mL/hr, Intravenous, Continuous, Miranda Easton MD, Last Rate: 75 mL/hr at 05/29/21 1010, 75 mL/hr at 05/29/21 1010    traMADol (ULTRAM) tablet 50 mg, 50 mg, Oral, Q6H PRN, Arelis David PA-C, 50 mg at 05/30/21 1315    traMADol (ULTRAM) tablet 50 mg, 50 mg, Oral, Once, Fay Horton PA-C, Stopped at 05/30/21 0345    vancomycin (VANCOCIN) IVPB (premix in dextrose) 750 mg 150 mL, 15 mg/kg, Intravenous, Q24H, Miranda Easton MD, Last Rate: 150 mL/hr at 05/30/21 0928, 750 mg at 05/30/21 5870    Allergies   Allergen Reactions    Codeine Other (See Comments)     Throat closes    Codeine Sulfate Throat Swelling    Neomycin-Bacitracin Zn-Polymyx Rash    Wound Dressing Adhesive Other (See Comments)     If its on too long- pt starts itching    Zolmitriptan Palpitations     Heart palpitations    Generic for Zomig           Intake/Output Summary (Last 24 hours) at 5/30/2021 1341  Last data filed at 5/30/2021 1252  Gross per 24 hour   Intake 120 ml   Output 1050 ml   Net -930 ml       PE:  WD, WN, WF who c/o low back pain  VSS, Tmax: 98 7  HEENT:  No scleral icterus, pharynx clear  NECK: Supple  CARDIAC:  RRR, nml S1, S2  LUNGS:  Clear anteriorly  ABDOMEN:  +BS, soft, nontender  EXTREMITIES:  No calf tenderness  SKIN: No rash  NEURO: Able to move both legs    Invasive Devices:   Peripheral IV 05/29/21 Right Antecubital (Active)   Site Assessment WDL 05/30/21 0900   Dressing Type Transparent 05/30/21 0900   Line Status Infusing 05/30/21 0900   Dressing Status Intact 05/30/21 0900           Lab Results:   Admission on 05/29/2021   Component Date Value    WBC 05/29/2021 7 32     RBC 05/29/2021 2 69*    Hemoglobin 05/29/2021 9 0*    Hematocrit 05/29/2021 27 3*    MCV 05/29/2021 102*    MCH 05/29/2021 33 5     MCHC 05/29/2021 33 0     RDW 05/29/2021 16 6*    MPV 05/29/2021 9 8     Platelets 31/11/3583 291     nRBC 05/29/2021 0     Neutrophils Relative 05/29/2021 92*    Immat GRANS % 05/29/2021 1     Lymphocytes Relative 05/29/2021 4*    Monocytes Relative 05/29/2021 3*    Eosinophils Relative 05/29/2021 0     Basophils Relative 05/29/2021 0     Neutrophils Absolute 05/29/2021 6 79     Immature Grans Absolute 05/29/2021 0 06     Lymphocytes Absolute 05/29/2021 0 28*    Monocytes Absolute 05/29/2021 0 18     Eosinophils Absolute 05/29/2021 0 00     Basophils Absolute 05/29/2021 0 01     Sodium 05/29/2021 138     Potassium 05/29/2021 4 4     Chloride 05/29/2021 99*    CO2 05/29/2021 28     ANION GAP 05/29/2021 11     BUN 05/29/2021 21     Creatinine 05/29/2021 1 48*    Glucose 05/29/2021 99     Calcium 05/29/2021 8 3     Corrected Calcium 05/29/2021 8 8     AST 05/29/2021 63*    ALT 05/29/2021 30     Alkaline Phosphatase 05/29/2021 87     Total Protein 05/29/2021 7 6     Albumin 05/29/2021 3 4*    Total Bilirubin 05/29/2021 0 50     eGFR 05/29/2021 35     LACTIC ACID 05/29/2021 1 8     Procalcitonin 05/29/2021 1 32*    Protime 05/29/2021 13 6     INR 05/29/2021 1 04     PTT 05/29/2021 24     Blood Culture 05/29/2021 Gram Negative Koko Enteric Like*    Gram Stain Result 05/29/2021 Gram negative rods*    Blood Culture 05/29/2021 Gram Negative Koko Enteric Like*    Gram Stain Result 05/29/2021 Gram negative rods*    Color, UA 05/29/2021 Yellow     Clarity, UA 05/29/2021 Slightly Cloudy     Specific Gravity, UA 05/29/2021 1 015     pH, UA 05/29/2021 7 0     Leukocytes, UA 05/29/2021 Negative     Nitrite, UA 05/29/2021 Negative     Protein, UA 05/29/2021 Negative     Glucose, UA 05/29/2021 Negative     Ketones, UA 05/29/2021 Negative     Urobilinogen, UA 05/29/2021 0 2     Bilirubin, UA 05/29/2021 Negative     Blood, UA 05/29/2021 Trace-Intact*    Troponin I 05/29/2021 0 07*    SARS-CoV-2 05/29/2021 Negative     Sed Rate 05/29/2021 32*    CRP 05/29/2021 38 9*    RBC, UA 05/29/2021 None Seen     WBC, UA 05/29/2021 None Seen     Epithelial Cells 05/29/2021 None Seen     Bacteria, UA 05/29/2021 Innumerable*    Troponin I 05/29/2021 0 28*    Troponin I 05/29/2021 0 35*    Sodium 05/29/2021 136     Potassium 05/29/2021 3 9     Chloride 05/29/2021 98*    CO2 05/29/2021 30     ANION GAP 05/29/2021 8     BUN 05/29/2021 18     Creatinine 05/29/2021 1 37*    Glucose 05/29/2021 103     Calcium 05/29/2021 8 4     eGFR 05/29/2021 39     Troponin I 05/29/2021 0 40*    Procalcitonin 05/30/2021 4 24*    Troponin I 05/29/2021 0 24*    Cholesterol 05/30/2021 119     Triglycerides 05/30/2021 97     HDL, Direct 05/30/2021 58     LDL Calculated 05/30/2021 42     Non-HDL-Chol (CHOL-HDL) 05/30/2021 61     Troponin I 05/30/2021 0 12*    Sodium 05/30/2021 139     Potassium 05/30/2021 3 3*    Chloride 05/30/2021 104     CO2 05/30/2021 24     ANION GAP 05/30/2021 11     BUN 05/30/2021 20     Creatinine 05/30/2021 1 29     Glucose 05/30/2021 95     Calcium 05/30/2021 8 2*    Corrected Calcium 05/30/2021 9 2     AST 05/30/2021 42     ALT 05/30/2021 25     Alkaline Phosphatase 05/30/2021 74     Total Protein 05/30/2021 6 5     Albumin 05/30/2021 2 8*    Total Bilirubin 05/30/2021 0 20     eGFR 05/30/2021 41     Magnesium 05/30/2021 2 1     Ventricular Rate 05/29/2021 75     Atrial Rate 05/29/2021 75     CO Interval 05/29/2021 162     QRSD Interval 05/29/2021 130     QT Interval 05/29/2021 422     QTC Interval 05/29/2021 471     P Axis 05/29/2021 72     QRS Axis 05/29/2021 -34     T Wave Axis 05/29/2021 6     Ventricular Rate 05/29/2021 94     Atrial Rate 05/29/2021 94     CO Interval 05/29/2021 138     QRSD Interval 05/29/2021 132     QT Interval 05/29/2021 384     QTC Interval 05/29/2021 480     P Axis 05/29/2021 76     QRS Axis 05/29/2021 -53     T Wave Axis 05/29/2021 63     Ventricular Rate 05/29/2021 89     Atrial Rate 05/29/2021 89     CO Interval 05/29/2021 140     QRSD Interval 05/29/2021 134     QT Interval 05/29/2021 380     QTC Interval 05/29/2021 462     P Axis 05/29/2021 53     QRS Axis 05/29/2021 -45     T Wave Axis 05/29/2021 45     Ventricular Rate 05/29/2021 140     Atrial Rate 05/29/2021 133     QRSD Interval 05/29/2021 130     QT Interval 05/29/2021 346     QTC Interval 05/29/2021 528     QRS Axis 05/29/2021 -49     T Wave Axis 05/29/2021 78     Ventricular Rate 05/29/2021 86     Atrial Rate 05/29/2021 86     CO Interval 05/29/2021 156     QRSD Interval 05/29/2021 142     QT Interval 05/29/2021 408     QTC Interval 05/29/2021 488     P Axis 05/29/2021 85     QRS Axis 05/29/2021 -39     T Wave Axis 05/29/2021 55      Imaging Studies: I have personally reviewed pertinent reports  EKG, Pathology, and Other Studies: I have personally reviewed pertinent reports  Culture  Lab Results   Component Value Date    BLOODCX Gram Negative Koko Enteric Like (A) 05/29/2021    BLOODCX Gram Negative Koko Enteric Like (A) 05/29/2021    BLOODCX No Growth After 5 Days  02/19/2020    BLOODCX No Growth After 5 Days  02/19/2020    BLOODCX No Growth After 5 Days  02/06/2020    BLOODCX No Growth After 5 Days   02/06/2020     No results found for: WOUNDCULT  No results found for: URINECX  No results found for: SPUTUMCULTUR    Principal Problem:    Acute exacerbation of chronic low back pain  Active Problems:    GIST (gastrointestinal stromal tumor), malignant (HCC)    Essential hypertension    Sepsis (HCC)    Elevated troponin    Stage 3 chronic kidney disease ([facility] [address] )

## 2021-05-30 NOTE — UTILIZATION REVIEW
Initial Clinical Review    Admission: Date/Time/Statement:   Admission Orders (From admission, onward)     Ordered        05/29/21 0525  Inpatient Admission  Once                   Orders Placed This Encounter   Procedures    Inpatient Admission     Standing Status:   Standing     Number of Occurrences:   1     Order Specific Question:   Level of Care     Answer:   Med Surg [16]     Order Specific Question:   Estimated length of stay     Answer:   More than 2 Midnights     Order Specific Question:   Certification     Answer:   I certify that inpatient services are medically necessary for this patient for a duration of greater than two midnights  See H&P and MD Progress Notes for additional information about the patient's course of treatment  ED Arrival Information     Expected Arrival Acuity Means of Arrival Escorted By Service Admission Type    5/29/2021 01:49 5/29/2021 01:48 [facility] [employer] [facility] Urgent    Arrival Complaint    Back Pain        Chief Complaint   Patient presents with    Back Pain     chronic back pain worsening over past 3 weeks  Initial Presentation: 65year old female to the ED from home via EMS with complaints of worsenign lower back pain  Admitted to inpatient for acute exacerbation of low back pain, elevated troponin, sepsis  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, on avapritinib, HTN, HLD, pAfib   Had been tapering down prednisone for back pain  Difficulty moving on day of arrival and now unable to stand, ambulate due to pain  On arrival, she is pale, febrile, has elevated bp, anxious, tearful, has decreased ROM, tenderness, spasm in left lumbar paraspinal area  Strength is 5 out of 5 in bilateral lower extremities- able to push down equally on the "gas pedals", able to lift and move bilateral lower extremities off the bed, but minimally as it results in significant back pain and she winces and cries out   She has abnormal anal tone  Troponin elevated, CRP and CSed rate elevated  Discussed with neurosurgery concerning for epidural abscess  [facility] need [facility] for [facility]  Currently refusing MRI  Given IV fluids in the ED  IV abx started  Blood cultures pending  Troponin elevated, no check pain  IV dilaudid given in the ED  Neurosurgery consult, cardiology consult  5/29 Cardiology consult: Elevated troponin in the setting of sepsis  Probable non mi troponin elevation  INtermediate risk of CAD with abnormal resting EKG  Will need nuclear stress test when acute medical issues resolved  Continue BB and statins  Tele currently with NSR with AP[facility]  COntinue to monitor  Check ECHO  5/29 Neurosurg consult: Longstanding h/o back pain with pain management and h/o GIST with previous abdominal surgery/rectal surgery  Date: 5/30    Day 2:  Troponin remains elevated  Continues to refuse MRI  Will get CT T and L spine with possible transfer for MRI under sedation  Blood cultures growing gram neg rods  Continue IV abx, ID consult       ED Triage Vitals [05/29/21 0150]   Temperature Pulse Respirations Blood Pressure SpO2   (!) 102 2 °F (39 °C) 95 20 (!) 183/90 97 %      Temp Source Heart Rate Source Patient Position - Orthostatic VS BP Location FiO2 (%)   Oral Monitor Lying Left arm --      Pain Score       Worst Possible Pain          Wt Readings from Last 1 Encounters:   05/29/21 55 3 kg (122 lb)     Additional Vital Signs:   Time  Temp  Pulse  Resp  BP  MAP (mmHg)  SpO2  Calculated FIO2 (%) - Nasal Cannula  Nasal Cannula O2 Flow Rate (L/min)  O2 Device  Patient Position - Orthostatic VS   05/30/21 0900  --  --  --  --  --  97 %  --  --  None (Room air)  --   05/30/21 07:25:01  98 3 °F (36 8 °C)  --  16  110/62  78  --  --  --  --  --   05/29/21 23:36:53  98 4 °F (36 9 °C)  --  21  100/61  74  --  --  --  --  --   05/29/21 15:26:49  98 7 °F (37 1 °C)  59  18  95/60  72  97 %  --  --  --  --   05/29/21 0900  --  --  --  --  --  -- --  --  None (Room air)  --   05/29/21 0801  98 7 °F (37 1 °C)  81  18  111/71  84  --  --  --  --  --   05/29/21 0615  --  81  18  107/57  76  98 %  28  2 L/min  Nasal cannula  --   05/29/21 0600  --  73  18  92/54  70  99 %  --  --  --  --   05/29/21 0530  98 6 °F (37 °C)  90  18  129/62  89  100 %  28  2 L/min  Nasal cannula  Lying   05/29/21 0500  --  93  20  128/68  92  100 %  --  --  --  --   05/29/21 0430  --  135Abnormal   22  151/79  107  99 %  28  2 L/min  Nasal cannula  Lying   05/29/21 0415  --  133Abnormal   22  144/75  104  99 %  --  --  --  --   05/29/21 0400  --  133Abnormal   24Abnormal   152/81  112  100 %  --  --  --  --   05/29/21 0345  --  134Abnormal   22  161/97  122  100 %  --  --  --  --   05/29/21 0330  100 6 °F (38 1 °C)Abnormal   135Abnormal   22  163/78  111  100 %  28  2 L/min  Nasal cannula  Lying   05/29/21 0300  --  99  18  164/77  110  98 %  28  2 L/min  Nasal cannula  --   05/29/21 0230  --  94  17  155/75  107  99 %  28  2 L/min  Nasal cannula  --   05/29/21 0216  --  --  --  --  --  99 %  28  2 L/min  Nasal cannula  --   05/29/21 0215  --  92  18  --  --  87 %Abnormal    --  --  None (Room air)  --   SpO2: DR Nguyen Goss AT BEDSIDE- PLACED PT ON 2LO2 NC at 05/29/21 0215   05/29/21 0150  102 2 °F (39 °C)Abnormal   95  20  183/90Abnormal   --  97 %  --  --  None (Room air         Pertinent Labs/Diagnostic Test Results:   5/29 CT A/P:   Gas is seen within the endometrial canal   Differential diagnosis includes recent instrumentation, endometritis, and/or fistula to the bowel  5/29 CXR:   No acute cardiopulmonary disease     5/29 EKG: Sinus rhythm with Premature atrial complexes in a pattern of bigeminy  Left axis deviation  Right bundle branch block  Minimal voltage criteria for LVH, may be normal variant  Abnormal ECG    Results from last 7 days   Lab Units 05/29/21  0212   SARS-COV-2  Negative     Results from last 7 days   Lab Units 05/29/21  0212   WBC Thousand/uL 7 32 HEMOGLOBIN g/dL 9 0*   HEMATOCRIT % 27 3*   PLATELETS Thousands/uL 291   NEUTROS ABS Thousands/µL 6 79         Results from last 7 days   Lab Units 05/30/21  0619 05/29/21  0831 05/29/21  0212   SODIUM mmol/L 139 136 138   POTASSIUM mmol/L 3 3* 3 9 4 4   CHLORIDE mmol/L 104 98* 99*   CO2 mmol/L 24 30 28   ANION GAP mmol/L 11 8 11   BUN mg/dL 20 18 21   CREATININE mg/dL 1 29 1 37* 1 48*   EGFR ml/min/1 73sq m 41 39 35   CALCIUM mg/dL 8 2* 8 4 8 3     Results from last 7 days   Lab Units 05/30/21  0619 05/29/21  0212   AST U/L 42 63*   ALT U/L 25 30   ALK PHOS U/L 74 87   TOTAL PROTEIN g/dL 6 5 7 6   ALBUMIN g/dL 2 8* 3 4*   TOTAL BILIRUBIN mg/dL 0 20 0 50         Results from last 7 days   Lab Units 05/30/21  0619 05/29/21  0831 05/29/21  0212   GLUCOSE RANDOM mg/dL 95 103 99         Results from last 7 days   Lab Units 05/30/21  0619 05/29/21  1415 05/29/21  1125 05/29/21  0811 05/29/21  0532 05/29/21  0212   TROPONIN I ng/mL 0 12* 0 24* 0 40* 0 35* 0 28* 0 07*         Results from last 7 days   Lab Units 05/29/21  0212   PROTIME seconds 13 6   INR  1 04   PTT seconds 24         Results from last 7 days   Lab Units 05/29/21  0212   PROCALCITONIN ng/ml 1 32*     Results from last 7 days   Lab Units 05/29/21  0212   LACTIC ACID mmol/L 1 8     Results from last 7 days   Lab Units 05/29/21  0212   CRP mg/L 38 9*   SED RATE mm/hour 32*         Results from last 7 days   Lab Units 05/29/21  0258   CLARITY UA  Slightly Cloudy   COLOR UA  Yellow   SPEC GRAV UA  1 015   PH UA  7 0   GLUCOSE UA mg/dl Negative   KETONES UA mg/dl Negative   BLOOD UA  Trace-Intact*   PROTEIN UA mg/dl Negative   NITRITE UA  Negative   BILIRUBIN UA  Negative   UROBILINOGEN UA E U /dl 0 2   LEUKOCYTES UA  Negative   WBC UA /hpf None Seen   RBC UA /hpf None Seen   BACTERIA UA /hpf Innumerable*   EPITHELIAL CELLS WET PREP /hpf None Seen     Results from last 7 days   Lab Units 05/29/21  0212   GRAM STAIN RESULT  Gram negative rods*  Gram negative rods*     ED Treatment:   Medication Administration from 05/29/2021 0147 to 05/29/2021 0753       Date/Time Order Dose Route Action     05/29/2021 0223 multi-electrolyte (ISOLYTE-S PH 7 4 equivalent) IV solution 1,000 mL 1,000 mL Intravenous New Bag     05/29/2021 0301 multi-electrolyte (PLASMALYTE-A/ISOLYTE-S PH 7 4) IV solution 1,000 mL 1,000 mL Intravenous New Bag     05/29/2021 0220 HYDROmorphone (DILAUDID) injection 1 mg 1 mg Intravenous Given     05/29/2021 0227 ondansetron (ZOFRAN) injection 4 mg 4 mg Intravenous Given     05/29/2021 0230 lidocaine (LIDODERM) 5 % patch 1 patch 1 patch Topical Medication Applied     05/29/2021 0308 aspirin chewable tablet 324 mg 324 mg Oral Given     05/29/2021 0308 acetaminophen (TYLENOL) tablet 975 mg 975 mg Oral Given     05/29/2021 0426 cefTRIAXone (ROCEPHIN) IVPB (premix in dextrose) 1,000 mg 50 mL 1,000 mg Intravenous New Bag        Past Medical History:   Diagnosis Date    ADHD     Anemia     Anxiety     Arthritis     Asthma     Atrial fibrillation (HCC)     Breast cancer ([facility] )     Cancer ([place] [address] )     Chronic iron deficiency anemia 6/9/2020    Extensive GI evaluation over the last year including multiple EGD, colonoscopy, and capsule endoscopy    Has had gastric AVMs cauterized, Dieulafoy's lesion clipped, and most recently a [place] erosion cauterized    Coronary artery disease     Depression     GERD (gastroesophageal reflux disease)     History of stomach ulcers     Hypercholesterolemia     Hypertension     Kidney disease     Metastatic cancer ([facility] )        Admitting Diagnosis: UTI (urinary tract infection) [N39 0]  Tachycardia [R00 0]  Elevated troponin [R77 8]  Acute exacerbation of chronic low back pain [M54 5, G89 29]  Sepsis ([facility] ) [A41 9]  Age/Sex: 65 y o  female  Admission Orders:  Scheduled Medications:  aspirin, 325 mg, Oral, Daily  atorvastatin, 40 mg, Oral, Daily With Dinner  Avapritinib, 200 mg, Oral, Daily  azelastine, 1 spray, Each Nare, BID  cefepime, 2,000 mg, Intravenous, Q12H  heparin (porcine), 5,000 Units, Subcutaneous, Q8H [facility]  hydroxychloroquine, 200 mg, Oral, Early Morning  metoprolol succinate, 12 5 mg, Oral, Daily  multivitamin-minerals, 1 tablet, Oral, Daily  pantoprazole, 40 mg, Oral, BID  traMADol, 50 mg, Oral, Once  vancomycin, 15 mg/kg, Intravenous, Q24H      Continuous IV Infusions:  sodium chloride, 75 mL/hr, Intravenous, Continuous      PRN Meds:  acetaminophen, 650 mg, Oral, Q6H PRN  ondansetron, 4 mg, Intravenous, Q6H PRN  traMADol, 50 mg, Oral, Q6H PRN x3        IP CONSULT TO CASE MANAGEMENT  IP CONSULT TO CARDIOLOGY  IP CONSULT TO INFECTIOUS DISEASES  IP CONSULT TO PHARMACY    Network Utilization Review Department  ATTENTION: Please call with any questions or concerns to [phone] and carefully listen to the prompts so that you are directed to the right person  All voicemails are confidential   [place] all requests for admission clinical reviews, approved or denied determinations and any other requests to dedicated fax number below belonging to the campus where the patient is receiving treatment   List of dedicated fax numbers for the Facilities:  [address] DENIALS (Administrative/Medical Necessity) [phone]   [address] (Maternity/NICU/Pediatrics) [phone]   [address] Holden Gee [id number] [place] [address] [address] [address] [facility] [place] [address] [phone]

## 2021-05-30 NOTE — PLAN OF CARE
Problem: Potential for Falls  Goal: Patient will remain free of falls  Description: INTERVENTIONS:  - Assess patient frequently for physical needs  -  Identify cognitive and physical deficits and behaviors that affect risk of falls    -  [place] fall precautions as indicated by assessment   - Educate patient/family on patient safety including physical limitations  - Instruct patient to call for assistance with activity based on assessment  - Modify environment to reduce risk of injury  - Consider OT/PT consult to assist with strengthening/mobility  Outcome: Progressing     Problem: CARDIOVASCULAR - ADULT  Goal: Maintains optimal cardiac output and hemodynamic stability  Description: INTERVENTIONS:  - Monitor I/O, vital signs and rhythm  - Monitor for S/S and trends of decreased cardiac output  - Administer and titrate ordered vasoactive medications to optimize hemodynamic stability  - Assess quality of pulses, skin color and temperature  - Assess for signs of decreased coronary artery perfusion  - Instruct patient to report change in severity of symptoms  Outcome: Progressing  Goal: Absence of cardiac dysrhythmias or at baseline rhythm  Description: INTERVENTIONS:  - Continuous cardiac monitoring, vital signs, obtain 12 lead EKG if ordered  - Administer antiarrhythmic and heart rate control medications as ordered  - Monitor electrolytes and administer replacement therapy as ordered  Outcome: Progressing     Problem: MUSCULOSKELETAL - ADULT  Goal: Maintain or return mobility to safest level of function  Description: INTERVENTIONS:  - Assess patient's ability to carry out ADLs; assess patient's baseline for ADL function and identify physical deficits which impact ability to perform ADLs (bathing, care of mouth/teeth, toileting, grooming, dressing, etc )  - Assess/evaluate cause of self-care deficits   - Assess range of motion  - Assess patient's mobility  - Assess patient's need for assistive devices and provide as appropriate  - Encourage maximum independence but intervene and supervise when necessary  - Involve family in performance of ADLs  - Assess for home care needs following discharge   - Consider OT consult to assist with ADL evaluation and planning for discharge  - Provide patient education as appropriate  Outcome: Progressing     Problem: PAIN - ADULT  Goal: Verbalizes/displays adequate comfort level or baseline comfort level  Description: Interventions:  - Encourage patient to monitor pain and request assistance  - Assess pain using appropriate pain scale  - Administer analgesics based on type and severity of pain and evaluate response  - Implement non-pharmacological measures as appropriate and evaluate response  - Consider cultural and social influences on pain and pain management  - Notify physician/advanced practitioner if interventions unsuccessful or patient reports new pain  Outcome: Progressing

## 2021-05-30 NOTE — ASSESSMENT & PLAN NOTE
· Continue metoprolol succinate 12 5 mg daily and Lasix 20 mg daily   · Lasix on hold  · Monitor vitals as per protocol

## 2021-05-30 NOTE — PROGRESS NOTES
[place]  Progress Note Austin Martinez [birth year], 65 y o  female MRN: [id number]  Unit/Bed#: -01 Encounter: [id number]  Primary Care Provider: Belinda Parra MD   Date and time admitted to hospital: 5/29/2021  1:48 AM    Stage 3 chronic kidney disease [facility])  Assessment & Plan  Lab Results   Component Value Date    EGFR 41 05/30/2021    EGFR 39 05/29/2021    EGFR 35 05/29/2021    CREATININE 1 29 05/30/2021    CREATININE 1 37 (H) 05/29/2021    CREATININE 1 48 (H) 05/29/2021   · Creatinine 1 48 on admission   · Baseline appears to be around 1 4-1 5   · Creatinine this morning is 1 29  · Avoid hypotension/nephrotoxins medication  · Monitor renal function in a m  Elevated troponin  Assessment & Plan  · Troponin 0 07   · Continue to trend  · Troponin peaked at 0 40 and this morning is 0 12  · Non MI related troponin elevation in the setting of sepsis  · Patient currently denying chest pain   · EKG normal sinus rhythm 86 beats per minute  · Cardiology consult appreciated    Sepsis [facility])  Assessment & Plan  · Febrile (102 2), tachycardia, tachypnea  Possible source of infection epidural abscess/diskitis vs ??  · Urine micro is only positive for bacteria    UA is negative for nitrite and leukocytes  · Patient currently refusing MRI of her back  · Patient received 2 L bolus of fluids  · On cefepime and vancomycin  · Patient is bacteremic with Gram-negative rods  · Repeat blood culture  · Trend WBC and fever curve  · See above    Essential hypertension  Assessment & Plan  · Continue metoprolol succinate 12 5 mg daily and Lasix 20 mg daily   · Lasix on hold  · Monitor vitals as per protocol    GIST (gastrointestinal stromal tumor), malignant ([facility] )  Assessment & Plan  · [facility] with [facility]   · Continue outpatient follow-up with Oncology   · Currently on ayvakit    * Acute exacerbation of chronic low back pain  Assessment & Plan  · Patient presented to the ED due to worsening low back pain  Patient had been recently placed on a prednisone taper, last dose was earlier today  Patient reports that she felt her back pain is getting worse as the prednisone dosage decreased  · Pain is described as constant, radiates down both legs, worse with movements  Earlier in the day she was able to ambulate then unable to ambulate without walker  · Took Tylenol and tramadol before coming to the ED  · Febrile in the ED, meeting SIRS criteria, ED doc reports poor rectal tone  Concerned for possible back abscess  Spoke to neuro surgery who recommended patient have an MRI of her back performed  Patient currently refusing  Discussed with patient and her  and she refuses MRI  Patient is aware of the risk of undiagnosed abscess/diskitis   · On cefepime and vancomycin  · Infectious disease consult  · Trend WBC and fever curve  · Blood cultures growing Gram-negative rods  Repeat blood cultures  · Consult PT/OT  · Discussed with ID and Neurosurgery Dr Markos Mittal who recommended ideally MRI is recommended but since patient is refusing to talk to radiologist   I spoke with radiologist who agreed with getting CT thoracic and lumbar with contrast and if there is no abscess/diskitis then it does not exclude it completely  · Discussed regarding Radiology recommendation to Dr Markos Mittal and he felt that once CT is done tomorrow to re-evaluate for regarding transferring the patient to [place] for getting MRI with anesthesia for Wednesday if needed  Labs & Imaging: I have personally reviewed pertinent reports  VTE Pharmacologic Prophylaxis: Heparin  VTE Mechanical Prophylaxis: sequential compression device    Code Status:   Level 1 - Full Code    Patient Centered Rounds: I have performed bedside rounds with nursing staff today      Discussions with Specialists or Other Care Team Provider:  Neurosurgery, Radiology and ID    Education and Discussions with Family / Patient:   at bedside    Current Length of Stay: 1 day(s)    Current Patient Status: Inpatient   Certification Statement: The patient will continue to require additional inpatient hospital stay due to see my assessment and plan  Subjective:   Patient is seen and examined at bedside  Back pain is well controlled  Afebrile  Denies any nausea, vomiting, chest pain, shortness of breath, palpitations  All other ROS are negative  Objective:    Vitals: Blood pressure 110/62, pulse 59, temperature 98 3 °F (36 8 °C), temperature source Oral, resp  rate 16, height 5' 1" (1 549 m), weight 55 3 kg (122 lb), SpO2 97 %  ,Body mass index is 23 05 kg/m²  SPO2 RA Rest      ED to Hosp-Admission (Current) from 5/29/2021 in [address] Med Surg Unit   SpO2  97 %   SpO2 Activity  At Rest   O2 Device  None (Room air)   O2 Flow Rate  --        I&O:     Intake/Output Summary (Last 24 hours) at 5/30/2021 0921  Last data filed at 5/30/2021 0842  Gross per 24 hour   Intake 1240 ml   Output 1650 ml   Net -410 ml       Physical Exam:    General- Alert, lying comfortably in bed  Not in any acute distress  Neck- Supple, No JVD  CVS- regular, S1 and S2 normal  Chest- Bilateral Air entry, No rhochi, crackles or wheezing present  Abdomen- soft, nontender, not distended, no guarding or rigidity, BS+  Extremities-  No pedal edema, No calf tenderness  Moving all 4 extremities  CNS-   Alert, awake and orientedx3  No focal deficits present  Able to wiggle the toes    No numbness, tingling or weakness    Invasive Devices     Peripheral Intravenous Line            Peripheral IV 05/29/21 Right Antecubital 1 day                      Social History  reviewed  Family History   Problem Relation Age of Onset   Tracy Knik Breast cancer Mother     Diabetes Mother     Hypertension Mother     Lung cancer Mother     Hyperlipidemia Father     Prostate cancer Father     Colon cancer Paternal Grandmother     Colon cancer Paternal Grandfather     Diabetes Family     Substance Abuse Neg Hx     Mental illness Neg Hx     reviewed    Meds:  Current Facility-Administered Medications   Medication Dose Route Frequency Provider Last Rate Last Admin    acetaminophen (TYLENOL) tablet 650 mg  650 mg Oral Q6H PRN Muna Evans PA-C   650 mg at 05/30/21 0313    aspirin tablet 325 mg  325 mg Oral Daily Catrina Kunz MD   325 mg at 05/30/21 0809    atorvastatin (LIPITOR) tablet 40 mg  40 mg Oral Daily With Khoi Escalera MD   40 mg at 05/29/21 1654    Avapritinib TABS 200 mg  200 mg Oral Daily Muna Evans PA-C        azelastine (ASTELIN) 0 1 % nasal spray 1 spray  1 spray Each Nare BID Muna Evans PA-C   1 spray at 05/30/21 8017    cefepime (MAXIPIME) IVPB (premix in dextrose) 2,000 mg 50 mL  2,000 mg Intravenous Q12H So Whitaker  mL/hr at 05/30/21 0314 2,000 mg at 05/30/21 0314    heparin (porcine) subcutaneous injection 5,000 Units  5,000 Units Subcutaneous [facility] Arelis David PA-C   5,000 Units at 05/29/21 2100    hydroxychloroquine (PLAQUENIL) tablet 200 mg  200 mg Oral Early Morning Muna Evans PA-C   200 mg at 05/30/21 0950    metoprolol succinate (TOPROL-XL) 24 hr tablet 12 5 mg  12 5 mg Oral Daily Arelis David PA-C   12 5 mg at 05/30/21 0809    multivitamin-minerals (CENTRUM ADULTS) tablet 1 tablet  1 tablet Oral Daily Muna Evans PA-C   1 tablet at 05/30/21 0809    ondansetron (ZOFRAN) injection 4 mg  4 mg Intravenous Q6H PRN Muna Evans PA-C        pantoprazole (PROTONIX) EC tablet 40 mg  40 mg Oral BID Muna Evans PA-C   40 mg at 05/30/21 0809    sodium chloride 0 9 % infusion  75 mL/hr Intravenous Continuous Catrina Kunz MD 75 mL/hr at 05/29/21 1010 75 mL/hr at 05/29/21 1010    traMADol (ULTRAM) tablet 50 mg  50 mg Oral Q6H PRN Muna Evans PA-C   50 mg at 05/30/21 5528    traMADol (ULTRAM) tablet 50 mg  50 mg Oral Once Muna Evans PA-C   Stopped at 05/30/21 0345    vancomycin (VANCOCIN) IVPB (premix in dextrose) 750 mg 150 mL  15 mg/kg Intravenous Q24H Trevor Hartman MD          Medications Prior to Admission   Medication    acetaminophen (TYLENOL) 500 mg tablet    aspirin 81 mg chewable tablet    Avapritinib (Ayvakit) 200 MG TABS    Azelastine HCl 137 MCG/SPRAY SOLN    calcium carbonate (Tums) 500 mg chewable tablet    diphenhydrAMINE (BENADRYL) 25 mg tablet    diphenoxylate-atropine (LOMOTIL) 2 5-0 025 mg per tablet    docusate sodium (COLACE) 100 mg capsule    ferrous sulfate 324 (65 Fe) mg    furosemide (LASIX) 20 mg tablet    hydroxychloroquine (PLAQUENIL) 200 mg tablet    loperamide (IMODIUM) 2 mg capsule    metoprolol succinate (Toprol XL) 25 mg 24 hr tablet    multivitamin-iron-minerals-folic acid (CENTRUM) chewable tablet    NON FORMULARY    ofloxacin (OCUFLOX) 0 3 % ophthalmic solution    ondansetron (ZOFRAN) 8 mg tablet    pantoprazole (PROTONIX) 40 mg tablet    prednisoLONE acetate (PRED FORTE) 1 % ophthalmic suspension    [] predniSONE 20 mg tablet    traMADol (ULTRAM) 50 mg tablet    Triamcinolone Acetonide (NASACORT ALLERGY 24HR NA)    Virt-Phos 250 Neutral 155-852-130 MG tablet    Vitamin D, Ergocalciferol, 50 MCG (2000) CAPS       Labs:  Results from last 7 days   Lab Units 21  0212   WBC Thousand/uL 7 32   HEMOGLOBIN g/dL 9 0*   HEMATOCRIT % 27 3*   PLATELETS Thousands/uL 291   NEUTROS PCT % 92*   LYMPHS PCT % 4*   MONOS PCT % 3*   EOS PCT % 0     Results from last 7 days   Lab Units 21  0619 21  0831 21  0212   POTASSIUM mmol/L 3 3* 3 9 4 4   CHLORIDE mmol/L 104 98* 99*   CO2 mmol/L 24 30 28   BUN mg/dL 20 18 21   CREATININE mg/dL 1 29 1 37* 1 48*   CALCIUM mg/dL 8 2* 8 4 8 3   ALK PHOS U/L 74  --  87   ALT U/L 25  --  30   AST U/L 42  --  63*     Lab Results   Component Value Date    TROPONINI 0 12 (H) 2021    TROPONINI 0 24 (H) 2021    TROPONINI 0 40 (H) 2021    CKTOTAL 400 (H) 2014     Results from last 7 days   Lab Units 21  0217 INR  1 04     Lab Results   Component Value Date    BLOODCX No Growth After 5 Days  02/19/2020    BLOODCX No Growth After 5 Days  02/19/2020    BLOODCX No Growth After 5 Days  02/06/2020    BLOODCX No Growth After 5 Days  02/06/2020         Imaging:  Results for orders placed during the hospital encounter of 05/29/21   XR chest portable    Narrative CHEST     INDICATION:   fever  COMPARISON:  Chest radiograph from 11/2/2020 and chest CT from 7/15/2015  Abdomen CT from 5/29/2021  EXAM PERFORMED/VIEWS:  XR CHEST PORTABLE  FINDINGS:    Cardiomediastinal silhouette normal     Lungs clear  No effusion or pneumothorax  Skinfolds over the left hemithorax  Clip in the left upper quadrant  Osseous structures normal for age  Impression No acute cardiopulmonary disease  Workstation performed: [id number]       Results for orders placed during the hospital encounter of 02/06/20   XR chest pa & lateral    Narrative CHEST     INDICATION:   sob  COMPARISON:  Chest radiograph from 2/6/2020  Chest CT from 3/16/2017  EXAM PERFORMED/VIEWS:  XR CHEST PA & LATERAL  DUAL ENERGY SUBTRACTION TECHNIQUE      FINDINGS:    Mild cardiomegaly  No acute pulmonary disease  Trace effusions on the lateral projection  No pneumothorax  Osseous structures appear within normal limits for patient age  Impression No acute pulmonary disease  Trace effusions          Workstation performed: [id number]         Last 24 Hours Medication List:   Current Facility-Administered Medications   Medication Dose Route Frequency Provider Last Rate    acetaminophen  650 mg Oral Q6H PRN Fay Horton PA-C      aspirin  325 mg Oral Daily Miranda Easton MD      atorvastatin  40 mg Oral Daily With Lizz Godinez MD      Avapritinib  200 mg Oral Daily Fay Horton PA-C      azelastine  1 spray Each Nare BID Fay Horton PA-C      cefepime  2,000 mg Intravenous Q12H Stewart Burnette MD 2,000 mg (05/30/21 6234)   Laura Freire heparin (porcine)  5,000 Units Subcutaneous [facility] [place] Miss, PA-C      hydroxychloroquine  200 mg Oral Early Morning [place] Miss, PA-C      metoprolol succinate  12 5 mg Oral Daily [place] Miss, PA-C      multivitamin-minerals  1 tablet Oral Daily [place] Miss, PA-C      ondansetron  4 mg Intravenous Q6H PRN [place] Miss, PA-C      pantoprazole  40 mg Oral BID [place] Miss, PA-C      sodium chloride  75 mL/hr Intravenous Continuous Kimberly Lanier MD 75 mL/hr (05/29/21 1010)    traMADol  50 mg Oral Q6H PRN [place] Miss, PA-C      traMADol  50 mg Oral Once Kendra Miss, PA-C      vancomycin  15 mg/kg Intravenous Q24H Kimberly Lanier MD          Today, Patient Was Seen By: Kimberly Lanier MD    ** Please Note: Dictation voice to text software may have been used in the creation of this document   **

## 2021-05-30 NOTE — PROGRESS NOTES
Vancomycin IV Pharmacy-to-Dose Consultation  Cesar Roberts is a 65 y o  female who is currently receiving Vancomycin IV with management by the Pharmacy Consult service for the treatment of Bone/Joint infection  Assessment/Plan:  The patient's chart was reviewed  Renal function is stable  There are no signs or symptoms of nephrotoxicity and/or infusion reactions documented  Based on todays assessment, will continue current vancomycin (Day # 2) dosing of 750mg IV q24h, with a plan for trough to be drawn at 0930 on 6/1/21  We will continue to follow the patients culture results and clinical progress daily      Deric Torres, PharmD  Pharmacist

## 2021-05-31 ENCOUNTER — APPOINTMENT (INPATIENT)
Dept: CT IMAGING | Facility: HOSPITAL | Age: 71
DRG: 872 | End: 2021-05-31
Payer: COMMERCIAL

## 2021-05-31 LAB
ANION GAP SERPL CALCULATED.3IONS-SCNC: 10 MMOL/L (ref 4–13)
BACTERIA BLD CULT: ABNORMAL
BACTERIA BLD CULT: ABNORMAL
BASOPHILS # BLD AUTO: 0.01 THOUSANDS/ΜL (ref 0–0.1)
BASOPHILS NFR BLD AUTO: 0 % (ref 0–1)
BUN SERPL-MCNC: 15 MG/DL (ref 5–25)
CALCIUM SERPL-MCNC: 8.1 MG/DL (ref 8.3–10.1)
CHLORIDE SERPL-SCNC: 103 MMOL/L (ref 100–108)
CO2 SERPL-SCNC: 25 MMOL/L (ref 21–32)
CREAT SERPL-MCNC: 1.14 MG/DL (ref 0.6–1.3)
CRP SERPL QL: 89.9 MG/L
EOSINOPHIL # BLD AUTO: 0.06 THOUSAND/ΜL (ref 0–0.61)
EOSINOPHIL NFR BLD AUTO: 1 % (ref 0–6)
ERYTHROCYTE [DISTWIDTH] IN BLOOD BY AUTOMATED COUNT: 16.8 % (ref 11.6–15.1)
ERYTHROCYTE [SEDIMENTATION RATE] IN BLOOD: 28 MM/HOUR (ref 0–29)
GFR SERPL CREATININE-BSD FRML MDRD: 48 ML/MIN/1.73SQ M
GLUCOSE SERPL-MCNC: 106 MG/DL (ref 65–140)
GRAM STN SPEC: ABNORMAL
GRAM STN SPEC: ABNORMAL
HCT VFR BLD AUTO: 28.2 % (ref 34.8–46.1)
HGB BLD-MCNC: 9 G/DL (ref 11.5–15.4)
IMM GRANULOCYTES # BLD AUTO: 0.01 THOUSAND/UL (ref 0–0.2)
IMM GRANULOCYTES NFR BLD AUTO: 0 % (ref 0–2)
LYMPHOCYTES # BLD AUTO: 0.42 THOUSANDS/ΜL (ref 0.6–4.47)
LYMPHOCYTES NFR BLD AUTO: 9 % (ref 14–44)
MCH RBC QN AUTO: 32.8 PG (ref 26.8–34.3)
MCHC RBC AUTO-ENTMCNC: 31.9 G/DL (ref 31.4–37.4)
MCV RBC AUTO: 103 FL (ref 82–98)
MONOCYTES # BLD AUTO: 0.57 THOUSAND/ΜL (ref 0.17–1.22)
MONOCYTES NFR BLD AUTO: 12 % (ref 4–12)
NEUTROPHILS # BLD AUTO: 3.62 THOUSANDS/ΜL (ref 1.85–7.62)
NEUTS SEG NFR BLD AUTO: 78 % (ref 43–75)
NRBC BLD AUTO-RTO: 0 /100 WBCS
PLATELET # BLD AUTO: 249 THOUSANDS/UL (ref 149–390)
PMV BLD AUTO: 9.5 FL (ref 8.9–12.7)
POTASSIUM SERPL-SCNC: 4.1 MMOL/L (ref 3.5–5.3)
RBC # BLD AUTO: 2.74 MILLION/UL (ref 3.81–5.12)
SODIUM SERPL-SCNC: 138 MMOL/L (ref 136–145)
WBC # BLD AUTO: 4.69 THOUSAND/UL (ref 4.31–10.16)

## 2021-05-31 PROCEDURE — 72129 CT CHEST SPINE W/DYE: CPT

## 2021-05-31 PROCEDURE — G1004 CDSM NDSC: HCPCS

## 2021-05-31 PROCEDURE — 99232 SBSQ HOSP IP/OBS MODERATE 35: CPT | Performed by: INTERNAL MEDICINE

## 2021-05-31 PROCEDURE — 72132 CT LUMBAR SPINE W/DYE: CPT

## 2021-05-31 PROCEDURE — 85025 COMPLETE CBC W/AUTO DIFF WBC: CPT | Performed by: INTERNAL MEDICINE

## 2021-05-31 PROCEDURE — 80048 BASIC METABOLIC PNL TOTAL CA: CPT | Performed by: INTERNAL MEDICINE

## 2021-05-31 PROCEDURE — 86140 C-REACTIVE PROTEIN: CPT | Performed by: INTERNAL MEDICINE

## 2021-05-31 PROCEDURE — 97163 PT EVAL HIGH COMPLEX 45 MIN: CPT

## 2021-05-31 PROCEDURE — 85652 RBC SED RATE AUTOMATED: CPT | Performed by: INTERNAL MEDICINE

## 2021-05-31 RX ORDER — CEFTRIAXONE 2 G/50ML
2000 INJECTION, SOLUTION INTRAVENOUS EVERY 24 HOURS
Status: DISCONTINUED | OUTPATIENT
Start: 2021-05-31 | End: 2021-06-01

## 2021-05-31 RX ORDER — MULTIVITAMIN/IRON/FOLIC ACID 18MG-0.4MG
1 TABLET ORAL DAILY
Status: DISCONTINUED | OUTPATIENT
Start: 2021-06-01 | End: 2021-06-02 | Stop reason: HOSPADM

## 2021-05-31 RX ADMIN — TRAMADOL HYDROCHLORIDE 50 MG: 50 TABLET, FILM COATED ORAL at 20:15

## 2021-05-31 RX ADMIN — CEFEPIME HYDROCHLORIDE 2000 MG: 2 INJECTION, SOLUTION INTRAVENOUS at 04:32

## 2021-05-31 RX ADMIN — IOHEXOL 100 ML: 350 INJECTION, SOLUTION INTRAVENOUS at 16:05

## 2021-05-31 RX ADMIN — HYDROXYCHLOROQUINE SULFATE 200 MG: 200 TABLET, FILM COATED ORAL at 05:55

## 2021-05-31 RX ADMIN — MULTIPLE VITAMINS W/ MINERALS TAB 1 TABLET: TAB ORAL at 09:18

## 2021-05-31 RX ADMIN — TRAMADOL HYDROCHLORIDE 50 MG: 50 TABLET, FILM COATED ORAL at 01:37

## 2021-05-31 RX ADMIN — ACETAMINOPHEN 650 MG: 325 TABLET, FILM COATED ORAL at 14:41

## 2021-05-31 RX ADMIN — HYDROMORPHONE HYDROCHLORIDE 0.5 MG: 1 INJECTION, SOLUTION INTRAMUSCULAR; INTRAVENOUS; SUBCUTANEOUS at 23:53

## 2021-05-31 RX ADMIN — PANTOPRAZOLE SODIUM 40 MG: 40 TABLET, DELAYED RELEASE ORAL at 17:30

## 2021-05-31 RX ADMIN — HEPARIN SODIUM 5000 UNITS: 5000 INJECTION INTRAVENOUS; SUBCUTANEOUS at 14:41

## 2021-05-31 RX ADMIN — HEPARIN SODIUM 5000 UNITS: 5000 INJECTION INTRAVENOUS; SUBCUTANEOUS at 05:55

## 2021-05-31 RX ADMIN — ASPIRIN 325 MG ORAL TABLET 325 MG: 325 PILL ORAL at 09:18

## 2021-05-31 RX ADMIN — VANCOMYCIN HYDROCHLORIDE 750 MG: 750 INJECTION, SOLUTION INTRAVENOUS at 09:18

## 2021-05-31 RX ADMIN — HEPARIN SODIUM 5000 UNITS: 5000 INJECTION INTRAVENOUS; SUBCUTANEOUS at 21:19

## 2021-05-31 RX ADMIN — HYDROMORPHONE HYDROCHLORIDE 0.5 MG: 1 INJECTION, SOLUTION INTRAMUSCULAR; INTRAVENOUS; SUBCUTANEOUS at 02:20

## 2021-05-31 RX ADMIN — METOPROLOL SUCCINATE 12.5 MG: 25 TABLET, FILM COATED, EXTENDED RELEASE ORAL at 09:18

## 2021-05-31 RX ADMIN — HYDROMORPHONE HYDROCHLORIDE 0.5 MG: 1 INJECTION, SOLUTION INTRAMUSCULAR; INTRAVENOUS; SUBCUTANEOUS at 16:23

## 2021-05-31 RX ADMIN — ACETAMINOPHEN 650 MG: 325 TABLET, FILM COATED ORAL at 04:32

## 2021-05-31 RX ADMIN — CEFTRIAXONE 2000 MG: 2 INJECTION, SOLUTION INTRAVENOUS at 20:16

## 2021-05-31 RX ADMIN — TRAMADOL HYDROCHLORIDE 50 MG: 50 TABLET, FILM COATED ORAL at 09:18

## 2021-05-31 RX ADMIN — AZELASTINE HYDROCHLORIDE 1 SPRAY: 137 SPRAY, METERED NASAL at 17:30

## 2021-05-31 RX ADMIN — TRAMADOL HYDROCHLORIDE 50 MG: 50 TABLET, FILM COATED ORAL at 14:41

## 2021-05-31 RX ADMIN — AZELASTINE HYDROCHLORIDE 1 SPRAY: 137 SPRAY, METERED NASAL at 09:19

## 2021-05-31 RX ADMIN — PANTOPRAZOLE SODIUM 40 MG: 40 TABLET, DELAYED RELEASE ORAL at 09:17

## 2021-05-31 RX ADMIN — ATORVASTATIN CALCIUM 40 MG: 40 TABLET, FILM COATED ORAL at 15:37

## 2021-05-31 RX ADMIN — CEFEPIME HYDROCHLORIDE 2000 MG: 2 INJECTION, SOLUTION INTRAVENOUS at 15:24

## 2021-05-31 NOTE — ASSESSMENT & PLAN NOTE
· Follows with [facility]   · Continue outpatient follow-up with Oncology   · Currently on ayvakit

## 2021-05-31 NOTE — PROGRESS NOTES
Vancomycin IV Pharmacy-to-Dose Consultation    Austen Ocasio is a 65 y o  female who is currently receiving Vancomycin IV with management by the Pharmacy Consult service  Assessment/Plan:  The patient was reviewed  Renal function is stable and no signs or symptoms of nephrotoxicity and/or infusion reactions were documented in the chart  Based on todays assessment, continue current vancomycin (day # 3) dosing of 750mg IV q24h, with a plan for trough to be drawn at 0930 on 6/1/21  We will continue to follow the patients culture results and clinical progress daily      Missy Almonte, Pharmacist

## 2021-05-31 NOTE — ASSESSMENT & PLAN NOTE
· Home meds: metoprolol succinate 12 5 mg daily and Lasix 20 mg daily   · Lasix on hold  · Monitor vitals as per protocol

## 2021-05-31 NOTE — ASSESSMENT & PLAN NOTE
Lab Results   Component Value Date    EGFR 48 05/31/2021    EGFR 41 05/30/2021    EGFR 39 05/29/2021    CREATININE 1 14 05/31/2021    CREATININE 1 29 05/30/2021    CREATININE 1 37 (H) 05/29/2021   · Creatinine 1 48 on admission   · Baseline Cr 1 4-1 5   · Creatinine stable at 1 14 today  · Avoid hypotension/nephrotoxins medication  · Monitor renal function in a m

## 2021-05-31 NOTE — PHYSICAL THERAPY NOTE
PHYSICAL THERAPY Evaluation    Physical Therapy Evaluation      Patient Active Problem List   Diagnosis    Anxiety    Acute exacerbation of chronic low back pain    DJD (degenerative joint disease)    Gait disturbance    GIST (gastrointestinal stromal tumor), malignant (HCC)    Herniated nucleus pulposus, L5-S1, right    Essential hypertension    Spinal stenosis, lumbar region, with neurogenic claudication    Pain syndrome, chronic    Paroxysmal atrial fibrillation (HCC)    Right bundle-branch block    Lumbar radiculopathy    Non-rheumatic mitral regurgitation    Health care maintenance    Sepsis ([facility] )    Elevated troponin    Metastatic cancer (HCC)    Gastric AVM    Iron deficiency anemia    Stage 3 chronic kidney disease (HCC)    Closed nondisplaced fracture of neck of left radius    Intestinal obstruction (HCC)    MARK (obstructive sleep apnea)    Partial small bowel obstruction (HCC)    Sprain of medial collateral ligament of left knee    Status post lumbar laminectomy    UTI (urinary tract infection)       Past Medical History:   Diagnosis Date    ADHD     Anemia     Anxiety     Arthritis     Asthma     Atrial fibrillation (HCC)     [facility]     Cancer ([facility] )     Chronic iron deficiency anemia 2020    Extensive GI evaluation over the last year including multiple EGD, colonoscopy, and capsule endoscopy    Has had gastric AVMs cauterized, Dieulafoy's lesion clipped, and most recently a Seb erosion cauterized    Coronary artery disease     Depression     GERD (gastroesophageal reflux disease)     History of stomach ulcers     Hypercholesterolemia     Hypertension     Kidney disease     [facility]        Past Surgical History:   Procedure Laterality Date    ANKLE SURGERY      APPENDECTOMY      BREAST SURGERY      CATARACT EXTRACTION       SECTION  COLONOSCOPY  06/26/2019    Multiple adenomatous colon polyps and internal hemorrhoids  Three year recall advised    HIP SURGERY      JOINT REPLACEMENT  07/30/2019    Left knee replacement    LAMINECTOMY      ROTATOR CUFF REPAIR      SIGMOID RESECTION / RECTOPEXY      SINUS SURGERY      UPPER GASTROINTESTINAL ENDOSCOPY  05/01/2020    Dieulafoy's lesion in proximal stomach which was actively oozing  Injected with epinephrine and 2 Endoclips placed with control of bleeding, hiatal hernia   UPPER GASTROINTESTINAL ENDOSCOPY  06/2020    Bleeding Seb's erosion status post cauterization        05/31/21 1007   PT Last Visit   PT Visit Date 05/31/21   Note Type   Note type Evaluation   Pain Assessment   Pain Assessment Tool 0-10   Pain Score 3   Pain Location/Orientation Location: Hip;Orientation: Right   Home Living   Type of Home House   Home Layout Two level;Bed/bath upstairs  (1 step to enter)   Home Equipment Cane;Walker   Prior Function   Level of [place] Independent with ADLs and functional mobility  (pt was doing yard work prior to this episode)   Lives With [employer] Help From Family   ADL Assistance Independent   IADLs Independent   General   Additional Pertinent History concern for epidural abscess; pt refuses MRI; possible transfer to \[facility]"" for neuro surgery consult   Cognition   Overall Cognitive Status WFL   Arousal/Participation Alert   Orientation Level Oriented X4   Memory Within functional limits   Following Commands Follows one step commands without difficulty   RUE Assessment   RUE Assessment WFL   LUE Assessment   LUE Assessment WFL   RLE Assessment   RLE Assessment X   Strength RLE   R Hip Flexion 2-/5   R Hip ABduction 2-/5   R Hip ADduction 2-/5   R Knee Flexion 3+/5   R Knee Extension 3+/5   R Ankle Dorsiflexion 4/5   LLE Assessment   LLE Assessment WFL   Bed Mobility   Supine to Sit 4  Minimal assistance   Additional items Assist x 1;HOB elevated; Bedrails   Sit to Supine 4  Minimal assistance   Additional items Assist x 1   Transfers   Sit to Stand 4  Minimal assistance   Additional items Assist x 1  (RW)   Stand to Sit 4  Minimal assistance   Additional items Assist x 1   Ambulation/Elevation   Gait pattern Forward Flexion;Decreased foot clearance;Decreased R stance;Decreased L stance; Step to;Excessively slow   Gait Assistance 4  Minimal assist   Additional items Assist x 1   Assistive Device Rolling walker   Distance 10ft; forward/backward walking and side stepping at EOB;  limited by R hip pain  Pt denies back pain   Balance   Static Sitting Fair -   Dynamic Sitting Fair -   Static Standing Fair -   Dynamic Standing Fair -   Ambulatory Fair -   Endurance Deficit   Endurance Deficit Yes   Endurance Deficit Description limited by R hip pain   Activity Tolerance   Activity Tolerance Patient limited by pain   Nurse Made Aware Radu   Assessment   Prognosis Guarded   Problem List Decreased strength;Decreased range of motion;Decreased endurance; Impaired balance;Decreased mobility; Decreased coordination;Pain   Assessment Pt is a 69y/o female who presented to ED with c/o LBP  Dx Sepsis, tachycardia, fever, blood cultures growing E  Coli-most likely from intra-abdominal source since UA did not show any pyuria to suggest UTI, elevated troponin, CKD, abnormal EKG with sinus rhythm with bifascicular block  Pt currently presents with pain in R hip, mobility deficits requiring physical assist for all bed mobility, transfers and ambulation, unable to tolerate increased distances secondary to fatigue, pain and B knee buckling noted  Pt unable to demo safe household mobility, poor sitting tolerance secondary to pain  Pt able to sit EOB x 3 minutes then begins to become restless, shifting weight to attempt to decrease pain in R hip;  Returned to supine in bed and positioned to comfort   Pt lives with  and son; pt's bed/bath are on second floor and pt will need to be able to care for herself (mobility, ADLs, and steps)  Pt would benefit from continued PT while in hospital and follow up at inpt rehab to increase strength, balance, endurance, independence with funcitonal mobility to return to PLOF  The patient's AM-PAC Basic Mobility Inpatient Short Form Raw Score is 16, Standardized Score is 38 32  A standardized score less than 42 9 suggests the patient may benefit from discharge to post-acute rehabilitation services  Please also refer to the recommendation of the Physical Therapist for safe discharge planning  Barriers to Discharge Inaccessible home environment;Decreased caregiver support   Goals   Patient Goals to get rid of the pain and go home   STG Expiration Date 06/14/21   Short Term Goal #1 1   mod I for sit to/from supine;  2   mod I for sit to/from standing with RW;  3   mod I to ambulate 50ft with RW;  4   mod I to ascend/descend 10 steps with handrail   Plan   Treatment/Interventions ADL retraining;Functional transfer training;LE strengthening/ROM; Elevations; Therapeutic exercise; Endurance training;Equipment eval/education; Bed mobility;Gait training; Compensatory technique education;Continued evaluation;Spoke to nursing;OT   PT Frequency Other (Comment)  (2-3x/wk)   Recommendation   PT Discharge Recommendation Post acute rehabilitation services   PT - OK to Discharge Yes  (when medically ready and bed available)   AM-PAC Basic Mobility Inpatient   Turning in Bed Without Bedrails 3   Lying on Back to Sitting on Edge of Flat Bed 3   Moving Bed to Chair 3   Standing Up From Chair 3   Walk in Room 2   Climb 3-5 Stairs 2   Basic Mobility Inpatient Raw Score 16   Basic Mobility Standardized Score 38 32         Griselda Cheema, PT              Patient Name: Mercy MCKEONAriellaI Date: 5/31/2021

## 2021-05-31 NOTE — PROGRESS NOTES
[place]  Progress Note Fransisca Ames 1950, 65 y o  female MRN: [id number]  Unit/Bed#: -01 Encounter: [id number]  Primary Care Provider: Naima Tyler MD   Date and time admitted to hospital: 5/29/2021  1:48 AM    * Acute exacerbation of chronic low back pain  Assessment & Plan  · Acute on chronic low back pain, ongoing for the past 3 weeks, recently hospitalized 5/4 and managed on prednisone taper  Steroid initially helped her back pain but back pain recurred when prednisone dosage finished  · CT lumbar spine on admission - showed severe degenerative disc disease from L2- L3, with disc herniation L4- L5  · Febrile in the ED, meeting SIRS criteria, ED doc reports poor rectal tone  Concerned for possible back abscess  Spoke to neuro surgery who recommended patient have an MRI of her back performed  Patient currently refusing, as she prefers to be knocked out for MRI    Patient is aware of the risk of undiagnosed abscess/diskitis   · On cefepime and vancomycin per ID recs  · Blood cultures #2/2 positive for Ecoli   · Infectious disease on board  · Trend WBC and fever curve  · Repeat blood cultures pending  · Plan for repeat CT lumbar spine w/Contrast today - if CT results are inconclusive, would initiate transfer to [place] for MRI with anesthesia and neurosurgical evaluation      [facility] ([facility]  Assessment & Plan  · Sepsis, present on admission, a/e/b fevers, T max 102 2, tachycardia, tachypnea secondary to E coli septicemia  · Rule out Discitis/vdertebral osteo given complaints of low back pain  · UA - Bacteruria, negative for wbc, nitrite or leukocytes  · Patient currently refusing MRI of her back  · On cefepime and vancomycin  · Admission Blood cultures 2/2 - Ecoli  · Repeat blood culture negative till date  · Trend WBC and fever curve  · See above    Stage 3 chronic kidney disease ([facility] )  Assessment & Plan  Lab Results   Component Value Date    EGFR 48 2021    EGFR 41 2021    EGFR 39 2021    CREATININE 1 14 2021    CREATININE 1 29 2021    CREATININE 1 37 (H) 2021   · Creatinine 1 48 on admission   · Baseline Cr 1 4-1 5   · Creatinine stable at 1 14 today  · Avoid hypotension/nephrotoxins medication  · Monitor renal function in a m  Elevated troponin  Assessment & Plan  · Non MI Troponin elevation, 0 07 on admission in the setting of E coli bacteremia, acute on chronic low back pain  · Troponin peaked at 0 40 and downtrended to 0 12  · Patient currently denying chest pain   · EKG normal sinus rhythm 86 beats per minute  · Cardiology consult appreciated    Essential hypertension  Assessment & Plan  · Home meds: metoprolol succinate 12 5 mg daily and Lasix 20 mg daily   · Lasix on hold  · Monitor vitals as per protocol    GIST (gastrointestinal stromal tumor), malignant ([facility] )  Assessment & Plan  · Follows with Meaghan Kapoor   · Continue outpatient follow-up with Oncology   · Currently on ayvakit      VTE Pharmacologic Prophylaxis:   Pharmacologic: Heparin  Mechanical VTE Prophylaxis in Place: Yes    Patient Centered Rounds: I have performed bedside rounds with nursing staff today  Discussions with Specialists or Other Care Team Provider:     Education and Discussions with Family / Patient: patient's  called, no answer, left a voicemail  Time Spent for Care: 30 minutes  More than 50% of total time spent on counseling and coordination of care as described above      Current Length of Stay: 2 day(s)    Current Patient Status: Inpatient   Certification Statement: The patient will continue to require additional inpatient hospital stay due to as above    Discharge Plan: 2-3 days, pending CT lumbar spine    Code Status: Level 1 - Full Code      Subjective:   No overnight events, complaining of continued low back pain, radiating to her legs     Objective:     Vitals:   Temp (24hrs), Av 7 °F (37 1 °C), Min:98 4 °F (36 9 °C), Max:98 9 °F (37 2 °C)    Temp:  [98 4 °F (36 9 °C)-98 9 °F (37 2 °C)] 98 4 °F (36 9 °C)  HR:  [67] 67  Resp:  [16] 16  BP: (112-125)/(65-91) 121/90  SpO2:  [97 %] 97 %  Body mass index is 23 05 kg/m²  Input and Output Summary (last 24 hours): Intake/Output Summary (Last 24 hours) at 5/31/2021 1135  Last data filed at 5/31/2021 0935  Gross per 24 hour   Intake 685 ml   Output 2269 ml   Net -1584 ml       Physical Exam:     Physical Exam  Vitals signs and nursing note reviewed  Constitutional:       General: She is not in acute distress  Appearance: Normal appearance  She is not ill-appearing, toxic-appearing or diaphoretic  Neck:      Musculoskeletal: Neck supple  Cardiovascular:      Rate and Rhythm: Normal rate and regular rhythm  Pulses: Normal pulses  Heart sounds: Normal heart sounds  No murmur  No gallop  Pulmonary:      Effort: Pulmonary effort is normal  No respiratory distress  Breath sounds: Normal breath sounds  No wheezing, rhonchi or rales  Chest:      Chest wall: No tenderness  Abdominal:      General: Bowel sounds are normal  There is no distension  Palpations: Abdomen is soft  Tenderness: There is no abdominal tenderness  There is no right CVA tenderness, left CVA tenderness or guarding  Musculoskeletal:         General: No swelling  Right hip: She exhibits decreased range of motion  Left hip: She exhibits decreased range of motion  Right lower leg: No edema  Left lower leg: No edema  Skin:     General: Skin is warm and dry  Capillary Refill: Capillary refill takes less than 2 seconds  Coloration: Skin is not jaundiced or pale  Findings: Rash (chronic facial rash) present  No bruising, erythema or lesion  Neurological:      General: No focal deficit present  Mental Status: She is alert  Mental status is at baseline  Cranial Nerves: No cranial nerve deficit     Psychiatric:         Mood and Affect: Mood normal          Thought Content: Thought content normal          Judgment: Judgment normal      Additional Data:     Labs:    Results from last 7 days   Lab Units 05/31/21  0514   WBC Thousand/uL 4 69   HEMOGLOBIN g/dL 9 0*   HEMATOCRIT % 28 2*   PLATELETS Thousands/uL 249   NEUTROS PCT % 78*   LYMPHS PCT % 9*   MONOS PCT % 12   EOS PCT % 1     Results from last 7 days   Lab Units 05/31/21  0514 05/30/21  0619   SODIUM mmol/L 138 139   POTASSIUM mmol/L 4 1 3 3*   CHLORIDE mmol/L 103 104   CO2 mmol/L 25 24   BUN mg/dL 15 20   CREATININE mg/dL 1 14 1 29   ANION GAP mmol/L 10 11   CALCIUM mg/dL 8 1* 8 2*   ALBUMIN g/dL  --  2 8*   TOTAL BILIRUBIN mg/dL  --  0 20   ALK PHOS U/L  --  74   ALT U/L  --  25   AST U/L  --  42   GLUCOSE RANDOM mg/dL 106 95     Results from last 7 days   Lab Units 05/29/21  0212   INR  1 04             Results from last 7 days   Lab Units 05/30/21  0619 05/29/21  0212   LACTIC ACID mmol/L  --  1 8   PROCALCITONIN ng/ml 4 24* 1 32*           * I Have Reviewed All Lab Data Listed Above  * Additional Pertinent Lab Tests Reviewed: All Labs Within Last 24 Hours Reviewed    Imaging:    Imaging Reports Reviewed Today Include:   Imaging Personally Reviewed by Myself Includes:      Recent Cultures (last 7 days):     Results from last 7 days   Lab Units 05/30/21  0943 05/29/21  6837   BLOOD CULTURE  Received in Microbiology Lab  Culture in Progress  Received in Microbiology Lab  Culture in Progress   Escherichia coli*  Escherichia coli*   GRAM STAIN RESULT   --  Gram negative rods*  Gram negative rods*       Last 24 Hours Medication List:   Current Facility-Administered Medications   Medication Dose Route Frequency Provider Last Rate    acetaminophen  650 mg Oral Q6H PRN Saritha Freire PA-C      aspirin  325 mg Oral Daily Meagan Arango MD      atorvastatin  40 mg Oral Daily With Levi Gibbs MD      Avapritinib  200 mg Oral Daily Saritha Freire PA-C      azelastine  1 spray Each Nare BID Ahsan Sal, PA-C      cefepime  2,000 mg Intravenous Q12H Larry Alonzo MD 2,000 mg (05/31/21 0432)    heparin (porcine)  5,000 Units Subcutaneous Q8H [facility] Ahsan Sal, PA-C      HYDROmorphone  0 5 mg Intravenous Q8H PRN Yimi Abernathy MD      hydroxychloroquine  200 mg Oral Early Morning Ahsan Sal, PA-C      metoprolol succinate  12 5 mg Oral Daily Ahsan Moose, PA-C      multivitamin-minerals  1 tablet Oral Daily Ahsan Sal, PA-C      ondansetron  4 mg Intravenous Q6H PRN Ahsan Sal, PA-C      pantoprazole  40 mg Oral BID Ahsan Sal, PA-C      sodium chloride  75 mL/hr Intravenous Continuous Yimi Abernathy MD 75 mL/hr (05/30/21 9254)    traMADol  50 mg Oral Q6H PRN Ahsan Sal, PA-C      traMADol  50 mg Oral Once DOUG Mendez-YUE      vancomycin  15 mg/kg Intravenous Q24H Yimi Abernathy  mg (05/31/21 9527)        Today, Patient Was Seen By: Jorge Dupree MD    ** Please Note: Dictation voice to text software may have been used in the creation of this document   **

## 2021-05-31 NOTE — PLAN OF CARE
Problem: Potential for Falls  Goal: Patient will remain free of falls  Description: INTERVENTIONS:  - Assess patient frequently for physical needs  -  Identify cognitive and physical deficits and behaviors that affect risk of falls    -  [place] fall precautions as indicated by assessment   - Educate patient/family on patient safety including physical limitations  - Instruct patient to call for assistance with activity based on assessment  - Modify environment to reduce risk of injury  - Consider OT/PT consult to assist with strengthening/mobility  Outcome: Progressing

## 2021-05-31 NOTE — PLAN OF CARE
Problem: PHYSICAL THERAPY ADULT  Goal: Performs mobility at highest level of function for planned discharge setting  See evaluation for individualized goals  Description: Treatment/Interventions: ADL retraining, Functional transfer training, LE strengthening/ROM, Elevations, Therapeutic exercise, Endurance training, Equipment eval/education, Bed mobility, Gait training, Compensatory technique education, Continued evaluation, Spoke to nursing, OT          See flowsheet documentation for full assessment, interventions and recommendations  Note: Prognosis: Guarded  Problem List: Decreased strength, Decreased range of motion, Decreased endurance, Impaired balance, Decreased mobility, Decreased coordination, Pain  Assessment: Pt is a 71y/o female who presented to ED with c/o LBP  Dx Sepsis, tachycardia, fever, blood cultures growing E  Coli-most likely from intra-abdominal source since UA did not show any pyuria to suggest UTI, elevated troponin, CKD, abnormal EKG with sinus rhythm with bifascicular block  Pt currently presents with pain in R hip, mobility deficits requiring physical assist for all bed mobility, transfers and ambulation, unable to tolerate increased distances secondary to fatigue, pain and B knee buckling noted  Pt unable to demo safe household mobility, poor sitting tolerance secondary to pain  Pt able to sit EOB x 3 minutes then begins to become restless, shifting weight to attempt to decrease pain in R hip;  Returned to supine in bed and positioned to comfort  Pt lives with  and son; pt's bed/bath are on second floor and pt will need to be able to care for herself (mobility, ADLs, and steps)  Pt would benefit from continued PT while in hospital and follow up at inpt rehab to increase strength, balance, endurance, independence with funcitonal mobility to return to PLOF   The patient's AM-PAC Basic Mobility Inpatient Short Form Raw Score is 16, Standardized Score is 38 32  A standardized score less than 42 9 suggests the patient may benefit from discharge to post-acute rehabilitation services  Please also refer to the recommendation of the Physical Therapist for safe discharge planning  Barriers to Discharge: Inaccessible home environment, Decreased caregiver support        PT Discharge Recommendation: Post acute rehabilitation services     PT - OK to Discharge: Yes(when medically ready and bed available)    See flowsheet documentation for full assessment

## 2021-05-31 NOTE — ASSESSMENT & PLAN NOTE
· Non MI Troponin elevation, 0 07 on admission in the setting of E coli bacteremia, acute on chronic low back pain  · Troponin peaked at 0 40 and downtrended to 0 12  · Patient currently denying chest pain   · EKG normal sinus rhythm 86 beats per minute  · Cardiology consult appreciated

## 2021-05-31 NOTE — ASSESSMENT & PLAN NOTE
· Sepsis, present on admission, a/e/b fevers, T max 102 2, tachycardia, tachypnea secondary to E coli septicemia  · Rule out Discitis/vdertebral osteo given complaints of low back pain  · UA - Bacteruria, negative for wbc, nitrite or leukocytes  · Patient currently refusing MRI of her back  · On cefepime and vancomycin  · Admission Blood cultures 2/2 - Ecoli  · Repeat blood culture negative till date  · Trend WBC and fever curve  · See above

## 2021-05-31 NOTE — PROGRESS NOTES
Cardiology Progress Note - Ariel Edwards 65 y o  female MRN: [id number]    Unit/Bed#: -01 Encounter: [id number]        ASSESSMENT/PLAN:    Principal Problem:    Acute exacerbation of chronic low back pain  Active Problems:    GIST (gastrointestinal stromal tumor), malignant (HCC)    Essential hypertension    Sepsis (HCC)    Elevated troponin    Stage 3 chronic kidney disease (HCC)    Elevated troponin in the setting of sepsis , E coli bacteremia:  Probable non MI troponin elevation  Troponin has trended down  Intermediate risk of underlying coronary artery disease  Abnormal resting EKG  She will need further risk stratification with a pharmacologic nuclear stress test when her acute medical issues infection resolve  Continue beta-blockers and statins  Echo to evaluate LV systolic function  Paroxysmal atrial fibrillation with no recurrence as per the patient in the last 5 years:  Anticoagulation was deferred due to recurrent GI blood loss secondary to gastric AV malformations for which she has had cauterization done  Continue monitoring  Continue low-dose beta-blockers      Systolic murmur:  Possibly related to a combination of mitral regurgitation and aortic valve disease  Echo planned to evaluate LV systolic function, wall motion abnormalities and severity of mitral and aortic valve disease  Subjective:   No significant events overnight  No chest pain reported, no shortness of breath  Continues to report low back pain  Review of Systems   Constitutional: Positive for fever  Respiratory: Negative for chest tightness, shortness of breath and wheezing  Cardiovascular: Negative for chest pain, palpitations and leg swelling  Musculoskeletal: Positive for back pain  Skin: Negative for rash  Neurological: Negative for syncope  Hematological: Does not bruise/bleed easily           Current Facility-Administered Medications:     acetaminophen (TYLENOL) tablet 650 mg, 650 mg, Oral, Q6H PRN, Luz Pacheco PA-C, 650 mg at 05/31/21 1933    aspirin tablet 325 mg, 325 mg, Oral, Daily, Gerald Samuel MD, 325 mg at 05/31/21 8511    atorvastatin (LIPITOR) tablet 40 mg, 40 mg, Oral, Daily With Prachi Hood MD, 40 mg at 05/30/21 1702    Avapritinib TABS 200 mg, 200 mg, Oral, Daily, Luz Pacheco PA-C    azelastine (ASTELIN) 0 1 % nasal spray 1 spray, 1 spray, Each Nare, BID, Arelis David PA-C, 1 spray at 05/31/21 0919    cefepime (MAXIPIME) IVPB (premix in dextrose) 2,000 mg 50 mL, 2,000 mg, Intravenous, Q12H, Anjana Vega MD, Last Rate: 100 mL/hr at 05/31/21 0432, 2,000 mg at 05/31/21 0432    heparin (porcine) subcutaneous injection 5,000 Units, 5,000 Units, Subcutaneous, Q8H [address], 5,000 Units at 05/31/21 0555 **AND** Platelet count, , , Once, Luz Pacheco PA-C    HYDROmorphone (DILAUDID) injection 0 5 mg, 0 5 mg, Intravenous, Q8H PRN, Gerald Samuel MD, 0 5 mg at 05/31/21 0220    hydroxychloroquine (PLAQUENIL) tablet 200 mg, 200 mg, Oral, Early Morning, Arelis David PA-C, 200 mg at 05/31/21 0555    metoprolol succinate (TOPROL-XL) 24 hr tablet 12 5 mg, 12 5 mg, Oral, Daily, Arelis David PA-C, 12 5 mg at 05/31/21 3529    multivitamin-minerals (CENTRUM ADULTS) tablet 1 tablet, 1 tablet, Oral, Daily, Luz Pacheco PA-C, 1 tablet at 05/31/21 0918    ondansetron (ZOFRAN) injection 4 mg, 4 mg, Intravenous, Q6H PRN, Luz Pacheco PA-C    pantoprazole (PROTONIX) EC tablet 40 mg, 40 mg, Oral, BID, Arelis David PA-C, 40 mg at 05/31/21 0917    sodium chloride 0 9 % infusion, 75 mL/hr, Intravenous, Continuous, Gerald Samuel MD, Last Rate: 75 mL/hr at 05/30/21 1709, 75 mL/hr at 05/30/21 1709    traMADol (ULTRAM) tablet 50 mg, 50 mg, Oral, Q6H PRN, Luz Pacheco PA-C, 50 mg at 05/31/21 0918    traMADol (ULTRAM) tablet 50 mg, 50 mg, Oral, Once, Luz Pacheco PA-C, Stopped at 05/30/21 0345    vancomycin (VANCOCIN) IVPB (premix in dextrose) 750 mg 150 mL, 15 mg/kg, Intravenous, Q24H, Yimi Rosa Blackwood MD, Last Rate: 150 mL/hr at 05/31/21 0918, 750 mg at 05/31/21 6831     Objective:     Vitals:   Blood pressure 121/90, pulse 67, temperature 98 4 °F (36 9 °C), temperature source Oral, resp  rate 16, height 5' 1" (1 549 m), weight 55 3 kg (122 lb), SpO2 97 %  Body mass index is 23 05 kg/m²  Orthostatic Blood Pressures      Most Recent Value   Blood Pressure  121/90 filed at 05/31/2021 0744   Patient Position - Orthostatic VS  Lying filed at 05/29/2021 2901         Systolic (33YAE), NRW:250 , Min:112 , TMX:552     Diastolic (16PRK), QAO:24, Min:65, Max:91      Intake/Output Summary (Last 24 hours) at 5/31/2021 1006  Last data filed at 5/31/2021 0935  Gross per 24 hour   Intake 685 ml   Output 2519 ml   Net -1834 ml     Weight (last 2 days)     Date/Time   Weight    05/29/21 0801   55 3 (122)              Physical Exam  Constitutional:       General: She is not in acute distress  HENT:      Head: Normocephalic  Cardiovascular:      Rate and Rhythm: Normal rate and regular rhythm  Heart sounds: S1 normal and S2 normal  Murmur present  Systolic murmur present with a grade of 3/6  Pulmonary:      Effort: Pulmonary effort is normal       Breath sounds: Normal breath sounds  Musculoskeletal:         General: No swelling  Right lower leg: No edema  Left lower leg: No edema  Skin:     General: Skin is warm  Comments: Left facial birth jf   Neurological:      General: No focal deficit present     Psychiatric:         Mood and Affect: Mood normal            Labs & Results:    Results from last 7 days   Lab Units 05/30/21  0619 05/29/21  1415 05/29/21  1125   TROPONIN I ng/mL 0 12* 0 24* 0 40*     Results from last 7 days   Lab Units 05/31/21  0514 05/29/21  0212   WBC Thousand/uL 4 69 7 32   HEMOGLOBIN g/dL 9 0* 9 0*   HEMATOCRIT % 28 2* 27 3*   PLATELETS Thousands/uL 249 291     Results from last 7 days   Lab Units 05/30/21  0619   TRIGLYCERIDES mg/dL 97   HDL mg/dL 58     Results from last 7 days   Lab Units 05/31/21  0514 05/30/21  0619 05/29/21  0831 05/29/21  0212   POTASSIUM mmol/L 4 1 3 3* 3 9 4 4   CHLORIDE mmol/L 103 104 98* 99*   CO2 mmol/L 25 24 30 28   BUN mg/dL 15 20 18 21   CREATININE mg/dL 1 14 1 29 1 37* 1 48*   CALCIUM mg/dL 8 1* 8 2* 8 4 8 3   ALK PHOS U/L  --  74  --  87   ALT U/L  --  25  --  30   AST U/L  --  42  --  63*     Results from last 7 days   Lab Units 05/29/21  0212   INR  1 04   PTT seconds 24     Results from last 7 days   Lab Units 05/30/21  0619   MAGNESIUM mg/dL 2 1     No results for input(s): NTBNP in the last 72 hours

## 2021-06-01 ENCOUNTER — APPOINTMENT (INPATIENT)
Dept: NON INVASIVE DIAGNOSTICS | Facility: HOSPITAL | Age: 71
DRG: 872 | End: 2021-06-01
Payer: COMMERCIAL

## 2021-06-01 LAB
ANION GAP SERPL CALCULATED.3IONS-SCNC: 10 MMOL/L (ref 4–13)
BASOPHILS # BLD AUTO: 0.01 THOUSANDS/ΜL (ref 0–0.1)
BASOPHILS NFR BLD AUTO: 0 % (ref 0–1)
BUN SERPL-MCNC: 13 MG/DL (ref 5–25)
CALCIUM SERPL-MCNC: 8.5 MG/DL (ref 8.3–10.1)
CHLORIDE SERPL-SCNC: 102 MMOL/L (ref 100–108)
CO2 SERPL-SCNC: 23 MMOL/L (ref 21–32)
CREAT SERPL-MCNC: 1.05 MG/DL (ref 0.6–1.3)
EOSINOPHIL # BLD AUTO: 0.07 THOUSAND/ΜL (ref 0–0.61)
EOSINOPHIL NFR BLD AUTO: 1 % (ref 0–6)
ERYTHROCYTE [DISTWIDTH] IN BLOOD BY AUTOMATED COUNT: 16.6 % (ref 11.6–15.1)
GFR SERPL CREATININE-BSD FRML MDRD: 53 ML/MIN/1.73SQ M
GLUCOSE SERPL-MCNC: 100 MG/DL (ref 65–140)
HCT VFR BLD AUTO: 26.8 % (ref 34.8–46.1)
HGB BLD-MCNC: 8.8 G/DL (ref 11.5–15.4)
IMM GRANULOCYTES # BLD AUTO: 0.02 THOUSAND/UL (ref 0–0.2)
IMM GRANULOCYTES NFR BLD AUTO: 0 % (ref 0–2)
LYMPHOCYTES # BLD AUTO: 0.33 THOUSANDS/ΜL (ref 0.6–4.47)
LYMPHOCYTES NFR BLD AUTO: 6 % (ref 14–44)
MCH RBC QN AUTO: 33.2 PG (ref 26.8–34.3)
MCHC RBC AUTO-ENTMCNC: 32.8 G/DL (ref 31.4–37.4)
MCV RBC AUTO: 101 FL (ref 82–98)
MONOCYTES # BLD AUTO: 0.53 THOUSAND/ΜL (ref 0.17–1.22)
MONOCYTES NFR BLD AUTO: 10 % (ref 4–12)
NEUTROPHILS # BLD AUTO: 4.54 THOUSANDS/ΜL (ref 1.85–7.62)
NEUTS SEG NFR BLD AUTO: 83 % (ref 43–75)
NRBC BLD AUTO-RTO: 0 /100 WBCS
PLATELET # BLD AUTO: 242 THOUSANDS/UL (ref 149–390)
PMV BLD AUTO: 9.8 FL (ref 8.9–12.7)
POTASSIUM SERPL-SCNC: 4 MMOL/L (ref 3.5–5.3)
RBC # BLD AUTO: 2.65 MILLION/UL (ref 3.81–5.12)
SODIUM SERPL-SCNC: 135 MMOL/L (ref 136–145)
WBC # BLD AUTO: 5.5 THOUSAND/UL (ref 4.31–10.16)

## 2021-06-01 PROCEDURE — 99232 SBSQ HOSP IP/OBS MODERATE 35: CPT | Performed by: INTERNAL MEDICINE

## 2021-06-01 PROCEDURE — 80048 BASIC METABOLIC PNL TOTAL CA: CPT | Performed by: INTERNAL MEDICINE

## 2021-06-01 PROCEDURE — 97167 OT EVAL HIGH COMPLEX 60 MIN: CPT

## 2021-06-01 PROCEDURE — 85025 COMPLETE CBC W/AUTO DIFF WBC: CPT | Performed by: INTERNAL MEDICINE

## 2021-06-01 PROCEDURE — 99231 SBSQ HOSP IP/OBS SF/LOW 25: CPT | Performed by: NURSE PRACTITIONER

## 2021-06-01 RX ORDER — CEFTRIAXONE 1 G/50ML
1000 INJECTION, SOLUTION INTRAVENOUS EVERY 24 HOURS
Status: DISCONTINUED | OUTPATIENT
Start: 2021-06-02 | End: 2021-06-02 | Stop reason: HOSPADM

## 2021-06-01 RX ORDER — FUROSEMIDE 20 MG/1
20 TABLET ORAL DAILY
Status: DISCONTINUED | OUTPATIENT
Start: 2021-06-01 | End: 2021-06-02 | Stop reason: HOSPADM

## 2021-06-01 RX ADMIN — CEFTRIAXONE 2000 MG: 2 INJECTION, SOLUTION INTRAVENOUS at 17:38

## 2021-06-01 RX ADMIN — ACETAMINOPHEN 650 MG: 325 TABLET, FILM COATED ORAL at 08:29

## 2021-06-01 RX ADMIN — FUROSEMIDE 20 MG: 20 TABLET ORAL at 14:28

## 2021-06-01 RX ADMIN — HEPARIN SODIUM 5000 UNITS: 5000 INJECTION INTRAVENOUS; SUBCUTANEOUS at 06:33

## 2021-06-01 RX ADMIN — PANTOPRAZOLE SODIUM 40 MG: 40 TABLET, DELAYED RELEASE ORAL at 08:29

## 2021-06-01 RX ADMIN — TRAMADOL HYDROCHLORIDE 50 MG: 50 TABLET, FILM COATED ORAL at 08:29

## 2021-06-01 RX ADMIN — ASPIRIN 325 MG ORAL TABLET 325 MG: 325 PILL ORAL at 08:31

## 2021-06-01 RX ADMIN — TRAMADOL HYDROCHLORIDE 50 MG: 50 TABLET, FILM COATED ORAL at 03:12

## 2021-06-01 RX ADMIN — HYDROXYCHLOROQUINE SULFATE 200 MG: 200 TABLET, FILM COATED ORAL at 06:33

## 2021-06-01 RX ADMIN — HYDROMORPHONE HYDROCHLORIDE 0.5 MG: 1 INJECTION, SOLUTION INTRAMUSCULAR; INTRAVENOUS; SUBCUTANEOUS at 20:23

## 2021-06-01 RX ADMIN — HEPARIN SODIUM 5000 UNITS: 5000 INJECTION INTRAVENOUS; SUBCUTANEOUS at 13:46

## 2021-06-01 RX ADMIN — AZELASTINE HYDROCHLORIDE 1 SPRAY: 137 SPRAY, METERED NASAL at 18:25

## 2021-06-01 RX ADMIN — PANTOPRAZOLE SODIUM 40 MG: 40 TABLET, DELAYED RELEASE ORAL at 17:38

## 2021-06-01 RX ADMIN — MULTIPLE VITAMINS W/ MINERALS TAB 1 TABLET: TAB ORAL at 08:29

## 2021-06-01 RX ADMIN — TRAMADOL HYDROCHLORIDE 50 MG: 50 TABLET, FILM COATED ORAL at 17:40

## 2021-06-01 RX ADMIN — ATORVASTATIN CALCIUM 40 MG: 40 TABLET, FILM COATED ORAL at 17:38

## 2021-06-01 RX ADMIN — ACETAMINOPHEN 650 MG: 325 TABLET, FILM COATED ORAL at 03:12

## 2021-06-01 RX ADMIN — METOPROLOL SUCCINATE 12.5 MG: 25 TABLET, FILM COATED, EXTENDED RELEASE ORAL at 08:30

## 2021-06-01 RX ADMIN — AZELASTINE HYDROCHLORIDE 1 SPRAY: 137 SPRAY, METERED NASAL at 08:31

## 2021-06-01 NOTE — PROGRESS NOTES
This patient's vancomycin therapy has been discontinued  Thank you for this consult; pharmacy will sign off now

## 2021-06-01 NOTE — PROGRESS NOTES
[place]  Progress Note Deborah Rasmussen 1950, 65 y o  female MRN: [id number]  Unit/Bed#: -01 Encounter: [id number]  Primary Care Provider: Mike Shaw MD   Date and time admitted to hospital: 5/29/2021  1:48 AM    * Acute exacerbation of chronic low back pain  Assessment & Plan  · Acute on chronic low back pain, ongoing for the past 3 weeks, recently hospitalized 5/4 and managed on prednisone taper  Steroid initially helped her back pain but back pain recurred when prednisone dosage finished  · CT lumbar spine on admission - showed severe degenerative disc disease from L2- L3, with disc herniation L4- L5  · Febrile in the ED, meeting SIRS criteria, ED doc reports poor rectal tone  Concerned for possible back abscess  Spoke to neuro surgery who recommended patient have an MRI of her back performed  Patient currently refusing, as she prefers to be knocked out for MRI  Patient is aware of the risk of undiagnosed abscess/diskitis   · On cefepime and vancomycin per ID recs  · Blood cultures #2/2 positive for Ecoli   · Infectious disease on board  · Trend WBC and fever curve  · Repeat blood cultures pending  S/p Repeat CT lumbar spine w/Contrast (6/1/21) - No evidence of abscess or discitis seen   Patient very reluctant to pursue MRI option at [facility], would keep inpatient pending further ID recs on antibiotic coverage etc    Sepsis ([facility] [place] [address] )  Assessment & Plan  · Sepsis, present on admission, a/e/b fevers, T max 102 2, tachycardia, tachypnea secondary to E coli septicemia  · Rule out Discitis/vdertebral osteo given complaints of low back pain  · UA - Bacteruria, negative for wbc, nitrite or leukocytes  · CT scan lumbar spine - No evidence of abscess or discitis  · Patient currently refusing MRI of her back  · On cefepime and vancomycin per ID recs  · Admission Blood cultures 2/2 - Ecoli  · Repeat blood culture negative till date  · Trend WBC and fever curve  · See above    Stage 3 chronic kidney disease [facility])  Assessment & Plan  Lab Results   Component Value Date    EGFR 53 06/01/2021    EGFR 48 05/31/2021    EGFR 41 05/30/2021    CREATININE 1 05 06/01/2021    CREATININE 1 14 05/31/2021    CREATININE 1 29 05/30/2021   · Creatinine 1 48 on admission   · Baseline Cr 1 4-1 5   · Creatinine stable at 1 14 today  · Avoid hypotension/nephrotoxins medication  · Monitor renal function in a m  Elevated troponin  Assessment & Plan  · Non MI Troponin elevation, 0 07 on admission in the setting of E coli bacteremia, acute on chronic low back pain  · Troponin peaked at 0 40 and downtrended to 0 12  · Patient currently denying chest pain   · EKG normal sinus rhythm 86 beats per minute  · Cardiology consult appreciated - patient refusing cardiology evaluation or ECHO  She would prefer to follow up with her personal cardiologist, Dr Tiffanie Alicea hypertension  Assessment & Plan  · Home meds: metoprolol succinate 12 5 mg daily and Lasix 20 mg daily   · Lasix on hold initially, can resume today  · Monitor vitals as per protocol    GIST (gastrointestinal stromal tumor), malignant ([facility] [address] )  Assessment & Plan  · Follows with Manda Hudson   · Continue outpatient follow-up with Oncology   · Currently on [email or web address]      VTE Pharmacologic Prophylaxis:   Pharmacologic: Heparin  Mechanical VTE Prophylaxis in Place: Yes    Patient Centered Rounds: I have performed bedside rounds with nursing staff today  Discussions with Specialists or Other Care Team Provider: CM    Education and Discussions with Family / Patient: patient discussed with, she opted to update her     Time Spent for Care: 30 minutes  More than 50% of total time spent on counseling and coordination of care as described above      Current Length of Stay: 3 day(s)    Current Patient Status: Inpatient   Certification Statement: The patient will continue to require additional inpatient hospital stay due to as above, pending final ID recs    Discharge Plan: Possibly tomorrow    Code Status: Level 1 - Full Code      Subjective:   No overnight events, chronic back pain somewhat tolerable today    Objective:     Vitals:   Temp (24hrs), Av 5 °F (36 9 °C), Min:98 5 °F (36 9 °C), Max:98 5 °F (36 9 °C)    Temp:  [98 5 °F (36 9 °C)] 98 5 °F (36 9 °C)  HR:  [78-86] 78  Resp:  [16-20] 16  BP: (142)/(82-83) 142/82  SpO2:  [97 %] 97 %  Body mass index is 23 05 kg/m²  Input and Output Summary (last 24 hours): Intake/Output Summary (Last 24 hours) at 2021 1414  Last data filed at 2021 0701  Gross per 24 hour   Intake 800 ml   Output 1575 ml   Net -775 ml       Physical Exam:     Physical Exam  Vitals signs and nursing note reviewed  Constitutional:       General: She is not in acute distress  Appearance: Normal appearance  She is not ill-appearing, toxic-appearing or diaphoretic  Cardiovascular:      Rate and Rhythm: Normal rate and regular rhythm  Pulses: Normal pulses  Heart sounds: Murmur present  Pulmonary:      Effort: Pulmonary effort is normal  No respiratory distress  Breath sounds: Normal breath sounds  No stridor  No wheezing, rhonchi or rales  Chest:      Chest wall: No tenderness  Abdominal:      General: Bowel sounds are normal  There is no distension  Palpations: Abdomen is soft  Tenderness: There is no abdominal tenderness  There is no right CVA tenderness, left CVA tenderness, guarding or rebound  Hernia: No hernia is present  Musculoskeletal: Normal range of motion  General: Tenderness (paralumbar) present  No swelling, deformity or signs of injury  Right lower leg: No edema  Left lower leg: No edema  Skin:     General: Skin is warm and dry  Capillary Refill: Capillary refill takes less than 2 seconds  Coloration: Skin is not jaundiced  Findings: Rash (right eye birthmark) present  No bruising or lesion     Neurological:      General: No focal deficit present  Mental Status: She is alert and oriented to person, place, and time  Mental status is at baseline  Cranial Nerves: No cranial nerve deficit  Psychiatric:         Mood and Affect: Mood normal          Thought Content: Thought content normal        Additional Data:     Labs:    Results from last 7 days   Lab Units 06/01/21  0510   WBC Thousand/uL 5 50   HEMOGLOBIN g/dL 8 8*   HEMATOCRIT % 26 8*   PLATELETS Thousands/uL 242   NEUTROS PCT % 83*   LYMPHS PCT % 6*   MONOS PCT % 10   EOS PCT % 1     Results from last 7 days   Lab Units 06/01/21  0510  05/30/21  0619   SODIUM mmol/L 135*   < > 139   POTASSIUM mmol/L 4 0   < > 3 3*   CHLORIDE mmol/L 102   < > 104   CO2 mmol/L 23   < > 24   BUN mg/dL 13   < > 20   CREATININE mg/dL 1 05   < > 1 29   ANION GAP mmol/L 10   < > 11   CALCIUM mg/dL 8 5   < > 8 2*   ALBUMIN g/dL  --   --  2 8*   TOTAL BILIRUBIN mg/dL  --   --  0 20   ALK PHOS U/L  --   --  74   ALT U/L  --   --  25   AST U/L  --   --  42   GLUCOSE RANDOM mg/dL 100   < > 95    < > = values in this interval not displayed  Results from last 7 days   Lab Units 05/29/21  0212   INR  1 04             Results from last 7 days   Lab Units 05/30/21  0619 05/29/21  3206   LACTIC ACID mmol/L  --  1 8   PROCALCITONIN ng/ml 4 24* 1 32*           * I Have Reviewed All Lab Data Listed Above  * Additional Pertinent Lab Tests Reviewed: All Labs Within Last 24 Hours Reviewed    Imaging:    Imaging Reports Reviewed Today Include:   Imaging Personally Reviewed by Myself Includes:      Recent Cultures (last 7 days):     Results from last 7 days   Lab Units 05/30/21  0943 05/29/21  0212   BLOOD CULTURE  No Growth at 24 hrs  No Growth at 24 hrs   Escherichia coli*  Escherichia coli*   GRAM STAIN RESULT   --  Gram negative rods*  Gram negative rods*       Last 24 Hours Medication List:   Current Facility-Administered Medications   Medication Dose Route Frequency Provider Last Rate    acetaminophen  650 mg Oral Q6H PRN Karina Mathur PA-C      aspirin  325 mg Oral Daily Joao García MD      atorvastatin  40 mg Oral Daily With Td Mullen MD      Avapritinib  200 mg Oral Daily Karina Mathur PA-C      azelastine  1 spray Each Nare BID Karina Mathur PA-C      cefTRIAXone  2,000 mg Intravenous Q24H Neisha Stokes MD 2,000 mg (05/31/21 2016)    furosemide  20 mg Oral Daily Jayne Anderson MD      heparin (porcine)  5,000 Units Subcutaneous [facility] Karina Mathur PA-C      HYDROmorphone  0 5 mg Intravenous Q8H PRN Joao García MD      hydroxychloroquine  200 mg Oral Early Morning Karina Mathur PA-C      metoprolol succinate  12 5 mg Oral Daily Karina Mathur PA-C      multivitamin-minerals  1 tablet Oral Daily Neisha Stokes MD      ondansetron  4 mg Intravenous Q6H PRN Karina Mathur PA-C      pantoprazole  40 mg Oral BID Karina Mathur PA-C      sodium chloride  75 mL/hr Intravenous Continuous Joao García MD 75 mL/hr (05/30/21 1709)    traMADol  50 mg Oral Q6H PRN Karina Mathur PA-C          Today, Patient Was Seen By: Jayne Anderson MD    ** Please Note: Dictation voice to text software may have been used in the creation of this document   **

## 2021-06-01 NOTE — CASE MANAGEMENT
LOS: 3 days  Bundle: No  Unplanned Readmission Score: 20 Green  30 Day Readmission: No     CM met with patient at bedside  Explained the role of CM  Obtained the following information from patient  Home: 2 story home 12 steps to second floor 1 step to enter house  Lives With:  and son  ADL's: Independent  DME: Walker/cane  Ambulation: Independent  Transportation: Yes  [facility]  PCP: Belinda José MD  [facility] Hx: [employer]  Rehab Hx: No  Mental Health Hx: No  Substance Abuse Hx: No  Employment:Retired  POA/LW/AD: Yes/Yes/Yes  Transport at D/C:     CM reviewed d/c planning process including the following: identifying help at home, patient preferences for d/c planning needs, availability of treatment team to discuss questions or concerns patient and/or family may have regarding understanding medications and recognizing signs and symptoms once discharged  Discussed therapy recommendations patient declines STR and [facility] services at this time  No DC needs at this time

## 2021-06-01 NOTE — ASSESSMENT & PLAN NOTE
Lab Results   Component Value Date    EGFR 53 06/01/2021    EGFR 48 05/31/2021    EGFR 41 05/30/2021    CREATININE 1 05 06/01/2021    CREATININE 1 14 05/31/2021    CREATININE 1 29 05/30/2021   · Creatinine 1 48 on admission   · Baseline Cr 1 4-1 5   · Creatinine stable at 1 14 today  · Avoid hypotension/nephrotoxins medication  · Monitor renal function in a m

## 2021-06-01 NOTE — PLAN OF CARE
Problem: Potential for Falls  Goal: Patient will remain free of falls  Description: INTERVENTIONS:  - Assess patient frequently for physical needs  -  Identify cognitive and physical deficits and behaviors that affect risk of falls    -  [place] fall precautions as indicated by assessment   - Educate patient/family on patient safety including physical limitations  - Instruct patient to call for assistance with activity based on assessment  - Modify environment to reduce risk of injury  - Consider OT/PT consult to assist with strengthening/mobility  Outcome: Progressing

## 2021-06-01 NOTE — OCCUPATIONAL THERAPY NOTE
Occupational Therapy Evaluation     Patient Name: Cristine BELLA Date: 2021  Problem List  Principal Problem:    Acute exacerbation of chronic low back pain  Active Problems:    GIST (gastrointestinal stromal tumor), malignant ([facility] )    Essential hypertension    Sepsis (HCC)    Elevated troponin    Stage 3 chronic kidney disease ([facility] )    Past Medical History  Past Medical History:   Diagnosis Date    ADHD     Anemia     Anxiety     Arthritis     Asthma     Atrial fibrillation ([facility] )     Breast cancer ([facility] )     Cancer ([place] [address] )     Chronic iron deficiency anemia 2020    Extensive GI evaluation over the last year including multiple EGD, colonoscopy, and capsule endoscopy  Has had gastric AVMs cauterized, Dieulafoy's lesion clipped, and most recently a Seb erosion cauterized    Coronary artery disease     Depression     GERD (gastroesophageal reflux disease)     History of stomach ulcers     Hypercholesterolemia     Hypertension     Kidney disease     Metastatic cancer ([facility] )      Past Surgical History  Past Surgical History:   Procedure Laterality Date    ANKLE SURGERY      APPENDECTOMY      BREAST SURGERY      CATARACT EXTRACTION       SECTION      COLONOSCOPY  2019    Multiple adenomatous colon polyps and internal hemorrhoids  Three year recall advised    HIP SURGERY      JOINT REPLACEMENT  2019    Left knee replacement    LAMINECTOMY      ROTATOR CUFF REPAIR      SIGMOID RESECTION / RECTOPEXY      SINUS SURGERY      UPPER GASTROINTESTINAL ENDOSCOPY  2020    Dieulafoy's lesion in proximal stomach which was actively oozing  Injected with epinephrine and 2 Endoclips placed with control of bleeding, hiatal hernia      UPPER GASTROINTESTINAL ENDOSCOPY  2020    Bleeding Seb's erosion status post cauterization             21 1415   OT Last Visit   OT Visit Date 21   Note Type   Note type Evaluation Restrictions/Precautions   Other Precautions Pain; Fall Risk   Pain Assessment   Pain Assessment Tool 0-10   Pain Score 3   Pain Location/Orientation Location: Back   Home Living   Type of Home House   Home Layout Two level;Bed/bath upstairs  (1STE)   Bathroom Shower/Tub Tub/shower unit   [employer] Raised   Bathroom Equipment Grab bars in shower; Shower chair   Home Equipment Cane;Walker   Additional Comments Reports mostly uses cane but also uses RW when pain increases  Prior Function   Level of [place] Independent with ADLs and functional mobility; Needs assistance with IADLs   Lives With Family   Receives Help From Family   ADL Assistance Independent   IADLs Needs assistance   Falls in the last 6 months 1 to 4   Comments Reports 1 fall 3 months ago  Son is in 45s and does yard work and helps around the house  +  Psychosocial   Psychosocial (WDL) WDL   Subjective   Subjective Pt reports she has no interest in going to rehab but would be agreeable to home health  ADL   Where Assessed Chair   Eating Assistance 7  Independent   Grooming Assistance 7  Independent   UB Bathing Assistance 7  Independent   LB Bathing Assistance 2  Maximal Assistance   UB Dressing Assistance 7  Independent   LB Dressing Assistance 2  Maximal [address]  4  Minimal Assistance   Additional Comments Pt demonstrates functional AROM for UB ADLs but is unable to reach feet to don/doff socks  Min A for toilet transfer this date  Bed Mobility   Additional Comments Pt recieved OOB to chair      Transfers   Sit to Stand 4  Minimal assistance   Additional items Assist x 1   Stand to Sit 4  Minimal assistance   Additional items Assist x 1   Balance   Static Sitting Fair   Dynamic Sitting Fair   Static Standing Fair -   Dynamic Standing Fair -   Activity Tolerance   Activity Tolerance Patient limited by pain   Nurse Made Aware OK to see per Elena Fisher RN   RUE Assessment   RUE Assessment WFL   LUE Assessment LUE Assessment WFL   Hand Function   Gross Motor Coordination Functional   Fine Motor Coordination Functional   Cognition   Overall Cognitive Status WFL   Arousal/Participation Alert; Cooperative   Attention Within functional limits   Orientation Level Oriented X4   Memory Within functional limits   Following Commands Follows one step commands without difficulty   Comments Pleasant and demonstrate good insight  Assessment   Limitation Decreased ADL status; Decreased endurance;Decreased self-care trans;Decreased high-level ADLs   Prognosis Good   Assessment Pt is a 65 y o  female seen for OT evaluation s/p admit to [facility] on 5/29/2021 w/ Acute exacerbation of chronic low back pain  Comorbidities affecting pt's functional performance at time of assessment include: GIST, essential hypertension, sepsis, elevated troponin, stage 3 CKD  Personal factors affecting pt at time of IE include:steps to enter environment, difficulty performing ADLS and difficulty performing IADLS   Prior to admission, pt was living in a [facility] with her  and son, she was independent in ADLs and functional mobility with a cane, and was assisted with IADLs by her family  Upon evaluation: Pt requires min A for functional mobility and max A for lower body ADLs 2* the following deficits impacting occupational performance: weakness, decreased strength, decreased balance, decreased tolerance and increased pain  Pt to benefit from continued skilled OT tx while in the hospital to address deficits as defined above and maximize level of functional independence w ADL's and functional mobility  Occupational Performance areas to address include: bathing/shower, toilet hygiene, dressing, functional mobility and community mobility  From OT standpoint, recommendation at time of d/c would be home health OT  Goals   Patient Goals To go home and get back to walking around more there      Plan   Goal Expiration Date 06/11/21   OT Frequency 2-3x/wk Recommendation   OT Discharge Recommendation Home with home health rehabilitation   AM-PAC Daily Activity Inpatient   Lower Body Dressing 2   Bathing 2   Toileting 3   Upper Body Dressing 4   Grooming 4   Eating 4   Daily Activity Raw Score 19   Daily Activity Standardized Score (Calc for Raw Score >=11) 40 22      GOALS    1) Pt will improve activity tolerance to G for min 30 min txment sessions    2) Pt will complete UB/LB dressing/self care w/ mod I using adaptive device and DME as needed    3) Pt will complete bathing w/ Mod I w/ use of AE and DME as needed    4) Pt will complete toileting w/ mod I w/ G hygiene/thoroughness using DME as needed    5) Pt will improve functional transfers to Mod I on/off all surfaces using DME as needed w/ G balance/safety     6) Pt will improve functional mobility during ADL/IADL/leisure tasks to Mod I using DME as needed w/ G balance/safety     7) Pt will participate in simulated IADL management task to increase independence to Mod I w/ G safety and endurance    8) Pt will demonstrate G attention for 10 minutes during ongoing cognitive assessment to assist w/ safe d/c planning/recommendations    9) Pt will demonstrate G carryover of pt/caregiver education and training as appropriate w/ mod I w/o cues w/ good tolerance    10) Pt will demonstrate 100% carryover of energy conservation techniques w/ mod I t/o functional I/ADL/leisure tasks w/o cues s/p skilled education    Giles Barajas, MOT, OTR/L

## 2021-06-01 NOTE — ASSESSMENT & PLAN NOTE
· Home meds: metoprolol succinate 12 5 mg daily and Lasix 20 mg daily   · Lasix on hold initially, can resume today  · Monitor vitals as per protocol

## 2021-06-01 NOTE — ASSESSMENT & PLAN NOTE
· Non MI Troponin elevation, 0 07 on admission in the setting of E coli bacteremia, acute on chronic low back pain  · Troponin peaked at 0 40 and downtrended to 0 12  · Patient currently denying chest pain   · EKG normal sinus rhythm 86 beats per minute  · Cardiology consult appreciated - patient refusing cardiology evaluation or ECHO   She would prefer to follow up with her personal cardiologist, Dr Kathi Flanagan

## 2021-06-01 NOTE — UTILIZATION REVIEW
Initial Clinical Review    Admission: Date/Time/Statement:   Admission Orders (From admission, onward)     Ordered        05/29/21 0525  Inpatient Admission  Once                   Orders Placed This Encounter   Procedures    Inpatient Admission     Standing Status:   Standing     Number of Occurrences:   1     Order Specific Question:   Level of Care     Answer:   Med Surg [16]     Order Specific Question:   Estimated length of stay     Answer:   More than 2 Midnights     Order Specific Question:   Certification     Answer:   I certify that inpatient services are medically necessary for this patient for a duration of greater than two midnights  See H&P and MD Progress Notes for additional information about the patient's course of treatment  ED Arrival Information     Expected Arrival Acuity Means of Arrival Escorted By Service Admission Type    5/29/2021 01:49 5/29/2021 01:48 Urgent Ambulance [facility] [employer] [facility]) General Medicine Urgent    Arrival Complaint    Back Pain        Chief Complaint   Patient presents with    Back Pain     chronic back pain worsening over past 3 weeks  Initial Presentation: 65year old female to the ED from home via EMS with complaints of worsenign lower back pain  Admitted to inpatient for acute exacerbation of low back pain, elevated troponin, sepsis  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, on avapritinib, HTN, HLD, pAfib   Had been tapering down prednisone for back pain  Difficulty moving on day of arrival and now unable to stand, ambulate due to pain  On arrival, she is pale, febrile, has elevated bp, anxious, tearful, has decreased ROM, tenderness, spasm in left lumbar paraspinal area  Strength is 5 out of 5 in bilateral lower extremities- able to push down equally on the "gas pedals", able to lift and move bilateral lower extremities off the bed, but minimally as it results in significant back pain and she winces and cries out   She has abnormal anal tone  Troponin elevated, CRP and CSed rate elevated  Discussed with neurosurgery concerning for epidural abscess  May need transfer to [facility] for MRI  Currently refusing MRI  Given IV fluids in the ED  IV abx started  Blood cultures pending  Troponin elevated, no check pain  IV dilaudid given in the ED  Neurosurgery consult, cardiology consult  5/29 Cardiology consult: Elevated troponin in the setting of sepsis  Probable non mi troponin elevation  INtermediate risk of CAD with abnormal resting EKG  Will need nuclear stress test when acute medical issues resolved  Continue BB and statins  Tele currently with NSR with APcs  COntinue to monitor  Check ECHO  5/29 Neurosurg consult: Longstanding h/o back pain with pain management and h/o GIST with previous abdominal surgery/rectal surgery  Date: 5/30    Day 2:  Troponin remains elevated  Continues to refuse MRI  Will get CT T and L spine with possible transfer for MRI under sedation  Blood cultures growing gram neg rods  Continue IV abx, ID consult       ED Triage Vitals [05/29/21 0150]   Temperature Pulse Respirations Blood Pressure SpO2   (!) 102 2 °F (39 °C) 95 20 (!) 183/90 97 %      Temp Source Heart Rate Source Patient Position - Orthostatic VS BP Location FiO2 (%)   Oral Monitor Lying Left arm --      Pain Score       Worst Possible Pain          Wt Readings from Last 1 Encounters:   05/29/21 55 3 kg (122 lb)     Additional Vital Signs:   Time  Temp  Pulse  Resp  BP  MAP (mmHg)  SpO2  Calculated FIO2 (%) - Nasal Cannula  Nasal Cannula O2 Flow Rate (L/min)  O2 Device  Patient Position - Orthostatic VS   05/30/21 0900  --  --  --  --  --  97 %  --  --  None (Room air)  --   05/30/21 07:25:01  98 3 °F (36 8 °C)  --  16  110/62  78  --  --  --  --  --   05/29/21 23:36:53  98 4 °F (36 9 °C)  --  21  100/61  74  --  --  --  --  --   05/29/21 15:26:49  98 7 °F (37 1 °C)  59  18  95/60  72  97 %  --  --  --  --   05/29/21 0900  --  --  --  --  --  -- --  --  None (Room air)  --   05/29/21 0801  98 7 °F (37 1 °C)  81  18  111/71  84  --  --  --  --  --   05/29/21 0615  --  81  18  107/57  76  98 %  28  2 L/min  Nasal cannula  --   05/29/21 0600  --  73  18  92/54  70  99 %  --  --  --  --   05/29/21 0530  98 6 °F (37 °C)  90  18  129/62  89  100 %  28  2 L/min  Nasal cannula  Lying   05/29/21 0500  --  93  20  128/68  92  100 %  --  --  --  --   05/29/21 0430  --  135Abnormal   22  151/79  107  99 %  28  2 L/min  Nasal cannula  Lying   05/29/21 0415  --  133Abnormal   22  144/75  104  99 %  --  --  --  --   05/29/21 0400  --  133Abnormal   24Abnormal   152/81  112  100 %  --  --  --  --   05/29/21 0345  --  134Abnormal   22  161/97  122  100 %  --  --  --  --   05/29/21 0330  100 6 °F (38 1 °C)Abnormal   135Abnormal   22  163/78  111  100 %  28  2 L/min  Nasal cannula  Lying   05/29/21 0300  --  99  18  164/77  110  98 %  28  2 L/min  Nasal cannula  --   05/29/21 0230  --  94  17  155/75  107  99 %  28  2 L/min  Nasal cannula  --   05/29/21 0216  --  --  --  --  --  99 %  28  2 L/min  Nasal cannula  --   05/29/21 0215  --  92  18  --  --  87 %Abnormal    --  --  None (Room air)  --   SpO2: DR Meredith Power AT BEDSIDE- PLACED PT ON 2LO2 NC at 05/29/21 0215   05/29/21 0150  102 2 °F (39 °C)Abnormal   95  20  183/90Abnormal   --  97 %  --  --  None (Room air         Pertinent Labs/Diagnostic Test Results:   5/29 CT A/P:   Gas is seen within the endometrial canal   Differential diagnosis includes recent instrumentation, endometritis, and/or fistula to the bowel  5/29 CXR:   No acute cardiopulmonary disease     5/29 EKG: Sinus rhythm with Premature atrial complexes in a pattern of bigeminy  Left axis deviation  Right bundle branch block  Minimal voltage criteria for LVH, may be normal variant  Abnormal ECG    Results from last 7 days   Lab Units 05/29/21  0212   SARS-COV-2  Negative     Results from last 7 days   Lab Units 06/01/21  0510 05/31/21  0514 05/29/21  0279 WBC Thousand/uL 5 50 4 69 7 32   HEMOGLOBIN g/dL 8 8* 9 0* 9 0*   HEMATOCRIT % 26 8* 28 2* 27 3*   PLATELETS Thousands/uL 242 249 291   NEUTROS ABS Thousands/µL 4 54 3 62 6 79         Results from last 7 days   Lab Units 06/01/21  0510 05/31/21  0514 05/30/21  0619 05/29/21  0831 05/29/21  0212   SODIUM mmol/L 135* 138 139 136 138   POTASSIUM mmol/L 4 0 4 1 3 3* 3 9 4 4   CHLORIDE mmol/L 102 103 104 98* 99*   CO2 mmol/L 23 25 24 30 28   ANION GAP mmol/L 10 10 11 8 11   BUN mg/dL 13 15 20 18 21   CREATININE mg/dL 1 05 1 14 1 29 1 37* 1 48*   EGFR ml/min/1 73sq m 53 48 41 39 35   CALCIUM mg/dL 8 5 8 1* 8 2* 8 4 8 3   MAGNESIUM mg/dL  --   --  2 1  --   --      Results from last 7 days   Lab Units 05/30/21  0619 05/29/21  0212   AST U/L 42 63*   ALT U/L 25 30   ALK PHOS U/L 74 87   TOTAL PROTEIN g/dL 6 5 7 6   ALBUMIN g/dL 2 8* 3 4*   TOTAL BILIRUBIN mg/dL 0 20 0 50         Results from last 7 days   Lab Units 06/01/21  0510 05/31/21  0514 05/30/21  0619 05/29/21  0831 05/29/21  0212   GLUCOSE RANDOM mg/dL 100 106 95 103 99         Results from last 7 days   Lab Units 05/30/21  0619 05/29/21  1415 05/29/21  1125 05/29/21  0811 05/29/21  0532 05/29/21  0212   TROPONIN I ng/mL 0 12* 0 24* 0 40* 0 35* 0 28* 0 07*         Results from last 7 days   Lab Units 05/29/21  0212   PROTIME seconds 13 6   INR  1 04   PTT seconds 24         Results from last 7 days   Lab Units 05/30/21  0619 05/29/21  0212   PROCALCITONIN ng/ml 4 24* 1 32*     Results from last 7 days   Lab Units 05/29/21  0212   LACTIC ACID mmol/L 1 8     Results from last 7 days   Lab Units 05/31/21  0514 05/29/21  0212   CRP mg/L 89 9* 38 9*   SED RATE mm/hour 28 32*         Results from last 7 days   Lab Units 05/29/21  0258   CLARITY UA  Slightly Cloudy   COLOR UA  Yellow   SPEC GRAV UA  1 015   PH UA  7 0   GLUCOSE UA mg/dl Negative   KETONES UA mg/dl Negative   BLOOD UA  Trace-Intact*   PROTEIN UA mg/dl Negative   NITRITE UA  Negative   BILIRUBIN UA Negative   UROBILINOGEN UA E U /dl 0 2   LEUKOCYTES UA  Negative   WBC UA /hpf None Seen   RBC UA /hpf None Seen   BACTERIA UA /hpf Innumerable*   EPITHELIAL CELLS WET PREP /hpf None Seen     Results from last 7 days   Lab Units 05/30/21  0943 05/29/21  0375   BLOOD CULTURE  No Growth at 24 hrs  No Growth at 24 hrs  Escherichia coli*  Escherichia coli*   GRAM STAIN RESULT   --  Gram negative rods*  Gram negative rods*     ED Treatment:   Medication Administration from 05/29/2021 0147 to 05/29/2021 0753       Date/Time Order Dose Route Action     05/29/2021 0223 multi-electrolyte (ISOLYTE-S PH 7 4 equivalent) IV solution 1,000 mL 1,000 mL Intravenous New Bag     05/29/2021 0301 multi-electrolyte (PLASMALYTE-A/ISOLYTE-S PH 7 4) IV solution 1,000 mL 1,000 mL Intravenous New Bag     05/29/2021 0220 HYDROmorphone (DILAUDID) injection 1 mg 1 mg Intravenous Given     05/29/2021 0227 ondansetron (ZOFRAN) injection 4 mg 4 mg Intravenous Given     05/29/2021 0230 lidocaine (LIDODERM) 5 % patch 1 patch 1 patch Topical Medication Applied     05/29/2021 0308 aspirin chewable tablet 324 mg 324 mg Oral Given     05/29/2021 0308 acetaminophen (TYLENOL) tablet 975 mg 975 mg Oral Given     05/29/2021 0426 cefTRIAXone (ROCEPHIN) IVPB (premix in dextrose) 1,000 mg 50 mL 1,000 mg Intravenous New Bag        Past Medical History:   Diagnosis Date    ADHD     Anemia     Anxiety     Arthritis     Asthma     Atrial fibrillation (HCC)     [facility] [address] )     [facility]     Chronic iron deficiency anemia 6/9/2020    Extensive GI evaluation over the last year including multiple EGD, colonoscopy, and capsule endoscopy    Has had gastric AVMs cauterized, Dieulafoy's lesion clipped, and most recently a Seb erosion cauterized    Coronary artery disease     Depression     GERD (gastroesophageal reflux disease)     History of stomach ulcers     Hypercholesterolemia     Hypertension     Kidney disease     Metastatic cancer [facility])        Admitting Diagnosis: UTI (urinary tract infection) [N39 0]  Tachycardia [R00 0]  Elevated troponin [R77 8]  Acute exacerbation of chronic low back pain [M54 5, G89 29]  Sepsis ([address] ) [A41 9]  Age/Sex: 65 y o  female  Admission Orders:  Scheduled Medications:  aspirin, 325 mg, Oral, Daily  atorvastatin, 40 mg, Oral, Daily With Dinner  Avapritinib, 200 mg, Oral, Daily  azelastine, 1 spray, Each Nare, BID  cefTRIAXone, 2,000 mg, Intravenous, Q24H  heparin (porcine), 5,000 Units, Subcutaneous, Q8H [address]  hydroxychloroquine, 200 mg, Oral, Early Morning  metoprolol succinate, 12 5 mg, Oral, Daily  multivitamin-minerals, 1 tablet, Oral, Daily  pantoprazole, 40 mg, Oral, BID      Continuous IV Infusions:  sodium chloride, 75 mL/hr, Intravenous, Continuous      PRN Meds:  acetaminophen, 650 mg, Oral, Q6H PRN  ondansetron, 4 mg, Intravenous, Q6H PRN  traMADol, 50 mg, Oral, Q6H PRN x3        IP CONSULT TO CASE MANAGEMENT  IP CONSULT TO CARDIOLOGY  IP CONSULT TO INFECTIOUS DISEASES    Network Utilization Review Department  ATTENTION: Please call with any questions or concerns to [phone] and carefully listen to the prompts so that you are directed to the right person  All voicemails are confidential   Parul Soriano all requests for admission clinical reviews, approved or denied determinations and any other requests to dedicated fax number below belonging to the campus where the patient is receiving treatment   List of dedicated fax numbers for the Facilities:  [address] DENIALS (Administrative/Medical Necessity) [phone]   [address] (Maternity/NICU/Pediatrics) 261 [facility] [address] Holden Gee 85[id number] [facility] Isabella [address] [address] [phone]

## 2021-06-01 NOTE — CASE MANAGEMENT
Called back to room by therapist  and patient now requests [facility] for therapy  [place] referrals sent via [facility] [address]  CM following

## 2021-06-01 NOTE — PROGRESS NOTES
General Cardiology   Progress Note -  Team One   Enzo Rogers 65 y o  female MRN: [id number]    Unit/Bed#: -01 Encounter: [id number]    Assessment:    Non-MI elevated troponin  · Admitted with exacerbation of lower back pain  · Initial consultation performed by Dr Hugo Arnold 5/29/2021 for elevated troponin  · EKG on admit NSR with RBBB; no acute ST/T wave changes noted  · Troponin: peak 0 40 on 5/29; likely non MI elevated troponin in the setting of sepsis/E  Coli bacteremia  · Echo ordered on initial consultation; the patient is currently refusing echo; I had a lengthy discussion with the patient and her  regarding the possible risk that E coli bacteremia has affected her cardiac function/valves and given elevated troponin, albeit mild elevation, warrants echocardiogram to further evaluate cardiac function  · Patient is adamantly refusing testing stating that she wants to follow-up with her primary cardiologist, Dr Myesha Cortes; I again reviewed importance of echocardiogram and further cardiovascular evaluation during this admission and patient's  states "do you understand what we are telling you?  She does not want to be here and does not want your testing"  · I have reviewed this information with internal medicine team and we will cancel echo; recommended that patient be given echocardiogram script to be done as outpatient and she is adamant about following up with her primary cardiologist Dr Myesha Cortes    PAF  · EKG on admit is NSR; not on tele this afternoon  · Home med: Toprol XL 25 mg QD  · No OAC due to recurrent GI bleeds in the past from AVM's  · Follows with Dr Cnythia Pittman and was last seen 6//5/1123    Systolic murmur  · Noted on PE by Dr Hugo Arnold upon his physical exam at time of consult  · Patient refuses to allow me to perform physical exam today  · Please see documentation as written above regarding patient's refusal of echocardiogram for further evaluation    Essential HTN  · SBP averaging 120's-140's  · Home meds: lasix 20 mg QD and Toprol XL 25 mg QD (lasix on hold)    Plan/Recommendations:  · Echo ordered on initial consultation; the patient is currently refusing echo; I had a lengthy discussion with the patient and her  regarding the possible risk that E coli bacteremia has affected her cardiac function/valves and given elevated troponin, albeit mild elevation, warrants echocardiogram to further evaluate cardiac function  · Patient is adamantly refusing testing stating that she wants to follow-up with her primary cardiologist, Dr Gladys Somers; I again reviewed importance of echocardiogram and further cardiovascular evaluation during this admission and patient's  states "do you understand what we are telling you? She does not want to be here and does not want your testing"  · I have reviewed this information with internal medicine team and we will cancel echo; recommended that patient be given echocardiogram script to be done as outpatient and she is adamant about following up with her primary cardiologist Dr Gladys Somers  · Continue Toprol XL 25 mg QD and restart PO lasix 20 mg QD once patient is more stable      __________________________________________________________________________    Subjective  Patient refuses cardiovascular workup today  Review of Systems   Unable to perform ROS: other   Patient is refusing Cardiology follow up care today and will not participate in ROS  Objective:   Vitals: Blood pressure 142/82, pulse 78, temperature 98 5 °F (36 9 °C), temperature source Oral, resp  rate 16, height 5' 1" (1 549 m), weight 55 3 kg (122 lb), SpO2 97 %  ,     Wt Readings from Last 3 Encounters:   05/29/21 55 3 kg (122 lb)   05/13/21 55 8 kg (123 lb)   05/04/21 57 2 kg (126 lb 1 7 oz)        Lab Results   Component Value Date    CREATININE 1 05 06/01/2021    CREATININE 1 14 05/31/2021    CREATININE 1 29 05/30/2021         Body mass index is 23 05 kg/m²  ,     Systolic (24hrs), Av , Min:139 , CWE:408     Diastolic (90RSG), QDT:86, Min:63, Max:83          Intake/Output Summary (Last 24 hours) at 2021 1229  Last data filed at 2021 0701  Gross per 24 hour   Intake 800 ml   Output 1575 ml   Net -775 ml     Weight (last 2 days)     None            Telemetry Review: not on tele      Physical exam is limited as patient refuses to allow me to physically examine her today  Physical Exam  Constitutional:       General: She is not in acute distress  HENT:      Head: Normocephalic  Comments: Right sided ecchymosis noted  Pulmonary:      Effort: Pulmonary effort is normal  No respiratory distress  Neurological:      Mental Status: She is alert and oriented to person, place, and time           LABORATORY RESULTS  Results from last 7 days   Lab Units 21  0619 21  1415 21  1125   TROPONIN I ng/mL 0 12* 0 24* 0 40*     CBC with diff:   Results from last 7 days   Lab Units 21  0510 05/31/21  0514 21  0212   WBC Thousand/uL 5 50 4 69 7 32   HEMOGLOBIN g/dL 8 8* 9 0* 9 0*   HEMATOCRIT % 26 8* 28 2* 27 3*   MCV fL 101* 103* 102*   PLATELETS Thousands/uL 242 249 291   MCH pg 33 2 32 8 33 5   MCHC g/dL 32 8 31 9 33 0   RDW % 16 6* 16 8* 16 6*   MPV fL 9 8 9 5 9 8   NRBC AUTO /100 WBCs 0 0 0       CMP:  Results from last 7 days   Lab Units 21  0510 21  0514 21  0619 21  0831 21  0212   POTASSIUM mmol/L 4 0 4 1 3 3* 3 9 4 4   CHLORIDE mmol/L 102 103 104 98* 99*   CO2 mmol/L 23 25 24 30 28   BUN mg/dL 13 15 20 18 21   CREATININE mg/dL 1 05 1 14 1 29 1 37* 1 48*   CALCIUM mg/dL 8 5 8 1* 8 2* 8 4 8 3   AST U/L  --   --  42  --  63*   ALT U/L  --   --  25  --  30   ALK PHOS U/L  --   --  74  --  87   EGFR ml/min/1 73sq m 53 48 41 39 35       BMP:  Results from last 7 days   Lab Units 21  0510 21  0514 21  0619 21  0831 21  0212   POTASSIUM mmol/L 4 0 4 1 3 3* 3 9 4 4   CHLORIDE mmol/L 102 103 104 98* 99*   CO2 mmol/L 23 25 24 30 28   BUN mg/dL 13 15 20 18 21   CREATININE mg/dL 1 05 1 14 1 29 1 37* 1 48*   CALCIUM mg/dL 8 5 8 1* 8 2* 8 4 8 3       Lab Results   Component Value Date    NTBNP 1,705 (H) 02/19/2020             Results from last 7 days   Lab Units 05/30/21  0619   MAGNESIUM mg/dL 2 1                     Results from last 7 days   Lab Units 05/29/21  0212   INR  1 04       Lipid Profile:   Lab Results   Component Value Date    CHOL 127 02/17/2015    CHOL 144 07/03/2014     Lab Results   Component Value Date    HDL 58 05/30/2021    HDL 49 04/11/2016    HDL 45 01/11/2016     Lab Results   Component Value Date    LDLCALC 42 05/30/2021    LDLCALC 60 04/11/2016    LDLCALC 74 01/11/2016     Lab Results   Component Value Date    TRIG 97 05/30/2021    TRIG 45 04/11/2016    TRIG 46 01/11/2016       Cardiac testing:   No results found for this or any previous visit  No results found for this or any previous visit  No results found for this or any previous visit  No procedure found  No results found for this or any previous visit        Meds/Allergies   current meds:   Current Facility-Administered Medications   Medication Dose Route Frequency    acetaminophen (TYLENOL) tablet 650 mg  650 mg Oral Q6H PRN    aspirin tablet 325 mg  325 mg Oral Daily    atorvastatin (LIPITOR) tablet 40 mg  40 mg Oral Daily With Dinner    Avapritinib TABS 200 mg  200 mg Oral Daily    azelastine (ASTELIN) 0 1 % nasal spray 1 spray  1 spray Each Nare BID    cefTRIAXone (ROCEPHIN) IVPB (premix in dextrose) 2,000 mg 50 mL  2,000 mg Intravenous Q24H    heparin (porcine) subcutaneous injection 5,000 Units  5,000 Units Subcutaneous Q8H [facility]    HYDROmorphone (DILAUDID) injection 0 5 mg  0 5 mg Intravenous Q8H PRN    hydroxychloroquine (PLAQUENIL) tablet 200 mg  200 mg Oral Early Morning    metoprolol succinate (TOPROL-XL) 24 hr tablet 12 5 mg  12 5 mg Oral Daily    multivitamin-minerals (CENTRUM ADULTS) tablet 1 tablet  1 tablet Oral Daily    ondansetron (ZOFRAN) injection 4 mg  4 mg Intravenous Q6H PRN    pantoprazole (PROTONIX) EC tablet 40 mg  40 mg Oral BID    sodium chloride 0 9 % infusion  75 mL/hr Intravenous Continuous    traMADol (ULTRAM) tablet 50 mg  50 mg Oral Q6H PRN     Medications Prior to Admission   Medication    acetaminophen (TYLENOL) 500 mg tablet    aspirin 81 mg chewable tablet    Avapritinib (Ayvakit) 200 MG TABS    Azelastine HCl 137 MCG/SPRAY SOLN    calcium carbonate (Tums) 500 mg chewable tablet    diphenhydrAMINE (BENADRYL) 25 mg tablet    diphenoxylate-atropine (LOMOTIL) 2 5-0 025 mg per tablet    docusate sodium (COLACE) 100 mg capsule    ferrous sulfate 324 (65 Fe) mg    furosemide (LASIX) 20 mg tablet    hydroxychloroquine (PLAQUENIL) 200 mg tablet    loperamide (IMODIUM) 2 mg capsule    metoprolol succinate (Toprol XL) 25 mg 24 hr tablet    multivitamin-iron-minerals-folic acid (CENTRUM) chewable tablet    NON FORMULARY    ofloxacin (OCUFLOX) 0 3 % ophthalmic solution    ondansetron (ZOFRAN) 8 mg tablet    pantoprazole (PROTONIX) 40 mg tablet    prednisoLONE acetate (PRED FORTE) 1 % ophthalmic suspension    [] predniSONE 20 mg tablet    traMADol (ULTRAM) 50 mg tablet    Triamcinolone Acetonide (NASACORT ALLERGY 24HR NA)    Virt-Phos 250 Neutral [phone] MG tablet    Vitamin D, Ergocalciferol, 50 MCG ( UT) CAPS       sodium chloride, 75 mL/hr, Last Rate: 75 mL/hr (21 1709)        Counseling / Coordination of Care  Total floor / unit time spent today 20 minutes  Greater than 50% of total time was spent with the patient and / or family counseling and / or coordination of care  ** Please Note: Dragon 360 Dictation voice to text software may have been used in the creation of this document   **

## 2021-06-01 NOTE — ASSESSMENT & PLAN NOTE
· Acute on chronic low back pain, ongoing for the past 3 weeks, recently hospitalized 5/4 and managed on prednisone taper  Steroid initially helped her back pain but back pain recurred when prednisone dosage finished  · CT lumbar spine on admission - showed severe degenerative disc disease from L2- L3, with disc herniation L4- L5  · Febrile in the ED, meeting SIRS criteria, ED doc reports poor rectal tone  Concerned for possible back abscess  Spoke to neuro surgery who recommended patient have an MRI of her back performed  Patient currently refusing, as she prefers to be knocked out for MRI  Patient is aware of the risk of undiagnosed abscess/diskitis   · On cefepime and vancomycin per ID recs  · Blood cultures #2/2 positive for Ecoli   · Infectious disease on board  · Trend WBC and fever curve  · Repeat blood cultures pending  S/p Repeat CT lumbar spine w/Contrast (6/1/21) - No evidence of abscess or discitis seen   Patient very reluctant to pursue MRI option at [place], would keep inpatient pending further ID recs on antibiotic coverage etc

## 2021-06-01 NOTE — PLAN OF CARE
Problem: OCCUPATIONAL THERAPY ADULT  Goal: Performs self-care activities at highest level of function for planned discharge setting  See evaluation for individualized goals  Description:            See flowsheet documentation for full assessment, interventions and recommendations  Outcome: Progressing  Note: Limitation: Decreased ADL status, Decreased endurance, Decreased self-care trans, Decreased high-level ADLs  Prognosis: Good  Assessment: Pt is a 65 y o  female seen for OT evaluation s/p admit to [facility] on 5/29/2021 w/ Acute exacerbation of chronic low back pain  Comorbidities affecting pt's functional performance at time of assessment include: GIST, essential hypertension, sepsis, elevated troponin, stage 3 CKD  Personal factors affecting pt at time of IE include:steps to enter environment, difficulty performing ADLS and difficulty performing IADLS   Prior to admission, pt was living in a [facility] with her  and son, she was independent in ADLs and functional mobility with a cane, and was assisted with IADLs by her family  Upon evaluation: Pt requires min A for functional mobility and max A for lower body ADLs 2* the following deficits impacting occupational performance: weakness, decreased strength, decreased balance, decreased tolerance and increased pain  Pt to benefit from continued skilled OT tx while in the hospital to address deficits as defined above and maximize level of functional independence w ADL's and functional mobility  Occupational Performance areas to address include: bathing/shower, toilet hygiene, dressing, functional mobility and community mobility  From OT standpoint, recommendation at time of d/c would be home health OT       OT Discharge Recommendation: Home with home health rehabilitation

## 2021-06-01 NOTE — ASSESSMENT & PLAN NOTE
· Sepsis, present on admission, a/e/b fevers, T max 102 2, tachycardia, tachypnea secondary to E coli septicemia  · Rule out Discitis/vdertebral osteo given complaints of low back pain  · UA - Bacteruria, negative for wbc, nitrite or leukocytes  · CT scan lumbar spine - No evidence of abscess or discitis  · Patient currently refusing MRI of her back  · On cefepime and vancomycin per ID recs  · Admission Blood cultures 2/2 - Ecoli  · Repeat blood culture negative till date  · Trend WBC and fever curve  · See above

## 2021-06-01 NOTE — ASSESSMENT & PLAN NOTE
· Follows with Fredo Hardin   · Continue outpatient follow-up with Oncology   · Currently on ayvakit

## 2021-06-02 VITALS
HEIGHT: 61 IN | OXYGEN SATURATION: 97 % | RESPIRATION RATE: 16 BRPM | TEMPERATURE: 98.6 F | DIASTOLIC BLOOD PRESSURE: 78 MMHG | SYSTOLIC BLOOD PRESSURE: 135 MMHG | BODY MASS INDEX: 23.03 KG/M2 | HEART RATE: 74 BPM | WEIGHT: 122 LBS

## 2021-06-02 LAB
ANION GAP SERPL CALCULATED.3IONS-SCNC: 8 MMOL/L (ref 4–13)
BUN SERPL-MCNC: 15 MG/DL (ref 5–25)
CALCIUM SERPL-MCNC: 7.3 MG/DL (ref 8.3–10.1)
CHLORIDE SERPL-SCNC: 107 MMOL/L (ref 100–108)
CO2 SERPL-SCNC: 25 MMOL/L (ref 21–32)
CREAT SERPL-MCNC: 0.92 MG/DL (ref 0.6–1.3)
ERYTHROCYTE [DISTWIDTH] IN BLOOD BY AUTOMATED COUNT: 16.7 % (ref 11.6–15.1)
GFR SERPL CREATININE-BSD FRML MDRD: 62 ML/MIN/1.73SQ M
GLUCOSE SERPL-MCNC: 80 MG/DL (ref 65–140)
HCT VFR BLD AUTO: 27.4 % (ref 34.8–46.1)
HGB BLD-MCNC: 8.8 G/DL (ref 11.5–15.4)
MCH RBC QN AUTO: 32.6 PG (ref 26.8–34.3)
MCHC RBC AUTO-ENTMCNC: 32.1 G/DL (ref 31.4–37.4)
MCV RBC AUTO: 102 FL (ref 82–98)
PLATELET # BLD AUTO: 238 THOUSANDS/UL (ref 149–390)
PMV BLD AUTO: 10.5 FL (ref 8.9–12.7)
POTASSIUM SERPL-SCNC: 3.5 MMOL/L (ref 3.5–5.3)
RBC # BLD AUTO: 2.7 MILLION/UL (ref 3.81–5.12)
SODIUM SERPL-SCNC: 140 MMOL/L (ref 136–145)
WBC # BLD AUTO: 5.42 THOUSAND/UL (ref 4.31–10.16)

## 2021-06-02 PROCEDURE — 99447 NTRPROF PH1/NTRNET/EHR 11-20: CPT | Performed by: PHYSICIAN ASSISTANT

## 2021-06-02 PROCEDURE — NC001 PR NO CHARGE: Performed by: INTERNAL MEDICINE

## 2021-06-02 PROCEDURE — 85027 COMPLETE CBC AUTOMATED: CPT | Performed by: INTERNAL MEDICINE

## 2021-06-02 PROCEDURE — 99239 HOSP IP/OBS DSCHRG MGMT >30: CPT | Performed by: INTERNAL MEDICINE

## 2021-06-02 PROCEDURE — 80048 BASIC METABOLIC PNL TOTAL CA: CPT | Performed by: INTERNAL MEDICINE

## 2021-06-02 PROCEDURE — 97760 ORTHOTIC MGMT&TRAING 1ST ENC: CPT

## 2021-06-02 PROCEDURE — 99221 1ST HOSP IP/OBS SF/LOW 40: CPT | Performed by: INTERNAL MEDICINE

## 2021-06-02 RX ORDER — LIDOCAINE 50 MG/G
1 PATCH TOPICAL DAILY
Status: DISCONTINUED | OUTPATIENT
Start: 2021-06-02 | End: 2021-06-02 | Stop reason: HOSPADM

## 2021-06-02 RX ORDER — CEPHALEXIN 500 MG/1
500 CAPSULE ORAL EVERY 6 HOURS SCHEDULED
Qty: 36 CAPSULE | Refills: 0 | Status: SHIPPED | OUTPATIENT
Start: 2021-06-02 | End: 2021-06-11

## 2021-06-02 RX ADMIN — PANTOPRAZOLE SODIUM 40 MG: 40 TABLET, DELAYED RELEASE ORAL at 10:13

## 2021-06-02 RX ADMIN — FUROSEMIDE 20 MG: 20 TABLET ORAL at 10:16

## 2021-06-02 RX ADMIN — TRAMADOL HYDROCHLORIDE 50 MG: 50 TABLET, FILM COATED ORAL at 10:13

## 2021-06-02 RX ADMIN — HYDROXYCHLOROQUINE SULFATE 200 MG: 200 TABLET, FILM COATED ORAL at 05:23

## 2021-06-02 RX ADMIN — LIDOCAINE 5% 1 PATCH: 700 PATCH TOPICAL at 10:18

## 2021-06-02 RX ADMIN — METOPROLOL SUCCINATE 12.5 MG: 25 TABLET, FILM COATED, EXTENDED RELEASE ORAL at 10:12

## 2021-06-02 RX ADMIN — HEPARIN SODIUM 5000 UNITS: 5000 INJECTION INTRAVENOUS; SUBCUTANEOUS at 05:28

## 2021-06-02 RX ADMIN — HYDROMORPHONE HYDROCHLORIDE 0.5 MG: 1 INJECTION, SOLUTION INTRAMUSCULAR; INTRAVENOUS; SUBCUTANEOUS at 05:23

## 2021-06-02 RX ADMIN — ACETAMINOPHEN 650 MG: 325 TABLET, FILM COATED ORAL at 01:11

## 2021-06-02 RX ADMIN — TRAMADOL HYDROCHLORIDE 50 MG: 50 TABLET, FILM COATED ORAL at 01:08

## 2021-06-02 RX ADMIN — ASPIRIN 325 MG ORAL TABLET 325 MG: 325 PILL ORAL at 10:12

## 2021-06-02 RX ADMIN — MULTIPLE VITAMINS W/ MINERALS TAB 1 TABLET: TAB ORAL at 10:13

## 2021-06-02 NOTE — PROGRESS NOTES
Nely Mercer  65 y o   female  1950  mrn [id number]    Assessment/Plan:  1  E coli septicemia/Fever/Back pain: Fevers have resolved and WBC count is nml  Admission bld cx's grew E coli - most likely from intra-abd source since UA did not show any pyuria to suggest presence of UTI  Repeat bld cx's are neg  Abd CT also showed DDD but there was gas seen in the endomdetrial canal - ?presence of fistula to bowel which might be a source for her septicemia  Abx regimen was narrowed to Rocephin on 5/31  GI is evaluating the Pt for source of her E coli septicemia      Repeat CT of lumbar spine with contrast did not show any evidence of discitis/osteo - it conts to show significant DDD which is the etiology for her increased back pain  Pt was scheduled to see Neurosurgery today as an out-Pt for evaluation of DDD       Pt presented with increasing low back pain x 3 weeks  Out-Pt CT of lumbar spine without contrast was done on 5/4 which showed significant DDD and she was tx'd with Prednisone  Her back pain increased as the steroid dose was tapered      On admission, she had fever so discitis/vertebral osteo/epidural abscess was considered as the etiology of her worsening back pain  Neurosurgery recommended MRI of lumbar spine but Pt refused because of her significant back pain and her  states that she cannot have an MRI because of hardware in her ankle from previous surgery  ESR is 32 and CRP is 38 9  Repeat ESR is 28 but CRP increased to 89 9 - difficult to interpret these results in the setting of acute E coli septicemia and her underlying medical isues     A  Cont Rocephin until Pt is ready for d/c  B  Awaiting recs from GI regarding eval for source of E coli septicemia - Abd CT suggested presence of colo-vaginal fistula  C   Would contact Neurosurgery to see if back brace would be helpful for symptomatic tx of DDD in this Pt - She was scheduled to have out-Pt appt with Neurosurgery today for eval of her DDD  D  If GI is not planning to do any studies during this admission and Pt is cleared medically, OK to d/c today on Keflex 500 mg po QID and cont through 6/11/21 to complete 2 week course of abx tx  E  Pt should reschedule appt with Neurosurgery to eval her DDD       Subjective: Feels better  Back pain is not as bad today    Objective:  Tmax: 99  Lungs: Clear  Abd: +BS, soft, nontender  Ext: No calf tenderness    Labs:  CBC w/diff  Recent Labs     06/01/21  0510 06/02/21  0448   WBC 5 50 5 42   HGB 8 8* 8 8*   HCT 26 8* 27 4*    238   NEUTOPHILPCT 83*  --    LYMPHOPCT 6*  --    MONOPCT 10  --    EOSPCT 1  --      BMP  Recent Labs     06/02/21  0448   K 3 5      CO2 25   BUN 15   CREATININE 0 92   CALCIUM 7 3*     CMP  Recent Labs     06/02/21  0448   K 3 5      CO2 25   BUN 15   CREATININE 0 92   CALCIUM 7 3*         labrc    Cultures:  Lab Results   Component Value Date    BLOODCX No Growth at 48 hrs  05/30/2021    BLOODCX No Growth at 48 hrs  05/30/2021    BLOODCX Escherichia coli (A) 05/29/2021    BLOODCX Escherichia coli (A) 05/29/2021    BLOODCX No Growth After 5 Days  02/19/2020    BLOODCX No Growth After 5 Days  02/19/2020    BLOODCX No Growth After 5 Days  02/06/2020    BLOODCX No Growth After 5 Days   02/06/2020     No results found for: WOUNDCULT  No results found for: URINECX  No results found for: SPUTUMCULTUR    MED:  Rocephin: #3  Abx: #5              Completed:   Rocephin x 1 dose on 5/29  Vanco: x 3 days on 5/31  Cefepime x 3 days on 5/31         Current Facility-Administered Medications:     acetaminophen (TYLENOL) tablet 650 mg, 650 mg, Oral, Q6H PRN, Arelis David PA-C, 650 mg at 06/02/21 0111    aspirin tablet 325 mg, 325 mg, Oral, Daily, Sukumar Hale MD, 325 mg at 06/02/21 1012    atorvastatin (LIPITOR) tablet 40 mg, 40 mg, Oral, Daily With Janessa Amaya MD, 40 mg at 06/01/21 1738    Avapritinib TABS 200 mg, 200 mg, Oral, Daily, Arelis David PA-C, 200 mg at 06/01/21 0831    azelastine (ASTELIN) 0 1 % nasal spray 1 spray, 1 spray, Each Nare, BID, Arelis David PA-C, 1 spray at 06/01/21 1825    cefTRIAXone (ROCEPHIN) IVPB (premix in dextrose) 1,000 mg 50 mL, 1,000 mg, Intravenous, Q24H, Kishor Purvis MD    furosemide (LASIX) tablet 20 mg, 20 mg, Oral, Daily, Parul Mejia MD, 20 mg at 06/02/21 1016    heparin (porcine) subcutaneous injection 5,000 Units, 5,000 Units, Subcutaneous, Q8H [address], 5,000 Units at 06/02/21 0528 **AND** Platelet count, , , Once, Oscar Cervantes PA-C    HYDROmorphone (DILAUDID) injection 0 5 mg, 0 5 mg, Intravenous, Q8H PRN, Jessee Tay MD, 0 5 mg at 06/02/21 0523    hydroxychloroquine (PLAQUENIL) tablet 200 mg, 200 mg, Oral, Early Morning, Arelis David PA-C, 200 mg at 06/02/21 0523    lidocaine (LIDODERM) 5 % patch 1 patch, 1 patch, Topical, Daily, Parul Mejia MD, 1 patch at 06/02/21 1018    metoprolol succinate (TOPROL-XL) 24 hr tablet 12 5 mg, 12 5 mg, Oral, Daily, Arelis David PA-C, 12 5 mg at 06/02/21 1012    multivitamin-minerals (CENTRUM ADULTS) tablet 1 tablet, 1 tablet, Oral, Daily, Kishor Purvis MD, 1 tablet at 06/02/21 1013    ondansetron (ZOFRAN) injection 4 mg, 4 mg, Intravenous, Q6H PRN, Oscar Cervantes PA-C    pantoprazole (PROTONIX) EC tablet 40 mg, 40 mg, Oral, BID, Arelis David PA-C, 40 mg at 06/02/21 1013    traMADol (ULTRAM) tablet 50 mg, 50 mg, Oral, Q6H PRN, Arelis David PA-C, 50 mg at 06/02/21 1013    Principal Problem:    Acute exacerbation of chronic low back pain  Active Problems:    GIST (gastrointestinal stromal tumor), malignant (HCC)    Essential hypertension    Sepsis (HCC)    Elevated troponin    Stage 3 chronic kidney disease ([facility] [address] )      Kishor Purvis MD

## 2021-06-02 NOTE — ASSESSMENT & PLAN NOTE
Lab Results   Component Value Date    EGFR 62 06/02/2021    EGFR 53 06/01/2021    EGFR 48 05/31/2021    CREATININE 0 92 06/02/2021    CREATININE 1 05 06/01/2021    CREATININE 1 14 05/31/2021   · Creatinine 1 48 on admission   · Baseline Cr 1 4-1 5   · Creatinine stable at 1 14 today  · Avoid hypotension/nephrotoxins medication  · Monitor renal function in a m

## 2021-06-02 NOTE — PHYSICAL THERAPY NOTE
Physical Therapy Cancellation Note       06/02/21 1138   PT Last Visit   PT Visit Date 06/02/21   Note Type   Cancel Reasons Other  (Pt declines PT efforts, walked to/from bathroom with nurse) Pt states she is not ambulating enough in hospital but declines ambulation opportunity at this time         Umm Mercer, PT

## 2021-06-02 NOTE — CONSULTS
Consultation - GI   Enzo Rogers 65 y o  female MRN: [id number]  Unit/Bed#: -01 Encounter: [id number]      Assessment/Plan    38-year-old female with history of rectal GIST tumor with recurrence and multiple resection, subsequent chemotherapy and left pelvic wall radiation  Admitted with severe acute on chronic low back pain radiating to left leg  Noted to be febrile and tachycardic on admission and blood cultures positive for E coli  CT scan shows no intra-abdominal abnormalities however gas noted at endometrial canal raising question of colovaginal fistula    1  E coli bacteremia  2  History of rectal GIST with recurrence  3  Recurrent upper GI bleed      Physician Requesting Consult: Nory Krause MD  Reason for Consult / Principal Problem:  E coli bacteremia, evaluate for GI source    HPI: Enzo Rogers is a 65y o  year old female with an extensive past medical history which includes resection of rectal GIST 2001 with liver Mets diagnosed 2005, progression of disease and resection of recurrence pelvic GIST 2014  She underwent radiation to the left pelvic wall in 2015 and continues on chemotherapy for treatment  She follows with Oncology at [facility]  She also has a long  history of upper GI bleed and iron deficiency anemia due to gastric AVMs, Dieulafoy's lesion,s/p OVESCO clip placement 11/2020 and Seb erosions  Most recent EGD was 03/2021 which revealed normal esophagus, telangiectasias within the gastric cardia actively oozing, treated with APC  History recent colonoscopy was 08/2020 performed at [facility] which revealed multiple adenomatous polyps which were excised and normal mucosa at anastomosis   History recent colonoscopy was 08/2020 performed at [facility] which revealed multiple adenomatous polyps which were excised and normal mucosa at anastomosis  She has chronic low back pain and DDD managed by pain management as an outpatient  She was recently evaluated in the ED 05/04/2020 with acute on chronic low back pain radiating to her left leg which was treated with lidocaine patch, IV Dilaudid and prednisone with improvement in her pain  On 05/29/2021, she again presented to ED with acute onset of severe left buttock and leg pain  In the ED she was noted to have a fever of 102 2, and tachycardia  UTI versus diskitis was suspected  Blood cultures are obtained and subsequently grew E coli on 05/29 but were negative on 05/30  UA negative for UTI per Infectious Disease  She is currently being treated with IV Rocephin  Her white count is within normal limits, C reactive protein elevated 89 9  Derek Francisca We are consulted to evaluate for GI source of E  Coli bacteremia  Recent CTscan of abdomen and pelvis showed no lymph adenopathy however gas was seen within the endometrial canal raising a question of a colovaginal fistula  The patient denies recent abdominal or pelvic pain  She denies recent change in bowel habits and reports bowel movement every 1-3 days with formed bowel movements and occasional constipation  No recent diarrhea  She denies any unusual or feculent vaginal discharge  She does report recent gyn exam and Pap smear within the past 3 months and does not report any unusual findings  Review of Systems   Constitutional: Positive for fever  Respiratory: Negative  Cardiovascular: Negative  Gastrointestinal: Positive for constipation  Genitourinary: Positive for difficulty urinating and dysuria  Musculoskeletal: Positive for back pain  Severe Left buttock pain radiating to left leg, and moderate right leg discomfort   Skin: Negative  Neurological: Negative  Psychiatric/Behavioral: Negative            Historical Information   Past Medical History:   Diagnosis Date    ADHD     Anemia     Anxiety     Arthritis     Asthma     Atrial fibrillation [facility]     Breast cancer ([facility] )     Cancer ([facility] )  Chronic iron deficiency anemia 2020    Extensive GI evaluation over the last year including multiple EGD, colonoscopy, and capsule endoscopy  Has had gastric AVMs cauterized, Dieulafoy's lesion clipped, and most recently a Seb erosion cauterized    Coronary artery disease     Depression     GERD (gastroesophageal reflux disease)     History of stomach ulcers     Hypercholesterolemia     Hypertension     Kidney disease     Metastatic cancer ([facility] [address] )      Past Surgical History:   Procedure Laterality Date    ANKLE SURGERY      APPENDECTOMY      BREAST SURGERY      CATARACT EXTRACTION       SECTION      COLONOSCOPY  2019    Multiple adenomatous colon polyps and internal hemorrhoids  Three year recall advised    HIP SURGERY      JOINT REPLACEMENT  2019    Left knee replacement    LAMINECTOMY      ROTATOR CUFF REPAIR      SIGMOID RESECTION / RECTOPEXY      SINUS SURGERY      UPPER GASTROINTESTINAL ENDOSCOPY  2020    Dieulafoy's lesion in proximal stomach which was actively oozing  Injected with epinephrine and 2 Endoclips placed with control of bleeding, hiatal hernia      UPPER GASTROINTESTINAL ENDOSCOPY  2020    Bleeding Seb's erosion status post cauterization     Social History   Social History     Substance and Sexual Activity   Alcohol Use Not Currently    Frequency: Never    Drinks per session: 1 or 2    Binge frequency: Never     Social History     Substance and Sexual Activity   Drug Use No     Social History     Tobacco Use   Smoking Status Former Smoker    Years: 20 00   Smokeless Tobacco Never Used     Family History   Problem Relation Age of Onset   Laura Lewis Breast cancer Mother     Diabetes Mother     Hypertension Mother     Lung cancer Mother     Hyperlipidemia Father     Prostate cancer Father     Colon cancer Paternal Grandmother     Colon cancer Paternal Grandfather     Diabetes Family     Substance Abuse Neg Hx     Mental illness Neg Hx        Meds/Allergies   Current Facility-Administered Medications   Medication Dose Route Frequency    acetaminophen (TYLENOL) tablet 650 mg  650 mg Oral Q6H PRN    aspirin tablet 325 mg  325 mg Oral Daily    atorvastatin (LIPITOR) tablet 40 mg  40 mg Oral Daily With Dinner    Avapritinib TABS 200 mg  200 mg Oral Daily    azelastine (ASTELIN) 0 1 % nasal spray 1 spray  1 spray Each Nare BID    cefTRIAXone (ROCEPHIN) IVPB (premix in dextrose) 1,000 mg 50 mL  1,000 mg Intravenous Q24H    furosemide (LASIX) tablet 20 mg  20 mg Oral Daily    heparin (porcine) subcutaneous injection 5,000 Units  5,000 Units Subcutaneous Q8H [address]    HYDROmorphone (DILAUDID) injection 0 5 mg  0 5 mg Intravenous Q8H PRN    hydroxychloroquine (PLAQUENIL) tablet 200 mg  200 mg Oral Early Morning    lidocaine (LIDODERM) 5 % patch 1 patch  1 patch Topical Daily    metoprolol succinate (TOPROL-XL) 24 hr tablet 12 5 mg  12 5 mg Oral Daily    multivitamin-minerals (CENTRUM ADULTS) tablet 1 tablet  1 tablet Oral Daily    ondansetron (ZOFRAN) injection 4 mg  4 mg Intravenous Q6H PRN    pantoprazole (PROTONIX) EC tablet 40 mg  40 mg Oral BID    traMADol (ULTRAM) tablet 50 mg  50 mg Oral Q6H PRN     Medications Prior to Admission   Medication    acetaminophen (TYLENOL) 500 mg tablet    aspirin 81 mg chewable tablet    Avapritinib (Ayvakit) 200 MG TABS    Azelastine HCl 137 MCG/SPRAY SOLN    calcium carbonate (Tums) 500 mg chewable tablet    diphenhydrAMINE (BENADRYL) 25 mg tablet    diphenoxylate-atropine (LOMOTIL) 2 5-0 025 mg per tablet    docusate sodium (COLACE) 100 mg capsule    ferrous sulfate 324 (65 Fe) mg    furosemide (LASIX) 20 mg tablet    hydroxychloroquine (PLAQUENIL) 200 mg tablet    loperamide (IMODIUM) 2 mg capsule    metoprolol succinate (Toprol XL) 25 mg 24 hr tablet    multivitamin-iron-minerals-folic acid (CENTRUM) chewable tablet    NON FORMULARY    ofloxacin (OCUFLOX) 0 3 % ophthalmic solution    ondansetron (ZOFRAN) 8 mg tablet    pantoprazole (PROTONIX) 40 mg tablet    prednisoLONE acetate (PRED FORTE) 1 % ophthalmic suspension    [] predniSONE 20 mg tablet    traMADol (ULTRAM) 50 mg tablet    Triamcinolone Acetonide (NASACORT ALLERGY 24HR NA)    Virt-Phos 250 Neutral 155-852-130 MG tablet    Vitamin D, Ergocalciferol, 50 MCG ( [place]) CAPS       Allergies   Allergen Reactions    Codeine Other (See Comments)     Throat closes    Codeine Sulfate Throat Swelling    Neomycin-Bacitracin Zn-Polymyx Rash    Wound Dressing Adhesive Other (See Comments)     If its on too long- pt starts itching    Zolmitriptan Palpitations     Heart palpitations  Generic for Zomig       Vitals:    21 0732 21 1530 21 2240 21 0700   BP: 142/82 122/70 137/67 136/70   Pulse: 78   75   Resp: 16  18 16   Temp: 98 5 °F (36 9 °C) 99 2 °F (37 3 °C) 99 2 °F (37 3 °C) 98 7 °F (37 1 °C)   TempSrc: Oral   Oral   SpO2: 97%   97%   Weight:       Height:                 Physical Exam   Constitutional: Is oriented to person, place, and time  Appears pale   HENT: WNL  Head: Normocephalic and atraumatic  Eyes: Pupils are equal, round, and reactive to light  Neck: Normal range of motion  Neck supple  Cardiovascular: Normal rate and regular rhythm  Pulmonary/Chest: Effort normal and breath sounds normal    Abdominal: Soft  Bowel sounds are normal, midline abdominal incisional scar  No tenderness with palpation  Musculoskeletal: Normal range of motion, persistent left> right buttock and leg pain with ambulation   Extremities:  No edema  Neurological: Is alert and oriented to person, place, and time  Skin: Skin is warm and dry  Psychiatric: Has a normal mood and affect  Lab Results:   No results displayed because visit has over 200 results  Imaging Studies: I have personally reviewed pertinent reports        EKG, Pathology, and Other Studies: I have personally reviewed pertinent reports  Counseling / Coordination of Care  Total floor / unit time spent today 30 minutes

## 2021-06-02 NOTE — TELEMEDICINE
On-Call Telephone Note    Inpatient consult to Neurosurgery  Consult performed by: Colt Naidu PA-C  Consult ordered by: Nel Sandra MD      Contacted through inpatient consultation  Taqueria Francisco 65 y o  female MRN: [id number]  Unit/Bed#: -01 Encounter: [id number]    Per provider report, patient presents on 5/29 with acute exacerbation of chronic low back pain  Neurosurgery on call recommended patient undergo MRI lumbar spine to rule out osteomyelitis in setting on infection, but patient refused  Available past medical history,social history, surgical history, medication list, drug allergies and review of systems were reviewed  /70   Pulse 75   Temp 98 7 °F (37 1 °C) (Oral)   Resp 16   Ht 5' 1" (1 549 m)   Wt 55 3 kg (122 lb)   SpO2 97%   BMI 23 05 kg/m²      Limited neurological examination per clinical notes, but neurosurgery consulted to determine in bracing may help patients pain  Imaging personally reviewed  Ct imaging with contrast was completed showing significant degenerative changes and spinal stenosis without evidence of infectious process  Assessment and Plan  1  Can consider LSO brace when out of bed of HOB >45 degrees for comfort  Possible improvement in mechanical pain with bracing  Typically degenerative spinal disease is not treated with bracing  2  Would still recommend MRI of the lumbar and thoracic spine given the severity of stenosis on imaging  3  Will require to reschedule outpatient appointment for formal surgical evaluation of lumbar and T11-12 spinal stenosis  4  Call with further questions, concerns, or worsening weakness or ongoing intractable pain  Ongoing pain control per primary team      All questions answered  Provider is in agreement with the course of action  A total of 15 minutes was spent discussing the presentation, physical exam and formulating a management plan with the provider

## 2021-06-02 NOTE — ASSESSMENT & PLAN NOTE
· Sepsis, present on admission, a/e/b fevers, T max 102 2, tachycardia, tachypnea secondary to E coli septicemia  · Rule out Discitis/vdertebral osteo given complaints of low back pain  · UA - Bacteruria, negative for wbc, nitrite or leukocytes  · CT scan lumbar spine x2 - No evidence of abscess or discitis  · Patient currently refusing MRI of her back  · On cefepime and vancomycin per ID recs  · Admission Blood cultures 2/2 - Ecoli  · GI consulted to help locate source of Ecoli bacteremia  · Repeat blood culture negative till date  · Trend WBC and fever curve  · See above

## 2021-06-02 NOTE — PROGRESS NOTES
[facility]  Progress Note Natty Sanchez 1950, 65 y o  female MRN: [id number]  Unit/Bed#: -01 Encounter: [id number]  Primary Care Provider: Denise Avina MD   Date and time admitted to hospital: 5/29/2021  1:48 AM    * Acute exacerbation of chronic low back pain  Assessment & Plan  · Acute on chronic low back pain, ongoing for the past 3 weeks, recently hospitalized 5/4 and managed on prednisone taper  Steroid initially helped her back pain but back pain recurred when prednisone dosage finished  · CT lumbar spine on admission - showed severe degenerative disc disease from L2- L3, with disc herniation L4- L5  · Repeat CT lumbar spine w/Contrast (6/1/21) - No evidence of abscess or discitis seen  · Patient reportedly febrile in the ED, with poor rectal tone and back pain, recommended to have MRI spine, but she has persistently refused  She is also reluctant to go to [facility] for MRI with anesthesia   Patient is aware of the risk of undiagnosed abscess/diskitis   · Neurosurgery consulted  · On cefepime and vancomycin per ID recs  · Blood cultures #2/2 positive for Ecoli   · Repeat blood cultures negative till date x 48hrs  · Infectious disease on board      Sepsis [facility])  Assessment & Plan  · Sepsis, present on admission, a/e/b fevers, T max 102 2, tachycardia, tachypnea secondary to E coli septicemia  · Rule out Discitis/vdertebral osteo given complaints of low back pain  · UA - Bacteruria, negative for wbc, nitrite or leukocytes  · CT scan lumbar spine x2 - No evidence of abscess or discitis  · Patient currently refusing MRI of her back  · On cefepime and vancomycin per ID recs  · Admission Blood cultures 2/2 - Ecoli  · GI consulted to help locate source of Ecoli bacteremia  · Repeat blood culture negative till date  · Trend WBC and fever curve  · See above    Stage 3 chronic kidney disease ([address] )  Assessment & Plan  Lab Results   Component Value Date    EGFR 62 2021    EGFR 53 2021    EGFR 48 2021    CREATININE 0 92 2021    CREATININE 1 05 2021    CREATININE 1 14 2021   · Creatinine 1 48 on admission   · Baseline Cr 1 4-1 5   · Creatinine stable at 1 14 today  · Avoid hypotension/nephrotoxins medication  · Monitor renal function in a m  Elevated troponin  Assessment & Plan  · Non MI Troponin elevation, 0 07 on admission in the setting of E coli bacteremia, acute on chronic low back pain  · Troponin peaked at 0 40 and downtrended to 0 12  · Patient currently denying chest pain   · EKG normal sinus rhythm 86 beats per minute  · Cardiology consult appreciated - patient refusing cardiology evaluation or ECHO  She would prefer to follow up with her personal cardiologist, Dr Marc Mcdonald [address]  · Home meds: metoprolol succinate 12 5 mg daily and Lasix 20 mg daily   · Monitor vitals as per protocol    GIST (gastrointestinal stromal tumor), malignant ([address] )  Assessment & Plan  · Follows with Enriqueta Taylor   · Continue outpatient follow-up with Oncology   · Currently on ayvakit      VTE Pharmacologic Prophylaxis:   Pharmacologic: Heparin  Mechanical VTE Prophylaxis in Place: Yes    Patient Centered Rounds: I have performed bedside rounds with nursing staff today  Discussions with Specialists or Other Care Team Provider: CM    Education and Discussions with Family / Patient: Plan to update patient's  at beside later today    Time Spent for Care: 30 minutes  More than 50% of total time spent on counseling and coordination of care as described above      Current Length of Stay: 4 day(s)    Current Patient Status: Inpatient   Certification Statement: The patient will continue to require additional inpatient hospital stay due to as above    Discharge Plan: 1-2 days    Code Status: Level 1 - Full Code      Subjective:   No overnight events, still with back pain    Objective:     Vitals:   Temp (24hrs), Av °F (37 2 °C), Min:98 7 °F (37 1 °C), Max:99 2 °F (37 3 °C)    Temp:  [98 7 °F (37 1 °C)-99 2 °F (37 3 °C)] 98 7 °F (37 1 °C)  HR:  [75] 75  Resp:  [16-18] 16  BP: (122-137)/(67-70) 136/70  SpO2:  [97 %] 97 %  Body mass index is 23 05 kg/m²  Input and Output Summary (last 24 hours): Intake/Output Summary (Last 24 hours) at 6/2/2021 0906  Last data filed at 6/1/2021 1901  Gross per 24 hour   Intake --   Output 351 ml   Net -351 ml       Physical Exam:     Physical Exam  Vitals signs and nursing note reviewed  Constitutional:       General: She is not in acute distress  Appearance: Normal appearance  She is not ill-appearing, toxic-appearing or diaphoretic  Cardiovascular:      Rate and Rhythm: Normal rate and regular rhythm  Pulses: Normal pulses  Heart sounds: Murmur present  Pulmonary:      Effort: Pulmonary effort is normal  No respiratory distress  Breath sounds: Normal breath sounds  No stridor  No wheezing, rhonchi or rales  Chest:      Chest wall: No tenderness  Abdominal:      General: Bowel sounds are normal  There is no distension  Palpations: Abdomen is soft  Tenderness: There is no abdominal tenderness  There is no right CVA tenderness, left CVA tenderness or guarding  Musculoskeletal: Normal range of motion  General: Tenderness (back) present  No swelling or deformity  Right lower leg: No edema  Left lower leg: No edema  Skin:     General: Skin is warm and dry  Capillary Refill: Capillary refill takes less than 2 seconds  Coloration: Skin is pale  Skin is not jaundiced  Findings: Rash present  No bruising, erythema or lesion  Neurological:      General: No focal deficit present  Mental Status: She is alert and oriented to person, place, and time  Mental status is at baseline  Cranial Nerves: No cranial nerve deficit  Psychiatric:         Mood and Affect: Mood normal          Thought Content:  Thought content normal          Judgment: Judgment normal          Additional Data:     Labs:    Results from last 7 days   Lab Units 06/02/21  0448 06/01/21  0510   WBC Thousand/uL 5 42 5 50   HEMOGLOBIN g/dL 8 8* 8 8*   HEMATOCRIT % 27 4* 26 8*   PLATELETS Thousands/uL 238 242   NEUTROS PCT %  --  83*   LYMPHS PCT %  --  6*   MONOS PCT %  --  10   EOS PCT %  --  1     Results from last 7 days   Lab Units 06/02/21  0448  05/30/21  0619   SODIUM mmol/L 140   < > 139   POTASSIUM mmol/L 3 5   < > 3 3*   CHLORIDE mmol/L 107   < > 104   CO2 mmol/L 25   < > 24   BUN mg/dL 15   < > 20   CREATININE mg/dL 0 92   < > 1 29   ANION GAP mmol/L 8   < > 11   CALCIUM mg/dL 7 3*   < > 8 2*   ALBUMIN g/dL  --   --  2 8*   TOTAL BILIRUBIN mg/dL  --   --  0 20   ALK PHOS U/L  --   --  74   ALT U/L  --   --  25   AST U/L  --   --  42   GLUCOSE RANDOM mg/dL 80   < > 95    < > = values in this interval not displayed  Results from last 7 days   Lab Units 05/29/21  0212   INR  1 04             Results from last 7 days   Lab Units 05/30/21  0619 05/29/21  2319   LACTIC ACID mmol/L  --  1 8   PROCALCITONIN ng/ml 4 24* 1 32*           * I Have Reviewed All Lab Data Listed Above  * Additional Pertinent Lab Tests Reviewed: All Labs Within Last 24 Hours Reviewed    Imaging:    Imaging Reports Reviewed Today Include:   Imaging Personally Reviewed by Myself Includes:      Recent Cultures (last 7 days):     Results from last 7 days   Lab Units 05/30/21  0943 05/29/21  0212   BLOOD CULTURE  No Growth at 48 hrs  No Growth at 48 hrs   Escherichia coli*  Escherichia coli*   GRAM STAIN RESULT   --  Gram negative rods*  Gram negative rods*       Last 24 Hours Medication List:   Current Facility-Administered Medications   Medication Dose Route Frequency Provider Last Rate    acetaminophen  650 mg Oral Q6H PRN Doris Vasquez PA-C      aspirin  325 mg Oral Daily Rupinder Santos MD      atorvastatin  40 mg Oral Daily With Stephanie Hdz MD     [facility] Avapritinib  200 mg Oral Daily Emily Mei PA-C      azelastine  1 spray Each Nare BID Emily Mei PA-C      cefTRIAXone  1,000 mg Intravenous Q24H Thais Ibarra MD      furosemide  20 mg Oral Daily Evelyn Jolly MD      heparin (porcine)  5,000 Units Subcutaneous [facility] Emily Mei PA-C      HYDROmorphone  0 5 mg Intravenous Q8H PRN Janene Ramirez MD      hydroxychloroquine  200 mg Oral Early Morning Emily Mei PA-C      metoprolol succinate  12 5 mg Oral Daily Emily Mei PA-C      multivitamin-minerals  1 tablet Oral Daily Thais Ibarra MD      ondansetron  4 mg Intravenous Q6H PRN Emily Mei PA-C      pantoprazole  40 mg Oral BID Emily Mei PA-C      traMADol  50 mg Oral Q6H PRN Emily Mei PA-C          Today, Patient Was Seen By: Evelyn Jolly MD    ** Please Note: Dictation voice to text software may have been used in the creation of this document   **

## 2021-06-02 NOTE — ASSESSMENT & PLAN NOTE
· Acute on chronic low back pain, ongoing for the past 3 weeks, recently hospitalized 5/4 and managed on prednisone taper  Steroid initially helped her back pain but back pain recurred when prednisone dosage finished  · CT lumbar spine on admission - showed severe degenerative disc disease from L2- L3, with disc herniation L4- L5  · Repeat CT lumbar spine w/Contrast (6/1/21) - No evidence of abscess or discitis seen  · Patient reportedly febrile in the ED, with poor rectal tone and back pain, recommended to have MRI spine, but she has persistently refused  She is also reluctant to go to [facility] for MRI with anesthesia  Patient is aware of the risk of undiagnosed abscess/diskitis   · Neurosurgery consulted - 'Lumbar brace is not usually used for DDD, but patient can use it for comfort, would recommend MRI spine'  Patient refusing MRI  · On cefepime and vancomycin per ID recs - to transition to 500mg keflex PO QID till 6/11/21 to complete a 2 week course    · Blood cultures #2/2 positive for Ecoli   · Repeat blood cultures negative till date  · Infectious disease on board  · Cleared for discharge by GI, 'to have outpatient colonoscopy'  · Plan for discharge today - PT discussed with, LSO brace fitted

## 2021-06-02 NOTE — DISCHARGE SUMMARY
[place]  Discharge- Zia Jung 1950, 65 y o  female MRN: [id number]  Unit/Bed#: -01 Encounter: [id number]  Primary Care Provider: Denise Avina MD   Date and time admitted to hospital: 5/29/2021  1:48 AM    * Acute exacerbation of chronic low back pain  Assessment & Plan  · Acute on chronic low back pain, ongoing for the past 3 weeks, recently hospitalized 5/4 and managed on prednisone taper  Steroid initially helped her back pain but back pain recurred when prednisone dosage finished  · CT lumbar spine on admission - showed severe degenerative disc disease from L2- L3, with disc herniation L4- L5  · Repeat CT lumbar spine w/Contrast (6/1/21) - No evidence of abscess or discitis seen  · Patient reportedly febrile in the ED, with poor rectal tone and back pain, recommended to have MRI spine, but she has persistently refused  She is also reluctant to go to [place] for MRI with anesthesia  Patient is aware of the risk of undiagnosed abscess/diskitis   · Neurosurgery consulted - 'Lumbar brace is not usually used for DDD, but patient can use it for comfort, would recommend MRI spine'  Patient refusing MRI  · On cefepime and vancomycin per ID recs - to transition to 500mg keflex PO QID till 6/11/21 to complete a 2 week course    · Blood cultures #2/2 positive for Ecoli   · Repeat blood cultures negative till date  · Infectious disease on board  · Cleared for discharge by GI, 'to have outpatient colonoscopy'  · Plan for discharge today - PT discussed with, LSO brace fitted      Sepsis ([facility] [address] )  Assessment & Plan  · Sepsis, present on admission, a/e/b fevers, T max 102 2, tachycardia, tachypnea secondary to E coli septicemia  · Rule out Discitis/vdertebral osteo given complaints of low back pain  · UA - Bacteruria, negative for wbc, nitrite or leukocytes  · CT scan lumbar spine x2 - No evidence of abscess or discitis  · Patient currently refusing MRI of her back  · On cefepime and vancomycin per ID recs  · Admission Blood cultures 2/2 - Ecoli  · GI consulted to help locate source of Ecoli bacteremia - plan for outpatient colonoscopy  · Repeat blood culture negative till date  · Trend WBC and fever curve  · See above    Stage 3 chronic kidney disease [facility])  Assessment & Plan  Lab Results   Component Value Date    EGFR 62 06/02/2021    EGFR 53 06/01/2021    EGFR 48 05/31/2021    CREATININE 0 92 06/02/2021    CREATININE 1 05 06/01/2021    CREATININE 1 14 05/31/2021   · Creatinine 1 48 on admission   · Baseline Cr 1 4-1 5   · Creatinine stable at 1 14 today  · Avoid hypotension/nephrotoxins medication  · Monitor renal function in a m  Elevated troponin  Assessment & Plan  · Non MI Troponin elevation, 0 07 on admission in the setting of E coli bacteremia, acute on chronic low back pain  · Troponin peaked at 0 40 and downtrended to 0 12  · Patient currently denying chest pain   · EKG normal sinus rhythm 86 beats per minute  · Cardiology consult appreciated - patient refusing cardiology evaluation or ECHO   She would prefer to follow up with her personal cardiologist, Dr Laura Villela hypertension  Assessment & Plan  · Home meds: metoprolol succinate 12 5 mg daily and Lasix 20 mg daily   · Monitor vitals as per protocol    GIST (gastrointestinal stromal tumor), malignant ([address] )  Assessment & Plan  · Follows with Meaghan Kapoor   · Continue outpatient follow-up with Oncology   · Currently on ayvakit      Discharging Physician / Practitioner: Harika Miller MD  PCP: Elida Swain MD  Admission Date:   Admission Orders (From admission, onward)     Ordered        05/29/21 0525  Inpatient Admission  Once                   Discharge Date: 06/02/21    Resolved Problems  Date Reviewed: 5/31/2021    None          Consultations During Hospital Stay:  · GI  · ID  · Neurosurgery      Procedures Performed:   ·     Significant Findings / Test Results:   · 5/29/21 CT abdomen/pelvis  FINDINGS:     ABDOMEN  LOWER CHEST:  No clinically significant abnormality identified in the visualized lower chest   LIVER/BILIARY TREE:  One or more subcentimeter sharply circumscribed low-density hepatic lesion(s) are noted, too small to accurately characterize, but statistically most likely to represent subcentimeter hepatic cysts  Hepatic contours are normal   No   biliary dilatation  GALLBLADDER:  No calcified gallstones  No pericholecystic inflammatory change  SPLEEN:  Unremarkable  PANCREAS:  Unremarkable  ADRENAL GLANDS:  Unremarkable  KIDNEYS/URETERS:  Unremarkable  No hydronephrosis  STOMACH AND BOWEL:  Clips near the GE junction  No bowel obstruction  Right lower quadrant anastomosis  APPENDIX:  No findings to suggest appendicitis  ABDOMINOPELVIC CAVITY:  No ascites  No pneumoperitoneum  No lymphadenopathy  VESSELS:  Unremarkable for patient's age      PELVIS  REPRODUCTIVE ORGANS:  Gas is seen within the endometrial canal (series 2, image 58)  URINARY BLADDER:  Unremarkable  ABDOMINAL WALL/INGUINAL REGIONS:  Unremarkable  OSSEOUS STRUCTURES:  No acute fracture or destructive osseous lesion  Degenerative changes the osseous structures with multilevel disc herniation similar to prior exam   Dextroscoliosis of the lumbar spine      IMPRESSION:  Gas is seen within the endometrial canal   Differential diagnosis includes recent instrumentation, endometritis, and/or fistula to the bowel      · 5/31/2021 CT spine thoracic lumbar  FINDINGS:  ALIGNMENT:  Accentuation of the thoracic kyphosis centered at the T8-9 level  Dextroscoliotic curvature to the lumbar spine centered at the L4 level  There is rightward lateral subluxation of L4 on L5 due to degenerative disease  Grade 1   anterolisthesis of L5 on S1 is also noted  VERTEBRAL BODIES: No fracture  No endplate erosion or destruction to suggest discitis or osteomyelitis    No facet erosion or distraction to suggest septic arthritis  DEGENERATIVE CHANGES:  Multilevel degenerative changes characterized by disc space narrowing, endplate Schmorl's node deformities, endplate sclerosis, and disc osteophyte complexes the visualized lower cervical, lower thoracic, and entire lumbar spine   with facet degenerative changes more pronounced in the lumbar regions  There are calcified disc herniations and bulges in the lower thoracic and lumbar spine with a notable bony excrescence with superior and inferior migration at the T11-12 level  This is resulting in severe spinal stenosis and likely compression of the conus  There are additional calcified disc bulges and herniations in the lumbar spine which are resulting in variable degrees of spinal canal stenosis  There is severe spinal stenosis at L2-L3 and L3-L4  There is variable foraminal stenosis due to the   eccentric nature of the disc bulges and the severe scoliosis  There is severe right L5-S1 foraminal stenosis  Chronic anterior cortical S2 buckling consistent with remote fracture         PARASPINAL SOFT TISSUES:  No prevertebral or paravertebral enhancing phlegmon or fluid collection  No intraspinal enhancing collection either  IMPRESSION:  Thoracic kyphosis and lumbar dextroscoliosis with lateral subluxation and spondylolisthesis  Multilevel lower cervical, lower thoracic and diffuse lumbar endplate degenerative changes with disc space narrowing and calcified disc bulges and herniations  In the thoracic spine, the most notable abnormality is a calcified disc bulge and herniation resulting in moderate to severe spinal stenosis at the T11-12 level  This likely represents in indentation on the distal thoracic cord and conus  Consider follow-up MR imaging of the thoracolumbar spine is recommended      There is also suggestion of moderate to severe lumbar spinal stenosis, most pronounced at L2-L3 and L3-L4  Severe for right L5-S1 foraminal stenosis is noted    Additional level by level detail discussion of the extensive lumbar and lower thoracic degenerative disease was provided on the May 4, 2020 CT lumbar spine study report  Incidental Findings:   ·      Test Results Pending at Discharge (will require follow up):   ·      Outpatient Tests Requested:  ·     Complications:      Reason for Admission: Worsening lower back pain    Hospital Course:     Rachel Tapia is a 65 y o  female patient with PMH of chronic back pain, CKD, GIST, HTN who originally presented to the hospital on 5/29/2021 due to progressively worsening lower back pain  On presentation, she was found to be septic and was treated with IV cefepime and vancomycin initially  Blood cultures grew pan sensitive E coli and she was transitioned to oral keflex 500mg QID till 6/11/21 per ID recommendations  Given patient's complaints of low persistent back pain with sepsis picture, there was concern for possible diskitis/ abscess but patient adamantly refused to have MRI spine carried out (even refused transfer to [place] for MRI under anesthesia) despite understanding the risks of untreated spinal infection  She also had non MI troponin elevation, troponin peaked at 0 28, but she refused cardiology consult or ECHO check  She was evaluated by GI given concerns of possible colovaginal fistula, and they would plan to follow up outpatient for colonoscopy    She was also evaluated by neurosurgery given her severe degenerative spine disease and ongoing back pain  They recommended MRI spine as well (but patient refused) and LSO brace for comfort    Brace applied by PT prior to discharge  Please see above list of diagnoses and related plan for additional information       Condition at Discharge: stable     Discharge Day Visit / Exam:     * Please refer to separate progress note for these details *    Discussion with Family: I updated patient's  at bedside    Discharge instructions/Information to patient and family: See after visit summary for information provided to patient and family  Provisions for Follow-Up Care:  See after visit summary for information related to follow-up care and any pertinent home health orders  Disposition:     Home with VNA Services (Reminder: Complete face to face encounter)    For Discharges to [facility]:   · Not Applicable to this Patient - Not Applicable to this Patient    Planned Readmission: No     Discharge Statement:  I spent 45 minutes discharging the patient  This time was spent on the day of discharge  I had direct contact with the patient on the day of discharge  Greater than 50% of the total time was spent examining patient, answering all patient questions, arranging and discussing plan of care with patient as well as directly providing post-discharge instructions  Additional time then spent on discharge activities  Discharge Medications:  See after visit summary for reconciled discharge medications provided to patient and family        ** Please Note: This note has been constructed using a voice recognition system **

## 2021-06-02 NOTE — DISCHARGE INSTRUCTIONS
Acute Low Back Pain   WHAT YOU NEED TO KNOW:   Acute low back pain is sudden discomfort in your lower back area that lasts for up to 6 weeks  The discomfort makes it difficult to tolerate activity  DISCHARGE INSTRUCTIONS:   Seek care immediately or call 911 if:   · You have severe pain  · You have sudden stiffness and heaviness on both buttocks down to both legs  · You have numbness or weakness in one leg, or pain in both legs  · You have numbness in your genital area or across your lower back  · You cannot control your urine or bowel movements  Contact your healthcare provider if:   · You have a fever  · You have pain at night or when you rest     · Your pain does not get better with treatment  · You have pain that worsens when you cough or sneeze  · You suddenly feel something pop or snap in your back  · You have questions or concerns about your condition or care  Medicines: The following medicines may be ordered by your healthcare provider:  · Acetaminophen  decreases pain  It is available without a doctor's order  Ask how much to take and how often to take it  Follow directions  Acetaminophen can cause liver damage if not taken correctly  · NSAIDs  help decrease swelling and pain  This medicine is available with or without a doctor's order  NSAIDs can cause stomach bleeding or kidney problems in certain people  If you take blood thinner medicine, always ask your healthcare provider if NSAIDs are safe for you  Always read the medicine label and follow directions  · Prescription pain medicine  may be given  Ask your healthcare provider how to take this medicine safely  · Muscle relaxers  decrease pain by relaxing the muscles in your lower spine  · Take your medicine as directed  Contact your healthcare provider if you think your medicine is not helping or if you have side effects  Tell him of her if you are allergic to any medicine   Keep a list of the medicines, vitamins, and herbs you take  Include the amounts, and when and why you take them  Bring the list or the pill bottles to follow-up visits  Carry your medicine list with you in case of an emergency  Self-care:   · Stay active  as much as you can without causing more pain  Bed rest could make your back pain worse  Start with some light exercises such as walking  Avoid heavy lifting until your pain is gone  Ask for more information about the activities or exercises that are right for you  · Ice  helps decrease swelling, pain, and muscle spams  Put crushed ice in a plastic bag  Cover it with a towel  Place it on your lower back for 20 to 30 minutes every 2 hours  Do this for about 2 to 3 days after your pain starts, or as directed  · Heat  helps decrease pain and muscle spasms  Start to use heat after treatment with ice has stopped  Use a small towel dampened with warm water or a heating pad, or sit in a warm bath  Apply heat on the area for 20 to 30 minutes every 2 hours for as many days as directed  Alternate heat and ice  Prevent acute low back pain:   · Use proper body mechanics  ¨ Bend at the hips and knees when you  objects  Do not bend from the waist  Use your leg muscles as you lift the load  Do not use your back  Keep the object close to your chest as you lift it  Try not to twist or lift anything above your waist     ¨ Change your position often when you stand for long periods of time  Rest one foot on a small box or footrest, and then switch to the other foot often  ¨ Try not to sit for long periods of time  When you do, sit in a straight-backed chair with your feet flat on the floor  Never reach, pull, or push while you are sitting  · Do exercises that strengthen your back muscles  Warm up before you exercise  Ask your healthcare provider the best exercises for you  · Maintain a healthy weight  Ask your healthcare provider how much you should weigh   Ask him to help you create a weight loss plan if you are overweight  Follow up with your healthcare provider as directed:  Return for a follow-up visit if you still have pain after 1 to 3 weeks of treatment  You may need to visit an orthopedist if your back pain lasts more than 6 to 12 weeks  Write down your questions so you remember to ask them during your visits  © 2017 [employer] [employer]  Information is for End User's use only and may not be sold, redistributed or otherwise used for commercial purposes  All illustrations and images included in CareNotes® are the copyrighted property of [employer]  or [employer]  The above information is an  only  It is not intended as medical advice for individual conditions or treatments  Talk to your doctor, nurse or pharmacist before following any medical regimen to see if it is safe and effective for you

## 2021-06-02 NOTE — ASSESSMENT & PLAN NOTE
· Non MI Troponin elevation, 0 07 on admission in the setting of E coli bacteremia, acute on chronic low back pain  · Troponin peaked at 0 40 and downtrended to 0 12  · Patient currently denying chest pain   · EKG normal sinus rhythm 86 beats per minute  · Cardiology consult appreciated - patient refusing cardiology evaluation or ECHO   She would prefer to follow up with her personal cardiologist, Dr Kira Haque

## 2021-06-02 NOTE — ASSESSMENT & PLAN NOTE
· Follows with [facility]   · Continue outpatient follow-up with Oncology   · Currently on ayvakit

## 2021-06-02 NOTE — ASSESSMENT & PLAN NOTE
· Non MI Troponin elevation, 0 07 on admission in the setting of E coli bacteremia, acute on chronic low back pain  · Troponin peaked at 0 40 and downtrended to 0 12  · Patient currently denying chest pain   · EKG normal sinus rhythm 86 beats per minute  · Cardiology consult appreciated - patient refusing cardiology evaluation or ECHO   She would prefer to follow up with her personal cardiologist, Dr Ney Moreno

## 2021-06-02 NOTE — PHYSICAL THERAPY NOTE
LSO fit/training  Referral rec'd for LSO for comfort after neuro consult  Pt agrees to be fitted  Fitted with LSO and instr on kian/doff of same and to wear when up  Advised pt may loosen when eating  All questions answered and pt satisfied with fit  Anxious for d/c home   D/c PT

## 2021-06-02 NOTE — CASE MANAGEMENT
LOS; 4 days    Response in [facility] [phone] is able to accept for post [place] [id number] services    CM following

## 2021-06-02 NOTE — ASSESSMENT & PLAN NOTE
· Acute on chronic low back pain, ongoing for the past 3 weeks, recently hospitalized 5/4 and managed on prednisone taper  Steroid initially helped her back pain but back pain recurred when prednisone dosage finished  · CT lumbar spine on admission - showed severe degenerative disc disease from L2- L3, with disc herniation L4- L5  · Repeat CT lumbar spine w/Contrast (6/1/21) - No evidence of abscess or discitis seen  · Patient reportedly febrile in the ED, with poor rectal tone and back pain, recommended to have MRI spine, but she has persistently refused  She is also reluctant to go to [place] for MRI with anesthesia   Patient is aware of the risk of undiagnosed abscess/diskitis   · Neurosurgery consulted  · On cefepime and vancomycin per ID recs  · Blood cultures #2/2 positive for Ecoli   · Repeat blood cultures negative till date x 48hrs  · Infectious disease on board

## 2021-06-02 NOTE — ASSESSMENT & PLAN NOTE
· Follows with Meaghan Kapoor   · Continue outpatient follow-up with Oncology   · Currently on ayvakit

## 2021-06-02 NOTE — CONSULTS
Patient seen and examined well known to me  Patient was discussed with Dr Ann Marie Mendoza from Infectious Disease and Alyssa Clarke  I understand and share the concern about an occult colovaginal fistula  She certainly has the proper history to put her at risk for this  It will be difficult to prove  Acutely I recommend treating the infection  She should have an outpatient colonoscopy assuming she makes a full recovery  If this is negative discussion could be undertaken as to whether to attempt a barium enema or Gastrografin enema to look for evidence of a fistula

## 2021-06-02 NOTE — ASSESSMENT & PLAN NOTE
· Home meds: metoprolol succinate 12 5 mg daily and Lasix 20 mg daily   · Monitor vitals as per protocol

## 2021-06-02 NOTE — PROGRESS NOTES
Nely Mercer  65 y o   female  1950  mrn [id number]    Assessment/Plan:  1  E coli septicemia/Fever/Back pain: No further fever and WBC count is nml  Admission bld cx's grew E coli - most likely from intra-abd source since UA did not show any pyuria to suggest presence of UTI  Repeat bld cx's are neg  Abd CT also showed DDD but there was gas seen in the endomdetrial canal - ?presence of fistula to bowel  Abx regimen was narrowed to Rocephin on 5/31    Repeat CT of lumbar spine with contrast did not show any evidence of discitis/osteo - it conts to show significant DDD which is the etiology for her increased back pain  Pt was scheduled to see Neurosurgery tomorrow for evaluation of DDD  Pt presented with increasing low back pain x 3 weeks  Out-Pt CT of lumbar spine without contrast was done on 5/4 which showed significant DDD and she was tx'd with Prednisone  Her back pain increased as the steroid dose was tapered      On admission, she had fever so discitis/vertebral osteo/epidural abscess was considered as the etiology of her worsening back pain  Neurosurgery recommended MRI of lumbar spine but Pt refused because of her significant back pain and her  states that she cannot have an MRI because of hardware in her ankle from previous surgery  ESR is 32 and CRP is 38 9     A  Cont Rocephin   B  Will consult GI to eval for source of E coli septicemia - Abd CT suggested presence of colo-vaginal fistula  C  Would contact Neurosurgery to see if back brace would be helpful for symptomatic tx of DDD in this Pt - She was scheduled to have out-Pt appt with Neurosurgery tomorrow for eval of her DDD  D  Awaiting results of repeat bld cx's       Subjective: Pt conts to have back pain   She has chronic diarrhea    Objective:  Tmax: 98 5  Lungs: Clear  Abd: +BS, soft, nontender  Ext: No calf tenderness    Labs:  CBC w/diff  Recent Labs     06/01/21  0510   WBC 5 50   HGB 8 8*   HCT 26 8*    NEUTOPHILPCT 83*   LYMPHOPCT 6*   MONOPCT 10   EOSPCT 1     BMP  Recent Labs     06/01/21  0510   K 4 0      CO2 23   BUN 13   CREATININE 1 05   CALCIUM 8 5     CMP  Recent Labs     05/30/21  0619  06/01/21  0510   K 3 3*   < > 4 0      < > 102   CO2 24   < > 23   BUN 20   < > 13   CREATININE 1 29   < > 1 05   CALCIUM 8 2*   < > 8 5   ALKPHOS 74  --   --    ALT 25  --   --    AST 42  --   --     < > = values in this interval not displayed  [facility]    Cultures:  Lab Results   Component Value Date    BLOODCX No Growth at 24 hrs  05/30/2021    BLOODCX No Growth at 24 hrs  05/30/2021    BLOODCX Escherichia coli (A) 05/29/2021    BLOODCX Escherichia coli (A) 05/29/2021    BLOODCX No Growth After 5 Days  02/19/2020    BLOODCX No Growth After 5 Days  02/19/2020    BLOODCX No Growth After 5 Days  02/06/2020    BLOODCX No Growth After 5 Days   02/06/2020     No results found for: WOUNDCULT  No results found for: URINECX  No results found for: SPUTUMCULTUR    MED:  Rocephin: #2  Abx: #4             Completed:   Rocephin x 1 dose on 5/29  Vanco: x 3 days on 5/31  Cefepime x 3 days on 5/31      Current Facility-Administered Medications:     acetaminophen (TYLENOL) tablet 650 mg, 650 mg, Oral, Q6H PRN, Arelis David PA-C, 650 mg at 06/01/21 9496    aspirin tablet 325 mg, 325 mg, Oral, Daily, Harshad Whitman MD, 325 mg at 06/01/21 0831    atorvastatin (LIPITOR) tablet 40 mg, 40 mg, Oral, Daily With Alexandra Gaffney MD, 40 mg at 06/01/21 1738    Avapritinib TABS 200 mg, 200 mg, Oral, Daily, Arelis David PA-C, 200 mg at 06/01/21 0831    azelastine (ASTELIN) 0 1 % nasal spray 1 spray, 1 spray, Each Nare, BID, Arelis David PA-C, 1 spray at 06/01/21 1825    [START ON 6/2/2021] cefTRIAXone (ROCEPHIN) IVPB (premix in dextrose) 1,000 mg 50 mL, 1,000 mg, Intravenous, Q24H, Serafin Diaz MD    furosemide (LASIX) tablet 20 mg, 20 mg, Oral, Daily, Maryam Parsons MD, 20 mg at 06/01/21 1428    heparin (porcine) subcutaneous injection 5,000 Units, 5,000 Units, Subcutaneous, Q8H [facility], 5,000 Units at 06/01/21 1346 **AND** Platelet count, , , Once, Oscar Cervantes PA-C    HYDROmorphone (DILAUDID) injection 0 5 mg, 0 5 mg, Intravenous, Q8H PRN, Jessee Tay MD, 0 5 mg at 06/01/21 2023    hydroxychloroquine (PLAQUENIL) tablet 200 mg, 200 mg, Oral, Early Morning, Arelis David PA-C, 200 mg at 06/01/21 7392    metoprolol succinate (TOPROL-XL) 24 hr tablet 12 5 mg, 12 5 mg, Oral, Daily, Arelis David PA-C, 12 5 mg at 06/01/21 0830    multivitamin-minerals (CENTRUM ADULTS) tablet 1 tablet, 1 tablet, Oral, Daily, Kishor Purvis MD, 1 tablet at 06/01/21 0829    ondansetron (ZOFRAN) injection 4 mg, 4 mg, Intravenous, Q6H PRN, Oscar Cervantes PA-C    pantoprazole (PROTONIX) EC tablet 40 mg, 40 mg, Oral, BID, Arelis David PA-C, 40 mg at 06/01/21 1738    traMADol (ULTRAM) tablet 50 mg, 50 mg, Oral, Q6H PRN, Oscar Cervantes PA-C, 50 mg at 06/01/21 1740    Principal Problem:    Acute exacerbation of chronic low back pain  Active Problems:    GIST (gastrointestinal stromal tumor), malignant (HCC)    Essential hypertension    Sepsis (HCC)    Elevated troponin    Stage 3 chronic kidney disease ([facility] [address] )      Kishor Purvis MD

## 2021-06-02 NOTE — ASSESSMENT & PLAN NOTE
· Sepsis, present on admission, a/e/b fevers, T max 102 2, tachycardia, tachypnea secondary to E coli septicemia  · Rule out Discitis/vdertebral osteo given complaints of low back pain  · UA - Bacteruria, negative for wbc, nitrite or leukocytes  · CT scan lumbar spine x2 - No evidence of abscess or discitis  · Patient currently refusing MRI of her back  · On cefepime and vancomycin per ID recs  · Admission Blood cultures 2/2 - Ecoli  · GI consulted to help locate source of Ecoli bacteremia - plan for outpatient colonoscopy  · Repeat blood culture negative till date  · Trend WBC and fever curve  · See above

## 2021-06-03 ENCOUNTER — TELEPHONE (OUTPATIENT)
Dept: GASTROENTEROLOGY | Facility: CLINIC | Age: 71
End: 2021-06-03

## 2021-06-03 NOTE — TELEPHONE ENCOUNTER
Yes as long as it is 1 month out  Also I would recommend a GoLYTELY or MiraLax prep in light of the possibility of a fistula  ,

## 2021-06-03 NOTE — TELEPHONE ENCOUNTER
You performed consult on patient Jeny Russo 1950 on 6/2/21 at [address]  She called office for follow up  I reviewed your consult note and Dr Fernie Ibarra progress note  Patient discharged on Keflex 500 mg QID to complete two week course 6/11/21  Your note recommends treating infection and outpatient colonoscopy  Can the colon be scheduled without office visit since we currently have no openings?  Please advise

## 2021-06-04 ENCOUNTER — TRANSITIONAL CARE MANAGEMENT (OUTPATIENT)
Dept: FAMILY MEDICINE CLINIC | Facility: HOSPITAL | Age: 71
End: 2021-06-04

## 2021-06-04 LAB
BACTERIA BLD CULT: NORMAL
BACTERIA BLD CULT: NORMAL

## 2021-06-04 NOTE — UTILIZATION REVIEW
Notification of Discharge   This is a Notification of Discharge from our facility [address]  Please be advised that this patient has been discharge from our facility  Below you will find the admission and discharge date and time including the patients disposition  UTILIZATION REVIEW CONTACT:  Cristal Chester  Utilization   Network Utilization Review Department  Phone: [phone] x carefully listen to the prompts  All voicemails are confidential   Email: [email or web address]     PHYSICIAN ADVISORY SERVICES:  FOR [id number] REVIEW - MEDICAL NECESSITY DENIAL  Phone: [phone]  Fax: [phone]  Email: [email or web address]     PRESENTATION DATE: 5/29/2021  1:48 AM  OBERVATION ADMISSION DATE:   INPATIENT ADMISSION DATE: 5/29/21 0525   DISCHARGE DATE: 6/2/2021  4:18 PM  DISPOSITION: Home with [place] with [address] [place] [address] Road INFORMATION:  Send all requests for admission clinical reviews, approved or denied determinations and any other requests to dedicated fax number below belonging to the campus where the patient is receiving treatment   List of dedicated fax numbers:  [address] [place] [address] DENIALS (Administrative/Medical Necessity) [phone]   [address] [place] [address] (Maternity/NICU/Pediatrics) [phone]   Zoila Ponce [phone]   Bettina Torres [phone]   Sal Martinez [phone]   [address] [phone]   [facility]  [phone]   [address] [place] [address], [address]  [address] [facility] [address] [place] [phone]

## 2021-06-04 NOTE — TELEPHONE ENCOUNTER
I spoke with patient and reviewed that scheduling would be contacting her in the future to schedule colonoscopy per WO note   Follow up would be determined after test

## 2021-06-09 PROBLEM — K56.609 INTESTINAL OBSTRUCTION (HCC): Status: RESOLVED | Noted: 2021-05-21 | Resolved: 2021-06-09

## 2021-06-09 PROBLEM — N39.0 UTI (URINARY TRACT INFECTION): Status: RESOLVED | Noted: 2021-05-29 | Resolved: 2021-06-09

## 2021-06-09 PROBLEM — R79.89 ELEVATED TROPONIN: Status: RESOLVED | Noted: 2020-02-20 | Resolved: 2021-06-09

## 2021-06-09 PROBLEM — A41.51 SEPSIS DUE TO ESCHERICHIA COLI (HCC): Status: ACTIVE | Noted: 2021-06-09

## 2021-06-09 PROBLEM — S83.412A SPRAIN OF MEDIAL COLLATERAL LIGAMENT OF LEFT KNEE: Status: RESOLVED | Noted: 2021-05-21 | Resolved: 2021-06-09

## 2021-06-09 PROBLEM — K31.819 GASTRIC AVM: Status: RESOLVED | Noted: 2020-06-04 | Resolved: 2021-06-09

## 2021-06-09 PROBLEM — S52.135A CLOSED NONDISPLACED FRACTURE OF NECK OF LEFT RADIUS: Status: RESOLVED | Noted: 2021-05-21 | Resolved: 2021-06-09

## 2021-06-09 PROBLEM — R77.8 ELEVATED TROPONIN: Status: RESOLVED | Noted: 2020-02-20 | Resolved: 2021-06-09

## 2021-06-09 PROBLEM — C49.A4 GIST (GASTROINTESTINAL STROMA TUMOR), MALIGNANT, COLON (HCC): Status: ACTIVE | Noted: 2021-06-09

## 2021-06-09 PROBLEM — C49.A0 GIST (GASTROINTESTINAL STROMAL TUMOR), MALIGNANT (HCC): Status: RESOLVED | Noted: 2018-03-12 | Resolved: 2021-06-09

## 2021-06-09 PROBLEM — A41.9 SEPSIS (HCC): Status: RESOLVED | Noted: 2020-02-07 | Resolved: 2021-06-09

## 2021-06-16 ENCOUNTER — OFFICE VISIT (OUTPATIENT)
Dept: FAMILY MEDICINE CLINIC | Facility: HOSPITAL | Age: 71
End: 2021-06-16
Payer: COMMERCIAL

## 2021-06-16 VITALS
BODY MASS INDEX: 23 KG/M2 | OXYGEN SATURATION: 99 % | HEIGHT: 61 IN | HEART RATE: 94 BPM | WEIGHT: 121.8 LBS | SYSTOLIC BLOOD PRESSURE: 148 MMHG | DIASTOLIC BLOOD PRESSURE: 80 MMHG

## 2021-06-16 DIAGNOSIS — I48.0 PAROXYSMAL ATRIAL FIBRILLATION (HCC): ICD-10-CM

## 2021-06-16 DIAGNOSIS — M48.062 SPINAL STENOSIS, LUMBAR REGION, WITH NEUROGENIC CLAUDICATION: ICD-10-CM

## 2021-06-16 DIAGNOSIS — I10 ESSENTIAL HYPERTENSION: ICD-10-CM

## 2021-06-16 DIAGNOSIS — C49.A4 GIST (GASTROINTESTINAL STROMA TUMOR), MALIGNANT, COLON (HCC): ICD-10-CM

## 2021-06-16 DIAGNOSIS — N18.30 STAGE 3 CHRONIC KIDNEY DISEASE, UNSPECIFIED WHETHER STAGE 3A OR 3B CKD (HCC): ICD-10-CM

## 2021-06-16 DIAGNOSIS — A41.51 SEPSIS DUE TO ESCHERICHIA COLI WITHOUT ACUTE ORGAN DYSFUNCTION (HCC): Primary | ICD-10-CM

## 2021-06-16 DIAGNOSIS — C78.89 MALIGNANT NEOPLASM METASTATIC TO OTHER DIGESTIVE SYSTEM STRUCTURE (HCC): ICD-10-CM

## 2021-06-16 PROCEDURE — 1111F DSCHRG MED/CURRENT MED MERGE: CPT | Performed by: INTERNAL MEDICINE

## 2021-06-16 PROCEDURE — 99495 TRANSJ CARE MGMT MOD F2F 14D: CPT | Performed by: INTERNAL MEDICINE

## 2021-06-16 RX ORDER — TRAMADOL HYDROCHLORIDE 100 MG/1
100 TABLET, COATED ORAL 4 TIMES DAILY
Qty: 60 TABLET | Refills: 1 | Status: SHIPPED | OUTPATIENT
Start: 2021-06-16 | End: 2021-07-16

## 2021-06-16 NOTE — PATIENT INSTRUCTIONS
You have been seen today for a hospital discharge follow up visit  The purpose of the visit is to be sure that you are feeling well and taking all medications as ordered  This visit is scheduled so that any post hospital questions you have can be addressed  The doctor or nurse will review all medications and will provide refills of medications  You may be asked to get some follow up labs or other testing done, please do this as soon as able  If  You are seeing a specialist for follow up, let us know so we can do appropriate referrals  Schedule your next routine visit today so that we can keep you on track and healthy  Increase tramadol to 100 mg 4 x day and can use tylenol if needed  Keep appt with dr Nasir Ge  Might need palliative care consult  See Gi for colonoscopy asap

## 2021-06-16 NOTE — ASSESSMENT & PLAN NOTE
Recent severe back pain,  Concern for disciitis raised in hospital   Refused MRI at the time and has completed course of antibiotics, Keflex

## 2021-06-16 NOTE — ASSESSMENT & PLAN NOTE
See note of 6/9/2021 for details  Concern persists for colovaginal fistula  Will need to see GI to further evaluate

## 2021-06-17 NOTE — TELEPHONE ENCOUNTER
Dr Poe Marni this pt at [facility] or [facility] for the out pt colon

## 2021-06-18 ENCOUNTER — DOCUMENTATION (OUTPATIENT)
Dept: SOCIAL WORK | Facility: HOSPITAL | Age: 71
End: 2021-06-18

## 2021-06-18 NOTE — PROGRESS NOTES
Admission Report at [facility] has admitted your patient to [facility] service with the following disciplines:      PT  Response needed, please respond via tiger text or phone call to [phone]  Primary focus of home health care [place]  Patient stated goals of care "to not have so much pain"  Anticipated visit pattern and next visit date 1w1 2w2  Significant clinical findings none  Request for additional disciplines none  Request for medication clarification  Level 2 interaction with [facility] and tums    ofloxacin and hydroxchloquine  Level 1 interaction with zofran and hydroxchloquine  Duplicate drug warning with lomotil and tramadol  May patient continue the above meds? Request for other order clarification none  Needs follow up physician appointments scheduled none  Potential barriers to goal achievement pain  Other pertinent information none    Thank you for allowing us to participate in the care of your patient        Lucina Ariza DPT

## 2021-06-28 ENCOUNTER — DOCUMENTATION (OUTPATIENT)
Dept: FAMILY MEDICINE CLINIC | Facility: HOSPITAL | Age: 71
End: 2021-06-28

## 2021-06-28 ENCOUNTER — TELEPHONE (OUTPATIENT)
Dept: FAMILY MEDICINE CLINIC | Facility: HOSPITAL | Age: 71
End: 2021-06-28

## 2021-06-28 RX ORDER — TRAMADOL HYDROCHLORIDE 50 MG/1
100 TABLET ORAL 4 TIMES DAILY
COMMUNITY
Start: 2021-06-16 | End: 2021-08-13

## 2021-06-28 NOTE — TELEPHONE ENCOUNTER
Pt states she is not taking Calcium, Lomotil, Ofloxacin, Nasacort, Vit D, Vit w/Phosph  She is using OTC Imodium  Changes made to chart   CR

## 2021-06-30 ENCOUNTER — OFFICE VISIT (OUTPATIENT)
Dept: NEUROSURGERY | Facility: CLINIC | Age: 71
End: 2021-06-30
Payer: COMMERCIAL

## 2021-06-30 VITALS
WEIGHT: 122 LBS | TEMPERATURE: 99 F | BODY MASS INDEX: 23.03 KG/M2 | DIASTOLIC BLOOD PRESSURE: 70 MMHG | HEIGHT: 61 IN | SYSTOLIC BLOOD PRESSURE: 120 MMHG

## 2021-06-30 DIAGNOSIS — M48.062 SPINAL STENOSIS OF LUMBAR REGION WITH NEUROGENIC CLAUDICATION: ICD-10-CM

## 2021-06-30 DIAGNOSIS — M54.59 INTRACTABLE LOW BACK PAIN: ICD-10-CM

## 2021-06-30 DIAGNOSIS — M54.16 LUMBAR RADICULOPATHY: ICD-10-CM

## 2021-06-30 PROCEDURE — 99204 OFFICE O/P NEW MOD 45 MIN: CPT | Performed by: NEUROLOGICAL SURGERY

## 2021-06-30 PROCEDURE — 1160F RVW MEDS BY RX/DR IN RCRD: CPT | Performed by: NEUROLOGICAL SURGERY

## 2021-06-30 PROCEDURE — 3008F BODY MASS INDEX DOCD: CPT | Performed by: NEUROLOGICAL SURGERY

## 2021-06-30 PROCEDURE — 1036F TOBACCO NON-USER: CPT | Performed by: NEUROLOGICAL SURGERY

## 2021-06-30 PROCEDURE — 3078F DIAST BP <80 MM HG: CPT | Performed by: NEUROLOGICAL SURGERY

## 2021-06-30 PROCEDURE — 3074F SYST BP LT 130 MM HG: CPT | Performed by: NEUROLOGICAL SURGERY

## 2021-06-30 NOTE — PROGRESS NOTES
Neurosurgery Office Note  Nguyen Greenfield 65 y o  female MRN: [id number]      Assessment/Plan     Lumbar radiculopathy  Patient seen in outpatient office today as a referral by PCP for further evaluation of lumbar radiculopathy  · Of note, patient was recently hospitalized in May with sepsis and back pain with concerns for osteomyelitis in the setting of infection  Recommended MRI lumbar spine but patient refused  She was also noted to have on CT severe spinal stenosis at T11-12 secondary to calcified disc herniation as well as lumbar stenosis  Imaging:    CT thoracic and lumbar spine w 5/31/21:Thoracic kyphosis and lumbar dextroscoliosis with lateral subluxation and spondylolisthesis  Multilevel lower cervical, lower thoracic and diffuse lumbar endplate degenerative changes with disc space narrowing and calcified disc bulges and herniations  In the thoracic spine, the most notable abnormality is a calcified disc bulge and herniation resulting in moderate to severe spinal stenosis at the T11-12 level  This likely represents in indentation on the distal thoracic cord and conus  Consider follow-up MR imaging of the thoracolumbar spine is recommended  There is also suggestion of moderate to severe lumbar spinal stenosis, most pronounced at L2-L3 and L3-L4  Severe for right L5-S1 foraminal stenosis is noted  Additional level by level detail discussion of the extensive lumbar and lower thoracic degenerative disease was provided on the May 4, 2020 CT lumbar spine study report  Plan:  · Reviewed imaging with patient   · Pain control with OTC medication as needed  · Patient reports seeing physical therapy a few times prior to the [place] pandemic which did not help with her pain or symptoms  Patient does report seeing pain management and receiving multiple injections in the past most recent 1 being in January 2021  Patient reported that these injections lasted 3 weeks and helped with her pain and symptoms  Patient has not had recent injection done  Recommend continue follow-up with pain management  · Ordered MRI thoracic and lumbar spine with anesthesia to further evaluate etiology and to further assess calcified disc herniation at T11-12 and lumbar stenosis  Also ordered scoliosis x-rays  · Patient will follow up in 2-4 weeks once imaging completed or sooner symptoms worsen  · Patient made aware if she develops new or worsening back pain, leg pain, leg weakness, paresthesias, bowel or bladder issues, saddle anesthesia, or ambulatory dysfunction to seek care sooner     · Patient made aware to contact neurosurgery with any further questions or concerns          Diagnoses and all orders for this visit:    Lumbar radiculopathy  -     Ambulatory referral to Neurosurgery    Intractable low back pain  -     Ambulatory referral to Neurosurgery  -     Cancel: MRI lumbar spine without contrast; Future  -     Cancel: MRI thoracic spine without contrast; Future  -     XR entire spine (scoliosis) 2-3 vw; Future  -     MRI thoracic spine without contrast; Future  -     MRI lumbar spine without contrast; Future    Spinal stenosis of lumbar region with neurogenic claudication  -     Ambulatory referral to Neurosurgery  -     Cancel: MRI lumbar spine without contrast; Future  -     Cancel: MRI thoracic spine without contrast; Future  -     XR entire spine (scoliosis) 2-3 vw; Future  -     MRI thoracic spine without contrast; Future  -     MRI lumbar spine without contrast; Future            CHIEF COMPLAINT    Chief Complaint   Patient presents with    Consult     Lumbar radiculopathy       HISTORY    History of Present Illness     65y o  year old female with past medical history significant for obstructive sleep apnea, gastrointestinal scroma tumor status post radiation, gastric AVM, AFib, hypertension, rheumatoid arthritis, stage 3 chronic kidney disease, status post lumbar laminectomy, sepsis due to E coli bacteremia, and metastatic cancer to liver  Patient seen outpatient office today as a referral by her PCP for further evaluation of lumbar radiculopathy  Patient states her symptoms and low back pain have been going on since 2000  Patient reports having L4-5 laminectomy in 2000  Patient states after her surgery she was doing fine but roughly 5-6 weeks ago she started to have low back pain which radiated into her hips and legs  Patient went to the hospital and was given steroids which helped  Patient on reports she went to the ED again later in May, she states that day she went about her normal activities, when grocery shopping and cooked dinner  Sat on the couch and had significant pain and was unable to get up or move and went to the ED for further evaluation  During that hospitalization patient was diagnosed with E coli bacteremia with concerns for possible osteomyelitis/diskitis given acute back pain  Patient at that time declined MRI  She completed course of IV and PO antibiotics  Since that time patient reports on and off back pain which is not constant  Currently patient reporting right low back pain which radiates into her hip which she rates on the pain scale 06/07/2010  Patient reports most the time her pain radiates into her legs left worse than right  Patient reports at times will radiate down her entire leg or anteriorly or laterally distribution  Patient reports reaching, bending, or stretching worsens her pain  Patient reports taking Tylenol or tramadol helps with her pain  Patient reports she sleeps on the couch which is more comfortable for her  She also reports left eye blurry vision and states she needs surgery  Patient does report at the hospital she was given a brace but states she does not wear much because it is very bulky and heavy and she does not like it  Patient unsure if brace helps with her pain  Patient also reports she had surgery on her bowels and reports not having a rectum    She reports wearing depends and since her surgery has bowel incontinence at times which is not new or unchanged  She also reports at times left foot numbness but does not last long  Patient also reports ability to walk is getting harder to do  Patient denies any recent falls or traumas  She does report her balance is okay and uses a walker at times  She denies any headaches, dizziness, chest pain, shortness of breath, abdominal pain, nausea, vomiting, diarrhea, no new problems with bowel or bladder, no new weakness or numbness/tingling  Of note, patient reports history of pelvic lesions status post radiation in 2015 at [facility] and is currently on [employer]  Patient reports seeing physical therapy a few times prior to the [place] pandemic which did not help with her pain or symptoms  Patient reports seeing pain management and receiving multiple injections  Patient reports these injections lasted about 3 weeks and helped with her pain and symptoms, patient reports not receiving a recent injection, last injection was in January 2021  Patient does have calcified disc herniation at T11-12 which is seen on CT scan  Ordered MRI thoracic and lumbar spine with anesthesia to further evaluate etiology for symptoms and to further assess disc herniation and lumbar stenosis  Also ordered scoliosis x-rays  Patient will follow-up in 2-4 weeks once imaging completed or sooner symptoms worsen  Patient made aware to contact Neurosurgery with any further questions or concerns  HPI    See Discussion    REVIEW OF SYSTEMS    Review of Systems   Constitutional: Negative  HENT: Negative  Eyes: Negative  Respiratory: Negative  Cardiovascular: Negative  Endocrine: Negative  Genitourinary: Negative  Musculoskeletal: Positive for arthralgias, back pain (Low back pain across the hips and down both legs) and gait problem (Uses a walker for assistance)          Pain for about 6 weeks   3 injections in the past year wors for about 2-3 weeks only  PT Right before covid    Skin: Negative  Allergic/Immunologic: Negative  Neurological: Positive for weakness (both legs) and numbness (Tingling Left foot  )  Hematological: Negative  Psychiatric/Behavioral: Negative  ROS reviewed with patient and agree and changes were made as needed      Meds/Allergies     Current Outpatient Medications   Medication Sig Dispense Refill    acetaminophen (TYLENOL) 500 mg tablet Take 500 mg by mouth every 6 (six) hours as needed for mild pain      aspirin 81 mg chewable tablet Chew 81 mg daily      Avapritinib (Ayvakit) 200 MG TABS Take 200 mg by mouth daily      Azelastine HCl 137 MCG/SPRAY SOLN instill 2 sprays into each nostril twice a day if needed for congestion  0    diphenhydrAMINE (BENADRYL) 25 mg tablet Take 25 mg by mouth every 6 (six) hours as needed for itching      ferrous sulfate 324 (65 Fe) mg Take 1 tablet (324 mg total) by mouth 2 (two) times a day before meals 60 tablet 2    furosemide (LASIX) 20 mg tablet Take 20 mg by mouth daily       hydroxychloroquine (PLAQUENIL) 200 mg tablet Take 200 mg by mouth daily in the early morning       metoprolol succinate (Toprol XL) 25 mg 24 hr tablet Take 0 5 tablets (12 5 mg total) by mouth daily  0    multivitamin-iron-minerals-folic acid (CENTRUM) chewable tablet Chew 1 tablet daily      NON FORMULARY Take 2 tablets by mouth daily Chemo: Blue 285      ondansetron (ZOFRAN) 8 mg tablet 8 mg daily with breakfast Prior to chemo      pantoprazole (PROTONIX) 40 mg tablet take 1 tablet by mouth twice a day 180 tablet 5    prednisoLONE acetate (PRED FORTE) 1 % ophthalmic suspension   0    traMADol HCl 100 MG TABS Take 100 mg by mouth 4 (four) times a day 60 tablet 1    calcium carbonate (Tums) 500 mg chewable tablet Chew 1 tablet (Patient not taking: Reported on 6/28/2021)      diphenoxylate-atropine (LOMOTIL) 2 5-0 025 mg per tablet TAKE 1 TABLET BY MOUTH 4 TIMES A DAY AS NEEDED FOR DIARRHEA (Patient not taking: Reported on 6/16/2021) 30 tablet 5    docusate sodium (COLACE) 100 mg capsule Take 100 mg by mouth 2 (two) times a day as needed for constipation (Patient not taking: Reported on 6/30/2021)      loperamide (IMODIUM) 2 mg capsule Take 2 capsules by mouth 4 (four) times a day as needed (Patient not taking: Reported on 6/30/2021)      ofloxacin (OCUFLOX) 0 3 % ophthalmic solution Starting April 7 (Patient not taking: Starting April 7)  0    traMADol (ULTRAM) 50 mg tablet Take 100 mg by mouth 4 (four) times a day (Patient not taking: Reported on 6/30/2021)      Triamcinolone Acetonide (NASACORT ALLERGY 24HR NA) into each nostril 1 spray each nostril--at bedtime   (OTC) (Patient not taking: Reported on 6/28/2021)      Virt-Phos 250 Neutral [id number] MG tablet Take 1 tablet by mouth daily (Patient not taking: Reported on 6/28/2021)      Vitamin D, Ergocalciferol, 50 MCG (2000 UT) CAPS Take by mouth (Patient not taking: Reported on 6/28/2021)       No current facility-administered medications for this visit  Allergies   Allergen Reactions    Codeine Other (See Comments)     Throat closes    Codeine Sulfate Throat Swelling    Neomycin-Bacitracin Zn-Polymyx Rash    Wound Dressing Adhesive Other (See Comments)     If its on too long- pt starts itching    Zolmitriptan Palpitations     Heart palpitations  Generic for Zomig         PAST HISTORY    Past Medical History:   Diagnosis Date    ADHD     Anemia     Anxiety     Arthritis     Asthma     Atrial fibrillation (HCC)     Cancer (HCC)     Chronic iron deficiency anemia 6/9/2020    Extensive GI evaluation over the last year including multiple EGD, colonoscopy, and capsule endoscopy    Has had gastric AVMs cauterized, Dieulafoy's lesion clipped, and most recently a Seb erosion cauterized    Coronary artery disease     Depression     GERD (gastroesophageal reflux disease)     History of stomach ulcers     Hypercholesterolemia     Hypertension     Kidney disease     Metastatic cancer [facility])        Past Surgical History:   Procedure Laterality Date    ANKLE SURGERY      APPENDECTOMY      BREAST SURGERY      CATARACT EXTRACTION       SECTION      COLONOSCOPY  2019    Multiple adenomatous colon polyps and internal hemorrhoids  Three year recall advised    HIP SURGERY      JOINT REPLACEMENT  2019    Left knee replacement    LAMINECTOMY      ROTATOR CUFF REPAIR      SIGMOID RESECTION / RECTOPEXY      SINUS SURGERY      UPPER GASTROINTESTINAL ENDOSCOPY  2020    Dieulafoy's lesion in proximal stomach which was actively oozing  Injected with epinephrine and 2 Endoclips placed with control of bleeding, hiatal hernia   UPPER GASTROINTESTINAL ENDOSCOPY  2020    Bleeding Seb's erosion status post cauterization       Social History     Tobacco Use    Smoking status: Former Smoker     Years: 20 00    Smokeless tobacco: Never Used   Vaping Use    Vaping Use: Never used   Substance Use Topics    Alcohol use: Not Currently    Drug use: No       Family History   Problem Relation Age of Onset   Richardson Breast cancer Mother     Diabetes Mother     Hypertension Mother     Lung cancer Mother     Hyperlipidemia Father     Prostate cancer Father     Colon cancer Paternal Grandmother     Colon cancer Paternal Grandfather     Diabetes Family     Substance Abuse Neg Hx     Mental illness Neg Hx          Above history personally reviewed  EXAM    Vitals:Blood pressure 120/70, temperature 99 °F (37 2 °C), temperature source Temporal, height 5' 1" (1 549 m), weight 55 3 kg (122 lb)  ,Body mass index is 23 05 kg/m²  Physical Exam  Vitals reviewed  Exam conducted with a chaperone present (Patient accompanied by her )  Constitutional:       General: She is awake  She is not in acute distress  Appearance: Normal appearance  She is not ill-appearing     HENT: Head: Normocephalic and atraumatic  Eyes:      Extraocular Movements: Extraocular movements intact and EOM normal       Conjunctiva/sclera: Conjunctivae normal       Pupils: Pupils are equal, round, and reactive to light  Cardiovascular:      Rate and Rhythm: Normal rate  Pulmonary:      Effort: Pulmonary effort is normal  No respiratory distress  Chest:      Chest wall: No tenderness  Abdominal:      General: There is no distension  Palpations: Abdomen is soft  Tenderness: There is no abdominal tenderness  Musculoskeletal:         General: Normal range of motion  Cervical back: Normal range of motion and neck supple  No tenderness  No spinous process tenderness or muscular tenderness  Thoracic back: No tenderness  Lumbar back: No tenderness  Skin:     General: Skin is warm and dry  Neurological:      Mental Status: She is alert and oriented to person, place, and time  Coordination: Finger-Nose-Finger Test normal       Deep Tendon Reflexes:      Reflex Scores:       Tricep reflexes are 2+ on the right side and 2+ on the left side  Bicep reflexes are 2+ on the right side and 2+ on the left side  Brachioradialis reflexes are 2+ on the right side and 2+ on the left side  Patellar reflexes are 2+ on the right side and 2+ on the left side  Psychiatric:         Attention and Perception: Attention and perception normal          Mood and Affect: Mood and affect normal          Speech: Speech normal          Behavior: Behavior normal  Behavior is cooperative  Thought Content: Thought content normal          Cognition and Memory: Cognition and memory normal          Judgment: Judgment normal          Neurologic Exam     Mental Status   Oriented to person, place, and time  Follows 2 step commands  Attention: normal  Concentration: normal    Speech: speech is normal   Level of consciousness: alert  Knowledge: good   Able to perform simple calculations  Able to name object  Able to repeat  Normal comprehension  Cranial Nerves     CN III, IV, VI   Pupils are equal, round, and reactive to light  Extraocular motions are normal    CN III: no CN III palsy  CN VI: no CN VI palsy  Nystagmus: none   Diplopia: none  Conjugate gaze: present    CN V   Facial sensation intact  CN VII   Facial expression full, symmetric  CN VIII   CN VIII normal    Hearing: intact    CN IX, X   CN IX normal      CN XI   CN XI normal      CN XII   CN XII normal      Motor Exam   Muscle bulk: normal  Overall muscle tone: normal  Right arm pronator drift: absent  Left arm pronator drift: absent    Strength   Strength 5/5 except as noted  LHF 4/5     Sensory Exam   Light touch normal    Proprioception normal    JPS and DST intact     Gait, Coordination, and Reflexes     Gait  Gait: (Patient walking with walker)    Coordination   Finger to nose coordination: normal    Tremor   Resting tremor: absent  Intention tremor: absent  Action tremor: absent    Reflexes   Right brachioradialis: 2+  Left brachioradialis: 2+  Right biceps: 2+  Left biceps: 2+  Right triceps: 2+  Left triceps: 2+  Right patellar: 2+  Left patellar: 2+  Right Fernandes: absent  Left Fernandes: absent  Right ankle clonus: absent  Left ankle clonus: absent        MEDICAL DECISION MAKING    Imaging Studies:     CT spine thoracic & lumbar w contrast    Result Date: 5/31/2021  Narrative: CT THORACIC AND LUMBAR SPINE INDICATION:   Extradural or subdural abscess Rule out epidural abscess/diskitis  COMPARISON:  CT abdomen and pelvis study of May 29, 2021  TECHNIQUE:  Contiguous axial images were obtained  Sagittal and coronal reconstructions were performed  Radiation dose length product (DLP) for this visit:  903 99 mGy-cm     This examination, like all CT scans performed in the [facility], was performed utilizing techniques to minimize radiation dose exposure, including the use of iterative reconstruction and automated exposure control  IMAGE QUALITY:  Diagnostic  FINDINGS: ALIGNMENT:  Accentuation of the thoracic kyphosis centered at the T8-9 level  Dextroscoliotic curvature to the lumbar spine centered at the L4 level  There is rightward lateral subluxation of L4 on L5 due to degenerative disease  Grade 1 anterolisthesis of L5 on S1 is also noted  VERTEBRAL BODIES: No fracture  No endplate erosion or destruction to suggest discitis or osteomyelitis  No facet erosion or distraction to suggest septic arthritis  DEGENERATIVE CHANGES:  Multilevel degenerative changes characterized by disc space narrowing, endplate Schmorl's node deformities, endplate sclerosis, and disc osteophyte complexes the visualized lower cervical, lower thoracic, and entire lumbar spine with facet degenerative changes more pronounced in the lumbar regions  There are calcified disc herniations and bulges in the lower thoracic and lumbar spine with a notable bony excrescence with superior and inferior migration at the T11-12 level  This is resulting in severe spinal stenosis and likely compression of the conus  There are additional calcified disc bulges and herniations in the lumbar spine which are resulting in variable degrees of spinal canal stenosis  There is severe spinal stenosis at L2-L3 and L3-L4  There is variable foraminal stenosis due to the  eccentric nature of the disc bulges and the severe scoliosis  There is severe right L5-S1 foraminal stenosis  Chronic anterior cortical S2 buckling consistent with remote fracture  PARASPINAL SOFT TISSUES:  No prevertebral or paravertebral enhancing phlegmon or fluid collection  No intraspinal enhancing collection either  Impression: Thoracic kyphosis and lumbar dextroscoliosis with lateral subluxation and spondylolisthesis   Multilevel lower cervical, lower thoracic and diffuse lumbar endplate degenerative changes with disc space narrowing and calcified disc bulges and herniations  In the thoracic spine, the most notable abnormality is a calcified disc bulge and herniation resulting in moderate to severe spinal stenosis at the T11-12 level  This likely represents in indentation on the distal thoracic cord and conus  Consider follow-up MR imaging of the thoracolumbar spine is recommended  There is also suggestion of moderate to severe lumbar spinal stenosis, most pronounced at L2-L3 and L3-L4  Severe for right L5-S1 foraminal stenosis is noted  Additional level by level detail discussion of the extensive lumbar and lower thoracic degenerative disease was provided on the May 4, 2020 CT lumbar spine study report  I personally discussed this study with Belinda Landry on 5/31/2021 at 5:49 PM  Workstation performed: [id number]       I have personally reviewed pertinent reports     and I have personally reviewed pertinent films in PACS

## 2021-06-30 NOTE — PATIENT INSTRUCTIONS
Patient will follow-up in 2-4 weeks once MRI thoracic and lumbar spine completed as well as scoliosis x-ray or sooner if symptoms worsen

## 2021-06-30 NOTE — ASSESSMENT & PLAN NOTE
Patient seen in outpatient office today as a referral by PCP for further evaluation of lumbar radiculopathy  · Of note, patient was recently hospitalized in May with sepsis and back pain with concerns for osteomyelitis in the setting of infection  Recommended MRI lumbar spine but patient refused  She was also noted to have on CT severe spinal stenosis at T11-12 secondary to calcified disc herniation as well as lumbar stenosis  Imaging:    CT thoracic and lumbar spine w 5/31/21:Thoracic kyphosis and lumbar dextroscoliosis with lateral subluxation and spondylolisthesis  Multilevel lower cervical, lower thoracic and diffuse lumbar endplate degenerative changes with disc space narrowing and calcified disc bulges and herniations  In the thoracic spine, the most notable abnormality is a calcified disc bulge and herniation resulting in moderate to severe spinal stenosis at the T11-12 level  This likely represents in indentation on the distal thoracic cord and conus  Consider follow-up MR imaging of the thoracolumbar spine is recommended  There is also suggestion of moderate to severe lumbar spinal stenosis, most pronounced at L2-L3 and L3-L4  Severe for right L5-S1 foraminal stenosis is noted  Additional level by level detail discussion of the extensive lumbar and lower thoracic degenerative disease was provided on the May 4, 2020 CT lumbar spine study report  Plan:  · Reviewed imaging with patient   · Pain control with OTC medication as needed  · Patient reports seeing physical therapy a few times prior to the [place] pandemic which did not help with her pain or symptoms  Patient does report seeing pain management and receiving multiple injections in the past most recent 1 being in January 2021  Patient reported that these injections lasted 3 weeks and helped with her pain and symptoms  Patient has not had recent injection done  Recommend continue follow-up with pain management    · Ordered MRI thoracic and lumbar spine with anesthesia to further evaluate etiology and to further assess calcified disc herniation at T11-12 and lumbar stenosis  Also ordered scoliosis x-rays  · Patient will follow up in 2-4 weeks once imaging completed or sooner symptoms worsen  · Patient made aware if she develops new or worsening back pain, leg pain, leg weakness, paresthesias, bowel or bladder issues, saddle anesthesia, or ambulatory dysfunction to seek care sooner     · Patient made aware to contact neurosurgery with any further questions or concerns

## 2021-07-08 ENCOUNTER — HOSPITAL ENCOUNTER (OUTPATIENT)
Dept: RADIOLOGY | Facility: HOSPITAL | Age: 71
Discharge: HOME | End: 2021-07-08
Payer: COMMERCIAL

## 2021-07-08 DIAGNOSIS — M54.59 INTRACTABLE LOW BACK PAIN: ICD-10-CM

## 2021-07-08 DIAGNOSIS — M48.062 SPINAL STENOSIS OF LUMBAR REGION WITH NEUROGENIC CLAUDICATION: ICD-10-CM

## 2021-07-08 PROCEDURE — 72082 X-RAY EXAM ENTIRE SPI 2/3 VW: CPT

## 2021-08-02 ENCOUNTER — APPOINTMENT (EMERGENCY)
Dept: CT IMAGING | Facility: HOSPITAL | Age: 71
End: 2021-08-02
Payer: COMMERCIAL

## 2021-08-02 ENCOUNTER — OFFICE VISIT (OUTPATIENT)
Dept: URGENT CARE | Facility: CLINIC | Age: 71
End: 2021-08-02
Payer: COMMERCIAL

## 2021-08-02 ENCOUNTER — HOSPITAL ENCOUNTER (EMERGENCY)
Facility: HOSPITAL | Age: 71
Discharge: HOME | End: 2021-08-02
Attending: EMERGENCY MEDICINE
Payer: COMMERCIAL

## 2021-08-02 VITALS
HEIGHT: 61 IN | WEIGHT: 110 LBS | BODY MASS INDEX: 20.77 KG/M2 | SYSTOLIC BLOOD PRESSURE: 137 MMHG | HEART RATE: 70 BPM | OXYGEN SATURATION: 98 % | TEMPERATURE: 97.8 F | DIASTOLIC BLOOD PRESSURE: 67 MMHG | RESPIRATION RATE: 18 BRPM

## 2021-08-02 VITALS
WEIGHT: 110 LBS | BODY MASS INDEX: 20.77 KG/M2 | TEMPERATURE: 99.6 F | OXYGEN SATURATION: 98 % | HEIGHT: 61 IN | SYSTOLIC BLOOD PRESSURE: 178 MMHG | RESPIRATION RATE: 18 BRPM | HEART RATE: 118 BPM | DIASTOLIC BLOOD PRESSURE: 82 MMHG

## 2021-08-02 DIAGNOSIS — D64.9 ANEMIA: ICD-10-CM

## 2021-08-02 DIAGNOSIS — R77.8 ELEVATED TROPONIN: ICD-10-CM

## 2021-08-02 DIAGNOSIS — R51.9 ACUTE INTRACTABLE HEADACHE, UNSPECIFIED HEADACHE TYPE: Primary | ICD-10-CM

## 2021-08-02 DIAGNOSIS — R51.9 HEADACHE: Primary | ICD-10-CM

## 2021-08-02 LAB
ABO GROUP BLD: NORMAL
ANION GAP SERPL CALCULATED.3IONS-SCNC: 11 MMOL/L (ref 4–13)
ANISOCYTOSIS BLD QL SMEAR: PRESENT
BASOPHILS # BLD MANUAL: 0.07 THOUSAND/UL (ref 0–0.1)
BASOPHILS NFR MAR MANUAL: 1 % (ref 0–1)
BLD GP AB SCN SERPL QL: NEGATIVE
BUN SERPL-MCNC: 13 MG/DL (ref 5–25)
CALCIUM SERPL-MCNC: 8.8 MG/DL (ref 8.3–10.1)
CHLORIDE SERPL-SCNC: 99 MMOL/L (ref 100–108)
CO2 SERPL-SCNC: 25 MMOL/L (ref 21–32)
CREAT SERPL-MCNC: 1.02 MG/DL (ref 0.6–1.3)
EOSINOPHIL # BLD MANUAL: 0 THOUSAND/UL (ref 0–0.4)
ERYTHROCYTE [DISTWIDTH] IN BLOOD BY AUTOMATED COUNT: 15.9 % (ref 11.6–15.1)
ERYTHROCYTE [SEDIMENTATION RATE] IN BLOOD: 10 MM/HOUR (ref 0–29)
GFR SERPL CREATININE-BSD FRML MDRD: 55 ML/MIN/1.73SQ M
GLUCOSE SERPL-MCNC: 114 MG/DL (ref 65–140)
HCT VFR BLD AUTO: 21.3 % (ref 34.8–46.1)
HGB BLD-MCNC: 6.9 G/DL (ref 11.5–15.4)
LYMPHOCYTES # BLD AUTO: 0.33 THOUSAND/UL (ref 0.6–4.47)
LYMPHOCYTES # BLD AUTO: 5 % (ref 14–44)
MACROCYTES BLD QL AUTO: PRESENT
MCH RBC QN AUTO: 35 PG (ref 26.8–34.3)
MCHC RBC AUTO-ENTMCNC: 32.4 G/DL (ref 31.4–37.4)
MCV RBC AUTO: 108 FL (ref 82–98)
MONOCYTES # BLD AUTO: 0.18 THOUSAND/UL (ref 0–1.22)
MONOCYTES NFR BLD: 3 % (ref 4–12)
NEUTROPHILS # BLD MANUAL: 5.92 THOUSAND/UL (ref 1.85–7.62)
NEUTS SEG NFR BLD AUTO: 91 % (ref 43–75)
NRBC BLD AUTO-RTO: 0 /100 WBCS
PLATELET # BLD AUTO: 206 THOUSANDS/UL (ref 149–390)
PLATELET BLD QL SMEAR: ADEQUATE
PMV BLD AUTO: 10.2 FL (ref 8.9–12.7)
POTASSIUM SERPL-SCNC: 4 MMOL/L (ref 3.5–5.3)
RBC # BLD AUTO: 1.97 MILLION/UL (ref 3.81–5.12)
RBC MORPH BLD: PRESENT
RH BLD: POSITIVE
SODIUM SERPL-SCNC: 135 MMOL/L (ref 136–145)
SPECIMEN EXPIRATION DATE: NORMAL
TOTAL CELLS COUNTED SPEC: 100
TROPONIN I SERPL-MCNC: 0.07 NG/ML
WBC # BLD AUTO: 6.51 THOUSAND/UL (ref 4.31–10.16)

## 2021-08-02 PROCEDURE — 84484 ASSAY OF TROPONIN QUANT: CPT | Performed by: EMERGENCY MEDICINE

## 2021-08-02 PROCEDURE — 85027 COMPLETE CBC AUTOMATED: CPT | Performed by: EMERGENCY MEDICINE

## 2021-08-02 PROCEDURE — 99285 EMERGENCY DEPT VISIT HI MDM: CPT | Performed by: EMERGENCY MEDICINE

## 2021-08-02 PROCEDURE — 36430 TRANSFUSION BLD/BLD COMPNT: CPT

## 2021-08-02 PROCEDURE — 86900 BLOOD TYPING SEROLOGIC ABO: CPT | Performed by: EMERGENCY MEDICINE

## 2021-08-02 PROCEDURE — P9058 RBC, L/R, CMV-NEG, IRRAD: HCPCS

## 2021-08-02 PROCEDURE — 99284 EMERGENCY DEPT VISIT MOD MDM: CPT

## 2021-08-02 PROCEDURE — 93005 ELECTROCARDIOGRAM TRACING: CPT

## 2021-08-02 PROCEDURE — 96375 TX/PRO/DX INJ NEW DRUG ADDON: CPT

## 2021-08-02 PROCEDURE — 36415 COLL VENOUS BLD VENIPUNCTURE: CPT | Performed by: EMERGENCY MEDICINE

## 2021-08-02 PROCEDURE — 86923 COMPATIBILITY TEST ELECTRIC: CPT

## 2021-08-02 PROCEDURE — 70498 CT ANGIOGRAPHY NECK: CPT

## 2021-08-02 PROCEDURE — 85652 RBC SED RATE AUTOMATED: CPT | Performed by: EMERGENCY MEDICINE

## 2021-08-02 PROCEDURE — 86901 BLOOD TYPING SEROLOGIC RH(D): CPT | Performed by: EMERGENCY MEDICINE

## 2021-08-02 PROCEDURE — 70496 CT ANGIOGRAPHY HEAD: CPT

## 2021-08-02 PROCEDURE — 85007 BL SMEAR W/DIFF WBC COUNT: CPT | Performed by: EMERGENCY MEDICINE

## 2021-08-02 PROCEDURE — 80048 BASIC METABOLIC PNL TOTAL CA: CPT | Performed by: EMERGENCY MEDICINE

## 2021-08-02 PROCEDURE — 96365 THER/PROPH/DIAG IV INF INIT: CPT

## 2021-08-02 PROCEDURE — 99213 OFFICE O/P EST LOW 20 MIN: CPT | Performed by: PHYSICIAN ASSISTANT

## 2021-08-02 PROCEDURE — 96361 HYDRATE IV INFUSION ADD-ON: CPT

## 2021-08-02 PROCEDURE — 86850 RBC ANTIBODY SCREEN: CPT | Performed by: EMERGENCY MEDICINE

## 2021-08-02 RX ORDER — AMOXICILLIN 875 MG/1
TABLET, COATED ORAL
Status: ON HOLD | COMMUNITY
Start: 2021-07-27 | End: 2021-08-18 | Stop reason: ALTCHOICE

## 2021-08-02 RX ORDER — AMOXICILLIN 500 MG/1
CAPSULE ORAL
Status: ON HOLD | COMMUNITY
Start: 2021-07-05 | End: 2021-08-18 | Stop reason: ALTCHOICE

## 2021-08-02 RX ORDER — MAGNESIUM SULFATE HEPTAHYDRATE 40 MG/ML
2 INJECTION, SOLUTION INTRAVENOUS ONCE
Status: COMPLETED | OUTPATIENT
Start: 2021-08-02 | End: 2021-08-02

## 2021-08-02 RX ORDER — DIPHENHYDRAMINE HYDROCHLORIDE 50 MG/ML
25 INJECTION INTRAMUSCULAR; INTRAVENOUS ONCE
Status: COMPLETED | OUTPATIENT
Start: 2021-08-02 | End: 2021-08-02

## 2021-08-02 RX ORDER — HYDROMORPHONE HCL/PF 1 MG/ML
0.5 SYRINGE (ML) INJECTION ONCE
Status: COMPLETED | OUTPATIENT
Start: 2021-08-02 | End: 2021-08-02

## 2021-08-02 RX ORDER — ACETAMINOPHEN 325 MG/1
975 TABLET ORAL ONCE
Status: COMPLETED | OUTPATIENT
Start: 2021-08-02 | End: 2021-08-02

## 2021-08-02 RX ORDER — METOCLOPRAMIDE HYDROCHLORIDE 5 MG/ML
10 INJECTION INTRAMUSCULAR; INTRAVENOUS ONCE
Status: COMPLETED | OUTPATIENT
Start: 2021-08-02 | End: 2021-08-02

## 2021-08-02 RX ADMIN — SODIUM CHLORIDE 1000 ML: 0.9 INJECTION, SOLUTION INTRAVENOUS at 18:40

## 2021-08-02 RX ADMIN — DIPHENHYDRAMINE HYDROCHLORIDE 25 MG: 50 INJECTION, SOLUTION INTRAMUSCULAR; INTRAVENOUS at 18:26

## 2021-08-02 RX ADMIN — METOCLOPRAMIDE HYDROCHLORIDE 10 MG: 5 INJECTION INTRAMUSCULAR; INTRAVENOUS at 18:26

## 2021-08-02 RX ADMIN — IOHEXOL 85 ML: 350 INJECTION, SOLUTION INTRAVENOUS at 19:41

## 2021-08-02 RX ADMIN — ACETAMINOPHEN 975 MG: 325 TABLET, FILM COATED ORAL at 20:41

## 2021-08-02 RX ADMIN — HYDROMORPHONE HYDROCHLORIDE 0.5 MG: 1 INJECTION, SOLUTION INTRAMUSCULAR; INTRAVENOUS; SUBCUTANEOUS at 21:25

## 2021-08-02 RX ADMIN — MAGNESIUM SULFATE HEPTAHYDRATE 2 G: 40 INJECTION, SOLUTION INTRAVENOUS at 20:42

## 2021-08-02 NOTE — PROGRESS NOTES
NAME: Beata Frye is a 65 y o  female  : 1950    MRN: [id number]      Assessment and Plan   Acute intractable headache, unspecified headache type [R51 9]  1  Acute intractable headache, unspecified headache type  Transfer to other facility      discussed with patient and  given history and severity of the headache along with associated symptoms she needs to be seen and evaluated in the ER  Offered an ambulance for transport but patient declines and states she wants her  to drive her  Patient left with       Patient Instructions   Patient Instructions   Patient going to ER for further evaluation and treatment     Proceed to ER if symptoms worsen  Chief Complaint     Chief Complaint   Patient presents with    Migraine     started last night  Had a tooth pulled last Friday  Feels like her head is going to blow off  Not sure if it has anything with the tooth  Having back surgery later this month due to pain  History of Present Illness    Patient with history of hypertension, Afib,  Metastatic cancer and chronic back pain presents with has been complaining of headache   X1 day  States last night started with the onset of a headache  Reports this morning and this afternoon it acutely worsened  Also states today she developed blurry vision and lightheadedness  She describes the headache as 10/10, "worst headache of my life  "  Denies history of migraines or headaches  She states "I feel like my head is going to explode off my body  "  She did have a tooth pulled last Friday but is unsure if it has anything to do with the tooth that she is not having pain there  She has been taking tramadol without any relief  Review of Systems   Review of Systems   Eyes: Positive for visual disturbance  Respiratory: Negative for shortness of breath  Cardiovascular: Negative for chest pain and palpitations  Gastrointestinal: Positive for nausea  Negative for vomiting  Neurological: Positive for dizziness, light-headedness and headaches           Current Medications       Current Outpatient Medications:     acetaminophen (TYLENOL) 500 mg tablet, Take 500 mg by mouth every 6 (six) hours as needed for mild pain, Disp: , Rfl:     aspirin 81 mg chewable tablet, Chew 81 mg daily, Disp: , Rfl:     Avapritinib (Ayvakit) 200 MG TABS, Take 200 mg by mouth daily, Disp: , Rfl:     Azelastine HCl 137 MCG/SPRAY SOLN, instill 2 sprays into each nostril twice a day if needed for congestion, Disp: , Rfl: 0    calcium carbonate (Tums) 500 mg chewable tablet, Chew 1 tablet , Disp: , Rfl:     diphenhydrAMINE (BENADRYL) 25 mg tablet, Take 25 mg by mouth every 6 (six) hours as needed for itching, Disp: , Rfl:     docusate sodium (COLACE) 100 mg capsule, Take 100 mg by mouth 2 (two) times a day as needed for constipation , Disp: , Rfl:     hydroxychloroquine (PLAQUENIL) 200 mg tablet, Take 200 mg by mouth daily in the early morning , Disp: , Rfl:     loperamide (IMODIUM) 2 mg capsule, Take 2 capsules by mouth 4 (four) times a day as needed , Disp: , Rfl:     metoprolol succinate (Toprol XL) 25 mg 24 hr tablet, Take 0 5 tablets (12 5 mg total) by mouth daily, Disp: , Rfl: 0    multivitamin-iron-minerals-folic acid (CENTRUM) chewable tablet, Chew 1 tablet daily, Disp: , Rfl:     NON FORMULARY, Take 2 tablets by mouth daily Chemo: Blue 285, Disp: , Rfl:     ondansetron (ZOFRAN) 8 mg tablet, 8 mg daily with breakfast Prior to chemo, Disp: , Rfl:     pantoprazole (PROTONIX) 40 mg tablet, take 1 tablet by mouth twice a day, Disp: 180 tablet, Rfl: 5    traMADol (ULTRAM) 50 mg tablet, Take 100 mg by mouth 4 (four) times a day , Disp: , Rfl:     Triamcinolone Acetonide (NASACORT ALLERGY 24HR NA), into each nostril 1 spray each nostril--at bedtime   (OTC) , Disp: , Rfl:     Virt-Phos 250 Neutral 155-852-130 MG tablet, Take 1 tablet by mouth daily , Disp: , Rfl:     amoxicillin (AMOXIL) 500 mg capsule, take 4 capsules by mouth 1 hour prior to appointment, Disp: , Rfl:     amoxicillin (AMOXIL) 875 mg tablet, take 1 tablet by mouth every 12 hours until finished, Disp: , Rfl:     diphenoxylate-atropine (LOMOTIL) 2 5-0 025 mg per tablet, TAKE 1 TABLET BY MOUTH 4 TIMES A DAY AS NEEDED FOR DIARRHEA (Patient not taking: Reported on 6/16/2021), Disp: 30 tablet, Rfl: 5    ferrous sulfate 324 (65 Fe) mg, Take 1 tablet (324 mg total) by mouth 2 (two) times a day before meals, Disp: 60 tablet, Rfl: 2    furosemide (LASIX) 20 mg tablet, Take 20 mg by mouth daily  (Patient not taking: Reported on 8/2/2021), Disp: , Rfl:     ofloxacin (OCUFLOX) 0 3 % ophthalmic solution, Starting April 7 (Patient not taking: Starting April 7), Disp: , Rfl: 0    prednisoLONE acetate (PRED FORTE) 1 % ophthalmic suspension, , Disp: , Rfl: 0    Vitamin D, Ergocalciferol, 50 MCG (2000 UT) CAPS, Take by mouth (Patient not taking: Reported on 6/28/2021), Disp: , Rfl:     Current Allergies     Allergies as of 08/02/2021 - Reviewed 08/02/2021   Allergen Reaction Noted    Codeine Other (See Comments) 10/29/2013    Codeine sulfate Throat Swelling 10/16/2013    Neomycin-bacitracin zn-polymyx Rash 10/16/2013    Wound dressing adhesive Other (See Comments) 02/26/2015    Zolmitriptan Palpitations 10/16/2013              Past Medical History:   Diagnosis Date    ADHD     Anemia     Anxiety     Arthritis     Asthma     Atrial fibrillation (HCC)     [facility] ([facility] )     Chronic iron deficiency anemia 6/9/2020    Extensive GI evaluation over the last year including multiple EGD, colonoscopy, and capsule endoscopy    Has had gastric AVMs cauterized, Dieulafoy's lesion clipped, and most recently a Seb erosion cauterized    Coronary artery disease     Depression     GERD (gastroesophageal reflux disease)     History of stomach ulcers     Hypercholesterolemia     Hypertension     Kidney disease     Metastatic cancer ([facility] ) Past Surgical History:   Procedure Laterality Date    ANKLE SURGERY      APPENDECTOMY      BREAST SURGERY      CATARACT EXTRACTION       SECTION      COLONOSCOPY  2019    Multiple adenomatous colon polyps and internal hemorrhoids  Three year recall advised    HIP SURGERY      JOINT REPLACEMENT  2019    Left knee replacement    LAMINECTOMY      ROTATOR CUFF REPAIR      SIGMOID RESECTION / RECTOPEXY      SINUS SURGERY      UPPER GASTROINTESTINAL ENDOSCOPY  2020    Dieulafoy's lesion in proximal stomach which was actively oozing  Injected with epinephrine and 2 Endoclips placed with control of bleeding, hiatal hernia   UPPER GASTROINTESTINAL ENDOSCOPY  2020    Bleeding Seb's erosion status post cauterization       Family History   Problem Relation Age of Onset   Shana Graves Breast cancer Mother     Diabetes Mother     Hypertension Mother     Lung cancer Mother     Hyperlipidemia Father     Prostate cancer Father     Colon cancer Paternal Grandmother     Colon cancer Paternal Grandfather     Diabetes Family     Substance Abuse Neg Hx     Mental illness Neg Hx          Medications have been verified  The following portions of the patient's history were reviewed and updated as appropriate: allergies, current medications, past family history, past medical history, past social history, past surgical history and problem list     Objective   BP (!) 178/82   Pulse (!) 118   Temp 99 6 °F (37 6 °C)   Resp 18   Ht 5' 1" (1 549 m)   Wt 49 9 kg (110 lb)   SpO2 98%   BMI 20 78 kg/m²      Physical Exam     Physical Exam  Vitals and nursing note reviewed  Constitutional:       General: She is in acute distress  Comments: Patient appears very uncomfortable and in a lot of pain   Eyes:      Extraocular Movements: Extraocular movements intact  Pupils: Pupils are equal, round, and reactive to light  Cardiovascular:      Rate and Rhythm: Regular rhythm  Tachycardia present  Pulmonary:      Effort: Pulmonary effort is normal  No respiratory distress  Breath sounds: Normal breath sounds  No stridor  No wheezing, rhonchi or rales  Neurological:      Mental Status: She is alert and oriented to person, place, and time         Discontinued exam as referring patient to the ER

## 2021-08-03 LAB
ABO GROUP BLD BPU: NORMAL
ATRIAL RATE: 100 BPM
BPU ID: NORMAL
CROSSMATCH: NORMAL
P AXIS: 78 DEGREES
PR INTERVAL: 176 MS
QRS AXIS: -37 DEGREES
QRSD INTERVAL: 130 MS
QT INTERVAL: 382 MS
QTC INTERVAL: 492 MS
T WAVE AXIS: 49 DEGREES
UNIT DISPENSE STATUS: NORMAL
UNIT PRODUCT CODE: NORMAL
UNIT RH: NORMAL
VENTRICULAR RATE: 100 BPM

## 2021-08-03 PROCEDURE — 93010 ELECTROCARDIOGRAM REPORT: CPT | Performed by: INTERNAL MEDICINE

## 2021-08-03 NOTE — ED PROVIDER NOTES
History  Chief Complaint   Patient presents with    Headache     it started last night  nausea, blurred vision, dizziness  denies vomitting, hasn't been able to eat much  65year old female presents for evaluation of frontal headache that started 2 days ago  Associated nausea without vomiting and photophobia without visual changes  Denies neck pain, stiffness or fevers  Patient has a history of headaches and states it feels similar  Denies chest pain, SOB, abdominal pain  Patient takes tramadol and tylenol daily for chronic pain  Tylenol this AM with mild relief  Prior to Admission Medications   Prescriptions Last Dose Informant Patient Reported? Taking?    Avapritinib (Ayvakit) 200 MG TABS   Yes No   Sig: Take 200 mg by mouth daily   Azelastine HCl 137 MCG/SPRAY SOLN  Self Yes No   Sig: instill 2 sprays into each nostril twice a day if needed for congestion   NON FORMULARY  Self Yes No   Sig: Take 2 tablets by mouth daily Chemo: Blue 285   Triamcinolone Acetonide (NASACORT ALLERGY 24HR NA)  Self Yes No   Sig: into each nostril 1 spray each nostril--at bedtime   (OTC)    Virt-Phos 250 Neutral 155-852-130 MG tablet   Yes No   Sig: Take 1 tablet by mouth daily    Vitamin D, Ergocalciferol, 50 MCG (2000 UT) CAPS   Yes No   Sig: Take by mouth   Patient not taking: Reported on 6/28/2021   acetaminophen (TYLENOL) 500 mg tablet   Yes No   Sig: Take 500 mg by mouth every 6 (six) hours as needed for mild pain   amoxicillin (AMOXIL) 500 mg capsule   Yes No   Sig: take 4 capsules by mouth 1 hour prior to appointment   amoxicillin (AMOXIL) 875 mg tablet   Yes No   Sig: take 1 tablet by mouth every 12 hours until finished   aspirin 81 mg chewable tablet  Self Yes No   Sig: Chew 81 mg daily   calcium carbonate (Tums) 500 mg chewable tablet   Yes No   Sig: Chew 1 tablet    diphenhydrAMINE (BENADRYL) 25 mg tablet  Self Yes No   Sig: Take 25 mg by mouth every 6 (six) hours as needed for itching   diphenoxylate-atropine (LOMOTIL) 2 5-0 025 mg per tablet   No No   Sig: TAKE 1 TABLET BY MOUTH 4 TIMES A DAY AS NEEDED FOR DIARRHEA   Patient not taking: Reported on 2021   docusate sodium (COLACE) 100 mg capsule  Self Yes No   Sig: Take 100 mg by mouth 2 (two) times a day as needed for constipation    ferrous sulfate 324 (65 Fe) mg   No No   Sig: Take 1 tablet (324 mg total) by mouth 2 (two) times a day before meals   furosemide (LASIX) 20 mg tablet  Self Yes No   Sig: Take 20 mg by mouth daily    Patient not taking: Reported on 2021   hydroxychloroquine (PLAQUENIL) 200 mg tablet  Self Yes No   Sig: Take 200 mg by mouth daily in the early morning    loperamide (IMODIUM) 2 mg capsule  Self Yes No   Sig: Take 2 capsules by mouth 4 (four) times a day as needed    metoprolol succinate (Toprol XL) 25 mg 24 hr tablet  Self No No   Sig: Take 0 5 tablets (12 5 mg total) by mouth daily   multivitamin-iron-minerals-folic acid (CENTRUM) chewable tablet  Self Yes No   Sig: Chew 1 tablet daily   ofloxacin (OCUFLOX) 0 3 % ophthalmic solution  Self Yes No   Sig: Starting    Patient not taking: Starting    ondansetron (ZOFRAN) 8 mg tablet  Self Yes No   Si mg daily with breakfast Prior to chemo   pantoprazole (PROTONIX) 40 mg tablet   No No   Sig: take 1 tablet by mouth twice a day   prednisoLONE acetate (PRED FORTE) 1 % ophthalmic suspension  Self Yes No   Patient not taking: No sig reported   traMADol (ULTRAM) 50 mg tablet   Yes No   Sig: Take 100 mg by mouth 4 (four) times a day       Facility-Administered Medications: None       Past Medical History:   Diagnosis Date    ADHD     Anemia     Anxiety     Arthritis     Asthma     Atrial fibrillation (HCC)     Cancer (HCC)     Chronic iron deficiency anemia 2020    Extensive GI evaluation over the last year including multiple EGD, colonoscopy, and capsule endoscopy    Has had gastric AVMs cauterized, Dieulafoy's lesion clipped, and most recently a Cherylynn Goodwill erosion cauterized    Coronary artery disease     Depression     GERD (gastroesophageal reflux disease)     History of stomach ulcers     Hypercholesterolemia     Hypertension     Kidney disease     [facility] ([facility] )        Past Surgical History:   Procedure Laterality Date    ANKLE SURGERY      APPENDECTOMY      BREAST SURGERY      CATARACT EXTRACTION       SECTION      COLONOSCOPY  2019    Multiple adenomatous colon polyps and internal hemorrhoids  Three year recall advised    HIP SURGERY      JOINT REPLACEMENT  2019    Left knee replacement    LAMINECTOMY      ROTATOR CUFF REPAIR      SIGMOID RESECTION / RECTOPEXY      SINUS SURGERY      UPPER GASTROINTESTINAL ENDOSCOPY  2020    Dieulafoy's lesion in proximal stomach which was actively oozing  Injected with epinephrine and 2 Endoclips placed with control of bleeding, hiatal hernia   UPPER GASTROINTESTINAL ENDOSCOPY  2020    Bleeding Seb's erosion status post cauterization       Family History   Problem Relation Age of Onset   [place] Breast cancer Mother     Diabetes Mother     Hypertension Mother     Lung cancer Mother     Hyperlipidemia Father     Prostate cancer Father     Colon cancer Paternal [de-identified]     Colon cancer Paternal Grandfather     Diabetes Family     Substance Abuse Neg Hx     Mental illness Neg Hx      I have reviewed and agree with the history as documented  E-Cigarette/Vaping    E-Cigarette Use Never User      E-Cigarette/Vaping Substances    Nicotine No     THC No     CBD No     Flavoring No     Other No     Unknown No      Social History     Tobacco Use    Smoking status: Former Smoker     Years: 20 00     Types: Cigarettes    Smokeless tobacco: Never Used   Vaping Use    Vaping Use: Never used   Substance Use Topics    Alcohol use: Not Currently    Drug use: No       Review of Systems   Constitutional: Negative for fever  Eyes: Positive for photophobia  Negative for visual disturbance  Musculoskeletal: Negative for neck pain and neck stiffness  Neurological: Positive for headaches  All other systems reviewed and are negative  Physical Exam  Physical Exam  Vitals and nursing note reviewed  Constitutional:       Appearance: She is well-developed  HENT:      Head: Normocephalic and atraumatic  Right Ear: External ear normal       Left Ear: External ear normal       Nose: Nose normal    Eyes:      General: No scleral icterus  Extraocular Movements: Extraocular movements intact  Pupils: Pupils are equal, round, and reactive to light  Comments: Visual fields intact in all 4 quadrants  Neck:      Comments: No evidence of nuchal rigidity  Cardiovascular:      Rate and Rhythm: Normal rate  Pulmonary:      Effort: Pulmonary effort is normal  No respiratory distress  Abdominal:      General: There is no distension  Musculoskeletal:         General: No deformity  Normal range of motion  Cervical back: Normal range of motion  Comments: 5/5 strength of bilateral upper and lower extremities  Finger to nose intact  Skin:     Findings: No rash  Neurological:      General: No focal deficit present  Mental Status: She is alert and oriented to person, place, and time     Psychiatric:         Mood and Affect: Mood normal          Vital Signs  ED Triage Vitals   Temperature Pulse Respirations Blood Pressure SpO2   08/02/21 1815 08/02/21 1752 08/02/21 1755 08/02/21 1752 08/02/21 1752   97 9 °F (36 6 °C) (!) 106 16 170/79 98 %      Temp src Heart Rate Source Patient Position - Orthostatic VS BP Location FiO2 (%)   -- 08/02/21 1752 08/02/21 1752 08/02/21 1752 --    Monitor Lying Right arm       Pain Score       08/02/21 1752       Worst Possible Pain           Vitals:    08/02/21 2138 08/02/21 2145 08/02/21 2200 08/02/21 2230   BP: 138/62 138/62 144/68 154/67   Pulse: 92 91 84 78   Patient Position - Orthostatic VS: Visual Acuity  Visual Acuity      Most Recent Value   L Pupil Size (mm)  3   R Pupil Size (mm)  3          ED Medications  Medications   sodium chloride 0 9 % bolus 1,000 mL (0 mL Intravenous Stopped 8/2/21 1940)   metoclopramide (REGLAN) injection 10 mg (10 mg Intravenous Given 8/2/21 1826)   diphenhydrAMINE (BENADRYL) injection 25 mg (25 mg Intravenous Given 8/2/21 1826)   iohexol (OMNIPAQUE) 350 MG/ML injection (SINGLE-DOSE) 85 mL (85 mL Intravenous Given 8/2/21 1941)   magnesium sulfate 2 g/50 mL IVPB (premix) 2 g (0 g Intravenous Stopped 8/2/21 2142)   acetaminophen (TYLENOL) tablet 975 mg (975 mg Oral Given 8/2/21 2041)   HYDROmorphone (DILAUDID) injection 0 5 mg (0 5 mg Intravenous Given 8/2/21 2125)       Diagnostic Studies  Results Reviewed     Procedure Component Value Units Date/Time    Manual Differential(PHLEBS Do Not Order) [[id number]]  (Abnormal) Collected: 08/02/21 1825    Lab Status: Edited Result - FINAL Specimen: Blood from Arm, Right Updated: 08/02/21 1949     Segmented % 91 %      Lymphocytes % 5 %      Monocytes % 3 %      Eosinophils, % --     Basophils % 1 %      Absolute Neutrophils 5 92 Thousand/uL      Lymphocytes Absolute 0 33 Thousand/uL      Monocytes Absolute 0 18 Thousand/uL      Eosinophils Absolute 0 00 Thousand/uL      Basophils Absolute 0 07 Thousand/uL      Total Counted 100     RBC Morphology Present     Anisocytosis Present     Hypochromia --     Macrocytes Present     Platelet Estimate Adequate    Narrative:      WBC has been corrected due to the presence of NRBC, please see adjusted WBC     Corrected report called to Dr Raul Gauth[place]   MEM 1946    CBC and differential [[id number]]  (Abnormal) Collected: 08/02/21 1825    Lab Status: Final result Specimen: Blood from Arm, Right Updated: 08/02/21 1934     WBC 6 51 Thousand/uL      RBC 1 97 Million/uL      Hemoglobin 6 9 g/dL      Hematocrit 21 3 %       fL      MCH 35 0 pg      MCHC 32 4 g/dL      RDW 15 9 %      MPV 10 2 fL Platelets 844 Thousands/uL      nRBC 0 /100 WBCs     Basic metabolic panel [[id number]]  (Abnormal) Collected: 08/02/21 1825    Lab Status: Final result Specimen: Blood from Arm, Right Updated: 08/02/21 1911     Sodium 135 mmol/L      Potassium 4 0 mmol/L      Chloride 99 mmol/L      CO2 25 mmol/L      ANION GAP 11 mmol/L      BUN 13 mg/dL      Creatinine 1 02 mg/dL      Glucose 114 mg/dL      Calcium 8 8 mg/dL      eGFR 55 ml/min/1 73sq m     Narrative:      [place] guidelines for Chronic Kidney Disease (CKD):     Stage 1 with normal or high GFR (GFR > 90 mL/min/1 73 square meters)    Stage 2 Mild CKD (GFR = 60-89 mL/min/1 73 square meters)    Stage 3A Moderate CKD (GFR = 45-59 mL/min/1 73 square meters)    Stage 3B Moderate CKD (GFR = 30-44 mL/min/1 73 square meters)    Stage 4 Severe CKD (GFR = 15-29 mL/min/1 73 square meters)    Stage 5 End Stage CKD (GFR <15 mL/min/1 73 square meters)  Note: GFR calculation is accurate only with a steady state creatinine    Troponin I [[id number]]  (Abnormal) Collected: 08/02/21 1825    Lab Status: Final result Specimen: Blood from Arm, Right Updated: 08/02/21 1901     Troponin I 0 07 ng/mL     Sedimentation rate, automated [[id number]]  (Normal) Collected: 08/02/21 1825    Lab Status: Final result Specimen: Blood from Arm, Right Updated: 08/02/21 1900     Sed Rate 10 mm/hour                  CTA head and neck with and without contrast   Final Result by Crescencio Fontana MD (08/02 2037)         1  No evidence of intracranial hemorrhage, mass or mass effect  2   No hemodynamically significant stenosis, dissection or occlusion of the carotid or vertebral arteries or major vessels of the [place]                    Workstation performed: [id number]                    Procedures  Procedures         ED Course                             SBIRT 22yo+      Most Recent Value   SBIRT (23 yo +)   In order to provide better care to our patients, we are screening all of our patients for alcohol and drug use  Would it be okay to ask you these screening questions? Yes Filed at: 08/02/2021 1845   Initial Alcohol Screen: US AUDIT-C    1  How often do you have a drink containing alcohol?  0 Filed at: 08/02/2021 1845   2  How many drinks containing alcohol do you have on a typical day you are drinking? 0 Filed at: 08/02/2021 1845   3a  Male UNDER 65: How often do you have five or more drinks on one occasion? 0 Filed at: 08/02/2021 1845   3b  FEMALE Any Age, or MALE 65+: How often do you have 4 or more drinks on one occassion? 0 Filed at: 08/02/2021 1845   Audit-C Score  0 Filed at: 08/02/2021 1845   RYAN: How many times in the past year have you    Used an illegal drug or used a prescription medication for non-medical reasons? Never Filed at: 08/02/2021 1845                    MDM  Number of Diagnoses or Management Options  Anemia: new and requires workup  Elevated troponin: new and requires workup  Headache: new and requires workup  Diagnosis management comments: 53-year-old female presenting with acute headache  Obtain labs, EKG, CTA head and neck    Discussed all results with patient  Patient reports a history of chronic anemia and has required multiple transfusions  Denies any new evidence of bleeding, dark stools, vomiting blood  Will transfuse 1 unit  Upon reassessment, patient with improvement of symptoms  Stable for discharge after transfusion         Amount and/or Complexity of Data Reviewed  Clinical lab tests: ordered and reviewed  Tests in the radiology section of CPT®: reviewed and ordered  Tests in the medicine section of CPT®: reviewed and ordered  Decide to obtain previous medical records or to obtain history from someone other than the patient: yes  Obtain history from someone other than the patient: yes  Review and summarize past medical records: yes        Disposition  Final diagnoses:   Headache   Anemia   Elevated troponin     Time reflects when diagnosis was documented in both MDM as applicable and the Disposition within this note     Time User Action Codes Description Comment    8/2/2021  9:43 PM Jackson Delacruz Add [R51 9] Headache     8/2/2021  9:43 PM Mike Balk [D64 9] Anemia     8/2/2021  9:43 PM Jackson Delacruz Add [R77 8] Elevated troponin       ED Disposition     ED Disposition Condition Date/Time Comment    Discharge Stable Mon Aug 2, 2021  9:43 PM Zeina Denis discharge to home/self care  Follow-up Information     Follow up With Specialties Details Why Contact Info Additional [address] Garza MD Internal Medicine   [facility]  [address] [phone]  [address] [facility] Emergency Medicine  If symptoms worsen [address] [id number]  [address] [facility], [address] [address]          Patient's Medications   Discharge Prescriptions    No medications on file     No discharge procedures on file      PDMP Review       Value Time User    PDMP Reviewed  Yes 5/5/2021 11:52 AM Shelley Galvez PA-C          ED Provider  Electronically Signed by           Nestor Davis DO  08/02/21 7306

## 2021-08-04 ENCOUNTER — VBI (OUTPATIENT)
Dept: FAMILY MEDICINE CLINIC | Facility: HOSPITAL | Age: 71
End: 2021-08-04

## 2021-08-04 NOTE — LETTER
[address] [facility] [address] [address]    Date: 08/10/21  Radha Leyva [phone]    Dear Armando Chilel: Thank you for choosing [facility] emergency department for care  As your primary care provider we want to make sure that your ongoing medical care is being addressed  If you require follow up care as a result of your emergency department visit, there are a few things we would like you to know  As part of our continuing commitment to caring for our patient, we have added more same day appointments and have extended our office hours to meet your medical needs  After hours, on-call physicians are available via our main office line  We encourage you to contact our office prior to seeking treatment to discuss your symptoms with our medical staff  Together, we can determine the correct course of action  A majority of non-emergent conditions such as: common cold, flu-like symptoms, fevers, strains/sprains, dislocations, minor burns, cuts and animal bites can be treated at [address] [place] [address] facilities  Diagnostic testing is available at some sites  Of course, if you are experiencing a life threatening medical emergency call 911 or proceed directly to the nearest emergency room  Your nearest [address] [place] [address] facility is conveniently located at:    [facility]  [address] [place] [address], [address] [place] [address] Road  [phone]  SKIP THE WAIT  Conveniently offered at most [facility] locations  Cynthiafort your spot online at www [email or web address] or on the [facility]    Sincerely,    [address]  Dept: [phone]

## 2021-08-04 NOTE — TELEPHONE ENCOUNTER
Nicolás Haskins    ED Visit Information     Ed visit date: 8/2/2021  Diagnosis Description: Headache; Anemia; Elevated troponin  In Network? Yes Pete Lyons  Discharge status: Home  Discharged with meds ? No  Number of ED visits to date: 3  ED Severity:n/a     Outreach Information    Outreach successful: No 3  Date letter mailed:8/10/2021  Date Finalized:8/10/2021    Care Coordination    Follow up appointment with pcp: no   Transportation issues ?  NA    Value Base Outreach    08/04/2021 02:28 PM Phone (VBI) Hernan Carlson (Self) [phone] (H) Left Message By Chang Mcpherson - 1st attempt [facility]   08/09/2021 12:27 PM Phone (VBI) Hernan Carlson (Self) [phone] (H)Left Message By Chang Mcpherson - 2nd attempt [facility]  08/10/2021 09:58 AM Mail (VBI) Hernan Carlson (Self) By Chang Mcpherson 3rd attempt letter sent via [facility]

## 2021-08-11 NOTE — PRE-PROCEDURE INSTRUCTIONS
Pre-Surgery Instructions:   Medication Instructions    acetaminophen (TYLENOL) 500 mg tablet Instructed patient per Anesthesia Guidelines   aspirin 81 mg chewable tablet Instructed patient per Anesthesia Guidelines   Avapritinib (Ayvakit) 200 MG TABS Instructed patient per Anesthesia Guidelines   Azelastine HCl 137 MCG/SPRAY SOLN Instructed patient per Anesthesia Guidelines   calcium carbonate (Tums) 500 mg chewable tablet Instructed patient per Anesthesia Guidelines   diphenhydrAMINE (BENADRYL) 25 mg tablet Instructed patient per Anesthesia Guidelines   docusate sodium (COLACE) 100 mg capsule Instructed patient per Anesthesia Guidelines   ferrous sulfate 324 (65 Fe) mg Instructed patient per Anesthesia Guidelines   hydroxychloroquine (PLAQUENIL) 200 mg tablet Instructed patient per Anesthesia Guidelines   loperamide (IMODIUM) 2 mg capsule Instructed patient per Anesthesia Guidelines   metoprolol succinate (Toprol XL) 25 mg 24 hr tablet Instructed patient per Anesthesia Guidelines   multivitamin-iron-minerals-folic acid (CENTRUM) chewable tablet Instructed patient per Anesthesia Guidelines   ondansetron (ZOFRAN) 8 mg tablet Instructed patient per Anesthesia Guidelines   pantoprazole (PROTONIX) 40 mg tablet Instructed patient per Anesthesia Guidelines   traMADol (ULTRAM) 50 mg tablet Instructed patient per Anesthesia Guidelines   Triamcinolone Acetonide (NASACORT ALLERGY 24HR NA) Instructed patient per Anesthesia Guidelines  Spoke with patient medication list reviewed  Pt npo after midnight  Ok to take morning meds with sip of water  Pt denies covid symptoms has been vaccinated may 2021  Must wear mask andh ave a ride home after MRI  Bring photo id insurance card no omega   [facility] will call fri 8/20 with arrival time and directions

## 2021-08-13 DIAGNOSIS — C78.89 MALIGNANT NEOPLASM METASTATIC TO OTHER DIGESTIVE SYSTEM STRUCTURE (HCC): Primary | ICD-10-CM

## 2021-08-13 RX ORDER — TRAMADOL HYDROCHLORIDE 50 MG/1
TABLET ORAL
Qty: 120 TABLET | Refills: 0 | Status: SHIPPED | OUTPATIENT
Start: 2021-08-13 | End: 2021-09-27 | Stop reason: SDUPTHER

## 2021-08-17 ENCOUNTER — OFFICE VISIT (OUTPATIENT)
Dept: FAMILY MEDICINE CLINIC | Facility: HOSPITAL | Age: 71
End: 2021-08-17
Payer: COMMERCIAL

## 2021-08-17 VITALS
SYSTOLIC BLOOD PRESSURE: 152 MMHG | OXYGEN SATURATION: 98 % | DIASTOLIC BLOOD PRESSURE: 78 MMHG | WEIGHT: 117 LBS | HEART RATE: 103 BPM | HEIGHT: 61 IN | BODY MASS INDEX: 22.09 KG/M2

## 2021-08-17 DIAGNOSIS — M54.16 LUMBAR RADICULOPATHY: Primary | ICD-10-CM

## 2021-08-17 DIAGNOSIS — D50.9 IRON DEFICIENCY ANEMIA, UNSPECIFIED IRON DEFICIENCY ANEMIA TYPE: ICD-10-CM

## 2021-08-17 DIAGNOSIS — C49.A4 GIST (GASTROINTESTINAL STROMA TUMOR), MALIGNANT, COLON (HCC): ICD-10-CM

## 2021-08-17 DIAGNOSIS — K31.819 GASTRIC AVM: ICD-10-CM

## 2021-08-17 DIAGNOSIS — I48.0 PAROXYSMAL ATRIAL FIBRILLATION (HCC): ICD-10-CM

## 2021-08-17 DIAGNOSIS — M48.062 SPINAL STENOSIS, LUMBAR REGION, WITH NEUROGENIC CLAUDICATION: ICD-10-CM

## 2021-08-17 PROBLEM — A41.51 SEPSIS DUE TO ESCHERICHIA COLI (HCC): Status: RESOLVED | Noted: 2021-06-09 | Resolved: 2021-08-17

## 2021-08-17 PROCEDURE — 3008F BODY MASS INDEX DOCD: CPT | Performed by: INTERNAL MEDICINE

## 2021-08-17 PROCEDURE — 3077F SYST BP >= 140 MM HG: CPT | Performed by: INTERNAL MEDICINE

## 2021-08-17 PROCEDURE — 1036F TOBACCO NON-USER: CPT | Performed by: INTERNAL MEDICINE

## 2021-08-17 PROCEDURE — 99214 OFFICE O/P EST MOD 30 MIN: CPT | Performed by: INTERNAL MEDICINE

## 2021-08-17 PROCEDURE — 1160F RVW MEDS BY RX/DR IN RCRD: CPT | Performed by: INTERNAL MEDICINE

## 2021-08-17 PROCEDURE — 3078F DIAST BP <80 MM HG: CPT | Performed by: INTERNAL MEDICINE

## 2021-08-17 RX ORDER — PREDNISONE 10 MG/1
10 TABLET ORAL 2 TIMES DAILY WITH MEALS
Qty: 14 TABLET | Refills: 1 | Status: SHIPPED | OUTPATIENT
Start: 2021-08-17 | End: 2021-08-25 | Stop reason: HOSPADM

## 2021-08-17 NOTE — ASSESSMENT & PLAN NOTE
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

## 2021-08-17 NOTE — PATIENT INSTRUCTIONS
For current headache, take 400 mg of magnesium and prednisone 10 mg  Rx for prednisone sent to pharmacy

## 2021-08-17 NOTE — PROGRESS NOTES
Subjective:   Chief Complaint   Patient presents with    Pre-op Exam        Patient ID: Meera Conklin is a 66 y o  female  Visit in preparation for MRI with anesthesia  Has extreme claustrophobia  Today with a severe headache which has not responded to any meds yet  Allergy to codeine and bad reaction to  tryptan medications  Tramadol and tylenol not helping  Prednisone has helped  In past      Last labs reveal normal kidney function and has had no prior adverse reactions to anesthesia  Anemia is chronic and is treated with prn transfusions  Had transfusion or 2 units PRBCs last week  Will get follow up lab  The following portions of the patient's history were reviewed and updated as appropriate: allergies, current medications, past family history, past medical history, past social history, past surgical history and problem list     Review of Systems   Constitutional: Positive for fatigue  Negative for fever  Gastrointestinal: Negative for abdominal distention and abdominal pain  Musculoskeletal: Positive for arthralgias, back pain, gait problem and myalgias  Neurological: Positive for weakness and headaches  Psychiatric/Behavioral: Positive for dysphoric mood  All other systems reviewed and are negative          Current Outpatient Medications on File Prior to Visit   Medication Sig Dispense Refill    acetaminophen (TYLENOL) 500 mg tablet Take 500 mg by mouth every 6 (six) hours as needed for mild pain      amoxicillin (AMOXIL) 500 mg capsule take 4 capsules by mouth 1 hour prior to appointment      amoxicillin (AMOXIL) 875 mg tablet take 1 tablet by mouth every 12 hours until finished      aspirin 81 mg chewable tablet Chew 81 mg daily      Avapritinib (Ayvakit) 200 MG TABS Take 200 mg by mouth daily      Azelastine HCl 137 MCG/SPRAY SOLN instill 2 sprays into each nostril twice a day if needed for congestion  0    calcium carbonate (Tums) 500 mg chewable tablet Chew 1 tablet       diphenhydrAMINE (BENADRYL) 25 mg tablet Take 25 mg by mouth every 6 (six) hours as needed for itching      diphenoxylate-atropine (LOMOTIL) 2 5-0 025 mg per tablet TAKE 1 TABLET BY MOUTH 4 TIMES A DAY AS NEEDED FOR DIARRHEA 30 tablet 5    docusate sodium (COLACE) 100 mg capsule Take 100 mg by mouth 2 (two) times a day as needed for constipation       ferrous sulfate 324 (65 Fe) mg Take 1 tablet (324 mg total) by mouth 2 (two) times a day before meals 60 tablet 2    hydroxychloroquine (PLAQUENIL) 200 mg tablet Take 200 mg by mouth daily in the early morning       loperamide (IMODIUM) 2 mg capsule Take 2 capsules by mouth 4 (four) times a day as needed       metoprolol succinate (Toprol XL) 25 mg 24 hr tablet Take 0 5 tablets (12 5 mg total) by mouth daily  0    multivitamin-iron-minerals-folic acid (CENTRUM) chewable tablet Chew 1 tablet daily      NON FORMULARY Take 2 tablets by mouth daily Chemo: Blue 285      ondansetron (ZOFRAN) 8 mg tablet 8 mg daily with breakfast Prior to chemo      pantoprazole (PROTONIX) 40 mg tablet take 1 tablet by mouth twice a day 180 tablet 5    traMADol (ULTRAM) 50 mg tablet take 2 tablets by mouth four times a day 120 tablet 0    Triamcinolone Acetonide (NASACORT ALLERGY 24HR NA) into each nostril 1 spray each nostril--at bedtime   (OTC)       prednisoLONE acetate (PRED FORTE) 1 % ophthalmic suspension  (Patient not taking: No sig reported)  0     No current facility-administered medications on file prior to visit  Objective:  Vitals:    08/17/21 1419   BP: 152/78   Pulse: 103   SpO2: 98%   Weight: 53 1 kg (117 lb)   Height: 5' 1" (1 549 m)      Physical Exam  Vitals and nursing note reviewed  Constitutional:       General: She is not in acute distress  Cardiovascular:      Rate and Rhythm: Normal rate and regular rhythm  Pulmonary:      Effort: Pulmonary effort is normal       Breath sounds: Normal breath sounds     Musculoskeletal: General: Normal range of motion  Cervical back: Normal range of motion  Skin:     Findings: No rash  Neurological:      Mental Status: She is alert and oriented to person, place, and time  Psychiatric:         Thought Content: Thought content normal             Assessment/Plan:    Spinal stenosis, lumbar region, with neurogenic claudication  Severe back pain and abnormal CT scan  Planning MRI under anesthesia and then possible surgery  Iron deficiency anemia  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  Gets transfused prn  Last Hg was 6 8 on 8/2  Did have PRBC transfusion  Follow up lab ordered today  Diagnoses and all orders for this visit:    Lumbar radiculopathy  -     predniSONE 10 mg tablet; Take 1 tablet (10 mg total) by mouth 2 (two) times a day with meals for 5 days    Spinal stenosis, lumbar region, with neurogenic claudication    Paroxysmal atrial fibrillation (HCC)  -     predniSONE 10 mg tablet;  Take 1 tablet (10 mg total) by mouth 2 (two) times a day with meals for 5 days    Iron deficiency anemia, unspecified iron deficiency anemia type  -     CBC and differential; Future    Gastric AVM    GIST (gastrointestinal stroma tumor), malignant, colon ([facility] [address] )

## 2021-08-18 ENCOUNTER — TELEPHONE (OUTPATIENT)
Dept: FAMILY MEDICINE CLINIC | Facility: HOSPITAL | Age: 71
End: 2021-08-18

## 2021-08-18 ENCOUNTER — HOSPITAL ENCOUNTER (INPATIENT)
Facility: HOSPITAL | Age: 71
LOS: 6 days | Discharge: HOME HEALTH | DRG: 374 | End: 2021-08-25
Attending: EMERGENCY MEDICINE | Admitting: INTERNAL MEDICINE
Payer: COMMERCIAL

## 2021-08-18 ENCOUNTER — LAB (OUTPATIENT)
Dept: LAB | Facility: HOSPITAL | Age: 71
DRG: 374 | End: 2021-08-18
Attending: INTERNAL MEDICINE
Payer: COMMERCIAL

## 2021-08-18 ENCOUNTER — APPOINTMENT (EMERGENCY)
Dept: RADIOLOGY | Facility: HOSPITAL | Age: 71
DRG: 374 | End: 2021-08-18
Payer: COMMERCIAL

## 2021-08-18 DIAGNOSIS — D50.9 IRON DEFICIENCY ANEMIA, UNSPECIFIED IRON DEFICIENCY ANEMIA TYPE: ICD-10-CM

## 2021-08-18 DIAGNOSIS — K92.2 UPPER GI BLEED: ICD-10-CM

## 2021-08-18 DIAGNOSIS — T17.908D ASPIRATION INTO AIRWAY, SUBSEQUENT ENCOUNTER: ICD-10-CM

## 2021-08-18 DIAGNOSIS — K31.819 GASTRIC AVM: ICD-10-CM

## 2021-08-18 DIAGNOSIS — D64.9 SYMPTOMATIC ANEMIA: ICD-10-CM

## 2021-08-18 DIAGNOSIS — J96.01 ACUTE RESPIRATORY FAILURE WITH HYPOXIA (HCC): Primary | ICD-10-CM

## 2021-08-18 DIAGNOSIS — R26.9 GAIT DISTURBANCE: ICD-10-CM

## 2021-08-18 DIAGNOSIS — R78.81 BACTEREMIA: ICD-10-CM

## 2021-08-18 DIAGNOSIS — D64.9 ACUTE ON CHRONIC ANEMIA: ICD-10-CM

## 2021-08-18 LAB
ABO GROUP BLD: NORMAL
ALBUMIN SERPL BCP-MCNC: 3.1 G/DL (ref 3.5–5)
ALP SERPL-CCNC: 88 U/L (ref 46–116)
ALT SERPL W P-5'-P-CCNC: 32 U/L (ref 12–78)
ANION GAP SERPL CALCULATED.3IONS-SCNC: 11 MMOL/L (ref 4–13)
ANISOCYTOSIS BLD QL SMEAR: PRESENT
ARTIFACT: PRESENT
AST SERPL W P-5'-P-CCNC: 41 U/L (ref 5–45)
BASOPHILS # BLD AUTO: 0 THOUSANDS/ΜL (ref 0–0.1)
BASOPHILS # BLD MANUAL: 0 THOUSAND/UL (ref 0–0.1)
BASOPHILS NFR BLD AUTO: 0 % (ref 0–1)
BASOPHILS NFR MAR MANUAL: 0 % (ref 0–1)
BILIRUB SERPL-MCNC: 0.2 MG/DL (ref 0.2–1)
BLASTS NFR BLD MANUAL: 1 %
BLD GP AB SCN SERPL QL: NEGATIVE
BUN SERPL-MCNC: 30 MG/DL (ref 5–25)
CALCIUM ALBUM COR SERPL-MCNC: 9.6 MG/DL (ref 8.3–10.1)
CALCIUM SERPL-MCNC: 8.9 MG/DL (ref 8.3–10.1)
CHLORIDE SERPL-SCNC: 101 MMOL/L (ref 100–108)
CO2 SERPL-SCNC: 24 MMOL/L (ref 21–32)
CREAT SERPL-MCNC: 1.69 MG/DL (ref 0.6–1.3)
EOSINOPHIL # BLD AUTO: 0 THOUSAND/ΜL (ref 0–0.61)
EOSINOPHIL # BLD MANUAL: 0 THOUSAND/UL (ref 0–0.4)
EOSINOPHIL NFR BLD AUTO: 0 % (ref 0–6)
EOSINOPHIL NFR BLD MANUAL: 0 % (ref 0–6)
ERYTHROCYTE [DISTWIDTH] IN BLOOD BY AUTOMATED COUNT: 15.5 % (ref 11.6–15.1)
ERYTHROCYTE [DISTWIDTH] IN BLOOD BY AUTOMATED COUNT: 16 % (ref 11.6–15.1)
GFR SERPL CREATININE-BSD FRML MDRD: 30 ML/MIN/1.73SQ M
GLUCOSE SERPL-MCNC: 113 MG/DL (ref 65–140)
HCT VFR BLD AUTO: 15.7 % (ref 34.8–46.1)
HCT VFR BLD AUTO: 16 % (ref 34.8–46.1)
HGB BLD-MCNC: 4.9 G/DL (ref 11.5–15.4)
HGB BLD-MCNC: 5.1 G/DL (ref 11.5–15.4)
IMM GRANULOCYTES # BLD AUTO: 0.03 THOUSAND/UL (ref 0–0.2)
IMM GRANULOCYTES NFR BLD AUTO: 0 % (ref 0–2)
LYMPHOCYTES # BLD AUTO: 0 % (ref 14–44)
LYMPHOCYTES # BLD AUTO: 0 THOUSAND/UL (ref 0.6–4.47)
LYMPHOCYTES # BLD AUTO: 0.15 THOUSANDS/ΜL (ref 0.6–4.47)
LYMPHOCYTES NFR BLD AUTO: 2 % (ref 14–44)
MACROCYTES BLD QL AUTO: PRESENT
MCH RBC QN AUTO: 33.3 PG (ref 26.8–34.3)
MCH RBC QN AUTO: 34 PG (ref 26.8–34.3)
MCHC RBC AUTO-ENTMCNC: 31.2 G/DL (ref 31.4–37.4)
MCHC RBC AUTO-ENTMCNC: 31.3 G/DL (ref 31.4–37.4)
MCV RBC AUTO: 107 FL (ref 82–98)
MCV RBC AUTO: 109 FL (ref 82–98)
MONOCYTES # BLD AUTO: 0.09 THOUSAND/UL (ref 0–1.22)
MONOCYTES # BLD AUTO: 0.52 THOUSAND/ΜL (ref 0.17–1.22)
MONOCYTES NFR BLD AUTO: 6 % (ref 4–12)
MONOCYTES NFR BLD: 1 % (ref 4–12)
NEUTROPHILS # BLD AUTO: 8.22 THOUSANDS/ΜL (ref 1.85–7.62)
NEUTROPHILS # BLD MANUAL: 8.5 THOUSAND/UL (ref 1.85–7.62)
NEUTS SEG NFR BLD AUTO: 92 % (ref 43–75)
NEUTS SEG NFR BLD AUTO: 96 % (ref 43–75)
PLATELET # BLD AUTO: 269 THOUSANDS/UL (ref 149–390)
PLATELET # BLD AUTO: 299 THOUSANDS/UL (ref 149–390)
PLATELET BLD QL SMEAR: ADEQUATE
PMV BLD AUTO: 10.3 FL (ref 8.9–12.7)
PMV BLD AUTO: 9.3 FL (ref 8.9–12.7)
POLYCHROMASIA BLD QL SMEAR: PRESENT
POTASSIUM SERPL-SCNC: 4.5 MMOL/L (ref 3.5–5.3)
PROT SERPL-MCNC: 6.8 G/DL (ref 6.4–8.2)
RBC # BLD AUTO: 1.47 MILLION/UL (ref 3.81–5.12)
RBC # BLD AUTO: 1.47 MILLION/UL (ref 3.81–5.12)
RBC MORPH BLD: PRESENT
RH BLD: POSITIVE
SODIUM SERPL-SCNC: 136 MMOL/L (ref 136–145)
SPECIMEN EXPIRATION DATE: NORMAL
TARGETS BLD QL SMEAR: PRESENT
TROPONIN I SERPL-MCNC: 0.06 NG/ML
VARIANT LYMPHS # BLD AUTO: 2 %
WBC # BLD AUTO: 8.85 THOUSAND/UL (ref 4.31–10.16)
WBC # BLD AUTO: 8.92 THOUSAND/UL (ref 4.31–10.16)

## 2021-08-18 PROCEDURE — 85027 COMPLETE CBC AUTOMATED: CPT

## 2021-08-18 PROCEDURE — 83550 IRON BINDING TEST: CPT | Performed by: PHYSICIAN ASSISTANT

## 2021-08-18 PROCEDURE — 86900 BLOOD TYPING SEROLOGIC ABO: CPT | Performed by: EMERGENCY MEDICINE

## 2021-08-18 PROCEDURE — 86901 BLOOD TYPING SEROLOGIC RH(D): CPT | Performed by: EMERGENCY MEDICINE

## 2021-08-18 PROCEDURE — 83540 ASSAY OF IRON: CPT | Performed by: PHYSICIAN ASSISTANT

## 2021-08-18 PROCEDURE — 80053 COMPREHEN METABOLIC PANEL: CPT | Performed by: EMERGENCY MEDICINE

## 2021-08-18 PROCEDURE — 71045 X-RAY EXAM CHEST 1 VIEW: CPT

## 2021-08-18 PROCEDURE — 86920 COMPATIBILITY TEST SPIN: CPT

## 2021-08-18 PROCEDURE — 86923 COMPATIBILITY TEST ELECTRIC: CPT

## 2021-08-18 PROCEDURE — 84484 ASSAY OF TROPONIN QUANT: CPT | Performed by: EMERGENCY MEDICINE

## 2021-08-18 PROCEDURE — 82728 ASSAY OF FERRITIN: CPT | Performed by: PHYSICIAN ASSISTANT

## 2021-08-18 PROCEDURE — 99220 PR INITIAL OBSERVATION CARE/DAY 70 MINUTES: CPT | Performed by: PHYSICIAN ASSISTANT

## 2021-08-18 PROCEDURE — P9058 RBC, L/R, CMV-NEG, IRRAD: HCPCS

## 2021-08-18 PROCEDURE — 85007 BL SMEAR W/DIFF WBC COUNT: CPT

## 2021-08-18 PROCEDURE — 93005 ELECTROCARDIOGRAM TRACING: CPT

## 2021-08-18 PROCEDURE — 30233N1 TRANSFUSION OF NONAUTOLOGOUS RED BLOOD CELLS INTO PERIPHERAL VEIN, PERCUTANEOUS APPROACH: ICD-10-PCS | Performed by: INTERNAL MEDICINE

## 2021-08-18 PROCEDURE — 36415 COLL VENOUS BLD VENIPUNCTURE: CPT

## 2021-08-18 PROCEDURE — 99285 EMERGENCY DEPT VISIT HI MDM: CPT

## 2021-08-18 PROCEDURE — 86850 RBC ANTIBODY SCREEN: CPT | Performed by: EMERGENCY MEDICINE

## 2021-08-18 PROCEDURE — 85025 COMPLETE CBC W/AUTO DIFF WBC: CPT | Performed by: EMERGENCY MEDICINE

## 2021-08-18 PROCEDURE — 99285 EMERGENCY DEPT VISIT HI MDM: CPT | Performed by: EMERGENCY MEDICINE

## 2021-08-18 RX ORDER — ONDANSETRON 2 MG/ML
4 INJECTION INTRAMUSCULAR; INTRAVENOUS EVERY 6 HOURS PRN
Status: DISCONTINUED | OUTPATIENT
Start: 2021-08-18 | End: 2021-08-25 | Stop reason: HOSPADM

## 2021-08-18 RX ORDER — CALCIUM CARBONATE 200(500)MG
1000 TABLET,CHEWABLE ORAL DAILY PRN
Status: DISCONTINUED | OUTPATIENT
Start: 2021-08-18 | End: 2021-08-20

## 2021-08-18 RX ORDER — METOPROLOL SUCCINATE 25 MG/1
25 TABLET, EXTENDED RELEASE ORAL DAILY
Status: DISCONTINUED | OUTPATIENT
Start: 2021-08-19 | End: 2021-08-20

## 2021-08-18 RX ORDER — ACETAMINOPHEN 325 MG/1
650 TABLET ORAL EVERY 6 HOURS PRN
Status: DISCONTINUED | OUTPATIENT
Start: 2021-08-18 | End: 2021-08-25 | Stop reason: HOSPADM

## 2021-08-18 RX ORDER — HYDROXYCHLOROQUINE SULFATE 200 MG/1
200 TABLET, FILM COATED ORAL
Status: DISCONTINUED | OUTPATIENT
Start: 2021-08-20 | End: 2021-08-20

## 2021-08-18 RX ORDER — TRAMADOL HYDROCHLORIDE 50 MG/1
100 TABLET ORAL EVERY 12 HOURS PRN
Status: DISCONTINUED | OUTPATIENT
Start: 2021-08-18 | End: 2021-08-20

## 2021-08-18 RX ORDER — HYDROXYCHLOROQUINE SULFATE 200 MG/1
200 TABLET, FILM COATED ORAL EVERY OTHER DAY
Status: DISCONTINUED | OUTPATIENT
Start: 2021-08-19 | End: 2021-08-20

## 2021-08-18 RX ORDER — PREDNISONE 10 MG/1
10 TABLET ORAL 2 TIMES DAILY WITH MEALS
Status: DISCONTINUED | OUTPATIENT
Start: 2021-08-19 | End: 2021-08-20

## 2021-08-18 RX ORDER — DIPHENHYDRAMINE HCL 25 MG
25 TABLET ORAL EVERY 6 HOURS PRN
Status: DISCONTINUED | OUTPATIENT
Start: 2021-08-18 | End: 2021-08-20

## 2021-08-18 RX ORDER — PANTOPRAZOLE SODIUM 40 MG/1
40 INJECTION, POWDER, FOR SOLUTION INTRAVENOUS EVERY 12 HOURS SCHEDULED
Status: DISCONTINUED | OUTPATIENT
Start: 2021-08-18 | End: 2021-08-24

## 2021-08-18 RX ADMIN — ACETAMINOPHEN 650 MG: 325 TABLET, FILM COATED ORAL at 23:59

## 2021-08-19 ENCOUNTER — APPOINTMENT (INPATIENT)
Dept: CT IMAGING | Facility: HOSPITAL | Age: 71
DRG: 374 | End: 2021-08-19
Payer: COMMERCIAL

## 2021-08-19 PROBLEM — R65.10 SIRS DUE TO NON-INFECTIOUS PROCESS WITHOUT ACUTE ORGAN DYSFUNCTION (HCC): Status: ACTIVE | Noted: 2021-08-19

## 2021-08-19 LAB
ABO GROUP BLD BPU: NORMAL
ABO GROUP BLD BPU: NORMAL
ANION GAP SERPL CALCULATED.3IONS-SCNC: 12 MMOL/L (ref 4–13)
BPU ID: NORMAL
BPU ID: NORMAL
BUN SERPL-MCNC: 31 MG/DL (ref 5–25)
CALCIUM SERPL-MCNC: 8.6 MG/DL (ref 8.3–10.1)
CHLORIDE SERPL-SCNC: 100 MMOL/L (ref 100–108)
CO2 SERPL-SCNC: 23 MMOL/L (ref 21–32)
CREAT SERPL-MCNC: 1.4 MG/DL (ref 0.6–1.3)
CROSSMATCH: NORMAL
CROSSMATCH: NORMAL
FERRITIN SERPL-MCNC: 76 NG/ML (ref 8–388)
FERRITIN SERPL-MCNC: 79 NG/ML (ref 8–388)
GFR SERPL CREATININE-BSD FRML MDRD: 37 ML/MIN/1.73SQ M
GLUCOSE P FAST SERPL-MCNC: 112 MG/DL (ref 65–99)
GLUCOSE SERPL-MCNC: 112 MG/DL (ref 65–140)
GLUCOSE SERPL-MCNC: 136 MG/DL (ref 65–140)
HGB BLD-MCNC: 6.6 G/DL (ref 11.5–15.4)
HGB BLD-MCNC: 6.7 G/DL (ref 11.5–15.4)
HGB BLD-MCNC: 9.3 G/DL (ref 11.5–15.4)
HGB UR QL STRIP.AUTO: ABNORMAL
IRON SATN MFR SERPL: 10 %
IRON SATN MFR SERPL: 9 %
IRON SERPL-MCNC: 32 UG/DL (ref 50–170)
IRON SERPL-MCNC: 36 UG/DL (ref 50–170)
POTASSIUM SERPL-SCNC: 5.2 MMOL/L (ref 3.5–5.3)
RBC, URINE: ABNORMAL
SODIUM SERPL-SCNC: 135 MMOL/L (ref 136–145)
TIBC SERPL-MCNC: 338 UG/DL (ref 250–450)
TIBC SERPL-MCNC: 351 UG/DL (ref 250–450)
TRANSFUSION STATUS PATIENT QL: NORMAL
TROPONIN I SERPL-MCNC: 0.06 NG/ML
TROPONIN I SERPL-MCNC: 0.07 NG/ML
UNIT DISPENSE STATUS: NORMAL
UNIT DISPENSE STATUS: NORMAL
UNIT PRODUCT CODE: NORMAL
UNIT PRODUCT CODE: NORMAL
UNIT PRODUCT VOLUME: 350 ML
UNIT PRODUCT VOLUME: 350 ML
UNIT RH: NORMAL
UNIT RH: NORMAL

## 2021-08-19 PROCEDURE — 99222 1ST HOSP IP/OBS MODERATE 55: CPT | Performed by: INTERNAL MEDICINE

## 2021-08-19 PROCEDURE — 82728 ASSAY OF FERRITIN: CPT | Performed by: PHYSICIAN ASSISTANT

## 2021-08-19 PROCEDURE — 84484 ASSAY OF TROPONIN QUANT: CPT | Performed by: PHYSICIAN ASSISTANT

## 2021-08-19 PROCEDURE — 99232 SBSQ HOSP IP/OBS MODERATE 35: CPT | Performed by: INTERNAL MEDICINE

## 2021-08-19 PROCEDURE — 83550 IRON BINDING TEST: CPT | Performed by: PHYSICIAN ASSISTANT

## 2021-08-19 PROCEDURE — P9058 RBC, L/R, CMV-NEG, IRRAD: HCPCS

## 2021-08-19 PROCEDURE — C9113 INJ PANTOPRAZOLE SODIUM, VIA: HCPCS | Performed by: PHYSICIAN ASSISTANT

## 2021-08-19 PROCEDURE — 87040 BLOOD CULTURE FOR BACTERIA: CPT | Performed by: PHYSICIAN ASSISTANT

## 2021-08-19 PROCEDURE — 85018 HEMOGLOBIN: CPT | Performed by: PHYSICIAN ASSISTANT

## 2021-08-19 PROCEDURE — 81003 URINALYSIS AUTO W/O SCOPE: CPT | Performed by: PHYSICIAN ASSISTANT

## 2021-08-19 PROCEDURE — 86901 BLOOD TYPING SEROLOGIC RH(D): CPT | Performed by: PHYSICIAN ASSISTANT

## 2021-08-19 PROCEDURE — 82948 REAGENT STRIP/BLOOD GLUCOSE: CPT

## 2021-08-19 PROCEDURE — 86900 BLOOD TYPING SEROLOGIC ABO: CPT | Performed by: PHYSICIAN ASSISTANT

## 2021-08-19 PROCEDURE — 86880 COOMBS TEST DIRECT: CPT | Performed by: PHYSICIAN ASSISTANT

## 2021-08-19 PROCEDURE — 81015 MICROSCOPIC EXAM OF URINE: CPT | Performed by: PHYSICIAN ASSISTANT

## 2021-08-19 PROCEDURE — 74176 CT ABD & PELVIS W/O CONTRAST: CPT

## 2021-08-19 PROCEDURE — 80048 BASIC METABOLIC PNL TOTAL CA: CPT | Performed by: PHYSICIAN ASSISTANT

## 2021-08-19 PROCEDURE — 83540 ASSAY OF IRON: CPT | Performed by: PHYSICIAN ASSISTANT

## 2021-08-19 RX ORDER — DOCUSATE SODIUM 100 MG/1
100 CAPSULE, LIQUID FILLED ORAL 2 TIMES DAILY
Status: DISCONTINUED | OUTPATIENT
Start: 2021-08-19 | End: 2021-08-20

## 2021-08-19 RX ORDER — BISACODYL 10 MG
10 SUPPOSITORY, RECTAL RECTAL DAILY PRN
Status: DISCONTINUED | OUTPATIENT
Start: 2021-08-19 | End: 2021-08-25 | Stop reason: HOSPADM

## 2021-08-19 RX ORDER — SENNOSIDES 8.6 MG
2 TABLET ORAL 2 TIMES DAILY
Status: DISCONTINUED | OUTPATIENT
Start: 2021-08-19 | End: 2021-08-25 | Stop reason: HOSPADM

## 2021-08-19 RX ADMIN — HYDROXYCHLOROQUINE SULFATE 200 MG: 200 TABLET, FILM COATED ORAL at 08:32

## 2021-08-19 RX ADMIN — PANTOPRAZOLE SODIUM 40 MG: 40 INJECTION, POWDER, FOR SOLUTION INTRAVENOUS at 08:21

## 2021-08-19 RX ADMIN — STANDARDIZED SENNA CONCENTRATE 17.2 MG: 8.6 TABLET ORAL at 10:59

## 2021-08-19 RX ADMIN — SODIUM CHLORIDE 200 MG: 9 INJECTION, SOLUTION INTRAVENOUS at 14:39

## 2021-08-19 RX ADMIN — TRAMADOL HYDROCHLORIDE 100 MG: 50 TABLET, FILM COATED ORAL at 23:38

## 2021-08-19 RX ADMIN — IOHEXOL 50 ML: 240 INJECTION, SOLUTION INTRATHECAL; INTRAVASCULAR; INTRAVENOUS; ORAL at 17:46

## 2021-08-19 RX ADMIN — ACETAMINOPHEN 650 MG: 325 TABLET, FILM COATED ORAL at 20:41

## 2021-08-19 RX ADMIN — METOPROLOL SUCCINATE 25 MG: 25 TABLET, FILM COATED, EXTENDED RELEASE ORAL at 08:21

## 2021-08-19 RX ADMIN — PREDNISONE 10 MG: 10 TABLET ORAL at 08:21

## 2021-08-19 RX ADMIN — PANTOPRAZOLE SODIUM 40 MG: 40 INJECTION, POWDER, FOR SOLUTION INTRAVENOUS at 00:00

## 2021-08-19 RX ADMIN — PANTOPRAZOLE SODIUM 40 MG: 40 INJECTION, POWDER, FOR SOLUTION INTRAVENOUS at 20:41

## 2021-08-19 RX ADMIN — ONDANSETRON 4 MG: 2 INJECTION INTRAMUSCULAR; INTRAVENOUS at 14:26

## 2021-08-19 RX ADMIN — DOCUSATE SODIUM 100 MG: 100 CAPSULE, LIQUID FILLED ORAL at 10:59

## 2021-08-19 RX ADMIN — AVAPRITINIB 200 MG: 200 TABLET, FILM COATED ORAL at 08:31

## 2021-08-19 RX ADMIN — ONDANSETRON 4 MG: 2 INJECTION INTRAMUSCULAR; INTRAVENOUS at 04:28

## 2021-08-19 RX ADMIN — TRAMADOL HYDROCHLORIDE 100 MG: 50 TABLET, FILM COATED ORAL at 05:09

## 2021-08-19 NOTE — ED PROVIDER NOTES
History  Chief Complaint   Patient presents with    Anemia     Pt reports her hgb is 5 1 from todays blood draw for upcoming back operation  68-year-old female with history of chronic iron deficiency anemia, GI bleed, status post resection of GIST tumor, atrial fibrillation, CAD, kidney disease complains of some shortness of breath on exertion, lightheadedness and headache over the past week  Her hemoglobin was found to be 5 1 today  Magdalena Beck was last transfused one week ago  She states her Hb target is to be above 8 0           Prior to Admission Medications   Prescriptions Last Dose Informant Patient Reported? Taking?    Avapritinib (Ayvakit) 200 MG TABS 8/18/2021 at Unknown time  Yes Yes   Sig: Take 200 mg by mouth daily   Azelastine HCl 137 MCG/SPRAY SOLN 8/18/2021 at Unknown time Self Yes Yes   Sig: instill 2 sprays into each nostril twice a day if needed for congestion   Triamcinolone Acetonide (NASACORT ALLERGY 24HR NA)  Self Yes No   Sig: into each nostril 1 spray each nostril--at bedtime   (OTC)    acetaminophen (TYLENOL) 500 mg tablet 8/18/2021 at Unknown time  Yes Yes   Sig: Take 500 mg by mouth every 6 (six) hours as needed for mild pain   aspirin 81 mg chewable tablet 8/18/2021 at Unknown time Self Yes Yes   Sig: Chew 81 mg daily   calcium carbonate (Tums) 500 mg chewable tablet Past Month at Unknown time  Yes Yes   Sig: Chew 1 tablet    diphenhydrAMINE (BENADRYL) 25 mg tablet 8/18/2021 at Unknown time Self Yes Yes   Sig: Take 25 mg by mouth every 6 (six) hours as needed for itching   diphenoxylate-atropine (LOMOTIL) 2 5-0 025 mg per tablet Past Month at Unknown time  No Yes   Sig: TAKE 1 TABLET BY MOUTH 4 TIMES A DAY AS NEEDED FOR DIARRHEA   docusate sodium (COLACE) 100 mg capsule Past Month at Unknown time Self Yes Yes   Sig: Take 100 mg by mouth 2 (two) times a day as needed for constipation    ferrous sulfate 324 (65 Fe) mg 8/18/2021 at Unknown time  No Yes   Sig: Take 1 tablet (324 mg total) by mouth 2 (two) times a day before meals   hydroxychloroquine (PLAQUENIL) 200 mg tablet 2021 at Unknown time Self Yes Yes   Sig: Take 200 mg by mouth 2 (two) times a day with meals Alternate with daily and BID   loperamide (IMODIUM) 2 mg capsule Past Week at Unknown time Self Yes Yes   Sig: Take 2 capsules by mouth 4 (four) times a day as needed    metoprolol succinate (Toprol XL) 25 mg 24 hr tablet 2021 at Unknown time Self No Yes   Sig: Take 0 5 tablets (12 5 mg total) by mouth daily   Patient taking differently: Take 25 mg by mouth daily    multivitamin-iron-minerals-folic acid (CENTRUM) chewable tablet 2021 at Unknown time Self Yes Yes   Sig: Chew 1 tablet daily   ondansetron (ZOFRAN) 8 mg tablet 2021 at Unknown time Self Yes Yes   Si mg daily with breakfast Prior to chemo   pantoprazole (PROTONIX) 40 mg tablet 2021 at Unknown time  No Yes   Sig: take 1 tablet by mouth twice a day   predniSONE 10 mg tablet 2021 at Unknown time  No Yes   Sig: Take 1 tablet (10 mg total) by mouth 2 (two) times a day with meals for 5 days   traMADol (ULTRAM) 50 mg tablet 2021 at Unknown time  No Yes   Sig: take 2 tablets by mouth four times a day      Facility-Administered Medications: None       Past Medical History:   Diagnosis Date    ADHD     Anemia     Anxiety     Arthritis     Asthma     Atrial fibrillation (HCC)     Cancer (HCC)     Chronic iron deficiency anemia 2020    Extensive GI evaluation over the last year including multiple EGD, colonoscopy, and capsule endoscopy    Has had gastric AVMs cauterized, Dieulafoy's lesion clipped, and most recently a Cami Poet erosion cauterized    Coronary artery disease     Depression     GERD (gastroesophageal reflux disease)     Hard to intubate     History of stomach ulcers     Hypercholesterolemia     Hypertension     Kidney disease     [facility] [address] [facility]     [facility]       Past Surgical History:   Procedure Laterality Date    ANKLE SURGERY      APPENDECTOMY      BREAST SURGERY      CATARACT EXTRACTION       SECTION      COLONOSCOPY  2019    Multiple adenomatous colon polyps and internal hemorrhoids  Three year recall advised    HIP SURGERY      JOINT REPLACEMENT  2019    Left knee replacement    LAMINECTOMY      ROTATOR CUFF REPAIR      SIGMOID RESECTION / RECTOPEXY      SINUS SURGERY      UPPER GASTROINTESTINAL ENDOSCOPY  2020    Dieulafoy's lesion in proximal stomach which was actively oozing  Injected with epinephrine and 2 Endoclips placed with control of bleeding, hiatal hernia   UPPER GASTROINTESTINAL ENDOSCOPY  2020    Bleeding Seb's erosion status post cauterization       Family History   Problem Relation Age of Onset   Yoly Melo Breast cancer Mother     Diabetes Mother     Hypertension Mother     Lung cancer Mother     Hyperlipidemia Father     Prostate cancer Father     Colon cancer Paternal [de-identified]     Colon cancer Paternal Grandfather     Diabetes Family     Substance Abuse Neg Hx     Mental illness Neg Hx      I have reviewed and agree with the history as documented  E-Cigarette/Vaping    E-Cigarette Use Never User      E-Cigarette/Vaping Substances    Nicotine No     THC No     CBD No     Flavoring No     Other No     Unknown No      Social History     Tobacco Use    Smoking status: Former Smoker     Years: 20 00     Types: Cigarettes    Smokeless tobacco: Never Used   Vaping Use    Vaping Use: Never used   Substance Use Topics    Alcohol use: Not Currently    Drug use: No       Review of Systems   Constitutional: Positive for activity change and fatigue  Negative for fever  HENT: Negative  Eyes: Negative  Respiratory: Positive for shortness of breath  Negative for cough  Cardiovascular: Negative  Negative for chest pain and palpitations  Gastrointestinal: Negative    Negative for blood in stool, diarrhea, nausea and vomiting  Endocrine: Negative  Genitourinary: Negative  Musculoskeletal: Negative  Skin: Negative  Allergic/Immunologic: Negative  Neurological: Positive for light-headedness  Hematological: Negative  Psychiatric/Behavioral: Negative  All other systems reviewed and are negative  Physical Exam  Physical Exam  Vitals and nursing note reviewed  Constitutional:       General: She is not in acute distress  Appearance: She is well-developed  She is ill-appearing  She is not diaphoretic  HENT:      Head: Normocephalic and atraumatic  Right Ear: External ear normal       Left Ear: External ear normal       Nose: Nose normal       Mouth/Throat:      Mouth: Mucous membranes are moist       Pharynx: Oropharynx is clear  Eyes:      General: No scleral icterus  Conjunctiva/sclera: Conjunctivae normal       Pupils: Pupils are equal, round, and reactive to light  Cardiovascular:      Rate and Rhythm: Regular rhythm  Tachycardia present  Pulses: Normal pulses  Heart sounds: No murmur heard  Pulmonary:      Effort: Pulmonary effort is normal       Breath sounds: Normal breath sounds  Abdominal:      General: Bowel sounds are normal       Palpations: Abdomen is soft  Tenderness: There is no abdominal tenderness  There is no guarding or rebound  Musculoskeletal:         General: No tenderness  Normal range of motion  Cervical back: Normal range of motion and neck supple  No rigidity  Right lower leg: No edema  Left lower leg: No edema  Lymphadenopathy:      Cervical: No cervical adenopathy  Skin:     General: Skin is warm and dry  Capillary Refill: Capillary refill takes less than 2 seconds  Coloration: Skin is pale  Findings: No bruising or rash  Neurological:      General: No focal deficit present  Mental Status: She is alert and oriented to person, place, and time   Mental status is at baseline  Cranial Nerves: No cranial nerve deficit  Sensory: No sensory deficit  Motor: No weakness  Coordination: Coordination normal       Deep Tendon Reflexes: Reflexes are normal and symmetric     Psychiatric:         Mood and Affect: Mood normal          Behavior: Behavior normal          Vital Signs  ED Triage Vitals   Temperature Pulse Respirations Blood Pressure SpO2   08/18/21 2055 08/18/21 2055 08/18/21 2055 08/18/21 2055 08/18/21 2055   99 1 °F (37 3 °C) 103 (!) 24 105/54 99 %      Temp Source Heart Rate Source Patient Position - Orthostatic VS BP Location FiO2 (%)   08/18/21 2055 08/18/21 2055 08/18/21 2055 08/18/21 2055 --   Temporal Monitor Sitting Left arm       Pain Score       08/18/21 2222       7           Vitals:    08/18/21 2130 08/18/21 2145 08/18/21 2200 08/18/21 2224   BP: 109/58 115/57 116/56 134/72   Pulse: (!) 109 94 85 (!) 108   Patient Position - Orthostatic VS:             Visual Acuity      ED Medications  Medications   pantoprazole (PROTONIX) injection 40 mg (has no administration in time range)       Diagnostic Studies  Results Reviewed     Procedure Component Value Units Date/Time    CBC and differential [[id number]]  (Abnormal) Collected: 08/18/21 2119    Lab Status: Final result Specimen: Blood from Arm, Right Updated: 08/18/21 2208     WBC 8 92 Thousand/uL      RBC 1 47 Million/uL      Hemoglobin 4 9 g/dL      Hematocrit 15 7 %       fL      MCH 33 3 pg      MCHC 31 2 g/dL      RDW 15 5 %      MPV 9 3 fL      Platelets 955 Thousands/uL      Neutrophils Relative 92 %      Immat GRANS % 0 %      Lymphocytes Relative 2 %      Monocytes Relative 6 %      Eosinophils Relative 0 %      Basophils Relative 0 %      Neutrophils Absolute 8 22 Thousands/µL      Immature Grans Absolute 0 03 Thousand/uL      Lymphocytes Absolute 0 15 Thousands/µL      Monocytes Absolute 0 52 Thousand/µL      Eosinophils Absolute 0 00 Thousand/µL      Basophils Absolute 0 00 intervals: Wide  Conduction:     Conduction: abnormal      Abnormal conduction: complete RBBB    ST segments:     ST segments:  Normal  T waves:     T waves: normal               ED Course                             SBIRT 22yo+      Most Recent Value   SBIRT (22 yo +)   In order to provide better care to our patients, we are screening all of our patients for alcohol and drug use  Would it be okay to ask you these screening questions? Yes Filed at: 08/18/2021 2058   Initial Alcohol Screen: US AUDIT-C    1  How often do you have a drink containing alcohol?  0 Filed at: 08/18/2021 2058   2  How many drinks containing alcohol do you have on a typical day you are drinking? 0 Filed at: 08/18/2021 2058   3a  Male UNDER 65: How often do you have five or more drinks on one occasion? 0 Filed at: 08/18/2021 2058   3b  FEMALE Any Age, or MALE 65+: How often do you have 4 or more drinks on one occassion? 0 Filed at: 08/18/2021 2058   Audit-C Score  0 Filed at: 08/18/2021 2058   RYAN: How many times in the past year have you    Used an illegal drug or used a prescription medication for non-medical reasons? Never Filed at: 08/18/2021 2058                    MDM  Number of Diagnoses or Management Options  Symptomatic anemia: established and worsening  Diagnosis management comments: Symptomatic kidney in elderly woman with multiple comorbidities including chronic iron deficiency anemia  Patient will require blood transfusion  Had a transfusion just 1 week ago  Has history of GI bleeds but no visible bleeding currently  No chest pain or syncope  Admitted for slow careful transfusion and further workup         Amount and/or Complexity of Data Reviewed  Clinical lab tests: ordered and reviewed  Review and summarize past medical records: yes  Discuss the patient with other providers: yes  Independent visualization of images, tracings, or specimens: yes        Disposition  Final diagnoses:   Symptomatic anemia     Time reflects when diagnosis was documented in both MDM as applicable and the Disposition within this note     Time User Action Codes Description Comment    8/18/2021  9:56 PM Dana Shi Add [D64 9] Symptomatic anemia       ED Disposition     ED Disposition Condition Date/Time Comment    Admit Stable Wed Aug 18, 2021  9:56 PM Case was discussed with BRINDA AP and the patient's admission status was agreed to be Admission Status: observation status to the service of Dr Nestor Petersen           Follow-up Information    None         Current Discharge Medication List      CONTINUE these medications which have NOT CHANGED    Details   acetaminophen (TYLENOL) 500 mg tablet Take 500 mg by mouth every 6 (six) hours as needed for mild pain      aspirin 81 mg chewable tablet Chew 81 mg daily      Avapritinib (Ayvakit) 200 MG TABS Take 200 mg by mouth daily      Azelastine HCl 137 MCG/SPRAY SOLN instill 2 sprays into each nostril twice a day if needed for congestion  Refills: 0      calcium carbonate (Tums) 500 mg chewable tablet Chew 1 tablet       diphenhydrAMINE (BENADRYL) 25 mg tablet Take 25 mg by mouth every 6 (six) hours as needed for itching      diphenoxylate-atropine (LOMOTIL) 2 5-0 025 mg per tablet TAKE 1 TABLET BY MOUTH 4 TIMES A DAY AS NEEDED FOR DIARRHEA  Qty: 30 tablet, Refills: 5    Associated Diagnoses: 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)      docusate sodium (COLACE) 100 mg capsule Take 100 mg by mouth 2 (two) times a day as needed for constipation       ferrous sulfate 324 (65 Fe) mg Take 1 tablet (324 mg total) by mouth 2 (two) times a day before meals  Qty: 60 tablet, Refills: 2    Associated Diagnoses: Symptomatic anemia      hydroxychloroquine (PLAQUENIL) 200 mg tablet Take 200 mg by mouth 2 (two) times a day with meals Alternate with daily and BID      loperamide (IMODIUM) 2 mg capsule Take 2 capsules by mouth 4 (four) times a day as needed       metoprolol succinate (Toprol XL) 25 mg 24 hr tablet Take 0 5 tablets (12 5 mg total) by mouth daily  Refills: 0    Associated Diagnoses: Hypertension, unspecified type      multivitamin-iron-minerals-folic acid (CENTRUM) chewable tablet Chew 1 tablet daily      ondansetron (ZOFRAN) 8 mg tablet 8 mg daily with breakfast Prior to chemo      pantoprazole (PROTONIX) 40 mg tablet take 1 tablet by mouth twice a day  Qty: 180 tablet, Refills: 5    Associated Diagnoses: Upper GI bleed      predniSONE 10 mg tablet Take 1 tablet (10 mg total) by mouth 2 (two) times a day with meals for 5 days  Qty: 14 tablet, Refills: 1    Associated Diagnoses: Lumbar radiculopathy; Paroxysmal atrial fibrillation (HCC)      traMADol (ULTRAM) 50 mg tablet take 2 tablets by mouth four times a day  Qty: 120 tablet, Refills: 0    Associated Diagnoses: 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

## 2021-08-19 NOTE — ASSESSMENT & PLAN NOTE
· Follows with cardiology at [facility]  · Continue metoprolol 25mg daily   · Only on daily ASA 81mg - held in setting of anemia

## 2021-08-19 NOTE — UTILIZATION REVIEW
Initial Clinical Review    WAS OBSERVATION 08/18/2021 @ 2157, CONVERTED TO INPATIENT ADMISSION 08/19/2021 @ 0922, DUE TO CONTINUED STAY REQUIRED TO CARE FOR PATIENT WITH  Terrell Pretty Anemia Hgl 5 1 > Transfused  Monitor & trend H/H  Consult GI  Admission: Date/Time/Statement:   08/19/21 6178  Inpatient Admission Once     Transfer Service: Hospitalist       Question Answer Comment   Level of Care Med Surg    Estimated length of stay More than 2 Midnights    Certification I certify that inpatient services are medically necessary for this patient for a duration of greater than two midnights  See H&P and MD Progress Notes for additional information about the patient's course of treatment  ED Arrival Information     Expected Arrival Acuity    8/18/2021 20:30 8/18/2021 20:35 Urgent         Means of arrival Escorted by Service Admission type    Walk-In Self Hospitalist Urgent         Arrival complaint    HGB 5 1         Chief Complaint   Patient presents with    Anemia     Pt reports her hgb is 5 1 from todays blood draw for upcoming back operation  Initial Presentation: 66year old female, presented to the ED @ [facility], from home via walk in  Admitted as Observation due to Acute on Chronic Anemia  PMH of metastatic GastroIntestionalStromaTumor s/p radiation and surgery on daily chemo, chronic anemia  MARC, gastric AVMs, Dieulafoy lesion, Seb lesion, PAF, chronic back pain, and HTN  Date: 08/18/2021  Her PCP obtained outpatient labs for clearance for back surgery  Hgl 5 1  Repeat in ED 4 9  Patient reports that she has noticed some dyspnea on exertion over the last 1 week as well as excessive fatigue  Has had 2 episodes of short lasting palpitations over the last 2 weeks and has had intermittent black stools     Patient reports that she receives blood transfusions as needed, with her last on 08/02 in the ED  patient reports that she was started on prednisone yesterday by her PCP in an attempt to resolve an ongoing headache that the patient was having  Obsain iron panel  Transfuse 2 Units PRBC  Consult GI   NOP, sips with meds for now  IV Progonix  Trend & Monitor trops  No ECG Changes  Monitor on telemetry  VTE Pharmacologic Prophylaxis: VTE Score: 2 Low Risk (Score 0-2) - Encourage Ambulation    Day 2: 08/19/2021  Will start venofer  Hemoglobin this morning is 6 7  Patient is ordered 1 more unit of packed red cell transfusion  H&H q 6 hours and transfuse to keep hemoglobin greater than 7  NPO  EGD in AM   Continue IV Protonix  08/19/2021  Consult GI:  A patient hemoglobin 5 1, repeated in the emergency room 4 9, she has received 1 5 units of PRBCs (possible reaction with 2nd unit), last serial hemoglobin 6 7  Patient states she had been having some increased weakness and shortness of breath with exertion over the last week  She denies any chest pain, shortness of breath or palpitations bedside exam   She states she has continuous nausea with abdominal discomfort which she states usually occurs with constipation  Denies vomiting  History of multiple clippings and cauterizations due to Dieulafoy and Seb lesions  - schedule EGD, NPO for procedure  - Protonix 40mg IV BID      ED Triage Vitals   Temperature Pulse Respirations Blood Pressure SpO2   08/18/21 2055 08/18/21 2055 08/18/21 2055 08/18/21 2055 08/18/21 2055   99 1 °F (37 3 °C) 103 (!) 24 105/54 99 %      Temp Source Heart Rate Source Patient Position - Orthostatic VS BP Location FiO2 (%)   08/18/21 2055 08/18/21 2055 08/18/21 2055 08/18/21 2055 --   Temporal Monitor Sitting Left arm       Pain Score       08/18/21 2222       7          Wt Readings from Last 1 Encounters:   08/18/21 55 kg (121 lb 4 1 oz)     Additional Vital Signs:   Date/Time  Temp  Pulse  Resp  BP  MAP (mmHg)  SpO2  Calculated FIO2 (%) - Nasal Cannula  Nasal Cannula O2 Flow Rate (L/min)  O2 Device  Patient Position - Orthostatic VS 08/19/21 1125  98 5 °F (36 9 °C)  91  22  139/76  102  98 %  24  1 L/min  Nasal cannula  Lying   08/19/21 1100  98 8 °F (37 1 °C)  99  20  129/73  92  95 %  24  1 L/min  Nasal cannula  Lying   08/19/21 0728  98 7 °F (37 1 °C)  92  19  136/71  98  96 %  --  --  None (Room air)  Lying   08/19/21 0315  100 °F (37 8 °C)  93  20  138/65  --  --  --  --  --  --   08/19/21 0259  99 4 °F (37 4 °C)  96  18  148/67  --  --  --  --  --  --   08/19/21 0200  98 8 °F (37 1 °C)  100  --  130/71  --  --  --  --  --  --   08/18/21 2350  99 °F (37 2 °C)  84  20  131/66  --  --  --  --  --  --   08/18/21 2323  98 7 °F (37 1 °C)  110Abnormal   20  134/74  97  --  --  --  --  Sitting   08/18/21 2224  98 9 °F (37 2 °C)  108Abnormal   20  134/72  96  --  --  --  --  --   08/18/21 2200  --  85  20  116/56  80  98 %  --  --  None (Room air)  --   08/18/21 2145  --  94  25Abnormal   115/57  82  99 %  --  --  None (Room air)  --   08/18/21 2130  --  109Abnormal   25Abnormal   109/58  80  96 %  --  --  None (Room air)  --   08/18/21 2120  --  90  20  115/59  82  96 %  --  --  None (Room air)  Sitting   08/18/21 2115  --  104  20  137/75  100  98 %  --  --  None (Room air)  Sitting     08/19/2021 @ 0820  Chest X:  No acute cardiopulmonary disease       08/18/2021 @ 2251  ECG:  Sinus Tachycardia    Pertinent Labs/Diagnostic Test Results:     Results from last 7 days   Lab Units 08/19/21  0444 08/19/21  0418 08/18/21  2119 08/18/21  1237   WBC Thousand/uL  --   --  8 92 8 85   HEMOGLOBIN g/dL 6 7* 6 6* 4 9* 5 1*   HEMATOCRIT %  --   --  15 7* 16 0*   PLATELETS Thousands/uL  --   --  299 269   NEUTROS ABS Thousands/µL  --   --  8 22*  --      Results from last 7 days   Lab Units 08/19/21  0430 08/18/21  2119   SODIUM mmol/L 135* 136   POTASSIUM mmol/L 5 2 4 5   CHLORIDE mmol/L 100 101   CO2 mmol/L 23 24   ANION GAP mmol/L 12 11   BUN mg/dL 31* 30*   CREATININE mg/dL 1 40* 1 69*   EGFR ml/min/1 73sq m 37 30   CALCIUM mg/dL 8 6 8 9     Results from last 7 days   Lab Units 08/18/21  2119   AST U/L 41   ALT U/L 32   ALK PHOS U/L 88   TOTAL PROTEIN g/dL 6 8   ALBUMIN g/dL 3 1*   TOTAL BILIRUBIN mg/dL 0 20     Results from last 7 days   Lab Units 08/19/21  0430 08/18/21  2119   GLUCOSE RANDOM mg/dL 112 113     Results from last 7 days   Lab Units 08/19/21  0418 08/19/21  0055 08/18/21  2119   TROPONIN I ng/mL 0 06* 0 07* 0 06*     Results from last 7 days   Lab Units 08/19/21  0415   BLOOD UA  Trace-Intact*     ED Treatment:   Medication Administration from 08/18/2021 2002 to 08/18/2021 2217     None        Past Medical History:   Diagnosis Date    ADHD     Anemia     Anxiety     Arthritis     Asthma     Atrial fibrillation (HCC)     [facility] [address] [facility]     Chronic iron deficiency anemia 6/9/2020    Extensive GI evaluation over the last year including multiple EGD, colonoscopy, and capsule endoscopy  Has had gastric AVMs cauterized, Dieulafoy's lesion clipped, and most recently a Maryse Simmer erosion cauterized    Coronary artery disease     Depression     GERD (gastroesophageal reflux disease)     Hard to intubate     History of stomach ulcers     Hypercholesterolemia     Hypertension     Kidney disease     Metastatic cancer ([facility] [address] [facility]     [facility] to [facility] 6/9/2021     Present on Admission:   Essential hypertension   Anxiety   Stage 3 chronic kidney disease (HCC)   Paroxysmal atrial fibrillation (HCC)   Gastric AVM   Acute on chronic anemia   GIST (gastrointestinal stroma tumor), malignant, colon (HCC)      Admitting Diagnosis: Anemia [D64 9]  Symptomatic anemia [D64 9]  Age/Sex: 66 y o  female  Admission Orders:  Scheduled Medications:   Avapritinib, 200 mg, Oral, Daily  hydroxychloroquine, 200 mg, Oral, Every Other Day  [START ON 8/20/2021] hydroxychloroquine, 200 mg, Oral, 2 times per day every other day  metoprolol succinate, 25 mg, Oral, Daily  pantoprazole, 40 mg, Intravenous, Q12H [facility]  predniSONE, 10 mg, Oral, BID With Meals      Continuous IV Infusions:     PRN Meds:  acetaminophen, 650 mg, Oral, Q6H PRN  calcium carbonate, 1,000 mg, Oral, Daily PRN  diphenhydrAMINE, 25 mg, Oral, Q6H PRN  ondansetron, 4 mg, Intravenous, Q6H PRN  traMADol, 100 mg, Oral, Q12H PRN      NPO  Serial Hgl q6h  Completed transfusions:  Ordered   Start   08/18/21 2235  Transfuse Leukoreduced RBC: 2 Units Transfusion      08/18/21 2235   IP CONSULT TO GASTROENTEROLOGY    Network Utilization Review Department  ATTENTION: Please call with any questions or concerns to [phone] and carefully listen to the prompts so that you are directed to the right person  All voicemails are confidential   Castro Lazcanoider all requests for admission clinical reviews, approved or denied determinations and any other requests to dedicated fax number below belonging to the campus where the patient is receiving treatment   List of dedicated fax numbers for the Facilities:  [address] DENIALS (Administrative/Medical Necessity) [phone]   [address] (Maternity/NICU/Pediatrics) [phone]   [address] [place] [address] [address] [id number] [place] [address] [address] [facility] [place] [address] [phone]

## 2021-08-19 NOTE — PROGRESS NOTES
George Mcginnisttton  Progress Note Rashad Killian 1950, 66 y o  female MRN: [id number]  Unit/Bed#: -01 Encounter: [id number]  Primary Care Provider: Denisse Guevara MD   Date and time admitted to hospital: 8/18/2021  8:39 PM    SIRS due to non-infectious process without acute organ dysfunction [facility])  Assessment & Plan  Patient admitted with heart rate of , respiratory rate 24-25 likely in the setting of anemia  No evidence of infection  Trend WBC and fever curve  Received packed red cell transfusion  Trend H&H    GIST (gastrointestinal stroma tumor), malignant, colon ([facility] [address] )  Assessment & Plan  · Follows at [facility]   · S/P resection of rectal tumor 08/2001  · Liver biopsy 04/2005 showed metastatic cancer  · S/p Diagnostic laparoscopy, laparoscopic lysis of adhesions, exploratory laparotomy, resection of pelvic GIST in conjunction with a small bowel resection with primary anastomosis  · On Ayvakit 200mg daily     Stage 3 chronic kidney disease [facility])  Assessment & Plan  Lab Results   Component Value Date    EGFR 37 08/19/2021    EGFR 30 08/18/2021    EGFR 55 08/02/2021    CREATININE 1 40 (H) 08/19/2021    CREATININE 1 69 (H) 08/18/2021    CREATININE 1 02 08/02/2021   · Baseline Cr 1 4-1 5  · Cr at 1 69 on admission - does not meet IVIS criteria  · Suspect anemia is contributory   · Creatinine this morning is 1 40  · Avoid hypotension   · Monitor labs in am    Gastric AVM  Assessment & Plan  · Hx of in 06/2019     Elevated troponin level not due myocardial infarction  Assessment & Plan  · Troponin at 0 06   · Suspect this is secondary to anemia and is a non-MI troponin elevation   · Trend x3 for completeness  · No EKG changes     Paroxysmal atrial fibrillation (HCC)  Assessment & Plan  · Follows with cardiology at [facility]  · Continue metoprolol 25mg daily   · Only on daily ASA 81mg - held in setting of anemia     Essential hypertension  Assessment & Plan  · On metoprolol  · Continue with increased hold parameter    Anxiety  Assessment & Plan  · No home anxiolytics     * Acute on chronic anemia  Assessment & Plan  · Outpatient Hgb at 5 1 - repeat in ED 4 9   · Reports intermittent black stools over the last 2 weeks, fatigue, and slight SHEPARD - no lightheadedness or dizziness   · Hx of MARC, gastric AVMs with cauterization 06/2019, and Dieulafoy's lesion clipped 05/2020, and Seb erosion cauterized 06/2020  · Last EGD 06/2020 with [facility]  erosion cauterization   · Receives transfusions PRN - last was 8/2 in ED for Hgb of 6 9  · Iron panel reviewed  Will start on Venofer  · Ordered 2 units of packed red cell transfusion  On 2nd transfusion, patient developed chills and if was discontinued after have back was given  · Hemoglobin this morning is 6 7  Patient is ordered 1 more unit of packed red cell transfusion  · H&H q 6 hours and transfuse to keep hemoglobin greater than 7  · GI consult appreciated  GI ordered CT abdomen pelvis with contrast  · NPO, sips with meds for now   · EGD in a m  · Protonix 40mg IV BID as suspected source is acute on chronic upper GI bleed         Labs & Imaging: I have personally reviewed pertinent reports  VTE Pharmacologic Prophylaxis: Reason for no pharmacologic prophylaxis GI bleed  VTE Mechanical Prophylaxis: sequential compression device    Code Status:   Level 1 - Full Code    Patient Centered Rounds: I have performed bedside rounds with nursing staff today  Discussions with Specialists or Other Care Team Provider:  GI    Education and Discussions with Family / Patient:  Spouse Andres    Current Length of Stay: 0 day(s)    Current Patient Status: Inpatient   Certification Statement: The patient will continue to require additional inpatient hospital stay due to see my assessment and plan  Subjective:   Patient is seen and examined at bedside  Complains of abdominal pain and some nausea    No chest pain, shortness of breath, palpitations  Afebrile  All other ROS are negative  Objective:    Vitals: Blood pressure 139/76, pulse 91, temperature 98 5 °F (36 9 °C), temperature source Oral, resp  rate 22, height 5' 1" (1 549 m), weight 55 kg (121 lb 4 1 oz), SpO2 97 %  ,Body mass index is 22 91 kg/m²  SPO2 RA Rest      ED to Hosp-Admission (Current) from 8/18/2021 in [address] Med Surg Unit   SpO2  97 %   SpO2 Activity  At Rest   O2 Device  None (Room air)   O2 Flow Rate  --        I&O:     Intake/Output Summary (Last 24 hours) at 8/19/2021 1356  Last data filed at 8/19/2021 0836  Gross per 24 hour   Intake 400 ml   Output --   Net 400 ml       Physical Exam:    General- Alert, lying comfortably in bed  Not in any acute distress  Neck- Supple, No JVD  CVS- regular, S1 and S2 normal  Chest- Bilateral Air entry, No rhochi, crackles or wheezing present  Abdomen- soft, tender, not distended, no guarding or rigidity, BS+  Extremities-  No pedal edema, No calf tenderness  CNS-   Alert, awake and orientedx3  No focal deficits present      Invasive Devices     Peripheral Intravenous Line            Peripheral IV 08/18/21 Right Antecubital <1 day                      Social History  reviewed  Family History   Problem Relation Age of Onset   Adán Winters Breast cancer Mother     Diabetes Mother     Hypertension Mother     Lung cancer Mother     Hyperlipidemia Father     Prostate cancer Father     Colon cancer Paternal Grandmother     Colon cancer Paternal Grandfather     Diabetes Family     Substance Abuse Neg Hx     Mental illness Neg Hx     reviewed    Meds:  Current Facility-Administered Medications   Medication Dose Route Frequency Provider Last Rate Last Admin    acetaminophen (TYLENOL) tablet 650 mg  650 mg Oral Q6H PRN Ardelle Bio, PA-C   650 mg at 08/18/21 2359    Avapritinib TABS 200 mg  200 mg Oral Daily Ardelle Bio, PA-C   200 mg at 08/19/21 0831    bisacodyl (DULCOLAX) rectal suppository 10 mg  10 mg metoprolol succinate (Toprol XL) 25 mg 24 hr tablet    multivitamin-iron-minerals-folic acid (CENTRUM) chewable tablet    ondansetron (ZOFRAN) 8 mg tablet    pantoprazole (PROTONIX) 40 mg tablet    predniSONE 10 mg tablet    traMADol (ULTRAM) 50 mg tablet    Triamcinolone Acetonide (NASACORT ALLERGY 24HR NA)       Labs:  Results from last 7 days   Lab Units 08/19/21  0444 08/19/21  0418 08/18/21 2119 08/18/21  1237   WBC Thousand/uL  --   --  8 92 8 85   HEMOGLOBIN g/dL 6 7* 6 6* 4 9* 5 1*   HEMATOCRIT %  --   --  15 7* 16 0*   PLATELETS Thousands/uL  --   --  299 269   NEUTROS PCT %  --   --  92*  --    LYMPHS PCT %  --   --  2*  --    LYMPHO PCT %  --   --   --  0*   MONOS PCT %  --   --  6  --    MONO PCT %  --   --   --  1*   EOS PCT %  --   --  0 0     Results from last 7 days   Lab Units 08/19/21 0430 08/18/21 2119   POTASSIUM mmol/L 5 2 4 5   CHLORIDE mmol/L 100 101   CO2 mmol/L 23 24   BUN mg/dL 31* 30*   CREATININE mg/dL 1 40* 1 69*   CALCIUM mg/dL 8 6 8 9   ALK PHOS U/L  --  88   ALT U/L  --  32   AST U/L  --  41     Lab Results   Component Value Date    TROPONINI 0 06 (H) 08/19/2021    TROPONINI 0 07 (H) 08/19/2021    TROPONINI 0 06 (H) 08/18/2021    CKTOTAL 400 (H) 07/03/2014         Lab Results   Component Value Date    BLOODCX No Growth After 5 Days  05/30/2021    BLOODCX No Growth After 5 Days  05/30/2021    BLOODCX Escherichia coli (A) 05/29/2021    BLOODCX Escherichia coli (A) 05/29/2021         Imaging:  Results for orders placed during the hospital encounter of 08/18/21    XR chest 1 view portable    Narrative  CHEST    INDICATION:   anemia  COMPARISON:  Chest radiograph May 29, 2021    EXAM PERFORMED/VIEWS:  XR CHEST PORTABLE      FINDINGS:    Cardiomediastinal silhouette appears unremarkable  The lungs are clear  No pneumothorax or pleural effusion  Scoliosis    Impression  No acute cardiopulmonary disease              Workstation performed: [id number]    Results for orders placed during the hospital encounter of 02/06/20    XR chest pa & lateral    Narrative  CHEST    INDICATION:   sob  COMPARISON:  Chest radiograph from 2/6/2020  Chest CT from 3/16/2017  EXAM PERFORMED/VIEWS:  XR CHEST PA & LATERAL  DUAL ENERGY SUBTRACTION TECHNIQUE      FINDINGS:    Mild cardiomegaly  No acute pulmonary disease  Trace effusions on the lateral projection  No pneumothorax  Osseous structures appear within normal limits for patient age  Impression  No acute pulmonary disease  Trace effusions  Workstation performed: [id number]      Last 24 Hours Medication List:   Current Facility-Administered Medications   Medication Dose Route Frequency Provider Last Rate    acetaminophen  650 mg Oral Q6H PRN Bernadette Tobin PA-C      Avapritinib  200 mg Oral Daily Bernadette Tobin PA-C      bisacodyl  10 mg Rectal Daily PRN ANDREA IbarraNP      calcium carbonate  1,000 mg Oral Daily PRN Bernadette Tobin PA-C      diphenhydrAMINE  25 mg Oral Q6H PRN Bernadette Tobin PA-C      docusate sodium  100 mg Oral BID Cosme Anila, ANDREANP      hydroxychloroquine  200 mg Oral Every Other Day Bernadette Tobin PA-C      [START ON 8/20/2021] hydroxychloroquine  200 mg Oral 2 times per day every other day Bernadette Tobin PA-C      iron sucrose  200 mg Intravenous Daily Lukasz Friedman MD      metoprolol succinate  25 mg Oral Daily Bernadette Tobin PA-C      ondansetron  4 mg Intravenous Q6H PRN Bernadette Tobin PA-C      pantoprazole  40 mg Intravenous Q12H [address] Bernadette Tobin PA-C      predniSONE  10 mg Oral BID With Meals Bernadette Tobin PA-C      senna  2 tablet Oral BID LANEY Ibarra      traMADol  100 mg Oral Q12H PRN Bernadette Tobin PA-C          Today, Patient Was Seen By: Lukasz Friedman MD    ** Please Note: Dictation voice to text software may have been used in the creation of this document   **

## 2021-08-19 NOTE — PROGRESS NOTES
ilia in lab notified blood help pending results of testing prior dose that was turned off half way R/T reaction reported on evening shift

## 2021-08-19 NOTE — ASSESSMENT & PLAN NOTE
Lab Results   Component Value Date    EGFR 37 08/19/2021    EGFR 30 08/18/2021    EGFR 55 08/02/2021    CREATININE 1 40 (H) 08/19/2021    CREATININE 1 69 (H) 08/18/2021    CREATININE 1 02 08/02/2021   · Baseline Cr 1 4-1 5  · Cr at 1 69 on admission - does not meet IVIS criteria  · Suspect anemia is contributory   · Creatinine this morning is 1 40  · Avoid hypotension   · Monitor labs in am

## 2021-08-19 NOTE — ASSESSMENT & PLAN NOTE
· Outpatient Hgb at 5 1 - repeat in ED 4 9   · Reports intermittent black stools over the last 2 weeks, fatigue, and slight SHEPARD - no lightheadedness or dizziness   · Hx of MARC, gastric AVMs with cauterization 06/2019, and Dieulafoy's lesion clipped 05/2020, and Seb erosion cauterized 06/2020  · Last EGD 06/2020 with [facility] erosion cauterization   · Receives transfusions PRN - last was 8/2 in ED for Hgb of 6 9  · Obtain iron panel   · Will transfuse 2U PRBCs to start with recheck 2 hours post completion   · GI consult for possible EGD  · NPO, sips with meds for now   · Protonix 40mg IV BID as suspected source is acute on chronic upper GI bleed

## 2021-08-19 NOTE — CONSULTS
Consultation - GI   Piyush Love 66 y o  female MRN: [id number]  Unit/Bed#: -01 Encounter: [id number]      Assessment/Plan     1  Acute on chronic anemia:  A patient hemoglobin 5 1, repeated in the emergency room 4 9, she has received 1 5 units of PRBCs (possible reaction with 2nd unit), last serial hemoglobin 6 7  Patient states she had been having some increased weakness and shortness of breath with exertion over the last week  She denies any chest pain, shortness of breath or palpitations bedside exam   She states she has continuous nausea with abdominal discomfort which she states usually occurs with constipation  Denies vomiting  History of multiple clippings and cauterizations due to Dieulafoy and Seb lesions  - schedule EGD, NPO for procedure  - Protonix 40mg IV BID    2  GIST (Gastrointestinal stroma tumor), malignant, colon:   Patient is currently followed at [facility], she was in research for and now takes Ayvakit 200 mg daily  3  Gastric AVM:  History of with EGD in 2019      4  Constipation:   She states she has chronic constipation followed by episodes of diarrhea  She states she takes Colace as needed for constipation  She has tried MiraLax in the past however does not like results  History of hemorrhoids and states she does have periodic hematochezia however routinely with constipation  Positive for melena  - Colace 100 mg p o  Twice daily  - Senna 17 6 mg po Twice Daily   - Dulcolax Supp 10mg daily prn constipation     5  Abdominal Pain:   Patient continues to have increased generalized abdominal discomfort with increased nausea and retching  In the setting of her upper and lower GI bleed history CT of the abdomen with oral contrast if tolerated      - CT abdomin w/oral contrast  - Sips of clears/Clear liquid diet, as tolerated        Inpatient consult to gastroenterology  Consult performed by: LANEY Bowden  Consult ordered by: Lidia Miller YOSELIN          Physician Requesting Consult: Nini Patiño MD  Reason for Consult / Principal Problem: Acute Anemia    HPI: Faisal Marquis is a 66y o  year old female who presents with past medical history of metastatic gastrointestinal stromal tumor, chronic anemia, gastric AVMs, Dieulafuy lesion, Seb lesion  Patient was seen by her PCP for labs and clearance for back surgery  Hemoglobin was 5 1, she had stated that she had noticed some shortness of breath on exertion over past week as well as excessive fatigue  She denied chest pain, lightheadedness or dizziness  She does receive transfusions as needed, last transfusion 08/02/2021  She was not started on prednisone by her PCP for headaches, patient states her headaches resolved  She denies fever chills, vomiting  She does state that she has ongoing nausea and generalized lower abdominal pain that she states she routinely has when she is feeling constipated  She has not been taking anything regularly for constipation and states that she routinely will have an episode of diarrhea after she finally moved her bowels  EGD 06/03/2020; medium sliding hiatal hernia with Beauford Frame lesion present, small linear ulcer in the GE junction with oozing hemorrhage; induced coagulation with bipolar cautery  Previous placed clips were noted  Proximal to previous place clips was and oozing Beauford Frame erosion that was treated with success  [facility]/28/2020 at [facility] from cardia, chronic GI bleeding secondary to hiatal hernia with Beauford Frame lesion  Review of Systems: Negative for 14 point exam except for HPI  Historical Information   Past Medical History:   Diagnosis Date    ADHD     Anemia     Anxiety     Arthritis     Asthma     Atrial fibrillation (HCC)     Cancer (HCC)     Chronic iron deficiency anemia 6/9/2020    Extensive GI evaluation over the last year including multiple EGD, colonoscopy, and capsule endoscopy    Has had gastric AVMs cauterized, Dieulafoy's lesion clipped, and most recently a Harrison Arnaud erosion cauterized    Coronary artery disease     Depression     GERD (gastroesophageal reflux disease)     Hard to intubate     History of stomach ulcers     Hypercholesterolemia     Hypertension     Kidney disease     [facility] [address] [facility]     [facility]     Past Surgical History:   Procedure Laterality Date    ANKLE SURGERY      APPENDECTOMY      BREAST SURGERY      CATARACT EXTRACTION       SECTION      COLONOSCOPY  2019    Multiple adenomatous colon polyps and internal hemorrhoids  Three year recall advised    HIP SURGERY      JOINT REPLACEMENT  2019    Left knee replacement    LAMINECTOMY      ROTATOR CUFF REPAIR      SIGMOID RESECTION / RECTOPEXY      SINUS SURGERY      UPPER GASTROINTESTINAL ENDOSCOPY  2020    Dieulafoy's lesion in proximal stomach which was actively oozing  Injected with epinephrine and 2 Endoclips placed with control of bleeding, hiatal hernia      UPPER GASTROINTESTINAL ENDOSCOPY  2020    Bleeding Seb's erosion status post cauterization     Social History   Social History     Substance and Sexual Activity   Alcohol Use Not Currently     Social History     Substance and Sexual Activity   Drug Use No     Social History     Tobacco Use   Smoking Status Former Smoker    Years: 20 00    Types: Cigarettes   Smokeless Tobacco Never Used     Family History   Problem Relation Age of Onset   [employer] Breast cancer Mother     Diabetes Mother     Hypertension Mother     Lung cancer Mother     Hyperlipidemia Father     Prostate cancer Father     Colon cancer Paternal Grandmother     Colon cancer Paternal Grandfather     Diabetes Family     Substance Abuse Neg Hx     Mental illness Neg Hx        Meds/Allergies   Current Facility-Administered Medications   Medication Dose Route Frequency    acetaminophen (TYLENOL) tablet 650 mg  650 mg Oral Q6H PRN    Avapritinib TABS 200 mg  200 mg Oral Daily    calcium carbonate (TUMS) chewable tablet 1,000 mg  1,000 mg Oral Daily PRN    diphenhydrAMINE (BENADRYL) tablet 25 mg  25 mg Oral Q6H PRN    hydroxychloroquine (PLAQUENIL) tablet 200 mg  200 mg Oral Every Other Day    [START ON 8/20/2021] hydroxychloroquine (PLAQUENIL) tablet 200 mg  200 mg Oral 2 times per day every other day    metoprolol succinate (TOPROL-XL) 24 hr tablet 25 mg  25 mg Oral Daily    ondansetron (ZOFRAN) injection 4 mg  4 mg Intravenous Q6H PRN    pantoprazole (PROTONIX) injection 40 mg  40 mg Intravenous Q12H DUKE    predniSONE tablet 10 mg  10 mg Oral BID With Meals    traMADol (ULTRAM) tablet 100 mg  100 mg Oral Q12H PRN     Medications Prior to Admission   Medication    acetaminophen (TYLENOL) 500 mg tablet    aspirin 81 mg chewable tablet    Avapritinib (Ayvakit) 200 MG TABS    Azelastine HCl 137 MCG/SPRAY SOLN    calcium carbonate (Tums) 500 mg chewable tablet    diphenhydrAMINE (BENADRYL) 25 mg tablet    diphenoxylate-atropine (LOMOTIL) 2 5-0 025 mg per tablet    docusate sodium (COLACE) 100 mg capsule    ferrous sulfate 324 (65 Fe) mg    hydroxychloroquine (PLAQUENIL) 200 mg tablet    loperamide (IMODIUM) 2 mg capsule    metoprolol succinate (Toprol XL) 25 mg 24 hr tablet    multivitamin-iron-minerals-folic acid (CENTRUM) chewable tablet    ondansetron (ZOFRAN) 8 mg tablet    pantoprazole (PROTONIX) 40 mg tablet    predniSONE 10 mg tablet    traMADol (ULTRAM) 50 mg tablet    Triamcinolone Acetonide (NASACORT ALLERGY 24HR NA)       Allergies   Allergen Reactions    Codeine Other (See Comments)     Throat closes    Codeine Sulfate Throat Swelling    Neomycin-Bacitracin Zn-Polymyx Rash    Wound Dressing Adhesive Other (See Comments)     If its on too long- pt starts itching    Zolmitriptan Palpitations     Heart palpitations    Generic for Zomig             Physical Exam   Constitutional: Is oriented to person, place, and time  Appears well-developed and well-nourished  HENT: WNL  Head: Normocephalic and atraumatic  Eyes: Pupils are equal, round, and reactive to light  Neck: Normal range of motion  Neck supple  Cardiovascular: Normal rate and regular rhythm  Pulmonary/Chest: Effort normal and breath sounds normal    Abdominal: Soft  Bowel sounds are normal    Musculoskeletal: Normal range of motion  Extremities:  No edema  Neurological: Is alert and oriented to person, place, and time  Skin: Skin is warm and dry  Psychiatric: Has a normal mood and affect         Lab Results:   Admission on 08/18/2021   Component Date Value    WBC 08/18/2021 8 92     RBC 08/18/2021 1 47*    Hemoglobin 08/18/2021 4 9*    Hematocrit 08/18/2021 15 7*    MCV 08/18/2021 107*    MCH 08/18/2021 33 3     MCHC 08/18/2021 31 2*    RDW 08/18/2021 15 5*    MPV 08/18/2021 9 3     Platelets 69/71/9956 299     Neutrophils Relative 08/18/2021 92*    Immat GRANS % 08/18/2021 0     Lymphocytes Relative 08/18/2021 2*    Monocytes Relative 08/18/2021 6     Eosinophils Relative 08/18/2021 0     Basophils Relative 08/18/2021 0     Neutrophils Absolute 08/18/2021 8 22*    Immature Grans Absolute 08/18/2021 0 03     Lymphocytes Absolute 08/18/2021 0 15*    Monocytes Absolute 08/18/2021 0 52     Eosinophils Absolute 08/18/2021 0 00     Basophils Absolute 08/18/2021 0 00     Sodium 08/18/2021 136     Potassium 08/18/2021 4 5     Chloride 08/18/2021 101     CO2 08/18/2021 24     ANION GAP 08/18/2021 11     BUN 08/18/2021 30*    Creatinine 08/18/2021 1 69*    Glucose 08/18/2021 113     Calcium 08/18/2021 8 9     Corrected Calcium 08/18/2021 9 6     AST 08/18/2021 41     ALT 08/18/2021 32     Alkaline Phosphatase 08/18/2021 88     Total Protein 08/18/2021 6 8     Albumin 08/18/2021 3 1*    Total Bilirubin 08/18/2021 0 20     eGFR 08/18/2021 30     Troponin I 08/18/2021 0 06*    ABO Grouping 08/18/2021 A     Rh Factor 08/18/2021 Positive     Antibody Screen 08/18/2021 Negative     Specimen Expiration Date 08/18/2021 [id number]     Unit Product Code 08/19/2021 [id number]     Unit Number 08/19/2021 [id number]     Unit ABO 08/19/2021 A     Unit [facility] 08/19/2021 POS     Crossmatch 08/19/2021 Compatible     Unit Dispense Status 08/19/2021 Presumed Trans     Unit Product Volume 08/19/2021 350     Unit Product Code 08/19/2021 [id number]     Unit Number 08/19/2021 [id number]     Unit ABO 08/19/2021 O     Unit RH 08/19/2021 POS     Crossmatch 08/19/2021 Compatible     Unit Dispense Status 08/19/2021 Presumed Trans     Unit Product Volume 08/19/2021 350     Troponin I 08/19/2021 0 07*    Hemoglobin 08/19/2021 6 6*    Troponin I 08/19/2021 0 06*    Blood, UA 08/19/2021 Trace-Intact*    RBC, URINE 08/19/2021 0-1*    Hemoglobin 08/19/2021 6 7*    Sodium 08/19/2021 135*    Potassium 08/19/2021 5 2     Chloride 08/19/2021 100     CO2 08/19/2021 23     ANION GAP 08/19/2021 12     BUN 08/19/2021 31*    Creatinine 08/19/2021 1 40*    Glucose 08/19/2021 112     Glucose, Fasting 08/19/2021 112*    Calcium 08/19/2021 8 6     eGFR 08/19/2021 37      Imaging Studies: I have personally reviewed pertinent reports  EKG, Pathology, and Other Studies: I have personally reviewed pertinent reports  Counseling / Coordination of Care  Total floor / unit time spent today 30 minutes

## 2021-08-19 NOTE — ASSESSMENT & PLAN NOTE
· Follows at [facility]   · S/P resection of rectal tumor 08/2001  · Liver biopsy 04/2005 showed metastatic cancer  · S/p Diagnostic laparoscopy, laparoscopic lysis of adhesions, exploratory laparotomy, resection of pelvic GIST in conjunction with a small bowel resection with primary anastomosis  · On Ayvakit 200mg daily

## 2021-08-19 NOTE — PLAN OF CARE
Problem: Potential for Falls  Goal: Patient will remain free of falls  Description: INTERVENTIONS:  - Educate patient/family on patient safety including physical limitations  - Instruct patient to call for assistance with activity   - Consult OT/PT to assist with strengthening/mobility   - Keep Call bell within reach  - Keep bed low and locked with side rails adjusted as appropriate  - Keep care items and personal belongings within reach  - Initiate and maintain comfort rounds  - Make Fall Risk Sign visible to staff  - Offer Toileting every 1 Hours, in advance of need  - Initiate/Maintain rings appropriately   - Obtain necessary fall risk management equipment: cane standby  - Apply yellow socks and bracelet for high fall risk patients  - Consider moving patient to room near nurses station  Outcome: Progressing

## 2021-08-19 NOTE — ASSESSMENT & PLAN NOTE
Patient admitted with heart rate of , respiratory rate 24-25 likely in the setting of anemia  No evidence of infection  Trend WBC and fever curve  Received packed red cell transfusion  Trend H&H

## 2021-08-19 NOTE — ASSESSMENT & PLAN NOTE
· Outpatient Hgb at 5 1 - repeat in ED 4 9   · Reports intermittent black stools over the last 2 weeks, fatigue, and slight SHEPARD - no lightheadedness or dizziness   · Hx of MARC, gastric AVMs with cauterization 06/2019, and Dieulafoy's lesion clipped 05/2020, and Seb erosion cauterized 06/2020  · Last EGD 06/2020 with Tor[place] Massa erosion cauterization   · Receives transfusions PRN - last was 8/2 in ED for Hgb of 6 9  · Iron panel reviewed  Will start on Venofer  · Ordered 2 units of packed red cell transfusion  On 2nd transfusion, patient developed chills and if was discontinued after have back was given  · Hemoglobin this morning is 6 7  Patient is ordered 1 more unit of packed red cell transfusion  · H&H q 6 hours and transfuse to keep hemoglobin greater than 7  · GI consult appreciated  GI ordered CT abdomen pelvis with contrast  · NPO, sips with meds for now   · EGD in a m    · Protonix 40mg IV BID as suspected source is acute on chronic upper GI bleed

## 2021-08-19 NOTE — ASSESSMENT & PLAN NOTE
· Troponin at 0 06   · Suspect this is secondary to anemia and is a non-MI troponin elevation   · Trend x3 for completeness  · No EKG changes

## 2021-08-19 NOTE — QUICK NOTE
Notified by RN that pt developed chills and nausea 45 minutes s/p start of 2nd unit of PRBCs  Transfusion stopped  Unit of blood sent for transfusion reaction evaluation

## 2021-08-19 NOTE — H&P (VIEW-ONLY)
Consultation - GI   Piyush Love 66 y o  female MRN: [id number]  Unit/Bed#: -01 Encounter: [id number]      Assessment/Plan     1  Acute on chronic anemia:  A patient hemoglobin 5 1, repeated in the emergency room 4 9, she has received 1 5 units of PRBCs (possible reaction with 2nd unit), last serial hemoglobin 6 7  Patient states she had been having some increased weakness and shortness of breath with exertion over the last week  She denies any chest pain, shortness of breath or palpitations bedside exam   She states she has continuous nausea with abdominal discomfort which she states usually occurs with constipation  Denies vomiting  History of multiple clippings and cauterizations due to Dieulafoy and Seb lesions  - schedule EGD, NPO for procedure  - Protonix 40mg IV BID    2  GIST (Gastrointestinal stroma tumor), malignant, colon:   Patient is currently followed at [facility], she was in research for and now takes Ayvakit 200 mg daily  3  Gastric AVM:  History of with EGD in 2019      4  Constipation:   She states she has chronic constipation followed by episodes of diarrhea  She states she takes Colace as needed for constipation  She has tried MiraLax in the past however does not like results  History of hemorrhoids and states she does have periodic hematochezia however routinely with constipation  Positive for melena  - Colace 100 mg p o  Twice daily  - Senna 17 6 mg po Twice Daily   - Dulcolax Supp 10mg daily prn constipation     5  Abdominal Pain:   Patient continues to have increased generalized abdominal discomfort with increased nausea and retching  In the setting of her upper and lower GI bleed history CT of the abdomen with oral contrast if tolerated      - CT abdomin w/oral contrast  - Sips of clears/Clear liquid diet, as tolerated        Inpatient consult to gastroenterology  Consult performed by: LANEY Bowden  Consult ordered by: Lidia Miller YOSELIN          Physician Requesting Consult: Kate Conteh MD  Reason for Consult / Principal Problem: Acute Anemia    HPI: Nadai Vazquez is a 66y o  year old female who presents with past medical history of metastatic gastrointestinal stromal tumor, chronic anemia, gastric AVMs, Dieulafuy lesion, Seb lesion  Patient was seen by her PCP for labs and clearance for back surgery  Hemoglobin was 5 1, she had stated that she had noticed some shortness of breath on exertion over past week as well as excessive fatigue  She denied chest pain, lightheadedness or dizziness  She does receive transfusions as needed, last transfusion 08/02/2021  She was not started on prednisone by her PCP for headaches, patient states her headaches resolved  She denies fever chills, vomiting  She does state that she has ongoing nausea and generalized lower abdominal pain that she states she routinely has when she is feeling constipated  She has not been taking anything regularly for constipation and states that she routinely will have an episode of diarrhea after she finally moved her bowels  EGD 06/03/2020; medium sliding hiatal hernia with Tashia Felton lesion present, small linear ulcer in the GE junction with oozing hemorrhage; induced coagulation with bipolar cautery  Previous placed clips were noted  Proximal to previous place clips was and oozing Tashia Felton erosion that was treated with success  EGD 28/28/2020 at [facility] from cardia, chronic GI bleeding secondary to hiatal hernia with Tashia Felton lesion  Review of Systems: Negative for 14 point exam except for HPI  Historical Information   Past Medical History:   Diagnosis Date    ADHD     Anemia     Anxiety     Arthritis     Asthma     Atrial fibrillation (HCC)     Cancer (HCC)     Chronic iron deficiency anemia 6/9/2020    Extensive GI evaluation over the last year including multiple EGD, colonoscopy, and capsule endoscopy    Has had gastric AVMs cauterized, Dieulafoy's lesion clipped, and most recently a Jean Claude Adilson erosion cauterized    Coronary artery disease     Depression     GERD (gastroesophageal reflux disease)     Hard to intubate     History of stomach ulcers     Hypercholesterolemia     Hypertension     Kidney disease     Metastatic cancer ([facility] )     [facility]     Past Surgical History:   Procedure Laterality Date    ANKLE SURGERY      APPENDECTOMY      BREAST SURGERY      CATARACT EXTRACTION       SECTION      COLONOSCOPY  2019    Multiple adenomatous colon polyps and internal hemorrhoids  Three year recall advised    HIP SURGERY      JOINT REPLACEMENT  2019    Left knee replacement    LAMINECTOMY      ROTATOR CUFF REPAIR      SIGMOID RESECTION / RECTOPEXY      SINUS SURGERY      UPPER GASTROINTESTINAL ENDOSCOPY  2020    Dieulafoy's lesion in proximal stomach which was actively oozing  Injected with epinephrine and 2 Endoclips placed with control of bleeding, hiatal hernia      UPPER GASTROINTESTINAL ENDOSCOPY  2020    Bleeding Seb's erosion status post cauterization     Social History   Social History     Substance and Sexual Activity   Alcohol Use Not Currently     Social History     Substance and Sexual Activity   Drug Use No     Social History     Tobacco Use   Smoking Status Former Smoker    Years: 20 00    Types: Cigarettes   Smokeless Tobacco Never Used     Family History   Problem Relation Age of Onset   [employer] Breast cancer Mother     Diabetes Mother     Hypertension Mother     Lung cancer Mother     Hyperlipidemia Father     Prostate cancer Father     Colon cancer Paternal Grandmother     Colon cancer Paternal Grandfather     Diabetes Family     Substance Abuse Neg Hx     Mental illness Neg Hx        Meds/Allergies   Current Facility-Administered Medications   Medication Dose Route Frequency    acetaminophen (TYLENOL) tablet 650 mg  650 mg Oral Q6H PRN    Avapritinib TABS 200 mg  200 mg Oral Daily    calcium carbonate (TUMS) chewable tablet 1,000 mg  1,000 mg Oral Daily PRN    diphenhydrAMINE (BENADRYL) tablet 25 mg  25 mg Oral Q6H PRN    hydroxychloroquine (PLAQUENIL) tablet 200 mg  200 mg Oral Every Other Day    [START ON 8/20/2021] hydroxychloroquine (PLAQUENIL) tablet 200 mg  200 mg Oral 2 times per day every other day    metoprolol succinate (TOPROL-XL) 24 hr tablet 25 mg  25 mg Oral Daily    ondansetron (ZOFRAN) injection 4 mg  4 mg Intravenous Q6H PRN    pantoprazole (PROTONIX) injection 40 mg  40 mg Intravenous Q12H DUKE    predniSONE tablet 10 mg  10 mg Oral BID With Meals    traMADol (ULTRAM) tablet 100 mg  100 mg Oral Q12H PRN     Medications Prior to Admission   Medication    acetaminophen (TYLENOL) 500 mg tablet    aspirin 81 mg chewable tablet    Avapritinib (Ayvakit) 200 MG TABS    Azelastine HCl 137 MCG/SPRAY SOLN    calcium carbonate (Tums) 500 mg chewable tablet    diphenhydrAMINE (BENADRYL) 25 mg tablet    diphenoxylate-atropine (LOMOTIL) 2 5-0 025 mg per tablet    docusate sodium (COLACE) 100 mg capsule    ferrous sulfate 324 (65 Fe) mg    hydroxychloroquine (PLAQUENIL) 200 mg tablet    loperamide (IMODIUM) 2 mg capsule    metoprolol succinate (Toprol XL) 25 mg 24 hr tablet    multivitamin-iron-minerals-folic acid (CENTRUM) chewable tablet    ondansetron (ZOFRAN) 8 mg tablet    pantoprazole (PROTONIX) 40 mg tablet    predniSONE 10 mg tablet    traMADol (ULTRAM) 50 mg tablet    Triamcinolone Acetonide (NASACORT ALLERGY 24HR NA)       Allergies   Allergen Reactions    Codeine Other (See Comments)     Throat closes    Codeine Sulfate Throat Swelling    Neomycin-Bacitracin Zn-Polymyx Rash    Wound Dressing Adhesive Other (See Comments)     If its on too long- pt starts itching    Zolmitriptan Palpitations     Heart palpitations    Generic for Zomig             Physical Exam   Constitutional: Is oriented to person, place, and time  Appears well-developed and well-nourished  HENT: WNL  Head: Normocephalic and atraumatic  Eyes: Pupils are equal, round, and reactive to light  Neck: Normal range of motion  Neck supple  Cardiovascular: Normal rate and regular rhythm  Pulmonary/Chest: Effort normal and breath sounds normal    Abdominal: Soft  Bowel sounds are normal    Musculoskeletal: Normal range of motion  Extremities:  No edema  Neurological: Is alert and oriented to person, place, and time  Skin: Skin is warm and dry  Psychiatric: Has a normal mood and affect         Lab Results:   Admission on 08/18/2021   Component Date Value    WBC 08/18/2021 8 92     RBC 08/18/2021 1 47*    Hemoglobin 08/18/2021 4 9*    Hematocrit 08/18/2021 15 7*    MCV 08/18/2021 107*    MCH 08/18/2021 33 3     MCHC 08/18/2021 31 2*    RDW 08/18/2021 15 5*    MPV 08/18/2021 9 3     Platelets 21/66/5314 299     Neutrophils Relative 08/18/2021 92*    Immat GRANS % 08/18/2021 0     Lymphocytes Relative 08/18/2021 2*    Monocytes Relative 08/18/2021 6     Eosinophils Relative 08/18/2021 0     Basophils Relative 08/18/2021 0     Neutrophils Absolute 08/18/2021 8 22*    Immature Grans Absolute 08/18/2021 0 03     Lymphocytes Absolute 08/18/2021 0 15*    Monocytes Absolute 08/18/2021 0 52     Eosinophils Absolute 08/18/2021 0 00     Basophils Absolute 08/18/2021 0 00     Sodium 08/18/2021 136     Potassium 08/18/2021 4 5     Chloride 08/18/2021 101     CO2 08/18/2021 24     ANION GAP 08/18/2021 11     BUN 08/18/2021 30*    Creatinine 08/18/2021 1 69*    Glucose 08/18/2021 113     Calcium 08/18/2021 8 9     Corrected Calcium 08/18/2021 9 6     AST 08/18/2021 41     ALT 08/18/2021 32     Alkaline Phosphatase 08/18/2021 88     Total Protein 08/18/2021 6 8     Albumin 08/18/2021 3 1*    Total Bilirubin 08/18/2021 0 20     eGFR 08/18/2021 30     Troponin I 08/18/2021 0 06*    ABO Grouping 08/18/2021 A     Rh Factor 08/18/2021 Positive     Antibody Screen 08/18/2021 Negative     Specimen Expiration Date 08/18/2021 [id number]     Unit Product Code 08/19/2021 P0030A49     Unit Number 08/19/2021 [id number]     Unit ABO 08/19/2021 A     Unit [facility] 08/19/2021 POS     Crossmatch 08/19/2021 Compatible     Unit Dispense Status 08/19/2021 Presumed Trans     Unit Product Volume 08/19/2021 350     Unit Product Code 08/19/2021 [id number]     Unit Number 08/19/2021 [id number]     Unit ABO 08/19/2021 O     Unit RH 08/19/2021 POS     Crossmatch 08/19/2021 Compatible     Unit Dispense Status 08/19/2021 Presumed Trans     Unit Product Volume 08/19/2021 350     Troponin I 08/19/2021 0 07*    Hemoglobin 08/19/2021 6 6*    Troponin I 08/19/2021 0 06*    Blood, UA 08/19/2021 Trace-Intact*    RBC, URINE 08/19/2021 0-1*    Hemoglobin 08/19/2021 6 7*    Sodium 08/19/2021 135*    Potassium 08/19/2021 5 2     Chloride 08/19/2021 100     CO2 08/19/2021 23     ANION GAP 08/19/2021 12     BUN 08/19/2021 31*    Creatinine 08/19/2021 1 40*    Glucose 08/19/2021 112     Glucose, Fasting 08/19/2021 112*    Calcium 08/19/2021 8 6     eGFR 08/19/2021 37      Imaging Studies: I have personally reviewed pertinent reports  EKG, Pathology, and Other Studies: I have personally reviewed pertinent reports  Counseling / Coordination of Care  Total floor / unit time spent today 30 minutes

## 2021-08-19 NOTE — ASSESSMENT & PLAN NOTE
Lab Results   Component Value Date    EGFR 30 08/18/2021    EGFR 55 08/02/2021    EGFR 62 06/02/2021    CREATININE 1 69 (H) 08/18/2021    CREATININE 1 02 08/02/2021    CREATININE 0 92 06/02/2021   · Baseline Cr 1 4-1 5  · Cr at 1 69 on admission - does not meet IVIS criteria  · Suspect anemia is contributory   · Recheck BMP in AM  · Avoid hypotension

## 2021-08-19 NOTE — H&P
George Thuyon  H&P- Morgan Fisher 1950, 66 y o  female MRN: [id number]  Unit/Bed#: -01 Encounter: [id number]  Primary Care Provider: Angely Vieira MD   Date and time admitted to hospital: 8/18/2021  8:39 PM    * Acute on chronic anemia  Assessment & Plan  · Outpatient Hgb at 5 1 - repeat in ED 4 9   · Reports intermittent black stools over the last 2 weeks, fatigue, and slight SHEPARD - no lightheadedness or dizziness   · Hx of MARC, gastric AVMs with cauterization 06/2019, and Dieulafoy's lesion clipped 05/2020, and Seb erosion cauterized 06/2020  · Last EGD 06/2020 with Francisca Raymondll erosion cauterization   · Receives transfusions PRN - last was 8/2 in ED for Hgb of 6 9  · Obtain iron panel   · Will transfuse 2U PRBCs to start with recheck 2 hours post completion   · GI consult for possible EGD  · NPO, sips with meds for now   · Protonix 40mg IV BID as suspected source is acute on chronic upper GI bleed     Gastric AVM  Assessment & Plan  · Hx of in 06/2019     Elevated troponin level not due myocardial infarction  Assessment & Plan  · Troponin at 0 06   · Suspect this is secondary to anemia and is a non-MI troponin elevation   · Trend x3 for completeness  · No EKG changes     GIST (gastrointestinal stroma tumor), malignant, colon ([facility] [address] )  Assessment & Plan  · [facility] at [facility]   · S/P resection of rectal tumor 08/2001  · Liver biopsy 04/2005 showed metastatic cancer  · S/p Diagnostic laparoscopy, laparoscopic lysis of adhesions, exploratory laparotomy, resection of pelvic GIST in conjunction with a small bowel resection with primary anastomosis  · On Ayvakit 200mg daily     Stage 3 chronic kidney disease [facility])  Assessment & Plan  Lab Results   Component Value Date    EGFR 30 08/18/2021    EGFR 55 08/02/2021    EGFR 62 06/02/2021    CREATININE 1 69 (H) 08/18/2021    CREATININE 1 02 08/02/2021    CREATININE 0 92 06/02/2021   · Baseline Cr 1 4-1 5  · Cr at 1 69 on admission - does not meet IVIS criteria  · Suspect anemia is contributory   · Recheck BMP in AM  · Avoid hypotension     Paroxysmal atrial fibrillation (HCC)  Assessment & Plan  · Follows with cardiology at [facility]  · Continue metoprolol 25mg daily   · Only on daily ASA 81mg - held in setting of anemia     Essential hypertension  Assessment & Plan  · On metoprolol  · Continue with increased hold parameter    Anxiety  Assessment & Plan  · No home anxiolytics     VTE Pharmacologic Prophylaxis: VTE Score: 2 Low Risk (Score 0-2) - Encourage Ambulation  Code Status: Level 1 - Full Code   Discussion with family: Patient declined call to   said she will call her  herself     Anticipated Length of Stay: Patient will be admitted on an inpatient basis with an anticipated length of stay of greater than 2 midnights secondary to acute on chronic anemia   Total Time for Visit, including Counseling / Coordination of Care: 60 minutes Greater than 50% of this total time spent on direct patient counseling and coordination of care  Chief Complaint: "My doctor's office called and told me my hemoglobin was low"    History of Present Illness:  Wil Deal is a 66 y o  female with a PMH of metastatic GIST tumor s/p radiation and surgery on daily chemo, chronic anemia  MARC, gastric AVMs, Dieulafoy lesion, Seb lesion, PAF, chronic back pain, and HTN who presents with per PCP guidance for hemoglobin of 5 1 that was obtained on outpatient labs for clearance for back surgery  Patient reports that she has noticed some dyspnea on exertion over the last 1 week as well as excessive fatigue  Has had 2 episodes of short lasting palpitations over the last 2 weeks and has had intermittent black stools  She denies any lightheaded or dizziness  No chest pain    Patient reports that she receives blood transfusions as needed, with her last on 08/02 in the ED  patient reports that she was started on prednisone yesterday by her PCP in an attempt to resolve an ongoing headache that the patient was having  On admission, patient reports that headache has resolved  No fever, chills, nausea, emesis, or urinary symptoms  Review of Systems:  Review of Systems   Constitutional: Positive for fatigue  Negative for chills and fever  HENT: Negative for congestion  Respiratory: Positive for shortness of breath  Negative for cough  Cardiovascular: Positive for palpitations  Negative for chest pain and leg swelling  Gastrointestinal: Positive for blood in stool  Negative for abdominal pain, constipation, diarrhea, nausea and vomiting  Genitourinary: Negative for difficulty urinating and dysuria  Musculoskeletal: Positive for back pain (Chronic)  Neurological: Negative for dizziness, syncope, weakness, light-headedness and numbness  All other systems reviewed and are negative  Past Medical and Surgical History:   Past Medical History:   Diagnosis Date    ADHD     Anemia     Anxiety     Arthritis     Asthma     Atrial fibrillation (HCC)     Cancer ([facility] )     Chronic iron deficiency anemia 2020    Extensive GI evaluation over the last year including multiple EGD, colonoscopy, and capsule endoscopy  Has had gastric AVMs cauterized, Dieulafoy's lesion clipped, and most recently a Robert Tom erosion cauterized    Coronary artery disease     Depression     GERD (gastroesophageal reflux disease)     Hard to intubate     History of stomach ulcers     Hypercholesterolemia     Hypertension     Kidney disease     Metastatic cancer ([facility] [address] )     Sepsis due to Escherichia coli [facility] ) 2021       Past Surgical History:   Procedure Laterality Date    ANKLE SURGERY      APPENDECTOMY      BREAST SURGERY      CATARACT EXTRACTION       SECTION      COLONOSCOPY  2019    Multiple adenomatous colon polyps and internal hemorrhoids    Three year recall advised    HIP SURGERY      JOINT REPLACEMENT  2019    Left knee replacement    LAMINECTOMY      ROTATOR CUFF REPAIR      SIGMOID RESECTION / RECTOPEXY      SINUS SURGERY      UPPER GASTROINTESTINAL ENDOSCOPY  05/01/2020    Dieulafoy's lesion in proximal stomach which was actively oozing  Injected with epinephrine and 2 Endoclips placed with control of bleeding, hiatal hernia   UPPER GASTROINTESTINAL ENDOSCOPY  06/2020    Bleeding Seb's erosion status post cauterization       Meds/Allergies:  Prior to Admission medications    Medication Sig Start Date End Date Taking?  Authorizing Provider   acetaminophen (TYLENOL) 500 mg tablet Take 500 mg by mouth every 6 (six) hours as needed for mild pain   Yes Historical Provider, MD   aspirin 81 mg chewable tablet Chew 81 mg daily   Yes Historical Provider, MD   Avapritinib (Ayvakit) 200 MG TABS Take 200 mg by mouth daily   Yes Historical Provider, MD   Azelastine HCl 137 MCG/SPRAY SOLN instill 2 sprays into each nostril twice a day if needed for congestion 2/21/18  Yes Historical Provider, MD   calcium carbonate (Tums) 500 mg chewable tablet Chew 1 tablet    Yes Historical Provider, MD   diphenhydrAMINE (BENADRYL) 25 mg tablet Take 25 mg by mouth every 6 (six) hours as needed for itching   Yes Historical Provider, MD   diphenoxylate-atropine (LOMOTIL) 2 5-0 025 mg per tablet TAKE 1 TABLET BY MOUTH 4 TIMES A DAY AS NEEDED FOR DIARRHEA 8/10/20  Yes Susan Villatoro MD   docusate sodium (COLACE) 100 mg capsule Take 100 mg by mouth 2 (two) times a day as needed for constipation    Yes Historical Provider, MD   ferrous sulfate 324 (65 Fe) mg Take 1 tablet (324 mg total) by mouth 2 (two) times a day before meals 6/29/20 8/18/21 Yes Karli Queen MD   hydroxychloroquine (PLAQUENIL) 200 mg tablet Take 200 mg by mouth 2 (two) times a day with meals Alternate with daily and BID   Yes Historical Provider, MD   loperamide (IMODIUM) 2 mg capsule Take 2 capsules by mouth 4 (four) times a day as needed    Yes Historical Provider, MD metoprolol succinate (Toprol XL) 25 mg 24 hr tablet Take 0 5 tablets (12 5 mg total) by mouth daily  Patient taking differently: Take 25 mg by mouth daily  6/5/20  Yes Savita Malone, DO   multivitamin-iron-minerals-folic acid (CENTRUM) chewable tablet Chew 1 tablet daily   Yes Historical Provider, MD   ondansetron (ZOFRAN) 8 mg tablet 8 mg daily with breakfast Prior to chemo 10/17/19  Yes Historical Provider, MD   pantoprazole (PROTONIX) 40 mg tablet take 1 tablet by mouth twice a day 7/31/20  Yes LANEY San   predniSONE 10 mg tablet Take 1 tablet (10 mg total) by mouth 2 (two) times a day with meals for 5 days 8/17/21 8/22/21 Yes Annelise Diaz MD   traMADol Vickye Linda) 50 mg tablet take 2 tablets by mouth four times a day 8/13/21  Yes Savita Malone, DO   Triamcinolone Acetonide (NASACORT ALLERGY 24HR NA) into each nostril 1 spray each nostril--at bedtime   (OTC)     Historical Provider, MD   amoxicillin (AMOXIL) 500 mg capsule take 4 capsules by mouth 1 hour prior to appointment 7/5/21 8/18/21  Historical Provider, MD   amoxicillin (AMOXIL) 875 mg tablet take 1 tablet by mouth every 12 hours until finished 7/27/21 8/18/21  Historical Provider, MD   NON FORMULARY Take 2 tablets by mouth daily Chemo: Blue 285  8/18/21  Historical Provider, MD   prednisoLONE acetate (PRED FORTE) 1 % ophthalmic suspension  4/7/20 8/18/21  Historical Provider, MD     I have reviewed home medications with patient personally  Allergies: Allergies   Allergen Reactions    Codeine Other (See Comments)     Throat closes    Codeine Sulfate Throat Swelling    Neomycin-Bacitracin Zn-Polymyx Rash    Wound Dressing Adhesive Other (See Comments)     If its on too long- pt starts itching    Zolmitriptan Palpitations     Heart palpitations    Generic for Zomig         Social History:  Marital Status: /Civil Union   Occupation: retired   Patient Pre-hospital Living Situation: Home, With spouse  Patient Pre-hospital Level of Mobility: walks with walker  Patient Pre-hospital Diet Restrictions: none   Substance Use History:   Social History     Substance and Sexual Activity   Alcohol Use Not Currently     Social History     Tobacco Use   Smoking Status Former Smoker    Years: 20 00    Types: Cigarettes   Smokeless Tobacco Never Used     Social History     Substance and Sexual Activity   Drug Use No       Family History:  Family History   Problem Relation Age of Onset   Lico Grimes Breast cancer Mother     Diabetes Mother     Hypertension Mother     Lung cancer Mother     Hyperlipidemia Father     Prostate cancer Father     Colon cancer Paternal Grandmother     Colon cancer Paternal Grandfather     Diabetes Family     Substance Abuse Neg Hx     Mental illness Neg Hx        Physical Exam:     Vitals:   Blood Pressure: 134/74 (08/18/21 2323)  Pulse: (!) 110 (08/18/21 2323)  Temperature: 98 7 °F (37 1 °C) (08/18/21 2323)  Temp Source: Oral (08/18/21 2224)  Respirations: 20 (08/18/21 2323)  Height: 5' 1" (154 9 cm) (08/18/21 2224)  Weight - Scale: 55 kg (121 lb 4 1 oz) (08/18/21 2224)  SpO2: 98 % (08/18/21 2200)    Physical Exam  Vitals and nursing note reviewed  Constitutional:       Appearance: Normal appearance  HENT:      Head: Normocephalic  Nose: Nose normal       Mouth/Throat:      Mouth: Mucous membranes are moist    Eyes:      Extraocular Movements: Extraocular movements intact  Comments: Pale conjunctiva   Cardiovascular:      Rate and Rhythm: Regular rhythm  Tachycardia present  Pulses: Normal pulses  Heart sounds: No murmur heard  Pulmonary:      Effort: Pulmonary effort is normal       Breath sounds: Normal breath sounds  No wheezing, rhonchi or rales  Abdominal:      General: Abdomen is flat  Palpations: Abdomen is soft  Tenderness: There is no abdominal tenderness  Musculoskeletal:         General: Normal range of motion  Cervical back: Normal range of motion        Right lower leg: No edema  Left lower leg: No edema  Skin:     General: Skin is warm and dry  Capillary Refill: Capillary refill takes 2 to 3 seconds  Coloration: Skin is pale (Extremely)  Neurological:      General: No focal deficit present  Mental Status: She is alert and oriented to person, place, and time  Psychiatric:         Mood and Affect: Mood normal          Thought Content: Thought content normal           Additional Data:     Lab Results:  Results from last 7 days   Lab Units 08/18/21  2119   WBC Thousand/uL 8 92   HEMOGLOBIN g/dL 4 9*   HEMATOCRIT % 15 7*   PLATELETS Thousands/uL 299   NEUTROS PCT % 92*   LYMPHS PCT % 2*   MONOS PCT % 6   EOS PCT % 0     Results from last 7 days   Lab Units 08/18/21  2119   SODIUM mmol/L 136   POTASSIUM mmol/L 4 5   CHLORIDE mmol/L 101   CO2 mmol/L 24   BUN mg/dL 30*   CREATININE mg/dL 1 69*   ANION GAP mmol/L 11   CALCIUM mg/dL 8 9   ALBUMIN g/dL 3 1*   TOTAL BILIRUBIN mg/dL 0 20   ALK PHOS U/L 88   ALT U/L 32   AST U/L 41   GLUCOSE RANDOM mg/dL 113                       Imaging: Personally reviewed the following imaging: chest xray  XR chest 1 view portable    (Results Pending)       EKG and Other Studies Reviewed on Admission:   · EKG: Sinus Tachycardia   bpm  Known RBBB  No acute ischemia  normal QT     ** Please Note: This note has been constructed using a voice recognition system   **

## 2021-08-19 NOTE — CASE MANAGEMENT
LOS: 1 day  PATIENT IS NOT A MEDICARE BUNDLE OR A 30 DAY READMISSION  ADMITTED AS OBSERVATION STATUS  Met with patient  Explained role of care management  Patient lives with spouse and son Chasidy Santos in a [address] [facility] with 1 KAREN  She is independent adl's, uses cane/walker to ambulate, drives, provided her own meals  DME - cane, RW x2, BSC, grab bars in shower  Past services - [facility]  Denies history of SNF, MH or D&A  Pharmacy of choice is [facility] on [address] in [facility]  She has a prescription plan and is able to afford her medication  Her PCP is Marcia Moralez  Her spouse Chasidy Santos is her contact/POA - [phone]  She has an AD  She plans on returning home at discharge and does not feel that she will need VNA  Her spouse would help at home if needed  Her spouse will transport home  Will follow  CM reviewed d/c planning process including the following: identifying help at home, patient preference for d/c planning needs, availability of treatment team to discuss questions or concerns patient and/or family may have regarding understanding medications and recognizing signs and symptoms once discharged  CM also encouraged patient to follow up with all recommended appointments after discharge  Patient advised of importance for patient and family to participate in managing patients medical well being

## 2021-08-19 NOTE — ASSESSMENT & PLAN NOTE
· Follows at [facility]   · S/P resection of rectal tumor 08/2001  · Liver biopsy 04/2005 showed metastatic cancer  · S/p Diagnostic laparoscopy, laparoscopic lysis of adhesions, exploratory laparotomy, resection of pelvic GIST in conjunction with a small bowel resection with primary anastomosis  · On Ayvakit 200mg daily

## 2021-08-20 ENCOUNTER — APPOINTMENT (INPATIENT)
Dept: RADIOLOGY | Facility: HOSPITAL | Age: 71
DRG: 374 | End: 2021-08-20
Payer: COMMERCIAL

## 2021-08-20 ENCOUNTER — TELEPHONE (OUTPATIENT)
Dept: SURGERY | Facility: HOSPITAL | Age: 71
End: 2021-08-20

## 2021-08-20 ENCOUNTER — APPOINTMENT (INPATIENT)
Dept: NON INVASIVE DIAGNOSTICS | Facility: HOSPITAL | Age: 71
DRG: 374 | End: 2021-08-20
Payer: COMMERCIAL

## 2021-08-20 PROBLEM — R78.81 BACTEREMIA: Status: ACTIVE | Noted: 2021-08-20

## 2021-08-20 PROBLEM — J96.01 ACUTE RESPIRATORY FAILURE WITH HYPOXIA (HCC): Status: ACTIVE | Noted: 2021-08-20

## 2021-08-20 PROBLEM — R65.10 SIRS (SYSTEMIC INFLAMMATORY RESPONSE SYNDROME) (HCC): Status: RESOLVED | Noted: 2021-08-19 | Resolved: 2021-08-20

## 2021-08-20 PROBLEM — T17.908A ASPIRATION INTO AIRWAY: Status: ACTIVE | Noted: 2021-08-20

## 2021-08-20 PROBLEM — R57.9 SHOCK (HCC): Status: ACTIVE | Noted: 2021-08-20

## 2021-08-20 PROBLEM — R65.20 SEVERE SEPSIS (HCC): Status: ACTIVE | Noted: 2020-02-07

## 2021-08-20 LAB
ABO GROUP BLD BPU: NORMAL
ALBUMIN SERPL BCP-MCNC: 2.5 G/DL (ref 3.5–5)
ALBUMIN SERPL BCP-MCNC: 3 G/DL (ref 3.5–5)
ALP SERPL-CCNC: 117 U/L (ref 46–116)
ALP SERPL-CCNC: 133 U/L (ref 46–116)
ALT SERPL W P-5'-P-CCNC: 36 U/L (ref 12–78)
ALT SERPL W P-5'-P-CCNC: 41 U/L (ref 12–78)
ANION GAP SERPL CALCULATED.3IONS-SCNC: 10 MMOL/L (ref 4–13)
ANION GAP SERPL CALCULATED.3IONS-SCNC: 14 MMOL/L (ref 4–13)
ANION GAP SERPL CALCULATED.3IONS-SCNC: 16 MMOL/L (ref 4–13)
AST SERPL W P-5'-P-CCNC: 48 U/L (ref 5–45)
AST SERPL W P-5'-P-CCNC: 80 U/L (ref 5–45)
ATRIAL RATE: 101 BPM
ATRIAL RATE: 121 BPM
ATRIAL RATE: 98 BPM
BACTERIA UR QL AUTO: ABNORMAL /HPF
BASE EXCESS BLDA CALC-SCNC: -3 MMOL/L (ref -2–3)
BASOPHILS # BLD AUTO: 0.01 THOUSANDS/ΜL (ref 0–0.1)
BASOPHILS NFR BLD AUTO: 0 % (ref 0–1)
BILIRUB SERPL-MCNC: 0.8 MG/DL (ref 0.2–1)
BILIRUB SERPL-MCNC: 1.1 MG/DL (ref 0.2–1)
BILIRUB UR QL STRIP: NEGATIVE
BPU ID: NORMAL
BUN SERPL-MCNC: 28 MG/DL (ref 5–25)
BUN SERPL-MCNC: 32 MG/DL (ref 5–25)
BUN SERPL-MCNC: 33 MG/DL (ref 5–25)
CALCIUM ALBUM COR SERPL-MCNC: 9.3 MG/DL (ref 8.3–10.1)
CALCIUM ALBUM COR SERPL-MCNC: 9.6 MG/DL (ref 8.3–10.1)
CALCIUM SERPL-MCNC: 8 MG/DL (ref 8.3–10.1)
CALCIUM SERPL-MCNC: 8.1 MG/DL (ref 8.3–10.1)
CALCIUM SERPL-MCNC: 8.8 MG/DL (ref 8.3–10.1)
CHLORIDE SERPL-SCNC: 100 MMOL/L (ref 100–108)
CHLORIDE SERPL-SCNC: 101 MMOL/L (ref 100–108)
CHLORIDE SERPL-SCNC: 98 MMOL/L (ref 100–108)
CLARITY UR: CLEAR
CO2 SERPL-SCNC: 22 MMOL/L (ref 21–32)
CO2 SERPL-SCNC: 22 MMOL/L (ref 21–32)
CO2 SERPL-SCNC: 23 MMOL/L (ref 21–32)
COLOR UR: YELLOW
CREAT SERPL-MCNC: 1.19 MG/DL (ref 0.6–1.3)
CREAT SERPL-MCNC: 1.23 MG/DL (ref 0.6–1.3)
CREAT SERPL-MCNC: 1.34 MG/DL (ref 0.6–1.3)
CROSSMATCH: NORMAL
EOSINOPHIL # BLD AUTO: 0.01 THOUSAND/ΜL (ref 0–0.61)
EOSINOPHIL NFR BLD AUTO: 0 % (ref 0–6)
ERYTHROCYTE [DISTWIDTH] IN BLOOD BY AUTOMATED COUNT: 19.4 % (ref 11.6–15.1)
ERYTHROCYTE [DISTWIDTH] IN BLOOD BY AUTOMATED COUNT: 19.5 % (ref 11.6–15.1)
FIO2 GAS DIL.REBREATH: 100 L
GFR SERPL CREATININE-BSD FRML MDRD: 39 ML/MIN/1.73SQ M
GFR SERPL CREATININE-BSD FRML MDRD: 44 ML/MIN/1.73SQ M
GFR SERPL CREATININE-BSD FRML MDRD: 45 ML/MIN/1.73SQ M
GLUCOSE SERPL-MCNC: 108 MG/DL (ref 65–140)
GLUCOSE SERPL-MCNC: 111 MG/DL (ref 65–140)
GLUCOSE SERPL-MCNC: 117 MG/DL (ref 65–140)
GLUCOSE SERPL-MCNC: 121 MG/DL (ref 65–140)
GLUCOSE SERPL-MCNC: 127 MG/DL (ref 65–140)
GLUCOSE SERPL-MCNC: 145 MG/DL (ref 65–140)
GLUCOSE UR STRIP-MCNC: NEGATIVE MG/DL
HCO3 BLDA-SCNC: 20.3 MMOL/L (ref 22–28)
HCT VFR BLD AUTO: 25 % (ref 34.8–46.1)
HCT VFR BLD AUTO: 28.9 % (ref 34.8–46.1)
HCT VFR BLD CALC: 25 % (ref 34.8–46.1)
HGB BLD-MCNC: 7.4 G/DL (ref 11.5–15.4)
HGB BLD-MCNC: 7.8 G/DL (ref 11.5–15.4)
HGB BLD-MCNC: 8.2 G/DL (ref 11.5–15.4)
HGB BLD-MCNC: 9.7 G/DL (ref 11.5–15.4)
HGB BLDA-MCNC: 8.5 G/DL (ref 11.5–15.4)
HGB UR QL STRIP.AUTO: ABNORMAL
IMM GRANULOCYTES # BLD AUTO: 0.13 THOUSAND/UL (ref 0–0.2)
IMM GRANULOCYTES NFR BLD AUTO: 1 % (ref 0–2)
INR PPP: 1.25 (ref 0.84–1.19)
KETONES UR STRIP-MCNC: NEGATIVE MG/DL
L PNEUMO1 AG UR QL IA.RAPID: NEGATIVE
LACTATE SERPL-SCNC: 1.2 MMOL/L (ref 0.5–2)
LACTATE SERPL-SCNC: 2.1 MMOL/L (ref 0.5–2)
LACTATE SERPL-SCNC: 5.2 MMOL/L (ref 0.5–2)
LEUKOCYTE ESTERASE UR QL STRIP: ABNORMAL
LYMPHOCYTES # BLD AUTO: 0.14 THOUSANDS/ΜL (ref 0.6–4.47)
LYMPHOCYTES NFR BLD AUTO: 1 % (ref 14–44)
MAGNESIUM SERPL-MCNC: 2.1 MG/DL (ref 1.6–2.6)
MAGNESIUM SERPL-MCNC: 2.3 MG/DL (ref 1.6–2.6)
MCH RBC QN AUTO: 31.4 PG (ref 26.8–34.3)
MCH RBC QN AUTO: 32.1 PG (ref 26.8–34.3)
MCHC RBC AUTO-ENTMCNC: 32.8 G/DL (ref 31.4–37.4)
MCHC RBC AUTO-ENTMCNC: 33.6 G/DL (ref 31.4–37.4)
MCV RBC AUTO: 96 FL (ref 82–98)
MCV RBC AUTO: 96 FL (ref 82–98)
MONOCYTES # BLD AUTO: 0.67 THOUSAND/ΜL (ref 0.17–1.22)
MONOCYTES NFR BLD AUTO: 4 % (ref 4–12)
MUCOUS THREADS UR QL AUTO: ABNORMAL
NEUTROPHILS # BLD AUTO: 18.02 THOUSANDS/ΜL (ref 1.85–7.62)
NEUTS SEG NFR BLD AUTO: 94 % (ref 43–75)
NITRITE UR QL STRIP: NEGATIVE
NON-SQ EPI CELLS URNS QL MICRO: ABNORMAL /HPF
NRBC BLD AUTO-RTO: 0 /100 WBCS
NT-PROBNP SERPL-MCNC: ABNORMAL PG/ML
OTHER STN SPEC: ABNORMAL
P AXIS: 48 DEGREES
P AXIS: 73 DEGREES
P AXIS: 90 DEGREES
PCO2 BLD: 21 MMOL/L (ref 21–32)
PCO2 BLD: 29.6 MM HG (ref 36–44)
PH BLD: 7.44 [PH] (ref 7.35–7.45)
PH UR STRIP.AUTO: 5 [PH]
PLATELET # BLD AUTO: 243 THOUSANDS/UL (ref 149–390)
PLATELET # BLD AUTO: 279 THOUSANDS/UL (ref 149–390)
PMV BLD AUTO: 10.1 FL (ref 8.9–12.7)
PMV BLD AUTO: 9.5 FL (ref 8.9–12.7)
PO2 BLD: 63 MM HG (ref 75–129)
POTASSIUM BLD-SCNC: 3.8 MMOL/L (ref 3.5–5.3)
POTASSIUM SERPL-SCNC: 3.2 MMOL/L (ref 3.5–5.3)
POTASSIUM SERPL-SCNC: 4.2 MMOL/L (ref 3.5–5.3)
POTASSIUM SERPL-SCNC: 4.7 MMOL/L (ref 3.5–5.3)
PR INTERVAL: 136 MS
PR INTERVAL: 140 MS
PR INTERVAL: 182 MS
PROCALCITONIN SERPL-MCNC: 5.99 NG/ML
PROT SERPL-MCNC: 6.4 G/DL (ref 6.4–8.2)
PROT SERPL-MCNC: 7.1 G/DL (ref 6.4–8.2)
PROT UR STRIP-MCNC: NEGATIVE MG/DL
PROTHROMBIN TIME: 15.7 SECONDS (ref 11.6–14.5)
QRS AXIS: -40 DEGREES
QRS AXIS: -44 DEGREES
QRS AXIS: 8 DEGREES
QRSD INTERVAL: 130 MS
QRSD INTERVAL: 130 MS
QRSD INTERVAL: 132 MS
QT INTERVAL: 310 MS
QT INTERVAL: 374 MS
QT INTERVAL: 406 MS
QTC INTERVAL: 440 MS
QTC INTERVAL: 484 MS
QTC INTERVAL: 518 MS
RBC # BLD AUTO: 2.61 MILLION/UL (ref 3.81–5.12)
RBC # BLD AUTO: 3.02 MILLION/UL (ref 3.81–5.12)
RBC #/AREA URNS AUTO: ABNORMAL /HPF
S PNEUM AG UR QL: NEGATIVE
SAO2 % BLD FROM PO2: 93 % (ref 60–85)
SARS-COV-2 RNA RESP QL NAA+PROBE: NEGATIVE
SODIUM BLD-SCNC: 133 MMOL/L (ref 136–145)
SODIUM SERPL-SCNC: 133 MMOL/L (ref 136–145)
SODIUM SERPL-SCNC: 134 MMOL/L (ref 136–145)
SODIUM SERPL-SCNC: 139 MMOL/L (ref 136–145)
SP GR UR STRIP.AUTO: 1.01 (ref 1–1.03)
SPECIMEN SOURCE: ABNORMAL
T WAVE AXIS: 35 DEGREES
T WAVE AXIS: 75 DEGREES
T WAVE AXIS: 78 DEGREES
TROPONIN I SERPL-MCNC: 0.4 NG/ML
TROPONIN I SERPL-MCNC: 0.56 NG/ML
TROPONIN I SERPL-MCNC: 0.79 NG/ML
UNIT DISPENSE STATUS: NORMAL
UNIT PRODUCT CODE: NORMAL
UNIT PRODUCT VOLUME: 350 ML
UNIT RH: NORMAL
UROBILINOGEN UR QL STRIP.AUTO: 0.2 E.U./DL
VENTRICULAR RATE: 101 BPM
VENTRICULAR RATE: 121 BPM
VENTRICULAR RATE: 98 BPM
WBC # BLD AUTO: 18.98 THOUSAND/UL (ref 4.31–10.16)
WBC # BLD AUTO: 27.3 THOUSAND/UL (ref 4.31–10.16)
WBC #/AREA URNS AUTO: ABNORMAL /HPF

## 2021-08-20 PROCEDURE — U0005 INFEC AGEN DETEC AMPLI PROBE: HCPCS | Performed by: NURSE PRACTITIONER

## 2021-08-20 PROCEDURE — 99291 CRITICAL CARE FIRST HOUR: CPT | Performed by: NURSE PRACTITIONER

## 2021-08-20 PROCEDURE — 94150 VITAL CAPACITY TEST: CPT

## 2021-08-20 PROCEDURE — 94640 AIRWAY INHALATION TREATMENT: CPT

## 2021-08-20 PROCEDURE — 99232 SBSQ HOSP IP/OBS MODERATE 35: CPT | Performed by: INTERNAL MEDICINE

## 2021-08-20 PROCEDURE — 87077 CULTURE AEROBIC IDENTIFY: CPT | Performed by: NURSE PRACTITIONER

## 2021-08-20 PROCEDURE — 87449 NOS EACH ORGANISM AG IA: CPT | Performed by: NURSE PRACTITIONER

## 2021-08-20 PROCEDURE — 84132 ASSAY OF SERUM POTASSIUM: CPT

## 2021-08-20 PROCEDURE — 71045 X-RAY EXAM CHEST 1 VIEW: CPT

## 2021-08-20 PROCEDURE — 83880 ASSAY OF NATRIURETIC PEPTIDE: CPT | Performed by: PHYSICIAN ASSISTANT

## 2021-08-20 PROCEDURE — 99292 CRITICAL CARE ADDL 30 MIN: CPT | Performed by: INTERNAL MEDICINE

## 2021-08-20 PROCEDURE — 94002 VENT MGMT INPAT INIT DAY: CPT

## 2021-08-20 PROCEDURE — 87205 SMEAR GRAM STAIN: CPT | Performed by: NURSE PRACTITIONER

## 2021-08-20 PROCEDURE — 85018 HEMOGLOBIN: CPT | Performed by: PHYSICIAN ASSISTANT

## 2021-08-20 PROCEDURE — 93306 TTE W/DOPPLER COMPLETE: CPT

## 2021-08-20 PROCEDURE — 93005 ELECTROCARDIOGRAM TRACING: CPT

## 2021-08-20 PROCEDURE — C9113 INJ PANTOPRAZOLE SODIUM, VIA: HCPCS | Performed by: PHYSICIAN ASSISTANT

## 2021-08-20 PROCEDURE — 94664 DEMO&/EVAL PT USE INHALER: CPT

## 2021-08-20 PROCEDURE — 81001 URINALYSIS AUTO W/SCOPE: CPT | Performed by: NURSE PRACTITIONER

## 2021-08-20 PROCEDURE — 87081 CULTURE SCREEN ONLY: CPT | Performed by: PHYSICIAN ASSISTANT

## 2021-08-20 PROCEDURE — 93010 ELECTROCARDIOGRAM REPORT: CPT | Performed by: INTERNAL MEDICINE

## 2021-08-20 PROCEDURE — 87040 BLOOD CULTURE FOR BACTERIA: CPT | Performed by: NURSE PRACTITIONER

## 2021-08-20 PROCEDURE — 82948 REAGENT STRIP/BLOOD GLUCOSE: CPT

## 2021-08-20 PROCEDURE — 83605 ASSAY OF LACTIC ACID: CPT | Performed by: INTERNAL MEDICINE

## 2021-08-20 PROCEDURE — 85027 COMPLETE CBC AUTOMATED: CPT | Performed by: NURSE PRACTITIONER

## 2021-08-20 PROCEDURE — 80053 COMPREHEN METABOLIC PANEL: CPT | Performed by: PHYSICIAN ASSISTANT

## 2021-08-20 PROCEDURE — 85610 PROTHROMBIN TIME: CPT | Performed by: NURSE PRACTITIONER

## 2021-08-20 PROCEDURE — 83605 ASSAY OF LACTIC ACID: CPT | Performed by: PHYSICIAN ASSISTANT

## 2021-08-20 PROCEDURE — 84145 PROCALCITONIN (PCT): CPT | Performed by: NURSE PRACTITIONER

## 2021-08-20 PROCEDURE — 94669 MECHANICAL CHEST WALL OSCILL: CPT

## 2021-08-20 PROCEDURE — 80053 COMPREHEN METABOLIC PANEL: CPT | Performed by: NURSE PRACTITIONER

## 2021-08-20 PROCEDURE — 5A1945Z RESPIRATORY VENTILATION, 24-96 CONSECUTIVE HOURS: ICD-10-PCS | Performed by: INTERNAL MEDICINE

## 2021-08-20 PROCEDURE — 82947 ASSAY GLUCOSE BLOOD QUANT: CPT

## 2021-08-20 PROCEDURE — 31500 INSERT EMERGENCY AIRWAY: CPT | Performed by: NURSE PRACTITIONER

## 2021-08-20 PROCEDURE — 94668 MNPJ CHEST WALL SBSQ: CPT

## 2021-08-20 PROCEDURE — 93306 TTE W/DOPPLER COMPLETE: CPT | Performed by: INTERNAL MEDICINE

## 2021-08-20 PROCEDURE — 87070 CULTURE OTHR SPECIMN AEROBIC: CPT | Performed by: NURSE PRACTITIONER

## 2021-08-20 PROCEDURE — 94760 N-INVAS EAR/PLS OXIMETRY 1: CPT

## 2021-08-20 PROCEDURE — NC001 PR NO CHARGE: Performed by: NURSE PRACTITIONER

## 2021-08-20 PROCEDURE — 85014 HEMATOCRIT: CPT

## 2021-08-20 PROCEDURE — 84484 ASSAY OF TROPONIN QUANT: CPT | Performed by: NURSE PRACTITIONER

## 2021-08-20 PROCEDURE — 80048 BASIC METABOLIC PNL TOTAL CA: CPT | Performed by: NURSE PRACTITIONER

## 2021-08-20 PROCEDURE — 0BH17EZ INSERTION OF ENDOTRACHEAL AIRWAY INTO TRACHEA, VIA NATURAL OR ARTIFICIAL OPENING: ICD-10-PCS | Performed by: INTERNAL MEDICINE

## 2021-08-20 PROCEDURE — 83735 ASSAY OF MAGNESIUM: CPT | Performed by: NURSE PRACTITIONER

## 2021-08-20 PROCEDURE — 36600 WITHDRAWAL OF ARTERIAL BLOOD: CPT

## 2021-08-20 PROCEDURE — 85025 COMPLETE CBC W/AUTO DIFF WBC: CPT | Performed by: PHYSICIAN ASSISTANT

## 2021-08-20 PROCEDURE — U0003 INFECTIOUS AGENT DETECTION BY NUCLEIC ACID (DNA OR RNA); SEVERE ACUTE RESPIRATORY SYNDROME CORONAVIRUS 2 (SARS-COV-2) (CORONAVIRUS DISEASE [COVID-19]), AMPLIFIED PROBE TECHNIQUE, MAKING USE OF HIGH THROUGHPUT TECHNOLOGIES AS DESCRIBED BY CMS-2020-01-R: HCPCS | Performed by: NURSE PRACTITIONER

## 2021-08-20 PROCEDURE — 84295 ASSAY OF SERUM SODIUM: CPT

## 2021-08-20 PROCEDURE — 87186 SC STD MICRODIL/AGAR DIL: CPT | Performed by: NURSE PRACTITIONER

## 2021-08-20 PROCEDURE — 82803 BLOOD GASES ANY COMBINATION: CPT

## 2021-08-20 RX ORDER — PROPOFOL 10 MG/ML
5-50 INJECTION, EMULSION INTRAVENOUS
Status: DISCONTINUED | OUTPATIENT
Start: 2021-08-20 | End: 2021-08-21

## 2021-08-20 RX ORDER — ALBUMIN, HUMAN INJ 5% 5 %
25 SOLUTION INTRAVENOUS ONCE
Status: COMPLETED | OUTPATIENT
Start: 2021-08-20 | End: 2021-08-20

## 2021-08-20 RX ORDER — LIDOCAINE HYDROCHLORIDE 10 MG/ML
10 INJECTION, SOLUTION EPIDURAL; INFILTRATION; INTRACAUDAL; PERINEURAL ONCE
Status: DISCONTINUED | OUTPATIENT
Start: 2021-08-20 | End: 2021-08-20

## 2021-08-20 RX ORDER — MIDAZOLAM HYDROCHLORIDE 2 MG/2ML
1 INJECTION, SOLUTION INTRAMUSCULAR; INTRAVENOUS ONCE
Status: COMPLETED | OUTPATIENT
Start: 2021-08-20 | End: 2021-08-20

## 2021-08-20 RX ORDER — FUROSEMIDE 10 MG/ML
40 INJECTION INTRAMUSCULAR; INTRAVENOUS ONCE
Status: COMPLETED | OUTPATIENT
Start: 2021-08-20 | End: 2021-08-20

## 2021-08-20 RX ORDER — POTASSIUM CHLORIDE 20MEQ/15ML
40 LIQUID (ML) ORAL ONCE
Status: COMPLETED | OUTPATIENT
Start: 2021-08-20 | End: 2021-08-20

## 2021-08-20 RX ORDER — PROPOFOL 10 MG/ML
5-50 INJECTION, EMULSION INTRAVENOUS
Status: DISCONTINUED | OUTPATIENT
Start: 2021-08-20 | End: 2021-08-20

## 2021-08-20 RX ORDER — FENTANYL CITRATE 50 UG/ML
INJECTION, SOLUTION INTRAMUSCULAR; INTRAVENOUS
Status: COMPLETED
Start: 2021-08-20 | End: 2021-08-20

## 2021-08-20 RX ORDER — SUCCINYLCHOLINE/SOD CL,ISO/PF 100 MG/5ML
100 SYRINGE (ML) INTRAVENOUS ONCE
Status: COMPLETED | OUTPATIENT
Start: 2021-08-20 | End: 2021-08-20

## 2021-08-20 RX ORDER — HYDROXYCHLOROQUINE SULFATE 200 MG/1
200 TABLET, FILM COATED ORAL
Status: DISCONTINUED | OUTPATIENT
Start: 2021-08-21 | End: 2021-08-25 | Stop reason: HOSPADM

## 2021-08-20 RX ORDER — ALBUMIN (HUMAN) 12.5 G/50ML
25 SOLUTION INTRAVENOUS ONCE
Status: COMPLETED | OUTPATIENT
Start: 2021-08-20 | End: 2021-08-20

## 2021-08-20 RX ORDER — POTASSIUM CHLORIDE 14.9 MG/ML
20 INJECTION INTRAVENOUS
Status: COMPLETED | OUTPATIENT
Start: 2021-08-20 | End: 2021-08-21

## 2021-08-20 RX ORDER — ETOMIDATE 2 MG/ML
20 INJECTION INTRAVENOUS ONCE
Status: COMPLETED | OUTPATIENT
Start: 2021-08-20 | End: 2021-08-20

## 2021-08-20 RX ORDER — FENTANYL CITRATE 50 UG/ML
50 INJECTION, SOLUTION INTRAMUSCULAR; INTRAVENOUS EVERY 2 HOUR PRN
Status: DISCONTINUED | OUTPATIENT
Start: 2021-08-20 | End: 2021-08-21

## 2021-08-20 RX ORDER — FENTANYL CITRATE 50 UG/ML
75 INJECTION, SOLUTION INTRAMUSCULAR; INTRAVENOUS ONCE
Status: COMPLETED | OUTPATIENT
Start: 2021-08-20 | End: 2021-08-20

## 2021-08-20 RX ORDER — METOPROLOL TARTRATE 5 MG/5ML
2.5 INJECTION INTRAVENOUS EVERY 6 HOURS
Status: DISCONTINUED | OUTPATIENT
Start: 2021-08-20 | End: 2021-08-20

## 2021-08-20 RX ORDER — CEFEPIME HYDROCHLORIDE 2 G/50ML
2000 INJECTION, SOLUTION INTRAVENOUS EVERY 12 HOURS
Status: COMPLETED | OUTPATIENT
Start: 2021-08-20 | End: 2021-08-23

## 2021-08-20 RX ORDER — LEVALBUTEROL 1.25 MG/.5ML
1.25 SOLUTION, CONCENTRATE RESPIRATORY (INHALATION)
Status: DISCONTINUED | OUTPATIENT
Start: 2021-08-20 | End: 2021-08-22

## 2021-08-20 RX ORDER — SODIUM CHLORIDE, SODIUM GLUCONATE, SODIUM ACETATE, POTASSIUM CHLORIDE, MAGNESIUM CHLORIDE, SODIUM PHOSPHATE, DIBASIC, AND POTASSIUM PHOSPHATE .53; .5; .37; .037; .03; .012; .00082 G/100ML; G/100ML; G/100ML; G/100ML; G/100ML; G/100ML; G/100ML
2000 INJECTION, SOLUTION INTRAVENOUS ONCE
Status: COMPLETED | OUTPATIENT
Start: 2021-08-20 | End: 2021-08-20

## 2021-08-20 RX ORDER — CHLORHEXIDINE GLUCONATE 0.12 MG/ML
15 RINSE ORAL EVERY 12 HOURS SCHEDULED
Status: DISCONTINUED | OUTPATIENT
Start: 2021-08-20 | End: 2021-08-21

## 2021-08-20 RX ADMIN — PANTOPRAZOLE SODIUM 40 MG: 40 INJECTION, POWDER, FOR SOLUTION INTRAVENOUS at 20:24

## 2021-08-20 RX ADMIN — STANDARDIZED SENNA CONCENTRATE 17.2 MG: 8.6 TABLET ORAL at 08:18

## 2021-08-20 RX ADMIN — NOREPINEPHRINE BITARTRATE 2 MCG/MIN: 1 INJECTION, SOLUTION, CONCENTRATE INTRAVENOUS at 15:01

## 2021-08-20 RX ADMIN — IPRATROPIUM BROMIDE 0.5 MG: 0.5 SOLUTION RESPIRATORY (INHALATION) at 02:47

## 2021-08-20 RX ADMIN — HYDROCORTISONE SODIUM SUCCINATE 50 MG: 100 INJECTION, POWDER, FOR SOLUTION INTRAMUSCULAR; INTRAVENOUS at 13:25

## 2021-08-20 RX ADMIN — DEXMEDETOMIDINE 0.7 MCG/KG/HR: 100 INJECTION, SOLUTION, CONCENTRATE INTRAVENOUS at 16:26

## 2021-08-20 RX ADMIN — CHLORHEXIDINE GLUCONATE 0.12% ORAL RINSE 15 ML: 1.2 LIQUID ORAL at 20:24

## 2021-08-20 RX ADMIN — METRONIDAZOLE 500 MG: 500 INJECTION, SOLUTION INTRAVENOUS at 17:33

## 2021-08-20 RX ADMIN — LEVALBUTEROL HYDROCHLORIDE 1.25 MG: 1.25 SOLUTION, CONCENTRATE RESPIRATORY (INHALATION) at 07:46

## 2021-08-20 RX ADMIN — ONDANSETRON 4 MG: 2 INJECTION INTRAMUSCULAR; INTRAVENOUS at 00:34

## 2021-08-20 RX ADMIN — METRONIDAZOLE 500 MG: 500 INJECTION, SOLUTION INTRAVENOUS at 10:55

## 2021-08-20 RX ADMIN — ALBUMIN (HUMAN) 25 G: 12.5 INJECTION, SOLUTION INTRAVENOUS at 20:24

## 2021-08-20 RX ADMIN — FENTANYL CITRATE 75 MCG: 50 INJECTION, SOLUTION INTRAMUSCULAR; INTRAVENOUS at 23:29

## 2021-08-20 RX ADMIN — LEVALBUTEROL HYDROCHLORIDE 1.25 MG: 1.25 SOLUTION, CONCENTRATE RESPIRATORY (INHALATION) at 02:47

## 2021-08-20 RX ADMIN — IPRATROPIUM BROMIDE 0.5 MG: 0.5 SOLUTION RESPIRATORY (INHALATION) at 20:13

## 2021-08-20 RX ADMIN — METRONIDAZOLE 500 MG: 500 INJECTION, SOLUTION INTRAVENOUS at 04:36

## 2021-08-20 RX ADMIN — SODIUM CHLORIDE, SODIUM GLUCONATE, SODIUM ACETATE, POTASSIUM CHLORIDE, MAGNESIUM CHLORIDE, SODIUM PHOSPHATE, DIBASIC, AND POTASSIUM PHOSPHATE 2000 ML: .53; .5; .37; .037; .03; .012; .00082 INJECTION, SOLUTION INTRAVENOUS at 05:25

## 2021-08-20 RX ADMIN — FUROSEMIDE 40 MG: 10 INJECTION, SOLUTION INTRAMUSCULAR; INTRAVENOUS at 13:45

## 2021-08-20 RX ADMIN — IPRATROPIUM BROMIDE 0.5 MG: 0.5 SOLUTION RESPIRATORY (INHALATION) at 15:10

## 2021-08-20 RX ADMIN — STANDARDIZED SENNA CONCENTRATE 17.2 MG: 8.6 TABLET ORAL at 17:33

## 2021-08-20 RX ADMIN — ETOMIDATE 20 MG: 20 INJECTION, SOLUTION INTRAVENOUS at 05:24

## 2021-08-20 RX ADMIN — ALBUMIN (HUMAN) 25 G: 0.25 INJECTION, SOLUTION INTRAVENOUS at 13:45

## 2021-08-20 RX ADMIN — LEVALBUTEROL HYDROCHLORIDE 1.25 MG: 1.25 SOLUTION, CONCENTRATE RESPIRATORY (INHALATION) at 20:13

## 2021-08-20 RX ADMIN — PROPOFOL 50 MCG/KG/MIN: 10 INJECTION, EMULSION INTRAVENOUS at 08:51

## 2021-08-20 RX ADMIN — PANTOPRAZOLE SODIUM 40 MG: 40 INJECTION, POWDER, FOR SOLUTION INTRAVENOUS at 08:18

## 2021-08-20 RX ADMIN — POTASSIUM CHLORIDE 40 MEQ: 20 SOLUTION ORAL at 23:07

## 2021-08-20 RX ADMIN — VANCOMYCIN HYDROCHLORIDE 750 MG: 750 INJECTION, SOLUTION INTRAVENOUS at 15:07

## 2021-08-20 RX ADMIN — ALBUTEROL SULFATE: 2.5 SOLUTION RESPIRATORY (INHALATION) at 00:35

## 2021-08-20 RX ADMIN — CEFEPIME HYDROCHLORIDE 2000 MG: 2 INJECTION, SOLUTION INTRAVENOUS at 13:25

## 2021-08-20 RX ADMIN — CEFEPIME HYDROCHLORIDE 2000 MG: 2 INJECTION, SOLUTION INTRAVENOUS at 01:44

## 2021-08-20 RX ADMIN — LEVALBUTEROL HYDROCHLORIDE 1.25 MG: 1.25 SOLUTION, CONCENTRATE RESPIRATORY (INHALATION) at 15:10

## 2021-08-20 RX ADMIN — PROPOFOL 40 MCG/KG/MIN: 10 INJECTION, EMULSION INTRAVENOUS at 04:30

## 2021-08-20 RX ADMIN — CHLORHEXIDINE GLUCONATE 0.12% ORAL RINSE 15 ML: 1.2 LIQUID ORAL at 08:18

## 2021-08-20 RX ADMIN — FENTANYL CITRATE 50 MCG: 50 INJECTION, SOLUTION INTRAMUSCULAR; INTRAVENOUS at 04:18

## 2021-08-20 RX ADMIN — HYDROCORTISONE SODIUM SUCCINATE 50 MG: 100 INJECTION, POWDER, FOR SOLUTION INTRAMUSCULAR; INTRAVENOUS at 21:41

## 2021-08-20 RX ADMIN — FENTANYL CITRATE 50 MCG: 50 INJECTION, SOLUTION INTRAMUSCULAR; INTRAVENOUS at 22:04

## 2021-08-20 RX ADMIN — PROPOFOL 10 MCG/KG/MIN: 10 INJECTION, EMULSION INTRAVENOUS at 23:35

## 2021-08-20 RX ADMIN — PROPOFOL 20 MCG/KG/MIN: 10 INJECTION, EMULSION INTRAVENOUS at 17:33

## 2021-08-20 RX ADMIN — Medication 100 MG: at 05:24

## 2021-08-20 RX ADMIN — FENTANYL CITRATE 25 MCG/HR: 50 INJECTION, SOLUTION INTRAMUSCULAR; INTRAVENOUS at 18:42

## 2021-08-20 RX ADMIN — IPRATROPIUM BROMIDE 0.5 MG: 0.5 SOLUTION RESPIRATORY (INHALATION) at 07:46

## 2021-08-20 RX ADMIN — POTASSIUM CHLORIDE 20 MEQ: 14.9 INJECTION, SOLUTION INTRAVENOUS at 23:07

## 2021-08-20 RX ADMIN — MIDAZOLAM 1 MG: 1 INJECTION INTRAMUSCULAR; INTRAVENOUS at 05:30

## 2021-08-20 RX ADMIN — DEXMEDETOMIDINE 0.1 MCG/KG/HR: 100 INJECTION, SOLUTION, CONCENTRATE INTRAVENOUS at 05:00

## 2021-08-20 NOTE — PROGRESS NOTES
Bipap alarm heard on unit  Entered room  Patient attempting to rip off bipap mask  Reapplied  Explained need for intervention to patient  Shook head that she understands why she must wear the bipap mask

## 2021-08-20 NOTE — SPEECH THERAPY NOTE
Speech Language/Pathology    Order received, chart reviewed  Pt intubated this am  ST to d/c orders at this time, please re-consult when medically appropriate

## 2021-08-20 NOTE — QUICK NOTE
Updated patients  John Wise via telephone  All questions answered to families satisfaction  Consent for arterial and central line obtained

## 2021-08-20 NOTE — PROGRESS NOTES
[place]  Progress Note Jc Perez 1950, 66 y o  female MRN: [id number]  Unit/Bed#: -01 Encounter: [id number]  Primary Care Provider: Arcelia Moffett MD   Date and time admitted to hospital: 8/18/2021  8:39 PM    * Acute respiratory failure with hypoxia (HCC)  Assessment & Plan  · AEB tachypnea, tachycardia, increased WOB, hypoxia requiring ETT and MV   · Likely in the setting of aspiration leading to pna   · Escalating O2 requirements from 6L, bipap to ETT   · Failed bipap trial in ICU with tachycardia, tachypnea, increased WOB, hypoxia, AMS   · NT suctioning yields moderate to large amts of thick, tan sputum per RT   · 8/20 ETT for increased WOB, 7 5 tube, 23cm at the lip  · ACVC 12/350/50%/10  · VAP precautions, scheduled oral care   · Trend ABG PRN  · Goal spo2 > 90%  · Analgesia and Sedation: Propofol, Precedex, fentanyl gtt with prn    Aspiration into airway  Assessment & Plan  · Patient reports intermittent difficulty while swallowing food and liquid   · Patient reports vomiting x1 yesterday afternoon, concern for aspiration with generalized weakness and now tachypnea, hypoxia, increased WOB  · CXR with evidence of aspiration   · Will need speech eval when appropriate     Severe sepsis (HCC)  Assessment & Plan  · AEB tachypnea, tachycardia, hypoxia and increased WOB requiring ETT, LA 5 2, leukocytosis   · Likely etiology appears to be asp pna   · BC x2, strep, legionella, sputum cx pending   · Procal 5 99, trend   · Abx: Vanc, cefepime Flagyl D2  · Hold avapritinib given active infection, can restart plaquenil   · Received 30cc/kg isolyte bolus for LA, now requiring norepi for MAP > 65 mm/hg  · Trend LA and endpoints     Acute on chronic anemia  Assessment & Plan  · Baseline hemoglobin appears to be 8-9  · Hemoglobin as low as 4 9 on admission   · Unclear etiology, though does have hx of Dieulafoy's lesions and Seb lesions in the upper GI requiring epi injection, cautery and clipping   · GI following, plan was for EGD, now on hold given decompensation, discuss with GI regarding timing  · OGT with ETT placed 8/20, brown thin output noted   · Has previously required protonix gtt given multiple modalities of hemostasis required   · Protonix IV BID   · Total blood product: 2 5U PRBC (poss tx rxn during second unit)  · Serial h/h, transfuse for hemoglobin <7    Paroxysmal atrial fibrillation (HCC)  Assessment & Plan  · Hx per chart review   · On home lopressor  · AC: home ASA, currently on hold for symptomatic anemia   · Currently in NSR/ST with occasional bouts on telemetry  · Consider switching levophed to charisma for rate control  · Episode of prolonged afib with RVR (rates to 130s) during agitation, increased sedation, recheck electrolytes, replete K and Mag, consider amiodarone vs  Digoxin with continued rates > 120    Shock ([facility] [address] )  Assessment & Plan  · As evidenced by hypotension  · Requiring norepinephrine to maintain map greater than 65 mmHg  · Etiology unclear possibly mixed:  Likely distributive sepsis versus medication induced (propofol)  · Careful volume assessments  · Echo as noted:  EF 55% with grade 2 diastolic dysfunction    Bacteremia  Assessment & Plan  · BC from 8/20 growing gram negative x 2  · Source unclear, ua without clear signs of infection  · Aspiration vs  GI translocation in setting of possible GI bleed  · Continue cefepime, flagyl, consider d/c vanc pending MRSA swab  · Repeat bc after 48 hours abx    GIST (gastrointestinal stroma tumor), malignant, colon ([facility] [address] )  Assessment & Plan  · Per previous notes, follow with Erwin Asencio as OP   · Last visit appears to have been 7/21/21  · Extensive hx of treatment, including  · Resection of a rectal GIST 8/24/01  · Biopsy of liver lesion 4/05, demonstrating metastatic disease  · Diagnostic laparoscopy, laparoscopic lysis of adhesions, exploratory laparotomy, resection of pelvic GIST in conjunction with a small bowel resection with primary anastomosis, placement of Seprafilm, omental pedicled flap to the pelvis and cystoscopy with bilateral ureteral sent placement  · Left pelvic sidewall radiation  · Holding avapritinib, plaquenil given active infection     MARK (obstructive sleep apnea)  Assessment & Plan  · Not on home CPAP per previous notes   · Failed bipap for increased WOB as above     Stage 3 chronic kidney disease [facility])  Assessment & Plan  Lab Results   Component Value Date    EGFR 45 08/20/2021    EGFR 44 08/20/2021    EGFR 37 08/19/2021    CREATININE 1 19 08/20/2021    CREATININE 1 23 08/20/2021    CREATININE 1 40 (H) 08/19/2021     · Baseline creat appears to be 0 9 to 1 3  · Initially admitted with IVIS in the setting of ABLA, though creat 1 23 now   · Queen placed for critical I&O   · Avoid hypotension, nephrotoxic agents     Gastric AVM  Assessment & Plan  · Continue PPI  · GI following for probable intervention when appropriate, no emergent indication for EGD/colonoscopy    Elevated troponin level not due myocardial infarction  Assessment & Plan  · Likely demand ischemia in the setting of ABLA, aspiration pna   · Not a candidate for heparin 2/2 ABLA   · Trend to peak   · Cont tele     Essential hypertension  Assessment & Plan  · On home lopressor, currently on hold given nor epi requirements  · Goal SBP <160  · BP control improved after ETT       ----------------------------------------------------------------------------------------  HPI/24hr events:   · Weaning FiO2 from 100% to 50%  · Episode of atrial fibrillation with RVR rates to 130, improved with repletion of electrolytes and increasing sedation  · Slowly decreasing Levophed requirements, now off    Patient appropriate for transfer out of the ICU today?: No  Disposition: Continue Critical Care   Code Status: Level 1 - Full Code  ---------------------------------------------------------------------------------------  SUBJECTIVE  Intubated    Review of Systems   Unable to perform ROS: Intubated       ---------------------------------------------------------------------------------------  OBJECTIVE    Vitals   Vitals:    21 0300 21 0323 21 0420 21 0437   BP: 101/55 125/64 106/55    BP Location: Left arm      Pulse: 73 84 64    Resp: 18 22 14    Temp:   97 8 °F (36 6 °C)    TempSrc:   Oral    SpO2: 100% 98% 99% 100%   Weight:   52 4 kg (115 lb 8 3 oz)    Height:         Temp (24hrs), Av 9 °F (36 6 °C), Min:97 4 °F (36 3 °C), Max:98 6 °F (37 °C)  Current: Temperature: 97 8 °F (36 6 °C)          Respiratory:  SpO2: SpO2: 100 %  ACVC [id number]    Invasive/non-invasive ventilation settings   Respiratory    Lab Data (Last 4 hours)    None         O2/Vent Data (Last 4 hours)    None                Physical Exam  Vitals and nursing note reviewed  Constitutional:       General: She is not in acute distress  Appearance: She is ill-appearing  She is not toxic-appearing or diaphoretic  Interventions: She is sedated and intubated  HENT:      Head: Normocephalic and atraumatic  Mouth/Throat:      Mouth: Mucous membranes are dry  Cardiovascular:      Rate and Rhythm: Normal rate  Rhythm irregular  Pulses: Normal pulses  Pulmonary:      Effort: No respiratory distress  She is intubated  Breath sounds: Rales present  No wheezing  Abdominal:      General: Abdomen is flat  Palpations: Abdomen is soft  Tenderness: There is no abdominal tenderness  Musculoskeletal:         General: Normal range of motion  Cervical back: Neck supple  Right lower leg: No edema  Left lower leg: No edema  Skin:     General: Skin is warm and dry  Capillary Refill: Capillary refill takes less than 2 seconds  Neurological:      GCS: GCS eye subscore is 3  GCS verbal subscore is 1  GCS motor subscore is 6        Comments: RASS 0  Intermittent agitation   Sedated with propofol, precedex, and fentanyl  Follows verbal commands, gives thumbs up  Moves all extremities         Laboratory and Diagnostics:  Results from last 7 days   Lab Units 08/21/21 0416 08/20/21 2236 08/20/21  1111 08/20/21  0808 08/20/21  0604 08/20/21  0034 08/19/21  1907 08/18/21  2119 08/18/21  1237   WBC Thousand/uL 18 60*  --   --  27 30*  --  18 98*  --  8 92 8 85   HEMOGLOBIN g/dL 7 1* 7 4* 7 8* 8 2*  --  9 7* 9 3* 4 9* 5 1*   I STAT HEMOGLOBIN g/dl  --   --   --   --  8 5*  --   --   --   --    HEMATOCRIT % 22 1*  --   --  25 0*  --  28 9*  --  15 7* 16 0*   HEMATOCRIT, ISTAT %  --   --   --   --  25*  --   --   --   --    PLATELETS Thousands/uL 205  --   --  243  --  279  --  299 269   NEUTROS PCT %  --   --   --   --   --  94*  --  92*  --    MONOS PCT %  --   --   --   --   --  4  --  6  --    MONO PCT %  --   --   --   --   --   --   --   --  1*     Results from last 7 days   Lab Units 08/20/21 2236 08/20/21  0808 08/20/21  0604 08/20/21  0033 08/19/21  0430 08/18/21  2119   SODIUM mmol/L 139 133*  --  134* 135* 136   POTASSIUM mmol/L 3 2* 4 7  --  4 2 5 2 4 5   CHLORIDE mmol/L 101 100  --  98* 100 101   CO2 mmol/L 22 23  --  22 23 24   CO2, I-STAT mmol/L  --   --  21  --   --   --    ANION GAP mmol/L 16* 10  --  14* 12 11   BUN mg/dL 33* 32*  --  28* 31* 30*   CREATININE mg/dL 1 34* 1 19  --  1 23 1 40* 1 69*   CALCIUM mg/dL 8 0* 8 1*  --  8 8 8 6 8 9   GLUCOSE RANDOM mg/dL 117 111  --  127 112 113   ALT U/L  --  41  --  36  --  32   AST U/L  --  80*  --  48*  --  41   ALK PHOS U/L  --  117*  --  133*  --  88   ALBUMIN g/dL  --  2 5*  --  3 0*  --  3 1*   TOTAL BILIRUBIN mg/dL  --  0 80  --  1 10*  --  0 20     Results from last 7 days   Lab Units 08/20/21  2236 08/20/21  0808   MAGNESIUM mg/dL 2 3 2 1      Results from last 7 days   Lab Units 08/20/21  0808   INR  1 25*      Results from last 7 days   Lab Units 08/20/21  1111 08/20/21  0808 08/20/21  0307 08/19/21  0418 08/19/21  0055 08/18/21  2119   TROPONIN I ng/mL 0 56* 0 79* 0 40* 0 06* 0 07* 0 06*     Results from last 7 days   Lab Units 08/21/21  0416 08/20/21  0808 08/20/21  0307 08/20/21  0033   LACTIC ACID mmol/L 0 8 1 2 5 2* 2 1*     ABG:    VBG:    Results from last 7 days   Lab Units 08/20/21  0307   PROCALCITONIN ng/ml 5 99*       Micro  Results from last 7 days   Lab Units 08/20/21  0647 08/20/21  0522 08/20/21  0308 08/19/21  0849   BLOOD CULTURE   --   --   --  No Growth at 24 hrs  GRAM STAIN RESULT   --  Rare Polys  No bacteria seen Gram negative rods*  Gram negative rods*  --    LEGIONELLA URINARY ANTIGEN  Negative  --   --   --    STREP PNEUMONIAE ANTIGEN, URINE  Negative  --   --   --        EKG: NSR rate 80 on telemetry  Imaging: I have personally reviewed pertinent reports  Intake and Output  I/O       08/19 0701 - 08/20 0700 08/20 0701 - 08/21 0700    P  O  50 0    I V  (mL/kg)  2265 4 (41 2)    Blood 350     NG/GT  300    IV Piggyback  200    Total Intake(mL/kg) 400 (7 3) 2765 4 (50 3)    Urine (mL/kg/hr) 100 (0 1) 1115 (1 7)    Emesis/NG output  250    Total Output 100 1365    Net +300 +1400 4          Unmeasured Urine Occurrence 2 x           Height and Weights   Height: 5' 1" (154 9 cm)  IBW (Ideal Body Weight): 47 8 kg  Body mass index is 21 83 kg/m²    Weight (last 2 days)     Date/Time   Weight    08/21/21 0420   52 4 (115 52)                Nutrition       Diet Orders   (From admission, onward)             Start     Ordered    08/20/21 1222  Diet Enteral/Parenteral; Tube Feeding No Oral Diet; Jevity 1 2 Toni; Continuous; 40  Diet effective now     Comments: Start tube feeds at 25cc/hr - slow increase   Question Answer Comment   Diet Type Enteral/Parenteral    Enteral/Parenteral Tube Feeding No Oral Diet    Tube Feeding Formula: Jevity 1 2 Toni    Bolus/Cyclic/Continuous Continuous    Tube Feeding Goal Rate (mL/hr): 40    RD to adjust diet per protocol?  No        08/20/21 1221                  Active Medications  Scheduled Meds:  Current Facility-Administered Medications   Medication Dose Route Frequency Provider Last Rate    acetaminophen  650 mg Oral Q6H PRN Sergei Thurman PA-C      bisacodyl  10 mg Rectal Daily PRN LANEY Andrews      cefepime  2,000 mg Intravenous Q12H LANEY Moran Stopped (08/21/21 0401)    chlorhexidine  15 mL Mouth/Throat Q12H [address] LANEY Moran      dexmedetomidine  0 1-1 5 mcg/kg/hr Intravenous Titrated LANEY Larios 1 1 mcg/kg/hr (08/21/21 0313)    fentaNYL  50 mcg/hr Intravenous Continuous LANEY Larios 50 mcg/hr (08/20/21 2236)    fentanyl citrate (PF)  50 mcg Intravenous Q2H PRN LANEY Moran      hydrocortisone sodium succinate  50 mg Intravenous [facility] Bouchra Donahue PA-C      hydroxychloroquine  200 mg Oral Daily With Breakfast LANEY Larios      ipratropium  0 5 mg Nebulization Q6H LANEY Moran      levalbuterol  1 25 mg Nebulization Q6H LANEY Moran      metroNIDAZOLE  500 mg Intravenous Q8H LANEY Moran Stopped (08/21/21 0401)    norepinephrine  1-30 mcg/min Intravenous Titrated Carly Rachel PA-C Stopped (08/21/21 0322)    ondansetron  4 mg Intravenous Q6H PRN Sergei Thurman PA-C      pantoprazole  40 mg Intravenous Q12H [address] Sergei Thurman PA-C      propofol  5-50 mcg/kg/min Intravenous Titrated LANEY Larios 10 mcg/kg/min (08/20/21 2335)    senna  2 tablet Oral BID LANEY Reynaga      vancomycin  15 mg/kg Intravenous Q24H LANEY Moran       Continuous Infusions:  dexmedetomidine, 0 1-1 5 mcg/kg/hr, Last Rate: 1 1 mcg/kg/hr (08/21/21 0313)  fentaNYL, 50 mcg/hr, Last Rate: 50 mcg/hr (08/20/21 2236)  norepinephrine, 1-30 mcg/min, Last Rate: Stopped (08/21/21 6832)  propofol, 5-50 mcg/kg/min, Last Rate: 10 mcg/kg/min (08/20/21 0445)      PRN Meds:   acetaminophen, 650 mg, Q6H PRN  bisacodyl, 10 mg, Daily PRN  fentanyl citrate (PF), 50 mcg, Q2H PRN  ondansetron, 4 mg, Q6H PRN        Invasive Devices Review  Invasive Devices     Peripheral Intravenous Line            Peripheral IV 08/20/21 Right Antecubital 1 day    Peripheral IV 08/20/21 Left;Ventral (anterior) Wrist <1 day          Drain            NG/OG/Enteral Tube Orogastric 16 Fr Right mouth <1 day    Urethral Catheter <1 day          Airway            ETT  Cuffed 7 5 mm 1 day                Rationale for remaining devices: Mechanical ventilation, IV medications  ---------------------------------------------------------------------------------------  Advance Directive and Living Will:      Power of :    POLST:    ---------------------------------------------------------------------------------------  Care Time Delivered:   No Critical Care time spent       LANEY Infante      Portions of the record may have been created with voice recognition software  Occasional wrong word or "sound a like" substitutions may have occurred due to the inherent limitations of voice recognition software    Read the chart carefully and recognize, using context, where substitutions have occurred

## 2021-08-20 NOTE — ASSESSMENT & PLAN NOTE
· Likely demand ischemia in the setting of ABLA, aspiration pna   · Not a candidate for heparin 2/2 ABLA   · Trend to peak   · Cont tele

## 2021-08-20 NOTE — ASSESSMENT & PLAN NOTE
· Patient reports to me that she has intermittent difficulty while swallowing food and liquid   · Loose, coarse cough with coarse rhonchi throughout   · Patient reports vomiting x1 yesterday afternoon, concern for aspiration with generalized weakness and now tachypnea, hypoxia, increased WOB  · CXR with evidence of aspiration   · Strict NPO   · Will need speech eval when appropriate

## 2021-08-20 NOTE — NURSING NOTE
Patient presenting with change in respiratory status with new onset of shortness of breath, tachypnea and also tachycardia  Monitor showed 86% on 4L nasal cannula  DOUG Hartman called to bedside  RRT called upon further assessments  Respiratory and CRNP at bedside  ECG, labs, blood sugar, and vitals obtained  Patient upgraded to MS3 for continuous heart monitoring and closer assessment  Report given to [facility] on MS3      Rosmery Del Real RN

## 2021-08-20 NOTE — PROGRESS NOTES
Progress note - Gastroenterology   Lisy Sharma 66 y o  female MRN: [id number]  Unit/Bed#: -01 Encounter: [id number]    ASSESSMENT and PLAN    1  Acute on chronic anemia:  History of multiple clippings and cauterizations due to Dieulafoy and Seb lesions  EGD was planned but will cancel right now given recent aspiration plan to perform when she is improved  In the meantime transfuse as needed  - Protonix 40mg IV BID     2  GIST (Gastrointestinal stroma tumor), malignant, colon:   Patient is currently followed at [facility], she was in research for and now takes Ayvakit 200 mg daily      3  Gastric AVM:  History of with EGD in 2019       4  Constipation:    - Colace 100 mg p o  Twice daily  - Senna 17 6 mg po Twice Daily   - Dulcolax Supp 10mg daily prn constipation      5  Abdominal Pain:   Patient continues to have increased generalized abdominal discomfort with increased nausea and retching    - CT nonspecific intra-abdominal findings  No explanation for pain or anemia  We will continue to follow with you  Chief Complaint   Patient presents with    Anemia     Pt reports her hgb is 5 1 from todays blood draw for upcoming back operation  SUBJECTIVE/HPI   Patient is seen in ICU  Events of last evening noted  She apparently had aspiration was having fatigue with breathing and was intubated  Presently appears comfortable but unresponsive on the ventilator  /60 (BP Location: Right arm)   Pulse 79   Temp 97 9 °F (36 6 °C) (Axillary)   Resp (!) 29   Ht 5' 1" (1 549 m)   Wt 55 kg (121 lb 4 1 oz)   SpO2 98%   BMI 22 91 kg/m²     PHYSICALEXAM  General appearance: alert, appears stated age and cooperative  Eyes: PERLLA, EOMI, no icterus   Head: Normocephalic, without obvious abnormality, atraumatic  Abdomen: soft, non-tender; NG tube draining greenish clear fluid  No obvious blood   no masses,  no organomegaly  Extremities: extremities normal, atraumatic, no cyanosis or edema  Neurologic:  Sedated on the ventilator    Lab Results   Component Value Date    GLUCOSE 121 08/20/2021    CALCIUM 8 8 08/20/2021     10/12/2015    K 4 2 08/20/2021    CO2 21 08/20/2021    CL 98 (L) 08/20/2021    BUN 28 (H) 08/20/2021    CREATININE 1 23 08/20/2021     Lab Results   Component Value Date    WBC 18 98 (H) 08/20/2021    HGB 8 5 (L) 08/20/2021    HCT 25 (L) 08/20/2021    MCV 96 08/20/2021     08/20/2021     Lab Results   Component Value Date    ALT 36 08/20/2021    AST 48 (H) 08/20/2021    ALKPHOS 133 (H) 08/20/2021    BILITOT 0 28 10/12/2015     No results found for: AMYLASE  Lab Results   Component Value Date    LIPASE 118 02/19/2020     Lab Results   Component Value Date    IRON 32 (L) 08/19/2021    TIBC 338 08/19/2021    FERRITIN 76 08/19/2021     Lab Results   Component Value Date    INR 1 04 05/29/2021

## 2021-08-20 NOTE — ASSESSMENT & PLAN NOTE
· AEB tachypnea, tachycardia, increased WOB, hypoxia requiring ETT and MV   · Likely in the setting of aspiration leading to pna   · Escalating O2 requirements from 6L, bipap to ETT   · Failed bipap trial in ICU with tachycardia, tachypnea, increased WOB, hypoxia, AMS   · NT suctioning yields moderate to large amts of thick, tan sputum per RT   · 8/20 ETT for increased WOB, 7 5 tube, 23cm at the lip  · ACVC 12/300/100/6  · VAP precautions, scheduled oral care   · Check ABG   · Goal spo2 > 90%  · Sedation: Propofol, Precedex   · Pain: PRN fentanyl

## 2021-08-20 NOTE — ASSESSMENT & PLAN NOTE
· AEB tachypnea, tachycardia, hypoxia and increased WOB requiring ETT, LA 5 2, leukocytosis   · Likely etiology appears to be asp pna   · BC x2, strep, legionella, sputum cx pending   · Procal 5 99, trend   · Abx: Vanc, cefepime Flagyl D2  · Hold avapritinib given active infection, can restart plaquenil   · Received 30cc/kg isolyte bolus for LA, now requiring norepi for MAP > 65 mm/hg  · Trend LA and endpoints

## 2021-08-20 NOTE — ASSESSMENT & PLAN NOTE
· Baseline hemoglobin appears to be 8-9  · Hemoglobin as low as 4 9 on admission   · Unclear etiology, though does have hx of Dieulafoy's lesions and Seb lesions in the upper GI requiring epi injection, cautery and clipping   · GI following, plan was for EGD, now on hold given decompensation, discuss with GI regarding timing  · OGT with ETT placed 8/20, brown thin output noted   · Has previously required protonix gtt given multiple modalities of hemostasis required   · Protonix IV BID   · Total blood product: 2 5U PRBC (poss tx rxn during second unit)  · Serial h/h, transfuse for hemoglobin <7

## 2021-08-20 NOTE — RESPIRATORY THERAPY NOTE
Respiratory therapy  Note     At request of PA vent changes made as documented  ETT near mala see cxr, confirm ETT placement  BS diminished and equal b/l  Pt suctioned for scant secretions  Pt remains sedated  RN at bedside and aware        08/20/21 0809   Respiratory Assessment   Assessment Type Assess only   Vent Information   SpO2 98 %   AC/VC Settings   Resp Rate (BPM) 27 BPM   Vt (mL) 350 mL   PEEP (cmH2O) 8 cmH2O   AC/VC Actuals   Resp Rate (BPM) 27 BPM   VT (mL) 384   MV 9 5

## 2021-08-20 NOTE — ASSESSMENT & PLAN NOTE
· Per previous notes, follow with Suzanne Hopson as OP   · Last visit appears to have been 7/21/21  · Extensive hx of treatment, including  · Resection of a rectal GIST 8/24/01  · Biopsy of liver lesion 4/05, demonstrating metastatic disease  · Diagnostic laparoscopy, laparoscopic lysis of adhesions, exploratory laparotomy, resection of pelvic GIST in conjunction with a small bowel resection with primary anastomosis, placement of Seprafilm, omental pedicled flap to the pelvis and cystoscopy with bilateral ureteral sent placement  · Left pelvic sidewall radiation  · Holding avapritinib, plaquenil given active infection

## 2021-08-20 NOTE — UTILIZATION REVIEW
Inpatient Admission Authorization Request   NOTIFICATION OF INPATIENT ADMISSION/INPATIENT AUTHORIZATION REQUEST   SERVICING FACILITY:   [place] [address]  Tax ID: [id number]  NPI: [id number]  Place of Service: Inpatient [facility]  [address]  Place of Service Code: 24     ATTENDING PROVIDER:  Attending Name and NPI#: Kya Barr Md [[id number]]  Address: [address] [place] [address]  Phone: [phone]     UTILIZATION REVIEW CONTACT:  Angelo Carcamo Utilization   Network Utilization Review Department  Phone: [phone]  Fax [phone]  Email: [email or web address]     PHYSICIAN ADVISORY SERVICES:  FOR GRBD-LR-SXOB REVIEW - MEDICAL NECESSITY DENIAL  Phone: [phone]  Fax: [phone]  Email: [email or web address]     TYPE OF REQUEST:  Inpatient Status     ADMISSION INFORMATION:  ADMISSION DATE/TIME: 8/19/21  9:22 AM  PATIENT DIAGNOSIS CODE/DESCRIPTION:  Anemia [D64 9]  Symptomatic anemia [D64 9]  DISCHARGE DATE/TIME: No discharge date for patient encounter  DISCHARGE DISPOSITION (IF DISCHARGED): Home/Self Care     IMPORTANT INFORMATION:  Please contact the [facility] directly with any questions or concerns regarding this request  Department voicemails are confidential     Send requests for admission clinical reviews, concurrent reviews, approvals, and administrative denials due to lack of clinical to fax [phone]

## 2021-08-20 NOTE — PROGRESS NOTES
Vancomycin Assessment    Kit Benitez is a 66 y o  female who is currently receiving vancomycin 750mg q24h for bacteremia, Pneumonia     Relevant clinical data and objective history reviewed:  Creatinine   Date Value Ref Range Status   08/20/2021 1 23 0 60 - 1 30 mg/dL Final     Comment:     Standardized to IDMS reference method   08/19/2021 1 40 (H) 0 60 - 1 30 mg/dL Final     Comment:     Standardized to IDMS reference method   08/18/2021 1 69 (H) 0 60 - 1 30 mg/dL Final     Comment:     Standardized to IDMS reference method   10/12/2015 0 93 0 60 - 1 30 mg/dL Final     Comment:     Standardized to IDMS reference method   07/09/2015 0 91 0 60 - 1 30 mg/dL Final     Comment:     Standardized to IDMS reference method   04/14/2015 0 89 0 60 - 1 30 mg/dL Final     Comment:     Standardized to IDMS reference method     /73   Pulse 97   Temp 98 7 °F (37 1 °C) (Oral)   Resp (!) 37   Ht 5' 1" (1 549 m)   Wt 55 kg (121 lb 4 1 oz)   SpO2 98%   BMI 22 91 kg/m²   I/O last 3 completed shifts: In: 750 [P O :50; Blood:700]  Out: 100 [Urine:100]  Lab Results   Component Value Date/Time    BUN 28 (H) 08/20/2021 12:33 AM    BUN 20 03/09/2020 10:47 AM    WBC 18 98 (H) 08/20/2021 12:34 AM    WBC 4 11 (L) 10/12/2015 08:56 AM    HGB 9 7 (L) 08/20/2021 12:34 AM    HGB 10 8 (L) 10/12/2015 08:56 AM    HCT 28 9 (L) 08/20/2021 12:34 AM    HCT 33 8 (L) 10/12/2015 08:56 AM    MCV 96 08/20/2021 12:34 AM     (H) 10/12/2015 08:56 AM     08/20/2021 12:34 AM     10/12/2015 08:56 AM     Temp Readings from Last 3 Encounters:   08/20/21 98 7 °F (37 1 °C) (Oral)   08/02/21 97 8 °F (36 6 °C)   08/02/21 99 6 °F (37 6 °C)     Vancomycin Days of Therapy: 1    Assessment/Plan  The patient is currently on vancomycin utilizing scheduled dosing based on actual body weight  Baseline risks associated with therapy include: concomitant nephrotoxic medications and advanced age    The patient is currently receiving 750mg q24h and is clinically appropriate and dose will be continued  Pharmacy will also follow closely for s/sx of nephrotoxicity, infusion reactions, and appropriateness of therapy  BMP and CBC will be ordered per protocol  Plan for trough as patient approaches steady state, prior to the 4th  dose at approximately 0530 on 5/23/20  Due to infection severity, will target a trough of 15-20 (appropriate for most indications)   Pharmacy will continue to follow the patients culture results and clinical progress daily      Pilar Carmona, Pharmacist

## 2021-08-20 NOTE — ASSESSMENT & PLAN NOTE
· AEB tachypnea, tachycardia, increased WOB, hypoxia requiring ETT and MV   · Likely in the setting of aspiration leading to pna   · Escalating O2 requirements from 6L, bipap to ETT   · Failed bipap trial in ICU with tachycardia, tachypnea, increased WOB, hypoxia, AMS   · NT suctioning yields moderate to large amts of thick, tan sputum per RT   · 8/20 ETT for increased WOB, 7 5 tube, 23cm at the lip  · ACVC 12/350/50%/10  · VAP precautions, scheduled oral care   · Trend ABG PRN  · Goal spo2 > 90%  · Analgesia and Sedation: Propofol, Precedex, fentanyl gtt with prn

## 2021-08-20 NOTE — ASSESSMENT & PLAN NOTE
· On home lopressor, currently on hold given nor epi requirements  · Goal SBP <160  · BP control improved after ETT

## 2021-08-20 NOTE — ASSESSMENT & PLAN NOTE
· Hx per chart review   · On home lopressor  · AC: home ASA, currently on hold for symptomatic anemia   · Currently in NSR/ST with occasional bouts on telemetry  · Consider switching levophed to charisma for rate control  · Episode of prolonged afib with RVR (rates to 130s) during agitation, increased sedation, recheck electrolytes, replete K and Mag, consider amiodarone vs  Digoxin with continued rates > 120

## 2021-08-20 NOTE — QUICK NOTE
Received message from RN around midnight that patient was presenting with increased SOB now requiring 5 L NC with spO2 87%  Patient was seen and examined- patient was in respiratory distress, appeared pale/diaphoretic and rapid response was called  CXR showed aspiration pneumonia  Critical Care AP on board and agreed to take patient on their service due to increasing oxygen demands requiring mid-flow NC >10 L/min  Initiate IV abx and NPO

## 2021-08-20 NOTE — ASSESSMENT & PLAN NOTE
· Continue PPI  · GI following for probable intervention when appropriate, no emergent indication for EGD/colonoscopy

## 2021-08-20 NOTE — PROCEDURES
Intubation    Date/Time: 8/20/2021 4:16 AM  Performed by: LANEY Frias  Authorized by: LANEY Frias     Patient location:  Bedside  Consent:     Consent obtained:  Emergent situation    Risks discussed:  Aspiration, pneumothorax, hypoxia, death and laryngeal injury    Alternatives discussed:  Delayed treatment and observation  Universal protocol:     Immediately prior to procedure, a time out was called: yes      Patient identity confirmed:  Arm band and hospital-assigned identification number  Pre-procedure details:     Patient status:  Altered mental status    Mallampati score:  3    Pretreatment medications:  See MAR for details and etomidate    Paralytics:  Succinylcholine  Indications:     Indications for intubation: respiratory distress, respiratory failure and hypoxemia    Procedure details:     Preoxygenation:  BiPAP    CPR in progress: no      Intubation method:  Oral    Oral intubation technique: Guerra  Laryngoscope blade: Mac 3    Tube size (mm):  7 5    Tube type:  Cuffed    Number of attempts:  1    Ventilation between attempts: no      Cricoid pressure: no      Tube visualized through cords: yes    Placement assessment:     ETT to lip:  23    Tube secured with:  ETT ridley    Breath sounds:  Equal and absent over the epigastrium    Placement verification: chest rise, condensation, direct visualization and ETCO2 detector      CXR findings:  ETT in proper place    Ventilator settings:  ACVC 12/300/100/6  Post-procedure details:     Patient tolerance of procedure: Tolerated well, no immediate complications  Comments:      Patient urgently intubated for increased WOB, tachypnea, hypoxia  She became altered while on bipap with continued increased WOB and appeared to be tiring  She stated to nursing staff that she had to go home and let the dogs out, she was then speaking incomprehensible sentences to me  Progressive hypoxia into the 80s while on bipap with fio2 100%   Attempts x2 to speak with  via telephone were unsuccessful, will attempt again now

## 2021-08-20 NOTE — QUICK NOTE
JACOBO called the patient's , Jody Silverman, at the phone number listed in the chart  He did answer the phone, and I explained to him that earlier in the evening, the patient had become hypoxic with increased work of breathing and tachypnea and was ultimately transferred to the ICU and trialed on BiPAP secondary to what appears to be pneumonia in the setting of possible aspiration  I did also explain to him that despite the BiPAP, she continued to be hypoxic, tachycardic, and exhibit a in increased work of breathing  She was hypoxic in the 80s despite FiO2 of 100% on BiPAP  She progressed to what appeared to be encephalopathic, stating to nursing staff that she had to [place] home and let the dogs out  She was grabbing and picking at her BiPAP mask, EKG leads and other monitoring equipment  She appeared to be increasingly confused and appeared to be tiring her work of breathing  As result, she was intubated and placed on mechanical ventilation  Tahir Luna understanding of this, and states he has no further questions at this time  He does state that he will be in to see the patient

## 2021-08-20 NOTE — ASSESSMENT & PLAN NOTE
· AEB tachypnea, tachycardia, hypoxia and increased WOB requiring ETT, LA 5 2, leukocytosis   · Likely etiology appears to be asp pna   · BC x2, strep, legionella, sputum cx pending   · Procal pending   · Abx: Vanc, cefepime Flagyl D1  · Hold plaquenil, avapritinib given active infection   · Received 30cc/kg isolyte bolus for LA, however remains HD stable   · Trend LA and endpoints

## 2021-08-20 NOTE — ASSESSMENT & PLAN NOTE
· Hx per chart review   · On home lopressor  · AC: home ASA, currently on hold for symptomatic anemia   · Currently in NSR to ST on tele   · Cont lopressor IV q6h

## 2021-08-20 NOTE — ASSESSMENT & PLAN NOTE
· Patient reports intermittent difficulty while swallowing food and liquid   · Patient reports vomiting x1 yesterday afternoon, concern for aspiration with generalized weakness and now tachypnea, hypoxia, increased WOB  · CXR with evidence of aspiration   · Will need speech eval when appropriate

## 2021-08-20 NOTE — ASSESSMENT & PLAN NOTE
Lab Results   Component Value Date    EGFR 44 08/20/2021    EGFR 37 08/19/2021    EGFR 30 08/18/2021    CREATININE 1 23 08/20/2021    CREATININE 1 40 (H) 08/19/2021    CREATININE 1 69 (H) 08/18/2021     · Baseline creat appears to be 0 9 to 1 3  · Initially admitted with IVIS in the setting of ABLA, though creat 1 23 now   · Queen placed for critical I&O   · Avoid hypotension, nephrotoxic agents

## 2021-08-20 NOTE — RAPID RESPONSE
Rapid Response Note  Tarcy Wen 66 y o  female MRN: [id number]  Unit/Bed#: -01 Encounter: [id number]    Rapid Response Notification(s):   Response called date/time:  8/20/2021 12:24 AM  Response team arrival date/time:  8/20/2021 12:26 AM  Response end date/time:  8/20/2021 1:00 AM  Rapid response location:  [facility] unit ()  Primary reason for rapid response call:  Acute change in RR and acute change in O2 sat    Rapid Response Intervention(s):   Airway:  None  Breathing:  Other (comment) (6L NC)  Circulation:  Electrocardiogram  Fluids administered:  None  Medications administered: Other (comment) and ondansetron (albuterol )       Background/Situation:   Tracy Wen is a 66 y o  female with a past medical history of PAF, MARK not on home CPAP, chronic anemia, anxiety, hypertension, CKD 3, gastrointestinal tumor presents as a rapid response tonight for increasing oxygen requirements, tachypnea, increased work of breathing  She is placed on supplemental oxygen, provided an albuterol nebulizer and transferred to ICU step-down unit  Please see ICU acceptance note for further details  Review of Systems   Unable to perform ROS: Acuity of condition       Objective:   Vitals:    08/19/21 2045 08/19/21 2300 08/20/21 0035 08/20/21 0036   BP:  132/64 157/92    BP Location:  Right arm Right arm    Pulse:  70 104    Resp:  18     Temp:  98 1 °F (36 7 °C)     TempSrc:  Oral     SpO2: 90% 95%  94%   Weight:       Height:         Physical Exam  Vitals and nursing note reviewed  Cardiovascular:      Rate and Rhythm: Tachycardia present  Heart sounds: Normal heart sounds  Pulmonary:      Effort: Tachypnea, accessory muscle usage, prolonged expiration and respiratory distress present  Breath sounds: Rhonchi present  Abdominal:      Palpations: Abdomen is soft  Tenderness: There is no abdominal tenderness  Skin:     General: Skin is warm        Capillary Refill: Capillary refill takes 2 to 3 seconds  Coloration: Skin is pale  Neurological:      General: No focal deficit present  Mental Status: She is oriented to person, place, and time  GCS: GCS eye subscore is 4  GCS verbal subscore is 5  GCS motor subscore is 6  Cranial Nerves: Cranial nerves are intact  Assessment:   · Acute respiratory failure with hypoxia  · Likely aspiration    Plan:   · Patient is currently on hospital day 2 for acute anemia in the setting of possible GI bleed  · Rapid response was called today for acutely escalating oxygen requirements, tachypnea, increased work of breathing  · A my arrival, the patient is on 6 L nasal cannula with reported oxygen saturations in the low 80s  · The patient appears pale, tachypneic and with labored breathing  She is awake alert and oriented, though appears to be in some distress  · Alternate pulse ox machine was trialed, and the patient was found to be with oxygen saturation of 89-90% on 6 L nasal cannula  · Auscultation of the lungs reveals coarse rhonchi throughout  · Albuterol neb x1 administered with moderate improvement in work of breathing  · Chest x-ray performed consistent with aspiration, which is markedly different in comparison to yesterday's imaging  · Patient to ICU under Critical Care for step-down monitoring  · Please see ICU acceptance note for full details     Rapid Response Outcome:   Condition:  In serious condition  Progression:  Improving  Transfer:  Transfer to step-down  Primary service notified of transfer: Yes    Handoff report: in person    Code Status: Level 1 (Full Code)      Family notified of transfer: yes  Family member contacted: Attempt made to call , Chasidy Santos, at both home and cell numbers  No answer, VM left with callback number  Portions of the record may have been created with voice recognition software    Occasional wrong word or "sound a like" substitutions may have occurred due to the inherent limitations of voice recognition software  Read the chart carefully and recognize, using context, where substitutions have occurred      [address]

## 2021-08-20 NOTE — RESPIRATORY THERAPY NOTE
Respiratory Therapy Note    Rt continued to wean fio2 now at 50% with peep at 10cm  Pt BS remain diminished and clear b/l with scant secretions suctioned  Vest therapy initated and tolerated well  Pip/Plat 17/15 this am  Ett remains at 22  RN notified        08/20/21 1128   Respiratory Assessment   Assessment Type Assess only   General Appearance Sedated   Respiratory Pattern Assisted   Chest Assessment Chest expansion symmetrical   Bilateral Breath Sounds Diminished   R Breath Sounds Clear   Cough Non-productive   Vent Information   Vent ID [id number]   Vent type     Vent Mode AC/VC   $ Pulse Oximetry Spot Check Charge Completed   SpO2 100 %   AC/VC Settings   Resp Rate (BPM) 12 BPM   Vt (mL) 350 mL   FIO2 (%) 50 %   PEEP (cmH2O) 10 cmH2O   Flow Pattern (LPM) 50 L/min  (Ti 0 76)   Trigger Sensitivity Flow (lpm) 3 %   Humidification Heater   Heater Temperature (Set) 98 6 °F (37 °C)   AC/VC Actuals   Resp Rate (BPM) 22 BPM   VT (mL) 431   MAP (cmH2O) 14 cmH2O   Peak Pressure (cmH2O) 19 cmH2O   I/E Ratio (Obs) 1:2 8   Heater Temperature (Obs) 98 6 °F (37 °C)   AC/VC Alarms   High Peak Pressure (cmH2O) 40   High Resp Rate (BPM) 40 BPM   High MV (L/min) 18 L/min   Low MV (L/min) 4 L/min   Vt High (mL) 800 mL   Vt Low (mL) 250 mL   AC/VC Apnea Settings   Resp Rate (BPM) 12 BPM   VT (mL) 350 mL   FIO2 (%) 100 %   Apnea Time (s) 20 S   Apnea Flow (L/min) 50 L/min   Maintenance   Alarm (pink) cable attached No   Resuscitation bag with peep valve at bedside Yes   Water bag changed No   Circuit changed No   IHI Ventilator Associated Pneumonia Bundle   Head of Bed Elevated HOB 30   ETT  Cuffed 7 5 mm   Placement Date/Time: 08/20/21 (c) 9852   Type: Cuffed  Tube Size: 7 5 mm  Location: Oral  Insertion attempts: 1  Placement Verification: End tidal CO2  Secured at (cm): 23   Secured at (cm) 22   Measured from Lips   Secured Location Right   Site Condition Dry   HI-LO Suction  Intermittent suction   HI-LO Secretions Scant HI-LO Intervention Patent

## 2021-08-20 NOTE — ASSESSMENT & PLAN NOTE
Lab Results   Component Value Date    EGFR 45 08/20/2021    EGFR 44 08/20/2021    EGFR 37 08/19/2021    CREATININE 1 19 08/20/2021    CREATININE 1 23 08/20/2021    CREATININE 1 40 (H) 08/19/2021     · Baseline creat appears to be 0 9 to 1 3  · Initially admitted with IVIS in the setting of ABLA, though creat 1 23 now   · Queen placed for critical I&O   · Avoid hypotension, nephrotoxic agents

## 2021-08-20 NOTE — ASSESSMENT & PLAN NOTE
· Baseline hemoglobin appears to be 8-9  · Hemoglobin as low as 4 9 on admission   · Unclear etiology, though does have hx of Dieulafoy's lesions and Seb lesions in the upper GI requiring epi injection, cautery and clipping   · GI following, plan was for EGD today   Will need to confer with service lines to evaluate appropriateness of procedure   · Strict NPO   · OGT with ETT placed 8/20, brown thin output noted   · Has previously required protonix gtt given multiple modalities of hemostasis required   · Protonix IV BID   · Total blood product: 2 5U PRBC (poss tx rxn during second unit)  · Serial h/h, transfuse for hemoglobin <7

## 2021-08-20 NOTE — SEPSIS NOTE
Sepsis Note   Kit Gamma 66 y o  female MRN: [id number]  Unit/Bed#: -01 Encounter: [id number]      qSOFA     Row Name 08/20/21 0430 08/20/21 0400 08/20/21 0330 08/20/21 0321 08/20/21 0210    Altered mental status GCS < 15  --  --  --  --  --    Respiratory Rate > / =22  1  1  1  --  1    Systolic BP < / =625  0  0  0  0  0    Q Sofa Score  1  1  1  1  1    Row Name 08/20/21 0035 08/19/21 2300 08/19/21 1512 08/19/21 1419 08/19/21 1125    Altered mental status GCS < 15  --  --  --  --  --    Respiratory Rate > / =22  --  0  0  0  1    Systolic BP < / =890  0  0  0  0  0    Q Sofa Score  0  0  0  0  1    Row Name 08/19/21 1100 08/19/21 0728 08/19/21 0315 08/19/21 0259 08/19/21 0200    Altered mental status GCS < 15  --  --  --  --  --    Respiratory Rate > / =22  0  0  0  0  --    Systolic BP < / =514  0  0  0  0  0    Q Sofa Score  0  0  0  0  0    Row Name 08/18/21 2350 08/18/21 2323 08/18/21 2224 08/18/21 2200 08/18/21 2145    Altered mental status GCS < 15  --  --  --  --  --    Respiratory Rate > / =22  0  0  0  0  1    Systolic BP < / =511  0  0  0  0  0    Q Sofa Score  0  0  0  0  1    Row Name 08/18/21 2130 08/18/21 2120 08/18/21 2115 08/18/21 2055       Altered mental status GCS < 15  --  --  --  --     Respiratory Rate > / =22  1  0  0  1     Systolic BP < / =915  0  0  0  0     Q Sofa Score  1  0  0  1         Initial Sepsis Screening     Row Name 08/20/21 0200                Is the patient's history suggestive of a new or worsening infection? (!) Yes (Proceed)  -EJ        Suspected source of infection  pneumonia  -EJ        Are two or more of the following signs & symptoms of infection both present and new to the patient?   (!) Yes (Proceed)  -EJ        Indicate SIRS criteria  Tachycardia > 90 bpm;Tachypnea > 20 resp per min;Leukocytosis (WBC > 92802 IJL)  -EJ        If the answer is yes to both questions, suspicion of sepsis is present  --        If severe sepsis is present AND tissue hypoperfusion perists in the hour after fluid resuscitation or lactate > 4, the patient meets criteria for SEPTIC SHOCK  --        Are any of the following organ dysfunction criteria present within 6 hours of suspected infection and SIRS criteria that are NOT considered to be chronic conditions? --        Organ dysfunction  Lactate > 2 0 mmol/L;Acute respiratory failure (new need for invasive or non-invasive mechanical ventilation)  -EJ        Date of presentation of severe sepsis  08/20/21  -EJ        Time of presentation of severe sepsis  0200  -EJ        Tissue hypoperfusion persists in the hour after crystalloid fluid administration, evidenced, by either:  * OR * Lactate level is greater to or equal 4 mmol/dL ( ___ mmol/dL in comment field)  -EJ        Was hypotension present within one hour of the conclusion of crystalloid fluid administration?   No  -EJ        Date of presentation of septic shock  --        Time of presentation of septic shock  --          User Key  (r) = Recorded By, (t) = Taken By, (c) = Cosigned By    [address] Name Provider Type    EJ Amy Dunham, [address]ung[address]ia Nurse Practitioner

## 2021-08-20 NOTE — ASSESSMENT & PLAN NOTE
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

## 2021-08-20 NOTE — PROGRESS NOTES
[facility]  [facility]/ Progress Note Mandeep Nicholas 1950, 66 y o  female MRN: [id number]  Unit/Bed#: -01 Encounter: [id number]  Primary Care Provider: Moris Blackwell MD   Date and time admitted to hospital: 8/18/2021  8:39 PM    * Acute respiratory failure with hypoxia (HCC)  Assessment & Plan  · AEB tachypnea, tachycardia, increased WOB, hypoxia requiring ETT and MV   · Likely in the setting of aspiration leading to pna   · Escalating O2 requirements from 6L, bipap to ETT   · Failed bipap trial in ICU with tachycardia, tachypnea, increased WOB, hypoxia, AMS   · NT suctioning yields moderate to large amts of thick, tan sputum per RT   · 8/20 ETT for increased WOB, 7 5 tube, 23cm at the lip  · ACVC 12/300/100/6  · VAP precautions, scheduled oral care   · Check ABG   · Goal spo2 > 90%  · Sedation: Propofol, Precedex   · Pain: PRN fentanyl     Aspiration into airway  Assessment & Plan  · Patient reports to me that she has intermittent difficulty while swallowing food and liquid   · Loose, coarse cough with coarse rhonchi throughout   · Patient reports vomiting x1 yesterday afternoon, concern for aspiration with generalized weakness and now tachypnea, hypoxia, increased WOB  · CXR with evidence of aspiration   · Strict NPO   · Will need speech eval when appropriate     Severe sepsis (HCC)  Assessment & Plan  · AEB tachypnea, tachycardia, hypoxia and increased WOB requiring ETT, LA 5 2, leukocytosis   · Likely etiology appears to be asp pna   · BC x2, strep, legionella, sputum cx pending   · Procal pending   · Abx: Vanc, cefepime Flagyl D1  · Hold plaquenil, avapritinib given active infection   · Received 30cc/kg isolyte bolus for LA, however remains HD stable   · Trend LA and endpoints     Acute on chronic anemia  Assessment & Plan  · Baseline hemoglobin appears to be 8-9  · Hemoglobin as low as 4 9 on admission   · Unclear etiology, though does have hx of Dieulafoy's lesions and Sheela Schirmer lesions in the upper GI requiring epi injection, cautery and clipping   · GI following, plan was for EGD today   Will need to confer with service lines to evaluate appropriateness of procedure   · Strict NPO   · OGT with ETT placed 8/20, brown thin output noted   · Has previously required protonix gtt given multiple modalities of hemostasis required   · Protonix IV BID   · Total blood product: 2 5U PRBC (poss tx rxn during second unit)  · Serial h/h, transfuse for hemoglobin <7    Paroxysmal atrial fibrillation ([facility] [address] )  Assessment & Plan  · Hx per chart review   · On home lopressor  · AC: home ASA, currently on hold for symptomatic anemia   · Currently in NSR to ST on tele   · Cont lopressor IV q6h     GIST (gastrointestinal stroma tumor), malignant, colon ([facility] [address] )  Assessment & Plan  · Per previous notes, follow with Omero Sorenson as OP   · Last visit appears to have been 7/21/21  · Extensive hx of treatment, including  · Resection of a rectal GIST 8/24/01  · Biopsy of liver lesion 4/05, demonstrating metastatic disease  · Diagnostic laparoscopy, laparoscopic lysis of adhesions, exploratory laparotomy, resection of pelvic GIST in conjunction with a small bowel resection with primary anastomosis, placement of Seprafilm, omental pedicled flap to the pelvis and cystoscopy with bilateral ureteral sent placement  · Left pelvic sidewall radiation  · Holding avapritinib, plaquenil given active infection     MARK (obstructive sleep apnea)  Assessment & Plan  · Not on home CPAP per previous notes   · Failed bipap for increased WOB as above     Stage 3 chronic kidney disease [facility])  Assessment & Plan  Lab Results   Component Value Date    EGFR 44 08/20/2021    EGFR 37 08/19/2021    EGFR 30 08/18/2021    CREATININE 1 23 08/20/2021    CREATININE 1 40 (H) 08/19/2021    CREATININE 1 69 (H) 08/18/2021     · Baseline creat appears to be 0 9 to 1 3  · Initially admitted with IVIS in the setting of ABLA, though creat 1 23 now   · Queen placed for critical I&O   · Avoid hypotension, nephrotoxic agents     Elevated troponin level not due myocardial infarction  Assessment & Plan  · Likely demand ischemia in the setting of ABLA, aspiration pna   · Not a candidate for heparin 2/2 ABLA   · Trend to peak   · Cont tele     Essential hypertension  Assessment & Plan  · On home lopressor -- continue IV q6h   · Goal SBP <160  · BP control improved after ETT         ----------------------------------------------------------------------------------------  HPI/24hr events:  42-year-old female with a past medical history of PAF, MARK not on home CPAP, chronic anemia, anxiety, hypertension, CKD 3, gastrointestinal tumor presents as a critical care admission following a rapid response overnight  She was initially admitted to the hospitalist service for acute blood loss anemia, thought to be secondary to a GI bleed  A rapid response was activated overnight for increasing oxygen requirements, increased work of breathing, tachypnea, tachycardia  On my arrival, the patient appeared uncomfortable, to be in moderate respiratory distress, and with rhonchi throughout  She was placed on 6 L nasal cannula with oxygen mid to high 80s  Chest x-ray concerning for aspiration  At the time of the rapid response, the patient did indicate to me that she has had difficulty swallowing both thin liquids and solid foods at home  She was administered an albuterol nebulizer, however given her lack of improvement, she was brought to the ICU  On arrival to the ICU, she appeared to be in worsening respiratory distress as noted above, and she was placed on BiPAP  Unfortunately, after approximately 2 hours of BiPAP, she became more hypoxic, tachycardic, tachypneic, and with increased work of breathing appearing more tired  She then became encephalopathic, likely in the setting of respiratory distress with hypoxia    She was electively intubated for increased work of breathing, tachypnea, hypoxia  She is placed on broad-spectrum antibiotics, and administered 2 L isolate bolus for lactic acid of 5 2  She remains hemodynamically stable  She remains in the ICU on mechanical ventilation for likely aspiration pneumonia and severe sepsis  Patient appropriate for transfer out of the ICU today?: No  Disposition: Transfer to Critical Care   Code Status: Level 1 - Full Code  ---------------------------------------------------------------------------------------  SUBJECTIVE  ETT    Review of Systems   Unable to perform ROS: Intubated     Review of systems was reviewed and negative unless stated above in HPI/24-hour events   ---------------------------------------------------------------------------------------  OBJECTIVE    Vitals   Vitals:    21 0330 21 0400 21 0430 21 0439   BP: (!) 176/82 137/74 136/73    BP Location:       Pulse: (!) 109 (!) 108 97    Resp: (!) 44 (!) 43 (!) 37    Temp:       TempSrc:       SpO2: (!) 88% (!) 89% 95% 98%   Weight:       Height:         Temp (24hrs), Av 6 °F (37 °C), Min:98 1 °F (36 7 °C), Max:98 8 °F (37 1 °C)  Current: Temperature: 98 7 °F (37 1 °C)          Respiratory:  SpO2: SpO2: 94 %, SpO2 Activity: SpO2 Activity: At Rest, SpO2 Device: O2 Device: Ventilator  Nasal Cannula O2 Flow Rate (L/min): 4 L/min    Invasive/non-invasive ventilation settings   Respiratory    Lab Data (Last 4 hours)    None         O2/Vent Data (Last 4 hours)      /20 0439         Vent Mode  AC/VC      Non-Invasive Ventilation Mode BiPAP       Resp Rate (BPM) (BPM)  12      Vt (mL) (mL)  300      FIO2 (%) (%)  100      PEEP (cmH2O) (cmH2O)  6      MV  10 9                  Physical Exam  Vitals and nursing note reviewed  Constitutional:       General: She is not in acute distress  Appearance: Normal appearance  She is normal weight  She is ill-appearing  She is not toxic-appearing or diaphoretic  Interventions: She is sedated, intubated and restrained  HENT:      Head: Normocephalic  Cardiovascular:      Rate and Rhythm: Normal rate and regular rhythm  Pulses:           Radial pulses are 2+ on the right side and 2+ on the left side  Heart sounds: Normal heart sounds  Pulmonary:      Effort: Tachypnea present  No accessory muscle usage, prolonged expiration or respiratory distress  She is intubated  Breath sounds: Examination of the right-lower field reveals decreased breath sounds  Examination of the left-lower field reveals decreased breath sounds  Decreased breath sounds and rhonchi present  Comments: [facility] 12/300/100/6  Abdominal:      General: Bowel sounds are normal       Palpations: Abdomen is soft  Tenderness: There is no abdominal tenderness  Genitourinary:     Comments: Bernice present   Skin:     General: Skin is warm  Capillary Refill: Capillary refill takes less than 2 seconds  Coloration: Skin is pale  Neurological:      Mental Status: She is easily aroused        Comments: Opens eyes to voice   Follows commands intermittently while intubated and sedated   Moves ext x4 spontaneously    Psychiatric:      Comments: Deferred          Laboratory and Diagnostics:  Results from last 7 days   Lab Units 08/20/21  0034 08/19/21  1907 08/19/21  0444 08/19/21  0418 08/18/21  2119 08/18/21  1237   WBC Thousand/uL 18 98*  --   --   --  8 92 8 85   HEMOGLOBIN g/dL 9 7* 9 3* 6 7* 6 6* 4 9* 5 1*   HEMATOCRIT % 28 9*  --   --   --  15 7* 16 0*   PLATELETS Thousands/uL 279  --   --   --  299 269   NEUTROS PCT % 94*  --   --   --  92*  --    MONOS PCT % 4  --   --   --  6  --    MONO PCT %  --   --   --   --   --  1*     Results from last 7 days   Lab Units 08/20/21  0033 08/19/21  0430 08/18/21  2119   SODIUM mmol/L 134* 135* 136   POTASSIUM mmol/L 4 2 5 2 4 5   CHLORIDE mmol/L 98* 100 101   CO2 mmol/L 22 23 24   ANION GAP mmol/L 14* 12 11   BUN mg/dL 28* 31* 30* CREATININE mg/dL 1 23 1 40* 1 69*   CALCIUM mg/dL 8 8 8 6 8 9   GLUCOSE RANDOM mg/dL 127 112 113   ALT U/L 36  --  32   AST U/L 48*  --  41   ALK PHOS U/L 133*  --  88   ALBUMIN g/dL 3 0*  --  3 1*   TOTAL BILIRUBIN mg/dL 1 10*  --  0 20               Results from last 7 days   Lab Units 08/20/21  0307 08/19/21  0418 08/19/21  0055 08/18/21  2119   TROPONIN I ng/mL 0 40* 0 06* 0 07* 0 06*     Results from last 7 days   Lab Units 08/20/21  0307 08/20/21  0033   LACTIC ACID mmol/L 5 2* 2 1*     ABG:    VBG:          Micro        EKG: NSR on tele   Imaging: I have personally reviewed pertinent reports  Intake and Output  I/O       08/18 0701 - 08/19 0700 08/19 0701 - 08/20 0700    P  O   50    Blood 350 350    Total Intake(mL/kg) 350 (6 4) 400 (7 3)    Urine (mL/kg/hr)  100 (0 1)    Total Output  100    Net +350 +300          Unmeasured Urine Occurrence  2 x          Height and Weights   Height: 5' 1" (154 9 cm)  IBW (Ideal Body Weight): 47 8 kg  Body mass index is 22 91 kg/m²  Weight (last 2 days)     Date/Time   Weight    08/18/21 2224   55 (121 25)    08/18/21 2055   53 1 (117)                Nutrition       Diet Orders   (From admission, onward)             Start     Ordered    08/20/21 0204  Diet NPO  Diet effective now     Question Answer Comment   Diet Type NPO    RD to adjust diet per protocol?  No        08/20/21 0203                  Active Medications  Scheduled Meds:  Current Facility-Administered Medications   Medication Dose Route Frequency Provider Last Rate    acetaminophen  650 mg Oral Q6H PRN Lidia Miller PA-C      bisacodyl  10 mg Rectal Daily PRN LANEY Bowden      cefepime  2,000 mg Intravenous Q12H LANEY Hui 2,000 mg (08/20/21 0144)    chlorhexidine  15 mL Mouth/Throat Q12H [facility] LANEY Hui      dexmedetomidine  0 1-0 7 mcg/kg/hr Intravenous Titrated Concepcion Louder, CRNP 0 1 mcg/kg/hr (08/20/21 0500)    etomidate  20 mg Intravenous Once Concepcion Louder, CRNP      fentanyl citrate (PF)  50 mcg Intravenous Q2H PRN Betsy Kelly, CRNP      ipratropium  0 5 mg Nebulization Q6H Betsy Newbrandy, CRNP      iron sucrose  200 mg Intravenous Daily Heber Carias  mg (08/19/21 1439)    levalbuterol  1 25 mg Nebulization Q6H Maren Newness, CRNP      metoprolol  2 5 mg Intravenous Q6H Maren Newness, CRNP      metroNIDAZOLE  500 mg Intravenous Q8H Betsy Newbrandy, CRNP 500 mg (08/20/21 0436)    midazolam  1 mg Intravenous Once Maren Newness, CRNP      ondansetron  4 mg Intravenous Q6H PRN Helen Richey PA-C      pantoprazole  40 mg Intravenous Q12H [address] Helen Richey PA-C      propofol  5-50 mcg/kg/min Intravenous Titrated Betsypetra Kelly, CRNP 40 mcg/kg/min (08/20/21 0430)    senna  2 tablet Oral BID LANEY Fernando      Succinylcholine Chloride  100 mg Intravenous Once Maren Newness, CRNP      vancomycin  15 mg/kg Intravenous Once Betsy Kelly, CRNP      [START ON 8/21/2021] vancomycin  15 mg/kg Intravenous Q24H Betsy Newbrandy, CRNP       Continuous Infusions:  dexmedetomidine, 0 1-0 7 mcg/kg/hr, Last Rate: 0 1 mcg/kg/hr (08/20/21 0500)  propofol, 5-50 mcg/kg/min, Last Rate: 40 mcg/kg/min (08/20/21 0430)      PRN Meds:   acetaminophen, 650 mg, Q6H PRN  bisacodyl, 10 mg, Daily PRN  fentanyl citrate (PF), 50 mcg, Q2H PRN  ondansetron, 4 mg, Q6H PRN        Invasive Devices Review  Invasive Devices     Peripheral Intravenous Line            Peripheral IV 08/18/21 Right Antecubital 1 day    Peripheral IV 08/20/21 Right Antecubital <1 day          Drain            NG/OG/Enteral Tube Orogastric 16 Fr Right mouth <1 day          Airway            ETT  Cuffed 7 5 mm <1 day                ---------------------------------------------------------------------------------------  Advance Directive and Living Will:      Power of :    POLST:    ---------------------------------------------------------------------------------------  Care Time Delivered:   Upon my evaluation, this patient had a high probability of imminent or life-threatening deterioration due to Acute respiratory failure with hypoxia, aspiration, acute on chronic anemia, encephalopathy, which required my direct attention, intervention, and personal management  I have personally provided 40 minutes (0400 to 0440) of critical care time, exclusive of procedures, teaching, family meetings, and any prior time recorded by providers other than myself  LANEY Alexis      Portions of the record may have been created with voice recognition software  Occasional wrong word or "sound a like" substitutions may have occurred due to the inherent limitations of voice recognition software    Read the chart carefully and recognize, using context, where substitutions have occurred

## 2021-08-21 ENCOUNTER — APPOINTMENT (INPATIENT)
Dept: RADIOLOGY | Facility: HOSPITAL | Age: 71
DRG: 374 | End: 2021-08-21
Payer: COMMERCIAL

## 2021-08-21 PROBLEM — R65.21 SEPTIC SHOCK (HCC): Status: ACTIVE | Noted: 2020-02-07

## 2021-08-21 LAB
ALBUMIN SERPL BCP-MCNC: 2.9 G/DL (ref 3.5–5)
ALP SERPL-CCNC: 94 U/L (ref 46–116)
ALT SERPL W P-5'-P-CCNC: 30 U/L (ref 12–78)
ANION GAP SERPL CALCULATED.3IONS-SCNC: 9 MMOL/L (ref 4–13)
AST SERPL W P-5'-P-CCNC: 43 U/L (ref 5–45)
BASOPHILS # BLD AUTO: 0.02 THOUSANDS/ΜL (ref 0–0.1)
BASOPHILS NFR BLD AUTO: 0 % (ref 0–1)
BILIRUB SERPL-MCNC: 0.5 MG/DL (ref 0.2–1)
BUN SERPL-MCNC: 36 MG/DL (ref 5–25)
CA-I BLD-SCNC: 1.06 MMOL/L (ref 1.12–1.32)
CALCIUM ALBUM COR SERPL-MCNC: 9.6 MG/DL (ref 8.3–10.1)
CALCIUM SERPL-MCNC: 8.7 MG/DL (ref 8.3–10.1)
CHLORIDE SERPL-SCNC: 105 MMOL/L (ref 100–108)
CO2 SERPL-SCNC: 26 MMOL/L (ref 21–32)
CREAT SERPL-MCNC: 1.27 MG/DL (ref 0.6–1.3)
EOSINOPHIL # BLD AUTO: 0 THOUSAND/ΜL (ref 0–0.61)
EOSINOPHIL NFR BLD AUTO: 0 % (ref 0–6)
ERYTHROCYTE [DISTWIDTH] IN BLOOD BY AUTOMATED COUNT: 19.2 % (ref 11.6–15.1)
GFR SERPL CREATININE-BSD FRML MDRD: 42 ML/MIN/1.73SQ M
GLUCOSE SERPL-MCNC: 124 MG/DL (ref 65–140)
GLUCOSE SERPL-MCNC: 137 MG/DL (ref 65–140)
GLUCOSE SERPL-MCNC: 143 MG/DL (ref 65–140)
HCT VFR BLD AUTO: 22.1 % (ref 34.8–46.1)
HGB BLD-MCNC: 7.1 G/DL (ref 11.5–15.4)
HGB BLD-MCNC: 8.3 G/DL (ref 11.5–15.4)
IMM GRANULOCYTES # BLD AUTO: 0.07 THOUSAND/UL (ref 0–0.2)
IMM GRANULOCYTES NFR BLD AUTO: 0 % (ref 0–2)
LACTATE SERPL-SCNC: 0.8 MMOL/L (ref 0.5–2)
LYMPHOCYTES # BLD AUTO: 0.09 THOUSANDS/ΜL (ref 0.6–4.47)
LYMPHOCYTES NFR BLD AUTO: 1 % (ref 14–44)
MCH RBC QN AUTO: 31.4 PG (ref 26.8–34.3)
MCHC RBC AUTO-ENTMCNC: 32.1 G/DL (ref 31.4–37.4)
MCV RBC AUTO: 98 FL (ref 82–98)
MONOCYTES # BLD AUTO: 0.66 THOUSAND/ΜL (ref 0.17–1.22)
MONOCYTES NFR BLD AUTO: 4 % (ref 4–12)
MRSA NOSE QL CULT: NORMAL
NEUTROPHILS # BLD AUTO: 17.76 THOUSANDS/ΜL (ref 1.85–7.62)
NEUTS SEG NFR BLD AUTO: 95 % (ref 43–75)
NRBC BLD AUTO-RTO: 0 /100 WBCS
PLATELET # BLD AUTO: 205 THOUSANDS/UL (ref 149–390)
PMV BLD AUTO: 10.2 FL (ref 8.9–12.7)
POTASSIUM SERPL-SCNC: 3.8 MMOL/L (ref 3.5–5.3)
PROCALCITONIN SERPL-MCNC: 7.34 NG/ML
PROT SERPL-MCNC: 6.2 G/DL (ref 6.4–8.2)
RBC # BLD AUTO: 2.26 MILLION/UL (ref 3.81–5.12)
SODIUM SERPL-SCNC: 140 MMOL/L (ref 136–145)
WBC # BLD AUTO: 18.6 THOUSAND/UL (ref 4.31–10.16)

## 2021-08-21 PROCEDURE — 85018 HEMOGLOBIN: CPT | Performed by: NURSE PRACTITIONER

## 2021-08-21 PROCEDURE — 99233 SBSQ HOSP IP/OBS HIGH 50: CPT | Performed by: INTERNAL MEDICINE

## 2021-08-21 PROCEDURE — 99231 SBSQ HOSP IP/OBS SF/LOW 25: CPT | Performed by: INTERNAL MEDICINE

## 2021-08-21 PROCEDURE — 94003 VENT MGMT INPAT SUBQ DAY: CPT

## 2021-08-21 PROCEDURE — 80053 COMPREHEN METABOLIC PANEL: CPT | Performed by: NURSE PRACTITIONER

## 2021-08-21 PROCEDURE — 83605 ASSAY OF LACTIC ACID: CPT | Performed by: INTERNAL MEDICINE

## 2021-08-21 PROCEDURE — 84100 ASSAY OF PHOSPHORUS: CPT | Performed by: INTERNAL MEDICINE

## 2021-08-21 PROCEDURE — 93005 ELECTROCARDIOGRAM TRACING: CPT

## 2021-08-21 PROCEDURE — 94760 N-INVAS EAR/PLS OXIMETRY 1: CPT

## 2021-08-21 PROCEDURE — C9113 INJ PANTOPRAZOLE SODIUM, VIA: HCPCS | Performed by: NURSE PRACTITIONER

## 2021-08-21 PROCEDURE — C9113 INJ PANTOPRAZOLE SODIUM, VIA: HCPCS | Performed by: PHYSICIAN ASSISTANT

## 2021-08-21 PROCEDURE — 94640 AIRWAY INHALATION TREATMENT: CPT

## 2021-08-21 PROCEDURE — 84145 PROCALCITONIN (PCT): CPT | Performed by: NURSE PRACTITIONER

## 2021-08-21 PROCEDURE — 82330 ASSAY OF CALCIUM: CPT | Performed by: INTERNAL MEDICINE

## 2021-08-21 PROCEDURE — 71045 X-RAY EXAM CHEST 1 VIEW: CPT

## 2021-08-21 PROCEDURE — 82948 REAGENT STRIP/BLOOD GLUCOSE: CPT

## 2021-08-21 PROCEDURE — 85025 COMPLETE CBC W/AUTO DIFF WBC: CPT | Performed by: INTERNAL MEDICINE

## 2021-08-21 PROCEDURE — 83735 ASSAY OF MAGNESIUM: CPT | Performed by: INTERNAL MEDICINE

## 2021-08-21 RX ORDER — CALCIUM GLUCONATE 20 MG/ML
2 INJECTION, SOLUTION INTRAVENOUS ONCE
Status: COMPLETED | OUTPATIENT
Start: 2021-08-21 | End: 2021-08-21

## 2021-08-21 RX ORDER — LANOLIN ALCOHOL/MO/W.PET/CERES
6 CREAM (GRAM) TOPICAL
Status: DISCONTINUED | OUTPATIENT
Start: 2021-08-21 | End: 2021-08-23

## 2021-08-21 RX ORDER — METOPROLOL SUCCINATE 25 MG/1
25 TABLET, EXTENDED RELEASE ORAL DAILY
Status: DISCONTINUED | OUTPATIENT
Start: 2021-08-22 | End: 2021-08-25 | Stop reason: HOSPADM

## 2021-08-21 RX ORDER — PROPOFOL 10 MG/ML
5-50 INJECTION, EMULSION INTRAVENOUS
Status: DISCONTINUED | OUTPATIENT
Start: 2021-08-21 | End: 2021-08-21

## 2021-08-21 RX ORDER — METOPROLOL TARTRATE 5 MG/5ML
5 INJECTION INTRAVENOUS ONCE
Status: COMPLETED | OUTPATIENT
Start: 2021-08-21 | End: 2021-08-21

## 2021-08-21 RX ORDER — DOCUSATE SODIUM 100 MG/1
100 CAPSULE, LIQUID FILLED ORAL 2 TIMES DAILY
Status: DISCONTINUED | OUTPATIENT
Start: 2021-08-21 | End: 2021-08-25 | Stop reason: HOSPADM

## 2021-08-21 RX ADMIN — PANTOPRAZOLE SODIUM 40 MG: 40 INJECTION, POWDER, FOR SOLUTION INTRAVENOUS at 21:20

## 2021-08-21 RX ADMIN — DOCUSATE SODIUM 100 MG: 100 CAPSULE, LIQUID FILLED ORAL at 17:57

## 2021-08-21 RX ADMIN — POTASSIUM CHLORIDE 20 MEQ: 14.9 INJECTION, SOLUTION INTRAVENOUS at 01:14

## 2021-08-21 RX ADMIN — HYDROCORTISONE SODIUM SUCCINATE 50 MG: 100 INJECTION, POWDER, FOR SOLUTION INTRAMUSCULAR; INTRAVENOUS at 06:02

## 2021-08-21 RX ADMIN — METRONIDAZOLE 500 MG: 500 INJECTION, SOLUTION INTRAVENOUS at 03:22

## 2021-08-21 RX ADMIN — LEVALBUTEROL HYDROCHLORIDE 1.25 MG: 1.25 SOLUTION, CONCENTRATE RESPIRATORY (INHALATION) at 14:15

## 2021-08-21 RX ADMIN — LEVALBUTEROL HYDROCHLORIDE 1.25 MG: 1.25 SOLUTION, CONCENTRATE RESPIRATORY (INHALATION) at 20:41

## 2021-08-21 RX ADMIN — DEXMEDETOMIDINE HYDROCHLORIDE 1 MCG/KG/HR: 100 INJECTION, SOLUTION INTRAVENOUS at 02:39

## 2021-08-21 RX ADMIN — METRONIDAZOLE 500 MG: 500 INJECTION, SOLUTION INTRAVENOUS at 09:23

## 2021-08-21 RX ADMIN — LEVALBUTEROL HYDROCHLORIDE 1.25 MG: 1.25 SOLUTION, CONCENTRATE RESPIRATORY (INHALATION) at 07:50

## 2021-08-21 RX ADMIN — IPRATROPIUM BROMIDE 0.5 MG: 0.5 SOLUTION RESPIRATORY (INHALATION) at 01:16

## 2021-08-21 RX ADMIN — IPRATROPIUM BROMIDE 0.5 MG: 0.5 SOLUTION RESPIRATORY (INHALATION) at 20:41

## 2021-08-21 RX ADMIN — METOPROLOL TARTRATE 5 MG: 5 INJECTION INTRAVENOUS at 23:03

## 2021-08-21 RX ADMIN — DEXMEDETOMIDINE HYDROCHLORIDE 1.1 MCG/KG/HR: 100 INJECTION, SOLUTION INTRAVENOUS at 00:53

## 2021-08-21 RX ADMIN — HYDROCORTISONE SODIUM SUCCINATE 50 MG: 100 INJECTION, POWDER, FOR SOLUTION INTRAMUSCULAR; INTRAVENOUS at 21:20

## 2021-08-21 RX ADMIN — IPRATROPIUM BROMIDE 0.5 MG: 0.5 SOLUTION RESPIRATORY (INHALATION) at 07:49

## 2021-08-21 RX ADMIN — CALCIUM GLUCONATE 2 G: 20 INJECTION, SOLUTION INTRAVENOUS at 06:03

## 2021-08-21 RX ADMIN — STANDARDIZED SENNA CONCENTRATE 17.2 MG: 8.6 TABLET ORAL at 17:57

## 2021-08-21 RX ADMIN — LEVALBUTEROL HYDROCHLORIDE 1.25 MG: 1.25 SOLUTION, CONCENTRATE RESPIRATORY (INHALATION) at 01:16

## 2021-08-21 RX ADMIN — METRONIDAZOLE 500 MG: 500 INJECTION, SOLUTION INTRAVENOUS at 17:59

## 2021-08-21 RX ADMIN — HYDROXYCHLOROQUINE SULFATE 200 MG: 200 TABLET, FILM COATED ORAL at 07:53

## 2021-08-21 RX ADMIN — FENTANYL CITRATE 50 MCG: 50 INJECTION, SOLUTION INTRAMUSCULAR; INTRAVENOUS at 03:13

## 2021-08-21 RX ADMIN — CEFEPIME HYDROCHLORIDE 2000 MG: 2 INJECTION, SOLUTION INTRAVENOUS at 02:44

## 2021-08-21 RX ADMIN — IPRATROPIUM BROMIDE 0.5 MG: 0.5 SOLUTION RESPIRATORY (INHALATION) at 14:15

## 2021-08-21 RX ADMIN — PANTOPRAZOLE SODIUM 40 MG: 40 INJECTION, POWDER, FOR SOLUTION INTRAVENOUS at 09:19

## 2021-08-21 RX ADMIN — Medication 6 MG: at 21:20

## 2021-08-21 RX ADMIN — CEFEPIME HYDROCHLORIDE 2000 MG: 2 INJECTION, SOLUTION INTRAVENOUS at 13:01

## 2021-08-21 NOTE — PROGRESS NOTES
[facility] phone call message- general phone call:    Reason for call: She needs a call back re a biopsy    Return call needed: Yes     OK to leave a message on voice mail? Yes    Primary language: English      needed? No    Call taken on March 7, 2018 at 11:42 AM by Earline Curry     Vancomycin IV Pharmacy-to-Dose Consultation    Nadia Vazquez is a 66 y o  female who is currently receiving Vancomycin IV with management by the Pharmacy Consult service  Assessment/Plan:  The patient was reviewed  Renal function is stable and no signs or symptoms of nephrotoxicity and/or infusion reactions were documented in the chart  Based on todays assessment, continue current vancomycin (day # 2) dosing of 750 mg Q 24 H, with a plan for trough to be drawn at 1430 on 08/23/21  We will continue to follow the patients culture results and clinical progress daily      Lawrence Whitehead, Pharmacist

## 2021-08-21 NOTE — ASSESSMENT & PLAN NOTE
· As evidenced by hypotension  · Requiring norepinephrine to maintain map greater than 65 mmHg  · Etiology unclear possibly mixed:  Likely distributive sepsis versus medication induced (propofol)  · Careful volume assessments  · Echo as noted:  EF 55% with grade 2 diastolic dysfunction

## 2021-08-21 NOTE — ASSESSMENT & PLAN NOTE
Progress Note  Date:2020       AFDN:[id number]  Patient Lui Hines     YOB: 1953     Age:67 y.o. Patient sleeping during exam.    Subjective    Subjective:  Symptoms:  Improved. She reports shortness of breath. No chest pain. Diet:  Adequate intake. Activity level: Impaired due to weakness. Pain:  She reports no pain. Review of Systems   Constitutional: Positive for activity change. Negative for fever. HENT: Negative for congestion. Respiratory: Positive for shortness of breath. Cardiovascular: Negative for chest pain. Gastrointestinal: Negative for abdominal pain. Genitourinary: Negative for difficulty urinating. Musculoskeletal: Negative for arthralgias. Neurological: Negative for dizziness. Hematological: Negative for adenopathy. Psychiatric/Behavioral: Negative for agitation. Objective         Vitals Last 24 Hours:  TEMPERATURE:  Temp  Av.7 °F (35.9 °C)  Min: 96.6 °F (35.9 °C)  Max: 96.8 °F (36 °C)  RESPIRATIONS RANGE: Resp  Av  Min: 20  Max: 20  PULSE OXIMETRY RANGE: SpO2  Av %  Min: 93 %  Max: 97 %  PULSE RANGE: Pulse  Av.5  Min: 78  Max: 91  BLOOD PRESSURE RANGE: Systolic (99ZMQ), PQH:333 , Min:158 , WKB:097   ; Diastolic (31DRD), CVZ:40, Min:60, Max:74    I/O (24Hr): Intake/Output Summary (Last 24 hours) at 2020 0653  Last data filed at 2020 1417  Gross per 24 hour   Intake 370 ml   Output --   Net 370 ml     Objective:  General Appearance:  Comfortable. Vital signs: (most recent): Blood pressure (!) 170/60, pulse 91, temperature 96.8 °F (36 °C), temperature source Temporal, resp. rate 20, height 5' 1\" (1.549 m), weight 140 lb (63.5 kg), SpO2 97 %. No fever. Lungs:  She is not in respiratory distress. There are decreased breath sounds. Heart: Normal rate. Regular rhythm.   S1 normal and S2 normal.      Labs/Imaging/Diagnostics    Labs:  CBC:  Recent Labs     20  6235 · Baseline hemoglobin appears to be 8-9  · Hemoglobin 4 9 on admission   · Unclear etiology, though does have hx of Dieulafoy's lesions and Seb lesions in the upper GI requiring epi injection, cautery and clipping & GIST   · GI following, plan was for EGD, likely Monday   · OGT with ETT placed 8/20, brown thin output noted   · Protonix IV BID   · Resume Iron supplementation as able   · Total blood product: 2 5U PRBC  · Serial h/h, transfuse for hemoglobin <7  · Repeat hemoglobin 7 9 this morning 11/28/20  0621 11/28/20  1534 11/29/20  0540   WBC 3.0* 16.2*  --  14.7*   RBC 3.77 3.35*  --  3.87   HGB 7.9* 6.9* 7.7* 8.5*   HCT 27.3* 24.1* 25.9* 28.9*   MCV 72.4* 71.9*  --  74.7*   RDW 18.7* 19.3*  --  21.2*    180  --  178     CHEMISTRIES:  Recent Labs     11/27/20  0838 11/28/20  0621 11/29/20  0540    140 141   K 4.0 4.1 5.0    106 106   CO2 21* 20* 21*   BUN 30* 24* 23   CREATININE 1.3* 1.0 1.0   GLUCOSE 116* 127* 186*     PT/INR:No results for input(s): PROTIME, INR in the last 72 hours. APTT:No results for input(s): APTT in the last 72 hours. LIVER PROFILE:  Recent Labs     11/27/20  0838   AST 28   ALT 18   BILITOT 0.3   ALKPHOS 88       Imaging Last 24 Hours:  Ct Chest Wo Contrast    Result Date: 11/27/2020  EXAMINATION: CT OF THE CHEST WITHOUT CONTRAST 11/27/2020 10:48 am TECHNIQUE: CT of the chest was performed without the administration of intravenous contrast. Multiplanar reformatted images are provided for review. Dose modulation, iterative reconstruction, and/or weight based adjustment of the mA/kV was utilized to reduce the radiation dose to as low as reasonably achievable. COMPARISON: None. HISTORY: ORDERING SYSTEM PROVIDED HISTORY: hypoxia TECHNOLOGIST PROVIDED HISTORY: Reason for exam:->hypoxia FINDINGS: Mediastinum: No abnormal lymphadenopathy. The pulmonary trunk is normal in size Lungs/pleura: Patchy infiltrates and ground-glass opacity seen in the right lung. Left lung is clear. No pleural effusions. No pneumothorax. Upper Abdomen: No acute process identified Soft Tissues/Bones: No aggressive osseous lesions. Patchy infiltrates in the right lung represents infectious process. Aspiration may also be considered in the proper clinical setting.     Xr Chest Portable    Result Date: 11/27/2020  EXAMINATION: ONE XRAY VIEW OF THE CHEST 11/27/2020 8:56 am COMPARISON: 11/18/2019 HISTORY: ORDERING SYSTEM PROVIDED HISTORY: dyspnea TECHNOLOGIST PROVIDED HISTORY: Reason for exam:->dyspnea FINDINGS: Multifocal dense airspace disease is seen throughout the right lung favored to represent pneumonia. The left lung is clear. The heart is not enlarged. No findings of failure. There is no left pleural effusion. Diffuse dense right lung airspace disease favored to represent diffuse pneumonia. The left lung is clear. Assessment//Plan           Hospital Problems           Last Modified POA    Pneumonia 11/27/2020 Yes        Assessment:  (Pneumonia  Acute hypoxic respiratory failure  Hyperlipidemia). Plan:   (Appreciate Pulmonary management. Continue Abx, steroids, nebs. Monitor labs and cultures. ).

## 2021-08-21 NOTE — ASSESSMENT & PLAN NOTE
· BC from 8/20 growing gram negative x 2  · Source unclear, ua without clear signs of infection  · Aspiration vs  GI translocation in setting of possible GI bleed  · Continue cefepime, flagyl, consider d/c vanc pending MRSA swab  · Repeat bc after 48 hours abx

## 2021-08-21 NOTE — ASSESSMENT & PLAN NOTE
Lab Results   Component Value Date    EGFR 42 08/21/2021    EGFR 39 08/20/2021    EGFR 45 08/20/2021    CREATININE 1 27 08/21/2021    CREATININE 1 34 (H) 08/20/2021    CREATININE 1 19 08/20/2021     · Baseline creat appears to be 0 9 to 1 3  · Initially admitted with IVIS in the setting of ABLA, though creat 1 23 now   · Queen placed for critical I&O   · Avoid hypotension, nephrotoxic agents

## 2021-08-21 NOTE — PROGRESS NOTES
Patient converted back to NSR  Heart rate 79, /62, spo2 at 100% on ventilator  Patient resting in bed comfortably  Will continue to monitor   Bill Martinez RN

## 2021-08-21 NOTE — ASSESSMENT & PLAN NOTE
· Baseline hemoglobin appears to be 8-9  · Hemoglobin 4 9 on admission   · Unclear etiology, though does have hx of Dieulafoy's lesions and Seb lesions in the upper GI requiring epi injection, cautery and clipping & GIST   · GI following, plan was for EGD, likely Monday   · OGT with ETT placed 8/20, brown thin output noted   · Protonix IV BID   · Total blood product: 2 5U PRBC  · Serial h/h, transfuse for hemoglobin <7

## 2021-08-21 NOTE — PROGRESS NOTES
Patient became agitated and tachycardic  Heart rate became irregular to auscultation  EKG showed atrial fib with RVR with heart rate in 140s-150s  Bp 118/77, o2 at 100% on vent  Patient denies chest pain or palpitations  CRLIBRA Quan at bedside  PRN given  Labs drawn  Emotional support given  Will continue to monitor   Naman Morgan RN

## 2021-08-21 NOTE — NURSING NOTE
Received pt at 0140 from [facility] as a result of a Rapid Response  Pt tachypneic, hypoxic, oxygen via face mask with O2 sats 80's, restless and  in apparent resp distress  Critical care AP called to bedside at 0150 and pt emergently transferred to ICU

## 2021-08-21 NOTE — ASSESSMENT & PLAN NOTE
· Per previous notes, follow with Chinedu Calvin as OP   · Last visit appears to have been 7/21/21  · Extensive hx of treatment, including  · Resection of a rectal GIST 8/24/01  · Biopsy of liver lesion 4/05, demonstrating metastatic disease  · Diagnostic laparoscopy, laparoscopic lysis of adhesions, exploratory laparotomy, resection of pelvic GIST in conjunction with a small bowel resection with primary anastomosis, placement of Seprafilm, omental pedicled flap to the pelvis and cystoscopy with bilateral ureteral sent placement  · Left pelvic sidewall radiation  · Holding avapritinib given infection   · Restarted plaquenil

## 2021-08-21 NOTE — ASSESSMENT & PLAN NOTE
Lab Results   Component Value Date    EGFR 48 08/22/2021    EGFR 42 08/21/2021    EGFR 39 08/20/2021    CREATININE 1 13 08/22/2021    CREATININE 1 27 08/21/2021    CREATININE 1 34 (H) 08/20/2021     · Baseline creat appears to be 0 9 to 1 3  · Initially admitted with IVIS in the setting of ABLA  · D/c rust cath 8/21  · Follow urinary retention protocol   · Trend I/O  · Creatinine stable at 1 13

## 2021-08-21 NOTE — ASSESSMENT & PLAN NOTE
· Troponin peaked 0 79  · Suspect this is secondary to anemia and is a non-MI troponin elevation   · No EKG changes

## 2021-08-21 NOTE — ASSESSMENT & PLAN NOTE
· Per previous notes, follow with [facility] as OP   · Last visit appears to have been 7/21/21  · Extensive hx of treatment, including  · Resection of a rectal GIST 8/24/01  · Biopsy of liver lesion 4/05, demonstrating metastatic disease  · Diagnostic laparoscopy, laparoscopic lysis of adhesions, exploratory laparotomy, resection of pelvic GIST in conjunction with a small bowel resection with primary anastomosis, placement of Seprafilm, omental pedicled flap to the pelvis and cystoscopy with bilateral ureteral sent placement  · Left pelvic sidewall radiation  · Holding avapritinib, plaquenil given active infection

## 2021-08-21 NOTE — PROGRESS NOTES
Progress note - Gastroenterology   Corena Killer 66 y o  female MRN: [id number]  Unit/Bed#: -01 Encounter: [id number]    ASSESSMENT and PLAN    1  Acute on chronic anemia:  History of multiple clippings and cauterizations due to Dieulafoy and Seb lesions  EGD was planned but was canceled yesterday 820 because of aspiration requiring ventilation  She is now dramatically improved  Plan to proceed with EGD on 8/23   - Protonix 40mg IV BID     2   GIST (Gastrointestinal stroma tumor), malignant, colon:   Patient is currently followed at [facility], she was in research for and now takes Ayvakit 200 mg daily      3  Gastric AVM:  History of with EGD in 2019       4  Constipation:    - Colace 100 mg p o  Twice daily  - Senna 17 6 mg po Twice Daily   - Dulcolax Supp 10mg daily prn constipation      5  Abdominal Pain:   Patient continues to have increased generalized abdominal discomfort with increased nausea and retching    - CT nonspecific intra-abdominal findings  No explanation for pain or anemia        Chief Complaint   Patient presents with    Anemia     Pt reports her hgb is 5 1 from todays blood draw for upcoming back operation  SUBJECTIVE/HPI   Dramatic improvement  She is sitting in a chair eating  No GI complaints no shortness of breath      /64 (BP Location: Left arm)   Pulse 98   Temp 97 7 °F (36 5 °C) (Oral)   Resp (!) 25   Ht 5' 1" (1 549 m)   Wt 52 4 kg (115 lb 8 3 oz)   SpO2 95%   BMI 21 83 kg/m²     PHYSICALEXAM  General appearance: alert, appears stated age and cooperative  Eyes: PERLLA, EOMI, no icterus   Head: Normocephalic, without obvious abnormality, atraumatic  Extremities: extremities normal, atraumatic, no cyanosis or edema  Neurologic: Grossly normal    Lab Results   Component Value Date    GLUCOSE 121 08/20/2021    CALCIUM 8 7 08/21/2021     10/12/2015    K 3 8 08/21/2021    CO2 26 08/21/2021     08/21/2021    BUN 36 (H) 08/21/2021 CREATININE 1 27 08/21/2021     Lab Results   Component Value Date    WBC 18 60 (H) 08/21/2021    HGB 7 1 (L) 08/21/2021    HCT 22 1 (L) 08/21/2021    MCV 98 08/21/2021     08/21/2021     Lab Results   Component Value Date    ALT 30 08/21/2021    AST 43 08/21/2021    ALKPHOS 94 08/21/2021    BILITOT 0 28 10/12/2015     No results found for: AMYLASE  Lab Results   Component Value Date    LIPASE 118 02/19/2020     Lab Results   Component Value Date    IRON 32 (L) 08/19/2021    TIBC 338 08/19/2021    FERRITIN 76 08/19/2021     Lab Results   Component Value Date    INR 1 25 (H) 08/20/2021

## 2021-08-21 NOTE — ASSESSMENT & PLAN NOTE
· Treated for asp   pna va pneumonitis   · Continue abx   · Patient passed nursing dysphagia screening post intubation   · Will obtain official speech eval to confirm   · Follow-up culture results

## 2021-08-21 NOTE — ASSESSMENT & PLAN NOTE
· BC from 8/20 growing gram negative rods x 2  · Aspiration vs  GI translocation in setting of possible GI bleed/ Cancer   · Continue cefepime, flagyl,d/c vanc   · Repeat bc drawn this morning

## 2021-08-21 NOTE — ASSESSMENT & PLAN NOTE
· Follows with cardiology at [facility]  · Developed AFib RVR overnight likely in setting of missed doses of metoprolol/bacteremia  · Continue metoprolol 25mg daily - resumed this a m  with improvement in rates to low 100s  · on daily ASA 81mg - held in setting of anemia/GIB  · IV Lopressor p r n   · Monitor on telemetry

## 2021-08-21 NOTE — ASSESSMENT & PLAN NOTE
· AEB tachypnea, tachycardia, increased WOB, hypoxia requiring ETT and MV   · Likely in the setting of aspiration followed by initiation of Bipap   · Extubated 8/21/21 to NC o2, currently at 2 L, spo2 > 95%  · Encourage IS  · OOB to chair   · Wean NC o2 as able for goal spo2 > 92%  · SpO2 stable on 2 L nasal cannula, wean as able

## 2021-08-22 LAB
ANION GAP SERPL CALCULATED.3IONS-SCNC: 14 MMOL/L (ref 4–13)
ATRIAL RATE: 127 BPM
ATRIAL RATE: 133 BPM
ATRIAL RATE: 150 BPM
ATRIAL RATE: 150 BPM
ATRIAL RATE: 166 BPM
ATRIAL RATE: 33 BPM
ATRIAL RATE: 60 BPM
BACTERIA BLD CULT: ABNORMAL
BACTERIA BLD CULT: ABNORMAL
BACTERIA SPT RESP CULT: NORMAL
BASOPHILS # BLD AUTO: 0.02 THOUSANDS/ΜL (ref 0–0.1)
BASOPHILS NFR BLD AUTO: 0 % (ref 0–1)
BUN SERPL-MCNC: 26 MG/DL (ref 5–25)
CALCIUM SERPL-MCNC: 8.6 MG/DL (ref 8.3–10.1)
CHLORIDE SERPL-SCNC: 101 MMOL/L (ref 100–108)
CO2 SERPL-SCNC: 22 MMOL/L (ref 21–32)
CREAT SERPL-MCNC: 1.13 MG/DL (ref 0.6–1.3)
EOSINOPHIL # BLD AUTO: 0 THOUSAND/ΜL (ref 0–0.61)
EOSINOPHIL NFR BLD AUTO: 0 % (ref 0–6)
ERYTHROCYTE [DISTWIDTH] IN BLOOD BY AUTOMATED COUNT: 18.6 % (ref 11.6–15.1)
GFR SERPL CREATININE-BSD FRML MDRD: 48 ML/MIN/1.73SQ M
GLUCOSE SERPL-MCNC: 119 MG/DL (ref 65–140)
GRAM STN SPEC: ABNORMAL
GRAM STN SPEC: ABNORMAL
GRAM STN SPEC: NORMAL
GRAM STN SPEC: NORMAL
HCT VFR BLD AUTO: 24.6 % (ref 34.8–46.1)
HGB BLD-MCNC: 7.9 G/DL (ref 11.5–15.4)
IMM GRANULOCYTES # BLD AUTO: 0.19 THOUSAND/UL (ref 0–0.2)
IMM GRANULOCYTES NFR BLD AUTO: 1 % (ref 0–2)
LYMPHOCYTES # BLD AUTO: 0.19 THOUSANDS/ΜL (ref 0.6–4.47)
LYMPHOCYTES NFR BLD AUTO: 1 % (ref 14–44)
MAGNESIUM SERPL-MCNC: 2.1 MG/DL (ref 1.6–2.6)
MCH RBC QN AUTO: 31.9 PG (ref 26.8–34.3)
MCHC RBC AUTO-ENTMCNC: 32.1 G/DL (ref 31.4–37.4)
MCV RBC AUTO: 99 FL (ref 82–98)
MONOCYTES # BLD AUTO: 0.73 THOUSAND/ΜL (ref 0.17–1.22)
MONOCYTES NFR BLD AUTO: 4 % (ref 4–12)
NEUTROPHILS # BLD AUTO: 15.39 THOUSANDS/ΜL (ref 1.85–7.62)
NEUTS SEG NFR BLD AUTO: 94 % (ref 43–75)
NRBC BLD AUTO-RTO: 0 /100 WBCS
PLATELET # BLD AUTO: 238 THOUSANDS/UL (ref 149–390)
PMV BLD AUTO: 9.9 FL (ref 8.9–12.7)
POTASSIUM SERPL-SCNC: 3.5 MMOL/L (ref 3.5–5.3)
PROCALCITONIN SERPL-MCNC: 5.49 NG/ML
QRS AXIS: -11 DEGREES
QRS AXIS: -16 DEGREES
QRS AXIS: -20 DEGREES
QRS AXIS: -24 DEGREES
QRS AXIS: -24 DEGREES
QRS AXIS: -27 DEGREES
QRS AXIS: -29 DEGREES
QRSD INTERVAL: 116 MS
QRSD INTERVAL: 120 MS
QRSD INTERVAL: 122 MS
QRSD INTERVAL: 126 MS
QT INTERVAL: 266 MS
QT INTERVAL: 286 MS
QT INTERVAL: 304 MS
QT INTERVAL: 308 MS
QT INTERVAL: 314 MS
QT INTERVAL: 318 MS
QT INTERVAL: 332 MS
QTC INTERVAL: 427 MS
QTC INTERVAL: 459 MS
QTC INTERVAL: 496 MS
QTC INTERVAL: 496 MS
QTC INTERVAL: 504 MS
QTC INTERVAL: 506 MS
QTC INTERVAL: 521 MS
RBC # BLD AUTO: 2.48 MILLION/UL (ref 3.81–5.12)
SODIUM SERPL-SCNC: 137 MMOL/L (ref 136–145)
T WAVE AXIS: -18 DEGREES
T WAVE AXIS: -38 DEGREES
T WAVE AXIS: -38 DEGREES
T WAVE AXIS: -42 DEGREES
T WAVE AXIS: -42 DEGREES
T WAVE AXIS: -49 DEGREES
T WAVE AXIS: -5 DEGREES
VENTRICULAR RATE: 148 BPM
VENTRICULAR RATE: 151 BPM
VENTRICULAR RATE: 155 BPM
VENTRICULAR RATE: 155 BPM
VENTRICULAR RATE: 156 BPM
VENTRICULAR RATE: 156 BPM
VENTRICULAR RATE: 160 BPM
WBC # BLD AUTO: 16.52 THOUSAND/UL (ref 4.31–10.16)

## 2021-08-22 PROCEDURE — 99231 SBSQ HOSP IP/OBS SF/LOW 25: CPT | Performed by: INTERNAL MEDICINE

## 2021-08-22 PROCEDURE — C9113 INJ PANTOPRAZOLE SODIUM, VIA: HCPCS | Performed by: NURSE PRACTITIONER

## 2021-08-22 PROCEDURE — 94664 DEMO&/EVAL PT USE INHALER: CPT

## 2021-08-22 PROCEDURE — 99232 SBSQ HOSP IP/OBS MODERATE 35: CPT | Performed by: PHYSICIAN ASSISTANT

## 2021-08-22 PROCEDURE — 87040 BLOOD CULTURE FOR BACTERIA: CPT | Performed by: NURSE PRACTITIONER

## 2021-08-22 PROCEDURE — 94640 AIRWAY INHALATION TREATMENT: CPT

## 2021-08-22 PROCEDURE — 84145 PROCALCITONIN (PCT): CPT | Performed by: NURSE PRACTITIONER

## 2021-08-22 PROCEDURE — 83735 ASSAY OF MAGNESIUM: CPT | Performed by: NURSE PRACTITIONER

## 2021-08-22 PROCEDURE — 94760 N-INVAS EAR/PLS OXIMETRY 1: CPT

## 2021-08-22 PROCEDURE — 80048 BASIC METABOLIC PNL TOTAL CA: CPT | Performed by: NURSE PRACTITIONER

## 2021-08-22 PROCEDURE — 93010 ELECTROCARDIOGRAM REPORT: CPT | Performed by: INTERNAL MEDICINE

## 2021-08-22 PROCEDURE — 85025 COMPLETE CBC W/AUTO DIFF WBC: CPT | Performed by: NURSE PRACTITIONER

## 2021-08-22 RX ORDER — LEVALBUTEROL 1.25 MG/.5ML
1.25 SOLUTION, CONCENTRATE RESPIRATORY (INHALATION) EVERY 6 HOURS PRN
Status: DISCONTINUED | OUTPATIENT
Start: 2021-08-22 | End: 2021-08-25 | Stop reason: HOSPADM

## 2021-08-22 RX ADMIN — Medication 6 MG: at 20:58

## 2021-08-22 RX ADMIN — IPRATROPIUM BROMIDE 0.5 MG: 0.5 SOLUTION RESPIRATORY (INHALATION) at 01:50

## 2021-08-22 RX ADMIN — METRONIDAZOLE 500 MG: 500 INJECTION, SOLUTION INTRAVENOUS at 02:29

## 2021-08-22 RX ADMIN — STANDARDIZED SENNA CONCENTRATE 17.2 MG: 8.6 TABLET ORAL at 17:18

## 2021-08-22 RX ADMIN — LEVALBUTEROL HYDROCHLORIDE 1.25 MG: 1.25 SOLUTION, CONCENTRATE RESPIRATORY (INHALATION) at 07:17

## 2021-08-22 RX ADMIN — LEVALBUTEROL HYDROCHLORIDE 1.25 MG: 1.25 SOLUTION, CONCENTRATE RESPIRATORY (INHALATION) at 01:50

## 2021-08-22 RX ADMIN — CEFEPIME HYDROCHLORIDE 2000 MG: 2 INJECTION, SOLUTION INTRAVENOUS at 02:28

## 2021-08-22 RX ADMIN — HYDROCORTISONE SODIUM SUCCINATE 50 MG: 100 INJECTION, POWDER, FOR SOLUTION INTRAMUSCULAR; INTRAVENOUS at 08:02

## 2021-08-22 RX ADMIN — CEFEPIME HYDROCHLORIDE 2000 MG: 2 INJECTION, SOLUTION INTRAVENOUS at 13:23

## 2021-08-22 RX ADMIN — METRONIDAZOLE 500 MG: 500 INJECTION, SOLUTION INTRAVENOUS at 09:24

## 2021-08-22 RX ADMIN — STANDARDIZED SENNA CONCENTRATE 17.2 MG: 8.6 TABLET ORAL at 08:02

## 2021-08-22 RX ADMIN — METRONIDAZOLE 500 MG: 500 INJECTION, SOLUTION INTRAVENOUS at 17:19

## 2021-08-22 RX ADMIN — PANTOPRAZOLE SODIUM 40 MG: 40 INJECTION, POWDER, FOR SOLUTION INTRAVENOUS at 20:58

## 2021-08-22 RX ADMIN — DOCUSATE SODIUM 100 MG: 100 CAPSULE, LIQUID FILLED ORAL at 17:18

## 2021-08-22 RX ADMIN — HYDROCORTISONE SODIUM SUCCINATE 50 MG: 100 INJECTION, POWDER, FOR SOLUTION INTRAMUSCULAR; INTRAVENOUS at 20:58

## 2021-08-22 RX ADMIN — PANTOPRAZOLE SODIUM 40 MG: 40 INJECTION, POWDER, FOR SOLUTION INTRAVENOUS at 08:02

## 2021-08-22 RX ADMIN — METOPROLOL SUCCINATE 25 MG: 25 TABLET, FILM COATED, EXTENDED RELEASE ORAL at 08:02

## 2021-08-22 RX ADMIN — DOCUSATE SODIUM 100 MG: 100 CAPSULE, LIQUID FILLED ORAL at 08:02

## 2021-08-22 RX ADMIN — IPRATROPIUM BROMIDE 0.5 MG: 0.5 SOLUTION RESPIRATORY (INHALATION) at 07:17

## 2021-08-22 RX ADMIN — HYDROXYCHLOROQUINE SULFATE 200 MG: 200 TABLET, FILM COATED ORAL at 08:06

## 2021-08-22 NOTE — PROGRESS NOTES
Vancomycin IV Pharmacy-to-Dose Consultation    Eduardo Sinclair is a 66 y o  female who was receiving Vancomycin IV with management by the Pharmacy Consult service  Assessment/Plan:  The patient was reviewed  Patient's Vancomycin was discontinued  Pharmacy consult will also be discontinued and Pharmacy will sign off on the patient      Salvador Horton, Pharmacist

## 2021-08-22 NOTE — PROGRESS NOTES
Progress note - Gastroenterology   Zeina Phy 66 y o  female MRN: [id number]  Unit/Bed#: -Megan Encounter: [id number]    ASSESSMENT and PLAN    1  Acute on chronic anemia:  History of multiple clippings and cauterizations due to Dieulafoy and Osmar  was planned but was canceled 820 because of aspiration requiring ventilation  She is now dramatically improved  Plan to proceed with EGD on 8/23   - Protonix 40mg IV BID     2   GIST (Gastrointestinal stroma tumor), malignant, colon:   Patient is currently followed at [facility], she was in research for and now takes Ayvakit 200 mg daily      3  Gastric AVM:  History of with EGD in 2019       4  Constipation:    - Colace 100 mg p o  Twice daily  - Senna 17 6 mg po Twice Daily   - Dulcolax Supp 10mg daily prn constipation      5  Abdominal Pain:   Patient continues to have increased generalized abdominal discomfort with increased nausea and retching    - CT nonspecific intra-abdominal findings   No explanation for pain or anemia           Chief Complaint   Patient presents with    Anemia     Pt reports her hgb is 5 1 from todays blood draw for upcoming back operation           SUBJECTIVE/HPI   Eating without GI complaints    /88   Pulse 102   Temp 98 9 °F (37 2 °C) (Oral)   Resp 18   Ht 5' 1" (1 549 m)   Wt 52 4 kg (115 lb 8 3 oz)   SpO2 94%   BMI 21 83 kg/m²     PHYSICALEXAM  General appearance: alert, appears stated age and cooperative  Eyes: PERLLA, EOMI, no icterus   Head: Normocephalic, without obvious abnormality, atraumatic  Extremities: extremities normal, atraumatic, no cyanosis or edema  Neurologic: Grossly normal    Lab Results   Component Value Date    GLUCOSE 121 08/20/2021    CALCIUM 8 6 08/22/2021     10/12/2015    K 3 5 08/22/2021    CO2 22 08/22/2021     08/22/2021    BUN 26 (H) 08/22/2021    CREATININE 1 13 08/22/2021     Lab Results   Component Value Date    WBC 16 52 (H) 08/22/2021    HGB 7 9 (L) 08/22/2021    HCT 24 6 (L) 08/22/2021    MCV 99 (H) 08/22/2021     08/22/2021     Lab Results   Component Value Date    ALT 30 08/21/2021    AST 43 08/21/2021    ALKPHOS 94 08/21/2021    BILITOT 0 28 10/12/2015     No results found for: AMYLASE  Lab Results   Component Value Date    LIPASE 118 02/19/2020     Lab Results   Component Value Date    IRON 32 (L) 08/19/2021    TIBC 338 08/19/2021    FERRITIN 76 08/19/2021     Lab Results   Component Value Date    INR 1 25 (H) 08/20/2021

## 2021-08-22 NOTE — PLAN OF CARE
Problem: HEMATOLOGIC - ADULT  Goal: Maintains hematologic stability  Description: INTERVENTIONS  - Assess for signs and symptoms of bleeding or hemorrhage  - Monitor labs  - Administer supportive blood products/factors as ordered and appropriate  Outcome: Progressing     Problem: Nutrition/Hydration-ADULT  Goal: Nutrient/Hydration intake appropriate for improving, restoring or maintaining nutritional needs  Description: Monitor and assess patient's nutrition/hydration status for malnutrition  Collaborate with interdisciplinary team and initiate plan and interventions as ordered  Monitor patient's weight and dietary intake as ordered or per policy  Utilize nutrition screening tool and intervene as necessary  Determine patient's food preferences and provide high-protein, high-caloric foods as appropriate       INTERVENTIONS:  - Monitor oral intake, urinary output, labs, and treatment plans  - Assess nutrition and hydration status and recommend course of action  - Evaluate amount of meals eaten  - Assist patient with eating if necessary   - Allow adequate time for meals  - Recommend/ encourage appropriate diets, oral nutritional supplements, and vitamin/mineral supplements  - Order, calculate, and assess calorie counts as needed  - Recommend, monitor, and adjust tube feedings and TPN/PPN based on assessed needs  - Assess need for intravenous fluids  - Provide specific nutrition/hydration education as appropriate  - Include patient/family/caregiver in decisions related to nutrition  Outcome: Progressing

## 2021-08-22 NOTE — RESPIRATORY THERAPY NOTE
RT Protocol Note  Shelly Duenas 66 y o  female MRN: [id number]  Unit/Bed#: -01 Encounter: [id number]    Assessment    Principal Problem:    Acute respiratory failure with hypoxia ([facility] )  Active Problems:    Essential hypertension    Paroxysmal atrial fibrillation (HCC)    Septic shock (HCC)    Elevated troponin level not due myocardial infarction    Gastric AVM    Acute on chronic anemia    Stage 3 chronic kidney disease (HCC)    MARK (obstructive sleep apnea)    GIST (gastrointestinal stroma tumor), malignant, colon (HCC)    Aspiration into airway    Bacteremia      Home Pulmonary Medications:  none       Past Medical History:   Diagnosis Date    ADHD     Anemia     Anxiety     Arthritis     Asthma     Atrial fibrillation (HCC)     Cancer ([place] [address] )     Chronic iron deficiency anemia 6/9/2020    Extensive GI evaluation over the last year including multiple EGD, colonoscopy, and capsule endoscopy    Has had gastric AVMs cauterized, Dieulafoy's lesion clipped, and most recently a [place] erosion cauterized    Coronary artery disease     Depression     GERD (gastroesophageal reflux disease)     Hard to intubate     History of stomach ulcers     Hypercholesterolemia     Hypertension     Kidney disease     Metastatic cancer ([place] [address] )     Sepsis due to Escherichia coli ([place] [address] ) 6/9/2021     Social History     Socioeconomic History    Marital status: /Civil Union     Spouse name: None    Number of children: None    Years of education: None    Highest education level: None   Occupational History    None   Tobacco Use    Smoking status: Former Smoker     Years: 20 00     Types: Cigarettes    Smokeless tobacco: Never Used   Vaping Use    Vaping Use: Never used   Substance and Sexual Activity    Alcohol use: Not Currently    Drug use: No    Sexual activity: Not Currently   Other Topics Concern    None   Social History Narrative    Wears seatbelt     Regular dental care      Social Determinants of Health     Financial Resource Strain: Low Risk     Difficulty of Paying Living Expenses: Not hard at all   Food Insecurity: No Food Insecurity    Worried About Running Out of Food in the Last Year: Never true    Neisha of Food in the Last Year: Never true   Transportation Needs: No Transportation Needs    Lack of Transportation (Medical): No    Lack of Transportation (Non-Medical): No   Physical Activity: Insufficiently Active    Days of Exercise per Week: 5 days    Minutes of Exercise per Session: 20 min   Stress: No Stress Concern Present    Feeling of Stress : Only a little   Social Connections:     Frequency of Communication with Friends and Family:     Frequency of Social Gatherings with Friends and Family:     Attends Zoroastrian Services:     Active Member of Clubs or Organizations:     Attends Club or Organization Meetings:     Marital Status:    Intimate Partner Violence: Not At Risk    Fear of Current or Ex-Partner: No    Emotionally Abused: No    Physically Abused: No    Sexually Abused: No       Subjective         Objective    Physical Exam:   Assessment Type: Assess only  General Appearance: Alert, Awake  Respiratory Pattern: Normal  Chest Assessment: Chest expansion symmetrical  Bilateral Breath Sounds: Clear, Diminished  Cough: Non-productive    Vitals:  Blood pressure 144/88, pulse 102, temperature 98 9 °F (37 2 °C), temperature source Oral, resp  rate 18, height 5' 1" (1 549 m), weight 52 4 kg (115 lb 8 3 oz), SpO2 94 %  Imaging and other studies: I have personally reviewed pertinent reports              Plan    Respiratory Plan: No distress/Pulmonary history        Resp Comments: pt nebs changed to prn

## 2021-08-22 NOTE — PROGRESS NOTES
[place]     Progress Note Aarti Leos 1950, 66 y o  female MRN: [id number]  Unit/Bed#: -01 Encounter: [id number]  Primary Care Provider: Daniel Bowman MD   Date and time admitted to hospital: 8/18/2021  8:39 PM    * Acute respiratory failure with hypoxia (HCC)  Assessment & Plan  · AEB tachypnea, tachycardia, increased WOB, hypoxia requiring ETT and MV   · Likely in the setting of aspiration followed by initiation of Bipap   · Extubated 8/21/21 to NC o2, currently at 2 L, spo2 > 95%  · Encourage IS  · OOB to chair   · Wean NC o2 as able for goal spo2 > 92%  · SpO2 stable on 2 L nasal cannula, wean as able    Bacteremia  Assessment & Plan  · BC from 8/20 growing gram negative rods x 2  · Aspiration vs  GI translocation in setting of possible GI bleed/ Cancer   · Continue cefepime, flagyl,d/c vanc   · Repeat bc drawn this morning    Atrial fibrillation with RVR ([facility] 75 )  Assessment & Plan  · Follows with cardiology at [facility]  · Developed AFib RVR overnight likely in setting of missed doses of metoprolol/bacteremia  · Continue metoprolol 25mg daily - resumed this a m  with improvement in rates to low 100s  · on daily ASA 81mg - held in setting of anemia/GIB  · IV Lopressor p r n   · Monitor on telemetry    Aspiration into airway  Assessment & Plan  · Treated for asp   pna va pneumonitis   · Continue abx   · Patient passed nursing dysphagia screening post intubation   · Will obtain official speech eval to confirm   · Follow-up culture results    GIST (gastrointestinal stroma tumor), malignant, colon ([facility] )  Assessment & Plan  · Per previous notes, follow with [facility] as OP   · Last visit appears to have been 7/21/21  · Extensive hx of treatment, including  · Resection of a rectal GIST 8/24/01  · Biopsy of liver lesion 4/05, demonstrating metastatic disease  · Diagnostic laparoscopy, laparoscopic lysis of adhesions, exploratory laparotomy, resection of pelvic GIST in conjunction with a small bowel resection with primary anastomosis, placement of Seprafilm, omental pedicled flap to the pelvis and cystoscopy with bilateral ureteral sent placement  · Left pelvic sidewall radiation  · Holding avapritinib given infection   · Restarted plaquenil     MARK (obstructive sleep apnea)  Assessment & Plan  · Not on C-pap     Stage 3 chronic kidney disease [facility])  Assessment & Plan  Lab Results   Component Value Date    EGFR 48 08/22/2021    EGFR 42 08/21/2021    EGFR 39 08/20/2021    CREATININE 1 13 08/22/2021    CREATININE 1 27 08/21/2021    CREATININE 1 34 (H) 08/20/2021     · Baseline creat appears to be 0 9 to 1 3  · Initially admitted with IVIS in the setting of ABLA  · D/c rust cath 8/21  · Follow urinary retention protocol   · Trend I/O  · Creatinine stable at 1 13    Acute on chronic anemia  Assessment & Plan  · Baseline hemoglobin appears to be 8-9  · Hemoglobin 4 9 on admission   · Unclear etiology, though does have hx of Dieulafoy's lesions and Seb lesions in the upper GI requiring epi injection, cautery and clipping & GIST   · GI following, plan was for EGD, likely Monday   · OGT with ETT placed 8/20, brown thin output noted   · Protonix IV BID   · Resume Iron supplementation as able   · Total blood product: 2 5U PRBC  · Serial h/h, transfuse for hemoglobin <7  · Repeat hemoglobin 7 9 this morning    Gastric AVM  Assessment & Plan  · Hx of 6/2019  · Continue PPI  · GI following   · Plan for EGD Monday 8/23     Elevated troponin level not due myocardial infarction  Assessment & Plan  · Troponin peaked 0 79  · Suspect this is secondary to anemia and is a non-MI troponin elevation   · No EKG changes     Septic shock (HCC)  Assessment & Plan  · AEB tachypnea, tachycardia, hypoxia and increased WOB requiring ETT, LA 5 2, leukocytosis, hypotension   · Likely etiology appears to be asp pna vs GI translocation   · Requiring brief initiation of norepinephrine to maintain map greater than 65 mmHg  · Echo as noted:  EF 55% with grade 2 diastolic dysfunction  · BC x2 + gram neg rods x2 - repeat blood cultures pending this morning  · Strep, legionella, sputum cx neg  · MRSA negative, d/c vanc  · Procal 4 24> 5 99, trend daily - repeat pending  · Abx:  Continues on cefepime and Flagyl, vancomycin discontinued per critical care  · Hold avapritinib given active infection,continue plaquenil   · Trend temp & WBC   · Weaning IV steroids, pt  Was 50 mg q8 hrs, decreased to BID    · Hemodynamically stable, afebrile    Essential hypertension  Assessment & Plan  · Continue on metoprolol  · Blood pressure stable      VTE Pharmacologic Prophylaxis:   Pharmacologic: Pharmacologic VTE Prophylaxis contraindicated due to GI bleed  Mechanical VTE Prophylaxis in Place: Yes    Patient Centered Rounds: I have performed bedside rounds with nursing staff today  Discussions with Specialists or Other Care Team Provider: Nursing, Attending    Education and Discussions with Family / Patient: Discussed with patient directly at bedside  Offered to call patient's , she reports he will be at bedside later today  Encouraged them to reach out with any questions  Time Spent for Care: 30 minutes  More than 50% of total time spent on counseling and coordination of care as described above  Current Length of Stay: 3 day(s)    Current Patient Status: Inpatient   Certification Statement: The patient will continue to require additional inpatient hospital stay due to bacteremia, GI bleed/EGD    Discharge Plan: Pending clinical course    Code Status: Level 1 - Full Code      Subjective:   Patient states she overall feels well  Denies chest pain/palpitations, shortness of breath, nausea vomiting, abdominal pain      Objective:     Vitals:   Temp (24hrs), Av 5 °F (36 9 °C), Min:97 7 °F (36 5 °C), Max:99 °F (37 2 °C)    Temp:  [97 7 °F (36 5 °C)-99 °F (37 2 °C)] 98 9 °F (37 2 °C)  HR:  [] 102  Resp:  [16-25] 18  BP: (122-147)/(64-88) 144/88  SpO2:  [88 %-100 %] 94 %  Body mass index is 21 83 kg/m²  Input and Output Summary (last 24 hours): Intake/Output Summary (Last 24 hours) at 8/22/2021 1102  Last data filed at 8/22/2021 0705  Gross per 24 hour   Intake 1994 1 ml   Output 850 ml   Net 1144 1 ml       Physical Exam:     Physical Exam  Vitals and nursing note reviewed  Constitutional:       Appearance: Normal appearance  Interventions: Nasal cannula in place  Comments: Appears comfortable, no acute distress   HENT:      Head: Normocephalic  Eyes:      General: No scleral icterus  Extraocular Movements: Extraocular movements intact  Conjunctiva/sclera: Conjunctivae normal    Cardiovascular:      Rate and Rhythm: Tachycardia present  Rhythm irregularly irregular  Pulmonary:      Effort: Pulmonary effort is normal       Breath sounds: Normal breath sounds  No wheezing, rhonchi or rales  Abdominal:      General: Bowel sounds are normal       Palpations: Abdomen is soft  Tenderness: There is no abdominal tenderness  There is no guarding or rebound  Musculoskeletal:         General: No swelling, tenderness or deformity  Cervical back: Normal range of motion  Comments: Able to move upper/lower ext bilaterally, no edema   Skin:     General: Skin is warm and dry  Neurological:      Mental Status: She is alert and oriented to person, place, and time     Psychiatric:         Mood and Affect: Mood normal          Speech: Speech normal          Behavior: Behavior normal            Additional Data:     Labs:    Results from last 7 days   Lab Units 08/22/21  1003   WBC Thousand/uL 16 52*   HEMOGLOBIN g/dL 7 9*   HEMATOCRIT % 24 6*   PLATELETS Thousands/uL 238   NEUTROS PCT % 94*   LYMPHS PCT % 1*   MONOS PCT % 4   EOS PCT % 0     Results from last 7 days   Lab Units 08/22/21  1003 08/21/21  0936   SODIUM mmol/L 137 140   POTASSIUM mmol/L 3 5 3 8   CHLORIDE mmol/L 101 105   CO2 mmol/L 22 26   BUN mg/dL 26* 36*   CREATININE mg/dL 1 13 1 27   ANION GAP mmol/L 14* 9   CALCIUM mg/dL 8 6 8 7   ALBUMIN g/dL  --  2 9*   TOTAL BILIRUBIN mg/dL  --  0 50   ALK PHOS U/L  --  94   ALT U/L  --  30   AST U/L  --  43   GLUCOSE RANDOM mg/dL 119 143*     Results from last 7 days   Lab Units 08/20/21  0808   INR  1 25*     Results from last 7 days   Lab Units 08/21/21  0602 08/21/21  0023 08/20/21  1230 08/20/21  0021 08/19/21  2000   POC GLUCOSE mg/dl 137 124 108 145* 136         Results from last 7 days   Lab Units 08/21/21  0723 08/21/21  0416 08/20/21  0808 08/20/21  0307 08/20/21  0033   LACTIC ACID mmol/L  --  0 8 1 2 5 2* 2 1*   PROCALCITONIN ng/ml 7 34*  --   --  5 99*  --            * I Have Reviewed All Lab Data Listed Above  * Additional Pertinent Lab Tests Reviewed: All Labs Within Last 24 Hours Reviewed    Imaging:    Imaging Reports Reviewed Today Include:   Imaging Personally Reviewed by Myself Includes:      Recent Cultures (last 7 days):     Results from last 7 days   Lab Units 08/20/21  0647 08/20/21  0522 08/20/21  0308 08/19/21  0849   BLOOD CULTURE   --   --  Escherichia coli*  Escherichia coli* No Growth at 48 hrs  SPUTUM CULTURE   --  Culture results to follow    --   --    GRAM STAIN RESULT   --  Rare Polys  No bacteria seen Gram negative rods*  Gram negative rods*  --    LEGIONELLA URINARY ANTIGEN  Negative  --   --   --        Last 24 Hours Medication List:   Current Facility-Administered Medications   Medication Dose Route Frequency Provider Last Rate    acetaminophen  650 mg Oral Q6H PRN LANEY Saravia      bisacodyl  10 mg Rectal Daily PRN LANEY Saravia      cefepime  2,000 mg Intravenous Q12H ANDREA SaraviaNP 2,000 mg (08/22/21 0228)    docusate sodium  100 mg Oral BID LANEY Saravia      hydrocortisone sodium succinate  50 mg Intravenous Q12H [facility] LANEY Amaya      hydroxychloroquine  200 mg Oral Daily With Breakfast LANEY Amaya      ipratropium  0 5 mg Nebulization Q6H PRN Nitish Matthew MD      levalbuterol  1 25 mg Nebulization Q6H PRN Nitish Matthew MD      melatonin  6 mg Oral HS LANYE Gotti      metoprolol succinate  25 mg Oral Daily Maycol Genao PA-C      metroNIDAZOLE  500 mg Intravenous Q8H LANEY Amaya 500 mg (08/22/21 0924)    ondansetron  4 mg Intravenous Q6H PRN LANEY Gotti      pantoprazole  40 mg Intravenous Q12H [address] [place], LANEY      senna  2 tablet Oral BID LANEY Gotti          Today, Patient Was Seen By: Bri Lynch PA-C    ** Please Note: Dictation voice to text software may have been used in the creation of this document   **

## 2021-08-22 NOTE — PLAN OF CARE
Problem: Potential for Falls  Goal: Patient will remain free of falls  Description: INTERVENTIONS:  - Educate patient/family on patient safety including physical limitations  - Instruct patient to call for assistance with activity   - Consult OT/PT to assist with strengthening/mobility   - Keep Call bell within reach  - Keep bed low and locked with side rails adjusted as appropriate  - Keep care items and personal belongings within reach  - Initiate and maintain comfort rounds  - Make Fall Risk Sign visible to staff  - Offer Toileting every  Hours, in advance of need  - Initiate/Maintain alarm  - Obtain necessary fall risk management equipment:   - Apply yellow socks and bracelet for high fall risk patients  - Consider moving patient to room near nurses station  Outcome: Progressing     Problem: MOBILITY - ADULT  Goal: Maintain or return to baseline ADL function  Description: INTERVENTIONS:  -  Assess patient's ability to carry out ADLs; assess patient's baseline for ADL function and identify physical deficits which impact ability to perform ADLs (bathing, care of mouth/teeth, toileting, grooming, dressing, etc )  - Assess/evaluate cause of self-care deficits   - Assess range of motion  - Assess patient's mobility; develop plan if impaired  - Assess patient's need for assistive devices and provide as appropriate  - Encourage maximum independence but intervene and supervise when necessary  - Involve family in performance of ADLs  - Assess for home care needs following discharge   - Consider OT consult to assist with ADL evaluation and planning for discharge  - Provide patient education as appropriate  Outcome: Progressing  Goal: Maintains/Returns to pre admission functional level  Description: INTERVENTIONS:  - Perform BMAT or MOVE assessment daily    - Set and communicate daily mobility goal to care team and patient/family/caregiver     - Collaborate with rehabilitation services on mobility goals if consulted  - Perform Range of Motion  times a day  - Reposition patient every  hours  - Dangle patient  times a day  - Stand patient  times a day  - Ambulate patient  times a day  - Out of bed to chair  times a day   - Out of bed for meal times a day  - Out of bed for toileting  - Record patient progress and toleration of activity level   Outcome: Progressing     Problem: Prexisting or High Potential for Compromised Skin Integrity  Goal: Skin integrity is maintained or improved  Description: INTERVENTIONS:  - Identify patients at risk for skin breakdown  - Assess and monitor skin integrity  - Assess and monitor nutrition and hydration status  - Monitor labs   - Assess for incontinence   - Turn and reposition patient  - Assist with mobility/ambulation  - Relieve pressure over bony prominences  - Avoid friction and shearing  - Provide appropriate hygiene as needed including keeping skin clean and dry  - Evaluate need for skin moisturizer/barrier cream  - Collaborate with interdisciplinary team   - Patient/family teaching  - Consider wound care consult   Outcome: Progressing     Problem: HEMATOLOGIC - ADULT  Goal: Maintains hematologic stability  Description: INTERVENTIONS  - Assess for signs and symptoms of bleeding or hemorrhage  - Monitor labs  - Administer supportive blood products/factors as ordered and appropriate  Outcome: Progressing     Problem: Nutrition/Hydration-ADULT  Goal: Nutrient/Hydration intake appropriate for improving, restoring or maintaining nutritional needs  Description: Monitor and assess patient's nutrition/hydration status for malnutrition  Collaborate with interdisciplinary team and initiate plan and interventions as ordered  Monitor patient's weight and dietary intake as ordered or per policy  Utilize nutrition screening tool and intervene as necessary  Determine patient's food preferences and provide high-protein, high-caloric foods as appropriate       INTERVENTIONS:  - Monitor oral intake, urinary output, labs, and treatment plans  - Assess nutrition and hydration status and recommend course of action  - Evaluate amount of meals eaten  - Assist patient with eating if necessary   - Allow adequate time for meals  - Recommend/ encourage appropriate diets, oral nutritional supplements, and vitamin/mineral supplements  - Order, calculate, and assess calorie counts as needed  - Recommend, monitor, and adjust tube feedings and TPN/PPN based on assessed needs  - Assess need for intravenous fluids  - Provide specific nutrition/hydration education as appropriate  - Include patient/family/caregiver in decisions related to nutrition  Outcome: Progressing

## 2021-08-22 NOTE — QUICK NOTE
Patient with history of Afib, home regimen metoprolol succinate 25 mg daily  Patient had not been receiving her metoprolol or her aspirin while in the ICU  Patient noted by nursing to have heart rate in the 150s  EKG obtained showing AFib with RVR  Denies palpitations or shortness of breath  Continue to hold aspirin due to anemia      Orders  -place on telemetry  -5 mg IV metoprolol  -restart metoprolol succinate 25 mg daily tomorrow  -obtain potassium and magnesium levels and replete if needed

## 2021-08-23 ENCOUNTER — ANESTHESIA EVENT (INPATIENT)
Dept: GASTROENTEROLOGY | Facility: HOSPITAL | Age: 71
DRG: 374 | End: 2021-08-23
Payer: COMMERCIAL

## 2021-08-23 ENCOUNTER — HOSPITAL ENCOUNTER (OUTPATIENT)
Dept: RADIOLOGY | Facility: HOSPITAL | Age: 71
Discharge: HOME | End: 2021-08-23

## 2021-08-23 ENCOUNTER — APPOINTMENT (INPATIENT)
Dept: GASTROENTEROLOGY | Facility: HOSPITAL | Age: 71
DRG: 374 | End: 2021-08-23
Attending: INTERNAL MEDICINE
Payer: COMMERCIAL

## 2021-08-23 ENCOUNTER — ANESTHESIA (INPATIENT)
Dept: GASTROENTEROLOGY | Facility: HOSPITAL | Age: 71
DRG: 374 | End: 2021-08-23
Payer: COMMERCIAL

## 2021-08-23 LAB
ANION GAP SERPL CALCULATED.3IONS-SCNC: 9 MMOL/L (ref 4–13)
BASOPHILS # BLD AUTO: 0.02 THOUSANDS/ΜL (ref 0–0.1)
BASOPHILS NFR BLD AUTO: 0 % (ref 0–1)
BUN SERPL-MCNC: 21 MG/DL (ref 5–25)
CALCIUM SERPL-MCNC: 8.3 MG/DL (ref 8.3–10.1)
CHLORIDE SERPL-SCNC: 104 MMOL/L (ref 100–108)
CO2 SERPL-SCNC: 26 MMOL/L (ref 21–32)
CREAT SERPL-MCNC: 0.91 MG/DL (ref 0.6–1.3)
EOSINOPHIL # BLD AUTO: 0 THOUSAND/ΜL (ref 0–0.61)
EOSINOPHIL NFR BLD AUTO: 0 % (ref 0–6)
ERYTHROCYTE [DISTWIDTH] IN BLOOD BY AUTOMATED COUNT: 17.9 % (ref 11.6–15.1)
GFR SERPL CREATININE-BSD FRML MDRD: 63 ML/MIN/1.73SQ M
GLUCOSE SERPL-MCNC: 102 MG/DL (ref 65–140)
HCT VFR BLD AUTO: 24.4 % (ref 34.8–46.1)
HGB BLD-MCNC: 7.8 G/DL (ref 11.5–15.4)
IMM GRANULOCYTES # BLD AUTO: 0.2 THOUSAND/UL (ref 0–0.2)
IMM GRANULOCYTES NFR BLD AUTO: 2 % (ref 0–2)
LYMPHOCYTES # BLD AUTO: 0.37 THOUSANDS/ΜL (ref 0.6–4.47)
LYMPHOCYTES NFR BLD AUTO: 3 % (ref 14–44)
MCH RBC QN AUTO: 31.2 PG (ref 26.8–34.3)
MCHC RBC AUTO-ENTMCNC: 32 G/DL (ref 31.4–37.4)
MCV RBC AUTO: 98 FL (ref 82–98)
MONOCYTES # BLD AUTO: 0.71 THOUSAND/ΜL (ref 0.17–1.22)
MONOCYTES NFR BLD AUTO: 6 % (ref 4–12)
NEUTROPHILS # BLD AUTO: 11.12 THOUSANDS/ΜL (ref 1.85–7.62)
NEUTS SEG NFR BLD AUTO: 89 % (ref 43–75)
NRBC BLD AUTO-RTO: 0 /100 WBCS
PLATELET # BLD AUTO: 232 THOUSANDS/UL (ref 149–390)
PMV BLD AUTO: 10.2 FL (ref 8.9–12.7)
POTASSIUM SERPL-SCNC: 3.2 MMOL/L (ref 3.5–5.3)
RBC # BLD AUTO: 2.5 MILLION/UL (ref 3.81–5.12)
SODIUM SERPL-SCNC: 139 MMOL/L (ref 136–145)
WBC # BLD AUTO: 12.42 THOUSAND/UL (ref 4.31–10.16)

## 2021-08-23 PROCEDURE — 0W3P8ZZ CONTROL BLEEDING IN GASTROINTESTINAL TRACT, VIA NATURAL OR ARTIFICIAL OPENING ENDOSCOPIC: ICD-10-PCS | Performed by: INTERNAL MEDICINE

## 2021-08-23 PROCEDURE — 80048 BASIC METABOLIC PNL TOTAL CA: CPT | Performed by: PHYSICIAN ASSISTANT

## 2021-08-23 PROCEDURE — 43270 EGD LESION ABLATION: CPT | Performed by: INTERNAL MEDICINE

## 2021-08-23 PROCEDURE — 99232 SBSQ HOSP IP/OBS MODERATE 35: CPT | Performed by: INTERNAL MEDICINE

## 2021-08-23 PROCEDURE — 85025 COMPLETE CBC W/AUTO DIFF WBC: CPT | Performed by: PHYSICIAN ASSISTANT

## 2021-08-23 PROCEDURE — 87493 C DIFF AMPLIFIED PROBE: CPT | Performed by: INTERNAL MEDICINE

## 2021-08-23 PROCEDURE — 97167 OT EVAL HIGH COMPLEX 60 MIN: CPT

## 2021-08-23 PROCEDURE — C9113 INJ PANTOPRAZOLE SODIUM, VIA: HCPCS | Performed by: NURSE PRACTITIONER

## 2021-08-23 PROCEDURE — 94760 N-INVAS EAR/PLS OXIMETRY 1: CPT

## 2021-08-23 RX ORDER — LIDOCAINE HYDROCHLORIDE 10 MG/ML
INJECTION, SOLUTION EPIDURAL; INFILTRATION; INTRACAUDAL; PERINEURAL AS NEEDED
Status: DISCONTINUED | OUTPATIENT
Start: 2021-08-23 | End: 2021-08-23

## 2021-08-23 RX ORDER — PROPOFOL 10 MG/ML
INJECTION, EMULSION INTRAVENOUS AS NEEDED
Status: DISCONTINUED | OUTPATIENT
Start: 2021-08-23 | End: 2021-08-23

## 2021-08-23 RX ORDER — SODIUM CHLORIDE 9 MG/ML
INJECTION, SOLUTION INTRAVENOUS CONTINUOUS PRN
Status: DISCONTINUED | OUTPATIENT
Start: 2021-08-23 | End: 2021-08-23

## 2021-08-23 RX ORDER — POTASSIUM CHLORIDE 20 MEQ/1
40 TABLET, EXTENDED RELEASE ORAL ONCE
Status: COMPLETED | OUTPATIENT
Start: 2021-08-23 | End: 2021-08-23

## 2021-08-23 RX ORDER — CEFTRIAXONE 1 G/50ML
1000 INJECTION, SOLUTION INTRAVENOUS EVERY 24 HOURS
Status: DISCONTINUED | OUTPATIENT
Start: 2021-08-23 | End: 2021-08-25

## 2021-08-23 RX ADMIN — HYDROCORTISONE SODIUM SUCCINATE 50 MG: 100 INJECTION, POWDER, FOR SOLUTION INTRAMUSCULAR; INTRAVENOUS at 08:21

## 2021-08-23 RX ADMIN — PROPOFOL 30 MG: 10 INJECTION, EMULSION INTRAVENOUS at 12:12

## 2021-08-23 RX ADMIN — PROPOFOL 20 MG: 10 INJECTION, EMULSION INTRAVENOUS at 12:06

## 2021-08-23 RX ADMIN — LIDOCAINE HYDROCHLORIDE 50 MG: 10 INJECTION, SOLUTION EPIDURAL; INFILTRATION; INTRACAUDAL; PERINEURAL at 12:05

## 2021-08-23 RX ADMIN — CEFEPIME HYDROCHLORIDE 2000 MG: 2 INJECTION, SOLUTION INTRAVENOUS at 13:19

## 2021-08-23 RX ADMIN — METRONIDAZOLE 500 MG: 500 INJECTION, SOLUTION INTRAVENOUS at 09:22

## 2021-08-23 RX ADMIN — PROPOFOL 20 MG: 10 INJECTION, EMULSION INTRAVENOUS at 12:16

## 2021-08-23 RX ADMIN — METRONIDAZOLE 500 MG: 500 INJECTION, SOLUTION INTRAVENOUS at 17:07

## 2021-08-23 RX ADMIN — CEFEPIME HYDROCHLORIDE 2000 MG: 2 INJECTION, SOLUTION INTRAVENOUS at 01:13

## 2021-08-23 RX ADMIN — SODIUM CHLORIDE: 0.9 INJECTION, SOLUTION INTRAVENOUS at 12:00

## 2021-08-23 RX ADMIN — CEFTRIAXONE 1000 MG: 1 INJECTION, SOLUTION INTRAVENOUS at 14:10

## 2021-08-23 RX ADMIN — PANTOPRAZOLE SODIUM 40 MG: 40 INJECTION, POWDER, FOR SOLUTION INTRAVENOUS at 21:18

## 2021-08-23 RX ADMIN — HYDROXYCHLOROQUINE SULFATE 200 MG: 200 TABLET, FILM COATED ORAL at 08:27

## 2021-08-23 RX ADMIN — PANTOPRAZOLE SODIUM 40 MG: 40 INJECTION, POWDER, FOR SOLUTION INTRAVENOUS at 08:20

## 2021-08-23 RX ADMIN — METRONIDAZOLE 500 MG: 500 INJECTION, SOLUTION INTRAVENOUS at 02:11

## 2021-08-23 RX ADMIN — METOPROLOL SUCCINATE 25 MG: 25 TABLET, FILM COATED, EXTENDED RELEASE ORAL at 08:21

## 2021-08-23 RX ADMIN — PROPOFOL 80 MG: 10 INJECTION, EMULSION INTRAVENOUS at 12:05

## 2021-08-23 RX ADMIN — POTASSIUM CHLORIDE 40 MEQ: 1500 TABLET, EXTENDED RELEASE ORAL at 08:22

## 2021-08-23 RX ADMIN — PROPOFOL 30 MG: 10 INJECTION, EMULSION INTRAVENOUS at 12:09

## 2021-08-23 NOTE — PROGRESS NOTES
[place]     Progress Note Farhana Collado 1950, 66 y o  female MRN: [id number]  Unit/Bed#: -01 Encounter: [id number]  Primary Care Provider: Cosme Miller MD   Date and time admitted to hospital: 8/18/2021  8:39 PM    * Bacteremia  Assessment & Plan  · BC from 8/20 growing E coli x2  · Aspiration vs  GI translocation in setting of possible GI bleed/ Cancer ? · Currently on cefepime/Flagyl  · Repeat blood cultures pending  · Consult ID for further recommendations    Atrial fibrillation with RVR ([facility] )  Assessment & Plan  · Follows with cardiology at [address]  [facility]  · Developed AFib RVR overnight likely in setting of missed doses of metoprolol/bacteremia  · Continue metoprolol 25mg daily - resumed this a m  with improvement in rates to low 100s  · on daily ASA 81mg - held in setting of anemia/GIB  · IV Lopressor p r n   · Monitor on telemetry - rates better controlled this morning    Acute respiratory failure with hypoxia (HCC)  Assessment & Plan  · AEB tachypnea, tachycardia, increased WOB, hypoxia requiring ETT and MV   · Likely in the setting of aspiration followed by initiation of Bipap   · Extubated 8/21/21 to NC O2  · Encourage IS  · OOB to chair   · Wean NC o2 as able for goal spo2 > 92%  · Resolved, SpO2 stable on RA    Aspiration into airway  Assessment & Plan  · Treated for asp   pna va pneumonitis   · Continue abx   · Patient passed nursing dysphagia screening post intubation   · Follow-up culture results    GIST (gastrointestinal stroma tumor), malignant, colon ([place] [address] )  Assessment & Plan  · Per previous notes, follow with [facility]   · Last visit appears to have been 7/21/21  · Extensive hx of treatment, including  · Resection of a rectal GIST 8/24/01  · Biopsy of liver lesion 4/05, demonstrating metastatic disease  · Diagnostic laparoscopy, laparoscopic lysis of adhesions, exploratory laparotomy, resection of pelvic GIST in conjunction with a small bowel resection with primary anastomosis, placement of Seprafilm, omental pedicled flap to the pelvis and cystoscopy with bilateral ureteral sent placement  · Left pelvic sidewall radiation  · Holding avapritinib given infection   · Restarted plaquenil     MARK (obstructive sleep apnea)  Assessment & Plan  · Not on C-pap     Stage 3 chronic kidney disease [facility])  Assessment & Plan  Lab Results   Component Value Date    EGFR 63 08/23/2021    EGFR 48 08/22/2021    EGFR 42 08/21/2021    CREATININE 0 91 08/23/2021    CREATININE 1 13 08/22/2021    CREATININE 1 27 08/21/2021     · Baseline creat appears to be 0 9 to 1 3  · Initially admitted with IVIS in the setting of ABLA  · D/c rust cath 8/21  · Follow urinary retention protocol   · Trend I/O  · Creatinine stable at 0 91    Acute on chronic anemia  Assessment & Plan  · Baseline hemoglobin appears to be 8-9  · Hemoglobin 4 9 on admission   · Unclear etiology, though does have hx of Dieulafoy's lesions and Seb lesions in the upper GI requiring epi injection, cautery and clipping & GIST   · GI following, plan was for EGD, likely Monday   · OGT with ETT placed 8/20, brown thin output noted   · Protonix IV BID   · Resume Iron supplementation as able   · Total blood product: 2 5U PRBC  · Serial h/h, transfuse for hemoglobin <7  · Repeat hemoglobin 7 8    Gastric AVM  Assessment & Plan  · Hx of 6/2019  · Continue PPI  · GI following   · Plan for EGD today    Septic shock ([facility] )  Assessment & Plan  · AEB tachypnea, tachycardia, hypoxia and increased WOB requiring ETT, LA 5 2, leukocytosis, hypotension   · Likely etiology appears to be asp pna vs GI translocation   · Requiring brief initiation of norepinephrine to maintain map greater than 65 mmHg  · Echo as noted:  EF 55% with grade 2 diastolic dysfunction  · BC x2 + E coli - repeat blood cultures pending  · Strep, legionella, sputum cx neg  · MRSA negative, d/c vanc  · Procal 4 24> 5 99, trend daily - repeat pending  · Abx: Continues on cefepime and Flagyl, vancomycin discontinued per critical care  · Hold avapritinib given active infection,continue plaquenil   · Trend temp & WBC   · Weaning IV steroids - decreased to daily  likely discontinue tomorrow  · Hemodynamically stable, afebrile    Essential hypertension  Assessment & Plan  · Continue on metoprolol  · Blood pressure stable      VTE Pharmacologic Prophylaxis:   Pharmacologic: Pharmacologic VTE Prophylaxis contraindicated due to Anemia  Mechanical VTE Prophylaxis in Place: Yes    Patient Centered Rounds: I have performed bedside rounds with nursing staff today  Discussions with Specialists or Other Care Team Provider:  Nursing, CM, attending    Education and Discussions with Family / Patient:  Discussed with patient and patient's  directly at bedside  Time Spent for Care: 20 minutes  More than 50% of total time spent on counseling and coordination of care as described above  Current Length of Stay: 4 day(s)    Current Patient Status: Inpatient   Certification Statement: The patient will continue to require additional inpatient hospital stay due to EGD today, further recommendations in regards to bacteremia    Discharge Plan:  Pending clinical course    Code Status: Level 1 - Full Code      Subjective:   Patient states she feels okay this morning  Successfully weaned off of oxygen  Denies any chest pain/palpitations, shortness of breath, nausea vomiting, abdominal pain  Anxious for discharge  Objective:     Vitals:   Temp (24hrs), Av 8 °F (37 1 °C), Min:98 4 °F (36 9 °C), Max:99 2 °F (37 3 °C)    Temp:  [98 4 °F (36 9 °C)-99 2 °F (37 3 °C)] 98 8 °F (37 1 °C)  HR:  [72-96] 90  Resp:  [18-20] 18  BP: (123-167)/(59-92) 137/78  SpO2:  [92 %-97 %] 96 %  Body mass index is 22 41 kg/m²  Input and Output Summary (last 24 hours):        Intake/Output Summary (Last 24 hours) at 2021 1413  Last data filed at 2021 1223  Gross per 24 hour   Intake 440 ml   Output 1600 ml   Net -1160 ml       Physical Exam:     Physical Exam  Vitals and nursing note reviewed  Constitutional:       Appearance: Normal appearance  Comments: Appears comfortable, no acute distress   HENT:      Head: Normocephalic  Eyes:      General: No scleral icterus  Extraocular Movements: Extraocular movements intact  Conjunctiva/sclera: Conjunctivae normal    Cardiovascular:      Rate and Rhythm: Normal rate  Rhythm irregularly irregular  Pulmonary:      Effort: Pulmonary effort is normal       Breath sounds: Normal breath sounds  No wheezing, rhonchi or rales  Abdominal:      General: Bowel sounds are normal       Palpations: Abdomen is soft  Tenderness: There is no abdominal tenderness  There is no guarding or rebound  Musculoskeletal:         General: No swelling, tenderness or deformity  Cervical back: Normal range of motion  Comments: Able to move upper/lower extremities bilaterally, no extremity edema   Skin:     General: Skin is warm and dry  Coloration: Skin is pale  Neurological:      Mental Status: She is alert and oriented to person, place, and time             Additional Data:     Labs:    Results from last 7 days   Lab Units 08/23/21  0536   WBC Thousand/uL 12 42*   HEMOGLOBIN g/dL 7 8*   HEMATOCRIT % 24 4*   PLATELETS Thousands/uL 232   NEUTROS PCT % 89*   LYMPHS PCT % 3*   MONOS PCT % 6   EOS PCT % 0     Results from last 7 days   Lab Units 08/23/21  0536 08/21/21  0936   SODIUM mmol/L 139 140   POTASSIUM mmol/L 3 2* 3 8   CHLORIDE mmol/L 104 105   CO2 mmol/L 26 26   BUN mg/dL 21 36*   CREATININE mg/dL 0 91 1 27   ANION GAP mmol/L 9 9   CALCIUM mg/dL 8 3 8 7   ALBUMIN g/dL  --  2 9*   TOTAL BILIRUBIN mg/dL  --  0 50   ALK PHOS U/L  --  94   ALT U/L  --  30   AST U/L  --  43   GLUCOSE RANDOM mg/dL 102 143*     Results from last 7 days   Lab Units 08/20/21  0808   INR  1 25*     Results from last 7 days   Lab Units 08/21/21  0602 08/21/21  0023 08/20/21  1230 08/20/21  0021 08/19/21 2000   POC GLUCOSE mg/dl 137 124 108 145* 136         Results from last 7 days   Lab Units 08/22/21  1003 08/21/21  0723 08/21/21  0416 08/20/21  0808 08/20/21  0307 08/20/21  0033   LACTIC ACID mmol/L  --   --  0 8 1 2 5 2* 2 1*   PROCALCITONIN ng/ml 5 49* 7 34*  --   --  5 99*  --            * I Have Reviewed All Lab Data Listed Above  * Additional Pertinent Lab Tests Reviewed: All Labs Within Last 24 Hours Reviewed    Imaging:    Imaging Reports Reviewed Today Include:   Imaging Personally Reviewed by Myself Includes:      Recent Cultures (last 7 days):     Results from last 7 days   Lab Units 08/22/21  1003 08/20/21  0647 08/20/21  0522 08/20/21  0308 08/19/21  0849   BLOOD CULTURE  Received in Microbiology Lab  Culture in Progress  Received in Microbiology Lab  Culture in Progress  --   --  Escherichia coli*  Escherichia coli* No Growth at 72 hrs     SPUTUM CULTURE   --   --  2+ Growth of   --   --    GRAM STAIN RESULT   --   --  Rare Polys  No bacteria seen Gram negative rods*  Gram negative rods*  --    LEGIONELLA URINARY ANTIGEN   --  Negative  --   --   --        Last 24 Hours Medication List:   Current Facility-Administered Medications   Medication Dose Route Frequency Provider Last Rate    acetaminophen  650 mg Oral Q6H PRN LANEY Becerra      bisacodyl  10 mg Rectal Daily PRN LANEY Becerra      cefTRIAXone  1,000 mg Intravenous Q24H Jackson Trujillo MD 1,000 mg (08/23/21 1410)    docusate sodium  100 mg Oral BID LANEY Amaya      [START ON 8/24/2021] hydrocortisone sodium succinate  50 mg Intravenous Daily Ivania Farrar PA-C      hydroxychloroquine  200 mg Oral Daily With Breakfast LANEY Amaya      ipratropium  0 5 mg Nebulization Q6H PRN Naseem Rodrigues MD      levalbuterol  1 25 mg Nebulization Q6H PRN Naseem Rodrigues MD      melatonin  6 mg Oral HS LANEY Becerra      metoprolol succinate  25 mg Oral Daily Jose Maria Poole PA-C      metroNIDAZOLE  500 mg Intravenous Q8H LANEY Amaya 500 mg (08/23/21 9673)    ondansetron  4 mg Intravenous Q6H PRN LANEY Powers      pantoprazole  40 mg Intravenous Q12H [address] ChristieLANEY Gandara      senna  2 tablet Oral BID LANEY Powers          Today, Patient Was Seen By: Rex Barraza PA-C    ** Please Note: Dictation voice to text software may have been used in the creation of this document   **

## 2021-08-23 NOTE — ASSESSMENT & PLAN NOTE
Lab Results   Component Value Date    EGFR 63 08/23/2021    EGFR 48 08/22/2021    EGFR 42 08/21/2021    CREATININE 0 91 08/23/2021    CREATININE 1 13 08/22/2021    CREATININE 1 27 08/21/2021     · Baseline creat appears to be 0 9 to 1 3  · Initially admitted with IVIS in the setting of ABLA  · D/c rust cath 8/21  · Follow urinary retention protocol   · Trend I/O  · Creatinine stable at 0 91

## 2021-08-23 NOTE — ASSESSMENT & PLAN NOTE
· Treated for asp   pna va pneumonitis   · Continue abx   · Patient passed nursing dysphagia screening post intubation   · Follow-up culture results

## 2021-08-23 NOTE — ANESTHESIA POSTPROCEDURE EVALUATION
Post-Op Assessment Note    CV Status:  Stable  Pain Score: 0    Pain management: adequate     Mental Status:  Sleepy and arousable   Hydration Status:  Euvolemic   PONV Controlled:  Controlled   Airway Patency:  Patent      Post Op Vitals Reviewed: Yes      Staff: CRNA   Comments: Pt able to maintain own airway, VSS, report to recovery RN        No complications documented      /59 (08/23/21 1229)    Temp      Pulse 82 (08/23/21 1229)   Resp 18 (08/23/21 1229)    SpO2 91 % (08/23/21 1229)

## 2021-08-23 NOTE — OCCUPATIONAL THERAPY NOTE
Occupational Therapy Evaluation     Patient Name: Tracy Wen  UEXHG'X Date: 2021  Problem List  Principal Problem:    Bacteremia  Active Problems:    Essential hypertension    Atrial fibrillation with RVR (HCC)    Septic shock (HCC)    Elevated troponin level not due myocardial infarction    Gastric AVM    Acute on chronic anemia    Stage 3 chronic kidney disease (HCC)    MARK (obstructive sleep apnea)    GIST (gastrointestinal stroma tumor), malignant, colon (HCC)    Aspiration into airway    Acute respiratory failure with hypoxia (HCC)    Past Medical History  Past Medical History:   Diagnosis Date    ADHD     Anemia     Anxiety     Arthritis     Asthma     Atrial fibrillation (HCC)     Cancer ([facility]     Chronic iron deficiency anemia 2020    Extensive GI evaluation over the last year including multiple EGD, colonoscopy, and capsule endoscopy  Has had gastric AVMs cauterized, Dieulafoy's lesion clipped, and most recently a Jack Itmann erosion cauterized    Coronary artery disease     Depression     GERD (gastroesophageal reflux disease)     Hard to intubate     History of stomach ulcers     Hypercholesterolemia     Hypertension     Kidney disease     Metastatic cancer ([facility] )     Sepsis due to Escherichia coli ([facility] ) 2021     Past Surgical History  Past Surgical History:   Procedure Laterality Date    ANKLE SURGERY      APPENDECTOMY      BREAST SURGERY      CATARACT EXTRACTION       SECTION      COLONOSCOPY  2019    Multiple adenomatous colon polyps and internal hemorrhoids  Three year recall advised    HIP SURGERY      JOINT REPLACEMENT  2019    Left knee replacement    LAMINECTOMY      ROTATOR CUFF REPAIR      SIGMOID RESECTION / RECTOPEXY      SINUS SURGERY      UPPER GASTROINTESTINAL ENDOSCOPY  2020    Dieulafoy's lesion in proximal stomach which was actively oozing    Injected with epinephrine and 2 Endoclips placed with control of bleeding, hiatal hernia   UPPER GASTROINTESTINAL ENDOSCOPY  06/2020    Bleeding Seb's erosion status post cauterization         08/23/21 1545   OT Last Visit   OT Visit Date 08/23/21   Note Type   Note type Evaluation   Restrictions/Precautions   Weight Bearing Precautions Per Order No   Other Precautions Telemetry   Pain Assessment   Pain Assessment Tool Pain Assessment not indicated - pt denies pain   Home Living   Type of [address] One level;Stairs to enter with rails   Bathroom Shower/Tub Tub/shower unit   Bathroom Toilet Standard   Bathroom Equipment Grab bars in shower   [facility]   Prior Function   Level of [address] with ADLs and functional mobility   Lives With Spouse   Receives Help From Family   ADL Assistance Independent   IADLs Needs assistance   Subjective   Subjective Pt received in semisupine position  Pt has episode of bowel incontinence  Pt agreeable to OT assistance and OT evaluation at this time     ADL   Eating Assistance 7  Independent   Eating Deficit Setup   Grooming Assistance 7  Independent   Grooming Deficit Setup   UB Bathing Assistance 5  Supervision/Setup   LB Bathing Assistance 5  Supervision/Setup   UB Dressing Assistance 5  Supervision/Setup   LB Dressing Assistance 5  Supervision/Setup   Toileting Assistance  3  Moderate Assistance   Bed Mobility   Supine to Sit 5  Supervision   Additional Comments Pt remained seated in recliner by end of session   Transfers   Sit to Stand 5  Supervision   Stand to Sit 5  Supervision   Stand pivot 4  Minimal assistance   Additional items   (RW)   Functional Mobility   Functional Mobility 4  Minimal assistance   Additional Comments RW   RUE Assessment   RUE Assessment WFL   LUE Assessment   LUE Assessment WFL   Cognition   Overall Cognitive Status WFL   Arousal/Participation Alert   Attention Within functional limits   Orientation Level Oriented X4   Memory Within functional limits   Following Commands Follows all commands and directions without difficulty   Assessment   Limitation Decreased high-level ADLs; Decreased endurance   Prognosis Good   Assessment Pt is a 66 y o  female seen for OT evaluation at [facility], admitted 8/18/2021 w/ low hemoglobin  OT completed extensive review of pt's medical and social history  Comorbidities affecting pt's functional performance at time of assessment include: anxiety, gist tumor status post radiation and surgery on chemo, chronic anemia, gastric AVMs, PAF, chronic back pain and hypertension   Personal factors affecting pt at time of IE include:difficulty performing IADLS   Prior to admission, pt was living with spouse in house with steps to manage  Pt was I w/  ADLS and required some assist with IADLS  Pt relied on use of RW PTA  Upon evaluation: Pt requires sup for bed mobility, sup-min A x 1 for functional mobility/transfers, superivsion for UB ADLs and supervision-mod A for LB ADLS 2* the following deficits impacting occupational performance: decreased strength, decreased balance and decreased tolerance  Pt to benefit from continued skilled OT tx while in the hospital to address deficits as defined above and maximize level of functional independence w ADL's and functional mobility  Occupational Performance areas to address include: bathing/shower, toilet hygiene, dressing and functional mobility  Based on findings, pt is of high complexity  The patient's raw score on the AM-PAC Daily Activity inpatient short form is 21, standardized score is 44 27, greater than 39 4  Patients at this level are likely to benefit from DC to home  Please refer to the recommendation of the Occupational Therapist for safe DC planning  At this time, OT recommendations at time of discharge are home OT  Goals   Patient Goals Pt wishes to get better   Plan   Treatment Interventions ADL retraining;Functional transfer training; Compensatory technique education;Patient/family training   Goal Expiration Date 09/02/21   OT Treatment Day 0   OT Frequency 2-3x/wk   Recommendation   OT Discharge Recommendation Home with home health rehabilitation   AM-PAC Daily Activity Inpatient   Lower Body Dressing 3   Bathing 3   Toileting 3   Upper Body Dressing 4   Grooming 4   Eating 4   Daily Activity Raw Score 21   Daily Activity Standardized Score (Calc for Raw Score >=11) 44 27   AM-PAC Applied Cognition Inpatient   Following a Speech/Presentation 4   Understanding Ordinary Conversation 4   Taking Medications 4   Remembering Where Things Are Placed or Put Away 4   Remembering List of 4-5 Errands 4   Taking Care of Complicated Tasks 4   Applied Cognition Raw Score 24   Applied Cognition Standardized Score 62 21     Pt will achieve the following goals within 10 days  *Pt will complete UB bathing and dressing with mod I     *Pt will complete LB bathing and dressing with mod I      * Pt will complete toileting w/ mod I w/ G hygiene/thoroughness using DME PRN    *Pt will complete bed mobility with mod I, with bed flat and no side rail to prep for purposeful tasks    *Pt will perform functional transfers with on/off all surfaces with mod I using DME as needed w/ G balance/safety  *Pt will increase standing tolerance to 5 minutes in order to complete sinkside ADL task  *Pt will identify 3-5 fall risks during ADL routine to ensure home safety upon discharge           EVELINA Pineda/GIOVANNI

## 2021-08-23 NOTE — INTERVAL H&P NOTE
H&P reviewed  After examining the patient I find no changes in the patients condition since the H&P had been written      Vitals:    08/23/21 1057   BP: 167/82   Pulse: 90   Resp: 18   Temp: 99 1 °F (37 3 °C)   SpO2: 97%

## 2021-08-23 NOTE — ASSESSMENT & PLAN NOTE
· Baseline hemoglobin appears to be 8-9  · Hemoglobin 4 9 on admission   · Unclear etiology, though does have hx of Dieulafoy's lesions and Seb lesions in the upper GI requiring epi injection, cautery and clipping & GIST   · GI following, plan was for EGD, likely Monday   · OGT with ETT placed 8/20, brown thin output noted   · Protonix IV BID   · Resume Iron supplementation as able   · Total blood product: 2 5U PRBC  · Serial h/h, transfuse for hemoglobin <7  · Repeat hemoglobin 7 8

## 2021-08-23 NOTE — PLAN OF CARE
Problem: Potential for Falls  Goal: Patient will remain free of falls  Description: INTERVENTIONS:  - Educate patient/family on patient safety including physical limitations  - Instruct patient to call for assistance with activity   - Consult OT/PT to assist with strengthening/mobility   - Keep Call bell within reach  - Keep bed low and locked with side rails adjusted as appropriate  - Keep care items and personal belongings within reach  - Initiate and maintain comfort rounds  - Make Fall Risk Sign visible to staff  - Offer Toileting every  Hours, in advance of need  - Initiate/Maintain alarm  - Obtain necessary fall risk management equipment:   - Apply yellow socks and bracelet for high fall risk patients  - Consider moving patient to room near nurses station  Outcome: Progressing     Problem: MOBILITY - ADULT  Goal: Maintain or return to baseline ADL function  Description: INTERVENTIONS:  -  Assess patient's ability to carry out ADLs; assess patient's baseline for ADL function and identify physical deficits which impact ability to perform ADLs (bathing, care of mouth/teeth, toileting, grooming, dressing, etc )  - Assess/evaluate cause of self-care deficits   - Assess range of motion  - Assess patient's mobility; develop plan if impaired  - Assess patient's need for assistive devices and provide as appropriate  - Encourage maximum independence but intervene and supervise when necessary  - Involve family in performance of ADLs  - Assess for home care needs following discharge   - Consider OT consult to assist with ADL evaluation and planning for discharge  - Provide patient education as appropriate  Outcome: Progressing  Goal: Maintains/Returns to pre admission functional level  Description: INTERVENTIONS:  - Perform BMAT or MOVE assessment daily    - Set and communicate daily mobility goal to care team and patient/family/caregiver     - Collaborate with rehabilitation services on mobility goals if consulted  - Perform Range of Motion  times a day  - Reposition patient every  hours  - Dangle patient  times a day  - Stand patient  times a day  - Ambulate patient  times a day  - Out of bed to chair  times a day   - Out of bed for me times a day  - Out of bed for toileting  - Record patient progress and toleration of activity level   Outcome: Progressing     Problem: Prexisting or High Potential for Compromised Skin Integrity  Goal: Skin integrity is maintained or improved  Description: INTERVENTIONS:  - Identify patients at risk for skin breakdown  - Assess and monitor skin integrity  - Assess and monitor nutrition and hydration status  - Monitor labs   - Assess for incontinence   - Turn and reposition patient  - Assist with mobility/ambulation  - Relieve pressure over bony prominences  - Avoid friction and shearing  - Provide appropriate hygiene as needed including keeping skin clean and dry  - Evaluate need for skin moisturizer/barrier cream  - Collaborate with interdisciplinary team   - Patient/family teaching  - Consider wound care consult   Outcome: Progressing     Problem: HEMATOLOGIC - ADULT  Goal: Maintains hematologic stability  Description: INTERVENTIONS  - Assess for signs and symptoms of bleeding or hemorrhage  - Monitor labs  - Administer supportive blood products/factors as ordered and appropriate  Outcome: Progressing     Problem: Nutrition/Hydration-ADULT  Goal: Nutrient/Hydration intake appropriate for improving, restoring or maintaining nutritional needs  Description: Monitor and assess patient's nutrition/hydration status for malnutrition  Collaborate with interdisciplinary team and initiate plan and interventions as ordered  Monitor patient's weight and dietary intake as ordered or per policy  Utilize nutrition screening tool and intervene as necessary  Determine patient's food preferences and provide high-protein, high-caloric foods as appropriate       INTERVENTIONS:  - Monitor oral intake, urinary output, labs, and treatment plans  - Assess nutrition and hydration status and recommend course of action  - Evaluate amount of meals eaten  - Assist patient with eating if necessary   - Allow adequate time for meals  - Recommend/ encourage appropriate diets, oral nutritional supplements, and vitamin/mineral supplements  - Order, calculate, and assess calorie counts as needed  - Recommend, monitor, and adjust tube feedings and TPN/PPN based on assessed needs  - Assess need for intravenous fluids  - Provide specific nutrition/hydration education as appropriate  - Include patient/family/caregiver in decisions related to nutrition  Outcome: Progressing

## 2021-08-23 NOTE — ASSESSMENT & PLAN NOTE
· Follows with cardiology at [facility]  · Developed AFib RVR overnight likely in setting of missed doses of metoprolol/bacteremia  · Continue metoprolol 25mg daily - resumed this a m  with improvement in rates to low 100s  · on daily ASA 81mg - held in setting of anemia/GIB  · IV Lopressor p r n   · Monitor on telemetry - rates better controlled this morning

## 2021-08-23 NOTE — ASSESSMENT & PLAN NOTE
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

## 2021-08-23 NOTE — ASSESSMENT & PLAN NOTE
· BC from 8/20 growing E coli x2  · Aspiration vs  GI translocation in setting of possible GI bleed/ Cancer ?   · Currently on cefepime/Flagyl  · Repeat blood cultures pending  · Consult ID for further recommendations

## 2021-08-23 NOTE — PROGRESS NOTES
Pastoral Care Progress Note    2021  Patient: Isabella Bui :   Admission Date & Time: 2021  MRN: [id number] CSN: [id number]                     Chaplaincy Interventions Utilized:   Relationship Building: Cultivated a relationship of care and support  Patient said she was "so-so "  She was somewhat anxious as she was waiting for a test to be done  Patient and her  said they are members of [facility] in [facility]     Ritual: Provided prayer       21 1100   Clinical Encounter Type   Visited With Patient and family together   Routine Visit Introduction   Referral To   (census/rounds)   Islam Encounters   Islam Needs Prayer

## 2021-08-23 NOTE — ANESTHESIA PREPROCEDURE EVALUATION
Review of Systems/Medical History  Patient summary reviewed  Chart reviewed  History of anesthetic complications (EZ mask, intubated with Kennieth Began 2 (according to patient letter)) difficult airway    Cardiovascular  EKG reviewed , Exercise tolerance (METS): <4 ,  Hyperlipidemia, Hypertension , Dysrhythmias , atrial fibrillation and history of PSVT,    Pulmonary  Smoker ex-smoker  , Pneumonia: 25 , Asthma , well controlled/ stable , Sleep apnea (not on CPAP) ,        GI/Hepatic    GI bleeding , GERD ,  Hiatal hernia, GI malignancy (GIST),        Chronic kidney disease ,        Endo/Other  History of thyroid disease , hypothyroidism,      GYN  Negative gynecology ROS          Hematology  Anemia ,     Musculoskeletal    Arthritis     Neurology  Negative neurology ROS      Psychology   Anxiety, Depression ,              Physical Exam    Airway    Mallampati score: III  TM Distance: <3 FB  Neck ROM: limited     Dental   No notable dental hx     Cardiovascular  Cardiovascular exam normal    Pulmonary  Pulmonary exam normal     Other Findings        Anesthesia Plan  ASA Score- 3     Anesthesia Type- IV sedation with anesthesia with ASA Monitors  Additional Monitors:   Airway Plan:           Plan Factors-Exercise tolerance (METS): <4     Chart reviewed  EKG reviewed  Patient summary reviewed  Induction- intravenous  Postoperative Plan-     Informed Consent- Anesthetic plan and risks discussed with patient  I personally reviewed this patient with the CRNA  Discussed and agreed on the Anesthesia Plan with the ROLAN Prater

## 2021-08-23 NOTE — ASSESSMENT & PLAN NOTE
· AEB tachypnea, tachycardia, increased WOB, hypoxia requiring ETT and MV   · Likely in the setting of aspiration followed by initiation of Bipap   · Extubated 8/21/21 to NC O2  · Encourage IS  · OOB to chair   · Wean NC o2 as able for goal spo2 > 92%  · Resolved, SpO2 stable on RA

## 2021-08-23 NOTE — PLAN OF CARE
Problem: OCCUPATIONAL THERAPY ADULT  Goal: Performs self-care activities at highest level of function for planned discharge setting  See evaluation for individualized goals  Description: Treatment Interventions: ADL retraining, Functional transfer training, Compensatory technique education, Patient/family training          See flowsheet documentation for full assessment, interventions and recommendations  Note: Limitation: Decreased high-level ADLs, Decreased endurance  Prognosis: Good  Assessment: Pt is a 66 y o  female seen for OT evaluation at [facility], admitted 8/18/2021 w/ low hemoglobin  OT completed extensive review of pt's medical and social history  Comorbidities affecting pt's functional performance at time of assessment include: anxiety, gist tumor status post radiation and surgery on chemo, chronic anemia, gastric AVMs, PAF, chronic back pain and hypertension   Personal factors affecting pt at time of IE include:difficulty performing IADLS   Prior to admission, pt was living with spouse in house with steps to manage  Pt was I w/  ADLS and required some assist with IADLS  Pt relied on use of RW PTA  Upon evaluation: Pt requires sup for bed mobility, sup-min A x 1 for functional mobility/transfers, superivsion for UB ADLs and supervision-mod A for LB ADLS 2* the following deficits impacting occupational performance: decreased strength, decreased balance and decreased tolerance  Pt to benefit from continued skilled OT tx while in the hospital to address deficits as defined above and maximize level of functional independence w ADL's and functional mobility  Occupational Performance areas to address include: bathing/shower, toilet hygiene, dressing and functional mobility  Based on findings, pt is of high complexity  The patient's raw score on the AM-PAC Daily Activity inpatient short form is 21, standardized score is 44 27, greater than 39 4   Patients at this level are likely to benefit from DC to home  Please refer to the recommendation of the Occupational Therapist for safe DC planning  At this time, OT recommendations at time of discharge are home OT       OT Discharge Recommendation: Home with home health rehabilitation

## 2021-08-23 NOTE — SPEECH THERAPY NOTE
Speech Language/Pathology  Order received, chart reviewed  Pt extubated 8/21 and placed on regular diet  Per RN, pt has been tolerating diet without difficulty/concerns  Per chart review, pt w/ report of intermittent dysphagia  Pt NPO for EGD today, unable to participate in ST evaluation at this time  ST f/u for evaluation as able and appropriate

## 2021-08-23 NOTE — ASSESSMENT & PLAN NOTE
· AEB tachypnea, tachycardia, hypoxia and increased WOB requiring ETT, LA 5 2, leukocytosis, hypotension   · Likely etiology appears to be asp pna vs GI translocation   · Requiring brief initiation of norepinephrine to maintain map greater than 65 mmHg  · Echo as noted:  EF 55% with grade 2 diastolic dysfunction  · BC x2 + E coli - repeat blood cultures pending  · Strep, legionella, sputum cx neg  · MRSA negative, d/c vanc  · Procal 4 24> 5 99, trend daily - repeat pending  · Abx:  Continues on cefepime and Flagyl, vancomycin discontinued per critical care  · Hold avapritinib given active infection,continue plaquenil   · Trend temp & WBC   · Weaning IV steroids - decreased to daily 8/23 likely discontinue tomorrow  · Hemodynamically stable, afebrile

## 2021-08-24 LAB
ANION GAP SERPL CALCULATED.3IONS-SCNC: 10 MMOL/L (ref 4–13)
ATRIAL RATE: 89 BPM
BACTERIA BLD CULT: NORMAL
BASOPHILS # BLD AUTO: 0.03 THOUSANDS/ΜL (ref 0–0.1)
BASOPHILS NFR BLD AUTO: 0 % (ref 0–1)
BUN SERPL-MCNC: 15 MG/DL (ref 5–25)
C DIFF TOX GENS STL QL NAA+PROBE: NEGATIVE
CALCIUM SERPL-MCNC: 8.5 MG/DL (ref 8.3–10.1)
CHLORIDE SERPL-SCNC: 104 MMOL/L (ref 100–108)
CO2 SERPL-SCNC: 24 MMOL/L (ref 21–32)
CREAT SERPL-MCNC: 0.97 MG/DL (ref 0.6–1.3)
EOSINOPHIL # BLD AUTO: 0.03 THOUSAND/ΜL (ref 0–0.61)
EOSINOPHIL NFR BLD AUTO: 0 % (ref 0–6)
ERYTHROCYTE [DISTWIDTH] IN BLOOD BY AUTOMATED COUNT: 17.9 % (ref 11.6–15.1)
GFR SERPL CREATININE-BSD FRML MDRD: 58 ML/MIN/1.73SQ M
GLUCOSE SERPL-MCNC: 97 MG/DL (ref 65–140)
HCT VFR BLD AUTO: 29 % (ref 34.8–46.1)
HCT VFR BLD AUTO: 29.4 % (ref 34.8–46.1)
HGB BLD-MCNC: 9.2 G/DL (ref 11.5–15.4)
HGB BLD-MCNC: 9.2 G/DL (ref 11.5–15.4)
IMM GRANULOCYTES # BLD AUTO: 0.17 THOUSAND/UL (ref 0–0.2)
IMM GRANULOCYTES NFR BLD AUTO: 2 % (ref 0–2)
LYMPHOCYTES # BLD AUTO: 0.64 THOUSANDS/ΜL (ref 0.6–4.47)
LYMPHOCYTES NFR BLD AUTO: 7 % (ref 14–44)
MCH RBC QN AUTO: 31.4 PG (ref 26.8–34.3)
MCHC RBC AUTO-ENTMCNC: 31.7 G/DL (ref 31.4–37.4)
MCV RBC AUTO: 99 FL (ref 82–98)
MONOCYTES # BLD AUTO: 0.98 THOUSAND/ΜL (ref 0.17–1.22)
MONOCYTES NFR BLD AUTO: 11 % (ref 4–12)
NEUTROPHILS # BLD AUTO: 7.13 THOUSANDS/ΜL (ref 1.85–7.62)
NEUTS SEG NFR BLD AUTO: 80 % (ref 43–75)
NRBC BLD AUTO-RTO: 0 /100 WBCS
PLATELET # BLD AUTO: 280 THOUSANDS/UL (ref 149–390)
PMV BLD AUTO: 9.9 FL (ref 8.9–12.7)
POTASSIUM SERPL-SCNC: 3.5 MMOL/L (ref 3.5–5.3)
QRS AXIS: -6 DEGREES
QRSD INTERVAL: 128 MS
QT INTERVAL: 296 MS
QTC INTERVAL: 451 MS
RBC # BLD AUTO: 2.93 MILLION/UL (ref 3.81–5.12)
SODIUM SERPL-SCNC: 138 MMOL/L (ref 136–145)
T WAVE AXIS: 48 DEGREES
VENTRICULAR RATE: 140 BPM
WBC # BLD AUTO: 8.98 THOUSAND/UL (ref 4.31–10.16)

## 2021-08-24 PROCEDURE — 85018 HEMOGLOBIN: CPT | Performed by: INTERNAL MEDICINE

## 2021-08-24 PROCEDURE — 92610 EVALUATE SWALLOWING FUNCTION: CPT

## 2021-08-24 PROCEDURE — C9113 INJ PANTOPRAZOLE SODIUM, VIA: HCPCS | Performed by: NURSE PRACTITIONER

## 2021-08-24 PROCEDURE — 99231 SBSQ HOSP IP/OBS SF/LOW 25: CPT | Performed by: INTERNAL MEDICINE

## 2021-08-24 PROCEDURE — 93010 ELECTROCARDIOGRAM REPORT: CPT | Performed by: INTERNAL MEDICINE

## 2021-08-24 PROCEDURE — 80048 BASIC METABOLIC PNL TOTAL CA: CPT | Performed by: INTERNAL MEDICINE

## 2021-08-24 PROCEDURE — 85014 HEMATOCRIT: CPT | Performed by: INTERNAL MEDICINE

## 2021-08-24 PROCEDURE — 97162 PT EVAL MOD COMPLEX 30 MIN: CPT

## 2021-08-24 PROCEDURE — 85025 COMPLETE CBC W/AUTO DIFF WBC: CPT | Performed by: INTERNAL MEDICINE

## 2021-08-24 PROCEDURE — 99239 HOSP IP/OBS DSCHRG MGMT >30: CPT | Performed by: NURSE PRACTITIONER

## 2021-08-24 PROCEDURE — NC001 PR NO CHARGE: Performed by: PHYSICIAN ASSISTANT

## 2021-08-24 RX ORDER — PANTOPRAZOLE SODIUM 40 MG/1
40 TABLET, DELAYED RELEASE ORAL
Status: DISCONTINUED | OUTPATIENT
Start: 2021-08-25 | End: 2021-08-25 | Stop reason: HOSPADM

## 2021-08-24 RX ADMIN — METRONIDAZOLE 500 MG: 500 INJECTION, SOLUTION INTRAVENOUS at 17:06

## 2021-08-24 RX ADMIN — PANTOPRAZOLE SODIUM 40 MG: 40 INJECTION, POWDER, FOR SOLUTION INTRAVENOUS at 08:46

## 2021-08-24 RX ADMIN — METRONIDAZOLE 500 MG: 500 INJECTION, SOLUTION INTRAVENOUS at 02:22

## 2021-08-24 RX ADMIN — HYDROXYCHLOROQUINE SULFATE 200 MG: 200 TABLET, FILM COATED ORAL at 08:46

## 2021-08-24 RX ADMIN — METOPROLOL SUCCINATE 25 MG: 25 TABLET, FILM COATED, EXTENDED RELEASE ORAL at 08:46

## 2021-08-24 RX ADMIN — METRONIDAZOLE 500 MG: 500 INJECTION, SOLUTION INTRAVENOUS at 09:07

## 2021-08-24 RX ADMIN — CEFTRIAXONE 1000 MG: 1 INJECTION, SOLUTION INTRAVENOUS at 13:21

## 2021-08-24 RX ADMIN — HYDROCORTISONE SODIUM SUCCINATE 50 MG: 100 INJECTION, POWDER, FOR SOLUTION INTRAMUSCULAR; INTRAVENOUS at 08:46

## 2021-08-24 NOTE — ASSESSMENT & PLAN NOTE
Lab Results   Component Value Date    EGFR 58 08/24/2021    EGFR 63 08/23/2021    EGFR 48 08/22/2021    CREATININE 0 97 08/24/2021    CREATININE 0 91 08/23/2021    CREATININE 1 13 08/22/2021     · Baseline creat appears to be 0 9 to 1 3  · Initially admitted with IVIS in the setting of ABLA  · D/c rust cath 8/21   Follow urinary retention protocol   · Creatinine stable at 0 91

## 2021-08-24 NOTE — ASSESSMENT & PLAN NOTE
· AEB tachypnea, tachycardia, hypoxia and increased WOB requiring ETT, LA 5 2, leukocytosis, hypotension   · Requiring brief initiation of norepinephrine to maintain map greater than 65 mmHg    Now hemodynamically stable  · BC x2 + E coli - repeat blood cultures pending  · Strep, legionella, sputum cx neg  · Procal trending down  · Abx:  Continues on cefepime and Flagyl per Infectious Disease  · Hold avapritinib given active infection, continue plaquenil   · Weaning IV steroids - decreased to daily 8/23 likely discontinue today

## 2021-08-24 NOTE — ASSESSMENT & PLAN NOTE
· Baseline hemoglobin appears to be 8-9   Hemoglobin 4 9 on admission   · Suspect secondary to bleeding AVMs noted on EGD 8/23  · Initially OGT with ETT placed 8/20, brown thin output noted   · Continue iron and Protonix  · Total blood product: 2 5U PRBC  · Serial h/h, transfuse for hemoglobin <7  · Hemoglobin stable 9 2

## 2021-08-24 NOTE — UTILIZATION REVIEW
Continued Stay Review    Date: 8/24/2021                        Current Patient Class: inpatient   Current Level of Care: med surg    HPI:71 y o  female initially admitted on 8/18/2021 to observation converted to inpatient on 8/19/2021 due to Acute on chronic anemia, gastric AVMs, non MI related troponin elevation, SIRS, stage III CKD, GIST and hypertension  On admission, hgb 5 1 with baseline of 8 to 9     Patient transfused 3 units PRBC this admission, started on Venofer  On 8/20  Had vomiting, became hypoxic, suspected aspiration pneumonia, intubated  Blood cultures showed E coli  Treated with antibiotics  Developed afib with RVR on 8/21 and has history of afib, given metoprolol IV and restarted home medication  Patient extubated  Antibiotics transitioned to rocephin on 8/23 from cefepime and Flagyl    8/23/2021 Procedure - egd - We cauterized several AVMs in the stomach  Clear liquid diet and observe  Note in light of her diarrhea we should check a C diff    Assessment/Plan:  8/24/2021:  No shortness of breath  Hungry  On exam port wine stain to right side of face  Few crackles at bases  H&H 9 2/29  Plan is advance diet  Recheck H&H 1400 and has 9 2/29  4  To continue rocephin and Flagyl  Await results of repeat blood cultures  Vital Signs:   08/24/21 15:56:25  98 8 °F (37 1 °C)  --  --  123/99  107  --  --  --  --  --  --   08/24/21 0900  --  --  --  --  --  94 %  --  --  --  None (Room air)  --   08/24/21 06:41:43  98 2 °F (36 8 °C)  83  16  122/68  86  94 %  --  --  --  --  Sitting   08/23/21 22:48:14  98 6 °F (37 °C)  89  20  134/82  99  96 %  --  --  --  None (Room air)  Sitting    Patient Position - Orthostatic VS: edge of bed at 08/23/21 9098       Pertinent Labs/Diagnostic Results:   8/19/2021 ct abdomen Interstitial opacities in the lung bases, partially visualized  Pulmonary edema is favored, however cannot exclude atypical infection  2  Small bilateral pleural effusions  3  CT findings of anemia  4  Mild volume ascites and mesenteric edema    8/20/2021 CxR Endotracheal tube tip 5 mm above the mala, with retraction recommended by approximately 2 to 3 cm  Continued bilateral pulmonary infiltrates, with increased size of left effusion     The examination demonstrates a significant  finding and was documented as such in [facility] for liaison and referring practitioner significant notification    8/21/2021 CxR - Improved pneumonia      Results from last 7 days   Lab Units 08/20/21  0316   SARS-COV-2  Negative     Results from last 7 days   Lab Units 08/24/21  1419 08/24/21  0256 08/23/21  0536 08/22/21  1003 08/21/21  1620 08/21/21  0416   WBC Thousand/uL  --  8 98 12 42* 16 52*  --  18 60*   HEMOGLOBIN g/dL 9 2* 9 2* 7 8* 7 9* 8 3* 7 1*   HEMATOCRIT % 29 4* 29 0* 24 4* 24 6*  --  22 1*   PLATELETS Thousands/uL  --  280 232 238  --  205   NEUTROS ABS Thousands/µL  --  7 13 11 12* 15 39*  --  17 76*         Results from last 7 days   Lab Units 08/24/21  0256 08/23/21  0536 08/22/21  1003 08/21/21  0936 08/21/21  0416 08/20/21  2236 08/20/21  0808   SODIUM mmol/L 138 139 137 140  --  139 133*   POTASSIUM mmol/L 3 5 3 2* 3 5 3 8  --  3 2* 4 7   CHLORIDE mmol/L 104 104 101 105  --  101 100   CO2 mmol/L 24 26 22 26  --  22 23   ANION GAP mmol/L 10 9 14* 9  --  16* 10   BUN mg/dL 15 21 26* 36*  --  33* 32*   CREATININE mg/dL 0 97 0 91 1 13 1 27  --  1 34* 1 19   EGFR ml/min/1 73sq m 58 63 48 42  --  39 45   CALCIUM mg/dL 8 5 8 3 8 6 8 7  --  8 0* 8 1*   CALCIUM, IONIZED mmol/L  --   --   --   --  1 06*  --   --    MAGNESIUM mg/dL  --   --  2 1  --   --  2 3 2 1     Results from last 7 days   Lab Units 08/21/21  0936 08/20/21  0808 08/20/21  0033 08/18/21 2119   AST U/L 43 80* 48* 41   ALT U/L 30 41 36 32   ALK PHOS U/L 94 117* 133* 88   TOTAL PROTEIN g/dL 6 2* 6 4 7 1 6 8   ALBUMIN g/dL 2 9* 2 5* 3 0* 3 1*   TOTAL BILIRUBIN mg/dL 0 50 0 80 1 10* 0 20     Results from last 7 days   Lab Units 08/21/21  0602 08/21/21  0023 08/20/21  1230 08/20/21  0021 08/19/21 2000   POC GLUCOSE mg/dl 137 124 108 145* 136     Results from last 7 days   Lab Units 08/24/21  0256 08/23/21  0536 08/22/21  1003 08/21/21  0936 08/20/21  2236 08/20/21  0808 08/20/21  0033 08/19/21  0430 08/18/21  2119   GLUCOSE RANDOM mg/dL 97 102 119 143* 117 111 127 112 113     Results from last 7 days   Lab Units 08/20/21  0604   I STAT BASE EXC mmol/L -3*   I STAT O2 SAT % 93*   ISTAT PH ART  7 444   I STAT ART PCO2 mm HG 29 6*   I STAT ART PO2 mm HG 63 0*   I STAT ART HCO3 mmol/L 20 3*     Results from last 7 days   Lab Units 08/20/21  1111 08/20/21  0808 08/20/21  0307 08/19/21  0418 08/19/21  0055   TROPONIN I ng/mL 0 56* 0 79* 0 40* 0 06* 0 07*     Results from last 7 days   Lab Units 08/20/21  0808   PROTIME seconds 15 7*   INR  1 25*     Results from last 7 days   Lab Units 08/22/21  1003 08/21/21  0723 08/20/21  0307   PROCALCITONIN ng/ml 5 49* 7 34* 5 99*     Results from last 7 days   Lab Units 08/21/21  0416 08/20/21  0808 08/20/21  0307 08/20/21  0033   LACTIC ACID mmol/L 0 8 1 2 5 2* 2 1*     Results from last 7 days   Lab Units 08/20/21  1111   NT-PRO BNP pg/mL >35,000*     Results from last 7 days   Lab Units 08/19/21  0055 08/18/21  2119   FERRITIN ng/mL 76 79     Results from last 7 days   Lab Units 08/20/21  2134 08/19/21  0415   CLARITY UA  Clear  --    COLOR UA  Yellow  --    SPEC GRAV UA  1 010  --    PH UA  5 0  --    GLUCOSE UA mg/dl Negative  --    KETONES UA mg/dl Negative  --    BLOOD UA  Small* Trace-Intact*   PROTEIN UA mg/dl Negative  --    NITRITE UA  Negative  --    BILIRUBIN UA  Negative  --    UROBILINOGEN UA E U /dl 0 2  --    LEUKOCYTES UA  Trace*  --    WBC UA /hpf 4-10*  --    RBC UA /hpf 0-1*  --    BACTERIA UA /hpf Moderate*  --    EPITHELIAL CELLS WET PREP /hpf None Seen  --    MUCUS THREADS  Occasional*  --      Results from last 7 days   Lab Units 08/20/21  0647   STREP PNEUMONIAE ANTIGEN, URINE Negative   LEGIONELLA URINARY ANTIGEN  Negative     Results from last 7 days   Lab Units 08/23/21  1520   C DIFF TOXIN B BY PCR  Negative     Results from last 7 days   Lab Units 08/22/21  1003 08/20/21  0522 08/20/21  0308 08/19/21  0849   BLOOD CULTURE  No Growth at 24 hrs  No Growth at 24 hrs   --  Escherichia coli*  Escherichia coli* No Growth After 5 Days  SPUTUM CULTURE   --  2+ Growth of   --   --    GRAM STAIN RESULT   --  Rare Polys  No bacteria seen Gram negative rods*  Gram negative rods*  --          Medications:   Scheduled Medications:  cefTRIAXone, 1,000 mg, Intravenous, Q24H  docusate sodium, 100 mg, Oral, BID  hydroxychloroquine, 200 mg, Oral, Daily With Breakfast  metoprolol succinate, 25 mg, Oral, Daily  metroNIDAZOLE, 500 mg, Intravenous, Q8H  [START ON 8/25/2021] pantoprazole, 40 mg, Oral, Early Morning  senna, 2 tablet, Oral, BID      Continuous IV Infusions: none     PRN Meds: not used   acetaminophen, 650 mg, Oral, Q6H PRN  bisacodyl, 10 mg, Rectal, Daily PRN  ipratropium, 0 5 mg, Nebulization, Q6H PRN  levalbuterol, 1 25 mg, Nebulization, Q6H PRN  ondansetron, 4 mg, Intravenous, Q6H PRN        Discharge Plan: To be determined    Network Utilization Review Department  ATTENTION: Please call with any questions or concerns to [phone] and carefully listen to the prompts so that you are directed to the right person  All voicemails are confidential   Clara Rod all requests for admission clinical reviews, approved or denied determinations and any other requests to dedicated fax number below belonging to the campus where the patient is receiving treatment   List of dedicated fax numbers for the Facilities:  [address] [place] [address] DENIALS (Administrative/Medical Necessity) [phone]   [address] (Maternity/NICU/Pediatrics) [phone]   [address] [place] [address] [facility] [address] [place] [address],[address] Floor [phone]   Salome Matias [facility] [id number] [address] Elver Gamboa [facility] [address] [place] [address] [phone]

## 2021-08-24 NOTE — PROGRESS NOTES
Danilo Killer  66 y o   female  1950  mrn [id number]    Assessment/Plan:  1  Sepsis/E coli septicemia//Aspiration pneumonia/ Leukocytosis/Hypoxia: No fever and WBC count has decreased to 8K  EGD showed multiple AVM's  GI did not feel that colonoscopy needed to be done because Pt had one in 2020  Pt presented with SOB, abd pain, melena and intermittent consitpation  Hgb was 5 1  She developed vomiting and became hypoxic on 8/20  CXR showed new bilat pulmonary infilts suggestive of aspiration pneumonia  She required intubation  Her WBC count and lactic acid became elevated c/w sepsis  Bld cx's drawn on 8/20 grew E coli - source of her gm neg septicemia is unclear - ?due to translocation from her bowel in setting of constipation  Less likely from aspiration pneumonia  Abd CT showed small bilat pleural effusions, no hydro, and mild ascites  She improved on tx with Cefepime and Flagyl  Cefepime was narrowed to Rocephin on 8/23  Repeat CXR showed improvement of her pulm infilts     A  Cont Rocephin and Flagyl overnight  B  Awaiting results of repeat bld cx's  C  If repeat bld cx's remain neg, OK to d/c Pt tomorrow AM on Doxycycline 100 mg po BID with Flagyl 500 mg po TID and cont through 8/2/21  D  Pt will f/u with me as an out-Pt      Subjective: Pt feels better  Denies abd pain      Objective:  Tmax: 99 1  Lungs: +Few crackles at the bases  Abd: +BS, soft, nontender  Ext: No calf tenderness        Labs:  CBC w/diff  Recent Labs     08/24/21  0256   WBC 8 98   HGB 9 2*   HCT 29 0*      NEUTOPHILPCT 80*   LYMPHOPCT 7*   MONOPCT 11   EOSPCT 0     BMP  Recent Labs     08/24/21  0256   K 3 5      CO2 24   BUN 15   CREATININE 0 97   CALCIUM 8 5     CMP  Recent Labs     08/24/21  0256   K 3 5      CO2 24   BUN 15   CREATININE 0 97   CALCIUM 8 5        labrc    Cultures:  Lab Results   Component Value Date    BLOODCX No Growth at 24 hrs  08/22/2021    BLOODCX No Growth at 24 hrs  08/22/2021 BLOODCX Escherichia coli (A) 08/20/2021    BLOODCX Escherichia coli (A) 08/20/2021    BLOODCX No Growth After 4 Days  08/19/2021    BLOODCX No Growth After 5 Days  05/30/2021    BLOODCX No Growth After 5 Days  05/30/2021    BLOODCX Escherichia coli (A) 05/29/2021    BLOODCX Escherichia coli (A) 05/29/2021    BLOODCX No Growth After 5 Days  02/19/2020    BLOODCX No Growth After 5 Days  02/19/2020    BLOODCX No Growth After 5 Days  02/06/2020    BLOODCX No Growth After 5 Days   02/06/2020     No results found for: WOUNDCULT  No results found for: [facility]  Lab Results   Component Value Date    SPUTUMCULTUR 2+ Growth of  08/20/2021       MED:  Rocephin: #2  Flagyl: #5           Completed: Vanco x 1 dose on 8/20  Cefepime x 4 days on 8/23      Current Facility-Administered Medications:     acetaminophen (TYLENOL) tablet 650 mg, 650 mg, Oral, Q6H PRN, LANEY Cisse, 650 mg at 08/19/21 2041    bisacodyl (DULCOLAX) rectal suppository 10 mg, 10 mg, Rectal, Daily PRN, LANEY Cisse    cefTRIAXone (ROCEPHIN) IVPB (premix in dextrose) 1,000 mg 50 mL, 1,000 mg, Intravenous, Q24H, Reji Monroe MD, Last Rate: 100 mL/hr at 08/24/21 1321, 1,000 mg at 08/24/21 1321    docusate sodium (COLACE) capsule 100 mg, 100 mg, Oral, BID, LANEY Amaya, 100 mg at 08/22/21 1718    hydroxychloroquine (PLAQUENIL) tablet 200 mg, 200 mg, Oral, Daily With Breakfast, LANEY Amaya, 200 mg at 08/24/21 0846    ipratropium (ATROVENT) 0 02 % inhalation solution 0 5 mg, 0 5 mg, Nebulization, Q6H PRN, Shirlee Gitelman, MD    levalbuterol (XOPENEX) inhalation solution 1 25 mg, 1 25 mg, Nebulization, Q6H PRN, Shirlee Gitelman, MD    metoprolol succinate (TOPROL-XL) 24 hr tablet 25 mg, 25 mg, Oral, Daily, Arelis David PA-C, 25 mg at 08/24/21 0846    metroNIDAZOLE (FLAGYL) IVPB (premix) 500 mg 100 mL, 500 mg, Intravenous, Q8H, LANEY Amaya, Last Rate: 200 mL/hr at 08/24/21 0907, 500 mg at 08/24/21 0907    ondansetron (ZOFRAN) injection 4 mg, 4 mg, Intravenous, Q6H PRN, LANEY Aldrich, 4 mg at 08/20/21 0034    [START ON 8/25/2021] pantoprazole (PROTONIX) EC tablet 40 mg, 40 mg, Oral, Early Morning, Leyla Chatman MD    [facility]) tablet 17 2 mg, 2 tablet, Oral, BID, LANEY Amaya, 17 2 mg at 08/22/21 1718    Principal Problem:    Bacteremia  Active Problems:    Essential hypertension    Atrial fibrillation with RVR (HCC)    Septic shock (HCC)    Elevated troponin level not due myocardial infarction    Gastric AVM    Acute on chronic anemia    Stage 3 chronic kidney disease (HCC)    GIST (gastrointestinal stroma tumor), malignant, colon ([facility] )    Aspiration into airway    Acute respiratory failure with hypoxia ([facility] [address] )      Letitia Solo MD

## 2021-08-24 NOTE — ASSESSMENT & PLAN NOTE
· Treated for asp   pna va pneumonitis   · Continue abx   · Patient passed nursing dysphagia screening post intubation   · Seen by speech today 8/24, without significant oral difficulties, no overt signs and symptoms of aspiration though silent aspiration could not be excluded without instrumentation and may be beneficial if there is concern for ongoing pneumonia in the future

## 2021-08-24 NOTE — DISCHARGE INSTRUCTIONS
- Colace 100 mg p o   Twice daily  - Senna 17 6 mg po Twice Daily   - Dulcolax Supp 10mg daily prn constipation

## 2021-08-24 NOTE — CONSULTS
Consultation - Infectious Disease   Pavan Ricks 66 y o  female MRN: [id number]  Unit/Bed#: -01 Encounter: [id number]      Assessment/Plan   1  Sepsis/E coli septicemia//Aspiration pneumonia/Leukocytosis/Hypoxia: Pt presented with SOB, abd pain, melena and intermittent consitpation  Hgb was 5 1  She developed vomiting and became hypoxic on 8/20  CXR showed new bilat pulmonary infilts suggestive of aspiration pneumonia  She required intubation  Her WBC count and lactic acid became elevated c/w sepsis  Bld cx's drawn on 8/20 grew E coli - source of her gm neg septicemia is unclear - ?due to translocation from her bowel in setting of constipation  Less likely from aspiration pneumonia  Abd CT showed small bilat pleural effusions, no hydro, and mild ascites  She has improved on tx with Cefepime and Flagyl  EGD is being scheduled to eval her initial anemia  A  Will narrow abx regmen to Rocephin and Flagyl  B  Will follow WBC count which is decreasing  C  Awaiting results of repeat bld cx's  D  Awaiting results of EGD  E  ? Need for colonoscopy to eval her intermittent constipation and to look for source of her E coli septicemia - will discuss with GI    History of Present Illness   Physician Requesting Consult: Carolyne Chacon DO  Reason for Consult / Principal Problem: SOB, lightheaded, lower abd pain, melena, and intermittent constipation    HPI: Pavan Ricks is a 66y o  year old female with H/O A fib, CAD, s/p resection of GIST tumor with liver mets, ADHD, arthritis, asthma, depression, GERD, PUD, HLD, HTN, s/p left TKR, gastric AVM's, and chronic constipation who was admitted on 8/18 with c/o SOB, lightheadedness, lower abd pain, nausea, and intermittent constipation alternating with diarrhea x 1 week  On admission, she did not have fever or elevated WBC count  Her Hgb was 5 1  Abd CT showed small bilat pleural effusions, no hydro, and mild ascites   She was transfused on 8/19 and developed nausea and mild increase in temp during the transfusion  On , she had difficulty swallowing with vomiting  She developed increasing SOB and became hypoxic  She was intubated and transferred to the ICU  WBC count increased to 27K and lactic acid was elevated  Repeat CXR showed development of bilat pulmonary infilts  She was given one dose of Vanco and started on Cefepime and Flagyl for tx of presumed aspiration pneumonia  Repeat CXR on  showed improvement in her pulmonary infilts and she was extubated  Her pulmonary status has continued to improve  Admission bld cx's were neg but repeat bld cx;s drawn on  grew E coli  Additional bld cx's were drawn on  and are neg so far,    Pt denies cough, SOB, CP, or dysuria  She reports that she is having BM's                Inpatient consult to Infectious Diseases  Consult performed by: Alice Chadwick MD  Consult ordered by: Candido Bundy PA-C          ROS: 12 systems reviewed, remainder is neg  Historical Information   Past Medical History:   Diagnosis Date    ADHD     Anemia     Anxiety     Arthritis     Asthma     Atrial fibrillation (HCC)     Cancer (HCC)     Chronic iron deficiency anemia 2020    Extensive GI evaluation over the last year including multiple EGD, colonoscopy, and capsule endoscopy  Has had gastric AVMs cauterized, Dieulafoy's lesion clipped, and most recently a [place] erosion cauterized    Coronary artery disease     Depression     GERD (gastroesophageal reflux disease)     Hard to intubate     History of stomach ulcers     Hypercholesterolemia     Hypertension     Kidney disease     [facility] [address] [facility]     Sepsis due to Escherichia coli ([facility] [address] ) 2021     Past Surgical History:   Procedure Laterality Date    ANKLE SURGERY      APPENDECTOMY      BREAST SURGERY      CATARACT EXTRACTION       SECTION      COLONOSCOPY  2019    Multiple adenomatous colon polyps and internal hemorrhoids  Three year recall advised    HIP SURGERY      JOINT REPLACEMENT  07/30/2019    Left knee replacement    LAMINECTOMY      ROTATOR CUFF REPAIR      SIGMOID RESECTION / RECTOPEXY      SINUS SURGERY      UPPER GASTROINTESTINAL ENDOSCOPY  05/01/2020    Dieulafoy's lesion in proximal stomach which was actively oozing  Injected with epinephrine and 2 Endoclips placed with control of bleeding, hiatal hernia      UPPER GASTROINTESTINAL ENDOSCOPY  06/2020    Bleeding Seb's erosion status post cauterization     Social History   Social History     Substance and Sexual Activity   Alcohol Use Not Currently     Social History     Substance and Sexual Activity   Drug Use No     Social History     Tobacco Use   Smoking Status Former Smoker    Years: 20 00    Types: Cigarettes   Smokeless Tobacco Never Used     Family History   Problem Relation Age of Onset   [facility] Breast cancer Mother     Diabetes Mother     Hypertension Mother     Lung cancer Mother     Hyperlipidemia Father     Prostate cancer Father     Colon cancer Paternal Grandmother     Colon cancer Paternal Grandfather     Diabetes Family     Substance Abuse Neg Hx     Mental illness Neg Hx        Meds/Allergies   MEDS:  Rocephin: #1  Cefepime: #4  Flagyl: #4        Completed: Vanco x 1 dose on 8/20      Current Facility-Administered Medications:     acetaminophen (TYLENOL) tablet 650 mg, 650 mg, Oral, Q6H PRN, Colonel Bone, CRNP, 650 mg at 08/19/21 2041    bisacodyl (DULCOLAX) rectal suppository 10 mg, 10 mg, Rectal, Daily PRN, Colonel Bone, CRNP    cefTRIAXone (ROCEPHIN) IVPB (premix in dextrose) 1,000 mg 50 mL, 1,000 mg, Intravenous, Q24H, Bravo Buenrostro MD, Last Rate: 100 mL/hr at 08/23/21 1410, 1,000 mg at 08/23/21 1410    docusate sodium (COLACE) capsule 100 mg, 100 mg, Oral, BID, LANEY Amaya, 100 mg at 08/22/21 1718    [START ON 8/24/2021] hydrocortisone sodium succinate (PF) (Solu-CORTEF) injection 50 mg, 50 mg, Intravenous, Daily, Donny Go PA-C    hydroxychloroquine (PLAQUENIL) tablet 200 mg, 200 mg, Oral, Daily With Breakfast, LANEY Amaya, 200 mg at 08/23/21 0827    ipratropium (ATROVENT) 0 02 % inhalation solution 0 5 mg, 0 5 mg, Nebulization, Q6H PRN, Lukasz Friedman MD    levalbuterol (XOPENEX) inhalation solution 1 25 mg, 1 25 mg, Nebulization, Q6H PRN, Lukasz Friedman MD    metoprolol succinate (TOPROL-XL) 24 hr tablet 25 mg, 25 mg, Oral, Daily, Arelis David PA-C, 25 mg at 08/23/21 3040    metroNIDAZOLE (FLAGYL) IVPB (premix) 500 mg 100 mL, 500 mg, Intravenous, Q8H, LANEY Amaya, Last Rate: 200 mL/hr at 08/23/21 1707, 500 mg at 08/23/21 1707    ondansetron (ZOFRAN) injection 4 mg, 4 mg, Intravenous, Q6H PRN, LANEY Retana, 4 mg at 08/20/21 0034    pantoprazole (PROTONIX) injection 40 mg, 40 mg, Intravenous, Q12H [facility], LANEY Amaya, 40 mg at 08/23/21 2118    senna (SENOKOT) tablet 17 2 mg, 2 tablet, Oral, BID, LANEY Amaya, 17 2 mg at 08/22/21 1718    Allergies   Allergen Reactions    Codeine Other (See Comments)     Throat closes    Codeine Sulfate Throat Swelling    Neomycin-Bacitracin Zn-Polymyx Rash    Wound Dressing Adhesive Other (See Comments)     If its on too long- pt starts itching    Zolmitriptan Palpitations     Heart palpitations  Generic for Zomig           Intake/Output Summary (Last 24 hours) at 8/23/2021 2340  Last data filed at 8/23/2021 2042  Gross per 24 hour   Intake 830 ml   Output 800 ml   Net 30 ml       PE:  WD, WN, WF in NAD  VSS, Tmax: 99 2  HEENT:  No scleral icterus, pharynx clear  NECK: Supple  CARDIAC: RRR, nml S1, S2  LUNGS: Clear  ABDOMEN:  +BS, soft, nontender  EXTREMITIES:  No calf tenderness  SKIN: No rash   NEURO: Grossly nonfocal    Invasive Devices:   Peripheral IV 08/20/21 Left;Ventral (anterior) Wrist (Active)   Site Assessment Clean;Dry; Intact 08/23/21 1058   Dressing Type Transparent 08/23/21 1058   Line Status Flushed; Infusing 08/23/21 1058   Dressing Status Clean;Dry; Intact 08/23/21 1058   Dressing Intervention Dressing reinforced 08/20/21 2000   Dressing Change Due 08/24/21 08/22/21 1600   Reason Not Rotated Not due 08/22/21 1600       Peripheral IV 08/23/21 Right Wrist (Active)           Lab Results:   No results displayed because visit has over 200 results  Imaging Studies: I have personally reviewed pertinent reports  EKG, Pathology, and Other Studies: I have personally reviewed pertinent reports  Culture  Lab Results   Component Value Date    BLOODCX No Growth at 24 hrs  08/22/2021    BLOODCX No Growth at 24 hrs  08/22/2021    BLOODCX Escherichia coli (A) 08/20/2021    BLOODCX Escherichia coli (A) 08/20/2021    BLOODCX No Growth After 4 Days  08/19/2021    BLOODCX No Growth After 5 Days  05/30/2021    BLOODCX No Growth After 5 Days  05/30/2021    BLOODCX Escherichia coli (A) 05/29/2021    BLOODCX Escherichia coli (A) 05/29/2021    BLOODCX No Growth After 5 Days  02/19/2020    BLOODCX No Growth After 5 Days  02/19/2020    BLOODCX No Growth After 5 Days  02/06/2020    BLOODCX No Growth After 5 Days   02/06/2020     No results found for: WOUNDCULT  No results found for: [facility]  Lab Results   Component Value Date    SPUTUMCULTUR 2+ Growth of  08/20/2021       Principal Problem:    Bacteremia  Active Problems:    Essential hypertension    Atrial fibrillation with RVR (HCC)    Septic shock (HCC)    Elevated troponin level not due myocardial infarction    Gastric AVM    Acute on chronic anemia    Stage 3 chronic kidney disease (HCC)    MARK (obstructive sleep apnea)    GIST (gastrointestinal stroma tumor), malignant, colon (HCC)    Aspiration into airway    Acute respiratory failure with hypoxia ([address] )

## 2021-08-24 NOTE — CASE MANAGEMENT
[facility]

## 2021-08-24 NOTE — PHYSICAL THERAPY NOTE
PHYSICAL THERAPY Evaluation    Physical Therapy Evaluation    Performed at least 2 patient identifiers during session:  Patient Active Problem List   Diagnosis    Anxiety    Gait disturbance    Essential hypertension    Spinal stenosis, lumbar region, with neurogenic claudication    Pain syndrome, chronic    Atrial fibrillation with RVR (HCC)    Right bundle-branch block    Lumbar radiculopathy    Supraventricular tachycardia (HCC)    Non-rheumatic mitral regurgitation    Health care maintenance    Septic shock ([facility] )    Elevated troponin level not due myocardial infarction    Metastatic cancer ([facility] [address] )    Gastric AVM    Acute on chronic anemia    Stage 3 chronic kidney disease (HCC)    MARK (obstructive sleep apnea)    Status post lumbar laminectomy    GIST (gastrointestinal stroma tumor), malignant, colon ([facility] [address] )    Aspiration into airway    Acute respiratory failure with hypoxia (HCC)    Bacteremia       Past Medical History:   Diagnosis Date    ADHD     Anemia     Anxiety     Arthritis     Asthma     Atrial fibrillation (HCC)     [facility]     Chronic iron deficiency anemia 2020    Extensive GI evaluation over the last year including multiple EGD, colonoscopy, and capsule endoscopy    Has had gastric AVMs cauterized, Dieulafoy's lesion clipped, and most recently a [employer] erosion cauterized    Coronary artery disease     Depression     GERD (gastroesophageal reflux disease)     Hard to intubate     History of stomach ulcers     Hypercholesterolemia     Hypertension     Kidney disease     Metastatic cancer ([facility] [address] )     Sepsis [facility] to Escherichia coli [facility] 2021       Past Surgical History:   Procedure Laterality Date    ANKLE SURGERY      APPENDECTOMY      BREAST SURGERY      CATARACT EXTRACTION       SECTION      COLONOSCOPY  2019    Multiple adenomatous colon polyps and internal hemorrhoids  Three year recall advised    HIP SURGERY      JOINT REPLACEMENT  07/30/2019    Left knee replacement    LAMINECTOMY      ROTATOR CUFF REPAIR      SIGMOID RESECTION / RECTOPEXY      SINUS SURGERY      UPPER GASTROINTESTINAL ENDOSCOPY  05/01/2020    Dieulafoy's lesion in proximal stomach which was actively oozing  Injected with epinephrine and 2 Endoclips placed with control of bleeding, hiatal hernia   UPPER GASTROINTESTINAL ENDOSCOPY  06/2020    Bleeding Seb's erosion status post cauterization          08/24/21 1308   PT Last Visit   PT Visit Date 08/24/21   Note Type   Note type Evaluation   Pain Assessment   Pain Assessment Tool 0-10   Pain Score No Pain   Home Living   Type of Home House   Home Layout Two level   Home Equipment Walker   Prior Function   Level of [place] Independent with ADLs and functional mobility   Lives With Spouse   Receives Help From Family   ADL Assistance Independent   IADLs Needs assistance   Cognition   Overall Cognitive Status WFL   Arousal/Participation Alert   Attention Within functional limits   Orientation Level Oriented X4   Memory Within functional limits   RUE Assessment   RUE Assessment WFL   LUE Assessment   LUE Assessment WFL   RLE Assessment   RLE Assessment WFL   LLE Assessment   LLE Assessment WFL   Bed Mobility   Supine to Sit 5  Supervision   Additional Comments Pt remains seated EOB to meet with MD and call    Transfers   Sit to Stand 5  Supervision   Stand to Sit 5  Supervision   Ambulation/Elevation   Gait pattern Forward Flexion;Decreased foot clearance; Excessively slow   Gait Assistance 5  Supervision   Additional items Assist x 1   Assistive Device Rolling walker   Distance 40ft x 2, no LOB or unsteadiness, slow dieter   Balance   Static Sitting Fair   Dynamic Sitting Fair   Static Standing Fair   Dynamic Standing Fair   Ambulatory Fair   Activity Tolerance   Activity Tolerance   (no adverse effects to PT IE noted) Nurse Made Aware nena   Assessment   Prognosis Fair   Problem List Decreased endurance; Impaired balance;Decreased mobility; Decreased strength   Assessment Pt is a 65 y/o female who presented to ED with c/o OP labs showing Hgb 5 1  Dx symptomatic anrmia, SIRS, possible GIB, increased O2 requirements, bacteremia  Pt currently presents with increased time for all mobility, decreased endurance, SpO2 decreased to 88% after ambulation on room air;  Able to ambulate 40ft 2x this session;  Used toilet in bathroom, able to perform own hygiene and wash hands at sink with supervision  Pt would benefit from continued PT while in hospital to increase strength, balance, endurance, independence with funcitonal mobility to return to PLOF  The patient's AM-PAC Basic Mobility Inpatient Short Form Raw Score is 23, Standardized Score is 50  88  A standardized score greater than 42 9 suggests the patient may benefit from discharge to home with Lindsey Sanders PT  Please also refer to the recommendation of the Physical Therapist for safe discharge planning  Goals   Patient Goals to go home   STG Expiration Date 09/07/21   Short Term Goal #1 1   mod I for sit to/from supine;  2   mod I for sit to/from standing with RW;  3   mod I to ambulate 150ft with RW   Plan   Treatment/Interventions ADL retraining;Functional transfer training;LE strengthening/ROM; Elevations; Therapeutic exercise; Endurance training;Patient/family training;Equipment eval/education; Bed mobility;Gait training; Compensatory technique education;Continued evaluation   PT Frequency Other (Comment)  (3-5x/wk)   Recommendation   PT Discharge Recommendation Home with home health rehabilitation   PT - OK to Discharge Yes  (When medically ready)   AM-PAC Basic Mobility Inpatient   Turning in Bed Without Bedrails 4   Lying on Back to Sitting on Edge of Flat Bed 4   Moving Bed to Chair 4   Standing Up From Chair 4   Walk in Room 4   Climb 3-5 Stairs 3   Basic Mobility Inpatient Raw Score 23   Basic Mobility Standardized Score 50 88     Sachin Purvis, PT    Patient Name: Zack Mcnamara  [id number] Date: 8/24/2021

## 2021-08-24 NOTE — PROGRESS NOTES
Progress note - Gastroenterology   Nadia Vazquez 66 y o  female MRN: [id number]  Unit/Bed#: -01 Encounter: [id number]    ASSESSMENT and PLAN    1  Acute on chronic anemia:  History of multiple clippings and cauterizations due to Dieulafoy and Suri Dux yesterday with mult AVM's s/p APC  Hgb now stable with no further overt bleeding     - adv diet today  - Recheck H&H at 2pm  - If stable, OK for discharge and pt will call us with any concerns     2   GIST (Gastrointestinal stroma tumor), malignant, colon:   Patient [facility], she was in research for and now takes Ayvakit 200 mg daily      3  Gastric AVM     4  Constipation:    - Colace 100 mg p o  Twice daily  - Senna 17 6 mg po Twice Daily   - Dulcolax Supp 10mg daily prn constipation     5  E coli sepsis:    At this point, followed by ID  She had a neg colon for malignancy in 2020  I do not think a repeat is needed at this point        Chief Complaint   Patient presents with    Anemia     Pt reports her hgb is 5 1 from todays blood draw for upcoming back operation  SUBJECTIVE/HPI   No more bleeding  No abd pain  Feels hungry  Wants to go home  /68 (BP Location: Left arm)   Pulse 83   Temp 98 2 °F (36 8 °C) (Oral)   Resp 16   Ht 5' 1" (1 549 m)   Wt 50 8 kg (111 lb 14 4 oz)   SpO2 94%   BMI 21 14 kg/m²     PHYSICALEXAM  General appearance: alert, appears stated age and cooperative  Eyes: LATISHA, EOMI, no scleral icterus   Head: Normocephalic, without obvious abnormality, atraumatic  Lungs: clear to auscultation bilaterally, no c/w/r  Heart: regular rate and rhythm, S1, S2 normal, no murmur, click, rub or gallop  Abdomen: soft, non-tender, non-distended; bowel sounds normal; no masses,  no organomegaly  Extremities: extremities normal, atraumatic, no clubbing or cyanosis   No LE edema  Neurologic: Grossly normal, AAOx3, no asterixis    Lab Results   Component Value Date    GLUCOSE 121 08/20/2021 CALCIUM 8 5 08/24/2021     10/12/2015    K 3 5 08/24/2021    CO2 24 08/24/2021     08/24/2021    BUN 15 08/24/2021    CREATININE 0 97 08/24/2021     Lab Results   Component Value Date    WBC 8 98 08/24/2021    HGB 9 2 (L) 08/24/2021    HCT 29 0 (L) 08/24/2021    MCV 99 (H) 08/24/2021     08/24/2021     Lab Results   Component Value Date    ALT 30 08/21/2021    AST 43 08/21/2021    ALKPHOS 94 08/21/2021    BILITOT 0 28 10/12/2015     No results found for: AMYLASE  Lab Results   Component Value Date    LIPASE 118 02/19/2020     Lab Results   Component Value Date    IRON 32 (L) 08/19/2021    TIBC 338 08/19/2021    FERRITIN 76 08/19/2021     Lab Results   Component Value Date    INR 1 25 (H) 08/20/2021

## 2021-08-24 NOTE — DISCHARGE SUMMARY
[place]  Discharge- [facility] 1950, 66 y o  female MRN: [id number]  Unit/Bed#: -01 Encounter: [id number]  Primary Care Provider: Carina Davila MD   Date and time admitted to hospital: 8/18/2021  8:39 PM    * Bacteremia  Assessment & Plan  · BC from 8/20 growing E coli x2  · Aspiration vs  GI translocation in setting of possible GI bleed/ Cancer ? · Currently on cefepime/Flagyl  · Repeat blood cultures pending  · Appreciate ID input  · Per GI, she had a neg colon for malignancy in 2020 and repeat is not indicated     Acute on chronic anemia  Assessment & Plan  · Baseline hemoglobin appears to be 8-9  Hemoglobin 4 9 on admission   · Suspect secondary to bleeding AVMs noted on EGD 8/23  · Initially OGT with ETT placed 8/20, brown thin output noted   · Continue iron and Protonix  · Total blood product: 2 5U PRBC  · Serial h/h, transfuse for hemoglobin <7  · Hemoglobin stable 9 2    Septic shock (HCC)  Assessment & Plan  · AEB tachypnea, tachycardia, hypoxia and increased WOB requiring ETT, LA 5 2, leukocytosis, hypotension   · Requiring brief initiation of norepinephrine to maintain map greater than 65 mmHg    Now hemodynamically stable  · BC x2 + E coli - repeat blood cultures pending  · Strep, legionella, sputum cx neg  · Procal trending down  · Abx:  Continues on cefepime and Flagyl per Infectious Disease  · Hold avapritinib given active infection, continue plaquenil   · Weaning IV steroids - decreased to daily 8/23 likely discontinue today     Gastric AVM  Assessment & Plan  · Hx of Dieulafoy's lesions and Seb lesions in the upper GI requiring epi injection, cautery and clipping & GIST June 2019  · EGD 8/23 with multiple AVMs that were cauterized  · Continue Protonix  · Tolerating regular diet   · C diff PCR negative    Acute respiratory failure with hypoxia (HCC)  Assessment & Plan  · AEB tachypnea, tachycardia, increased WOB, hypoxia requiring ETT and MV · Likely in the setting of aspiration followed by initiation of BiPAP   · Extubated 8/21/21 to NC O2  · Wean NC O2 as able for goal spo2 > 92%  · Resolved, SpO2 stable on RA    Aspiration into airway  Assessment & Plan  · Treated for asp  pna va pneumonitis   · Continue abx   · Patient passed nursing dysphagia screening post intubation   · Seen by speech today 8/24, without significant oral difficulties, no overt signs and symptoms of aspiration though silent aspiration could not be excluded without instrumentation and may be beneficial if there is concern for ongoing pneumonia in the future    GIST (gastrointestinal stroma tumor), malignant, colon ([address] )  Assessment & Plan  · Per previous notes, follow with [facility]   · Last visit appears to have been 7/21/21  · Extensive hx of treatment, including  · Resection of a rectal GIST 8/24/01  · Biopsy of liver lesion 4/05, demonstrating metastatic disease  · Diagnostic laparoscopy, laparoscopic lysis of adhesions, exploratory laparotomy, resection of pelvic GIST in conjunction with a small bowel resection with primary anastomosis, placement of Seprafilm, omental pedicled flap to the pelvis and cystoscopy with bilateral ureteral sent placement  · Left pelvic sidewall radiation  · Holding avapritinib given infection   · Restarted plaquenil     Stage 3 chronic kidney disease [facility])  Assessment & Plan  Lab Results   Component Value Date    EGFR 58 08/24/2021    EGFR 63 08/23/2021    EGFR 48 08/22/2021    CREATININE 0 97 08/24/2021    CREATININE 0 91 08/23/2021    CREATININE 1 13 08/22/2021     · Baseline creat appears to be 0 9 to 1 3  · Initially admitted with IVIS in the setting of ABLA  · D/c rust cath 8/21   Follow urinary retention protocol   · Creatinine stable at 0 91    Elevated troponin level not due myocardial infarction  Assessment & Plan  · Troponin peaked 0 79  · Suspect this is secondary to anemia and is a non-MI troponin elevation   · No EKG changes Atrial fibrillation with RVR ([facility] )  Assessment & Plan  · Follows with cardiology at [facility]  · Developed AFib RVR overnight likely in setting of missed doses of metoprolol/bacteremia  · Continue metoprolol 25mg daily - resumed this a m  with improvement in rates to low 100s  · on daily ASA 81mg - held in setting of anemia/GIB  · IV Lopressor p r n      Essential hypertension  Assessment & Plan  · Continue on metoprolol  · Blood pressure stable    Discharging Physician / Practitioner: Reinier Leach PA-C  PCP: Kylie Amaro MD  Admission Date:   Admission Orders (From admission, onward)     Ordered        08/19/21 0922  Inpatient Admission  Once         08/18/21 2157  Place in Observation  Once                   Discharge Date: 08/25/21    Medical Problems     Resolved Problems  Date Reviewed: 8/24/2021        Resolved    SIRS (systemic inflammatory response syndrome) ([facility] ) 8/20/2021     Resolved by  Minnie Rowell, [address] Stay:  · Critical care   · ID   · GI     Procedures Performed:   · Brief Intubation  · EGD 8/36 - bleeding AVMs s/p cauterization     Significant Findings / Test Results:   · Chest x-ray 8/18 new acute pulmonary disease  · CT abdomen pelvis 8/19-interstitial opacities in the lung bases, pulmonary edema favored versus atypical infection, small bilateral pleural effusions, mild volume ascites and mesenteric edema  · Chest x-ray 8/20 bilateral pulmonary infiltrates which may signal edema or infection  · Chest x-ray 8/20 endotracheal tube tip 5 mm above mala  · Chest x-ray/21 improved pneumonia  · Echocardiogram LVEF 55%, no regional wall motion abnormalities, grade 2 diastolic dysfunction, moderately dilated left atrium, mild to moderately dilated right atrium, moderate mitral regurgitation, mild aortic regurgitation moderate tricuspid regurgitation, mild-to-moderate pulmonic regurgitation mild dilatation of aortic root, mild dilatation of IVC  · Acute anemia likely due to GI bleeding  · C difficile PCR negative  · Initial blood cultures positive for E coli x2  · Repeat Blood cultures x2 negative at 48 hours  · Septic shock  · Atrial fibrillation with RVR  · Elevated troponin in the setting of septic shock not due to myocardial infarction  · IVIS, resolved  · Acute respiratory failure with hypoxia status post intubation    Incidental Findings:   · None    Test Results Pending at Discharge (will require follow up): · None     Outpatient Tests Requested:  · Repeat chest x-ray in the outpatient setting  · Follow-up with PCP, GI and ID    Complications:  ICU transfer, intubation    Reason for Admission: none    Hospital Course:     Cynthia Godoy is a 66 y o  female patient with past medical history significant for hypertension, atrial fibrillation, known gastric AVMs, chronic anemia, stage 3 kidney disease, sleep apnea, gastrointestinal stromal tumor of the colon on chemotherapy, who originally presented to the hospital on 8/18/2021 due to acute on chronic anemia  She was admitted after not being notified by her outpatient provider regarding a hemoglobin of 5 1  Patient also complained of dyspnea on exertion for 1 week with increased fatigue keeping with her diagnosis of acute on chronic anemia  On 08/20 patient became increasingly hypoxic and shortness of breath and rapid response was called  Chest x-ray showed possible aspiration pneumonia and she was transferred to the ICU and eventually intubated on 08/20  Patient was treated briefly for septic shock  Blood cultures were positive for GNR and Infectious Disease was consulted  Repeat blood cultures were negative and she was cleared for discharge by Infectious Disease  Gastroenterology was consulted and she eventually underwent EGD once respiratory status is stable on 08/23 which showed bleeding AVM status post cauterization  She was tolerating regular diet prior to discharge  she was cleared by GI  She did not require inpatient colonoscopy per Gastroenterology  Please see above list of diagnoses and related plan for additional information  Condition at Discharge: stable     Discharge Day Visit / Exam:     Subjective:  Patient seen and examined at bedside  No new complaints  Anxious for discharge  Vitals: Blood Pressure: 122/68 (08/24/21 0641)  Pulse: 83 (08/24/21 0641)  Temperature: 98 2 °F (36 8 °C) (08/24/21 0641)  Temp Source: Oral (08/24/21 0641)  Respirations: 16 (08/24/21 0641)  Height: 5' 1" (154 9 cm) (08/18/21 2224)  Weight - Scale: 50 8 kg (111 lb 14 4 oz) (08/24/21 0600)  SpO2: 94 % (08/24/21 0900)    Exam:   Physical Exam  Vitals and nursing note reviewed  Constitutional:       Appearance: She is obese  HENT:      Head: Normocephalic  Cardiovascular:      Rate and Rhythm: Normal rate  Pulmonary:      Breath sounds: Normal breath sounds  Abdominal:      Tenderness: There is no abdominal tenderness  Musculoskeletal:         General: No swelling  Skin:     General: Skin is warm  Neurological:      Mental Status: She is alert and oriented to person, place, and time  Mental status is at baseline  Psychiatric:         Mood and Affect: Mood normal            Discussion with Family:  Patient  Refused update to   Discharge instructions/Information to patient and family:   See after visit summary for information provided to patient and family  Provisions for Follow-Up Care:  See after visit summary for information related to follow-up care and any pertinent home health orders  Disposition:     Home with [facility]NA Services (Reminder: Complete face to face encounter)    For Discharges to [facility] SNF:   · Not Applicable to this Patient - Not Applicable to this Patient    Planned Readmission: none     Discharge Statement:  I spent 45 minutes discharging the patient  This time was spent on the day of discharge   I had direct contact with the patient on the day of discharge  Greater than 50% of the total time was spent examining patient, answering all patient questions, arranging and discussing plan of care with patient as well as directly providing post-discharge instructions  Additional time then spent on discharge activities  Discharge Medications:  See after visit summary for reconciled discharge medications provided to patient and family        ** Please Note: This note has been constructed using a voice recognition system **

## 2021-08-24 NOTE — ASSESSMENT & PLAN NOTE
· AEB tachypnea, tachycardia, increased WOB, hypoxia requiring ETT and MV   · Likely in the setting of aspiration followed by initiation of BiPAP   · Extubated 8/21/21 to NC O2  · Wean NC O2 as able for goal spo2 > 92%  · Resolved, SpO2 stable on RA

## 2021-08-24 NOTE — PLAN OF CARE
Problem: Potential for Falls  Goal: Patient will remain free of falls  Description: INTERVENTIONS:  - Educate patient/family on patient safety including physical limitations  - Instruct patient to call for assistance with activity   - Consult OT/PT to assist with strengthening/mobility   - Keep Call bell within reach  - Keep bed low and locked with side rails adjusted as appropriate  - Keep care items and personal belongings within reach  - Initiate and maintain comfort rounds  - Make Fall Risk Sign visible to staff  - Offer Toileting every 2 Hours, in advance of need  - Initiate/Maintain bed alarm  - Obtain necessary fall risk management equipment: yellow socks, 'fall risk' arm band, 'fall risk' sign, bed alarm   - Apply yellow socks and bracelet for high fall risk patients  - Consider moving patient to room near nurses station  Outcome: Progressing     Problem: MOBILITY - ADULT  Goal: Maintain or return to baseline ADL function  Description: INTERVENTIONS:  -  Assess patient's ability to carry out ADLs; assess patient's baseline for ADL function and identify physical deficits which impact ability to perform ADLs (bathing, care of mouth/teeth, toileting, grooming, dressing, etc )  - Assess/evaluate cause of self-care deficits   - Assess range of motion  - Assess patient's mobility; develop plan if impaired  - Assess patient's need for assistive devices and provide as appropriate  - Encourage maximum independence but intervene and supervise when necessary  - Involve family in performance of ADLs  - Assess for home care needs following discharge   - Consider OT consult to assist with ADL evaluation and planning for discharge  - Provide patient education as appropriate  Outcome: Progressing  Goal: Maintains/Returns to pre admission functional level  Description: INTERVENTIONS:  - Perform BMAT or MOVE assessment daily    - Set and communicate daily mobility goal to care team and patient/family/caregiver     - Collaborate with rehabilitation services on mobility goals if consulted  - Perform Range of Motion 4 times a day  - Reposition patient every 2 hours  - Dangle patient 3 times a day  - Stand patient 3 times a day  - Ambulate patient 3 times a day  - Out of bed to chair 3 times a day   - Out of bed for meals 3 times a day  - Out of bed for toileting  - Record patient progress and toleration of activity level   Outcome: Progressing     Problem: Prexisting or High Potential for Compromised Skin Integrity  Goal: Skin integrity is maintained or improved  Description: INTERVENTIONS:  - Identify patients at risk for skin breakdown  - Assess and monitor skin integrity  - Assess and monitor nutrition and hydration status  - Monitor labs   - Assess for incontinence   - Turn and reposition patient  - Assist with mobility/ambulation  - Relieve pressure over bony prominences  - Avoid friction and shearing  - Provide appropriate hygiene as needed including keeping skin clean and dry  - Evaluate need for skin moisturizer/barrier cream  - Collaborate with interdisciplinary team   - Patient/family teaching  - Consider wound care consult   Outcome: Progressing     Problem: HEMATOLOGIC - ADULT  Goal: Maintains hematologic stability  Description: INTERVENTIONS  - Assess for signs and symptoms of bleeding or hemorrhage  - Monitor labs  - Administer supportive blood products/factors as ordered and appropriate  Outcome: Progressing     Problem: Nutrition/Hydration-ADULT  Goal: Nutrient/Hydration intake appropriate for improving, restoring or maintaining nutritional needs  Description: Monitor and assess patient's nutrition/hydration status for malnutrition  Collaborate with interdisciplinary team and initiate plan and interventions as ordered  Monitor patient's weight and dietary intake as ordered or per policy  Utilize nutrition screening tool and intervene as necessary   Determine patient's food preferences and provide high-protein, high-caloric foods as appropriate       INTERVENTIONS:  - Monitor oral intake, urinary output, labs, and treatment plans  - Assess nutrition and hydration status and recommend course of action  - Evaluate amount of meals eaten  - Assist patient with eating if necessary   - Allow adequate time for meals  - Recommend/ encourage appropriate diets, oral nutritional supplements, and vitamin/mineral supplements  - Order, calculate, and assess calorie counts as needed  - Recommend, monitor, and adjust tube feedings and TPN/PPN based on assessed needs  - Assess need for intravenous fluids  - Provide specific nutrition/hydration education as appropriate  - Include patient/family/caregiver in decisions related to nutrition  Outcome: Progressing

## 2021-08-24 NOTE — ASSESSMENT & PLAN NOTE
· Follows with cardiology at [facility]  · Developed AFib RVR overnight likely in setting of missed doses of metoprolol/bacteremia  · Continue metoprolol 25mg daily - resumed this a m  with improvement in rates to low 100s  · on daily ASA 81mg - held in setting of anemia/GIB  · IV Lopressor p r n

## 2021-08-24 NOTE — ASSESSMENT & PLAN NOTE
· BC from 8/20 growing E coli x2  · Aspiration vs  GI translocation in setting of possible GI bleed/ Cancer ?   · Currently on cefepime/Flagyl  · Repeat blood cultures pending  · Appreciate ID input  · Per GI, she had a neg colon for malignancy in 2020 and repeat is not indicated

## 2021-08-24 NOTE — PROGRESS NOTES
[place]  Progress Note Farhana Collado 1950, 66 y o  female MRN: [id number]  Unit/Bed#: -01 Encounter: [id number]  Primary Care Provider: Cosme Miller MD   Date and time admitted to hospital: 8/18/2021  8:39 PM    * Bacteremia  Assessment & Plan  · BC from 8/20 growing E coli x2  · Aspiration vs  GI translocation in setting of possible GI bleed/ Cancer ? · Currently on cefepime/Flagyl  · Repeat blood cultures pending  · Appreciate ID input  · Per GI, she had a neg colon for malignancy in 2020 and repeat is not indicated     Acute on chronic anemia  Assessment & Plan  · Baseline hemoglobin appears to be 8-9  Hemoglobin 4 9 on admission   · Suspect secondary to bleeding AVMs noted on EGD 8/23  · Initially OGT with ETT placed 8/20, brown thin output noted   · Continue iron and Protonix  · Total blood product: 2 5U PRBC  · Serial h/h, transfuse for hemoglobin <7  · Hemoglobin stable 9 2    Septic shock (HCC)  Assessment & Plan  · AEB tachypnea, tachycardia, hypoxia and increased WOB requiring ETT, LA 5 2, leukocytosis, hypotension   · Requiring brief initiation of norepinephrine to maintain map greater than 65 mmHg    Now hemodynamically stable  · BC x2 + E coli - repeat blood cultures pending  · Strep, legionella, sputum cx neg  · Procal trending down  · Abx:  Continues on cefepime and Flagyl per Infectious Disease  · Hold avapritinib given active infection, continue plaquenil   · Weaning IV steroids - decreased to daily 8/23 likely discontinue today     Gastric AVM  Assessment & Plan  · Hx of Dieulafoy's lesions and Seb lesions in the upper GI requiring epi injection, cautery and clipping & GIST June 2019  · EGD 8/23 with multiple AVMs that were cauterized  · Continue Protonix  · Tolerating regular diet   · C diff PCR negative    Acute respiratory failure with hypoxia (HCC)  Assessment & Plan  · AEB tachypnea, tachycardia, increased WOB, hypoxia requiring ETT and MV   · Likely in the setting of aspiration followed by initiation of BiPAP   · Extubated 8/21/21 to NC O2  · Wean NC O2 as able for goal spo2 > 92%  · Resolved, SpO2 stable on RA    Aspiration into airway  Assessment & Plan  · Treated for asp  pna va pneumonitis   · Continue abx   · Patient passed nursing dysphagia screening post intubation   · Seen by speech today 8/24, without significant oral difficulties, no overt signs and symptoms of aspiration though silent aspiration could not be excluded without instrumentation and may be beneficial if there is concern for ongoing pneumonia in the future    GIST (gastrointestinal stroma tumor), malignant, colon ([address] )  Assessment & Plan  · Per previous notes, follow with [facility] as OP   · Last visit appears to have been 7/21/21  · Extensive hx of treatment, including  · Resection of a rectal GIST 8/24/01  · Biopsy of liver lesion 4/05, demonstrating metastatic disease  · Diagnostic laparoscopy, laparoscopic lysis of adhesions, exploratory laparotomy, resection of pelvic GIST in conjunction with a small bowel resection with primary anastomosis, placement of Seprafilm, omental pedicled flap to the pelvis and cystoscopy with bilateral ureteral sent placement  · Left pelvic sidewall radiation  · Holding avapritinib given infection   · Restarted plaquenil     Stage 3 chronic kidney disease [facility])  Assessment & Plan  Lab Results   Component Value Date    EGFR 58 08/24/2021    EGFR 63 08/23/2021    EGFR 48 08/22/2021    CREATININE 0 97 08/24/2021    CREATININE 0 91 08/23/2021    CREATININE 1 13 08/22/2021     · Baseline creat appears to be 0 9 to 1 3  · Initially admitted with IVIS in the setting of ABLA  · D/c rust cath 8/21   Follow urinary retention protocol   · Creatinine stable at 0 91    Elevated troponin level not due myocardial infarction  Assessment & Plan  · Troponin peaked 0 79  · Suspect this is secondary to anemia and is a non-MI troponin elevation   · No EKG changes     Atrial fibrillation with RVR (HCC)  Assessment & Plan  · Follows with cardiology at [facility]  · Developed AFib RVR overnight likely in setting of missed doses of metoprolol/bacteremia  · Continue metoprolol 25mg daily - resumed this a m  with improvement in rates to low 100s  · on daily ASA 81mg - held in setting of anemia/GIB  · IV Lopressor p r n  Essential hypertension  Assessment & Plan  · Continue on metoprolol  · Blood pressure stable    VTE Pharmacologic Prophylaxis:   Pharmacologic: Pharmacologic VTE Prophylaxis contraindicated due to bleeding AVMs on EGD   Mechanical VTE Prophylaxis in Place: Yes    Patient Centered Rounds: I have performed bedside rounds with nursing staff today  Discussions with Specialists or Other Care Team Provider: nursing, cm     Education and Discussions with Family / Patient: patient, called her      Time Spent for Care: 30 minutes  More than 50% of total time spent on counseling and coordination of care as described above  Current Length of Stay: 5 day(s)    Current Patient Status: Inpatient   Certification Statement: The patient will continue to require additional inpatient hospital stay due to on IV abx for aspiration pneumonia     Discharge Plan: possibly later today if cleared by GI and ID    Code Status: Level 1 - Full Code    Subjective:   Pt seen and examined at bedside  No difficulties swallowing  No fevers or chills  No SOB  Objective:     Vitals:   Temp (24hrs), Av 6 °F (37 °C), Min:98 2 °F (36 8 °C), Max:98 9 °F (37 2 °C)    Temp:  [98 2 °F (36 8 °C)-98 9 °F (37 2 °C)] 98 2 °F (36 8 °C)  HR:  [83-89] 83  Resp:  [16-20] 16  BP: (122-137)/(68-82) 122/68  SpO2:  [94 %-96 %] 94 %  Body mass index is 21 14 kg/m²  Input and Output Summary (last 24 hours):        Intake/Output Summary (Last 24 hours) at 2021 1407  Last data filed at 2021 0258  Gross per 24 hour   Intake 960 ml   Output --   Net 960 ml       Physical Exam: Physical Exam  Constitutional:       Appearance: Normal appearance  HENT:      Head: Normocephalic and atraumatic  Mouth/Throat:      Mouth: Mucous membranes are moist    Eyes:      Extraocular Movements: Extraocular movements intact  Cardiovascular:      Rate and Rhythm: Normal rate and regular rhythm  Pulmonary:      Effort: Pulmonary effort is normal       Breath sounds: Normal breath sounds  Comments: RA  Abdominal:      General: Abdomen is flat  Palpations: Abdomen is soft  Tenderness: There is no abdominal tenderness  Musculoskeletal:         General: No swelling  Normal range of motion  Cervical back: Normal range of motion and neck supple  Skin:     General: Skin is warm and dry  Coloration: Skin is pale  Comments: Port wine stain right face    Neurological:      General: No focal deficit present  Mental Status: She is alert and oriented to person, place, and time     Psychiatric:         Mood and Affect: Mood normal          Behavior: Behavior normal        Additional Data:     Labs:    Results from last 7 days   Lab Units 08/24/21  0256   WBC Thousand/uL 8 98   HEMOGLOBIN g/dL 9 2*   HEMATOCRIT % 29 0*   PLATELETS Thousands/uL 280   NEUTROS PCT % 80*   LYMPHS PCT % 7*   MONOS PCT % 11   EOS PCT % 0     Results from last 7 days   Lab Units 08/24/21  0256 08/21/21  0936   SODIUM mmol/L 138 140   POTASSIUM mmol/L 3 5 3 8   CHLORIDE mmol/L 104 105   CO2 mmol/L 24 26   BUN mg/dL 15 36*   CREATININE mg/dL 0 97 1 27   ANION GAP mmol/L 10 9   CALCIUM mg/dL 8 5 8 7   ALBUMIN g/dL  --  2 9*   TOTAL BILIRUBIN mg/dL  --  0 50   ALK PHOS U/L  --  94   ALT U/L  --  30   AST U/L  --  43   GLUCOSE RANDOM mg/dL 97 143*     Results from last 7 days   Lab Units 08/20/21  0808   INR  1 25*     Results from last 7 days   Lab Units 08/21/21  0602 08/21/21  0023 08/20/21  1230 08/20/21  0021 08/19/21  2000   POC GLUCOSE mg/dl 137 124 108 145* 136         Results from last 7 days   Lab Units 08/22/21  1003 08/21/21  0723 08/21/21  0416 08/20/21  0808 08/20/21  0307 08/20/21  0033   LACTIC ACID mmol/L  --   --  0 8 1 2 5 2* 2 1*   PROCALCITONIN ng/ml 5 49* 7 34*  --   --  5 99*  --            * I Have Reviewed All Lab Data Listed Above  * Additional Pertinent Lab Tests Reviewed: [place] [facility] Admission Reviewed    Imaging:    Imaging Reports Reviewed Today Include: all  Imaging Personally Reviewed by Myself Includes:  none    Recent Cultures (last 7 days):     Results from last 7 days   Lab Units 08/23/21  1520 08/22/21  1003 08/20/21  0647 08/20/21  0522 08/20/21  0308 08/19/21  0849   BLOOD CULTURE   --  No Growth at 24 hrs  No Growth at 24 hrs   --   --  Escherichia coli*  Escherichia coli* No Growth After 4 Days     SPUTUM CULTURE   --   --   --  2+ Growth of   --   --    GRAM STAIN RESULT   --   --   --  Rare Polys  No bacteria seen Gram negative rods*  Gram negative rods*  --    LEGIONELLA URINARY ANTIGEN   --   --  Negative  --   --   --    C DIFF TOXIN B BY PCR  Negative  --   --   --   --   --        Last 24 Hours Medication List:   Current Facility-Administered Medications   Medication Dose Route Frequency Provider Last Rate    acetaminophen  650 mg Oral Q6H PRN LANEY Tyler      bisacodyl  10 mg Rectal Daily PRN LANEY Tyler      cefTRIAXone  1,000 mg Intravenous Q24H Ricarda Flores MD 1,000 mg (08/24/21 1321)    docusate sodium  100 mg Oral BID LANEY Tyler      hydroxychloroquine  200 mg Oral Daily With Breakfast LANEY Amaya      ipratropium  0 5 mg Nebulization Q6H PRLAMONT Gastelum MD      levalbuterol  1 25 mg Nebulization Q6H PRN Cece Gastelum MD      metoprolol succinate  25 mg Oral Daily Bushra Zhu PA-C      metroNIDAZOLE  500 mg Intravenous Q8H LANEY Amaya 500 mg (08/24/21 0907)    ondansetron  4 mg Intravenous Q6H PRN LANEY Tyler      [START ON 8/25/2021] pantoprazole  40 mg Oral Early Morning Vane Romo MD      senna  2 tablet Oral BID LANEY Powers          Today, Patient Was Seen By: Dario Marquez PA-C    ** Please Note: Dictation voice to text software may have been used in the creation of this document   **

## 2021-08-24 NOTE — PLAN OF CARE
Problem: PHYSICAL THERAPY ADULT  Goal: Performs mobility at highest level of function for planned discharge setting  See evaluation for individualized goals  Description: Treatment/Interventions: ADL retraining, Functional transfer training, LE strengthening/ROM, Elevations, Therapeutic exercise, Endurance training, Patient/family training, Equipment eval/education, Bed mobility, Gait training, Compensatory technique education, Continued evaluation          See flowsheet documentation for full assessment, interventions and recommendations  Note: Prognosis: Fair  Problem List: Decreased endurance, Impaired balance, Decreased mobility, Decreased strength  Assessment: Pt is a 65 y/o female who presented to ED with c/o OP labs showing Hgb 5 1  Dx symptomatic anrmia, SIRS, possible GIB, increased O2 requirements, bacteremia  Pt currently presents with increased time for all mobility, decreased endurance, SpO2 decreased to 88% after ambulation on room air;  Able to ambulate 40ft 2x this session;  Used toilet in bathroom, able to perform own hygiene and wash hands at sink with supervision  Pt would benefit from continued PT while in hospital to increase strength, balance, endurance, independence with funcitonal mobility to return to PLOF  The patient's AM-PAC Basic Mobility Inpatient Short Form Raw Score is 23, Standardized Score is 50  88  A standardized score greater than 42 9 suggests the patient may benefit from discharge to home with [facility]  Please also refer to the recommendation of the Physical Therapist for safe discharge planning  PT Discharge Recommendation: Home with home health rehabilitation     PT - OK to Discharge: Yes (When medically ready)    See flowsheet documentation for full assessment

## 2021-08-24 NOTE — CASE MANAGEMENT
LOS: 5 days    Met with patient at bedside  A post acute care recommendation was made by your care team for [facility]  Discussed Freedom of Choice with patient  List of agencies given to patient via in person  patient aware the list is custom filtered for them by preference  and that [facility] post acute providers are designated   Referral sent to [facility] patient has had their services in the past

## 2021-08-24 NOTE — ASSESSMENT & PLAN NOTE
· Hx of Dieulafoy's lesions and Gonzalez Yary lesions in the upper GI requiring epi injection, cautery and clipping & GIST June 2019  · EGD 8/23 with multiple AVMs that were cauterized  · Continue Protonix  · Tolerating regular diet   · C diff PCR negative

## 2021-08-24 NOTE — ASSESSMENT & PLAN NOTE
· Per previous notes, follow with [facility] as OP   · Last visit appears to have been 7/21/21  · Extensive hx of treatment, including  · Resection of a rectal GIST 8/24/01  · Biopsy of liver lesion 4/05, demonstrating metastatic disease  · Diagnostic laparoscopy, laparoscopic lysis of adhesions, exploratory laparotomy, resection of pelvic GIST in conjunction with a small bowel resection with primary anastomosis, placement of Seprafilm, omental pedicled flap to the pelvis and cystoscopy with bilateral ureteral sent placement  · Left pelvic sidewall radiation  · Holding avapritinib given infection   · Restarted plaquenil

## 2021-08-24 NOTE — SPEECH THERAPY NOTE
Speech Language/Pathology    Speech-Language Pathology Bedside Swallow Evaluation      Patient Name: Faisal Marquis    QBLTT'Y Date: 2021     Problem List  Principal Problem:    Bacteremia  Active Problems:    Essential hypertension    Atrial fibrillation with RVR (HCC)    Septic shock (HCC)    Elevated troponin level not due myocardial infarction    Gastric AVM    Acute on chronic anemia    Stage 3 chronic kidney disease (HCC)    MARK (obstructive sleep apnea)    GIST (gastrointestinal stroma tumor), malignant, colon (HCC)    Aspiration into airway    Acute respiratory failure with hypoxia (HCC)      Past Medical History  Past Medical History:   Diagnosis Date    ADHD     Anemia     Anxiety     Arthritis     Asthma     Atrial fibrillation (HCC)     Cancer ([address] )     Chronic iron deficiency anemia 2020    Extensive GI evaluation over the last year including multiple EGD, colonoscopy, and capsule endoscopy  Has had gastric AVMs cauterized, Dieulafoy's lesion clipped, and most recently a [place] erosion cauterized    Coronary artery disease     Depression     GERD (gastroesophageal reflux disease)     Hard to intubate     History of stomach ulcers     Hypercholesterolemia     Hypertension     Kidney disease     [facility]     Sepsis due to Escherichia coli ([facility] ) 2021       Past Surgical History  Past Surgical History:   Procedure Laterality Date    ANKLE SURGERY      APPENDECTOMY      BREAST SURGERY      CATARACT EXTRACTION       SECTION      COLONOSCOPY  2019    Multiple adenomatous colon polyps and internal hemorrhoids  Three year recall advised    HIP SURGERY      JOINT REPLACEMENT  2019    Left knee replacement    LAMINECTOMY      ROTATOR CUFF REPAIR      SIGMOID RESECTION / RECTOPEXY      SINUS SURGERY      UPPER GASTROINTESTINAL ENDOSCOPY  2020    Dieulafoy's lesion in proximal stomach which was actively oozing    Injected with epinephrine and 2 Endoclips placed with control of bleeding, hiatal hernia   UPPER GASTROINTESTINAL ENDOSCOPY  06/2020    Bleeding Seb's erosion status post cauterization       Summary   Pt presented with functional appearing oral and pharyngeal stage swallowing skills with materials administered today  No significant oral difficulties noted today  Swallow initiation appeared fairly prompt, multiple swallows noted for all  No overt s/s aspiration during evaluation today  Pt denies cough w/ PO or dysphagia at this time  Silent aspiration cannot be excluded without instrumentation - may be beneficial if concern for ongoing pna w/ future imaging  Risk/s for Aspiration: Mild      Recommended Diet: regular diet and thin liquids   Recommended Form of Meds: whole with liquid   Aspiration precautions and swallowing strategies: upright posture, only feed when fully alert and slow rate of feeding  Other Recommendations: Continue frequent oral care        Current Medical Status  Pt is a 66 y o  female who presented to [address] [place] [address] with with H/O A fib, CAD, s/p resection of GIST tumor with liver mets, ADHD, arthritis, asthma, depression, GERD, PUD, HLD, HTN, s/p left TKR, gastric AVM's, and chronic constipation who was admitted on 8/18 with c/o SOB, lightheadedness, lower abd pain, nausea, and intermittent constipation alternating with diarrhea x 1 week      On admission, she did not have fever or elevated WBC count  Her Hgb was 5 1  Abd CT showed small bilat pleural effusions, no hydro, and mild ascites  She was transfused on 8/19 and developed nausea and mild increase in temp during the transfusion       On 8/20, she had difficulty swallowing with vomiting  She developed increasing SOB and became hypoxic  She was intubated and transferred to the ICU  WBC count increased to 27K and lactic acid was elevated  Repeat CXR showed development of bilat pulmonary infilts   She was given one dose of Vanco and started on Cefepime and Flagyl for tx of presumed aspiration pneumonia  Repeat CXR on 8/21 showed improvement in her pulmonary infilts and she was extubated      Her pulmonary status has continued to improve  Admission bld cx's were neg but repeat bld cx;s drawn on 8/20 grew E coli  Additional bld cx's were drawn on 8/22 and are neg so far,     Pt denies cough, SOB, CP, or dysuria  She reports that she is having BM's        Current Precautions:  Fall  Aspiration  Contact  AIrborn  C diff   Seizure  Delirium    Allergies:  No known food allergies    Past medical history:  Please see H&P for details    Special Studies:  CXR 8/21: Improved pneumonia  CXR 8/20: Endotracheal tube tip 5 mm above the mala, with retraction recommended by approximately 2 to 3 cm      Continued bilateral pulmonary infiltrates, with increased size of left effusion       The examination demonstrates a significant  finding and was documented as such in [facility] for liaison and referring practitioner significant notification  CT abdomen pelvis 8/19:    1  Interstitial opacities in the lung bases, partially visualized  Pulmonary edema is favored, however cannot exclude atypical infection  2  Small bilateral pleural effusions  3  CT findings of anemia  4  Mild volume ascites and mesenteric edema    CXR 8/18: No acute cardiopulmonary disease      · EGD 8/22: The esophagus and duodenum appeared normal   Multiple small angioectasias in the cardia and fundus of the stomach; stigmata; ablated residual tissue with argon plasma coagulation   Note there was a large clip in the proximal stomach no ulceration or bleeding seem to be coming from it        Social/Education/Vocational Hx:  Pt lives w/ spouse    Swallow Information   Current Risks for Dysphagia & Aspiration: recent intubation  Current Symptoms/Concerns: ? aspiration pna   Current Diet: clear liquids - was on regular w/ thin prior to EGD    Baseline Diet: regular diet and thin liquids      Baseline Assessment   Behavior/Cognition: alert  Speech/Language Status: able to participate in conversation and able to follow commands  Patient Positioning: upright in bed  Pain Status/Interventions/Response to Interventions:  No report of or nonverbal indications of pain  Swallow Mechanism Exam  Facial: symmetrical  Labial: WFL  Lingual: WFL  Velum: symmetrical  Mandible: adequate ROM  Dentition: adequate  Vocal quality:clear/adequate   Volitional Cough: strong/productive   Respiratory Status: on RA      Consistencies Assessed and Performance   Consistencies Administered: thin liquids, puree, soft solids and hard solids  Materials administered included applesauce, yue cracker, hard sandwich cookie - limited trials of solids cleared by GI    Oral Stage: WFL  Mastication was adequate with the materials administered today  Bolus formation and transfer were functional with no significant oral residue noted  No overt s/s reduced oral control  Pharyngeal Stage: WFL  Swallow Mechanics:  Swallowing initiation appeared fairly prompt  Laryngeal rise was palpated and judged to be within functional limits  Multiple swallows noted per bolus, ? Globus sensation vs  Pharyngeal retention  No coughing, throat clearing, change in vocal quality or respiratory status noted today  Esophageal Concerns: see EGD above    Strategies and Efficacy: -     Summary and Recommendations (see above)    Results Reviewed with: patient and RN     Treatment Recommended: YEs     Frequency of treatment: Brief as able, ? VBS to further assess swallow function if clinically indicated      Patient Stated Goal: "can I get something to eat? I'm hungry"     Dysphagia LTG  -Patient will demonstrate safe and effective oral intake (without overt s/s significant oral/pharyngeal dysphagia including s/s penetration or aspiration) for the highest appropriate diet level       Speech Therapy Prognosis   Prognosis: good    Prognosis Considerations: age and medical status

## 2021-08-25 VITALS
TEMPERATURE: 98.5 F | WEIGHT: 111.55 LBS | SYSTOLIC BLOOD PRESSURE: 124 MMHG | HEART RATE: 76 BPM | OXYGEN SATURATION: 96 % | BODY MASS INDEX: 21.06 KG/M2 | RESPIRATION RATE: 19 BRPM | DIASTOLIC BLOOD PRESSURE: 60 MMHG | HEIGHT: 61 IN

## 2021-08-25 PROBLEM — R65.21 SEPTIC SHOCK (HCC): Status: RESOLVED | Noted: 2020-02-07 | Resolved: 2021-08-25

## 2021-08-25 PROBLEM — J96.01 ACUTE RESPIRATORY FAILURE WITH HYPOXIA (HCC): Status: RESOLVED | Noted: 2021-08-20 | Resolved: 2021-08-25

## 2021-08-25 PROBLEM — R78.81 BACTEREMIA: Status: RESOLVED | Noted: 2021-08-20 | Resolved: 2021-08-25

## 2021-08-25 PROBLEM — A41.9 SEPTIC SHOCK (HCC): Status: RESOLVED | Noted: 2020-02-07 | Resolved: 2021-08-25

## 2021-08-25 PROCEDURE — 99231 SBSQ HOSP IP/OBS SF/LOW 25: CPT | Performed by: INTERNAL MEDICINE

## 2021-08-25 RX ORDER — METOPROLOL SUCCINATE 25 MG/1
25 TABLET, EXTENDED RELEASE ORAL DAILY
COMMUNITY

## 2021-08-25 RX ORDER — METRONIDAZOLE 500 MG/1
500 TABLET ORAL EVERY 8 HOURS SCHEDULED
Qty: 9 TABLET | Refills: 0 | Status: SHIPPED | OUTPATIENT
Start: 2021-08-25 | End: 2021-08-28

## 2021-08-25 RX ORDER — METRONIDAZOLE 500 MG/1
500 TABLET ORAL EVERY 8 HOURS SCHEDULED
Status: DISCONTINUED | OUTPATIENT
Start: 2021-08-25 | End: 2021-08-25 | Stop reason: HOSPADM

## 2021-08-25 RX ORDER — DOXYCYCLINE HYCLATE 100 MG/1
100 CAPSULE ORAL EVERY 12 HOURS SCHEDULED
Qty: 18 CAPSULE | Refills: 0 | Status: SHIPPED | OUTPATIENT
Start: 2021-08-25 | End: 2021-09-03

## 2021-08-25 RX ORDER — DOXYCYCLINE HYCLATE 100 MG/1
100 CAPSULE ORAL EVERY 12 HOURS SCHEDULED
Status: DISCONTINUED | OUTPATIENT
Start: 2021-08-25 | End: 2021-08-25 | Stop reason: HOSPADM

## 2021-08-25 RX ORDER — PANTOPRAZOLE SODIUM 40 MG/1
40 TABLET, DELAYED RELEASE ORAL DAILY
Qty: 180 TABLET | Refills: 0
Start: 2021-08-25 | End: 2022-06-15

## 2021-08-25 RX ADMIN — METRONIDAZOLE 500 MG: 500 INJECTION, SOLUTION INTRAVENOUS at 01:32

## 2021-08-25 RX ADMIN — PANTOPRAZOLE SODIUM 40 MG: 40 TABLET, DELAYED RELEASE ORAL at 05:06

## 2021-08-25 RX ADMIN — DOXYCYCLINE 100 MG: 100 CAPSULE ORAL at 11:46

## 2021-08-25 RX ADMIN — METOPROLOL SUCCINATE 25 MG: 25 TABLET, FILM COATED, EXTENDED RELEASE ORAL at 08:30

## 2021-08-25 RX ADMIN — HYDROXYCHLOROQUINE SULFATE 200 MG: 200 TABLET, FILM COATED ORAL at 08:30

## 2021-08-25 NOTE — PLAN OF CARE
Problem: Potential for Falls  Goal: Patient will remain free of falls  Description: INTERVENTIONS:  - Educate patient/family on patient safety including physical limitations  - Instruct patient to call for assistance with activity   - Consult OT/PT to assist with strengthening/mobility   - Keep Call bell within reach  - Keep bed low and locked with side rails adjusted as appropriate  - Keep care items and personal belongings within reach  - Initiate and maintain comfort rounds  - Make Fall Risk Sign visible to staff  - Offer Toileting every 2 Hours, in advance of need  - Initiate/Maintain bed alarm  - Obtain necessary fall risk management equipment: yellow socks, 'fall risk' bracelet, 'fall risk' sign, bed alarm   - Apply yellow socks and bracelet for high fall risk patients  - Consider moving patient to room near nurses station  Outcome: Progressing     Problem: MOBILITY - ADULT  Goal: Maintain or return to baseline ADL function  Description: INTERVENTIONS:  -  Assess patient's ability to carry out ADLs; assess patient's baseline for ADL function and identify physical deficits which impact ability to perform ADLs (bathing, care of mouth/teeth, toileting, grooming, dressing, etc )  - Assess/evaluate cause of self-care deficits   - Assess range of motion  - Assess patient's mobility; develop plan if impaired  - Assess patient's need for assistive devices and provide as appropriate  - Encourage maximum independence but intervene and supervise when necessary  - Involve family in performance of ADLs  - Assess for home care needs following discharge   - Consider OT consult to assist with ADL evaluation and planning for discharge  - Provide patient education as appropriate  Outcome: Progressing  Goal: Maintains/Returns to pre admission functional level  Description: INTERVENTIONS:  - Perform BMAT or MOVE assessment daily    - Set and communicate daily mobility goal to care team and patient/family/caregiver     - Collaborate with rehabilitation services on mobility goals if consulted  - Perform Range of Motion 4 times a day  - Reposition patient every 2 hours  - Dangle patient 3 times a day  - Stand patient 3 times a day  - Ambulate patient 3 times a day  - Out of bed to chair 3 times a day   - Out of bed for meals 3 times a day  - Out of bed for toileting  - Record patient progress and toleration of activity level   Outcome: Progressing     Problem: Prexisting or High Potential for Compromised Skin Integrity  Goal: Skin integrity is maintained or improved  Description: INTERVENTIONS:  - Identify patients at risk for skin breakdown  - Assess and monitor skin integrity  - Assess and monitor nutrition and hydration status  - Monitor labs   - Assess for incontinence   - Turn and reposition patient  - Assist with mobility/ambulation  - Relieve pressure over bony prominences  - Avoid friction and shearing  - Provide appropriate hygiene as needed including keeping skin clean and dry  - Evaluate need for skin moisturizer/barrier cream  - Collaborate with interdisciplinary team   - Patient/family teaching  - Consider wound care consult   Outcome: Progressing     Problem: HEMATOLOGIC - ADULT  Goal: Maintains hematologic stability  Description: INTERVENTIONS  - Assess for signs and symptoms of bleeding or hemorrhage  - Monitor labs  - Administer supportive blood products/factors as ordered and appropriate  Outcome: Progressing     Problem: Nutrition/Hydration-ADULT  Goal: Nutrient/Hydration intake appropriate for improving, restoring or maintaining nutritional needs  Description: Monitor and assess patient's nutrition/hydration status for malnutrition  Collaborate with interdisciplinary team and initiate plan and interventions as ordered  Monitor patient's weight and dietary intake as ordered or per policy  Utilize nutrition screening tool and intervene as necessary   Determine patient's food preferences and provide high-protein, high-caloric foods as appropriate       INTERVENTIONS:  - Monitor oral intake, urinary output, labs, and treatment plans  - Assess nutrition and hydration status and recommend course of action  - Evaluate amount of meals eaten  - Assist patient with eating if necessary   - Allow adequate time for meals  - Recommend/ encourage appropriate diets, oral nutritional supplements, and vitamin/mineral supplements  - Order, calculate, and assess calorie counts as needed  - Recommend, monitor, and adjust tube feedings and TPN/PPN based on assessed needs  - Assess need for intravenous fluids  - Provide specific nutrition/hydration education as appropriate  - Include patient/family/caregiver in decisions related to nutrition  Outcome: Progressing

## 2021-08-25 NOTE — PROGRESS NOTES
Pastoral Care Progress Note    2021  Patient: Damion Wheeler :   Admission Date & Time: 2021  MRN: [id number] [facility]: [id number]                     Chaplaincy Interventions Utilized:   Relationship Building: Cultivated a relationship of care and support  Prayer said that she hopes to go home today  She had a procedure on Monday and indicated that it seemed to go well     Ritual: Provided prayer     21 0900   Clinical Encounter Type   Visited With Patient   Routine Visit Follow-up   Baptism Encounters   Baptism Needs Prayer

## 2021-08-25 NOTE — PROGRESS NOTES
Progress note - Gastroenterology   Nicolás Haskins 66 y o  female MRN: [id number]  Unit/Bed#: -01 Encounter: [id number]    ASSESSMENT and PLAN  1  Acute on chronic anemia  2  Gastric AVM  History of multiple clippings and cauterizations due to Dieulafoy and Seb lesions  EGD 8/23 with mult AVM's s/p APC  Hgb stable yesterday at 9 2 with no further overt bleeding      -no GI barrier for discharge to home  -continue pantoprazole 40 mg daily  -check H&H as outpatient in 2 weeks and GI follow-up in our office in 4-6 weeks     3   GIST (Gastrointestinal stroma tumor), malignant, colon:   Patient is currently followed at [facility], she was in research for and now takes Ayvakit 200 mg daily      4  Constipation:    - Colace 100 mg p o  Twice daily  - Senna 17 6 mg po Twice Daily   - Dulcolax Supp 10mg daily prn constipation       Chief Complaint   Patient presents with    Anemia     Pt reports her hgb is 5 1 from todays blood draw for upcoming back operation  SUBJECTIVE/HPI   No complaints this a m  Denies abdominal pain and feels hungry  Tolerating regular diet  She had a formed brown stool last evening, no further melena    /60   Pulse 76   Temp 98 5 °F (36 9 °C)   Resp 19   Ht 5' 1" (1 549 m)   Wt 50 6 kg (111 lb 8 8 oz)   SpO2 96%   BMI 21 08 kg/m²     PHYSICALEXAM  General appearance:  Awake and conversing appropriately, no acute distress  Eyes no icterus   Head: Normocephalic, without obvious abnormality, atraumatic  Lungs: clear to auscultation bilaterally    Respirations nonlabored  Heart: regular rate and rhythm, S1, S2 normal, no murmur, click, rub or gallop  Abdomen: soft, nondistended, non-tender; bowel sounds normal, formed brown stool last evening, no further melena  Extremities: extremities normal, atraumatic, no cyanosis or edema  Neurologic: Grossly normal    Lab Results   Component Value Date    GLUCOSE 121 08/20/2021    CALCIUM 8 5 08/24/2021     10/12/2015    K 3 5 08/24/2021    CO2 24 08/24/2021     08/24/2021    BUN 15 08/24/2021    CREATININE 0 97 08/24/2021     Lab Results   Component Value Date    WBC 8 98 08/24/2021    HGB 9 2 (L) 08/24/2021    HCT 29 4 (L) 08/24/2021    MCV 99 (H) 08/24/2021     08/24/2021     Lab Results   Component Value Date    ALT 30 08/21/2021    AST 43 08/21/2021    ALKPHOS 94 08/21/2021    BILITOT 0 28 10/12/2015     No results found for: AMYLASE  Lab Results   Component Value Date    LIPASE 118 02/19/2020     Lab Results   Component Value Date    IRON 32 (L) 08/19/2021    TIBC 338 08/19/2021    FERRITIN 76 08/19/2021     Lab Results   Component Value Date    INR 1 25 (H) 08/20/2021

## 2021-08-25 NOTE — PLAN OF CARE
Problem: Potential for Falls  Goal: Patient will remain free of falls  Description: INTERVENTIONS:  - Educate patient/family on patient safety including physical limitations  - Instruct patient to call for assistance with activity   - Consult OT/PT to assist with strengthening/mobility   - Keep Call bell within reach  - Keep bed low and locked with side rails adjusted as appropriate  - Keep care items and personal belongings within reach  - Initiate and maintain comfort rounds  - Make Fall Risk Sign visible to staff  - Offer Toileting every  Hours, in advance of need  - Initiate/Maintain alarm  - Obtain necessary fall risk management equipment:   - Apply yellow socks and bracelet for high fall risk patients  - Consider moving patient to room near nurses station  Outcome: Progressing     Problem: MOBILITY - ADULT  Goal: Maintain or return to baseline ADL function  Description: INTERVENTIONS:  -  Assess patient's ability to carry out ADLs; assess patient's baseline for ADL function and identify physical deficits which impact ability to perform ADLs (bathing, care of mouth/teeth, toileting, grooming, dressing, etc )  - Assess/evaluate cause of self-care deficits   - Assess range of motion  - Assess patient's mobility; develop plan if impaired  - Assess patient's need for assistive devices and provide as appropriate  - Encourage maximum independence but intervene and supervise when necessary  - Involve family in performance of ADLs  - Assess for home care needs following discharge   - Consider OT consult to assist with ADL evaluation and planning for discharge  - Provide patient education as appropriate  Outcome: Progressing  Goal: Maintains/Returns to pre admission functional level  Description: INTERVENTIONS:  - Perform BMAT or MOVE assessment daily    - Set and communicate daily mobility goal to care team and patient/family/caregiver     - Collaborate with rehabilitation services on mobility goals if consulted  - Perform Range of Motion  times a day  - Reposition patient every  hours  - Dangle patient  times a day  - Stand patient  times a day  - Ambulate patient  times a day  - Out of bed to chair  times a day   - Out of bed for darrion times a day  - Out of bed for toileting  - Record patient progress and toleration of activity level   Outcome: Progressing     Problem: Prexisting or High Potential for Compromised Skin Integrity  Goal: Skin integrity is maintained or improved  Description: INTERVENTIONS:  - Identify patients at risk for skin breakdown  - Assess and monitor skin integrity  - Assess and monitor nutrition and hydration status  - Monitor labs   - Assess for incontinence   - Turn and reposition patient  - Assist with mobility/ambulation  - Relieve pressure over bony prominences  - Avoid friction and shearing  - Provide appropriate hygiene as needed including keeping skin clean and dry  - Evaluate need for skin moisturizer/barrier cream  - Collaborate with interdisciplinary team   - Patient/family teaching  - Consider wound care consult   Outcome: Progressing     Problem: HEMATOLOGIC - ADULT  Goal: Maintains hematologic stability  Description: INTERVENTIONS  - Assess for signs and symptoms of bleeding or hemorrhage  - Monitor labs  - Administer supportive blood products/factors as ordered and appropriate  Outcome: Progressing     Problem: Nutrition/Hydration-ADULT  Goal: Nutrient/Hydration intake appropriate for improving, restoring or maintaining nutritional needs  Description: Monitor and assess patient's nutrition/hydration status for malnutrition  Collaborate with interdisciplinary team and initiate plan and interventions as ordered  Monitor patient's weight and dietary intake as ordered or per policy  Utilize nutrition screening tool and intervene as necessary  Determine patient's food preferences and provide high-protein, high-caloric foods as appropriate       INTERVENTIONS:  - Monitor oral intake, urinary output, labs, and treatment plans  - Assess nutrition and hydration status and recommend course of action  - Evaluate amount of meals eaten  - Assist patient with eating if necessary   - Allow adequate time for meals  - Recommend/ encourage appropriate diets, oral nutritional supplements, and vitamin/mineral supplements  - Order, calculate, and assess calorie counts as needed  - Recommend, monitor, and adjust tube feedings and TPN/PPN based on assessed needs  - Assess need for intravenous fluids  - Provide specific nutrition/hydration education as appropriate  - Include patient/family/caregiver in decisions related to nutrition  Outcome: Progressing

## 2021-08-25 NOTE — DISCHARGE INSTR - AVS FIRST PAGE
Please get blood work done in 2 weeks with results to your family doctor to follow-up your blood counts  Please make appointments with Gastroenterology and Infectious Disease, phone numbers listed below

## 2021-08-26 NOTE — UTILIZATION REVIEW
Notification of Discharge   This is a Notification of Discharge from our facility [address]  Please be advised that this patient has been discharge from our facility  Below you will find the admission and discharge date and time including the patients disposition  UTILIZATION REVIEW CONTACT:  Heather Egan  Utilization   Network Utilization Review Department  Phone: [phone] x carefully listen to the prompts  All voicemails are confidential   Email: [email or web address]     PHYSICIAN ADVISORY SERVICES:  FOR [id number] REVIEW - MEDICAL NECESSITY DENIAL  Phone: [phone]  Fax: [phone]  Email: [email or web address]     PRESENTATION DATE: 8/18/2021  8:39 PM  OBERVATION ADMISSION DATE:   INPATIENT ADMISSION DATE: 8/19/21  9:22 AM   DISCHARGE DATE: 8/25/2021 12:37 PM  DISPOSITION: Home with [place] with [address] [place] [address] Road INFORMATION:  Send all requests for admission clinical reviews, approved or denied determinations and any other requests to dedicated fax number below belonging to the campus where the patient is receiving treatment   List of dedicated fax numbers:  [address] [place] [address] DENIALS (Administrative/Medical Necessity) [phone]   [address]  (Maternity/NICU/Pediatrics) [phone]   Makenzie Zuñiga [phone]   Abner Perez [phone]   [facility] [phone]   [facility] [address] [facility] [phone]   [facility]  [phone]   [address] [facility]  [address] [facility] [address] [facility] [phone]

## 2021-08-27 ENCOUNTER — TELEPHONE (OUTPATIENT)
Dept: NEUROSURGERY | Facility: CLINIC | Age: 71
End: 2021-08-27

## 2021-08-27 ENCOUNTER — TRANSITIONAL CARE MANAGEMENT (OUTPATIENT)
Dept: FAMILY MEDICINE CLINIC | Facility: HOSPITAL | Age: 71
End: 2021-08-27

## 2021-08-27 LAB
BACTERIA BLD CULT: NORMAL
BACTERIA BLD CULT: NORMAL

## 2021-08-27 NOTE — TELEPHONE ENCOUNTER
8/27/21 RECEIVED MSG FROM PT ON NEWPT LINE  SHE WOULD LIKE HER APPT 8/30/21 TO BE CANCELLED  SHE WILL CALL BACK WHEN SHE WOULD LIKE TO RESCHEDULE  PT NEEDS MRI RESCHEDULED PRIOR TO APPT

## 2021-09-07 ENCOUNTER — OFFICE VISIT (OUTPATIENT)
Dept: FAMILY MEDICINE CLINIC | Facility: HOSPITAL | Age: 71
End: 2021-09-07
Payer: COMMERCIAL

## 2021-09-07 VITALS
DIASTOLIC BLOOD PRESSURE: 68 MMHG | HEART RATE: 83 BPM | OXYGEN SATURATION: 97 % | BODY MASS INDEX: 22.3 KG/M2 | SYSTOLIC BLOOD PRESSURE: 116 MMHG | WEIGHT: 118 LBS

## 2021-09-07 DIAGNOSIS — D64.9 ACUTE ON CHRONIC ANEMIA: ICD-10-CM

## 2021-09-07 DIAGNOSIS — C78.89 MALIGNANT NEOPLASM METASTATIC TO OTHER DIGESTIVE SYSTEM STRUCTURE (HCC): ICD-10-CM

## 2021-09-07 DIAGNOSIS — T17.908S ASPIRATION INTO AIRWAY, SEQUELA: ICD-10-CM

## 2021-09-07 DIAGNOSIS — K31.819 GASTRIC AVM: Primary | ICD-10-CM

## 2021-09-07 DIAGNOSIS — C49.A4 GIST (GASTROINTESTINAL STROMA TUMOR), MALIGNANT, COLON (HCC): ICD-10-CM

## 2021-09-07 DIAGNOSIS — R77.8 ELEVATED TROPONIN LEVEL NOT DUE MYOCARDIAL INFARCTION: ICD-10-CM

## 2021-09-07 DIAGNOSIS — N18.30 STAGE 3 CHRONIC KIDNEY DISEASE, UNSPECIFIED WHETHER STAGE 3A OR 3B CKD (HCC): ICD-10-CM

## 2021-09-07 DIAGNOSIS — D62 ABLA (ACUTE BLOOD LOSS ANEMIA): ICD-10-CM

## 2021-09-07 PROBLEM — T17.908A ASPIRATION INTO AIRWAY: Status: ACTIVE | Noted: 2021-09-07

## 2021-09-07 PROCEDURE — 99495 TRANSJ CARE MGMT MOD F2F 14D: CPT | Performed by: INTERNAL MEDICINE

## 2021-09-07 NOTE — ASSESSMENT & PLAN NOTE
Treated in hospital for aspiration causing bacteremia and septic shock  Finished course of antibiotics  Seen by GI and evaluated with EGD  Now doing better  Denies cough or short of breath

## 2021-09-07 NOTE — PATIENT INSTRUCTIONS
You have been seen today for a hospital discharge follow up visit  The purpose of the visit is to be sure that you are feeling well and taking all medications as ordered  This visit is scheduled so that any post hospital questions you have can be addressed  The doctor or nurse will review all medications and will provide refills of medications  You may be asked to get some follow up labs or other testing done, please do this as soon as able  If  You are seeing a specialist for follow up, let us know so we can do appropriate referrals  Schedule your next routine visit today so that we can keep you on track and healthy  Will review lab results when available  Follow up with [facility] for next steps

## 2021-09-13 ENCOUNTER — APPOINTMENT (OUTPATIENT)
Dept: LAB | Facility: HOSPITAL | Age: 71
End: 2021-09-13
Payer: COMMERCIAL

## 2021-09-13 ENCOUNTER — TELEPHONE (OUTPATIENT)
Dept: GASTROENTEROLOGY | Facility: CLINIC | Age: 71
End: 2021-09-13

## 2021-09-13 DIAGNOSIS — Z51.81 ENCOUNTER FOR THERAPEUTIC DRUG MONITORING: ICD-10-CM

## 2021-09-13 DIAGNOSIS — D64.9 ANEMIA, UNSPECIFIED TYPE: ICD-10-CM

## 2021-09-13 LAB
ALBUMIN SERPL BCP-MCNC: 3.2 G/DL (ref 3.5–5)
ALP SERPL-CCNC: 70 U/L (ref 46–116)
ALT SERPL W P-5'-P-CCNC: 28 U/L (ref 12–78)
ANION GAP SERPL CALCULATED.3IONS-SCNC: 4 MMOL/L (ref 4–13)
AST SERPL W P-5'-P-CCNC: 56 U/L (ref 5–45)
BASOPHILS # BLD AUTO: 0.01 THOUSANDS/ΜL (ref 0–0.1)
BASOPHILS NFR BLD AUTO: 0 % (ref 0–1)
BILIRUB SERPL-MCNC: 0.54 MG/DL (ref 0.2–1)
BUN SERPL-MCNC: 17 MG/DL (ref 5–25)
CALCIUM ALBUM COR SERPL-MCNC: 9.3 MG/DL (ref 8.3–10.1)
CALCIUM SERPL-MCNC: 8.7 MG/DL (ref 8.3–10.1)
CHLORIDE SERPL-SCNC: 105 MMOL/L (ref 100–108)
CO2 SERPL-SCNC: 28 MMOL/L (ref 21–32)
CREAT SERPL-MCNC: 1.15 MG/DL (ref 0.6–1.3)
EOSINOPHIL # BLD AUTO: 0.12 THOUSAND/ΜL (ref 0–0.61)
EOSINOPHIL NFR BLD AUTO: 4 % (ref 0–6)
ERYTHROCYTE [DISTWIDTH] IN BLOOD BY AUTOMATED COUNT: 18 % (ref 11.6–15.1)
GFR SERPL CREATININE-BSD FRML MDRD: 47 ML/MIN/1.73SQ M
GLUCOSE SERPL-MCNC: 87 MG/DL (ref 65–140)
HCT VFR BLD AUTO: 24.4 % (ref 34.8–46.1)
HGB BLD-MCNC: 7.6 G/DL (ref 11.5–15.4)
IMM GRANULOCYTES # BLD AUTO: 0.01 THOUSAND/UL (ref 0–0.2)
IMM GRANULOCYTES NFR BLD AUTO: 0 % (ref 0–2)
LYMPHOCYTES # BLD AUTO: 0.47 THOUSANDS/ΜL (ref 0.6–4.47)
LYMPHOCYTES NFR BLD AUTO: 16 % (ref 14–44)
MAGNESIUM SERPL-MCNC: 1.6 MG/DL (ref 1.6–2.6)
MCH RBC QN AUTO: 32.5 PG (ref 26.8–34.3)
MCHC RBC AUTO-ENTMCNC: 31.1 G/DL (ref 31.4–37.4)
MCV RBC AUTO: 104 FL (ref 82–98)
MONOCYTES # BLD AUTO: 0.37 THOUSAND/ΜL (ref 0.17–1.22)
MONOCYTES NFR BLD AUTO: 13 % (ref 4–12)
NEUTROPHILS # BLD AUTO: 1.95 THOUSANDS/ΜL (ref 1.85–7.62)
NEUTS SEG NFR BLD AUTO: 67 % (ref 43–75)
NRBC BLD AUTO-RTO: 0 /100 WBCS
PHOSPHATE SERPL-MCNC: 4 MG/DL (ref 2.3–4.1)
PLATELET # BLD AUTO: 213 THOUSANDS/UL (ref 149–390)
PMV BLD AUTO: 10.5 FL (ref 8.9–12.7)
POTASSIUM SERPL-SCNC: 3.8 MMOL/L (ref 3.5–5.3)
PROT SERPL-MCNC: 6.5 G/DL (ref 6.4–8.2)
RBC # BLD AUTO: 2.34 MILLION/UL (ref 3.81–5.12)
SODIUM SERPL-SCNC: 137 MMOL/L (ref 136–145)
WBC # BLD AUTO: 2.93 THOUSAND/UL (ref 4.31–10.16)

## 2021-09-13 PROCEDURE — 85025 COMPLETE CBC W/AUTO DIFF WBC: CPT

## 2021-09-13 PROCEDURE — 36415 COLL VENOUS BLD VENIPUNCTURE: CPT

## 2021-09-13 PROCEDURE — 80053 COMPREHEN METABOLIC PANEL: CPT

## 2021-09-13 PROCEDURE — 84100 ASSAY OF PHOSPHORUS: CPT

## 2021-09-13 PROCEDURE — 83735 ASSAY OF MAGNESIUM: CPT

## 2021-09-13 NOTE — TELEPHONE ENCOUNTER
Spoke with pt regarding colon scheduled for 9/15  Pt did not know she was scheduled for a colonoscopy, she thought it was an ov  She does not want colon done right now, she is waiting to have an MRI done that was ordered by Daysi Balderas  I told her that Dr Nasir Lyons wanted her to have colon done so we will call her in a few weeks to reschedule

## 2021-09-14 NOTE — TELEPHONE ENCOUNTER
Forwarding to OT for heads up  Looks like an MRI is scheduled at [address] on Dec  15th

## 2021-09-22 ENCOUNTER — LAB (OUTPATIENT)
Dept: LAB | Facility: HOSPITAL | Age: 71
End: 2021-09-22
Payer: COMMERCIAL

## 2021-09-22 DIAGNOSIS — Z51.81 ENCOUNTER FOR THERAPEUTIC DRUG MONITORING: ICD-10-CM

## 2021-09-22 DIAGNOSIS — D64.9 ERYTHROPENIA: ICD-10-CM

## 2021-09-22 LAB
ALBUMIN SERPL BCP-MCNC: 3.5 G/DL (ref 3.5–5)
ALP SERPL-CCNC: 73 U/L (ref 46–116)
ALT SERPL W P-5'-P-CCNC: 27 U/L (ref 12–78)
ANION GAP SERPL CALCULATED.3IONS-SCNC: 3 MMOL/L (ref 4–13)
AST SERPL W P-5'-P-CCNC: 51 U/L (ref 5–45)
BILIRUB SERPL-MCNC: 0.38 MG/DL (ref 0.2–1)
BUN SERPL-MCNC: 15 MG/DL (ref 5–25)
CALCIUM SERPL-MCNC: 9.1 MG/DL (ref 8.3–10.1)
CHLORIDE SERPL-SCNC: 108 MMOL/L (ref 100–108)
CO2 SERPL-SCNC: 28 MMOL/L (ref 21–32)
CREAT SERPL-MCNC: 1.04 MG/DL (ref 0.6–1.3)
GFR SERPL CREATININE-BSD FRML MDRD: 53 ML/MIN/1.73SQ M
GLUCOSE P FAST SERPL-MCNC: 86 MG/DL (ref 65–99)
POTASSIUM SERPL-SCNC: 3.9 MMOL/L (ref 3.5–5.3)
PROT SERPL-MCNC: 6.9 G/DL (ref 6.4–8.2)
SODIUM SERPL-SCNC: 139 MMOL/L (ref 136–145)

## 2021-09-22 PROCEDURE — 80053 COMPREHEN METABOLIC PANEL: CPT

## 2021-09-22 PROCEDURE — 36415 COLL VENOUS BLD VENIPUNCTURE: CPT

## 2021-09-27 DIAGNOSIS — C78.89 MALIGNANT NEOPLASM METASTATIC TO OTHER DIGESTIVE SYSTEM STRUCTURE (HCC): ICD-10-CM

## 2021-09-28 RX ORDER — TRAMADOL HYDROCHLORIDE 50 MG/1
100 TABLET ORAL 4 TIMES DAILY
Qty: 120 TABLET | Refills: 0 | Status: SHIPPED | OUTPATIENT
Start: 2021-09-28 | End: 2021-11-01 | Stop reason: SDUPTHER

## 2021-09-29 ENCOUNTER — LAB (OUTPATIENT)
Dept: LAB | Facility: HOSPITAL | Age: 71
End: 2021-09-29
Payer: COMMERCIAL

## 2021-09-29 DIAGNOSIS — D64.9 ANEMIA, UNSPECIFIED TYPE: ICD-10-CM

## 2021-09-29 DIAGNOSIS — Z51.81 ENCOUNTER FOR THERAPEUTIC DRUG MONITORING: ICD-10-CM

## 2021-09-29 LAB — PHOSPHATE SERPL-MCNC: 4 MG/DL (ref 2.3–4.1)

## 2021-09-29 PROCEDURE — 84100 ASSAY OF PHOSPHORUS: CPT

## 2021-09-29 PROCEDURE — 36415 COLL VENOUS BLD VENIPUNCTURE: CPT

## 2021-10-04 ENCOUNTER — LAB (OUTPATIENT)
Dept: LAB | Facility: HOSPITAL | Age: 71
End: 2021-10-04
Payer: COMMERCIAL

## 2021-10-04 DIAGNOSIS — D64.9 NORMOCYTIC NORMOCHROMIC ANEMIA: ICD-10-CM

## 2021-10-04 DIAGNOSIS — Z51.81 ADMISSION FOR THERAPEUTIC DRUG MONITORING: ICD-10-CM

## 2021-10-04 LAB
ALBUMIN SERPL BCP-MCNC: 3.2 G/DL (ref 3.5–5)
ALP SERPL-CCNC: 71 U/L (ref 46–116)
ALT SERPL W P-5'-P-CCNC: 24 U/L (ref 12–78)
ANION GAP SERPL CALCULATED.3IONS-SCNC: 5 MMOL/L (ref 4–13)
AST SERPL W P-5'-P-CCNC: 43 U/L (ref 5–45)
BASOPHILS # BLD AUTO: 0.01 THOUSANDS/ΜL (ref 0–0.1)
BASOPHILS NFR BLD AUTO: 0 % (ref 0–1)
BILIRUB SERPL-MCNC: 0.32 MG/DL (ref 0.2–1)
BUN SERPL-MCNC: 12 MG/DL (ref 5–25)
CALCIUM ALBUM COR SERPL-MCNC: 9.6 MG/DL (ref 8.3–10.1)
CALCIUM SERPL-MCNC: 9 MG/DL (ref 8.3–10.1)
CHLORIDE SERPL-SCNC: 108 MMOL/L (ref 100–108)
CO2 SERPL-SCNC: 27 MMOL/L (ref 21–32)
CREAT SERPL-MCNC: 1.14 MG/DL (ref 0.6–1.3)
EOSINOPHIL # BLD AUTO: 0.17 THOUSAND/ΜL (ref 0–0.61)
EOSINOPHIL NFR BLD AUTO: 7 % (ref 0–6)
ERYTHROCYTE [DISTWIDTH] IN BLOOD BY AUTOMATED COUNT: 17.7 % (ref 11.6–15.1)
GFR SERPL CREATININE-BSD FRML MDRD: 48 ML/MIN/1.73SQ M
GLUCOSE P FAST SERPL-MCNC: 82 MG/DL (ref 65–99)
HCT VFR BLD AUTO: 25.7 % (ref 34.8–46.1)
HGB BLD-MCNC: 8 G/DL (ref 11.5–15.4)
IMM GRANULOCYTES # BLD AUTO: 0.03 THOUSAND/UL (ref 0–0.2)
IMM GRANULOCYTES NFR BLD AUTO: 1 % (ref 0–2)
LYMPHOCYTES # BLD AUTO: 0.43 THOUSANDS/ΜL (ref 0.6–4.47)
LYMPHOCYTES NFR BLD AUTO: 17 % (ref 14–44)
MCH RBC QN AUTO: 33.5 PG (ref 26.8–34.3)
MCHC RBC AUTO-ENTMCNC: 31.1 G/DL (ref 31.4–37.4)
MCV RBC AUTO: 108 FL (ref 82–98)
MONOCYTES # BLD AUTO: 0.33 THOUSAND/ΜL (ref 0.17–1.22)
MONOCYTES NFR BLD AUTO: 13 % (ref 4–12)
NEUTROPHILS # BLD AUTO: 1.53 THOUSANDS/ΜL (ref 1.85–7.62)
NEUTS SEG NFR BLD AUTO: 62 % (ref 43–75)
NRBC BLD AUTO-RTO: 0 /100 WBCS
PHOSPHATE SERPL-MCNC: 3.4 MG/DL (ref 2.3–4.1)
PLATELET # BLD AUTO: 219 THOUSANDS/UL (ref 149–390)
PMV BLD AUTO: 10.5 FL (ref 8.9–12.7)
POTASSIUM SERPL-SCNC: 3.9 MMOL/L (ref 3.5–5.3)
PROT SERPL-MCNC: 6.4 G/DL (ref 6.4–8.2)
RBC # BLD AUTO: 2.39 MILLION/UL (ref 3.81–5.12)
SODIUM SERPL-SCNC: 140 MMOL/L (ref 136–145)
WBC # BLD AUTO: 2.5 THOUSAND/UL (ref 4.31–10.16)

## 2021-10-04 PROCEDURE — 85025 COMPLETE CBC W/AUTO DIFF WBC: CPT

## 2021-10-04 PROCEDURE — 80053 COMPREHEN METABOLIC PANEL: CPT

## 2021-10-04 PROCEDURE — 36415 COLL VENOUS BLD VENIPUNCTURE: CPT

## 2021-10-04 PROCEDURE — 84100 ASSAY OF PHOSPHORUS: CPT

## 2021-10-22 ENCOUNTER — HOSPITAL ENCOUNTER (OUTPATIENT)
Dept: RADIOLOGY | Facility: HOSPITAL | Age: 71
Discharge: HOME | End: 2021-10-22
Payer: COMMERCIAL

## 2021-10-22 ENCOUNTER — ANESTHESIA EVENT (OUTPATIENT)
Dept: RADIOLOGY | Facility: HOSPITAL | Age: 71
End: 2021-10-22

## 2021-10-22 ENCOUNTER — ANESTHESIA (OUTPATIENT)
Dept: RADIOLOGY | Facility: HOSPITAL | Age: 71
End: 2021-10-22

## 2021-10-22 VITALS
RESPIRATION RATE: 16 BRPM | OXYGEN SATURATION: 97 % | SYSTOLIC BLOOD PRESSURE: 110 MMHG | TEMPERATURE: 97.9 F | DIASTOLIC BLOOD PRESSURE: 56 MMHG | HEART RATE: 90 BPM

## 2021-10-22 VITALS
HEIGHT: 61 IN | SYSTOLIC BLOOD PRESSURE: 117 MMHG | HEART RATE: 90 BPM | WEIGHT: 115 LBS | TEMPERATURE: 98.6 F | OXYGEN SATURATION: 94 % | DIASTOLIC BLOOD PRESSURE: 59 MMHG | BODY MASS INDEX: 21.71 KG/M2 | RESPIRATION RATE: 11 BRPM

## 2021-10-22 DIAGNOSIS — M54.59 INTRACTABLE LOW BACK PAIN: ICD-10-CM

## 2021-10-22 DIAGNOSIS — M48.062 SPINAL STENOSIS OF LUMBAR REGION WITH NEUROGENIC CLAUDICATION: ICD-10-CM

## 2021-10-22 PROCEDURE — G1004 CDSM NDSC: HCPCS

## 2021-10-22 PROCEDURE — 72146 MRI CHEST SPINE W/O DYE: CPT

## 2021-10-22 PROCEDURE — 72148 MRI LUMBAR SPINE W/O DYE: CPT

## 2021-10-22 RX ORDER — LIDOCAINE HYDROCHLORIDE 10 MG/ML
INJECTION, SOLUTION EPIDURAL; INFILTRATION; INTRACAUDAL; PERINEURAL AS NEEDED
Status: DISCONTINUED | OUTPATIENT
Start: 2021-10-22 | End: 2021-10-22

## 2021-10-22 RX ORDER — ONDANSETRON 2 MG/ML
INJECTION INTRAMUSCULAR; INTRAVENOUS AS NEEDED
Status: DISCONTINUED | OUTPATIENT
Start: 2021-10-22 | End: 2021-10-22

## 2021-10-22 RX ORDER — EPHEDRINE SULFATE 50 MG/ML
INJECTION INTRAVENOUS AS NEEDED
Status: DISCONTINUED | OUTPATIENT
Start: 2021-10-22 | End: 2021-10-22

## 2021-10-22 RX ORDER — LIDOCAINE HYDROCHLORIDE 10 MG/ML
0.5 INJECTION, SOLUTION EPIDURAL; INFILTRATION; INTRACAUDAL; PERINEURAL ONCE AS NEEDED
Status: DISCONTINUED | OUTPATIENT
Start: 2021-10-22 | End: 2021-10-23 | Stop reason: HOSPADM

## 2021-10-22 RX ORDER — SODIUM CHLORIDE, SODIUM LACTATE, POTASSIUM CHLORIDE, CALCIUM CHLORIDE 600; 310; 30; 20 MG/100ML; MG/100ML; MG/100ML; MG/100ML
20 INJECTION, SOLUTION INTRAVENOUS CONTINUOUS
Status: DISCONTINUED | OUTPATIENT
Start: 2021-10-22 | End: 2021-10-23 | Stop reason: HOSPADM

## 2021-10-22 RX ORDER — PROPOFOL 10 MG/ML
INJECTION, EMULSION INTRAVENOUS AS NEEDED
Status: DISCONTINUED | OUTPATIENT
Start: 2021-10-22 | End: 2021-10-22

## 2021-10-22 RX ORDER — DEXAMETHASONE SODIUM PHOSPHATE 10 MG/ML
INJECTION, SOLUTION INTRAMUSCULAR; INTRAVENOUS AS NEEDED
Status: DISCONTINUED | OUTPATIENT
Start: 2021-10-22 | End: 2021-10-22

## 2021-10-22 RX ADMIN — DEXAMETHASONE SODIUM PHOSPHATE 10 MG: 10 INJECTION, SOLUTION INTRAMUSCULAR; INTRAVENOUS at 14:32

## 2021-10-22 RX ADMIN — EPHEDRINE SULFATE 10 MG: 50 INJECTION, SOLUTION INTRAVENOUS at 14:46

## 2021-10-22 RX ADMIN — EPHEDRINE SULFATE 5 MG: 50 INJECTION, SOLUTION INTRAVENOUS at 15:06

## 2021-10-22 RX ADMIN — ONDANSETRON 4 MG: 2 INJECTION INTRAMUSCULAR; INTRAVENOUS at 14:32

## 2021-10-22 RX ADMIN — PROPOFOL 120 MG: 10 INJECTION, EMULSION INTRAVENOUS at 14:26

## 2021-10-22 RX ADMIN — SODIUM CHLORIDE, SODIUM LACTATE, POTASSIUM CHLORIDE, AND CALCIUM CHLORIDE 20 ML/HR: .6; .31; .03; .02 INJECTION, SOLUTION INTRAVENOUS at 12:30

## 2021-10-22 RX ADMIN — LIDOCAINE HYDROCHLORIDE 60 MG: 10 INJECTION, SOLUTION EPIDURAL; INFILTRATION; INTRACAUDAL; PERINEURAL at 14:26

## 2021-11-01 DIAGNOSIS — C78.89 MALIGNANT NEOPLASM METASTATIC TO OTHER DIGESTIVE SYSTEM STRUCTURE (HCC): ICD-10-CM

## 2021-11-01 RX ORDER — TRAMADOL HYDROCHLORIDE 50 MG/1
100 TABLET ORAL 4 TIMES DAILY
Qty: 120 TABLET | Refills: 0 | Status: SHIPPED | OUTPATIENT
Start: 2021-11-01 | End: 2021-11-29

## 2021-11-01 NOTE — PATIENT INSTRUCTIONS
Chief Complaint:   Francisco Dejesus, a 16 year old male is here today for   Chief Complaint   Patient presents with   • Sore Throat   .     Subjective:   16 year old male is here today with sore throat, nausea, cough. Symptoms began Friday night.  He denies fevers, chills, shortness of breath, dyspnea on exertion, abdominal pain, constipation or diarrhea.  He reports exposure to COVID.    Current Outpatient Medications   Medication Sig Dispense Refill   • lisdexamfetamine (Vyvanse) 50 MG capsule Take 1 capsule by mouth every morning. 30 capsule 0   • amoxicillin (AMOXIL) 500 MG capsule Take 1 capsule by mouth 2 times daily. 20 capsule 0     No current facility-administered medications for this visit.     ALLERGIES:  No Known Allergies  Social History     Tobacco Use   • Smoking status: Never Smoker   • Smokeless tobacco: Never Used   Substance Use Topics   • Alcohol use: No     Alcohol/week: 0.0 standard drinks       Physical Exam:  Vitals:    11/01/21 0930   BP: 120/84   Pulse: 74   Resp: 18   Temp: 97.7 °F (36.5 °C)     General: Alert, mildly ill appearing, comfortable, not toxic, and in no acute distress  Head: Head is normocephalic without tics or tremors. No masses, lesions, tenderness or abnormalities.  Nose: No rhinorrhea is present.   Eyes: Conjunctivae and sclerae are without erythema or discharge.  Ears: External ears and canals are normal.  TMs are normal with bright light reflex present.  Throat:  Posterior pharynx with erythema and mild cobblestone appearance, 3+ tonsils bilaterally without exudate or petechiae.   Neck: Supple without lymphadenopathy or masses  Lungs: Clear to auscultation, without no wheezing, crackles, or stridor. Pulse ox 99 % on room air. Non labored breathing, no use of accessory muscles.   Heart: Regular rate and rhythm, without murmurs, gallops, or rubs    Labs:  COVID PCR: pending   COVID antigen rapid: negative   Group A Strep PCR: negative    Assessment:  16 year old male with  Patient going to ER for further evaluation and treatment sore throat, nausea, and cough.  Differentials do include viral URI versus suspected COVID-19 infection.  I do think his cough is related to postnasal drip.  He does not have any other GI symptoms.    Plan:  We discussed supportive care for the patient's symptoms.   For sore throat, I recommend hot tea with honey and gargling with salt water.  He denies medication for nausea.  Patient was encouraged to continue with Tylenol, ibuprofen, plenty of rest and fluids. If at any time symptoms worsen or new symptoms began they should go directly to the emergency department.    We did also discussed CDC guidelines regarding quarantining.  Patient should quarantine for a minimum of 10 days from symptom onset.  Patient will need to quarantine longer of continuing to be symptomatic.  Anyone living under the same roof should quarantine for 14 days starting on day of positive test. Patient understands and agrees with this plan.     A total of 20 minutes was spent on today's visit for history taking, patient examination, discussion of treatment options and differentials with patient, and treatment planning.

## 2021-11-02 ENCOUNTER — TELEPHONE (OUTPATIENT)
Dept: NEUROSURGERY | Facility: CLINIC | Age: 71
End: 2021-11-02

## 2021-11-19 ENCOUNTER — OFFICE VISIT (OUTPATIENT)
Dept: NEUROSURGERY | Facility: CLINIC | Age: 71
End: 2021-11-19
Payer: COMMERCIAL

## 2021-11-19 VITALS
WEIGHT: 116 LBS | BODY MASS INDEX: 21.9 KG/M2 | RESPIRATION RATE: 16 BRPM | DIASTOLIC BLOOD PRESSURE: 70 MMHG | HEART RATE: 65 BPM | SYSTOLIC BLOOD PRESSURE: 126 MMHG | TEMPERATURE: 97.4 F | HEIGHT: 61 IN

## 2021-11-19 DIAGNOSIS — M48.062 LUMBAR STENOSIS WITH NEUROGENIC CLAUDICATION: Primary | ICD-10-CM

## 2021-11-19 PROCEDURE — 99214 OFFICE O/P EST MOD 30 MIN: CPT | Performed by: NEUROLOGICAL SURGERY

## 2021-11-19 PROCEDURE — 1036F TOBACCO NON-USER: CPT | Performed by: NEUROLOGICAL SURGERY

## 2021-11-19 PROCEDURE — 1160F RVW MEDS BY RX/DR IN RCRD: CPT | Performed by: NEUROLOGICAL SURGERY

## 2021-11-19 PROCEDURE — 3008F BODY MASS INDEX DOCD: CPT | Performed by: NEUROLOGICAL SURGERY

## 2021-11-29 DIAGNOSIS — C78.89 MALIGNANT NEOPLASM METASTATIC TO OTHER DIGESTIVE SYSTEM STRUCTURE (HCC): ICD-10-CM

## 2021-11-29 RX ORDER — TRAMADOL HYDROCHLORIDE 50 MG/1
TABLET ORAL
Qty: 120 TABLET | Refills: 0 | Status: SHIPPED | OUTPATIENT
Start: 2021-11-29 | End: 2022-01-04

## 2021-12-15 ENCOUNTER — TELEPHONE (OUTPATIENT)
Dept: FAMILY MEDICINE CLINIC | Facility: HOSPITAL | Age: 71
End: 2021-12-15

## 2021-12-31 DIAGNOSIS — C78.89 MALIGNANT NEOPLASM METASTATIC TO OTHER DIGESTIVE SYSTEM STRUCTURE (HCC): ICD-10-CM

## 2022-01-04 RX ORDER — TRAMADOL HYDROCHLORIDE 50 MG/1
TABLET ORAL
Qty: 120 TABLET | Refills: 0 | Status: SHIPPED | OUTPATIENT
Start: 2022-01-04 | End: 2022-01-19 | Stop reason: SDUPTHER

## 2022-01-06 ENCOUNTER — TELEMEDICINE (OUTPATIENT)
Dept: NEUROSURGERY | Facility: CLINIC | Age: 72
End: 2022-01-06

## 2022-01-06 DIAGNOSIS — M54.16 LUMBAR RADICULOPATHY: ICD-10-CM

## 2022-01-06 DIAGNOSIS — M48.062 SPINAL STENOSIS OF LUMBAR REGION WITH NEUROGENIC CLAUDICATION: Primary | ICD-10-CM

## 2022-01-06 PROCEDURE — 99024 POSTOP FOLLOW-UP VISIT: CPT | Performed by: NEUROLOGICAL SURGERY

## 2022-01-07 ENCOUNTER — TELEPHONE (OUTPATIENT)
Dept: PAIN MEDICINE | Facility: CLINIC | Age: 72
End: 2022-01-07

## 2022-01-07 VITALS
WEIGHT: 115 LBS | DIASTOLIC BLOOD PRESSURE: 62 MMHG | HEIGHT: 61 IN | HEART RATE: 78 BPM | SYSTOLIC BLOOD PRESSURE: 114 MMHG | BODY MASS INDEX: 21.71 KG/M2

## 2022-01-07 NOTE — TELEPHONE ENCOUNTER
I would say lets schedule her for a f/u with me OR SL, whomever has an OV first for re-evaluation and to discuss tx plan options  Thank you

## 2022-01-07 NOTE — PROGRESS NOTES
Virtual Regular Visit     Verification of patient location:     Patient is located in the following state in which I hold an active license [place]        Assessment/Plan:          Problem List Items Addressed This Visit                 Nervous and Auditory      Lumbar radiculopathy            Other      Spinal stenosis, lumbar region, with neurogenic claudication                      Reason for visit is        Chief Complaint   Patient presents with    Virtual Regular Visit    Follow-up       Spinal stenosis, lumbar region, with neurogenic claudication    Error    Virtual Regular Visit      Encounter provider Camilo Duane, MD     Provider located at [address] Chanda Loyd [facility] [address] [phone]        Recent Visits  No visits were found meeting these conditions  Showing recent visits within past 7 days and meeting all other requirements  Today's Visits  Date Type Provider Dept   01/06/22 Telemedicine Camilo Duane,  [facility] today's visits and meeting all other requirements  Future Appointments  No visits were found meeting these conditions  Showing future appointments within next 150 days and meeting all other requirements     The patient was identified by name and date of birth  [facility] was informed that this is a telemedicine visit and that the visit is being conducted through Telephone  My office door was closed  No one else was in the room  She acknowledged consent and understanding of privacy and security of the video platform  The patient has agreed to participate and understands they can discontinue the visit at any time  Patient is aware this is a billable service  Subjective  [place] is a 66 y o  female S lower back and bilateral leg pain worse on the right hip and outer thigh  She also has abdominal pain   She is being followed for known thoracolumbar spondylosis and stenosis with scoliosis  She has a large calcified thoracic disc herniation but denies any difficulties with weakness or clumsiness in her legs  Her primary concern is her lower back pain and her right hip and outer thigh pain today  We have previously discussed a right L2-3 minimally invasive decompression as an alternative to much larger procedure which would confer considerable risk  She has not followed up with her pain specialist to discuss a focal right foraminal steroid injection on the right at L2-3  With her current pain medication regimen, she is able to be somewhat active during the day, though increasing activity level seems to exacerbate her pain  She denies any change in bladder function or her more chronic bowel issues  Assessment:     Patient is stable  55-year-old woman with chronic pain syndrome and known thoracic and lumbar spondylosis and stenosis with scoliosis  It does not sound as if she has any progressive symptoms of thoracic myelopathy  She will follow up with her pain specialist to discuss a right L2-3 transforaminal epidural steroid injection  If this is not helpful with her pain, proceeding with a right L2-3 minimally invasive decompression and possible epidural steroid injection would likely confer the best risk to benefit profile, though is quite clear that this would aim to try to improve some of her right hip and thigh pain that is unlikely to improve her overall back pain and abdominal pain  She will follow-up in 6-8 weeks time to check on her progress and for further surgical discussion as appropriate  In the interim, she is planning on scheduling a corneal surgery  Would recommend she discuss surgical optimization with her PCP  History, physical examination and diagnostic tests were reviewed and questions answered  Diagnosis, care plan and treatment options were discussed  The patient understand instructions and will follow up as directed       Plan:     Follow-up: 6-8 weeks          Problem List Items Addressed This Visit                 Nervous and Auditory      Lumbar radiculopathy            Other      Spinal stenosis, lumbar region, with neurogenic claudication                Other Visit Diagnoses      - Primary             Subjective/Objective      Chief Complaint     Lower back and bilateral leg pain, worse on the right  Medical History        Past Medical History:   Diagnosis Date    ADHD      Anemia      Anxiety      Arthritis      Asthma      Atrial fibrillation (HCC)      Cancer (HCC)      Chronic iron deficiency anemia 2020     Extensive GI evaluation over the last year including multiple EGD, colonoscopy, and capsule endoscopy  Has had gastric AVMs cauterized, Dieulafoy's lesion clipped, and most recently a Seb erosion cauterized    Coronary artery disease      Depression      GERD (gastroesophageal reflux disease)      History of stomach ulcers      Hypercholesterolemia      Hypertension      Kidney disease      [facility]ast[facility] [address] [facility]      Sepsis due to Escherichia coli ([facility] [facility] ) 2021            Surgical History         Past Surgical History:   Procedure Laterality Date    ANKLE SURGERY        APPENDECTOMY        BREAST SURGERY        CATARACT EXTRACTION         SECTION        COLONOSCOPY   2019     Multiple adenomatous colon polyps and internal hemorrhoids  Three year recall advised    HIP SURGERY        JOINT REPLACEMENT   2019     Left knee replacement    LAMINECTOMY        ROTATOR CUFF REPAIR        SIGMOID RESECTION / RECTOPEXY        SINUS SURGERY        UPPER GASTROINTESTINAL ENDOSCOPY   2020     Dieulafoy's lesion in proximal stomach which was actively oozing  Injected with epinephrine and 2 Endoclips placed with control of bleeding, hiatal hernia      UPPER GASTROINTESTINAL ENDOSCOPY   2020     Bleeding Seb's erosion status post cauterization            Current Medications Current Outpatient Medications   Medication Sig Dispense Refill    acetaminophen (TYLENOL) 500 mg tablet Take 500 mg by mouth every 6 (six) hours as needed for mild pain        aspirin 81 mg chewable tablet Chew 81 mg daily        Avapritinib (Ayvakit) 200 MG TABS Take 200 mg by mouth daily        Azelastine HCl 137 MCG/SPRAY SOLN instill 2 sprays into each nostril twice a day if needed for congestion   0    calcium carbonate (Tums) 500 mg chewable tablet Chew 1 tablet         diphenhydrAMINE (BENADRYL) 25 mg tablet Take 25 mg by mouth every 6 (six) hours as needed for itching        diphenoxylate-atropine (LOMOTIL) 2 5-0 025 mg per tablet TAKE 1 TABLET BY MOUTH 4 TIMES A DAY AS NEEDED FOR DIARRHEA 30 tablet 5    docusate sodium (COLACE) 100 mg capsule Take 100 mg by mouth 2 (two) times a day as needed for constipation         ferrous sulfate 324 (65 Fe) mg Take 1 tablet (324 mg total) by mouth 2 (two) times a day before meals 60 tablet 2    hydroxychloroquine (PLAQUENIL) 200 mg tablet Take 200 mg by mouth 2 (two) times a day with meals Alternate with daily and BID        loperamide (IMODIUM) 2 mg capsule Take 2 capsules by mouth 4 (four) times a day as needed         metoprolol succinate (TOPROL-XL) 25 mg 24 hr tablet Take 25 mg by mouth daily        multivitamin-iron-minerals-folic acid (CENTRUM) chewable tablet Chew 1 tablet daily        ondansetron (ZOFRAN) 8 mg tablet 8 mg daily with breakfast Prior to chemo        pantoprazole (PROTONIX) 40 mg tablet Take 1 tablet (40 mg total) by mouth daily 180 tablet 0    traMADol (ULTRAM) 50 mg tablet take 2 tablets by mouth four times a day 120 tablet 0    Triamcinolone Acetonide (NASACORT ALLERGY 24HR NA) into each nostril as needed 1 spray each nostril--at bedtime  (OTC)           No current facility-administered medications for this visit                 Allergies   Allergen Reactions    Codeine Other (See Comments)       Throat closes    Codeine Sulfate Throat Swelling    Neomycin-Bacitracin Zn-Polymyx Rash    Wound Dressing Adhesive Other (See Comments)       If its on too long- pt starts itching    Zolmitriptan Palpitations       Heart palpitations  Generic for Zomig          Review of Systems   Constitutional: Negative  HENT: Positive for hearing loss (wears right hearing aid)  Eyes:   Needs cornea surgery   Respiratory: Positive for shortness of breath (with exertion)  Cardiovascular: Negative  Gastrointestinal: Positive for constipation and diarrhea  Endocrine: Positive for cold intolerance  Genitourinary: Negative  Musculoskeletal: Positive for arthralgias (b/l hips R>L), back pain (Low back pain across the hips and down both legs) and gait problem (Using a standard cane today)  Skin: Negative  Allergic/Immunologic: Negative  Neurological: Positive for weakness (both legs, occasional) and numbness (left hand)  Hematological: Bruises/bleeds easily (bruises)  Psychiatric/Behavioral: Positive for sleep disturbance (GI / bowels)  Trouble word finding 2/2 chemo, some memory/forgetfulness   All other systems reviewed and are negative  Video Exam     Vitals       Vitals:     01/06/22 1047   BP: 114/62   Pulse: 78   Weight: 52 2 kg (115 lb)   Height: 5' 1" (1 549 m)            I spent 13 minutes directly with the patient during this visit     VIRTUAL VISIT [address] verbally agrees to participate in [employer]   Pt is aware that [employer] could be limited without vital signs or the ability to perform a full hands-on physical exam  Gustavo Sparrow understands she or the provider may request at any time to terminate the video visit and request the patient to seek care or treatment in person

## 2022-01-07 NOTE — TELEPHONE ENCOUNTER
Pt went to Dr Marky Duron and he took 2 mri and told pt he was going to send everything over to [facility]  Pt would like to know what the next step is going to be  If [facility] wants pt to come in and see him please let pt know      Pt # [phone]

## 2022-01-07 NOTE — TELEPHONE ENCOUNTER
The patient was last seen on 4/26/21 and she was supposed to be seen again on 8/23/21 but ended up being hospitalized from 8/18 through 8/25  Her last injection was on 1/15/21  She has no ovs scheduled  Please advise   Thanks

## 2022-01-17 NOTE — TELEPHONE ENCOUNTER
Dr Kendra Farrar reaching pt on home number to check If pt was going to reschedule colon  -unable to lvm-message comes on stating no is available to take call then hangs up phone  Pt has not called to schedule since last contact-send final letter?

## 2022-01-18 NOTE — TELEPHONE ENCOUNTER
She has been quite ill in and out of the hospital   At this point let us hold off on any planned recall  I see her  frequently and he keeps me up-to-date    Thanks

## 2022-01-19 DIAGNOSIS — C78.89 MALIGNANT NEOPLASM METASTATIC TO OTHER DIGESTIVE SYSTEM STRUCTURE (HCC): ICD-10-CM

## 2022-01-19 RX ORDER — TRAMADOL HYDROCHLORIDE 50 MG/1
100 TABLET ORAL 4 TIMES DAILY
Qty: 120 TABLET | Refills: 0 | Status: SHIPPED | OUTPATIENT
Start: 2022-01-19 | End: 2022-02-04

## 2022-02-03 DIAGNOSIS — C78.89 MALIGNANT NEOPLASM METASTATIC TO OTHER DIGESTIVE SYSTEM STRUCTURE (HCC): ICD-10-CM

## 2022-02-04 RX ORDER — TRAMADOL HYDROCHLORIDE 50 MG/1
TABLET ORAL
Qty: 120 TABLET | Refills: 0 | Status: SHIPPED | OUTPATIENT
Start: 2022-02-04 | End: 2022-02-22

## 2022-02-17 DIAGNOSIS — C78.89 MALIGNANT NEOPLASM METASTATIC TO OTHER DIGESTIVE SYSTEM STRUCTURE (HCC): ICD-10-CM

## 2022-02-22 RX ORDER — TRAMADOL HYDROCHLORIDE 50 MG/1
TABLET ORAL
Qty: 120 TABLET | Refills: 0 | Status: SHIPPED | OUTPATIENT
Start: 2022-02-22 | End: 2022-03-08

## 2022-03-05 DIAGNOSIS — C78.89 MALIGNANT NEOPLASM METASTATIC TO OTHER DIGESTIVE SYSTEM STRUCTURE (HCC): ICD-10-CM

## 2022-03-08 RX ORDER — TRAMADOL HYDROCHLORIDE 50 MG/1
TABLET ORAL
Qty: 120 TABLET | Refills: 1 | Status: SHIPPED | OUTPATIENT
Start: 2022-03-08 | End: 2022-04-12

## 2022-03-30 ENCOUNTER — TELEPHONE (OUTPATIENT)
Dept: PAIN MEDICINE | Facility: CLINIC | Age: 72
End: 2022-03-30

## 2022-04-08 DIAGNOSIS — C78.89 MALIGNANT NEOPLASM METASTATIC TO OTHER DIGESTIVE SYSTEM STRUCTURE (HCC): ICD-10-CM

## 2022-04-11 ENCOUNTER — TELEPHONE (OUTPATIENT)
Dept: FAMILY MEDICINE CLINIC | Facility: HOSPITAL | Age: 72
End: 2022-04-11

## 2022-04-12 RX ORDER — TRAMADOL HYDROCHLORIDE 50 MG/1
TABLET ORAL
Qty: 120 TABLET | Refills: 0 | Status: SHIPPED | OUTPATIENT
Start: 2022-04-12 | End: 2022-07-20 | Stop reason: SDUPTHER

## 2022-05-10 ENCOUNTER — OFFICE VISIT (OUTPATIENT)
Dept: FAMILY MEDICINE CLINIC | Facility: HOSPITAL | Age: 72
End: 2022-05-10
Payer: COMMERCIAL

## 2022-05-10 VITALS
HEART RATE: 69 BPM | SYSTOLIC BLOOD PRESSURE: 118 MMHG | BODY MASS INDEX: 21.64 KG/M2 | DIASTOLIC BLOOD PRESSURE: 68 MMHG | WEIGHT: 114.6 LBS | OXYGEN SATURATION: 98 % | HEIGHT: 61 IN

## 2022-05-10 DIAGNOSIS — D70.1 CHEMOTHERAPY-INDUCED NEUTROPENIA (HCC): ICD-10-CM

## 2022-05-10 DIAGNOSIS — Z01.818 PRE-OP EXAMINATION: Primary | ICD-10-CM

## 2022-05-10 DIAGNOSIS — C49.A4 GIST (GASTROINTESTINAL STROMA TUMOR), MALIGNANT, COLON (HCC): ICD-10-CM

## 2022-05-10 DIAGNOSIS — I10 ESSENTIAL HYPERTENSION: ICD-10-CM

## 2022-05-10 DIAGNOSIS — D64.89 ANEMIA DUE TO OTHER CAUSE, NOT CLASSIFIED: ICD-10-CM

## 2022-05-10 DIAGNOSIS — T45.1X5A CHEMOTHERAPY-INDUCED NEUTROPENIA (HCC): ICD-10-CM

## 2022-05-10 DIAGNOSIS — K31.819 GASTRIC AVM: ICD-10-CM

## 2022-05-10 DIAGNOSIS — C78.89 MALIGNANT NEOPLASM METASTATIC TO OTHER DIGESTIVE SYSTEM STRUCTURE (HCC): ICD-10-CM

## 2022-05-10 PROBLEM — D64.9 ACUTE ON CHRONIC ANEMIA: Status: RESOLVED | Noted: 2020-06-09 | Resolved: 2022-05-10

## 2022-05-10 PROBLEM — D62 ABLA (ACUTE BLOOD LOSS ANEMIA): Status: RESOLVED | Noted: 2020-07-29 | Resolved: 2022-05-10

## 2022-05-10 PROBLEM — T17.908A ASPIRATION INTO AIRWAY: Status: RESOLVED | Noted: 2021-09-07 | Resolved: 2022-05-10

## 2022-05-10 PROBLEM — R79.89 ELEVATED TROPONIN LEVEL NOT DUE MYOCARDIAL INFARCTION: Status: RESOLVED | Noted: 2020-02-20 | Resolved: 2022-05-10

## 2022-05-10 PROBLEM — R77.8 ELEVATED TROPONIN LEVEL NOT DUE MYOCARDIAL INFARCTION: Status: RESOLVED | Noted: 2020-02-20 | Resolved: 2022-05-10

## 2022-05-10 PROCEDURE — 3008F BODY MASS INDEX DOCD: CPT | Performed by: INTERNAL MEDICINE

## 2022-05-10 PROCEDURE — 1036F TOBACCO NON-USER: CPT | Performed by: INTERNAL MEDICINE

## 2022-05-10 PROCEDURE — 99214 OFFICE O/P EST MOD 30 MIN: CPT | Performed by: INTERNAL MEDICINE

## 2022-05-10 PROCEDURE — 1160F RVW MEDS BY RX/DR IN RCRD: CPT | Performed by: INTERNAL MEDICINE

## 2022-05-10 NOTE — PROGRESS NOTES
PRE-OPERATIVE EVALUATION  [facility] PHYSICIAN GROUP [facility] PRIMARY CARE [address]    NAME: Johannah Kocher Trocine  AGE: 66 y o  SEX: female  : 1950     DATE: 5/10/2022    Internal Medicine Pre-Operative Evaluation      Chief Complaint: Pre-operative Evaluation     Surgery: keratoplasty left eye  Anticipated Date of Surgery: May 26,2022  Referring Provider: Dr Charisse Baum      History of Present Illness:     Danielle Hsieh is a 66 y o  female who presents to the office today for a preoperative consultation at the request of surgeon, Dr Charisse Baum, Patient has a bleeding risk of: hx of gastrointestinal bleed due to AVMs, no other abnormal bleeding  Patient does not have objections to receiving blood products if needed  Current anti-platelet/anti-coagulation medications that the patient is prescribed includes: none  Assessment of Chronic Conditions:   - chemotherapy for GIST tumor     Assessment of Cardiac Risk:  · Denies unstable or severe angina or MI in the last 6 weeks or history of stent placement in the last year   ·      Exercise Capacity:  · Able to walk 4 blocks without symptoms?: Yes  · Able to walk 2 flights without symptoms?: Yes    Prior Anesthesia Reactions: No     Personal history of venous thromboembolic disease? No    History of steroid use for >2 weeks within last year? No         Review of Systems:     Review of Systems   Constitutional: Positive for fatigue  Eyes: Positive for visual disturbance  Musculoskeletal: Positive for arthralgias and back pain  All other systems reviewed and are negative        Current Problem List:     Patient Active Problem List   Diagnosis    Anemia    Gait disturbance    Essential hypertension    Spinal stenosis, lumbar region, with neurogenic claudication    Pain syndrome, chronic    Right bundle-branch block    Lumbar radiculopathy    Supraventricular tachycardia ([address]    Non-rheumatic mitral regurgitation    Health care maintenance    Pre-op examination    [facility]ast[facility] cancer [facility] [address] [facility]    Gastric AVM    Stage 3 chronic kidney disease ([facility] [address] )    MARK (obstructive sleep apnea)    Status post lumbar laminectomy    GIST (gastrointestinal stroma tumor), malignant, colon (HCC)    Chemotherapy-induced neutropenia (HCC)       Allergies: Allergies   Allergen Reactions    Codeine Other (See Comments)     Throat closes    Codeine Sulfate Throat Swelling    Neomycin-Bacitracin Zn-Polymyx Rash    Wound Dressing Adhesive Other (See Comments)     If its on too long- pt starts itching    Zolmitriptan Palpitations     Heart palpitations    Generic for Zomig         Current Medications:       Current Outpatient Medications:     acetaminophen (TYLENOL) 500 mg tablet, Take 500 mg by mouth every 6 (six) hours as needed for mild pain, Disp: , Rfl:     aspirin 81 mg chewable tablet, Chew 81 mg daily, Disp: , Rfl:     Avapritinib (Ayvakit) 200 MG TABS, Take 200 mg by mouth daily, Disp: , Rfl:     Azelastine HCl 137 MCG/SPRAY SOLN, instill 2 sprays into each nostril twice a day if needed for congestion, Disp: , Rfl: 0    calcium carbonate (Tums) 500 mg chewable tablet, Chew 1 tablet , Disp: , Rfl:     diphenhydrAMINE (BENADRYL) 25 mg tablet, Take 25 mg by mouth every 6 (six) hours as needed for itching, Disp: , Rfl:     diphenoxylate-atropine (LOMOTIL) 2 5-0 025 mg per tablet, TAKE 1 TABLET BY MOUTH 4 TIMES A DAY AS NEEDED FOR DIARRHEA, Disp: 30 tablet, Rfl: 5    docusate sodium (COLACE) 100 mg capsule, Take 100 mg by mouth 2 (two) times a day as needed for constipation , Disp: , Rfl:     ferrous sulfate 324 (65 Fe) mg, Take 1 tablet (324 mg total) by mouth 2 (two) times a day before meals, Disp: 60 tablet, Rfl: 2    hydroxychloroquine (PLAQUENIL) 200 mg tablet, Take 200 mg by mouth 2 (two) times a day with meals Alternate with daily and BID, Disp: , Rfl:     loperamide (IMODIUM) 2 mg capsule, Take 2 capsules by mouth 4 (four) times a day as needed , Disp: , Rfl:     metoprolol succinate (TOPROL-XL) 25 mg 24 hr tablet, Take 25 mg by mouth daily, Disp: , Rfl:     multivitamin-iron-minerals-folic acid (CENTRUM) chewable tablet, Chew 1 tablet daily, Disp: , Rfl:     ondansetron (ZOFRAN) 8 mg tablet, 8 mg daily with breakfast Prior to chemo, Disp: , Rfl:     pantoprazole (PROTONIX) 40 mg tablet, Take 1 tablet (40 mg total) by mouth daily, Disp: 180 tablet, Rfl: 0    traMADol (ULTRAM) 50 mg tablet, take 2 tablets by mouth four times a day, Disp: 120 tablet, Rfl: 0    Triamcinolone Acetonide (NASACORT ALLERGY 24HR NA), into each nostril as needed 1 spray each nostril--at bedtime   (OTC) , Disp: , Rfl:     Past Medical History:       Past Medical History:   Diagnosis Date    ADHD     Anemia     Anxiety     Arthritis     Asthma     Atrial fibrillation (HCC)     Cancer [facility] [address] [facility]     Chronic iron deficiency anemia 2020    Extensive GI evaluation over the last year including multiple EGD, colonoscopy, and capsule endoscopy  Has had gastric AVMs cauterized, Dieulafoy's lesion clipped, and most recently a Seb erosion cauterized    Coronary artery disease     Depression     GERD (gastroesophageal reflux disease)     History of stomach ulcers     Hypercholesterolemia     Hypertension     Kidney disease     Metastatic cancer [facility] [address] [facility]     Sepsis due to Escherichia coli ([facility] [address] ) 2021        Past Surgical History:   Procedure Laterality Date    ANKLE SURGERY      APPENDECTOMY      BREAST SURGERY      CATARACT EXTRACTION       SECTION      COLONOSCOPY  2019    Multiple adenomatous colon polyps and internal hemorrhoids  Three year recall advised    HIP SURGERY      JOINT REPLACEMENT  2019    Left knee replacement    LAMINECTOMY      ROTATOR CUFF REPAIR      SIGMOID RESECTION / RECTOPEXY      SINUS SURGERY      UPPER GASTROINTESTINAL ENDOSCOPY  2020    Dieulafoy's lesion in proximal stomach which was actively oozing  Injected with epinephrine and 2 Endoclips placed with control of bleeding, hiatal hernia   UPPER GASTROINTESTINAL ENDOSCOPY  06/2020    Bleeding Seb's erosion status post cauterization        Family History   Problem Relation Age of Onset   Valaria Reil Breast cancer Mother     Diabetes Mother     Hypertension Mother     Lung cancer Mother     Hyperlipidemia Father     Prostate cancer Father     Colon cancer Paternal Grandmother     Colon cancer Paternal Grandfather     Diabetes Family     Substance Abuse Neg Hx     Mental illness Neg Hx         Social History     Socioeconomic History    Marital status: /Civil Union     Spouse name: Not on file    Number of children: Not on file    Years of education: Not on file    Highest education level: Not on file   Occupational History    Not on file   Tobacco Use    Smoking status: Former Smoker     Years: 20 00     Types: Cigarettes    Smokeless tobacco: Never Used   Vaping Use    Vaping Use: Never used   Substance and Sexual Activity    Alcohol use: Not Currently    Drug use: No    Sexual activity: Not Currently   Other Topics Concern    Not on file   Social History Narrative    Wears seatbelt     Regular dental care      Social Determinants of Health     Financial Resource Strain: Low Risk     Difficulty of Paying Living Expenses: Not hard at all   Food Insecurity: No Food Insecurity    Worried About Running Out of Food in the Last Year: Never true    Neisha of Food in the Last Year: Never true   Transportation Needs: No Transportation Needs    Lack of Transportation (Medical): No    Lack of Transportation (Non-Medical):  No   Physical Activity: Not on file   Stress: Not on file   Social Connections: Not on file   Intimate Partner Violence: Not on file   Housing Stability: Not on file        Physical Exam:     /68   Pulse 69   Ht 5' 1" (1 549 m)   Wt 52 kg (114 lb 9 6 oz)   SpO2 98%   BMI 21 65 kg/m²     Physical Exam  Vitals and nursing note reviewed  Constitutional:       General: She is not in acute distress  Cardiovascular:      Rate and Rhythm: Normal rate and regular rhythm  Pulmonary:      Effort: Pulmonary effort is normal       Breath sounds: Normal breath sounds  Musculoskeletal:         General: Normal range of motion  Cervical back: Normal range of motion  Skin:     Findings: No rash  Neurological:      Mental Status: She is alert and oriented to person, place, and time  Psychiatric:         Thought Content: Thought content normal           Data:     Pre-operative work-up    Laboratory Results: I have personally reviewed the pertinent laboratory results/reports   Pertinent labs reviewed:  A1C     n/a             , GFR   >60  EKG: n/a  EKG is patient seen and cleared by cardiology    Chest x-ray: n/a      ·       Assessment & Recommendations:     1  Pre-op examination     2  GIST (gastrointestinal stroma tumor), malignant, colon ([address] )     3  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[facility] [address] [facility]     4  Gastric AVM     5  Essential hypertension     6  Anemia due to other cause, not classified     7  Chemotherapy-induced neutropenia (HCC)         Pre-Op Evaluation Assessment  66 y o  female with planned surgery: left eye keratoplasty  Known risk factors for perioperative complications: None  neutropenia and anemia secondary to treatment for GIST tumor  These are chronic and stable  Did have recent transfusion of PRBCs and is closely followed at [facility]  Current medications which may produce withdrawal symptoms if withheld perioperatively: none  Pre-Op Evaluation Plan  1  Further preoperative workup as follows:   - None; no further preoperative work-up is required    2  Medication Management/Recommendations:   - None, continue medication regimen including morning of surgery, with sip of water    3   Prophylaxis for cardiac events with perioperative beta-blockers: not indicated  4  Patient requires further consultation with: None    Clearance  Patient is CLEARED for surgery without any additional cardiac testing       Kina Richard MD  [facility] PRIMARY CARE [address]  Phone#  [phone]  Fax#  [phone]

## 2022-06-15 DIAGNOSIS — K92.2 UPPER GI BLEED: ICD-10-CM

## 2022-06-15 RX ORDER — PANTOPRAZOLE SODIUM 40 MG/1
TABLET, DELAYED RELEASE ORAL
Qty: 180 TABLET | Refills: 0 | Status: SHIPPED | OUTPATIENT
Start: 2022-06-15

## 2022-07-02 ENCOUNTER — APPOINTMENT (EMERGENCY)
Dept: RADIOLOGY | Facility: HOSPITAL | Age: 72
DRG: 559 | End: 2022-07-02
Payer: COMMERCIAL

## 2022-07-02 ENCOUNTER — HOSPITAL ENCOUNTER (INPATIENT)
Facility: HOSPITAL | Age: 72
LOS: 4 days | Discharge: ANOTHER HOSPITAL | DRG: 559 | End: 2022-07-07
Attending: EMERGENCY MEDICINE | Admitting: INTERNAL MEDICINE
Payer: COMMERCIAL

## 2022-07-02 DIAGNOSIS — D49.89 INTRA-ABDOMINAL TUMOR: ICD-10-CM

## 2022-07-02 DIAGNOSIS — D64.9 ANEMIA, UNSPECIFIED TYPE: ICD-10-CM

## 2022-07-02 DIAGNOSIS — M25.562 ACUTE PAIN OF LEFT KNEE: ICD-10-CM

## 2022-07-02 DIAGNOSIS — R50.9 FEVER: ICD-10-CM

## 2022-07-02 DIAGNOSIS — M00.9 PYOGENIC ARTHRITIS OF LEFT KNEE JOINT, DUE TO UNSPECIFIED ORGANISM (HCC): ICD-10-CM

## 2022-07-02 DIAGNOSIS — J96.01 ACUTE RESPIRATORY FAILURE WITH HYPOXIA (HCC): ICD-10-CM

## 2022-07-02 DIAGNOSIS — Z96.659 INFECTION OF TOTAL KNEE REPLACEMENT (HCC): ICD-10-CM

## 2022-07-02 DIAGNOSIS — T84.59XA INFECTION OF TOTAL KNEE REPLACEMENT (HCC): ICD-10-CM

## 2022-07-02 DIAGNOSIS — N71.9 ENDOMETRITIS: ICD-10-CM

## 2022-07-02 DIAGNOSIS — M79.89 SWELLING OF LIMB: ICD-10-CM

## 2022-07-02 DIAGNOSIS — D64.9 SYMPTOMATIC ANEMIA: ICD-10-CM

## 2022-07-02 DIAGNOSIS — C79.9 METASTATIC MALIGNANT NEOPLASM, UNSPECIFIED SITE (HCC): ICD-10-CM

## 2022-07-02 DIAGNOSIS — R53.1 GENERALIZED WEAKNESS: Primary | ICD-10-CM

## 2022-07-02 DIAGNOSIS — J81.0 ACUTE PULMONARY EDEMA (HCC): ICD-10-CM

## 2022-07-02 DIAGNOSIS — M25.562 LEFT KNEE PAIN, UNSPECIFIED CHRONICITY: ICD-10-CM

## 2022-07-02 PROCEDURE — 86920 COMPATIBILITY TEST SPIN: CPT

## 2022-07-02 PROCEDURE — 86140 C-REACTIVE PROTEIN: CPT | Performed by: EMERGENCY MEDICINE

## 2022-07-02 PROCEDURE — 86900 BLOOD TYPING SEROLOGIC ABO: CPT | Performed by: EMERGENCY MEDICINE

## 2022-07-02 PROCEDURE — 99285 EMERGENCY DEPT VISIT HI MDM: CPT

## 2022-07-02 PROCEDURE — 99285 EMERGENCY DEPT VISIT HI MDM: CPT | Performed by: EMERGENCY MEDICINE

## 2022-07-02 PROCEDURE — 86850 RBC ANTIBODY SCREEN: CPT | Performed by: EMERGENCY MEDICINE

## 2022-07-02 PROCEDURE — 36430 TRANSFUSION BLD/BLD COMPNT: CPT

## 2022-07-02 PROCEDURE — 36415 COLL VENOUS BLD VENIPUNCTURE: CPT | Performed by: EMERGENCY MEDICINE

## 2022-07-02 PROCEDURE — 84550 ASSAY OF BLOOD/URIC ACID: CPT | Performed by: EMERGENCY MEDICINE

## 2022-07-02 PROCEDURE — 85007 BL SMEAR W/DIFF WBC COUNT: CPT | Performed by: EMERGENCY MEDICINE

## 2022-07-02 PROCEDURE — 86901 BLOOD TYPING SEROLOGIC RH(D): CPT | Performed by: EMERGENCY MEDICINE

## 2022-07-02 PROCEDURE — 84145 PROCALCITONIN (PCT): CPT | Performed by: EMERGENCY MEDICINE

## 2022-07-02 PROCEDURE — 80053 COMPREHEN METABOLIC PANEL: CPT | Performed by: EMERGENCY MEDICINE

## 2022-07-02 PROCEDURE — 87040 BLOOD CULTURE FOR BACTERIA: CPT | Performed by: EMERGENCY MEDICINE

## 2022-07-02 PROCEDURE — 85027 COMPLETE CBC AUTOMATED: CPT | Performed by: EMERGENCY MEDICINE

## 2022-07-02 PROCEDURE — 71045 X-RAY EXAM CHEST 1 VIEW: CPT

## 2022-07-02 PROCEDURE — 83605 ASSAY OF LACTIC ACID: CPT | Performed by: EMERGENCY MEDICINE

## 2022-07-02 RX ORDER — SODIUM CHLORIDE 9 MG/ML
3 INJECTION INTRAVENOUS EVERY 8 HOURS SCHEDULED
Status: DISCONTINUED | OUTPATIENT
Start: 2022-07-03 | End: 2022-07-03

## 2022-07-03 ENCOUNTER — APPOINTMENT (EMERGENCY)
Dept: CT IMAGING | Facility: HOSPITAL | Age: 72
DRG: 559 | End: 2022-07-03
Payer: COMMERCIAL

## 2022-07-03 ENCOUNTER — APPOINTMENT (EMERGENCY)
Dept: RADIOLOGY | Facility: HOSPITAL | Age: 72
DRG: 559 | End: 2022-07-03
Payer: COMMERCIAL

## 2022-07-03 PROBLEM — M25.562 LEFT KNEE PAIN: Status: ACTIVE | Noted: 2022-07-03

## 2022-07-03 PROBLEM — T84.59XA INFECTION OF TOTAL KNEE REPLACEMENT (HCC): Status: ACTIVE | Noted: 2022-07-03

## 2022-07-03 PROBLEM — R50.9 FEVER: Status: ACTIVE | Noted: 2022-07-03

## 2022-07-03 PROBLEM — I10 PRIMARY HYPERTENSION: Status: ACTIVE | Noted: 2022-07-03

## 2022-07-03 PROBLEM — I48.91 ATRIAL FIBRILLATION (HCC): Status: ACTIVE | Noted: 2022-07-03

## 2022-07-03 PROBLEM — Z96.659 INFECTION OF TOTAL KNEE REPLACEMENT (HCC): Status: ACTIVE | Noted: 2022-07-03

## 2022-07-03 PROBLEM — I25.10 CAD (CORONARY ARTERY DISEASE): Status: ACTIVE | Noted: 2022-07-03

## 2022-07-03 PROBLEM — R33.9 URINARY RETENTION: Status: ACTIVE | Noted: 2022-07-03

## 2022-07-03 PROBLEM — C79.9 METASTATIC CANCER (HCC): Status: ACTIVE | Noted: 2022-07-03

## 2022-07-03 PROBLEM — D64.9 ANEMIA: Status: ACTIVE | Noted: 2022-07-03

## 2022-07-03 LAB
ABO GROUP BLD BPU: NORMAL
ABO GROUP BLD: NORMAL
ABO GROUP BLD: NORMAL
ALBUMIN SERPL BCP-MCNC: 3.2 G/DL (ref 3.5–5)
ALBUMIN SERPL BCP-MCNC: 3.6 G/DL (ref 3.5–5)
ALP SERPL-CCNC: 65 U/L (ref 46–116)
ALP SERPL-CCNC: 74 U/L (ref 46–116)
ALT SERPL W P-5'-P-CCNC: 26 U/L (ref 12–78)
ALT SERPL W P-5'-P-CCNC: 28 U/L (ref 12–78)
ANION GAP SERPL CALCULATED.3IONS-SCNC: 11 MMOL/L (ref 4–13)
ANION GAP SERPL CALCULATED.3IONS-SCNC: 11 MMOL/L (ref 4–13)
ANISOCYTOSIS BLD QL SMEAR: PRESENT
ANISOCYTOSIS BLD QL SMEAR: PRESENT
APPEARANCE FLD: ABNORMAL
APTT PPP: 27 SECONDS (ref 23–37)
AST SERPL W P-5'-P-CCNC: 42 U/L (ref 5–45)
AST SERPL W P-5'-P-CCNC: 53 U/L (ref 5–45)
BACTERIA UR QL AUTO: ABNORMAL /HPF
BASOPHILS # BLD MANUAL: 0 THOUSAND/UL (ref 0–0.1)
BASOPHILS # BLD MANUAL: 0 THOUSAND/UL (ref 0–0.1)
BASOPHILS NFR MAR MANUAL: 0 % (ref 0–1)
BASOPHILS NFR MAR MANUAL: 0 % (ref 0–1)
BILIRUB SERPL-MCNC: 0.5 MG/DL (ref 0.2–1)
BILIRUB SERPL-MCNC: 0.8 MG/DL (ref 0.2–1)
BILIRUB UR QL STRIP: NEGATIVE
BILIRUB UR QL STRIP: NEGATIVE
BLD GP AB SCN SERPL QL: NEGATIVE
BPU ID: NORMAL
BUN SERPL-MCNC: 22 MG/DL (ref 5–25)
BUN SERPL-MCNC: 25 MG/DL (ref 5–25)
CALCIUM ALBUM COR SERPL-MCNC: 8.9 MG/DL (ref 8.3–10.1)
CALCIUM SERPL-MCNC: 8.3 MG/DL (ref 8.3–10.1)
CALCIUM SERPL-MCNC: 8.7 MG/DL (ref 8.3–10.1)
CAOX CRY URNS QL MICRO: ABNORMAL /HPF
CHLORIDE SERPL-SCNC: 101 MMOL/L (ref 100–108)
CHLORIDE SERPL-SCNC: 101 MMOL/L (ref 100–108)
CLARITY UR: ABNORMAL
CLARITY UR: CLEAR
CO2 SERPL-SCNC: 23 MMOL/L (ref 21–32)
CO2 SERPL-SCNC: 24 MMOL/L (ref 21–32)
COLOR FLD: ABNORMAL
COLOR UR: YELLOW
COLOR UR: YELLOW
CREAT SERPL-MCNC: 1.09 MG/DL (ref 0.6–1.3)
CREAT SERPL-MCNC: 1.44 MG/DL (ref 0.6–1.3)
CROSSMATCH: NORMAL
CRP SERPL QL: 15.4 MG/L
CRYSTALS SNV QL MICRO: NORMAL
DACRYOCYTES BLD QL SMEAR: PRESENT
EOSINOPHIL # BLD MANUAL: 0 THOUSAND/UL (ref 0–0.4)
EOSINOPHIL # BLD MANUAL: 0 THOUSAND/UL (ref 0–0.4)
EOSINOPHIL NFR BLD MANUAL: 0 % (ref 0–6)
EOSINOPHIL NFR BLD MANUAL: 0 % (ref 0–6)
ERYTHROCYTE [DISTWIDTH] IN BLOOD BY AUTOMATED COUNT: 15.8 % (ref 11.6–15.1)
ERYTHROCYTE [DISTWIDTH] IN BLOOD BY AUTOMATED COUNT: 16.2 % (ref 11.6–15.1)
GFR SERPL CREATININE-BSD FRML MDRD: 36 ML/MIN/1.73SQ M
GFR SERPL CREATININE-BSD FRML MDRD: 50 ML/MIN/1.73SQ M
GLUCOSE SERPL-MCNC: 115 MG/DL (ref 65–140)
GLUCOSE SERPL-MCNC: 135 MG/DL (ref 65–140)
GLUCOSE UR STRIP-MCNC: NEGATIVE MG/DL
GLUCOSE UR STRIP-MCNC: NEGATIVE MG/DL
HCT VFR BLD AUTO: 20.5 % (ref 34.8–46.1)
HCT VFR BLD AUTO: 22.9 % (ref 34.8–46.1)
HCT VFR BLD AUTO: 27 % (ref 34.8–46.1)
HGB BLD-MCNC: 6.7 G/DL (ref 11.5–15.4)
HGB BLD-MCNC: 7.5 G/DL (ref 11.5–15.4)
HGB BLD-MCNC: 9 G/DL (ref 11.5–15.4)
HGB UR QL STRIP.AUTO: ABNORMAL
HGB UR QL STRIP.AUTO: NEGATIVE
HYPERCHROMIA BLD QL SMEAR: PRESENT
HYPERCHROMIA BLD QL SMEAR: PRESENT
INR PPP: 1.13 (ref 0.84–1.19)
KETONES UR STRIP-MCNC: NEGATIVE MG/DL
KETONES UR STRIP-MCNC: NEGATIVE MG/DL
LACTATE SERPL-SCNC: 1.6 MMOL/L (ref 0.5–2)
LEUKOCYTE ESTERASE UR QL STRIP: ABNORMAL
LEUKOCYTE ESTERASE UR QL STRIP: NEGATIVE
LYMPHOCYTES # BLD AUTO: 0.24 THOUSAND/UL (ref 0.6–4.47)
LYMPHOCYTES # BLD AUTO: 0.31 THOUSAND/UL (ref 0.6–4.47)
LYMPHOCYTES # BLD AUTO: 3 % (ref 14–44)
LYMPHOCYTES # BLD AUTO: 3 % (ref 14–44)
LYMPHOCYTES # SNV MANUAL: 4 %
MACROCYTES BLD QL AUTO: PRESENT
MAGNESIUM SERPL-MCNC: 1.9 MG/DL (ref 1.6–2.6)
MCH RBC QN AUTO: 34.4 PG (ref 26.8–34.3)
MCH RBC QN AUTO: 35.1 PG (ref 26.8–34.3)
MCHC RBC AUTO-ENTMCNC: 32.7 G/DL (ref 31.4–37.4)
MCHC RBC AUTO-ENTMCNC: 32.8 G/DL (ref 31.4–37.4)
MCV RBC AUTO: 105 FL (ref 82–98)
MCV RBC AUTO: 107 FL (ref 82–98)
MONOCYTES # BLD AUTO: 0 THOUSAND/UL (ref 0–1.22)
MONOCYTES # BLD AUTO: 0.52 THOUSAND/UL (ref 0–1.22)
MONOCYTES NFR BLD: 0 % (ref 4–12)
MONOCYTES NFR BLD: 5 % (ref 4–12)
MONOCYTES NFR SNV MANUAL: 2 %
NEUTROPHILS # BLD MANUAL: 7.7 THOUSAND/UL (ref 1.85–7.62)
NEUTROPHILS # BLD MANUAL: 9.5 THOUSAND/UL (ref 1.85–7.62)
NEUTROPHILS NFR SNV MANUAL: 94 %
NEUTS BAND NFR BLD MANUAL: 3 % (ref 0–8)
NEUTS SEG NFR BLD AUTO: 92 % (ref 43–75)
NEUTS SEG NFR BLD AUTO: 94 % (ref 43–75)
NITRITE UR QL STRIP: NEGATIVE
NITRITE UR QL STRIP: NEGATIVE
NON-SQ EPI CELLS URNS QL MICRO: ABNORMAL /HPF
PH UR STRIP.AUTO: 6.5 [PH]
PH UR STRIP.AUTO: 7 [PH]
PHOSPHATE SERPL-MCNC: 4.3 MG/DL (ref 2.3–4.1)
PLATELET # BLD AUTO: 173 THOUSANDS/UL (ref 149–390)
PLATELET # BLD AUTO: 215 THOUSANDS/UL (ref 149–390)
PLATELET BLD QL SMEAR: ADEQUATE
PLATELET BLD QL SMEAR: ADEQUATE
PMV BLD AUTO: 10.4 FL (ref 8.9–12.7)
PMV BLD AUTO: 9.9 FL (ref 8.9–12.7)
POIKILOCYTOSIS BLD QL SMEAR: PRESENT
POTASSIUM SERPL-SCNC: 3.9 MMOL/L (ref 3.5–5.3)
POTASSIUM SERPL-SCNC: 4.1 MMOL/L (ref 3.5–5.3)
PROCALCITONIN SERPL-MCNC: 0.62 NG/ML
PROCALCITONIN SERPL-MCNC: 2.76 NG/ML
PROT SERPL-MCNC: 6.3 G/DL (ref 6.4–8.2)
PROT SERPL-MCNC: 6.8 G/DL (ref 6.4–8.2)
PROT UR STRIP-MCNC: NEGATIVE MG/DL
PROT UR STRIP-MCNC: NEGATIVE MG/DL
PROTHROMBIN TIME: 14.4 SECONDS (ref 11.6–14.5)
RBC # BLD AUTO: 1.91 MILLION/UL (ref 3.81–5.12)
RBC # BLD AUTO: 2.18 MILLION/UL (ref 3.81–5.12)
RBC # SNV MANUAL: ABNORMAL /UL (ref 0–10)
RBC #/AREA URNS AUTO: ABNORMAL /HPF
RBC MORPH BLD: PRESENT
RBC MORPH BLD: PRESENT
RH BLD: POSITIVE
RH BLD: POSITIVE
SITE: ABNORMAL
SODIUM SERPL-SCNC: 135 MMOL/L (ref 136–145)
SODIUM SERPL-SCNC: 136 MMOL/L (ref 136–145)
SP GR UR STRIP.AUTO: 1.01 (ref 1–1.03)
SP GR UR STRIP.AUTO: 1.02 (ref 1–1.03)
SPECIMEN EXPIRATION DATE: NORMAL
TOTAL CELLS COUNTED SPEC: 100
UNIT DISPENSE STATUS: NORMAL
UNIT PRODUCT CODE: NORMAL
UNIT PRODUCT VOLUME: 350 ML
UNIT RH: NORMAL
URATE CRY URNS QL MICRO: ABNORMAL /HPF
URATE SERPL-MCNC: 5.8 MG/DL (ref 2–6.8)
UROBILINOGEN UR QL STRIP.AUTO: 0.2 E.U./DL
UROBILINOGEN UR QL STRIP.AUTO: 0.2 E.U./DL
WBC # BLD AUTO: 10.33 THOUSAND/UL (ref 4.31–10.16)
WBC # BLD AUTO: 7.94 THOUSAND/UL (ref 4.31–10.16)
WBC # FLD MANUAL: ABNORMAL /UL (ref 0–200)
WBC #/AREA URNS AUTO: ABNORMAL /HPF

## 2022-07-03 PROCEDURE — 30233N1 TRANSFUSION OF NONAUTOLOGOUS RED BLOOD CELLS INTO PERIPHERAL VEIN, PERCUTANEOUS APPROACH: ICD-10-PCS | Performed by: HOSPITALIST

## 2022-07-03 PROCEDURE — 89060 EXAM SYNOVIAL FLUID CRYSTALS: CPT | Performed by: ORTHOPAEDIC SURGERY

## 2022-07-03 PROCEDURE — 85730 THROMBOPLASTIN TIME PARTIAL: CPT | Performed by: EMERGENCY MEDICINE

## 2022-07-03 PROCEDURE — 85018 HEMOGLOBIN: CPT | Performed by: INTERNAL MEDICINE

## 2022-07-03 PROCEDURE — 99223 1ST HOSP IP/OBS HIGH 75: CPT | Performed by: ORTHOPAEDIC SURGERY

## 2022-07-03 PROCEDURE — 36415 COLL VENOUS BLD VENIPUNCTURE: CPT | Performed by: EMERGENCY MEDICINE

## 2022-07-03 PROCEDURE — G1004 CDSM NDSC: HCPCS

## 2022-07-03 PROCEDURE — P9016 RBC LEUKOCYTES REDUCED: HCPCS

## 2022-07-03 PROCEDURE — 84100 ASSAY OF PHOSPHORUS: CPT | Performed by: PHYSICIAN ASSISTANT

## 2022-07-03 PROCEDURE — 96374 THER/PROPH/DIAG INJ IV PUSH: CPT

## 2022-07-03 PROCEDURE — 89050 BODY FLUID CELL COUNT: CPT | Performed by: ORTHOPAEDIC SURGERY

## 2022-07-03 PROCEDURE — 83735 ASSAY OF MAGNESIUM: CPT | Performed by: PHYSICIAN ASSISTANT

## 2022-07-03 PROCEDURE — 74176 CT ABD & PELVIS W/O CONTRAST: CPT

## 2022-07-03 PROCEDURE — 85610 PROTHROMBIN TIME: CPT | Performed by: EMERGENCY MEDICINE

## 2022-07-03 PROCEDURE — 80053 COMPREHEN METABOLIC PANEL: CPT | Performed by: PHYSICIAN ASSISTANT

## 2022-07-03 PROCEDURE — 99223 1ST HOSP IP/OBS HIGH 75: CPT | Performed by: INTERNAL MEDICINE

## 2022-07-03 PROCEDURE — 85027 COMPLETE CBC AUTOMATED: CPT | Performed by: PHYSICIAN ASSISTANT

## 2022-07-03 PROCEDURE — 73562 X-RAY EXAM OF KNEE 3: CPT

## 2022-07-03 PROCEDURE — 71250 CT THORAX DX C-: CPT

## 2022-07-03 PROCEDURE — 87070 CULTURE OTHR SPECIMN AEROBIC: CPT | Performed by: ORTHOPAEDIC SURGERY

## 2022-07-03 PROCEDURE — 20610 DRAIN/INJ JOINT/BURSA W/O US: CPT | Performed by: ORTHOPAEDIC SURGERY

## 2022-07-03 PROCEDURE — 89051 BODY FLUID CELL COUNT: CPT | Performed by: ORTHOPAEDIC SURGERY

## 2022-07-03 PROCEDURE — 70450 CT HEAD/BRAIN W/O DYE: CPT

## 2022-07-03 PROCEDURE — 85007 BL SMEAR W/DIFF WBC COUNT: CPT | Performed by: PHYSICIAN ASSISTANT

## 2022-07-03 PROCEDURE — 81001 URINALYSIS AUTO W/SCOPE: CPT | Performed by: EMERGENCY MEDICINE

## 2022-07-03 PROCEDURE — 85014 HEMATOCRIT: CPT | Performed by: INTERNAL MEDICINE

## 2022-07-03 PROCEDURE — 84145 PROCALCITONIN (PCT): CPT | Performed by: PHYSICIAN ASSISTANT

## 2022-07-03 PROCEDURE — 87205 SMEAR GRAM STAIN: CPT | Performed by: ORTHOPAEDIC SURGERY

## 2022-07-03 PROCEDURE — 81003 URINALYSIS AUTO W/O SCOPE: CPT | Performed by: PHYSICIAN ASSISTANT

## 2022-07-03 PROCEDURE — 0S9D3ZZ DRAINAGE OF LEFT KNEE JOINT, PERCUTANEOUS APPROACH: ICD-10-PCS | Performed by: HOSPITALIST

## 2022-07-03 RX ORDER — AVAPRITINIB 200 MG/1
200 TABLET, FILM COATED ORAL DAILY
COMMUNITY
End: 2022-07-20 | Stop reason: SDUPTHER

## 2022-07-03 RX ORDER — CEFEPIME HYDROCHLORIDE 1 G/50ML
1000 INJECTION, SOLUTION INTRAVENOUS EVERY 12 HOURS
Status: DISCONTINUED | OUTPATIENT
Start: 2022-07-03 | End: 2022-07-07 | Stop reason: HOSPADM

## 2022-07-03 RX ORDER — ACETAMINOPHEN 325 MG/1
650 TABLET ORAL EVERY 6 HOURS PRN
Status: DISCONTINUED | OUTPATIENT
Start: 2022-07-03 | End: 2022-07-07 | Stop reason: HOSPADM

## 2022-07-03 RX ORDER — CALCIUM CARBONATE 200(500)MG
1000 TABLET,CHEWABLE ORAL DAILY PRN
Status: DISCONTINUED | OUTPATIENT
Start: 2022-07-03 | End: 2022-07-07 | Stop reason: HOSPADM

## 2022-07-03 RX ORDER — PANTOPRAZOLE SODIUM 40 MG/1
40 TABLET, DELAYED RELEASE ORAL
Status: DISCONTINUED | OUTPATIENT
Start: 2022-07-03 | End: 2022-07-05

## 2022-07-03 RX ORDER — HYDROXYCHLOROQUINE SULFATE 200 MG/1
200 TABLET, FILM COATED ORAL 2 TIMES DAILY WITH MEALS
COMMUNITY
End: 2022-07-20 | Stop reason: SDUPTHER

## 2022-07-03 RX ORDER — SODIUM CHLORIDE, SODIUM GLUCONATE, SODIUM ACETATE, POTASSIUM CHLORIDE, MAGNESIUM CHLORIDE, SODIUM PHOSPHATE, DIBASIC, AND POTASSIUM PHOSPHATE .53; .5; .37; .037; .03; .012; .00082 G/100ML; G/100ML; G/100ML; G/100ML; G/100ML; G/100ML; G/100ML
75 INJECTION, SOLUTION INTRAVENOUS CONTINUOUS
Status: DISPENSED | OUTPATIENT
Start: 2022-07-03 | End: 2022-07-03

## 2022-07-03 RX ORDER — SENNOSIDES 8.6 MG
1 TABLET ORAL
Status: DISCONTINUED | OUTPATIENT
Start: 2022-07-03 | End: 2022-07-07 | Stop reason: HOSPADM

## 2022-07-03 RX ORDER — HYDROMORPHONE HCL IN WATER/PF 6 MG/30 ML
0.2 PATIENT CONTROLLED ANALGESIA SYRINGE INTRAVENOUS
Status: DISCONTINUED | OUTPATIENT
Start: 2022-07-03 | End: 2022-07-07 | Stop reason: HOSPADM

## 2022-07-03 RX ORDER — METOPROLOL SUCCINATE 25 MG/1
25 TABLET, EXTENDED RELEASE ORAL DAILY
COMMUNITY
End: 2022-07-20 | Stop reason: SDUPTHER

## 2022-07-03 RX ORDER — HYDROMORPHONE HCL/PF 1 MG/ML
0.5 SYRINGE (ML) INJECTION ONCE
Status: COMPLETED | OUTPATIENT
Start: 2022-07-03 | End: 2022-07-03

## 2022-07-03 RX ORDER — ONDANSETRON HYDROCHLORIDE 8 MG/1
8 TABLET, FILM COATED ORAL DAILY
COMMUNITY
End: 2022-07-20 | Stop reason: SDUPTHER

## 2022-07-03 RX ORDER — VANCOMYCIN HYDROCHLORIDE 1 G/200ML
20 INJECTION, SOLUTION INTRAVENOUS EVERY 24 HOURS
Status: DISCONTINUED | OUTPATIENT
Start: 2022-07-03 | End: 2022-07-06

## 2022-07-03 RX ORDER — TRAMADOL HYDROCHLORIDE 50 MG/1
100 TABLET ORAL EVERY 6 HOURS PRN
COMMUNITY
End: 2022-08-31

## 2022-07-03 RX ORDER — PANTOPRAZOLE SODIUM 40 MG/1
40 TABLET, DELAYED RELEASE ORAL 2 TIMES DAILY
COMMUNITY
End: 2022-07-20 | Stop reason: SDUPTHER

## 2022-07-03 RX ORDER — METOPROLOL SUCCINATE 25 MG/1
25 TABLET, EXTENDED RELEASE ORAL DAILY
Status: DISCONTINUED | OUTPATIENT
Start: 2022-07-03 | End: 2022-07-07 | Stop reason: HOSPADM

## 2022-07-03 RX ORDER — LIDOCAINE HYDROCHLORIDE 20 MG/ML
1 JELLY TOPICAL ONCE
Status: COMPLETED | OUTPATIENT
Start: 2022-07-03 | End: 2022-07-03

## 2022-07-03 RX ORDER — ASPIRIN 81 MG/1
81 TABLET ORAL DAILY
COMMUNITY

## 2022-07-03 RX ORDER — TRAMADOL HYDROCHLORIDE 50 MG/1
100 TABLET ORAL EVERY 6 HOURS PRN
Status: DISCONTINUED | OUTPATIENT
Start: 2022-07-03 | End: 2022-07-07 | Stop reason: HOSPADM

## 2022-07-03 RX ADMIN — TRAMADOL HYDROCHLORIDE 100 MG: 50 TABLET, COATED ORAL at 11:51

## 2022-07-03 RX ADMIN — CEFEPIME HYDROCHLORIDE 1000 MG: 1 INJECTION, SOLUTION INTRAVENOUS at 12:30

## 2022-07-03 RX ADMIN — PANTOPRAZOLE SODIUM 40 MG: 40 TABLET, DELAYED RELEASE ORAL at 07:42

## 2022-07-03 RX ADMIN — ACETAMINOPHEN 650 MG: 325 TABLET, FILM COATED ORAL at 15:21

## 2022-07-03 RX ADMIN — HYDROMORPHONE HYDROCHLORIDE 0.5 MG: 1 INJECTION, SOLUTION INTRAMUSCULAR; INTRAVENOUS; SUBCUTANEOUS at 05:45

## 2022-07-03 RX ADMIN — SODIUM CHLORIDE 3 ML: 9 INJECTION, SOLUTION INTRAMUSCULAR; INTRAVENOUS; SUBCUTANEOUS at 02:03

## 2022-07-03 RX ADMIN — LIDOCAINE HYDROCHLORIDE 1 APPLICATION: 20 JELLY TOPICAL at 05:45

## 2022-07-03 RX ADMIN — VANCOMYCIN HYDROCHLORIDE 1000 MG: 1 INJECTION, SOLUTION INTRAVENOUS at 13:31

## 2022-07-03 RX ADMIN — SODIUM CHLORIDE, SODIUM GLUCONATE, SODIUM ACETATE, POTASSIUM CHLORIDE, MAGNESIUM CHLORIDE, SODIUM PHOSPHATE, DIBASIC, AND POTASSIUM PHOSPHATE 75 ML/HR: .53; .5; .37; .037; .03; .012; .00082 INJECTION, SOLUTION INTRAVENOUS at 07:41

## 2022-07-03 RX ADMIN — METOPROLOL SUCCINATE 25 MG: 25 TABLET, FILM COATED, EXTENDED RELEASE ORAL at 07:42

## 2022-07-03 NOTE — ASSESSMENT & PLAN NOTE
· Hgb at 6 7 - baseline Hgb 7-8  · Intermittent requires blood transfusions at [facility]   · Hx of Dielafoy's lesions and Evaline Landsman lesions in upper GI requiring epi infection, cautery, and clipping in 2019   · EGD 08/2021 with multiple AVM cauterized   · Given 1U PRBCs in the ED - recheck H/H at 0800   · Placed on liquid diet to start   · Continue protonix   · Stool occults - stool on admission is brown colored (hemocolt in ED had tested positive, however stool brown)  · Monitor for melena - if develops, GI consult and make NPO

## 2022-07-03 NOTE — ED PROVIDER NOTES
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 Taking? Avapritinib (Ayvakit) 200 MG TABS   Yes No   Sig: Take 200 mg by mouth in the morning   aspirin (ECOTRIN LOW STRENGTH) 81 mg EC tablet   Yes No   Sig: Take 81 mg by mouth daily   hydroxychloroquine (PLAQUENIL) 200 mg tablet   Yes No   Sig: Take 200 mg by mouth 2 (two) times a day with meals Alternates with daily and BID   metoprolol succinate (TOPROL-XL) 25 mg 24 hr tablet   Yes No   Sig: Take 25 mg by mouth daily   ondansetron (ZOFRAN) 8 mg tablet   Yes No   Sig: Take 8 mg by mouth in the morning Prior to chemo   pantoprazole (PROTONIX) 40 mg tablet   Yes No   Sig: Take 40 mg by mouth 2 (two) times a day   traMADol (ULTRAM) 50 mg tablet   Yes No   Sig: Take 100 mg by mouth every 6 (six) hours as needed for moderate pain      Facility-Administered Medications: None       Past Medical History:   Diagnosis Date    Cancer [facility])     liver       History reviewed  No pertinent surgical history  History reviewed  No pertinent family history  I have reviewed and agree with the history as documented      E-Cigarette/Vaping     E-Cigarette/Vaping Substances     Social History     Tobacco Use    Smoking status: Never Smoker    Smokeless tobacco: Never Used   Substance Use Topics    Alcohol use: Never    Drug use: Never       Review of Systems   Unable to perform ROS: Dementia (ros also reviewed with )   Constitutional: Positive for fever  Negative for chills  HENT: Negative for rhinorrhea and sore throat  Respiratory: Negative for shortness of breath  Cardiovascular: Negative for chest pain  Gastrointestinal: Negative for blood in stool, constipation, diarrhea, nausea and vomiting  Genitourinary: Negative for dysuria and frequency  Musculoskeletal: Positive for arthralgias and back pain  Skin: Negative for rash  Neurological: Positive for weakness  All other systems reviewed and are negative  Physical Exam  Physical Exam  Vitals and nursing note reviewed  Constitutional:       Appearance: She is well-developed  HENT:      Head: Normocephalic and atraumatic  Comments: Birth jf at right side of face     Right Ear: External ear normal       Left Ear: External ear normal       Nose: Nose normal    Eyes:      Conjunctiva/sclera: Conjunctivae normal       Pupils: Pupils are equal, round, and reactive to light  Neck:      Meningeal: Brudzinski's sign and Kernig's sign absent  Cardiovascular:      Rate and Rhythm: Normal rate and regular rhythm  Heart sounds: Normal heart sounds  Pulmonary:      Effort: Pulmonary effort is normal  No respiratory distress  Breath sounds: Normal breath sounds  No wheezing  Chest:      Chest wall: No deformity, swelling or crepitus  Abdominal:      General: Bowel sounds are normal  There is no distension  Palpations: Abdomen is soft  Tenderness: There is no abdominal tenderness  Musculoskeletal:         General: No deformity  Cervical back: Normal range of motion and neck supple  No rigidity or bony tenderness  No spinous process tenderness  Thoracic back: No bony tenderness  Lumbar back: No bony tenderness        Right knee: Normal       Left knee: Swelling and effusion present  No erythema  Decreased range of motion  Tenderness present  Normal pulse  Comments: Diffuse tender left knee, limited active and passive ROM due to pain  Skin:     General: Skin is warm and dry  Coloration: Skin is pale  Findings: No rash  Neurological:      General: No focal deficit present  Mental Status: She is alert  Mental status is at baseline  She is disoriented  GCS: GCS eye subscore is 4  GCS verbal subscore is 4  GCS motor subscore is 6  Cranial Nerves: Cranial nerves are intact  Sensory: Sensation is intact  No sensory deficit  Motor: Motor function is intact        Comments: Generalized weakness   Psychiatric:         Mood and Affect: Mood normal          Vital Signs  ED Triage Vitals [07/02/22 2342]   Temperature Pulse Respirations Blood Pressure SpO2   100 1 °F (37 8 °C) 95 20 126/60 99 %      Temp Source Heart Rate Source Patient Position - Orthostatic VS BP Location FiO2 (%)   Oral Monitor Lying Right arm --      Pain Score       6           Vitals:    07/03/22 2216 07/04/22 0510 07/04/22 0600 07/04/22 0755   BP: 139/83 (!) 166/127 136/82 131/79   Pulse: 93 (!) 124 (!) 119 (!) 119   Patient Position - Orthostatic VS:             Visual Acuity      ED Medications  Medications   metoprolol succinate (TOPROL-XL) 24 hr tablet 25 mg ( Oral Canceled Entry 7/3/22 0900)   pantoprazole (PROTONIX) EC tablet 40 mg (0 mg Oral Hold 7/4/22 0608)   traMADol (ULTRAM) tablet 100 mg (100 mg Oral Given 7/4/22 0040)   multi-electrolyte (PLASMALYTE-A/ISOLYTE-S PH 7 4) IV solution (0 mL/hr Intravenous Stopped 7/3/22 2107)   acetaminophen (TYLENOL) tablet 650 mg (650 mg Oral Given 7/3/22 1521)   senna (SENOKOT) tablet 8 6 mg (has no administration in time range)   calcium carbonate (TUMS) chewable tablet 1,000 mg (has no administration in time range)   vancomycin (VANCOCIN) IVPB (premix in dextrose) 1,000 mg 200 mL (1,000 mg Intravenous New Bag 7/3/22 1331) cefepime (MAXIPIME) IVPB (premix in dextrose) 1,000 mg 50 mL (1,000 mg Intravenous New Bag 7/4/22 0026)   HYDROmorphone HCl (DILAUDID) injection 0 2 mg (0 2 mg Intravenous Given 7/4/22 0502)   nitroglycerin (NITROSTAT) SL tablet 0 4 mg (0 mg Sublingual Hold 7/4/22 0607)   tamsulosin (FLOMAX) capsule 0 4 mg (has no administration in time range)   lidocaine (URO-JET) 2 % urethral/mucosal gel 1 application (1 application Urethral Given 7/3/22 0545)   HYDROmorphone (DILAUDID) injection 0 5 mg (0 5 mg Intravenous Given 7/3/22 0545)   furosemide (LASIX) injection 40 mg (40 mg Intravenous Given 7/4/22 0519)       Diagnostic Studies  Results Reviewed     Procedure Component Value Units Date/Time    Blood culture #1 [[id number]] Collected: 07/02/22 2358    Lab Status: Preliminary result Specimen: Blood from Arm, Left Updated: 07/04/22 9995     Blood Culture No Growth at 24 hrs  Blood culture #2 [[id number]] Collected: 07/02/22 2358    Lab Status: Preliminary result Specimen: Blood from Arm, Right Updated: 07/04/22 0701     Blood Culture No Growth at 24 hrs      Urine Microscopic [[id number]]  (Abnormal) Collected: 07/03/22 0333    Lab Status: Final result Specimen: Urine, Clean Catch Updated: 07/03/22 0356     RBC, UA 10-20 /hpf      WBC, UA 4-10 /hpf      Epithelial Cells Moderate /hpf      Bacteria, UA Moderate /hpf      Ca Oxalate Monique, UA Occasional /hpf      Uric Acid Monique, UA Moderate /hpf     UA w Reflex to Microscopic w Reflex to Culture [[id number]]  (Abnormal) Collected: 07/03/22 0333    Lab Status: Final result Specimen: Urine, Clean Catch Updated: 07/03/22 0342     Color, UA Yellow     Clarity, UA Slightly Cloudy     Specific [place], UA 1 020     pH, UA 6 5     Leukocytes, UA Trace     Nitrite, UA Negative     Protein, UA Negative mg/dl      Glucose, UA Negative mg/dl      Ketones, UA Negative mg/dl      Urobilinogen, UA 0 2 E U /dl      Bilirubin, UA Negative     Occult Blood, UA Large    Uric acid [[id number]] (Normal) Collected: 07/02/22 2358    Lab Status: Final result Specimen: Blood from Arm, Left Updated: 07/03/22 0152     Uric Acid 5 8 mg/dL     Protime-INR [[id number]]  (Normal) Collected: 07/03/22 0120    Lab Status: Final result Specimen: Blood from Arm, Left Updated: 07/03/22 0140     Protime 14 4 seconds      INR 1 13    APTT [[id number]]  (Normal) Collected: 07/03/22 0120    Lab Status: Final result Specimen: Blood from Arm, Left Updated: 07/03/22 0140     PTT 27 seconds     CBC and differential [[id number]]  (Abnormal) Collected: 07/02/22 2358    Lab Status: Final result Specimen: Blood from Arm, Left Updated: 07/03/22 0119     WBC 7 94 Thousand/uL      RBC 1 91 Million/uL      Hemoglobin 6 7 g/dL      Hematocrit 20 5 %       fL      MCH 35 1 pg      MCHC 32 7 g/dL      RDW 15 8 %      MPV 10 4 fL      Platelets 268 Thousands/uL     Narrative: This is an appended report  These results have been appended to a previously verified report      Manual Differential(PHLEBS Do Not Order) [[id number]]  (Abnormal) Collected: 07/02/22 2358    Lab Status: Final result Specimen: Blood from Arm, Left Updated: 07/03/22 0119     Segmented % 94 %      Bands % 3 %      Lymphocytes % 3 %      Monocytes % 0 %      Eosinophils, % 0 %      Basophils % 0 %      Absolute Neutrophils 7 70 Thousand/uL      Lymphocytes Absolute 0 24 Thousand/uL      Monocytes Absolute 0 00 Thousand/uL      Eosinophils Absolute 0 00 Thousand/uL      Basophils Absolute 0 00 Thousand/uL      Total Counted --     RBC Morphology Present     Anisocytosis Present     Hypochromia Present     Poikilocytes Present     Tear Drop Cells Present     Platelet Estimate Adequate    Procalcitonin [[id number]]  (Abnormal) Collected: 07/02/22 2358    Lab Status: Final result Specimen: Blood from Arm, Left Updated: 07/03/22 0050     Procalcitonin 0 62 ng/ml     Lactic acid [[id number]]  (Normal) Collected: 07/02/22 2358    Lab Status: Final result Specimen: Blood from Arm, Left Updated: 07/03/22 0044     LACTIC ACID 1 6 mmol/L     Narrative:      Result may be elevated if tourniquet was used during collection  Comprehensive metabolic panel [[id number]]  (Abnormal) Collected: 07/02/22 2358    Lab Status: Final result Specimen: Blood from Arm, Left Updated: 07/03/22 0039     Sodium 136 mmol/L      Potassium 4 1 mmol/L      Chloride 101 mmol/L      CO2 24 mmol/L      ANION GAP 11 mmol/L      BUN 25 mg/dL      Creatinine 1 44 mg/dL      Glucose 135 mg/dL      Calcium 8 7 mg/dL      AST 53 U/L      ALT 28 U/L      Alkaline Phosphatase 74 U/L      Total Protein 6 8 g/dL      Albumin 3 6 g/dL      Total Bilirubin 0 50 mg/dL      eGFR 36 ml/min/1 73sq m     Narrative:      [place] guidelines for Chronic Kidney Disease (CKD):     Stage 1 with normal or high GFR (GFR > 90 mL/min/1 73 square meters)    Stage 2 Mild CKD (GFR = 60-89 mL/min/1 73 square meters)    Stage 3A Moderate CKD (GFR = 45-59 mL/min/1 73 square meters)    Stage 3B Moderate CKD (GFR = 30-44 mL/min/1 73 square meters)    Stage 4 Severe CKD (GFR = 15-29 mL/min/1 73 square meters)    Stage 5 End Stage CKD (GFR <15 mL/min/1 73 square meters)  Note: GFR calculation is accurate only with a steady state creatinine    C-reactive protein [[id number]]  (Abnormal) Collected: 07/02/22 2358    Lab Status: Final result Specimen: Blood from Arm, Left Updated: 07/03/22 0039     CRP 15 4 mg/L                  CT chest abdomen pelvis wo contrast   Final Result by Yoni Avila MD (60/03 4460)      1  There is a prominent left presacral nodule measuring up to 2 4 cm which has increased in size since prior exam where it measured 1 3 cm concerning for metastasis  Persistent gas within the endometrial canal may represent endometritis however    underlying endometrial carcinoma cannot be excluded  2   Marked distention of the urinary bladder with associated fullness of bilateral collecting systems  Correlate for bladder outlet obstruction  3   Cardiomegaly  4   Anemia  I personally discussed this study with Dr Kamini Crawford on 7/3/2022 at 3:14 AM          Workstation performed: [id number]         CT head without contrast   Final Result by Rohit Romero MD (07/03 1661)      No acute intracranial hemorrhage, midline shift, or mass effect  Workstation performed: [id number]         XR knee 3 views left non injury   ED Interpretation by Dee Torres DO (07/03 0026)   Possible effusion, no fracture      Final Result by Reena Oglesby MD (07/03 1224)      Unremarkable appearance of total knee arthroplasty  Small joint effusion  No acute osseous abnormalities  Workstation performed: [id number]         XR chest portable   ED Interpretation by Dee Torres DO (07/03 0027)   No acute abnl, rotated view      Final Result by Paulo Snow MD (07/03 1124)      No acute cardiopulmonary disease                    Workstation performed: [id number]         XR chest portable    (Results Pending)              Procedures  ECG 12 Lead Documentation Only    Date/Time: 7/3/2022 12:31 AM  Performed by: Dee Torres DO  Authorized by: Dee Torres DO     Indications / Diagnosis:  Sirs criteria, tender right chest  ECG reviewed by me, the ED Provider: yes    Patient location:  ED  Previous ECG:     Previous ECG:  Unavailable  Interpretation:     Interpretation: non-specific    Rate:     ECG rate:  88    ECG rate assessment: normal    Rhythm:     Rhythm: sinus rhythm    Ectopy:     Ectopy: none    QRS:     QRS axis:  Normal    QRS intervals:  Normal  Conduction:     Conduction: normal    ST segments:     ST segments:  Normal  T waves:     T waves: normal               ED Course  ED Course as of 07/04/22 0832   Sun Jul 03, 2022   Ela Booth Credit from [facility] did left TKR    0126 Reviewed with Dr Cielo Carter ortho via tiger text - agrees with medical admission and consult ortho in morning for fever and left knee pain s/p remote TKR  Notified N Daria hospitalist    1628 Sign out to [address] - generalized weakness, fever, left knee pain  Results today with acute on chronic anemia - blood transfusion ordered  Ct scan head and chest abd pelvis pending, ua pending  Med surg admission                               SBIRT 20yo+    Flowsheet Row Most Recent Value   SBIRT (25 yo +)    In order to provide better care to our patients, we are screening all of our patients for alcohol and drug use  Would it be okay to ask you these screening questions?  Unable to answer at this time Filed at: 07/02/2022 0107                    [facility]  Number of Diagnoses or Management Options  Acute pain of left knee: new and requires workup  Endometritis: new and requires workup  Fever: new and requires workup  Generalized weakness: new and requires workup  Intra-abdominal tumor: new and requires workup  Symptomatic anemia: new and requires workup     Amount and/or Complexity of Data Reviewed  Clinical lab tests: ordered and reviewed  Tests in the radiology section of CPT®: ordered and reviewed  Obtain history from someone other than the patient: yes  Discuss the patient with other providers: yes    Patient Progress  Patient progress: improved      Disposition  Final diagnoses:   Generalized weakness   Symptomatic anemia   Acute pain of left knee   Fever   Endometritis   Intra-abdominal tumor     Time reflects when diagnosis was documented in both MDM as applicable and the Disposition within this note     Time User Action Codes Description Comment    7/3/2022  1:11 AM Vázquez Picking Add [R53 1] Generalized weakness     7/3/2022  1:11 AM Vázquez Picking Add [D64 9] Symptomatic anemia     7/3/2022  1:11 AM Vázquez Picking Add [M25 562] Acute pain of left knee     7/3/2022  1:12 AM Vázquez Picking Add [R50 9] Fever     7/3/2022  6:56 AM Ashley Bert Add [M25 562] Left knee pain, unspecified chronicity     7/3/2022 11:26 AM Kamilla Narayanan Add [M00 9] Pyogenic arthritis of left knee joint, due to unspecified organism ([facility] [address] )     7/3/2022 11:26 AM Nam Pilon Add [D64 9] Anemia, unspecified type     7/3/2022 11:50 AM Windy Blew Add [T84 59XA,  R67 854] Infection of total knee replacement ([facility] [address] )     7/3/2022 11:51 AM Windy Blew Add [C79 9] Metastatic malignant neoplasm, unspecified site [facility])     7/4/2022  8:31 AM Lawernce Simsbury Add [N71 9] Endometritis     7/4/2022  8:32 AM Lawernce Simsbury Add [D49 89] Intra-abdominal tumor       ED Disposition     ED Disposition   Admit    Condition   Stable    Date/Time   Sun Jul 3, 2022  1:11 AM    Comment   Case was discussed with Noah Doherty and the patient's admission status was agreed to be Admission Status: inpatient status to the service of Dr Pancho Jerome**   Follow-up Information    None         Current Discharge Medication List      CONTINUE these medications which have NOT CHANGED    Details   aspirin (ECOTRIN LOW STRENGTH) 81 mg EC tablet Take 81 mg by mouth daily      Avapritinib (Ayvakit) 200 MG TABS Take 200 mg by mouth in the morning      hydroxychloroquine (PLAQUENIL) 200 mg tablet Take 200 mg by mouth 2 (two) times a day with meals Alternates with daily and BID      metoprolol succinate (TOPROL-XL) 25 mg 24 hr tablet Take 25 mg by mouth daily      ondansetron (ZOFRAN) 8 mg tablet Take 8 mg by mouth in the morning Prior to chemo      pantoprazole (PROTONIX) 40 mg tablet Take 40 mg by mouth 2 (two) times a day      traMADol (ULTRAM) 50 mg tablet Take 100 mg by mouth every 6 (six) hours as needed for moderate pain             No discharge procedures on file      PDMP Review     None          ED Provider  Electronically Signed by           Johanny Frances DO  07/04/22 5426

## 2022-07-03 NOTE — PROGRESS NOTES
Vancomycin Assessment    Taqueria Francisco is a 66 y o  female who will be receiving vancomycin 1000 mg Q24H (start 1330 hrs 7/3) for      indication: bone/joint infection    Relevant clinical data and objective history reviewed:  Creatinine   Date Value Ref Range Status   07/03/2022 1 09 0 60 - 1 30 mg/dL Final     Comment:     Standardized to IDMS reference method   07/02/2022 1 44 (H) 0 60 - 1 30 mg/dL Final     Comment:     Standardized to IDMS reference method     /73   Pulse 98   Temp 97 9 °F (36 6 °C)   Resp 16   Ht 5' 1" (1 549 m)   Wt 50 3 kg (111 lb)   SpO2 93%   BMI 20 97 kg/m²   I/O last 3 completed shifts: In: 512 5 [Blood:512 5]  Out: 0945 [Urine:1242]  Lab Results   Component Value Date/Time    BUN 22 07/03/2022 09:44 AM    WBC 10 33 (H) 07/03/2022 07:36 AM    HGB 7 5 (L) 07/03/2022 07:36 AM    HCT 22 9 (L) 07/03/2022 07:36 AM     (H) 07/03/2022 07:36 AM     07/03/2022 07:36 AM     Temp Readings from Last 3 Encounters:   07/03/22 97 9 °F (36 6 °C)     Vancomycin Days of Therapy: 1    Assessment/Plan  The patient is currently on vancomycin utilizing scheduled dosing based on actual body weight  Baseline risks associated with therapy include: pre-existing renal impairment, advanced age, and dehydration  The patient will receive 1000 mg Q24H (start 1330 hrs 7/3) and is clinically appropriate at this time  Pharmacy will also follow closely for s/sx of nephrotoxicity, infusion reactions, and appropriateness of therapy  BMP and CBC will be ordered per protocol  Plan for trough as patient approaches steady state, prior to the 4th  dose at approximately 1300 hrs on 7/6/22  Due to infection severity, will target a trough of 15-20 (appropriate for most indications)   Pharmacy will continue to follow the patients culture results and clinical progress daily      Holger Edwards, Tamy

## 2022-07-03 NOTE — ASSESSMENT & PLAN NOTE
Case discussed today with orthopedic team, concern for infection left total knee replacement    Culture sent, blood cultures sent yesterday    Start vancomycin cefepime, consult Infectious Disease  NPO after midnight for planned surgical removal of hardware tomorrow, patient is medically stable and okay to proceed to operating room without any further cardiac or pulmonary testing  Patient is at low cardiac risk for planned surgical intervention  EKG reviewed, showed normal sinus rhythm  No history of coronary artery disease, no history of CHF, no significant chronic lung disease

## 2022-07-03 NOTE — ASSESSMENT & PLAN NOTE
· Fever of 102F reported on EMS arrival with tylenol given by EMS - afebrile here    · Suspected infection left total knee, plan for OR tomorrow

## 2022-07-03 NOTE — CONSULTS
Orthopedics   Shannon Martha Almaraz 66 y o  female MRN: [id number]  Unit/Bed#: -01      Chief Complaint:   left knee pain    HPI:   66 y  o female complaining of left knee pain  Patient reports left knee pain at going on for the last 2 days  She also reports having doing much yd work and may have aggravated at that point  She had surgery done 5 years to 6 years ago by Dr Maldonado Garcia at [facility]   She did not contact this Dr  As of yet  She reports that the pain can be as high as 8 at 10 and as low as 2/10  She reported having a fever previously but does not report a fever currently  There is no fever on record the closest thing to have fever was a 100 1 temperature at 11:00 p m  Since then she has not been on any antibiotics and been afebrile  She does have anemia as long-term diagnosis baseline is 7 5 which she is currently today  She also has a white count of 10 3 today  She does report feeling warm  Review Of Systems:   · Skin: Normal  · Neuro: See HPI  · Musculoskeletal: See HPI  · 14 point review of systems negative except as stated above     Past Medical History:   Past Medical History:   Diagnosis Date    Cancer ([facility] [address] )     liver       Past Surgical History:   History reviewed  No pertinent surgical history  Family History:  Family history reviewed and non-contributory  History reviewed  No pertinent family history      Social History:  Social History     Socioeconomic History    Marital status: /Civil Union     Spouse name: None    Number of children: None    Years of education: None    Highest education level: None   Occupational History    None   Tobacco Use    Smoking status: Never Smoker    Smokeless tobacco: Never Used   Substance and Sexual Activity    Alcohol use: Never    Drug use: Never    Sexual activity: None   Other Topics Concern    None   Social History Narrative    None     Social Determinants of Health     Financial Resource Strain: Not on file Food Insecurity: Not on file   Transportation Needs: Not on file   Physical Activity: Not on file   Stress: Not on file   Social Connections: Not on file   Intimate Partner Violence: Not on file   Housing Stability: Not on file       Allergies: Allergies   Allergen Reactions    Codeine Anaphylaxis           Labs:  0   Lab Value Date/Time    HCT 22 9 (L) 07/03/2022 0736    HCT 20 5 (L) 07/02/2022 2358    HGB 7 5 (L) 07/03/2022 0736    HGB 6 7 (LL) 07/02/2022 2358    INR 1 13 07/03/2022 0120    WBC 10 33 (H) 07/03/2022 0736    WBC 7 94 07/02/2022 2358    CRP 15 4 (H) 07/02/2022 2358       Meds:    Current Facility-Administered Medications:     acetaminophen (TYLENOL) tablet 650 mg, 650 mg, Oral, Q6H PRN, Claudio Sosa PA-C    calcium carbonate (TUMS) chewable tablet 1,000 mg, 1,000 mg, Oral, Daily PRN, Claudio Sosa PA-C    metoprolol succinate (TOPROL-XL) 24 hr tablet 25 mg, 25 mg, Oral, Daily, Claudio Sosa, PA-C, 25 mg at 07/03/22 0742    multi-electrolyte (PLASMALYTE-A/ISOLYTE-S PH 7 4) IV solution, 75 mL/hr, Intravenous, Continuous, Claudio Sosa PA-C, Last Rate: 75 mL/hr at 07/03/22 0741, 75 mL/hr at 07/03/22 0741    pantoprazole (PROTONIX) EC tablet 40 mg, 40 mg, Oral, Early Morning, Claudio Sosa PA-C, 40 mg at 07/03/22 2620    senna (SENOKOT) tablet 8 6 mg, 1 tablet, Oral, HS PRN, Claudio Sosa PA-C    traMADol Jessica Butts) tablet 100 mg, 100 mg, Oral, Q6H PRN, Claudio Love, PA-C    Blood Culture:   Lab Results   Component Value Date    BLOODCX Received in Microbiology Lab  Culture in Progress  07/02/2022    BLOODCX Received in Microbiology Lab  Culture in Progress  07/02/2022       Wound Culture:   No results found for: WOUNDCULT    Ins and Outs:  I/O last 24 hours:   In: 512 5 [Blood:512 5]  Out: 5789 [Urine:1242]          Physical Exam:   /73   Pulse 98   Temp 97 9 °F (36 6 °C)   Resp 16   Ht 5' 1" (1 549 m)   Wt 50 3 kg (111 lb)   SpO2 93%   BMI 20 97 kg/m²   Gen: Alert and oriented to person, place, time  HEENT: EOMI, eyes clear, moist mucus membranes, hearing intact  Respiratory: Bilateral chest rise  No audible wheezing found  Cardiovascular: Regular Rate and Rhythm  Abdomen: soft nontender/nondistended  Musculoskeletal: right lower extremity  · Skin intact  · Tender to palpation over Medial, lateral, superior, inferior, posterior and anterior knee diffuse  · effusion difficult to appreciate there is no erythema but it is warm to palpation  · Can perform straight leg raise  · Unable to perform true test because the patient pain  Patient has pain with range of motion and micro motion  Radiology:   I personally reviewed the films  X-rays left knee shows a femoral component that looks to be loosening and looks to be loosening over time due to the sclerotic margins and lucency around the perimeter of the implant  This may be consistent with chronic loosening     _*_*_*_*_*_*_*_*_*_*_*_*_*_*_*_*_*_*_*_*_*_*_*_*_*_*_*_*_*_*_*_*_*_*_*_*_*_*_*_*_*    Assessment:  66 y o  female with left knee pain questionable infection versus loosening of component  Plan:   · Non weight-bearing bearing as tolerated  left lower extremity  · PT  · Pain control  · Body mass index is 20 97 kg/m²  mildly obese  Recommend behavior modifications and nutrition  · Dispo: Ortho will follow  · Will perform arthrocentesis send this off for analysis at this time concern for infection is there are I would not do antibiotic until we get the aspirate  May start empiric antibiotics if primary team feels this is necessary  Would also recommend consult to Infectious Disease IF aspirate comes back positive  · We will make her NPO as of now just in case if this true is truly infected  If it is not infected we will recommend her follow-up with her primary surgeon as she will need a revision of the femoral component as it is loosening    This implant is not of what [facility] uses as probably more familiar with the primary surgeon anyway    Gretchen Weinstein, DO

## 2022-07-03 NOTE — ASSESSMENT & PLAN NOTE
· Will transfuse 1 unit of packed red blood cells, which will make two total units transfused this admission

## 2022-07-03 NOTE — ASSESSMENT & PLAN NOTE
· Markedly distended bladder seen on CT and palpable on exam   · Straight cath for 1100 CC on admission   · Urinary retention protocol

## 2022-07-03 NOTE — ASSESSMENT & PLAN NOTE
· Pt reports left knee pain and swelling onset yesterday ( states knee is swollen at baseline)   · Home temperature of 102F per EMS, however  reports no other fever at home  · Ortho consult for possible arthrocentesis

## 2022-07-03 NOTE — PLAN OF CARE
Problem: MOBILITY - ADULT  Goal: Maintain or return to baseline ADL function  Description: INTERVENTIONS:  -  Assess patient's ability to carry out ADLs; assess patient's baseline for ADL function and identify physical deficits which impact ability to perform ADLs (bathing, care of mouth/teeth, toileting, grooming, dressing, etc )  - Assess/evaluate cause of self-care deficits   - Assess range of motion  - Assess patient's mobility; develop plan if impaired  - Assess patient's need for assistive devices and provide as appropriate  - Encourage maximum independence but intervene and supervise when necessary  - Involve family in performance of ADLs  - Assess for home care needs following discharge   - Consider OT consult to assist with ADL evaluation and planning for discharge  - Provide patient education as appropriate  Outcome: Progressing  Goal: Maintains/Returns to pre admission functional level  Description: INTERVENTIONS:  - Perform BMAT or MOVE assessment daily    - Set and communicate daily mobility goal to care team and patient/family/caregiver  - Collaborate with rehabilitation services on mobility goals if consulted  - Perform Range of Motion 3 times a day  - Reposition patient every 3 hours    - Dangle patient 3 times a day  - Stand patient 3 times a day  - Ambulate patient 3 times a day  - Out of bed to chair 3 times a day   - Out of bed for meals 3 times a day  - Out of bed for toileting  - Record patient progress and toleration of activity level   Outcome: Progressing     Problem: Potential for Falls  Goal: Patient will remain free of falls  Description: INTERVENTIONS:  - Educate patient/family on patient safety including physical limitations  - Instruct patient to call for assistance with activity   - Consult OT/PT to assist with strengthening/mobility   - Keep Call bell within reach  - Keep bed low and locked with side rails adjusted as appropriate  - Keep care items and personal belongings within reach  - Initiate and maintain comfort rounds  - Make Fall Risk Sign visible to staff  - Offer Toileting every 3 Hours, in advance of need  - Initiate/Maintain bedalarm  - Obtain necessary fall risk management equipment:   - Apply yellow socks and bracelet for high fall risk patients  - Consider moving patient to room near nurses station  Outcome: Progressing     Problem: Prexisting or High Potential for Compromised Skin Integrity  Goal: Skin integrity is maintained or improved  Description: INTERVENTIONS:  - Identify patients at risk for skin breakdown  - Assess and monitor skin integrity  - Assess and monitor nutrition and hydration status  - Monitor labs   - Assess for incontinence   - Turn and reposition patient  - Assist with mobility/ambulation  - Relieve pressure over bony prominences  - Avoid friction and shearing  - Provide appropriate hygiene as needed including keeping skin clean and dry  - Evaluate need for skin moisturizer/barrier cream  - Collaborate with interdisciplinary team   - Patient/family teaching  - Consider wound care consult   Outcome: Progressing

## 2022-07-03 NOTE — ASSESSMENT & PLAN NOTE
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

## 2022-07-03 NOTE — ASSESSMENT & PLAN NOTE
· Does not appear to be on home anticoagulation (assuming to do anemia)  · Awaiting medication confirmation from  who will obtain list from home and bring to hospital   · Continue metoprolol 25mg daily found on EMR review

## 2022-07-03 NOTE — ASSESSMENT & PLAN NOTE
· Patient is not on home anticoagulation, confirmed with  at bedside    · Continue metoprolol 25mg daily found on EMR review

## 2022-07-03 NOTE — H&P
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, cautery, and clipping in 2019   · EGD 08/2021 with multiple AVM cauterized   · Given 1U PRBCs in the ED - recheck H/H at 0800   · Placed on liquid diet to start   · Continue protonix   · Stool occults - stool on admission is brown colored (hemocolt in ED had tested positive, however stool brown)  · Monitor for melena - if develops, GI consult and make NPO     Left knee pain  Assessment & Plan  · Pt reports left knee pain and swelling onset yesterday ( states knee is swollen at baseline)   · Home temperature of 102F per EMS, however  reports no other fever at home  · Ortho consult for possible arthrocentesis     Fever  Assessment & Plan  · Fever of 102F reported on EMS arrival with tylenol given by EMS - afebrile here  · Left knee with edema but is not erythematous or warm to touch   · CT C/A/P: " Marked distention of the urinary bladder with associated fullness of bilateral collecting systems   "  · Initial UA contaminated - pt straight cathed for repeat sterile sample and due to urinary retention seen on CT   · Repeat UA pending   · Ortho consulted due to effusion seen on XR - if arthocentesis suspicious for infection would start vancomycin and cefepime due to presence of prosthetic joint   · CT also showing "Persistent gas within the endometrial canal may represent endometritis however underlying endometrial carcinoma cannot be excluded "  · If recurrent fever without source identified consider gynecology consult     Atrial fibrillation ([address] )  Assessment & Plan  · Does not appear to be on home anticoagulation (assuming to do anemia)  · Awaiting medication confirmation from  who will obtain list from home and bring to hospital   · Continue metoprolol 25mg daily found on EMR review    Urinary retention  Assessment & Plan  · Markedly distended bladder seen on CT and palpable on exam   · Straight cath for 1100 CC on admission   · Urinary retention protocol     Metastatic cancer [facility])  Assessment & Plan  · 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   · 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 and b/l ureteral stent placement   · Currently maintained on avapritinib 200mg daily   · CT on admission showing "There is a prominent left presacral nodule measuring up to 2 4 cm which has increased in size since prior exam where it measured 1 3 cm concerning for metastasis"  · CT also showing "Persistent gas within the endometrial canal may represent endometritis however underlying endometrial carcinoma cannot be excluded "  · Needs further follow-up with Kirti Mauricio    Primary hypertension  Assessment & Plan  · Home regimen: metoprolol 25mg daily   · Continue    VTE Pharmacologic Prophylaxis: VTE Score: 3 VTE prophylaxis held due to anemia  Code Status: Level 1 - Full Code   Discussion with family: Updated  () via phone  Anticipated Length of Stay: Patient will be admitted on an inpatient basis with an anticipated length of stay of greater than 2 midnights secondary to anemia, fever, urinary retention, left knee pain, metastatic cancer  Total Time for Visit, including Counseling / Coordination of Care: 45 minutes Greater than 50% of this total time spent on direct patient counseling and coordination of care      Chief Complaint: "I am tired"    History of Present Illness:  Bety Skinner is a 66 y o  female with a PMH of metastatic cancer with rectal GIST primary s/p resection currently on daily PO chemotherapy, anemia, HTN, Afib, and left knee replacement who presents with confusion and lethargy per  at home  Fever reportedly noted by EMS to be 102F with tylenol given in route   denies fever at home  Pt reports pain in her left knee and swelling started yesterday, but  states the knee swells intermittently at baseline  No reports of urinary symptoms at home by   Pt denies dysuria or hematuria  She is unsure if her urine output has decreased  Pt admits to nausea with chemotherapy and intermittent diarrhea and constipation at baseline  Reports abdominal fullness  Pt denies fever at home saying her temperature was 98 6F  History limited as patient and  are both poor historians  Review of Systems:  Review of Systems   Constitutional: Positive for fatigue and fever (per EMS, negative per pt and )  Negative for chills  HENT: Negative for congestion  Respiratory: Negative for cough and shortness of breath  Cardiovascular: Negative for chest pain and leg swelling  Gastrointestinal: Positive for abdominal distention, abdominal pain, constipation, diarrhea and nausea  Negative for vomiting  Genitourinary: Negative for dysuria and hematuria  Musculoskeletal: Positive for arthralgias (left knee) and joint swelling (left knee)  Negative for gait problem  Psychiatric/Behavioral: Positive for confusion  All other systems reviewed and are negative  Past Medical and Surgical History:   Past Medical History:   Diagnosis Date    Cancer [facility])     liver       History reviewed  No pertinent surgical history  Meds/Allergies:  Prior to Admission medications    Medication Sig Start Date End Date Taking?  Authorizing Provider   aspirin (ECOTRIN LOW STRENGTH) 81 mg EC tablet Take 81 mg by mouth daily    Historical Provider, MD   Avapritinib (Ayvakit) 200 MG TABS Take 200 mg by mouth in the morning Historical Provider, MD   hydroxychloroquine (PLAQUENIL) 200 mg tablet Take 200 mg by mouth 2 (two) times a day with meals Alternates with daily and BID    Historical Provider, MD   metoprolol succinate (TOPROL-XL) 25 mg 24 hr tablet Take 25 mg by mouth daily    Historical Provider, MD   ondansetron (ZOFRAN) 8 mg tablet Take 8 mg by mouth in the morning Prior to chemo    Historical Provider, MD   pantoprazole (PROTONIX) 40 mg tablet Take 40 mg by mouth 2 (two) times a day    Historical Provider, MD   traMADol (ULTRAM) 50 mg tablet Take 100 mg by mouth every 6 (six) hours as needed for moderate pain    Historical Provider, MD     Unable to verify medication and doses with pt or  -  said he'll get a list together at home and bring it to the hospital      Allergies: Allergies   Allergen Reactions    Codeine Anaphylaxis       Social History:  Marital Status: /Civil Union   Occupation: retired   Patient Pre-hospital Living Situation: Home, With spouse  Patient Pre-hospital Level of Mobility: walks  Patient Pre-hospital Diet Restrictions: none   Substance Use History:   Social History     Substance and Sexual Activity   Alcohol Use Never     Social History     Tobacco Use   Smoking Status Never Smoker   Smokeless Tobacco Never Used     Social History     Substance and Sexual Activity   Drug Use Never       Family History:  History reviewed  No pertinent family history  Physical Exam:     Vitals:   Blood Pressure: 131/73 (07/03/22 0741)  Pulse: 98 (07/03/22 0741)  Temperature: 97 9 °F (36 6 °C) (07/03/22 0741)  Temp Source: Oral (07/03/22 0237)  Respirations: 16 (07/03/22 0741)  Height: 5' 1" (154 9 cm) (07/02/22 2342)  Weight - Scale: 50 3 kg (111 lb) (07/02/22 2342)  SpO2: 93 % (07/03/22 0746)    Physical Exam  Vitals and nursing note reviewed  Constitutional:       Appearance: She is ill-appearing (chronically)  Comments: Sleeping but easily arouseable and conversant      HENT: Head: Normocephalic  Nose: Nose normal       Mouth/Throat:      Mouth: Mucous membranes are dry  Eyes:      Extraocular Movements: Extraocular movements intact  Conjunctiva/sclera: Conjunctivae normal    Cardiovascular:      Rate and Rhythm: Normal rate  Pulses: Normal pulses  Heart sounds: No murmur heard  Pulmonary:      Effort: Pulmonary effort is normal       Breath sounds: Normal breath sounds  Abdominal:      General: There is distension (suprapubic distention)  Palpations: Abdomen is soft  Tenderness: There is abdominal tenderness  There is no guarding or rebound  Musculoskeletal:      Cervical back: Normal range of motion  Comments: Limited ROM of the left knee due to pain  Left knee swollen and tender to touch  No erythema  Not warm to touch  Skin:     General: Skin is warm and dry  Coloration: Skin is pale  Neurological:      General: No focal deficit present  Mental Status: She is alert  Comments: Oriented to self, place, month, year, and president  Psychiatric:         Mood and Affect: Mood normal          Thought Content:  Thought content normal           Additional Data:     Lab Results:  Results from last 7 days   Lab Units 07/03/22  0736 07/02/22  2358   WBC Thousand/uL 10 33* 7 94   HEMOGLOBIN g/dL 7 5* 6 7*   HEMATOCRIT % 22 9* 20 5*   PLATELETS Thousands/uL 173 215   BANDS PCT %  --  3   LYMPHO PCT %  --  3*   MONO PCT %  --  0*   EOS PCT %  --  0     Results from last 7 days   Lab Units 07/02/22  2358   SODIUM mmol/L 136   POTASSIUM mmol/L 4 1   CHLORIDE mmol/L 101   CO2 mmol/L 24   BUN mg/dL 25   CREATININE mg/dL 1 44*   ANION GAP mmol/L 11   CALCIUM mg/dL 8 7   ALBUMIN g/dL 3 6   TOTAL BILIRUBIN mg/dL 0 50   ALK PHOS U/L 74   ALT U/L 28   AST U/L 53*   GLUCOSE RANDOM mg/dL 135     Results from last 7 days   Lab Units 07/03/22  0120   INR  1 13             Results from last 7 days   Lab Units 07/02/22  2358   LACTIC ACID mmol/L 1 6 PROCALCITONIN ng/ml 0 62*       Imaging: Personally reviewed the following imaging: abdominal/pelvic CT  CT chest abdomen pelvis wo contrast   Final Result by Gallo Canas MD (15/55 [id number])      1  There is a prominent left presacral nodule measuring up to 2 4 cm which has increased in size since prior exam where it measured 1 3 cm concerning for metastasis  Persistent gas within the endometrial canal may represent endometritis however    underlying endometrial carcinoma cannot be excluded  2   Marked distention of the urinary bladder with associated fullness of bilateral collecting systems  Correlate for bladder outlet obstruction  3   Cardiomegaly  4   Anemia  I personally discussed this study with Dr Micki Gaston on 7/3/2022 at 3:14 AM          Workstation performed: [id number]         CT head without contrast   Final Result by Gallo Canas MD (07/03 6606)      No acute intracranial hemorrhage, midline shift, or mass effect  Workstation performed: [id number]         XR knee 3 views left non injury   ED Interpretation by Yuval Ortiz DO (07/03 0026)   Possible effusion, no fracture      XR chest portable   ED Interpretation by Yuval Ortiz DO (07/03 0027)   No acute abnl, rotated view          EKG and Other Studies Reviewed on Admission:   · EKG: No EKG obtained  ** Please Note: This note has been constructed using a voice recognition system   **

## 2022-07-03 NOTE — PROGRESS NOTES
[place]  Progress Note Nico Daley 1950, 66 y o  female MRN: [id number]  Unit/Bed#: -01 Encounter: [id number]  Primary Care Provider: No primary care provider on file  Date and time admitted to hospital: 7/2/2022 11:44 PM    * Infection of total knee replacement [facility])  Assessment & Plan  Case discussed today with orthopedic team, concern for infection left total knee replacement    Culture sent, blood cultures sent yesterday    Start vancomycin cefepime, consult Infectious Disease  NPO after midnight for planned surgical removal of hardware tomorrow, patient is medically stable and okay to proceed to operating room without any further cardiac or pulmonary testing  Patient is at low cardiac risk for planned surgical intervention  EKG reviewed, showed normal sinus rhythm  No history of coronary artery disease, no history of CHF, no significant chronic lung disease      Anemia  Assessment & Plan  · Will transfuse 1 unit of packed red blood cells, which will make two total units transfused this admission        Atrial fibrillation [facility])  Assessment & Plan  · Patient is not on home anticoagulation, confirmed with  at bedside    · Continue metoprolol 25mg daily found on EMR review    Urinary retention  Assessment & Plan  · Markedly distended bladder seen on CT and palpable on exam   · Straight cath for 1100 CC on admission   · Urinary retention protocol     Fever  Assessment & Plan  · Fever of 102F reported on EMS arrival with tylenol given by EMS - afebrile here    · Suspected infection left total knee, plan for OR tomorrow    Metastatic cancer [facility])  Assessment & Plan  · 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   · 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 and b/l ureteral stent placement   · Currently maintained on avapritinib 200mg daily   · CT on admission showing "There is a prominent left presacral nodule measuring up to 2 4 cm which has increased in size since prior exam where it measured 1 3 cm concerning for metastasis"  · CT also showing "Persistent gas within the endometrial canal may represent endometritis however underlying endometrial carcinoma cannot be excluded "  Consult Hematology-Oncology for acute on chronic anemia    Primary hypertension  Assessment & Plan  · Home regimen: metoprolol 25mg daily   · Continue    Left knee pain  Assessment & Plan  · Pt reports left knee pain and swelling onset yesterday ( states knee is swollen at baseline)         Subjective:   Patient complaining of left knee pain    Objective:     Vitals:   Temp (24hrs), Av 8 °F (37 1 °C), Min:97 9 °F (36 6 °C), Max:100 1 °F (37 8 °C)    Temp:  [97 9 °F (36 6 °C)-100 1 °F (37 8 °C)] 97 9 °F (36 6 °C)  HR:  [] 98  Resp:  [16-22] 16  BP: (109-132)/(56-75) 131/73  SpO2:  [89 %-99 %] 93 %  Body mass index is 20 97 kg/m²  Input and Output Summary (last 24 hours): Intake/Output Summary (Last 24 hours) at 7/3/2022 1221  Last data filed at 7/3/2022 0604  Gross per 24 hour   Intake 512 5 ml   Output 1242 ml   Net -729 5 ml       Physical Exam:   Physical Exam  Vitals and nursing note reviewed  Constitutional:       General: She is not in acute distress  Appearance: She is not ill-appearing, toxic-appearing or diaphoretic  Comments: Frail, elderly appearing female   HENT:      Head: Normocephalic and atraumatic  Right Ear: External ear normal       Left Ear: External ear normal    Cardiovascular:      Rate and Rhythm: Normal rate  Pulmonary:      Effort: Pulmonary effort is normal       Breath sounds: Normal breath sounds  No wheezing, rhonchi or rales  Abdominal:      General: Abdomen is flat  Bowel sounds are normal  There is no distension  Palpations: Abdomen is soft  Tenderness: There is no abdominal tenderness     Musculoskeletal:      Cervical back: Normal range of motion  Right lower leg: Edema present  Left lower leg: Edema present  Skin:     Coloration: Skin is pale  Neurological:      Mental Status: Mental status is at baseline  Psychiatric:         Mood and Affect: Mood normal          Behavior: Behavior normal          Thought Content: Thought content normal          Judgment: Judgment normal           Additional Data:     Labs:  Results from last 7 days   Lab Units 07/03/22  0736 07/02/22  2358   WBC Thousand/uL 10 33* 7 94   HEMOGLOBIN g/dL 7 5* 6 7*   HEMATOCRIT % 22 9* 20 5*   PLATELETS Thousands/uL 173 215   BANDS PCT %  --  3   LYMPHO PCT % 3* 3*   MONO PCT % 5 0*   EOS PCT % 0 0     Results from last 7 days   Lab Units 07/03/22  0944   SODIUM mmol/L 135*   POTASSIUM mmol/L 3 9   CHLORIDE mmol/L 101   CO2 mmol/L 23   BUN mg/dL 22   CREATININE mg/dL 1 09   ANION GAP mmol/L 11   CALCIUM mg/dL 8 3   ALBUMIN g/dL 3 2*   TOTAL BILIRUBIN mg/dL 0 80   ALK PHOS U/L 65   ALT U/L 26   AST U/L 42   GLUCOSE RANDOM mg/dL 115     Results from last 7 days   Lab Units 07/03/22  0120   INR  1 13             Results from last 7 days   Lab Units 07/03/22  0944 07/02/22  2358   LACTIC ACID mmol/L  --  1 6   PROCALCITONIN ng/ml 2 76* 0 62*       Lines/Drains:  Invasive Devices  Report    Peripheral Intravenous Line  Duration           Peripheral IV 07/03/22 Right Antecubital <1 day                      Imaging: Reviewed radiology reports from this admission including: chest CT scan    Recent Cultures (last 7 days):   Results from last 7 days   Lab Units 07/02/22  2358   BLOOD CULTURE  Received in Microbiology Lab  Culture in Progress  Received in Microbiology Lab  Culture in Progress         Last 24 Hours Medication List:   Current Facility-Administered Medications   Medication Dose Route Frequency Provider Last Rate    acetaminophen  650 mg Oral Q6H PRN Denise Pollard PA-C      calcium carbonate  1,000 mg Oral Daily PRN Denise Pollard PA-C  cefepime  1,000 mg Intravenous Q12H [facility] [place]      HYDROmorphone  0 2 mg Intravenous Q3H PRN [facility]      metoprolol succinate  25 mg Oral Daily Maryanne Taylor      multi-electrolyte  75 mL/hr Intravenous Continuous [facility], [place] 75 mL/hr (07/03/22 0741)    pantoprazole  40 mg Oral Early Morning Lenin Chan PA-C      senna  1 tablet Oral HS PRN Lenin Chan PA-C      traMADol  100 mg Oral Q6H PRN Lenin Chan PA-C      vancomycin  20 mg/kg Intravenous Q24H Estrada Duncan DO          Today, Patient Was Seen By: Estrada Duncan DO    **Please Note: This note may have been constructed using a voice recognition system  **

## 2022-07-03 NOTE — ASSESSMENT & PLAN NOTE
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

## 2022-07-03 NOTE — ASSESSMENT & PLAN NOTE
· Fever of 102F reported on EMS arrival with tylenol given by EMS - afebrile here  · Left knee with edema but is not erythematous or warm to touch   · CT C/A/P: " Marked distention of the urinary bladder with associated fullness of bilateral collecting systems   "  · Initial UA contaminated - pt straight cathed for repeat sterile sample and due to urinary retention seen on CT   · Repeat UA pending   · Ortho consulted due to effusion seen on XR - if arthocentesis suspicious for infection would start vancomycin and cefepime due to presence of prosthetic joint   · CT also showing "Persistent gas within the endometrial canal may represent endometritis however underlying endometrial carcinoma cannot be excluded "  · If recurrent fever without source identified consider gynecology consult

## 2022-07-03 NOTE — PROCEDURES
Procedure- Orthopedics   Sai Campbellcine 66 y o  female MRN: [id number]  Unit/Bed#: -01    Procedure: left knee aspiration    After sterile preparation of the skin overlying the knee local anesthetic was provided with 5cc of 1% lidocaine  An 18 gauge needle was then  inserted via a superior lateral portal   Approx 12 cc of purulent fluid was aspirated and sent for gram stain, culture, synovial WBC/RBC, and crystals  Sterile dressing was then applied  Pt tolerated the procedure well and was neurovascularly intact both pre and post procedure  Patient was properly consented for surgical procedure I answered all questions  and wife had      Carolina Dobbs DO

## 2022-07-04 ENCOUNTER — ANESTHESIA EVENT (OUTPATIENT)
Dept: PERIOP | Facility: HOSPITAL | Age: 72
End: 2022-07-04

## 2022-07-04 ENCOUNTER — APPOINTMENT (INPATIENT)
Dept: RADIOLOGY | Facility: HOSPITAL | Age: 72
DRG: 559 | End: 2022-07-04
Payer: COMMERCIAL

## 2022-07-04 ENCOUNTER — ANESTHESIA (OUTPATIENT)
Dept: PERIOP | Facility: HOSPITAL | Age: 72
End: 2022-07-04

## 2022-07-04 PROBLEM — N17.9 AKI (ACUTE KIDNEY INJURY) (HCC): Status: ACTIVE | Noted: 2022-07-04

## 2022-07-04 PROBLEM — J81.0 ACUTE PULMONARY EDEMA (HCC): Status: RESOLVED | Noted: 2022-07-04 | Resolved: 2022-07-04

## 2022-07-04 PROBLEM — M00.9 ARTHRITIS, SEPTIC, KNEE (HCC): Status: ACTIVE | Noted: 2022-07-03

## 2022-07-04 PROBLEM — R79.89 ELEVATED LACTIC ACID LEVEL: Status: ACTIVE | Noted: 2022-07-04

## 2022-07-04 PROBLEM — J96.01 ACUTE RESPIRATORY FAILURE WITH HYPOXIA (HCC): Status: ACTIVE | Noted: 2022-07-04

## 2022-07-04 PROBLEM — R79.89 ELEVATED LACTIC ACID LEVEL: Status: RESOLVED | Noted: 2022-07-04 | Resolved: 2022-07-04

## 2022-07-04 PROBLEM — J81.0 ACUTE PULMONARY EDEMA (HCC): Status: ACTIVE | Noted: 2022-07-04

## 2022-07-04 PROBLEM — I50.40 COMBINED CONGESTIVE SYSTOLIC AND DIASTOLIC HEART FAILURE (HCC): Status: ACTIVE | Noted: 2022-07-04

## 2022-07-04 LAB
4HR DELTA HS TROPONIN: 349 NG/L
ABO GROUP BLD BPU: NORMAL
ALBUMIN SERPL BCP-MCNC: 3.3 G/DL (ref 3.5–5)
ALP SERPL-CCNC: 86 U/L (ref 46–116)
ALT SERPL W P-5'-P-CCNC: 29 U/L (ref 12–78)
ANION GAP SERPL CALCULATED.3IONS-SCNC: 10 MMOL/L (ref 4–13)
ANION GAP SERPL CALCULATED.3IONS-SCNC: 15 MMOL/L (ref 4–13)
AST SERPL W P-5'-P-CCNC: 46 U/L (ref 5–45)
ATRIAL RATE: 105 BPM
ATRIAL RATE: 110 BPM
ATRIAL RATE: 133 BPM
BASE EXCESS BLDA CALC-SCNC: -6 MMOL/L (ref -2–3)
BASOPHILS # BLD AUTO: 0.02 THOUSANDS/ΜL (ref 0–0.1)
BASOPHILS NFR BLD AUTO: 0 % (ref 0–1)
BILIRUB SERPL-MCNC: 1.4 MG/DL (ref 0.2–1)
BPU ID: NORMAL
BUN SERPL-MCNC: 24 MG/DL (ref 5–25)
BUN SERPL-MCNC: 28 MG/DL (ref 5–25)
CA-I BLD-SCNC: 1.11 MMOL/L (ref 1.12–1.32)
CA-I BLD-SCNC: 1.11 MMOL/L (ref 1.12–1.32)
CA-I BLD-SCNC: 1.21 MMOL/L (ref 1.12–1.32)
CALCIUM ALBUM COR SERPL-MCNC: 9.9 MG/DL (ref 8.3–10.1)
CALCIUM SERPL-MCNC: 9 MG/DL (ref 8.3–10.1)
CALCIUM SERPL-MCNC: 9.3 MG/DL (ref 8.3–10.1)
CARDIAC TROPONIN I PNL SERPL HS: 237 NG/L
CARDIAC TROPONIN I PNL SERPL HS: 586 NG/L
CHLORIDE SERPL-SCNC: 95 MMOL/L (ref 100–108)
CHLORIDE SERPL-SCNC: 96 MMOL/L (ref 100–108)
CO2 SERPL-SCNC: 20 MMOL/L (ref 21–32)
CO2 SERPL-SCNC: 24 MMOL/L (ref 21–32)
CREAT SERPL-MCNC: 1.22 MG/DL (ref 0.6–1.3)
CREAT SERPL-MCNC: 1.33 MG/DL (ref 0.6–1.3)
CROSSMATCH: NORMAL
EOSINOPHIL # BLD AUTO: 0 THOUSAND/ΜL (ref 0–0.61)
EOSINOPHIL NFR BLD AUTO: 0 % (ref 0–6)
ERYTHROCYTE [DISTWIDTH] IN BLOOD BY AUTOMATED COUNT: 18.4 % (ref 11.6–15.1)
FIO2 GAS DIL.REBREATH: 100 L
GFR SERPL CREATININE-BSD FRML MDRD: 39 ML/MIN/1.73SQ M
GFR SERPL CREATININE-BSD FRML MDRD: 44 ML/MIN/1.73SQ M
GLUCOSE SERPL-MCNC: 110 MG/DL (ref 65–140)
GLUCOSE SERPL-MCNC: 118 MG/DL (ref 65–140)
GLUCOSE SERPL-MCNC: 176 MG/DL (ref 65–140)
GLUCOSE SERPL-MCNC: 188 MG/DL (ref 65–140)
HCO3 BLDA-SCNC: 18.2 MMOL/L (ref 22–28)
HCT VFR BLD AUTO: 31.7 % (ref 34.8–46.1)
HCT VFR BLD CALC: 30 % (ref 34.8–46.1)
HGB BLD-MCNC: 10.4 G/DL (ref 11.5–15.4)
HGB BLDA-MCNC: 10.2 G/DL (ref 11.5–15.4)
IMM GRANULOCYTES # BLD AUTO: 0.06 THOUSAND/UL (ref 0–0.2)
IMM GRANULOCYTES NFR BLD AUTO: 1 % (ref 0–2)
INR PPP: 1.2 (ref 0.84–1.19)
LACTATE SERPL-SCNC: 1.4 MMOL/L (ref 0.5–2)
LACTATE SERPL-SCNC: 3.9 MMOL/L (ref 0.5–2)
LYMPHOCYTES # BLD AUTO: 0.27 THOUSANDS/ΜL (ref 0.6–4.47)
LYMPHOCYTES NFR BLD AUTO: 3 % (ref 14–44)
MAGNESIUM SERPL-MCNC: 2.1 MG/DL (ref 1.6–2.6)
MAGNESIUM SERPL-MCNC: 2.3 MG/DL (ref 1.6–2.6)
MCH RBC QN AUTO: 33.2 PG (ref 26.8–34.3)
MCHC RBC AUTO-ENTMCNC: 32.8 G/DL (ref 31.4–37.4)
MCV RBC AUTO: 101 FL (ref 82–98)
MONOCYTES # BLD AUTO: 0.38 THOUSAND/ΜL (ref 0.17–1.22)
MONOCYTES NFR BLD AUTO: 4 % (ref 4–12)
NEUTROPHILS # BLD AUTO: 9.36 THOUSANDS/ΜL (ref 1.85–7.62)
NEUTS SEG NFR BLD AUTO: 92 % (ref 43–75)
NRBC BLD AUTO-RTO: 0 /100 WBCS
NT-PROBNP SERPL-MCNC: ABNORMAL PG/ML
P AXIS: 66 DEGREES
P AXIS: 77 DEGREES
PCO2 BLD: 19 MMOL/L (ref 21–32)
PCO2 BLD: 32.1 MM HG (ref 36–44)
PH BLD: 7.36 [PH] (ref 7.35–7.45)
PHOSPHATE SERPL-MCNC: 4.2 MG/DL (ref 2.3–4.1)
PHOSPHATE SERPL-MCNC: 4.6 MG/DL (ref 2.3–4.1)
PLATELET # BLD AUTO: 213 THOUSANDS/UL (ref 149–390)
PMV BLD AUTO: 10.2 FL (ref 8.9–12.7)
PO2 BLD: 107 MM HG (ref 75–129)
POTASSIUM BLD-SCNC: 3.7 MMOL/L (ref 3.5–5.3)
POTASSIUM SERPL-SCNC: 3.7 MMOL/L (ref 3.5–5.3)
POTASSIUM SERPL-SCNC: 4.1 MMOL/L (ref 3.5–5.3)
PR INTERVAL: 136 MS
PR INTERVAL: 144 MS
PROCALCITONIN SERPL-MCNC: 2.24 NG/ML
PROT SERPL-MCNC: 7.2 G/DL (ref 6.4–8.2)
PROTHROMBIN TIME: 15.1 SECONDS (ref 11.6–14.5)
QRS AXIS: -57 DEGREES
QRS AXIS: -61 DEGREES
QRS AXIS: -62 DEGREES
QRSD INTERVAL: 128 MS
QRSD INTERVAL: 128 MS
QRSD INTERVAL: 130 MS
QT INTERVAL: 312 MS
QT INTERVAL: 350 MS
QT INTERVAL: 358 MS
QTC INTERVAL: 464 MS
QTC INTERVAL: 473 MS
QTC INTERVAL: 473 MS
RBC # BLD AUTO: 3.13 MILLION/UL (ref 3.81–5.12)
SAO2 % BLD FROM PO2: 98 % (ref 60–85)
SODIUM BLD-SCNC: 131 MMOL/L (ref 136–145)
SODIUM SERPL-SCNC: 129 MMOL/L (ref 136–145)
SODIUM SERPL-SCNC: 131 MMOL/L (ref 136–145)
SPECIMEN SOURCE: ABNORMAL
T WAVE AXIS: 108 DEGREES
T WAVE AXIS: 86 DEGREES
T WAVE AXIS: 94 DEGREES
UNIT DISPENSE STATUS: NORMAL
UNIT PRODUCT CODE: NORMAL
UNIT PRODUCT VOLUME: 350 ML
UNIT RH: NORMAL
VENTRICULAR RATE: 105 BPM
VENTRICULAR RATE: 110 BPM
VENTRICULAR RATE: 133 BPM
WBC # BLD AUTO: 10.09 THOUSAND/UL (ref 4.31–10.16)

## 2022-07-04 PROCEDURE — 84145 PROCALCITONIN (PCT): CPT | Performed by: INTERNAL MEDICINE

## 2022-07-04 PROCEDURE — 84132 ASSAY OF SERUM POTASSIUM: CPT

## 2022-07-04 PROCEDURE — 83735 ASSAY OF MAGNESIUM: CPT | Performed by: NURSE PRACTITIONER

## 2022-07-04 PROCEDURE — 82330 ASSAY OF CALCIUM: CPT | Performed by: PHYSICIAN ASSISTANT

## 2022-07-04 PROCEDURE — 82947 ASSAY GLUCOSE BLOOD QUANT: CPT

## 2022-07-04 PROCEDURE — 93010 ELECTROCARDIOGRAM REPORT: CPT | Performed by: INTERNAL MEDICINE

## 2022-07-04 PROCEDURE — 82330 ASSAY OF CALCIUM: CPT | Performed by: NURSE PRACTITIONER

## 2022-07-04 PROCEDURE — 99222 1ST HOSP IP/OBS MODERATE 55: CPT | Performed by: INTERNAL MEDICINE

## 2022-07-04 PROCEDURE — 85014 HEMATOCRIT: CPT

## 2022-07-04 PROCEDURE — 82803 BLOOD GASES ANY COMBINATION: CPT

## 2022-07-04 PROCEDURE — 80053 COMPREHEN METABOLIC PANEL: CPT | Performed by: INTERNAL MEDICINE

## 2022-07-04 PROCEDURE — 83605 ASSAY OF LACTIC ACID: CPT | Performed by: INTERNAL MEDICINE

## 2022-07-04 PROCEDURE — 93005 ELECTROCARDIOGRAM TRACING: CPT

## 2022-07-04 PROCEDURE — 94760 N-INVAS EAR/PLS OXIMETRY 1: CPT

## 2022-07-04 PROCEDURE — 82330 ASSAY OF CALCIUM: CPT

## 2022-07-04 PROCEDURE — 84484 ASSAY OF TROPONIN QUANT: CPT | Performed by: INTERNAL MEDICINE

## 2022-07-04 PROCEDURE — 85025 COMPLETE CBC W/AUTO DIFF WBC: CPT | Performed by: INTERNAL MEDICINE

## 2022-07-04 PROCEDURE — 83735 ASSAY OF MAGNESIUM: CPT | Performed by: PHYSICIAN ASSISTANT

## 2022-07-04 PROCEDURE — 80048 BASIC METABOLIC PNL TOTAL CA: CPT | Performed by: NURSE PRACTITIONER

## 2022-07-04 PROCEDURE — 84100 ASSAY OF PHOSPHORUS: CPT | Performed by: NURSE PRACTITIONER

## 2022-07-04 PROCEDURE — 94002 VENT MGMT INPAT INIT DAY: CPT

## 2022-07-04 PROCEDURE — 99291 CRITICAL CARE FIRST HOUR: CPT | Performed by: INTERNAL MEDICINE

## 2022-07-04 PROCEDURE — 84100 ASSAY OF PHOSPHORUS: CPT | Performed by: PHYSICIAN ASSISTANT

## 2022-07-04 PROCEDURE — 80048 BASIC METABOLIC PNL TOTAL CA: CPT | Performed by: PHYSICIAN ASSISTANT

## 2022-07-04 PROCEDURE — 84100 ASSAY OF PHOSPHORUS: CPT | Performed by: INTERNAL MEDICINE

## 2022-07-04 PROCEDURE — 36600 WITHDRAWAL OF ARTERIAL BLOOD: CPT

## 2022-07-04 PROCEDURE — 84295 ASSAY OF SERUM SODIUM: CPT

## 2022-07-04 PROCEDURE — 99231 SBSQ HOSP IP/OBS SF/LOW 25: CPT | Performed by: ORTHOPAEDIC SURGERY

## 2022-07-04 PROCEDURE — 94660 CPAP INITIATION&MGMT: CPT

## 2022-07-04 PROCEDURE — 82948 REAGENT STRIP/BLOOD GLUCOSE: CPT

## 2022-07-04 PROCEDURE — 83880 ASSAY OF NATRIURETIC PEPTIDE: CPT | Performed by: INTERNAL MEDICINE

## 2022-07-04 PROCEDURE — 71045 X-RAY EXAM CHEST 1 VIEW: CPT

## 2022-07-04 PROCEDURE — 85610 PROTHROMBIN TIME: CPT | Performed by: INTERNAL MEDICINE

## 2022-07-04 PROCEDURE — 83735 ASSAY OF MAGNESIUM: CPT | Performed by: INTERNAL MEDICINE

## 2022-07-04 RX ORDER — HEPARIN SODIUM 5000 [USP'U]/ML
5000 INJECTION, SOLUTION INTRAVENOUS; SUBCUTANEOUS EVERY 8 HOURS SCHEDULED
Status: DISCONTINUED | OUTPATIENT
Start: 2022-07-04 | End: 2022-07-07 | Stop reason: HOSPADM

## 2022-07-04 RX ORDER — FUROSEMIDE 10 MG/ML
40 INJECTION INTRAMUSCULAR; INTRAVENOUS ONCE
Status: COMPLETED | OUTPATIENT
Start: 2022-07-04 | End: 2022-07-04

## 2022-07-04 RX ORDER — NITROGLYCERIN 0.4 MG/1
0.4 TABLET SUBLINGUAL
Status: DISCONTINUED | OUTPATIENT
Start: 2022-07-04 | End: 2022-07-07 | Stop reason: HOSPADM

## 2022-07-04 RX ORDER — FUROSEMIDE 10 MG/ML
40 INJECTION INTRAMUSCULAR; INTRAVENOUS
Status: DISCONTINUED | OUTPATIENT
Start: 2022-07-04 | End: 2022-07-04

## 2022-07-04 RX ORDER — POTASSIUM CHLORIDE 14.9 MG/ML
20 INJECTION INTRAVENOUS ONCE
Status: COMPLETED | OUTPATIENT
Start: 2022-07-04 | End: 2022-07-04

## 2022-07-04 RX ORDER — TAMSULOSIN HYDROCHLORIDE 0.4 MG/1
0.4 CAPSULE ORAL
Status: DISCONTINUED | OUTPATIENT
Start: 2022-07-04 | End: 2022-07-04

## 2022-07-04 RX ADMIN — HYDROMORPHONE HYDROCHLORIDE 0.2 MG: 0.2 INJECTION, SOLUTION INTRAMUSCULAR; INTRAVENOUS; SUBCUTANEOUS at 01:53

## 2022-07-04 RX ADMIN — CEFEPIME HYDROCHLORIDE 1000 MG: 1 INJECTION, SOLUTION INTRAVENOUS at 23:33

## 2022-07-04 RX ADMIN — CEFEPIME HYDROCHLORIDE 1000 MG: 1 INJECTION, SOLUTION INTRAVENOUS at 00:26

## 2022-07-04 RX ADMIN — HYDROMORPHONE HYDROCHLORIDE 0.2 MG: 0.2 INJECTION, SOLUTION INTRAMUSCULAR; INTRAVENOUS; SUBCUTANEOUS at 05:02

## 2022-07-04 RX ADMIN — CEFEPIME HYDROCHLORIDE 1000 MG: 1 INJECTION, SOLUTION INTRAVENOUS at 11:45

## 2022-07-04 RX ADMIN — POTASSIUM CHLORIDE 20 MEQ: 14.9 INJECTION, SOLUTION INTRAVENOUS at 11:45

## 2022-07-04 RX ADMIN — VANCOMYCIN HYDROCHLORIDE 1000 MG: 1 INJECTION, SOLUTION INTRAVENOUS at 13:15

## 2022-07-04 RX ADMIN — FUROSEMIDE 40 MG: 10 INJECTION, SOLUTION INTRAMUSCULAR; INTRAVENOUS at 22:29

## 2022-07-04 RX ADMIN — TRAMADOL HYDROCHLORIDE 100 MG: 50 TABLET, COATED ORAL at 00:40

## 2022-07-04 RX ADMIN — HYDROMORPHONE HYDROCHLORIDE 0.2 MG: 0.2 INJECTION, SOLUTION INTRAMUSCULAR; INTRAVENOUS; SUBCUTANEOUS at 10:23

## 2022-07-04 RX ADMIN — FUROSEMIDE 40 MG: 10 INJECTION, SOLUTION INTRAMUSCULAR; INTRAVENOUS at 05:19

## 2022-07-04 RX ADMIN — METOPROLOL SUCCINATE 25 MG: 25 TABLET, FILM COATED, EXTENDED RELEASE ORAL at 08:53

## 2022-07-04 RX ADMIN — FUROSEMIDE 40 MG: 10 INJECTION, SOLUTION INTRAVENOUS at 10:24

## 2022-07-04 RX ADMIN — HYDROMORPHONE HYDROCHLORIDE 0.2 MG: 0.2 INJECTION, SOLUTION INTRAMUSCULAR; INTRAVENOUS; SUBCUTANEOUS at 16:27

## 2022-07-04 RX ADMIN — HEPARIN SODIUM 5000 UNITS: 5000 INJECTION INTRAVENOUS; SUBCUTANEOUS at 16:00

## 2022-07-04 RX ADMIN — HEPARIN SODIUM 5000 UNITS: 5000 INJECTION INTRAVENOUS; SUBCUTANEOUS at 22:19

## 2022-07-04 NOTE — ASSESSMENT & PLAN NOTE
· Patient is not on home anticoagulation, confirmed with  at bedside    · Continue Toprol-XL 25mg daily found on EMR review

## 2022-07-04 NOTE — ASSESSMENT & PLAN NOTE
Case discussed today with orthopedic team, concern for infection left total knee replacement  Follow-up blood culture results  Follow-up fluid culture results  Continue on vancomycin and cefepime, consult Infectious Disease  Patient was NPO for planned surgical removal of hardware tomorrow, patient is medically stable and okay to proceed to operating room without any further cardiac or pulmonary testing  Orthopedic canceled the procedure as patient was on stable not requiring BiPAP this morning  Patient is likely going to OR tomorrow  EKG reviewed, showed normal sinus rhythm  No history of coronary artery disease, no history of CHF, no significant chronic lung disease

## 2022-07-04 NOTE — ASSESSMENT & PLAN NOTE
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

## 2022-07-04 NOTE — ASSESSMENT & PLAN NOTE
· Febrile at home PTA, Tmax 100 1 this admission  · UA: bland  · CXR: no acute pulm disease  · CT CAP: Persistent gas within the endometrial canal may represent endometritis however underlying endometrial carcinoma cannot be excluded  Marked distention of the urinary bladder with associated fullness of bilateral collecting systems  Correlate for bladder outlet obstruction    · Concern for septic arthritis of L knee with concomitant L knee pain  · Continue cefepime and vancomycin   · S/p arthrocentesis with ortho; follow-up culture data  · Trend fever curve, WBC count, and procal

## 2022-07-04 NOTE — PROGRESS NOTES
Upon entering pts room at 0500 pt was having pain and requesting pain meds for leg  Dilaudid IV given but pt also in respiratory distress, coarse with crackles  Increased oxygen & sat pt up in bed  Notified SLIM PA Congers Maxcy and Respiratory therapist Noel Bryan  HR also increased to 120 and BP elevated at 166/127  IV lasix given, EKG done and transferred pt to ICU  Gave report to ICU RN Tae Bellamy

## 2022-07-04 NOTE — ASSESSMENT & PLAN NOTE
· Will transfuse 1 unit of packed red blood cells, which will make two total units transfused this admission  ·

## 2022-07-04 NOTE — ASSESSMENT & PLAN NOTE
· 7/4 patient developed acute onset respiratory distress and increased work of breathing; flash pulmonary edema  · Given 40 IV lasix and placed on BiPAP  · Upgraded to SD2  · CXR: moderate pulmonary edema  · Given additional  40 IV lasix x2 on 7/4  · BiPAP PRN  · CXR in AM  · Wean supplemental O2 to maintain spO2 > 92%

## 2022-07-04 NOTE — ASSESSMENT & PLAN NOTE
· Hx of atrial fibrillation, currently in NSR  · No AC 2/2 chronic anemia  · Continue TOPROL XL 25mg daily  · Continue telemetry

## 2022-07-04 NOTE — PROGRESS NOTES
Called to Pt's room for shortness of breath  Upon arrival found Pt sitting upright, sweating profusely  laboring to breath  Breath sounds on auscultation were coarse crackles in all fields  AP was notified,  Placed Pt on BiPAP @ 12/6, rate of 16, 100%  Pt transferred to ICU

## 2022-07-04 NOTE — CONSULTS
Consultation - Cardiology   Devyn Monae 66 y o  female MRN: [id number]  Unit/Bed#: -01 Encounter: [id number]    Assessment/Plan     Assessment:    Acute on Chronic Diastolic CHF  Atrial Fibrillation  Septic prosthetic left knee    Plan:      Acute on Chronic Diastolic CHF: Continue to diurese with IV lasix  We did a bedside ultrasound and her LVEF is in the 40 - 45% range with mild MR, mild AI and mild TR with a small pericardial effusion  Will have a formal echocardiogram tomorrow  Atrial Fibrillation: Off anticoagulation due to anemia  She has been on aspirin at home  Continue with metoprolol  Currently in NSR  She had probable SVT on her EKG yesterday  She is currently in normal sinus rhythm  History of Present Illness   Physician Requesting Consult: Jade Lambert DO  Reason for Consult / Principal Problem: CHF  HPI: Devyn Monae is a 66y o  year old female who presents with fatigue  Patient has metastatic rectal GIST on chemotherapy  She has a history of anemia, persistent AF, and Hypertension  Patient was febrile on presentation and also anemic with a hemoglobin of 6 7  Patient has hx of left knee replacement and on antibiotics for prosthetic infection  Patient was to go to the OR however was transferred to the ICU for respiratory failure  Patient was started on IV lasix and was requiring BIPAP over night  She is currently resting comfortably on BIPAP    Inpatient consult to Cardiology  Consult performed by: Christopher Moody MD  Consult ordered by: LANEY Dowling          Review of Systems   Constitutional: Negative for chills and fever  HENT: Negative for ear pain and sore throat  Eyes: Negative for pain and visual disturbance  Respiratory: Positive for shortness of breath  Negative for cough  Cardiovascular: Negative for chest pain and palpitations  Gastrointestinal: Negative for abdominal pain and vomiting     Genitourinary: Negative for dysuria and hematuria  Musculoskeletal: Positive for back pain and joint swelling  Negative for arthralgias  Skin: Negative for color change and rash  Neurological: Negative for seizures and syncope  All other systems reviewed and are negative  Historical Information   Past Medical History:   Diagnosis Date    Cancer [facility])     liver     History reviewed  No pertinent surgical history  Social History     Substance and Sexual Activity   Alcohol Use Never     Social History     Substance and Sexual Activity   Drug Use Never     E-Cigarette/Vaping     E-Cigarette/Vaping Substances     Social History     Tobacco Use   Smoking Status Never Smoker   Smokeless Tobacco Never Used     Family History: History reviewed  No pertinent family history  Meds/Allergies   current meds:   Current Facility-Administered Medications   Medication Dose Route Frequency    acetaminophen (TYLENOL) tablet 650 mg  650 mg Oral Q6H PRN    calcium carbonate (TUMS) chewable tablet 1,000 mg  1,000 mg Oral Daily PRN    cefepime (MAXIPIME) IVPB (premix in dextrose) 1,000 mg 50 mL  1,000 mg Intravenous Q12H    HYDROmorphone HCl (DILAUDID) injection 0 2 mg  0 2 mg Intravenous Q3H PRN    metoprolol succinate (TOPROL-XL) 24 hr tablet 25 mg  25 mg Oral Daily    nitroglycerin (NITROSTAT) SL tablet 0 4 mg  0 4 mg Sublingual Q5 Min PRN    pantoprazole (PROTONIX) EC tablet 40 mg  40 mg Oral Early Morning    potassium chloride 20 mEq IVPB (premix)  20 mEq Intravenous Once    senna (SENOKOT) tablet 8 6 mg  1 tablet Oral HS PRN    tamsulosin (FLOMAX) capsule 0 4 mg  0 4 mg Oral Daily With Dinner    traMADol (ULTRAM) tablet 100 mg  100 mg Oral Q6H PRN    vancomycin (VANCOCIN) IVPB (premix in dextrose) 1,000 mg 200 mL  20 mg/kg Intravenous Q24H     Allergies   Allergen Reactions    Codeine Anaphylaxis       Objective   Vitals: Blood pressure 124/80, pulse 85, temperature 98 4 °F (36 9 °C), temperature source Oral, resp   rate (!) 36, height 5' 1" (1 549 m), weight 50 3 kg (111 lb), SpO2 98 %  Orthostatic Blood Pressures    Flowsheet Row Most Recent Value   Blood Pressure 124/80 filed at 07/04/2022 1100   Patient Position - Orthostatic VS Lying filed at 07/02/2022 2342            Intake/Output Summary (Last 24 hours) at 7/4/2022 1132  Last data filed at 7/4/2022 1001  Gross per 24 hour   Intake 420 ml   Output 2400 ml   Net -1980 ml       Invasive Devices  Report    Peripheral Intravenous Line  Duration           Peripheral IV 07/03/22 Right Antecubital 1 day          Drain  Duration           Urethral Catheter 16 Fr  <1 day                Physical Exam  Vitals and nursing note reviewed  Constitutional:       General: She is not in acute distress  Appearance: She is well-developed  HENT:      Head: Normocephalic and atraumatic  Eyes:      Conjunctiva/sclera: Conjunctivae normal    Neck:      Vascular: JVD present  Cardiovascular:      Rate and Rhythm: Normal rate and regular rhythm  Heart sounds: No murmur heard  Pulmonary:      Effort: Pulmonary effort is normal  No respiratory distress  Breath sounds: Examination of the right-lower field reveals rales  Examination of the left-lower field reveals rales  Rales present  Abdominal:      Palpations: Abdomen is soft  Tenderness: There is no abdominal tenderness  Musculoskeletal:      Cervical back: Neck supple  Skin:     General: Skin is warm and dry  Neurological:      Mental Status: She is alert  Lab Results:   I have personally reviewed pertinent lab results      CBC with diff:   Results from last 7 days   Lab Units 07/04/22  0556   WBC Thousand/uL 10 09   RBC Million/uL 3 13*   HEMOGLOBIN g/dL 10 4*   HEMATOCRIT % 31 7*   MCV fL 101*   MCH pg 33 2   MCHC g/dL 32 8   RDW % 18 4*   MPV fL 10 2   PLATELETS Thousands/uL 213     CMP:   Results from last 7 days   Lab Units 07/04/22  0556 07/04/22  0551   SODIUM mmol/L 131*  --    CHLORIDE mmol/L 96*  --    CO2 mmol/L 20* --    CO2, I-STAT mmol/L  --  19*   BUN mg/dL 24  --    CREATININE mg/dL 1 33*  --    GLUCOSE, ISTAT mg/dl  --  188*   CALCIUM mg/dL 9 3  --    AST U/L 46*  --    ALT U/L 29  --    ALK PHOS U/L 86  --    EGFR ml/min/1 73sq m 39  --      HS Troponin:   0   Lab Value Date/Time    HSTNI0 237 (H) 07/04/2022 0556    HSTNI4 586 (H) 07/04/2022 0900     BNP:   Results from last 7 days   Lab Units 07/04/22  0556 07/04/22  0551   POTASSIUM mmol/L 3 7  --    CHLORIDE mmol/L 96*  --    CO2 mmol/L 20*  --    CO2, I-STAT mmol/L  --  19*   BUN mg/dL 24  --    CREATININE mg/dL 1 33*  --    GLUCOSE, ISTAT mg/dl  --  188*   CALCIUM mg/dL 9 3  --    EGFR ml/min/1 73sq m 39  --      Coags:   Results from last 7 days   Lab Units 07/04/22  0556 07/03/22  0120   PTT seconds  --  27   INR  1 20* 1 13     TSH:     Magnesium:   Results from last 7 days   Lab Units 07/04/22  0556   MAGNESIUM mg/dL 2 3     Lipid Profile:     Imaging: I have personally reviewed pertinent films in PACS  EKG:  Probable SVT terminating to normal sinus rhythm       Code Status: Level 1 - Full Code  Advance Directive and Living Will:      Power of :    POLST:      Counseling / Coordination of Care  Total floor / unit time spent today 45 minutes  Greater than 50% of total time was spent with the patient and / or family counseling and / or coordination of care  A description of the counseling / coordination of care

## 2022-07-04 NOTE — PROGRESS NOTES
Progress Note - Orthopedics   Stewart Stauffer 66 y o  female MRN: [id number]  Unit/Bed#: -01 Encounter: [id number]    Assessment:  Septic left total knee    Plan:  I&D and excision of implants and antibiotic spacer for tomorrow patient canceled today due to medical lability and transfer to ICU  Plan is for surgical op operative intervention tomorrow pending medical clearance  Highly recommend Infectious Disease evaluate for source of infection  The knee was not spontaneously infected this most likely came from another source she does have history of cancer and may be the original source  Patient is obviously immunocompromised as she had a septic knee and had no or very little white count and no fever  Weight bearing:  Non      Subjective:  Patient on BiPAP at time of interview understands that surgery will be tomorrow pending clearance    Vitals: Blood pressure 136/82, pulse (!) 119, temperature 98 9 °F (37 2 °C), resp  rate (!) 30, height 5' 1" (1 549 m), weight 50 3 kg (111 lb), SpO2 98 %  ,Body mass index is 20 97 kg/m²  Intake/Output Summary (Last 24 hours) at 7/4/2022 0756  Last data filed at 7/4/2022 0606  Gross per 24 hour   Intake 320 ml   Output 1675 ml   Net -1355 ml       Invasive Devices  Report    Peripheral Intravenous Line  Duration           Peripheral IV 07/03/22 Right Antecubital 1 day          Drain  Duration           Urethral Catheter 16 Fr  <1 day                Ortho Exam: Knee:  No change in clinical exam of the knee  Patient on BiPAP    Lab, Imaging and other studies:   I have personally reviewed pertinent lab results    CBC:   Lab Results   Component Value Date    WBC 10 09 07/04/2022    HGB 10 4 (L) 07/04/2022    HCT 31 7 (L) 07/04/2022     (H) 07/04/2022     07/04/2022    MCH 33 2 07/04/2022    MCHC 32 8 07/04/2022    RDW 18 4 (H) 07/04/2022    MPV 10 2 07/04/2022    NRBC 0 07/04/2022       G stain negative so far

## 2022-07-04 NOTE — ASSESSMENT & PLAN NOTE
· S/p L TKR with prosthesis 5-6 years ago at [facility] now presenting with L knee pain and fevers  · Concern for septic arthritis  · Orthopedics consulted  · S/p Arthrocentesis on 7/3- no growth from fluid cultures thus far  · Plan is for I/D with excision of of implants when patient medically stable  · Continue cefepime and vancomycin for now  · Follow-up culture data  · CARLOS JEFFERY

## 2022-07-04 NOTE — ASSESSMENT & PLAN NOTE
· Markedly distended bladder on admission  · Queen catheter placed- continue for now  · Trial of voiding with urinary retention protocol after diuresis

## 2022-07-04 NOTE — PROGRESS NOTES
Vancomycin IV Pharmacy-to-Dose Consultation    Cesar Roberts is a 66 y o  female who is currently receiving Vancomycin IV with management by the Pharmacy Consult service  Assessment/Plan:  The patient was reviewed  Renal function is stable and no signs or symptoms of nephrotoxicity and/or infusion reactions were documented in the chart  Based on todays assessment, continue current vancomycin (day # 2) dosing of 1000mg every 24 hours, with a plan for trough to be drawn at 1300 on 7/6/22  Currently, no growths in cultures  We will continue to follow the patients culture results and clinical progress daily      Aiden Pimentel, Pharmacist, PharmD, BCPS

## 2022-07-04 NOTE — PROGRESS NOTES
Critical Care Services- Interval Progress Note   Dale Kurtz 66 y o  female MRN: [id number]  Unit/Bed#: -01 Encounter: [id number]  Assessment and Plan  Interval Events:  Alerted by SLIM AP around 5 AM that patient developed acute onset shortness of breath, increased work of breathing, and tachycardia  40 IV lasix was ordered by SLIM AP and RT placing patient on bipap  Upon my arrival patient was pale, diaphoretic, mottled lower extremities, tachypneic with accessory muscle use and significant rales  She was tachycardic to the 140s and EKG obtained showing wide QRS tachycardia, likely afib RVR with aberrancy  Concern for flash pulmonary edema, CXR ordered and transferred patient to ICU for further management   Diagnosis: Flash pulmonary edema  o Plan: Likely tachy-mediated with afib RVR  o Received 40 IV lasix with brisk repsonse  o Improving respiratory status on BiPAP, continue for now  o F/u CXR  o Labs obtained, follow-up results  o Upgrade to SD2      Upon my evaluation, this patient had a high probability of imminent or life-threatening deterioration due to flash pulmonary edema, which required my direct attention, intervention, and personal management  I have personally provided 25 minutes (0520 to [phone]) on 7/7/2022 of critical care time, exclusive of procedures, teaching, family meetings, and any prior time recorded by providers other than myself  Time includes review of laboratory data, review of imaging/radiology results, monitoring for potential decompensation    Interventions were performed as documented above    -------------------------------------------------------------------------------------------------------------------------------------  Hemodynamic Monitoring:  Vital Signs:   Vitals:    07/04/22 0600   BP: 136/82   Pulse: (!) 119   Resp: (!) 30   Temp:    SpO2: 94%       Laboratory   Results from last 7 days   Lab Units 07/04/22  0556 07/04/22  0551 07/03/22  2132 07/03/22  0736 07/02/22  2358   WBC Thousand/uL 10 09  --   --  10 33* 7 94   HEMOGLOBIN g/dL 10 4*  --  9 0* 7 5* 6 7*   I STAT HEMOGLOBIN g/dl  --  10 2*  --   --   --    HEMATOCRIT % 31 7*  --  27 0* 22 9* 20 5*   HEMATOCRIT, ISTAT %  --  30*  --   --   --    PLATELETS Thousands/uL 213  --   --  173 215   NEUTROS PCT % 92*  --   --   --   --    BANDS PCT %  --   --   --   --  3   MONOS PCT % 4  --   --   --   --    MONO PCT %  --   --   --  5 0*     Results from last 7 days   Lab Units 07/04/22  0556 07/04/22  0551 07/03/22 0944 07/02/22  2358   SODIUM mmol/L 131*  --  135* 136   POTASSIUM mmol/L 3 7  --  3 9 4 1   CHLORIDE mmol/L 96*  --  101 101   CO2 mmol/L 20*  --  23 24   CO2, I-STAT mmol/L  --  19*  --   --    ANION GAP mmol/L 15*  --  11 11   BUN mg/dL 24  --  22 25   CREATININE mg/dL 1 33*  --  1 09 1 44*   CALCIUM mg/dL 9 3  --  8 3 8 7   GLUCOSE RANDOM mg/dL 176*  --  115 135   ALT U/L 29  --  26 28   AST U/L 46*  --  42 53*   ALK PHOS U/L 86  --  65 74   ALBUMIN g/dL 3 3*  --  3 2* 3 6   TOTAL BILIRUBIN mg/dL 1 40*  --  0 80 0 50     Results from last 7 days   Lab Units 07/04/22  0556 07/03/22  0944   MAGNESIUM mg/dL 2 3 1 9   PHOSPHORUS mg/dL 4 6* 4 3*      Results from last 7 days   Lab Units 07/04/22  0556 07/03/22  0120   INR  1 20* 1 13   PTT seconds  --  27          Results from last 7 days   Lab Units 07/04/22  0556 07/02/22  2358   LACTIC ACID mmol/L 3 9* 1 6     ABG:    VBG:    Results from last 7 days   Lab Units 07/04/22  0556 07/03/22  0944 07/02/22  2358   PROCALCITONIN ng/ml 2 24* 2 76* 0 62*       Micro  Results from last 7 days   Lab Units 07/03/22  1142 07/02/22  2358   BLOOD CULTURE   --  No Growth at 24 hrs  No Growth at 24 hrs  GRAM STAIN RESULT  No Polys or Bacteria seen  --        Diagnostic Imaging / Data: I have personally reviewed pertinent reports        Medications:  Current Facility-Administered Medications   Medication Dose Route Frequency    acetaminophen (TYLENOL) tablet 650 mg  650 mg Oral Q6H PRN    calcium carbonate (TUMS) chewable tablet 1,000 mg  1,000 mg Oral Daily PRN    cefepime (MAXIPIME) IVPB (premix in dextrose) 1,000 mg 50 mL  1,000 mg Intravenous Q12H    HYDROmorphone HCl (DILAUDID) injection 0 2 mg  0 2 mg Intravenous Q3H PRN    metoprolol succinate (TOPROL-XL) 24 hr tablet 25 mg  25 mg Oral Daily    nitroglycerin (NITROSTAT) SL tablet 0 4 mg  0 4 mg Sublingual Q5 Min PRN    pantoprazole (PROTONIX) EC tablet 40 mg  40 mg Oral Early Morning    senna (SENOKOT) tablet 8 6 mg  1 tablet Oral HS PRN    traMADol (ULTRAM) tablet 100 mg  100 mg Oral Q6H PRN    vancomycin (VANCOCIN) IVPB (premix in dextrose) 1,000 mg 200 mL  20 mg/kg Intravenous Q24H       SIGNATURE: Ana Gibbs PA-C    Portions of the record may have been created with voice recognition software  Occasional wrong word or "sound a like" substitutions may have occurred due to the inherent limitations of voice recognition software    Read the chart carefully and recognize, using context, where substitutions have occurred

## 2022-07-04 NOTE — CONSULTS
Consultation - Infectious Disease   Federico Smith 66 y o  female MRN: [id number]  Unit/Bed#: -01 Encounter: [id number]      Assessment/Plan     Fever/septic arthritis/left TKR infection/on chemotherapy    Patient is a 75-year-old female with metastatic rectal just cancer receiving chemotherapy, who presents with left TKR infection  G stain and culture are still pending  Cultures are negative thus far  Plan is for OR this morning, though may be delayed due to flash pulmonary edema last night  - continue vanco and cefepime  - monitor for toxicities while on these antibiotics  - await Gram stain and culture  - await OR findings  - not clear what surgical plan is at this point, whether this will be a one-step per 2 step procedure, await surgical findings    Discussed with primary team    History of Present Illness   Physician Requesting Consult: Raul Smalls MD  Reason for Consult / Principal Problem:      HPI: Federico Smith is a 66y o  year old female with metastatic rectal GIST cancer, 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, hypertension, Afib, chronic anemia, left knee replacement approximately 5 years ago  Patient brought in for fever and confusion yesterday   noted that her knee has been bothering her for the last few days  In the ER she had temp of 102° a white blood cell count 7 94  She underwent aspiration of her knee, which revealed 200,000 white blood cells, there were no crystals, mostly neutrophils  Gram stain has not been performed yet  Patient was started on vancomycin and cefepime  Plan was for operating room this morning  Overnight patient developed increased shortness of breath, found to be in flash pulmonary edema, improving with diuresis  Patient has no headaches chest pains abdominal pains nausea vomiting or diarrhea  Left TKR infection  Consults    ROS: 12 systems reviewed, remainder is neg      Historical Information   Past Medical History:   Diagnosis Date    Cancer [facility])     liver     History reviewed  No pertinent surgical history  Social History   Social History     Substance and Sexual Activity   Alcohol Use Never     Social History     Substance and Sexual Activity   Drug Use Never     Social History     Tobacco Use   Smoking Status Never Smoker   Smokeless Tobacco Never Used     History reviewed  No pertinent family history      Meds/Allergies   MEDS:  Reviewed      Current Facility-Administered Medications:     acetaminophen (TYLENOL) tablet 650 mg, 650 mg, Oral, Q6H PRN, Joselin Gonzáles PA-C, 650 mg at 07/03/22 1521    calcium carbonate (TUMS) chewable tablet 1,000 mg, 1,000 mg, Oral, Daily PRN, Joselin Gonzáles PA-C    cefepime (MAXIPIME) IVPB (premix in dextrose) 1,000 mg 50 mL, 1,000 mg, Intravenous, Q12H, Bethena Shoreham Jacinto, DO, Last Rate: 100 mL/hr at 07/04/22 0026, 1,000 mg at 07/04/22 0026    HYDROmorphone HCl (DILAUDID) injection 0 2 mg, 0 2 mg, Intravenous, Q3H PRN, [facility] Jacinto, DO, 0 2 mg at 07/04/22 0502    metoprolol succinate (TOPROL-XL) 24 hr tablet 25 mg, 25 mg, Oral, Daily, DOUG Dye-C, 25 mg at 07/03/22 5476    nitroglycerin (NITROSTAT) SL tablet 0 4 mg, 0 4 mg, Sublingual, Q5 Min PRN, Geraline DONA PoseyC    pantoprazole (PROTONIX) EC tablet 40 mg, 40 mg, Oral, Early Morning, Joselin Gonzáles PA-C, 40 mg at 07/03/22 5910    senna (SENOKOT) tablet 8 6 mg, 1 tablet, Oral, HS PRN, Joselin Gonzáles PA-C    tamsulosin [facility]) capsule 0 4 mg, 0 4 mg, Oral, Daily With Rena Colunga MD    traMADol Alfreida Kida) tablet 100 mg, 100 mg, Oral, Q6H PRN, Joselin Gonzáles PA-C, 100 mg at 07/04/22 0040    vancomycin (VANCOCIN) IVPB (premix in dextrose) 1,000 mg 200 mL, 20 mg/kg, Intravenous, Q24H, [facility] Jacinto, DO, Last Rate: 200 mL/hr at 07/03/22 1331, 1,000 mg at 07/03/22 1331    Allergies   Allergen Reactions    Codeine Anaphylaxis         Intake/Output Summary (Last 24 hours) at 7/4/2022 1923  Last data filed at 7/4/2022 0606  Gross per 24 hour   Intake 320 ml   Output 1675 ml   Net -1355 ml       PE:  WD, WN, WF in NAD  VSS, Tmax: 98 9  HEENT:  Anicteric, extraocular movements intact  NECK:  Supple no adenopathy  CARDIAC:  Regular rate and rhythm S1-S2 no murmurs rubs or gallop  LUNGS:  CTA bilaterally  ABDOMEN:  Soft nontender nondistended positive bowel sounds  EXTREMITIES:  Left knee, pain on range of motion, no erythema warmth  SKIN:  Clear no rashes  NEURO:  Grossly nonfocal  PSYCH:  Normal affect  :  Queen catheter in place      Invasive Devices:   Peripheral IV 07/03/22 Right Antecubital (Active)   Site Assessment WDL; Clean;Dry; Intact 07/03/22 0400   Dressing Type Transparent 07/03/22 0400   Line Status Flushed;Saline locked 07/03/22 0400   Dressing Status Clean;Dry; Intact 07/03/22 0400       Urethral Catheter 16 Fr  (Active)   Queen Care Done 07/03/22 2100   Output (mL) 250 mL 07/04/22 0606           Lab Results:   No results displayed because visit has over 200 results  Imaging Studies: I have personally reviewed pertinent films in PACS  EKG, Pathology, and Other Studies: I have personally reviewed pertinent reports        Culture  Lab Results   Component Value Date    BLOODCX No Growth at 24 hrs  07/02/2022    BLOODCX No Growth at 24 hrs  07/02/2022     No results found for: WOUNDCULT  No results found for: URINECX  No results found for: SPUTUMCULTUR    Principal Problem:    Infection of total knee replacement ([facility] 75 )  Active Problems:    Metastatic cancer ([facility] )    Atrial fibrillation ([facility] )    Anemia    Primary hypertension    Left knee pain    Fever    Urinary retention

## 2022-07-04 NOTE — PROGRESS NOTES
66year old woman with metastatic rectal GIST tumor on Avaprinitinib from [facility], chronic MARC requiring PRBC transfusions, afib not AC secondary to chronic anemia, and HTN who presented fever and left knee pain  Concern for possible septic joint  She was admitted and placed on IV antibiotics and had arthrocentesis  She has required 2units PRBCs for her anemia, early AM 7/4 developed increased dyspnea and hypoxia  Suspected flash pulmonary edema and required BiPAP and lasix  Given added O2 needs, transferred to ICU and her OR knee washout was postponed  24hr events - she reports episodic dyspnea and anxiety  Denied SSCP, no pleurisy, no sputum production        VS AF, 's MAPS 's, SpO2 99% BiPAP 14/10, 1 0, I/Os -1 3L   Exam  GEN older woman initially with sig tachypnea and respiratory distress, improved with replacement of BiPAP, oriented x 3  HEENT ATNC, facial birthmark noted, MMM  NECK JVD to tragus, accessory muscle use, trachea midline  CV reg, tachy, single s1/2, no m/r - though limited with respiratory status  Pulm course rales diffusely and scattered rhonchi, tachypnea, no wheeze,   ABD +BS soft NTND, no rebound  EXT noted left knee effusion and warm, LE w/o edema, brisk cap refill   rust in place, yellow urine    Laboratory and Diagnostics  Results from last 7 days   Lab Units 07/04/22  0556 07/04/22  0551 07/03/22  2136 07/03/22  0736 07/02/22  2358   WBC Thousand/uL 10 09  --   --  10 33* 7 94   HEMOGLOBIN g/dL 10 4*  --  9 0* 7 5* 6 7*   I STAT HEMOGLOBIN g/dl  --  10 2*  --   --   --    HEMATOCRIT % 31 7*  --  27 0* 22 9* 20 5*   HEMATOCRIT, ISTAT %  --  30*  --   --   --    PLATELETS Thousands/uL 213  --   --  173 215   NEUTROS PCT % 92*  --   --   --   --    BANDS PCT %  --   --   --   --  3   MONOS PCT % 4  --   --   --   --    MONO PCT %  --   --   --  5 0*     Results from last 7 days   Lab Units 07/04/22  0556 07/04/22  0551 07/03/22  0944 07/02/22  9078   SODIUM mmol/L 131*  --  135* 136   POTASSIUM mmol/L 3 7  --  3 9 4 1   CHLORIDE mmol/L 96*  --  101 101   CO2 mmol/L 20*  --  23 24   CO2, I-STAT mmol/L  --  19*  --   --    ANION GAP mmol/L 15*  --  11 11   BUN mg/dL 24  --  22 25   CREATININE mg/dL 1 33*  --  1 09 1 44*   CALCIUM mg/dL 9 3  --  8 3 8 7   GLUCOSE RANDOM mg/dL 176*  --  115 135   ALT U/L 29  --  26 28   AST U/L 46*  --  42 53*   ALK PHOS U/L 86  --  65 74   ALBUMIN g/dL 3 3*  --  3 2* 3 6   TOTAL BILIRUBIN mg/dL 1 40*  --  0 80 0 50     Results from last 7 days   Lab Units 07/04/22  0556 07/03/22  0944   MAGNESIUM mg/dL 2 3 1 9   PHOSPHORUS mg/dL 4 6* 4 3*      Results from last 7 days   Lab Units 07/04/22  0556 07/03/22  0120   INR  1 20* 1 13   PTT seconds  --  27          Results from last 7 days   Lab Units 07/04/22  0900 07/04/22  0556 07/02/22  2358   LACTIC ACID mmol/L 1 4 3 9* 1 6     Results from last 7 days   Lab Units 07/02/22  2358   CRP mg/L 15 4*                 Results from last 7 days   Lab Units 07/04/22  0556 07/03/22  0944 07/02/22  2358   PROCALCITONIN ng/ml 2 24* 2 76* 0 62*     BNP 26955  MICRO  7/3 - left knee effusion - no pmn/bact, 200K WBC, 94% Neutrophils    Bld Cx 7/2 - NGTD    RADIOGRAPHS - images personally reviewed  CXR 7/4 - diffuse central alveolar infiltrates - new from prior imaging, no PTX    CT C/A/P - 7/3 - left presacral nodule c/f metastasis, CM, bladder distention and dilation, no focal pulmonary infiltrates    Left knee - small joint effusion, no acute osseous abnormalities    TTE 8/2021 - EF 55%, grade II diastolic dysfunction, mod MR    Assessment   1  Acute hypoxic respiratory failure  2  Acute flash pulmonary edema suspected acute diastolic CHF  3  SIRS/Sepsis POA related to suspected septic left knee  4  Septic arthritis left knee with TKR prosthesis  5  Anemia post transfusion  6  Metastatic rectal GIST tumor on chemotherapy  7  Urinary retention  8  IVIS  9   Elevated lactate - resolved  10  Hyponatremia  11   Toxic/metatobolic encephalopathy    PLAN  · NEURO - delirium precautions, monitor BiPAP tolerance, may consider precedex if needed, prn dilaudid for knee pain  · CARDIAC - attempt further diuresis now with lasix 40mg x 1, consider trial NTG gtt if not improving, goal negative 1-2L for now, contact cardiology for STAT echo given septic joint and r/o valvular lesion/endocarditis, trend trop  · PULM - returned to BiPAP now, 14/10, FiO2 1 0, wean FiO2 as tolerated and once appropriate diuresis will wean BiPAP support, repeat CXR in AM  · GI - NPO for now  · RENAL - trend BMP q12hs, trend Na, and SCr, replete K, continue rust catheter  · ID - further abx per ID, follow up cultures, trend temp/WBC  · HEME - holding chemotherapy for now, further care per Nneka  · ENDO - no issues  · ICU Care  - Full Code, SCDs alone for now, PPI    Critical  Care time excluding procedures, teaching and updates is 35 minutes    Anjana Tsang DO

## 2022-07-04 NOTE — ASSESSMENT & PLAN NOTE
· S/p 2 u PRBCs  · Continue to trend hgb   · Macrocytic anemia, check B12 and Folate in AM  · Transfuse to maintain hgb >7  · Consider diuresis with future blood transfusions

## 2022-07-04 NOTE — ASSESSMENT & PLAN NOTE
· Markedly distended bladder seen on CT and palpable on exam   · Patient was Straight cath for 1100 CC on admission   · Urinary retention protocol   · Will start on Flomax

## 2022-07-04 NOTE — ASSESSMENT & PLAN NOTE
· Baseline Cr 1-1 1  · Cr on admission 1 44, down to 1 22  · Improving with diuresis  · Trend UOP  · Trend renal indices

## 2022-07-04 NOTE — ASSESSMENT & PLAN NOTE
· 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  · B/l ureteral stents  · Currently receiving chemotherapy with avapritinib 200 mg daily  · Hold in the setting of acute infection  · Outpatient follow-up

## 2022-07-04 NOTE — ASSESSMENT & PLAN NOTE
Wt Readings from Last 3 Encounters:   07/02/22 50 3 kg (111 lb)     · Echo 8/2021: EF 55%, grade II diastolic dysfunction, mod MR  · 7/4 flash pulmonary edema requiring IV diuresis and bipap  · CXR: Moderate pulm edema  · Repeat echo pending  · Received 40 IV lasix x 3  · Continue metoprolol  · PRN diuresis for goal - 1L to 2L  · Daily weights  · Trend UOP

## 2022-07-05 ENCOUNTER — APPOINTMENT (INPATIENT)
Dept: NON INVASIVE DIAGNOSTICS | Facility: HOSPITAL | Age: 72
DRG: 559 | End: 2022-07-05
Payer: COMMERCIAL

## 2022-07-05 ENCOUNTER — APPOINTMENT (INPATIENT)
Dept: RADIOLOGY | Facility: HOSPITAL | Age: 72
DRG: 559 | End: 2022-07-05
Payer: COMMERCIAL

## 2022-07-05 LAB
2HR DELTA HS TROPONIN: -10 NG/L
ALBUMIN SERPL BCP-MCNC: 3.4 G/DL (ref 3.5–5)
ALP SERPL-CCNC: 87 U/L (ref 46–116)
ALT SERPL W P-5'-P-CCNC: 29 U/L (ref 12–78)
ANION GAP SERPL CALCULATED.3IONS-SCNC: 12 MMOL/L (ref 4–13)
ANION GAP SERPL CALCULATED.3IONS-SCNC: 12 MMOL/L (ref 4–13)
ANION GAP SERPL CALCULATED.3IONS-SCNC: 7 MMOL/L (ref 4–13)
AORTIC ROOT: 3.3 CM
APICAL FOUR CHAMBER EJECTION FRACTION: 38 %
ASCENDING AORTA: 3.7 CM
AST SERPL W P-5'-P-CCNC: 45 U/L (ref 5–45)
BILIRUB DIRECT SERPL-MCNC: 0.22 MG/DL (ref 0–0.2)
BILIRUB SERPL-MCNC: 0.9 MG/DL (ref 0.2–1)
BUN SERPL-MCNC: 29 MG/DL (ref 5–25)
BUN SERPL-MCNC: 32 MG/DL (ref 5–25)
BUN SERPL-MCNC: 40 MG/DL (ref 5–25)
CA-I BLD-SCNC: 1.09 MMOL/L (ref 1.12–1.32)
CA-I BLD-SCNC: 1.16 MMOL/L (ref 1.12–1.32)
CALCIUM SERPL-MCNC: 9 MG/DL (ref 8.3–10.1)
CALCIUM SERPL-MCNC: 9.3 MG/DL (ref 8.3–10.1)
CALCIUM SERPL-MCNC: 9.3 MG/DL (ref 8.3–10.1)
CARDIAC TROPONIN I PNL SERPL HS: 453 NG/L
CARDIAC TROPONIN I PNL SERPL HS: 463 NG/L
CHLORIDE SERPL-SCNC: 95 MMOL/L (ref 100–108)
CHLORIDE SERPL-SCNC: 95 MMOL/L (ref 100–108)
CHLORIDE SERPL-SCNC: 97 MMOL/L (ref 100–108)
CO2 SERPL-SCNC: 24 MMOL/L (ref 21–32)
CO2 SERPL-SCNC: 24 MMOL/L (ref 21–32)
CO2 SERPL-SCNC: 26 MMOL/L (ref 21–32)
CREAT SERPL-MCNC: 1.2 MG/DL (ref 0.6–1.3)
CREAT SERPL-MCNC: 1.22 MG/DL (ref 0.6–1.3)
CREAT SERPL-MCNC: 1.56 MG/DL (ref 0.6–1.3)
E WAVE DECELERATION TIME: 208 MS
ERYTHROCYTE [DISTWIDTH] IN BLOOD BY AUTOMATED COUNT: 17 % (ref 11.6–15.1)
FOLATE SERPL-MCNC: >20 NG/ML (ref 3.1–17.5)
FRACTIONAL SHORTENING: 26 % (ref 28–44)
GFR SERPL CREATININE-BSD FRML MDRD: 32 ML/MIN/1.73SQ M
GFR SERPL CREATININE-BSD FRML MDRD: 44 ML/MIN/1.73SQ M
GFR SERPL CREATININE-BSD FRML MDRD: 44 ML/MIN/1.73SQ M
GLUCOSE SERPL-MCNC: 127 MG/DL (ref 65–140)
GLUCOSE SERPL-MCNC: 86 MG/DL (ref 65–140)
GLUCOSE SERPL-MCNC: 99 MG/DL (ref 65–140)
HCT VFR BLD AUTO: 32.1 % (ref 34.8–46.1)
HGB BLD-MCNC: 10.5 G/DL (ref 11.5–15.4)
INTERVENTRICULAR SEPTUM IN DIASTOLE (PARASTERNAL SHORT AXIS VIEW): 1.2 CM
INTERVENTRICULAR SEPTUM: 1.2 CM (ref 0.6–1.1)
LAAS-AP2: 32.3 CM2
LAAS-AP4: 28.4 CM2
LEFT ATRIUM AREA SYSTOLE SINGLE PLANE A4C: 27 CM2
LEFT ATRIUM SIZE: 4.6 CM
LEFT INTERNAL DIMENSION IN SYSTOLE: 4 CM (ref 2.1–4)
LEFT VENTRICLE DIASTOLIC VOLUME (MOD BIPLANE): 114 ML
LEFT VENTRICLE SYSTOLIC VOLUME (MOD BIPLANE): 68 ML
LEFT VENTRICULAR INTERNAL DIMENSION IN DIASTOLE: 5.4 CM (ref 3.5–6)
LEFT VENTRICULAR POSTERIOR WALL IN END DIASTOLE: 1.3 CM
LEFT VENTRICULAR STROKE VOLUME: 73 ML
LV EF: 40 %
LVSV (TEICH): 73 ML
MAGNESIUM SERPL-MCNC: 2 MG/DL (ref 1.6–2.6)
MAGNESIUM SERPL-MCNC: 2 MG/DL (ref 1.6–2.6)
MAGNESIUM SERPL-MCNC: 2.2 MG/DL (ref 1.6–2.6)
MCH RBC QN AUTO: 33 PG (ref 26.8–34.3)
MCHC RBC AUTO-ENTMCNC: 32.7 G/DL (ref 31.4–37.4)
MCV RBC AUTO: 101 FL (ref 82–98)
MV E'TISSUE VEL-SEP: 7 CM/S
MV PEAK A VEL: 0.8 M/S
MV PEAK E VEL: 81 CM/S
MV STENOSIS PRESSURE HALF TIME: 60 MS
MV VALVE AREA P 1/2 METHOD: 3.67 CM2
PHOSPHATE SERPL-MCNC: 2.6 MG/DL (ref 2.3–4.1)
PHOSPHATE SERPL-MCNC: 3.3 MG/DL (ref 2.3–4.1)
PHOSPHATE SERPL-MCNC: 4.1 MG/DL (ref 2.3–4.1)
PLATELET # BLD AUTO: 209 THOUSANDS/UL (ref 149–390)
PMV BLD AUTO: 10.1 FL (ref 8.9–12.7)
POTASSIUM SERPL-SCNC: 3.3 MMOL/L (ref 3.5–5.3)
POTASSIUM SERPL-SCNC: 3.5 MMOL/L (ref 3.5–5.3)
POTASSIUM SERPL-SCNC: 3.6 MMOL/L (ref 3.5–5.3)
PROCALCITONIN SERPL-MCNC: 5.95 NG/ML
PROT SERPL-MCNC: 7.6 G/DL (ref 6.4–8.2)
RBC # BLD AUTO: 3.18 MILLION/UL (ref 3.81–5.12)
RIGHT ATRIUM AREA SYSTOLE A4C: 15.4 CM2
RIGHT VENTRICLE ID DIMENSION: 3.5 CM
SL CV LEFT ATRIUM LENGTH A2C: 6.8 CM
SL CV LV EF: 40
SL CV PED ECHO LEFT VENTRICLE DIASTOLIC VOLUME (MOD BIPLANE) 2D: 142 ML
SL CV PED ECHO LEFT VENTRICLE SYSTOLIC VOLUME (MOD BIPLANE) 2D: 69 ML
SODIUM SERPL-SCNC: 130 MMOL/L (ref 136–145)
SODIUM SERPL-SCNC: 131 MMOL/L (ref 136–145)
SODIUM SERPL-SCNC: 131 MMOL/L (ref 136–145)
TR MAX PG: 31 MMHG
TR PEAK VELOCITY: 2.8 M/S
TRICUSPID VALVE PEAK REGURGITATION VELOCITY: 2.79 M/S
VIT B12 SERPL-MCNC: 490 PG/ML (ref 100–900)
WBC # BLD AUTO: 8.89 THOUSAND/UL (ref 4.31–10.16)

## 2022-07-05 PROCEDURE — 94760 N-INVAS EAR/PLS OXIMETRY 1: CPT

## 2022-07-05 PROCEDURE — 83735 ASSAY OF MAGNESIUM: CPT | Performed by: PHYSICIAN ASSISTANT

## 2022-07-05 PROCEDURE — 82607 VITAMIN B-12: CPT | Performed by: PHYSICIAN ASSISTANT

## 2022-07-05 PROCEDURE — 84484 ASSAY OF TROPONIN QUANT: CPT | Performed by: PHYSICIAN ASSISTANT

## 2022-07-05 PROCEDURE — 99232 SBSQ HOSP IP/OBS MODERATE 35: CPT | Performed by: INTERNAL MEDICINE

## 2022-07-05 PROCEDURE — 82330 ASSAY OF CALCIUM: CPT | Performed by: PHYSICIAN ASSISTANT

## 2022-07-05 PROCEDURE — 99233 SBSQ HOSP IP/OBS HIGH 50: CPT | Performed by: INTERNAL MEDICINE

## 2022-07-05 PROCEDURE — 84145 PROCALCITONIN (PCT): CPT | Performed by: PHYSICIAN ASSISTANT

## 2022-07-05 PROCEDURE — 84100 ASSAY OF PHOSPHORUS: CPT | Performed by: PHYSICIAN ASSISTANT

## 2022-07-05 PROCEDURE — 99232 SBSQ HOSP IP/OBS MODERATE 35: CPT | Performed by: ORTHOPAEDIC SURGERY

## 2022-07-05 PROCEDURE — 82746 ASSAY OF FOLIC ACID SERUM: CPT | Performed by: PHYSICIAN ASSISTANT

## 2022-07-05 PROCEDURE — 80076 HEPATIC FUNCTION PANEL: CPT | Performed by: PHYSICIAN ASSISTANT

## 2022-07-05 PROCEDURE — 94660 CPAP INITIATION&MGMT: CPT

## 2022-07-05 PROCEDURE — 71045 X-RAY EXAM CHEST 1 VIEW: CPT

## 2022-07-05 PROCEDURE — 80048 BASIC METABOLIC PNL TOTAL CA: CPT | Performed by: PHYSICIAN ASSISTANT

## 2022-07-05 PROCEDURE — 93005 ELECTROCARDIOGRAM TRACING: CPT

## 2022-07-05 PROCEDURE — 93306 TTE W/DOPPLER COMPLETE: CPT | Performed by: INTERNAL MEDICINE

## 2022-07-05 PROCEDURE — C9113 INJ PANTOPRAZOLE SODIUM, VIA: HCPCS | Performed by: PHYSICIAN ASSISTANT

## 2022-07-05 PROCEDURE — 85027 COMPLETE CBC AUTOMATED: CPT | Performed by: PHYSICIAN ASSISTANT

## 2022-07-05 PROCEDURE — 93306 TTE W/DOPPLER COMPLETE: CPT

## 2022-07-05 RX ORDER — LOSARTAN POTASSIUM 25 MG/1
25 TABLET ORAL DAILY
Status: DISCONTINUED | OUTPATIENT
Start: 2022-07-05 | End: 2022-07-06

## 2022-07-05 RX ORDER — POTASSIUM CHLORIDE 20 MEQ/1
40 TABLET, EXTENDED RELEASE ORAL ONCE
Status: COMPLETED | OUTPATIENT
Start: 2022-07-05 | End: 2022-07-05

## 2022-07-05 RX ORDER — PANTOPRAZOLE SODIUM 40 MG/10ML
40 INJECTION, POWDER, LYOPHILIZED, FOR SOLUTION INTRAVENOUS
Status: DISCONTINUED | OUTPATIENT
Start: 2022-07-05 | End: 2022-07-06

## 2022-07-05 RX ORDER — POTASSIUM CHLORIDE 14.9 MG/ML
20 INJECTION INTRAVENOUS
Status: COMPLETED | OUTPATIENT
Start: 2022-07-05 | End: 2022-07-05

## 2022-07-05 RX ORDER — FUROSEMIDE 10 MG/ML
40 INJECTION INTRAMUSCULAR; INTRAVENOUS ONCE
Status: COMPLETED | OUTPATIENT
Start: 2022-07-05 | End: 2022-07-05

## 2022-07-05 RX ORDER — CALCIUM GLUCONATE 20 MG/ML
2 INJECTION, SOLUTION INTRAVENOUS ONCE
Status: COMPLETED | OUTPATIENT
Start: 2022-07-05 | End: 2022-07-05

## 2022-07-05 RX ADMIN — HEPARIN SODIUM 5000 UNITS: 5000 INJECTION INTRAVENOUS; SUBCUTANEOUS at 14:59

## 2022-07-05 RX ADMIN — HEPARIN SODIUM 5000 UNITS: 5000 INJECTION INTRAVENOUS; SUBCUTANEOUS at 06:06

## 2022-07-05 RX ADMIN — METOPROLOL SUCCINATE 25 MG: 25 TABLET, FILM COATED, EXTENDED RELEASE ORAL at 09:57

## 2022-07-05 RX ADMIN — CEFEPIME HYDROCHLORIDE 1000 MG: 1 INJECTION, SOLUTION INTRAVENOUS at 23:34

## 2022-07-05 RX ADMIN — PANTOPRAZOLE SODIUM 40 MG: 40 INJECTION, POWDER, FOR SOLUTION INTRAVENOUS at 06:06

## 2022-07-05 RX ADMIN — CALCIUM GLUCONATE 2 G: 20 INJECTION, SOLUTION INTRAVENOUS at 15:04

## 2022-07-05 RX ADMIN — HEPARIN SODIUM 5000 UNITS: 5000 INJECTION INTRAVENOUS; SUBCUTANEOUS at 21:07

## 2022-07-05 RX ADMIN — POTASSIUM CHLORIDE 20 MEQ: 14.9 INJECTION, SOLUTION INTRAVENOUS at 00:36

## 2022-07-05 RX ADMIN — POTASSIUM CHLORIDE 20 MEQ: 14.9 INJECTION, SOLUTION INTRAVENOUS at 04:06

## 2022-07-05 RX ADMIN — FUROSEMIDE 40 MG: 10 INJECTION, SOLUTION INTRAMUSCULAR; INTRAVENOUS at 08:32

## 2022-07-05 RX ADMIN — VANCOMYCIN HYDROCHLORIDE 1000 MG: 1 INJECTION, SOLUTION INTRAVENOUS at 13:54

## 2022-07-05 RX ADMIN — POTASSIUM CHLORIDE 40 MEQ: 1500 TABLET, EXTENDED RELEASE ORAL at 15:04

## 2022-07-05 RX ADMIN — CEFEPIME HYDROCHLORIDE 1000 MG: 1 INJECTION, SOLUTION INTRAVENOUS at 13:00

## 2022-07-05 RX ADMIN — POTASSIUM CHLORIDE 40 MEQ: 1500 TABLET, EXTENDED RELEASE ORAL at 21:24

## 2022-07-05 RX ADMIN — FUROSEMIDE 40 MG: 10 INJECTION, SOLUTION INTRAMUSCULAR; INTRAVENOUS at 16:26

## 2022-07-05 RX ADMIN — LOSARTAN POTASSIUM 25 MG: 25 TABLET, FILM COATED ORAL at 15:04

## 2022-07-05 NOTE — ASSESSMENT & PLAN NOTE
· 7/4 patient developed acute onset respiratory distress and increased work of breathing; flash pulmonary edema  · Given 40 IV lasix and placed on BiPAP  · 7/4 CXR: moderate pulmonary edema  · Given additional  40 IV lasix x2  · 7/5 weaned from bipap to MF   · BiPAP PRN  · CXR this AM pending   · Wean supplemental O2 to maintain spO2 > 90%

## 2022-07-05 NOTE — PROGRESS NOTES
Vancomycin IV Pharmacy-to-Dose Consultation    Asher Thornton is a 66 y o  female who is currently receiving Vancomycin IV with management by the Pharmacy Consult service  Assessment/Plan:  The patient was reviewed  Renal function is stable and no signs or symptoms of nephrotoxicity and/or infusion reactions were documented in the chart  Based on todays assessment, continue current vancomycin (day # 3) dosing of 1000mg IV Q24H, with a plan for trough to be drawn at 1300 on 7/6/22  We will continue to follow the patients culture results and clinical progress daily      Jamee Booker, Pharmacist

## 2022-07-05 NOTE — ASSESSMENT & PLAN NOTE
Wt Readings from Last 3 Encounters:   07/05/22 50 3 kg (111 lb)     · Echo 8/2021: EF 55%, grade II diastolic dysfunction, mod MR  · 7/4 flash pulmonary edema requiring IV diuresis and bipap  · CXR: Moderate pulm edema  · Echo 7/5/22: EF 40%, G1DD, LA severely dilated, severe MR  · Continue metoprolol  · PRN diuresis for goal - 1L to 2L  · Receiving Lasix IV  · Daily weights, trend UO

## 2022-07-05 NOTE — ASSESSMENT & PLAN NOTE
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

## 2022-07-05 NOTE — ASSESSMENT & PLAN NOTE
· Baseline Cr 1-1 1  · Cr on admission 1 44, down to 1 22  · Improving with diuresis  · Trend UOP, renal indices  · Avoid hypotension, nephrotoxic agents   · Bump in creat in the setting of diuresis -- further diuresis held overnight

## 2022-07-05 NOTE — UTILIZATION REVIEW
Initial Clinical Review    Admission: Date/Time/Statement:   Admission Orders (From admission, onward)     Ordered        07/03/22 0333  Inpatient Admission  Once                      Orders Placed This Encounter   Procedures    Inpatient Admission     Standing Status:   Standing     Number of Occurrences:   1     Order Specific Question:   Level of Care     Answer:   Med Surg [16]     Order Specific Question:   Estimated length of stay     Answer:   More than 2 Midnights     Order Specific Question:   Certification     Answer:   I certify that inpatient services are medically necessary for this patient for a duration of greater than two midnights  See H&P and MD Progress Notes for additional information about the patient's course of treatment  ED Arrival Information     Expected   -    Arrival   7/2/2022 23:31    Acuity   Emergent            Means of arrival   Ambulance    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, cauter and clipping in 2019, afib c/w home meds,  to ED from Home via EMS , admitted as inpatient  due to anemia  Presented with  Confusion and lethargy per   Pain in left knee with swelling, that started yesterday  Exam: Chronically ill appearing, sleeping but easily arousable and conversant, MM dry, suprapbic distention, abdominal tenderness  Left knee limited ROM d/t pain  Left knee swollen and tender to touch  No erythema, not warm to touch  Skin color pale  Oriented to self,place, month year, and president    ED work up reveals fever reported by , Tylenol given enroute, HgB 6 7 Hct 20 5 (7/2), 7/3/22  leukocytosis 10 33 H/H 7 5/22 9  Baseline 7-8, requires intermittent transfusions, transfued 1 U PRBC in ED, stool hemocolt in ED +,  St cath for 1100 ml on admission, S/P bilat ureteral stents maintained on avapritinib, Plan :  steffany H/H 0800, CLD, C/W PPI, stool occults, GI consult, NPO, Ortho consult arthrocentesis, repeat UA pending,  F/u with cristopher chacon,     Ortho Consult: Left knee pain: Assessment: left knee pain questionable infection vs loosening of component  Plan: non wt bearing as tolerated LLE, PT, PRN pain control  mildly obese, recommend behavior modifications and nutrition  Ortho to follow  Arthrocentesis  To be performed  Procedure note: Left Knee arthrocentesis: 12 cc purulent fluid aspirated, sent for gram stain, culture, synovial  WBC/RBX and crystals  Sterile drsg applied  SLIM Note: Infection of total knee replacement: cultures pending, IV vanco and cefepime started  Consult ID, NPO after Mid night for surgical removal of hardware left knee  Tomorrow if medically stable  Pt is low cardiac risk for planned surgery  EKG NSR no H/O CAD  Anemia: transfuse 1 U PRBC  To make total of 2, hem/onc consult for acute on chronic anemia  Date: 7/4/22   Day 2: CC team note: Pt developed at approx 0500 flash pulmonary Edema with acute onset of SOB, increaed WOB and tachycardia, IV lasix 40mg given, RT placed on BiPaP, pt was pale, diaphoretic, mottled lower extremities, tachypneic, iwt accessory musle use and  Significant rales  EKG reveal wide QRS tachy likely afib with RVR  With aberrancy  Transfer to ICU SD2 for further management of flash pulmometry edema  Critical care Note:VS AF, 's MAPS 's, SpO2 99% BiPAP 14/10, 1 0, I/Os -1 3L, improved with BiPaP   Oriented x3, JVD to tragus, accessory muscle use for breathing, Breath sounds with rales diffusely and scattered rhonchi, tachypnea, brisk cap refill  Queen urine output yellow urine, elevated lactate resolved, hyponatremia, toxic metabolic encephalopathy, IVIS, anemia s/p 2 U PRBC transfused,  Plan:  delirurm precautions, trend BiPaP tolerance consider precedex if needed and prn dilaudid for knee pain, consider NTG if diureses with lasix iv not achieved  Goal neg 1-2L, Stat echo pending, trend trops, NPO, wean BiPaP as tolerated once appropriate diuresis, trend serial labs with repletion as needed  Hold chemo for now  C/w PPI and scds  Ortho Note: I&D with excision of implant and antibiotic spacer for tomorrow, pt cancelled today d/t medical lability and ICU transfer  Plan is for surgical op operative intervention tomorrow pending medical clearance  Highly recommend Infectious Disease evaluate for source of infection  The knee was not spontaneously infected this most likely came from another source she does have history of cancer and may be the original source  Patient is obviously immunocompromised as she had a septic knee and had no or very little white count and no fever  I&D consult: Fever/septic arthritis left TKR infection/on chemo: Cultures neg thus far  Plans is for OR today, delayed d/t flash pulmonary edema    C/W IV Vano and Cefepime, trend and observe for toxicities, await gram stain and culture,  Await or findings,     ED Triage Vitals   Temperature Pulse Respirations Blood Pressure SpO2   07/02/22 2342 07/02/22 2342 07/02/22 2342 07/02/22 2342 07/02/22 2342   100 1 °F (37 8 °C) 95 20 126/60 99 %      Temp Source Heart Rate Source Patient Position - Orthostatic VS BP Location FiO2 (%)   07/02/22 2342 07/02/22 2342 07/02/22 2342 07/02/22 2342 07/04/22 1044   Oral Monitor Lying Right arm 100      Pain Score       07/02/22 2342       6          Wt Readings from Last 1 Encounters:   07/02/22 50 3 kg (111 lb)     Additional Vital Signs:   07/04/22 2000 -- 81 22 133/76 99 97 % -- -- -- -- -- -- --   07/04/22 1914 -- -- -- -- -- 99 % -- 68 -- 12 L/min Mid flow nasal cannula -- --   07/04/22 1906 98 7 °F (37 1 °C) 82 30 Abnormal  126/86 99 98 % -- -- -- -- -- -- Lying   07/04/22 1831 -- -- -- -- -- 99 % -- 68 -- 12 L/min Mid flow nasal cannula -- --   07/04/22 1830 -- -- -- -- -- 99 % -- -- -- -- -- MFNC prongs --   07/04/22 1625 -- -- -- -- -- -- -- -- -- -- -- Full face mask --   07/04/22 1300 -- 84 38 Abnormal  127/82 99 100 % -- -- -- -- -- -- --   07/04/22 1230 -- 81 26 Abnormal  128/74 96 99 % -- -- -- -- -- -- --   07/04/22 1200 -- 75 23 Abnormal  125/71 92 99 % -- -- -- -- -- -- --   07/04/22 1150 -- -- -- -- -- -- 80 -- -- -- BiPAP -- --   07/04/22 1149 -- -- -- -- -- 99 % -- -- -- -- -- Full face mask --   07/04/22 1130 -- 79 63 Abnormal  125/71 92 99 % -- -- -- -- -- -- --   07/04/22 1100 -- 85 36 Abnormal  124/80 96 98 % -- -- -- -- -- -- --   07/04/22 1044 -- -- -- -- -- 96 % 100 -- -- -- BiPAP -- --   07/04/22 1043 -- -- -- -- -- 96 % -- -- -- -- -- Face mask --   07/04/22 1030 -- 104 33 Abnormal  147/93 112 90 % -- -- -- -- -- -- --   07/04/22 1009 -- -- -- -- -- 92 % -- 80 15 L/min 15 L/min Mid flow nasal cannula MFNC prongs --   07/04/22 0926 -- -- -- -- -- 96 % -- -- -- -- -- Face mask --   07/04/22 0800 -- 93 34 Abnormal  131/79 99 93 % -- -- -- -- BiPAP -- --   07/04/22 0755 98 4 °F (36 9 °C) 119 Abnormal  22 131/79 99 97 % -- -- -- -- -- -- --   07/04/22 0754 -- -- -- -- -- 98 % -- 44 -- 6 L/min Nasal cannula -- --   07/04/22 0600 -- 119 Abnormal  30 Abnormal  136/82 103 94 % -- -- -- -- -- -- --   07/04/22 0555 -- -- -- -- -- 95 % -- -- -- -- -- -- --   07/04/22 0520 -- -- -- -- -- 97 % -- -- -- -- -- Face mask --   07/04/22 05:10:23 -- 124 Abnormal  -- 166/127 Abnormal  140 83 % Abnormal  -- -- -- -- -- -- --   07/03/22 22:16:12 98 9 °F (37 2 °C) 93 -- 139/83 102 98 % -- -- -- -- -- -- --   07/03/22 18:39:51 98 6 °F (37 °C) 72 16 -- -- 94 % -- -- -- -- -- -- --   07/03/22 1837 98 6 °F (37 °C) 71 16 137/74 -- -- -- -- -- -- -- -- --   07/03/22 1608 -- -- 18 -- -- -- -- -- -- -- -- -- --   07/03/22 16:07:11 98 1 °F (36 7 °C) 79 -- 139/73 95 92 % -- -- -- -- -- -- --   07/03/22 15:40:57 98 2 °F (36 8 °C) 78 18 143/75 98 92 % -- -- -- -- -- -- --   07/03/22 0746 -- -- -- -- -- 93 % -- -- -- -- None (Room air) -- --   07/03/22 07:41:05 97 9 °F (36 6 °C) 98 16 131/73 92 96 % -- -- -- -- -- -- --   07/03/22 06:03:59 98 7 °F (37 1 °C) 98 -- 130/74 93 89 % Abnormal  -- -- -- -- -- -- --   07/03/22 04:30:50 98 8 °F (37 1 °C) 129 Abnormal  -- 132/75 94 92 % -- -- -- -- None (Room air) -- --   07/03/22 0300 98 6 °F (37 °C) 90 18 117/56 81 96 % -- -- -- -- -- -- --   07/03/22 0255 98 7 °F (37 1 °C) 102 19 116/60 -- 97 % -- -- -- -- -- -- --   07/03/22 0240 98 8 °F (37 1 °C) 91 19 113/56 79 94 % -- -- -- -- -- -- --   07/03/22 0237 98 8 °F (37 1 °C) 90 20 113/56 -- -- -- -- -- -- -- -- --   07/03/22 0200 -- 87 20 109/59 81 92 % -- -- -- -- -- -- --   07/03/22 0130 -- 85 18 112/56 78 92 % -- -- -- -- -- -- --   07/03/22 0030 -- 91 22 119/59 83               Pertinent Labs/Diagnostic Test Results:   XR chest portable   Final Result by Leyda Harp MD (07/04 1210)      Flash pulmonary edema with probable small effusions  Workstation performed: [id number]         CT chest abdomen pelvis wo contrast   Final Result by Stacy Morales MD (82/52 0703)      1  There is a prominent left presacral nodule measuring up to 2 4 cm which has increased in size since prior exam where it measured 1 3 cm concerning for metastasis  Persistent gas within the endometrial canal may represent endometritis however    underlying endometrial carcinoma cannot be excluded  2   Marked distention of the urinary bladder with associated fullness of bilateral collecting systems  Correlate for bladder outlet obstruction  3   Cardiomegaly  4   Anemia         I personally discussed this study with Dr Lety Wang on 7/3/2022 at 3:14 AM  Workstation performed: [id number]         CT head without contrast   Final Result by Akilah Tracy MD (07/03 8716)      No acute intracranial hemorrhage, midline shift, or mass effect  Workstation performed: [id number]         XR knee 3 views left non injury   ED Interpretation by Poonam Bhatia DO (07/03 0026)   Possible effusion, no fracture      Final Result by Jairo Ovalles MD (07/03 1224)      Unremarkable appearance of total knee arthroplasty  Small joint effusion  No acute osseous abnormalities  Workstation performed: [id number]         XR chest portable   ED Interpretation by Poonam Bhatia DO (07/03 0027)   No acute abnl, rotated view      Final Result by Clotilde Iniguez MD (07/03 1124)      No acute cardiopulmonary disease                    Workstation performed: [id number]         XR chest portable ICU    (Results Pending)         Results from last 7 days   Lab Units 07/04/22  0556 07/04/22  0551 07/03/22  2136 07/03/22  0736 07/02/22  2358   WBC Thousand/uL 10 09  --   --  10 33* 7 94   HEMOGLOBIN g/dL 10 4*  --  9 0* 7 5* 6 7*   I STAT HEMOGLOBIN g/dl  --  10 2*  --   --   --    HEMATOCRIT % 31 7*  --  27 0* 22 9* 20 5*   HEMATOCRIT, ISTAT %  --  30*  --   --   --    PLATELETS Thousands/uL 213  --   --  173 215   NEUTROS ABS Thousands/µL 9 36*  --   --   --   --    BANDS PCT %  --   --   --   --  3         Results from last 7 days   Lab Units 07/04/22  1601 07/04/22  0556 07/04/22  0551 07/03/22  0944 07/02/22  2358   SODIUM mmol/L 129* 131*  --  135* 136   POTASSIUM mmol/L 4 1 3 7  --  3 9 4 1   CHLORIDE mmol/L 95* 96*  --  101 101   CO2 mmol/L 24 20*  --  23 24   CO2, I-STAT mmol/L  --   --  19*  --   --    ANION GAP mmol/L 10 15*  --  11 11   BUN mg/dL 28* 24  --  22 25   CREATININE mg/dL 1 22 1 33*  --  1 09 1 44*   EGFR ml/min/1 73sq m 44 39  --  50 36   CALCIUM mg/dL 9 0 9 3  --  8 3 8 7   CALCIUM, IONIZED mmol/L 1 11*  --   --   --   --    CALCIUM, IONIZED, ISTAT mmol/L  --   --  1 21  --   --    MAGNESIUM mg/dL 2 1 2 3  --  1 9  --    PHOSPHORUS mg/dL 4 2* 4 6*  --  4 3*  --      Results from last 7 days   Lab Units 07/04/22  0556 07/03/22  0944 07/02/22  2358   AST U/L 46* 42 53*   ALT U/L 29 26 28   ALK PHOS U/L 86 65 74   TOTAL PROTEIN g/dL 7 2 6 3* 6 8   ALBUMIN g/dL 3 3* 3 2* 3 6   TOTAL BILIRUBIN mg/dL 1 40* 0 80 0 50         Results from last 7 days   Lab Units 07/04/22  1601 07/04/22  0556 07/03/22  0944 07/02/22  2358   GLUCOSE RANDOM mg/dL 110 176* 115 135       Results from last 7 days   Lab Units 07/04/22  0551   I STAT BASE EXC mmol/L -6*   I STAT O2 SAT % 98*   ISTAT PH ART  7 363   I STAT ART PCO2 mm HG 32 1*   I STAT ART PO2 mm  0   I STAT ART HCO3 mmol/L 18 2*         Results from last 7 days   Lab Units 07/04/22  0900 07/04/22  0556   HS TNI 0HR ng/L  --  237*   HS TNI 4HR ng/L 586*  --    HSTNI D4 ng/L 349*  --          Results from last 7 days   Lab Units 07/04/22  0556 07/03/22  0120   PROTIME seconds 15 1* 14 4   INR  1 20* 1 13   PTT seconds  --  27         Results from last 7 days   Lab Units 07/04/22  0556 07/03/22  0944 07/02/22  2358   PROCALCITONIN ng/ml 2 24* 2 76* 0 62*     Results from last 7 days   Lab Units 07/04/22  0900 07/04/22  0556 07/02/22  2358   LACTIC ACID mmol/L 1 4 3 9* 1 6             Results from last 7 days   Lab Units 07/04/22  0556   NT-PRO BNP pg/mL 25,427*                 Results from last 7 days   Lab Units 07/02/22  2358   CRP mg/L 15 4*             Results from last 7 days   Lab Units 07/03/22  0609 07/03/22  0333   CLARITY UA  Clear Slightly Cloudy   COLOR UA  Yellow Yellow   SPEC GRAV UA  1 015 1 020   PH UA  7 0 6 5   GLUCOSE UA mg/dl Negative Negative   KETONES UA mg/dl Negative Negative   BLOOD UA  Negative Large*   PROTEIN UA mg/dl Negative Negative   NITRITE UA  Negative Negative   BILIRUBIN UA  Negative Negative   UROBILINOGEN UA E U /dl 0 2 0 2   LEUKOCYTES UA  Negative Trace*   WBC UA /hpf  -- 4-10*   RBC UA /hpf  --  10-20*   BACTERIA UA /hpf  --  Moderate*   EPITHELIAL CELLS WET PREP /hpf  --  Moderate*         Results from last 7 days   Lab Units 07/03/22  1142 07/02/22  2358   BLOOD CULTURE   --  No Growth at 24 hrs  No Growth at 24 hrs     GRAM STAIN RESULT  No Polys or Bacteria seen  --    BODY FLUID CULTURE, STERILE  No growth  --      Results from last 7 days   Lab Units 07/03/22  1138   TOTAL COUNTED  100   NEUTROPHIL % (SYNOVIAL) % 94   MONOCYTE % (SYNOVIAL) % 2   WBC FLUID /ul 200,120*   RBC, SYNOVIAL  50,000*   CRYSTALS, SYNOVIAL FLUID  No Crystals Seen           ED Treatment:   Medication Administration from 07/02/2022 2331 to 07/03/2022 7823       Date/Time Order Dose Route Action     07/03/2022 0203 sodium chloride (PF) 0 9 % injection 3 mL 3 mL Intravenous Given        Past Medical History:   Diagnosis Date    Cancer [facility])     liver     Present on Admission:   [facility]astatic cancer ([facility] )   Atrial fibrillation ([facility] )   Anemia   Primary hypertension   Left knee pain   Fever   Urinary retention   Acute respiratory failure with hypoxia (HCC)   IVIS (acute kidney injury) ([facility] )      Admitting Diagnosis: Altered mental status [R41 82]  Fever [R50 9]  Generalized weakness [R53 1]  Acute pain of left knee [M25 562]  Symptomatic anemia [D64 9]  Age/Sex: 66 y o  female  Admission Orders:[facility] ICU,  Cardio pulmonary monitoring, I/o, urinary retention protocol,   Up with assistance,    Scheduled Medications:  cefepime, 1,000 mg, Intravenous, Q12H  heparin (porcine), 5,000 Units, Subcutaneous, Q8H [address]  metoprolol succinate, 25 mg, Oral, Daily  pantoprazole, 40 mg, Oral, Early Morning  vancomycin, 20 mg/kg, Intravenous, Q24H      Continuous IV Infusions:  m ulti-electrolyte (PLASMALYTE-A/ISOLYTE-S PH 7 4) IV solution  Rate: 75 mL/hr Dose: 75 mL/hr  Freq: Continuous Route: IV  Last Dose: Stopped (07/03/22 5224)  Start: 07/03/22 0745 End: 07/03/22 1959  PRN Meds:  acetaminophen, 650 mg, Oral, Q6H PRN  calcium carbonate, 1,000 mg, Oral, Daily PRN  HYDROmorphone, 0 2 mg, Intravenous, Q3H PRN 7/4 x4   nitroglycerin, 0 4 mg, Sublingual, Q5 Min PRN 7/4 x1   senna, 1 tablet, Oral, HS PRN  traMADol, 100 mg, Oral, Q6H PRN        IP CONSULT TO ORTHOPEDIC SURGERY  IP CONSULT TO INFECTIOUS DISEASES  IP CONSULT TO ONCOLOGY  IP CONSULT TO PHARMACY  IP CONSULT TO CARDIOLOGY    Network Utilization Review Department  ATTENTION: Please call with any questions or concerns to [phone] and carefully listen to the prompts so that you are directed to the right person  All voicemails are confidential   Kin Schmitt all requests for admission clinical reviews, approved or denied determinations and any other requests to dedicated fax number below belonging to the campus where the patient is receiving treatment   List of dedicated fax numbers for the Facilities:  [address] DENIALS (Administrative/Medical Necessity) [phone]   [address] (Maternity/NICU/Pediatrics) [phone]   [address]  [address] [facility] [place] [address] [id number] [place] [address] [address] [facility] [place] [address] [phone]

## 2022-07-05 NOTE — PROGRESS NOTES
General Cardiology   Progress Note -  Team One   Ariel Edwards 66 y o  female MRN: [id number]    Unit/Bed#: -01 Encounter: [id number]    Assessment:    Acute on HFrEF  · Acute onset of shortness of breath yesterday early a m  with increased work of breathing and tachycardia  · Chest x-ray with evidence of flash pulmonary edema; patient did receive IVF for much of the day 7/3 as well as 2 units PRBC's  · Patient take lasix 20 mg PRN for LE edema; states that she takes this medication most days  · POC echo by Dr Elyce Boast yesterday with EF 40-45%; mild MR/AI/TR  · Full TTE pending  · IV lasix 40 mg given TID yesterday and continued into this AM; discussed with ICU team and they have dosed IV Lasix, 40 mg x 1 this morning they will further evaluate the patient later this afternoon to determine if a 2nd dose is necessary for goal net negative of at least 1 L  · I/O: total UO in 24 hrs is 3 1 L; no PO intake recorded for day shift, cannot calculate negative status  · No weight this AM; dry weight is unknown    Cardiomyopathy  · POC echo by Dr Elyce Boast yesterday with EF 40-45% (had been 55% in 8/2021); mild MR/AI/TR  · Full TTE pending  · Etiology unclear, will require outpatient ischemic workup as outpatient  · GDMT: Toprol XL 25 mg QD and will start ACE/ARB prior to discharge    Acute hypoxic respiratory failure  · Abrupt onset of shortness of breath yesterday with hypoxia on room air  · Likely secondary to CHF; IV diuresis started  · Had required mid flow NC as well as BIPAP for increased work of breathing  · Currently on 8 L NC this AM with adequate O2 sat    PAF  · Known hx of PAF; follows with Dr Rigoberto Lopez as primary cardiologist  · No afib noted since admit  · Home med: Toprol XL 25 mg QD  · No OAC due to recurrent GI bleeds in the past from AVM's; on aspirin 81 mg QD only as outpatient (on hold)  · Follows with Dr Meriam Carrel as primary cardiologist      Essential HTN  · SBP averaging 120's-130's  · Home meds: lasix 20 mg QD and Toprol XL 25 mg QD (lasix on hold)    Acute on chronic anemia  · Baseline Hgb 7-8  · Hgb on admit is 6 7  · Now s/p 2 units PRBC' since admit  · Intermittent requires blood transfusions at [facility]   · 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, cautery, and clipping in 2019   · EGD 08/2021 with multiple AVM cauterized   · Hgb this AM 10 5    Infection of left TKR  · Left knee pain with fever on admit  · Hx of left TKR 5-6 years ago  · Ortho consulted; left knee aspirated yesterday, cultures pending  · plans had been made for washout in the OR, currently on hold secondary to acute CHF  · ABX per [facility] and Ortho teams    Metastatic cancer  · 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   · 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 and b/l ureteral stent placement   · Currently maintained on avapritinib 200mg daily   · CT on admission showing "There is a prominent left presacral nodule measuring up to 2 4 cm which has increased in size since prior exam where it measured 1 3 cm concerning for metastasis"  · CT also showing "Persistent gas within the endometrial canal may represent endometritis however underlying endometrial carcinoma cannot be excluded "    Plan/Recommendations:  · Await TTE results  · IV lasix 40 mg given TID yesterday and continued into this AM; discussed with ICU team and they have dosed IV Lasix, 40 mg x 1 this morning they will further evaluate the patient later this afternoon to determine if a 2nd dose is necessary for goal net negative of at least 1 L  · Continue remainder of home medications      _______________________________________________________________________    Subjective  Patient notes fatigue and left knee pain today      Review of Systems   Constitutional: Positive for malaise/fatigue  Negative for chills and fever  HENT: Negative for congestion  Cardiovascular: Negative for chest pain, dyspnea on exertion, leg swelling, orthopnea and palpitations  Respiratory: Negative for cough, shortness of breath (no SOB at rest) and wheezing  Endocrine: Negative  Hematologic/Lymphatic: Negative  Skin: Negative  Musculoskeletal: Positive for joint pain (left knee)  Gastrointestinal: Negative for bloating, abdominal pain, nausea and vomiting  Genitourinary: Negative  Neurological: Negative for dizziness and light-headedness  Psychiatric/Behavioral: Negative  All other systems reviewed and are negative  Objective:   Vitals: Blood pressure 125/71, pulse 74, temperature 98 5 °F (36 9 °C), temperature source Oral, resp  rate 21, height 5' 1" (1 549 m), weight 50 3 kg (111 lb), SpO2 99 %  ,     Wt Readings from Last 3 Encounters:   07/02/22 50 3 kg (111 lb)        Lab Results   Component Value Date    CREATININE 1 20 07/04/2022    CREATININE 1 22 07/04/2022    CREATININE 1 33 (H) 07/04/2022         Body mass index is 20 97 kg/m²  ,     Systolic (40DDY), FQB:457 , Min:124 , YKK:822     Diastolic (80LHH), XOZ:83, Min:71, Max:98          Intake/Output Summary (Last 24 hours) at 7/5/2022 0848  Last data filed at 7/5/2022 0116  Gross per 24 hour   Intake 590 ml   Output 2790 ml   Net -2200 ml     Weight (last 2 days)     None            Telemetry Review:  Sinus rhythm with BBB rates in the 80's        Physical Exam  Vitals reviewed  Constitutional:       General: She is not in acute distress  HENT:      Head: Normocephalic and atraumatic  Mouth/Throat:      Mouth: Mucous membranes are moist    Cardiovascular:      Rate and Rhythm: Normal rate and regular rhythm  Heart sounds: Normal heart sounds, S1 normal and S2 normal  No murmur heard  Pulmonary:      Effort: Pulmonary effort is normal  No respiratory distress  Breath sounds: Examination of the right-lower field reveals rales  Examination of the left-lower field reveals rales  Rales present  Abdominal:      General: Bowel sounds are normal  There is no distension  Palpations: Abdomen is soft  Musculoskeletal:         General: Normal range of motion  Cervical back: Normal range of motion and neck supple  Right lower leg: No edema  Left lower leg: No edema  Skin:     General: Skin is warm and dry  Neurological:      Mental Status: She is alert and oriented to person, place, and time     Psychiatric:         Mood and Affect: Mood normal          LABORATORY RESULTS      CBC with diff:   Results from last 7 days   Lab Units 07/04/22  0556 07/04/22  0551 07/03/22  2136 07/03/22  0736 07/02/22  2358   WBC Thousand/uL 10 09  --   --  10 33* 7 94   HEMOGLOBIN g/dL 10 4*  --  9 0* 7 5* 6 7*   I STAT HEMOGLOBIN g/dl  --  10 2*  --   --   --    HEMATOCRIT % 31 7*  --  27 0* 22 9* 20 5*   HEMATOCRIT, ISTAT %  --  30*  --   --   --    MCV fL 101*  --   --  105* 107*   PLATELETS Thousands/uL 213  --   --  173 215   MCH pg 33 2  --   --  34 4* 35 1*   MCHC g/dL 32 8  --   --  32 8 32 7   RDW % 18 4*  --   --  16 2* 15 8*   MPV fL 10 2  --   --  9 9 10 4   NRBC AUTO /100 WBCs 0  --   --   --   --        CMP:  Results from last 7 days   Lab Units 07/04/22  2332 07/04/22  1601 07/04/22  0556 07/04/22  0551 07/03/22  0944 07/02/22  2358   POTASSIUM mmol/L 3 3* 4 1 3 7  --  3 9 4 1   CHLORIDE mmol/L 95* 95* 96*  --  101 101   CO2 mmol/L 24 24 20*  --  23 24   CO2, I-STAT mmol/L  --   --   --  19*  --   --    BUN mg/dL 29* 28* 24  --  22 25   CREATININE mg/dL 1 20 1 22 1 33*  --  1 09 1 44*   GLUCOSE, ISTAT mg/dl  --   --   --  188*  --   --    CALCIUM mg/dL 9 0 9 0 9 3  --  8 3 8 7   AST U/L  --   --  46*  --  42 53*   ALT U/L  --   --  29  --  26 28   ALK PHOS U/L  --   --  86  --  65 74   EGFR ml/min/1 73sq m 44 44 39  --  50 36       BMP:  Results from last 7 days   Lab Units 07/04/22  2332 07/04/22  1601 07/04/22  0556 07/04/22  0551 07/03/22  0944 07/02/22  1829   POTASSIUM mmol/L 3 3* 4 1 3 7  --  3 9 4 1   CHLORIDE mmol/L 95* 95* 96*  --  101 101   CO2 mmol/L 24 24 20*  --  23 24   CO2, I-STAT mmol/L  --   --   --  19*  --   --    BUN mg/dL 29* 28* 24  --  22 25   CREATININE mg/dL 1 20 1 22 1 33*  --  1 09 1 44*   GLUCOSE, ISTAT mg/dl  --   --   --  188*  --   --    CALCIUM mg/dL 9 0 9 0 9 3  --  8 3 8 7       Lab Results   Component Value Date    NTBNP 25,427 (H) 07/04/2022             Results from last 7 days   Lab Units 07/04/22  2332 07/04/22  1601 07/04/22  0556 07/03/22  0944   MAGNESIUM mg/dL 2 0 2 1 2 3 1 9                     Results from last 7 days   Lab Units 07/04/22  0556 07/03/22  0120   INR  1 20* 1 13       Lipid Profile:   No results found for: CHOL  No results found for: HDL  No results found for: LDLCALC  No results found for: TRIG    Cardiac testing:   No results found for this or any previous visit  No results found for this or any previous visit  No results found for this or any previous visit  No valid procedures specified  No results found for this or any previous visit          Meds/Allergies   current meds:   Current Facility-Administered Medications   Medication Dose Route Frequency    acetaminophen (TYLENOL) tablet 650 mg  650 mg Oral Q6H PRN    calcium carbonate (TUMS) chewable tablet 1,000 mg  1,000 mg Oral Daily PRN    cefepime (MAXIPIME) IVPB (premix in dextrose) 1,000 mg 50 mL  1,000 mg Intravenous Q12H    heparin (porcine) subcutaneous injection 5,000 Units  5,000 Units Subcutaneous Q8H [facility]    HYDROmorphone HCl (DILAUDID) injection 0 2 mg  0 2 mg Intravenous Q3H PRN    metoprolol succinate (TOPROL-XL) 24 hr tablet 25 mg  25 mg Oral Daily    nitroglycerin (NITROSTAT) SL tablet 0 4 mg  0 4 mg Sublingual Q5 Min PRN    pantoprazole (PROTONIX) injection 40 mg  40 mg Intravenous Q24H [facility]    senna (SENOKOT) tablet 8 6 mg  1 tablet Oral HS PRN    traMADol (ULTRAM) tablet 100 mg  100 mg Oral Q6H PRN    vancomycin (VANCOCIN) IVPB (premix in dextrose) 1,000 mg 200 mL  20 mg/kg Intravenous Q24H     Medications Prior to Admission   Medication    aspirin (ECOTRIN LOW STRENGTH) 81 mg EC tablet    Avapritinib (Ayvakit) 200 MG TABS    hydroxychloroquine (PLAQUENIL) 200 mg tablet    metoprolol succinate (TOPROL-XL) 25 mg 24 hr tablet    ondansetron (ZOFRAN) 8 mg tablet    pantoprazole (PROTONIX) 40 mg tablet    traMADol (ULTRAM) 50 mg tablet            Counseling / Coordination of Care  Total floor / unit time spent today 20 minutes  Greater than 50% of total time was spent with the patient and / or family counseling and / or coordination of care  ** Please Note: Dragon 360 Dictation voice to text software may have been used in the creation of this document   **

## 2022-07-05 NOTE — ASSESSMENT & PLAN NOTE
· S/p L TKR with prosthesis 5-6 years ago at [facility] now presenting with L knee pain and fevers  · Concern for septic arthritis  · Orthopedics following   · 7/3 S/p Arthrocentesis- no growth from fluid cultures thus far  · Plan is for I/D with excision of of implants when patient medically stable  · Currently, plans for OP f/u with her ortho for revision vs removal and washout with abx spacer   · Continue cefepime and vancomycin as above   · Follow-up culture data  · CARLOS HERNANDEZE

## 2022-07-05 NOTE — PROGRESS NOTES
[place]  Progress Note Therese Marianelaalexis 1950, 66 y o  female MRN: [id number]  Unit/Bed#: -01 Encounter: [id number]  Primary Care Provider: No primary care provider on file  Date and time admitted to hospital: 7/2/2022 11:44 PM    * Left knee pain  Assessment & Plan  · S/p L TKR with prosthesis 5-6 years ago at [facility] now presenting with L knee pain and fevers  · Concern for septic arthritis  · Orthopedics following   · 7/3 S/p Arthrocentesis- no growth from fluid cultures thus far  · Plan is for I/D with excision of of implants when patient medically stable  · Currently, plans for OP f/u with her ortho for revision vs removal and washout with abx spacer   · Continue cefepime and vancomycin as above   · Follow-up culture data  · NWB LLE    Fever  Assessment & Plan  · Febrile at home PTA, Tmax 100 1 this admission  · UA: bland  · CXR: no acute pulm disease  · CT CAP: Persistent gas within the endometrial canal may represent endometritis however underlying endometrial carcinoma cannot be excluded  Marked distention of the urinary bladder with associated fullness of bilateral collecting systems  Correlate for bladder outlet obstruction    · Concern for septic arthritis of L knee with concomitant L knee pain  · Abx D4: Vanc, cefepime D4  · S/p arthrocentesis with ortho -- cx's pending   · Trend fever curve, WBC count, and procal    Anemia  Assessment & Plan  · S/p 2 u PRBCs this admission  · 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, cautery, and clipping in 2019   · EGD 08/2021 with multiple AVM cauterized   · Continue to trend hgb   · Macrocytic anemia, check B12 and Folate in AM  · Transfuse to maintain hgb >7  · Consider diuresis with future blood transfusions    Atrial fibrillation (HCC)  Assessment & Plan  · Hx of atrial fibrillation, currently in NSR on tele   · No AC 2/2 chronic anemia  · Continue Toprol XL 25mg daily  · Continous telemetry    Urinary retention  Assessment & Plan  · Markedly distended bladder on admission  · Queen catheter placed- continue for now  · Trial of voiding with urinary retention protocol after diuresis    Metastatic cancer ([facility] )  Assessment & Plan  · 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  · B/l ureteral stents  · Currently receiving chemotherapy with avapritinib 200 mg daily  · Hold in the setting of acute infection  · Outpatient follow-up    Primary hypertension  Assessment & Plan  · Continue Toprol XL 25 mg daily  · Goal SBP <160    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)  Assessment & Plan  Wt Readings from Last 3 Encounters:   07/05/22 50 3 kg (111 lb)     · Echo 8/2021: EF 55%, grade II diastolic dysfunction, mod MR  · 7/4 flash pulmonary edema requiring IV diuresis and bipap  · CXR: Moderate pulm edema  · Echo 7/5/22: EF 40%, G1DD, LA severely dilated, severe MR  · Continue metoprolol  · PRN diuresis for goal - 1L to 2L  · Receiving Lasix IV  · Daily weights, trend UO    IVIS (acute kidney injury) ([place] [id number] )  Assessment & Plan  · Baseline Cr 1-1 1  · Cr on admission 1 44, down to 1 22  · Improving with diuresis  · Trend UOP, renal indices  · Avoid hypotension, nephrotoxic agents   · Bump in creat in the setting of diuresis -- further diuresis held overnight     Acute respiratory failure with hypoxia ([facility] )  Assessment & Plan  · 7/4 patient developed acute onset respiratory distress and increased work of breathing; flash pulmonary edema  · Given 40 IV lasix and placed on BiPAP  · 7/4 CXR: moderate pulmonary edema  · Given additional  40 IV lasix x2  · 7/5 weaned from bipap to MF   · BiPAP PRN  · CXR this AM pending   · Wean supplemental O2 to maintain spO2 > 90%      ----------------------------------------------------------------------------------------  HPI/24hr events: She continued with diuresis yesterday, and was able to be weaned from bipap to MF early in the day  She continues to wean MF with improving o2 sats  She endorses some mild, intermittent posterior L knee pain  Further diuresis was held overnight 2/2 slight bump in creat overnight, however she is 1 2L negative for 24H as of this morning, and was weaned to 2L NC with 98% spo2  She had an episode of L lateral foot pain unrelieved by tramadol, dilaudid and heat pack  She describes that this pain occurs at home as well, and when it does she takes tylenol, which was administered without improvement  Patient appropriate for transfer out of the ICU today?: Patient does not meet criteria for referral to the ICU Follow-Up Clinic; referral has not been made  Disposition: Transfer to Med Surg with Telemetry   Code Status: Level 1 - Full Code  ---------------------------------------------------------------------------------------  SUBJECTIVE  "I'm doing ok"    Review of Systems  Review of systems was reviewed and negative unless stated above in HPI/24-hour events   ---------------------------------------------------------------------------------------  OBJECTIVE    Vitals   Vitals:    22 1900 22 1940 22 2337 22 0417   BP: 110/61  124/65 121/67   BP Location:       Pulse: 74  72 84   Resp: (!) 25  (!) 24 20   Temp:   98 1 °F (36 7 °C) 98 2 °F (36 8 °C)   TempSrc:   Axillary Axillary   SpO2: 100% 100% 97% 98%   Weight:       Height:         Temp (24hrs), Av 3 °F (36 8 °C), Min:98 1 °F (36 7 °C), Max:98 7 °F (37 1 °C)  Current: Temperature: 98 2 °F (36 8 °C)          Respiratory:  SpO2: SpO2: 98 %, SpO2 Activity: SpO2 Activity: At Rest, SpO2 Device: O2 Device: Nasal cannula  Nasal Cannula O2 Flow Rate (L/min): 2 L/min    Invasive/non-invasive ventilation settings   Respiratory  Report   Lab Data (Last 4 hours)    None         O2/Vent Data (Last 4 hours)    None                Physical Exam  Vitals and nursing note reviewed     Constitutional:       General: She is sleeping  She is not in acute distress  Appearance: Normal appearance  She is underweight  HENT:      Head: Normocephalic and atraumatic  Cardiovascular:      Rate and Rhythm: Normal rate and regular rhythm  Pulses:           Dorsalis pedis pulses are 2+ on the right side and 2+ on the left side  Heart sounds: Murmur heard  Systolic murmur is present with a grade of 3/6  Pulmonary:      Effort: Pulmonary effort is normal  No tachypnea, bradypnea, accessory muscle usage, prolonged expiration or respiratory distress  Breath sounds: Normal breath sounds  Abdominal:      General: Abdomen is flat  Bowel sounds are normal  There is no distension  Palpations: Abdomen is soft  Tenderness: There is no abdominal tenderness  Genitourinary:     Comments: +rust   Musculoskeletal:      Right lower leg: No edema  Left lower leg: No edema  Comments: L knee with mild edema, warm to touch   Skin:     General: Skin is warm  Capillary Refill: Capillary refill takes less than 2 seconds  Coloration: Skin is not pale  Neurological:      General: No focal deficit present  Mental Status: She is oriented to person, place, and time and easily aroused  GCS: GCS eye subscore is 4  GCS verbal subscore is 5  GCS motor subscore is 6  Cranial Nerves: Cranial nerves are intact  Psychiatric:         Mood and Affect: Mood normal  Affect is flat  Speech: Speech normal          Behavior: Behavior is cooperative           Laboratory and Diagnostics:  Results from last 7 days   Lab Units 07/06/22  0411 07/05/22  0948 07/04/22  0556 07/04/22  0551 07/03/22  2136 07/03/22  0736 07/02/22  2358   WBC Thousand/uL 9 30 8 89 10 09  --   --  10 33* 7 94   HEMOGLOBIN g/dL 10 0* 10 5* 10 4*  --  9 0* 7 5* 6 7*   I STAT HEMOGLOBIN g/dl  --   --   --  10 2*  --   --   --    HEMATOCRIT % 30 1* 32 1* 31 7*  --  27 0* 22 9* 20 5*   HEMATOCRIT, ISTAT %  --   --   --  30*  --   --   -- PLATELETS Thousands/uL 233 209 213  --   --  173 215   NEUTROS PCT %  --   --  92*  --   --   --   --    BANDS PCT %  --   --   --   --   --   --  3   MONOS PCT %  --   --  4  --   --   --   --    MONO PCT %  --   --   --   --   --  5 0*     Results from last 7 days   Lab Units 07/06/22 0411 07/05/22 1932 07/05/22 0948 07/04/22 2332 07/04/22  1601 07/04/22  0556 07/04/22  0551 07/03/22  0944 07/02/22  2358   SODIUM mmol/L 130* 130* 131* 131* 129* 131*  --  135* 136   POTASSIUM mmol/L 4 0 3 5 3 6 3 3* 4 1 3 7  --  3 9 4 1   CHLORIDE mmol/L 96* 97* 95* 95* 95* 96*  --  101 101   CO2 mmol/L 25 26 24 24 24 20*  --  23 24   CO2, I-STAT mmol/L  --   --   --   --   --   --  19*  --   --    ANION GAP mmol/L 9 7 12 12 10 15*  --  11 11   BUN mg/dL 34* 40* 32* 29* 28* 24  --  22 25   CREATININE mg/dL 1 37* 1 56* 1 22 1 20 1 22 1 33*  --  1 09 1 44*   CALCIUM mg/dL 9 4 9 3 9 3 9 0 9 0 9 3  --  8 3 8 7   GLUCOSE RANDOM mg/dL 105 127 86 99 110 176*  --  115 135   ALT U/L  --   --  29  --   --  29  --  26 28   AST U/L  --   --  45  --   --  46*  --  42 53*   ALK PHOS U/L  --   --  87  --   --  86  --  65 74   ALBUMIN g/dL  --   --  3 4*  --   --  3 3*  --  3 2* 3 6   TOTAL BILIRUBIN mg/dL  --   --  0 90  --   --  1 40*  --  0 80 0 50     Results from last 7 days   Lab Units 07/06/22 0411 07/05/22 1932 07/05/22 0948 07/04/22 2332 07/04/22  1601 07/04/22  0556 07/03/22  0944   MAGNESIUM mg/dL 2 0 2 0 2 2 2 0 2 1 2 3 1 9   PHOSPHORUS mg/dL 2 4 2 6 3 3 4 1 4 2* 4 6* 4 3*      Results from last 7 days   Lab Units 07/04/22  0556 07/03/22  0120   INR  1 20* 1 13   PTT seconds  --  27          Results from last 7 days   Lab Units 07/04/22  0900 07/04/22  0556 07/02/22  2358   LACTIC ACID mmol/L 1 4 3 9* 1 6     ABG:    VBG:    Results from last 7 days   Lab Units 07/06/22  0411 07/05/22  0948 07/04/22  0556 07/03/22  0944 07/02/22  2358   PROCALCITONIN ng/ml 4 42* 5 95* 2 24* 2 76* 0 62*       Micro  Results from last 7 days Lab Units 07/03/22  1142 07/02/22  2358   BLOOD CULTURE   --  No Growth at 48 hrs  No Growth at 48 hrs  GRAM STAIN RESULT  No Polys or Bacteria seen  --    BODY FLUID CULTURE, STERILE  No growth  --        EKG: NSR on tele   Imaging: I have personally reviewed pertinent reports  XR chest portable ICU   Final Result      Improvement in pulmonary edema  Workstation performed: [id number]         XR chest portable   Final Result      Flash pulmonary edema with probable small effusions  Workstation performed: [id number]         CT chest abdomen pelvis wo contrast   Final Result      1  There is a prominent left presacral nodule measuring up to 2 4 cm which has increased in size since prior exam where it measured 1 3 cm concerning for metastasis  Persistent gas within the endometrial canal may represent endometritis however    underlying endometrial carcinoma cannot be excluded  2   Marked distention of the urinary bladder with associated fullness of bilateral collecting systems  Correlate for bladder outlet obstruction  3   Cardiomegaly  4   Anemia  I personally discussed this study with Dr Richard Cnotreras on 7/3/2022 at 3:14 AM          Workstation performed: [id number]         CT head without contrast   Final Result      No acute intracranial hemorrhage, midline shift, or mass effect  Workstation performed: [id number]         XR knee 3 views left non injury   ED Interpretation   Possible effusion, no fracture      Final Result      Unremarkable appearance of total knee arthroplasty  Small joint effusion  No acute osseous abnormalities  Workstation performed: [id number]         XR chest portable   ED Interpretation   No acute abnl, rotated view      Final Result      No acute cardiopulmonary disease                    Workstation performed: [id number]         XR chest portable ICU    (Results Pending)       Intake and Output  I/O 07/04 0701  07/05 0700 07/05 0701 07/06 0700    P  O  60 600    I V  (mL/kg) 30 (0 6)     Blood      IV Piggyback 600 450    Total Intake(mL/kg) 690 (13 7) 1050 (20 9)    Urine (mL/kg/hr) 3140 (2 6) 1450 (2 3)    Stool  0    Total Output 3140 1450    Net -2450 -400          Unmeasured Stool Occurrence  1 x          Height and Weights   Height: 5' 1" (154 9 cm)     Body mass index is 20 97 kg/m²  Weight (last 2 days)     Date/Time Weight    07/05/22 0720 50 3 (111)            Nutrition       Diet Orders   (From admission, onward)             Start     Ordered    07/05/22 1500  Diet Cardiovascular; Cardiac; Fluid Restriction 1500 ML  Diet effective now        References:    Nutrtion Support Algorithm Enteral vs  Parenteral   Question Answer Comment   Diet Type Cardiovascular    Cardiac Cardiac    Other Restriction(s): Fluid Restriction 1500 ML    RD to adjust diet per protocol?  Yes        07/05/22 1459                  Active Medications  Scheduled Meds:  Current Facility-Administered Medications   Medication Dose Route Frequency Provider Last Rate    acetaminophen  650 mg Oral Q6H PRN Dino Aldana PA-C      calcium carbonate  1,000 mg Oral Daily PRN Dino Aldana PA-C      cefepime  1,000 mg Intravenous Q12H Dino Aldana PA-C Stopped (07/06/22 0048)    heparin (porcine)  5,000 Units Subcutaneous [facility], [address]      HYDROmorphone  0 2 mg Intravenous Q3H PRN Dino Aldana PA-C      losartan  25 mg Oral Daily LANEY Melissa      metoprolol succinate  25 mg Oral Daily Dino Aldana PA-C      nitroglycerin  0 4 mg Sublingual Q5 Min PRN Dino Aldana PA-C      pantoprazole  40 mg Intravenous Q24H [address] Dino Aldana PA-C      senna  1 tablet Oral HS PRN Dino Aldana PA-C      traMADol  100 mg Oral Q6H PRN DOUG Carmona-YUE      vancomycin  20 mg/kg Intravenous Q24H Dino Aldana PA-C Stopped (07/05/22 1600)     Continuous Infusions:     PRN Meds:   acetaminophen, 650 mg, Q6H PRN  calcium carbonate, 1,000 mg, Daily PRN  HYDROmorphone, 0 2 mg, Q3H PRN  nitroglycerin, 0 4 mg, Q5 Min PRN  senna, 1 tablet, HS PRN  traMADol, 100 mg, Q6H PRN        Invasive Devices Review  Invasive Devices  Report    Peripheral Intravenous Line  Duration           Peripheral IV 07/03/22 Right Antecubital 3 days    Peripheral IV 07/04/22 Left;Proximal;Ventral (anterior) Forearm 1 day          Drain  Duration           Urethral Catheter 16 Fr  2 days                ---------------------------------------------------------------------------------------  Advance Directive and Living Will:      Power of :    POLST:    ---------------------------------------------------------------------------------------  Care Time Delivered:   No Critical Care time spent       LANEY Sosa      Portions of the record may have been created with voice recognition software  Occasional wrong word or "sound a like" substitutions may have occurred due to the inherent limitations of voice recognition software    Read the chart carefully and recognize, using context, where substitutions have occurred

## 2022-07-05 NOTE — ASSESSMENT & PLAN NOTE
· Febrile at home PTA, Tmax 100 1 this admission  · UA: bland  · CXR: no acute pulm disease  · CT CAP: Persistent gas within the endometrial canal may represent endometritis however underlying endometrial carcinoma cannot be excluded  Marked distention of the urinary bladder with associated fullness of bilateral collecting systems  Correlate for bladder outlet obstruction    · Concern for septic arthritis of L knee with concomitant L knee pain  · Abx D4: Vanc, cefepime D4  · S/p arthrocentesis with ortho -- cx's pending   · Trend fever curve, WBC count, and procal

## 2022-07-05 NOTE — ASSESSMENT & PLAN NOTE
· Hx of atrial fibrillation, currently in NSR on tele   · No AC 2/2 chronic anemia  · Continue Toprol XL 25mg daily  · Continous telemetry

## 2022-07-05 NOTE — PROGRESS NOTES
[place]  Progress Note Markcandi Baez 1950, 66 y o  female MRN: [id number]  Unit/Bed#: -01 Encounter: [id number]  Primary Care Provider: No primary care provider on file  Date and time admitted to hospital: 7/2/2022 11:44 PM    * Left knee pain  Assessment & Plan  · S/p L TKR with prosthesis 5-6 years ago at [facility] now presenting with L knee pain and fevers  · Concern for septic arthritis  · Orthopedics consulted  · S/p Arthrocentesis on 7/3- no growth from fluid cultures thus far  · Plan is for I/D with excision of of implants when patient medically stable  · Continue cefepime and vancomycin for now  · Follow-up culture data  · NWB LLE    Fever  Assessment & Plan  · Febrile at home PTA, Tmax 100 1 this admission  · UA: bland  · CXR: no acute pulm disease  · CT CAP: Persistent gas within the endometrial canal may represent endometritis however underlying endometrial carcinoma cannot be excluded  Marked distention of the urinary bladder with associated fullness of bilateral collecting systems  Correlate for bladder outlet obstruction    · Concern for septic arthritis of L knee with concomitant L knee pain  · Continue cefepime and vancomycin   · S/p arthrocentesis with ortho; follow-up culture data  · Trend fever curve, WBC count, and procal    Anemia  Assessment & Plan  · S/p 2 u PRBCs  · Continue to trend hgb   · Macrocytic anemia, check B12 and Folate in AM  · Transfuse to maintain hgb >7  · Consider diuresis with future blood transfusions    Atrial fibrillation (HCC)  Assessment & Plan  · Hx of atrial fibrillation, currently in NSR  · No AC 2/2 chronic anemia  · Continue TOPROL XL 25mg daily  · Continue telemetry    Urinary retention  Assessment & Plan  · Markedly distended bladder on admission  · Queen catheter placed- continue for now  · Trial of voiding with urinary retention protocol after diuresis    Metastatic cancer ([facility] [address] )  Assessment & Plan  · 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  · B/l ureteral stents  · Currently receiving chemotherapy with avapritinib 200 mg daily  · Hold in the setting of acute infection  · Outpatient follow-up    Primary hypertension  Assessment & Plan  · Continue TOPROL XL 25 mg daily    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)  Assessment & Plan  Wt Readings from Last 3 Encounters:   07/02/22 50 3 kg (111 lb)     · Echo 8/2021: EF 55%, grade II diastolic dysfunction, mod MR  · 7/4 flash pulmonary edema requiring IV diuresis and bipap  · CXR: Moderate pulm edema  · Repeat echo pending  · Received 40 IV lasix x 3  · Continue metoprolol  · PRN diuresis for goal - 1L to 2L  · Daily weights  · Trend UOP        IVIS (acute kidney injury) ([facility] [address] )  Assessment & Plan  · Baseline Cr 1-1 1  · Cr on admission 1 44, down to 1 22  · Improving with diuresis  · Trend UOP  · Trend renal indices    Acute respiratory failure with hypoxia (HCC)  Assessment & Plan  · 7/4 patient developed acute onset respiratory distress and increased work of breathing; flash pulmonary edema  · Given 40 IV lasix and placed on BiPAP  · Upgraded to SD2  · CXR: moderate pulmonary edema  · Given additional  40 IV lasix x2 on 7/4  · BiPAP PRN  · CXR in AM  · Wean supplemental O2 to maintain spO2 > 92%      ----------------------------------------------------------------------------------------  HPI/24hr events: Patient was given an additional 40 IV lasix yesterday afternoon and maintained on BiPAP throughout the day  Trialed off bipap in the evening and placed on 12 liters midflow  Around 2200 patient again developed acute onset shortness of breath, tachypnea, tachycardia, diaphioresis and was placed back on BiPAP  She was given an additional 40 IV lasix with improvement in her symptoms       Patient appropriate for transfer out of the ICU today?: No  Disposition: Continue Stepdown Level 1 level of care   Code Status: Level 1 - Full Code  ---------------------------------------------------------------------------------------  SUBJECTIVE  Pt states that she still feels mildly SOB but denies any chest discomfort  She complains of thirst but understands that she cannot drink while on BiPAP  Review of Systems   Constitutional: Positive for diaphoresis  Negative for fever  Respiratory: Positive for shortness of breath  Negative for cough and wheezing  Cardiovascular: Negative for chest pain and palpitations  Gastrointestinal: Negative for abdominal distention, abdominal pain, constipation, diarrhea, nausea and vomiting  Neurological: Negative for dizziness, weakness, light-headedness, numbness and headaches  All other systems reviewed and are negative  Review of systems was reviewed and negative unless stated above in HPI/24-hour events   ---------------------------------------------------------------------------------------  OBJECTIVE    Vitals   Vitals:    22 2226 22 2234 22 2300 22 0000   BP:  167/98 127/73 137/76   BP Location:       Pulse:  82 73 68   Resp:  (!) 30 (!) 40 (!) 52   Temp:  98 5 °F (36 9 °C)     TempSrc:  Oral     SpO2: 96% 94% 96% 99%   Weight:       Height:         Temp (24hrs), Av 5 °F (36 9 °C), Min:98 4 °F (36 9 °C), Max:98 7 °F (37 1 °C)  Current: Temperature: 98 5 °F (36 9 °C)          Respiratory:  SpO2: SpO2: 99 %  Nasal Cannula O2 Flow Rate (L/min): 12 L/min    Invasive/non-invasive ventilation settings   Respiratory  Report   Lab Data (Last 4 hours)    None         O2/Vent Data (Last 4 hours)      07/04 2226          Non-Invasive Ventilation Mode BiPAP                   Physical Exam  Vitals reviewed  Constitutional:       General: She is not in acute distress  Appearance: She is not ill-appearing, toxic-appearing or diaphoretic  HENT:      Head: Normocephalic        Nose: Nose normal       Mouth/Throat:      Mouth: Mucous membranes are moist    Eyes: Extraocular Movements: Extraocular movements intact  Pupils: Pupils are equal, round, and reactive to light  Neck:      Vascular: JVD present  Cardiovascular:      Rate and Rhythm: Normal rate  Rhythm irregular  Heart sounds: No murmur heard  No friction rub  No gallop  Pulmonary:      Breath sounds: Rales present  No wheezing or rhonchi  Chest:      Chest wall: No tenderness  Abdominal:      General: Abdomen is flat  There is no distension  Palpations: Abdomen is soft  Tenderness: There is no abdominal tenderness  There is no guarding or rebound  Musculoskeletal:      Cervical back: Neck supple  Right lower leg: No edema  Left lower leg: No edema  Skin:     General: Skin is warm and dry  Capillary Refill: Capillary refill takes less than 2 seconds  Neurological:      General: No focal deficit present  Mental Status: She is alert and oriented to person, place, and time     Psychiatric:         Mood and Affect: Mood normal          Behavior: Behavior normal              Laboratory and Diagnostics:  Results from last 7 days   Lab Units 07/04/22  0556 07/04/22  0551 07/03/22  2136 07/03/22  0736 07/02/22  2358   WBC Thousand/uL 10 09  --   --  10 33* 7 94   HEMOGLOBIN g/dL 10 4*  --  9 0* 7 5* 6 7*   I STAT HEMOGLOBIN g/dl  --  10 2*  --   --   --    HEMATOCRIT % 31 7*  --  27 0* 22 9* 20 5*   HEMATOCRIT, ISTAT %  --  30*  --   --   --    PLATELETS Thousands/uL 213  --   --  173 215   NEUTROS PCT % 92*  --   --   --   --    BANDS PCT %  --   --   --   --  3   MONOS PCT % 4  --   --   --   --    MONO PCT %  --   --   --  5 0*     Results from last 7 days   Lab Units 07/04/22  2332 07/04/22  1601 07/04/22  0556 07/04/22  0551 07/03/22  0944 07/02/22  2358   SODIUM mmol/L 131* 129* 131*  --  135* 136   POTASSIUM mmol/L 3 3* 4 1 3 7  --  3 9 4 1   CHLORIDE mmol/L 95* 95* 96*  --  101 101   CO2 mmol/L 24 24 20*  --  23 24   CO2, I-STAT mmol/L  --   --   --  19*  -- --    ANION GAP mmol/L 12 10 15*  --  11 11   BUN mg/dL 29* 28* 24  --  22 25   CREATININE mg/dL 1 20 1 22 1 33*  --  1 09 1 44*   CALCIUM mg/dL 9 0 9 0 9 3  --  8 3 8 7   GLUCOSE RANDOM mg/dL 99 110 176*  --  115 135   ALT U/L  --   --  29  --  26 28   AST U/L  --   --  46*  --  42 53*   ALK PHOS U/L  --   --  86  --  65 74   ALBUMIN g/dL  --   --  3 3*  --  3 2* 3 6   TOTAL BILIRUBIN mg/dL  --   --  1 40*  --  0 80 0 50     Results from last 7 days   Lab Units 07/04/22  2332 07/04/22  1601 07/04/22  0556 07/03/22  0944   MAGNESIUM mg/dL 2 0 2 1 2 3 1 9   PHOSPHORUS mg/dL 4 1 4 2* 4 6* 4 3*      Results from last 7 days   Lab Units 07/04/22  0556 07/03/22  0120   INR  1 20* 1 13   PTT seconds  --  27          Results from last 7 days   Lab Units 07/04/22  0900 07/04/22  0556 07/02/22  2358   LACTIC ACID mmol/L 1 4 3 9* 1 6     ABG:    VBG:    Results from last 7 days   Lab Units 07/04/22  0556 07/03/22  0944 07/02/22  2358   PROCALCITONIN ng/ml 2 24* 2 76* 0 62*       Micro  Results from last 7 days   Lab Units 07/03/22  1142 07/02/22  2358   BLOOD CULTURE   --  No Growth at 24 hrs  No Growth at 24 hrs  GRAM STAIN RESULT  No Polys or Bacteria seen  --    BODY FLUID CULTURE, STERILE  No growth  --        EKG: NSR with frequent PVCs  Imaging: I have personally reviewed pertinent reports  Intake and Output  I/O       07/03 0701 07/04 0700 07/04 0701 07/05 0700    P  O   60    I V  (mL/kg)  30 (0 6)    Blood 320     IV Piggyback  500    Total Intake(mL/kg) 320 (6 4) 590 (11 7)    Urine (mL/kg/hr) 1675 (1 4) 2715 (2 2)    Stool 0     Total Output 1675 2715    Net -1355 -2125          Unmeasured Stool Occurrence 1 x           Height and Weights   Height: 5' 1" (154 9 cm)     Body mass index is 20 97 kg/m²    Weight (last 2 days)     None            Nutrition       Diet Orders   (From admission, onward)             Start     Ordered    07/04/22 8694  Diet NPO  Diet effective now        References:    Nutrtion Support Algorithm Enteral vs  Parenteral   Question Answer Comment   Diet Type NPO    RD to adjust diet per protocol?  Yes        07/04/22 3120                  Active Medications  Scheduled Meds:  Current Facility-Administered Medications   Medication Dose Route Frequency Provider Last Rate    acetaminophen  650 mg Oral Q6H PRN Avni Cerise, PA-C      calcium carbonate  1,000 mg Oral Daily PRN Avni Cerise, PA-C      cefepime  1,000 mg Intravenous Q12H Avni Cerise, PA-C Stopped (07/05/22 0023)    heparin (porcine)  5,000 Units [facility] [place], [place]      HYDROmorphone  0 2 mg Intravenous Q3H PRN Avni Cerise, PA-C      metoprolol succinate  25 mg Oral Daily Avni Cerise, PA-C      nitroglycerin  0 4 mg Sublingual Q5 Min PRN Avni Cerise, PA-C      pantoprazole  40 mg Oral Early Morning Avni Cerise, PA-C      potassium chloride  20 mEq Intravenous Q2H Avni Cerise, PA-C 20 mEq (07/05/22 0036)    senna  1 tablet Oral HS PRN Avni Cerise, PA-C      traMADol  100 mg Oral Q6H PRN Avni Cerise, PA-C      vancomycin  20 mg/kg Intravenous Q24H Avni Cerise, PA-C Stopped (07/04/22 1415)     Continuous Infusions:     PRN Meds:   acetaminophen, 650 mg, Q6H PRN  calcium carbonate, 1,000 mg, Daily PRN  HYDROmorphone, 0 2 mg, Q3H PRN  nitroglycerin, 0 4 mg, Q5 Min PRN  senna, 1 tablet, HS PRN  traMADol, 100 mg, Q6H PRN        Invasive Devices Review  Invasive Devices  Report    Peripheral Intravenous Line  Duration           Peripheral IV 07/03/22 Right Antecubital 2 days          Drain  Duration           Urethral Catheter 16 Fr  1 day                ---------------------------------------------------------------------------------------  Advance Directive and Living Will:      Power of :    POLST:    ---------------------------------------------------------------------------------------  Care Time Delivered:   No Critical Care time spent       Karen Carr PA-C      Portions of the record may have been created with voice recognition software  Occasional wrong word or "sound a like" substitutions may have occurred due to the inherent limitations of voice recognition software    Read the chart carefully and recognize, using context, where substitutions have occurred

## 2022-07-05 NOTE — PROGRESS NOTES
Carson Roman  66 y o   female  MR#: [id number]  7/5/2022    Extremity: left knee    Subjective:  Patient seen examined  Lying comfortably in bed  Currently getting an echocardiogram   Denies any pain in her left knee at this time  Vitals:   Vitals:    07/05/22 0600   BP: 125/71   Pulse: 74   Resp: 21   Temp:    SpO2: 100%       Exam:   A&O x 3 NAD  Left knee:  Mild swelling  No erythema  Mild tenderness to palpation  NV intact    Labs:   WBC   Recent Labs     07/02/22 2358 07/03/22  0736 07/04/22  0556   WBC 7 94 10 33* 10 09     H/H   Recent Labs     07/02/22  2358 07/03/22  0736 07/03/22  2136 07/04/22  0551 07/04/22  0556   HGB 6 7* 7 5* 9 0* 10 2* 10 4*   /  Recent Labs     07/02/22 2358 07/03/22  0736 07/03/22  2136 07/04/22  0551 07/04/22  0556   HCT 20 5* 22 9* 27 0* 30* 31 7*     Sed Rate No results for input(s): SEDRATE in the last 72 hours  CRP   Recent Labs     07/02/22  2358   CRP 15 4*       Assessment:   S/p left TKA with septic joint    Plan:   Patient had symptoms for 4 weeks prior to admission  Dr Loren Morton discussed treatment options of explant with antibiotic spacer during admission once medically stable verses suppression with antibiotics and follow-up with total joint specialist for definitive treatment  Continue current critical care management  Not medically cleared for OR today  Will make NPO at midnight and discuss treatment options tomorrow once again

## 2022-07-06 ENCOUNTER — APPOINTMENT (INPATIENT)
Dept: RADIOLOGY | Facility: HOSPITAL | Age: 72
DRG: 559 | End: 2022-07-06
Payer: COMMERCIAL

## 2022-07-06 LAB
ABO GROUP BLD BPU: NORMAL
ABO GROUP BLD BPU: NORMAL
ANION GAP SERPL CALCULATED.3IONS-SCNC: 9 MMOL/L (ref 4–13)
ANION GAP SERPL CALCULATED.3IONS-SCNC: 9 MMOL/L (ref 4–13)
ATRIAL RATE: 119 BPM
ATRIAL RATE: 82 BPM
BACTERIA SPEC BFLD CULT: NO GROWTH
BASOPHILS # BLD MANUAL: 0 THOUSAND/UL (ref 0–0.1)
BASOPHILS NFR MAR MANUAL: 0 % (ref 0–1)
BPU ID: NORMAL
BPU ID: NORMAL
BUN SERPL-MCNC: 30 MG/DL (ref 5–25)
BUN SERPL-MCNC: 34 MG/DL (ref 5–25)
CA-I BLD-SCNC: 0.91 MMOL/L (ref 1.12–1.32)
CA-I BLD-SCNC: 1.13 MMOL/L (ref 1.12–1.32)
CALCIUM SERPL-MCNC: 9.1 MG/DL (ref 8.3–10.1)
CALCIUM SERPL-MCNC: 9.4 MG/DL (ref 8.3–10.1)
CHLORIDE SERPL-SCNC: 95 MMOL/L (ref 100–108)
CHLORIDE SERPL-SCNC: 96 MMOL/L (ref 100–108)
CO2 SERPL-SCNC: 24 MMOL/L (ref 21–32)
CO2 SERPL-SCNC: 25 MMOL/L (ref 21–32)
CREAT SERPL-MCNC: 1.29 MG/DL (ref 0.6–1.3)
CREAT SERPL-MCNC: 1.37 MG/DL (ref 0.6–1.3)
CROSSMATCH: NORMAL
CROSSMATCH: NORMAL
EOSINOPHIL # BLD MANUAL: 0 THOUSAND/UL (ref 0–0.4)
EOSINOPHIL NFR BLD MANUAL: 0 % (ref 0–6)
ERYTHROCYTE [DISTWIDTH] IN BLOOD BY AUTOMATED COUNT: 16.2 % (ref 11.6–15.1)
GFR SERPL CREATININE-BSD FRML MDRD: 38 ML/MIN/1.73SQ M
GFR SERPL CREATININE-BSD FRML MDRD: 41 ML/MIN/1.73SQ M
GLUCOSE SERPL-MCNC: 105 MG/DL (ref 65–140)
GLUCOSE SERPL-MCNC: 133 MG/DL (ref 65–140)
GRAM STN SPEC: NORMAL
HCT VFR BLD AUTO: 30.1 % (ref 34.8–46.1)
HGB BLD-MCNC: 10 G/DL (ref 11.5–15.4)
LYMPHOCYTES # BLD AUTO: 0 % (ref 14–44)
LYMPHOCYTES # BLD AUTO: 0 THOUSAND/UL (ref 0.6–4.47)
MAGNESIUM SERPL-MCNC: 2 MG/DL (ref 1.6–2.6)
MAGNESIUM SERPL-MCNC: 2 MG/DL (ref 1.6–2.6)
MCH RBC QN AUTO: 33.3 PG (ref 26.8–34.3)
MCHC RBC AUTO-ENTMCNC: 33.2 G/DL (ref 31.4–37.4)
MCV RBC AUTO: 100 FL (ref 82–98)
MONOCYTES # BLD AUTO: 1.12 THOUSAND/UL (ref 0–1.22)
MONOCYTES NFR BLD: 12 % (ref 4–12)
NEUTROPHILS # BLD MANUAL: 8.18 THOUSAND/UL (ref 1.85–7.62)
NEUTS SEG NFR BLD AUTO: 88 % (ref 43–75)
P AXIS: 36 DEGREES
P AXIS: 71 DEGREES
PHOSPHATE SERPL-MCNC: 2.2 MG/DL (ref 2.3–4.1)
PHOSPHATE SERPL-MCNC: 2.4 MG/DL (ref 2.3–4.1)
PLATELET # BLD AUTO: 233 THOUSANDS/UL (ref 149–390)
PLATELET BLD QL SMEAR: ADEQUATE
PMV BLD AUTO: 10 FL (ref 8.9–12.7)
POTASSIUM SERPL-SCNC: 3.9 MMOL/L (ref 3.5–5.3)
POTASSIUM SERPL-SCNC: 4 MMOL/L (ref 3.5–5.3)
PR INTERVAL: 120 MS
PR INTERVAL: 164 MS
PROCALCITONIN SERPL-MCNC: 4.42 NG/ML
QRS AXIS: -58 DEGREES
QRS AXIS: -70 DEGREES
QRSD INTERVAL: 124 MS
QRSD INTERVAL: 126 MS
QT INTERVAL: 332 MS
QT INTERVAL: 426 MS
QTC INTERVAL: 467 MS
QTC INTERVAL: 497 MS
RBC # BLD AUTO: 3 MILLION/UL (ref 3.81–5.12)
RBC MORPH BLD: NORMAL
SODIUM SERPL-SCNC: 128 MMOL/L (ref 136–145)
SODIUM SERPL-SCNC: 130 MMOL/L (ref 136–145)
T WAVE AXIS: -6 DEGREES
T WAVE AXIS: 85 DEGREES
UNIT DISPENSE STATUS: NORMAL
UNIT DISPENSE STATUS: NORMAL
UNIT PRODUCT CODE: NORMAL
UNIT PRODUCT CODE: NORMAL
UNIT PRODUCT VOLUME: 350 ML
UNIT PRODUCT VOLUME: 350 ML
UNIT RH: NORMAL
UNIT RH: NORMAL
VANCOMYCIN TROUGH SERPL-MCNC: 13.9 UG/ML (ref 10–20)
VENTRICULAR RATE: 119 BPM
VENTRICULAR RATE: 82 BPM
WBC # BLD AUTO: 9.3 THOUSAND/UL (ref 4.31–10.16)

## 2022-07-06 PROCEDURE — 82330 ASSAY OF CALCIUM: CPT | Performed by: NURSE PRACTITIONER

## 2022-07-06 PROCEDURE — 99232 SBSQ HOSP IP/OBS MODERATE 35: CPT | Performed by: INTERNAL MEDICINE

## 2022-07-06 PROCEDURE — C9113 INJ PANTOPRAZOLE SODIUM, VIA: HCPCS | Performed by: PHYSICIAN ASSISTANT

## 2022-07-06 PROCEDURE — 73630 X-RAY EXAM OF FOOT: CPT

## 2022-07-06 PROCEDURE — NC001 PR NO CHARGE: Performed by: NURSE PRACTITIONER

## 2022-07-06 PROCEDURE — 93010 ELECTROCARDIOGRAM REPORT: CPT | Performed by: INTERNAL MEDICINE

## 2022-07-06 PROCEDURE — NC001 PR NO CHARGE: Performed by: PHYSICIAN ASSISTANT

## 2022-07-06 PROCEDURE — 84100 ASSAY OF PHOSPHORUS: CPT | Performed by: NURSE PRACTITIONER

## 2022-07-06 PROCEDURE — 99231 SBSQ HOSP IP/OBS SF/LOW 25: CPT | Performed by: ORTHOPAEDIC SURGERY

## 2022-07-06 PROCEDURE — 85027 COMPLETE CBC AUTOMATED: CPT | Performed by: NURSE PRACTITIONER

## 2022-07-06 PROCEDURE — 71045 X-RAY EXAM CHEST 1 VIEW: CPT

## 2022-07-06 PROCEDURE — 84145 PROCALCITONIN (PCT): CPT | Performed by: NURSE PRACTITIONER

## 2022-07-06 PROCEDURE — 80048 BASIC METABOLIC PNL TOTAL CA: CPT | Performed by: NURSE PRACTITIONER

## 2022-07-06 PROCEDURE — 80202 ASSAY OF VANCOMYCIN: CPT | Performed by: PHYSICIAN ASSISTANT

## 2022-07-06 PROCEDURE — 93005 ELECTROCARDIOGRAM TRACING: CPT

## 2022-07-06 PROCEDURE — 85007 BL SMEAR W/DIFF WBC COUNT: CPT | Performed by: NURSE PRACTITIONER

## 2022-07-06 PROCEDURE — 99239 HOSP IP/OBS DSCHRG MGMT >30: CPT | Performed by: INTERNAL MEDICINE

## 2022-07-06 PROCEDURE — 83735 ASSAY OF MAGNESIUM: CPT | Performed by: NURSE PRACTITIONER

## 2022-07-06 RX ORDER — PANTOPRAZOLE SODIUM 40 MG/1
40 TABLET, DELAYED RELEASE ORAL
Status: DISCONTINUED | OUTPATIENT
Start: 2022-07-07 | End: 2022-07-07 | Stop reason: HOSPADM

## 2022-07-06 RX ORDER — ONDANSETRON 2 MG/ML
4 INJECTION INTRAMUSCULAR; INTRAVENOUS EVERY 4 HOURS PRN
Status: DISCONTINUED | OUTPATIENT
Start: 2022-07-06 | End: 2022-07-07 | Stop reason: HOSPADM

## 2022-07-06 RX ORDER — LOSARTAN POTASSIUM 25 MG/1
25 TABLET ORAL DAILY
Status: DISCONTINUED | OUTPATIENT
Start: 2022-07-06 | End: 2022-07-07 | Stop reason: HOSPADM

## 2022-07-06 RX ORDER — ONDANSETRON 2 MG/ML
INJECTION INTRAMUSCULAR; INTRAVENOUS
Status: COMPLETED
Start: 2022-07-06 | End: 2022-07-06

## 2022-07-06 RX ORDER — METOPROLOL TARTRATE 5 MG/5ML
2.5 INJECTION INTRAVENOUS ONCE
Status: COMPLETED | OUTPATIENT
Start: 2022-07-06 | End: 2022-07-06

## 2022-07-06 RX ORDER — MAGNESIUM SULFATE HEPTAHYDRATE 40 MG/ML
2 INJECTION, SOLUTION INTRAVENOUS ONCE
Status: COMPLETED | OUTPATIENT
Start: 2022-07-06 | End: 2022-07-06

## 2022-07-06 RX ORDER — POTASSIUM CHLORIDE 14.9 MG/ML
20 INJECTION INTRAVENOUS ONCE
Status: COMPLETED | OUTPATIENT
Start: 2022-07-06 | End: 2022-07-06

## 2022-07-06 RX ADMIN — TRAMADOL HYDROCHLORIDE 100 MG: 50 TABLET, COATED ORAL at 02:45

## 2022-07-06 RX ADMIN — HYDROMORPHONE HYDROCHLORIDE 0.2 MG: 0.2 INJECTION, SOLUTION INTRAMUSCULAR; INTRAVENOUS; SUBCUTANEOUS at 03:13

## 2022-07-06 RX ADMIN — HEPARIN SODIUM 5000 UNITS: 5000 INJECTION INTRAVENOUS; SUBCUTANEOUS at 13:09

## 2022-07-06 RX ADMIN — HEPARIN SODIUM 5000 UNITS: 5000 INJECTION INTRAVENOUS; SUBCUTANEOUS at 05:57

## 2022-07-06 RX ADMIN — Medication 2 TABLET: at 16:45

## 2022-07-06 RX ADMIN — ONDANSETRON 4 MG: 2 INJECTION INTRAMUSCULAR; INTRAVENOUS at 17:11

## 2022-07-06 RX ADMIN — CALCIUM CARBONATE (ANTACID) CHEW TAB 500 MG 1000 MG: 500 CHEW TAB at 15:01

## 2022-07-06 RX ADMIN — PANTOPRAZOLE SODIUM 40 MG: 40 INJECTION, POWDER, FOR SOLUTION INTRAVENOUS at 08:27

## 2022-07-06 RX ADMIN — ACETAMINOPHEN 650 MG: 325 TABLET, FILM COATED ORAL at 04:12

## 2022-07-06 RX ADMIN — TRAMADOL HYDROCHLORIDE 100 MG: 50 TABLET, COATED ORAL at 13:28

## 2022-07-06 RX ADMIN — MAGNESIUM SULFATE HEPTAHYDRATE 2 G: 40 INJECTION, SOLUTION INTRAVENOUS at 16:45

## 2022-07-06 RX ADMIN — HEPARIN SODIUM 5000 UNITS: 5000 INJECTION INTRAVENOUS; SUBCUTANEOUS at 21:25

## 2022-07-06 RX ADMIN — TRAMADOL HYDROCHLORIDE 100 MG: 50 TABLET, COATED ORAL at 23:02

## 2022-07-06 RX ADMIN — VANCOMYCIN HYDROCHLORIDE 1000 MG: 1 INJECTION, SOLUTION INTRAVENOUS at 14:07

## 2022-07-06 RX ADMIN — POTASSIUM CHLORIDE 20 MEQ: 14.9 INJECTION, SOLUTION INTRAVENOUS at 16:45

## 2022-07-06 RX ADMIN — LOSARTAN POTASSIUM 25 MG: 25 TABLET, FILM COATED ORAL at 15:02

## 2022-07-06 RX ADMIN — METOPROLOL TARTRATE 2.5 MG: 1 INJECTION, SOLUTION INTRAVENOUS at 16:45

## 2022-07-06 RX ADMIN — HYDROMORPHONE HYDROCHLORIDE 0.2 MG: 0.2 INJECTION, SOLUTION INTRAMUSCULAR; INTRAVENOUS; SUBCUTANEOUS at 19:37

## 2022-07-06 RX ADMIN — METOPROLOL SUCCINATE 25 MG: 25 TABLET, FILM COATED, EXTENDED RELEASE ORAL at 08:27

## 2022-07-06 RX ADMIN — CEFEPIME HYDROCHLORIDE 1000 MG: 1 INJECTION, SOLUTION INTRAVENOUS at 13:09

## 2022-07-06 NOTE — UTILIZATION REVIEW
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 currently on daily PO chemotherapy, anemia, HTN, Afib, and left knee replacement  Presented with confusion and lethargy, fever to 102  Pain in left knee started day prior to arrival    Concern of infection of  Left total knee replacement, started on vancomycin and cefepime  Will need removal of hardware  Anemic and transfused 2 units PRBC this admission  Per Orthopedics 7/3/22:  Patient with left knee pain and differential of infection vs loosening of component  Non weight bearing LLE  Arthrocentesis done  7/3/22 procedure: left knee aspiration  Findings: Approx 12 cc of purulent fluid was aspirated and sent for gram stain, culture, synovial WBC/RBC, and crystals  Plan: I&D and excision of implants and antibiotic spacer for tomorrow    Per ID 7/4/22  Patient with septic arthritis, left TKR infection on chemotherapy  On vancomycin and cefepime  On 7/4/22 flash pulmonary edema:  patient became short of breath and on exam lungs with rales  Placed on Bipap and transfer to ICU  Telemetry afib with RVR  Diuresis with Lasix IV  Surgery on hold  7/5/22: Today feels less short of breath  In last 24 hours transiently on midflow, returned to [facility] for work of breathing  Lungs base rales  To continue diuresis and given lasix IV  Wean bipap  Continue antibiotics  Hold OR washout of left knee at least 24 hours        Assessment/Plan: 7/6/22  REQUEST TRANSFER TO THE [facility] : RECOMMENDATION FOR PATIENT TO FOLLOW WITH ORIGINAL SURGEON FOR FURTHER TREATMENT OF SEPTIC JOINT/LEFT TKR INFECTION  ORTHOPEDIC SURGEON AT [facility] DOES NOT PERFORM THIS TYPE OF SURGERY   Yesterday, patient able to transition off Bipap and continues to be weaned from mid flow  Has intermittent posterior left knee pain  Left lateral foot pain  Net 1 2L for last 24 hours  On exam systolic murmur  Queen  Left knee with mild edema  Warm to touch  Telemetry sinus  Wbc 9 30  H&H 10/30 1  Bun 34, creatinine 1 37 with baseline creatinine of 1 - 1 1  Procalcitonin 4 42   7/3 S/p Arthrocentesis- no growth from fluid cultures thus far   Needs I&D with excision of implants when medically stable  Continue cefepime and vancomycin  NWB LLE  Diuresis on hold overnight due to IVIS  Wean oxygen for sat > 90%        Vital Signs:   07/06/22 1100 99 3 °F (37 4 °C) 90 20 137/71 98 94 % -- -- -- -- -- -- Lying   07/06/22 0718 98 5 °F (36 9 °C) 85 -- 107/60 76 97 % -- 28 -- 2 L/min -- -- Sitting   07/06/22 0417 98 2 °F (36 8 °C) 84 20 121/67 88 98 % -- 28 -- 2 L/min Nasal cannula -- --   07/05/22 2337 98 1 °F (36 7 °C) 72 24 Abnormal  124/65 89 97 % -- 28 -- 2 L/min  Nasal cannula -- --   07/05/22 1940 -- -- -- -- -- 100 % -- 36 4 L/min 4 L/min Mid flow nasal cannula -- --   07/05/22 1900 -- 74 25 Abnormal  110/61 80 100 % -- -- -- -- Mid flow nasal cannula -- --   07/05/22 1853 98 7 °F (37 1 °C) 73 25 Abnormal  110/61 80 100 % -- -- 6 L/min -- Mid flow nasal cannula -- Lying   07/05/22 1415 -- -- -- -- -- 100 % -- -- 6 L/min -- Mid flow nasal cannula MFNC prongs --   07/05/22 1200 -- 79 28 Abnormal  124/66 90 100 % -- -- -- -- -- -- --   07/05/22 1000 -- 80 27 Abnormal  122/70 91 100 % -- -- -- -- -- -- --   07/05/22 0929 -- -- -- -- -- 100 % -- -- 8 L/min -- Mid flow nasal cannula MFNC prongs        Pertinent Labs/Diagnostic Results:   XR chest portable ICU   Final Result by Shankar Pereira MD (07/06 1104)      Continued improvement of pulmonary edema with trace left effusion  Workstation performed: [id number]         XR chest portable ICU   Final Result by Adria Gage MD (07/05 1357)      Improvement in pulmonary edema  Workstation performed: [id number]         XR chest portable   Final Result by Zach Mann MD (07/04 1210)      Flash pulmonary edema with probable small effusions  Workstation performed: [id number]         CT chest abdomen pelvis wo contrast   Final Result by Jordana Rodriguez MD (05/59 5040)      1  There is a prominent left presacral nodule measuring up to 2 4 cm which has increased in size since prior exam where it measured 1 3 cm concerning for metastasis  Persistent gas within the endometrial canal may represent endometritis however    underlying endometrial carcinoma cannot be excluded  2   Marked distention of the urinary bladder with associated fullness of bilateral collecting systems  Correlate for bladder outlet obstruction  3   Cardiomegaly  4   Anemia  I personally discussed this study with Dr Víctor Raphael on 7/3/2022 at 3:14 AM          Workstation performed: [id number]         CT head without contrast   Final Result by Jordana Rodriguez MD (07/03 8482)      No acute intracranial hemorrhage, midline shift, or mass effect  Workstation performed: [id number]         XR knee 3 views left non injury   ED Interpretation by Igor Sanchez DO (07/03 0026)   Possible effusion, no fracture      Final Result by Mary Dennis MD (07/03 1224)      Unremarkable appearance of total knee arthroplasty  Small joint effusion  No acute osseous abnormalities  Workstation performed: [id number]         XR chest portable   ED Interpretation by Igor Sanchez DO (07/03 0027)   No acute abnl, rotated view      Final Result by Zach Mann MD (07/03 1124)      No acute cardiopulmonary disease  Workstation performed: [id number]         VAS lower limb venous duplex study, complete bilateral    (Results Pending)   XR foot 3+ vw left    (Results Pending)     7/5/22 echo Left Ventricle: Left ventricular cavity size is normal  Wall thickness is mildly increased  The left ventricular ejection fraction is 40% by visual estimation  Systolic function is moderately reduced  There is moderate global hypokinesis  Diastolic function is mildly abnormal, consistent with grade I (abnormal) relaxation    Left Atrium: The atrium is severely dilated    Mitral Valve: There is severe regurgitation    Aorta: The aortic root is normal in size  The ascending aorta is mildly dilated    Pericardium: There is a moderately sized left pleural effusion    No prior studies for comparison    Results from last 7 days   Lab Units 07/06/22 0411 07/05/22  0948 07/04/22  0556 07/04/22  0551 07/03/22  2136 07/03/22  0736 07/02/22  2358   WBC Thousand/uL 9 30 8 89 10 09  --   --    < > 7 94   HEMOGLOBIN g/dL 10 0* 10 5* 10 4*  --  9 0*   < > 6 7*   I STAT HEMOGLOBIN g/dl  --   --   --  10 2*  --   --   --    HEMATOCRIT % 30 1* 32 1* 31 7*  --  27 0*   < > 20 5*   HEMATOCRIT, ISTAT %  --   --   --  30*  --   --   --    PLATELETS Thousands/uL 233 209 213  --   --    < > 215   NEUTROS ABS Thousands/µL  --   --  9 36*  --   --   --   --    BANDS PCT %  --   --   --   --   --   --  3    < > = values in this interval not displayed           Results from last 7 days   Lab Units 07/06/22 0411 07/05/22  1932 07/05/22  0948 07/04/22  2332 07/04/22  1601   SODIUM mmol/L 130* 130* 131* 131* 129*   POTASSIUM mmol/L 4 0 3 5 3 6 3 3* 4 1   CHLORIDE mmol/L 96* 97* 95* 95* 95*   CO2 mmol/L 25 26 24 24 24   ANION GAP mmol/L 9 7 12 12 10   BUN mg/dL 34* 40* 32* 29* 28*   CREATININE mg/dL 1 37* 1 56* 1 22 1 20 1 22   EGFR ml/min/1 73sq m 38 32 44 44 44   CALCIUM mg/dL 9 4 9 3 9 3 9 0 9 0   CALCIUM, IONIZED mmol/L 1 13 1 16 1 09* 1 11* 1 11* MAGNESIUM mg/dL 2 0 2 0 2 2 2 0 2 1   PHOSPHORUS mg/dL 2 4 2 6 3 3 4 1 4 2*     Results from last 7 days   Lab Units 07/05/22  0948 07/04/22  0556 07/03/22  0944 07/02/22  2358   AST U/L 45 46* 42 53*   ALT U/L 29 29 26 28   ALK PHOS U/L 87 86 65 74   TOTAL PROTEIN g/dL 7 6 7 2 6 3* 6 8   ALBUMIN g/dL 3 4* 3 3* 3 2* 3 6   TOTAL BILIRUBIN mg/dL 0 90 1 40* 0 80 0 50   BILIRUBIN DIRECT mg/dL 0 22*  --   --   --      Results from last 7 days   Lab Units 07/04/22  2223   POC GLUCOSE mg/dl 118     Results from last 7 days   Lab Units 07/06/22  0411 07/05/22  1932 07/05/22  0948 07/04/22  2332 07/04/22  1601 07/04/22  0556 07/03/22  0944 07/02/22  2358   GLUCOSE RANDOM mg/dL 105 127 86 99 110 176* 115 135     Results from last 7 days   Lab Units 07/04/22  0551   I STAT BASE EXC mmol/L -6*   I STAT O2 SAT % 98*   ISTAT PH ART  7 363   I STAT ART PCO2 mm HG 32 1*   I STAT ART PO2 mm  0   I STAT ART HCO3 mmol/L 18 2*     Results from last 7 days   Lab Units 07/05/22  1131 07/05/22  0948 07/04/22  0900 07/04/22  0556   HS TNI 0HR ng/L  --  463*  --  237*   HS TNI 2HR ng/L 453*  --   --   --    HSTNI D2 ng/L -10  --   --   --    HS TNI 4HR ng/L  --   --  586*  --    HSTNI D4 ng/L  --   --  349*  --      Results from last 7 days   Lab Units 07/04/22  0556 07/03/22  0120   PROTIME seconds 15 1* 14 4   INR  1 20* 1 13   PTT seconds  --  27     Results from last 7 days   Lab Units 07/06/22  0411 07/05/22  0948 07/04/22  0556 07/03/22  0944 07/02/22  2358   PROCALCITONIN ng/ml 4 42* 5 95* 2 24* 2 76* 0 62*     Results from last 7 days   Lab Units 07/04/22  0900 07/04/22  0556 07/02/22  2358   LACTIC ACID mmol/L 1 4 3 9* 1 6     Results from last 7 days   Lab Units 07/04/22  0556   NT-PRO BNP pg/mL 25,427*     Results from last 7 days   Lab Units 07/02/22  2358   CRP mg/L 15 4*     Results from last 7 days   Lab Units 07/03/22  0609 07/03/22  0333   CLARITY UA  Clear Slightly Cloudy   COLOR UA  Yellow Yellow   SPEC GRAV UA 1 015 1 020   PH UA  7 0 6 5   GLUCOSE UA mg/dl Negative Negative   KETONES UA mg/dl Negative Negative   BLOOD UA  Negative Large*   PROTEIN UA mg/dl Negative Negative   NITRITE UA  Negative Negative   BILIRUBIN UA  Negative Negative   UROBILINOGEN UA E U /dl 0 2 0 2   LEUKOCYTES UA  Negative Trace*   WBC UA /hpf  --  4-10*   RBC UA /hpf  --  10-20*   BACTERIA UA /hpf  --  Moderate*   EPITHELIAL CELLS WET PREP /hpf  --  Moderate*     Results from last 7 days   Lab Units 07/03/22  1142 07/02/22  2358   BLOOD CULTURE   --  No Growth at 72 hrs  No Growth at 72 hrs  GRAM STAIN RESULT  No Polys or Bacteria seen  --    BODY FLUID CULTURE, STERILE  No growth  --      Results from last 7 days   Lab Units 07/03/22  1138   TOTAL COUNTED  100   NEUTROPHIL % (SYNOVIAL) % 94   MONOCYTE % (SYNOVIAL) % 2   WBC FLUID /ul 200,120*   RBC, SYNOVIAL  50,000*   CRYSTALS, SYNOVIAL FLUID  No Crystals Seen         Medications:   Scheduled Medications:  cefepime, 1,000 mg, Intravenous, Q12H  heparin (porcine), 5,000 Units, Subcutaneous, Q8H [facility]  losartan, 25 mg, Oral, Daily  metoprolol succinate, 25 mg, Oral, Daily  pantoprazole, 40 mg, Intravenous, Q24H [facility]  vancomycin, 20 mg/kg, Intravenous, Q24H      Continuous IV Infusions: none      PRN Meds:  acetaminophen, 650 mg, Oral, Q6H PRN - used x 1 7/6  calcium carbonate, 1,000 mg, Oral, Daily PRN  HYDROmorphone, 0 2 mg, Intravenous, Q3H PRN  nitroglycerin, 0 4 mg, Sublingual, Q5 Min PRN  senna, 1 tablet, Oral, HS PRN  traMADol, 100 mg, Oral, Q6H PRN - used x 1 7/6        Discharge Plan:  Request transfer to [address] [place] [address] Review Department  ATTENTION: Please call with any questions or concerns to [phone] and carefully listen to the prompts so that you are directed to the right person   All voicemails are confidential   [facility] all requests for admission clinical reviews, approved or denied determinations and any other requests to dedicated fax number below belonging to the campus where the patient is receiving treatment   List of dedicated fax numbers for the Facilities:  [address] DENIALS (Administrative/Medical Necessity) [phone]   [address] (Maternity/NICU/Pediatrics) [phone] [address]  [address] [place] [id number] [address] [place] [place] [address] [place] [address] [phone]

## 2022-07-06 NOTE — PROGRESS NOTES
General Cardiology   Progress Note -  [facility] One   Rachel Tapia 66 y o  female MRN: [id number]    Unit/Bed#: -01 Encounter: [id number]    Assessment:    Acute on HFrEF  · Acute onset of shortness of breath early a m  of 7/4 with increased work of breathing and tachycardia  · Chest x-ray with evidence of flash pulmonary edema; patient did receive IVF for much of the day 7/3 as well as 2 units PRBC's  · Patient take lasix 20 mg PRN for LE edema; states that she takes this medication most days; this can be continued at discharge but we will have her take it daily, not PRN (this CHF exacerbation was iatrogenic)  · TTE from yesterday: EF 40%; grade 1 DD; severe MR  · IV lasix 40 mg given TID 7/4 and continued into yesterday AM with only one dose given yesterday as her creat elevated up to 1 5 yesterday afternoon from 1 2 that AM  · This AM her creat has reduced to 1 3 and she is now only requiring 2 L NC down from 8 L NC yesterday AM (had required BIPAP 7/4 for WOB)  · Would hold off on any diuretics today and probably restart PO lasix tomorrow using 20 mg QD  · I/O: total UO in 24 hrs is 2 9 L; net negative 1 4 L in 24 hrs  · No weight recorded this AM  · No weight this AM; dry weight is unknown    Cardiomyopathy  · TTE from yesterday: EF 40% (had been 55% in 8/2021); grade 1 DD; severe MR  · Etiology unclear, will require outpatient ischemic workup as outpatient  · GDMT: Toprol XL 25 mg QD and losartan 25 mg QD started yesterday    Acute hypoxic respiratory failure  · Abrupt onset of shortness of breath yesterday with hypoxia on room air  · Likely secondary to CHF; IV diuresis started  · Had required mid flow NC as well as BIPAP for increased work of breathing  · Currently on 2 L NC this AM with adequate O2 sat down from 8 L yesterday    PAF  · Known hx of PAF; follows with Dr Ann Steele as primary cardiologist  · No afib noted since admit  · Home med: Toprol XL 25 mg QD  · No OAC due to recurrent GI bleeds in Problem: Patient Care Overview  Goal: Plan of Care Review  Pt remained free from injury.  POC discussed and pt verbalized understanding.  Personal cane at bedside.  Tolerated PT/OT.  Tolerated diet.  No signs of withdrawal at this time.  Remains calm.  precedex stopped and pt tolerating.  RA.  Afebrile.  Will monitor.       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, cautery, and clipping in 2019   · EGD 08/2021 with multiple AVM cauterized   · Hgb this AM 10 0    Infection of left TKR  · Left knee pain with fever on admit  · Hx of left TKR 5-6 years ago  · Ortho consulted; left knee aspirated yesterday, cultures pending  · plans had been made for washout in the OR, currently on hold secondary to acute CHF; likely to be done as outpatient per Ortho  · ABX per [facility] and Ortho teams  · Patient will be transferred to [facility] to be cared for by her primary orthopedic surgeon    Metastatic cancer  · 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   · 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 and b/l ureteral stent placement   · Currently maintained on avapritinib 200mg daily   · CT on admission showing "There is a prominent left presacral nodule measuring up to 2 4 cm which has increased in size since prior exam where it measured 1 3 cm concerning for metastasis"  · CT also showing "Persistent gas within the endometrial canal may represent endometritis however underlying endometrial carcinoma cannot be excluded "    Plan/Recommendations:  · This AM her creat has reduced to 1 3 and she is now only requiring 2 L NC down from 8 L NC yesterday AM (had required BIPAP 7/4 for WOB)  · Would hold off on any diuretics today and probably restart PO lasix tomorrow using 20 mg QD  · Continue losartan 25 mg daily that was added yesterday as GDMT for CM  · Continue remainder of home medications  · Patient will be transferred to [facility] to be cared for by her primary orthopedic surgeon, it is likely that Cardiology will be consulted when she arrives and she will care for by her primary cardiologist, Dr Hieu Slater      _______________________________________________________________________    Subjective  Patient continues to note left knee pain this morning  She also notes left foot pain  She denies any shortness of breath at rest      Review of Systems   Constitutional: Positive for malaise/fatigue  Negative for chills and fever  HENT: Negative for congestion  Cardiovascular: Negative for chest pain, dyspnea on exertion, leg swelling, orthopnea and palpitations  Respiratory: Negative for cough, shortness of breath (no SOB at rest) and wheezing  Endocrine: Negative  Hematologic/Lymphatic: Negative  Skin: Negative  Musculoskeletal: Positive for joint pain (left knee)  Left foot pain   Gastrointestinal: Negative for bloating, abdominal pain, nausea and vomiting  Genitourinary: Negative  Neurological: Negative for dizziness and light-headedness  Psychiatric/Behavioral: Negative  All other systems reviewed and are negative  Objective:   Vitals: Blood pressure 107/60, pulse 85, temperature 98 5 °F (36 9 °C), temperature source Oral, resp  rate 20, height 5' 1" (1 549 m), weight 50 3 kg (111 lb), SpO2 97 %  ,     Wt Readings from Last 3 Encounters:   07/05/22 50 3 kg (111 lb)        Lab Results   Component Value Date    CREATININE 1 37 (H) 07/06/2022    CREATININE 1 56 (H) 07/05/2022    CREATININE 1 22 07/05/2022         Body mass index is 20 97 kg/m²  ,     Systolic (31WOK), ENT:055 , Min:107 , VZS:679     Diastolic (26RAC), TGI:93, Min:60, Max:70          Intake/Output Summary (Last 24 hours) at 7/6/2022 0825  Last data filed at 7/6/2022 0601  Gross per 24 hour   Intake 1390 ml   Output 2860 ml   Net -1470 ml     Weight (last 2 days)     Date/Time Weight    07/05/22 0720 50 3 (111)            Telemetry Review:  Sinus rhythm with BBB rates in the 80's        Physical Exam  Vitals reviewed  Constitutional:       General: She is not in acute distress  HENT:      Head: Normocephalic and atraumatic  Mouth/Throat:      Mouth: Mucous membranes are moist    Cardiovascular:      Rate and Rhythm: Normal rate and regular rhythm  Heart sounds: S1 normal and S2 normal  Murmur heard  Pulmonary:      Effort: Pulmonary effort is normal  No respiratory distress  Breath sounds: Normal breath sounds  Abdominal:      General: Bowel sounds are normal  There is no distension  Palpations: Abdomen is soft  Musculoskeletal:         General: Normal range of motion  Cervical back: Normal range of motion and neck supple  Right lower leg: No edema  Left lower leg: No edema  Skin:     General: Skin is warm and dry  Neurological:      Mental Status: She is alert and oriented to person, place, and time     Psychiatric:         Mood and Affect: Mood normal          LABORATORY RESULTS      CBC with diff:   Results from last 7 days   Lab Units 07/06/22  0411 07/05/22  0948 07/04/22  0556 07/04/22  0551 07/03/22  2136 07/03/22  0736 07/02/22  2358   WBC Thousand/uL 9 30 8 89 10 09  --   --  10 33* 7 94   HEMOGLOBIN g/dL 10 0* 10 5* 10 4*  --  9 0* 7 5* 6 7*   I STAT HEMOGLOBIN g/dl  --   --   --  10 2*  --   --   --    HEMATOCRIT % 30 1* 32 1* 31 7*  --  27 0* 22 9* 20 5*   HEMATOCRIT, ISTAT %  --   --   --  30*  --   --   --    MCV fL 100* 101* 101*  --   --  105* 107*   PLATELETS Thousands/uL 233 209 213  --   --  173 215   MCH pg 33 3 33 0 33 2  --   --  34 4* 35 1*   MCHC g/dL 33 2 32 7 32 8  --   --  32 8 32 7   RDW % 16 2* 17 0* 18 4*  --   --  16 2* 15 8*   MPV fL 10 0 10 1 10 2  --   --  9 9 10 4   NRBC AUTO /100 WBCs  --   --  0 --   --   --   --        CMP:  Results from last 7 days   Lab Units 07/06/22 0411 07/05/22 1932 07/05/22 0948 07/04/22 2332 07/04/22  1601 07/04/22  0556 07/04/22  0551 07/03/22  0944 07/02/22  2358   POTASSIUM mmol/L 4 0 3 5 3 6 3 3* 4 1 3 7  --  3 9 4 1   CHLORIDE mmol/L 96* 97* 95* 95* 95* 96*  --  101 101   CO2 mmol/L 25 26 24 24 24 20*  --  23 24   CO2, I-STAT mmol/L  --   --   --   --   --   --  19*  --   --    BUN mg/dL 34* 40* 32* 29* 28* 24  --  22 25   CREATININE mg/dL 1 37* 1 56* 1 22 1 20 1 22 1 33*  --  1 09 1 44*   GLUCOSE, ISTAT mg/dl  --   --   --   --   --   --  188*  --   --    CALCIUM mg/dL 9 4 9 3 9 3 9 0 9 0 9 3  --  8 3 8 7   AST U/L  --   --  45  --   --  46*  --  42 53*   ALT U/L  --   --  29  --   --  29  --  26 28   ALK PHOS U/L  --   --  87  --   --  86  --  65 74   EGFR ml/min/1 73sq m 38 32 44 44 44 39  --  50 36       BMP:  Results from last 7 days   Lab Units 07/06/22 0411 07/05/22 1932 07/05/22 0948 07/04/22 2332 07/04/22  1601 07/04/22  0556 07/04/22  0551 07/03/22  0944   POTASSIUM mmol/L 4 0 3 5 3 6 3 3* 4 1 3 7  --  3 9   CHLORIDE mmol/L 96* 97* 95* 95* 95* 96*  --  101   CO2 mmol/L 25 26 24 24 24 20*  --  23   CO2, I-STAT mmol/L  --   --   --   --   --   --  19*  --    BUN mg/dL 34* 40* 32* 29* 28* 24  --  22   CREATININE mg/dL 1 37* 1 56* 1 22 1 20 1 22 1 33*  --  1 09   GLUCOSE, ISTAT mg/dl  --   --   --   --   --   --  188*  --    CALCIUM mg/dL 9 4 9 3 9 3 9 0 9 0 9 3  --  8 3       Lab Results   Component Value Date    NTBN 25,427 (H) 07/04/2022             Results from last 7 days   Lab Units 07/06/22  0411 07/05/22  1932 07/05/22  0948 07/04/22  2332 07/04/22  1601 07/04/22  0556 07/03/22  0944   MAGNESIUM mg/dL 2 0 2 0 2 2 2 0 2 1 2 3 1 9                     Results from last 7 days   Lab Units 07/04/22  0556 07/03/22  0120   INR  1 20* 1 13       Lipid Profile:   No results found for: CHOL  No results found for: HDL  No results found for: LDLCALC  No results found for: TRIG    Cardiac testing:   No results found for this or any previous visit  No results found for this or any previous visit  No results found for this or any previous visit  No valid procedures specified  No results found for this or any previous visit  Meds/Allergies   current meds:   Current Facility-Administered Medications   Medication Dose Route Frequency    acetaminophen (TYLENOL) tablet 650 mg  650 mg Oral Q6H PRN    calcium carbonate (TUMS) chewable tablet 1,000 mg  1,000 mg Oral Daily PRN    cefepime (MAXIPIME) IVPB (premix in dextrose) 1,000 mg 50 mL  1,000 mg Intravenous Q12H    heparin (porcine) subcutaneous injection 5,000 Units  5,000 Units Subcutaneous Q8H [facility]    HYDROmorphone HCl (DILAUDID) injection 0 2 mg  0 2 mg Intravenous Q3H PRN    losartan (COZAAR) tablet 25 mg  25 mg Oral Daily    metoprolol succinate (TOPROL-XL) 24 hr tablet 25 mg  25 mg Oral Daily    nitroglycerin (NITROSTAT) SL tablet 0 4 mg  0 4 mg Sublingual Q5 Min PRN    pantoprazole (PROTONIX) injection 40 mg  40 mg Intravenous Q24H [facility]    senna (SENOKOT) tablet 8 6 mg  1 tablet Oral HS PRN    traMADol (ULTRAM) tablet 100 mg  100 mg Oral Q6H PRN    vancomycin (VANCOCIN) IVPB (premix in dextrose) 1,000 mg 200 mL  20 mg/kg Intravenous Q24H     Medications Prior to Admission   Medication    aspirin (ECOTRIN LOW STRENGTH) 81 mg EC tablet    Avapritinib (Ayvakit) 200 MG TABS    hydroxychloroquine (PLAQUENIL) 200 mg tablet    metoprolol succinate (TOPROL-XL) 25 mg 24 hr tablet    ondansetron (ZOFRAN) 8 mg tablet    pantoprazole (PROTONIX) 40 mg tablet    traMADol (ULTRAM) 50 mg tablet            Counseling / Coordination of Care  Total floor / unit time spent today 20 minutes  Greater than 50% of total time was spent with the patient and / or family counseling and / or coordination of care        ** Please Note: Dragon 360 Dictation voice to text software may have been used in the creation of this document   **

## 2022-07-06 NOTE — DISCHARGE SUMMARY
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 from [facility], chronic MARC requiring PRBC transfusions, afib not AC 2/2 to chronic anemia, and HTN who presented to ED 7/3/2022 fever and left knee pain  Concern for possible septic joint  She was admitted and placed on IV cefepime/vancomycin and had arthrocentesis 7/3 c/w septic left total knee arthroplasty  There was plan to take her to the OR for washout  She has required 2units PRBCs for her anemia and early AM 7/4, developed increased dyspnea and hypoxia  Suspected flash pulmonary edema and required BiPAP and aggressive diuresis with transfer to the ICU  Due to her acute hypoxic respiratory failure, timing of her OR knee washout was postponed  An echocardiogram was performed this admission showing a newly depressed ejection fraction of 40% with severe mitral regurgitation  She was seen by cardiology here at [facility] and are recommending an outpatient ischemic evaluation and started toprol XL and losartan on 7/5  Patient's respiratory has significantly improved with diuresis and is currently on room air  Her cultures, including knee fluid gram stain, have remained without growth   After collaborative discussion amongst patient's orthopedic surgeon from [facility], Dr Ericka Monae, Dr Sarita Egan, and infectious disease, It is felt that it would be best for patient to be transferred to [facility] for definitive management of septic left total knee arthroplasty which could include OR washout, removal of prosthesis, revision surgery, antibiotic spacer placement  Patient accepted under medicine service by Dr Ben Austin per PACS  Patient consents for transfer and  notified  Culture Data:  7/2 BCx2: No Growth 72 hours  7/3 Body fluid culture/gram stain: no growth/no poly or bacteria  7/3 UA: Large blood, Trace leuk, 10-20 RBC, 4-10 WBC, (-) nitrite, moderate bacteria, occasional ca oxalate    Antibiotics:  Cefepime 1gram q12 day #4 (started 7/3)  Vancomycin 1g q24 day #4 (started 7/3)  vanc trough 1100 7/6 13 9  Dose increased to 1250mg q24hrs     Significant Findings, Care, Treatment and Services Provided:   7/3 Admit to med/surg medicine service  Started on cefepime/vancomycin  ID following  7/3 Bedside Left knee arthrocentesis: 200,120 WBC, 50,000 RBC, 94% neutrophil, 4% Lymph, Monocyte  7/3 transfused 2 U PRBC for anemia (Hgb 6 7)  7/4  Am: sudden onset dyspnea/hypoxia  Afib w/ RVR  Flash pulmonary edema  BIPAP/ lasix, transfer to ICU  7/5 transitioned off BIPAP to midflow  Started on losartan 25mg PO for HF GDMT   7/5 2D Echo: LVEF 40%  Moderate global hypokinesis  Grade 1 relaxation  Severe atrial dilation  Severe mitral regurgitation  Moderate pleural effusion  7/6 Room air  Downgraded to [facility]  Accepted transfer to [facility]  7/6 c/o left lateral ankle pain/calf pain  Ankle XRY unremarkable  LE duplex ordered (pending)  I/O 24hrs: 1 4/2 9 -1 4  Net negative -5 9 since admission    Lines/Drains:  Queen placed 7/3  Placed for urinary retention failing urinary retention protocol     PIV x2    Complications: none    Consultants: Orthopedic Surgery, Infectious Disease, Critical Care, Cardiology    Discharge Diagnosis: Left septic knee arthroplasty, Acute Heart Failure, New Cardiomyopathy, Afib w/ RVR    Medical Problems             Resolved Problems  Date Reviewed: 7/3/2022          Resolved    Elevated lactic acid level 7/4/2022     Resolved by  LANEY Sung    Acute pulmonary edema ([facility] [address] ) 7/4/2022     Resolved by  Lelea Cabrera PA-C                Condition at Discharge: stable         Discharge instructions/Information to patient and family:   See after visit summary for information provided to patient and family  Provisions for Follow-Up Care:  See after visit summary for information related to follow-up care and any pertinent home health orders  PCP: No primary care provider on file  Disposition: transfer to [facility]    Planned Readmission: No      Discharge Statement   I spent 50 minutes discharging the patient  This time was spent on the day of discharge  I had direct contact with the patient on the day of discharge  Additional documentation is required if more than 30 minutes were spent on discharge  Discharge Medications:  See after visit summary for reconciled discharge medications provided to patient and family

## 2022-07-06 NOTE — PROGRESS NOTES
After collaborative d/w orthopedics here and patient's orthopedic surgical group ([place]-Dr Jurado Me) and ID, it was determined it would be best suited for patient to be transferred to [facility] for definitive management of her left knee septic arthroplasty whom which performed the initial total knee replacement 5 years ago  Patient is still awaiting insurance authorization approval for transfer to [address] [place] [address] for definitive management of septic joint  Per PACS, accepting physician is Dr Brigitte Sinclair under internal medicine service however critical care team has not yet spoken to a physician at [facility] for patient report/acceptance  Patient transferred to [facility] service under Dr Raines Page  Report given  Note discharge summary completed due to anticipation of discharge within the next 24 hours

## 2022-07-06 NOTE — OCCUPATIONAL THERAPY NOTE
Occupational Therapy Cancellation    Patient Name: Jamey Lai  PQIMD'R Date: 7/6/2022 07/06/22 1100   OT Last Visit   OT Visit Date 07/06/22   Note Type   Note type Cancelled Session   Additional Comments OT orders received and chart reviewed  Pt transferring to [facility] and CICI Najera stating pt with new pain in LLE  OT to follow and evaluate if pt remains at [facility] once medically stable       "[employer] MS, OTR/L

## 2022-07-06 NOTE — PHYSICAL THERAPY NOTE
Physical Therapy Cancellation Note         07/06/22 1251   PT Last Visit   PT Visit Date 07/06/22   Note Type   Note type Cancelled Session;  PT orders received and chart reviewed  Pt transferring to [facility] and RN Juan Emanuel stating pt with new pain in L foot  PT to follow and evaluate if pt remains at [address] when appropriate       Hosea Sandoval, PT

## 2022-07-06 NOTE — PROGRESS NOTES
Vancomycin IV Pharmacy-to-Dose Consultation    Marina Freeman is a 66 y o  female who is currently receiving Vancomycin IV with management by the Pharmacy Consult service  Assessment/Plan:  The patient was reviewed  Renal function is stable and no signs or symptoms of nephrotoxicity and/or infusion reactions were documented in the chart  Patient is currently on vancomycin 1000mg IV Q24H and most recent trough level drawn today at 1323 hrs is 13 9 ug/ml  This is sub-therapeutic level based on goal of 15-20  Based on todays assessment will change current vancomycin (day # 4) dosing to 1250mg IV Q24H, with a plan for trough to be drawn at 1330 on 7/9/22  We will continue to follow the patients culture results and clinical progress daily      Deborah Moran, Pharmacist

## 2022-07-06 NOTE — PROGRESS NOTES
I had a long discussion with patient was regard to her treatment while she is in the hospital   Our plan is to continue to monitor her and as long as patient is not in any sign of septic  We will suppress her knee infection with antibiotic with recommendation from infectious disease doctor  Patient claimed to follow-up with her original orthopedic surgeon for further treatment once patient is stabilized and discharged from the hospital   If patient becomes septic during the hospital stay, I would recommend patient to be transferred to [place] for her surgery, since I do not perform this types of surgery

## 2022-07-07 ENCOUNTER — APPOINTMENT (INPATIENT)
Dept: NON INVASIVE DIAGNOSTICS | Facility: HOSPITAL | Age: 72
DRG: 559 | End: 2022-07-07
Payer: COMMERCIAL

## 2022-07-07 ENCOUNTER — APPOINTMENT (INPATIENT)
Dept: RADIOLOGY | Facility: HOSPITAL | Age: 72
DRG: 559 | End: 2022-07-07
Payer: COMMERCIAL

## 2022-07-07 VITALS
RESPIRATION RATE: 17 BRPM | SYSTOLIC BLOOD PRESSURE: 119 MMHG | WEIGHT: 111 LBS | BODY MASS INDEX: 20.96 KG/M2 | TEMPERATURE: 98.2 F | DIASTOLIC BLOOD PRESSURE: 68 MMHG | HEIGHT: 61 IN | OXYGEN SATURATION: 96 % | HEART RATE: 92 BPM

## 2022-07-07 LAB
ANION GAP SERPL CALCULATED.3IONS-SCNC: 9 MMOL/L (ref 4–13)
BUN SERPL-MCNC: 25 MG/DL (ref 5–25)
CA-I BLD-SCNC: 1.04 MMOL/L (ref 1.12–1.32)
CALCIUM SERPL-MCNC: 9.5 MG/DL (ref 8.3–10.1)
CHLORIDE SERPL-SCNC: 94 MMOL/L (ref 100–108)
CO2 SERPL-SCNC: 24 MMOL/L (ref 21–32)
CREAT SERPL-MCNC: 1.12 MG/DL (ref 0.6–1.3)
GFR SERPL CREATININE-BSD FRML MDRD: 48 ML/MIN/1.73SQ M
GLUCOSE SERPL-MCNC: 113 MG/DL (ref 65–140)
MAGNESIUM SERPL-MCNC: 2.7 MG/DL (ref 1.6–2.6)
PHOSPHATE SERPL-MCNC: 3.2 MG/DL (ref 2.3–4.1)
POTASSIUM SERPL-SCNC: 4.3 MMOL/L (ref 3.5–5.3)
SODIUM SERPL-SCNC: 127 MMOL/L (ref 136–145)

## 2022-07-07 PROCEDURE — 84100 ASSAY OF PHOSPHORUS: CPT | Performed by: PHYSICIAN ASSISTANT

## 2022-07-07 PROCEDURE — 80048 BASIC METABOLIC PNL TOTAL CA: CPT | Performed by: PHYSICIAN ASSISTANT

## 2022-07-07 PROCEDURE — 99232 SBSQ HOSP IP/OBS MODERATE 35: CPT | Performed by: INTERNAL MEDICINE

## 2022-07-07 PROCEDURE — 82330 ASSAY OF CALCIUM: CPT | Performed by: PHYSICIAN ASSISTANT

## 2022-07-07 PROCEDURE — 93970 EXTREMITY STUDY: CPT

## 2022-07-07 PROCEDURE — 73130 X-RAY EXAM OF HAND: CPT

## 2022-07-07 PROCEDURE — 93971 EXTREMITY STUDY: CPT | Performed by: SURGERY

## 2022-07-07 PROCEDURE — 83735 ASSAY OF MAGNESIUM: CPT | Performed by: PHYSICIAN ASSISTANT

## 2022-07-07 RX ORDER — CEFEPIME HYDROCHLORIDE 1 G/50ML
1000 INJECTION, SOLUTION INTRAVENOUS EVERY 12 HOURS
Status: CANCELLED | OUTPATIENT
Start: 2022-07-08

## 2022-07-07 RX ORDER — SENNOSIDES 8.6 MG
1 TABLET ORAL
Status: CANCELLED | OUTPATIENT
Start: 2022-07-07

## 2022-07-07 RX ORDER — LOSARTAN POTASSIUM 25 MG/1
25 TABLET ORAL DAILY
Status: CANCELLED | OUTPATIENT
Start: 2022-07-08

## 2022-07-07 RX ORDER — ONDANSETRON 2 MG/ML
4 INJECTION INTRAMUSCULAR; INTRAVENOUS EVERY 4 HOURS PRN
Status: CANCELLED | OUTPATIENT
Start: 2022-07-07

## 2022-07-07 RX ORDER — NITROGLYCERIN 0.4 MG/1
0.4 TABLET SUBLINGUAL
Status: CANCELLED | OUTPATIENT
Start: 2022-07-07

## 2022-07-07 RX ORDER — FUROSEMIDE 20 MG/1
20 TABLET ORAL DAILY
Status: CANCELLED | OUTPATIENT
Start: 2022-07-08

## 2022-07-07 RX ORDER — CALCIUM CARBONATE 200(500)MG
1000 TABLET,CHEWABLE ORAL DAILY PRN
Status: CANCELLED | OUTPATIENT
Start: 2022-07-07

## 2022-07-07 RX ORDER — FUROSEMIDE 20 MG/1
20 TABLET ORAL DAILY
Status: DISCONTINUED | OUTPATIENT
Start: 2022-07-07 | End: 2022-07-07 | Stop reason: HOSPADM

## 2022-07-07 RX ORDER — METOPROLOL SUCCINATE 25 MG/1
25 TABLET, EXTENDED RELEASE ORAL DAILY
Status: CANCELLED | OUTPATIENT
Start: 2022-07-08

## 2022-07-07 RX ORDER — TRAMADOL HYDROCHLORIDE 50 MG/1
100 TABLET ORAL EVERY 6 HOURS PRN
Status: CANCELLED | OUTPATIENT
Start: 2022-07-07

## 2022-07-07 RX ORDER — PANTOPRAZOLE SODIUM 40 MG/1
40 TABLET, DELAYED RELEASE ORAL
Status: CANCELLED | OUTPATIENT
Start: 2022-07-08

## 2022-07-07 RX ORDER — ACETAMINOPHEN 325 MG/1
650 TABLET ORAL EVERY 6 HOURS PRN
Status: CANCELLED | OUTPATIENT
Start: 2022-07-07

## 2022-07-07 RX ORDER — HYDROMORPHONE HCL IN WATER/PF 6 MG/30 ML
0.2 PATIENT CONTROLLED ANALGESIA SYRINGE INTRAVENOUS
Status: CANCELLED | OUTPATIENT
Start: 2022-07-07

## 2022-07-07 RX ORDER — HEPARIN SODIUM 5000 [USP'U]/ML
5000 INJECTION, SOLUTION INTRAVENOUS; SUBCUTANEOUS EVERY 8 HOURS SCHEDULED
Status: CANCELLED | OUTPATIENT
Start: 2022-07-07

## 2022-07-07 RX ADMIN — HYDROMORPHONE HYDROCHLORIDE 0.2 MG: 0.2 INJECTION, SOLUTION INTRAMUSCULAR; INTRAVENOUS; SUBCUTANEOUS at 07:28

## 2022-07-07 RX ADMIN — HYDROMORPHONE HYDROCHLORIDE 0.2 MG: 0.2 INJECTION, SOLUTION INTRAMUSCULAR; INTRAVENOUS; SUBCUTANEOUS at 02:16

## 2022-07-07 RX ADMIN — HYDROMORPHONE HYDROCHLORIDE 0.2 MG: 0.2 INJECTION, SOLUTION INTRAMUSCULAR; INTRAVENOUS; SUBCUTANEOUS at 13:37

## 2022-07-07 RX ADMIN — VANCOMYCIN HYDROCHLORIDE 1250 MG: 5 INJECTION, POWDER, LYOPHILIZED, FOR SOLUTION INTRAVENOUS at 13:38

## 2022-07-07 RX ADMIN — FUROSEMIDE 20 MG: 20 TABLET ORAL at 12:24

## 2022-07-07 RX ADMIN — METOPROLOL SUCCINATE 25 MG: 25 TABLET, FILM COATED, EXTENDED RELEASE ORAL at 09:34

## 2022-07-07 RX ADMIN — HEPARIN SODIUM 5000 UNITS: 5000 INJECTION INTRAVENOUS; SUBCUTANEOUS at 05:51

## 2022-07-07 RX ADMIN — CEFEPIME HYDROCHLORIDE 1000 MG: 1 INJECTION, SOLUTION INTRAVENOUS at 01:30

## 2022-07-07 RX ADMIN — CEFEPIME HYDROCHLORIDE 1000 MG: 1 INJECTION, SOLUTION INTRAVENOUS at 12:24

## 2022-07-07 RX ADMIN — HEPARIN SODIUM 5000 UNITS: 5000 INJECTION INTRAVENOUS; SUBCUTANEOUS at 13:37

## 2022-07-07 RX ADMIN — PANTOPRAZOLE SODIUM 40 MG: 40 TABLET, DELAYED RELEASE ORAL at 05:51

## 2022-07-07 RX ADMIN — LOSARTAN POTASSIUM 25 MG: 25 TABLET, FILM COATED ORAL at 09:34

## 2022-07-07 NOTE — UTILIZATION REVIEW
Continued Stay Review  REQUEST TRANSFER TO [facility]  NPI [id number]  ACCEPTING ATTENDING:  DR Dakota Arenas NPI [id number]   RE:  MANAGEMENT OF LEFT KNEE SEPTIC ARTHROPLASTY AS INITIAL TOTAL KNEE REPLACEMENT PERFORMED 5 YEARS AGO AT [address] [place] [address] [id number]    REQUEST AUTHORIZATION FOR TRANSPORT  Date: 7/7/22                           Current Patient Class: inpatient   Current Level of Care: med surg    HPI:71 y o  female initially admitted on   7/3/22 inpatient due to left knee pain/fever/anemia/urinary retention   PMH of metastatic cancer with rectal GIST primary s/p resection currently on daily PO chemotherapy, anemia, HTN, Afib, and left knee replacement  Presented with confusion and lethargy, fever to 102  Pain in left knee started day prior to arrival    Concern of infection of  Left total knee replacement, started on vancomycin and cefepime  Will need removal of hardware  Anemic and transfused 2 units PRBC this admission  On 7/4/22 acute on chronic CHG  Assessment/Plan:   7/7/22 has ongoing left knee pain  Left foot pain  I/O: total UO in 24 hrs is 1 3 L; net + 360 cc in 24 hrs  On exam murmur  Na 127  Creatinine 1 1  Plan is restart home lasix  Remains on oxygen  Plan is to continue antibiotics and transfer to [facility] under care of care of primary orthopedic surgeon  Vital Signs:   07/07/22 0730 -- 99 23 Abnormal  132/73 96 94 % -- -- -- -- -- --   07/07/22 0700 98 1 °F (36 7 °C) 94 -- 132/73 -- -- -- -- -- -- -- Lying   07/07/22 0135 98 2 °F (36 8 °C) 96 22 130/72 96 94 % 28 -- 2 L/min Nasal cannula -- --   07/06/22 1930 98 4 °F (36 9 °C) 88 26 Abnormal  128/78 97 96 % 28 -- 2 L/min Nasal cannula         Pertinent Labs/Diagnostic Results:   VAS lower limb venous duplex study, complete bilateral   Final Result by Lachelle Mendez MD (07/07 1239)      XR foot 3+ vw left   Final Result by Hany White MD (07/06 6948)      1    No acute osseous abnormality  2   Postsurgical change status post hallus valgus correction  No hardware complication  Workstation performed: [id number]         XR chest portable ICU   Final Result by Jefferson Power MD (07/06 1104)      Continued improvement of pulmonary edema with trace left effusion  Workstation performed: [id number]         XR chest portable ICU   Final Result by Saji Parsons MD (07/05 1357)      Improvement in pulmonary edema  Workstation performed: [id number]         XR chest portable   Final Result by Jefferson Power MD (07/04 1210)      Flash pulmonary edema with probable small effusions  Workstation performed: [id number]         CT chest abdomen pelvis wo contrast   Final Result by Servando Hunt MD (89/86 2075)      1  There is a prominent left presacral nodule measuring up to 2 4 cm which has increased in size since prior exam where it measured 1 3 cm concerning for metastasis  Persistent gas within the endometrial canal may represent endometritis however    underlying endometrial carcinoma cannot be excluded  2   Marked distention of the urinary bladder with associated fullness of bilateral collecting systems  Correlate for bladder outlet obstruction  3   Cardiomegaly  4   Anemia  I personally discussed this study with Dr Porfirio Bryant on 7/3/2022 at 3:14 AM          Workstation performed: [id number]         CT head without contrast   Final Result by Servando Hunt MD (07/03 9997)      No acute intracranial hemorrhage, midline shift, or mass effect  Workstation performed: [id number]         XR knee 3 views left non injury   ED Interpretation by Rupinder Contreras DO (07/03 0026)   Possible effusion, no fracture      Final Result by Maikol Chadwick MD (07/03 1224)      Unremarkable appearance of total knee arthroplasty  Small joint effusion  No acute osseous abnormalities              Workstation performed: [id number]         XR chest portable   ED Interpretation by León Davidson DO (07/03 0027)   No acute abnl, rotated view      Final Result by Emmanuelle Sears MD (07/03 1124)      No acute cardiopulmonary disease  Workstation performed: [id number]         XR hand 3+ vw left    (Results Pending)       7/7/22 venous duplex RIGHT LOWER LIMB:  No evidence of acute or chronic deep vein thrombosis  No evidence of superficial thrombophlebitis noted  Doppler evaluation shows a normal response to augmentation maneuvers  Popliteal, posterior tibial and anterior tibial arterial Doppler waveforms are  triphasic  LEFT LOWER LIMB:  No evidence of acute or chronic deep vein thrombosis  No evidence of superficial thrombophlebitis noted  Doppler evaluation shows a normal response to augmentation maneuvers  Popliteal, posterior tibial and anterior tibial arterial Doppler waveforms are  triphasic  Results from last 7 days   Lab Units 07/06/22  0411 07/05/22  0948 07/04/22  0556 07/04/22  0551 07/03/22  2136 07/03/22  0736 07/02/22  2358   WBC Thousand/uL 9 30 8 89 10 09  --   --    < > 7 94   HEMOGLOBIN g/dL 10 0* 10 5* 10 4*  --  9 0*   < > 6 7*   I STAT HEMOGLOBIN g/dl  --   --   --  10 2*  --   --   --    HEMATOCRIT % 30 1* 32 1* 31 7*  --  27 0*   < > 20 5*   HEMATOCRIT, ISTAT %  --   --   --  30*  --   --   --    PLATELETS Thousands/uL 233 209 213  --   --    < > 215   NEUTROS ABS Thousands/µL  --   --  9 36*  --   --   --   --    BANDS PCT %  --   --   --   --   --   --  3    < > = values in this interval not displayed       Results from last 7 days   Lab Units 07/07/22  0523 07/06/22  1459 07/06/22  1323 07/06/22  0411 07/05/22  1932 07/05/22  0948   SODIUM mmol/L 127* 128*  --  130* 130* 131*   POTASSIUM mmol/L 4 3 3 9  --  4 0 3 5 3 6   CHLORIDE mmol/L 94* 95*  --  96* 97* 95*   CO2 mmol/L 24 24  --  25 26 24   ANION GAP mmol/L 9 9  --  9 7 12   BUN mg/dL 25 30*  --  34* 40* 32* CREATININE mg/dL 1 12 1 29  --  1 37* 1 56* 1 22   EGFR ml/min/1 73sq m 48 41  --  38 32 44   CALCIUM mg/dL 9 5 9 1  --  9 4 9 3 9 3   CALCIUM, IONIZED mmol/L 1 04*  --  0 91* 1 13 1 16 1 09*   MAGNESIUM mg/dL 2 7* 2 0  --  2 0 2 0 2 2   PHOSPHORUS mg/dL 3 2 2 2*  --  2 4 2 6 3 3     Results from last 7 days   Lab Units 07/05/22  0948 07/04/22  0556 07/03/22  0944 07/02/22  2358   AST U/L 45 46* 42 53*   ALT U/L 29 29 26 28   ALK PHOS U/L 87 86 65 74   TOTAL PROTEIN g/dL 7 6 7 2 6 3* 6 8   ALBUMIN g/dL 3 4* 3 3* 3 2* 3 6   TOTAL BILIRUBIN mg/dL 0 90 1 40* 0 80 0 50   BILIRUBIN DIRECT mg/dL 0 22*  --   --   --      Results from last 7 days   Lab Units 07/04/22  2223   POC GLUCOSE mg/dl 118     Results from last 7 days   Lab Units 07/07/22  0523 07/06/22  1459 07/06/22  0411 07/05/22  1932 07/05/22  0948 07/04/22  2332 07/04/22  1601 07/04/22  0556 07/03/22  0944 07/02/22  2358   GLUCOSE RANDOM mg/dL 113 133 105 127 86 99 110 176* 115 135     Results from last 7 days   Lab Units 07/04/22  0551   I STAT BASE EXC mmol/L -6*   I STAT O2 SAT % 98*   ISTAT PH ART  7 363   I STAT ART PCO2 mm HG 32 1*   I STAT ART PO2 mm  0   I STAT ART HCO3 mmol/L 18 2*     Results from last 7 days   Lab Units 07/05/22  1131 07/05/22  0948 07/04/22  0900 07/04/22  0556   HS TNI 0HR ng/L  --  463*  --  237*   HS TNI 2HR ng/L 453*  --   --   --    HSTNI D2 ng/L -10  --   --   --    HS TNI 4HR ng/L  --   --  586*  --    HSTNI D4 ng/L  --   --  349*  --      Results from last 7 days   Lab Units 07/04/22  0556 07/03/22  0120   PROTIME seconds 15 1* 14 4   INR  1 20* 1 13   PTT seconds  --  27     Results from last 7 days   Lab Units 07/06/22  0411 07/05/22  0948 07/04/22  0556 07/03/22  0944 07/02/22  2358   PROCALCITONIN ng/ml 4 42* 5 95* 2 24* 2 76* 0 62*     Results from last 7 days   Lab Units 07/04/22  0900 07/04/22  0556 07/02/22  2358   LACTIC ACID mmol/L 1 4 3 9* 1 6     Results from last 7 days   Lab Units 07/04/22  0556 NT-PRO BNP pg/mL 25,427*     Results from last 7 days   Lab Units 07/02/22  2358   CRP mg/L 15 4*     Results from last 7 days   Lab Units 07/03/22  0609 07/03/22  0333   CLARITY UA  Clear Slightly Cloudy   COLOR UA  Yellow Yellow   SPEC GRAV UA  1 015 1 020   PH UA  7 0 6 5   GLUCOSE UA mg/dl Negative Negative   KETONES UA mg/dl Negative Negative   BLOOD UA  Negative Large*   PROTEIN UA mg/dl Negative Negative   NITRITE UA  Negative Negative   BILIRUBIN UA  Negative Negative   UROBILINOGEN UA E U /dl 0 2 0 2   LEUKOCYTES UA  Negative Trace*   WBC UA /hpf  --  4-10*   RBC UA /hpf  --  10-20*   BACTERIA UA /hpf  --  Moderate*   EPITHELIAL CELLS WET PREP /hpf  --  Moderate*     Results from last 7 days   Lab Units 07/03/22  1142 07/02/22  2358   BLOOD CULTURE   --  No Growth After 4 Days  No Growth After 4 Days     GRAM STAIN RESULT  No Polys or Bacteria seen  --    BODY FLUID CULTURE, STERILE  No growth  --      Results from last 7 days   Lab Units 07/03/22  1138   TOTAL COUNTED  100   NEUTROPHIL % (SYNOVIAL) % 94   MONOCYTE % (SYNOVIAL) % 2   WBC FLUID /ul 200,120*   RBC, SYNOVIAL  50,000*   CRYSTALS, SYNOVIAL FLUID  No Crystals Seen         Medications:   Scheduled Medications:  cefepime, 1,000 mg, Intravenous, Q12H  heparin (porcine), 5,000 Units, Subcutaneous, Q8H [facility]  losartan, 25 mg, Oral, Daily  metoprolol succinate, 25 mg, Oral, Daily  pantoprazole, 40 mg, Oral, Early Morning  vancomycin, 1,250 mg, Intravenous, Q24H      Continuous IV Infusions: none      PRN Meds:  acetaminophen, 650 mg, Oral, Q6H PRN  calcium carbonate, 1,000 mg, Oral, Daily PRN  HYDROmorphone, 0 2 mg, Intravenous, Q3H PRN - used x 2 7/7/22   nitroglycerin, 0 4 mg, Sublingual, Q5 Min PRN  ondansetron, 4 mg, Intravenous, Q4H PRN  senna, 1 tablet, Oral, HS PRN  traMADol, 100 mg, Oral, Q6H PRN        Discharge Plan:  Request to transfer to [facility] Utilization Review Department  ATTENTION: Please call with any questions or concerns to [phone] and carefully listen to the prompts so that you are directed to the right person  All voicemails are confidential   Neeta Thapa all requests for admission clinical reviews, approved or denied determinations and any other requests to dedicated fax number below belonging to the campus where the patient is receiving treatment   List of dedicated fax numbers for the Facilities:  [address] DENIALS (Administrative/Medical Necessity) [phone]   [address] (Maternity/NICU/Pediatrics) [phone]   [address]  [address] [address] [place] [address] [address] [facility] [place] [address] [phone]

## 2022-07-07 NOTE — PROGRESS NOTES
General Cardiology   Progress Note -  Team One   Jamey Lai 66 y o  female MRN: [id number]    Unit/Bed#: -01 Encounter: [id number]    Assessment:    Acute on HFrEF  · Acute onset of shortness of breath early a m  of 7/4 with increased work of breathing and tachycardia  · Chest x-ray with evidence of flash pulmonary edema; patient did receive IVF for much of the day 7/3 as well as 2 units PRBC's  · Patient take lasix 20 mg PRN for LE edema; states that she takes this medication most days; this can be continued at discharge but we will have her take it daily, not PRN (this CHF exacerbation was iatrogenic)  · TTE from 7/5/2022: EF 40%; grade 1 DD; severe MR  · IV lasix 40 mg given TID 7/4 and continued into AM of 7/5 with only one dose given that day as her creat elevated up to 1 5 that afternoon from 1 2 that AM  · All diuretics held yesterday given her creat was still mildly elevated but improved from the day prior  · This AM her creat is improved to 1 1; will restart her home dose of lasix 20 mg but will use it daily as opposed to the p r n   Dosing she had been doing prior to admission  · I/O: total UO in 24 hrs is 1 3 L; net + 360 cc in 24 hrs  · No weight this AM; dry weight is unknown    Cardiomyopathy  · TTE from 7/5/2022: EF 40% (had been 55% in 8/2021); grade 1 DD; severe MR  · Etiology unclear, will require outpatient ischemic workup as outpatient  · GDMT: Toprol XL 25 mg QD and losartan 25 mg QD started 7/5    Acute hypoxic respiratory failure  · Abrupt onset of shortness of breath yesterday with hypoxia on room air  · Likely secondary to CHF; IV diuresis started  · Had required mid flow NC as well as BIPAP for increased work of breathing  · Currently on 2 L NC this AM with adequate O2 sats    PAF  · Known hx of PAF; follows with Dr Paddy Jones as primary cardiologist  · No afib noted since admit  · Home med: Toprol XL 25 mg QD  · No OAC due to recurrent GI bleeds in the past from AVM's; on aspirin 81 mg QD only as outpatient (on hold)  · Follows with Dr Conor Garcia as primary cardiologist      Essential HTN  · SBP averaging 120's-130's  · Home meds: lasix 20 mg QD and Toprol XL 25 mg QD; losartan 25 mg QD added for GDMT for CM    Acute on chronic anemia  · Baseline Hgb 7-8  · Hgb on admit is 6 7  · Now s/p 2 units PRBC' since admit  · Intermittent requires blood transfusions at [facility]   · 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 epi infection, cautery, and clipping in 2019   · EGD 08/2021 with multiple AVM cauterized   · Hgb this AM is pending, was 10 0 yesterday    Infection of left TKR  · Left knee pain with fever on admit  · Hx of left TKR 5-6 years ago  · Ortho consulted; left knee aspirated yesterday, cultures pending  · plans had been made for washout in the OR, currently on hold secondary to acute CHF; likely to be done as outpatient per Ortho  · ABX per [facility] and Ortho teams  · Patient will be transferred to [facility] to be cared for by her primary orthopedic surgeon    Metastatic cancer  · 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   · 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 and b/l ureteral stent placement   · Currently maintained on avapritinib 200mg daily   · CT on admission showing "There is a prominent left presacral nodule measuring up to 2 4 cm which has increased in size since prior exam where it measured 1 3 cm concerning for metastasis"  · CT also showing "Persistent gas within the endometrial canal may represent endometritis however underlying endometrial carcinoma cannot be excluded "    Plan/Recommendations:  · This AM her creat is improved to 1 1; will restart her home dose of lasix 20 mg but will use it daily as opposed to the p r n   Dosing she had been doing prior to admission  · Continue losartan 25 mg daily for GDMT for CM  · Continue Toprol XL 25 QD  · Continue remainder of home medications  · Patient will be transferred to [facility] to be cared for by her primary orthopedic surgeon, it is likely that Cardiology will be consulted when she arrives and she will care for by her primary cardiologist, Dr Ney Moreno      _______________________________________________________________________    Subjective  Patient continues to note left knee pain this morning  She also notes left foot pain  She denies any shortness of breath at rest      Review of Systems   Constitutional: Positive for malaise/fatigue  Negative for chills and fever  HENT: Negative for congestion  Cardiovascular: Negative for chest pain, dyspnea on exertion, leg swelling, orthopnea and palpitations  Respiratory: Negative for cough, shortness of breath (no SOB at rest) and wheezing  Endocrine: Negative  Hematologic/Lymphatic: Negative  Skin: Negative  Musculoskeletal: Positive for joint pain (left knee)  Left foot pain   Gastrointestinal: Negative for bloating, abdominal pain, nausea and vomiting  Genitourinary: Negative  Neurological: Negative for dizziness and light-headedness  Psychiatric/Behavioral: Negative  All other systems reviewed and are negative  Objective:   Vitals: Blood pressure 132/73, pulse 99, temperature 98 1 °F (36 7 °C), temperature source Oral, resp  rate (!) 23, height 5' 1" (1 549 m), weight 50 3 kg (111 lb), SpO2 94 %  ,     Wt Readings from Last 3 Encounters:   07/05/22 50 3 kg (111 lb)        Lab Results   Component Value Date    CREATININE 1 12 07/07/2022    CREATININE 1 29 07/06/2022    CREATININE 1 37 (H) 07/06/2022         Body mass index is 20 97 kg/m²  ,     Systolic (11PAB), KOS:201 , Min:128 , HXL:931     Diastolic (80YSJ), OLR:23, Min:72, Max:80          Intake/Output Summary (Last 24 hours) at 7/7/2022 1138  Last data filed at 7/7/2022 0901  Gross per 24 hour   Intake 970 ml   Output 995 ml   Net -25 ml     Weight (last 2 days) Date/Time Weight    07/05/22 0720 50 3 (111)            Telemetry Review:  Sinus rhythm with BBB rates in the 80's        Physical Exam  Vitals reviewed  Constitutional:       General: She is not in acute distress  HENT:      Head: Normocephalic and atraumatic  Mouth/Throat:      Mouth: Mucous membranes are moist    Cardiovascular:      Rate and Rhythm: Normal rate and regular rhythm  Heart sounds: S1 normal and S2 normal  Murmur heard  Pulmonary:      Effort: Pulmonary effort is normal  No respiratory distress  Breath sounds: Normal breath sounds  Abdominal:      General: Bowel sounds are normal  There is no distension  Palpations: Abdomen is soft  Musculoskeletal:         General: Normal range of motion  Cervical back: Normal range of motion and neck supple  Right lower leg: No edema  Left lower leg: No edema  Skin:     General: Skin is warm and dry  Neurological:      Mental Status: She is alert and oriented to person, place, and time     Psychiatric:         Mood and Affect: Mood normal          LABORATORY RESULTS      CBC with diff:   Results from last 7 days   Lab Units 07/06/22  0411 07/05/22  0948 07/04/22  0556 07/04/22  0551 07/03/22  2136 07/03/22  0736 07/02/22  2358   WBC Thousand/uL 9 30 8 89 10 09  --   --  10 33* 7 94   HEMOGLOBIN g/dL 10 0* 10 5* 10 4*  --  9 0* 7 5* 6 7*   I STAT HEMOGLOBIN g/dl  --   --   --  10 2*  --   --   --    HEMATOCRIT % 30 1* 32 1* 31 7*  --  27 0* 22 9* 20 5*   HEMATOCRIT, ISTAT %  --   --   --  30*  --   --   --    MCV fL 100* 101* 101*  --   --  105* 107*   PLATELETS Thousands/uL 233 209 213  --   --  173 215   MCH pg 33 3 33 0 33 2  --   --  34 4* 35 1*   MCHC g/dL 33 2 32 7 32 8  --   --  32 8 32 7   RDW % 16 2* 17 0* 18 4*  --   --  16 2* 15 8*   MPV fL 10 0 10 1 10 2  --   --  9 9 10 4   NRBC AUTO /100 WBCs  --   --  0  --   --   --   --        CMP:  Results from last 7 days   Lab Units 07/07/22  0523 07/06/22  1459 07/06/22 0411 07/05/22 1932 07/05/22  1093 07/04/22 2332 07/04/22  1601 07/04/22  0556 07/04/22  0551 07/03/22  0944 07/02/22  2358   POTASSIUM mmol/L 4 3 3 9 4 0 3 5 3 6 3 3* 4 1 3 7  --  3 9 4 1   CHLORIDE mmol/L 94* 95* 96* 97* 95* 95* 95* 96*  --  101 101   CO2 mmol/L 24 24 25 26 24 24 24 20*  --  23 24   CO2, I-STAT mmol/L  --   --   --   --   --   --   --   --  19*  --   --    BUN mg/dL 25 30* 34* 40* 32* 29* 28* 24  --  22 25   CREATININE mg/dL 1 12 1 29 1 37* 1 56* 1 22 1 20 1 22 1 33*  --  1 09 1 44*   GLUCOSE, ISTAT mg/dl  --   --   --   --   --   --   --   --  188*  --   --    CALCIUM mg/dL 9 5 9 1 9 4 9 3 9 3 9 0 9 0 9 3  --  8 3 8 7   AST U/L  --   --   --   --  45  --   --  46*  --  42 53*   ALT U/L  --   --   --   --  29  --   --  29  --  26 28   ALK PHOS U/L  --   --   --   --  87  --   --  86  --  65 74   EGFR ml/min/1 73sq m 48 41 38 32 44 44 44 39  --  50 36       BMP:  Results from last 7 days   Lab Units 07/07/22  0523 07/06/22  1459 07/06/22 0411 07/05/22 1932 07/05/22  0948 07/04/22  2332 07/04/22  1601 07/04/22  0556 07/04/22  0551   POTASSIUM mmol/L 4 3 3 9 4 0 3 5 3 6 3 3* 4 1   < >  --    CHLORIDE mmol/L 94* 95* 96* 97* 95* 95* 95*   < >  --    CO2 mmol/L 24 24 25 26 24 24 24   < >  --    CO2, I-STAT mmol/L  --   --   --   --   --   --   --   --  19*   BUN mg/dL 25 30* 34* 40* 32* 29* 28*   < >  --    CREATININE mg/dL 1 12 1 29 1 37* 1 56* 1 22 1 20 1 22   < >  --    GLUCOSE, ISTAT mg/dl  --   --   --   --   --   --   --   --  188*   CALCIUM mg/dL 9 5 9 1 9 4 9 3 9 3 9 0 9 0   < >  --     < > = values in this interval not displayed         Lab Results   Component Value Date    [facility] [address] (H) 07/04/2022             Results from last 7 days   Lab Units 07/07/22  0523 07/06/22  1459 07/06/22  0411 07/05/22  1932 07/05/22  0948 07/04/22  2332 07/04/22  1601   MAGNESIUM mg/dL 2 7* 2 0 2 0 2 0 2 2 2 0 2 1                     Results from last 7 days   Lab Units 07/04/22  0556 07/03/22  0120   INR  1 20* 1 13       Lipid Profile:   No results found for: CHOL  No results found for: HDL  No results found for: LDLCALC  No results found for: TRIG    Cardiac testing:   No results found for this or any previous visit  No results found for this or any previous visit  No results found for this or any previous visit  No valid procedures specified  No results found for this or any previous visit  Meds/Allergies   current meds:   Current Facility-Administered Medications   Medication Dose Route Frequency    acetaminophen (TYLENOL) tablet 650 mg  650 mg Oral Q6H PRN    calcium carbonate (TUMS) chewable tablet 1,000 mg  1,000 mg Oral Daily PRN    cefepime (MAXIPIME) IVPB (premix in dextrose) 1,000 mg 50 mL  1,000 mg Intravenous Q12H    heparin (porcine) subcutaneous injection 5,000 Units  5,000 Units Subcutaneous Q8H [address]    HYDROmorphone HCl (DILAUDID) injection 0 2 mg  0 2 mg Intravenous Q3H PRN    losartan (COZAAR) tablet 25 mg  25 mg Oral Daily    metoprolol succinate (TOPROL-XL) 24 hr tablet 25 mg  25 mg Oral Daily    nitroglycerin (NITROSTAT) SL tablet 0 4 mg  0 4 mg Sublingual Q5 Min PRN    ondansetron (ZOFRAN) injection 4 mg  4 mg Intravenous Q4H PRN    pantoprazole (PROTONIX) EC tablet 40 mg  40 mg Oral Early Morning    senna (SENOKOT) tablet 8 6 mg  1 tablet Oral HS PRN    traMADol (ULTRAM) tablet 100 mg  100 mg Oral Q6H PRN    vancomycin (VANCOCIN) 1,250 mg in sodium chloride 0 9 % 250 mL IVPB  1,250 mg Intravenous Q24H     Medications Prior to Admission   Medication    aspirin (ECOTRIN LOW STRENGTH) 81 mg EC tablet    Avapritinib (Ayvakit) 200 MG TABS    hydroxychloroquine (PLAQUENIL) 200 mg tablet    metoprolol succinate (TOPROL-XL) 25 mg 24 hr tablet    ondansetron (ZOFRAN) 8 mg tablet    pantoprazole (PROTONIX) 40 mg tablet    traMADol (ULTRAM) 50 mg tablet            Counseling / Coordination of Care  Total floor / unit time spent today 20 minutes  Greater than 50% of total time was spent with the patient and / or family counseling and / or coordination of care  ** Please Note: Dragon 360 Dictation voice to text software may have been used in the creation of this document   **

## 2022-07-07 NOTE — PROGRESS NOTES
Vancomycin IV Pharmacy-to-Dose Consultation    Dale Kurtz is a 66 y o  female who is currently receiving Vancomycin IV with management by the Pharmacy Consult service  Assessment/Plan:  The patient was reviewed  Renal function is stable and no signs or symptoms of nephrotoxicity and/or infusion reactions were documented in the chart  Based on todays assessment, continue current vancomycin (day # 5) dosing of 1250mg IV Q24H, with a plan for trough to be drawn at 1330 on 7/9/22  We will continue to follow the patients culture results and clinical progress daily      Yamil Lopez, Pharmacist

## 2022-07-07 NOTE — DISCHARGE SUMMARY
[place]  Discharge- Lenka [place] 1950, 66 y o  female MRN: [id number]  Unit/Bed#: -01 Encounter: [id number]  Primary Care Provider: No primary care provider on file  Date and time admitted to hospital: 7/2/2022 11:44 PM       Admission Orders (From admission, onward)              Ordered          07/03/22 0333   Inpatient Admission  Once                             Admitting Diagnosis: Altered mental status [R41 82]  Fever [R50 9]  Generalized weakness [R53 1]  Acute pain of left knee [M25 562]  Symptomatic anemia [D64 9]     Procedures Performed:       Orders Placed This Encounter   Procedures    ED ECG Documentation Only         Summary of Hospital Course: In brief, pt  Is 66year old woman with metastatic rectal GIST tumor on Avaprinitinib from [facility], chronic MARC requiring PRBC transfusions, afib not AC 2/2 to chronic anemia, and HTN who presented to ED 7/3/2022 fever and left knee pain  Concern for possible septic joint  She was admitted and placed on IV cefepime/vancomycin and had arthrocentesis 7/3 c/w septic left total knee arthroplasty  There was plan to take her to the OR for washout  She has required 2units PRBCs for her anemia and early AM 7/4, developed increased dyspnea and hypoxia  Suspected flash pulmonary edema and required BiPAP and aggressive diuresis with transfer to the ICU  Due to her acute hypoxic respiratory failure, timing of her OR knee washout was postponed  An echocardiogram was performed this admission showing a newly depressed ejection fraction of 40% with severe mitral regurgitation  She was seen by cardiology here at [facility] and are recommending an outpatient ischemic evaluation and started toprol XL and losartan on 7/5  Patient's respiratory has significantly improved with diuresis and is currently on room air  Her cultures, including knee fluid gram stain, have remained without growth   After collaborative discussion amongst patient's orthopedic surgeon from [facility], Dr Nyla Zheng, Dr Clint Galvez, and infectious disease, It is felt that it would be best for patient to be transferred to [facility] for definitive management of septic left total knee arthroplasty which could include OR washout, removal of prosthesis, revision surgery, antibiotic spacer placement  Patient accepted under medicine service by Dr Cecilio Serna per PACS  Patient consents for transfer and  notified        Culture Data:  7/2 BCx2: No Growth 72 hours  7/3 Body fluid culture/gram stain: no growth/no poly or bacteria  7/3 UA: Large blood, Trace leuk, 10-20 RBC, 4-10 WBC, (-) nitrite, moderate bacteria, occasional ca oxalate     Antibiotics:  Cefepime 1gram q12 day #4 (started 7/3)  Vancomycin 1g q24 day #4 (started 7/3)  vanc trough 1100 7/6 13 9  Dose increased to 1250mg q24hrs      Significant Findings, Care, Treatment and Services Provided:   7/3 Admit to med/surg medicine service  Started on cefepime/vancomycin  ID following  7/3 Bedside Left knee arthrocentesis: 200,120 WBC, 50,000 RBC, 94% neutrophil, 4% Lymph, Monocyte  7/3 transfused 2 U PRBC for anemia (Hgb 6 7)  7/4  Am: sudden onset dyspnea/hypoxia  Afib w/ RVR  Flash pulmonary edema  BIPAP/ lasix, transfer to ICU  7/5 transitioned off BIPAP to midflow  Started on losartan 25mg PO for HF GDMT   7/5 2D Echo: LVEF 40%  Moderate global hypokinesis  Grade 1 relaxation  Severe atrial dilation  Severe mitral regurgitation  Moderate pleural effusion  7/6 Room air  Downgraded to [employer]  Accepted transfer to [facility]  7/6 c/o left lateral ankle pain/calf pain  Ankle XRY unremarkable  LE duplex ordered (pending)       I/O 24hrs: 1 4/2 9 -1 4  Net negative -5 9 since admission     Lines/Drains:  Queen placed 7/3  Placed for urinary retention failing urinary retention protocol     PIV x2     Complications: none     Consultants: Orthopedic Surgery, Infectious Disease, Critical Care, Cardiology     Discharge Diagnosis: Left septic knee arthroplasty, Acute Heart Failure, New Cardiomyopathy, Afib w/ RVR         Medical Problems                            Resolved Problems  Date Reviewed: 7/3/2022                    Resolved      Elevated lactic acid level 7/4/2022        Resolved by  LANEY Mata      Acute pulmonary edema ([facility] [address] ) 7/4/2022        Resolved by  Desi Sr PA-C                     Condition at Discharge: stable         Discharge instructions/Information to patient and family:   See after visit summary for information provided to patient and family        Provisions for Follow-Up Care:  See after visit summary for information related to follow-up care and any pertinent home health orders        PCP: No primary care provider on file      Disposition: transfer to [facility]     Planned Readmission: No           GEN: No acute distress, comfortable elderly female  HEEENT: No JVD, PERRLA, no scleral icterus  RESP: Lungs clear to auscultation bilaterally  CV: RRR, +s1/s2   ABD: SOFT NON TENDER, POSITIVE BOWEL SOUNDS, NO DISTENTION  PSYCH: CALM  NEURO: A X O X 3, NO FOCAL DEFICITS  SKIN: NO RASH  EXTREM: NO EDEMA       Discharge Statement   I spent 42 minutes discharging the patient  This time was spent on the day of discharge  I had direct contact with the patient on the day of discharge   Additional documentation is required if more than 30 minutes were spent on discharge       Discharge Medications:  See after visit summary for reconciled discharge medications provided to patient and family

## 2022-07-07 NOTE — PLAN OF CARE
Problem: MOBILITY - ADULT  Goal: Maintain or return to baseline ADL function  Description: INTERVENTIONS:  -  Assess patient's ability to carry out ADLs; assess patient's baseline for ADL function and identify physical deficits which impact ability to perform ADLs (bathing, care of mouth/teeth, toileting, grooming, dressing, etc )  - Assess/evaluate cause of self-care deficits   - Assess range of motion  - Assess patient's mobility; develop plan if impaired  - Assess patient's need for assistive devices and provide as appropriate  - Encourage maximum independence but intervene and supervise when necessary  - Involve family in performance of ADLs  - Assess for home care needs following discharge   - Consider OT consult to assist with ADL evaluation and planning for discharge  - Provide patient education as appropriate  Outcome: Progressing  Goal: Maintains/Returns to pre admission functional level  Description: INTERVENTIONS:  - Perform BMAT or MOVE assessment daily    - Set and communicate daily mobility goal to care team and patient/family/caregiver  - Collaborate with rehabilitation services on mobility goals if consulted  - Perform Range of Motion 2 times a day  - Reposition patient every 2 hours    - Dangle patient 2 times a day  - Stand patient 2 times a day  - Ambulate patient 2 times a day  - Out of bed to chair 2 times a day   - Out of bed for meals 2 times a day  - Out of bed for toileting  - Record patient progress and toleration of activity level   Outcome: Progressing     Problem: Potential for Falls  Goal: Patient will remain free of falls  Description: INTERVENTIONS:  - Educate patient/family on patient safety including physical limitations  - Instruct patient to call for assistance with activity   - Consult OT/PT to assist with strengthening/mobility   - Keep Call bell within reach  - Keep bed low and locked with side rails adjusted as appropriate  - Keep care items and personal belongings within reach  - Initiate and maintain comfort rounds  - Make Fall Risk Sign visible to staff  - Offer Toileting every 2 Hours, in advance of need  - Initiate/Maintain bed alarm  - Obtain necessary fall risk management equipment:   - Apply yellow socks and bracelet for high fall risk patients  - Consider moving patient to room near nurses station  Outcome: Progressing     Problem: Prexisting or High Potential for Compromised Skin Integrity  Goal: Skin integrity is maintained or improved  Description: INTERVENTIONS:  - Identify patients at risk for skin breakdown  - Assess and monitor skin integrity  - Assess and monitor nutrition and hydration status  - Monitor labs   - Assess for incontinence   - Turn and reposition patient  - Assist with mobility/ambulation  - Relieve pressure over bony prominences  - Avoid friction and shearing  - Provide appropriate hygiene as needed including keeping skin clean and dry  - Evaluate need for skin moisturizer/barrier cream  - Collaborate with interdisciplinary team   - Patient/family teaching  - Consider wound care consult   Outcome: Progressing     Problem: PAIN - ADULT  Goal: Verbalizes/displays adequate comfort level or baseline comfort level  Description: Interventions:  - Encourage patient to monitor pain and request assistance  - Assess pain using appropriate pain scale  - Administer analgesics based on type and severity of pain and evaluate response  - Implement non-pharmacological measures as appropriate and evaluate response  - Consider cultural and social influences on pain and pain management  - Notify physician/advanced practitioner if interventions unsuccessful or patient reports new pain  Outcome: Progressing     Problem: INFECTION - ADULT  Goal: Absence or prevention of progression during hospitalization  Description: INTERVENTIONS:  - Assess and monitor for signs and symptoms of infection  - Monitor lab/diagnostic results  - Monitor all insertion sites, i e  indwelling lines, tubes, and drains  - Monitor endotracheal if appropriate and nasal secretions for changes in amount and color  - [place] appropriate cooling/warming therapies per order  - Administer medications as ordered  - Instruct and encourage patient and family to use good hand hygiene technique  - Identify and instruct in appropriate isolation precautions for identified infection/condition  Outcome: Progressing  Goal: Absence of fever/infection during neutropenic period  Description: INTERVENTIONS:  - Monitor WBC    Outcome: Progressing     Problem: SAFETY ADULT  Goal: Maintain or return to baseline ADL function  Description: INTERVENTIONS:  -  Assess patient's ability to carry out ADLs; assess patient's baseline for ADL function and identify physical deficits which impact ability to perform ADLs (bathing, care of mouth/teeth, toileting, grooming, dressing, etc )  - Assess/evaluate cause of self-care deficits   - Assess range of motion  - Assess patient's mobility; develop plan if impaired  - Assess patient's need for assistive devices and provide as appropriate  - Encourage maximum independence but intervene and supervise when necessary  - Involve family in performance of ADLs  - Assess for home care needs following discharge   - Consider OT consult to assist with ADL evaluation and planning for discharge  - Provide patient education as appropriate  Outcome: Progressing  Goal: Maintains/Returns to pre admission functional level  Description: INTERVENTIONS:  - Perform BMAT or MOVE assessment daily    - Set and communicate daily mobility goal to care team and patient/family/caregiver  - Collaborate with rehabilitation services on mobility goals if consulted  - Perform Range of Motion 2 times a day  - Reposition patient every 2 hours    - Dangle patient 2 times a day  - Stand patient 2 times a day  - Ambulate patient 2 times a day  - Out of bed to chair 2 times a day   - Out of bed for meals 2 times a day  - Out of bed for toileting  - Record patient progress and toleration of activity level   Outcome: Progressing  Goal: Patient will remain free of falls  Description: INTERVENTIONS:  - Educate patient/family on patient safety including physical limitations  - Instruct patient to call for assistance with activity   - Consult OT/PT to assist with strengthening/mobility   - Keep Call bell within reach  - Keep bed low and locked with side rails adjusted as appropriate  - Keep care items and personal belongings within reach  - Initiate and maintain comfort rounds  - Make Fall Risk Sign visible to staff  - Offer Toileting every 2 Hours, in advance of need  - Initiate/Maintain bed alarm  - Obtain necessary fall risk management equipment:   - Apply yellow socks and bracelet for high fall risk patients  - Consider moving patient to room near nurses station  Outcome: Progressing     Problem: DISCHARGE PLANNING  Goal: Discharge to home or other facility with appropriate resources  Description: INTERVENTIONS:  - Identify barriers to discharge w/patient and caregiver  - Arrange for needed discharge resources and transportation as appropriate  - Identify discharge learning needs (meds, wound care, etc )  - Arrange for interpretive services to assist at discharge as needed  - Refer to Case Management Department for coordinating discharge planning if the patient needs post-hospital services based on physician/advanced practitioner order or complex needs related to functional status, cognitive ability, or social support system  Outcome: Progressing     Problem: Knowledge Deficit  Goal: Patient/family/caregiver demonstrates understanding of disease process, treatment plan, medications, and discharge instructions  Description: Complete learning assessment and assess knowledge base    Interventions:  - Provide teaching at level of understanding  - Provide teaching via preferred learning methods  Outcome: Progressing

## 2022-07-07 NOTE — INCIDENTAL FINDINGS
I already completed her forms, can you check and see what this is about ?  Thanks    The following findings require follow up:  Radiographic finding   Finding:  CT chest abdomen pelvis wo contrast: 1  There is a prominent left presacral nodule measuring up to 2 4 cm which has increased in size since prior exam where it measured 1 3 cm concerning for metastasis  Persistent gas within the endometrial canal may represent endometritis however , underlying endometrial carcinoma cannot be excluded , 2   Marked distention of the urinary bladder with associated fullness of bilateral collecting systems  Correlate for bladder outlet obstruction  , 3   Cardiomegaly  , 4   Anemia ,  , I personally discussed this study with Dr Zaina Hudson on 7/3/2022 at 3:14 AM , Workstation performed: [id number]     Follow up required: gyn f/u , repeat imaging   Follow up should be done within  1-2 months  Please notify the following clinician to assist with the follow up:   Dr Quinn primary care provider on file

## 2022-07-08 LAB
BACTERIA BLD CULT: NORMAL
BACTERIA BLD CULT: NORMAL

## 2022-07-08 NOTE — UTILIZATION REVIEW
Notification of Discharge   This is a Notification of Discharge from our facility [address]  Please be advised that this patient has been discharge from our facility  Below you will find the admission and discharge date and time including the patients disposition  UTILIZATION REVIEW CONTACT:  Curly Vela  Utilization   Network Utilization Review Department  Phone: [phone] carefully listen to the prompts  All voicemails are confidential   Email: [email or web address]     PHYSICIAN ADVISORY SERVICES:  FOR BNWO-RB-EIHI REVIEW - MEDICAL NECESSITY DENIAL  Phone: [phone]  Fax: [phone]  Email: [email or web address]     PRESENTATION DATE: 7/2/2022 11:44 PM  OBERVATION ADMISSION DATE:  INPATIENT ADMISSION DATE: 7/3/22  3:33 AM   DISCHARGE DATE: 7/7/2022  3:35 PM  DISPOSITION: Non [facility] Non [facility]      IMPORTANT INFORMATION:  Send all requests for admission clinical reviews, approved or denied determinations and any other requests to dedicated fax number below belonging to the campus where the patient is receiving treatment   List of dedicated fax numbers:  [address] DENIALS (Administrative/Medical Necessity) [phone]   [address] [facility] (Maternity/NICU/Pediatrics) [phone]   [facility] [phone]   130 [facility] [phone]   [address] [place] [address] [phone]   [address] [place] [address],[address] [phone]   [facility]  [phone]   [address] [place] [address]  [address] [facility] 1000 [facility] [phone]

## 2022-07-13 ENCOUNTER — TELEPHONE (OUTPATIENT)
Dept: FAMILY MEDICINE CLINIC | Facility: HOSPITAL | Age: 72
End: 2022-07-13

## 2022-07-13 NOTE — TELEPHONE ENCOUNTER
Nurse calling regarding drug interaction, hydroxychlorquine and ondansetron  Is it ok that patient continues both medications?  Please return call to nurse

## 2022-07-14 NOTE — TELEPHONE ENCOUNTER
NIGEL Sheikh 51 home care nurse and message given      She recommend that I call PT and schedule a hospital f/u - PT was d/c from [facility] on 7/11 - for sepsis

## 2022-07-15 ENCOUNTER — TELEPHONE (OUTPATIENT)
Dept: FAMILY MEDICINE CLINIC | Facility: HOSPITAL | Age: 72
End: 2022-07-15

## 2022-07-15 NOTE — TELEPHONE ENCOUNTER
Jennifer called to inform us that patient's BP sitting down was 100/60  When she stood up, BP was 90/60  Pulse 80  She did not feel dizzy  Patient did not take metoprolol today  Please advise

## 2022-07-18 ENCOUNTER — TELEPHONE (OUTPATIENT)
Dept: FAMILY MEDICINE CLINIC | Facility: HOSPITAL | Age: 72
End: 2022-07-18

## 2022-07-18 NOTE — TELEPHONE ENCOUNTER
Have them hold metoprolol today-cut the tablet to half that dose of 12 5 mg daily starting tomorrow and call in blood pressure readings on Thursday  Also could someone clean up her med list appears at [facility] duplicates of multiple meds

## 2022-07-20 ENCOUNTER — OFFICE VISIT (OUTPATIENT)
Dept: FAMILY MEDICINE CLINIC | Facility: HOSPITAL | Age: 72
End: 2022-07-20
Payer: COMMERCIAL

## 2022-07-20 VITALS
DIASTOLIC BLOOD PRESSURE: 62 MMHG | HEART RATE: 70 BPM | WEIGHT: 110 LBS | HEIGHT: 61 IN | OXYGEN SATURATION: 96 % | BODY MASS INDEX: 20.77 KG/M2 | SYSTOLIC BLOOD PRESSURE: 102 MMHG

## 2022-07-20 DIAGNOSIS — M00.9 PYOGENIC ARTHRITIS OF KNEE, DUE TO UNSPECIFIED ORGANISM, UNSPECIFIED LATERALITY (HCC): ICD-10-CM

## 2022-07-20 DIAGNOSIS — D64.81 ANEMIA DUE TO ANTINEOPLASTIC CHEMOTHERAPY: ICD-10-CM

## 2022-07-20 DIAGNOSIS — I50.43 ACUTE ON CHRONIC COMBINED SYSTOLIC AND DIASTOLIC CONGESTIVE HEART FAILURE (HCC): ICD-10-CM

## 2022-07-20 DIAGNOSIS — N18.30 STAGE 3 CHRONIC KIDNEY DISEASE, UNSPECIFIED WHETHER STAGE 3A OR 3B CKD (HCC): ICD-10-CM

## 2022-07-20 DIAGNOSIS — D70.1 CHEMOTHERAPY-INDUCED NEUTROPENIA (HCC): ICD-10-CM

## 2022-07-20 DIAGNOSIS — D64.89 ANEMIA DUE TO OTHER CAUSE, NOT CLASSIFIED: ICD-10-CM

## 2022-07-20 DIAGNOSIS — I48.91 ATRIAL FIBRILLATION, UNSPECIFIED TYPE (HCC): ICD-10-CM

## 2022-07-20 DIAGNOSIS — C78.89 MALIGNANT NEOPLASM METASTATIC TO OTHER DIGESTIVE SYSTEM STRUCTURE (HCC): ICD-10-CM

## 2022-07-20 DIAGNOSIS — C49.A4 GIST (GASTROINTESTINAL STROMA TUMOR), MALIGNANT, COLON (HCC): Primary | ICD-10-CM

## 2022-07-20 DIAGNOSIS — Z12.31 ENCOUNTER FOR SCREENING MAMMOGRAM FOR MALIGNANT NEOPLASM OF BREAST: ICD-10-CM

## 2022-07-20 DIAGNOSIS — T45.1X5A CHEMOTHERAPY-INDUCED NEUTROPENIA (HCC): ICD-10-CM

## 2022-07-20 DIAGNOSIS — T45.1X5A ANEMIA DUE TO ANTINEOPLASTIC CHEMOTHERAPY: ICD-10-CM

## 2022-07-20 PROBLEM — J96.01 ACUTE RESPIRATORY FAILURE WITH HYPOXIA (HCC): Status: RESOLVED | Noted: 2022-07-04 | Resolved: 2022-07-20

## 2022-07-20 PROBLEM — D64.9 ANEMIA: Status: RESOLVED | Noted: 2022-07-03 | Resolved: 2022-07-20

## 2022-07-20 PROBLEM — N17.9 AKI (ACUTE KIDNEY INJURY) (HCC): Status: RESOLVED | Noted: 2022-07-04 | Resolved: 2022-07-20

## 2022-07-20 PROBLEM — Z01.818 PRE-OP EXAMINATION: Status: RESOLVED | Noted: 2019-12-12 | Resolved: 2022-07-20

## 2022-07-20 PROBLEM — R50.9 FEVER: Status: RESOLVED | Noted: 2022-07-03 | Resolved: 2022-07-20

## 2022-07-20 PROBLEM — R33.9 URINARY RETENTION: Status: RESOLVED | Noted: 2022-07-03 | Resolved: 2022-07-20

## 2022-07-20 PROCEDURE — 99215 OFFICE O/P EST HI 40 MIN: CPT | Performed by: INTERNAL MEDICINE

## 2022-07-20 PROCEDURE — 1160F RVW MEDS BY RX/DR IN RCRD: CPT | Performed by: INTERNAL MEDICINE

## 2022-07-20 PROCEDURE — 1101F PT FALLS ASSESS-DOCD LE1/YR: CPT | Performed by: INTERNAL MEDICINE

## 2022-07-20 PROCEDURE — 3288F FALL RISK ASSESSMENT DOCD: CPT | Performed by: INTERNAL MEDICINE

## 2022-07-20 PROCEDURE — 3725F SCREEN DEPRESSION PERFORMED: CPT | Performed by: INTERNAL MEDICINE

## 2022-07-20 RX ORDER — FUROSEMIDE 20 MG/1
TABLET ORAL
COMMUNITY
Start: 2022-06-28

## 2022-07-20 RX ORDER — OFLOXACIN 3 MG/ML
0.3 SOLUTION/ DROPS OPHTHALMIC
COMMUNITY
Start: 2022-05-19

## 2022-07-20 NOTE — ASSESSMENT & PLAN NOTE
Lab Results   Component Value Date    EGFR 48 07/07/2022    EGFR 41 07/06/2022    EGFR 38 07/06/2022    CREATININE 1 12 07/07/2022    CREATININE 1 29 07/06/2022    CREATININE 1 37 (H) 07/06/2022   will continue to monitor

## 2022-07-20 NOTE — ASSESSMENT & PLAN NOTE
Wt Readings from Last 3 Encounters:   07/05/22 50 3 kg (111 lb)   05/10/22 52 kg (114 lb 9 6 oz)   01/06/22 52 2 kg (115 lb)     On metoprolol and lasix, daily wieghts      NOte new finding of EF 40%, will follow up with cardiology

## 2022-07-20 NOTE — PROGRESS NOTES
Subjective:   Chief Complaint   Patient presents with    hospital follow up         Patient ID: Corina Will is a 66 y o  female  Presents after discharge from [facility] on 7/13/22  Originally came to [facility] ER with fever, knee swelling, confusion  She had undergone TKR of left knee, developed pain and swelling  Diagnosed with sepsis due to prosthetic joint infection, was treated with IV vanco and cefepime  During hospital stay, she received PRBCs due to chronic chemotherapy induced anemia and IVF which resulted in acute on chronic CHF  Cardiology ordered ECHO and IV diuresis  EF noted to be 40 %  She did respond well to diuresis  She was discharged on lasix  Cultures from knee showed no growth and ID discontinued antibiotics as patient knee was improving over several days  On discharge she was to follow up with cardiology for evaluation of decreased EF  To follow up with ortho for surveillance of knee  Ordered VN through [facility]  Today she presents and states she has slow improvement  The following portions of the patient's history were reviewed and updated as appropriate: allergies, current medications, past family history, past medical history, past social history, past surgical history and problem list     Review of Systems   Constitutional: Positive for fatigue  Negative for fever  Respiratory: Negative for shortness of breath  Cardiovascular: Negative for chest pain and leg swelling  Musculoskeletal: Positive for arthralgias and gait problem  Skin: Negative for rash and wound  Neurological: Positive for weakness  Hematological: Does not bruise/bleed easily  All other systems reviewed and are negative          Current Outpatient Medications on File Prior to Visit   Medication Sig Dispense Refill    acetaminophen (TYLENOL) 500 mg tablet Take 500 mg by mouth every 6 (six) hours as needed for mild pain      aspirin (ECOTRIN LOW STRENGTH) 81 mg EC tablet Take 81 mg by mouth daily      Avapritinib (Ayvakit) 200 MG TABS Take 200 mg by mouth daily      Azelastine HCl 137 MCG/SPRAY SOLN instill 2 sprays into each nostril twice a day if needed for congestion  0    calcium carbonate (TUMS) 500 mg chewable tablet Chew 1 tablet       diphenhydrAMINE (BENADRYL) 25 mg tablet Take 25 mg by mouth every 6 (six) hours as needed for itching      diphenoxylate-atropine (LOMOTIL) 2 5-0 025 mg per tablet TAKE 1 TABLET BY MOUTH 4 TIMES A DAY AS NEEDED FOR DIARRHEA 30 tablet 5    docusate sodium (COLACE) 100 mg capsule Take 100 mg by mouth 2 (two) times a day as needed for constipation       ferrous sulfate 324 (65 Fe) mg Take 1 tablet (324 mg total) by mouth 2 (two) times a day before meals 60 tablet 2    furosemide (LASIX) 20 mg tablet 20 mg      hydroxychloroquine (PLAQUENIL) 200 mg tablet Take 200 mg by mouth 2 (two) times a day with meals Alternate with daily and BID      loperamide (IMODIUM) 2 mg capsule Take 2 capsules by mouth 4 (four) times a day as needed       metoprolol succinate (TOPROL-XL) 25 mg 24 hr tablet Take 25 mg by mouth daily 1/2 in the morning 1/2 at night      multivitamin-iron-minerals-folic acid (CENTRUM) chewable tablet Chew 1 tablet daily      ofloxacin (OCUFLOX) 0 3 % ophthalmic solution 0 3 drops Left eye      ondansetron (ZOFRAN) 8 mg tablet 8 mg daily with breakfast Prior to chemo      pantoprazole (PROTONIX) 40 mg tablet take 1 tablet by mouth twice a day 180 tablet 0    traMADol (ULTRAM) 50 mg tablet Take 100 mg by mouth every 6 (six) hours as needed for moderate pain      Triamcinolone Acetonide (NASACORT ALLERGY 24HR NA) into each nostril as needed 1 spray each nostril--at bedtime   (OTC)       [DISCONTINUED] aspirin 81 mg chewable tablet Chew 81 mg daily (Patient not taking: Reported on 7/20/2022)      [DISCONTINUED] Avapritinib (Ayvakit) 200 MG TABS Take 200 mg by mouth in the morning      [DISCONTINUED] hydroxychloroquine (PLAQUENIL) 200 mg tablet Take 200 mg by mouth 2 (two) times a day with meals Alternates with daily and BID      [DISCONTINUED] metoprolol succinate (TOPROL-XL) 25 mg 24 hr tablet Take 25 mg by mouth daily      [DISCONTINUED] ondansetron (ZOFRAN) 8 mg tablet Take 8 mg by mouth in the morning Prior to chemo      [DISCONTINUED] pantoprazole (PROTONIX) 40 mg tablet Take 40 mg by mouth 2 (two) times a day      [DISCONTINUED] traMADol (ULTRAM) 50 mg tablet take 2 tablets by mouth four times a day 120 tablet 0     No current facility-administered medications on file prior to visit  Objective:  Vitals:    07/20/22 1117   BP: 102/62   Pulse: 70   SpO2: 96%   Weight: 49 9 kg (110 lb)   Height: 5' 1" (1 549 m)      Physical Exam  Vitals and nursing note reviewed  Constitutional:       General: She is not in acute distress  Cardiovascular:      Rate and Rhythm: Normal rate and regular rhythm  Pulmonary:      Effort: Pulmonary effort is normal       Breath sounds: Normal breath sounds  Musculoskeletal:         General: Normal range of motion  Cervical back: Normal range of motion  Skin:     Findings: No rash  Neurological:      Mental Status: She is alert and oriented to person, place, and time  Psychiatric:         Thought Content: Thought content normal            Assessment/Plan:    Extensive visit with review and discussion of all hospital records from [facility] and [address] [place] [address]  Cardiology consult reviewed  ECHO reviewed  All labs and testing reviewed  Current medication regimen reviewed  Discussion and patient education accomplished        Diagnoses and all orders for this visit:    GIST (gastrointestinal stroma tumor), malignant, colon (HCC)    Pyogenic arthritis of knee, due to unspecified organism, unspecified laterality ([facility] )    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[facility] )    Chemotherapy-induced neutropenia (HCC)    Acute on chronic combined systolic and diastolic congestive heart failure (HCC)    Atrial fibrillation, unspecified type ([facility] )    Anemia due to other cause, not classified    Anemia due to antineoplastic chemotherapy  -     CBC and differential; Future    Stage 3 chronic kidney disease, unspecified whether stage 3a or 3b CKD ([place] [address] )  -     Comprehensive metabolic panel;  Future    Encounter for screening mammogram for malignant neoplasm of breast  -     Mammo screening bilateral w 3d & cad; Future    Other orders  -     furosemide (LASIX) 20 mg tablet; 20 mg  -     ofloxacin (OCUFLOX) 0 3 % ophthalmic solution; 0 3 drops Left eye

## 2022-07-20 NOTE — ASSESSMENT & PLAN NOTE
Monitored and managed by oncology at [facility]    Received [phone] during hospital stay

## 2022-07-20 NOTE — ASSESSMENT & PLAN NOTE
Completed antibiotics  No fever and less swelling    Will get labs for follow up or monitor labs per oncology at [facility]

## 2022-07-26 ENCOUNTER — TELEPHONE (OUTPATIENT)
Dept: FAMILY MEDICINE CLINIC | Facility: HOSPITAL | Age: 72
End: 2022-07-26

## 2022-07-27 ENCOUNTER — HOSPITAL ENCOUNTER (EMERGENCY)
Facility: HOSPITAL | Age: 72
Discharge: HOME | End: 2022-07-27
Attending: EMERGENCY MEDICINE
Payer: COMMERCIAL

## 2022-07-27 ENCOUNTER — APPOINTMENT (OUTPATIENT)
Dept: LAB | Facility: HOSPITAL | Age: 72
End: 2022-07-27
Attending: INTERNAL MEDICINE
Payer: COMMERCIAL

## 2022-07-27 ENCOUNTER — HOSPITAL ENCOUNTER (OUTPATIENT)
Dept: MAMMOGRAPHY | Facility: IMAGING CENTER | Age: 72
Discharge: HOME | End: 2022-07-27
Payer: COMMERCIAL

## 2022-07-27 VITALS — WEIGHT: 110 LBS | HEIGHT: 61 IN | BODY MASS INDEX: 20.77 KG/M2

## 2022-07-27 VITALS
HEIGHT: 61 IN | TEMPERATURE: 98.4 F | BODY MASS INDEX: 20.96 KG/M2 | RESPIRATION RATE: 18 BRPM | OXYGEN SATURATION: 98 % | WEIGHT: 111 LBS | SYSTOLIC BLOOD PRESSURE: 117 MMHG | DIASTOLIC BLOOD PRESSURE: 57 MMHG | HEART RATE: 75 BPM

## 2022-07-27 DIAGNOSIS — D64.9 ANEMIA: Primary | ICD-10-CM

## 2022-07-27 DIAGNOSIS — T45.1X5A ANEMIA DUE TO ANTINEOPLASTIC CHEMOTHERAPY: ICD-10-CM

## 2022-07-27 DIAGNOSIS — N18.30 STAGE 3 CHRONIC KIDNEY DISEASE, UNSPECIFIED WHETHER STAGE 3A OR 3B CKD (HCC): ICD-10-CM

## 2022-07-27 DIAGNOSIS — D64.81 ANEMIA DUE TO ANTINEOPLASTIC CHEMOTHERAPY: ICD-10-CM

## 2022-07-27 DIAGNOSIS — Z12.31 ENCOUNTER FOR SCREENING MAMMOGRAM FOR MALIGNANT NEOPLASM OF BREAST: ICD-10-CM

## 2022-07-27 LAB
ABO GROUP BLD: NORMAL
ALBUMIN SERPL BCP-MCNC: 3.1 G/DL (ref 3.5–5)
ALP SERPL-CCNC: 91 U/L (ref 46–116)
ALT SERPL W P-5'-P-CCNC: 29 U/L (ref 12–78)
ANION GAP SERPL CALCULATED.3IONS-SCNC: 6 MMOL/L (ref 4–13)
APTT PPP: 31 SECONDS (ref 23–37)
AST SERPL W P-5'-P-CCNC: 49 U/L (ref 5–45)
BASOPHILS # BLD AUTO: 0.01 THOUSANDS/ΜL (ref 0–0.1)
BASOPHILS # BLD AUTO: 0.02 THOUSANDS/ΜL (ref 0–0.1)
BASOPHILS NFR BLD AUTO: 0 % (ref 0–1)
BASOPHILS NFR BLD AUTO: 1 % (ref 0–1)
BILIRUB SERPL-MCNC: 0.41 MG/DL (ref 0.2–1)
BLD GP AB SCN SERPL QL: NEGATIVE
BUN SERPL-MCNC: 27 MG/DL (ref 5–25)
CALCIUM ALBUM COR SERPL-MCNC: 9.9 MG/DL (ref 8.3–10.1)
CALCIUM SERPL-MCNC: 9.2 MG/DL (ref 8.3–10.1)
CHLORIDE SERPL-SCNC: 104 MMOL/L (ref 96–108)
CO2 SERPL-SCNC: 27 MMOL/L (ref 21–32)
CREAT SERPL-MCNC: 1.54 MG/DL (ref 0.6–1.3)
EOSINOPHIL # BLD AUTO: 0.08 THOUSAND/ΜL (ref 0–0.61)
EOSINOPHIL # BLD AUTO: 0.13 THOUSAND/ΜL (ref 0–0.61)
EOSINOPHIL NFR BLD AUTO: 2 % (ref 0–6)
EOSINOPHIL NFR BLD AUTO: 3 % (ref 0–6)
ERYTHROCYTE [DISTWIDTH] IN BLOOD BY AUTOMATED COUNT: 15.5 % (ref 11.6–15.1)
ERYTHROCYTE [DISTWIDTH] IN BLOOD BY AUTOMATED COUNT: 15.8 % (ref 11.6–15.1)
GFR SERPL CREATININE-BSD FRML MDRD: 33 ML/MIN/1.73SQ M
GLUCOSE P FAST SERPL-MCNC: 80 MG/DL (ref 65–99)
HCT VFR BLD AUTO: 23.1 % (ref 34.8–46.1)
HCT VFR BLD AUTO: 23.8 % (ref 34.8–46.1)
HGB BLD-MCNC: 7.2 G/DL (ref 11.5–15.4)
HGB BLD-MCNC: 7.2 G/DL (ref 11.5–15.4)
HOLD SPECIMEN: NORMAL
IMM GRANULOCYTES # BLD AUTO: 0.02 THOUSAND/UL (ref 0–0.2)
IMM GRANULOCYTES # BLD AUTO: 0.02 THOUSAND/UL (ref 0–0.2)
IMM GRANULOCYTES NFR BLD AUTO: 1 % (ref 0–2)
IMM GRANULOCYTES NFR BLD AUTO: 1 % (ref 0–2)
INR PPP: 1.03 (ref 0.84–1.19)
LYMPHOCYTES # BLD AUTO: 0.28 THOUSANDS/ΜL (ref 0.6–4.47)
LYMPHOCYTES # BLD AUTO: 0.29 THOUSANDS/ΜL (ref 0.6–4.47)
LYMPHOCYTES NFR BLD AUTO: 7 % (ref 14–44)
LYMPHOCYTES NFR BLD AUTO: 8 % (ref 14–44)
MCH RBC QN AUTO: 33.5 PG (ref 26.8–34.3)
MCH RBC QN AUTO: 34.3 PG (ref 26.8–34.3)
MCHC RBC AUTO-ENTMCNC: 30.3 G/DL (ref 31.4–37.4)
MCHC RBC AUTO-ENTMCNC: 31.2 G/DL (ref 31.4–37.4)
MCV RBC AUTO: 107 FL (ref 82–98)
MCV RBC AUTO: 113 FL (ref 82–98)
MONOCYTES # BLD AUTO: 0.33 THOUSAND/ΜL (ref 0.17–1.22)
MONOCYTES # BLD AUTO: 0.36 THOUSAND/ΜL (ref 0.17–1.22)
MONOCYTES NFR BLD AUTO: 8 % (ref 4–12)
MONOCYTES NFR BLD AUTO: 9 % (ref 4–12)
NEUTROPHILS # BLD AUTO: 3.05 THOUSANDS/ΜL (ref 1.85–7.62)
NEUTROPHILS # BLD AUTO: 3.57 THOUSANDS/ΜL (ref 1.85–7.62)
NEUTS SEG NFR BLD AUTO: 79 % (ref 43–75)
NEUTS SEG NFR BLD AUTO: 81 % (ref 43–75)
NRBC BLD AUTO-RTO: 0 /100 WBCS
NRBC BLD AUTO-RTO: 0 /100 WBCS
PLATELET # BLD AUTO: 237 THOUSANDS/UL (ref 149–390)
PLATELET # BLD AUTO: 273 THOUSANDS/UL (ref 149–390)
PMV BLD AUTO: 10.3 FL (ref 8.9–12.7)
PMV BLD AUTO: 9.6 FL (ref 8.9–12.7)
POTASSIUM SERPL-SCNC: 4 MMOL/L (ref 3.5–5.3)
PROT SERPL-MCNC: 6.8 G/DL (ref 6.4–8.4)
PROTHROMBIN TIME: 14.2 SECONDS (ref 11.6–14.5)
RBC # BLD AUTO: 2.1 MILLION/UL (ref 3.81–5.12)
RBC # BLD AUTO: 2.15 MILLION/UL (ref 3.81–5.12)
RH BLD: POSITIVE
SODIUM SERPL-SCNC: 137 MMOL/L (ref 135–147)
SPECIMEN EXPIRATION DATE: NORMAL
WBC # BLD AUTO: 3.82 THOUSAND/UL (ref 4.31–10.16)
WBC # BLD AUTO: 4.34 THOUSAND/UL (ref 4.31–10.16)

## 2022-07-27 PROCEDURE — 80053 COMPREHEN METABOLIC PANEL: CPT

## 2022-07-27 PROCEDURE — P9058 RBC, L/R, CMV-NEG, IRRAD: HCPCS

## 2022-07-27 PROCEDURE — 85025 COMPLETE CBC W/AUTO DIFF WBC: CPT | Performed by: EMERGENCY MEDICINE

## 2022-07-27 PROCEDURE — 86920 COMPATIBILITY TEST SPIN: CPT

## 2022-07-27 PROCEDURE — 85730 THROMBOPLASTIN TIME PARTIAL: CPT | Performed by: PHYSICIAN ASSISTANT

## 2022-07-27 PROCEDURE — 99283 EMERGENCY DEPT VISIT LOW MDM: CPT

## 2022-07-27 PROCEDURE — 77067 SCR MAMMO BI INCL CAD: CPT

## 2022-07-27 PROCEDURE — 85025 COMPLETE CBC W/AUTO DIFF WBC: CPT

## 2022-07-27 PROCEDURE — 86900 BLOOD TYPING SEROLOGIC ABO: CPT | Performed by: PHYSICIAN ASSISTANT

## 2022-07-27 PROCEDURE — 86850 RBC ANTIBODY SCREEN: CPT | Performed by: PHYSICIAN ASSISTANT

## 2022-07-27 PROCEDURE — 99284 EMERGENCY DEPT VISIT MOD MDM: CPT | Performed by: PHYSICIAN ASSISTANT

## 2022-07-27 PROCEDURE — 36415 COLL VENOUS BLD VENIPUNCTURE: CPT

## 2022-07-27 PROCEDURE — 36430 TRANSFUSION BLD/BLD COMPNT: CPT

## 2022-07-27 PROCEDURE — 86901 BLOOD TYPING SEROLOGIC RH(D): CPT | Performed by: PHYSICIAN ASSISTANT

## 2022-07-27 PROCEDURE — 85610 PROTHROMBIN TIME: CPT | Performed by: PHYSICIAN ASSISTANT

## 2022-07-27 PROCEDURE — 77063 BREAST TOMOSYNTHESIS BI: CPT

## 2022-07-27 RX ORDER — ACETAMINOPHEN 325 MG/1
650 TABLET ORAL ONCE
Status: COMPLETED | OUTPATIENT
Start: 2022-07-27 | End: 2022-07-27

## 2022-07-27 RX ORDER — DIPHENHYDRAMINE HCL 25 MG
25 TABLET ORAL ONCE
Status: COMPLETED | OUTPATIENT
Start: 2022-07-27 | End: 2022-07-27

## 2022-07-27 RX ADMIN — ACETAMINOPHEN 650 MG: 325 TABLET ORAL at 18:53

## 2022-07-27 RX ADMIN — DIPHENHYDRAMINE HYDROCHLORIDE 25 MG: 25 TABLET ORAL at 18:53

## 2022-07-27 NOTE — ED PROVIDER NOTES
History  Chief Complaint   Patient presents with    Abnormal Lab     OP labwork this morning showing hgb 7 1  Denies increased fatigue or dizziness  Patient is a 65 y/o F with h/o anemia due to cancer that presents to the ED for blood transfusion  Patient states she received a call from her PCP today because her Hg was 7 1  She states her last transfusion was 1 month ago  Patient denies chest pain or SOB  She has fatigue, but this is chronic  History provided by:  Patient  Evaluation of Abnormal Diagnostic Test  Time since result:  1 day  Patient referred by:  PCP  Resulting agency:  Internal  Result type: hematology    Hematology:     Hemoglobin:  Low      Prior to Admission Medications   Prescriptions Last Dose Informant Patient Reported? Taking?    Avapritinib (Ayvakit) 200 MG TABS  Self Yes No   Sig: Take 200 mg by mouth daily   Azelastine HCl 137 MCG/SPRAY SOLN  Self Yes No   Sig: instill 2 sprays into each nostril twice a day if needed for congestion   Triamcinolone Acetonide (NASACORT ALLERGY 24HR NA)  Self Yes No   Sig: into each nostril as needed 1 spray each nostril--at bedtime   (OTC)    acetaminophen (TYLENOL) 500 mg tablet  Self Yes No   Sig: Take 500 mg by mouth every 6 (six) hours as needed for mild pain   aspirin (ECOTRIN LOW STRENGTH) 81 mg EC tablet   Yes No   Sig: Take 81 mg by mouth daily   calcium carbonate (TUMS) 500 mg chewable tablet  Self Yes No   Sig: Chew 1 tablet    diphenhydrAMINE (BENADRYL) 25 mg tablet  Self Yes No   Sig: Take 25 mg by mouth every 6 (six) hours as needed for itching   diphenoxylate-atropine (LOMOTIL) 2 5-0 025 mg per tablet  Self No No   Sig: TAKE 1 TABLET BY MOUTH 4 TIMES A DAY AS NEEDED FOR DIARRHEA   docusate sodium (COLACE) 100 mg capsule  Self Yes No   Sig: Take 100 mg by mouth 2 (two) times a day as needed for constipation    ferrous sulfate 324 (65 Fe) mg  Self No No   Sig: Take 1 tablet (324 mg total) by mouth 2 (two) times a day before meals furosemide (LASIX) 20 mg tablet   Yes No   Si mg   hydroxychloroquine (PLAQUENIL) 200 mg tablet  Self Yes No   Sig: Take 200 mg by mouth 2 (two) times a day with meals Alternate with daily and BID   loperamide (IMODIUM) 2 mg capsule  Self Yes No   Sig: Take 2 capsules by mouth 4 (four) times a day as needed    metoprolol succinate (TOPROL-XL) 25 mg 24 hr tablet  Self Yes No   Sig: Take 25 mg by mouth daily 1/2 in the morning 1/2 at night   multivitamin-iron-minerals-folic acid (CENTRUM) chewable tablet  Self Yes No   Sig: Chew 1 tablet daily   ofloxacin (OCUFLOX) 0 3 % ophthalmic solution   Yes No   Si 3 drops Left eye   ondansetron (ZOFRAN) 8 mg tablet  Self Yes No   Si mg daily with breakfast Prior to chemo   pantoprazole (PROTONIX) 40 mg tablet   No No   Sig: take 1 tablet by mouth twice a day   traMADol (ULTRAM) 50 mg tablet   Yes No   Sig: Take 100 mg by mouth every 6 (six) hours as needed for moderate pain      Facility-Administered Medications: None       Past Medical History:   Diagnosis Date    ADHD     Anemia     Anxiety     Arthritis     Asthma     Atrial fibrillation (HCC)     Cancer (HCC)     Cancer ([facility] )     liver    Chronic iron deficiency anemia 2020    Extensive GI evaluation over the last year including multiple EGD, colonoscopy, and capsule endoscopy    Has had gastric AVMs cauterized, Dieulafoy's lesion clipped, and most recently a Garcia Dire erosion cauterized    Coronary artery disease     Depression     GERD (gastroesophageal reflux disease)     History of stomach ulcers     Hypercholesterolemia     Hypertension     Kidney disease     Metastatic cancer ([facility] )     Sepsis due to Escherichia coli ([facility] ) 2021       Past Surgical History:   Procedure Laterality Date    ANKLE SURGERY      APPENDECTOMY      BREAST BIOPSY      BREAST SURGERY N/A     pt went to [facility] the [facility]s, had a procedure but unable to give date or what was done, just benign    CATARACT EXTRACTION       SECTION      COLONOSCOPY  2019    Multiple adenomatous colon polyps and internal hemorrhoids  Three year recall advised    HIP SURGERY      JOINT REPLACEMENT  2019    Left knee replacement    LAMINECTOMY      OOPHORECTOMY      ROTATOR CUFF REPAIR      SIGMOID RESECTION / RECTOPEXY      SINUS SURGERY      UPPER GASTROINTESTINAL ENDOSCOPY  2020    Dieulafoy's lesion in proximal stomach which was actively oozing  Injected with epinephrine and 2 Endoclips placed with control of bleeding, hiatal hernia   UPPER GASTROINTESTINAL ENDOSCOPY  2020    Bleeding Seb's erosion status post cauterization       Family History   Problem Relation Age of Onset    Diabetes Mother     Hypertension Mother     Lung cancer Mother     Hyperlipidemia Father     Prostate cancer Father     No Known Problems Maternal Grandmother     No Known Problems Maternal Grandfather     Diabetes Family     No Known Problems Maternal Aunt     No Known Problems Maternal Aunt     No Known Problems Maternal Aunt     No Known Problems Paternal Aunt     Breast cancer Cousin     Breast cancer Cousin     Substance Abuse Neg Hx     Mental illness Neg Hx      I have reviewed and agree with the history as documented  E-Cigarette/Vaping    E-Cigarette Use Never User      E-Cigarette/Vaping Substances    Nicotine No     THC No     CBD No     Flavoring No     Other No     Unknown No      Social History     Tobacco Use    Smoking status: Never Smoker    Smokeless tobacco: Never Used   Vaping Use    Vaping Use: Never used   Substance Use Topics    Alcohol use: Never    Drug use: Never       Review of Systems   Constitutional: Positive for fatigue  Negative for chills and fever  Eyes: Negative for visual disturbance  Respiratory: Negative for cough and shortness of breath  Cardiovascular: Negative for chest pain, palpitations and leg swelling  Gastrointestinal: Negative for abdominal pain  Musculoskeletal: Negative  Skin: Positive for pallor  Neurological: Negative for dizziness, weakness, light-headedness and numbness  Psychiatric/Behavioral: Negative for confusion  All other systems reviewed and are negative  Physical Exam  Physical Exam  Vitals and nursing note reviewed  Constitutional:       General: She is not in acute distress  Appearance: Normal appearance  She is well-developed, well-groomed and normal weight  She is not ill-appearing or diaphoretic  HENT:      Head: Normocephalic and atraumatic  Right Ear: External ear normal       Left Ear: External ear normal       Nose: Nose normal       Mouth/Throat:      Mouth: Mucous membranes are pale  Eyes:      Conjunctiva/sclera: Conjunctivae normal    Cardiovascular:      Rate and Rhythm: Normal rate and regular rhythm  Pulmonary:      Effort: Pulmonary effort is normal       Breath sounds: Normal breath sounds  Musculoskeletal:         General: Normal range of motion  Right lower leg: No edema  Left lower leg: No edema  Skin:     General: Skin is warm and dry  Coloration: Skin is pale  Skin is not jaundiced  Findings: No rash  Neurological:      General: No focal deficit present  Mental Status: She is alert and oriented to person, place, and time  Motor: No weakness  Psychiatric:         Mood and Affect: Mood normal          Behavior: Behavior is cooperative           Vital Signs  ED Triage Vitals [07/27/22 1600]   Temperature Pulse Respirations Blood Pressure SpO2   (!) 97 3 °F (36 3 °C) 86 14 120/61 97 %      Temp Source Heart Rate Source Patient Position - Orthostatic VS BP Location FiO2 (%)   Temporal Monitor Sitting Right arm --      Pain Score       No Pain           Vitals:    07/27/22 1933 07/27/22 2115 07/27/22 2145 07/27/22 2209   BP: 117/65 107/57 112/56 117/57   Pulse: 72 66 71 75   Patient Position - Orthostatic VS: Visual Acuity  Visual Acuity    Flowsheet Row Most Recent Value   L Pupil Size (mm) 3   R Pupil Size (mm) 3          ED Medications  Medications   acetaminophen (TYLENOL) tablet 650 mg (650 mg Oral Given 7/27/22 1853)   diphenhydrAMINE (BENADRYL) tablet 25 mg (25 mg Oral Given 7/27/22 1853)       Diagnostic Studies  Results Reviewed     Procedure Component Value Units Date/Time    Protime-INR [[id number]]  (Normal) Collected: 07/27/22 1728    Lab Status: Final result Specimen: Blood from Arm, Left Updated: 07/27/22 1942     Protime 14 2 seconds      INR 1 03    APTT [[id number]]  (Normal) Collected: 07/27/22 1728    Lab Status: Final result Specimen: Blood from Arm, Left Updated: 07/27/22 1942     PTT 31 seconds     Trauma tubes on hold [[id number]] Collected: 07/27/22 1601    Lab Status: Final result Specimen: Blood from Arm, Right Updated: 07/27/22 1803    Narrative: The following orders were created for panel order Trauma tubes on hold  Procedure                               Abnormality         Status                     ---------                               -----------         ------                     Nely Maxim / Yellow tube on LTIS[[id number]]                      Final result                 Please view results for these tests on the individual orders      CBC and differential [[id number]]  (Abnormal) Collected: 07/27/22 1601    Lab Status: Final result Specimen: Blood from Arm, Right Updated: 07/27/22 1611     WBC 3 82 Thousand/uL      RBC 2 10 Million/uL      Hemoglobin 7 2 g/dL      Hematocrit 23 8 %       fL      MCH 34 3 pg      MCHC 30 3 g/dL      RDW 15 8 %      MPV 9 6 fL      Platelets 746 Thousands/uL      nRBC 0 /100 WBCs      Neutrophils Relative 79 %      Immat GRANS % 1 %      Lymphocytes Relative 8 %      Monocytes Relative 9 %      Eosinophils Relative 2 %      Basophils Relative 1 %      Neutrophils Absolute 3 05 Thousands/µL      Immature Grans Absolute 0 02 Thousand/uL Lymphocytes Absolute 0 29 Thousands/µL      Monocytes Absolute 0 36 Thousand/µL      Eosinophils Absolute 0 08 Thousand/µL      Basophils Absolute 0 02 Thousands/µL                  No orders to display              Procedures  Procedures         ED Course  ED Course as of 07/27/22 2232 Wed Jul 27, 2022   1742 Patient gave written consent for blood transfusion  SBIRT 22yo+    Flowsheet Row Most Recent Value   SBIRT (23 yo +)    In order to provide better care to our patients, we are screening all of our patients for alcohol and drug use  Would it be okay to ask you these screening questions? Yes Filed at: 07/27/2022 1818   Initial Alcohol Screen: US AUDIT-C     1  How often do you have a drink containing alcohol? 0 Filed at: 07/27/2022 1818   2  How many drinks containing alcohol do you have on a typical day you are drinking? 0 Filed at: 07/27/2022 1818   3a  Male UNDER 65: How often do you have five or more drinks on one occasion? 0 Filed at: 07/27/2022 1818   3b  FEMALE Any Age, or MALE 65+: How often do you have 4 or more drinks on one occassion? 0 Filed at: 07/27/2022 1818   Audit-C Score 0 Filed at: 07/27/2022 1818   RYAN: How many times in the past year have you    Used an illegal drug or used a prescription medication for non-medical reasons? Never Filed at: 07/27/2022 1818                    MDM  Number of Diagnoses or Management Options  Anemia: established and worsening  Diagnosis management comments: Patient with anemia, frequent blood transfusion, will give a unit of blood and advised f/u with PCP  No complications during transfusion          Amount and/or Complexity of Data Reviewed  Clinical lab tests: reviewed and ordered    Patient Progress  Patient progress: stable      Disposition  Final diagnoses:   Anemia     Time reflects when diagnosis was documented in both MDM as applicable and the Disposition within this note     Time User Action Codes Description Comment    7/27/2022 10:15 PM Mark Campbell Add [D64 9] Anemia       ED Disposition     ED Disposition   Discharge    Condition   Stable    Date/Time   Wed Jul 27, 2022 10:15 PM    Comment   Yue Ballesteros discharge to home/self care  Follow-up Information     Follow up With Specialties Details Why Contact Info    Bebo Rizvi MD Internal Medicine Call in 1 day For recheck [place]  [address] [place] [phone]            Patient's Medications   Discharge Prescriptions    No medications on file       No discharge procedures on file      PDMP Review       Value Time User    PDMP Reviewed  Yes 1/19/2022  5:08 PM Fernie Casanova MD          ED Provider  Electronically Signed by           Diana Grady PA-C  07/27/22 7239

## 2022-07-28 LAB
ABO GROUP BLD BPU: NORMAL
BPU ID: NORMAL
CROSSMATCH: NORMAL
UNIT DISPENSE STATUS: NORMAL
UNIT PRODUCT CODE: NORMAL
UNIT PRODUCT VOLUME: 350 ML
UNIT RH: NORMAL

## 2022-07-28 NOTE — ED NOTES
Blood infusion switched to Right antecubital IV due to occlusion alarms and left IV flushed    Nurse made aware     Francisco Narayanan RN  07/27/22 2019

## 2022-08-05 ENCOUNTER — TELEPHONE (OUTPATIENT)
Dept: FAMILY MEDICINE CLINIC | Facility: HOSPITAL | Age: 72
End: 2022-08-05

## 2022-08-05 NOTE — TELEPHONE ENCOUNTER
LMOM to make preop appt for eye surgery Aug 17 at [facility], [facility] Em[place] Surgical coordinator  [phone] exten [phone]

## 2022-08-12 ENCOUNTER — OFFICE VISIT (OUTPATIENT)
Dept: FAMILY MEDICINE CLINIC | Facility: HOSPITAL | Age: 72
End: 2022-08-12
Payer: COMMERCIAL

## 2022-08-12 VITALS
RESPIRATION RATE: 16 BRPM | WEIGHT: 110 LBS | OXYGEN SATURATION: 98 % | HEIGHT: 61 IN | HEART RATE: 77 BPM | DIASTOLIC BLOOD PRESSURE: 64 MMHG | BODY MASS INDEX: 20.77 KG/M2 | SYSTOLIC BLOOD PRESSURE: 118 MMHG

## 2022-08-12 DIAGNOSIS — C49.A4 GIST (GASTROINTESTINAL STROMA TUMOR), MALIGNANT, COLON (HCC): Primary | ICD-10-CM

## 2022-08-12 DIAGNOSIS — I48.0 PAROXYSMAL ATRIAL FIBRILLATION (HCC): ICD-10-CM

## 2022-08-12 DIAGNOSIS — I10 ESSENTIAL HYPERTENSION: ICD-10-CM

## 2022-08-12 DIAGNOSIS — I50.43 ACUTE ON CHRONIC COMBINED SYSTOLIC AND DIASTOLIC CONGESTIVE HEART FAILURE (HCC): ICD-10-CM

## 2022-08-12 DIAGNOSIS — K31.819 GASTRIC AVM: ICD-10-CM

## 2022-08-12 PROCEDURE — 1160F RVW MEDS BY RX/DR IN RCRD: CPT | Performed by: STUDENT IN AN ORGANIZED HEALTH CARE EDUCATION/TRAINING PROGRAM

## 2022-08-12 PROCEDURE — 3078F DIAST BP <80 MM HG: CPT | Performed by: STUDENT IN AN ORGANIZED HEALTH CARE EDUCATION/TRAINING PROGRAM

## 2022-08-12 PROCEDURE — 99214 OFFICE O/P EST MOD 30 MIN: CPT | Performed by: STUDENT IN AN ORGANIZED HEALTH CARE EDUCATION/TRAINING PROGRAM

## 2022-08-12 PROCEDURE — 3074F SYST BP LT 130 MM HG: CPT | Performed by: STUDENT IN AN ORGANIZED HEALTH CARE EDUCATION/TRAINING PROGRAM

## 2022-08-12 RX ORDER — PREDNISOLONE ACETATE 10 MG/ML
SUSPENSION/ DROPS OPHTHALMIC
COMMUNITY
Start: 2022-08-02

## 2022-08-12 NOTE — PROGRESS NOTES
[facility] PRE-OPERATIVE EVALUATION  [facility] PHYSICIAN GROUP [facility] CARE [address]       NAME: Lucy Almaraz  AGE: 74 y o  SEX: female  : 1950   DATE: 2022    [facility] Pre-Operative Evaluation      Chief Complaint: Pre-operative Evaluation  Surgery: Eye surgery Right Side,   Anticipated Date of Surgery: 22  Referring Provider:   Brice Lockhart MD     History of Present Illness:     Ariel Edwards is a 74 y o  female who presents to the office today for a preoperative consultation at the request of surgeon, Brice Lockhart, who plans on performing Eye surgery Right Side on 22  Planned anesthesia is local and IV sedation  Patient has a bleeding risk of: no recent abnormal bleeding and no remote history of abnormal bleeding  Patient does not have objections to receiving blood products if needed  Current anti-platelet/anti-coagulation medications that the patient is prescribed includes: Aspirin  Assessment of Chronic Conditions:   - Congestive Heart Failure: Lasix 20 mg  - Hypertension: Well controlled  - GIST colon     Assessment of Cardiac Risk:  · Denies unstable or severe angina or MI in the last 6 weeks or history of stent placement in the last year   · Denies decompensated heart failure (e g  New onset heart failure, NYHA functional class IV heart failure, or worsening existing heart failure)  · Denies significant arrhythmias such as high grade AV block, symptomatic ventricular arrhythmia, newly recognized ventricular tachycardia, supraventricular tachycardia with resting heart rate >100, or symptomatic bradycardia  · Denies severe heart valve disease including aortic stenosis or symptomatic mitral stenosis     Exercise Capacity:  · Able to walk 4 blocks without symptoms?: No - limited by pain, cane    Prior Anesthesia Reactions: No  Personal history of venous thromboembolic disease? No  History of steroid use for >2 weeks within last year?  No Review of Systems:     Review of Systems    Current Problem List:     Patient Active Problem List   Diagnosis    Anemia    Gait disturbance    Essential hypertension    Spinal stenosis, lumbar region, with neurogenic claudication    Pain syndrome, chronic    Right bundle-branch block    Lumbar radiculopathy    Supraventricular tachycardia (HCC)    Non-rheumatic mitral regurgitation    Health care maintenance    [facility]astatic cancer ([facility] )    Gastric AVM    Stage 3 chronic kidney disease ([place] [address] )    MARK (obstructive sleep apnea)    Status post lumbar laminectomy    GIST (gastrointestinal stroma tumor), malignant, colon (HCC)    Chemotherapy-induced neutropenia (HCC)    Metastatic cancer ([place] [address] )    Atrial fibrillation ([place] [address] )    CAD (coronary artery disease)    Primary hypertension    Left knee pain    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)    Pyogenic arthritis of knee (HCC)    Anemia due to antineoplastic chemotherapy       Allergies: Allergies   Allergen Reactions    Codeine Other (See Comments)     Throat closes    Codeine Anaphylaxis    Codeine Sulfate Throat Swelling    Neomycin-Bacitracin Zn-Polymyx Rash    Wound Dressing Adhesive Other (See Comments)     If its on too long- pt starts itching    Zolmitriptan Palpitations     Heart palpitations    Generic for Zomig         Current Medications:       Current Outpatient Medications:     acetaminophen (TYLENOL) 500 mg tablet, Take 500 mg by mouth every 6 (six) hours as needed for mild pain, Disp: , Rfl:     aspirin (ECOTRIN LOW STRENGTH) 81 mg EC tablet, Take 81 mg by mouth daily, Disp: , Rfl:     Avapritinib (Ayvakit) 200 MG TABS, Take 200 mg by mouth daily, Disp: , Rfl:     Azelastine HCl 137 MCG/SPRAY SOLN, instill 2 sprays into each nostril twice a day if needed for congestion, Disp: , Rfl: 0    calcium carbonate (TUMS) 500 mg chewable tablet, Chew 1 tablet , Disp: , Rfl:     diphenhydrAMINE (BENADRYL) 25 mg tablet, Take 25 mg by mouth every 6 (six) hours as needed for itching, Disp: , Rfl:     diphenoxylate-atropine (LOMOTIL) 2 5-0 025 mg per tablet, TAKE 1 TABLET BY MOUTH 4 TIMES A DAY AS NEEDED FOR DIARRHEA, Disp: 30 tablet, Rfl: 5    docusate sodium (COLACE) 100 mg capsule, Take 100 mg by mouth 2 (two) times a day as needed for constipation , Disp: , Rfl:     ferrous sulfate 324 (65 Fe) mg, Take 1 tablet (324 mg total) by mouth 2 (two) times a day before meals, Disp: 60 tablet, Rfl: 2    furosemide (LASIX) 20 mg tablet, 1 daily, Disp: , Rfl:     hydroxychloroquine (PLAQUENIL) 200 mg tablet, Take 200 mg by mouth 2 (two) times a day with meals Alternate with daily and BID, Disp: , Rfl:     loperamide (IMODIUM) 2 mg capsule, Take 2 capsules by mouth 4 (four) times a day as needed , Disp: , Rfl:     metoprolol succinate (TOPROL-XL) 25 mg 24 hr tablet, Take 25 mg by mouth daily 1/2 in the morning 1/2 at night, Disp: , Rfl:     multivitamin-iron-minerals-folic acid (CENTRUM) chewable tablet, Chew 1 tablet daily, Disp: , Rfl:     ofloxacin (OCUFLOX) 0 3 % ophthalmic solution, 0 3 drops Left eye, Disp: , Rfl:     ondansetron (ZOFRAN) 8 mg tablet, 8 mg daily with breakfast Prior to chemo, Disp: , Rfl:     pantoprazole (PROTONIX) 40 mg tablet, take 1 tablet by mouth twice a day, Disp: 180 tablet, Rfl: 0    prednisoLONE acetate (PRED FORTE) 1 % ophthalmic suspension, , Disp: , Rfl:     traMADol (ULTRAM) 50 mg tablet, Take 100 mg by mouth every 6 (six) hours as needed for moderate pain, Disp: , Rfl:     Triamcinolone Acetonide (NASACORT ALLERGY 24HR NA), into each nostril as needed 1 spray each nostril--at bedtime   (OTC) , Disp: , Rfl:     Past Medical History:       Past Medical History:   Diagnosis Date    ADHD     Anemia     Anxiety     Arthritis     Asthma     Atrial fibrillation (HCC)     [facility]     Cancer [facility] )     liver    Chronic iron deficiency anemia 6/9/2020    Extensive GI evaluation over the last year including multiple EGD, colonoscopy, and capsule endoscopy  Has had gastric AVMs cauterized, Dieulafoy's lesion clipped, and most recently a Karma Hummingbird erosion cauterized    Coronary artery disease     Depression     GERD (gastroesophageal reflux disease)     History of stomach ulcers     Hypercholesterolemia     Hypertension     Kidney disease     [facility] )     Sepsis due to Escherichia coli ([facility] ) 2021        Past Surgical History:   Procedure Laterality Date    ANKLE SURGERY      APPENDECTOMY      BREAST BIOPSY      BREAST SURGERY N/A     pt went to [facility] in the , had a procedure but unable to give date or what was done, just benign    CATARACT EXTRACTION       SECTION      COLONOSCOPY  2019    Multiple adenomatous colon polyps and internal hemorrhoids  Three year recall advised    HIP SURGERY      JOINT REPLACEMENT  2019    Left knee replacement    LAMINECTOMY      OOPHORECTOMY      ROTATOR CUFF REPAIR      SIGMOID RESECTION / RECTOPEXY      SINUS SURGERY      UPPER GASTROINTESTINAL ENDOSCOPY  2020    Dieulafoy's lesion in proximal stomach which was actively oozing  Injected with epinephrine and 2 Endoclips placed with control of bleeding, hiatal hernia      UPPER GASTROINTESTINAL ENDOSCOPY  2020    Bleeding Seb's erosion status post cauterization        Family History   Problem Relation Age of Onset    Diabetes Mother     Hypertension Mother     Lung cancer Mother     Hyperlipidemia Father     Prostate cancer Father     No Known Problems Maternal Grandmother     No Known Problems Maternal Grandfather     Diabetes Family     No Known Problems Maternal Aunt     No Known Problems Maternal Aunt     No Known Problems Maternal Aunt     No Known Problems Paternal Aunt     Breast cancer Cousin     Breast cancer Cousin     Substance Abuse Neg Hx     Mental illness Neg Hx         Social History Socioeconomic History    Marital status: /Civil Union     Spouse name: Not on file    Number of children: Not on file    Years of education: Not on file    Highest education level: Not on file   Occupational History    Not on file   Tobacco Use    Smoking status: Never Smoker    Smokeless tobacco: Never Used   Vaping Use    Vaping Use: Never used   Substance and Sexual Activity    Alcohol use: Never    Drug use: Never    Sexual activity: Not Currently   Other Topics Concern    Not on file   Social History Narrative    ** Merged History Encounter **         Wears seatbelt   Regular dental care      Social Determinants of Health     Financial Resource Strain: Not on file   Food Insecurity: Not on file   Transportation Needs: Not on file   Physical Activity: Not on file   Stress: Not on file   Social Connections: Not on file   Intimate Partner Violence: Not on file   Housing Stability: Not on file        Physical Exam:     /64   Pulse 77   Resp 16   Ht 5' 1" (1 549 m)   Wt 49 9 kg (110 lb)   SpO2 98%   BMI 20 78 kg/m²     Physical Exam     Data:     Pre-operative work-up  Laboratory Results: I have personally reviewed the pertinent laboratory results/reports    7/27/22 - Hb 7 2, Plts 237 - nml PT /INR, Na 137, K 4 0, EGFR 33    EKG: I have personally reviewed pertinent reports  7/6/22 - Sinus tachycardia  Right bundle branch block  Left anterior fascicular block - Bifascicular block  When compared with ECG of 05-JUL-2022 09:59, (unconfirmed)  No significant change was found    Previous cardiopulmonary studies within the past year:  · Echocardiogram: 7/5/22 - Left ventricular cavity size is normal  Wall thickness is mildly increased  The left ventricular ejection fraction is 40% by visual estimation  Systolic function is moderately reduced  There is moderate global hypokinesis  Diastolic function is mildly abnormal, consistent with grade I (abnormal) relaxation      Assessment & Recommendations:     1  GIST (gastrointestinal stroma tumor), malignant, colon ([facility] )     2  Gastric AVM     3  Essential hypertension     4  Acute on chronic combined systolic and diastolic congestive heart failure (HCC)     5  Paroxysmal atrial fibrillation (HCC)         Pre-Op Evaluation Assessment  74 y o  female with planned surgery: Eye surgery Right Side  Known risk factors for perioperative complications: Anemia  Congestive heart failure  Current medications which may produce withdrawal symptoms if withheld perioperatively: None    Pre-Op Evaluation Plan  1  Further preoperative workup as follows:   - None; no further preoperative work-up is required - Hold ASA x3d    2  Medication Management/Recommendations:   - None, continue medication regimen including morning of surgery, with sip of water    3  Prophylaxis for cardiac events with perioperative beta-blockers: not indicated  4  Patient requires further consultation with: None    Clearance  Patient is CLEARED for surgery without any additional cardiac testing       DO BLANCA Johnson [facility] PRIMARY CARE [address] [place] [id number]  Phone#  [phone]  Fax#  [phone]

## 2022-08-31 DIAGNOSIS — M54.16 LUMBAR RADICULOPATHY: Primary | ICD-10-CM

## 2022-08-31 RX ORDER — TRAMADOL HYDROCHLORIDE 50 MG/1
TABLET ORAL
Qty: 120 TABLET | Refills: 0 | Status: SHIPPED | OUTPATIENT
Start: 2022-08-31 | End: 2022-10-05

## 2022-09-01 NOTE — PLAN OF CARE
Problem: Potential for Falls  Goal: Patient will remain free of falls  Description: INTERVENTIONS:  - Educate patient/family on patient safety including physical limitations  - Instruct patient to call for assistance with activity   - Consult OT/PT to assist with strengthening/mobility   - Keep Call bell within reach  - Keep bed low and locked with side rails adjusted as appropriate  - Keep care items and personal belongings within reach  - Initiate and maintain comfort rounds  - Make Fall Risk Sign visible to staff  - Offer Toileting every 2 Hours, in advance of need  - Initiate/Maintain bed alarm  - Obtain necessary fall risk management equipment:   Problem: MOBILITY - ADULT  Goal: Maintain or return to baseline ADL function  Description: INTERVENTIONS:  -  Assess patient's ability to carry out ADLs; assess patient's baseline for ADL function and identify physical deficits which impact ability to perform ADLs (bathing, care of mouth/teeth, toileting, grooming, dressing, etc )  - Assess/evaluate cause of self-care deficits   - Assess range of motion  - Assess patient's mobility; develop plan if impaired  - Assess patient's need for assistive devices and provide as appropriate  - Encourage maximum independence but intervene and supervise when necessary  - Involve family in performance of ADLs  - Assess for home care needs following discharge   - Consider OT consult to assist with ADL evaluation and planning for discharge  - Provide patient education as appropriate  Outcome: Progressing  Goal: Maintains/Returns to pre admission functional level  Description: INTERVENTIONS:  - Perform BMAT or MOVE assessment daily    - Set and communicate daily mobility goal to care team and patient/family/caregiver     - Collaborate with rehabilitation services on mobility goals if consulted    Problem: Prexisting or High Potential for Compromised Skin Integrity  Goal: Skin integrity is maintained or improved  Description: INTERVENTIONS:  - Identify patients at risk for skin breakdown  - Assess and monitor skin integrity  - Assess and monitor nutrition and hydration status  - Monitor labs   - Assess for incontinence   - Turn and reposition patient  - Assist with mobility/ambulation  - Relieve pressure over bony prominences  - Avoid friction and shearing  - Provide appropriate hygiene as needed including keeping skin clean and dry  - Evaluate need for skin moisturizer/barrier cream  - Collaborate with interdisciplinary team   - Patient/family teaching  - Consider wound care consult   Outcome: Progressing     Problem: HEMATOLOGIC - ADULT  Goal: Maintains hematologic stability  Description: INTERVENTIONS  - Assess for signs and symptoms of bleeding or hemorrhage  - Monitor labs  - Administer supportive blood products/factors as ordered and appropriate  Outcome: Progressing     Problem: Nutrition/Hydration-ADULT  Goal: Nutrient/Hydration intake appropriate for improving, restoring or maintaining nutritional needs  Description: Monitor and assess patient's nutrition/hydration status for malnutrition  Collaborate with interdisciplinary team and initiate plan and interventions as ordered  Monitor patient's weight and dietary intake as ordered or per policy  Utilize nutrition screening tool and intervene as necessary  Determine patient's food preferences and provide high-protein, high-caloric foods as appropriate       INTERVENTIONS:  - Monitor oral intake, urinary output, labs, and treatment plans  - Assess nutrition and hydration status and recommend course of action  - Evaluate amount of meals eaten  - Assist patient with eating if necessary   - Allow adequate time for meals  - Recommend/ encourage appropriate diets, oral nutritional supplements, and vitamin/mineral supplements  - Order, calculate, and assess calorie counts as needed  - Recommend, monitor, and adjust tube feedings and TPN/PPN based on assessed needs  - Assess need for intravenous fluids  - Provide specific nutrition/hydration education as appropriate  - Include patient/family/caregiver in decisions related to nutrition  Outcome: Progressing     Problem: SAFETY,RESTRAINT: NV/NON-SELF DESTRUCTIVE BEHAVIOR  Goal: Remains free of harm/injury (restraint for non violent/non self-detsructive behavior)  Description: INTERVENTIONS:  - Instruct patient/family regarding restraint use   - Assess and monitor physiologic and psychological status   - Provide interventions and comfort measures to meet assessed patient needs   - Identify and implement measures to help patient regain control  - Assess readiness for release of restraint   Outcome: Progressing  Goal: Returns to optimal restraint-free functioning  Description: INTERVENTIONS:  - Assess the patient's behavior and symptoms that indicate continued need for restraint  - Identify and implement measures to help patient regain control  - Assess readiness for release of restraint   Outcome: Progressing     - Record patient progress and toleration of activity level   Outcome: Progressing     - Apply yellow socks and bracelet for high fall risk patients  - Consider moving patient to room near nurses station  Outcome: Progressing Patient with one or more new problems requiring additional work-up/treatment.

## 2022-09-06 DIAGNOSIS — K92.2 UPPER GI BLEED: ICD-10-CM

## 2022-09-06 RX ORDER — PANTOPRAZOLE SODIUM 40 MG/1
TABLET, DELAYED RELEASE ORAL
Qty: 180 TABLET | Refills: 0 | Status: SHIPPED | OUTPATIENT
Start: 2022-09-06

## 2022-09-30 DIAGNOSIS — M54.16 LUMBAR RADICULOPATHY: ICD-10-CM

## 2022-10-05 RX ORDER — TRAMADOL HYDROCHLORIDE 50 MG/1
TABLET ORAL
Qty: 120 TABLET | Refills: 0 | Status: SHIPPED | OUTPATIENT
Start: 2022-10-05

## 2022-11-22 ENCOUNTER — OFFICE VISIT (OUTPATIENT)
Dept: FAMILY MEDICINE CLINIC | Facility: HOSPITAL | Age: 72
End: 2022-11-22

## 2022-11-22 VITALS
HEART RATE: 74 BPM | WEIGHT: 112 LBS | BODY MASS INDEX: 21.14 KG/M2 | OXYGEN SATURATION: 95 % | RESPIRATION RATE: 16 BRPM | SYSTOLIC BLOOD PRESSURE: 124 MMHG | DIASTOLIC BLOOD PRESSURE: 64 MMHG | HEIGHT: 61 IN

## 2022-11-22 DIAGNOSIS — N18.32 STAGE 3B CHRONIC KIDNEY DISEASE (HCC): ICD-10-CM

## 2022-11-22 DIAGNOSIS — I50.42 CHRONIC COMBINED SYSTOLIC AND DIASTOLIC CONGESTIVE HEART FAILURE (HCC): ICD-10-CM

## 2022-11-22 DIAGNOSIS — I48.0 PAROXYSMAL ATRIAL FIBRILLATION (HCC): Primary | ICD-10-CM

## 2022-11-22 DIAGNOSIS — I10 PRIMARY HYPERTENSION: ICD-10-CM

## 2022-11-22 DIAGNOSIS — C49.A4 GIST (GASTROINTESTINAL STROMA TUMOR), MALIGNANT, COLON (HCC): ICD-10-CM

## 2022-11-22 PROBLEM — Z00.00 HEALTH CARE MAINTENANCE: Status: RESOLVED | Noted: 2019-08-28 | Resolved: 2022-11-22

## 2022-11-22 PROBLEM — M00.9 PYOGENIC ARTHRITIS OF KNEE (HCC): Status: RESOLVED | Noted: 2022-07-20 | Resolved: 2022-11-22

## 2022-11-22 PROBLEM — M25.562 LEFT KNEE PAIN: Status: RESOLVED | Noted: 2022-07-03 | Resolved: 2022-11-22

## 2022-11-22 RX ORDER — AMOXICILLIN 500 MG/1
2000 CAPSULE ORAL AS NEEDED
COMMUNITY
Start: 2022-11-14

## 2022-11-22 NOTE — PROGRESS NOTES
Assessment/Plan:    GIST (gastrointestinal stroma tumor), malignant, colon [facility])  Patient has gastrointestinal stromal tumor follows with Onel Hina  She will have blood work and CT scan year  Denies abdominal pain  Has intermittent GI bleeds  She is on aspirin    Paroxysmal atrial fibrillation (HCC)  Patient's history of PAF  Denies palpitations  Heart rate is regular today  Continue Toprol  She is not on anticoagulation likely because of history of GI bleed  She follows with NAWAF BARRAZA [facility] cardiology    Chronic combined systolic and diastolic congestive heart failure ([facility] )  Wt Readings from Last 3 Encounters:   11/22/22 50 8 kg (112 lb)   08/12/22 49 9 kg (110 lb)   07/27/22 50 3 kg (111 lb)       Patient has chronic systolic CHF  Ejection fraction was 40% on echo this year  At the diastolic dysfunction was also present  Her breathing is stable denies shortness of breath  She has no evidence of volume overload  Continue furosemide and Toprol      Stage 3b chronic kidney disease [facility])  Lab Results   Component Value Date    EGFR 33 07/27/2022    EGFR 48 07/07/2022    EGFR 41 07/06/2022    CREATININE 1 54 (H) 07/27/2022    CREATININE 1 12 07/07/2022    CREATININE 1 29 07/06/2022     Patient has stage IIIB chronic kidney disease  Urination is at baseline  She will have blood work done [facility] this year    Primary hypertension  Patient has chronic hypertension  Blood pressure is stable today  Continue Lasix and Toprol       Diagnoses and all orders for this visit:    Paroxysmal atrial fibrillation ([facility] )    Primary hypertension    Chronic combined systolic and diastolic congestive heart failure (HCC)    Stage [facility] )    GIST (gastrointestinal stroma tumor), malignant, colon ([facility] )    Other orders  -     amoxicillin (AMOXIL) 500 mg capsule; Take 2,000 mg by mouth as needed 1 hour prior to dental appointment          Subjective:      Patient ID: Janette Willoughby is a 74 y o  female  HPI    Patient presents for follow-up of chronic conditions as detailed in the assessment and plan  The following portions of the patient's history were reviewed and updated as appropriate: current medications, past family history, past medical history, past social history, past surgical history and problem list     Review of Systems   Constitutional: Negative for fever  Respiratory: Negative for shortness of breath  Cardiovascular: Negative for chest pain and palpitations  Gastrointestinal: Positive for blood in stool (once in a while, [facility] transf[facility]) and diarrhea (chronic since sigmoidectomy)  Negative for abdominal pain  Genitourinary: Negative for dysuria and hematuria  Musculoskeletal: Positive for joint swelling  Intermittent right shoulder deltoid area discomfort with lowering the right arm relieved with prn tylenol  She also has arthritis and by the look of her MCP joints I suspect she has rheumatoid arthritis  She sees Dr Micah Moses but I could not find an office note from him  She is on chloroquine and is followed by Ophthalmology  I requested office records from Dr Micah Moses   Neurological: Negative for headaches  Psychiatric/Behavioral: Negative for dysphoric mood  Objective:    /64   Pulse 74   Resp 16   Ht 5' 1" (1 549 m)   Wt 50 8 kg (112 lb)   SpO2 95%   BMI 21 16 kg/m²      Physical Exam  HENT:      Head: Normocephalic  Mouth/Throat:      Mouth: Mucous membranes are moist    Eyes:      Conjunctiva/sclera: Conjunctivae normal    Cardiovascular:      Rate and Rhythm: Normal rate and regular rhythm  Heart sounds: No murmur heard  Pulmonary:      Effort: No respiratory distress  Breath sounds: No wheezing or rales  Abdominal:      General: Bowel sounds are normal  There is no distension  Palpations: Abdomen is soft  Tenderness: There is no abdominal tenderness     Musculoskeletal:         General: Swelling (bony hypertrophy of the MCP joints without acute synovitis) present  Normal range of motion  Skin:     General: Skin is warm and dry  Coloration: Skin is pale  Findings: Rash (right facial birth jf) present  Neurological:      Mental Status: She is alert and oriented to person, place, and time  Motor: No weakness  Gait: Gait normal    Psychiatric:         Mood and Affect: Mood normal          Thought Content:  Thought content normal              Courtney Brumfield MD

## 2022-11-22 NOTE — ASSESSMENT & PLAN NOTE
Wt Readings from Last 3 Encounters:   11/22/22 50 8 kg (112 lb)   08/12/22 49 9 kg (110 lb)   07/27/22 50 3 kg (111 lb)       Patient has chronic systolic CHF  Ejection fraction was 40% on echo this year  At the diastolic dysfunction was also present  Her breathing is stable denies shortness of breath  She has no evidence of volume overload    Continue furosemide and Toprol

## 2022-11-22 NOTE — ASSESSMENT & PLAN NOTE
Patient's history of PAF  Denies palpitations  Heart rate is regular today  Continue Toprol  She is not on anticoagulation likely because of history of GI bleed      She follows with NAWAF BARRAZA [facility] cardiology

## 2022-11-22 NOTE — ASSESSMENT & PLAN NOTE
Lab Results   Component Value Date    EGFR 33 07/27/2022    EGFR 48 07/07/2022    EGFR 41 07/06/2022    CREATININE 1 54 (H) 07/27/2022    CREATININE 1 12 07/07/2022    CREATININE 1 29 07/06/2022     Patient has stage IIIB chronic kidney disease  Urination is at baseline    She will have blood work done at [facility] this year

## 2022-11-28 DIAGNOSIS — M54.16 LUMBAR RADICULOPATHY: ICD-10-CM

## 2022-11-28 RX ORDER — TRAMADOL HYDROCHLORIDE 50 MG/1
TABLET ORAL
Qty: 120 TABLET | Refills: 1 | Status: SHIPPED | OUTPATIENT
Start: 2022-11-28

## 2022-12-24 ENCOUNTER — APPOINTMENT (EMERGENCY)
Dept: CT IMAGING | Facility: HOSPITAL | Age: 72
End: 2022-12-24

## 2022-12-24 ENCOUNTER — HOSPITAL ENCOUNTER (INPATIENT)
Facility: HOSPITAL | Age: 72
LOS: 7 days | Discharge: HOME HEALTH | End: 2022-12-31
Attending: EMERGENCY MEDICINE | Admitting: INTERNAL MEDICINE

## 2022-12-24 ENCOUNTER — APPOINTMENT (EMERGENCY)
Dept: RADIOLOGY | Facility: HOSPITAL | Age: 72
End: 2022-12-24

## 2022-12-24 DIAGNOSIS — I50.9 CHF EXACERBATION (HCC): Primary | ICD-10-CM

## 2022-12-24 DIAGNOSIS — J96.90 RESPIRATORY FAILURE (HCC): ICD-10-CM

## 2022-12-24 DIAGNOSIS — N17.9 ACUTE KIDNEY FAILURE (HCC): ICD-10-CM

## 2022-12-24 DIAGNOSIS — R26.9 GAIT DISTURBANCE: ICD-10-CM

## 2022-12-24 DIAGNOSIS — M48.062 SPINAL STENOSIS, LUMBAR REGION, WITH NEUROGENIC CLAUDICATION: ICD-10-CM

## 2022-12-24 LAB
2HR DELTA HS TROPONIN: 15 NG/L
ALBUMIN SERPL BCP-MCNC: 3.5 G/DL (ref 3.5–5)
ALP SERPL-CCNC: 92 U/L (ref 46–116)
ALT SERPL W P-5'-P-CCNC: 29 U/L (ref 12–78)
ANION GAP SERPL CALCULATED.3IONS-SCNC: 16 MMOL/L (ref 4–13)
APTT PPP: 26 SECONDS (ref 23–37)
AST SERPL W P-5'-P-CCNC: 50 U/L (ref 5–45)
ATRIAL RATE: 141 BPM
ATRIAL RATE: 76 BPM
BACTERIA UR QL AUTO: NORMAL /HPF
BASE EXCESS BLDA CALC-SCNC: -10 MMOL/L (ref -2–3)
BASOPHILS # BLD AUTO: 0 THOUSANDS/ÂΜL (ref 0–0.1)
BASOPHILS NFR BLD AUTO: 0 % (ref 0–1)
BILIRUB SERPL-MCNC: 0.3 MG/DL (ref 0.2–1)
BILIRUB UR QL STRIP: NEGATIVE
BUN SERPL-MCNC: 16 MG/DL (ref 5–25)
CA-I BLD-SCNC: 1.16 MMOL/L (ref 1.12–1.32)
CALCIUM SERPL-MCNC: 10.1 MG/DL (ref 8.3–10.1)
CARDIAC TROPONIN I PNL SERPL HS: 52 NG/L
CARDIAC TROPONIN I PNL SERPL HS: 67 NG/L
CHLORIDE SERPL-SCNC: 97 MMOL/L (ref 96–108)
CK MB SERPL-MCNC: 1.8 % (ref 0–2.5)
CK MB SERPL-MCNC: 6.8 NG/ML (ref 0–5)
CK SERPL-CCNC: 373 U/L (ref 26–192)
CLARITY UR: CLEAR
CO2 SERPL-SCNC: 19 MMOL/L (ref 21–32)
COLOR UR: COLORLESS
CREAT SERPL-MCNC: 1.29 MG/DL (ref 0.6–1.3)
EOSINOPHIL # BLD AUTO: 0 THOUSAND/ÂΜL (ref 0–0.61)
EOSINOPHIL NFR BLD AUTO: 0 % (ref 0–6)
ERYTHROCYTE [DISTWIDTH] IN BLOOD BY AUTOMATED COUNT: 15 % (ref 11.6–15.1)
FLUAV RNA RESP QL NAA+PROBE: NEGATIVE
FLUBV RNA RESP QL NAA+PROBE: NEGATIVE
GFR SERPL CREATININE-BSD FRML MDRD: 40 ML/MIN/1.73SQ M
GLUCOSE SERPL-MCNC: 167 MG/DL (ref 65–140)
GLUCOSE SERPL-MCNC: 171 MG/DL (ref 65–140)
GLUCOSE UR STRIP-MCNC: NEGATIVE MG/DL
HCO3 BLDA-SCNC: 16.4 MMOL/L (ref 24–30)
HCT VFR BLD AUTO: 24.1 % (ref 34.8–46.1)
HCT VFR BLD CALC: 25 % (ref 34.8–46.1)
HGB BLD-MCNC: 7.6 G/DL (ref 11.5–15.4)
HGB BLDA-MCNC: 8.5 G/DL (ref 11.5–15.4)
HGB UR QL STRIP.AUTO: NEGATIVE
IMM GRANULOCYTES # BLD AUTO: 0.04 THOUSAND/UL (ref 0–0.2)
IMM GRANULOCYTES NFR BLD AUTO: 1 % (ref 0–2)
INR PPP: 1.07 (ref 0.84–1.19)
KETONES UR STRIP-MCNC: NEGATIVE MG/DL
LACTATE SERPL-SCNC: 7.6 MMOL/L (ref 0.5–2)
LEUKOCYTE ESTERASE UR QL STRIP: NEGATIVE
LYMPHOCYTES # BLD AUTO: 0.57 THOUSANDS/ÂΜL (ref 0.6–4.47)
LYMPHOCYTES NFR BLD AUTO: 8 % (ref 14–44)
MCH RBC QN AUTO: 35.7 PG (ref 26.8–34.3)
MCHC RBC AUTO-ENTMCNC: 31.5 G/DL (ref 31.4–37.4)
MCV RBC AUTO: 113 FL (ref 82–98)
MONOCYTES # BLD AUTO: 0.25 THOUSAND/ÂΜL (ref 0.17–1.22)
MONOCYTES NFR BLD AUTO: 3 % (ref 4–12)
NEUTROPHILS # BLD AUTO: 6.57 THOUSANDS/ÂΜL (ref 1.85–7.62)
NEUTS SEG NFR BLD AUTO: 88 % (ref 43–75)
NITRITE UR QL STRIP: NEGATIVE
NON-SQ EPI CELLS URNS QL MICRO: NORMAL /HPF
NRBC BLD AUTO-RTO: 0 /100 WBCS
NT-PROBNP SERPL-MCNC: ABNORMAL PG/ML
P AXIS: 82 DEGREES
PCO2 BLD: 17 MMOL/L (ref 21–32)
PCO2 BLD: 36.7 MM HG (ref 42–50)
PH BLD: 7.26 [PH] (ref 7.3–7.4)
PH UR STRIP.AUTO: 6.5 [PH]
PLATELET # BLD AUTO: 250 THOUSANDS/UL (ref 149–390)
PMV BLD AUTO: 10.2 FL (ref 8.9–12.7)
PO2 BLD: 28 MM HG (ref 35–45)
POTASSIUM BLD-SCNC: 3.7 MMOL/L (ref 3.5–5.3)
POTASSIUM SERPL-SCNC: 3.6 MMOL/L (ref 3.5–5.3)
PR INTERVAL: 178 MS
PROCALCITONIN SERPL-MCNC: 0.15 NG/ML
PROT SERPL-MCNC: 7.4 G/DL (ref 6.4–8.4)
PROT UR STRIP-MCNC: ABNORMAL MG/DL
PROTHROMBIN TIME: 14.6 SECONDS (ref 11.6–14.5)
QRS AXIS: -56 DEGREES
QRS AXIS: -62 DEGREES
QRSD INTERVAL: 130 MS
QRSD INTERVAL: 138 MS
QT INTERVAL: 342 MS
QT INTERVAL: 432 MS
QTC INTERVAL: 486 MS
QTC INTERVAL: 513 MS
RBC # BLD AUTO: 2.13 MILLION/UL (ref 3.81–5.12)
RBC #/AREA URNS AUTO: NORMAL /HPF
RSV RNA RESP QL NAA+PROBE: NEGATIVE
SAO2 % BLD FROM PO2: 44 % (ref 60–85)
SARS-COV-2 RNA RESP QL NAA+PROBE: NEGATIVE
SODIUM BLD-SCNC: 134 MMOL/L (ref 136–145)
SODIUM SERPL-SCNC: 132 MMOL/L (ref 135–147)
SP GR UR STRIP.AUTO: 1.01 (ref 1–1.03)
SPECIMEN SOURCE: ABNORMAL
T WAVE AXIS: 100 DEGREES
T WAVE AXIS: 88 DEGREES
UROBILINOGEN UR STRIP-ACNC: <2 MG/DL
VENTRICULAR RATE: 135 BPM
VENTRICULAR RATE: 76 BPM
WBC # BLD AUTO: 7.43 THOUSAND/UL (ref 4.31–10.16)
WBC #/AREA URNS AUTO: NORMAL /HPF

## 2022-12-24 RX ORDER — CEFTRIAXONE 1 G/50ML
1000 INJECTION, SOLUTION INTRAVENOUS ONCE
Status: COMPLETED | OUTPATIENT
Start: 2022-12-24 | End: 2022-12-24

## 2022-12-24 RX ORDER — FUROSEMIDE 10 MG/ML
40 INJECTION INTRAMUSCULAR; INTRAVENOUS ONCE
Status: COMPLETED | OUTPATIENT
Start: 2022-12-24 | End: 2022-12-24

## 2022-12-24 RX ORDER — CEFTRIAXONE 1 G/50ML
1000 INJECTION, SOLUTION INTRAVENOUS EVERY 24 HOURS
Status: DISCONTINUED | OUTPATIENT
Start: 2022-12-25 | End: 2022-12-29

## 2022-12-24 RX ORDER — ACETAMINOPHEN 325 MG/1
975 TABLET ORAL ONCE
Status: COMPLETED | OUTPATIENT
Start: 2022-12-24 | End: 2022-12-24

## 2022-12-24 RX ORDER — BISACODYL 10 MG
10 SUPPOSITORY, RECTAL RECTAL DAILY PRN
Status: DISCONTINUED | OUTPATIENT
Start: 2022-12-24 | End: 2022-12-25

## 2022-12-24 RX ORDER — METOPROLOL SUCCINATE 25 MG/1
25 TABLET, EXTENDED RELEASE ORAL DAILY
Status: DISCONTINUED | OUTPATIENT
Start: 2022-12-25 | End: 2022-12-26

## 2022-12-24 RX ORDER — TRAMADOL HYDROCHLORIDE 50 MG/1
50 TABLET ORAL ONCE
Status: COMPLETED | OUTPATIENT
Start: 2022-12-24 | End: 2022-12-24

## 2022-12-24 RX ORDER — HEPARIN SODIUM 5000 [USP'U]/ML
5000 INJECTION, SOLUTION INTRAVENOUS; SUBCUTANEOUS EVERY 8 HOURS SCHEDULED
Status: DISCONTINUED | OUTPATIENT
Start: 2022-12-24 | End: 2022-12-31 | Stop reason: HOSPADM

## 2022-12-24 RX ORDER — SODIUM CHLORIDE, SODIUM GLUCONATE, SODIUM ACETATE, POTASSIUM CHLORIDE, MAGNESIUM CHLORIDE, SODIUM PHOSPHATE, DIBASIC, AND POTASSIUM PHOSPHATE .53; .5; .37; .037; .03; .012; .00082 G/100ML; G/100ML; G/100ML; G/100ML; G/100ML; G/100ML; G/100ML
500 INJECTION, SOLUTION INTRAVENOUS ONCE
Status: COMPLETED | OUTPATIENT
Start: 2022-12-24 | End: 2022-12-25

## 2022-12-24 RX ADMIN — SODIUM CHLORIDE, SODIUM GLUCONATE, SODIUM ACETATE, POTASSIUM CHLORIDE, MAGNESIUM CHLORIDE, SODIUM PHOSPHATE, DIBASIC, AND POTASSIUM PHOSPHATE 500 ML: .53; .5; .37; .037; .03; .012; .00082 INJECTION, SOLUTION INTRAVENOUS at 22:26

## 2022-12-24 RX ADMIN — CEFTRIAXONE 1000 MG: 1 INJECTION, SOLUTION INTRAVENOUS at 21:08

## 2022-12-24 RX ADMIN — VANCOMYCIN HYDROCHLORIDE 1250 MG: 5 INJECTION, POWDER, LYOPHILIZED, FOR SOLUTION INTRAVENOUS at 22:26

## 2022-12-24 RX ADMIN — HEPARIN SODIUM 5000 UNITS: 5000 INJECTION INTRAVENOUS; SUBCUTANEOUS at 22:45

## 2022-12-24 RX ADMIN — IOHEXOL 85 ML: 350 INJECTION, SOLUTION INTRAVENOUS at 20:39

## 2022-12-24 RX ADMIN — TRAMADOL HYDROCHLORIDE 50 MG: 50 TABLET, COATED ORAL at 21:08

## 2022-12-24 RX ADMIN — FUROSEMIDE 40 MG: 10 INJECTION, SOLUTION INTRAMUSCULAR; INTRAVENOUS at 20:47

## 2022-12-24 RX ADMIN — ACETAMINOPHEN 975 MG: 325 TABLET ORAL at 21:08

## 2022-12-24 NOTE — Clinical Note
Case was discussed with BRINAD and the patient's admission status was agreed to be Admission Status: inpatient status to the service of Dr Symone Price

## 2022-12-25 ENCOUNTER — APPOINTMENT (INPATIENT)
Dept: RADIOLOGY | Facility: HOSPITAL | Age: 72
End: 2022-12-25

## 2022-12-25 LAB
4HR DELTA HS TROPONIN: 37 NG/L
ANION GAP SERPL CALCULATED.3IONS-SCNC: 11 MMOL/L (ref 4–13)
ARTERIAL PATENCY WRIST A: ABNORMAL
BASE EXCESS BLDA CALC-SCNC: 0 MMOL/L (ref -2–3)
BASOPHILS # BLD MANUAL: 0 THOUSAND/UL (ref 0–0.1)
BASOPHILS NFR MAR MANUAL: 0 % (ref 0–1)
BUN SERPL-MCNC: 18 MG/DL (ref 5–25)
CA-I BLD-SCNC: 1.05 MMOL/L (ref 1.12–1.32)
CA-I BLD-SCNC: 1.15 MMOL/L (ref 1.12–1.32)
CALCIUM SERPL-MCNC: 8.9 MG/DL (ref 8.3–10.1)
CARDIAC TROPONIN I PNL SERPL HS: 89 NG/L
CHLORIDE SERPL-SCNC: 97 MMOL/L (ref 96–108)
CO2 SERPL-SCNC: 25 MMOL/L (ref 21–32)
CREAT SERPL-MCNC: 1.11 MG/DL (ref 0.6–1.3)
EOSINOPHIL # BLD MANUAL: 0 THOUSAND/UL (ref 0–0.4)
EOSINOPHIL NFR BLD MANUAL: 0 % (ref 0–6)
ERYTHROCYTE [DISTWIDTH] IN BLOOD BY AUTOMATED COUNT: 14.9 % (ref 11.6–15.1)
FIO2 GAS DIL.REBREATH: 60 L
GFR SERPL CREATININE-BSD FRML MDRD: 49 ML/MIN/1.73SQ M
GLUCOSE SERPL-MCNC: 115 MG/DL (ref 65–140)
GLUCOSE SERPL-MCNC: 120 MG/DL (ref 65–140)
HCO3 BLDA-SCNC: 23.8 MMOL/L (ref 22–28)
HCT VFR BLD AUTO: 23.1 % (ref 34.8–46.1)
HCT VFR BLD CALC: 24 % (ref 34.8–46.1)
HGB BLD-MCNC: 7.5 G/DL (ref 11.5–15.4)
HGB BLDA-MCNC: 8.2 G/DL (ref 11.5–15.4)
L PNEUMO1 AG UR QL IA.RAPID: NEGATIVE
LACTATE SERPL-SCNC: 1 MMOL/L (ref 0.5–2)
LYMPHOCYTES # BLD AUTO: 0.13 THOUSAND/UL (ref 0.6–4.47)
LYMPHOCYTES # BLD AUTO: 2 % (ref 14–44)
MACROCYTES BLD QL AUTO: PRESENT
MAGNESIUM SERPL-MCNC: 1.8 MG/DL (ref 1.6–2.6)
MCH RBC QN AUTO: 35 PG (ref 26.8–34.3)
MCHC RBC AUTO-ENTMCNC: 32.5 G/DL (ref 31.4–37.4)
MCV RBC AUTO: 108 FL (ref 82–98)
MONOCYTES # BLD AUTO: 0.38 THOUSAND/UL (ref 0–1.22)
MONOCYTES NFR BLD: 6 % (ref 4–12)
NEUTROPHILS # BLD MANUAL: 5.75 THOUSAND/UL (ref 1.85–7.62)
NEUTS BAND NFR BLD MANUAL: 3 % (ref 0–8)
NEUTS SEG NFR BLD AUTO: 89 % (ref 43–75)
PCO2 BLD: 25 MMOL/L (ref 21–32)
PCO2 BLD: 35 MM HG (ref 36–44)
PH BLD: 7.44 [PH] (ref 7.35–7.45)
PHOSPHATE SERPL-MCNC: 5 MG/DL (ref 2.3–4.1)
PLATELET # BLD AUTO: 225 THOUSANDS/UL (ref 149–390)
PLATELET BLD QL SMEAR: ADEQUATE
PMV BLD AUTO: 10.3 FL (ref 8.9–12.7)
PO2 BLD: 83 MM HG (ref 75–129)
POTASSIUM BLD-SCNC: 4.1 MMOL/L (ref 3.5–5.3)
POTASSIUM SERPL-SCNC: 3.5 MMOL/L (ref 3.5–5.3)
PROCALCITONIN SERPL-MCNC: 0.46 NG/ML
RBC # BLD AUTO: 2.14 MILLION/UL (ref 3.81–5.12)
S PNEUM AG UR QL: NEGATIVE
SAMPLE SITE: ABNORMAL
SAO2 % BLD FROM PO2: 97 % (ref 60–85)
SODIUM BLD-SCNC: 130 MMOL/L (ref 136–145)
SODIUM SERPL-SCNC: 133 MMOL/L (ref 135–147)
SPECIMEN SOURCE: ABNORMAL
WBC # BLD AUTO: 6.25 THOUSAND/UL (ref 4.31–10.16)

## 2022-12-25 RX ORDER — FUROSEMIDE 10 MG/ML
40 INJECTION INTRAMUSCULAR; INTRAVENOUS ONCE
Status: COMPLETED | OUTPATIENT
Start: 2022-12-25 | End: 2022-12-25

## 2022-12-25 RX ORDER — PANTOPRAZOLE SODIUM 40 MG/1
40 TABLET, DELAYED RELEASE ORAL
Status: DISCONTINUED | OUTPATIENT
Start: 2022-12-26 | End: 2022-12-31 | Stop reason: HOSPADM

## 2022-12-25 RX ORDER — LORAZEPAM 2 MG/ML
INJECTION INTRAMUSCULAR
Status: COMPLETED
Start: 2022-12-25 | End: 2022-12-25

## 2022-12-25 RX ORDER — AZITHROMYCIN 500 MG/1
500 TABLET, FILM COATED ORAL EVERY 24 HOURS
Status: DISCONTINUED | OUTPATIENT
Start: 2022-12-25 | End: 2022-12-25

## 2022-12-25 RX ORDER — BISACODYL 10 MG
10 SUPPOSITORY, RECTAL RECTAL DAILY PRN
Status: DISCONTINUED | OUTPATIENT
Start: 2022-12-25 | End: 2022-12-31 | Stop reason: HOSPADM

## 2022-12-25 RX ORDER — DOCUSATE SODIUM 100 MG/1
100 CAPSULE, LIQUID FILLED ORAL 2 TIMES DAILY PRN
Status: DISCONTINUED | OUTPATIENT
Start: 2022-12-25 | End: 2022-12-31 | Stop reason: HOSPADM

## 2022-12-25 RX ORDER — LORAZEPAM 2 MG/ML
0.5 INJECTION INTRAMUSCULAR ONCE
Status: COMPLETED | OUTPATIENT
Start: 2022-12-25 | End: 2022-12-25

## 2022-12-25 RX ORDER — POLYETHYLENE GLYCOL 3350 17 G/17G
17 POWDER, FOR SOLUTION ORAL DAILY
Status: DISCONTINUED | OUTPATIENT
Start: 2022-12-25 | End: 2022-12-25

## 2022-12-25 RX ORDER — ONDANSETRON 2 MG/ML
4 INJECTION INTRAMUSCULAR; INTRAVENOUS ONCE
Status: DISCONTINUED | OUTPATIENT
Start: 2022-12-25 | End: 2022-12-31 | Stop reason: HOSPADM

## 2022-12-25 RX ORDER — FUROSEMIDE 10 MG/ML
40 INJECTION INTRAMUSCULAR; INTRAVENOUS ONCE
Status: DISCONTINUED | OUTPATIENT
Start: 2022-12-25 | End: 2022-12-25

## 2022-12-25 RX ORDER — POLYETHYLENE GLYCOL 3350 17 G/17G
17 POWDER, FOR SOLUTION ORAL DAILY PRN
Status: DISCONTINUED | OUTPATIENT
Start: 2022-12-25 | End: 2022-12-25

## 2022-12-25 RX ORDER — LOSARTAN POTASSIUM 25 MG/1
25 TABLET ORAL DAILY
Status: DISCONTINUED | OUTPATIENT
Start: 2022-12-25 | End: 2022-12-27

## 2022-12-25 RX ORDER — SODIUM CHLORIDE FOR INHALATION 0.9 %
3 VIAL, NEBULIZER (ML) INHALATION
Status: DISCONTINUED | OUTPATIENT
Start: 2022-12-25 | End: 2022-12-31 | Stop reason: HOSPADM

## 2022-12-25 RX ORDER — DIPHENOXYLATE HYDROCHLORIDE AND ATROPINE SULFATE 2.5; .025 MG/1; MG/1
1 TABLET ORAL 4 TIMES DAILY PRN
Status: DISCONTINUED | OUTPATIENT
Start: 2022-12-25 | End: 2022-12-31 | Stop reason: HOSPADM

## 2022-12-25 RX ORDER — MAGNESIUM SULFATE HEPTAHYDRATE 40 MG/ML
2 INJECTION, SOLUTION INTRAVENOUS ONCE
Status: COMPLETED | OUTPATIENT
Start: 2022-12-25 | End: 2022-12-25

## 2022-12-25 RX ORDER — DOXYCYCLINE HYCLATE 100 MG/1
100 CAPSULE ORAL EVERY 12 HOURS SCHEDULED
Status: DISCONTINUED | OUTPATIENT
Start: 2022-12-25 | End: 2022-12-27

## 2022-12-25 RX ORDER — LEVALBUTEROL 1.25 MG/.5ML
1.25 SOLUTION, CONCENTRATE RESPIRATORY (INHALATION)
Status: DISCONTINUED | OUTPATIENT
Start: 2022-12-25 | End: 2022-12-31 | Stop reason: HOSPADM

## 2022-12-25 RX ORDER — TRAMADOL HYDROCHLORIDE 50 MG/1
50 TABLET ORAL EVERY 6 HOURS PRN
Status: DISCONTINUED | OUTPATIENT
Start: 2022-12-25 | End: 2022-12-31 | Stop reason: HOSPADM

## 2022-12-25 RX ORDER — POLYETHYLENE GLYCOL 3350 17 G/17G
17 POWDER, FOR SOLUTION ORAL DAILY PRN
Status: DISCONTINUED | OUTPATIENT
Start: 2022-12-25 | End: 2022-12-27

## 2022-12-25 RX ORDER — HYDROXYCHLOROQUINE SULFATE 200 MG/1
200 TABLET, FILM COATED ORAL
Status: DISCONTINUED | OUTPATIENT
Start: 2022-12-25 | End: 2022-12-31 | Stop reason: HOSPADM

## 2022-12-25 RX ORDER — PANTOPRAZOLE SODIUM 40 MG/10ML
40 INJECTION, POWDER, LYOPHILIZED, FOR SOLUTION INTRAVENOUS
Status: DISCONTINUED | OUTPATIENT
Start: 2022-12-25 | End: 2022-12-25

## 2022-12-25 RX ORDER — ONDANSETRON 2 MG/ML
4 INJECTION INTRAMUSCULAR; INTRAVENOUS EVERY 6 HOURS PRN
Status: DISCONTINUED | OUTPATIENT
Start: 2022-12-25 | End: 2022-12-25

## 2022-12-25 RX ORDER — POTASSIUM CHLORIDE 14.9 MG/ML
20 INJECTION INTRAVENOUS
Status: COMPLETED | OUTPATIENT
Start: 2022-12-25 | End: 2022-12-25

## 2022-12-25 RX ORDER — POTASSIUM CHLORIDE 20 MEQ/1
40 TABLET, EXTENDED RELEASE ORAL ONCE
Status: DISCONTINUED | OUTPATIENT
Start: 2022-12-25 | End: 2022-12-25

## 2022-12-25 RX ORDER — FUROSEMIDE 20 MG/1
20 TABLET ORAL
Status: DISCONTINUED | OUTPATIENT
Start: 2022-12-25 | End: 2022-12-25

## 2022-12-25 RX ORDER — ONDANSETRON 2 MG/ML
INJECTION INTRAMUSCULAR; INTRAVENOUS
Status: COMPLETED
Start: 2022-12-25 | End: 2022-12-25

## 2022-12-25 RX ORDER — ASPIRIN 81 MG/1
81 TABLET ORAL DAILY
Status: DISCONTINUED | OUTPATIENT
Start: 2022-12-25 | End: 2022-12-31 | Stop reason: HOSPADM

## 2022-12-25 RX ADMIN — HYDROXYCHLOROQUINE SULFATE 200 MG: 200 TABLET, FILM COATED ORAL at 12:32

## 2022-12-25 RX ADMIN — ONDANSETRON 4 MG: 2 INJECTION INTRAMUSCULAR; INTRAVENOUS at 09:16

## 2022-12-25 RX ADMIN — LEVALBUTEROL HYDROCHLORIDE 1.25 MG: 1.25 SOLUTION, CONCENTRATE RESPIRATORY (INHALATION) at 15:22

## 2022-12-25 RX ADMIN — DOXYCYCLINE 100 MG: 100 CAPSULE ORAL at 20:39

## 2022-12-25 RX ADMIN — HEPARIN SODIUM 5000 UNITS: 5000 INJECTION INTRAVENOUS; SUBCUTANEOUS at 14:27

## 2022-12-25 RX ADMIN — PANTOPRAZOLE SODIUM 40 MG: 40 INJECTION, POWDER, FOR SOLUTION INTRAVENOUS at 08:42

## 2022-12-25 RX ADMIN — HEPARIN SODIUM 5000 UNITS: 5000 INJECTION INTRAVENOUS; SUBCUTANEOUS at 21:40

## 2022-12-25 RX ADMIN — LOSARTAN POTASSIUM 25 MG: 25 TABLET, FILM COATED ORAL at 12:32

## 2022-12-25 RX ADMIN — ASPIRIN 81 MG: 81 TABLET, COATED ORAL at 12:19

## 2022-12-25 RX ADMIN — ISODIUM CHLORIDE 3 ML: 0.03 SOLUTION RESPIRATORY (INHALATION) at 15:23

## 2022-12-25 RX ADMIN — POTASSIUM CHLORIDE 20 MEQ: 14.9 INJECTION, SOLUTION INTRAVENOUS at 11:35

## 2022-12-25 RX ADMIN — ISODIUM CHLORIDE 3 ML: 0.03 SOLUTION RESPIRATORY (INHALATION) at 20:07

## 2022-12-25 RX ADMIN — DOXYCYCLINE 100 MG: 100 CAPSULE ORAL at 12:32

## 2022-12-25 RX ADMIN — LORAZEPAM 0.5 MG: 2 INJECTION INTRAMUSCULAR at 14:48

## 2022-12-25 RX ADMIN — CEFTRIAXONE 1000 MG: 1 INJECTION, SOLUTION INTRAVENOUS at 20:39

## 2022-12-25 RX ADMIN — METOPROLOL SUCCINATE 25 MG: 25 TABLET, EXTENDED RELEASE ORAL at 12:19

## 2022-12-25 RX ADMIN — POTASSIUM CHLORIDE 20 MEQ: 14.9 INJECTION, SOLUTION INTRAVENOUS at 09:30

## 2022-12-25 RX ADMIN — FUROSEMIDE 40 MG: 10 INJECTION, SOLUTION INTRAMUSCULAR; INTRAVENOUS at 14:27

## 2022-12-25 RX ADMIN — MAGNESIUM SULFATE HEPTAHYDRATE 2 G: 2 INJECTION, SOLUTION INTRAVENOUS at 08:54

## 2022-12-25 RX ADMIN — ONDANSETRON 4 MG: 2 INJECTION INTRAMUSCULAR; INTRAVENOUS at 03:52

## 2022-12-25 RX ADMIN — LEVALBUTEROL HYDROCHLORIDE 1.25 MG: 1.25 SOLUTION, CONCENTRATE RESPIRATORY (INHALATION) at 20:07

## 2022-12-25 RX ADMIN — LORAZEPAM 0.5 MG: 2 INJECTION INTRAMUSCULAR; INTRAVENOUS at 14:48

## 2022-12-25 NOTE — ASSESSMENT & PLAN NOTE
Lab Results   Component Value Date    EGFR 49 12/25/2022    EGFR 40 12/24/2022    EGFR 33 07/27/2022    CREATININE 1 11 12/25/2022    CREATININE 1 29 12/24/2022    CREATININE 1 54 (H) 07/27/2022     • With baseline Cr of 1 15-1 35  • Cr 1 1 currently   • Avoiding nephrotoxins  • Strict I and O, continue external cath  • Monitor BMP while receiving diuresis

## 2022-12-25 NOTE — PROGRESS NOTES
[facility]  Critical Care Progress Note Elvia Cox [birth year], 74 y o  female MRN: [id number]  Unit/Bed#: -01 Encounter: [id number]  Primary Care Provider: Win Shaffer DO   Date and time admitted to hospital: 12/24/2022  7:18 PM    * Acute decompensated heart failure [facility])  Assessment & Plan  Wt Readings from Last 3 Encounters:   12/24/22 54 3 kg (119 lb 11 4 oz)   11/22/22 50 8 kg (112 lb)   08/12/22 49 9 kg (110 lb)     · Last ECHO 7/2022 40% G1DD, severe mitral regurgitation: "the valve morphology is consistent with myxomatous proliferation  There is annular calcification  There is severe regurgitation"  · With 5lb weight gain since November  · On 20mg Lasix daily as outpatient, denies missing doses  · S/p IV lasix 40mg in ED  · Will consider additional diuresis pending urine output  · Strict I and O, rust cath in place  · Daily weights  · Will follow up formal ECHO to assess MV, LV function    Acute respiratory failure with hypoxia ([address] )  Assessment & Plan  · Patient presenting to ED with worsening SOB, cough  · Noted to be 70% on RA, quickly escalated to [facility]  · CTA PE Study: No PE- findings suggestive of pulmonary edema in the setting of acute CHF versus fluid overload  Consolidative opacity at the medial right base possibly reflecting superimposed pneumonia  · Trial Bipap 12/6 in setting of acute pulmonary edema in the setting of decompensated heart failure  · S/p IV lasix 40mg, consider additional diuresis pending I and O  · Deescalated to nasal cannula in AM  · F/u AM CXR  · Procal negative, f/u AM  · BC pending  · S   Pneumo, legionella, MRSA pending  · Continue ABX for now, CTX vanco, low threshold to discontinue    Lactic acidosis  Assessment & Plan  · Patient on arrival with lactic acidosis to 7 2, with gapped metabolic acidosis  · Likely in setting of decompensated heart failure, hypoxia to 70% on RA  · Will receive 500cc IVF in setting of elevated lactate  · Lactate cleared to 1, resolved    Elevated troponin  Assessment & Plan  · Initial troponin elevated in setting of likely demand ischemia  · EKG without ischemic changes  · Continue to trend troponin to peak  · Will f/u formal ECHO as above    Paroxysmal atrial fibrillation (HCC)  Assessment & Plan  · With history of atrial fibrillation  · On ASA as outpatient, however not on more aggressive AC d/t history of gastric AVMs  · Currently in NSR with PACs  · Continue metoprolol this AM    Stage 3b chronic kidney disease ([facility] )  Assessment & Plan  Lab Results   Component Value Date    EGFR 40 12/24/2022    EGFR 33 07/27/2022    EGFR 48 07/07/2022    CREATININE 1 29 12/24/2022    CREATININE 1 54 (H) 07/27/2022    CREATININE 1 12 07/07/2022     · With baseline Cr of 1 15-1 35  · On arrival Cr 1 29, awaiting AM recheck  · Avoiding nephrotoxins  · Strict I and O, continue external cath  · S/p IV lasix x1, will follow up in AM for repeat diuretic dosing  · Continue to trend renal indices    GIST (gastrointestinal stroma tumor), malignant, colon ([facility] )  Assessment & Plan  · S/p sigmoid resection in setting of colon cancer about 20 years ago  ·  endorses Horace  recently stopped patients Avapritinib  · No acute interventions at this time  · Continue home prn medications for diarrhea/constipation    Pain syndrome, chronic  Assessment & Plan  · Takes tramadol 100mg Q6H in setting of chronic cancer related pain  · Can continue when admitted  · Utilize multimodal pain regimen    Essential hypertension  Assessment & Plan  · Home regimen of metoprolol succinate 25mg  · Can restart BB this AM  · Continue to trend vitals per unit routine     MARK (obstructive sleep apnea)  Assessment & Plan  · Patient endorses CPAP usage at night  · Currently on Bipap, can likely transition to HS once diuresis completed    ----------------------------------------------------------------------------------------  HPI/24hr events: 72 YOM with CHF, GIST s/p resection who is ICU day 1 from CHF exacerbation  S/p IV lasix 40mg x1 with adequate response  Will consider rebolusing lasix for further diuresis  Continued on bipap  overnight  Plan to deescalate to NC in AM     Patient appropriate for transfer out of the ICU today?: Patient does not meet criteria for referral to the ICU Follow-Up Clinic; referral has not been made  Disposition: Transfer to Stepdown Level 2  Code Status: Level 1 - Full Code  ---------------------------------------------------------------------------------------  SUBJECTIVE  "I am feeling better"    Denies RICA LEWIS, states WOB has improved    Review of Systems   Constitutional: Negative  HENT: Negative  Eyes: Negative  Respiratory: Negative for shortness of breath  Cardiovascular: Negative for chest pain  Gastrointestinal: Negative for diarrhea, nausea and vomiting  Endocrine: Negative  Genitourinary: Negative  Musculoskeletal: Negative  Skin: Negative  Allergic/Immunologic: Negative  Neurological: Negative  Hematological: Negative  Psychiatric/Behavioral: Negative  Review of systems was reviewed and negative unless stated above in HPI/24-hour events   ---------------------------------------------------------------------------------------  OBJECTIVE    Vitals   Vitals:    22 2200 22 2300 22 0000 22 0100   BP: 167/81 161/84 (!) 160/107 161/85   BP Location:       Pulse: 75 76 91 78   Resp: 19 18 (!) 30 19   Temp: 98 °F (36 7 °C)      TempSrc: Oral      SpO2: 95% 96% 95% 96%   Weight:       Height:         Temp (24hrs), Av °F (36 7 °C), Min:97 9 °F (36 6 °C), Max:98 °F (36 7 °C)  Current: Temperature: 98 °F (36 7 °C)    Respiratory:  SpO2: SpO2: 96 %, SpO2 Activity: SpO2 Activity: At Rest    Physical Exam  Vitals and nursing note reviewed  HENT:      Head: Normocephalic and atraumatic        Right Ear: External ear normal       Left Ear: External ear normal       Nose: Nose normal       Mouth/Throat:      Mouth: Mucous membranes are dry  Pharynx: Oropharynx is clear  Cardiovascular:      Rate and Rhythm: Normal rate and regular rhythm  Pulses:           Radial pulses are 2+ on the right side and 2+ on the left side  Dorsalis pedis pulses are 1+ on the right side and 1+ on the left side  Heart sounds: S1 normal and S2 normal    Pulmonary:      Effort: Pulmonary effort is normal       Breath sounds: Examination of the right-upper field reveals rales  Examination of the left-upper field reveals rales  Examination of the right-lower field reveals decreased breath sounds  Examination of the left-lower field reveals decreased breath sounds  Decreased breath sounds and rales present  Abdominal:      General: Abdomen is flat  Bowel sounds are normal  There is no distension  Palpations: Abdomen is soft  Tenderness: There is no abdominal tenderness  Musculoskeletal:      Right lower leg: Edema present  Left lower leg: Edema present  Skin:     General: Skin is warm and dry  Capillary Refill: Capillary refill takes less than 2 seconds  Neurological:      Mental Status: She is alert and oriented to person, place, and time  GCS: GCS eye subscore is 4  GCS verbal subscore is 5  GCS motor subscore is 6  Psychiatric:         Behavior: Behavior is cooperative       Laboratory and Diagnostics:  Results from last 7 days   Lab Units 12/24/22 1929 12/24/22 1926   WBC Thousand/uL  --  7 43   HEMOGLOBIN g/dL  --  7 6*   I STAT HEMOGLOBIN g/dl 8 5*  --    HEMATOCRIT %  --  24 1*   HEMATOCRIT, ISTAT % 25*  --    PLATELETS Thousands/uL  --  250   NEUTROS PCT %  --  88*   MONOS PCT %  --  3*     Results from last 7 days   Lab Units 12/24/22 1929 12/24/22 1926   SODIUM mmol/L  --  132*   POTASSIUM mmol/L  --  3 6   CHLORIDE mmol/L  --  97   CO2 mmol/L  --  19*   CO2, I-STAT mmol/L 17*  --    ANION GAP mmol/L  --  16*   BUN mg/dL --  16   CREATININE mg/dL  --  1 29   CALCIUM mg/dL  --  10 1   GLUCOSE RANDOM mg/dL  --  167*   ALT U/L  --  29   AST U/L  --  50*   ALK PHOS U/L  --  92   ALBUMIN g/dL  --  3 5   TOTAL BILIRUBIN mg/dL  --  0 30          Results from last 7 days   Lab Units 12/24/22 2010   INR  1 07   PTT seconds 26      Results from last 7 days   Lab Units 12/24/22  2358 12/24/22 1926   LACTIC ACID mmol/L 1 0 7 6*     VBG:    Results from last 7 days   Lab Units 12/24/22 1926   PROCALCITONIN ng/ml 0 15     Micro  Results from last 7 days   Lab Units 12/24/22 1926   BLOOD CULTURE  Received in Microbiology Lab  Culture in Progress  Received in Microbiology Lab  Culture in Progress  EKG: NSR with PACs rate 80  Imaging: I have personally reviewed pertinent reports  Intake and Output  I/O       12/23 0701 12/24 0700 12/24 0701 12/25 0700    IV Piggyback  50    Total Intake(mL/kg)  50 (0 9)    Urine (mL/kg/hr)  175    Total Output  175    Net  -125              Height and Weights   Height: 5' 1" (154 9 cm)     Body mass index is 22 62 kg/m²  Weight (last 2 days)     Date/Time Weight    12/24/22 2130 54 3 (119 71)    12/24/22 1920 49 9 (110)        Nutrition       Diet Orders   (From admission, onward)             Start     Ordered    12/24/22 2124  Diet NPO  Diet effective now        References:    Nutrtion Support Algorithm Enteral vs  Parenteral   Question Answer Comment   Diet Type NPO    RD to adjust diet per protocol?  Yes        12/24/22 2129              Active Medications  Scheduled Meds:  Current Facility-Administered Medications   Medication Dose Route Frequency Provider Last Rate   • bisacodyl  10 mg Rectal Daily PRN LANEY Espino     • cefTRIAXone  1,000 mg Intravenous Q24H LANEY Espino     • heparin (porcine)  5,000 Units Subcutaneous Q8H [address]  [place], CRNP     • metoprolol succinate  25 mg Oral Daily Iman Susan [place], CRNP     • vancomycin  750 mg Intravenous Q24H Lorrie Goldmann Schick, CRNP       Continuous Infusions:     PRN Meds:   bisacodyl, 10 mg, Daily PRN    Invasive Devices Review  Invasive Devices     Peripheral Intravenous Line  Duration           Peripheral IV 12/24/22 Dorsal (posterior); Left Forearm <1 day    Peripheral IV 12/24/22 Right Antecubital <1 day              Rationale for remaining devices: Peripheral IVs- IV access  ---------------------------------------------------------------------------------------  Advance Directive and Living Will:      Power of :    POLST:    ---------------------------------------------------------------------------------------  Care Time Delivered:   No Critical Care time spent     THE LANEY CONNOR    Portions of the record may have been created with voice recognition software  Occasional wrong word or "sound a like" substitutions may have occurred due to the inherent limitations of voice recognition software    Read the chart carefully and recognize, using context, where substitutions have occurred

## 2022-12-25 NOTE — ASSESSMENT & PLAN NOTE
· Initial troponin elevated in setting of likely demand ischemia  · EKG without ischemic changes  · Continue to trend troponin to peak  · Will f/u formal ECHO as above

## 2022-12-25 NOTE — ASSESSMENT & PLAN NOTE
• Patient on arrival with lactic acidosis to 7 2, with gapped metabolic acidosis  • Likely in setting of decompensated heart failure, hypoxia to 70% on RA  • Will receive 500cc IVF in setting of elevated lactate  • Lactate cleared to 1, resolved

## 2022-12-25 NOTE — ASSESSMENT & PLAN NOTE
· With history of atrial fibrillation  · On ASA as outpatient, however not on more aggressive AC d/t history of gastric AVMs  · Currently in NSR with PACs  · Continue metoprolol once able to take PO off bipap

## 2022-12-25 NOTE — ASSESSMENT & PLAN NOTE
· Patient presenting to ED with worsening SOB, cough  · Noted to be 70% on RA, quickly escalated to NAWAF LEON [facility]  · CTA PE Study: No PE- findings suggestive of pulmonary edema in the setting of acute CHF versus fluid overload  Consolidative opacity at the medial right base possibly reflecting superimposed pneumonia  · Trial Bipap 12/6 in setting of acute pulmonary edema in the setting of decompensated heart failure  · S/p IV lasix 40mg, consider additional diuresis pending I and O  · Deescalated to nasal cannula in AM  · F/u AM CXR  · Procal negative, f/u AM  · BC pending  · S   Pneumo, legionella, MRSA pending  · Continue ABX for now, CTX vanco, low threshold to discontinue

## 2022-12-25 NOTE — ASSESSMENT & PLAN NOTE
Lab Results   Component Value Date    EGFR 40 12/24/2022    EGFR 33 07/27/2022    EGFR 48 07/07/2022    CREATININE 1 29 12/24/2022    CREATININE 1 54 (H) 07/27/2022    CREATININE 1 12 07/07/2022     · With baseline Cr of 1 15-1 35  · On arrival Cr 1 29  · Avoiding nephrotoxins  · Strict I and O, continue external cath  · S/p IV lasix x1  · Continue to trend renal indices

## 2022-12-25 NOTE — ASSESSMENT & PLAN NOTE
· Patient on arrival with lactic acidosis to 7 2, with gapped metabolic acidosis  · Likely in setting of decompensated heart failure, hypoxia to 70% on RA  · Will receive 500cc IVF in setting of elevated lactate  · Lactate cleared to 1, resolved

## 2022-12-25 NOTE — ASSESSMENT & PLAN NOTE
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  There is annular calcification  There is severe regurgitation"  • With 5lb weight gain since November  • Weight 119 yesterday, 111 today however unsure of bed scale accuracy   • Home diuretic: lasix 20 mg daily, has not missed doses  • Repeat CXR 12/25: Worsening congestive changes with possible superimposed perihilar infiltrates  Effusions also suggested    • Cardiology consulted, appreciate recs   • Update echo   · S/p IV lasix 40mg x 2 doses --> will complete 3rd dose today due increased O2 requirement & CXR   · If no improvement in O2 requirement consider CT chest tomorrow   • Strict I and O, Daily weights --> Net loss 2L today   • Bernice blue

## 2022-12-25 NOTE — SEPSIS NOTE
Initial Sepsis Note   Casey House 74 y o  female MRN: [id number]  Unit/Bed#: -01 Encounter: [id number]       qSOFA     Row Name 12/24/22 2116 12/24/22 2045 12/24/22 1945 12/24/22 1920       Altered mental status GCS < 15 -- -- -- --     Respiratory Rate > / =22 1 1 1 1     Systolic BP < / =992 0 0 0 0     Q Sofa Score 1 1 1 1                Initial Sepsis Screening     Row Name 12/24/22 2155                Is the patient's history suggestive of a new or worsening infection? Yes (Proceed)  -TS        Suspected source of infection pneumonia  -TS        Are two or more of the following signs & symptoms of infection both present and new to the patient? --        Indicate SIRS criteria Tachycardia > 90 bpm;Tachypnea > 20 resp per min  -TS        If the answer is yes to both questions, suspicion of sepsis is present --        If severe sepsis is present AND tissue hypoperfusion perists in the hour after fluid resuscitation or lactate > 4, the patient meets criteria for SEPTIC SHOCK --        Are any of the following organ dysfunction criteria present within 6 hours of suspected infection and SIRS criteria that are NOT considered to be chronic conditions? Yes  -TS        Organ dysfunction Acute respiratory failure (new need for invasive or non-invasive mechanical ventilation); Lactate > 2 0 mmol/L  -TS        Date of presentation of severe sepsis 12/24/22  -TS        Time of presentation of severe sepsis 2155  -TS        Tissue hypoperfusion persists in the hour after crystalloid fluid administration, evidenced, by either: * OR * Lactate level is greater to or equal 4 mmol/dL ( ___ mmol/dL in comment field)  -TS        Was hypotension present within one hour of the conclusion of crystalloid fluid administration?  No  -TS        Date of presentation of septic shock --        Time of presentation of septic shock --              User Key  (r) = Recorded By, (t) = Taken By, (c) = Cosigned By    [address] Name Provider Type    LANEY Orona Nurse Practitioner              · POA- Tachycardia and tachypnea in setting of acute CHF exacerbation with volume overload  · Receiving CTX  Vanco in ED  · [facility] sent, MRSA S  pneumo and legionella pending  · Lactate of 7 2 identified  The 30ml/kg fluid bolus was not given to the patient despite hypotension and/or significantly elevated lactate of ? 4 and/or presence of septic shock due to: Heart Failure  The patient will be administered 500 ml of crystalloid fluid instead  Orders for this have been placed in Epic  The patient may receive additional colloid or crystalloid fluids thereafter based on clinical condition       · [address] [facility] [address]

## 2022-12-25 NOTE — ASSESSMENT & PLAN NOTE
· With history of atrial fibrillation  · On ASA as outpatient, however not on more aggressive AC d/t history of gastric AVMs  · Currently in NSR with PACs  · Continue metoprolol this AM

## 2022-12-25 NOTE — ASSESSMENT & PLAN NOTE
· Patient endorses CPAP usage at night  · Currently on Bipap, can likely transition to HS once diuresis completed

## 2022-12-25 NOTE — ASSESSMENT & PLAN NOTE
• Patient presenting to ED with worsening SOB, cough  • Noted to be 70% on RA, quickly escalated to NAWAF LEON [facility]  • CTA PE Study: No PE- findings suggestive of pulmonary edema in the setting of acute CHF versus fluid overload   Consolidative opacity at the medial right base possibly reflecting superimposed pneumonia  • S/P Bipap 1in setting of acute pulmonary edema in the setting of decompensated heart failure  • S/P IV diuresis, further diuresis per cards see above  • SIRS: tachypnea, tachycardia, however afebrile, no leukocytosis  • Procal positive yesterday, negative today --> repeat in am   • BC x2 negative at 24 hours  • Urine strep/legionella negative   • MRSA negative   • Continue ceftriaxone/doxy, consider discontinuation tomorrow if procal negative x 2   • Wean O2 as able --> on 8L midflow currently

## 2022-12-25 NOTE — ASSESSMENT & PLAN NOTE
· Takes tramadol 100mg Q6H in setting of chronic cancer related pain  · Can continue when admitted  · Utilize multimodal pain regimen

## 2022-12-25 NOTE — ASSESSMENT & PLAN NOTE
· Home regimen of metoprolol succinate 25mg  · Can restart BB once able to be taken off  · Continue to trend vitals per unit routine

## 2022-12-25 NOTE — RESPIRATORY THERAPY NOTE
Placed Pt on 15L [address] [facility] due to desat and WOB  Titrated MFNC to 10L and SpO2 94-95% w/ better WOB  After Pt returned from [address], MD ordered BiPAP due to Pulmonary Edema  Placed Pt on BiPAP 12/6/60%  Vt 500-560mL  RR 19-24  Ve 13 5L  SpO2 96%    RN and MD aware    Derrek Thacker, RRT  12/24/22  9:26 PM

## 2022-12-25 NOTE — ASSESSMENT & PLAN NOTE
· S/p sigmoid resection in setting of colon cancer about 20 years ago  ·  endorses Iveth Evans recently stopped patients Avapritinib  · No acute interventions at this time  · Continue home prn medications for diarrhea/constipation

## 2022-12-25 NOTE — PROGRESS NOTES
The pantoprazole has / have been converted to Oral per [facility] for Adults as approved by the Pharmacy and Therapeutics Committee  The patient met all eligible criteria:  3 Age = 25years old   2) Received at least one dose of the IV form   3) Receiving at least one other scheduled oral/enteral medication   4) Tolerating an oral/enteral diet   and did not have any exclusions:   1) Critical care patient   2) Active GI bleed (IF assessing H2RAs or PPIs)   3) Continuous tube feeding (IF assessing cipro, doxycycline, levofloxacin, minocycline, rifampin, or voriconazole)   4) Receiving PO vancomycin (IF assessing metronidazole)   5) Persistent nausea and/or vomiting   6) Ileus or gastrointestinal obstruction   7) Suresh/nasogastric tube set for continuous suction   8) Specific order not to automatically convert to PO (in the order's comments or if discussed in the most recent Infectious Disease or primary team's progress notes)

## 2022-12-25 NOTE — UTILIZATION REVIEW
NOTIFICATION OF INPATIENT ADMISSION   AUTHORIZATION REQUEST   SERVICING FACILITY:   [facility]  [address] [place] [id number]  Tax ID: [id number]  NPI: [id number] ATTENDING PROVIDER:  Attending Name and NPI#: Black Mendez Md [[id number]]  Address: [address] [place] [address]  Phone: [phone]   ADMISSION INFORMATION:  Place of Service: [place] [address]  Place of Service Code: 21  Inpatient Admission Date/Time: 12/24/22  9:09 PM  Discharge Date/Time: No discharge date for patient encounter  Admitting Diagnosis Code/Description:  Respiratory failure ([facility] ) [J96 90]  SOB (shortness of breath) [R06 02]  [facility]ation ([facility] [I50 9]     UTILIZATION REVIEW CONTACT:  Cosme Perdue Utilization   Network Utilization Review Department  Phone: [phone]  Fax [phone]  Email: [email or web address]  Contact for approvals/pending authorizations, clinical reviews, and discharge  PHYSICIAN ADVISORY SERVICES:  Medical Necessity Denial & Yyjq-ic-Eqyf Review  Phone: [phone]  Fax: [phone]  Email: [email or web address]

## 2022-12-25 NOTE — PROGRESS NOTES
Vancomycin IV Pharmacy-to-Dose Consultation     Vancomycin has been discontinued  Pharmacy will sign off  Please contact or re-consult with questions      Sergei Topete, Pharmacist

## 2022-12-25 NOTE — ASSESSMENT & PLAN NOTE
• Takes tramadol 100mg Q6H in setting of chronic cancer related pain  • Can continue when admitted  • Utilize multimodal pain regimen

## 2022-12-25 NOTE — ASSESSMENT & PLAN NOTE
• Home regimen of metoprolol succinate 25mg - continue   • Continue to trend vitals per unit routine

## 2022-12-25 NOTE — ASSESSMENT & PLAN NOTE
· Patient on arrival with lactic acidosis to 7 2, with gapped metabolic acidosis  · Likely in setting of decompensated heart failure, hypoxia to 70% on RA  · Will receive 500cc IVF in setting of elevated lactate  · Continue to trend

## 2022-12-25 NOTE — ASSESSMENT & PLAN NOTE
· Home regimen of metoprolol succinate 25mg  · Can restart BB this AM  · Continue to trend vitals per unit routine

## 2022-12-25 NOTE — ASSESSMENT & PLAN NOTE
Lab Results   Component Value Date    EGFR 40 12/24/2022    EGFR 33 07/27/2022    EGFR 48 07/07/2022    CREATININE 1 29 12/24/2022    CREATININE 1 54 (H) 07/27/2022    CREATININE 1 12 07/07/2022     · With baseline Cr of 1 15-1 35  · On arrival Cr 1 29, awaiting AM recheck  · Avoiding nephrotoxins  · Strict I and O, continue external cath  · S/p IV lasix x1, will follow up in AM for repeat diuretic dosing  · Continue to trend renal indices

## 2022-12-25 NOTE — ASSESSMENT & PLAN NOTE
· Patient presenting to ED with worsening SOB, cough  · Noted to be 70% on RA, quickly escalated to NAWAF LEON [facility]  · CTA PE Study: No PE- findings suggestive of pulmonary edema in the setting of acute CHF versus fluid overload  Consolidative opacity at the medial right base possibly reflecting superimposed pneumonia  · Trial Bipap 12/6 in setting of acute pulmonary edema in the setting of decompensated heart failure  · S/p IV lasix 40mg, consider additional diuresis pending I and O  · Deescalated to nasal cannula in AM  · F/u AM CXR  · Procal negative, f/u AM  · BC pending  · S   Pneumo, legionella, MRSA pending  · Continue ABX for now, CTX vanco, low threshold to discontinue

## 2022-12-25 NOTE — ED PROVIDER NOTES
History  Chief Complaint   Patient presents with   • Shortness of Breath     Pt c/o SOB since 1700, states shes had a cough for a few days  C/o vomiting      74year old female presents for evaluation of worsening SOB, cough that started a few days ago  Was doing ok but tonight had a lot of trouble breathing  Patient arrives tachycardic, hypoxic in the 70s with mild improvement after NC  Respiratory arrived and 15L mid flow applied with significant improvement in work of breathing  Lungs CTAB  Patient denies chest pain or fever  PMH GIST tumors actively being treated  Prior to Admission Medications   Prescriptions Last Dose Informant Patient Reported? Taking?    Avapritinib (Ayvakit) 200 MG TABS   Yes No   Sig: Take 200 mg by mouth daily   Azelastine HCl 137 MCG/SPRAY SOLN   Yes No   Sig: instill 2 sprays into each nostril twice a day if needed for congestion   Triamcinolone Acetonide (NASACORT ALLERGY 24HR NA)   Yes No   Sig: into each nostril as needed 1 spray each nostril--at bedtime   (OTC)    acetaminophen (TYLENOL) 500 mg tablet   Yes No   Sig: Take 500 mg by mouth every 6 (six) hours as needed for mild pain   amoxicillin (AMOXIL) 500 mg capsule   Yes No   Sig: Take 2,000 mg by mouth as needed 1 hour prior to dental appointment   aspirin (ECOTRIN LOW STRENGTH) 81 mg EC tablet   Yes No   Sig: Take 81 mg by mouth daily   calcium carbonate (TUMS) 500 mg chewable tablet   Yes No   Sig: Chew 1 tablet    diphenhydrAMINE (BENADRYL) 25 mg tablet   Yes No   Sig: Take 25 mg by mouth every 6 (six) hours as needed for itching   diphenoxylate-atropine (LOMOTIL) 2 5-0 025 mg per tablet   No No   Sig: TAKE 1 TABLET BY MOUTH 4 TIMES A DAY AS NEEDED FOR DIARRHEA   docusate sodium (COLACE) 100 mg capsule   Yes No   Sig: Take 100 mg by mouth 2 (two) times a day as needed for constipation    ferrous sulfate 324 (65 Fe) mg   No No   Sig: Take 1 tablet (324 mg total) by mouth 2 (two) times a day before meals   furosemide (LASIX) 20 mg tablet   Yes No   Si daily   hydroxychloroquine (PLAQUENIL) 200 mg tablet   Yes No   Sig: Alternate 1 daily and then 1 BID   loperamide (IMODIUM) 2 mg capsule   Yes No   Sig: Take 2 capsules by mouth 4 (four) times a day as needed    metoprolol succinate (TOPROL-XL) 25 mg 24 hr tablet   Yes No   Sig: Take by mouth 1 daily   multivitamin-iron-minerals-folic acid (CENTRUM) chewable tablet   Yes No   Sig: Chew 1 tablet daily   ondansetron (ZOFRAN) 8 mg tablet   Yes No   Si mg daily with breakfast Prior to chemo   pantoprazole (PROTONIX) 40 mg tablet   No No   Sig: take 1 tablet by mouth twice a day   prednisoLONE acetate (PRED FORTE) 1 % ophthalmic suspension   Yes No   Si drop into right eye three times daily  and 1 drop in left twice daily   traMADol (ULTRAM) 50 mg tablet   No No   Sig: take 2 tablets by mouth four times a day      Facility-Administered Medications: None       Past Medical History:   Diagnosis Date   • ADHD    • Anemia    • Anxiety    • Arthritis    • Asthma    • Atrial fibrillation (HCC)    • Cancer (HCC)    • Cancer (HCC)     liver   • Chronic iron deficiency anemia 2020    Extensive GI evaluation over the last year including multiple EGD, colonoscopy, and capsule endoscopy    Has had gastric AVMs cauterized, Dieulafoy's lesion clipped, and most recently a L[place] erosion cauterized   • Coronary artery disease    • Depression    • GERD (gastroesophageal reflux disease)    • History of stomach ulcers    • Hypercholesterolemia    • Hypertension    • Kidney disease    • Metastatic cancer ([facility] [address] )    • [facility] 2021       Past Surgical History:   Procedure Laterality Date   • ANKLE SURGERY     • APPENDECTOMY     • BREAST BIOPSY     • BREAST SURGERY N/A     pt went [facility] in the s, had a procedure but unable to give date or what was done, just benign   • CATARACT EXTRACTION     •  SECTION     • COLONOSCOPY  2019 Multiple adenomatous colon polyps and internal hemorrhoids  Three year recall advised   • HIP SURGERY     • JOINT REPLACEMENT  07/30/2019    Left knee replacement   • LAMINECTOMY     • OOPHORECTOMY  2011   • ROTATOR CUFF REPAIR     • SIGMOID RESECTION / RECTOPEXY     • SINUS SURGERY     • UPPER GASTROINTESTINAL ENDOSCOPY  05/01/2020    Dieulafoy's lesion in proximal stomach which was actively oozing  Injected with epinephrine and 2 Endoclips placed with control of bleeding, hiatal hernia  • UPPER GASTROINTESTINAL ENDOSCOPY  06/2020    Bleeding Seb's erosion status post cauterization       Family History   Problem Relation Age of Onset   • Diabetes Mother    • Hypertension Mother    • Lung cancer Mother    • Hyperlipidemia Father    • Prostate cancer Father    • No Known Problems Maternal Grandmother    • No Known Problems Maternal Grandfather    • Diabetes Family    • No Known Problems Maternal Aunt    • No Known Problems Maternal Aunt    • No Known Problems Maternal Aunt    • No Known Problems Paternal Aunt    • Breast cancer Cousin    • Breast cancer Cousin    • Substance Abuse Neg Hx    • Mental illness Neg Hx      I have reviewed and agree with the history as documented  E-Cigarette/Vaping   • E-Cigarette Use Never User      E-Cigarette/Vaping Substances   • Nicotine No    • THC No    • CBD No    • Flavoring No    • Other No    • Unknown No      Social History     Tobacco Use   • Smoking status: Never   • Smokeless tobacco: Never   Vaping Use   • Vaping Use: Never used   Substance Use Topics   • Alcohol use: Never   • Drug use: Never       Review of Systems   Constitutional: Negative for fever  Respiratory: Positive for cough and shortness of breath  All other systems reviewed and are negative  Physical Exam  Physical Exam  Vitals and nursing note reviewed  Constitutional:       Appearance: She is well-developed  HENT:      Head: Normocephalic and atraumatic        Right Ear: External ear normal       Left Ear: External ear normal       Nose: Nose normal    Eyes:      General: No scleral icterus  Cardiovascular:      Rate and Rhythm: Tachycardia present  Rhythm irregular  Pulmonary:      Effort: Pulmonary effort is normal  No respiratory distress  Breath sounds: No wheezing or rhonchi  Abdominal:      General: There is no distension  Musculoskeletal:         General: No deformity  Normal range of motion  Cervical back: Normal range of motion  Skin:     Findings: No rash  Neurological:      General: No focal deficit present  Mental Status: She is alert and oriented to person, place, and time     Psychiatric:         Mood and Affect: Mood normal          Vital Signs  ED Triage Vitals [12/24/22 1920]   Temperature Pulse Respirations Blood Pressure SpO2   97 9 °F (36 6 °C) (!) 133 (!) 32 151/85 (!) 74 %      Temp Source Heart Rate Source Patient Position - Orthostatic VS BP Location FiO2 (%)   Oral Monitor -- Left arm --      Pain Score       No Pain           Vitals:    12/24/22 1920 12/24/22 1945 12/24/22 2045 12/24/22 2116   BP: 151/85 136/65 155/88 147/82   Pulse: (!) 133 83 (!) 115 87         Visual Acuity      ED Medications  Medications   cefTRIAXone (ROCEPHIN) IVPB (premix in dextrose) 1,000 mg 50 mL (1,000 mg Intravenous New Bag 12/24/22 2108)   vancomycin (VANCOCIN) IVPB (premix in dextrose) 750 mg 150 mL (has no administration in time range)   heparin (porcine) subcutaneous injection 5,000 Units (has no administration in time range)   multi-electrolyte (ISOLYTE-S PH 7 4) bolus 500 mL (has no administration in time range)   bisacodyl (DULCOLAX) rectal suppository 10 mg (has no administration in time range)   cefTRIAXone (ROCEPHIN) IVPB (premix in dextrose) 1,000 mg 50 mL (has no administration in time range)   furosemide (LASIX) injection 40 mg (40 mg Intravenous Given 12/24/22 2047)   iohexol (OMNIPAQUE) 350 MG/ML injection (SINGLE-DOSE) 85 mL (85 mL Intravenous Given 12/24/22 2039)   acetaminophen (TYLENOL) tablet 975 mg (975 mg Oral Given 12/24/22 2108)   traMADol (ULTRAM) tablet 50 mg (50 mg Oral Given 12/24/22 2108)       Diagnostic Studies  Results Reviewed     Procedure Component Value Units Date/Time    HS Troponin I 2hr [[id number]] Collected: 12/24/22 2132    Lab Status: No result Specimen: Blood from Arm, Right     CK (with reflex to MB) [[id number]]     Lab Status: No result Specimen: Blood     MRSA culture [[id number]]     Lab Status: No result Specimen: Nares     APTT [[id number]]  (Normal) Collected: 12/24/22 2010    Lab Status: Final result Specimen: Blood from Arm, Left Updated: 12/24/22 2037     PTT 26 seconds     Protime-INR [[id number]]  (Abnormal) Collected: 12/24/22 2010    Lab Status: Final result Specimen: Blood from Arm, Left Updated: 12/24/22 2037     Protime 14 6 seconds      INR 1 07    COVID19, Influenza A/B, RSV PCR, [facility] [[id number]]  (Normal) Collected: 12/24/22 1926    Lab Status: Final result Specimen: Nares from Nose Updated: 12/24/22 2030     SARS-CoV-2 Negative     INFLUENZA A PCR Negative     INFLUENZA B PCR Negative     RSV PCR Negative    Narrative:      FOR PEDIATRIC PATIENTS - copy/paste COVID Guidelines URL to browser: [email or web address]    SARS-CoV-2 assay is a Nucleic Acid Amplification assay intended for the  qualitative detection of nucleic acid from SARS-CoV-2 in nasopharyngeal  swabs  Results are for the presumptive identification of SARS-CoV-2 RNA  Positive results are indicative of infection with SARS-CoV-2, the virus  causing COVID-19, but do not rule out bacterial infection or co-infection  with other viruses  Laboratories within the [place] and its  territories are required to report all positive results to the appropriate  public health authorities   Negative results do not preclude SARS-CoV-2  infection and should not be used as the sole basis for treatment or other  patient management decisions  Negative results must be combined with  clinical observations, patient history, and epidemiological information  This test has not been FDA cleared or approved  This test has been authorized by FDA under an Emergency Use Authorization  (EUA)  This test is only authorized for the duration of time the  declaration that circumstances exist justifying the authorization of the  emergency use of an in vitro diagnostic tests for detection of SARS-CoV-2  virus and/or diagnosis of COVID-19 infection under section 564(b)(1) of  the Act, 21 U  [place] C  580QNF-8(B)(6), unless the authorization is terminated  or revoked sooner  The test has been validated but independent review by FDA  and CLIA is pending  Test performed using [employer] GeneXpert: This RT-PCR assay targets N2,  a region unique to SARS-CoV-2  A conserved region in the E-gene was chosen  for pan-Sarbecovirus detection which includes SARS-CoV-2  According to CMS-2020-01-R, this platform meets the definition of high-throughput technology  Lactic Acid [[id number]]  (Abnormal) Collected: 12/24/22 1926    Lab Status: Final result Specimen: Blood from Arm, Right Updated: 12/24/22 2026     LACTIC ACID 7 6 mmol/L     Narrative:      Result may be elevated if tourniquet was used during collection      Lactic acid 2 Hours [[id number]]     Lab Status: No result Specimen: Blood     Procalcitonin [[id number]]  (Normal) Collected: 12/24/22 1926    Lab Status: Final result Specimen: Blood from Arm, Right Updated: 12/24/22 2015     Procalcitonin 0 15 ng/ml     HS Troponin 0hr (reflex protocol) [[id number]]  (Abnormal) Collected: 12/24/22 1926    Lab Status: Final result Specimen: Blood from Arm, Right Updated: 12/24/22 2012     hs TnI 0hr 52 ng/L     HS Troponin I 4hr [[id number]]     Lab Status: No result Specimen: Blood     Comprehensive metabolic panel [[id number]]  (Abnormal) Collected: 12/24/22 1926    Lab Status: Final result Specimen: Blood from Arm, Right Updated: 12/24/22 2011     Sodium 132 mmol/L      Potassium 3 6 mmol/L      Chloride 97 mmol/L      CO2 19 mmol/L      ANION GAP 16 mmol/L      BUN 16 mg/dL      Creatinine 1 29 mg/dL      Glucose 167 mg/dL      Calcium 10 1 mg/dL      AST 50 U/L      ALT 29 U/L      Alkaline Phosphatase 92 U/L      Total Protein 7 4 g/dL      Albumin 3 5 g/dL      Total Bilirubin 0 30 mg/dL      eGFR 40 ml/min/1 73sq m     Narrative:      National Kidney Disease Foundation guidelines for Chronic Kidney Disease (CKD):   •  Stage 1 with normal or high GFR (GFR > 90 mL/min/1 73 square meters)  •  Stage 2 Mild CKD (GFR = 60-89 mL/min/1 73 square meters)  •  Stage 3A Moderate CKD (GFR = 45-59 mL/min/1 73 square meters)  •  Stage 3B Moderate CKD (GFR = 30-44 mL/min/1 73 square meters)  •  Stage 4 Severe CKD (GFR = 15-29 mL/min/1 73 square meters)  •  Stage 5 End Stage CKD (GFR <15 mL/min/1 73 square meters)  Note: GFR calculation is accurate only with a steady state creatinine    NT-BNP PRO [[id number]]  (Abnormal) Collected: 12/24/22 1926    Lab Status: Final result Specimen: Blood from Arm, Right Updated: 12/24/22 2011     NT-proBNP 12,400 pg/mL     CBC and differential [[id number]]  (Abnormal) Collected: 12/24/22 1926    Lab Status: Final result Specimen: Blood from Arm, Right Updated: 12/24/22 1943     WBC 7 43 Thousand/uL      RBC 2 13 Million/uL      Hemoglobin 7 6 g/dL      Hematocrit 24 1 %       fL      MCH 35 7 pg      MCHC 31 5 g/dL      RDW 15 0 %      MPV 10 2 fL      Platelets 597 Thousands/uL      nRBC 0 /100 WBCs      Neutrophils Relative 88 %      Immat GRANS % 1 %      Lymphocytes Relative 8 %      Monocytes Relative 3 %      Eosinophils Relative 0 %      Basophils Relative 0 %      Neutrophils Absolute 6 57 Thousands/µL      Immature Grans Absolute 0 04 Thousand/uL      Lymphocytes Absolute 0 57 Thousands/µL      Monocytes Absolute 0 25 Thousand/µL      Eosinophils Absolute 0 00 Thousand/µL Basophils Absolute 0 00 Thousands/µL     Blood culture #2 [[id number]] Collected: 12/24/22 1926    Lab Status: In process Specimen: Blood from Arm, Left Updated: 12/24/22 1940    Blood culture #1 [[id number]] Collected: 12/24/22 1926    Lab Status: In process Specimen: Blood from Arm, Right Updated: 12/24/22 1940    POCT Blood Gas (CG8+) [[id number]]  (Abnormal) Collected: 12/24/22 1929    Lab Status: Final result Specimen: Venous Updated: 12/24/22 1931     ph, Natalio ISTAT 7 259     pCO2, Natalio i-STAT 36 7 mm HG      pO2, Natalio i-STAT 28 0 mm HG      BE, i-STAT -10 mmol/L      HCO3, Natalio i-STAT 16 4 mmol/L      CO2, i-STAT 17 mmol/L      O2 Sat, i-STAT 44 %      SODIUM, I-STAT 134 mmol/l      Potassium, i-STAT 3 7 mmol/L      Calcium, Ionized i-STAT 1 16 mmol/L      Hct, i-STAT 25 %      Hgb, i-STAT 8 5 g/dl      Glucose, i-STAT 171 mg/dl      Specimen Type VENOUS    UA w Reflex to Microscopic w Reflex to Culture [[id number]]     Lab Status: No result Specimen: Urine                  CTA ED chest PE Study   Final Result by Sam Hernandez MD (12/24 2059)      No evidence for pulmonary embolus  Constellation of findings above overall suggestive of pulmonary edema in the setting of acute CHF versus fluid overload  Some consolidative opacity is seen at the medial right base possibly reflecting superimposed pneumonia  Correlate clinically  Workstation performed: [id number]         XR chest 1 view portable    (Results Pending)              Procedures  CriticalCare Time  Performed by: Isabel Guzman DO  Authorized by:  Isabel Guzman DO     Critical care provider statement:     Critical care time (minutes):  35    Critical care time was exclusive of:  Separately billable procedures and treating other patients and teaching time    Critical care was necessary to treat or prevent imminent or life-threatening deterioration of the following conditions:  Respiratory failure and sepsis    Critical care was time spent personally by me on the following activities:  Blood draw for specimens, obtaining history from patient or surrogate, development of treatment plan with patient or surrogate, discussions with consultants, discussions with primary provider, evaluation of patient's response to treatment, examination of patient, ordering and performing treatments and interventions, ordering and review of laboratory studies, ordering and review of radiographic studies, re-evaluation of patient's condition and review of old charts             ED Course                                             MDM  Number of Diagnoses or Management Options  CHF exacerbation [facility]): new and requires workup  Respiratory failure [facility]): new and requires workup  Diagnosis management comments: 74 yof with cough and SOB  CXR appears fluid overload  Sepsis/cardiac evaluation  CTA PE study given hx of CA  Patient will require admission  Lasix, abx  Patient with improvement of symptoms on midflow however given amount of pulmonary edema, will initiate bipap  D/w ICU will admit to SD1          Amount and/or Complexity of Data Reviewed  Clinical lab tests: reviewed and ordered  Tests in the radiology section of CPT®: reviewed and ordered  Tests in the medicine section of CPT®: ordered and reviewed  Decide to obtain previous medical records or to obtain history from someone other than the patient: yes  Review and summarize past medical records: yes        Disposition  Final diagnoses:   CHF exacerbation ([address] )   Respiratory failure ([address] )     Time reflects when diagnosis was documented in both MDM as applicable and the Disposition within this note     Time User Action Codes Description Comment    12/24/2022  8:55 PM Delwyn Half Add [I50 9] CHF exacerbation ([address] )     12/24/2022  8:55 PM Delwyn Half Add [J96 90] Respiratory failure [facility])       ED Disposition     ED Disposition   Admit    Condition   Stable    Date/Time   Sat Dec 24, 2022  9:09 PM    Comment   Case was discussed with ICU and the patient's admission status was agreed to be Admission Status: inpatient status to the service of Dr Carol Segura   Follow-up Information    None         Patient's Medications   Discharge Prescriptions    No medications on file       No discharge procedures on file      PDMP Review       Value Time User    PDMP Reviewed  Yes 11/28/2022  5:32 PM Avtar Fontanez DO          ED Provider  Electronically Signed by           Jude Clinton DO  12/24/22 9592

## 2022-12-25 NOTE — PROGRESS NOTES
Stewart Stauffer is a 74 y o  female who is currently ordered Vancomycin IV with management by the Pharmacy Consult service  Relevant clinical data and objective / subjective history reviewed  Vancomycin Assessment:  Indication and Goal AUC/Trough: Pneumonia (goal -600, trough >10)  Clinical Status:  new  Micro:     Renal Function:  SCr: 1 29 mg/dL  CrCl: 28 7 mL/min  Renal replacement: Not on dialysis  Days of Therapy: 1  Current Dose: 750mg IV Q12H  Vancomycin Plan:  New Dosinmg (25mg/kg) IV once then 750mg IV Q24H  Estimated AUC: 481 mcg*hr/mL  Estimated Trough: 15 3 mcg/mL  Next Level: with AM labs at 0600 on 22 (prior to 3rd dose)  Renal Function Monitoring: Daily BMP and [place] will continue to follow closely for s/sx of nephrotoxicity, infusion reactions and appropriateness of therapy  BMP and CBC will be ordered per protocol  We will continue to follow the patient’s culture results and clinical progress daily      Janessa Pascal, Pharmacist

## 2022-12-25 NOTE — ASSESSMENT & PLAN NOTE
• S/p sigmoid resection in setting of colon cancer about 20 years ago  •  endorses [facility] recently stopped patients Avapritinib  • No acute interventions at this time  • Continue home prn medications for diarrhea/constipation

## 2022-12-25 NOTE — QUICK NOTE
Patient seen and evaluated by Critical Care today and deemed to be appropriate for transfer to [facility] with Telemetry   Spoke to Dr Glory Perry from [address] [place] [address] to accept patient transfer  Major changes to treatment plan from the day of transfer include: Weaned from bipap to UnBuyThat  Continue PRN diuresis to achieve goal of 1L neg in 24 hours, echo pending, continue metoprolol, add losartan, abx transitioned to ceftriaxone/doxy  Education that new chemotherapy drug may reduce LV function and to monitor for worensing CHF symtpomsupon starting medication  Critical care can be contacted via [employer] with any questions or concerns

## 2022-12-25 NOTE — ASSESSMENT & PLAN NOTE
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

## 2022-12-25 NOTE — H&P
NIGEL Tyree Zafar [address] H&P- [place] Em [birth year], 74 y o  female MRN: [id number]  Unit/Bed#: -01 Encounter: [id number]  Primary Care Provider: Myesha Brown DO   Date and time admitted to hospital: 12/24/2022  7:18 PM    * Acute decompensated heart failure [facility])  Assessment & Plan  Wt Readings from Last 3 Encounters:   12/24/22 54 3 kg (119 lb 11 4 oz)   11/22/22 50 8 kg (112 lb)   08/12/22 49 9 kg (110 lb)     · Last ECHO 7/2022 40% G1DD, severe mitral regurgitation: "the valve morphology is consistent with myxomatous proliferation  There is annular calcification  There is severe regurgitation"  · With 5lb weight gain since November  · On 20mg Lasix daily as outpatient, denies missing doses  · S/p IV lasix 40mg in ED  · Will consider additional diuresis pending urine output  · Strict I and O, rust cath in place  · Daily weights  · Will follow up formal ECHO to assess MV, LV function    Acute respiratory failure with hypoxia ([address] )  Assessment & Plan  · Patient presenting to ED with worsening SOB, cough  · Noted to be 70% on RA, quickly escalated to [facility]  · CTA PE Study: No PE- findings suggestive of pulmonary edema in the setting of acute CHF versus fluid overload  Consolidative opacity at the medial right base possibly reflecting superimposed pneumonia  · Trial Bipap 12/6 in setting of acute pulmonary edema in the setting of decompensated heart failure  · S/p IV lasix 40mg, consider additional diuresis pending I and O  · Deescalated to nasal cannula in AM  · F/u AM CXR  · Procal negative, f/u AM  · BC pending  · S   Pneumo, legionella, MRSA pending  · Continue ABX for now, CTX vanco, low threshold to discontinue    Lactic acidosis  Assessment & Plan  · Patient on arrival with lactic acidosis to 7 2, with gapped metabolic acidosis  · Likely in setting of decompensated heart failure, hypoxia to 70% on RA  · Will receive 500cc IVF in setting of elevated lactate  · Continue to trend    Elevated troponin  Assessment & Plan  · Initial troponin elevated in setting of likely demand ischemia  · EKG without ischemic changes  · Continue to trend troponin to peak  · Will f/u formal ECHO as above    Paroxysmal atrial fibrillation (HCC)  Assessment & Plan  · With history of atrial fibrillation  · On ASA as outpatient, however not on more aggressive AC d/t history of gastric AVMs  · Currently in NSR with PACs  · Continue metoprolol once able to take PO off bipap    Stage 3b chronic kidney disease [facility])  Assessment & Plan  Lab Results   Component Value Date    EGFR 40 12/24/2022    EGFR 33 07/27/2022    EGFR 48 07/07/2022    CREATININE 1 29 12/24/2022    CREATININE 1 54 (H) 07/27/2022    CREATININE 1 12 07/07/2022     · With baseline Cr of 1 15-1 35  · On arrival Cr 1 29  · Avoiding nephrotoxins  · Strict I and O, continue external cath  · S/p IV lasix x1  · Continue to trend renal indices    GIST (gastrointestinal stroma tumor), malignant, colon (HCC)  Assessment & Plan  · S/p sigmoid resection in setting of colon cancer about 20 years ago  ·  endorses Ayana Miller recently stopped patients Avapritinib  · No acute interventions at this time  · Continue home prn medications for diarrhea/constipation    Pain syndrome, chronic  Assessment & Plan  · Takes tramadol 100mg Q6H in setting of chronic cancer related pain  · Can continue when admitted  · Utilize multimodal pain regimen    Essential hypertension  Assessment & Plan  · Home regimen of metoprolol succinate 25mg  · Can restart BB once able to be taken off  · Continue to trend vitals per unit routine     MARK (obstructive sleep apnea)  Assessment & Plan  · Patient endorses CPAP usage at night  · Currently on Bipap, can likely transition to HS once diuresis completed    -------------------------------------------------------------------------------------------------------------  Chief Complaint: Shortness of breath, Cough    History of Present Illness   HX and PE limited by: Sylvester Jack is a 74 y o  female with a history of GIST s/p resection, following with [facility] and recently taken off of Avapritinib, a fib off of AC in setting of gastric AVMs who presented to [address] [place] [address] ED 12/24 with SOB and cough x2 days  Patient states she didn't overtly notice any weight gain, however states she has had worsening activity intolerance  Denies fever, missing diuretics  Lab work revealing elevated BNP to 10k, lactic acidosis to 7 2, however was without elevated white cell count or bandemia  CTA PE study obtained, excluding PE however suggestive of pulmonary edema with questionable right middle lobe opacification  Patient started on bipap for volume overload and trialed lasix 40mg x1  Plan to continue IV ABX and follow procalcitonin in the AM  Plan to admit to SD1 for acute decompensated HF and bipap management    History obtained from chart review and the patient   -------------------------------------------------------------------------------------------------------------  Dispo: Admit to Stepdown Level 1    Code Status: Level 1 - Full Code  --------------------------------------------------------------------------------------------------------------  Review of Systems   Constitutional: Positive for unexpected weight change  Negative for fever  HENT: Negative  Eyes: Negative  Respiratory: Positive for shortness of breath  Cardiovascular: Negative for chest pain and palpitations  Gastrointestinal: Negative  Endocrine: Negative  Genitourinary: Negative  Musculoskeletal: Negative  Skin: Negative  Allergic/Immunologic: Negative  Neurological: Negative  Hematological: Negative  Psychiatric/Behavioral: Negative  A 12-point, complete review of systems was reviewed and negative except as stated above     Physical Exam  Vitals and nursing note reviewed     HENT:      Head: Normocephalic and atraumatic  Right Ear: External ear normal       Left Ear: External ear normal       Nose: Nose normal       Mouth/Throat:      Mouth: Mucous membranes are dry  Pharynx: Oropharynx is clear  Cardiovascular:      Rate and Rhythm: Normal rate and regular rhythm  Pulses:           Radial pulses are 2+ on the right side and 2+ on the left side  Dorsalis pedis pulses are 1+ on the right side and 1+ on the left side  Heart sounds: S1 normal and S2 normal  No murmur heard  Pulmonary:      Effort: Respiratory distress present  Breath sounds: Examination of the right-upper field reveals rales  Examination of the left-upper field reveals rales  Rales present  Abdominal:      General: Abdomen is flat  Bowel sounds are normal  There is no distension  Palpations: Abdomen is soft  Tenderness: There is no abdominal tenderness  Musculoskeletal:      Right lower le+ Edema present  Left lower le+ Edema present  Skin:     General: Skin is warm and dry  Capillary Refill: Capillary refill takes less than 2 seconds  Neurological:      Mental Status: She is alert  GCS: GCS eye subscore is 4  GCS verbal subscore is 5  GCS motor subscore is 6  Psychiatric:         Behavior: Behavior is cooperative      --------------------------------------------------------------------------------------------------------------  Vitals:   Vitals:    22   BP: 155/88 147/82     BP Location:       Pulse: (!) 115 87     Resp: (!) 30 (!) 28     Temp:       TempSrc:       SpO2: (!) 88% 90% 98%    Weight:    54 3 kg (119 lb 11 4 oz)   Height:         Temp  Min: 97 9 °F (36 6 °C)  Max: 97 9 °F (36 6 °C)     Height: 5' 1" (154 9 cm)  Body mass index is 22 62 kg/m²      Laboratory and Diagnostics:  Results from last 7 days   Lab Units 12/24/22  1929 12/24/22  1926   WBC Thousand/uL  --  7 43   HEMOGLOBIN g/dL  --  7 6*   I STAT HEMOGLOBIN g/dl 8 5*  --    HEMATOCRIT %  --  24 1*   HEMATOCRIT, ISTAT % 25*  --    PLATELETS Thousands/uL  --  250   NEUTROS PCT %  --  88*   MONOS PCT %  --  3*     Results from last 7 days   Lab Units 12/24/22 1929 12/24/22 1926   SODIUM mmol/L  --  132*   POTASSIUM mmol/L  --  3 6   CHLORIDE mmol/L  --  97   CO2 mmol/L  --  19*   CO2, I-STAT mmol/L 17*  --    ANION GAP mmol/L  --  16*   BUN mg/dL  --  16   CREATININE mg/dL  --  1 29   CALCIUM mg/dL  --  10 1   GLUCOSE RANDOM mg/dL  --  167*   ALT U/L  --  29   AST U/L  --  50*   ALK PHOS U/L  --  92   ALBUMIN g/dL  --  3 5   TOTAL BILIRUBIN mg/dL  --  0 30          Results from last 7 days   Lab Units 12/24/22 2010   INR  1 07   PTT seconds 26      Results from last 7 days   Lab Units 12/24/22 1926   LACTIC ACID mmol/L 7 6*     VBG:    Results from last 7 days   Lab Units 12/24/22 1926   PROCALCITONIN ng/ml 0 15       Micro:  BC obtained, Pending  S  pneumo pending  Legionella pending  MRSA pending    EKG: NSR with PACs rate 74    Imaging: I have personally reviewed pertinent reports  12/24 CXR suggestive of pulmonary edema  12/24 CTA PE- no PE, pulm edema, concern for RML PNA  ECHO pending    Historical Information   Past Medical History:   Diagnosis Date   • ADHD    • Anemia    • Anxiety    • Arthritis    • Asthma    • Atrial fibrillation ([facility] )    • Cancer ([place] [address] )    • Cancer ([facility] )     liver   • Chronic iron deficiency anemia 6/9/2020    Extensive GI evaluation over the last year including multiple EGD, colonoscopy, and capsule endoscopy    Has had gastric AVMs cauterized, Dieulafoy's lesion clipped, and most recently a [place] erosion cauterized   • Coronary artery disease    • Depression    • GERD (gastroesophageal reflux disease)    • History of stomach ulcers    • Hypercholesterolemia    • Hypertension    • Kidney disease    • Metastatic cancer ([facility] )    • Sepsis due to Escherichia coli ([place] [address] ) 6/9/2021     Past Surgical History:   Procedure Laterality Date   • ANKLE SURGERY     • APPENDECTOMY     • BREAST BIOPSY     • BREAST SURGERY N/A     pt went to [facility] in the , had a procedure but unable to give date or what was done, just benign   • CATARACT EXTRACTION     •  SECTION     • COLONOSCOPY  2019    Multiple adenomatous colon polyps and internal hemorrhoids  Three year recall advised   • HIP SURGERY     • JOINT REPLACEMENT  2019    Left knee replacement   • LAMINECTOMY     • OOPHORECTOMY     • ROTATOR CUFF REPAIR     • SIGMOID RESECTION / RECTOPEXY     • SINUS SURGERY     • UPPER GASTROINTESTINAL ENDOSCOPY  2020    Dieulafoy's lesion in proximal stomach which was actively oozing  Injected with epinephrine and 2 Endoclips placed with control of bleeding, hiatal hernia     • UPPER GASTROINTESTINAL ENDOSCOPY  2020    Bleeding Seb's erosion status post cauterization     Social History   Social History     Substance and Sexual Activity   Alcohol Use Never     Social History     Substance and Sexual Activity   Drug Use Never     Social History     Tobacco Use   Smoking Status Never   Smokeless Tobacco Never     Exercise History: Difficulty with ADLs x2 weeks, progressing SOB and Cough  Family History:   Family History   Problem Relation Age of Onset   • Diabetes Mother    • Hypertension Mother    • Lung cancer Mother    • Hyperlipidemia Father    • Prostate cancer Father    • No Known Problems Maternal Grandmother    • No Known Problems Maternal Grandfather    • Diabetes Family    • No Known Problems Maternal Aunt    • No Known Problems Maternal Aunt    • No Known Problems Maternal Aunt    • No Known Problems Paternal Aunt    • Breast cancer Cousin    • Breast cancer Cousin    • Substance Abuse Neg Hx    • Mental illness Neg Hx      I have reviewed this patient's family history and commented on sigificant items within the HPI    Medications:  Current Facility-Administered Medications   Medication Dose Route Frequency   • bisacodyl (DULCOLAX) rectal suppository 10 mg  10 mg Rectal Daily PRN   • [START ON 12/25/2022] cefTRIAXone (ROCEPHIN) IVPB (premix in dextrose) 1,000 mg 50 mL  1,000 mg Intravenous Q24H   • heparin (porcine) subcutaneous injection 5,000 Units  5,000 Units Subcutaneous Q8H [facility]   • [START ON 12/25/2022] metoprolol succinate (TOPROL-XL) 24 hr tablet 25 mg  25 mg Oral Daily   • multi-electrolyte (ISOLYTE-S PH 7 4) bolus 500 mL  500 mL Intravenous Once   • vancomycin (VANCOCIN) 1250 mg in sodium chloride 0 9% 250 mL IVPB  25 mg/kg Intravenous Once   • [START ON 12/25/2022] vancomycin (VANCOCIN) IVPB (premix in dextrose) 750 mg 150 mL  750 mg Intravenous Q24H     Home medications:  Prior to Admission Medications   Prescriptions Last Dose Informant Patient Reported? Taking?    Avapritinib (Ayvakit) 200 MG TABS   Yes No   Sig: Take 200 mg by mouth daily   Azelastine HCl 137 MCG/SPRAY SOLN   Yes No   Sig: instill 2 sprays into each nostril twice a day if needed for congestion   Triamcinolone Acetonide (NASACORT ALLERGY 24HR NA)   Yes No   Sig: into each nostril as needed 1 spray each nostril--at bedtime   (OTC)    acetaminophen (TYLENOL) 500 mg tablet   Yes No   Sig: Take 500 mg by mouth every 6 (six) hours as needed for mild pain   amoxicillin (AMOXIL) 500 mg capsule   Yes No   Sig: Take 2,000 mg by mouth as needed 1 hour prior to dental appointment   aspirin (ECOTRIN LOW STRENGTH) 81 mg EC tablet   Yes No   Sig: Take 81 mg by mouth daily   calcium carbonate (TUMS) 500 mg chewable tablet   Yes No   Sig: Chew 1 tablet    diphenhydrAMINE (BENADRYL) 25 mg tablet   Yes No   Sig: Take 25 mg by mouth every 6 (six) hours as needed for itching   diphenoxylate-atropine (LOMOTIL) 2 5-0 025 mg per tablet   No No   Sig: TAKE 1 TABLET BY MOUTH 4 TIMES A DAY AS NEEDED FOR DIARRHEA   docusate sodium (COLACE) 100 mg capsule   Yes No   Sig: Take 100 mg by mouth 2 (two) times a day as needed for constipation    ferrous sulfate 324 (65 Fe) mg   No No Sig: Take 1 tablet (324 mg total) by mouth 2 (two) times a day before meals   furosemide (LASIX) 20 mg tablet   Yes No   Si daily   hydroxychloroquine (PLAQUENIL) 200 mg tablet   Yes No   Sig: Alternate 1 daily and then 1 BID   loperamide (IMODIUM) 2 mg capsule   Yes No   Sig: Take 2 capsules by mouth 4 (four) times a day as needed    metoprolol succinate (TOPROL-XL) 25 mg 24 hr tablet   Yes No   Sig: Take by mouth 1 daily   multivitamin-iron-minerals-folic acid (CENTRUM) chewable tablet   Yes No   Sig: Chew 1 tablet daily   ondansetron (ZOFRAN) 8 mg tablet   Yes No   Si mg daily with breakfast Prior to chemo   pantoprazole (PROTONIX) 40 mg tablet   No No   Sig: take 1 tablet by mouth twice a day   prednisoLONE acetate (PRED FORTE) 1 % ophthalmic suspension   Yes No   Si drop into right eye three times daily  and 1 drop in left twice daily   traMADol (ULTRAM) 50 mg tablet   No No   Sig: take 2 tablets by mouth four times a day      Facility-Administered Medications: None     Allergies: Allergies   Allergen Reactions   • Codeine Anaphylaxis     Throat closes   • Codeine Anaphylaxis   • Codeine Sulfate Throat Swelling   • Neomycin-Bacitracin Zn-Polymyx Rash   • Wound Dressing Adhesive Other (See Comments)     If its on too long- pt starts itching   • Zolmitriptan Palpitations     Heart palpitations  Generic for Zomig         ------------------------------------------------------------------------------------------------------------  Advance Directive and Living Will:      Power of :    POLST:    ------------------------------------------------------------------------------------------------------------  Anticipated Length of Stay is > 2 midnights    Care Time Delivered:   Upon my evaluation, this patient had a high probability of imminent or life-threatening deterioration due to AHRF, CHF exacerbation, which required my direct attention, intervention, and personal management    I have personally provided 40 minutes (2200 to 2240) of critical care time, exclusive of procedures, teaching, family meetings, and any prior time recorded by providers other than myself  LANEY Sebastian    Portions of the record may have been created with voice recognition software  Occasional wrong word or "sound a like" substitutions may have occurred due to the inherent limitations of voice recognition software    Read the chart carefully and recognize, using context, where substitutions have occurred

## 2022-12-25 NOTE — UTILIZATION REVIEW
Initial Clinical Review    Admission: Date/Time/Statement:   Admission Orders (From admission, onward)     Ordered        12/24/22 2109  INPATIENT ADMISSION  Once                      Orders Placed This Encounter   Procedures   • INPATIENT ADMISSION     Standing Status:   Standing     Number of Occurrences:   1     Order Specific Question:   Level of Care     Answer:   Level 1 Stepdown [13]     Order Specific Question:   Estimated length of stay     Answer:   More than 2 Midnights     Order Specific Question:   Certification     Answer:   I certify that inpatient services are medically necessary for this patient for a duration of greater than two midnights  See H&P and MD Progress Notes for additional information about the patient's course of treatment  ED Arrival Information     Expected   -    Arrival   12/24/2022 19:10    Acuity   Emergent            Means of arrival   Wheelchair    Escorted by   Family Member    Service   Critical Care/ICU    Admission type   Emergency            Arrival complaint   vomiting, sob           Chief Complaint   Patient presents with   • Shortness of Breath     Pt c/o SOB since 1700, states shes had a cough for a few days  C/o vomiting        Initial Presentation: 74 y o  female with PMH of GIST s/p resection, Afib presents with c/o shortness of breath and cough for 2 days  SOB worse with activity  In ED, O2 sat on RA 74% placed on BiPAP, BNP 12,400, lactic acid 7 6, trop 52, , Na 132, anion gap 16, hgb 7 6  CTA PE study suggestive of pulmonary edema with questionable right middle lobe opacification  Given IV Lasix  Admit Inpatient ICU dt Acute decompensated heart failure, Acute respiratory failure with hypoxia: monitor volume status, IO and daily wts, fu on Echo, continue supplemental O2 on BiPAP and wean to NC as able, fu on blood cxs and labs, continue ABX, trend lactic acid and trops, trend renal indices, continue home meds and pain control      Date: 12/25   Day 2: Rales noted in BL upper lung fields, decreased breath sounds, BL LE edema noted  Possible additional IV lasix for further diuresis, continued on BiPAP overnight  Continue to monitor volume status, fu on echo, fu on cxs and labs, continue ABX, lactic acidosis resolved, continue to trend renal function  Transfer to [facility] Level 2      ED Triage Vitals [12/24/22 1920]   Temperature Pulse Respirations Blood Pressure SpO2   97 9 °F (36 6 °C) (!) 133 (!) 32 151/85 (!) 74 %      Temp Source Heart Rate Source Patient Position - Orthostatic VS BP Location FiO2 (%)   Oral Monitor -- Left arm --      Pain Score       No Pain          Wt Readings from Last 1 Encounters:   12/25/22 54 3 kg (119 lb 11 4 oz)     Additional Vital Signs:   Date/Time Temp Pulse Resp BP MAP (mmHg) SpO2 O2 Flow Rate (L/min) O2 Device O2 Interface Device   12/25/22 0900 -- -- -- -- -- -- -- Mid flow nasal cannula [address] prongs   12/25/22 0800 98 6 °F (37 °C) 89 29 Abnormal  160/87 117 93 % -- -- --   12/25/22 0500 -- 90 26 Abnormal  151/92 117 90 % -- -- --   12/25/22 0400 98 1 °F (36 7 °C) 113 Abnormal  32 Abnormal  172/91 Abnormal  123 94 % -- -- --   12/25/22 0212 -- -- -- -- -- 97 % -- -- Face mask   12/25/22 0200 -- 79 20 155/81 112 97 % -- -- --   12/25/22 0100 -- 78 19 161/85 116 96 % -- -- --   12/25/22 0000 -- 91 30 Abnormal  160/107 Abnormal  127 95 % -- -- --   12/24/22 2300 -- 76 18 161/84 116 96 % -- -- --   12/24/22 2200 98 °F (36 7 °C) 75 19 167/81 116 95 % -- -- --   12/24/22 2120 -- -- -- -- -- 98 % -- -- Face mask   12/24/22 2116 -- 87 28 Abnormal  147/82 -- 90 % 12 L/min Mid flow nasal cannula --   12/24/22 2045 -- 115 Abnormal  30 Abnormal  155/88 115 88 % Abnormal  10 L/min Mid flow nasal cannula --   12/24/22 1945 -- 83 33 Abnormal  136/65 93 94 % -- -- --   12/24/22 1935 -- -- -- -- -- 94 % 10 L/min High flow nasal cannula [facility] prongs   12/24/22 1922 -- -- -- -- -- 96 % 13 L/min High flow nasal cannula --   12/24/22 1920 97 9 °F (36 6 °C) 133 Abnormal  32 Abnormal  151/85 111 74 % Abnormal  -- None (Room air) --     Pertinent Labs/Diagnostic Test Results:   12/24 EKG: NSR with PACs  12/25 ECHO PENDING    CTA ED chest PE Study   Final Result by Savannah Pink MD (12/24 2059)      No evidence for pulmonary embolus  Constellation of findings above overall suggestive of pulmonary edema in the setting of acute CHF versus fluid overload  Some consolidative opacity is seen at the medial right base possibly reflecting superimposed pneumonia  Correlate clinically                    Workstation performed: [id number]         XR chest 1 view portable    (Results Pending)   XR chest portable ICU    (Results Pending)     Results from last 7 days   Lab Units 12/24/22 1926   SARS-COV-2  Negative     Results from last 7 days   Lab Units 12/25/22 0424 12/24/22 1929 12/24/22 1926   WBC Thousand/uL 6 25  --  7 43   HEMOGLOBIN g/dL 7 5*  --  7 6*   I STAT HEMOGLOBIN g/dl  --  8 5*  --    HEMATOCRIT % 23 1*  --  24 1*   HEMATOCRIT, ISTAT %  --  25*  --    PLATELETS Thousands/uL 225  --  250   NEUTROS ABS Thousands/µL  --   --  6 57   BANDS PCT % 3  --   --          Results from last 7 days   Lab Units 12/25/22 0424 12/24/22 1929 12/24/22 1926   SODIUM mmol/L 133*  --  132*   POTASSIUM mmol/L 3 5  --  3 6   CHLORIDE mmol/L 97  --  97   CO2 mmol/L 25  --  19*   CO2, I-STAT mmol/L  --  17*  --    ANION GAP mmol/L 11  --  16*   BUN mg/dL 18  --  16   CREATININE mg/dL 1 11  --  1 29   EGFR ml/min/1 73sq m 49  --  40   CALCIUM mg/dL 8 9  --  10 1   CALCIUM, IONIZED mmol/L 1 05*  --   --    CALCIUM, IONIZED, ISTAT mmol/L  --  1 16  --    MAGNESIUM mg/dL 1 8  --   --    PHOSPHORUS mg/dL 5 0*  --   --      Results from last 7 days   Lab Units 12/24/22 1926   AST U/L 50*   ALT U/L 29   ALK PHOS U/L 92   TOTAL PROTEIN g/dL 7 4   ALBUMIN g/dL 3 5   TOTAL BILIRUBIN mg/dL 0 30         Results from last 7 days   Lab Units 12/25/22  0424 12/24/22  1926   GLUCOSE RANDOM mg/dL 120 167*             No results found for: BETA-HYDROXYBUTYRATE           Results from last 7 days   Lab Units 12/24/22 1929   PH, AXEL I-STAT  7 259*   PCO2, AXEL ISTAT mm HG 36 7*   PO2, AXEL ISTAT mm HG 28 0*   HCO3, AXEL ISTAT mmol/L 16 4*   I STAT BASE EXC mmol/L -10*   I STAT O2 SAT % 44*     Results from last 7 days   Lab Units 12/24/22 1926   CK TOTAL U/L 373*   CK MB INDEX % 1 8   CK MB ng/mL 6 8*     Results from last 7 days   Lab Units 12/24/22 2358 12/24/22 2132 12/24/22 1926   HS TNI 0HR ng/L  --   --  52*   HS TNI 2HR ng/L  --  67*  --    HSTNI D2 ng/L  --  15  --    HS TNI 4HR ng/L 89*  --   --    HSTNI D4 ng/L 37*  --   --          Results from last 7 days   Lab Units 12/24/22 2010   PROTIME seconds 14 6*   INR  1 07   PTT seconds 26         Results from last 7 days   Lab Units 12/25/22  0424 12/24/22 1926   PROCALCITONIN ng/ml 0 46* 0 15     Results from last 7 days   Lab Units 12/24/22 2358 12/24/22 1926   LACTIC ACID mmol/L 1 0 7 6*     Results from last 7 days   Lab Units 12/24/22 1926   NT-PRO BNP pg/mL 12,400*     Results from last 7 days   Lab Units 12/24/22 2138   CLARITY UA  Clear   COLOR UA  Colorless   SPEC GRAV UA  1 015   PH UA  6 5   GLUCOSE UA mg/dl Negative   KETONES UA mg/dl Negative   BLOOD UA  Negative   PROTEIN UA mg/dl Trace*   NITRITE UA  Negative   BILIRUBIN UA  Negative   UROBILINOGEN UA (BE) mg/dl <2 0   LEUKOCYTES UA  Negative   WBC UA /hpf None Seen   RBC UA /hpf None Seen   BACTERIA UA /hpf None Seen   EPITHELIAL CELLS WET PREP /hpf Occasional     Results from last 7 days   Lab Units 12/24/22 2247 12/24/22 2138 12/24/22 1926   STREP PNEUMONIAE ANTIGEN, URINE   --  Negative  --    LEGIONELLA URINARY ANTIGEN  Negative  --   --    INFLUENZA A PCR   --   --  Negative   INFLUENZA B PCR   --   --  Negative   RSV PCR   --   --  Negative     Results from last 7 days   Lab Units 12/24/22  1926   BLOOD CULTURE  Received in Microbiology Lab   Culture in Progress  Received in Microbiology Lab  Culture in Progress  ED Treatment:   Medication Administration from 12/24/2022 1910 to 12/24/2022 2142       Date/Time Order Dose Route Action     12/24/2022 2047 EST furosemide (LASIX) injection 40 mg 40 mg Intravenous Given     12/24/2022 2039 EST iohexol (OMNIPAQUE) 350 MG/ML injection (SINGLE-DOSE) 85 mL 85 mL Intravenous Given     12/24/2022 2108 EST cefTRIAXone (ROCEPHIN) IVPB (premix in dextrose) 1,000 mg 50 mL 1,000 mg Intravenous New Bag     12/24/2022 2108 EST acetaminophen (TYLENOL) tablet 975 mg 975 mg Oral Given     12/24/2022 2108 EST traMADol (ULTRAM) tablet 50 mg 50 mg Oral Given        Past Medical History:   Diagnosis Date   • ADHD    • Anemia    • Anxiety    • Arthritis    • Asthma    • Atrial fibrillation (HCC)    • Cancer (HCC)    • Cancer ([place] [address] )     liver   • Chronic iron deficiency anemia 6/9/2020    Extensive GI evaluation over the last year including multiple EGD, colonoscopy, and capsule endoscopy    Has had gastric AVMs cauterized, Dieulafoy's lesion clipped, and most recently a [employer] erosion cauterized   • Coronary artery disease    • Depression    • GERD (gastroesophageal reflux disease)    • History of stomach ulcers    • Hypercholesterolemia    • Hypertension    • Kidney disease    • Metastatic cancer ([place] [address] )    • Sepsis due to Escherichia coli ([place] [address] ) 6/9/2021     Present on Admission:  • Stage 3b chronic kidney disease ([place] [address] )  • Paroxysmal atrial fibrillation (HCC)  • Essential hypertension  • MARK (obstructive sleep apnea)  • Pain syndrome, chronic  • GIST (gastrointestinal stroma tumor), malignant, colon (HCC)  • Acute decompensated heart failure (HCC)  • Elevated troponin  • Lactic acidosis  • Acute respiratory failure with hypoxia (HCC)      Admitting Diagnosis: Respiratory failure (HCC) [J96 90]  SOB (shortness of breath) [R06 02]  CHF exacerbation ([place] [address] ) [I50 9]  Age/Sex: 74 y o  female  Admission Orders:  Scheduled Medications:  aspirin, 81 mg, Oral, Daily  cefTRIAXone, 1,000 mg, Intravenous, Q24H  heparin (porcine), 5,000 Units, Subcutaneous, Q8H [address]  hydroxychloroquine, 200 mg, Oral, Daily With Breakfast  magnesium sulfate, 2 g, Intravenous, Once  metoprolol succinate, 25 mg, Oral, Daily  ondansetron, 4 mg, Intravenous, Once  pantoprazole, 40 mg, Intravenous, Q24H [facility]  polyethylene glycol, 17 g, Oral, Daily  potassium chloride, 20 mEq, Intravenous, Q2H  vancomycin, 750 mg, Intravenous, Q24H      Continuous IV Infusions: NONE     PRN Meds:  bisacodyl, 10 mg, Rectal, Daily PRN  diphenoxylate-atropine, 1 tablet, Oral, 4x Daily PRN  docusate sodium, 100 mg, Oral, BID PRN  traMADol, 50 mg, Oral, Q6H PRN    ondansetron (ZOFRAN) injection 4 mg  Dose: 4 mg  Freq: Every 6 hours PRN Route: IV x2 thus far  PRN Reason: nausea  Start: 12/25/22 0344 End: 12/25/22 0404    scd  Neuro checks    IP CONSULT TO PHARMACY  IP CONSULT TO CASE MANAGEMENT    Network Utilization Review Department  ATTENTION: Please call with any questions or concerns to [phone] and carefully listen to the prompts so that you are directed to the right person  All voicemails are confidential   Zully Golden all requests for admission clinical reviews, approved or denied determinations and any other requests to dedicated fax number below belonging to the campus where the patient is receiving treatment   List of dedicated fax numbers for the Facilities:  [address] DENIALS (Administrative/Medical Necessity) [phone]   [address] [place] [address] (Maternity/NICU/Pediatrics) [phone]   [address] [place]  [phone]   [address] [place] [address] [address] [place] [address] [address] [facility] [place] [address] [facility] [phone]

## 2022-12-25 NOTE — ASSESSMENT & PLAN NOTE
• With history of atrial fibrillation  • On ASA as outpatient, however not on more aggressive AC d/t history of gastric AVMs  • Currently in NSR with PACs  • Continue metoprolol this AM

## 2022-12-25 NOTE — ASSESSMENT & PLAN NOTE
• Initial troponin elevated in setting of likely demand ischemia  • EKG without ischemic changes  • Continue to trend troponin to peak  • Will f/u formal ECHO as above

## 2022-12-25 NOTE — ASSESSMENT & PLAN NOTE
· S/p sigmoid resection in setting of colon cancer about 20 years ago  ·  endorses Beryle Post recently stopped patients Avapritinib  · No acute interventions at this time  · Continue home prn medications for diarrhea/constipation

## 2022-12-26 ENCOUNTER — APPOINTMENT (INPATIENT)
Dept: NON INVASIVE DIAGNOSTICS | Facility: HOSPITAL | Age: 72
End: 2022-12-26

## 2022-12-26 ENCOUNTER — APPOINTMENT (INPATIENT)
Dept: RADIOLOGY | Facility: HOSPITAL | Age: 72
End: 2022-12-26

## 2022-12-26 PROBLEM — J90 PLEURAL EFFUSION: Status: ACTIVE | Noted: 2022-12-26

## 2022-12-26 LAB
ANION GAP SERPL CALCULATED.3IONS-SCNC: 10 MMOL/L (ref 4–13)
ATRIAL RATE: 76 BPM
BUN SERPL-MCNC: 24 MG/DL (ref 5–25)
CALCIUM SERPL-MCNC: 9.4 MG/DL (ref 8.3–10.1)
CHLORIDE SERPL-SCNC: 95 MMOL/L (ref 96–108)
CO2 SERPL-SCNC: 26 MMOL/L (ref 21–32)
CREAT SERPL-MCNC: 1.18 MG/DL (ref 0.6–1.3)
ERYTHROCYTE [DISTWIDTH] IN BLOOD BY AUTOMATED COUNT: 14.9 % (ref 11.6–15.1)
FLUAV RNA RESP QL NAA+PROBE: NEGATIVE
FLUBV RNA RESP QL NAA+PROBE: NEGATIVE
GFR SERPL CREATININE-BSD FRML MDRD: 45 ML/MIN/1.73SQ M
GLUCOSE SERPL-MCNC: 126 MG/DL (ref 65–140)
HCT VFR BLD AUTO: 23.8 % (ref 34.8–46.1)
HGB BLD-MCNC: 7.9 G/DL (ref 11.5–15.4)
MCH RBC QN AUTO: 35.1 PG (ref 26.8–34.3)
MCHC RBC AUTO-ENTMCNC: 33.2 G/DL (ref 31.4–37.4)
MCV RBC AUTO: 106 FL (ref 82–98)
MRSA NOSE QL CULT: NORMAL
P AXIS: 82 DEGREES
PLATELET # BLD AUTO: 215 THOUSANDS/UL (ref 149–390)
PMV BLD AUTO: 10.1 FL (ref 8.9–12.7)
POTASSIUM SERPL-SCNC: 3.5 MMOL/L (ref 3.5–5.3)
PR INTERVAL: 178 MS
PROCALCITONIN SERPL-MCNC: <0.05 NG/ML
QRS AXIS: -56 DEGREES
QRSD INTERVAL: 138 MS
QT INTERVAL: 432 MS
QTC INTERVAL: 486 MS
RBC # BLD AUTO: 2.25 MILLION/UL (ref 3.81–5.12)
RSV RNA RESP QL NAA+PROBE: NEGATIVE
SARS-COV-2 RNA RESP QL NAA+PROBE: NEGATIVE
SODIUM SERPL-SCNC: 131 MMOL/L (ref 135–147)
T WAVE AXIS: 88 DEGREES
VENTRICULAR RATE: 76 BPM
WBC # BLD AUTO: 7.63 THOUSAND/UL (ref 4.31–10.16)

## 2022-12-26 RX ORDER — POTASSIUM CHLORIDE 20 MEQ/1
40 TABLET, EXTENDED RELEASE ORAL ONCE
Status: COMPLETED | OUTPATIENT
Start: 2022-12-26 | End: 2022-12-26

## 2022-12-26 RX ORDER — METOPROLOL TARTRATE 5 MG/5ML
5 INJECTION INTRAVENOUS EVERY 6 HOURS PRN
Status: DISCONTINUED | OUTPATIENT
Start: 2022-12-26 | End: 2022-12-31 | Stop reason: HOSPADM

## 2022-12-26 RX ORDER — METOPROLOL SUCCINATE 50 MG/1
50 TABLET, EXTENDED RELEASE ORAL DAILY
Status: DISCONTINUED | OUTPATIENT
Start: 2022-12-27 | End: 2022-12-27

## 2022-12-26 RX ORDER — DIGOXIN 0.25 MG/ML
250 INJECTION INTRAMUSCULAR; INTRAVENOUS ONCE
Status: COMPLETED | OUTPATIENT
Start: 2022-12-26 | End: 2022-12-26

## 2022-12-26 RX ORDER — FUROSEMIDE 10 MG/ML
40 INJECTION INTRAMUSCULAR; INTRAVENOUS
Status: DISCONTINUED | OUTPATIENT
Start: 2022-12-26 | End: 2022-12-29

## 2022-12-26 RX ORDER — FUROSEMIDE 10 MG/ML
40 INJECTION INTRAMUSCULAR; INTRAVENOUS ONCE
Status: COMPLETED | OUTPATIENT
Start: 2022-12-26 | End: 2022-12-26

## 2022-12-26 RX ORDER — SENNOSIDES 8.6 MG
2 TABLET ORAL
Status: DISCONTINUED | OUTPATIENT
Start: 2022-12-26 | End: 2022-12-31 | Stop reason: HOSPADM

## 2022-12-26 RX ADMIN — DOXYCYCLINE 100 MG: 100 CAPSULE ORAL at 21:17

## 2022-12-26 RX ADMIN — HEPARIN SODIUM 5000 UNITS: 5000 INJECTION INTRAVENOUS; SUBCUTANEOUS at 06:09

## 2022-12-26 RX ADMIN — SENNOSIDES 17.2 MG: 8.6 TABLET, FILM COATED ORAL at 21:17

## 2022-12-26 RX ADMIN — PANTOPRAZOLE SODIUM 40 MG: 40 TABLET, DELAYED RELEASE ORAL at 06:09

## 2022-12-26 RX ADMIN — DOXYCYCLINE 100 MG: 100 CAPSULE ORAL at 09:56

## 2022-12-26 RX ADMIN — ASPIRIN 81 MG: 81 TABLET, COATED ORAL at 09:56

## 2022-12-26 RX ADMIN — BISACODYL 10 MG: 10 SUPPOSITORY RECTAL at 16:59

## 2022-12-26 RX ADMIN — ISODIUM CHLORIDE 3 ML: 0.03 SOLUTION RESPIRATORY (INHALATION) at 02:15

## 2022-12-26 RX ADMIN — TRIMETHOBENZAMIDE HYDROCHLORIDE 200 MG: 100 INJECTION INTRAMUSCULAR at 13:20

## 2022-12-26 RX ADMIN — METOPROLOL SUCCINATE 25 MG: 25 TABLET, EXTENDED RELEASE ORAL at 09:56

## 2022-12-26 RX ADMIN — FUROSEMIDE 40 MG: 10 INJECTION, SOLUTION INTRAMUSCULAR; INTRAVENOUS at 17:37

## 2022-12-26 RX ADMIN — POTASSIUM CHLORIDE 40 MEQ: 1500 TABLET, EXTENDED RELEASE ORAL at 06:09

## 2022-12-26 RX ADMIN — CEFTRIAXONE 1000 MG: 1 INJECTION, SOLUTION INTRAVENOUS at 21:22

## 2022-12-26 RX ADMIN — LEVALBUTEROL HYDROCHLORIDE 1.25 MG: 1.25 SOLUTION, CONCENTRATE RESPIRATORY (INHALATION) at 02:15

## 2022-12-26 RX ADMIN — LEVALBUTEROL HYDROCHLORIDE 1.25 MG: 1.25 SOLUTION, CONCENTRATE RESPIRATORY (INHALATION) at 08:34

## 2022-12-26 RX ADMIN — DIGOXIN 250 MCG: 0.25 INJECTION INTRAMUSCULAR; INTRAVENOUS at 19:33

## 2022-12-26 RX ADMIN — LOSARTAN POTASSIUM 25 MG: 25 TABLET, FILM COATED ORAL at 09:56

## 2022-12-26 RX ADMIN — ISODIUM CHLORIDE 3 ML: 0.03 SOLUTION RESPIRATORY (INHALATION) at 19:20

## 2022-12-26 RX ADMIN — ISODIUM CHLORIDE 3 ML: 0.03 SOLUTION RESPIRATORY (INHALATION) at 08:34

## 2022-12-26 RX ADMIN — FUROSEMIDE 40 MG: 10 INJECTION, SOLUTION INTRAMUSCULAR; INTRAVENOUS at 10:52

## 2022-12-26 RX ADMIN — HYDROXYCHLOROQUINE SULFATE 200 MG: 200 TABLET, FILM COATED ORAL at 07:34

## 2022-12-26 RX ADMIN — LEVALBUTEROL HYDROCHLORIDE 1.25 MG: 1.25 SOLUTION, CONCENTRATE RESPIRATORY (INHALATION) at 19:20

## 2022-12-26 RX ADMIN — HEPARIN SODIUM 5000 UNITS: 5000 INJECTION INTRAVENOUS; SUBCUTANEOUS at 21:21

## 2022-12-26 RX ADMIN — TRIMETHOBENZAMIDE HYDROCHLORIDE 200 MG: 100 INJECTION INTRAMUSCULAR at 03:49

## 2022-12-26 RX ADMIN — HEPARIN SODIUM 5000 UNITS: 5000 INJECTION INTRAVENOUS; SUBCUTANEOUS at 13:20

## 2022-12-26 NOTE — CONSULTS
[facility]                                              Cardiology Consult  Yue Ballesteros 74 y o  female   YOB: 1950 MRN: [id number]  Unit/Bed#: -01 Encounter: [id number]      Physician Requesting Consult: Chasity Villatoro MD  Reason for Consult / Principal Problem: Heart failure    Assessments  Principal Problem:    Acute decompensated heart failure ([facility] [address] )  Active Problems:    Essential hypertension    Pain syndrome, chronic    Elevated troponin    Lactic acidosis    Stage 3b chronic kidney disease (HCC)    MARK (obstructive sleep apnea)    GIST (gastrointestinal stroma tumor), malignant, colon (HCC)    Paroxysmal atrial fibrillation (HCC)    Acute respiratory failure with hypoxia (HCC)      Plan  Acute hypoxic respiratory failure, acute on chronic combined heart failure - LVEF 40%  · Initially on BiPAP, now transition to mid flow nasal cannula  · She was noted to be volume overloaded at presentation and received IV diuresis with improvement in symptoms  · She has had multiple blood transfusions over the last year related to her anemia and GIST, and this maybe contributing to her volume overload  · Personally reviewed images of recent CT chest, and she has significant moderate to large right pleural effusion, which is likely contributing to her hypoxia --> recommend IR thoracocentesis for same  · No remaining peripheral edema, has mild JVP elevation, decreased breath sounds  · Continue IV diuresis today with IV Lasix 40 mg twice daily  · Check follow-up echocardiogram  · Optimize blood pressure control, currently remains elevated in the 757P systolic  · Increase metoprolol succinate to 50 mg daily  · Continue losartan 25 mg daily  · Additionally should improve with diuresis    Non-MI troponin elevation  · Hs-troponin 52, 67, 89, which is actually down from prior numbers in July 2022  · Likely related to volume overload/heart failure  · She can be indexes negative as well  · Check follow-up echocardiogram    Paroxysmal Atrial Fibrillation  · Currently in sinus rhythm, heart rate in the 80s to 90s   · Metoprolol being increased for better blood pressure control  · not on anticoagulation due to history of bleeding and AVMs, needing multiple transfusions    GIST, Anemia  · Hemoglobin down to 7 9  · She has received multiple blood transfusions over the last year and [facility]/immun[facility]    ECG: Personally reviewed  Normal sinus rhythm, bifascicular block  Telemetry: Personally reviewed  NSR  History of Present Illness   HPI: Asher Thornton is a 74y o  year old female who presents with complaints of abdominal pain and shortness of breath  She has a known history of GIST tumor, for which she follows at [facility] and has been on immune O/chemotherapy  She has already undergone physical treatment for the same as well  She reports having lost about 50 pounds over the last couple of years  On the day of presentation, she reported increased abdominal pain and had associated worsening shortness of breath and as result presented to the ED for evaluation  She underwent a CTA PE study which was negative for PE, but showed pulmonary edema  She was started on IV diuresis, placed on BiPAP and we are consulted to further assist with the management of her shortness of breath and heart failure  She continues to report significant abdominal discomfort and nausea as well  Past Medical History:   Diagnosis Date   • ADHD    • Anemia    • Anxiety    • Arthritis    • Asthma    • Atrial fibrillation ([facility] [address] )    • Cancer (HCC)    • Cancer (HCC)     liver   • Chronic iron deficiency anemia 6/9/2020    Extensive GI evaluation over the last year including multiple EGD, colonoscopy, and capsule endoscopy    Has had gastric AVMs cauterized, Dieulafoy's lesion clipped, and most recently a [place] erosion cauterized   • Coronary artery disease    • Depression    • GERD (gastroesophageal reflux disease)    • History of stomach ulcers    • Hypercholesterolemia    • Hypertension    • Kidney disease    • Metastatic cancer ([facility] [address] )    • Sepsis due to Escherichia coli ([facility] [address] ) 2021     Past Surgical History:   Procedure Laterality Date   • ANKLE SURGERY     • APPENDECTOMY     • BREAST BIOPSY     • BREAST SURGERY N/A     pt went to [facility] in the , had a procedure but unable to give date or what was done, just benign   • CATARACT EXTRACTION     •  SECTION     • COLONOSCOPY  2019    Multiple adenomatous colon polyps and internal hemorrhoids  Three year recall advised   • HIP SURGERY     • JOINT REPLACEMENT  2019    Left knee replacement   • LAMINECTOMY     • OOPHORECTOMY     • ROTATOR CUFF REPAIR     • SIGMOID RESECTION / RECTOPEXY     • SINUS SURGERY     • UPPER GASTROINTESTINAL ENDOSCOPY  2020    Dieulafoy's lesion in proximal stomach which was actively oozing  Injected with epinephrine and 2 Endoclips placed with control of bleeding, hiatal hernia     • UPPER GASTROINTESTINAL ENDOSCOPY  2020    Bleeding Seb's erosion status post cauterization     Family History   Problem Relation Age of Onset   • Diabetes Mother    • Hypertension Mother    • Lung cancer Mother    • Hyperlipidemia Father    • Prostate cancer Father    • No Known Problems Maternal Grandmother    • No Known Problems Maternal Grandfather    • Diabetes Family    • No Known Problems Maternal Aunt    • No Known Problems Maternal Aunt    • No Known Problems Maternal Aunt    • No Known Problems Paternal Aunt    • Breast cancer Cousin    • Breast cancer Cousin    • Substance Abuse Neg Hx    • Mental illness Neg Hx      Meds/Allergies   all current active meds have been reviewed and current meds:   Current Facility-Administered Medications   Medication Dose Route Frequency   • aspirin (ECOTRIN LOW STRENGTH) EC tablet 81 mg  81 mg Oral Daily   • bisacodyl (DULCOLAX) rectal suppository 10 mg  10 mg Rectal Daily PRN   • cefTRIAXone (ROCEPHIN) IVPB (premix in dextrose) 1,000 mg 50 mL  1,000 mg Intravenous Q24H   • diphenoxylate-atropine (LOMOTIL) 2 5-0 025 mg per tablet 1 tablet  1 tablet Oral 4x Daily PRN   • docusate sodium (COLACE) capsule 100 mg  100 mg Oral BID PRN   • doxycycline hyclate (VIBRAMYCIN) capsule 100 mg  100 mg Oral Q12H [address]   • heparin (porcine) subcutaneous injection 5,000 Units  5,000 Units Subcutaneous Q8H [address]   • hydroxychloroquine (PLAQUENIL) tablet 200 mg  200 mg Oral Daily With Breakfast   • levalbuterol (XOPENEX) inhalation solution 1 25 mg  1 25 mg Nebulization Q6H   • losartan (COZAAR) tablet 25 mg  25 mg Oral Daily   • metoprolol succinate (TOPROL-XL) 24 hr tablet 25 mg  25 mg Oral Daily   • ondansetron (ZOFRAN) injection 4 mg  4 mg Intravenous Once   • pantoprazole (PROTONIX) EC tablet 40 mg  40 mg Oral Daily Before Breakfast   • polyethylene glycol (MIRALAX) packet 17 g  17 g Oral Daily PRN   • sodium chloride 0 9 % inhalation solution 3 mL  3 mL Nebulization Q6H   • traMADol (ULTRAM) tablet 50 mg  50 mg Oral Q6H PRN   • trimethobenzamide (TIGAN) IM injection 200 mg  200 mg Intramuscular Q6H PRN     Medications Prior to Admission   Medication   • traMADol (ULTRAM) 50 mg tablet   • acetaminophen (TYLENOL) 500 mg tablet   • amoxicillin (AMOXIL) 500 mg capsule   • aspirin (ECOTRIN LOW STRENGTH) 81 mg EC tablet   • Avapritinib (Ayvakit) 200 MG TABS   • Azelastine HCl 137 MCG/SPRAY SOLN   • calcium carbonate (TUMS) 500 mg chewable tablet   • diphenhydrAMINE (BENADRYL) 25 mg tablet   • diphenoxylate-atropine (LOMOTIL) 2 5-0 025 mg per tablet   • docusate sodium (COLACE) 100 mg capsule   • ferrous sulfate 324 (65 Fe) mg   • furosemide (LASIX) 20 mg tablet   • hydroxychloroquine (PLAQUENIL) 200 mg tablet   • loperamide (IMODIUM) 2 mg capsule   • metoprolol succinate (TOPROL-XL) 25 mg 24 hr tablet   • multivitamin-iron-minerals-folic acid (CENTRUM) chewable tablet   • ondansetron (ZOFRAN) 8 mg tablet   • pantoprazole (PROTONIX) 40 mg tablet   • prednisoLONE acetate (PRED FORTE) 1 % ophthalmic suspension   • Triamcinolone Acetonide (NASACORT ALLERGY 24HR NA)     Allergies   Allergen Reactions   • Codeine Anaphylaxis     Throat closes   • Codeine Anaphylaxis   • Codeine Sulfate Throat Swelling   • Neomycin-Bacitracin Zn-Polymyx Rash   • Wound Dressing Adhesive Other (See Comments)     If its on too long- pt starts itching   • Zolmitriptan Palpitations     Heart palpitations  Generic for Zomig       Social History     Socioeconomic History   • Marital status: /Civil Union     Spouse name: None   • Number of children: None   • Years of education: None   • Highest education level: None   Occupational History   • None   Tobacco Use   • Smoking status: Never   • Smokeless tobacco: Never   Vaping Use   • Vaping Use: Never used   Substance and Sexual Activity   • Alcohol use: Never   • Drug use: Never   • Sexual activity: Not Currently   Other Topics Concern   • None   Social History Narrative    ** Merged History Encounter **         Wears seatbelt   Regular dental care      Social Determinants of Health     Financial Resource Strain: Not on file   Food Insecurity: Not on file   Transportation Needs: Not on file   Physical Activity: Not on file   Stress: Not on file   Social Connections: Not on file   Intimate Partner Violence: Not on file   Housing Stability: Not on file         Review of Systems   All other systems reviewed and are negative  Vitals:    12/26/22 1200 12/26/22 1300 12/26/22 1302 12/26/22 1400   BP: 159/86 160/92  155/77   Pulse: 87 92  96   Resp: (!) 29 (!) 31  (!) 36   Temp:       TempSrc:       SpO2: 91% 94% 96% 94%   Weight:       Height:         Orthostatic Blood Pressures    Flowsheet Row Most Recent Value   Blood Pressure 155/77 filed at 12/26/2022 1400        Body mass index is 20 99 kg/m²    Wt Readings from Last 5 Encounters:   12/26/22 50 4 kg (111 lb 1 8 oz)   11/22/22 50 8 kg (112 lb)   08/12/22 49 9 kg (110 lb)   07/27/22 50 3 kg (111 lb)   07/27/22 49 9 kg (110 lb)     I/O last 3 completed shifts: In: 1 [P O :620; IV Piggyback:350]  Out: 9649 [Urine:3277]      Physical Exam  Vitals and nursing note reviewed  Constitutional:       General: She is not in acute distress  Appearance: Normal appearance  She is well-developed  She is not ill-appearing  HENT:      Head: Normocephalic and atraumatic  Nose: No congestion  Eyes:      General: No scleral icterus  Conjunctiva/sclera: Conjunctivae normal    Neck:      Vascular: No carotid bruit or JVD  Cardiovascular:      Rate and Rhythm: Normal rate and regular rhythm  Pulses: Normal pulses  Heart sounds: Normal heart sounds  No murmur heard  No friction rub  No gallop  Pulmonary:      Effort: Pulmonary effort is normal  Tachypnea present  No respiratory distress  Breath sounds: Examination of the right-middle field reveals decreased breath sounds  Examination of the right-lower field reveals decreased breath sounds  Examination of the left-lower field reveals decreased breath sounds  Decreased breath sounds present  No rales  Abdominal:      General: There is no distension  Palpations: Abdomen is soft  Tenderness: There is no abdominal tenderness  Musculoskeletal:         General: No swelling or tenderness  Cervical back: Neck supple  Right lower leg: No edema  Left lower leg: No edema  Skin:     General: Skin is warm  Neurological:      General: No focal deficit present  Mental Status: She is alert and oriented to person, place, and time  Mental status is at baseline  Psychiatric:         Mood and Affect: Mood normal          Behavior: Behavior normal          Thought Content:  Thought content normal            Labs:  Results from last 7 days   Lab Units 12/26/22  0423 12/25/22  1513 12/25/22  0424 12/24/22  1929 12/24/22  1926   WBC Thousand/uL 7 63  --  6 25  --  7 43   HEMOGLOBIN g/dL 7 9*  --  7 5*  --  7 6*   I STAT HEMOGLOBIN g/dl  --  8 2*  --  8 5*  --    HEMATOCRIT % 23 8*  --  23 1*  --  24 1*   HEMATOCRIT, ISTAT %  --  24*  --  25*  --    RDW % 14 9  --  14 9  --  15 0   PLATELETS Thousands/uL 215  --  225  --  250     Results from last 7 days   Lab Units 12/26/22  0423 12/25/22  1513 12/25/22  0424 12/24/22  1929 12/24/22  1926   POTASSIUM mmol/L 3 5  --  3 5  --  3 6   CHLORIDE mmol/L 95*  --  97  --  97   CO2 mmol/L 26  --  25  --  19*   CO2, I-STAT mmol/L  --  25  --  17*  --    MAGNESIUM mg/dL  --   --  1 8  --   --    BUN mg/dL 24  --  18  --  16   CREATININE mg/dL 1 18  --  1 11  --  1 29   CALCIUM mg/dL 9 4  --  8 9  --  10 1   AST U/L  --   --   --   --  50*   ALT U/L  --   --   --   --  29   ALK PHOS U/L  --   --   --   --  92   GLUCOSE, ISTAT mg/dl  --  115  --  171*  --      Results from last 7 days   Lab Units 12/24/22 1926   CK TOTAL U/L 373*   CK MB INDEX % 1 8         Results from last 7 days   Lab Units 12/24/22 2010   INR  1 07             Imaging: XR chest 1 view portable    Result Date: 12/25/2022  Narrative: CHEST INDICATION:   sob  COMPARISON:  CXR 7/6/2022 and 7/5/2022, chest CT 12/24/2022  EXAM PERFORMED/VIEWS:  XR CHEST PORTABLE FINDINGS: Cardiomediastinal silhouette appears unremarkable  Moderate pulmonary edema with right greater than left effusions  Moderate curvature of the spine  Impression: Moderate pulmonary edema with right greater than left effusions  Workstation performed: [id number]     XR chest portable ICU    Result Date: 12/26/2022  Narrative: CHEST INDICATION:   follow up volume overload, AHRF  COMPARISON:  CXR 12/24/2022 and chest CT 12/24/2022  EXAM PERFORMED/VIEWS:  XR CHEST PORTABLE ICU FINDINGS: Cardiomediastinal silhouette appears unremarkable  Persistent moderate pulmonary edema with small effusions  No pneumothorax   Osseous structures appear within normal limits for patient age  Clip in the stomach  Impression: Persistent moderate pulmonary edema with small effusions  Workstation performed: [id number]     XR chest portable ICU    Result Date: 12/26/2022  Narrative: CHEST INDICATION:   shortness of breath, tachypnea  COMPARISON:  Compared 12/25/2022 EXAM PERFORMED/VIEWS:  XR CHEST PORTABLE ICU FINDINGS: Cardiomediastinal silhouette appears unremarkable  Worsening prominent interstitial markings of congestive changes with superimposed infiltrates in the perihilar region  No pneumothorax or pleural effusion  Osseous structures appear within normal limits for patient age  Impression: Worsening congestive changes with possible superimposed perihilar infiltrates  Effusions also suggested  Workstation performed: [id number]     CTA ED chest PE Study    Result Date: 12/24/2022  Narrative: CTA - CHEST WITH IV CONTRAST - PULMONARY ANGIOGRAM INDICATION:   PE  COMPARISON: 7/3/2022  TECHNIQUE: CTA examination of the chest was performed using angiographic technique according to a protocol specifically tailored to evaluate for pulmonary embolism  Axial, sagittal, and coronal 2D reformatted images were created from the source data and  submitted for interpretation  In addition, coronal 3D MIP postprocessing was performed on the acquisition scanner  Radiation dose length product (DLP) for this visit:  342 mGy-cm   This examination, like all CT scans performed in the [facility], was performed utilizing techniques to minimize radiation dose exposure, including the use of iterative reconstruction and automated exposure control  IV Contrast:  85 mL of iohexol (OMNIPAQUE)  FINDINGS: PULMONARY ARTERIAL TREE:  No pulmonary embolus is seen  LUNGS:  Bilateral multifocal groundglass opacities are seen prominently in central distribution  There is slightly more consolidative opacity noted at the medial right base   There is bilateral intralobular septal thickening noted   Pulmonary vascular congestive changes are seen  There is no tracheal or endobronchial lesion  PLEURA:  Small to moderate right and small left pleural effusions are seen with minimal subjacent compressive atelectasis on the right  Trace bilateral layering perifissural fluid is seen  HEART/GREAT VESSELS:  Cardiomegaly  No pericardial effusion  No thoracic aortic aneurysm  Trace atherosclerotic calcification thoracic aorta and coronary vasculature  MEDIASTINUM AND MICHAEL:  Unremarkable  CHEST WALL AND LOWER NECK:   Mild body wall edema  VISUALIZED STRUCTURES IN THE UPPER ABDOMEN:  No acute abnormality  Hepatic probable cysts similar to the prior again seen  Stable hardware within the stomach  OSSEOUS STRUCTURES:  No acute fracture or destructive osseous lesion  Spinal degenerative changes and scoliosis redemonstrated  Impression: No evidence for pulmonary embolus  Constellation of findings above overall suggestive of pulmonary edema in the setting of acute CHF versus fluid overload  Some consolidative opacity is seen at the medial right base possibly reflecting superimposed pneumonia  Correlate clinically   Workstation performed: [id number]       Cardiac testing:   Results for orders placed during the hospital encounter of 21    Echo complete with contrast if indicated    [address]    Transthoracic Echocardiogram  2D, M-mode, Doppler, and Color Doppler    Study date:  20-Aug-2021    Patient: Arnulfo Lorenz  MR number: [id number]  Account number: [de-identified]  : 1950  Age: 66 years  Gender: Female  Status: Inpatient  Location: [address] [place] [address]  Height: 61 in  Weight: 120 8 lb  BP: 84/ 50 mmHg    Indications: CAD    Diagnoses: I25 10 - Atherosclerotic heart disease of native coronary artery without angina pectoris    Sonographer:  SONIA Luna  Primary Physician:  Shayla Arce MD  Referring Physician:  Regino Fu PA-C  Group:  [facility]  Interpreting Physician:  Branden Monroe MD    SUMMARY    LEFT VENTRICLE:  Systolic function was normal  Ejection fraction was estimated to be 55 %  There were no regional wall motion abnormalities  Features were consistent with a pseudonormal left ventricular filling pattern, with concomitant abnormal relaxation and increased filling pressure (grade 2 diastolic dysfunction)  LEFT ATRIUM:  The atrium was moderately dilated  ATRIAL SEPTUM:  The septum bows from left to right, consistent with increased left atrial pressure  RIGHT ATRIUM:  The atrium was mildly to moderately dilated  MITRAL VALVE:  There was moderate diffuse thickening  There was moderate regurgitation  AORTIC VALVE:  There was mild regurgitation  TRICUSPID VALVE:  There was moderate regurgitation  Pulmonary artery systolic pressure was mildly increased  PULMONIC VALVE:  There was mild to moderate regurgitation  AORTA:  The root exhibited mild dilatation  The aortic root diameter was 38 mm  IVC, HEPATIC VEINS:  The inferior vena cava was mildly dilated  HISTORY: PRIOR HISTORY: HTN; HLD; Severe sepsis; Afib; Asthma; Elevated troponin; Anemia; MARK; CKD 3; CAD; Resp  failure; Hypoxia; Chronic pain; RBBB; 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; Metastatic cancer; Hard to intubate; F  smoker    PROCEDURE: The study was performed in the [facility]  This was a routine study  The transthoracic approach was used  The study included complete 2D imaging, M-mode, complete spectral Doppler, and color Doppler  Echocardiographic  views were limited due to decreased penetration and patient on mechanical ventilator  This was a technically difficult study  LEFT VENTRICLE: Size was normal  Systolic function was normal  Ejection fraction was estimated to be 55 %  There were no regional wall motion abnormalities   Wall thickness was normal  DOPPLER: Features were consistent with a pseudonormal  left ventricular filling pattern, with concomitant abnormal relaxation and increased filling pressure (grade 2 diastolic dysfunction)  RIGHT VENTRICLE: The size was normal  Systolic function was normal  Wall thickness was normal     LEFT ATRIUM: The atrium was moderately dilated  ATRIAL SEPTUM: The septum bows from left to right, consistent with increased left atrial pressure  RIGHT ATRIUM: The atrium was mildly to moderately dilated  MITRAL VALVE: There was moderate diffuse thickening  There was normal leaflet separation  DOPPLER: The transmitral velocity was within the normal range  There was no evidence for stenosis  There was moderate regurgitation  AORTIC VALVE: The valve was trileaflet  Leaflets exhibited normal thickness and normal cuspal separation  DOPPLER: Transaortic velocity was within the normal range  There was no evidence for stenosis  There was mild regurgitation  TRICUSPID VALVE: The valve structure was normal  There was normal leaflet separation  DOPPLER: The transtricuspid velocity was within the normal range  There was no evidence for stenosis  There was moderate regurgitation  Pulmonary artery  systolic pressure was mildly increased  PULMONIC VALVE: Leaflets exhibited normal thickness, no calcification, and normal cuspal separation  DOPPLER: The transpulmonic velocity was within the normal range  There was mild to moderate regurgitation  PERICARDIUM: There was no pericardial effusion  The pericardium was normal in appearance  AORTA: The root exhibited mild dilatation  SYSTEMIC VEINS: IVC: The inferior vena cava was mildly dilated  PULMONARY VEINS: DOPPLER: Doppler signals were not recordable in the pulmonary vein(s)      MEASUREMENT TABLES    2D MEASUREMENTS  Aorta   (Reference normals)  Root diam   38 mm   (--)    SYSTEM MEASUREMENT TABLES    2D  %FS: 33 17 %  Ao Diam: 3 04 cm  EDV(Teich): 151 22 ml  EF Biplane: 58 36 %  EF(Teich): 61 15 %  ESV(Teich): 58 75 ml  IVSd: 1 01 cm  LA Area: 30 16 cm2  LA Diam: 4 33 cm  LAAs A2C: 33 36 cm2  LAAs A4C: 31 72 cm2  LAESV A-L A2C: 142 3 ml  LAESV A-L A4C: 117 1 ml  LAESV Index (A-L): 88 43 ml/m2  LAESV MOD A2C: 129 42 ml  LAESV MOD A4C: 111 63 ml  LAESV(A-L): 135 3 ml  LAESV(MOD BP): 125 54 ml  LAESVInd MOD BP: 82 06 ml/m2  LALs A2C: 6 64 cm  LALs A4C: 7 29 cm  LVEDV MOD A2C: 182 43 ml  LVEDV MOD A4C: 154 27 ml  LVEDV MOD BP: 176 28 ml  LVEDVInd MOD BP: 115 22 ml/m2  LVEF MOD A2C: 65 99 %  LVEF MOD A4C: 47 39 %  LVESV MOD A2C: 62 04 ml  LVESV MOD A4C: 81 17 ml  LVESV MOD BP: 73 41 ml  LVESVInd MOD BP: 47 98 ml/m2  LVIDd: 5 56 cm  LVIDs: 3 72 cm  LVLd A2C: 8 47 cm  LVLd A4C: 7 64 cm  LVLs A2C: 6 38 cm  LVLs A4C: 6 87 cm  LVPWd: 0 86 cm  RA Area: 28 21 cm2  RVIDd: 4 1 cm  SV MOD A2C: 120 39 ml  SV MOD A4C: 73 1 ml  SV(Teich): 92 47 ml    CW  TR Vmax: 2 89 m/s  TR maxP 5 mmHg    MM  TAPSE: 1 9 cm    PW  E' Sept: 0 06 m/s  E/E' Sept: 11 69  MV A Raghav: 0 42 m/s  MV Dec Dawson: 3 73 m/s2  MV DecT: 196 18 ms  MV E Raghav: 0 73 m/s  MV E/A Ratio: 1 73  MV PHT: 56 89 ms  MVA By PHT: 3 87 cm2    [employer] Accredited Echocardi[facility]    Prepared and electronically signed by    Jody Marshall MD  Signed 20-Aug-2021 16:29:20    No results found for this or any previous visit  No results found for this or any previous visit  No results found for this or any previous visit  Milena Black

## 2022-12-26 NOTE — PLAN OF CARE
Problem: MOBILITY - ADULT  Goal: Maintain or return to baseline ADL function  Description: INTERVENTIONS:  -  Assess patient's ability to carry out ADLs; assess patient's baseline for ADL function and identify physical deficits which impact ability to perform ADLs (bathing, care of mouth/teeth, toileting, grooming, dressing, etc )  - Assess/evaluate cause of self-care deficits   - Assess range of motion  - Assess patient's mobility; develop plan if impaired  - Assess patient's need for assistive devices and provide as appropriate  - Encourage maximum independence but intervene and supervise when necessary  - Involve family in performance of ADLs  - Assess for home care needs following discharge   - Consider OT consult to assist with ADL evaluation and planning for discharge  - Provide patient education as appropriate  Outcome: Progressing  Goal: Maintains/Returns to pre admission functional level  Description: INTERVENTIONS:  - Perform BMAT or MOVE assessment daily    - Set and communicate daily mobility goal to care team and patient/family/caregiver  - Collaborate with rehabilitation services on mobility goals if consulted  - Perform Range of Motion 2 times a day  - Reposition patient every 2 hours    - Dangle patient 2 times a day  - Stand patient 1 times a day  - Ambulate patient 1 times a day  - Out of bed to chair 1 times a day   - Out of bed for meals 1 times a day  - Out of bed for toileting  - Record patient progress and toleration of activity level   Outcome: Progressing     Problem: Prexisting or High Potential for Compromised Skin Integrity  Goal: Skin integrity is maintained or improved  Description: INTERVENTIONS:  - Identify patients at risk for skin breakdown  - Assess and monitor skin integrity  - Assess and monitor nutrition and hydration status  - Monitor labs   - Assess for incontinence   - Turn and reposition patient  - Assist with mobility/ambulation  - Relieve pressure over bony prominences  - Avoid friction and shearing  - Provide appropriate hygiene as needed including keeping skin clean and dry  - Evaluate need for skin moisturizer/barrier cream  - Collaborate with interdisciplinary team   - Patient/family teaching  - Consider wound care consult   Outcome: Progressing     Problem: Potential for Falls  Goal: Patient will remain free of falls  Description: INTERVENTIONS:  - Educate patient/family on patient safety including physical limitations  - Instruct patient to call for assistance with activity   - Consult OT/PT to assist with strengthening/mobility   - Keep Call bell within reach  - Keep bed low and locked with side rails adjusted as appropriate  - Keep care items and personal belongings within reach  - Initiate and maintain comfort rounds  - Make Fall Risk Sign visible to staff  - Offer Toileting every 2 Hours, in advance of need  - Initiate/Maintain bed alarm  - Apply yellow socks and bracelet for high fall risk patients  - Consider moving patient to room near nurses station  Outcome: Progressing

## 2022-12-26 NOTE — PROGRESS NOTES
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  There is annular calcification  There is severe regurgitation"  • With 5lb weight gain since November  • Weight 119 yesterday, 111 today however unsure of bed scale accuracy   • Home diuretic: lasix 20 mg daily, has not missed doses  • Repeat CXR 12/25: Worsening congestive changes with possible superimposed perihilar infiltrates  Effusions also suggested  • Cardiology consulted, appreciate recs   • Update echo   · S/p IV lasix 40mg x 2 doses --> will complete 3rd dose today due increased O2 requirement & CXR   · If no improvement in O2 requirement consider CT chest tomorrow   • Strict I and O, Daily weights --> Net loss 2L today   • Queen removed     Acute respiratory failure with hypoxia (HCC)  Assessment & Plan  • Patient presenting to ED with worsening SOB, cough  • Noted to be 70% on RA, quickly escalated to NAWAF LEON [facility]  • CTA PE Study: No PE- findings suggestive of pulmonary edema in the setting of acute CHF versus fluid overload   Consolidative opacity at the medial right base possibly reflecting superimposed pneumonia  • S/P Bipap 1in setting of acute pulmonary edema in the setting of decompensated heart failure  • S/P IV diuresis, further diuresis per cards see above  • SIRS: tachypnea, tachycardia, however afebrile, no leukocytosis  • Procal positive yesterday, negative today --> repeat in am   • BC x2 negative at 24 hours  • Urine strep/legionella negative   • MRSA negative   • Continue ceftriaxone/doxy, consider discontinuation tomorrow if procal negative x 2   • Wean O2 as able --> on 8L midflow currently     Paroxysmal atrial fibrillation (HCC)  Assessment & Plan  • With history of atrial fibrillation  • On ASA as outpatient, however not on more aggressive AC d/t history of gastric AVMs  • Currently in NSR with PACs  • Continue metoprolol this AM    GIST (gastrointestinal stroma tumor), malignant, colon ([facility] [address] )  Assessment & Plan  • S/p sigmoid resection in setting of colon cancer about 20 years ago  •  endorses Ravinder Bautista recently stopped patients Avapritinib  • No acute interventions at this time  • Continue home prn medications for diarrhea/constipation    MARK (obstructive sleep apnea)  Assessment & Plan  • Patient endorses CPAP usage at night, can continue with diuresis once O2 is weaned     Stage 3b chronic kidney disease [facility])  Assessment & Plan  Lab Results   Component Value Date    EGFR 49 12/25/2022    EGFR 40 12/24/2022    EGFR 33 07/27/2022    CREATININE 1 11 12/25/2022    CREATININE 1 29 12/24/2022    CREATININE 1 54 (H) 07/27/2022     • With baseline Cr of 1 15-1 35  • Cr 1 1 currently   • Avoiding nephrotoxins  • Strict I and O, continue external cath  • Monitor BMP while receiving diuresis     Lactic acidosis  Assessment & Plan  • Patient on arrival with lactic acidosis to 7 2, with gapped metabolic acidosis  • Likely in setting of decompensated heart failure, hypoxia to 70% on RA  • Will receive 500cc IVF in setting of elevated lactate  • Lactate cleared to 1, resolved    Elevated troponin  Assessment & Plan  • Initial troponin elevated in setting of likely demand ischemia  • EKG without ischemic changes  • Continue to trend troponin to peak  • Will f/u formal ECHO as above    Pain syndrome, chronic  Assessment & Plan  • Takes tramadol 100mg Q6H in setting of chronic cancer related pain  • Can continue when admitted  • Utilize multimodal pain regimen    Essential hypertension  Assessment & Plan  • Home regimen of metoprolol succinate 25mg - continue   • Continue to trend vitals per unit routine         VTE Pharmacologic Prophylaxis: VTE Score: 5 High Risk (Score >/= 5) - Pharmacological DVT Prophylaxis Ordered: heparin  Sequential Compression Devices Ordered  Patient Centered Rounds: I performed bedside rounds with nursing staff today  Discussions with Specialists or Other Care Team Provider: Cardiology     Education and Discussions with Family / Patient: Attempted to update  () via phone  Left voicemail  Time Spent for Care: 30 minutes  More than 50% of total time spent on counseling and coordination of care as described above  Current Length of Stay: 2 day(s)  Current Patient Status: Inpatient   Certification Statement: The patient will continue to require additional inpatient hospital stay due to IV diureiss and O2  Discharge Plan: Anticipate discharge in 24-48 hrs to discharge location to be determined pending rehab evaluations  Code Status: Level 1 - Full Code    Subjective:   Patient reports slightly improved breathing today, still reports shortness of breath with movement and SHEPARD even with repositioning in bed  She denies fevers, chills, chest pain, palpitations  Notably has not had bowel movement and reports some abdominal bloating/discomfort  Objective:     Vitals:   Temp (24hrs), Av 4 °F (36 9 °C), Min:97 7 °F (36 5 °C), Max:98 8 °F (37 1 °C)    Temp:  [97 7 °F (36 5 °C)-98 8 °F (37 1 °C)] 98 5 °F (36 9 °C)  HR:  [] 90  Resp:  [24-33] 33  BP: (150-163)/(75-85) 156/84  SpO2:  [87 %-98 %] 87 %  Body mass index is 20 99 kg/m²  Input and Output Summary (last 24 hours):      Intake/Output Summary (Last 24 hours) at 2022 1158  Last data filed at 2022 0555  Gross per 24 hour   Intake 570 ml   Output 1000 ml Net -430 ml       Physical Exam:   Physical Exam  Vitals and nursing note reviewed  Constitutional:       General: She is not in acute distress  Appearance: She is well-developed  She is ill-appearing  HENT:      Head: Normocephalic and atraumatic  Eyes:      General:         Right eye: No discharge  Left eye: No discharge  Conjunctiva/sclera: Conjunctivae normal    Cardiovascular:      Rate and Rhythm: Normal rate and regular rhythm  Heart sounds: No murmur heard  Pulmonary:      Effort: Pulmonary effort is normal  No respiratory distress  Breath sounds: Rales present  No wheezing or rhonchi  Comments: Diminished breath sounds, rales  SpO2 92% on 8L midflow   Abdominal:      General: Bowel sounds are normal  There is no distension  Palpations: Abdomen is soft  Tenderness: There is abdominal tenderness  Musculoskeletal:      Cervical back: Neck supple  Right lower leg: No edema  Left lower leg: No edema  Comments: No pitting edema of the lower extremities   Skin:     General: Skin is warm and dry  Capillary Refill: Capillary refill takes less than 2 seconds  Neurological:      Mental Status: She is alert and oriented to person, place, and time     Psychiatric:         Mood and Affect: Mood normal          Behavior: Behavior normal           Additional Data:     Labs:  Results from last 7 days   Lab Units 12/26/22  0423 12/25/22  1513 12/25/22  0424 12/24/22  1929 12/24/22  1926   WBC Thousand/uL 7 63  --  6 25  --  7 43   HEMOGLOBIN g/dL 7 9*  --  7 5*  --  7 6*   I STAT HEMOGLOBIN   --    < >  --    < >  --    HEMATOCRIT % 23 8*  --  23 1*  --  24 1*   HEMATOCRIT, ISTAT   --    < >  --    < >  --    PLATELETS Thousands/uL 215  --  225  --  250   BANDS PCT %  --   --  3  --   --    NEUTROS PCT %  --   --   --   --  88*   LYMPHS PCT %  --   --   --   --  8*   LYMPHO PCT %  --   --  2*  --   --    MONOS PCT %  --   --   --   --  3*   MONO PCT % --   --  6  --   --    EOS PCT %  --   --  0  --  0    < > = values in this interval not displayed  Results from last 7 days   Lab Units 12/26/22 0423 12/24/22 1929 12/24/22 1926   SODIUM mmol/L 131*   < > 132*   POTASSIUM mmol/L 3 5   < > 3 6   CHLORIDE mmol/L 95*   < > 97   CO2 mmol/L 26   < > 19*   CO2, I-STAT   --    < >  --    BUN mg/dL 24   < > 16   CREATININE mg/dL 1 18   < > 1 29   ANION GAP mmol/L 10   < > 16*   CALCIUM mg/dL 9 4   < > 10 1   ALBUMIN g/dL  --   --  3 5   TOTAL BILIRUBIN mg/dL  --   --  0 30   ALK PHOS U/L  --   --  92   ALT U/L  --   --  29   AST U/L  --   --  50*   GLUCOSE RANDOM mg/dL 126   < > 167*    < > = values in this interval not displayed  Results from last 7 days   Lab Units 12/24/22 2010   INR  1 07             Results from last 7 days   Lab Units 12/26/22 0423 12/25/22 0424 12/24/22 2358 12/24/22 1926   LACTIC ACID mmol/L  --   --  1 0 7 6*   PROCALCITONIN ng/ml <0 05 0 46*  --  0 15       Lines/Drains:  Invasive Devices     Peripheral Intravenous Line  Duration           Peripheral IV 12/24/22 Dorsal (posterior); Left Forearm 1 day    Peripheral IV 12/24/22 Right Antecubital 1 day          Drain  Duration           External Urinary Catheter 1 day                  Telemetry:  Telemetry Orders (From admission, onward)             48 Hour Telemetry Monitoring  Continuous x 48 hours        References:    Telemetry Guidelines   Question:  Reason for 48 Hour Telemetry  Answer:  Arrhythmias Requiring Medical Therapy (eg  SVT, Vtach/fib, Bradycardia, Uncontrolled A-fib)                 Telemetry Reviewed: Normal Sinus Rhythm  Indication for Continued Telemetry Use: Acute CHF on >200 mg lasix/day or equivalent dose or with new reduced EF                Imaging: Reviewed radiology reports from this admission including: chest xray and chest CT scan    Recent Cultures (last 7 days):   Results from last 7 days   Lab Units 12/24/22 2247 12/24/22 1926   BLOOD CULTURE   --  No Growth at 24 hrs  No Growth at 24 hrs  LEGIONELLA URINARY ANTIGEN  Negative  --        Last 24 Hours Medication List:   Current Facility-Administered Medications   Medication Dose Route Frequency Provider Last Rate   • aspirin  81 mg Oral Daily Dino Aldana PA-C     • bisacodyl  10 mg Rectal Daily PRN Ghada Wright MD     • cefTRIAXone  1,000 mg Intravenous Q24H Dino Aldana PA-C 100 mL/hr at 12/26/22 5689   • diphenoxylate-atropine  1 tablet Oral 4x Daily PRN Dino Aldana PA-C     • docusate sodium  100 mg Oral BID PRN Dino Aldana PA-C     • doxycycline hyclate  100 mg Oral Q12H [facility] Dino Aldana PA-C     • heparin (porcine)  5,000 Units Subcutaneous [facility] Dino Aldana PA-C     • hydroxychloroquine  200 mg Oral Daily With Breakfast Dino Aldana PA-C     • levalbuterol  1 25 mg Nebulization Q6H Dino Aldana PA-C     • losartan  25 mg Oral Daily Dino Aldana PA-C     • metoprolol succinate  25 mg Oral Daily Dino Aldana PA-C     • ondansetron  4 mg Intravenous Once Dino Aldana PA-C     • pantoprazole  40 mg Oral Daily Before Breakfast Ghada Wright MD     • polyethylene glycol  17 g Oral Daily PRN Ghada Wright MD     • sodium chloride  3 mL Nebulization Q6H Ghada Wright MD     • traMADol  50 mg Oral Q6H PRN Dino Aldana PA-C     • trimethobenzamide  200 mg Intramuscular Q6H PRN Denise Pollard PA-C          Today, Patient Was Seen By: Yulissa Olmedo PA-C    **Please Note: This note may have been constructed using a voice recognition system  **

## 2022-12-27 PROBLEM — E87.20 LACTIC ACIDOSIS: Status: RESOLVED | Noted: 2020-06-02 | Resolved: 2022-12-27

## 2022-12-27 PROBLEM — R10.9 ABDOMINAL PAIN: Status: ACTIVE | Noted: 2022-12-27

## 2022-12-27 LAB
ANION GAP SERPL CALCULATED.3IONS-SCNC: 12 MMOL/L (ref 4–13)
AORTIC ROOT: 3.5 CM
AORTIC VALVE MEAN VELOCITY: 8.3 M/S
APICAL FOUR CHAMBER EJECTION FRACTION: 33 %
ASCENDING AORTA: 3.8 CM
ATRIAL RATE: 124 BPM
ATRIAL RATE: 141 BPM
ATRIAL RATE: 166 BPM
ATRIAL RATE: 82 BPM
ATRIAL RATE: 96 BPM
AV LVOT MEAN GRADIENT: 2 MMHG
AV LVOT PEAK GRADIENT: 4 MMHG
AV MEAN GRADIENT: 3 MMHG
AV PEAK GRADIENT: 5 MMHG
AV VELOCITY RATIO: 0.84
BASOPHILS # BLD AUTO: 0 THOUSANDS/ÂΜL (ref 0–0.1)
BASOPHILS NFR BLD AUTO: 0 % (ref 0–1)
BUN SERPL-MCNC: 31 MG/DL (ref 5–25)
CALCIUM SERPL-MCNC: 9 MG/DL (ref 8.3–10.1)
CHLORIDE SERPL-SCNC: 93 MMOL/L (ref 96–108)
CO2 SERPL-SCNC: 27 MMOL/L (ref 21–32)
CREAT SERPL-MCNC: 1.18 MG/DL (ref 0.6–1.3)
DOP CALC AO PEAK VEL: 1.12 M/S
DOP CALC AO VTI: 12.89 CM
DOP CALC LVOT PEAK VEL VTI: 12.8 CM
DOP CALC LVOT PEAK VEL: 0.94 M/S
E WAVE DECELERATION TIME: 151 MS
EOSINOPHIL # BLD AUTO: 0 THOUSAND/ÂΜL (ref 0–0.61)
EOSINOPHIL NFR BLD AUTO: 0 % (ref 0–6)
ERYTHROCYTE [DISTWIDTH] IN BLOOD BY AUTOMATED COUNT: 14.9 % (ref 11.6–15.1)
FRACTIONAL SHORTENING: 23 % (ref 28–44)
GFR SERPL CREATININE-BSD FRML MDRD: 45 ML/MIN/1.73SQ M
GLUCOSE SERPL-MCNC: 104 MG/DL (ref 65–140)
GLUCOSE SERPL-MCNC: 105 MG/DL (ref 65–140)
GLUCOSE SERPL-MCNC: 116 MG/DL (ref 65–140)
GLUCOSE SERPL-MCNC: 87 MG/DL (ref 65–140)
GLUCOSE SERPL-MCNC: 97 MG/DL (ref 65–140)
HCT VFR BLD AUTO: 24.8 % (ref 34.8–46.1)
HGB BLD-MCNC: 8.2 G/DL (ref 11.5–15.4)
IMM GRANULOCYTES # BLD AUTO: 0.02 THOUSAND/UL (ref 0–0.2)
IMM GRANULOCYTES NFR BLD AUTO: 0 % (ref 0–2)
INTERVENTRICULAR SEPTUM IN DIASTOLE (PARASTERNAL SHORT AXIS VIEW): 0.9 CM
INTERVENTRICULAR SEPTUM: 0.9 CM (ref 0.6–1.1)
LAAS-AP2: 35.4 CM2
LAAS-AP4: 39.8 CM2
LEFT ATRIUM SIZE: 5 CM
LEFT INTERNAL DIMENSION IN SYSTOLE: 4.6 CM (ref 2.1–4)
LEFT VENTRICLE DIASTOLIC VOLUME (MOD BIPLANE): 138 ML
LEFT VENTRICLE SYSTOLIC VOLUME (MOD BIPLANE): 89 ML
LEFT VENTRICULAR INTERNAL DIMENSION IN DIASTOLE: 6 CM (ref 3.5–6)
LEFT VENTRICULAR POSTERIOR WALL IN END DIASTOLE: 0.9 CM
LEFT VENTRICULAR STROKE VOLUME: 83 ML
LV EF: 35 %
LVSV (TEICH): 83 ML
LYMPHOCYTES # BLD AUTO: 0.13 THOUSANDS/ÂΜL (ref 0.6–4.47)
LYMPHOCYTES NFR BLD AUTO: 2 % (ref 14–44)
MCH RBC QN AUTO: 34.3 PG (ref 26.8–34.3)
MCHC RBC AUTO-ENTMCNC: 33.1 G/DL (ref 31.4–37.4)
MCV RBC AUTO: 104 FL (ref 82–98)
MONOCYTES # BLD AUTO: 0.58 THOUSAND/ÂΜL (ref 0.17–1.22)
MONOCYTES NFR BLD AUTO: 10 % (ref 4–12)
MV E'TISSUE VEL-LAT: 8 CM/S
MV E'TISSUE VEL-SEP: 5 CM/S
MV PEAK A VEL: 0.65 M/S
MV PEAK E VEL: 100 CM/S
MV STENOSIS PRESSURE HALF TIME: 44 MS
MV VALVE AREA P 1/2 METHOD: 5 CM2
NEUTROPHILS # BLD AUTO: 5.16 THOUSANDS/ÂΜL (ref 1.85–7.62)
NEUTS SEG NFR BLD AUTO: 88 % (ref 43–75)
NRBC BLD AUTO-RTO: 0 /100 WBCS
P AXIS: 82 DEGREES
P AXIS: 88 DEGREES
P AXIS: 94 DEGREES
PLATELET # BLD AUTO: 206 THOUSANDS/UL (ref 149–390)
PMV BLD AUTO: 10.1 FL (ref 8.9–12.7)
POTASSIUM SERPL-SCNC: 3.5 MMOL/L (ref 3.5–5.3)
PR INTERVAL: 158 MS
PR INTERVAL: 162 MS
PR INTERVAL: 178 MS
PROCALCITONIN SERPL-MCNC: 0.62 NG/ML
PULMONARY REGURGITATION LATE DIASTOLIC VELOCITY: 0.02 M/S
QRS AXIS: -62 DEGREES
QRS AXIS: -62 DEGREES
QRS AXIS: -63 DEGREES
QRS AXIS: -67 DEGREES
QRS AXIS: -69 DEGREES
QRSD INTERVAL: 126 MS
QRSD INTERVAL: 128 MS
QRSD INTERVAL: 130 MS
QRSD INTERVAL: 134 MS
QRSD INTERVAL: 134 MS
QT INTERVAL: 342 MS
QT INTERVAL: 358 MS
QT INTERVAL: 362 MS
QT INTERVAL: 398 MS
QT INTERVAL: 420 MS
QTC INTERVAL: 490 MS
QTC INTERVAL: 502 MS
QTC INTERVAL: 513 MS
QTC INTERVAL: 514 MS
QTC INTERVAL: 540 MS
RA PRESSURE ESTIMATED: 15 MMHG
RBC # BLD AUTO: 2.39 MILLION/UL (ref 3.81–5.12)
RIGHT ATRIAL 2D VOLUME: 57 ML
RIGHT ATRIUM AREA SYSTOLE A4C: 20.3 CM2
RIGHT VENTRICLE ID DIMENSION: 3.8 CM
RV PSP: 50 MMHG
SL CV LEFT ATRIUM LENGTH A2C: 6.8 CM
SL CV LV EF: 30
SL CV PED ECHO LEFT VENTRICLE DIASTOLIC VOLUME (MOD BIPLANE) 2D: 179 ML
SL CV PED ECHO LEFT VENTRICLE SYSTOLIC VOLUME (MOD BIPLANE) 2D: 96 ML
SODIUM SERPL-SCNC: 132 MMOL/L (ref 135–147)
T WAVE AXIS: 100 DEGREES
T WAVE AXIS: 91 DEGREES
T WAVE AXIS: 92 DEGREES
T WAVE AXIS: 94 DEGREES
T WAVE AXIS: 94 DEGREES
TR MAX PG: 35 MMHG
TR PEAK VELOCITY: 3 M/S
VENTRICULAR RATE: 124 BPM
VENTRICULAR RATE: 134 BPM
VENTRICULAR RATE: 135 BPM
VENTRICULAR RATE: 82 BPM
VENTRICULAR RATE: 96 BPM
WBC # BLD AUTO: 5.89 THOUSAND/UL (ref 4.31–10.16)

## 2022-12-27 RX ORDER — POTASSIUM CHLORIDE 20 MEQ/1
40 TABLET, EXTENDED RELEASE ORAL DAILY
Status: DISCONTINUED | OUTPATIENT
Start: 2022-12-27 | End: 2022-12-31 | Stop reason: HOSPADM

## 2022-12-27 RX ORDER — METOPROLOL SUCCINATE 50 MG/1
50 TABLET, EXTENDED RELEASE ORAL 2 TIMES DAILY
Status: DISCONTINUED | OUTPATIENT
Start: 2022-12-27 | End: 2022-12-31 | Stop reason: HOSPADM

## 2022-12-27 RX ORDER — POLYETHYLENE GLYCOL 3350 17 G/17G
17 POWDER, FOR SOLUTION ORAL DAILY
Status: DISCONTINUED | OUTPATIENT
Start: 2022-12-27 | End: 2022-12-31 | Stop reason: HOSPADM

## 2022-12-27 RX ADMIN — TRIMETHOBENZAMIDE HYDROCHLORIDE 200 MG: 100 INJECTION INTRAMUSCULAR at 00:53

## 2022-12-27 RX ADMIN — LOSARTAN POTASSIUM 25 MG: 25 TABLET, FILM COATED ORAL at 09:21

## 2022-12-27 RX ADMIN — LEVALBUTEROL HYDROCHLORIDE 1.25 MG: 1.25 SOLUTION, CONCENTRATE RESPIRATORY (INHALATION) at 19:29

## 2022-12-27 RX ADMIN — LEVALBUTEROL HYDROCHLORIDE 1.25 MG: 1.25 SOLUTION, CONCENTRATE RESPIRATORY (INHALATION) at 01:04

## 2022-12-27 RX ADMIN — SACUBITRIL AND VALSARTAN 1 TABLET: 24; 26 TABLET, FILM COATED ORAL at 17:40

## 2022-12-27 RX ADMIN — PANTOPRAZOLE SODIUM 40 MG: 40 TABLET, DELAYED RELEASE ORAL at 08:00

## 2022-12-27 RX ADMIN — HEPARIN SODIUM 5000 UNITS: 5000 INJECTION INTRAVENOUS; SUBCUTANEOUS at 05:01

## 2022-12-27 RX ADMIN — HYDROXYCHLOROQUINE SULFATE 200 MG: 200 TABLET, FILM COATED ORAL at 08:30

## 2022-12-27 RX ADMIN — LEVALBUTEROL HYDROCHLORIDE 1.25 MG: 1.25 SOLUTION, CONCENTRATE RESPIRATORY (INHALATION) at 13:51

## 2022-12-27 RX ADMIN — ISODIUM CHLORIDE 3 ML: 0.03 SOLUTION RESPIRATORY (INHALATION) at 19:29

## 2022-12-27 RX ADMIN — DOXYCYCLINE 100 MG: 100 CAPSULE ORAL at 09:21

## 2022-12-27 RX ADMIN — FUROSEMIDE 40 MG: 10 INJECTION, SOLUTION INTRAMUSCULAR; INTRAVENOUS at 09:00

## 2022-12-27 RX ADMIN — LEVALBUTEROL HYDROCHLORIDE 1.25 MG: 1.25 SOLUTION, CONCENTRATE RESPIRATORY (INHALATION) at 07:47

## 2022-12-27 RX ADMIN — HEPARIN SODIUM 5000 UNITS: 5000 INJECTION INTRAVENOUS; SUBCUTANEOUS at 21:20

## 2022-12-27 RX ADMIN — HEPARIN SODIUM 5000 UNITS: 5000 INJECTION INTRAVENOUS; SUBCUTANEOUS at 14:15

## 2022-12-27 RX ADMIN — POTASSIUM CHLORIDE 40 MEQ: 1500 TABLET, EXTENDED RELEASE ORAL at 11:22

## 2022-12-27 RX ADMIN — ISODIUM CHLORIDE 3 ML: 0.03 SOLUTION RESPIRATORY (INHALATION) at 13:51

## 2022-12-27 RX ADMIN — ISODIUM CHLORIDE 3 ML: 0.03 SOLUTION RESPIRATORY (INHALATION) at 07:47

## 2022-12-27 RX ADMIN — CEFTRIAXONE 1000 MG: 1 INJECTION, SOLUTION INTRAVENOUS at 20:50

## 2022-12-27 RX ADMIN — ISODIUM CHLORIDE 3 ML: 0.03 SOLUTION RESPIRATORY (INHALATION) at 01:04

## 2022-12-27 RX ADMIN — ASPIRIN 81 MG: 81 TABLET, COATED ORAL at 09:22

## 2022-12-27 RX ADMIN — METOPROLOL SUCCINATE 50 MG: 50 TABLET, EXTENDED RELEASE ORAL at 20:50

## 2022-12-27 RX ADMIN — FUROSEMIDE 40 MG: 10 INJECTION, SOLUTION INTRAMUSCULAR; INTRAVENOUS at 15:28

## 2022-12-27 RX ADMIN — METOPROLOL SUCCINATE 50 MG: 50 TABLET, EXTENDED RELEASE ORAL at 09:21

## 2022-12-27 NOTE — ASSESSMENT & PLAN NOTE
• Home regimen of metoprolol succinate 25mg -> increased to metoprolol 50 mg per cards  • SBP stable

## 2022-12-27 NOTE — ASSESSMENT & PLAN NOTE
• Takes tramadol 100mg Q6H in setting of chronic cancer related pain  • Can continue when admitted  • Utilize multimodal pain regimen Admission

## 2022-12-27 NOTE — UTILIZATION REVIEW
Initial Clinical Review    Admission: Date/Time/Statement:   Admission Orders (From admission, onward)     Ordered        12/24/22 2109  INPATIENT ADMISSION  Once                      Orders Placed This Encounter   Procedures   • INPATIENT ADMISSION     Standing Status:   Standing     Number of Occurrences:   1     Order Specific Question:   Level of Care     Answer:   Level 1 Stepdown [13]     Order Specific Question:   Estimated length of stay     Answer:   More than 2 Midnights     Order Specific Question:   Certification     Answer:   I certify that inpatient services are medically necessary for this patient for a duration of greater than two midnights  See H&P and MD Progress Notes for additional information about the patient's course of treatment  ED Arrival Information     Expected   -    Arrival   12/24/2022 19:10    Acuity   Emergent            Means of arrival   Wheelchair    Escorted by   Family Member    Service   Critical Care/ICU    Admission type   Emergency            Arrival complaint   vomiting, sob           Chief Complaint   Patient presents with   • Shortness of Breath     Pt c/o SOB since 1700, states shes had a cough for a few days  C/o vomiting        Initial Presentation: 74 y o  female with PMH of GIST s/p resection, Afib presents with c/o shortness of breath and cough for 2 days  SOB worse with activity  In ED, O2 sat on RA 74% placed on BiPAP, BNP 12,400, lactic acid 7 6, trop 52, , Na 132, anion gap 16, hgb 7 6  CTA PE study suggestive of pulmonary edema with questionable right middle lobe opacification  Given IV Lasix  Admit Inpatient ICU dt Acute decompensated heart failure, Acute respiratory failure with hypoxia: monitor volume status, IO and daily wts, fu on Echo, continue supplemental O2 on BiPAP and wean to NC as able, fu on blood cxs and labs, continue ABX, trend lactic acid and trops, trend renal indices, continue home meds and pain control      Date: 12/25   Day 2: Rales noted in BL upper lung fields, decreased breath sounds, BL LE edema noted  Possible additional IV lasix for further diuresis, continued on BiPAP overnight  Continue to monitor volume status, fu on echo, fu on cxs and labs, continue ABX, lactic acidosis resolved, continue to trend renal function  Transfer to [facility] Level 2      ED Triage Vitals   Temperature Pulse Respirations Blood Pressure SpO2   12/24/22 1920 12/24/22 1920 12/24/22 1920 12/24/22 1920 12/24/22 1920   97 9 °F (36 6 °C) (!) 133 (!) 32 151/85 (!) 74 %      Temp Source Heart Rate Source Patient Position - Orthostatic VS BP Location FiO2 (%)   12/24/22 1920 12/24/22 1920 12/26/22 2100 12/24/22 1920 --   Oral Monitor Lying Left arm       Pain Score       12/24/22 1920       No Pain          Wt Readings from Last 1 Encounters:   12/27/22 47 8 kg (105 lb 6 1 oz)     Additional Vital Signs:   Date/Time Temp Pulse Resp BP MAP (mmHg) SpO2 O2 Flow Rate (L/min) O2 Device O2 Interface Device   12/25/22 0900 -- -- -- -- -- -- -- Mid flow nasal cannula MFNC prongs   12/25/22 0800 98 6 °F (37 °C) 89 29 Abnormal  160/87 117 93 % -- -- --   12/25/22 0500 -- 90 26 Abnormal  151/92 117 90 % -- -- --   12/25/22 0400 98 1 °F (36 7 °C) 113 Abnormal  32 Abnormal  172/91 Abnormal  123 94 % -- -- --   12/25/22 0212 -- -- -- -- -- 97 % -- -- Face mask   12/25/22 0200 -- 79 20 155/81 112 97 % -- -- --   12/25/22 0100 -- 78 19 161/85 116 96 % -- -- --   12/25/22 0000 -- 91 30 Abnormal  160/107 Abnormal  127 95 % -- -- --   12/24/22 2300 -- 76 18 161/84 116 96 % -- -- --   12/24/22 2200 98 °F (36 7 °C) 75 19 167/81 116 95 % -- -- --   12/24/22 2120 -- -- -- -- -- 98 % -- -- Face mask   12/24/22 2116 -- 87 28 Abnormal  147/82 -- 90 % 12 L/min Mid flow nasal cannula --   12/24/22 2045 -- 115 Abnormal  30 Abnormal  155/88 115 88 % Abnormal  10 L/min Mid flow nasal cannula --   12/24/22 1945 -- 83 33 Abnormal  136/65 93 94 % -- -- --   12/24/22 1935 -- -- -- -- -- 94 % 10 L/min High flow nasal cannula [address]   12/24/22 1922 -- -- -- -- -- 96 % 13 L/min High flow nasal cannula --   12/24/22 1920 97 9 °F (36 6 °C) 133 Abnormal  32 Abnormal  151/85 111 74 % Abnormal  -- None (Room air) --     Pertinent Labs/Diagnostic Test Results:   12/24 EKG: NSR with PACs  12/25 ECHO PENDING    XR abdomen 1 view kub   Final Result by Joya Landon DO (12/27 5564)   1  Nonobstructive bowel gas pattern  2   Cardiomegaly with prominent interstitial lung markings within the visualized portions of the right lung base which appear slightly improved from chest x-ray of the previous day  This can be reevaluated on follow-up chest radiography as clinically    warranted  Workstation performed: [id number]         XR chest portable ICU   Final Result by Clive Marcus MD (12/26 1031)      Worsening congestive changes with possible superimposed perihilar infiltrates  Effusions also suggested  Workstation performed: [id number]         XR chest portable ICU   Final Result by Jessika Goodman MD (12/26 1332)      Persistent moderate pulmonary edema with small effusions  Workstation performed: [id number]         CTA ED chest PE Study   Final Result by Gertrude Madison MD (12/24 2059)      No evidence for pulmonary embolus  Constellation of findings above overall suggestive of pulmonary edema in the setting of acute CHF versus fluid overload  Some consolidative opacity is seen at the medial right base possibly reflecting superimposed pneumonia  Correlate clinically  Workstation performed: [id number]         XR chest 1 view portable   Final Result by Jessika Goodman MD (12/25 3030)      Moderate pulmonary edema with right greater than left effusions                    Workstation performed: [id number]           Results from last 7 days   Lab Units 12/26/22  1741 12/24/22 1926   SARS-COV-2  Negative Negative     Results from last 7 days   Lab Units 12/27/22  0508 12/26/22  0423 12/25/22  1513 12/25/22  0424 12/24/22 1929 12/24/22 1926   WBC Thousand/uL 5 89 7 63  --  6 25  --  7 43   HEMOGLOBIN g/dL 8 2* 7 9*  --  7 5*  --  7 6*   I STAT HEMOGLOBIN g/dl  --   --  8 2*  --  8 5*  --    HEMATOCRIT % 24 8* 23 8*  --  23 1*  --  24 1*   HEMATOCRIT, ISTAT %  --   --  24*  --  25*  --    PLATELETS Thousands/uL 206 215  --  225  --  250   NEUTROS ABS Thousands/µL 5 16  --   --   --   --  6 57   BANDS PCT %  --   --   --  3  --   --          Results from last 7 days   Lab Units 12/27/22  0508 12/26/22  0423 12/25/22  1513 12/25/22  0424 12/24/22 1929 12/24/22 1926   SODIUM mmol/L 132* 131*  --  133*  --  132*   POTASSIUM mmol/L 3 5 3 5  --  3 5  --  3 6   CHLORIDE mmol/L 93* 95*  --  97  --  97   CO2 mmol/L 27 26  --  25  --  19*   CO2, I-STAT mmol/L  --   --  25  --  17*  --    ANION GAP mmol/L 12 10  --  11  --  16*   BUN mg/dL 31* 24  --  18  --  16   CREATININE mg/dL 1 18 1 18  --  1 11  --  1 29   EGFR ml/min/1 73sq m 45 45  --  49  --  40   CALCIUM mg/dL 9 0 9 4  --  8 9  --  10 1   CALCIUM, IONIZED mmol/L  --   --   --  1 05*  --   --    CALCIUM, IONIZED, ISTAT mmol/L  --   --  1 15  --  1 16  --    MAGNESIUM mg/dL  --   --   --  1 8  --   --    PHOSPHORUS mg/dL  --   --   --  5 0*  --   --      Results from last 7 days   Lab Units 12/24/22 1926   AST U/L 50*   ALT U/L 29   ALK PHOS U/L 92   TOTAL PROTEIN g/dL 7 4   ALBUMIN g/dL 3 5   TOTAL BILIRUBIN mg/dL 0 30     Results from last 7 days   Lab Units 12/27/22  0512 12/27/22  0057   POC GLUCOSE mg/dl 116 104     Results from last 7 days   Lab Units 12/27/22  0508 12/26/22  0423 12/25/22  0424 12/24/22  1926   GLUCOSE RANDOM mg/dL 105 126 120 167*             No results found for: BETA-HYDROXYBUTYRATE           Results from last 7 days   Lab Units 12/25/22  1513 12/24/22  1929   PH, AXEL I-STAT   --  7 259*   PCO2, AXEL ISTAT mm HG  --  36 7*   PO2, AXEL ISTAT mm HG  --  28 0*   HCO3, AXEL ISTAT mmol/L  --  16 4*   I STAT BASE EXC mmol/L 0 -10*   I STAT O2 SAT % 97* 44*   ISTAT PH ART  7 442  --    I STAT ART PCO2 mm HG 35 0*  --    I STAT ART PO2 mm HG 83 0  --    I STAT ART HCO3 mmol/L 23 8  --      Results from last 7 days   Lab Units 12/24/22 1926   CK TOTAL U/L 373*   CK MB INDEX % 1 8   CK MB ng/mL 6 8*     Results from last 7 days   Lab Units 12/24/22 2358 12/24/22 2132 12/24/22 1926   HS TNI 0HR ng/L  --   --  52*   HS TNI 2HR ng/L  --  67*  --    HSTNI D2 ng/L  --  15  --    HS TNI 4HR ng/L 89*  --   --    HSTNI D4 ng/L 37*  --   --          Results from last 7 days   Lab Units 12/24/22 2010   PROTIME seconds 14 6*   INR  1 07   PTT seconds 26         Results from last 7 days   Lab Units 12/27/22  0508 12/26/22  0423 12/25/22  0424 12/24/22 1926   PROCALCITONIN ng/ml 0 62* <0 05 0 46* 0 15     Results from last 7 days   Lab Units 12/24/22 2358 12/24/22 1926   LACTIC ACID mmol/L 1 0 7 6*     Results from last 7 days   Lab Units 12/24/22 1926   NT-PRO BNP pg/mL 12,400*     Results from last 7 days   Lab Units 12/24/22 2138   CLARITY UA  Clear   COLOR UA  Colorless   SPEC GRAV UA  1 015   PH UA  6 5   GLUCOSE UA mg/dl Negative   KETONES UA mg/dl Negative   BLOOD UA  Negative   PROTEIN UA mg/dl Trace*   NITRITE UA  Negative   BILIRUBIN UA  Negative   UROBILINOGEN UA (BE) mg/dl <2 0   LEUKOCYTES UA  Negative   WBC UA /hpf None Seen   RBC UA /hpf None Seen   BACTERIA UA /hpf None Seen   EPITHELIAL CELLS WET PREP /hpf Occasional     Results from last 7 days   Lab Units 12/26/22  1741 12/24/22  2247 12/24/22 2138 12/24/22 1926   STREP PNEUMONIAE ANTIGEN, URINE   --   --  Negative  --    LEGIONELLA URINARY ANTIGEN   --  Negative  --   --    INFLUENZA A PCR  Negative  --   --  Negative   INFLUENZA B PCR  Negative  --   --  Negative   RSV PCR  Negative  --   --  Negative     Results from last 7 days   Lab Units 12/24/22  8485   BLOOD CULTURE  No Growth at 48 hrs  No Growth at 48 hrs  ED Treatment:   Medication Administration from 12/24/2022 1910 to 12/24/2022 2142       Date/Time Order Dose Route Action     12/24/2022 2047 EST furosemide (LASIX) injection 40 mg 40 mg Intravenous Given     12/24/2022 2039 EST iohexol (OMNIPAQUE) 350 MG/ML injection (SINGLE-DOSE) 85 mL 85 mL Intravenous Given     12/24/2022 2108 EST cefTRIAXone (ROCEPHIN) IVPB (premix in dextrose) 1,000 mg 50 mL 1,000 mg Intravenous New Bag     12/24/2022 2108 EST acetaminophen (TYLENOL) tablet 975 mg 975 mg Oral Given     12/24/2022 2108 EST traMADol (ULTRAM) tablet 50 mg 50 mg Oral Given        Past Medical History:   Diagnosis Date   • ADHD    • Anemia    • Anxiety    • Arthritis    • Asthma    • Atrial fibrillation (HCC)    • Cancer (HCC)    • Cancer ([facility] )     liver   • Chronic iron deficiency anemia 6/9/2020    Extensive GI evaluation over the last year including multiple EGD, colonoscopy, and capsule endoscopy    Has had gastric AVMs cauterized, Dieulafoy's lesion clipped, and most recently a Siria Greener erosion cauterized   • Coronary artery disease    • Depression    • GERD (gastroesophageal reflux disease)    • History of stomach ulcers    • Hypercholesterolemia    • Hypertension    • Kidney disease    • Metastatic cancer ([facility] )    • Sepsis due to Escherichia coli ([facility] ) 6/9/2021     Present on Admission:  • Stage 3b chronic kidney disease ([place] [address] )  • Paroxysmal atrial fibrillation (HCC)  • Essential hypertension  • MARK (obstructive sleep apnea)  • Pain syndrome, chronic  • GIST (gastrointestinal stroma tumor), malignant, colon (HCC)  • Acute decompensated heart failure (HCC)  • Elevated troponin  • (Resolved) Lactic acidosis  • Acute respiratory failure with hypoxia (HCC)  • Pleural effusion  • Anemia  • Abdominal pain      Admitting Diagnosis: Respiratory failure (HCC) [J96 90]  SOB (shortness of breath) [R06 02]  CHF exacerbation ([facility] ) [I50 9]  Age/Sex: 74 y o  female  Admission Orders:  Scheduled Medications:  aspirin, 81 mg, Oral, Daily  cefTRIAXone, 1,000 mg, Intravenous, Q24H  furosemide, 40 mg, Intravenous, BID (diuretic)  heparin (porcine), 5,000 Units, Subcutaneous, Q8H [address]  hydroxychloroquine, 200 mg, Oral, Daily With Breakfast  levalbuterol, 1 25 mg, Nebulization, Q6H  metoprolol succinate, 50 mg, Oral, BID  ondansetron, 4 mg, Intravenous, Once  pantoprazole, 40 mg, Oral, Daily Before Breakfast  polyethylene glycol, 17 g, Oral, Daily  potassium chloride, 40 mEq, Oral, Daily  sacubitril-valsartan, 1 tablet, Oral, BID  senna, 2 tablet, Oral, HS  sodium chloride, 3 mL, Nebulization, Q6H      Continuous IV Infusions: NONE     PRN Meds:  bisacodyl, 10 mg, Rectal, Daily PRN  diphenoxylate-atropine, 1 tablet, Oral, 4x Daily PRN  docusate sodium, 100 mg, Oral, BID PRN  metoprolol, 5 mg, Intravenous, Q6H PRN  traMADol, 50 mg, Oral, Q6H PRN  trimethobenzamide, 200 mg, Intramuscular, Q6H PRN    ondansetron (ZOFRAN) injection 4 mg  Dose: 4 mg  Freq: Every 6 hours PRN Route: IV x2 thus far  PRN Reason: nausea  Start: 12/25/22 0344 End: 12/25/22 0404    scd  Neuro checks    IP CONSULT TO CASE MANAGEMENT  IP CONSULT TO CARDIOLOGY  IP CONSULT TO PULMONOLOGY    Network Utilization Review Department  ATTENTION: Please call with any questions or concerns to [phone] and carefully listen to the prompts so that you are directed to the right person  All voicemails are confidential   Ladanie Menchaca all requests for admission clinical reviews, approved or denied determinations and any other requests to dedicated fax number below belonging to the campus where the patient is receiving treatment   List of dedicated fax numbers for the Facilities:  [address] DENIALS (Administrative/Medical Necessity) [phone]   [address] [place] [address] (Maternity/NICU/Pediatrics) Mikayla Marina [address] Emely Rodarte  [address] [place] [place] [address] Vincenzo Mccall [facility] [address] [facility] [place] [address] [phone]

## 2022-12-27 NOTE — PROGRESS NOTES
Cardiology Progress Note   Francisco Price 74 y o  female MRN: [id number]    Unit/Bed#: -01 Encounter: [id number]      Assessment:  1  Acute hypoxic respiratory failure  · Initially on BiPAP weaned to O2 via nasal cannula  · Overnight 12/26/2022, patient had increased work of breathing NC increased to 12L but then down titrated to 6L  · Multifactorial in the setting of right pleural effusion and acute CHF exacerbation  2  Acute on chronic combined CHF, EF 40%  · Started on IV Lasix 40 mg BID  · She has net -6 pounds and -5L since admission  · TTE 7/5/2022, EF 40%, moderate global hypokinesis, severe LA dilation, severe MR, moderate left pleural effusion  3  Severe MR  · Moderate -> severe MR per last TTE as above  · Prior TTE 8/20/2021 - EF 55%, no RWMAs, G2DD, moderate LAE, moderate MR, mild AR, moderate TR, mild to moderate VA, 3 8cm aortic root dilation  4   Moderate to large R pleural effusion  · IR thoracentesis recommended and ordered for today  5  Elevated troponins  • Hs-troponin 52>67>89  • Likely non-MI elevated secondary to acute CHF/hypoxia   6  Paroxysmal atrial fibrillation  • HR were noted in the 130s overnight s/p IV digoxin 250mcg x1 with improvement  • NSR on telemetry in the 90s currently,  • Toprol XL previously adjusted to 50mg QD  • Not on anticoagulation due to history of bleeding and AVMs, needing multiple transfusions  7  Chronic anemia/GIST  • She has received multiple blood transfusions over the last year and follows at [facility] for chemo/immunotherapy     Plan:  · Continue with IV Lasix 40mg BID, she is making good urine output and renal function stable overall     · Recommend IR consults for possible thoracentesis for her pleural effusion to improve her respiratory status  · For elevated HRs, increase Toprol XL to 50mg BID  · Continue losartan 25mg QD  · Not on [facility] given anemia/GIST/AVMs requiring frequent blood transfusion in the past - she is on ASA 81mg QD    Subjective: Patient seen and examined  Overnight events reviewed  Objective:     Vitals: Blood pressure 142/96, pulse 89, temperature (!) 97 4 °F (36 3 °C), temperature source Oral, resp  rate (!) 24, height 5' 1" (1 549 m), weight 47 8 kg (105 lb 6 1 oz), SpO2 100 %  , Body mass index is 19 91 kg/m² ,   Orthostatic Blood Pressures    Flowsheet Row Most Recent Value   Blood Pressure 142/96 filed at 12/27/2022 0800   Patient Position - Orthostatic VS Lying filed at 12/27/2022 0000            Intake/Output Summary (Last 24 hours) at 12/27/2022 1043  Last data filed at 12/27/2022 6670  Gross per 24 hour   Intake 50 ml   Output 2430 ml   Net -2380 ml         Physical Exam:    GEN: Dafne Vazquez appears well, alert and oriented x 3, pleasant and cooperative   HEENT: Sclera anicteric, conjunctivae pink, mucous membranes moist  Oropharynx clear  NECK: supple, no significant JVD, Trachea midline, no thyromegaly  HEART: regular rhythm, normal S1 and S2, no murmurs, clicks, gallops or rubs   LUNGS: decreased at bases; no wheezes, rales, or rhonchi  No increased work of breathing or signs of respiratory distress  ABDOMEN: Soft, nontender, nondistended  EXTREMITIES: Skin warm and well perfused, no clubbing, cyanosis, or edema  NEURO: No focal findings  Normal speech  Mood and affect normal    SKIN: Normal without suspicious lesions on exposed skin        Medications:      Current Facility-Administered Medications:   •  aspirin (ECOTRIN LOW STRENGTH) EC tablet 81 mg, 81 mg, Oral, Daily, Radha Seth PA-C, 81 mg at 12/27/22 6660  •  bisacodyl (DULCOLAX) rectal suppository 10 mg, 10 mg, Rectal, Daily PRN, Elpidio Shultz MD, 10 mg at 12/26/22 1659  •  cefTRIAXone (ROCEPHIN) IVPB (premix in dextrose) 1,000 mg 50 mL, 1,000 mg, Intravenous, Q24H, Radha Seth PA-C, Stopped at 12/26/22 2228  •  diphenoxylate-atropine (LOMOTIL) 2 5-0 025 mg per tablet 1 tablet, 1 tablet, Oral, 4x Daily PRN, Radha Seth PA-C  •  docusate sodium (COLACE) capsule 100 mg, 100 mg, Oral, BID PRN, Ivis Narvaez PA-C  •  doxycycline hyclate (VIBRAMYCIN) capsule 100 mg, 100 mg, Oral, Q12H [address], Ivis Narvaez, PA-C, 100 mg at 12/27/22 8796  •  furosemide (LASIX) injection 40 mg, 40 mg, Intravenous, BID (diuretic), Payal Gonsalez MD, 40 mg at 12/27/22 0900  •  heparin (porcine) subcutaneous injection 5,000 Units, 5,000 Units, Subcutaneous, Q8H [address], Ivisanabella Narvaez PA-C, 5,000 Units at 12/27/22 0501  •  hydroxychloroquine (PLAQUENIL) tablet 200 mg, 200 mg, Oral, Daily With Breakfast, DOUG Palacio-C, 200 mg at 12/27/22 0830  •  levalbuterol (Aspen Rho) inhalation solution 1 25 mg, 1 25 mg, Nebulization, Q6H, DOUG Palacio-C, 1 25 mg at 12/27/22 7711  •  losartan (COZAAR) tablet 25 mg, 25 mg, Oral, Daily, Ivis Narvaez PA-C, 25 mg at 12/27/22 8061  •  metoprolol (LOPRESSOR) injection 5 mg, 5 mg, Intravenous, Q6H PRN, Mancil Bernheim, CRNP  •  metoprolol succinate (TOPROL-XL) 24 hr tablet 50 mg, 50 mg, Oral, Daily, Deny Peters MD, 50 mg at 12/27/22 9649  •  ondansetron [facility]) injection 4 mg, 4 mg, Intravenous, Once, Ivis Narvaez PA-C  •  pantoprazole (PROTONIX) EC tablet 40 mg, 40 mg, Oral, Daily Before Breakfast, Angel Smith MD, 40 mg at 12/27/22 0800  •  polyethylene glycol (MIRALAX) packet 17 g, 17 g, Oral, Daily, Shana Escudero PA-C  •  senna (SENOKOT) tablet 17 2 mg, 2 tablet, Oral, HS, Shana Escudero PA-C, 17 2 mg at 12/26/22 2117  •  sodium chloride 0 9 % inhalation solution 3 mL, 3 mL, Nebulization, Q6H, Yuliya Abel MD, 3 mL at 12/27/22 0747  •  traMADol (ULTRAM) tablet 50 mg, 50 mg, Oral, Q6H PRN, Ivis Narvaez PA-C  •  trimethobenzamide Gwenevere Keara) IM injection 200 mg, 200 mg, Intramuscular, Q6H PRN, Amy Slater PA-C, 200 mg at 12/27/22 0053     Labs & Results:    Results from last 7 days   Lab Units 12/24/22  1926   CK TOTAL U/L 373*   CK MB INDEX % 1 8     Results from last 7 days   Lab Units 12/27/22  0508 12/26/22  0423 12/25/22  1513 12/25/22 0424   WBC Thousand/uL 5 89 7 63  --  6 25   HEMOGLOBIN g/dL 8 2* 7 9*  --  7 5*   I STAT HEMOGLOBIN g/dl  --   --  8 2*  --    HEMATOCRIT % 24 8* 23 8*  --  23 1*   HEMATOCRIT, ISTAT %  --   --  24*  --    PLATELETS Thousands/uL 206 215  --  225         Results from last 7 days   Lab Units 12/27/22  0508 12/26/22  0423 12/25/22  1513 12/25/22  0424 12/24/22  1929 12/24/22 1926 12/24/22 1926   POTASSIUM mmol/L 3 5 3 5  --  3 5  --   --  3 6   CHLORIDE mmol/L 93* 95*  --  97  --   --  97   CO2 mmol/L 27 26  --  25  --   --  19*   CO2, I-STAT mmol/L  --   --  25  --  17*   < >  --    BUN mg/dL 31* 24  --  18  --   --  16   CREATININE mg/dL 1 18 1 18  --  1 11  --   --  1 29   CALCIUM mg/dL 9 0 9 4  --  8 9  --   --  10 1   ALK PHOS U/L  --   --   --   --   --   --  92   ALT U/L  --   --   --   --   --   --  29   AST U/L  --   --   --   --   --   --  50*   GLUCOSE, ISTAT mg/dl  --   --  115  --  171*  --   --     < > = values in this interval not displayed       Results from last 7 days   Lab Units 12/24/22 2010   INR  1 07   PTT seconds 26     Results from last 7 days   Lab Units 12/25/22 0424   MAGNESIUM mg/dL 1 8

## 2022-12-27 NOTE — ASSESSMENT & PLAN NOTE
Lab Results   Component Value Date    EGFR 45 12/27/2022    EGFR 45 12/26/2022    EGFR 49 12/25/2022    CREATININE 1 18 12/27/2022    CREATININE 1 18 12/26/2022    CREATININE 1 11 12/25/2022     • With baseline Cr of 1 15-1 35  • Cr 1 18 currently   • Avoiding nephrotoxins  • Strict I and O, continue external cath  • Monitor BMP while receiving diuresis

## 2022-12-27 NOTE — PROGRESS NOTES
George Daleyon  Progress Note Corie Rehman [birth year], 74 y o  female MRN: [id number]  Unit/Bed#: -01 Encounter: [id number]  Primary Care Provider: Sy Rachel DO   Date and time admitted to hospital: 12/24/2022  7:18 PM    * Acute decompensated heart failure [facility])  Assessment & Plan  Wt Readings from Last 3 Encounters:   12/27/22 47 8 kg (105 lb 6 1 oz)   11/22/22 50 8 kg (112 lb)   08/12/22 49 9 kg (110 lb)     · Initially required Bipap, weaned to 6-8L midflow currently   • Last ECHO 7/2022: EF 40% G1DD, severe mitral regurgitation: "the valve morphology is consistent with myxomatous proliferation  There is annular calcification  There is severe regurgitation"  • Repeat CXR 12/25: Worsening congestive changes with possible superimposed perihilar infiltrates  Effusions also suggested  • Cardiology consulted, appreciate recs   • Update echo   • Continue IV lasix 40 mg BID, adjust per cardiology  • Strict I and O, Daily weights  • Weight 119 on 12/24 --> 105 lbs today   • Net loss around 5L since admission, around 2L in 24 hrs    Acute respiratory failure with hypoxia ([facility] [address] )  Assessment & Plan  • Patient presenting to ED with worsening SOB, cough  • Noted to be 70% on RA, quickly escalated to [address]  • CTA PE Study: No PE- findings suggestive of pulmonary edema in the setting of acute CHF versus fluid overload   Consolidative opacity at the medial right base possibly reflecting superimposed pneumonia  • S/P Bipap 1in setting of acute pulmonary edema in the setting of decompensated heart failure  • SIRS: tachypnea, tachycardia, however afebrile, no leukocytosis  • Procal is intermittently positive  • CXR with possible superimposed PNA  • BC x2 negative at 48 hours  • Urine strep/legionella negative   • MRSA negative   • Continue ceftriaxone IV, doxy and vanc discontinued    • Wean O2 as able --> on 6L midflow currently     Paroxysmal atrial fibrillation (HCC)  Assessment & Plan  • With history of atrial fibrillation  • On ASA as outpatient, however not on more aggressive AC d/t history of gastric AVMs  • Patient with episode Afib with RVR on 12/26, HR in 130s, resolved with 1x dose digoxin 0 25 mg   • Suspect secondary to abdominal pain/discomfort overnight    • Cardiology following     Abdominal pain  Assessment & Plan  · 12/26: Patient reporting generalized abdominal pain, nausea, dry heaving, constipation however refused po bowel regimen  Belly was soft, non-distended however tender with palpation in all quadrants  Patient was made NPO  · Obstruction series: Nonobstructive bowel gas pattern  · 1x enema given, no BM yet  · 12/27: abdominal pain resolved, no further N/V, no tenderness on exam, belly is soft and non-distended, BS +  Patient states she is hungry     · Restart diet, surgical soft, advance as tolerated   · Tigan PRN for further nausea, prolonged QT  · Will encourage miralax and senna for BM, no evidence of obstruction     Pleural effusion  Assessment & Plan  · Repeat CXR with large pleural effusion   · IR thoracentesis ordered     GIST (gastrointestinal stroma tumor), malignant, colon ([facility] [address] )  Assessment & Plan  • S/p sigmoid resection in setting of colon cancer about 20 years ago  •  endorses Meaghan Kapoor recently stopped patients Avapritinib  • No acute interventions at this time  • Continue home prn medications for diarrhea/constipation    MARK (obstructive sleep apnea)  Assessment & Plan  • Patient endorses CPAP usage at night, can continue with diuresis once O2 is weaned     Stage 3b chronic kidney disease [facility])  Assessment & Plan  Lab Results   Component Value Date    EGFR 45 12/27/2022    EGFR 45 12/26/2022    EGFR 49 12/25/2022    CREATININE 1 18 12/27/2022    CREATININE 1 18 12/26/2022    CREATININE 1 11 12/25/2022     • With baseline Cr of 1 15-1 35  • Cr 1 18 currently   • Avoiding nephrotoxins  • Strict I and O, continue external cath  • Monitor BMP while receiving diuresis     Elevated troponin  Assessment & Plan  • Initial troponin elevated in setting of likely demand ischemia  • EKG without ischemic changes  • Continue to trend troponin to peak  • Will f/u formal ECHO as above    Pain syndrome, chronic  Assessment & Plan  • Takes tramadol 100mg Q6H in setting of chronic cancer related pain  • Can continue when admitted  • Utilize multimodal pain regimen    Essential hypertension  Assessment & Plan  • Home regimen of metoprolol succinate 25mg -> increased to metoprolol 50 mg per cards  • SBP stable     Anemia  Assessment & Plan  · Hgb baseline is 7-10 since 07/2022  · Hgb currently 8 2, stable   · afib not on AC due to hsx of GIST, see above  · No evidence bleeding  · Continue to monitor on ASA and DVT prophylaxis     Lactic acidosis-resolved as of 12/27/2022  Assessment & Plan  • Patient on arrival with lactic acidosis to 7 2, with gapped metabolic acidosis  • Likely in setting of decompensated heart failure, hypoxia to 70% on RA  • Will receive 500cc IVF in setting of elevated lactate  • Lactate cleared to 1, resolved      VTE Pharmacologic Prophylaxis: VTE Score: 5 High Risk (Score >/= 5) - Pharmacological DVT Prophylaxis Ordered: heparin  Sequential Compression Devices Ordered  Patient Centered Rounds: I performed bedside rounds with nursing staff today  Discussions with Specialists or Other Care Team Provider: Cardiology     Education and Discussions with Family / Patient: Attempted to update  () via phone  Left voicemail  Time Spent for Care: 30 minutes  More than 50% of total time spent on counseling and coordination of care as described above      Current Length of Stay: 3 day(s)  Current Patient Status: Inpatient   Certification Statement: The patient will continue to require additional inpatient hospital stay due to IV diuresis, O2, IV abx  Discharge Plan: Anticipate discharge in 48-72 hrs to discharge location to be determined pending rehab evaluations  Code Status: Level 1 - Full Code    Subjective:   Patient is feeling much better than last night, no longer with labored breathing, N/V, or abdominal pain  She reports she is hungry, would like to eat  She was weaned from 13L to 6L midflow overnight, she is currently comfortable on this  Agreeable to thoracentesis  Objective:     Vitals:   Temp (24hrs), Av 9 °F (36 6 °C), Min:97 4 °F (36 3 °C), Max:98 5 °F (36 9 °C)    Temp:  [97 4 °F (36 3 °C)-98 5 °F (36 9 °C)] 97 4 °F (36 3 °C)  HR:  [] 89  Resp:  [21-39] 24  BP: (121-162)/() 142/96  SpO2:  [87 %-100 %] 100 %  Body mass index is 19 91 kg/m²  Input and Output Summary (last 24 hours): Intake/Output Summary (Last 24 hours) at 2022 1103  Last data filed at 2022 1001  Gross per 24 hour   Intake 110 ml   Output 2630 ml   Net -2520 ml       Physical Exam:   Physical Exam  Vitals and nursing note reviewed  Constitutional:       General: She is not in acute distress  Appearance: She is well-developed  She is ill-appearing  HENT:      Head: Normocephalic and atraumatic  Eyes:      General:         Right eye: No discharge  Left eye: No discharge  Conjunctiva/sclera: Conjunctivae normal    Cardiovascular:      Rate and Rhythm: Tachycardia present  Rhythm irregular  Heart sounds: No murmur heard  Pulmonary:      Effort: Pulmonary effort is normal  No respiratory distress  Breath sounds: Normal breath sounds  No wheezing, rhonchi or rales  Comments: diminished BS, comfortable on 6L midflow  Abdominal:      General: Bowel sounds are normal  There is no distension  Palpations: Abdomen is soft  Tenderness: There is no abdominal tenderness  Musculoskeletal:      Cervical back: Neck supple  Right lower leg: No edema  Left lower leg: No edema  Comments: No LE edema    Skin:     General: Skin is warm and dry        Capillary Refill: Capillary refill takes less than 2 seconds  Neurological:      Mental Status: She is alert and oriented to person, place, and time  Psychiatric:         Mood and Affect: Mood normal          Behavior: Behavior normal           Additional Data:     Labs:  Results from last 7 days   Lab Units 12/27/22  0508 12/25/22  1513 12/25/22  0424   WBC Thousand/uL 5 89   < > 6 25   HEMOGLOBIN g/dL 8 2*   < > 7 5*   I STAT HEMOGLOBIN   --    < >  --    HEMATOCRIT % 24 8*   < > 23 1*   HEMATOCRIT, ISTAT   --    < >  --    PLATELETS Thousands/uL 206   < > 225   BANDS PCT %  --   --  3   NEUTROS PCT % 88*  --   --    LYMPHS PCT % 2*  --   --    LYMPHO PCT %  --   --  2*   MONOS PCT % 10  --   --    MONO PCT %  --   --  6   EOS PCT % 0  --  0    < > = values in this interval not displayed  Results from last 7 days   Lab Units 12/27/22  0508 12/24/22  1929 12/24/22  1926   SODIUM mmol/L 132*   < > 132*   POTASSIUM mmol/L 3 5   < > 3 6   CHLORIDE mmol/L 93*   < > 97   CO2 mmol/L 27   < > 19*   CO2, I-STAT   --    < >  --    BUN mg/dL 31*   < > 16   CREATININE mg/dL 1 18   < > 1 29   ANION GAP mmol/L 12   < > 16*   CALCIUM mg/dL 9 0   < > 10 1   ALBUMIN g/dL  --   --  3 5   TOTAL BILIRUBIN mg/dL  --   --  0 30   ALK PHOS U/L  --   --  92   ALT U/L  --   --  29   AST U/L  --   --  50*   GLUCOSE RANDOM mg/dL 105   < > 167*    < > = values in this interval not displayed  Results from last 7 days   Lab Units 12/24/22  2010   INR  1 07     Results from last 7 days   Lab Units 12/27/22  0512 12/27/22  0057   POC GLUCOSE mg/dl 116 104         Results from last 7 days   Lab Units 12/27/22  0508 12/26/22  0423 12/25/22  0424 12/24/22  2358 12/24/22  1926   LACTIC ACID mmol/L  --   --   --  1 0 7 6*   PROCALCITONIN ng/ml 0 62* <0 05 0 46*  --  0 15       Lines/Drains:  Invasive Devices     Peripheral Intravenous Line  Duration           Peripheral IV 12/24/22 Dorsal (posterior); Left Forearm 2 days    Peripheral IV 12/24/22 Right Antecubital 2 days Telemetry:  Telemetry Orders (From admission, onward)             48 Hour Telemetry Monitoring  Continuous x 48 hours        Expiring   References:    Telemetry Guidelines   Question:  Reason for 48 Hour Telemetry  Answer:  Arrhythmias Requiring Medical Therapy (eg  SVT, Vtach/fib, Bradycardia, Uncontrolled A-fib)                 Telemetry Reviewed: Atrial fibrillation  HR averaging 100s  Indication for Continued Telemetry Use: Arrthymias requiring medical therapy             Imaging: Reviewed radiology reports from this admission including: chest xray and chest CT scan    Recent Cultures (last 7 days):   Results from last 7 days   Lab Units 12/24/22 2247 12/24/22 1926   BLOOD CULTURE   --  No Growth at 48 hrs  No Growth at 48 hrs     LEGIONELLA URINARY ANTIGEN  Negative  --        Last 24 Hours Medication List:   Current Facility-Administered Medications   Medication Dose Route Frequency Provider Last Rate   • aspirin  81 mg Oral Daily Pelon Ocasio PA-C     • bisacodyl  10 mg Rectal Daily PRN Tr Sneed MD     • cefTRIAXone  1,000 mg Intravenous Q24H Pelon Ocasio PA-C Stopped (12/26/22 2228)   • diphenoxylate-atropine  1 tablet Oral 4x Daily PRN Pelon Ocasio PA-C     • docusate sodium  100 mg Oral BID PRN Pelon Ocasio PA-C     • doxycycline hyclate  100 mg Oral Q12H [address] Pelon Ocasio PA-C     • furosemide  40 mg Intravenous BID (diuretic) Luisito Vallejo MD     • heparin (porcine)  5,000 Units Subcutaneous [facility] Pelon Ocasio PA-C     • hydroxychloroquine  200 mg Oral Daily With Breakfast Pelon Ocasio PA-C     • levalbuterol  1 25 mg Nebulization Q6H Pelon Ocasio PA-C     • losartan  25 mg Oral Daily Pelon Ocasio PA-C     • metoprolol  5 mg Intravenous Q6H PRN LANEY Stubbs     • metoprolol succinate  50 mg Oral Daily Luisito Vallejo MD     • ondansetron  4 mg Intravenous Once Pelon Ocaiso PA-C     • pantoprazole  40 mg Oral Daily Before Breakfast Yuliya Anibal Gutiérrez MD     • polyethylene glycol  17 g Oral Daily Maryanne Tinajero     • senna  2 tablet Oral HS Aníbal Cullen PA-C     • sodium chloride  3 mL Nebulization Q6H Yuliya Gutiérrez MD     • traMADol  50 mg Oral Q6H PRN Nguyen Liriano PA-C     • trimethobenzamide  200 mg Intramuscular Q6H PRN Sarah Ferraro PA-C          Today, Patient Was Seen By: Mitra Holloway PA-C    **Please Note: This note may have been constructed using a voice recognition system  **

## 2022-12-27 NOTE — ASSESSMENT & PLAN NOTE
• With history of atrial fibrillation  • On ASA as outpatient, however not on more aggressive AC d/t history of gastric AVMs  • Patient with episode Afib with RVR on 12/26, HR in 130s, resolved with 1x dose digoxin 0 25 mg   • Suspect secondary to abdominal pain/discomfort overnight    • Cardiology following

## 2022-12-27 NOTE — ASSESSMENT & PLAN NOTE
· 12/26: Patient reporting generalized abdominal pain, nausea, dry heaving, constipation however refused po bowel regimen  Belly was soft, non-distended however tender with palpation in all quadrants  Patient was made NPO  · Obstruction series: Nonobstructive bowel gas pattern  · 1x enema given, no BM yet  · 12/27: abdominal pain resolved, no further N/V, no tenderness on exam, belly is soft and non-distended, BS +  Patient states she is hungry     · Restart diet, surgical soft, advance as tolerated   · Tigan PRN for further nausea, prolonged QT  · Will encourage miralax and senna for BM, no evidence of obstruction

## 2022-12-27 NOTE — ASSESSMENT & PLAN NOTE
• Patient presenting to ED with worsening SOB, cough  • Noted to be 70% on RA, quickly escalated to [facility]  • CTA PE Study: No PE- findings suggestive of pulmonary edema in the setting of acute CHF versus fluid overload   Consolidative opacity at the medial right base possibly reflecting superimposed pneumonia  • S/P Bipap 1in setting of acute pulmonary edema in the setting of decompensated heart failure  • SIRS: tachypnea, tachycardia, however afebrile, no leukocytosis  • Procal is intermittently positive  • CXR with possible superimposed PNA  • BC x2 negative at 48 hours  • Urine strep/legionella negative   • MRSA negative   • Continue ceftriaxone IV, doxy and vanc discontinued    • Wean O2 as able --> on 6L midflow currently

## 2022-12-27 NOTE — PROGRESS NOTES
Pastoral Care Progress Note    2022  Patient: Glenroy Apodaca : [birth year]  Admission Date & Time: 2022  MRN: [id number] CSN: [id number]        Met with pt for intended introductory visit  Briefly met with pt "Dennis Walker" and introduced self and  services which was met with gratitude  Available to follow as requested via [address] 2   11-7 at [facility]       Thank you!                22 1100   Clinical Encounter Type   Visited With Patient   Routine Visit Introduction

## 2022-12-27 NOTE — ASSESSMENT & PLAN NOTE
· Hgb baseline is 7-10 since 07/2022  · Hgb currently 8 2, stable   · afib not on AC due to hsx of GIST, see above  · No evidence bleeding  · Continue to monitor on ASA and DVT prophylaxis

## 2022-12-27 NOTE — ASSESSMENT & PLAN NOTE
• S/p sigmoid resection in setting of colon cancer about 20 years ago  •  endorses Meaghan Kaporo recently stopped patients Avapritinib  • No acute interventions at this time  • Continue home prn medications for diarrhea/constipation

## 2022-12-27 NOTE — CASE MANAGEMENT
Case Management Assessment & Discharge Planning Note    Patient name Glenroy Em  Location /-01 MRN [id number]  : 1950 Date 2022       Current Admission Date: 2022  Current Admission Diagnosis:Acute decompensated heart failure [facility])   Patient Active Problem List    Diagnosis Date Noted   • Abdominal pain 2022   • Pleural effusion 2022   • Anemia due to antineoplastic chemotherapy 2022   • Acute respiratory failure with hypoxia ([address] ) 2022   • Acute decompensated heart failure ([address] ) 2022   • Metastatic cancer ([address] ) 2022   • Paroxysmal atrial fibrillation ([address] ) 2022   • CAD (coronary artery disease) 2022   • Primary hypertension 2022   • Chemotherapy-induced neutropenia ([address] ) 05/10/2022   • GIST (gastrointestinal stroma tumor), malignant, colon ([address] ) 2021   • MARK (obstructive sleep apnea) 2021   • Status post lumbar laminectomy 2021   • Stage 3b chronic kidney disease ([address] ) 10/14/2020   • Gastric AVM 2020   • Metastatic cancer ([facility] [address] ) 2020   • Elevated troponin 2020   • Non-rheumatic mitral regurgitation 2018   • Lumbar radiculopathy 2018   • Anemia 2017   • Spinal stenosis, lumbar region, with neurogenic claudication 2016   • Gait disturbance 2015   • Right bundle-branch block 2015   • Supraventricular tachycardia ([address] ) 2015   • Essential hypertension 2015   • Pain syndrome, chronic 2014      LOS (days): 3  Geometric Mean LOS (GMLOS) (days):   Days to GMLOS:     OBJECTIVE:    Risk of Unplanned Readmission Score: 27 16      Current admission status: Inpatient       Preferred Pharmacy:   [facility] [id number] [place] [address]  Phone: [phone] Fax: [phone]    Primary Care Provider: Myesha Brown DO    Primary Insurance: [address] REP  Secondary Insurance:     ASSESSMENT:  Active Health Care Proxies     Bibiana Almaraz00 [facility] - Spouse   Primary Phone: [phone] (Mobile)  Home Phone: [phone]               Readmission Root Cause  30 Day Readmission: No    Patient Information  Admitted from[de-identified] Home  Mental Status: Alert  During Assessment patient was accompanied by: Not accompanied during assessment  Assessment information provided by[de-identified] Patient  Support Systems: Spouse/significant other  [place] of Residence: [address] [place] [address] do you live in?: [facility] entry access options   Select all that apply : Stairs  Number of steps to enter home : 1  Type of Current Residence: 2 story home  Upon entering residence, is there a bedroom on the main floor (no further steps)?: No  A bedroom is located on the following floor levels of residence (select all that apply):: 2nd Floor  Upon entering residence, is there a bathroom on the main floor (no further steps)?: Yes  Number of steps to 2nd floor from main floor: One Flight  In the last 12 months, was there a time when you were not able to pay the mortgage or rent on time?: No  In the last 12 months, how many places have you lived?: 1  In the last 12 months, was there a time when you did not have a steady place to sleep or slept in a shelter (including now)?: No  Homeless/housing insecurity resource given?: N/A  Living Arrangements: Lives w/ Spouse/significant other  Is patient a ?: No    Activities of Daily Living Prior to Admission  Functional Status: Independent  Completes ADLs independently?: Yes  Ambulates independently?: Yes  Does patient use assisted devices?: No  Does patient currently own DME?: Yes  What DME does the patient currently own?: [employer]  Does patient have a history of Outpatient Therapy (PT/OT)?: No  Does the patient have a history of Short-Term Rehab?: No  Does patient have a history of HHC?: No  Does patient currently have HHC?: No    Patient Information Continued  Income Source: Pension/FDC  Does patient have prescription coverage?: Yes  Within the past 12 months, you worried that your food would run out before you got the money to buy more : Never true  Within the past 12 months, the food you bought just didn't last and you didn't have money to get more : Never true  Food insecurity resource given?: N/A  Does patient receive dialysis treatments?: No  Does patient have a history of substance abuse?: No  Does patient have a history of Mental Health Diagnosis?: No    Means of Transportation  Means of Transport to Appts[de-identified] Family transport  In the past 12 months, has lack of transportation kept you from medical appointments or from getting medications?: No  In the past 12 months, has lack of transportation kept you from meetings, work, or from getting things needed for daily living?: No  Was application for public transport provided?: N/A        DISCHARGE DETAILS:  Additional Comments: Met with Pt  Pt presents AA&Ox3  Discussed role of case management  Pt lives with nereyda in [facility],  heather  Independent PTA  Has walker, cane and commeliseo  Hx of [facility]  Denies hx of SNF/MH&D&A  Pt does not have home oxygen  Pt plans to return home upon discharge   to transport home  CM to follow

## 2022-12-27 NOTE — ASSESSMENT & PLAN NOTE
Wt Readings from Last 3 Encounters:   12/27/22 47 8 kg (105 lb 6 1 oz)   11/22/22 50 8 kg (112 lb)   08/12/22 49 9 kg (110 lb)     · Initially required Bipap, weaned to 6-8L midflow currently   • Last ECHO 7/2022: EF 40% G1DD, severe mitral regurgitation: "the valve morphology is consistent with myxomatous proliferation  There is annular calcification  There is severe regurgitation"  • Repeat CXR 12/25: Worsening congestive changes with possible superimposed perihilar infiltrates  Effusions also suggested    • Cardiology consulted, appreciate recs   • Update echo   • Continue IV lasix 40 mg BID, adjust per cardiology  • Strict I and O, Daily weights  • Weight 119 on 12/24 --> 105 lbs today   • Net loss around 5L since admission, around 2L in 24 hrs

## 2022-12-27 NOTE — UTILIZATION REVIEW
NOTIFICATION OF INPATIENT ADMISSION   AUTHORIZATION REQUEST   [facility]  [address] [place] [id number]  Tax ID: [id number]  NPI: [id number] ATTENDING PROVIDER:  Attending Name and NPI#: Ana Sosa Md [[id number]]  Address: [address] [place] [address]  Phone: [phone]   ADMISSION INFORMATION:  Place of Service: [place] [address]  Place of Service Code: 21  Inpatient Admission Date/Time: 12/24/22  9:09 PM  Discharge Date/Time: No discharge date for patient encounter  Admitting Diagnosis Code/Description:  Respiratory failure ([facility] [address] ) [J96 90]  SOB (shortness of breath) [R06 02]  CHF exacerbation ([facility] ) [I50 9]     UTILIZATION REVIEW CONTACT:  Jere Boyd Utilization   Network Utilization Review Department  Phone: [phone]  Fax [phone]  Email: [email or web address]  Contact for approvals/pending authorizations, clinical reviews, and discharge  PHYSICIAN ADVISORY SERVICES:  Medical Necessity Denial & [facility] Review  Phone: [phone]  Fax: [phone]  Email: [email or web address]

## 2022-12-27 NOTE — PLAN OF CARE
Problem: MOBILITY - ADULT  Goal: Maintain or return to baseline ADL function  Description: INTERVENTIONS:  -  Assess patient's ability to carry out ADLs; assess patient's baseline for ADL function and identify physical deficits which impact ability to perform ADLs (bathing, care of mouth/teeth, toileting, grooming, dressing, etc )  - Assess/evaluate cause of self-care deficits   - Assess range of motion  - Assess patient's mobility; develop plan if impaired  - Assess patient's need for assistive devices and provide as appropriate  - Encourage maximum independence but intervene and supervise when necessary  - Involve family in performance of ADLs  - Assess for home care needs following discharge   - Consider OT consult to assist with ADL evaluation and planning for discharge  - Provide patient education as appropriate  Outcome: Progressing  Goal: Maintains/Returns to pre admission functional level  Description: INTERVENTIONS:  - Perform BMAT or MOVE assessment daily    - Set and communicate daily mobility goal to care team and patient/family/caregiver  - Collaborate with rehabilitation services on mobility goals if consulted  - Reposition patient every 2 hours  Problem: Nutrition/Hydration-ADULT  Goal: Nutrient/Hydration intake appropriate for improving, restoring or maintaining nutritional needs  Description: Monitor and assess patient's nutrition/hydration status for malnutrition  Collaborate with interdisciplinary team and initiate plan and interventions as ordered  Monitor patient's weight and dietary intake as ordered or per policy  Utilize nutrition screening tool and intervene as necessary  Determine patient's food preferences and provide high-protein, high-caloric foods as appropriate       INTERVENTIONS:  - Monitor oral intake, urinary output, labs, and treatment plans  - Assess nutrition and hydration status and recommend course of action  - Evaluate amount of meals eaten  - Assist patient with eating if necessary   - Allow adequate time for meals  - Recommend/ encourage appropriate diets, oral nutritional supplements, and vitamin/mineral supplements  - Order, calculate, and assess calorie counts as needed  - Recommend, monitor, and adjust tube feedings and TPN/PPN based on assessed needs  - Assess need for intravenous fluids  - Provide specific nutrition/hydration education as appropriate  - Include patient/family/caregiver in decisions related to nutrition  Outcome: Progressing     - Out of bed for toileting  - Record patient progress and toleration of activity level   Outcome: Progressing

## 2022-12-27 NOTE — CASE MANAGEMENT
Case Management Assessment & Discharge Planning Note    Patient name Rajinder Sears  Location /-01 MRN [id number]  : 1950 Date 2022       Current Admission Date: 2022  Current Admission Diagnosis:Acute decompensated heart failure [facility])   Patient Active Problem List    Diagnosis Date Noted   • Abdominal pain 2022   • Pleural effusion 2022   • Anemia due to antineoplastic chemotherapy 2022   • Acute respiratory failure with hypoxia ([address] ) 2022   • Acute decompensated heart failure ([address] ) 2022   • Metastatic cancer ([address] ) 2022   • Paroxysmal atrial fibrillation ([address] ) 2022   • CAD (coronary artery disease) 2022   • Primary hypertension 2022   • Chemotherapy-induced neutropenia ([address] ) 05/10/2022   • GIST (gastrointestinal stroma tumor), malignant, colon ([address] ) 2021   • MARK (obstructive sleep apnea) 2021   • Status post lumbar laminectomy 2021   • Stage 3b chronic kidney disease ([address] ) 10/14/2020   • Gastric AVM 2020   • Metastatic cancer ([facility] ) 2020   • Elevated troponin 2020   • Non-rheumatic mitral regurgitation 2018   • Lumbar radiculopathy 2018   • Anemia 2017   • Spinal stenosis, lumbar region, with neurogenic claudication 2016   • Gait disturbance 2015   • Right bundle-branch block 2015   • Supraventricular tachycardia ([address] ) 2015   • Essential hypertension 2015   • Pain syndrome, chronic 2014      LOS (days): 3  Geometric Mean LOS (GMLOS) (days):   Days to GMLOS:     OBJECTIVE:    Risk of Unplanned Readmission Score: 27 16     Current admission status: Inpatient    Preferred Pharmacy:   [id number] [address]  Phone: [phone] Fax: [phone]    Primary Care Provider: Aysha Gimenez DO    Primary Insurance: [employer] LIAO [facility] REP  Secondary Insurance:     ASSESSMENT:  Active Health Care Proxies     Maria Elena Almaraz [facility] - Spouse   Primary Phone: [phone] (Mobile)  Home Phone: [phone]               Readmission Root Cause  30 Day Readmission: No    Patient Information  Admitted from[de-identified] Home  Mental Status: Alert  During Assessment patient was accompanied by: Not accompanied during assessment  Assessment information provided by[de-identified] Patient  Support Systems: Spouse/significant other  [place] of Residence: [address] [place] [address] do you live in?: [address] [place] [address] entry access options   Select all that apply : Stairs  Number of steps to enter home : 1  Type of Current Residence: 2 story home  Upon entering residence, is there a bedroom on the main floor (no further steps)?: No  A bedroom is located on the following floor levels of residence (select all that apply):: 2nd Floor  Upon entering residence, is there a bathroom on the main floor (no further steps)?: Yes  Number of steps to 2nd floor from main floor: One Flight  In the last 12 months, was there a time when you were not able to pay the mortgage or rent on time?: No  In the last 12 months, how many places have you lived?: 1  In the last 12 months, was there a time when you did not have a steady place to sleep or slept in a shelter (including now)?: No  Homeless/housing insecurity resource given?: N/A  Living Arrangements: Lives w/ Spouse/significant other  Is patient a ?: No    Activities of Daily Living Prior to Admission  Functional Status: Independent  Completes ADLs independently?: Yes  Ambulates independently?: Yes  Does patient use assisted devices?: No  Does patient currently own DME?: Yes  What DME does the patient currently own?: [employer]  Does patient have a history of Outpatient Therapy (PT/OT)?: No  Does the patient have a history of Short-Term Rehab?: No  Does patient have a history of HHC?: No  Does patient currently have HHC?: No    Patient Information Continued  Income Source: Pension/USP  Does patient have prescription coverage?: Yes  Within the past 12 months, you worried that your food would run out before you got the money to buy more : Never true  Within the past 12 months, the food you bought just didn't last and you didn't have money to get more : Never true  Food insecurity resource given?: N/A  Does patient receive dialysis treatments?: No  Does patient have a history of substance abuse?: No  Does patient have a history of Mental Health Diagnosis?: No    Means of Transportation  Means of Transport to Appts[de-identified] Family transport  In the past 12 months, has lack of transportation kept you from medical appointments or from getting medications?: No  In the past 12 months, has lack of transportation kept you from meetings, work, or from getting things needed for daily living?: No  Was application for public transport provided?: N/A    DISCHARGE DETAILS:  Additional Comments: Met with Pt  Pt presents AA&Ox3  Discussed role of case management  Pt lives with nereyda in [facility],  he[place]  Independent PTA  Has walker, cane and commeliseo  Hx of [facility]  Denies hx of SNF/MH&D&A  Pt does not have home oxygen  Pt plans to return home upon discharge   to transport home  CM to follow

## 2022-12-28 ENCOUNTER — APPOINTMENT (INPATIENT)
Dept: RADIOLOGY | Facility: HOSPITAL | Age: 72
End: 2022-12-28

## 2022-12-28 LAB
ANION GAP SERPL CALCULATED.3IONS-SCNC: 12 MMOL/L (ref 4–13)
BASOPHILS # BLD AUTO: 0 THOUSANDS/ÂΜL (ref 0–0.1)
BASOPHILS NFR BLD AUTO: 0 % (ref 0–1)
BUN SERPL-MCNC: 35 MG/DL (ref 5–25)
CALCIUM SERPL-MCNC: 8.6 MG/DL (ref 8.3–10.1)
CHLORIDE SERPL-SCNC: 95 MMOL/L (ref 96–108)
CO2 SERPL-SCNC: 26 MMOL/L (ref 21–32)
CREAT SERPL-MCNC: 1.12 MG/DL (ref 0.6–1.3)
EOSINOPHIL # BLD AUTO: 0 THOUSAND/ÂΜL (ref 0–0.61)
EOSINOPHIL NFR BLD AUTO: 0 % (ref 0–6)
ERYTHROCYTE [DISTWIDTH] IN BLOOD BY AUTOMATED COUNT: 14.6 % (ref 11.6–15.1)
GFR SERPL CREATININE-BSD FRML MDRD: 48 ML/MIN/1.73SQ M
GLUCOSE SERPL-MCNC: 105 MG/DL (ref 65–140)
GLUCOSE SERPL-MCNC: 94 MG/DL (ref 65–140)
HCT VFR BLD AUTO: 24.7 % (ref 34.8–46.1)
HGB BLD-MCNC: 8.1 G/DL (ref 11.5–15.4)
IMM GRANULOCYTES # BLD AUTO: 0.02 THOUSAND/UL (ref 0–0.2)
IMM GRANULOCYTES NFR BLD AUTO: 1 % (ref 0–2)
LYMPHOCYTES # BLD AUTO: 0.13 THOUSANDS/ÂΜL (ref 0.6–4.47)
LYMPHOCYTES NFR BLD AUTO: 3 % (ref 14–44)
MCH RBC QN AUTO: 34.5 PG (ref 26.8–34.3)
MCHC RBC AUTO-ENTMCNC: 32.8 G/DL (ref 31.4–37.4)
MCV RBC AUTO: 105 FL (ref 82–98)
MONOCYTES # BLD AUTO: 0.56 THOUSAND/ÂΜL (ref 0.17–1.22)
MONOCYTES NFR BLD AUTO: 13 % (ref 4–12)
NEUTROPHILS # BLD AUTO: 3.66 THOUSANDS/ÂΜL (ref 1.85–7.62)
NEUTS SEG NFR BLD AUTO: 83 % (ref 43–75)
NRBC BLD AUTO-RTO: 0 /100 WBCS
PLATELET # BLD AUTO: 209 THOUSANDS/UL (ref 149–390)
PMV BLD AUTO: 9.8 FL (ref 8.9–12.7)
POTASSIUM SERPL-SCNC: 3.9 MMOL/L (ref 3.5–5.3)
PROCALCITONIN SERPL-MCNC: 0.43 NG/ML
RBC # BLD AUTO: 2.35 MILLION/UL (ref 3.81–5.12)
SODIUM SERPL-SCNC: 133 MMOL/L (ref 135–147)
WBC # BLD AUTO: 4.37 THOUSAND/UL (ref 4.31–10.16)

## 2022-12-28 RX ADMIN — LEVALBUTEROL HYDROCHLORIDE 1.25 MG: 1.25 SOLUTION, CONCENTRATE RESPIRATORY (INHALATION) at 13:13

## 2022-12-28 RX ADMIN — SACUBITRIL AND VALSARTAN 1 TABLET: 24; 26 TABLET, FILM COATED ORAL at 17:52

## 2022-12-28 RX ADMIN — LEVALBUTEROL HYDROCHLORIDE 1.25 MG: 1.25 SOLUTION, CONCENTRATE RESPIRATORY (INHALATION) at 02:12

## 2022-12-28 RX ADMIN — PANTOPRAZOLE SODIUM 40 MG: 40 TABLET, DELAYED RELEASE ORAL at 05:55

## 2022-12-28 RX ADMIN — ISODIUM CHLORIDE 3 ML: 0.03 SOLUTION RESPIRATORY (INHALATION) at 19:48

## 2022-12-28 RX ADMIN — LEVALBUTEROL HYDROCHLORIDE 1.25 MG: 1.25 SOLUTION, CONCENTRATE RESPIRATORY (INHALATION) at 19:48

## 2022-12-28 RX ADMIN — POTASSIUM CHLORIDE 40 MEQ: 1500 TABLET, EXTENDED RELEASE ORAL at 08:03

## 2022-12-28 RX ADMIN — ISODIUM CHLORIDE 3 ML: 0.03 SOLUTION RESPIRATORY (INHALATION) at 07:29

## 2022-12-28 RX ADMIN — ASPIRIN 81 MG: 81 TABLET, COATED ORAL at 08:03

## 2022-12-28 RX ADMIN — SACUBITRIL AND VALSARTAN 1 TABLET: 24; 26 TABLET, FILM COATED ORAL at 08:04

## 2022-12-28 RX ADMIN — FUROSEMIDE 40 MG: 10 INJECTION, SOLUTION INTRAMUSCULAR; INTRAVENOUS at 08:03

## 2022-12-28 RX ADMIN — FUROSEMIDE 40 MG: 10 INJECTION, SOLUTION INTRAMUSCULAR; INTRAVENOUS at 15:49

## 2022-12-28 RX ADMIN — METOPROLOL SUCCINATE 50 MG: 50 TABLET, EXTENDED RELEASE ORAL at 22:26

## 2022-12-28 RX ADMIN — ISODIUM CHLORIDE 3 ML: 0.03 SOLUTION RESPIRATORY (INHALATION) at 13:13

## 2022-12-28 RX ADMIN — HEPARIN SODIUM 5000 UNITS: 5000 INJECTION INTRAVENOUS; SUBCUTANEOUS at 22:21

## 2022-12-28 RX ADMIN — LEVALBUTEROL HYDROCHLORIDE 1.25 MG: 1.25 SOLUTION, CONCENTRATE RESPIRATORY (INHALATION) at 07:29

## 2022-12-28 RX ADMIN — CEFTRIAXONE 1000 MG: 1 INJECTION, SOLUTION INTRAVENOUS at 20:37

## 2022-12-28 RX ADMIN — ISODIUM CHLORIDE 3 ML: 0.03 SOLUTION RESPIRATORY (INHALATION) at 02:12

## 2022-12-28 RX ADMIN — HEPARIN SODIUM 5000 UNITS: 5000 INJECTION INTRAVENOUS; SUBCUTANEOUS at 14:59

## 2022-12-28 RX ADMIN — METOPROLOL SUCCINATE 50 MG: 50 TABLET, EXTENDED RELEASE ORAL at 08:03

## 2022-12-28 RX ADMIN — HYDROXYCHLOROQUINE SULFATE 200 MG: 200 TABLET, FILM COATED ORAL at 08:06

## 2022-12-28 RX ADMIN — HEPARIN SODIUM 5000 UNITS: 5000 INJECTION INTRAVENOUS; SUBCUTANEOUS at 05:55

## 2022-12-28 NOTE — ASSESSMENT & PLAN NOTE
• Initial troponin elevated in setting of likely demand ischemia  • EKG without ischemic changes  • Cardiology Following

## 2022-12-28 NOTE — ASSESSMENT & PLAN NOTE
· Hgb baseline is 7-10 since 07/2022  · Hgb currently stable   · afib not on AC due to hsx of GIST, see above  · No evidence bleeding  · Continue to monitor on ASA and DVT prophylaxis

## 2022-12-28 NOTE — ASSESSMENT & PLAN NOTE
• Home Medication:  • Tramadol 100mg Q6H in setting of chronic cancer related pain  • Can continue when admitted  • Utilize multimodal pain regimen

## 2022-12-28 NOTE — ASSESSMENT & PLAN NOTE
· 12/26: Patient reporting generalized abdominal pain, nausea, dry heaving, constipation however refused po bowel regimen  Belly was soft, non-distended however tender with palpation in all quadrants  Patient was made NPO  · Obstruction series: Nonobstructive bowel gas pattern    · 1x enema given, no BM yet  · Patient with improved abdominal pain, no further N/V, no tenderness on exam, belly is soft and non-distended  · Continue diet, surgical soft, advance as tolerated   · Tigan PRN for further nausea, prolonged QT  · Continue miralax and senna for BM, no evidence of obstruction

## 2022-12-28 NOTE — ASSESSMENT & PLAN NOTE
Wt Readings from Last 3 Encounters:   12/28/22 47 9 kg (105 lb 9 6 oz)   11/22/22 50 8 kg (112 lb)   08/12/22 49 9 kg (110 lb)     · Initially required Bipap, weaned to 6-8L midflow currently   • Last ECHO 7/2022: EF 40% G1DD, severe mitral regurgitation: "the valve morphology is consistent with myxomatous proliferation  There is annular calcification  There is severe regurgitation"  • Repeat CXR 12/25: Worsening congestive changes with possible superimposed perihilar infiltrates  Effusions also suggested  • Updated Echo: EF 30% with severe global hypokinesis  G2DD  Severe Mitral valve regurgitation    • Repeat CXR: Pending  • Cardiology consulted, appreciate recs   • Continue IV lasix 40 mg BID, adjust per cardiology  • Strict I and O, Daily weights

## 2022-12-28 NOTE — PROGRESS NOTES
[place]  Progress Note Paco Baez [birth year], 74 y o  female MRN: [id number]  Unit/Bed#: -01 Encounter: [id number]  Primary Care Provider: Rob Pro DO   Date and time admitted to hospital: 12/24/2022  7:18 PM    * Acute decompensated heart failure [facility])  Assessment & Plan  Wt Readings from Last 3 Encounters:   12/28/22 47 9 kg (105 lb 9 6 oz)   11/22/22 50 8 kg (112 lb)   08/12/22 49 9 kg (110 lb)     · Initially required Bipap, weaned to 6-8L midflow currently   • Last ECHO 7/2022: EF 40% G1DD, severe mitral regurgitation: "the valve morphology is consistent with myxomatous proliferation  There is annular calcification  There is severe regurgitation"  • Repeat CXR 12/25: Worsening congestive changes with possible superimposed perihilar infiltrates  Effusions also suggested  • Updated Echo: EF 30% with severe global hypokinesis  G2DD  Severe Mitral valve regurgitation  • Repeat CXR: Pending  • Cardiology consulted, appreciate recs   • Continue IV lasix 40 mg BID, adjust per cardiology  • Strict I and O, Daily weights    Acute respiratory failure with hypoxia ([address] )  Assessment & Plan  • Patient presenting to ED with worsening SOB, cough  • Noted to be 70% on RA, quickly escalated to [address]  • CTA PE Study: No PE- findings suggestive of pulmonary edema in the setting of acute CHF versus fluid overload   Consolidative opacity at the medial right base possibly reflecting superimposed pneumonia  • S/P Bipap 1in setting of acute pulmonary edema in the setting of decompensated heart failure  • SIRS: tachypnea, tachycardia, however afebrile, no leukocytosis  • Procal is intermittently positive  • CXR with possible superimposed PNA  • BC x2 negative at 48 hours  • Urine strep/legionella negative   • MRSA negative   • Doxy/Vancomcin Discontinued  • Continue IV ceftriaxone (D5)  • Wean O2 as able --> on 2L NC    Paroxysmal atrial fibrillation (HCC)  Assessment & Plan  • With history of atrial fibrillation  • On ASA as outpatient, however not on more aggressive AC d/t history of gastric AVMs  • Patient with episode Afib with RVR on 12/26, HR in 130s  • Resolved with 1x dose digoxin 0 25 mg   • Continue with Telemetry  • Cardiology following     Abdominal pain  Assessment & Plan  · 12/26: Patient reporting generalized abdominal pain, nausea, dry heaving, constipation however refused po bowel regimen  Belly was soft, non-distended however tender with palpation in all quadrants  Patient was made NPO  · Obstruction series: Nonobstructive bowel gas pattern  · 1x enema given, no BM yet  · Patient with improved abdominal pain, no further N/V, no tenderness on exam, belly is soft and non-distended  · Continue diet, surgical soft, advance as tolerated   · Tigan PRN for further nausea, prolonged QT  · Continue miralax and senna for BM, no evidence of obstruction     Pleural effusion  Assessment & Plan  · CXR with large pleural effusion?   · Repeat CXR: Pending  · Continue with IV Diuresis  · Can consider IR consultation for thoracentesis if effusions or respiratory status worsen    GIST (gastrointestinal stroma tumor), malignant, colon ([facility] [address] )  Assessment & Plan  • S/p sigmoid resection in setting of colon cancer about 20 years ago  •  endorses Areli Likes recently stopped patients Avapritinib  • No acute interventions at this time  • Continue home prn medications for diarrhea/constipation    MARK (obstructive sleep apnea)  Assessment & Plan  • Patient endorses CPAP usage at night    Stage 3b chronic kidney disease [facility])  Assessment & Plan  Lab Results   Component Value Date    EGFR 48 12/28/2022    EGFR 45 12/27/2022    EGFR 45 12/26/2022    CREATININE 1 12 12/28/2022    CREATININE 1 18 12/27/2022    CREATININE 1 18 12/26/2022     • With baseline Cr of 1 15-1 35  • Cr currently stable at baseline  • Avoiding nephrotoxins  • Strict I and O, continue external cath  • Monitor BMP while receiving diuresis     Elevated troponin  Assessment & Plan  • Initial troponin elevated in setting of likely demand ischemia  • EKG without ischemic changes  • Cardiology Following    Pain syndrome, chronic  Assessment & Plan  • Home Medication:  • Tramadol 100mg Q6H in setting of chronic cancer related pain  • Can continue when admitted  • Utilize multimodal pain regimen    Essential hypertension  Assessment & Plan  • BP reviewed and remains stable  • Home regimen:  • metoprolol succinate 25mg -> increased to metoprolol 50 mg per cards  • Continue to monitor while admitted    Anemia  Assessment & Plan  · Hgb baseline is 7-10 since 07/2022  · Hgb currently stable   · afib not on AC due to hsx of GIST, see above  · No evidence bleeding  · Continue to monitor on ASA and DVT prophylaxis         VTE Pharmacologic Prophylaxis: VTE Score: 5 High Risk (Score >/= 5) - Pharmacological DVT Prophylaxis Ordered: heparin  Sequential Compression Devices Ordered  Patient Centered Rounds: I performed bedside rounds with nursing staff today  Discussions with Specialists or Other Care Team Provider: Cardiology    Education and Discussions with Family / Patient: Updated  () via phone  Time Spent for Care: 30 minutes  More than 50% of total time spent on counseling and coordination of care as described above  Current Length of Stay: 4 day(s)  Current Patient Status: Inpatient   Certification Statement: The patient will continue to require additional inpatient hospital stay due to CHF exacerbation and newly reduced EF requiring IV diuresis and cardiology consultation, medical management  Discharge Plan: Anticipate discharge in 24-48 hrs to discharge location to be determined pending rehab evaluations  Code Status: Level 1 - Full Code    Subjective:   Patient states she feels so-so, better than yesterday  Patient states she is having difficulties with laying in the bed and sitting still  Patient denies any active chest pain, abdominal pain, nausea, or vomiting  Objective:     Vitals:   Temp (24hrs), Av 9 °F (36 6 °C), Min:97 6 °F (36 4 °C), Max:98 6 °F (37 °C)    Temp:  [97 6 °F (36 4 °C)-98 6 °F (37 °C)] 97 8 °F (36 6 °C)  HR:  [67-96] 70  Resp:  [15-29] 15  BP: (107-153)/(59-73) 107/73  SpO2:  [92 %-100 %] 97 %  Body mass index is 19 95 kg/m²  Input and Output Summary (last 24 hours): Intake/Output Summary (Last 24 hours) at 2022 1906  Last data filed at 2022 1701  Gross per 24 hour   Intake 1000 ml   Output 850 ml   Net 150 ml       Physical Exam:   Physical Exam  Vitals and nursing note reviewed  Constitutional:       General: She is not in acute distress  Appearance: Normal appearance  HENT:      Head: Normocephalic  Nose: Nose normal  No congestion  Mouth/Throat:      Mouth: Mucous membranes are moist       Pharynx: Oropharynx is clear  Cardiovascular:      Rate and Rhythm: Normal rate  Rhythm irregular  Pulses: Normal pulses  Heart sounds: Normal heart sounds  No murmur heard  Pulmonary:      Effort: Pulmonary effort is normal  No respiratory distress  Abdominal:      General: Bowel sounds are normal  There is no distension  Palpations: Abdomen is soft  Tenderness: There is no abdominal tenderness  Musculoskeletal:         General: Normal range of motion  Cervical back: Normal range of motion  Right lower leg: No edema  Left lower leg: No edema  Skin:     General: Skin is warm and dry  Capillary Refill: Capillary refill takes less than 2 seconds  Neurological:      Mental Status: She is alert and oriented to person, place, and time  Mental status is at baseline  Motor: Weakness (Generalized) present  Psychiatric:         Mood and Affect: Affect is flat  Speech: Speech normal          Behavior: Behavior is cooperative            Additional Data:     Labs:  Results from last 7 days Lab Units 12/28/22  0603 12/25/22  1513 12/25/22  0424   WBC Thousand/uL 4 37   < > 6 25   HEMOGLOBIN g/dL 8 1*   < > 7 5*   I STAT HEMOGLOBIN   --    < >  --    HEMATOCRIT % 24 7*   < > 23 1*   HEMATOCRIT, ISTAT   --    < >  --    PLATELETS Thousands/uL 209   < > 225   BANDS PCT %  --   --  3   NEUTROS PCT % 83*   < >  --    LYMPHS PCT % 3*   < >  --    LYMPHO PCT %  --   --  2*   MONOS PCT % 13*   < >  --    MONO PCT %  --   --  6   EOS PCT % 0   < > 0    < > = values in this interval not displayed  Results from last 7 days   Lab Units 12/28/22  0603 12/24/22  1929 12/24/22  1926   SODIUM mmol/L 133*   < > 132*   POTASSIUM mmol/L 3 9   < > 3 6   CHLORIDE mmol/L 95*   < > 97   CO2 mmol/L 26   < > 19*   CO2, I-STAT   --    < >  --    BUN mg/dL 35*   < > 16   CREATININE mg/dL 1 12   < > 1 29   ANION GAP mmol/L 12   < > 16*   CALCIUM mg/dL 8 6   < > 10 1   ALBUMIN g/dL  --   --  3 5   TOTAL BILIRUBIN mg/dL  --   --  0 30   ALK PHOS U/L  --   --  92   ALT U/L  --   --  29   AST U/L  --   --  50*   GLUCOSE RANDOM mg/dL 94   < > 167*    < > = values in this interval not displayed  Results from last 7 days   Lab Units 12/24/22  2010   INR  1 07     Results from last 7 days   Lab Units 12/28/22  0557 12/27/22  2340 12/27/22  1651 12/27/22  0512 12/27/22  0057   POC GLUCOSE mg/dl 105 87 97 116 104         Results from last 7 days   Lab Units 12/28/22  0603 12/27/22  0508 12/26/22  0423 12/25/22  0424 12/24/22  2358 12/24/22  1926   LACTIC ACID mmol/L  --   --   --   --  1 0 7 6*   PROCALCITONIN ng/ml 0 43* 0 62* <0 05 0 46*  --  0 15       Lines/Drains:  Invasive Devices     Peripheral Intravenous Line  Duration           Peripheral IV 12/24/22 Dorsal (posterior); Left Forearm 3 days    Peripheral IV 12/24/22 Right Antecubital 3 days                  Telemetry:  Telemetry Orders (From admission, onward)             48 Hour Telemetry Monitoring  Continuous x 48 hours        References:    Telemetry Guidelines Question:  Reason for 48 Hour Telemetry  Answer:  Arrhythmias Requiring Medical Therapy (eg  SVT, Vtach/fib, Bradycardia, Uncontrolled A-fib)                 Telemetry Reviewed: Atrial fibrillation  HR averaging 60-80's  Indication for Continued Telemetry Use: Arrthymias requiring medical therapy             Imaging: Personally reviewed the following imaging: chest xray    Recent Cultures (last 7 days):   Results from last 7 days   Lab Units 12/24/22 2247 12/24/22  1926   BLOOD CULTURE   --  No Growth at 72 hrs  No Growth at 72 hrs     LEGIONELLA URINARY ANTIGEN  Negative  --        Last 24 Hours Medication List:   Current Facility-Administered Medications   Medication Dose Route Frequency Provider Last Rate   • aspirin  81 mg Oral Daily Trice Collier PA-C     • bisacodyl  10 mg Rectal Daily PRN Lyric Lock MD     • cefTRIAXone  1,000 mg Intravenous Q24H Trice Collier PA-C 1,000 mg (12/27/22 2050)   • diphenoxylate-atropine  1 tablet Oral 4x Daily PRN Trice Collier PA-C     • docusate sodium  100 mg Oral BID PRN Trice Collier PA-C     • furosemide  40 mg Intravenous BID (diuretic) Fany Guadalupe MD     • heparin (porcine)  5,000 Units [facility]cutaneous [facility] PA-C     • hydroxychloroquine  200 mg Oral Daily With Breakfast Trice Collier PA-C     • levalbuterol  1 25 mg Nebulization Q6H Trice Collier PA-C     • metoprolol  5 mg Intravenous Q6H PRN LANEY Ma     • metoprolol succinate  50 mg Oral BID Lj Ferreira PA-C     • ondansetron  4 mg Intravenous Once Trice Collier PA-C     • pantoprazole  40 mg Oral Daily Before Breakfast Lyric Lock MD     • polyethylene glycol  17 g Oral Daily Antonia Cullen PA-C     • potassium chloride  40 mEq Oral Daily Susanne Paula PA-C     • sacubitril-valsartan  1 tablet Oral BID Susanne Paula PA-C     • senna  2 tablet Oral HS Antonia Escudero PA-C     • sodium chloride  3 mL Nebulization Q6H Yuliya Cabrera Juan Ramon Silva MD     • traMADol  50 mg Oral Q6H PRN Gaby Johnson PA-C     • trimethobenzamide  200 mg Intramuscular Q6H PRN Jennifer Romero PA-C          Today, Patient Was Seen By: LANEY Coley    **Please Note: This note may have been constructed using a voice recognition system  **

## 2022-12-28 NOTE — ASSESSMENT & PLAN NOTE
• BP reviewed and remains stable  • Home regimen:  • metoprolol succinate 25mg -> increased to metoprolol 50 mg per cards  • Continue to monitor while admitted

## 2022-12-28 NOTE — ASSESSMENT & PLAN NOTE
• Patient presenting to ED with worsening SOB, cough  • Noted to be 70% on RA, quickly escalated to [address]  • CTA PE Study: No PE- findings suggestive of pulmonary edema in the setting of acute CHF versus fluid overload   Consolidative opacity at the medial right base possibly reflecting superimposed pneumonia  • S/P Bipap 1in setting of acute pulmonary edema in the setting of decompensated heart failure  • SIRS: tachypnea, tachycardia, however afebrile, no leukocytosis  • Procal is intermittently positive  • CXR with possible superimposed PNA  • BC x2 negative at 48 hours  • Urine strep/legionella negative   • MRSA negative   • Doxy/Vancomcin Discontinued  • Continue IV ceftriaxone (D5)  • Wean O2 as able --> on 2L NC

## 2022-12-28 NOTE — PLAN OF CARE
Problem: MOBILITY - ADULT  Goal: Maintain or return to baseline ADL function  Description: INTERVENTIONS:  -  Assess patient's ability to carry out ADLs; assess patient's baseline for ADL function and identify physical deficits which impact ability to perform ADLs (bathing, care of mouth/teeth, toileting, grooming, dressing, etc )  - Assess/evaluate cause of self-care deficits   - Assess range of motion  - Assess patient's mobility; develop plan if impaired  - Assess patient's need for assistive devices and provide as appropriate  - Encourage maximum independence but intervene and supervise when necessary  - Involve family in performance of ADLs  - Assess for home care needs following discharge   - Consider OT consult to assist with ADL evaluation and planning for discharge  - Provide patient education as appropriate  Outcome: Progressing     Problem: Prexisting or High Potential for Compromised Skin Integrity  Goal: Skin integrity is maintained or improved  Description: INTERVENTIONS:  - Identify patients at risk for skin breakdown  - Assess and monitor skin integrity  - Assess and monitor nutrition and hydration status  - Monitor labs   - Assess for incontinence   - Turn and reposition patient  - Assist with mobility/ambulation  - Relieve pressure over bony prominences  - Avoid friction and shearing  - Provide appropriate hygiene as needed including keeping skin clean and dry  - Evaluate need for skin moisturizer/barrier cream  - Collaborate with interdisciplinary team   - Patient/family teaching  - Consider wound care consult   Outcome: Progressing     Problem: Potential for Falls  Goal: Patient will remain free of falls  Description: INTERVENTIONS:  - Educate patient/family on patient safety including physical limitations  - Instruct patient to call for assistance with activity   - Consult OT/PT to assist with strengthening/mobility   - Keep Call bell within reach  - Keep bed low and locked with side rails adjusted as appropriate  - Keep care items and personal belongings within reach  - Initiate and maintain comfort rounds  - Make Fall Risk Sign visible to staff  - Offer Toileting every 2 Hours, in advance of need  - Obtain necessary fall risk management equipment: nonskid footwear  - Apply yellow socks and bracelet for high fall risk patients  - Consider moving patient to room near nurses station  Outcome: Progressing     Problem: Nutrition/Hydration-ADULT  Goal: Nutrient/Hydration intake appropriate for improving, restoring or maintaining nutritional needs  Description: Monitor and assess patient's nutrition/hydration status for malnutrition  Collaborate with interdisciplinary team and initiate plan and interventions as ordered  Monitor patient's weight and dietary intake as ordered or per policy  Utilize nutrition screening tool and intervene as necessary  Determine patient's food preferences and provide high-protein, high-caloric foods as appropriate       INTERVENTIONS:  - Monitor oral intake, urinary output, labs, and treatment plans  - Assess nutrition and hydration status and recommend course of action  - Evaluate amount of meals eaten  - Assist patient with eating if necessary   - Allow adequate time for meals  - Recommend/ encourage appropriate diets, oral nutritional supplements, and vitamin/mineral supplements  - Order, calculate, and assess calorie counts as needed  - Recommend, monitor, and adjust tube feedings and TPN/PPN based on assessed needs  - Assess need for intravenous fluids  - Provide specific nutrition/hydration education as appropriate  - Include patient/family/caregiver in decisions related to nutrition  Outcome: Progressing

## 2022-12-28 NOTE — PLAN OF CARE
Problem: MOBILITY - ADULT  Goal: Maintain or return to baseline ADL function  Description: INTERVENTIONS:  -  Assess patient's ability to carry out ADLs; assess patient's baseline for ADL function and identify physical deficits which impact ability to perform ADLs (bathing, care of mouth/teeth, toileting, grooming, dressing, etc )  - Assess/evaluate cause of self-care deficits   - Assess range of motion  - Assess patient's mobility; develop plan if impaired  - Assess patient's need for assistive devices and provide as appropriate  - Encourage maximum independence but intervene and supervise when necessary  - Involve family in performance of ADLs  - Assess for home care needs following discharge   - Consider OT consult to assist with ADL evaluation and planning for discharge  - Provide patient education as appropriate  Outcome: Progressing  Goal: Maintains/Returns to pre admission functional level  Description: INTERVENTIONS:  - Perform BMAT or MOVE assessment daily    - Set and communicate daily mobility goal to care team and patient/family/caregiver  - Collaborate with rehabilitation services on mobility goals if consulted  Problem: Nutrition/Hydration-ADULT  Goal: Nutrient/Hydration intake appropriate for improving, restoring or maintaining nutritional needs  Description: Monitor and assess patient's nutrition/hydration status for malnutrition  Collaborate with interdisciplinary team and initiate plan and interventions as ordered  Monitor patient's weight and dietary intake as ordered or per policy  Utilize nutrition screening tool and intervene as necessary  Determine patient's food preferences and provide high-protein, high-caloric foods as appropriate       INTERVENTIONS:  - Monitor oral intake, urinary output, labs, and treatment plans  - Assess nutrition and hydration status and recommend course of action  - Evaluate amount of meals eaten  - Assist patient with eating if necessary   - Allow adequate time for meals  - Recommend/ encourage appropriate diets, oral nutritional supplements, and vitamin/mineral supplements  - Order, calculate, and assess calorie counts as needed  - Recommend, monitor, and adjust tube feedings and TPN/PPN based on assessed needs  - Assess need for intravenous fluids  - Provide specific nutrition/hydration education as appropriate  - Include patient/family/caregiver in decisions related to nutrition  Outcome: Progressing     - Out of bed for toileting  - Record patient progress and toleration of activity level   Outcome: Progressing

## 2022-12-29 PROBLEM — R10.9 ABDOMINAL PAIN: Status: RESOLVED | Noted: 2022-12-27 | Resolved: 2022-12-29

## 2022-12-29 LAB
ANION GAP SERPL CALCULATED.3IONS-SCNC: 12 MMOL/L (ref 4–13)
BASOPHILS # BLD AUTO: 0.01 THOUSANDS/ÂΜL (ref 0–0.1)
BASOPHILS NFR BLD AUTO: 0 % (ref 0–1)
BUN SERPL-MCNC: 40 MG/DL (ref 5–25)
CALCIUM SERPL-MCNC: 8.7 MG/DL (ref 8.3–10.1)
CHLORIDE SERPL-SCNC: 94 MMOL/L (ref 96–108)
CO2 SERPL-SCNC: 25 MMOL/L (ref 21–32)
CREAT SERPL-MCNC: 1.4 MG/DL (ref 0.6–1.3)
EOSINOPHIL # BLD AUTO: 0.01 THOUSAND/ÂΜL (ref 0–0.61)
EOSINOPHIL NFR BLD AUTO: 0 % (ref 0–6)
ERYTHROCYTE [DISTWIDTH] IN BLOOD BY AUTOMATED COUNT: 14.4 % (ref 11.6–15.1)
GFR SERPL CREATININE-BSD FRML MDRD: 37 ML/MIN/1.73SQ M
GLUCOSE SERPL-MCNC: 102 MG/DL (ref 65–140)
GLUCOSE SERPL-MCNC: 103 MG/DL (ref 65–140)
GLUCOSE SERPL-MCNC: 107 MG/DL (ref 65–140)
GLUCOSE SERPL-MCNC: 111 MG/DL (ref 65–140)
GLUCOSE SERPL-MCNC: 131 MG/DL (ref 65–140)
GLUCOSE SERPL-MCNC: 99 MG/DL (ref 65–140)
HCT VFR BLD AUTO: 30.2 % (ref 34.8–46.1)
HGB BLD-MCNC: 9.8 G/DL (ref 11.5–15.4)
IMM GRANULOCYTES # BLD AUTO: 0.01 THOUSAND/UL (ref 0–0.2)
IMM GRANULOCYTES NFR BLD AUTO: 0 % (ref 0–2)
LYMPHOCYTES # BLD AUTO: 0.32 THOUSANDS/ÂΜL (ref 0.6–4.47)
LYMPHOCYTES NFR BLD AUTO: 9 % (ref 14–44)
MCH RBC QN AUTO: 34.5 PG (ref 26.8–34.3)
MCHC RBC AUTO-ENTMCNC: 32.5 G/DL (ref 31.4–37.4)
MCV RBC AUTO: 106 FL (ref 82–98)
MONOCYTES # BLD AUTO: 0.42 THOUSAND/ÂΜL (ref 0.17–1.22)
MONOCYTES NFR BLD AUTO: 11 % (ref 4–12)
NEUTROPHILS # BLD AUTO: 3.01 THOUSANDS/ÂΜL (ref 1.85–7.62)
NEUTS SEG NFR BLD AUTO: 80 % (ref 43–75)
NRBC BLD AUTO-RTO: 0 /100 WBCS
PLATELET # BLD AUTO: 271 THOUSANDS/UL (ref 149–390)
PMV BLD AUTO: 10.8 FL (ref 8.9–12.7)
POTASSIUM SERPL-SCNC: 4 MMOL/L (ref 3.5–5.3)
RBC # BLD AUTO: 2.84 MILLION/UL (ref 3.81–5.12)
SODIUM SERPL-SCNC: 131 MMOL/L (ref 135–147)
WBC # BLD AUTO: 3.78 THOUSAND/UL (ref 4.31–10.16)

## 2022-12-29 RX ORDER — SACUBITRIL AND VALSARTAN 24; 26 MG/1; MG/1
1 TABLET, FILM COATED ORAL 2 TIMES DAILY
Qty: 60 TABLET | Refills: 0 | Status: CANCELLED | OUTPATIENT
Start: 2022-12-29

## 2022-12-29 RX ADMIN — EMPAGLIFLOZIN 10 MG: 10 TABLET, FILM COATED ORAL at 09:11

## 2022-12-29 RX ADMIN — METOPROLOL SUCCINATE 50 MG: 50 TABLET, EXTENDED RELEASE ORAL at 08:28

## 2022-12-29 RX ADMIN — POTASSIUM CHLORIDE 40 MEQ: 1500 TABLET, EXTENDED RELEASE ORAL at 08:29

## 2022-12-29 RX ADMIN — LEVALBUTEROL HYDROCHLORIDE 1.25 MG: 1.25 SOLUTION, CONCENTRATE RESPIRATORY (INHALATION) at 15:03

## 2022-12-29 RX ADMIN — SACUBITRIL AND VALSARTAN 1 TABLET: 24; 26 TABLET, FILM COATED ORAL at 08:29

## 2022-12-29 RX ADMIN — LEVALBUTEROL HYDROCHLORIDE 1.25 MG: 1.25 SOLUTION, CONCENTRATE RESPIRATORY (INHALATION) at 19:38

## 2022-12-29 RX ADMIN — LEVALBUTEROL HYDROCHLORIDE 1.25 MG: 1.25 SOLUTION, CONCENTRATE RESPIRATORY (INHALATION) at 02:22

## 2022-12-29 RX ADMIN — ASPIRIN 81 MG: 81 TABLET, COATED ORAL at 08:27

## 2022-12-29 RX ADMIN — HEPARIN SODIUM 5000 UNITS: 5000 INJECTION INTRAVENOUS; SUBCUTANEOUS at 05:33

## 2022-12-29 RX ADMIN — LEVALBUTEROL HYDROCHLORIDE 1.25 MG: 1.25 SOLUTION, CONCENTRATE RESPIRATORY (INHALATION) at 09:31

## 2022-12-29 RX ADMIN — PANTOPRAZOLE SODIUM 40 MG: 40 TABLET, DELAYED RELEASE ORAL at 05:33

## 2022-12-29 RX ADMIN — SACUBITRIL AND VALSARTAN 1 TABLET: 24; 26 TABLET, FILM COATED ORAL at 17:32

## 2022-12-29 RX ADMIN — ISODIUM CHLORIDE 3 ML: 0.03 SOLUTION RESPIRATORY (INHALATION) at 02:22

## 2022-12-29 RX ADMIN — HYDROXYCHLOROQUINE SULFATE 200 MG: 200 TABLET, FILM COATED ORAL at 08:36

## 2022-12-29 RX ADMIN — ISODIUM CHLORIDE 3 ML: 0.03 SOLUTION RESPIRATORY (INHALATION) at 15:03

## 2022-12-29 RX ADMIN — FUROSEMIDE 40 MG: 10 INJECTION, SOLUTION INTRAMUSCULAR; INTRAVENOUS at 08:30

## 2022-12-29 RX ADMIN — ISODIUM CHLORIDE 3 ML: 0.03 SOLUTION RESPIRATORY (INHALATION) at 19:38

## 2022-12-29 RX ADMIN — ISODIUM CHLORIDE 3 ML: 0.03 SOLUTION RESPIRATORY (INHALATION) at 09:31

## 2022-12-29 NOTE — ASSESSMENT & PLAN NOTE
• Continue Home Medication:  • Tramadol 100mg Q6H in setting of chronic cancer related pain  • Utilize multimodal pain regimen

## 2022-12-29 NOTE — ASSESSMENT & PLAN NOTE
Lab Results   Component Value Date    EGFR 37 12/29/2022    EGFR 48 12/28/2022    EGFR 45 12/27/2022    CREATININE 1 40 (H) 12/29/2022    CREATININE 1 12 12/28/2022    CREATININE 1 18 12/27/2022     • With baseline Cr of 1 15-1 35  • Slightly elevated on am BMP  • Likely secondary to IV Diuresis  • Avoiding nephrotoxins  • Strict I and O, continue external cath  • Monitor BMP

## 2022-12-29 NOTE — PLAN OF CARE
Problem: OCCUPATIONAL THERAPY ADULT  Goal: Performs self-care activities at highest level of function for planned discharge setting  See evaluation for individualized goals  Description: Treatment Interventions: ADL retraining, Functional transfer training, Endurance training, Patient/family training, Compensatory technique education          See flowsheet documentation for full assessment, interventions and recommendations  Note: Limitation: Decreased ADL status, Decreased high-level ADLs, Decreased self-care trans, Decreased endurance  Prognosis: Fair  Assessment: Pt is a 74 y o  female seen for OT evaluation at [address]  [facility], admitted 12/24/2022 w/ SOB and cough  Pt found to have Acute decompensated heart failure ([facility] [address] )  Comorbidities affecting pt's functional performance at time of assessment include: GIST s/p resection, a fib off of AC in setting of gastric AVMs  Personal factors affecting pt at time of IE include:steps to enter environment, difficulty performing ADLS, difficulty performing IADLS  and decreased functional mobility  Prior to admission, pt was living with spouse in house with steps to manage  Pt was I w/  ADLS and IADLS, (+) drove, & required cane PRN PTA  Upon evaluation: Pt requires sup for bed mobility, min A x2  for functional mobility/transfers, sup for UB ADLs and min A for LB ADLS 2* the following deficits impacting occupational performance: weakness, decreased strength, decreased balance, decreased tolerance and dizziness  Full objective findings from OT assessment regarding body systems outlined above  These impairments, as well as risk for falls  limit pt's ability to safely engage in all baseline areas of occupation and mobility  Pt to benefit from continued skilled OT tx while in the hospital to address deficits as defined above and maximize level of functional independence w ADL's and functional mobility   Occupational Performance areas to address include: bathing/shower, toilet hygiene, dressing and functional mobility  This evaluation required an extensive review of medical and/or therapy records and additional review of physical, cognitive and psychosocial history related to functional performance  Based upon functional performance deficits and assessments, this evaluation has been identified as a high complexity evaluation  The patient's raw score on the AM-PAC Daily Activity inpatient short form is 19, standardized score is 40 22, greater than 39 4  Patients at this level are likely to benefit from DC to post-acute rehabilitation services, which DOES NOT coincide with CURRENT above OT recommendations  However please refer to therapist recommendation for discharge planning given other factors that may influence destination  At this time, OT recommendations at time of discharge are short term rehab  Pt currently requires min Ax 2 for transfers and is orthostatic       OT Discharge Recommendation: Post acute rehabilitation services

## 2022-12-29 NOTE — ASSESSMENT & PLAN NOTE
• BP reviewed and fluctuating  • Continue metoprolol succinate 50 mg per cards  • Will add hold parameters  • Continue to monitor while admitted

## 2022-12-29 NOTE — PLAN OF CARE
Problem: PHYSICAL THERAPY ADULT  Goal: Performs mobility at highest level of function for planned discharge setting  See evaluation for individualized goals  Description: Treatment/Interventions: Functional transfer training, LE strengthening/ROM, Therapeutic exercise, Endurance training, Patient/family training, Equipment eval/education, Bed mobility, Gait training, Elevations  Equipment Recommended: Walker       See flowsheet documentation for full assessment, interventions and recommendations  Note:    Problem List: Decreased strength, Decreased endurance, Impaired balance, Decreased mobility, Decreased safety awareness  Assessment: Pt is a 74 y o  female seen for PT evaluation s/p admit to [address] [place] [address] on 8/18/2022 w/ Acute decompensated heart failure ([facility] [address] )  Order placed for PT  Comorbidities affecting pt's physical performance at time of assessment include: HTN, previous surgery and cancer history  Personal factors affecting pt at time of IE include: multi-level environment, inability to perform ADLs, inability to ambulate household distances and limited insight into impairments  Prior to admission, pt was was independent w/ all functional mobility w/ out AD, lived in multi-level home, had 1 KAREN (-) railing and lived with spouse  Upon evaluation: Pt requires supervision for bed mobility, min A x2 for sit to stand, and min A x2 for SPT with RW  Limited assessment due to decrease in BP and pt feeling unwell  (Please find full objective findings from PT assessment regarding body systems outlined above)  Impairments and limitations also listed above, especially due to  weakness, impaired balance, decreased endurance, decreased activity tolerance, decreased safety awareness and fall risk  Pt's clinical presentation is currently unstable/unpredictable seen in pt's presentation of decreased safety awareness, fall risk, decrease in BP with mobility, and limited insight into deficits    Pt to benefit from continued skilled PT tx while in hospital and upon DC to address deficits as defined above and maximize level of functional mobility  From PT/mobility standpoint, recommendation at time of d/c would be inpatient rehab pending progress  Recommend progression of ambulation and initiation of HEP as appropriate  PT Discharge Recommendation: Post acute rehabilitation services    See flowsheet documentation for full assessment

## 2022-12-29 NOTE — ASSESSMENT & PLAN NOTE
· CXR with large pleural effusion? · Repeat CXR (12/29): Significant improvement in pulmonary edema  · S/p IV Diuresis     · Monitor respiratory status

## 2022-12-29 NOTE — ASSESSMENT & PLAN NOTE
Lab Results   Component Value Date    EGFR 48 12/28/2022    EGFR 45 12/27/2022    EGFR 45 12/26/2022    CREATININE 1 12 12/28/2022    CREATININE 1 18 12/27/2022    CREATININE 1 18 12/26/2022     • With baseline Cr of 1 15-1 35  • Cr currently stable at baseline  • Avoiding nephrotoxins  • Strict I and O, continue external cath  • Monitor BMP while receiving diuresis

## 2022-12-29 NOTE — ASSESSMENT & PLAN NOTE
Wt Readings from Last 3 Encounters:   12/29/22 48 6 kg (107 lb 2 3 oz)   11/22/22 50 8 kg (112 lb)   08/12/22 49 9 kg (110 lb)     · Initially required Bipap, weaned to 6-8L midflow currently   • Last ECHO 7/2022: EF 40% G1DD, severe mitral regurgitation: "the valve morphology is consistent with myxomatous proliferation  There is annular calcification  There is severe regurgitation"  • Repeat CXR 12/25: Worsening congestive changes with possible superimposed perihilar infiltrates  Effusions also suggested  • Updated Echo: EF 30% with severe global hypokinesis  G2DD  Severe Mitral valve regurgitation  • Repeat CXR: Interval significant improvement in pulmonary edema  • Cardiology consulted, appreciate recs   • With elevation in Crt this morning, plan to hold diuretics and repeat am BMP  • Plan to optimize GDMT-> Jardiance 10 mg to begin this morning   C/w Toprol-Xl and Entresto  • Discussed with patient possibility of primary prevention ICD but can be followed up outpatient  • Strict I and O, Daily weights  • CM following for price checking both Entresto and Jardiance  • PT/OT eval: Pending

## 2022-12-29 NOTE — ASSESSMENT & PLAN NOTE
• With history of atrial fibrillation  • On ASA as outpatient, however not on more aggressive AC d/t history of gastric AVMs  • Patient with episode Afib with RVR on 12/26, HR in 130s  • Resolved with 1x dose digoxin 0 25 mg   • Continue with Telemetry  • Cardiology following

## 2022-12-29 NOTE — UTILIZATION REVIEW
Continued Stay Review    Date: 12/29/22                        Current Patient Class: INPT Current Level of Care: STEPDOWN    HPI:72 y o  female initially admitted [facility] on 12/24 D/T Acute decompensated heart failure, Acute respiratory failure with hypoxia  Assessment/Plan: Feeling better overall  Denies any acute chest pain or discomfort  Initially on BiPAP weaned to O2 via nasal cannula  Overnight 12/26/2022, patient had increased work of breathing NC increased to 12L but down titrated down to 2L -- now off as of 12/29/22  Repeat CXR 12/28/2022 clear  BMP showed creatinine elevation (now 1 4), she has made minimal diuresis overnight and appears euvolemic   now net -4 pounds (bed scale) and -5 2L since admission  Toprol XL increased  Entresto added 12/28, JArdiance added 12/29  hold diuretics today  12/26: Cardiology consult:Acute hypoxic respiratory failure, acute on chronic combined heart failure - LVEF 40%  Non-MI troponin elevation  Paroxysmal Atrial Fibrillation  Currently in sinus rhythm, heart rate in the 80s to 90s  not on anticoagulation due to history of bleeding and AVMs, needing multiple transfusions  Initially on BiPAP, now transition to mid flow nasal cannula volume overloaded at presentation and received IV diuresis with improvement in symptoms  multiple blood transfusions over the last year related to her anemia and GIST, and this maybe contributing to her volume overload  CT chest shows significant moderate to large right pleural effusion, which is likely contributing to her hypoxia  No remaining peripheral edema, has mild JVP elevation, decreased breath sounds  Optimize blood pressure control, currently remains elevated in the 587G systolic  Hs-troponin 52, 67, 89  IR thoracocentesis  IV diuresis  Increase metoprolol succinate  Losartan  Echo ordered            Vital Signs:   12/29/22 13:21:08 -- 48 Abnormal  -- 90/67 75 100 % -- -- -- -- -- --   12/29/22 13:19:17 -- 65 -- 85/57 Abnormal  66 94 % -- -- -- -- -- --   12/29/22 13:17:10 -- -- -- 91/55 67 -- -- -- -- -- -- --               --               --   12/29/22 07:17:47 98 3 °F (36 8 °C) 69 18 126/97 107 97 % -- -- -- -- -- --   12/29/22 0225 -- -- -- -- -- 98 % -- -- -- None (Room air) -- --   12/29/22 00:29:59 97 1 °F (36 2 °C) Abnormal  64 16 97/57 70 98 % -- -- -- None (Room air) -- --   12/28/22 2226 -- 119 Abnormal  -- 114/65 -- -- -- -- -- -- -- --   12/28/22 2030 -- 72 -- 107/58 76 95 % -- -- -- -- -- --   12/28/22 2000 98 2 °F (36 8 °C) 68 47 Abnormal  101/57 72 100 % -- -- -- None (Room air) -- Lying                  12/28/22 1600 97 8 °F (36 6 °C) 70 15 107/73 83 97 % -- -- -- None (Room air) -- Lying                                 12/28/22 1200 97 6 °F (36 4 °C) 69 29 Abnormal  118/60 82 93 % -- -- -- None (Room air) -- Lying   12/28/22 1100 -- -- -- -- -- 95 % -- -- -- None (Room air) -- --   12/28/22 0800 97 6 °F (36 4 °C) 96 20 137/59 85 92 % 28 -- 2 L/min Nasal cannula -- Lying   12/28/22 0740 -- -- -- -- -- 100 % -- -- -- -- -- --   12/28/22 0729 -- -- -- -- -- 100 % -- 2 L/min -- Nasal cannula -- --                  12/27/22 2345 98 6 °F (37 °C) 67 19 134/66 95 100 % -- 2 L/min -- Nasal cannula -- Lying   12/27/22 2000 98 °F (36 7 °C) 80 21 153/70 101 99 % -- 2 L/min -- Nasal cannula -- --   12/27/22 1929 -- -- -- -- -- 100 % -- 3 L/min -- Mid flow nasal cannula -- --   12/27/22 1600 -- 74 58 Abnormal  155/68 98 100 % -- -- -- -- -- Sitting   12/27/22 1538 -- -- -- -- -- 97 % -- 6 L/min -- Mid flow nasal cannula MFNC prongs --   12/27/22 1531 98 2 °F (36 8 °C) -- -- -- -- -- -- -- -- -- -- --               --   12/27/22 1300 -- 82 18 -- -- 99 % -- -- -- -- -- --   12/27/22 1112 98 1 °F (36 7 °C) 85 22 146/93 115 100 % -- 6 L/min -- Mid flow nasal cannula -- Lying   12/27/22 0800 97 4 °F (36 3 °C) Abnormal  89 24 Abnormal  142/96 115 100 % -- 6 L/min -- Mid flow nasal cannula -- Lying                  12/27/22 0700 -- 90 23 Abnormal  149/74 100 100 % -- 4 L/min -- -- -- --   12/27/22 0600 -- 92 22 153/78 106 99 % -- 6 L/min -- Mid flow nasal cannula -- --   12/27/22 0530 -- 98 23 Abnormal  142/81 105 98 % -- -- -- -- -- --   12/27/22 0500 -- 104 26 Abnormal  144/127 Abnormal  133 99 % -- -- -- -- -- --   12/27/22 0400 -- 108 Abnormal  21 156/101 Abnormal  116 100 % -- -- -- -- -- --   12/27/22 0300 -- 101 23 Abnormal  142/77 99 98 % -- -- -- -- -- --   12/27/22 0200 -- 117 Abnormal  24 Abnormal  147/87 109 99 % -- -- -- -- -- --   12/27/22 0104 -- -- -- -- -- 96 % -- 6 L/min -- Mid flow nasal cannula MFNC prongs --   12/27/22 0100 -- 127 Abnormal  27 Abnormal  141/98 113 97 % -- -- -- -- -- --   12/27/22 0000 97 8 °F (36 6 °C) 126 Abnormal  26 Abnormal  162/104 Abnormal  117 98 % -- 10 L/min -- Mid flow nasal cannula         12/26:98 1-91-/85-93% on [email or web address]    Pertinent Labs/Diagnostic Results:   12/28 CXR:Interval significant improvement in pulmonary edema  12/26 XRAY ABD:1  Nonobstructive bowel gas pattern  2   Cardiomegaly with prominent interstitial lung markings within the visualized portions of the right lung base which appear slightly improved from chest x-ray of the previous day  This can be reevaluated on follow-up chest radiography as clinically Warranted  12/25 CXR:Worsening congestive changes with possible superimposed perihilar infiltrates  Effusions also suggested  12/25 CXR: Persistent moderate pulmonary edema with small effusions  12/27 EKG:Sinus rhythm with Premature supraventricular complexes  Right bundle branch block  Left anterior fascicular block  Bifascicular block   Left ventricular hypertrophy with repolarization abnormality  Abnormal ECG  Confirmed by Shy Carias ([id number]) on 12/27/2022 5:55:15 PM    12/26 ECHO:    Left Ventricle: Left ventricular cavity size is dilated  Wall thickness is normal  The left ventricular ejection fraction is 30% by biplane measurement  Systolic function is severely reduced  There is severe global hypokinesis with regional variation  Diastolic function is moderately abnormal, consistent with grade II (pseudonormal) relaxation  Left atrial filling pressure is elevated  •  Left Atrium: The atrium is massively dilated  •  Mitral Valve: The anterior and posterior leaflets are tethered  There is severe, probably functional, regurgitation  •  Tricuspid Valve: There is mild regurgitation  The right ventricular systolic pressure is moderately elevated  The estimated right ventricular systolic pressure is 11 62 mmHg  •  Aorta: The ascending aorta is mildly dilated  The ascending aorta is 3 8 cm  •  Pericardium: There is a trivial pericardial effusion circumferential to the heart  There is a moderately sized left pleural effusion      Compared to prior study dated 08/76/60, the LV systolic function has worsened         12/26 EKG:  Sinus tachycardia with Premature supraventricular complexes  Right bundle branch block  Left anterior fascicular block  Bifascicular block  Left ventricular hypertrophy with repolarization abnormality  Cannot rule out Septal infarct , age undetermined  Abnormal ECG  Confirmed by Kevin Warren ([id number]) on 12/27/2022 5:58:23 PM    Results from last 7 days   Lab Units 12/26/22  1741 12/24/22  1926   SARS-COV-2  Negative Negative     Results from last 7 days   Lab Units 12/29/22 0313 12/28/22  0603 12/27/22  0508 12/26/22  0423 12/25/22  1513 12/25/22  0424   WBC Thousand/uL 3 78* 4 37 5 89 7 63  --  6 25   HEMOGLOBIN g/dL 9 8* 8 1* 8 2* 7 9*  --  7 5*   I STAT HEMOGLOBIN g/dl  --   --   --   --  8 2*  --    HEMATOCRIT % 30 2* 24 7* 24 8* 23 8*  --  23 1*   HEMATOCRIT, ISTAT %  --   --   --   --  24*  --    PLATELETS Thousands/uL 271 209 206 215  --  225   NEUTROS ABS Thousands/µL 3 01 3 66 5 16  --   --   --    BANDS PCT %  --   --   --   --   --  3         Results from last 7 days   Lab Units 12/29/22  0313 12/28/22  0603 12/27/22  0508 12/26/22  0423 12/25/22  1513 12/25/22  0424 12/24/22 1929   SODIUM mmol/L 131* 133* 132* 131*  --  133*  --    POTASSIUM mmol/L 4 0 3 9 3 5 3 5  --  3 5  --    CHLORIDE mmol/L 94* 95* 93* 95*  --  97  --    CO2 mmol/L 25 26 27 26  --  25  --    CO2, I-STAT mmol/L  --   --   --   --  25  --  17*   ANION GAP mmol/L 12 12 12 10  --  11  --    BUN mg/dL 40* 35* 31* 24  --  18  --    CREATININE mg/dL 1 40* 1 12 1 18 1 18  --  1 11  --    EGFR ml/min/1 73sq m 37 48 45 45  --  49  --    CALCIUM mg/dL 8 7 8 6 9 0 9 4  --  8 9  --    CALCIUM, IONIZED mmol/L  --   --   --   --   --  1 05*  --    CALCIUM, IONIZED, ISTAT mmol/L  --   --   --   --  1 15  --  1 16   MAGNESIUM mg/dL  --   --   --   --   --  1 8  --    PHOSPHORUS mg/dL  --   --   --   --   --  5 0*  --      Results from last 7 days   Lab Units 12/24/22 1926   AST U/L 50*   ALT U/L 29   ALK PHOS U/L 92   TOTAL PROTEIN g/dL 7 4   ALBUMIN g/dL 3 5   TOTAL BILIRUBIN mg/dL 0 30     Results from last 7 days   Lab Units 12/29/22  1121 12/29/22  0717 12/29/22  0004 12/28/22  0557 12/27/22  2340 12/27/22  1651 12/27/22  0512 12/27/22  0057   POC GLUCOSE mg/dl 103 111 131 105 87 97 116 104     Results from last 7 days   Lab Units 12/29/22  0313 12/28/22  0603 12/27/22  0508 12/26/22  0423 12/25/22  0424 12/24/22  1926   GLUCOSE RANDOM mg/dL 107 94 105 126 120 167*       Results from last 7 days   Lab Units 12/25/22  1513 12/24/22  1929   PH, AXEL I-STAT   --  7 259*   PCO2, AXEL ISTAT mm HG  --  36 7*   PO2, AXEL ISTAT mm HG  --  28 0*   HCO3, AXEL ISTAT mmol/L  --  16 4*   I STAT BASE EXC mmol/L 0 -10*   I STAT O2 SAT % 97* 44*   ISTAT PH ART  7 442  --    I STAT ART PCO2 mm HG 35 0*  --    I STAT ART PO2 mm HG 83 0  --    I STAT ART HCO3 mmol/L 23 8  --      Results from last 7 days   Lab Units 12/24/22 1926   CK TOTAL U/L 373*   CK MB INDEX % 1 8   CK MB ng/mL 6 8*     Results from last 7 days   Lab Units 12/24/22  2358 12/24/22  2132 12/24/22 1926   HS TNI 0HR ng/L  --   --  52*   HS TNI 2HR ng/L  --  67*  --    HSTNI D2 ng/L  --  15  --    HS TNI 4HR ng/L 89*  --   --    HSTNI D4 ng/L 37*  --   --          Results from last 7 days   Lab Units 12/24/22 2010   PROTIME seconds 14 6*   INR  1 07   PTT seconds 26         Results from last 7 days   Lab Units 12/28/22  0603 12/27/22  0508 12/26/22  0423 12/25/22  0424 12/24/22 1926   PROCALCITONIN ng/ml 0 43* 0 62* <0 05 0 46* 0 15     Results from last 7 days   Lab Units 12/24/22  2358 12/24/22 1926   LACTIC ACID mmol/L 1 0 7 6*             Results from last 7 days   Lab Units 12/24/22 1926   NT-PRO BNP pg/mL 12,400*       Results from last 7 days   Lab Units 12/24/22 2138   CLARITY UA  Clear   COLOR UA  Colorless   SPEC GRAV UA  1 015   PH UA  6 5   GLUCOSE UA mg/dl Negative   KETONES UA mg/dl Negative   BLOOD UA  Negative   PROTEIN UA mg/dl Trace*   NITRITE UA  Negative   BILIRUBIN UA  Negative   UROBILINOGEN UA (BE) mg/dl <2 0   LEUKOCYTES UA  Negative   WBC UA /hpf None Seen   RBC UA /hpf None Seen   BACTERIA UA /hpf None Seen   EPITHELIAL CELLS WET PREP /hpf Occasional     Results from last 7 days   Lab Units 12/26/22  1741 12/24/22  2247 12/24/22 2138 12/24/22 1926   STREP PNEUMONIAE ANTIGEN, URINE   --   --  Negative  --    LEGIONELLA URINARY ANTIGEN   --  Negative  --   --    INFLUENZA A PCR  Negative  --   --  Negative   INFLUENZA B PCR  Negative  --   --  Negative   RSV PCR  Negative  --   --  Negative       Results from last 7 days   Lab Units 12/24/22 1926   BLOOD CULTURE  No Growth After 4 Days  No Growth After 4 Days           Medications:   Scheduled Medications:  aspirin, 81 mg, Oral, Daily  cefTRIAXone, 1,000 mg, Intravenous, Q24H  Empagliflozin, 10 mg, Oral, Daily  heparin (porcine), 5,000 Units, Subcutaneous, Q8H [facility]  hydroxychloroquine, 200 mg, Oral, Daily With Breakfast  levalbuterol, 1 25 mg, Nebulization, Q6H  metoprolol succinate, 50 mg, Oral, BID  ondansetron, 4 mg, Intravenous, Once  pantoprazole, 40 mg, Oral, Daily Before Breakfast  polyethylene glycol, 17 g, Oral, Daily  potassium chloride, 40 mEq, Oral, Daily  sacubitril-valsartan, 1 tablet, Oral, BID  senna, 2 tablet, Oral, HS  sodium chloride, 3 mL, Nebulization, Q6H      PRN Meds:  bisacodyl, 10 mg, Rectal, Daily PRN 12/26 X 1  diphenoxylate-atropine, 1 tablet, Oral, 4x Daily PRN  docusate sodium, 100 mg, Oral, BID PRN  metoprolol, 5 mg, Intravenous, Q6H PRN  traMADol, 50 mg, Oral, Q6H PRN  trimethobenzamide, 200 mg, Intramuscular, Q6H PRN 12/26 x 2, 12/27 x 1        Discharge Plan: D    Network Utilization Review Department  ATTENTION: Please call with any questions or concerns to [phone] and carefully listen to the prompts so that you are directed to the right person  All voicemails are confidential   Luis Kilpatrick all requests for admission clinical reviews, approved or denied determinations and any other requests to dedicated fax number below belonging to the campus where the patient is receiving treatment   List of dedicated fax numbers for the Facilities:  [address] DENIALS (Administrative/Medical Necessity) [phone]   [address] (Maternity/NICU/Pediatrics) [phone]   3 Viola Han [phone]   Priscila Rodarte 77 [phone]   [address] [address] Vincenzo Woo 28 [phone]   [facility]3 [facility]3] Dona Li [facility] 134 [address] [facility] [phone]

## 2022-12-29 NOTE — OCCUPATIONAL THERAPY NOTE
Occupational Therapy Evaluation     Patient Name: Taqueria LINJR'S Date: 2022  Problem List  Principal Problem:    Acute decompensated heart failure ([facility] )  Active Problems:    Anemia    Essential hypertension    Pain syndrome, chronic    Elevated troponin    Stage 3b chronic kidney disease (HCC)    MARK (obstructive sleep apnea)    GIST (gastrointestinal stroma tumor), malignant, colon (HCC)    Paroxysmal atrial fibrillation (HCC)    Acute respiratory failure with hypoxia (HCC)    Pleural effusion    Abdominal pain    Past Medical History  Past Medical History:   Diagnosis Date    ADHD     Anemia     Anxiety     Arthritis     Asthma     Atrial fibrillation ([facility] )     Cancer ([facility] )     Cancer ([place] [address] )     liver    Chronic iron deficiency anemia 2020    Extensive GI evaluation over the last year including multiple EGD, colonoscopy, and capsule endoscopy  Has had gastric AVMs cauterized, Dieulafoy's lesion clipped, and most recently a Earney Persons erosion cauterized    Coronary artery disease     Depression     GERD (gastroesophageal reflux disease)     History of stomach ulcers     Hypercholesterolemia     Hypertension     Kidney disease     Metastatic cancer ([place] [address] )     Sepsis due to Escherichia coli ([place] [address] ) 2021     Past Surgical History  Past Surgical History:   Procedure Laterality Date    ANKLE SURGERY      APPENDECTOMY      BREAST BIOPSY      BREAST SURGERY N/A     pt went to [facility] in the 's, had a procedure but unable to give date or what was done, just benign    CATARACT EXTRACTION       SECTION      COLONOSCOPY  2019    Multiple adenomatous colon polyps and internal hemorrhoids    Three year recall advised    HIP SURGERY      JOINT REPLACEMENT  2019    Left knee replacement    LAMINECTOMY      OOPHORECTOMY      ROTATOR CUFF REPAIR      SIGMOID RESECTION / RECTOPEXY      SINUS SURGERY      UPPER GASTROINTESTINAL ENDOSCOPY  2020    Dieulafoy's lesion in proximal stomach which was actively oozing  Injected with epinephrine and 2 Endoclips placed with control of bleeding, hiatal hernia  UPPER GASTROINTESTINAL ENDOSCOPY  06/2020    Bleeding Seb's erosion status post cauterization           12/29/22 1328   OT Last Visit   OT Visit Date 12/29/22   Note Type   Note type Evaluation   Pain Assessment   Pain Assessment Tool 0-10   Pain Score No Pain   Restrictions/Precautions   Weight Bearing Precautions Per Order No   Other Precautions Chair Alarm; Bed Alarm; Fall Risk   Home Living   Type of [address] Two level;Stairs to enter with rails   Bathroom Shower/Tub Tub/shower unit   Bathroom Toilet Standard   Bathroom Equipment Grab bars in shower;Commode   Home Equipment Walker;Cane  (Reports that she typically uses a cane PRN)   Prior Function   Level of [place] Independent with ADLs; Independent with functional mobility   Lives With Spouse   IADLs Independent with driving; Independent with meal prep; Independent with medication management   Subjective   Subjective Pt recceived in supine position  Pt agreeable to session  ADL   Eating Assistance 7  Independent   Eating Deficit Setup   Grooming Assistance 7  Independent   Grooming Deficit Setup   UB Bathing Assistance 5  Supervision/Setup   LB Bathing Assistance 4  Minimal Assistance   UB Dressing Assistance 5  Supervision/Setup   LB Dressing Assistance 4  Minimal Assistance   Toileting Assistance  4  Minimal Assistance   Bed Mobility   Supine to Sit 5  Supervision   Additional items Increased time required;Verbal cues; Bedrails   Additional Comments Pt remained seated in recliner by end of session     Transfers   Sit to Stand 4  Minimal assistance   Additional items Assist x 2;Verbal cues   Stand to Sit 4  Minimal assistance   Additional items Assist x 2;Verbal cues   Stand pivot 4  Minimal assistance   Additional items Assist x 2;Verbal cues  (RW)   Additional Comments Upon standing, pt reported "feeling sweaty"  Pt assisted to recliner at this time  Sitting BP 91/55  Attempted standing BP, however, pt unabel to tolerate standing for duration of assessment  Pt returned to seated position  BP 85/57  Pt reclined at this time BP 91/55  Balance   Static Sitting Fair +   Dynamic Sitting Fair   Static Standing Fair -   Dynamic Standing Fair -   Activity Tolerance   Activity Tolerance   (Treatment limited by low BP)   Medical Staff Made Aware PT Tyrone Reid   Nurse Made Aware CICI LAW Assessment   RUE Assessment WFL   LUE Assessment   LUE Assessment WFL   Cognition   Overall Cognitive Status WFL   Arousal/Participation Alert   Attention Within functional limits   Orientation Level Oriented X4   Memory Decreased recall of precautions   Following Commands Follows one step commands with increased time or repetition   Assessment   Limitation Decreased ADL status; Decreased high-level ADLs; Decreased self-care trans;Decreased endurance   Prognosis Fair   Assessment Pt is a 74 y o  female seen for OT evaluation at [facility], admitted 12/24/2022 w/ SOB and cough  Pt found to have Acute decompensated heart failure ([facility] )  Comorbidities affecting pt's functional performance at time of assessment include: GIST s/p resection, a fib off of AC in setting of gastric AVMs  Personal factors affecting pt at time of IE include:steps to enter environment, difficulty performing ADLS, difficulty performing IADLS  and decreased functional mobility  Prior to admission, pt was living with spouse in house with steps to manage  Pt was I w/  ADLS and IADLS, (+) drove, & required cane PRN PTA  Upon evaluation: Pt requires sup for bed mobility, min A x2  for functional mobility/transfers, sup for UB ADLs and min A for LB ADLS 2* the following deficits impacting occupational performance: weakness, decreased strength, decreased balance, decreased tolerance and dizziness   Full objective findings from OT assessment regarding body systems outlined above  These impairments, as well as risk for falls  limit pt's ability to safely engage in all baseline areas of occupation and mobility  Pt to benefit from continued skilled OT tx while in the hospital to address deficits as defined above and maximize level of functional independence w ADL's and functional mobility  Occupational Performance areas to address include: bathing/shower, toilet hygiene, dressing and functional mobility  This evaluation required an extensive review of medical and/or therapy records and additional review of physical, cognitive and psychosocial history related to functional performance  Based upon functional performance deficits and assessments, this evaluation has been identified as a high complexity evaluation  The patient's raw score on the AM-PAC Daily Activity inpatient short form is 19, standardized score is 40 22, greater than 39 4  Patients at this level are likely to benefit from DC to post-acute rehabilitation services, which DOES NOT coincide with CURRENT above OT recommendations  However please refer to therapist recommendation for discharge planning given other factors that may influence destination  At this time, OT recommendations at time of discharge are short term rehab  Pt currently requires min Ax 2 for transfers and is orthostatic  Goals   Patient Goals Pt wishes to get better   Short Term Goal #1 Pt will achieve the following goals within 10 days  *Pt will complete UB bathing and dressing with mod I         *Pt will complete LB bathing and dressing with mod I          * Pt will complete toileting w/ mod I w/ G hygiene/thoroughness using DME PRN        *Pt will complete bed mobility with mod I, with bed flat and no side rail to prep for purposeful tasks        *Pt will perform functional transfers with on/off all surfaces with mod I using DME as needed w/ G balance/safety          *Pt will increase standing tolerance to 5 minutes in order to complete sinkside ADL task  *Pt will identify 3-5 fall risks during ADL routine to ensure home safety upon discharge  *Pt will improve functional mobility during ADL/IADL/leisure tasks to mod I using DME as needed w/ G balance/safety  Plan   Treatment Interventions ADL retraining;Functional transfer training; Endurance training;Patient/family training; Compensatory technique education   Goal Expiration Date 01/08/23   OT Treatment Day 0   Recommendation   OT Discharge Recommendation Post acute rehabilitation services   AM-PAC Daily Activity Inpatient   Lower Body Dressing 2   Bathing 3   Toileting 2   Upper Body Dressing 4   Grooming 4   Eating 4   Daily Activity Raw Score 19   Daily Activity Standardized Score (Calc for Raw Score >=11) 40 22   AM-PAC Applied Cognition Inpatient   Following a Speech/Presentation 4   Understanding Ordinary Conversation 4   Taking Medications 4   Remembering Where Things Are Placed or Put Away 4   Remembering List of 4-5 Errands 4   Taking Care of Complicated Tasks 4   Applied Cognition Raw Score 24   Applied Cognition Standardized Score 62 21   End of Consult   Education Provided Yes   Patient Position at End of Consult Bedside chair;Bed/Chair alarm activated; All needs within reach   Nurse Communication Nurse aware of consult           Jose Eduardo Medrano OTR/L

## 2022-12-29 NOTE — PLAN OF CARE
Problem: MOBILITY - ADULT  Goal: Maintain or return to baseline ADL function  Description: INTERVENTIONS:  -  Assess patient's ability to carry out ADLs; assess patient's baseline for ADL function and identify physical deficits which impact ability to perform ADLs (bathing, care of mouth/teeth, toileting, grooming, dressing, etc )  - Assess/evaluate cause of self-care deficits   - Assess range of motion  - Assess patient's mobility; develop plan if impaired  - Assess patient's need for assistive devices and provide as appropriate  - Encourage maximum independence but intervene and supervise when necessary  - Involve family in performance of ADLs  - Assess for home care needs following discharge   - Consider OT consult to assist with ADL evaluation and planning for discharge  - Provide patient education as appropriate  Outcome: Progressing  Goal: Maintains/Returns to pre admission functional level  Description: INTERVENTIONS:  - Perform BMAT or MOVE assessment daily    - Set and communicate daily mobility goal to care team and patient/family/caregiver  - Collaborate with rehabilitation services on mobility goals if consulted  - Perform Range of Motion 2 times a day  - Reposition patient every 2 hours    - Dangle patient 2 times a day  - Stand patient 2 times a day  - Ambulate patient 2 times a day  - Out of bed to chair 2 times a day   - Out of bed for meals 2  Problem: Prexisting or High Potential for Compromised Skin Integrity  Goal: Skin integrity is maintained or improved  Description: INTERVENTIONS:  - Identify patients at risk for skin breakdown  - Assess and monitor skin integrity  - Assess and monitor nutrition and hydration status  - Monitor labs   - Assess for incontinence   - Turn and reposition patient  - Assist with mobility/ambulation  - Relieve pressure over bony prominences  - Avoid friction and shearing  - Provide appropriate hygiene as needed including keeping skin clean and dry  - Evaluate need for skin moisturizer/barrier cream  - Collaborate with interdisciplinary team   - Patient/family teaching  - Consider wound care consult   Outcome: Progressing     Problem: Potential for Falls  Goal: Patient will remain free of falls  Description: INTERVENTIONS:  - Educate patient/family on patient safety including physical limitations  - Instruct patient to call for assistance with activity   - Consult OT/PT to assist with strengthening/mobility   - Keep Call bell within reach  - Keep bed low and locked with side rails adjusted as appropriate  - Keep care items and personal belongings within reach  - Initiate and maintain comfort rounds  - Make Fall Risk Sign visible to staff  - Offer Toileting every 2 Hours, in advance of need  - Initiate/Maintain bed alarm  - Obtain necessary fall risk management equipment: socks  Problem: Nutrition/Hydration-ADULT  Goal: Nutrient/Hydration intake appropriate for improving, restoring or maintaining nutritional needs  Description: Monitor and assess patient's nutrition/hydration status for malnutrition  Collaborate with interdisciplinary team and initiate plan and interventions as ordered  Monitor patient's weight and dietary intake as ordered or per policy  Utilize nutrition screening tool and intervene as necessary  Determine patient's food preferences and provide high-protein, high-caloric foods as appropriate       INTERVENTIONS:  - Monitor oral intake, urinary output, labs, and treatment plans  - Assess nutrition and hydration status and recommend course of action  - Evaluate amount of meals eaten  - Assist patient with eating if necessary   - Allow adequate time for meals  - Recommend/ encourage appropriate diets, oral nutritional supplements, and vitamin/mineral supplements  - Order, calculate, and assess calorie counts as needed  - Recommend, monitor, and adjust tube feedings and TPN/PPN based on assessed needs  - Assess need for intravenous fluids  - Provide specific nutrition/hydration education as appropriate  - Include patient/family/caregiver in decisions related to nutrition  Outcome: Progressing     - Apply yellow socks and bracelet for high fall risk patients  - Consider moving patient to room near nurses station  Outcome: Progressing    times a day  - Out of bed for toileting  - Record patient progress and toleration of activity level   Outcome: Progressing

## 2022-12-29 NOTE — ASSESSMENT & PLAN NOTE
· 12/26: Patient reporting generalized abdominal pain, nausea, dry heaving, constipation however refused po bowel regimen  Belly was soft, non-distended however tender with palpation in all quadrants  Patient was made NPO  · Obstruction series: Nonobstructive bowel gas pattern    · 1x enema given, no BM yet  · Patient with improved abdominal pain, no further N/V, no tenderness on exam, belly is soft and non-distended  · Continue diet, surgical soft, advance as tolerated   · Continue miralax and senna for BM, no evidence of obstruction   · RESOLVED

## 2022-12-29 NOTE — ASSESSMENT & PLAN NOTE
• S/p sigmoid resection in setting of colon cancer about 20 years ago  •  endorses Enriqueta Taylor recently stopped patients Avapritinib  • No acute interventions at this time  • Continue home prn medications for diarrhea/constipation

## 2022-12-29 NOTE — ASSESSMENT & PLAN NOTE
• With history of atrial fibrillation  • On ASA as outpatient, however not on more aggressive AC d/t history of gastric AVMs  • Patient with episode Afib with RVR on 12/26, HR in 130s  • Resolved with 1x dose digoxin 0 25 mg   • Continue with Telemetry  • Cardiology following   • Continue Toprol-XL

## 2022-12-29 NOTE — ASSESSMENT & PLAN NOTE
• Patient presenting to ED with worsening SOB, cough  • Noted to be 70% on RA, quickly escalated to NAWAF LEON [facility]  • CTA PE Study: No PE- findings suggestive of pulmonary edema in the setting of acute CHF versus fluid overload   Consolidative opacity at the medial right base possibly reflecting superimposed pneumonia  • S/P Bipap 1in setting of acute pulmonary edema in the setting of decompensated heart failure  • SIRS: tachypnea, tachycardia, however afebrile, no leukocytosis  • Procal is intermittently positive  • CXR with possible superimposed PNA  • BC x2 negative at 48 hours  • Urine strep/legionella negative   • MRSA negative   • Doxy/Vancomcin Discontinued  • Completed 5 days of IV Rocephin  • Monitor off antibiotics  • Wean O2 as able

## 2022-12-29 NOTE — PROGRESS NOTES
Cardiology Progress Note   Elizabeth Iyer 74 y o  female MRN: [id number]    Unit/Bed#: -01 Encounter: [id number]    Assessment:  1   Acute hypoxic respiratory failure  • Initially on BiPAP weaned to O2 via nasal cannula  • Overnight 12/26/2022, patient had increased work of breathing NC increased to 12L but down titrated down to 2L -- now off as of 12/29/22  • Secondary to acute CHF exacerbation/plueral effusions  • Repeat CXR 12/28/2022 clear   2   Acute on chronic combined CHF, EF decline 40->30%  • Previously on PO lasix 20mg QD; on IV Lasix 40 mg BID as inpatient  • She is now net -4 pounds (bed scale) and -5 2L since admission  • TTE 8/20/2021 EF 55%, no RWMAs, G2DD, moderate LAE, moderate MR, mild AR, moderate TR, mild to moderate IL, 3 8cm aortic root dilation    • TTE 7/5/2022 EF 40%, moderate global hypokinesis, severe LA dilation, severe MR, moderate left pleural effusion  • TTE 12/26/22 EF 30%, severe global hypokinesis with regional variation, G2DD, LA massively dilated, anterior and posterior MV leaflets are tethered, there is severe likely functional regurgitation, mild TR, PASP 50 mmHg, 3 8 ascending aortic aneurysm, trivial pericardial effusion, left pleural effusion   • Toprol XL increased 25>50mg QD 12/27/22  • Entresto 24-26mg BID added 12/28/22  • Jardiance 10mg QD added 12/29/22  3   Severe functional MR  • Moderate -> severe MR, probably functional MR as per last TTE  • Overall conservative management   4   Pleural effusions  • Likely secondary to #2 and #3  • Resolved with diuretics  5   Elevated troponins  • Hs-troponin 52>67>89  • Likely non-MI elevated secondary to acute CHF/hypoxia   6   Paroxysmal atrial fibrillation  • Appears SR currently with HR 60-80s on telemetry  • Toprol XL increased to 50mg QD  • Not on anticoagulation due to history of bleeding and AVMs needing multiple transfusions  7  Chronic anemia/GIST  • She has received multiple blood transfusions over the last year and follows at hospitals for chemo/immunotherapy      Plan/discussion:   • BMP showed creatinine elevation (now 1 4), she has made minimal diuresis overnight and appears euvolemic on exam  Plan to hold diuretics today and check BMP tomorrow  • Plan to optimize GDMT -- Add Jardiance 10mg today  Continue Toprol XL and Entresto as above  If able, add aldactone which can be done as outpatient  • Discussed with patient possibility of primary prevention ICD given her EF now <35% to prevent SCD -- further discussion to be done as outpatient based on patients oncological status  Subjective:   Patient seen and examined  Feeling better overall  Denies any acute chest pain or discomfort  Jennifer Lau for discharge  Objective:     Vitals: Blood pressure 126/97, pulse 69, temperature 98 3 °F (36 8 °C), resp  rate 18, height 5' 1" (1 549 m), weight 48 6 kg (107 lb 2 3 oz), SpO2 98 %  , Body mass index is 20 24 kg/m² ,   Orthostatic Blood Pressures    Flowsheet Row Most Recent Value   Blood Pressure 126/97 filed at 12/29/2022 2038   Patient Position - Orthostatic VS Lying filed at 12/28/2022 2000            Intake/Output Summary (Last 24 hours) at 12/29/2022 1101  Last data filed at 12/29/2022 0355  Gross per 24 hour   Intake 850 ml   Output 500 ml   Net 350 ml         Physical Exam:    GEN: Jeny Russo appears well, alert and oriented x 3, pleasant and cooperative   HEENT: Sclera anicteric, conjunctivae pink, mucous membranes moist  Oropharynx clear  NECK: supple, no significant JVD, Trachea midline, no thyromegaly  HEART: regular rhythm, normal S1 and S2, no murmurs, clicks, gallops or rubs   LUNGS: clear to auscultation bilaterally; no wheezes, rales, or rhonchi  No increased work of breathing or signs of respiratory distress  ABDOMEN: Soft, nontender, nondistended  EXTREMITIES: Skin warm and well perfused, no clubbing, cyanosis, or edema  NEURO: No focal findings  Normal speech   Mood and affect normal  SKIN: Normal without suspicious lesions on exposed skin        Medications:      Current Facility-Administered Medications:   •  aspirin (ECOTRIN LOW STRENGTH) EC tablet 81 mg, 81 mg, Oral, Daily, LANEY Waters, 81 mg at 12/29/22 2307  •  bisacodyl (DULCOLAX) rectal suppository 10 mg, 10 mg, Rectal, Daily PRN, LANEY Waters, 10 mg at 12/26/22 1659  •  cefTRIAXone (ROCEPHIN) IVPB (premix in dextrose) 1,000 mg 50 mL, 1,000 mg, Intravenous, Q24H, LANEY Waters, Last Rate: 100 mL/hr at 12/28/22 2037, 1,000 mg at 12/28/22 2037  •  diphenoxylate-atropine (LOMOTIL) 2 5-0 025 mg per tablet 1 tablet, 1 tablet, Oral, 4x Daily PRN, LANEY Waters  •  docusate sodium (COLACE) capsule 100 mg, 100 mg, Oral, BID PRN, LANEY Waters  •  Empagliflozin (JARDIANCE) tablet 10 mg, 10 mg, Oral, Daily, Lj Ferreira PA-C, 10 mg at 12/29/22 1053  •  heparin (porcine) subcutaneous injection 5,000 Units, 5,000 Units, Subcutaneous, Q8H [address], LANEY Waters, 5,000 Units at 12/29/22 0533  •  hydroxychloroquine (PLAQUENIL) tablet 200 mg, 200 mg, Oral, Daily With Breakfast, LANEY Waters, 200 mg at 12/29/22 3179  •  levalbuterol (XOPENEX) inhalation solution 1 25 mg, 1 25 mg, Nebulization, Q6H, LANEY Waters, 1 25 mg at 12/29/22 0931  •  metoprolol (LOPRESSOR) injection 5 mg, 5 mg, Intravenous, Q6H PRN, LANEY Waters  •  metoprolol succinate (TOPROL-XL) 24 hr tablet 50 mg, 50 mg, Oral, BID, LANEY Waters, 50 mg at 12/29/22 6934  •  ondansetron (ZOFRAN) injection 4 mg, 4 mg, Intravenous, Once, LANEY Waters  •  pantoprazole (PROTONIX) EC tablet 40 mg, 40 mg, Oral, Daily Before Breakfast, LANEY Waters, 40 mg at 12/29/22 6833  •  polyethylene glycol (MIRALAX) packet 17 g, 17 g, Oral, Daily, LANEY Waters  •  potassium chloride (K-DUR,KLOR-CON) CR tablet 40 mEq, 40 mEq, Oral, Daily, LANEY Rudd, 40 mEq at 12/29/22 0280  •  sacubitril-valsartan (ENTRESTO) 24-26 MG per tablet 1 tablet, 1 tablet, Oral, BID, LANEY Waters, 1 tablet at 12/29/22 8601  •  senna (SENOKOT) tablet 17 2 mg, 2 tablet, Oral, HS, LANEY Waters, 17 2 mg at 12/26/22 2117  •  sodium chloride 0 9 % inhalation solution 3 mL, 3 mL, Nebulization, Q6H, LANEY Waters, 3 mL at 12/29/22 0931  •  traMADol (ULTRAM) tablet 50 mg, 50 mg, Oral, Q6H PRN, LANEY Waters  •  trimethobenzamide (TIGAN) IM injection 200 mg, 200 mg, Intramuscular, Q6H PRN, LANEY Waters, 200 mg at 12/27/22 0053     Labs & Results:    Results from last 7 days   Lab Units 12/24/22 1926   CK TOTAL U/L 373*   CK MB INDEX % 1 8     Results from last 7 days   Lab Units 12/29/22  0313 12/28/22  0603 12/27/22  0508   WBC Thousand/uL 3 78* 4 37 5 89   HEMOGLOBIN g/dL 9 8* 8 1* 8 2*   HEMATOCRIT % 30 2* 24 7* 24 8*   PLATELETS Thousands/uL 271 209 206         Results from last 7 days   Lab Units 12/29/22  0313 12/28/22  0603 12/27/22  0508 12/26/22  0423 12/25/22  1513 12/25/22  0424 12/24/22  1929 12/24/22  1926   0000   POTASSIUM mmol/L 4 0 3 9 3 5   < >  --    < >  --  3 6  --    CHLORIDE mmol/L 94* 95* 93*   < >  --    < >  --  97  --    CO2 mmol/L 25 26 27   < >  --    < >  --  19*  --    CO2, I-STAT mmol/L  --   --   --   --  25  --  17*  --    < >   BUN mg/dL 40* 35* 31*   < >  --    < >  --  16  --    CREATININE mg/dL 1 40* 1 12 1 18   < >  --    < >  --  1 29  --    CALCIUM mg/dL 8 7 8 6 9 0   < >  --    < >  --  10 1  --    ALK PHOS U/L  --   --   --   --   --   --   --  92  --    ALT U/L  --   --   --   --   --   --   --  29  --    AST U/L  --   --   --   --   --   --   --  50*  --    GLUCOSE, ISTAT mg/dl  --   --   --   --  115  --  171*  --   --     < > = values in this interval not displayed       Results from last 7 days   Lab Units 12/24/22 2010   INR  1 07   PTT seconds 26     Results from last 7 days   Lab Units 12/25/22  0424   MAGNESIUM mg/dL 1 8

## 2022-12-29 NOTE — PHYSICAL THERAPY NOTE
PHYSICAL THERAPY EVALUATION  NAME: Zia Jung  AGE:   74 y o  MRN:  [id number]  ADMIT DX: Respiratory failure ([facility] ) [J96 90]  SOB (shortness of breath) [R06 02]  CHF exacerbation (HCC) [I50 9]    PMH:   Past Medical History:   Diagnosis Date    ADHD     Anemia     Anxiety     Arthritis     Asthma     [facility] )     Cancer ([facility] )     Cancer ([place] [address] )     liver    Chronic iron deficiency anemia 6/9/2020    Extensive GI evaluation over the last year including multiple EGD, colonoscopy, and capsule endoscopy  Has had gastric AVMs cauterized, Dieulafoy's lesion clipped, and most recently a Karma Hummingbird erosion cauterized    Coronary artery disease     Depression     GERD (gastroesophageal reflux disease)     History of stomach ulcers     Hypercholesterolemia     Hypertension     Kidney disease     Metastatic cancer ([facility] )     Sepsis due to Escherichia coli ([place] [address] ) 6/9/2021     LENGTH OF STAY: 5     12/29/22 1327   PT Last Visit   PT Visit Date 12/29/22   Note Type   Note type Evaluation   Pain Assessment   Pain Assessment Tool 0-10   Pain Score No Pain   Restrictions/Precautions   Weight Bearing Precautions Per Order No   Other Precautions Chair Alarm; Bed Alarm; Fall Risk   Home Living   Type of [address] Two level;Stairs to enter with rails  (1 KAREN)   [address] Walker;Cane   Additional Comments Ambulates independently without AD at baseline, reports she occasionally uses a cane as needed  Prior Function   Level of [place] Independent with ADLs; Independent with functional mobility   Lives With Spouse   IADLs Independent with driving   General   Family/Caregiver Present Yes  ( at end of session)   Cognition   Overall Cognitive Status WFL   Arousal/Participation Cooperative   Orientation Level Oriented to person;Oriented to place;Oriented to situation   Memory Decreased recall of precautions   Following Commands Follows one step commands with increased time or repetition   Comments Pt identified by name and   Subjective   Subjective Agrees to PT evaluation and is cooperative throughout session despite complaints of fatigue/lack of sleep  RLE Assessment   RLE Assessment X   Strength RLE   RLE Overall Strength 4-/5  (functionally)   LLE Assessment   LLE Assessment X   Strength LLE   LLE Overall Strength 4-/5  (functionally)   Bed Mobility   Supine to Sit 5  Supervision   Additional items Increased time required;Verbal cues; Bedrails   Sit to Supine Unable to assess  (left OOB in chair post session with alarm intact)   Transfers   Sit to Stand 4  Minimal assistance   Additional items Assist x 2;Verbal cues; Increased time required   Stand to Sit 4  Minimal assistance   Additional items Assist x 2; Increased time required;Verbal cues   Stand pivot 4  Minimal assistance   Additional items Assist x 2; Increased time required;Verbal cues  (steppage transfer with RW)   Additional Comments Pt reports feeling "sweaty" upon standing  Assisted to recliner chair and BP taken  See below for results  Balance   Static Sitting Fair +   Static Standing Poor +   Dynamic Standing Poor +   Endurance Deficit   Endurance Deficit Yes   Endurance Deficit Description limited standing tolerance, ambulation distance   Activity Tolerance   Activity Tolerance Patient limited by fatigue;Treatment limited secondary to medical complications (Comment)  (decrease in BP and feeling dizzy)   Nurse Made Aware Per RN, pt appropriate to evaluate; updated on status   Assessment   Problem List Decreased strength;Decreased endurance; Impaired balance;Decreased mobility; Decreased safety awareness   Assessment Pt is a 74 y o  female seen for PT evaluation s/p admit to [address] [place] [address] on 2022 w/ Acute decompensated heart failure ([facility] [address] )  Order placed for PT  Comorbidities affecting pt's physical performance at time of assessment include: HTN, previous surgery and cancer history  Personal factors affecting pt at time of IE include: multi-level environment, inability to perform ADLs, inability to ambulate household distances and limited insight into impairments  Prior to admission, pt was was independent w/ all functional mobility w/ out AD, lived in multi-level home, had 1 KAREN (-) railing and lived with spouse  Upon evaluation: Pt requires supervision for bed mobility, min A x2 for sit to stand, and min A x2 for SPT with RW  Limited assessment due to decrease in BP and pt feeling unwell  (Please find full objective findings from PT assessment regarding body systems outlined above)  Impairments and limitations also listed above, especially due to  weakness, impaired balance, decreased endurance, decreased activity tolerance, decreased safety awareness and fall risk  Pt's clinical presentation is currently unstable/unpredictable seen in pt's presentation of decreased safety awareness, fall risk, decrease in BP with mobility, and limited insight into deficits  Pt to benefit from continued skilled PT tx while in hospital and upon DC to address deficits as defined above and maximize level of functional mobility  From PT/mobility standpoint, recommendation at time of d/c would be inpatient rehab pending progress  Recommend progression of ambulation and initiation of HEP as appropriate     Goals   Patient Goals to get better, go home tomorrow   STG Expiration Date 01/08/23   Short Term Goal #1 Pt will be able to: (1) perform bed mobility with mod I to promote OOB activity (2) perform sit to stand with supervision to decrease burden of care (3) ambulate at least 50` with supervision and least restrictive AD to increase activity tolerance (4) increase standing balance by 1 grade to decrease risk of falls (5) negotiate at least a full flight of stairs with supervision and use of handrail to allow safe access to 2nd floor   PT Treatment Day 0   Plan   Treatment/Interventions Functional transfer training;LE strengthening/ROM; Therapeutic exercise; Endurance training;Patient/family training;Equipment eval/education; Bed mobility;Gait training;Elevations   PT Frequency 3-5x/wk   Recommendation   PT Discharge Recommendation Post acute rehabilitation services   Equipment Recommended [facility] Recommended Wheeled walker   [facility] Basic Mobility Inpatient   Turning in Bed Without Bedrails 3   Lying on Back to Sitting on Edge of Flat Bed 3   Moving Bed to Chair 3   Standing Up From Chair 3   Walk in Room 1   Climb 3-5 Stairs 1   Basic Mobility Inpatient Raw Score 14   Basic Mobility Standardized Score 35 55   Highest Level Of Mobility   -HLM Goal 4: Move to chair/commode   JH-HLM Achieved 4: Move to chair/commode   End of Consult   Patient Position at End of Consult Bedside chair;Bed/Chair alarm activated; All needs within reach   Pt was seen for a co-eval with OT due to potential need for significant physical assist, poor pain control, impaired mental status, limiting behaviors, and poor adherence to precautions  The patient's [facility] Basic Mobility Inpatient Short Form Raw Score is 14, Standardized Score is 35 55  A Raw Score of less than 16 suggests the patient may benefit from discharge to post-acute rehabilitation services, which DOES coincide with CURRENT above PT recommendations  However please refer to therapist recommendation for discharge planning given other factors that may influence destination  Adapted from [employer] “6-Clicks” Basic Mobility and Daily Activity Scores With Discharge Destination  Physical Therapy, 2021;101:1-9   DOI: [email or web address]      Luis Gordillo, PT,DPT

## 2022-12-29 NOTE — ASSESSMENT & PLAN NOTE
· CXR with large pleural effusion?   · Repeat CXR: Pending  · Continue with IV Diuresis  · Can consider IR consultation for thoracentesis if effusions or respiratory status worsen

## 2022-12-29 NOTE — PROGRESS NOTES
[place]  Progress Note Nory Cunningham [birth year], 74 y o  female MRN: [id number]  Unit/Bed#: -01 Encounter: [id number]  Primary Care Provider: Cindy Brown DO   Date and time admitted to hospital: 12/24/2022  7:18 PM    * Acute decompensated heart failure [facility])  Assessment & Plan  Wt Readings from Last 3 Encounters:   12/29/22 48 6 kg (107 lb 2 3 oz)   11/22/22 50 8 kg (112 lb)   08/12/22 49 9 kg (110 lb)     · Initially required Bipap, weaned to 6-8L midflow currently   • Last ECHO 7/2022: EF 40% G1DD, severe mitral regurgitation: "the valve morphology is consistent with myxomatous proliferation  There is annular calcification  There is severe regurgitation"  • Repeat CXR 12/25: Worsening congestive changes with possible superimposed perihilar infiltrates  Effusions also suggested  • Updated Echo: EF 30% with severe global hypokinesis  G2DD  Severe Mitral valve regurgitation  • Repeat CXR: Interval significant improvement in pulmonary edema  • Cardiology consulted, appreciate recs   • With elevation in Crt this morning, plan to hold diuretics and repeat am BMP  • Plan to optimize GDMT-> Jardiance 10 mg to begin this morning  C/w Toprol-Xl and Entresto  • Discussed with patient possibility of primary prevention ICD but can be followed up outpatient  • Strict I and O, Daily weights  • CM following for price checking both Entresto and Jardiance  • PT/OT eval: Pending    Acute respiratory failure with hypoxia ([address] )  Assessment & Plan  • Patient presenting to ED with worsening SOB, cough  • Noted to be 70% on RA, quickly escalated to NAWAF LEON [facility]  • CTA PE Study: No PE- findings suggestive of pulmonary edema in the setting of acute CHF versus fluid overload   Consolidative opacity at the medial right base possibly reflecting superimposed pneumonia  • S/P Bipap 1in setting of acute pulmonary edema in the setting of decompensated heart failure  • SIRS: tachypnea, tachycardia, however afebrile, no leukocytosis  • Procal is intermittently positive  • CXR with possible superimposed PNA  • BC x2 negative at 48 hours  • Urine strep/legionella negative   • MRSA negative   • Doxy/Vancomcin Discontinued  • Completed 5 days of IV Rocephin  • Monitor off antibiotics  • Wean O2 as able     Paroxysmal atrial fibrillation (HCC)  Assessment & Plan  • With history of atrial fibrillation  • On ASA as outpatient, however not on more aggressive AC d/t history of gastric AVMs  • Patient with episode Afib with RVR on 12/26, HR in 130s  • Resolved with 1x dose digoxin 0 25 mg   • Continue with Telemetry  • Cardiology following   • Continue Toprol-XL    Abdominal pain-resolved as of 12/29/2022  Assessment & Plan  · 12/26: Patient reporting generalized abdominal pain, nausea, dry heaving, constipation however refused po bowel regimen  Belly was soft, non-distended however tender with palpation in all quadrants  Patient was made NPO  · Obstruction series: Nonobstructive bowel gas pattern  · 1x enema given, no BM yet  · Patient with improved abdominal pain, no further N/V, no tenderness on exam, belly is soft and non-distended  · Continue diet, surgical soft, advance as tolerated   · Continue miralax and senna for BM, no evidence of obstruction   · RESOLVED    Pleural effusion  Assessment & Plan  · CXR with large pleural effusion? · Repeat CXR (12/29): Significant improvement in pulmonary edema  · S/p IV Diuresis     · Monitor respiratory status    GIST (gastrointestinal stroma tumor), malignant, colon (HCC)  Assessment & Plan  • S/p sigmoid resection in setting of colon cancer about 20 years ago  •  endorses Horace Romero recently stopped patients Avapritinib  • No acute interventions at this time  • Continue home prn medications for diarrhea/constipation    MARK (obstructive sleep apnea)  Assessment & Plan  • Patient endorses CPAP usage at night    Stage 3b chronic kidney disease ([facility] [address] )  Assessment & Plan  Lab Results   Component Value Date    EGFR 37 12/29/2022    EGFR 48 12/28/2022    EGFR 45 12/27/2022    CREATININE 1 40 (H) 12/29/2022    CREATININE 1 12 12/28/2022    CREATININE 1 18 12/27/2022     • With baseline Cr of 1 15-1 35  • Slightly elevated on am BMP  • Likely secondary to IV Diuresis  • Avoiding nephrotoxins  • Strict I and O, continue external cath  • Monitor BMP     Elevated troponin  Assessment & Plan  • Initial troponin elevated in setting of likely demand ischemia  • EKG without ischemic changes  • Cardiology Following    Pain syndrome, chronic  Assessment & Plan  • Continue Home Medication:  • Tramadol 100mg Q6H in setting of chronic cancer related pain  • Utilize multimodal pain regimen    Essential hypertension  Assessment & Plan  • BP reviewed and fluctuating  • Continue metoprolol succinate 50 mg per cards  • Will add hold parameters  • Continue to monitor while admitted    Anemia  Assessment & Plan  · Hgb baseline is 7-10 since 07/2022  · Hgb currently stable   · afib not on AC due to hsx of GIST, see above  · No evidence bleeding  · Continue to monitor on ASA and DVT prophylaxis         VTE Pharmacologic Prophylaxis: VTE Score: 5 High Risk (Score >/= 5) - Pharmacological DVT Prophylaxis Ordered: heparin  Sequential Compression Devices Ordered  Patient Centered Rounds: I performed bedside rounds with nursing staff today  Discussions with Specialists or Other Care Team Provider: Bhupinder    Education and Discussions with Family / Patient: Patient declined call to   Time Spent for Care: 30 minutes  More than 50% of total time spent on counseling and coordination of care as described above      Current Length of Stay: 5 day(s)  Current Patient Status: Inpatient   Certification Statement: The patient will continue to require additional inpatient hospital stay due to CHF exacerbation and newly reduced EF requriing medication adjustment and cardiology evaluaiton  Discharge Plan: Anticipate discharge in 24-48 hrs to discharge location to be determined pending rehab evaluations  Code Status: Level 1 - Full Code    Subjective:   Patient stated she is feeling mildly better today  Patient states he is happy she is off oxygen  Patient states she is disappointed she is not able to go home today  Patient denies any active chest pain, shortness of breath, abdominal pain, nausea, vomiting, or diarrhea  Objective:     Vitals:   Temp (24hrs), Av 7 °F (36 5 °C), Min:97 1 °F (36 2 °C), Max:98 3 °F (36 8 °C)    Temp:  [97 1 °F (36 2 °C)-98 3 °F (36 8 °C)] 97 3 °F (36 3 °C)  HR:  [] 64  Resp:  [15-47] 18  BP: ()/(51-97) 88/51  SpO2:  [94 %-100 %] 100 %  Body mass index is 20 24 kg/m²  Input and Output Summary (last 24 hours): Intake/Output Summary (Last 24 hours) at 2022 1559  Last data filed at 2022 1246  Gross per 24 hour   Intake 730 ml   Output 200 ml   Net 530 ml       Physical Exam:   Physical Exam  Vitals and nursing note reviewed  Constitutional:       General: She is not in acute distress  Appearance: Normal appearance  HENT:      Head: Normocephalic  Nose: Nose normal  No congestion  Mouth/Throat:      Mouth: Mucous membranes are moist       Pharynx: Oropharynx is clear  Cardiovascular:      Rate and Rhythm: Normal rate and regular rhythm  Pulses: Normal pulses  Heart sounds: Normal heart sounds  No murmur heard  Pulmonary:      Effort: Pulmonary effort is normal  No respiratory distress  Breath sounds: Decreased breath sounds present  Abdominal:      General: Bowel sounds are normal  There is no distension  Palpations: Abdomen is soft  Tenderness: There is no abdominal tenderness  Musculoskeletal:         General: Normal range of motion  Cervical back: Normal range of motion  Right lower leg: No edema  Left lower leg: No edema  Skin:     General: Skin is warm and dry  Capillary Refill: Capillary refill takes less than 2 seconds  Coloration: Skin is pale  Neurological:      Mental Status: She is alert and oriented to person, place, and time  Mental status is at baseline  Motor: Weakness (Generalized) present  Psychiatric:         Mood and Affect: Affect is flat  Speech: Speech normal          Behavior: Behavior is cooperative  Additional Data:     Labs:  Results from last 7 days   Lab Units 12/29/22  0313 12/25/22  1513 12/25/22  0424   WBC Thousand/uL 3 78*   < > 6 25   HEMOGLOBIN g/dL 9 8*   < > 7 5*   I STAT HEMOGLOBIN   --    < >  --    HEMATOCRIT % 30 2*   < > 23 1*   HEMATOCRIT, ISTAT   --    < >  --    PLATELETS Thousands/uL 271   < > 225   BANDS PCT %  --   --  3   NEUTROS PCT % 80*   < >  --    LYMPHS PCT % 9*   < >  --    LYMPHO PCT %  --   --  2*   MONOS PCT % 11   < >  --    MONO PCT %  --   --  6   EOS PCT % 0   < > 0    < > = values in this interval not displayed  Results from last 7 days   Lab Units 12/29/22  0313 12/24/22  1929 12/24/22  1926   SODIUM mmol/L 131*   < > 132*   POTASSIUM mmol/L 4 0   < > 3 6   CHLORIDE mmol/L 94*   < > 97   CO2 mmol/L 25   < > 19*   CO2, I-STAT   --    < >  --    BUN mg/dL 40*   < > 16   CREATININE mg/dL 1 40*   < > 1 29   ANION GAP mmol/L 12   < > 16*   CALCIUM mg/dL 8 7   < > 10 1   ALBUMIN g/dL  --   --  3 5   TOTAL BILIRUBIN mg/dL  --   --  0 30   ALK PHOS U/L  --   --  92   ALT U/L  --   --  29   AST U/L  --   --  50*   GLUCOSE RANDOM mg/dL 107   < > 167*    < > = values in this interval not displayed       Results from last 7 days   Lab Units 12/24/22  2010   INR  1 07     Results from last 7 days   Lab Units 12/29/22  1121 12/29/22  0717 12/29/22  0004 12/28/22  0557 12/27/22  2340 12/27/22  1651 12/27/22  0512 12/27/22  0057   POC GLUCOSE mg/dl 103 111 131 105 87 97 116 104         Results from last 7 days   Lab Units 12/28/22  0603 12/27/22  3546 12/26/22  0423 12/25/22  0424 12/24/22  6596 12/24/22 1926   LACTIC ACID mmol/L  --   --   --   --  1 0 7 6*   PROCALCITONIN ng/ml 0 43* 0 62* <0 05 0 46*  --  0 15       Lines/Drains:  Invasive Devices     Peripheral Intravenous Line  Duration           Peripheral IV 12/29/22 Left;Ventral (anterior) Forearm <1 day                  Telemetry:  Telemetry Orders (From admission, onward)             48 Hour Telemetry Monitoring  Continuous x 48 hours        Expiring   References:    Telemetry Guidelines   Question:  Reason for 48 Hour Telemetry  Answer:  Arrhythmias Requiring Medical Therapy (eg  SVT, Vtach/fib, Bradycardia, Uncontrolled A-fib)                 Telemetry Reviewed: Atrial fibrillation  HR averaging 's  Indication for Continued Telemetry Use: Acute CHF on >200 mg lasix/day or equivalent dose or with new reduced EF  Imaging: Reviewed radiology reports from this admission including: chest xray    Recent Cultures (last 7 days):   Results from last 7 days   Lab Units 12/24/22 2247 12/24/22 1926   BLOOD CULTURE   --  No Growth After 4 Days  No Growth After 4 Days     LEGIONELLA URINARY ANTIGEN  Negative  --        Last 24 Hours Medication List:   Current Facility-Administered Medications   Medication Dose Route Frequency Provider Last Rate   • aspirin  81 mg Oral Daily LANEY Waters     • bisacodyl  10 mg Rectal Daily PRN LANEY Waters     • cefTRIAXone  1,000 mg Intravenous Q24H LANEY Waters 1,000 mg (12/28/22 2037)   • diphenoxylate-atropine  1 tablet Oral 4x Daily PRN LANEY Waters     • docusate sodium  100 mg Oral BID PRN LANEY Waters     • Empagliflozin  10 mg Oral Daily Bozena Lennon PA-C     • heparin (porcine)  5,000 Units Subcutaneous [facility] LANEY Waters     • hydroxychloroquine  200 mg Oral Daily With Breakfast LANEY Waters     • levalbuterol  1 25 mg Nebulization Q6H LANEY Waters     • metoprolol  5 mg Intravenous Q6H PRN LANEY Waters     • metoprolol succinate  50 mg Oral BID LANEY Waters     • ondansetron  4 mg Intravenous Once LANEY Rudd     • pantoprazole  40 mg Oral Daily Before Breakfast LANEY Waters     • polyethylene glycol  17 g Oral Daily LANEY Waters     • potassium chloride  40 mEq Oral Daily LANEY Waters     • sacubitril-valsartan  1 tablet Oral BID LANEY Waters     • senna  2 tablet Oral HS LANEY Waters     • sodium chloride  3 mL Nebulization Q6H LANEY Waters     • traMADol  50 mg Oral Q6H PRN LANEY Waters     • trimethobenzamide  200 mg Intramuscular Q6H PRN LANEY Rudd          Today, Patient Was Seen By: Merline Schlein, CRNP    **Please Note: This note may have been constructed using a voice recognition system  **

## 2022-12-30 PROBLEM — N18.9 ACUTE KIDNEY INJURY SUPERIMPOSED ON CHRONIC KIDNEY DISEASE (HCC): Status: ACTIVE | Noted: 2020-10-14

## 2022-12-30 PROBLEM — J96.01 ACUTE RESPIRATORY FAILURE WITH HYPOXIA (HCC): Status: RESOLVED | Noted: 2022-07-04 | Resolved: 2022-12-30

## 2022-12-30 PROBLEM — N17.9 ACUTE KIDNEY INJURY SUPERIMPOSED ON CHRONIC KIDNEY DISEASE (HCC): Status: ACTIVE | Noted: 2020-10-14

## 2022-12-30 LAB
ANION GAP SERPL CALCULATED.3IONS-SCNC: 15 MMOL/L (ref 4–13)
BACTERIA BLD CULT: NORMAL
BACTERIA BLD CULT: NORMAL
BASOPHILS # BLD AUTO: 0 THOUSANDS/ÂΜL (ref 0–0.1)
BASOPHILS NFR BLD AUTO: 0 % (ref 0–1)
BUN SERPL-MCNC: 54 MG/DL (ref 5–25)
CALCIUM SERPL-MCNC: 9.3 MG/DL (ref 8.3–10.1)
CHLORIDE SERPL-SCNC: 96 MMOL/L (ref 96–108)
CO2 SERPL-SCNC: 21 MMOL/L (ref 21–32)
CREAT SERPL-MCNC: 1.65 MG/DL (ref 0.6–1.3)
EOSINOPHIL # BLD AUTO: 0.01 THOUSAND/ÂΜL (ref 0–0.61)
EOSINOPHIL NFR BLD AUTO: 0 % (ref 0–6)
ERYTHROCYTE [DISTWIDTH] IN BLOOD BY AUTOMATED COUNT: 14.6 % (ref 11.6–15.1)
GFR SERPL CREATININE-BSD FRML MDRD: 30 ML/MIN/1.73SQ M
GLUCOSE SERPL-MCNC: 90 MG/DL (ref 65–140)
HCT VFR BLD AUTO: 27 % (ref 34.8–46.1)
HGB BLD-MCNC: 8.6 G/DL (ref 11.5–15.4)
IMM GRANULOCYTES # BLD AUTO: 0.01 THOUSAND/UL (ref 0–0.2)
IMM GRANULOCYTES NFR BLD AUTO: 0 % (ref 0–2)
LYMPHOCYTES # BLD AUTO: 0.3 THOUSANDS/ÂΜL (ref 0.6–4.47)
LYMPHOCYTES NFR BLD AUTO: 7 % (ref 14–44)
MCH RBC QN AUTO: 34.8 PG (ref 26.8–34.3)
MCHC RBC AUTO-ENTMCNC: 31.9 G/DL (ref 31.4–37.4)
MCV RBC AUTO: 109 FL (ref 82–98)
MONOCYTES # BLD AUTO: 0.74 THOUSAND/ÂΜL (ref 0.17–1.22)
MONOCYTES NFR BLD AUTO: 17 % (ref 4–12)
NEUTROPHILS # BLD AUTO: 3.25 THOUSANDS/ÂΜL (ref 1.85–7.62)
NEUTS SEG NFR BLD AUTO: 76 % (ref 43–75)
NRBC BLD AUTO-RTO: 0 /100 WBCS
PLATELET # BLD AUTO: 225 THOUSANDS/UL (ref 149–390)
PMV BLD AUTO: 10.9 FL (ref 8.9–12.7)
POTASSIUM SERPL-SCNC: 4.6 MMOL/L (ref 3.5–5.3)
RBC # BLD AUTO: 2.47 MILLION/UL (ref 3.81–5.12)
SODIUM SERPL-SCNC: 132 MMOL/L (ref 135–147)
WBC # BLD AUTO: 4.31 THOUSAND/UL (ref 4.31–10.16)

## 2022-12-30 RX ADMIN — ISODIUM CHLORIDE 3 ML: 0.03 SOLUTION RESPIRATORY (INHALATION) at 07:43

## 2022-12-30 RX ADMIN — SACUBITRIL AND VALSARTAN 1 TABLET: 24; 26 TABLET, FILM COATED ORAL at 08:52

## 2022-12-30 RX ADMIN — POTASSIUM CHLORIDE 40 MEQ: 1500 TABLET, EXTENDED RELEASE ORAL at 08:52

## 2022-12-30 RX ADMIN — EMPAGLIFLOZIN 10 MG: 10 TABLET, FILM COATED ORAL at 11:53

## 2022-12-30 RX ADMIN — POLYETHYLENE GLYCOL 3350 17 G: 17 POWDER, FOR SOLUTION ORAL at 08:51

## 2022-12-30 RX ADMIN — HEPARIN SODIUM 5000 UNITS: 5000 INJECTION INTRAVENOUS; SUBCUTANEOUS at 14:26

## 2022-12-30 RX ADMIN — HEPARIN SODIUM 5000 UNITS: 5000 INJECTION INTRAVENOUS; SUBCUTANEOUS at 22:03

## 2022-12-30 RX ADMIN — ISODIUM CHLORIDE 3 ML: 0.03 SOLUTION RESPIRATORY (INHALATION) at 14:06

## 2022-12-30 RX ADMIN — ISODIUM CHLORIDE 3 ML: 0.03 SOLUTION RESPIRATORY (INHALATION) at 01:05

## 2022-12-30 RX ADMIN — ASPIRIN 81 MG: 81 TABLET, COATED ORAL at 08:52

## 2022-12-30 RX ADMIN — METOPROLOL SUCCINATE 50 MG: 50 TABLET, EXTENDED RELEASE ORAL at 08:52

## 2022-12-30 RX ADMIN — LEVALBUTEROL HYDROCHLORIDE 1.25 MG: 1.25 SOLUTION, CONCENTRATE RESPIRATORY (INHALATION) at 14:06

## 2022-12-30 RX ADMIN — METOPROLOL SUCCINATE 50 MG: 50 TABLET, EXTENDED RELEASE ORAL at 22:03

## 2022-12-30 RX ADMIN — LEVALBUTEROL HYDROCHLORIDE 1.25 MG: 1.25 SOLUTION, CONCENTRATE RESPIRATORY (INHALATION) at 07:43

## 2022-12-30 RX ADMIN — ISODIUM CHLORIDE 3 ML: 0.03 SOLUTION RESPIRATORY (INHALATION) at 19:32

## 2022-12-30 RX ADMIN — LEVALBUTEROL HYDROCHLORIDE 1.25 MG: 1.25 SOLUTION, CONCENTRATE RESPIRATORY (INHALATION) at 01:05

## 2022-12-30 RX ADMIN — PANTOPRAZOLE SODIUM 40 MG: 40 TABLET, DELAYED RELEASE ORAL at 05:22

## 2022-12-30 RX ADMIN — LEVALBUTEROL HYDROCHLORIDE 1.25 MG: 1.25 SOLUTION, CONCENTRATE RESPIRATORY (INHALATION) at 19:32

## 2022-12-30 RX ADMIN — HYDROXYCHLOROQUINE SULFATE 200 MG: 200 TABLET, FILM COATED ORAL at 09:24

## 2022-12-30 RX ADMIN — HEPARIN SODIUM 5000 UNITS: 5000 INJECTION INTRAVENOUS; SUBCUTANEOUS at 05:22

## 2022-12-30 RX ADMIN — SENNOSIDES 17.2 MG: 8.6 TABLET, FILM COATED ORAL at 22:03

## 2022-12-30 NOTE — ASSESSMENT & PLAN NOTE
• S/p sigmoid resection in setting of colon cancer about 20 years ago  •  endorses Jerri Butler recently stopped patients Avapritinib  • No acute interventions at this time  • Continue home prn medications for diarrhea/constipation

## 2022-12-30 NOTE — ASSESSMENT & PLAN NOTE
Wt Readings from Last 3 Encounters:   12/30/22 46 6 kg (102 lb 11 8 oz)   11/22/22 50 8 kg (112 lb)   08/12/22 49 9 kg (110 lb)     · Initially required Bipap, weaned to 6-8L midflow --> now on RA  • Last ECHO 7/2022: EF 40% G1DD, severe mitral regurgitation: "the valve morphology is consistent with myxomatous proliferation  There is annular calcification  There is severe regurgitation"  • Repeat CXR 12/25: Worsening congestive changes with possible superimposed perihilar infiltrates  Effusions also suggested  • Updated Echo: EF 30% with severe global hypokinesis  G2DD  Severe Mitral valve regurgitation  • Repeat CXR: Interval significant improvement in pulmonary edema  • Cardiology consulted, appreciate recs   • Last dose IV lasix 12/29 --> hold further diuretics due to IVIS  • Initiated on Jardiance   C/w Toprol-Xl and Entresto  • Discussed with patient possibility of primary prevention ICD but can be followed up outpatient  • Diuretics on DC: likely Lasix 20 mg PRN edema/increase in wt 3 lbs above baseline  • CM following for price checking both Entresto and Jardiance  • PT/OT eval: recommend STR, patient unsure that she is agreeable to this --> [facility]

## 2022-12-30 NOTE — CASE MANAGEMENT
Case Management Progress Note    Patient name Stewart Stauffer  Location /-93 MRN [id number]  : 1950 Date 2022       LOS (days): 6  Geometric Mean LOS (GMLOS) (days):   Days to GMLOS:        OBJECTIVE:        Current admission status: Inpatient  Preferred Pharmacy:   [address] [place] [address] #[id number] [place] [id number]  Phone: [phone] Fax: [phone]    Primary Care Provider: Pradeep Castanon DO    Primary Insurance: [facility] REP  Secondary Insurance:     PROGRESS NOTE:  Met with Pt re: SNF and discharge planning  Pt is adamantly refusing SNF and wants to return home  Discussed fall risk ans safety and Pt still declines SNF  Pt reports she would be agreeable for home care  Pt reports she had home care in past but unable to recall agency  Pt wants to discuss home care with  when he comes in this morning  CM to follow up later this morning

## 2022-12-30 NOTE — PROGRESS NOTES
Cardiology Progress Note   Corina Will 74 y o  female MRN: [id number]    Unit/Bed#: -01 Encounter: [id number]    Assessment:  1   Acute hypoxic respiratory failure  • Initially on BiPAP weaned to O2 via nasal cannula  • Overnight 12/26/2022, patient had increased work of breathing NC increased to 12L but down titrated down to 2L -- now off as of 12/29/22  • Secondary to acute CHF exacerbation/plueral effusions  • Repeat CXR 12/28/2022 clear   2   Acute on chronic combined CHF, EF decline 40->30%  • Previously on PO lasix 20mg QD; on IV Lasix 40 mg BID as inpatient  • Net -5 0L total since admission  • TTE 8/20/2021 EF 55%, no RWMAs, G2DD, moderate LAE, moderate MR, mild AR, moderate TR, mild to moderate WA, 3 8cm aortic root dilation    • TTE 7/5/2022 EF 40%, moderate global hypokinesis, severe LA dilation, severe MR, moderate left pleural effusion  • TTE 12/26/22 EF 30%, severe global hypokinesis with regional variation, G2DD, LA massively dilated, anterior and posterior MV leaflets are tethered, there is severe likely functional regurgitation, mild TR, PASP 50 mmHg, 3 8 ascending aortic aneurysm, trivial pericardial effusion, left pleural effusion   • Toprol XL increased 25>50mg QD 12/27/22  • Entresto 24-26mg BID added 12/28/22  • Jardiance 10mg QD added 12/29/22  3   Severe functional MR  • Moderate -> severe MR, probably functional MR as per last TTE  • Overall conservative management   4   Pleural effusions  • Likely secondary to #2 and #3  • Resolved with diuretics  5   Elevated troponins  • Hs-troponin 52>67>89  • Likely non-MI elevated secondary to acute CHF/hypoxia   6   Paroxysmal atrial fibrillation  • Appears SR currently with HR 60-80s on telemetry  • Toprol XL increased to 50mg QD  • Not on anticoagulation due to history of bleeding and AVMs needing multiple transfusions  7  Chronic anemia/GIST  • She has received multiple blood transfusions over the last year and follows at [facility] for chemo/immunotherapy     Plan/discussion:   · BMP shows worsening of creatinine from 1 4>1 6 overnight  She appears volume depleted on exam today  She did receive morning dose of IV Lasix yesterday which has since been discontinued  Continue to hold diuretics today and recheck BMP tomorrow  · If creatinine improves, suggest discharge with PO Lasix 20 mg as needed for weight gain of >3lbs above her baseline  She is advised to maintain daily weights at home and report any significant weight gain  · Commend outpatient cardiology follow-up in 1 to 2 weeks post discharge (will call her cardiologist office in [facility] [employer] [facility] post discharge)  · Continue GDMT with Toprol-XL 50 mg twice daily, Entresto 24-26 mg twice daily and Jardiance 10 mg daily  · She is not on [facility] for atrial fibrillation due to anemia/ hx of GIST tumor  · Case management working on medication affordability post-discharge  · Discussed with patient possibility of primary prevention ICD given her low LVEF of less than 35%  Given her current oncologic status patient wants to hold off on further invasive measures at present time and will reconsider as outpatient with her primary cardiologist at [facility]  Subjective:   Patient seen and examined  Overnight events reviewed  Objective:     Vitals: Blood pressure 117/67, pulse 76, temperature 97 6 °F (36 4 °C), resp  rate 14, height 5' 1" (1 549 m), weight 46 6 kg (102 lb 11 8 oz), SpO2 100 %  , Body mass index is 19 41 kg/m² ,   Orthostatic Blood Pressures    Flowsheet Row Most Recent Value   Blood Pressure 117/67 filed at 12/30/2022 5843   Patient Position - Orthostatic VS Lying filed at 12/28/2022 2000            Intake/Output Summary (Last 24 hours) at 12/30/2022 1129  Last data filed at 12/30/2022 7781  Gross per 24 hour   Intake 510 ml   Output 300 ml   Net 210 ml         Physical Exam:    GEN: Corina Will appears well, alert and oriented x 3, pleasant and cooperative   HEENT: Sclera anicteric, conjunctivae pink, mucous membranes moist  Oropharynx clear  NECK: supple, no significant JVD, Trachea midline, no thyromegaly  HEART: regular rhythm, normal S1 and S2, no murmurs, clicks, gallops or rubs   LUNGS: clear to auscultation bilaterally; no wheezes, rales, or rhonchi  No increased work of breathing or signs of respiratory distress  ABDOMEN: Soft, nontender, nondistended  EXTREMITIES: Skin warm and well perfused, no clubbing, cyanosis, or edema  NEURO: No focal findings  Normal speech  Mood and affect normal    SKIN: Normal without suspicious lesions on exposed skin        Medications:      Current Facility-Administered Medications:   •  aspirin (ECOTRIN LOW STRENGTH) EC tablet 81 mg, 81 mg, Oral, Daily, LANEY Waters, 81 mg at 12/30/22 3061  •  bisacodyl (DULCOLAX) rectal suppository 10 mg, 10 mg, Rectal, Daily PRN, LANEY Waters, 10 mg at 12/26/22 1659  •  diphenoxylate-atropine (LOMOTIL) 2 5-0 025 mg per tablet 1 tablet, 1 tablet, Oral, 4x Daily PRN, LANEY Waters  •  docusate sodium (COLACE) capsule 100 mg, 100 mg, Oral, BID PRN, LANEY Waters  •  Empagliflozin (JARDIANCE) tablet 10 mg, 10 mg, Oral, Daily, Lj Ferreira PA-C, 10 mg at 12/29/22 9160  •  heparin (porcine) subcutaneous injection 5,000 Units, 5,000 Units, Subcutaneous, Q8H [address], LANEY Waters, 5,000 Units at 12/30/22 0522  •  hydroxychloroquine (PLAQUENIL) tablet 200 mg, 200 mg, Oral, Daily With Breakfast, LANEY Waters, 200 mg at 12/30/22 7462  •  levalbuterol (XOPENEX) inhalation solution 1 25 mg, 1 25 mg, Nebulization, Q6H, LANEY Waters, 1 25 mg at 12/30/22 8061  •  metoprolol (LOPRESSOR) injection 5 mg, 5 mg, Intravenous, Q6H PRN, LANEY Waters  •  metoprolol succinate (TOPROL-XL) 24 hr tablet 50 mg, 50 mg, Oral, BID, LANEY Waters, 50 mg at 12/30/22 9967  •  ondansetron (ZOFRAN) injection 4 mg, 4 mg, Intravenous, Once, [facility] LANEY Dominguez  •  pantoprazole (PROTONIX) EC tablet 40 mg, 40 mg, Oral, Daily Before Breakfast, LANEY Waters, 40 mg at 12/30/22 0522  •  polyethylene glycol (MIRALAX) packet 17 g, 17 g, Oral, Daily, LANEY Waters, 17 g at 12/30/22 0001  •  potassium chloride (K-DUR,KLOR-CON) CR tablet 40 mEq, 40 mEq, Oral, Daily, LANEY Waters, 40 mEq at 12/30/22 0632  •  sacubitril-valsartan (ENTRESTO) 24-26 MG per tablet 1 tablet, 1 tablet, Oral, BID, LANEY Waters, 1 tablet at 12/30/22 9524  •  senna (SENOKOT) tablet 17 2 mg, 2 tablet, Oral, HS, LANEY Waters, 17 2 mg at 12/26/22 2117  •  sodium chloride 0 9 % inhalation solution 3 mL, 3 mL, Nebulization, Q6H, LANEY Waters, 3 mL at 12/30/22 7762  •  traMADol (ULTRAM) tablet 50 mg, 50 mg, Oral, Q6H PRN, LANEY Waters  •  trimethobenzamide (TIGAN) IM injection 200 mg, 200 mg, Intramuscular, Q6H PRN, LANEY Waters, 200 mg at 12/27/22 0053     Labs & Results:    Results from last 7 days   Lab Units 12/24/22  1926   CK TOTAL U/L 373*   CK MB INDEX % 1 8     Results from last 7 days   Lab Units 12/30/22  0155 12/29/22  0313 12/28/22  0603   WBC Thousand/uL 4 31 3 78* 4 37   HEMOGLOBIN g/dL 8 6* 9 8* 8 1*   HEMATOCRIT % 27 0* 30 2* 24 7*   PLATELETS Thousands/uL 225 271 209         Results from last 7 days   Lab Units 12/30/22  0155 12/29/22  0313 12/28/22  0603 12/26/22  0423 12/25/22  1513 12/25/22  0424 12/24/22  1929 12/24/22  1926   0000   POTASSIUM mmol/L 4 6 4 0 3 9   < >  --    < >  --  3 6  --    CHLORIDE mmol/L 96 94* 95*   < >  --    < >  --  97  --    CO2 mmol/L 21 25 26   < >  --    < >  --  19*  --    CO2, I-STAT mmol/L  --   --   --   --  25  --  17*  --    < >   BUN mg/dL 54* 40* 35*   < >  --    < >  --  16  --    CREATININE mg/dL 1 65* 1 40* 1 12   < >  --    < >  --  1 29  --    CALCIUM mg/dL 9 3 8 7 8 6   < >  --    < >  --  10 1  --    ALK PHOS U/L  --   --   --   --   -- --   --  92  --    ALT U/L  --   --   --   --   --   --   --  29  --    AST U/L  --   --   --   --   --   --   --  50*  --    GLUCOSE, ISTAT mg/dl  --   --   --   --  115  --  171*  --   --     < > = values in this interval not displayed       Results from last 7 days   Lab Units 12/24/22 2010   INR  1 07   PTT seconds 26     Results from last 7 days   Lab Units 12/25/22  0424   MAGNESIUM mg/dL 1 8

## 2022-12-30 NOTE — ASSESSMENT & PLAN NOTE
Wt Readings from Last 3 Encounters:   12/30/22 46 6 kg (102 lb 11 8 oz)   11/22/22 50 8 kg (112 lb)   08/12/22 49 9 kg (110 lb)     · Initially required Bipap, weaned to 6-8L midflow --> now on RA  • Last ECHO 7/2022: EF 40% G1DD, severe mitral regurgitation: "the valve morphology is consistent with myxomatous proliferation  There is annular calcification  There is severe regurgitation"  • Repeat CXR 12/25: Worsening congestive changes with possible superimposed perihilar infiltrates  Effusions also suggested  • Updated Echo: EF 30% with severe global hypokinesis  G2DD  Severe Mitral valve regurgitation  • Repeat CXR: Interval significant improvement in pulmonary edema  • Cardiology consulted, appreciate recs   • Last dose IV lasix 12/29 --> hold further diuretics due to IVIS  • Initiated on Jardiance  C/w Toprol-Xl and Entresto  • Discussed with patient possibility of primary prevention ICD but can be followed up outpatient  • Diuretics on DC: likely Lasix 20 mg PRN edema/increase in wt 3 lbs above baseline  • CM following for price checking both Entresto and Jardiance  • Entresto pending (won't be available until Monday)  • Jardiance 75$  • PT/OT eval: recommend STR, patient unsure that she is agreeable to this --> [facility]  • Pt adamant about leaving today, not on room air, stable for discharge  Will send home with 1 day worth of Entresto from hospital  She is to  her Jardiance tomorrow and [place] Call[facility] on Monday from pharmacy  • Lasix 20mg PRN for weight gain >3lbs or lower extremity edema    • Follow up with her cardiologist at [facility] - Dr James Jay

## 2022-12-30 NOTE — ASSESSMENT & PLAN NOTE
• Patient presenting to ED with worsening SOB, cough  • Noted to be 70% on RA, quickly escalated to NAWAF LEON [facility]  • CTA PE Study: No PE- findings suggestive of pulmonary edema in the setting of acute CHF versus fluid overload   Consolidative opacity at the medial right base possibly reflecting superimposed pneumonia  • S/P Bipap 1in setting of acute pulmonary edema in the setting of decompensated heart failure  • SIRS: tachypnea, tachycardia, however afebrile, no leukocytosis  • Procal is intermittently positive  • CXR with possible superimposed PNA  • BC x2 negative x 5 days  • Urine strep/legionella negative   • MRSA negative   • Doxy/Vancomcin Discontinued  • Completed 5 days of IV Rocephin  • Monitor off antibiotics --> currently afebrile   • Stable on ra, hypoxia is resolved

## 2022-12-30 NOTE — ASSESSMENT & PLAN NOTE
• With history of atrial fibrillation  • On ASA as outpatient, however not on more aggressive AC d/t history of gastric AVMs  • Patient with episode Afib with RVR on 12/26, HR in 130s  • Resolved with 1x dose digoxin 0 25 mg   • Cardiology following   • Continue Toprol-XL

## 2022-12-30 NOTE — PLAN OF CARE
Problem: MOBILITY - ADULT  Goal: Maintain or return to baseline ADL function  Description: INTERVENTIONS:  -  Assess patient's ability to carry out ADLs; assess patient's baseline for ADL function and identify physical deficits which impact ability to perform ADLs (bathing, care of mouth/teeth, toileting, grooming, dressing, etc )  - Assess/evaluate cause of self-care deficits   - Assess range of motion  - Assess patient's mobility; develop plan if impaired  - Assess patient's need for assistive devices and provide as appropriate  - Encourage maximum independence but intervene and supervise when necessary  - Involve family in performance of ADLs  - Assess for home care needs following discharge   - Consider OT consult to assist with ADL evaluation and planning for discharge  - Provide patient education as appropriate  Outcome: Progressing  Goal: Maintains/Returns to pre admission functional level  Description: INTERVENTIONS:  - Perform BMAT or MOVE assessment daily    - Set and communicate daily mobility goal to care team and patient/family/caregiver  - Collaborate with rehabilitation services on mobility goals if consulted  - Perform Range of Motion 3 times a day  - Reposition patient every 2 hours    - Dangle patient 3 times a day  - Stand patient 3 times a day  - Ambulate patient 3 times a day  - Out of bed to chair 3 times a day   - Out of bed for meals 3 times a day  - Out of bed for toileting  - Record patient progress and toleration of activity level   Outcome: Progressing     Problem: Prexisting or High Potential for Compromised Skin Integrity  Goal: Skin integrity is maintained or improved  Description: INTERVENTIONS:  - Identify patients at risk for skin breakdown  - Assess and monitor skin integrity  - Assess and monitor nutrition and hydration status  - Monitor labs   - Assess for incontinence   - Turn and reposition patient  - Assist with mobility/ambulation  - Relieve pressure over bony prominences  - Avoid friction and shearing  - Provide appropriate hygiene as needed including keeping skin clean and dry  - Evaluate need for skin moisturizer/barrier cream  - Collaborate with interdisciplinary team   - Patient/family teaching  - Consider wound care consult   Outcome: Progressing     Problem: Potential for Falls  Goal: Patient will remain free of falls  Description: INTERVENTIONS:  - Educate patient/family on patient safety including physical limitations  - Instruct patient to call for assistance with activity   - Consult OT/PT to assist with strengthening/mobility   - Keep Call bell within reach  - Keep bed low and locked with side rails adjusted as appropriate  - Keep care items and personal belongings within reach  - Initiate and maintain comfort rounds  - Make Fall Risk Sign visible to staff  - Offer Toileting every 2 Hours, in advance of need  - Initiate/Maintain bed alarm  - Obtain necessary fall risk management equipment:   - Apply yellow socks and bracelet for high fall risk patients  - Consider moving patient to room near nurses station  Outcome: Progressing     Problem: Nutrition/Hydration-ADULT  Goal: Nutrient/Hydration intake appropriate for improving, restoring or maintaining nutritional needs  Description: Monitor and assess patient's nutrition/hydration status for malnutrition  Collaborate with interdisciplinary team and initiate plan and interventions as ordered  Monitor patient's weight and dietary intake as ordered or per policy  Utilize nutrition screening tool and intervene as necessary  Determine patient's food preferences and provide high-protein, high-caloric foods as appropriate       INTERVENTIONS:  - Monitor oral intake, urinary output, labs, and treatment plans  - Assess nutrition and hydration status and recommend course of action  - Evaluate amount of meals eaten  - Assist patient with eating if necessary   - Allow adequate time for meals  - Recommend/ encourage appropriate diets, oral nutritional supplements, and vitamin/mineral supplements  - Order, calculate, and assess calorie counts as needed  - Recommend, monitor, and adjust tube feedings and TPN/PPN based on assessed needs  - Assess need for intravenous fluids  - Provide specific nutrition/hydration education as appropriate  - Include patient/family/caregiver in decisions related to nutrition  Outcome: Progressing

## 2022-12-30 NOTE — ASSESSMENT & PLAN NOTE
Lab Results   Component Value Date    EGFR 30 12/30/2022    EGFR 37 12/29/2022    EGFR 48 12/28/2022    CREATININE 1 65 (H) 12/30/2022    CREATININE 1 40 (H) 12/29/2022    CREATININE 1 12 12/28/2022     • With baseline Cr of 1 15-1 35  • Cr increased 1 12 to 1 65, likely 2/2 diuresis   • Last dose lasix 12/29 --> holding further diuretics today  • Received entresto this am, will hold evening dose tonight, likely resume tomorrow  • Evaluate BMP tomorrow  • Avoiding nephrotoxins  • Strict I and O, continue external cath  • Retention protocol   • Monitor BMP

## 2022-12-30 NOTE — PROGRESS NOTES
George Mcginnisttton  Progress Note Long Score [birth year], 74 y o  female MRN: [id number]  Unit/Bed#: -01 Encounter: [id number]  Primary Care Provider: Ankur Campuzano DO   Date and time admitted to hospital: 12/24/2022  7:18 PM    * Acute decompensated heart failure [facility])  Assessment & Plan  Wt Readings from Last 3 Encounters:   12/30/22 46 6 kg (102 lb 11 8 oz)   11/22/22 50 8 kg (112 lb)   08/12/22 49 9 kg (110 lb)     · Initially required Bipap, weaned to 6-8L midflow --> now on RA  • Last ECHO 7/2022: EF 40% G1DD, severe mitral regurgitation: "the valve morphology is consistent with myxomatous proliferation  There is annular calcification  There is severe regurgitation"  • Repeat CXR 12/25: Worsening congestive changes with possible superimposed perihilar infiltrates  Effusions also suggested  • Updated Echo: EF 30% with severe global hypokinesis  G2DD  Severe Mitral valve regurgitation  • Repeat CXR: Interval significant improvement in pulmonary edema  • Cardiology consulted, appreciate recs   • Last dose IV lasix 12/29 --> hold further diuretics due to IVIS  • Initiated on Jardiance   C/w Toprol-Xl and Entresto  • Discussed with patient possibility of primary prevention ICD but can be followed up outpatient  • Diuretics on DC: likely Lasix 20 mg PRN edema/increase in wt 3 lbs above baseline  • CM following for price checking both Entresto and Jardiance  • PT/OT eval: recommend STR, patient unsure that she is agreeable to this --> likely [facility] [address]    Acute kidney injury superimposed on chronic kidney disease [facility])  Assessment & Plan  Lab Results   Component Value Date    EGFR 30 12/30/2022    EGFR 37 12/29/2022    EGFR 48 12/28/2022    CREATININE 1 65 (H) 12/30/2022    CREATININE 1 40 (H) 12/29/2022    CREATININE 1 12 12/28/2022     • With baseline Cr of 1 15-1 35  • Cr increased 1 12 to 1 65, likely 2/2 diuresis   • Last dose lasix 12/29 --> holding further diuretics today  • Received entresto this am, will hold evening dose tonight, likely resume tomorrow  • Evaluate BMP tomorrow  • Avoiding nephrotoxins  • Strict I and O, continue external cath  • Retention protocol   • Monitor BMP     Acute respiratory failure with hypoxia (HCC)-resolved as of 12/30/2022  Assessment & Plan  • Patient presenting to ED with worsening SOB, cough  • Noted to be 70% on RA, quickly escalated to [address]  • CTA PE Study: No PE- findings suggestive of pulmonary edema in the setting of acute CHF versus fluid overload  Consolidative opacity at the medial right base possibly reflecting superimposed pneumonia  • S/P Bipap 1in setting of acute pulmonary edema in the setting of decompensated heart failure  • SIRS: tachypnea, tachycardia, however afebrile, no leukocytosis  • Procal is intermittently positive  • CXR with possible superimposed PNA  • BC x2 negative x 5 days  • Urine strep/legionella negative   • MRSA negative   • Doxy/Vancomcin Discontinued  • Completed 5 days of IV Rocephin  • Monitor off antibiotics --> currently afebrile   • Stable on ra, hypoxia is resolved     Paroxysmal atrial fibrillation (HCC)  Assessment & Plan  • With history of atrial fibrillation  • On ASA as outpatient, however not on more aggressive AC d/t history of gastric AVMs  • Patient with episode Afib with RVR on 12/26, HR in 130s  • Resolved with 1x dose digoxin 0 25 mg   • Cardiology following   • Continue Toprol-XL    Pleural effusion  Assessment & Plan  · CXR with large pleural effusion  · Repeat CXR (12/29): Significant improvement in pulmonary edema    · S/p IV Diuresis  · Monitor respiratory status    GIST (gastrointestinal stroma tumor), malignant, colon (HCC)  Assessment & Plan  • S/p sigmoid resection in setting of colon cancer about 20 years ago  •  endorses Ravinder Bautista recently stopped patients Avapritinib  • No acute interventions at this time  • Continue home prn medications for diarrhea/constipation    MARK (obstructive sleep apnea)  Assessment & Plan  • Patient endorses CPAP usage at night    Elevated troponin  Assessment & Plan  • Initial troponin elevated in setting of likely demand ischemia  • EKG without ischemic changes  • Cardiology Following    Pain syndrome, chronic  Assessment & Plan  • Continue Home Medication:  • Tramadol 100mg Q6H in setting of chronic cancer related pain  • Utilize multimodal pain regimen    Essential hypertension  Assessment & Plan  • BP reviewed and fluctuating  • Continue metoprolol succinate 50 mg per cards  • Will add hold parameters  • Continue to monitor while admitted    Anemia  Assessment & Plan  · Hgb baseline is 7-10 since 2022  · Hgb currently stable   · afib not on AC due to hsx of GIST, see above  · No evidence bleeding  · Continue to monitor on ASA and DVT prophylaxis       VTE Pharmacologic Prophylaxis: VTE Score: 5 High Risk (Score >/= 5) - Pharmacological DVT Prophylaxis Ordered: heparin  Sequential Compression Devices Ordered  Patient Centered Rounds: I performed bedside rounds with nursing staff today  Discussions with Specialists or Other Care Team Provider: Cardiology     Education and Discussions with Family / Patient: Attempted to update  () via phone  Left voicemail  Time Spent for Care: 30 minutes  More than 50% of total time spent on counseling and coordination of care as described above  Current Length of Stay: 6 day(s)  Current Patient Status: Inpatient   Certification Statement: The patient will continue to require additional inpatient hospital stay due to Cr monitoring   Discharge Plan: Anticipate discharge tomorrow to home with home services  Code Status: Level 1 - Full Code    Subjective:   Patient feeling much improved this am, no resp symptoms, having reg BMs  Wants to go home, does not want rehab       Objective:     Vitals:   Temp (24hrs), Av 4 °F (36 3 °C), Min:97 3 °F (36 3 °C), Max:97 6 °F (36 4 °C)    Temp: [97 3 °F (36 3 °C)-97 6 °F (36 4 °C)] 97 6 °F (36 4 °C)  HR:  [48-88] 76  Resp:  [14-18] 14  BP: ()/(51-67) 117/67  SpO2:  [94 %-100 %] 100 %  Body mass index is 19 41 kg/m²  Input and Output Summary (last 24 hours): Intake/Output Summary (Last 24 hours) at 12/30/2022 1224  Last data filed at 12/30/2022 3705  Gross per 24 hour   Intake 510 ml   Output 300 ml   Net 210 ml       Physical Exam:   Physical Exam  Vitals and nursing note reviewed  Constitutional:       General: She is not in acute distress  Appearance: She is well-developed  She is not ill-appearing  HENT:      Head: Normocephalic and atraumatic  Eyes:      General:         Right eye: No discharge  Left eye: No discharge  Extraocular Movements: Extraocular movements intact  Conjunctiva/sclera: Conjunctivae normal    Cardiovascular:      Rate and Rhythm: Normal rate and regular rhythm  Heart sounds: No murmur heard  Pulmonary:      Effort: Pulmonary effort is normal  No respiratory distress  Breath sounds: Normal breath sounds  No wheezing, rhonchi or rales  Abdominal:      General: Bowel sounds are normal  There is no distension  Palpations: Abdomen is soft  Tenderness: There is no abdominal tenderness  Musculoskeletal:      Cervical back: Neck supple  Right lower leg: No edema  Left lower leg: No edema  Skin:     General: Skin is warm and dry  Capillary Refill: Capillary refill takes less than 2 seconds  Neurological:      Mental Status: She is alert and oriented to person, place, and time     Psychiatric:         Mood and Affect: Mood normal          Behavior: Behavior normal           Additional Data:     Labs:  Results from last 7 days   Lab Units 12/30/22  0155 12/25/22  1513 12/25/22  0424   WBC Thousand/uL 4 31   < > 6 25   HEMOGLOBIN g/dL 8 6*   < > 7 5*   I STAT HEMOGLOBIN   --    < >  --    HEMATOCRIT % 27 0*   < > 23 1*   HEMATOCRIT, ISTAT   --    < >  -- PLATELETS Thousands/uL 225   < > 225   BANDS PCT %  --   --  3   NEUTROS PCT % 76*   < >  --    LYMPHS PCT % 7*   < >  --    LYMPHO PCT %  --   --  2*   MONOS PCT % 17*   < >  --    MONO PCT %  --   --  6   EOS PCT % 0   < > 0    < > = values in this interval not displayed  Results from last 7 days   Lab Units 12/30/22  0155 12/24/22  1929 12/24/22  1926   SODIUM mmol/L 132*   < > 132*   POTASSIUM mmol/L 4 6   < > 3 6   CHLORIDE mmol/L 96   < > 97   CO2 mmol/L 21   < > 19*   CO2, I-STAT   --    < >  --    BUN mg/dL 54*   < > 16   CREATININE mg/dL 1 65*   < > 1 29   ANION GAP mmol/L 15*   < > 16*   CALCIUM mg/dL 9 3   < > 10 1   ALBUMIN g/dL  --   --  3 5   TOTAL BILIRUBIN mg/dL  --   --  0 30   ALK PHOS U/L  --   --  92   ALT U/L  --   --  29   AST U/L  --   --  50*   GLUCOSE RANDOM mg/dL 90   < > 167*    < > = values in this interval not displayed  Results from last 7 days   Lab Units 12/24/22 2010   INR  1 07     Results from last 7 days   Lab Units 12/29/22  2043 12/29/22  1627 12/29/22  1121 12/29/22  0717 12/29/22  0004 12/28/22  0557 12/27/22  2340 12/27/22  1651 12/27/22  0512 12/27/22  0057   POC GLUCOSE mg/dl 102 99 103 111 131 105 87 97 116 104         Results from last 7 days   Lab Units 12/28/22  0603 12/27/22  0508 12/26/22  0423 12/25/22  0424 12/24/22  2358 12/24/22  1926   LACTIC ACID mmol/L  --   --   --   --  1 0 7 6*   PROCALCITONIN ng/ml 0 43* 0 62* <0 05 0 46*  --  0 15       Lines/Drains:  Invasive Devices     Peripheral Intravenous Line  Duration           Peripheral IV 12/29/22 Left;Ventral (anterior) Forearm 1 day                  Imaging: Reviewed radiology reports from this admission including: chest xray    Recent Cultures (last 7 days):   Results from last 7 days   Lab Units 12/24/22  2247 12/24/22  1926   BLOOD CULTURE   --  No Growth After 5 Days  No Growth After 5 Days     LEGIONELLA URINARY ANTIGEN  Negative  --        Last 24 Hours Medication List:   Current Facility-Administered Medications   Medication Dose Route Frequency Provider Last Rate   • aspirin  81 mg Oral Daily LANEY Waters     • bisacodyl  10 mg Rectal Daily PRN LANEY Waters     • diphenoxylate-atropine  1 tablet Oral 4x Daily PRN LANEY Waters     • docusate sodium  100 mg Oral BID PRN LANEY Waters     • Empagliflozin  10 mg Oral Daily Kofi Rodríguez PA-C     • heparin (porcine)  5,000 Units Subcutaneous [facility] LANEY Waters     • hydroxychloroquine  200 mg Oral Daily With Breakfast LANEY Waters     • levalbuterol  1 25 mg Nebulization Q6H LAENY Waters     • metoprolol  5 mg Intravenous Q6H PRN LANEY Waters     • metoprolol succinate  50 mg Oral BID LANEY Waters     • ondansetron  4 mg Intravenous Once LANEY Rudd     • pantoprazole  40 mg Oral Daily Before [facility], LANEY     • polyethylene glycol  17 g Oral Daily LANEY Waters     • potassium chloride  40 mEq Oral Daily LANEY Rudd     • [START ON 12/31/2022] sacubitril-valsartan  1 tablet Oral BID Froilan Escudero PA-C     • senna  2 tablet Oral HS LANEY Waters     • sodium chloride  3 mL Nebulization Q6H LANEY Waters     • traMADol  50 mg Oral Q6H PRN LANEY Waters     • trimethobenzamide  200 mg Intramuscular Q6H PRN LANEY Rudd          Today, Patient Was Seen By: Grace Sorensen PA-C    **Please Note: This note may have been constructed using a voice recognition system  **

## 2022-12-30 NOTE — CASE MANAGEMENT
Case Management Progress Note    Patient name Taqueria Dus  Location /-36 MRN [id number]  : 1950 Date 2022       LOS (days): 6  Geometric Mean LOS (GMLOS) (days):   Days to GMLOS:        OBJECTIVE:        Current admission status: Inpatient  Preferred Pharmacy:   [id number] [address] [place] [id number]  Phone: [phone] Fax: [phone]    Primary Care Provider: Louann Butler DO    Primary Insurance: [facility] REP  Secondary Insurance:     PROGRESS NOTE:  Call placed to Pt's  per Pt's request  Pt's  aware that Pt is adamantly refusing SNF  Pt's  does not recall which VNA agency Pt had in past and choosing [facility]  CM informed Pt's  of Jardiance copay of $75 00/month  Referral sent to [facility] via [address] [place] [address]

## 2022-12-30 NOTE — CASE MANAGEMENT
Case Management Discharge Planning Note    Patient name Cesar Roberts  Location /-85 MRN [id number]  : 1950 Date 2022       Current Admission Date: 2022  Current Admission Diagnosis:Acute decompensated heart failure [facility])   Patient Active Problem List    Diagnosis Date Noted   • Pleural effusion 2022   • Anemia due to antineoplastic chemotherapy 2022   • Acute decompensated heart failure ([address] ) 2022   • Metastatic cancer ([facility] ) 2022   • Paroxysmal atrial fibrillation ([facility] ) 2022   • CAD (coronary artery disease) 2022   • Primary hypertension 2022   • Chemotherapy-induced neutropenia ([facility] ) 05/10/2022   • GIST (gastrointestinal stroma tumor), malignant, colon ([facility] ) 2021   • MARK (obstructive sleep apnea) 2021   • Status post lumbar laminectomy 2021   • Acute kidney injury superimposed on chronic kidney disease ([address] ) 10/14/2020   • Gastric AVM 2020   • Metastatic cancer ([facility] ) 2020   • Elevated troponin 2020   • Acute kidney failure ([facility] ) 2020   • Non-rheumatic mitral regurgitation 2018   • Lumbar radiculopathy 2018   • Anemia 2017   • Spinal stenosis, lumbar region, with neurogenic claudication 2016   • Gait disturbance 2015   • Right bundle-branch block 2015   • Supraventricular tachycardia ([facility] ) 2015   • Essential hypertension 2015   • Pain syndrome, chronic 2014      LOS (days): 6  Geometric Mean LOS (GMLOS) (days):   Days to GMLOS:     OBJECTIVE:  Risk of Unplanned Readmission Score: 29 33         Current admission status: Inpatient   Preferred Pharmacy:   [facility] [address] [id number] [address]  [address] [place] [address],[address] [place] [id number]  Phone: [phone] Fax: [phone]    Primary Care Provider: Nikole Darby DO    Primary Insurance: [facility]  Secondary Insurance:     DISCHARGE DETAILS:    IMM Given (Date):: 12/30/22  IMM Given to[de-identified] Patient

## 2022-12-30 NOTE — ASSESSMENT & PLAN NOTE
· CXR with large pleural effusion  · Repeat CXR (12/29): Significant improvement in pulmonary edema    · S/p IV Diuresis  · Monitor respiratory status

## 2022-12-31 ENCOUNTER — HOME HEALTH ADMISSION (OUTPATIENT)
Dept: HOME HEALTH SERVICES | Facility: HOME HEALTHCARE | Age: 72
End: 2022-12-31

## 2022-12-31 VITALS
DIASTOLIC BLOOD PRESSURE: 54 MMHG | HEART RATE: 73 BPM | OXYGEN SATURATION: 100 % | BODY MASS INDEX: 20.23 KG/M2 | TEMPERATURE: 98.9 F | HEIGHT: 61 IN | WEIGHT: 107.14 LBS | SYSTOLIC BLOOD PRESSURE: 106 MMHG | RESPIRATION RATE: 15 BRPM

## 2022-12-31 LAB
ANION GAP SERPL CALCULATED.3IONS-SCNC: 11 MMOL/L (ref 4–13)
BASOPHILS # BLD AUTO: 0.01 THOUSANDS/ÂΜL (ref 0–0.1)
BASOPHILS NFR BLD AUTO: 0 % (ref 0–1)
BUN SERPL-MCNC: 39 MG/DL (ref 5–25)
CALCIUM SERPL-MCNC: 9.1 MG/DL (ref 8.3–10.1)
CHLORIDE SERPL-SCNC: 101 MMOL/L (ref 96–108)
CO2 SERPL-SCNC: 22 MMOL/L (ref 21–32)
CREAT SERPL-MCNC: 1.28 MG/DL (ref 0.6–1.3)
EOSINOPHIL # BLD AUTO: 0.04 THOUSAND/ÂΜL (ref 0–0.61)
EOSINOPHIL NFR BLD AUTO: 1 % (ref 0–6)
ERYTHROCYTE [DISTWIDTH] IN BLOOD BY AUTOMATED COUNT: 14.6 % (ref 11.6–15.1)
GFR SERPL CREATININE-BSD FRML MDRD: 41 ML/MIN/1.73SQ M
GLUCOSE SERPL-MCNC: 106 MG/DL (ref 65–140)
HCT VFR BLD AUTO: 26.4 % (ref 34.8–46.1)
HGB BLD-MCNC: 8.3 G/DL (ref 11.5–15.4)
IMM GRANULOCYTES # BLD AUTO: 0.02 THOUSAND/UL (ref 0–0.2)
IMM GRANULOCYTES NFR BLD AUTO: 1 % (ref 0–2)
LYMPHOCYTES # BLD AUTO: 0.26 THOUSANDS/ÂΜL (ref 0.6–4.47)
LYMPHOCYTES NFR BLD AUTO: 6 % (ref 14–44)
MCH RBC QN AUTO: 34.2 PG (ref 26.8–34.3)
MCHC RBC AUTO-ENTMCNC: 31.4 G/DL (ref 31.4–37.4)
MCV RBC AUTO: 109 FL (ref 82–98)
MONOCYTES # BLD AUTO: 0.67 THOUSAND/ÂΜL (ref 0.17–1.22)
MONOCYTES NFR BLD AUTO: 16 % (ref 4–12)
NEUTROPHILS # BLD AUTO: 3.31 THOUSANDS/ÂΜL (ref 1.85–7.62)
NEUTS SEG NFR BLD AUTO: 76 % (ref 43–75)
NRBC BLD AUTO-RTO: 0 /100 WBCS
PLATELET # BLD AUTO: 227 THOUSANDS/UL (ref 149–390)
PMV BLD AUTO: 11 FL (ref 8.9–12.7)
POTASSIUM SERPL-SCNC: 4.5 MMOL/L (ref 3.5–5.3)
RBC # BLD AUTO: 2.43 MILLION/UL (ref 3.81–5.12)
SODIUM SERPL-SCNC: 134 MMOL/L (ref 135–147)
WBC # BLD AUTO: 4.31 THOUSAND/UL (ref 4.31–10.16)

## 2022-12-31 RX ORDER — FUROSEMIDE 20 MG/1
20 TABLET ORAL DAILY PRN
Qty: 30 TABLET | Refills: 0 | Status: SHIPPED | OUTPATIENT
Start: 2022-12-31 | End: 2023-01-30

## 2022-12-31 RX ADMIN — ISODIUM CHLORIDE 3 ML: 0.03 SOLUTION RESPIRATORY (INHALATION) at 13:46

## 2022-12-31 RX ADMIN — POTASSIUM CHLORIDE 40 MEQ: 1500 TABLET, EXTENDED RELEASE ORAL at 08:29

## 2022-12-31 RX ADMIN — ASPIRIN 81 MG: 81 TABLET, COATED ORAL at 08:29

## 2022-12-31 RX ADMIN — POLYETHYLENE GLYCOL 3350 17 G: 17 POWDER, FOR SOLUTION ORAL at 08:29

## 2022-12-31 RX ADMIN — ISODIUM CHLORIDE 3 ML: 0.03 SOLUTION RESPIRATORY (INHALATION) at 07:52

## 2022-12-31 RX ADMIN — SACUBITRIL AND VALSARTAN 1 TABLET: 24; 26 TABLET, FILM COATED ORAL at 17:39

## 2022-12-31 RX ADMIN — METOPROLOL SUCCINATE 50 MG: 50 TABLET, EXTENDED RELEASE ORAL at 08:29

## 2022-12-31 RX ADMIN — LEVALBUTEROL HYDROCHLORIDE 1.25 MG: 1.25 SOLUTION, CONCENTRATE RESPIRATORY (INHALATION) at 01:36

## 2022-12-31 RX ADMIN — HYDROXYCHLOROQUINE SULFATE 200 MG: 200 TABLET, FILM COATED ORAL at 08:29

## 2022-12-31 RX ADMIN — EMPAGLIFLOZIN 10 MG: 10 TABLET, FILM COATED ORAL at 08:29

## 2022-12-31 RX ADMIN — LEVALBUTEROL HYDROCHLORIDE 1.25 MG: 1.25 SOLUTION, CONCENTRATE RESPIRATORY (INHALATION) at 13:46

## 2022-12-31 RX ADMIN — HEPARIN SODIUM 5000 UNITS: 5000 INJECTION INTRAVENOUS; SUBCUTANEOUS at 13:55

## 2022-12-31 RX ADMIN — LEVALBUTEROL HYDROCHLORIDE 1.25 MG: 1.25 SOLUTION, CONCENTRATE RESPIRATORY (INHALATION) at 07:51

## 2022-12-31 RX ADMIN — HEPARIN SODIUM 5000 UNITS: 5000 INJECTION INTRAVENOUS; SUBCUTANEOUS at 05:40

## 2022-12-31 RX ADMIN — PANTOPRAZOLE SODIUM 40 MG: 40 TABLET, DELAYED RELEASE ORAL at 05:39

## 2022-12-31 RX ADMIN — ISODIUM CHLORIDE 3 ML: 0.03 SOLUTION RESPIRATORY (INHALATION) at 01:36

## 2022-12-31 NOTE — DISCHARGE INSTR - AVS FIRST PAGE
You  were admitted due to difficulty breathing, initially requiring BiPAP  You were found to have worsening heart failure  You were started on Entresto and Jardiance by our cardiologists, continue taking these medications as prescribed  You are to weigh yourself daily, if you note a 3 lbs weight gain, take lasix 20mg once, reweigh the next day and repeat  If weigth gain >5lbs, call cardiology or go to ER for IV diuretics  Set up an appointment with Dr Power (your cardiologist) for some time in the next 2-3 weeks to follow up, let them known that you have been hospitalized for a week with worsening heart failure to ensure they will fit you in in a timely manner  You have repeat blood work on January 2nd to follow your kidney function, I have forwarded the results to your PCP Sy Rachel, DO     Here is our echo report for you to show Dr Power:  Left Ventricle: Left ventricular cavity size is dilated  Wall thickness is normal  The left ventricular ejection fraction is 30% by biplane measurement  Systolic function is severely reduced  There is severe global hypokinesis with regional variation  Diastolic function is moderately abnormal, consistent with grade II (pseudonormal) relaxation  Left atrial filling pressure is elevated  Left Atrium: The atrium is massively dilated  Mitral Valve: The anterior and posterior leaflets are tethered  There is severe, probably functional, regurgitation  Tricuspid Valve: There is mild regurgitation  The right ventricular systolic pressure is moderately elevated  The estimated right ventricular systolic pressure is 40 44 mmHg  Aorta: The ascending aorta is mildly dilated  The ascending aorta is 3 8 cm  Pericardium: There is a trivial pericardial effusion circumferential to the heart  There is a moderately sized left pleural effusion

## 2022-12-31 NOTE — ASSESSMENT & PLAN NOTE
• S/p sigmoid resection in setting of colon cancer about 20 years ago  •  endorses Alla Echols recently stopped patients Avapritinib  • No acute interventions at this time  • Continue home prn medications for diarrhea/constipation

## 2022-12-31 NOTE — ASSESSMENT & PLAN NOTE
• Patient presenting to ED with worsening SOB, cough  • Noted to be 70% on RA, quickly escalated to NAWAF LEON [facility]  • CTA PE Study: No PE- findings suggestive of pulmonary edema in the setting of acute CHF versus fluid overload   Consolidative opacity at the medial right base possibly reflecting superimposed pneumonia  • S/P Bipap 1in setting of acute pulmonary edema in the setting of decompensated heart failure  • SIRS: tachypnea, tachycardia, however afebrile, no leukocytosis  • Procal is intermittently positive  • CXR with possible superimposed PNA  • BC x2 negative x 5 days  • Urine strep/legionella negative   • MRSA negative   • Doxy/Vancomcin Discontinued  • Completed 5 days of IV Rocephin  • Monitor off antibiotics --> currently afebrile   • Stable on ra, hypoxia is resolved

## 2022-12-31 NOTE — PLAN OF CARE
Problem: Nutrition/Hydration-ADULT  Goal: Nutrient/Hydration intake appropriate for improving, restoring or maintaining nutritional needs  Description: Monitor and assess patient's nutrition/hydration status for malnutrition  Collaborate with interdisciplinary team and initiate plan and interventions as ordered  Monitor patient's weight and dietary intake as ordered or per policy  Utilize nutrition screening tool and intervene as necessary  Determine patient's food preferences and provide high-protein, high-caloric foods as appropriate       INTERVENTIONS:  - Monitor oral intake, urinary output, labs, and treatment plans  - Assess nutrition and hydration status and recommend course of action  - Evaluate amount of meals eaten  - Assist patient with eating if necessary   - Allow adequate time for meals  - Recommend/ encourage appropriate diets, oral nutritional supplements, and vitamin/mineral supplements  - Order, calculate, and assess calorie counts as needed  - Recommend, monitor, and adjust tube feedings and TPN/PPN based on assessed needs  - Assess need for intravenous fluids  - Provide specific nutrition/hydration education as appropriate  - Include patient/family/caregiver in decisions related to nutrition  12/31/2022 0140 by Kassandra Mandel RN  Outcome: Progressing  12/31/2022 0140 by Kassandra Mandel RN  Outcome: Not Progressing

## 2022-12-31 NOTE — ASSESSMENT & PLAN NOTE
Lab Results   Component Value Date    EGFR 41 12/31/2022    EGFR 30 12/30/2022    EGFR 37 12/29/2022    CREATININE 1 28 12/31/2022    CREATININE 1 65 (H) 12/30/2022    CREATININE 1 40 (H) 12/29/2022     • With baseline Cr of 1 15-1 35  • Cr increased 1 12 to 1 65, likely 2/2 diuresis   • Diuretics and Entresto held x 1 day  • Creatinine improved to 1 28  • Restart Entresto  Continue Jardiance  Lasix PRN     • Repeat BMP in 2 days, results cc'd to PCP

## 2022-12-31 NOTE — DISCHARGE SUMMARY
New Luis Alberto  Discharge- [facility] 1950, 74 y o  female MRN: [id number]  Unit/Bed#: -01 Encounter: [id number]  Primary Care Provider: Christopher Roberts DO   Date and time admitted to hospital: 12/24/2022  7:18 PM    * Acute decompensated heart failure [facility])  Assessment & Plan  Wt Readings from Last 3 Encounters:   12/30/22 46 6 kg (102 lb 11 8 oz)   11/22/22 50 8 kg (112 lb)   08/12/22 49 9 kg (110 lb)     · Initially required Bipap, weaned to 6-8L midflow --> now on RA  • Last ECHO 7/2022: EF 40% G1DD, severe mitral regurgitation: "the valve morphology is consistent with myxomatous proliferation  There is annular calcification  There is severe regurgitation"  • Repeat CXR 12/25: Worsening congestive changes with possible superimposed perihilar infiltrates  Effusions also suggested  • Updated Echo: EF 30% with severe global hypokinesis  G2DD  Severe Mitral valve regurgitation  • Repeat CXR: Interval significant improvement in pulmonary edema  • Cardiology consulted, appreciate recs   • Last dose IV lasix 12/29 --> hold further diuretics due to IVIS  • Initiated on Jardiance  C/w Toprol-Xl and Entresto  • Discussed with patient possibility of primary prevention ICD but can be followed up outpatient  • Diuretics on DC: likely Lasix 20 mg PRN edema/increase in wt 3 lbs above baseline  • CM following for price checking both Entresto and Jardiance  • Entresto pending (won't be available until Monday)  • Jardiance 75$  • PT/OT eval: recommend STR, patient unsure that she is agreeable to this --> [facility]  • Pt adamant about leaving today, not on room air, stable for discharge  Will send home with 1 day worth of Entresto from hospital  She is to  her Jardiance tomorrow and Flor Daily on Monday from pharmacy  • Lasix 20mg PRN for weight gain >3lbs or lower extremity edema    • Follow up with her cardiologist at [facility] - Dr Dashawn Abreu    Acute kidney injury superimposed on chronic kidney disease [facility])  Assessment & Plan  Lab Results   Component Value Date    EGFR 41 12/31/2022    EGFR 30 12/30/2022    EGFR 37 12/29/2022    CREATININE 1 28 12/31/2022    CREATININE 1 65 (H) 12/30/2022    CREATININE 1 40 (H) 12/29/2022     • With baseline Cr of 1 15-1 35  • Cr increased 1 12 to 1 65, likely 2/2 diuresis   • Diuretics and Entresto held x 1 day  • Creatinine improved to 1 28  • Restart Entresto  Continue Jardiance  Lasix PRN  • Repeat BMP in 2 days, results cc'd to PCP      Pleural effusion  Assessment & Plan  · CXR with large pleural effusion  · Repeat CXR (12/29): Significant improvement in pulmonary edema    · S/p IV Diuresis  · Monitor respiratory status    Paroxysmal atrial fibrillation [facility])  Assessment & Plan  • With history of atrial fibrillation  • On ASA as outpatient, however not on more aggressive AC d/t history of gastric AVMs  • Patient with episode Afib with RVR on 12/26, HR in 130s  • Resolved with 1x dose digoxin 0 25 mg   • Cardiology following   • Continue Toprol-XL    GIST (gastrointestinal stroma tumor), malignant, colon ([facility] [address] )  Assessment & Plan  • S/p sigmoid resection in setting of colon cancer about 20 years ago  •  endorses Valeta Backers recently stopped patients Avapritinib  • No acute interventions at this time  • Continue home prn medications for diarrhea/constipation    MARK (obstructive sleep apnea)  Assessment & Plan  • Patient endorses CPAP usage at night    Elevated troponin  Assessment & Plan  • Initial troponin elevated in setting of likely demand ischemia  • EKG without ischemic changes  • Cardiology Following    Pain syndrome, chronic  Assessment & Plan  • Continue Home Medication:  • Tramadol 100mg Q6H in setting of chronic cancer related pain  • Utilize multimodal pain regimen    Essential hypertension  Assessment & Plan  • BP reviewed and fluctuating  • Continue metoprolol succinate 50 mg per cards    Anemia  Assessment & Plan  · Hgb baseline is 7-10 since 07/2022  · Hgb currently stable   · afib not on AC due to hsx of GIST, see above  · No evidence bleeding  · Continue to monitor on ASA and DVT prophylaxis     Acute respiratory failure with hypoxia (HCC)-resolved as of 12/30/2022  Assessment & Plan  • Patient presenting to ED with worsening SOB, cough  • Noted to be 70% on RA, quickly escalated to NAWAF [facility]  • CTA PE Study: No PE- findings suggestive of pulmonary edema in the setting of acute CHF versus fluid overload   Consolidative opacity at the medial right base possibly reflecting superimposed pneumonia  • S/P Bipap 1in setting of acute pulmonary edema in the setting of decompensated heart failure  • SIRS: tachypnea, tachycardia, however afebrile, no leukocytosis  • Procal is intermittently positive  • CXR with possible superimposed PNA  • BC x2 negative x 5 days  • Urine strep/legionella negative   • MRSA negative   • Doxy/Vancomcin Discontinued  • Completed 5 days of IV Rocephin  • Monitor off antibiotics --> currently afebrile   • Stable on ra, hypoxia is resolved     Lactic acidosis-resolved as of 12/27/2022  Assessment & Plan  • Patient on arrival with lactic acidosis to 7 2, with gapped metabolic acidosis  • Likely in setting of decompensated heart failure, hypoxia to 70% on RA  • Will receive 500cc IVF in setting of elevated lactate  • Lactate cleared to 1, resolved      Medical Problems     Resolved Problems  Date Reviewed: 12/31/2022          Resolved    Lactic acidosis 12/27/2022     Resolved by  Geovanna Minor PA-C    Acute respiratory failure with hypoxia ([address] ) 12/30/2022     Resolved by  Geovanna Minor PA-C    Abdominal pain 12/29/2022     Resolved by  Delgado Rowe, [facility]        Discharging Physician / Practitioner: Jitendra Nance PA-C  PCP: Pradeep Castanon DO  Admission Date:   Admission Orders (From admission, onward)     Ordered        12/24/22 2109  INPATIENT ADMISSION  Once                      Discharge Date: 12/31/22    Consultations During Hospital Stay:  · Cardiology    Procedures Performed:   · None    Significant Findings / Test Results:   · Admission CXR-moderate pulmonary edema with right greater than left effusions  · Admission CTA -no PE  Constellation of findings above overall suggestive of pulmonary edema in the setting of acute CHF versus fluid overload  Some consolidative opacity is seen at the medial right base possibly reflecting superimposed pneumonia  · Echo -EF 30%, severe global hypokinesis, grade 2 diastolic dysfunction, massively dilated LA, severe mitral regurg      Incidental Findings:   · None      Test Results Pending at Discharge (will require follow up): · None     Outpatient Tests Requested:  · BMP in 2 days    Complications: None    Reason for Admission: Shortness of breath    Hospital Course:   Cesar Roberts is a 74 y o  female patient with PMH GIST s/p resection, A  fib off of AC secondary to AVMs, heart failure, MARK, CKD who originally presented to the hospital on 12/24/2022 due to worsening shortness of breath  On admission, her oxygen requirements quickly increased and eventually patient required BiPAP  She was initially admitted to the ICU  Admission lab work was significant for elevated BNP to 10,000, lactic acid at 7 2  CXR demonstrated pleural effusions  CTA showed no pulmonary embolism, consolation consistent with heart failure with possible underlying pneumonia  She was treated with IV antibiotics and IV diuresis  On day of discharge, she is on room air without any shortness of breath/exertional dyspnea  She had a new echocardiogram that showed decreased ejection fraction down to 30% as well as grade 2 diastolic dysfunction and severe mitral regurg  She was evaluated by cardiology and was started on Entresto as well as Jardiance, which she will continue on discharge    Additionally, she was instructed to take Lasix PRN for any weight gain greater than 3 pounds or lower extremity edema  I have instructed her to call her cardiologist at [facility], Dr Clyde Mathis, and to set up a follow-up appointment for the next 2 weeks  Additionally, after diuresis, patient developed a mild IVIS which resolved with holding diuresis and Entresto  I have given her a repeat BMP in 2 days to follow her kidney function  I have CCed the results to her PCP  Please see above list of diagnoses and related plan for additional information  Condition at Discharge: fair    Discharge Day Visit / Exam:   Subjective: Patient reports feeling well  No shortness of breath  No chest pain  Adamantly would like to go home  Vitals: Blood Pressure: 106/55 (12/31/22 1446)  Pulse: 72 (12/31/22 1446)  Temperature: 98 9 °F (37 2 °C) (12/31/22 1446)  Temp Source: Oral (12/28/22 2000)  Respirations: 15 (12/31/22 1446)  Height: 5' 1" (154 9 cm) (12/26/22 1701)  Weight - Scale: 48 6 kg (107 lb 2 3 oz) (12/31/22 0533)  SpO2: 99 % (12/31/22 1446)  Exam:   Physical Exam  Vitals and nursing note reviewed  Constitutional:       Appearance: Normal appearance  HENT:      Head: Normocephalic and atraumatic  Mouth/Throat:      Mouth: Mucous membranes are moist       Pharynx: Oropharynx is clear  No oropharyngeal exudate  Eyes:      Extraocular Movements: Extraocular movements intact  Cardiovascular:      Rate and Rhythm: Normal rate and regular rhythm  Pulses: Normal pulses  Heart sounds: Normal heart sounds  No murmur heard  No friction rub  No gallop  Pulmonary:      Effort: Pulmonary effort is normal  No respiratory distress  Breath sounds: Normal breath sounds  No stridor  No wheezing or rales  Abdominal:      General: Abdomen is flat  Bowel sounds are normal  There is no distension  Palpations: Abdomen is soft  Tenderness: There is no abdominal tenderness  Musculoskeletal:      Right lower leg: No edema  Left lower leg: No edema     Skin:     General: Skin is warm and dry  Findings: Erythema ( birthmark under R eye) present  Neurological:      General: No focal deficit present  Mental Status: She is alert and oriented to person, place, and time  Discussion with Family: Updated  () at bedside  Discharge instructions/Information to patient and family:   See after visit summary for information provided to patient and family  Provisions for Follow-Up Care:  See after visit summary for information related to follow-up care and any pertinent home health orders  Disposition:   Home with VNA Services (Reminder: Complete face to face encounter)    Planned Readmission: None     Discharge Statement:  I spent 60 minutes discharging the patient  This time was spent on the day of discharge  I had direct contact with the patient on the day of discharge  Greater than 50% of the total time was spent examining patient, answering all patient questions, arranging and discussing plan of care with patient as well as directly providing post-discharge instructions  Additional time then spent on discharge activities  Discharge Medications:  See after visit summary for reconciled discharge medications provided to patient and/or family        **Please Note: This note may have been constructed using a voice recognition system**

## 2023-01-03 ENCOUNTER — HOME CARE VISIT (OUTPATIENT)
Dept: HOME HEALTH SERVICES | Facility: HOME HEALTHCARE | Age: 73
End: 2023-01-03

## 2023-01-03 ENCOUNTER — APPOINTMENT (OUTPATIENT)
Dept: LAB | Facility: HOSPITAL | Age: 73
End: 2023-01-03

## 2023-01-03 ENCOUNTER — TRANSITIONAL CARE MANAGEMENT (OUTPATIENT)
Dept: FAMILY MEDICINE CLINIC | Facility: HOSPITAL | Age: 73
End: 2023-01-03

## 2023-01-03 VITALS
SYSTOLIC BLOOD PRESSURE: 122 MMHG | TEMPERATURE: 97.9 F | DIASTOLIC BLOOD PRESSURE: 56 MMHG | BODY MASS INDEX: 20.81 KG/M2 | WEIGHT: 110.13 LBS | HEART RATE: 68 BPM

## 2023-01-03 DIAGNOSIS — N17.9 ACUTE KIDNEY FAILURE (HCC): ICD-10-CM

## 2023-01-03 LAB
ANION GAP SERPL CALCULATED.3IONS-SCNC: 11 MMOL/L (ref 4–13)
BUN SERPL-MCNC: 33 MG/DL (ref 5–25)
CALCIUM SERPL-MCNC: 9.4 MG/DL (ref 8.3–10.1)
CHLORIDE SERPL-SCNC: 106 MMOL/L (ref 96–108)
CO2 SERPL-SCNC: 19 MMOL/L (ref 21–32)
CREAT SERPL-MCNC: 1.39 MG/DL (ref 0.6–1.3)
GFR SERPL CREATININE-BSD FRML MDRD: 37 ML/MIN/1.73SQ M
GLUCOSE SERPL-MCNC: 83 MG/DL (ref 65–140)
POTASSIUM SERPL-SCNC: 4.4 MMOL/L (ref 3.5–5.3)
SODIUM SERPL-SCNC: 136 MMOL/L (ref 135–147)

## 2023-01-03 NOTE — Clinical Note
[facility] has admitted your patient to [address] [place] [address] service with the following disciplines:      SN  Response needed, please respond via fax to the VNA  Primary focus of home health care New diagnosis of CHF  Patient stated goals of care to return to level of function equal to function prior to hospitalization  Anticipated visit pattern and next visit date 2 visits this week, 3 visits next week, then 2 visits for 4 more weeks  See medication list - meds in home do not differ from AVS  Significant clinical findings Requesting order for home PT eval to see if patient would benefit from strengthening execises following hospitalization  Potential barriers to goal achievement patient is [facility] and does not wear her hearing aide  Other pertinent information none    Thank you for allowing us to participate in the care of your patient        Tameka Mahoney

## 2023-01-03 NOTE — UTILIZATION REVIEW
NOTIFICATION OF ADMISSION DISCHARGE   This is a Notification of Discharge from [address]  Please be advised that this patient has been discharge from our facility  Below you will find the admission and discharge date and time including the patient’s disposition  UTILIZATION REVIEW CONTACT:  Alea Trejo  Utilization   Network Utilization Review Department  Phone: [phone] carefully listen to the prompts  All voicemails are confidential   Email: [email or web address]     ADMISSION INFORMATION  PRESENTATION DATE: 12/24/2022  7:18 PM  OBERVATION ADMISSION DATE:   INPATIENT ADMISSION DATE: 12/24/22  9:09 PM   DISCHARGE DATE: 12/31/2022  6:16 PM   DISPOSITION:Home with Home Health Care    IMPORTANT INFORMATION:  Send all requests for admission clinical reviews, approved or denied determinations and any other requests to dedicated fax number below belonging to the campus where the patient is receiving treatment   List of dedicated fax numbers:  [address] [place] [address] DENIALS (Administrative/Medical Necessity) [phone]   [address] (Maternity/NICU/Pediatrics) [phone]   [facility] [phone]   [place] [address] [phone]   [address] [phone]   [address] [facility] [phone]   [address] [place] [address] [phone]   [address] [place] [address] 119 [phone]   [facility]  [phone]   405 [facility] [phone]   412 [facility] [address] [facility] [phone]

## 2023-01-03 NOTE — HOME HEALTH
Explained procedure  Validated patient name and   Assessed right arm for venipuncture site  Applied tourniquet above site  Donned gloves  Cleansed site with alcohol  Anchored vein and inserted 23 ga butterfly into right antecubital site  two unsuccessful attempts  third attempt successful  Obtained specimens for BMP  Tourniquet removed  Needle withdrawn  Pressure applied with 2 by 2 gauze  Patients asked to continue applying pressure until bleeding stopped  Self adhesive bandage applied to site  At end of procedure, site appearance without pain, tenderness, changes in color, changes in temperature, edema, induration, or drainage  Number of sticks attempted 3  Patient tolerated well    Instructions given to patient apply pressure if there's any bleeding  Specimens taken to [facility]

## 2023-01-03 NOTE — Clinical Note
[facility] your patient to [address] [place] [address] service with the following disciplines:      SN  Response needed, please respond via fax to the VNA  Primary focus of home health care new diagnosis of CHF  Patient stated goals of care to return to level of function equal to that before hospitaliztion  Anticipated visit pattern and next visit date 1/5 then 3 visits for 3 weeks then 2 visits for 6 weeks  See medication list - meds in home do not differ from AVS  Significant clinical findings red non blanchable area on sacrum  Requesting order for patient to apply desetin or other barrier cream 2 x  day  Potential barriers to goal achievement  Patient is [place] and decondiitioned  Other pertinent information   requesting order for Home PT eval for strengthening exercises    Thank you for allowing us to participate in the care of your patient      Marybeth Maria RN

## 2023-01-04 ENCOUNTER — HOME CARE VISIT (OUTPATIENT)
Dept: HOME HEALTH SERVICES | Facility: HOME HEALTHCARE | Age: 73
End: 2023-01-04

## 2023-01-05 ENCOUNTER — HOME CARE VISIT (OUTPATIENT)
Dept: HOME HEALTH SERVICES | Facility: HOME HEALTHCARE | Age: 73
End: 2023-01-05

## 2023-01-06 ENCOUNTER — HOME CARE VISIT (OUTPATIENT)
Dept: HOME HEALTH SERVICES | Facility: HOME HEALTHCARE | Age: 73
End: 2023-01-06

## 2023-01-06 VITALS — RESPIRATION RATE: 18 BRPM | DIASTOLIC BLOOD PRESSURE: 60 MMHG | SYSTOLIC BLOOD PRESSURE: 96 MMHG

## 2023-01-08 VITALS — DIASTOLIC BLOOD PRESSURE: 62 MMHG | OXYGEN SATURATION: 96 % | SYSTOLIC BLOOD PRESSURE: 110 MMHG | HEART RATE: 70 BPM

## 2023-01-09 ENCOUNTER — HOME CARE VISIT (OUTPATIENT)
Dept: HOME HEALTH SERVICES | Facility: HOME HEALTHCARE | Age: 73
End: 2023-01-09

## 2023-01-10 ENCOUNTER — HOME CARE VISIT (OUTPATIENT)
Dept: HOME HEALTH SERVICES | Facility: HOME HEALTHCARE | Age: 73
End: 2023-01-10

## 2023-01-10 VITALS
OXYGEN SATURATION: 98 % | DIASTOLIC BLOOD PRESSURE: 68 MMHG | RESPIRATION RATE: 19 BRPM | SYSTOLIC BLOOD PRESSURE: 126 MMHG | TEMPERATURE: 98 F | HEART RATE: 81 BPM

## 2023-01-10 VITALS
TEMPERATURE: 98 F | WEIGHT: 104 LBS | DIASTOLIC BLOOD PRESSURE: 68 MMHG | OXYGEN SATURATION: 96 % | HEART RATE: 81 BPM | BODY MASS INDEX: 19.65 KG/M2 | SYSTOLIC BLOOD PRESSURE: 122 MMHG

## 2023-01-11 ENCOUNTER — HOME CARE VISIT (OUTPATIENT)
Dept: HOME HEALTH SERVICES | Facility: HOME HEALTHCARE | Age: 73
End: 2023-01-11

## 2023-01-11 VITALS — HEART RATE: 70 BPM | SYSTOLIC BLOOD PRESSURE: 110 MMHG | OXYGEN SATURATION: 92 % | DIASTOLIC BLOOD PRESSURE: 60 MMHG

## 2023-01-12 ENCOUNTER — HOME CARE VISIT (OUTPATIENT)
Dept: HOME HEALTH SERVICES | Facility: HOME HEALTHCARE | Age: 73
End: 2023-01-12

## 2023-01-12 VITALS
HEART RATE: 98 BPM | SYSTOLIC BLOOD PRESSURE: 106 MMHG | OXYGEN SATURATION: 97 % | RESPIRATION RATE: 19 BRPM | DIASTOLIC BLOOD PRESSURE: 68 MMHG | TEMPERATURE: 97.3 F

## 2023-01-12 VITALS
TEMPERATURE: 98.1 F | WEIGHT: 104 LBS | BODY MASS INDEX: 19.65 KG/M2 | SYSTOLIC BLOOD PRESSURE: 106 MMHG | DIASTOLIC BLOOD PRESSURE: 60 MMHG | RESPIRATION RATE: 18 BRPM | HEART RATE: 96 BPM | OXYGEN SATURATION: 97 %

## 2023-01-13 ENCOUNTER — HOME CARE VISIT (OUTPATIENT)
Dept: HOME HEALTH SERVICES | Facility: HOME HEALTHCARE | Age: 73
End: 2023-01-13

## 2023-01-16 ENCOUNTER — HOME CARE VISIT (OUTPATIENT)
Dept: HOME HEALTH SERVICES | Facility: HOME HEALTHCARE | Age: 73
End: 2023-01-16

## 2023-01-17 ENCOUNTER — HOME CARE VISIT (OUTPATIENT)
Dept: HOME HEALTH SERVICES | Facility: HOME HEALTHCARE | Age: 73
End: 2023-01-17

## 2023-01-18 ENCOUNTER — HOME CARE VISIT (OUTPATIENT)
Dept: HOME HEALTH SERVICES | Facility: HOME HEALTHCARE | Age: 73
End: 2023-01-18

## 2023-01-18 VITALS
SYSTOLIC BLOOD PRESSURE: 114 MMHG | HEART RATE: 78 BPM | OXYGEN SATURATION: 97 % | RESPIRATION RATE: 19 BRPM | DIASTOLIC BLOOD PRESSURE: 68 MMHG | TEMPERATURE: 98 F

## 2023-01-18 VITALS
DIASTOLIC BLOOD PRESSURE: 60 MMHG | HEART RATE: 76 BPM | OXYGEN SATURATION: 97 % | SYSTOLIC BLOOD PRESSURE: 102 MMHG | TEMPERATURE: 97.2 F | RESPIRATION RATE: 18 BRPM

## 2023-01-20 ENCOUNTER — HOME CARE VISIT (OUTPATIENT)
Dept: HOME HEALTH SERVICES | Facility: HOME HEALTHCARE | Age: 73
End: 2023-01-20

## 2023-01-23 ENCOUNTER — HOME CARE VISIT (OUTPATIENT)
Dept: HOME HEALTH SERVICES | Facility: HOME HEALTHCARE | Age: 73
End: 2023-01-23

## 2023-01-23 VITALS — HEART RATE: 65 BPM | OXYGEN SATURATION: 99 % | DIASTOLIC BLOOD PRESSURE: 70 MMHG | SYSTOLIC BLOOD PRESSURE: 122 MMHG

## 2023-02-12 DIAGNOSIS — M54.16 LUMBAR RADICULOPATHY: ICD-10-CM

## 2023-02-12 RX ORDER — TRAMADOL HYDROCHLORIDE 50 MG/1
TABLET ORAL
Qty: 120 TABLET | Refills: 1 | Status: SHIPPED | OUTPATIENT
Start: 2023-02-12

## 2023-02-22 ENCOUNTER — OFFICE VISIT (OUTPATIENT)
Dept: FAMILY MEDICINE CLINIC | Facility: HOSPITAL | Age: 73
End: 2023-02-22

## 2023-02-22 VITALS
BODY MASS INDEX: 18.69 KG/M2 | HEART RATE: 80 BPM | HEIGHT: 61 IN | DIASTOLIC BLOOD PRESSURE: 78 MMHG | OXYGEN SATURATION: 100 % | WEIGHT: 99 LBS | SYSTOLIC BLOOD PRESSURE: 122 MMHG

## 2023-02-22 DIAGNOSIS — M54.16 LUMBAR RADICULOPATHY: ICD-10-CM

## 2023-02-22 DIAGNOSIS — M54.6 CHRONIC BILATERAL THORACIC BACK PAIN: Primary | ICD-10-CM

## 2023-02-22 DIAGNOSIS — F11.20 CONTINUOUS OPIOID DEPENDENCE (HCC): ICD-10-CM

## 2023-02-22 DIAGNOSIS — G89.29 CHRONIC BILATERAL THORACIC BACK PAIN: Primary | ICD-10-CM

## 2023-02-22 DIAGNOSIS — M51.34 DDD (DEGENERATIVE DISC DISEASE), THORACIC: ICD-10-CM

## 2023-02-22 DIAGNOSIS — N18.32 CHRONIC KIDNEY DISEASE, STAGE 3B (HCC): ICD-10-CM

## 2023-02-22 RX ORDER — DIGOXIN 0.06 MG/1
62.5 TABLET ORAL
COMMUNITY
Start: 2023-01-06

## 2023-02-22 RX ORDER — RIPRETINIB 50 MG/1
50 TABLET ORAL
COMMUNITY
Start: 2023-02-10

## 2023-03-22 NOTE — QUICK NOTE
Patient with increasing O2 requirements, increasing abdominal discomfort, new afib RVR on repeat EKG  Patient's last BM 3-4 days ago however refused PRN bowel regimen throughout day  Due to persistent discomfort and new afib with RVR + increasing O2 will obtain CT CAP  Discussed atrial fibrillation with RVR with cardiology, will give 0 25 mg (250 mcg) digoxin x 1 now, however suspect this is due to underlying cause, will continue work up: Will make patient NPO, hold off on fluids due to CHF currently    Obtain obx series  Check CBC, CMP, trops  Continue to monitor symptoms and O2 needs 08:30

## 2023-05-05 NOTE — RESPIRATORY THERAPY NOTE
Pt extubated to 4L nasal cannula  No distress present, Spo2 ios 905% rr18  Tolerating well at this time  VIEW ALL

## 2023-05-14 PROBLEM — F11.20 CONTINUOUS OPIOID DEPENDENCE (HCC): Status: ACTIVE | Noted: 2023-05-14

## 2023-05-15 NOTE — PROGRESS NOTES
[facility],     Assessment/Plan:      Diagnosis ICD-10-CM Associated Orders   1  Chronic bilateral thoracic back pain  M54 6     G89 29       2  Continuous opioid dependence ([facility] [address] )  F11 20       3  Chronic kidney disease, stage 3b (HCC)  N18 32       4  DDD (degenerative disc disease), thoracic  M51 34       5  Lumbar radiculopathy  M54 16         • CT Chest reviewed with pt demonstrating scoliosis & DDD  • Deferred dedicated Thoracic XR  Recommend PT or stretches, & heat at home  • C/W tramadol as needed  PDMP reviewed  eGFR 49 on most recent CMP  • F/U in 6 months or sooner as needed for pain  • Patient may call or return to office with any questions or concerns  ______________________________________________________________________  Subjective:     Patient ID: Roby Magaña is a 74 y o  female  [facility]  Chief Complaint   Patient presents with   • Follow-up     Patient has a pain in the middle of her back      CT Chest in Dec 2022  OSSEOUS STRUCTURES:  No acute fracture or destructive osseous lesion  Spinal degenerative changes and scoliosis redemonstrated  No recent traumas  No falls  No cane or walker needed  The following portions of the patient's history were reviewed and updated as appropriate: allergies, current medications, past medical history, and problem list     Review of Systems   Constitutional: Negative for chills and fever  Respiratory: Negative for cough and shortness of breath  Cardiovascular: Negative for chest pain and palpitations  Musculoskeletal: Positive for back pain  Negative for myalgias  Objective:      Vitals:    02/22/23 1101   BP: 122/78   Pulse: 80   SpO2: 100%      Physical Exam  Vitals reviewed  Constitutional:       General: She is not in acute distress  Appearance: She is well-developed  She is not ill-appearing  Comments: Frail appearing, underweight      HENT:      Head: "Normocephalic and atraumatic  Eyes:      General: No scleral icterus  Right eye: No discharge  Left eye: No discharge  Cardiovascular:      Rate and Rhythm: Normal rate and regular rhythm  Pulses: Normal pulses  Heart sounds: Normal heart sounds  No murmur heard  Pulmonary:      Effort: Pulmonary effort is normal  No respiratory distress  Breath sounds: Normal breath sounds  No stridor  No wheezing  Musculoskeletal:         General: Tenderness (b/l Tspine painful, no one bony midline point of tenderness) present  Cervical back: Normal range of motion  No rigidity  Right lower leg: No edema  Left lower leg: No edema  Skin:     General: Skin is warm  Neurological:      Mental Status: She is alert and oriented to person, place, and time  Psychiatric:         Mood and Affect: Mood normal          Behavior: Behavior normal          Thought Content: Thought content normal          Judgment: Judgment normal            Portions of the record may have been created with voice recognition software  Occasional wrong word or \"sound alike\" substitutions may have occurred due to the inherent limitations of voice recognition software  Please review the chart carefully and recognize, using context, where substitutions/typographical errors may have occurred       "

## 2023-07-21 ENCOUNTER — TELEPHONE (OUTPATIENT)
Dept: FAMILY MEDICINE CLINIC | Facility: HOSPITAL | Age: 73
End: 2023-07-21

## 2023-07-21 NOTE — TELEPHONE ENCOUNTER
Filippo Shin is going to start hospice- they will send over scripts.  If you have any questions Giorgi from [facility]  number is [phone] and the office is [phone]

## 2023-08-04 ENCOUNTER — TELEPHONE (OUTPATIENT)
Dept: FAMILY MEDICINE CLINIC | Facility: HOSPITAL | Age: 73
End: 2023-08-04

## 2023-08-04 NOTE — TELEPHONE ENCOUNTER
Needs a script for ativan 0.5mg every 2 hours as needed  Also her BP has been running low 90/60, taking her BP's, wondering if we could discontinue her meds.    Cheyenne Whalen from home nurse [phone]  Please send to [facility] Their fax is [phone]

## 2023-08-09 ENCOUNTER — TELEPHONE (OUTPATIENT)
Dept: FAMILY MEDICINE CLINIC | Facility: HOSPITAL | Age: 73
End: 2023-08-09

## 2023-08-21 ENCOUNTER — TELEPHONE (OUTPATIENT)
Dept: FAMILY MEDICINE CLINIC | Facility: HOSPITAL | Age: 73
End: 2023-08-21

## 2023-08-22 DIAGNOSIS — M54.16 LUMBAR RADICULOPATHY: ICD-10-CM

## 2023-08-22 RX ORDER — TRAMADOL HYDROCHLORIDE 50 MG/1
100 TABLET ORAL EVERY 8 HOURS PRN
Qty: 180 TABLET | Refills: 1 | Status: SHIPPED | OUTPATIENT
Start: 2023-08-22

## 2023-08-23 ENCOUNTER — TELEPHONE (OUTPATIENT)
Dept: FAMILY MEDICINE CLINIC | Facility: HOSPITAL | Age: 73
End: 2023-08-23

## 2023-08-23 NOTE — TELEPHONE ENCOUNTER
----Hi, this is [place] from [facility]. I had called in reference to Doctor Ojeda's patient Helen Cinthya. I need some medication orders for pain medication and I haven't heard back from anyone. My number is [phone] and I will wait to hear from you. Thank you.

## 2023-08-23 NOTE — TELEPHONE ENCOUNTER
[facility] called and wanted to let you know patient fell. Not sure how long or when she fell. Family found her. Patient doesn't believe she hit her head. Vitals were good.

## 2023-08-23 NOTE — TELEPHONE ENCOUNTER
Spoke with [facility] confirmed with Dr. Tobin Bhagat and gave verbal  Take 2 tablets (100 mg total) by mouth every 8 (eight) hours as needed for moderate pain or severe pain

## 2023-08-29 ENCOUNTER — TELEPHONE (OUTPATIENT)
Dept: FAMILY MEDICINE CLINIC | Facility: HOSPITAL | Age: 73
End: 2023-08-29

## 2023-08-29 NOTE — TELEPHONE ENCOUNTER
David Martínez from [facility] called and left message on med refill line, asking for a increase in pt's Haldol, she's currently taking 1mg at bedtime, they were wondering if it can be increased to 2mg.  Their number if any questions is [phone]

## 2023-08-30 NOTE — TELEPHONE ENCOUNTER
Marissa calling again. Asking for reply to yesterdays message, pt is really having difficulty sleeping.  PCB

## 2023-09-15 NOTE — H&P
History of Present Illness: The patient is a 65 y o  female who presents with complaints of low back and leg pain  Patient Active Problem List   Diagnosis    Anxiety    DJD (degenerative joint disease)    GIST (gastrointestinal stromal tumor), malignant ([facility]    Hypertension    Spinal stenosis of lumbar region    Pain syndrome, chronic    Paroxysmal atrial fibrillation (HCC)    Right bundle-branch block    Bilateral lumbar radiculopathy    Spondylosis of lumbar region without myelopathy or radiculopathy    Non-rheumatic mitral regurgitation    [facility]    Upper GI bleed    Acute gastric ulcer with hemorrhage    Gastric AVM    Iron deficiency anemia    Pre-op examination    Cornea disorder    Anemia due to blood loss, acute    Lumbar radiculitis       Past Medical History:   Diagnosis Date    ADHD     Anemia     Anxiety     Arthritis     Asthma     [facility]     [facility] cancer ([facility] )     Cancer ([facility] )     Chronic iron deficiency anemia 2020    Extensive GI evaluation over the last year including multiple EGD, colonoscopy, and capsule endoscopy  Has had gastric AVMs cauterized, Dieulafoy's lesion clipped, and most recently a Seb erosion cauterized    Coronary artery disease     Depression     Gastroenteritis 2020    GERD (gastroesophageal reflux disease)     History of stomach ulcers     Hypercholesterolemia     Hypertension     Kidney disease     Metastatic cancer ([facility]        Past Surgical History:   Procedure Laterality Date    ANKLE SURGERY      APPENDECTOMY      BREAST SURGERY      CATARACT EXTRACTION       SECTION      COLONOSCOPY  2019    Multiple adenomatous colon polyps and internal hemorrhoids    Three year recall advised    HIP SURGERY      JOINT REPLACEMENT  2019    Left knee replacement    LAMINECTOMY      ROTATOR CUFF REPAIR      SIGMOID RESECTION / RECTOPEXY      SINUS SURGERY      UPPER Spoke to patient regarding results below. Patient verbalized understanding and has no further questions at this time.   GASTROINTESTINAL ENDOSCOPY  05/01/2020    Dieulafoy's lesion in proximal stomach which was actively oozing  Injected with epinephrine and 2 Endoclips placed with control of bleeding, hiatal hernia      UPPER GASTROINTESTINAL ENDOSCOPY  06/2020    Bleeding Seb's erosion status post cauterization         Current Outpatient Medications:     Azelastine HCl 137 MCG/SPRAY SOLN, instill 2 sprays into each nostril twice a day if needed for congestion, Disp: , Rfl: 0    diphenhydrAMINE (BENADRYL) 25 mg tablet, Take 25 mg by mouth every 6 (six) hours as needed for itching, Disp: , Rfl:     diphenoxylate-atropine (LOMOTIL) 2 5-0 025 mg per tablet, TAKE 1 TABLET BY MOUTH 4 TIMES A DAY AS NEEDED FOR DIARRHEA, Disp: 30 tablet, Rfl: 5    docusate sodium (COLACE) 100 mg capsule, Take 100 mg by mouth 2 (two) times a day as needed for constipation, Disp: , Rfl:     famotidine (PEPCID) 10 mg tablet, Take 10 mg by mouth , Disp: , Rfl:     ferrous sulfate 324 (65 Fe) mg, Take 1 tablet (324 mg total) by mouth 2 (two) times a day before meals, Disp: 60 tablet, Rfl: 2    furosemide (LASIX) 20 mg tablet, Take 20 mg by mouth daily , Disp: , Rfl:     hydroxychloroquine (PLAQUENIL) 200 mg tablet, Take 200 mg by mouth daily in the early morning , Disp: , Rfl:     loperamide (IMODIUM) 2 mg capsule, Take 2 capsules by mouth 4 (four) times a day as needed, Disp: , Rfl:     metoprolol succinate (Toprol XL) 25 mg 24 hr tablet, Take 0 5 tablets (12 5 mg total) by mouth daily, Disp: , Rfl: 0    NON FORMULARY, Take 2 tablets by mouth daily Chemo: Blue 285, Disp: , Rfl:     ofloxacin (OCUFLOX) 0 3 % ophthalmic solution, Starting April 7, Disp: , Rfl: 0    ondansetron (ZOFRAN) 8 mg tablet, 8 mg daily with breakfast Prior to chemo, Disp: , Rfl:     pantoprazole (PROTONIX) 40 mg tablet, take 1 tablet by mouth twice a day, Disp: 180 tablet, Rfl: 5    prednisoLONE acetate (PRED FORTE) 1 % ophthalmic suspension, , Disp: , Rfl: 0   traMADol (ULTRAM) 50 mg tablet, Take 1 tablet (50 mg total) by mouth every 8 (eight) hours as needed for moderate pain, Disp: 30 tablet, Rfl: 0    Triamcinolone Acetonide (NASACORT ALLERGY 24HR NA), into each nostril 1 spray each nostril--at bedtime   (OTC), Disp: , Rfl:     Current Facility-Administered Medications:     iohexol (OMNIPAQUE) 300 mg/mL injection 50 mL, 50 mL, Epidural, Once, Tre Rdz,     lidocaine (PF) (XYLOCAINE-MPF) 1 % injection 5 mL, 5 mL, Other, Once, Tre Rdz DO    methylPREDNISolone acetate (DEPO-MEDROL) injection 80 mg, 80 mg, Epidural, Once, [employer], DO    Allergies   Allergen Reactions    Codeine Other (See Comments)     Throat closes    Codeine Sulfate Throat Swelling    Neomycin-Bacitracin Zn-Polymyx Rash    Wound Dressing Adhesive Other (See Comments)     If its on too long- pt starts itching    Zolmitriptan Palpitations     Heart palpitations  Generic for Zomig         Physical Exam:   General: Awake, Alert, Oriented x 3, Mood and affect appropriate  Respiratory: Respirations even and unlabored  Cardiovascular: Peripheral pulses intact; no edema  Musculoskeletal Exam:  Decreased range of motion lumbar spine    ASA Score: II         Assessment:   1  Spinal stenosis of lumbar region, unspecified whether neurogenic claudication present    2   Lumbar radiculitis        Plan: LESI - ASA

## 2023-09-20 ENCOUNTER — TELEPHONE (OUTPATIENT)
Dept: FAMILY MEDICINE CLINIC | Facility: HOSPITAL | Age: 73
End: 2023-09-20

## 2023-09-20 NOTE — TELEPHONE ENCOUNTER
[facility]--Reports that pt is having increased restlessness and agitation. Also seems to be sun-downing. Takes Haldol at bedtime and sometimes throughout the day. Asking if this can be switched to Seroquil.  [facility] [phone]

## 2023-09-22 NOTE — TELEPHONE ENCOUNTER
Ok per Dr. Guillermina Dakin for Seroquel  50 mg at bed.  [facility] aware and they will continue haldol prn

## 2023-10-02 ENCOUNTER — TELEPHONE (OUTPATIENT)
Dept: FAMILY MEDICINE CLINIC | Facility: HOSPITAL | Age: 73
End: 2023-10-02

## 2023-10-02 NOTE — TELEPHONE ENCOUNTER
[facility]Pt is declining and having lots of pain. Asking if Morphine can be ordered for 10mg/hour every hour as needed.  Asking for rx to be sent to [facility] fax: [phone]

## 2023-10-04 ENCOUNTER — TELEPHONE (OUTPATIENT)
Dept: FAMILY MEDICINE CLINIC | Facility: HOSPITAL | Age: 73
End: 2023-10-04

## 2023-10-04 DIAGNOSIS — C49.A4 GIST (GASTROINTESTINAL STROMA TUMOR), MALIGNANT, COLON (HCC): Primary | ICD-10-CM

## 2023-10-04 DIAGNOSIS — C78.89 MALIGNANT NEOPLASM METASTATIC TO OTHER DIGESTIVE SYSTEM STRUCTURE (HCC): ICD-10-CM

## 2023-10-04 RX ORDER — MORPHINE SULFATE 10 MG/.5ML
10 SOLUTION ORAL
Qty: 240 ML | Refills: 0 | Status: SHIPPED | OUTPATIENT
Start: 2023-10-04

## 2023-10-04 NOTE — PROGRESS NOTES
Per [facility]:     Opioid drug Equianalgesic dosage Initial oral dosage Comments  Oral dosage Parenteral dosage  Morphine 30 mg every 3 to 4 hours 10 mg 30 mg every 4 hours Available in a long-acting preparation      Agree with nursing call from hospice to increase morphine to 10 mg q1 hr prn pain.

## 2023-10-04 NOTE — TELEPHONE ENCOUNTER
Paulo Geronimo from [facility] left the following voicemail:    I'm calling about Germain Pleasure and I did speak with someone couple of hours ago about morphine and a script that was sent to [facility]. I did call [facility] to check on it because I know sometimes they don't have a supply of morphine. They said that they never received the order. So I am hoping we could get that sent out through our pharmacy. [facility], the E script for [facility] is not working. So we are faxing to your office a request for that so that you guys will just be able to sign and fax that. And I also do have the fax number [phone] and that would be for Martin Ty birth date 1950 morphine concentrate, 20 milligrams per amount, .5 ML hourly as needed for pain or shortness of breath. My number is [phone]. Thank you.  haris Eddy.

## 2023-10-09 ENCOUNTER — TELEPHONE (OUTPATIENT)
Dept: FAMILY MEDICINE CLINIC | Facility: HOSPITAL | Age: 73
End: 2023-10-09

## 2023-11-13 NOTE — PROGRESS NOTES
Daily Note     Today's date: 2019  Patient name: Isabella Bui  :   MRN: [id number]  Referring provider: Rayo Rao MD  Dx:   Encounter Diagnosis     ICD-10-CM    1  S/P TKR (total knee replacement), left Z96 652                   Subjective: Pt reports that she has been trying to use her cane when traveling short distances  She was able to use it to walk from her car and into [employer] the other day  She also notes that she has been having soreness and pain on either side of her knee joint that seems to be getting worse with each day  She has not talked to her doctor about the pain  Objective: See treatment diary below      Assessment: Gabe Kang continues to respond to gentle STM and PROM at the beginning of the session, as she is hypersensitive inititally and has high levels of fear avoidance with bending her knees  She continues to demonstrate approximately 8 degrees of knee extension, but has progressed flexion to 95 degrees today  She also is slowly tolerating gentle TE for strengthening and stretching introduced  She would benefit from continued skilled PT  Plan: Continue per plan of care  Progress treatment as tolerated         Precautions: High Blood Pressure, Asthma, Arthritis, Anxiety/Depression, ADHD, Atrial Fibrillation, Breast Cancer, GERD, hypothyroid, kidney Disease    EPOC: 10/23/19    Daily Treatment Diary      Manual                       L knee PROM   14'  15'                   STM Knee   10'  8'                                                                      24'                           Exercise Diary                       Stationary Bike  (Half rotations)   p!  held held                  Calf stretch at block    Long sit with strap  3 x30"                   Knee flexion stretch on step   heel slides with strap  2 x10, 10" hold      heel slides with strap  2 x10, 10" hold                          HR/TR    HR  x20                   Step Ups/Downs                       Lateral Step Ups                       Mini Squats   x20  x20                   TKE                                               Quad Sets   3" hold  2 x10    3" hold  2 x10                   SAQ   2 x10  3" hold  2 x10                   Bridges                       Hamstring stretch in long sit with strap    3 x30"                   Prone hamstring curls                                                                                                                                                      Modalities                        CP (post tx)                        NMES                                                  HEP: AAROM knee flexion with contralateral limb,tailgaiting, quad sets, heel prop in sitting, heel slides [address] OBS or IP - moderate complexity OR 35-49 mins

## 2024-02-05 NOTE — TELEPHONE ENCOUNTER
Lab called with critical result on pt, hemoglobin is 5 1  Should this be addressed tonight or can it wait for a doctor to address tomorrow in [address]? Not seen in 12 months: Tried calling patient, no answer, not able to LVM.

## 2024-06-04 NOTE — DISCHARGE INSTRUCTIONS
Epidural Steroid Injection   WHAT YOU NEED TO KNOW:   An epidural steroid injection (MARIVEL) is a procedure to inject steroid medicine into the epidural space  The epidural space is between your spinal cord and vertebrae  Steroids reduce inflammation and fluid buildup in your spine that may be causing pain  You may be given pain medicine along with the steroids  ACTIVITY  · Do not drive or operate machinery today  · No strenuous activity today - bending, lifting, etc   · You may resume normal activites starting tomorrow - start slowly and as tolerated  · You may shower today, but no tub baths or hot tubs  · You may have numbness for several hours from the local anesthetic  Please use caution and common sense, especially with weight-bearing activities  CARE OF THE INJECTION SITE  · If you have soreness or pain, apply ice to the area today (20 minutes on/20 minutes off)  · Starting tomorrow, you may use warm, moist heat or ice if needed  · You may have an increase or change in your discomfort for 36-48 hours after your treatment  · Apply ice and continue with any pain medication you have been prescribed  · Notify [facility] if you have any of the following: redness, drainage, swelling, headache, stiff neck or fever above 100°F     SPECIAL INSTRUCTIONS  · Our office will contact you in approximately 7 days for a progress report  MEDICATIONS  · Continue to take all routine medications  · Our office may have instructed you to hold some medications  If you have a problem specifically related to your procedure, please call our office at [phone]  Problems not related to your procedure should be directed to your primary care physician 
show

## 2025-06-04 NOTE — ASSESSMENT & PLAN NOTE
• S/p sigmoid resection in setting of colon cancer about 20 years ago  •  endorses Jessica He recently stopped patients Avapritinib  • No acute interventions at this time  • Continue home prn medications for diarrhea/constipation No exercise/No heavy lifting/No sports/gym/Quiet play